# Patient Record
Sex: FEMALE | Race: WHITE | Employment: OTHER | URBAN - NONMETROPOLITAN AREA
[De-identification: names, ages, dates, MRNs, and addresses within clinical notes are randomized per-mention and may not be internally consistent; named-entity substitution may affect disease eponyms.]

---

## 2017-01-03 DIAGNOSIS — I10 ESSENTIAL HYPERTENSION: ICD-10-CM

## 2017-01-03 RX ORDER — LISINOPRIL AND HYDROCHLOROTHIAZIDE 25; 20 MG/1; MG/1
TABLET ORAL
Qty: 30 TABLET | Refills: 11 | Status: SHIPPED | OUTPATIENT
Start: 2017-01-03 | End: 2017-10-03 | Stop reason: DRUGHIGH

## 2017-01-09 ENCOUNTER — OFFICE VISIT (OUTPATIENT)
Dept: PRIMARY CARE CLINIC | Age: 72
End: 2017-01-09
Payer: MEDICARE

## 2017-01-09 VITALS
BODY MASS INDEX: 41.61 KG/M2 | DIASTOLIC BLOOD PRESSURE: 72 MMHG | TEMPERATURE: 96.7 F | OXYGEN SATURATION: 97 % | SYSTOLIC BLOOD PRESSURE: 132 MMHG | HEART RATE: 103 BPM | WEIGHT: 226.12 LBS | HEIGHT: 62 IN

## 2017-01-09 DIAGNOSIS — E78.2 MIXED HYPERLIPIDEMIA: ICD-10-CM

## 2017-01-09 DIAGNOSIS — I63.319 CEREBRAL INFARCTION DUE TO THROMBOSIS OF MIDDLE CEREBRAL ARTERY, UNSPECIFIED BLOOD VESSEL LATERALITY (HCC): ICD-10-CM

## 2017-01-09 DIAGNOSIS — Z23 NEED FOR 23-POLYVALENT PNEUMOCOCCAL POLYSACCHARIDE VACCINE: Primary | ICD-10-CM

## 2017-01-09 DIAGNOSIS — E53.8 B12 DEFICIENCY: ICD-10-CM

## 2017-01-09 DIAGNOSIS — E11.9 TYPE 2 DIABETES MELLITUS WITHOUT COMPLICATION, WITHOUT LONG-TERM CURRENT USE OF INSULIN (HCC): ICD-10-CM

## 2017-01-09 DIAGNOSIS — I10 ESSENTIAL HYPERTENSION: ICD-10-CM

## 2017-01-09 PROCEDURE — 90732 PPSV23 VACC 2 YRS+ SUBQ/IM: CPT | Performed by: PEDIATRICS

## 2017-01-09 PROCEDURE — 99214 OFFICE O/P EST MOD 30 MIN: CPT | Performed by: PEDIATRICS

## 2017-01-09 PROCEDURE — G0009 ADMIN PNEUMOCOCCAL VACCINE: HCPCS | Performed by: PEDIATRICS

## 2017-01-09 ASSESSMENT — ENCOUNTER SYMPTOMS
BACK PAIN: 0
EYE PAIN: 0
CONSTIPATION: 0
DIARRHEA: 0
SORE THROAT: 0
SINUS PRESSURE: 0
NAUSEA: 0
WHEEZING: 0
EYE DISCHARGE: 0
ABDOMINAL PAIN: 0
COUGH: 0
SHORTNESS OF BREATH: 0
VOMITING: 0
VOICE CHANGE: 0

## 2017-01-30 ENCOUNTER — PROCEDURE VISIT (OUTPATIENT)
Dept: PRIMARY CARE CLINIC | Age: 72
End: 2017-01-30
Payer: MEDICARE

## 2017-01-30 DIAGNOSIS — E53.8 B12 DEFICIENCY: Primary | ICD-10-CM

## 2017-01-30 PROCEDURE — 96372 THER/PROPH/DIAG INJ SC/IM: CPT | Performed by: NURSE PRACTITIONER

## 2017-01-30 RX ORDER — CYANOCOBALAMIN 1000 UG/ML
1000 INJECTION INTRAMUSCULAR; SUBCUTANEOUS ONCE
Status: COMPLETED | OUTPATIENT
Start: 2017-01-30 | End: 2017-01-30

## 2017-01-30 RX ADMIN — CYANOCOBALAMIN 1000 MCG: 1000 INJECTION INTRAMUSCULAR; SUBCUTANEOUS at 13:37

## 2017-02-06 RX ORDER — ATORVASTATIN CALCIUM 20 MG/1
20 TABLET, FILM COATED ORAL NIGHTLY
Qty: 30 TABLET | Refills: 11 | OUTPATIENT
Start: 2017-02-06

## 2017-02-13 ENCOUNTER — TELEPHONE (OUTPATIENT)
Dept: PRIMARY CARE CLINIC | Age: 72
End: 2017-02-13

## 2017-02-13 DIAGNOSIS — H65.21 RIGHT CHRONIC SEROUS OTITIS MEDIA: ICD-10-CM

## 2017-02-13 DIAGNOSIS — M10.9 ACUTE GOUTY ARTHRITIS: ICD-10-CM

## 2017-02-14 ENCOUNTER — TELEPHONE (OUTPATIENT)
Dept: PRIMARY CARE CLINIC | Age: 72
End: 2017-02-14

## 2017-02-14 RX ORDER — PREDNISONE 10 MG/1
TABLET ORAL
Qty: 43 TABLET | Refills: 1 | Status: SHIPPED | OUTPATIENT
Start: 2017-02-14 | End: 2017-02-28 | Stop reason: ALTCHOICE

## 2017-02-14 RX ORDER — COLCHICINE 0.6 MG/1
0.6 TABLET ORAL 2 TIMES DAILY
Qty: 60 TABLET | Refills: 2 | Status: SHIPPED | OUTPATIENT
Start: 2017-02-14 | End: 2017-02-28

## 2017-02-28 ENCOUNTER — OFFICE VISIT (OUTPATIENT)
Dept: PRIMARY CARE CLINIC | Age: 72
End: 2017-02-28
Payer: MEDICARE

## 2017-02-28 ENCOUNTER — TELEPHONE (OUTPATIENT)
Dept: PRIMARY CARE CLINIC | Age: 72
End: 2017-02-28

## 2017-02-28 VITALS
SYSTOLIC BLOOD PRESSURE: 120 MMHG | HEART RATE: 103 BPM | DIASTOLIC BLOOD PRESSURE: 76 MMHG | BODY MASS INDEX: 40.03 KG/M2 | TEMPERATURE: 97.9 F | HEIGHT: 62 IN | WEIGHT: 217.5 LBS | OXYGEN SATURATION: 98 %

## 2017-02-28 DIAGNOSIS — M10.072 ACUTE IDIOPATHIC GOUT OF LEFT FOOT: ICD-10-CM

## 2017-02-28 DIAGNOSIS — E11.9 TYPE 2 DIABETES MELLITUS WITHOUT COMPLICATION, WITHOUT LONG-TERM CURRENT USE OF INSULIN (HCC): ICD-10-CM

## 2017-02-28 DIAGNOSIS — R05.9 COUGH: ICD-10-CM

## 2017-02-28 DIAGNOSIS — E11.9 TYPE 2 DIABETES MELLITUS WITHOUT COMPLICATION, WITHOUT LONG-TERM CURRENT USE OF INSULIN (HCC): Primary | ICD-10-CM

## 2017-02-28 DIAGNOSIS — E78.2 MIXED HYPERLIPIDEMIA: ICD-10-CM

## 2017-02-28 DIAGNOSIS — E53.8 B12 DEFICIENCY: ICD-10-CM

## 2017-02-28 DIAGNOSIS — J00 ACUTE NASOPHARYNGITIS: Primary | ICD-10-CM

## 2017-02-28 DIAGNOSIS — R73.9 HYPERGLYCEMIA: ICD-10-CM

## 2017-02-28 DIAGNOSIS — I10 ESSENTIAL HYPERTENSION: ICD-10-CM

## 2017-02-28 LAB
ALBUMIN SERPL-MCNC: 4.2 G/DL (ref 3.5–5.2)
ALP BLD-CCNC: 110 U/L (ref 35–104)
ALT SERPL-CCNC: 15 U/L (ref 5–33)
ANION GAP SERPL CALCULATED.3IONS-SCNC: 24 MMOL/L (ref 7–19)
AST SERPL-CCNC: 10 U/L (ref 5–32)
ATYPICAL LYMPHOCYTE RELATIVE PERCENT: 2 % (ref 0–8)
BANDED NEUTROPHILS RELATIVE PERCENT: 3 % (ref 0–5)
BASOPHILS ABSOLUTE: 0 K/UL (ref 0–0.2)
BASOPHILS RELATIVE PERCENT: 0 % (ref 0–1)
BILIRUB SERPL-MCNC: 0.3 MG/DL (ref 0.2–1.2)
BUN BLDV-MCNC: 20 MG/DL (ref 8–23)
CALCIUM SERPL-MCNC: 9 MG/DL (ref 8.8–10.2)
CHLORIDE BLD-SCNC: 96 MMOL/L (ref 98–111)
CHOLESTEROL, TOTAL: 194 MG/DL (ref 160–199)
CO2: 21 MMOL/L (ref 22–29)
CREAT SERPL-MCNC: 1 MG/DL (ref 0.5–0.9)
CREATININE URINE: 225.8 MG/DL (ref 4.2–622)
EOSINOPHILS ABSOLUTE: 0 K/UL (ref 0–0.6)
EOSINOPHILS RELATIVE PERCENT: 0 % (ref 0–5)
GFR NON-AFRICAN AMERICAN: 55
GLOBULIN: 3 G/DL
GLUCOSE BLD-MCNC: 260 MG/DL (ref 74–109)
HBA1C MFR BLD: 8.7 %
HCT VFR BLD CALC: 40.7 % (ref 37–47)
HDLC SERPL-MCNC: 63 MG/DL (ref 65–121)
HEMOGLOBIN: 12.6 G/DL (ref 12–16)
LDL CHOLESTEROL CALCULATED: 87 MG/DL
LYMPHOCYTES ABSOLUTE: 2.6 K/UL (ref 1.1–4.5)
LYMPHOCYTES RELATIVE PERCENT: 9 % (ref 20–40)
MCH RBC QN AUTO: 29 PG (ref 27–31)
MCHC RBC AUTO-ENTMCNC: 31 G/DL (ref 33–37)
MCV RBC AUTO: 93.8 FL (ref 81–99)
MICROALBUMIN UR-MCNC: <1.2 MG/DL (ref 0–19)
MICROALBUMIN/CREAT UR-RTO: NORMAL MG/G
MONOCYTES ABSOLUTE: 5.5 K/UL (ref 0–0.9)
MONOCYTES RELATIVE PERCENT: 23 % (ref 0–10)
NEUTROPHILS ABSOLUTE: 15.7 K/UL (ref 1.5–7.5)
NEUTROPHILS RELATIVE PERCENT: 63 % (ref 50–65)
PDW BLD-RTO: 13.4 % (ref 11.5–14.5)
PLATELET # BLD: 264 K/UL (ref 130–400)
PLATELET SLIDE REVIEW: ADEQUATE
PMV BLD AUTO: 12.3 FL (ref 7.4–10.4)
POTASSIUM SERPL-SCNC: 4.2 MMOL/L (ref 3.5–5)
RBC # BLD: 4.34 M/UL (ref 4.2–5.4)
SODIUM BLD-SCNC: 141 MMOL/L (ref 136–145)
STOMATOCYTES: ABNORMAL
T4 FREE: 1.7 NG/ML (ref 0.9–1.7)
TOTAL PROTEIN: 7.2 G/DL (ref 6.6–8.7)
TRIGL SERPL-MCNC: 221 MG/DL (ref 150–199)
TSH SERPL DL<=0.05 MIU/L-ACNC: 1.37 UIU/ML (ref 0.27–4.2)
URIC ACID, SERUM: 9.3 MG/DL (ref 2.4–5.7)
VITAMIN B-12: 719 PG/ML (ref 211–946)
WBC # BLD: 23.8 K/UL (ref 4.8–10.8)

## 2017-02-28 PROCEDURE — G8598 ASA/ANTIPLAT THER USED: HCPCS | Performed by: NURSE PRACTITIONER

## 2017-02-28 PROCEDURE — 1123F ACP DISCUSS/DSCN MKR DOCD: CPT | Performed by: NURSE PRACTITIONER

## 2017-02-28 PROCEDURE — 3017F COLORECTAL CA SCREEN DOC REV: CPT | Performed by: NURSE PRACTITIONER

## 2017-02-28 PROCEDURE — 1036F TOBACCO NON-USER: CPT | Performed by: NURSE PRACTITIONER

## 2017-02-28 PROCEDURE — 1090F PRES/ABSN URINE INCON ASSESS: CPT | Performed by: NURSE PRACTITIONER

## 2017-02-28 PROCEDURE — G8484 FLU IMMUNIZE NO ADMIN: HCPCS | Performed by: NURSE PRACTITIONER

## 2017-02-28 PROCEDURE — G8399 PT W/DXA RESULTS DOCUMENT: HCPCS | Performed by: NURSE PRACTITIONER

## 2017-02-28 PROCEDURE — 99213 OFFICE O/P EST LOW 20 MIN: CPT | Performed by: NURSE PRACTITIONER

## 2017-02-28 PROCEDURE — 4040F PNEUMOC VAC/ADMIN/RCVD: CPT | Performed by: NURSE PRACTITIONER

## 2017-02-28 PROCEDURE — G8417 CALC BMI ABV UP PARAM F/U: HCPCS | Performed by: NURSE PRACTITIONER

## 2017-02-28 PROCEDURE — 3014F SCREEN MAMMO DOC REV: CPT | Performed by: NURSE PRACTITIONER

## 2017-02-28 PROCEDURE — G8427 DOCREV CUR MEDS BY ELIG CLIN: HCPCS | Performed by: NURSE PRACTITIONER

## 2017-02-28 RX ORDER — NABUMETONE 750 MG/1
TABLET, FILM COATED ORAL
COMMUNITY
Start: 2017-01-04 | End: 2017-04-03

## 2017-02-28 RX ORDER — GUAIFENESIN 600 MG/1
1200 TABLET, EXTENDED RELEASE ORAL 2 TIMES DAILY
Qty: 56 TABLET | Refills: 0 | Status: SHIPPED | OUTPATIENT
Start: 2017-02-28 | End: 2017-03-14

## 2017-02-28 RX ORDER — FLUTICASONE PROPIONATE 50 MCG
1 SPRAY, SUSPENSION (ML) NASAL DAILY
Qty: 1 BOTTLE | Refills: 3 | Status: SHIPPED | OUTPATIENT
Start: 2017-02-28 | End: 2017-07-03

## 2017-02-28 ASSESSMENT — ENCOUNTER SYMPTOMS
SHORTNESS OF BREATH: 0
CONSTIPATION: 0
DIARRHEA: 0
COUGH: 1
VOMITING: 0
RHINORRHEA: 1
SORE THROAT: 1
EYE REDNESS: 0

## 2017-03-01 ENCOUNTER — TELEPHONE (OUTPATIENT)
Dept: PRIMARY CARE CLINIC | Age: 72
End: 2017-03-01

## 2017-03-01 DIAGNOSIS — D72.829 LEUKOCYTOSIS, UNSPECIFIED TYPE: Primary | ICD-10-CM

## 2017-03-01 LAB — PATHOLOGIST REVIEW: NORMAL

## 2017-03-02 RX ORDER — NABUMETONE 750 MG/1
TABLET, FILM COATED ORAL
Qty: 60 TABLET | Refills: 2 | Status: SHIPPED | OUTPATIENT
Start: 2017-03-02 | End: 2017-07-03

## 2017-03-10 ENCOUNTER — TELEPHONE (OUTPATIENT)
Dept: PRIMARY CARE CLINIC | Age: 72
End: 2017-03-10

## 2017-03-10 RX ORDER — ALLOPURINOL 100 MG/1
100 TABLET ORAL DAILY
Qty: 30 TABLET | Refills: 2 | Status: SHIPPED | OUTPATIENT
Start: 2017-03-10 | End: 2017-05-02 | Stop reason: SDUPTHER

## 2017-03-16 ENCOUNTER — PROCEDURE VISIT (OUTPATIENT)
Dept: PRIMARY CARE CLINIC | Age: 72
End: 2017-03-16
Payer: MEDICARE

## 2017-03-16 DIAGNOSIS — E53.8 B12 DEFICIENCY: Primary | ICD-10-CM

## 2017-03-16 PROCEDURE — 96372 THER/PROPH/DIAG INJ SC/IM: CPT | Performed by: PEDIATRICS

## 2017-03-16 RX ORDER — CYANOCOBALAMIN 1000 UG/ML
1000 INJECTION INTRAMUSCULAR; SUBCUTANEOUS ONCE
Status: COMPLETED | OUTPATIENT
Start: 2017-03-16 | End: 2017-03-16

## 2017-03-16 RX ADMIN — CYANOCOBALAMIN 1000 MCG: 1000 INJECTION INTRAMUSCULAR; SUBCUTANEOUS at 10:34

## 2017-03-27 ENCOUNTER — TELEPHONE (OUTPATIENT)
Dept: PRIMARY CARE CLINIC | Age: 72
End: 2017-03-27

## 2017-03-27 DIAGNOSIS — Z80.0 FAMILY HISTORY OF COLON CANCER: Primary | ICD-10-CM

## 2017-03-27 DIAGNOSIS — E11.9 TYPE 2 DIABETES MELLITUS WITHOUT COMPLICATION, WITHOUT LONG-TERM CURRENT USE OF INSULIN (HCC): ICD-10-CM

## 2017-03-27 DIAGNOSIS — I10 ESSENTIAL HYPERTENSION: ICD-10-CM

## 2017-03-27 LAB
HCT VFR BLD CALC: 34.1 % (ref 37–47)
HEMOGLOBIN: 10.7 G/DL (ref 12–16)
MCH RBC QN AUTO: 28.8 PG (ref 27–31)
MCHC RBC AUTO-ENTMCNC: 31.4 G/DL (ref 33–37)
MCV RBC AUTO: 91.9 FL (ref 81–99)
PDW BLD-RTO: 13.7 % (ref 11.5–14.5)
PLATELET # BLD: 301 K/UL (ref 130–400)
PMV BLD AUTO: 11.2 FL (ref 7.4–10.4)
RBC # BLD: 3.71 M/UL (ref 4.2–5.4)
WBC # BLD: 7.1 K/UL (ref 4.8–10.8)

## 2017-03-30 ENCOUNTER — PROCEDURE VISIT (OUTPATIENT)
Dept: PRIMARY CARE CLINIC | Age: 72
End: 2017-03-30
Payer: MEDICARE

## 2017-03-30 ENCOUNTER — TELEPHONE (OUTPATIENT)
Dept: PRIMARY CARE CLINIC | Age: 72
End: 2017-03-30

## 2017-03-30 DIAGNOSIS — Z12.11 COLON CANCER SCREENING: Primary | ICD-10-CM

## 2017-03-30 LAB
CONTROL: NORMAL
HEMOCCULT STL QL: NORMAL

## 2017-03-30 PROCEDURE — 82274 ASSAY TEST FOR BLOOD FECAL: CPT | Performed by: PEDIATRICS

## 2017-04-03 ENCOUNTER — OFFICE VISIT (OUTPATIENT)
Dept: PRIMARY CARE CLINIC | Age: 72
End: 2017-04-03
Payer: MEDICARE

## 2017-04-03 VITALS
SYSTOLIC BLOOD PRESSURE: 118 MMHG | HEIGHT: 62 IN | BODY MASS INDEX: 39.95 KG/M2 | HEART RATE: 108 BPM | TEMPERATURE: 97.2 F | DIASTOLIC BLOOD PRESSURE: 62 MMHG | OXYGEN SATURATION: 98 % | WEIGHT: 217.12 LBS

## 2017-04-03 DIAGNOSIS — I10 ESSENTIAL HYPERTENSION: ICD-10-CM

## 2017-04-03 DIAGNOSIS — E11.9 TYPE 2 DIABETES MELLITUS WITHOUT COMPLICATION, WITHOUT LONG-TERM CURRENT USE OF INSULIN (HCC): Primary | ICD-10-CM

## 2017-04-03 DIAGNOSIS — E79.0 HYPERURICEMIA: ICD-10-CM

## 2017-04-03 DIAGNOSIS — D64.9 ANEMIA, UNSPECIFIED TYPE: ICD-10-CM

## 2017-04-03 DIAGNOSIS — E78.2 MIXED HYPERLIPIDEMIA: ICD-10-CM

## 2017-04-03 DIAGNOSIS — R53.83 FATIGUE, UNSPECIFIED TYPE: ICD-10-CM

## 2017-04-03 PROCEDURE — 99214 OFFICE O/P EST MOD 30 MIN: CPT | Performed by: PEDIATRICS

## 2017-04-03 PROCEDURE — 1036F TOBACCO NON-USER: CPT | Performed by: PEDIATRICS

## 2017-04-03 PROCEDURE — 3014F SCREEN MAMMO DOC REV: CPT | Performed by: PEDIATRICS

## 2017-04-03 PROCEDURE — 3017F COLORECTAL CA SCREEN DOC REV: CPT | Performed by: PEDIATRICS

## 2017-04-03 PROCEDURE — 1090F PRES/ABSN URINE INCON ASSESS: CPT | Performed by: PEDIATRICS

## 2017-04-03 PROCEDURE — G8427 DOCREV CUR MEDS BY ELIG CLIN: HCPCS | Performed by: PEDIATRICS

## 2017-04-03 PROCEDURE — 3045F PR MOST RECENT HEMOGLOBIN A1C LEVEL 7.0-9.0%: CPT | Performed by: PEDIATRICS

## 2017-04-03 PROCEDURE — G8399 PT W/DXA RESULTS DOCUMENT: HCPCS | Performed by: PEDIATRICS

## 2017-04-03 PROCEDURE — G8417 CALC BMI ABV UP PARAM F/U: HCPCS | Performed by: PEDIATRICS

## 2017-04-03 PROCEDURE — 1123F ACP DISCUSS/DSCN MKR DOCD: CPT | Performed by: PEDIATRICS

## 2017-04-03 PROCEDURE — G8598 ASA/ANTIPLAT THER USED: HCPCS | Performed by: PEDIATRICS

## 2017-04-03 PROCEDURE — 4040F PNEUMOC VAC/ADMIN/RCVD: CPT | Performed by: PEDIATRICS

## 2017-04-03 ASSESSMENT — ENCOUNTER SYMPTOMS
SORE THROAT: 0
VOICE CHANGE: 0
SINUS PRESSURE: 0
NAUSEA: 0
COUGH: 0
ABDOMINAL PAIN: 0
BACK PAIN: 0
DIARRHEA: 0
SHORTNESS OF BREATH: 0
WHEEZING: 0
EYE DISCHARGE: 0
BLOOD IN STOOL: 0
EYE PAIN: 0
CONSTIPATION: 0
VOMITING: 0

## 2017-04-17 ENCOUNTER — TELEPHONE (OUTPATIENT)
Dept: PRIMARY CARE CLINIC | Age: 72
End: 2017-04-17

## 2017-04-17 ENCOUNTER — PROCEDURE VISIT (OUTPATIENT)
Dept: PRIMARY CARE CLINIC | Age: 72
End: 2017-04-17
Payer: MEDICARE

## 2017-04-17 DIAGNOSIS — D72.829 LEUKOCYTOSIS, UNSPECIFIED TYPE: ICD-10-CM

## 2017-04-17 DIAGNOSIS — E53.8 B12 DEFICIENCY: Primary | ICD-10-CM

## 2017-04-17 LAB
BANDED NEUTROPHILS RELATIVE PERCENT: 2 % (ref 0–5)
BASOPHILS ABSOLUTE: 0 K/UL (ref 0–0.2)
BASOPHILS RELATIVE PERCENT: 0 % (ref 0–1)
EOSINOPHILS ABSOLUTE: 0.24 K/UL (ref 0–0.6)
EOSINOPHILS RELATIVE PERCENT: 2 % (ref 0–5)
HCT VFR BLD CALC: 34.7 % (ref 37–47)
HEMOGLOBIN: 11.2 G/DL (ref 12–16)
LYMPHOCYTES ABSOLUTE: 3.4 K/UL (ref 1.1–4.5)
LYMPHOCYTES RELATIVE PERCENT: 28 % (ref 20–40)
MCH RBC QN AUTO: 28.6 PG (ref 27–31)
MCHC RBC AUTO-ENTMCNC: 32.3 G/DL (ref 33–37)
MCV RBC AUTO: 88.7 FL (ref 81–99)
MONOCYTES ABSOLUTE: 1.8 K/UL (ref 0–0.9)
MONOCYTES RELATIVE PERCENT: 15 % (ref 0–10)
NEUTROPHILS ABSOLUTE: 6.7 K/UL (ref 1.5–7.5)
NEUTROPHILS RELATIVE PERCENT: 53 % (ref 50–65)
PDW BLD-RTO: 13.8 % (ref 11.5–14.5)
PLATELET # BLD: 293 K/UL (ref 130–400)
PMV BLD AUTO: 12.6 FL (ref 7.4–10.4)
RBC # BLD: 3.91 M/UL (ref 4.2–5.4)
RBC # BLD: NORMAL 10*6/UL
WBC # BLD: 12.2 K/UL (ref 4.8–10.8)

## 2017-04-17 PROCEDURE — 96372 THER/PROPH/DIAG INJ SC/IM: CPT | Performed by: PEDIATRICS

## 2017-04-17 RX ORDER — CYANOCOBALAMIN 1000 UG/ML
1000 INJECTION INTRAMUSCULAR; SUBCUTANEOUS ONCE
Status: COMPLETED | OUTPATIENT
Start: 2017-04-17 | End: 2017-04-17

## 2017-04-17 RX ORDER — COLCHICINE 0.6 MG/1
0.6 TABLET ORAL 2 TIMES DAILY
Qty: 60 TABLET | Refills: 2 | Status: SHIPPED | OUTPATIENT
Start: 2017-04-17 | End: 2017-07-03

## 2017-04-17 RX ADMIN — CYANOCOBALAMIN 1000 MCG: 1000 INJECTION INTRAMUSCULAR; SUBCUTANEOUS at 11:25

## 2017-04-18 ENCOUNTER — TELEPHONE (OUTPATIENT)
Dept: PRIMARY CARE CLINIC | Age: 72
End: 2017-04-18

## 2017-04-26 ENCOUNTER — OFFICE VISIT (OUTPATIENT)
Dept: PRIMARY CARE CLINIC | Age: 72
End: 2017-04-26
Payer: MEDICARE

## 2017-04-26 VITALS
BODY MASS INDEX: 38.14 KG/M2 | SYSTOLIC BLOOD PRESSURE: 102 MMHG | HEART RATE: 95 BPM | WEIGHT: 207.25 LBS | TEMPERATURE: 97.3 F | HEIGHT: 62 IN | OXYGEN SATURATION: 96 % | DIASTOLIC BLOOD PRESSURE: 60 MMHG

## 2017-04-26 DIAGNOSIS — E11.9 TYPE 2 DIABETES MELLITUS WITHOUT COMPLICATION, WITHOUT LONG-TERM CURRENT USE OF INSULIN (HCC): ICD-10-CM

## 2017-04-26 DIAGNOSIS — R30.9 PAINFUL URINATION: Primary | ICD-10-CM

## 2017-04-26 DIAGNOSIS — M10.071 ACUTE IDIOPATHIC GOUT OF RIGHT FOOT: ICD-10-CM

## 2017-04-26 DIAGNOSIS — N30.01 ACUTE CYSTITIS WITH HEMATURIA: ICD-10-CM

## 2017-04-26 LAB
BILIRUBIN, POC: NORMAL
BLOOD URINE, POC: NORMAL
CLARITY, POC: NORMAL
COLOR, POC: NORMAL
GLUCOSE URINE, POC: 100
KETONES, POC: 15
LEUKOCYTE EST, POC: NORMAL
NITRITE, POC: POSITIVE
PH, POC: 5
PROTEIN, POC: 100
SPECIFIC GRAVITY, POC: 1.02
UROBILINOGEN, POC: 4

## 2017-04-26 PROCEDURE — 1036F TOBACCO NON-USER: CPT | Performed by: PEDIATRICS

## 2017-04-26 PROCEDURE — G8417 CALC BMI ABV UP PARAM F/U: HCPCS | Performed by: PEDIATRICS

## 2017-04-26 PROCEDURE — 99213 OFFICE O/P EST LOW 20 MIN: CPT | Performed by: PEDIATRICS

## 2017-04-26 PROCEDURE — 3017F COLORECTAL CA SCREEN DOC REV: CPT | Performed by: PEDIATRICS

## 2017-04-26 PROCEDURE — G8399 PT W/DXA RESULTS DOCUMENT: HCPCS | Performed by: PEDIATRICS

## 2017-04-26 PROCEDURE — G8598 ASA/ANTIPLAT THER USED: HCPCS | Performed by: PEDIATRICS

## 2017-04-26 PROCEDURE — 1123F ACP DISCUSS/DSCN MKR DOCD: CPT | Performed by: PEDIATRICS

## 2017-04-26 PROCEDURE — 1090F PRES/ABSN URINE INCON ASSESS: CPT | Performed by: PEDIATRICS

## 2017-04-26 PROCEDURE — 4040F PNEUMOC VAC/ADMIN/RCVD: CPT | Performed by: PEDIATRICS

## 2017-04-26 PROCEDURE — 3014F SCREEN MAMMO DOC REV: CPT | Performed by: PEDIATRICS

## 2017-04-26 PROCEDURE — 3045F PR MOST RECENT HEMOGLOBIN A1C LEVEL 7.0-9.0%: CPT | Performed by: PEDIATRICS

## 2017-04-26 PROCEDURE — G8427 DOCREV CUR MEDS BY ELIG CLIN: HCPCS | Performed by: PEDIATRICS

## 2017-04-26 PROCEDURE — 81002 URINALYSIS NONAUTO W/O SCOPE: CPT | Performed by: PEDIATRICS

## 2017-04-26 RX ORDER — CIPROFLOXACIN 500 MG/1
500 TABLET, FILM COATED ORAL 2 TIMES DAILY
Qty: 20 TABLET | Refills: 0 | Status: SHIPPED | OUTPATIENT
Start: 2017-04-26 | End: 2017-05-06

## 2017-04-26 ASSESSMENT — ENCOUNTER SYMPTOMS
VOMITING: 0
DIARRHEA: 0
COUGH: 0
CONSTIPATION: 0
SINUS PRESSURE: 0
NAUSEA: 0
ABDOMINAL DISTENTION: 0
CHOKING: 0
ABDOMINAL PAIN: 0
VOICE CHANGE: 0
SORE THROAT: 0
WHEEZING: 0
SHORTNESS OF BREATH: 0
BACK PAIN: 0
CHEST TIGHTNESS: 0
TROUBLE SWALLOWING: 0

## 2017-05-01 ENCOUNTER — TELEPHONE (OUTPATIENT)
Dept: PRIMARY CARE CLINIC | Age: 72
End: 2017-05-01

## 2017-05-01 DIAGNOSIS — M1A.09X0 CHRONIC GOUT OF MULTIPLE SITES, UNSPECIFIED CAUSE: Primary | ICD-10-CM

## 2017-05-02 RX ORDER — ALLOPURINOL 300 MG/1
300 TABLET ORAL DAILY
Qty: 30 TABLET | Refills: 3 | Status: SHIPPED | OUTPATIENT
Start: 2017-05-02 | End: 2017-10-05 | Stop reason: SDUPTHER

## 2017-05-18 DIAGNOSIS — Z80.0 FAMILY HISTORY OF COLON CANCER: ICD-10-CM

## 2017-05-18 DIAGNOSIS — E11.9 TYPE 2 DIABETES MELLITUS WITHOUT COMPLICATION, WITHOUT LONG-TERM CURRENT USE OF INSULIN (HCC): ICD-10-CM

## 2017-05-18 DIAGNOSIS — M10.071 ACUTE IDIOPATHIC GOUT OF RIGHT FOOT: ICD-10-CM

## 2017-05-18 DIAGNOSIS — I10 ESSENTIAL HYPERTENSION: ICD-10-CM

## 2017-05-18 LAB
HCT VFR BLD CALC: 34.8 % (ref 37–47)
HEMOGLOBIN: 11.1 G/DL (ref 12–16)
MCH RBC QN AUTO: 29.4 PG (ref 27–31)
MCHC RBC AUTO-ENTMCNC: 31.9 G/DL (ref 33–37)
MCV RBC AUTO: 92.3 FL (ref 81–99)
PDW BLD-RTO: 13.9 % (ref 11.5–14.5)
PLATELET # BLD: 286 K/UL (ref 130–400)
PMV BLD AUTO: 12.4 FL (ref 7.4–10.4)
RBC # BLD: 3.77 M/UL (ref 4.2–5.4)
URIC ACID, SERUM: 6 MG/DL (ref 2.4–5.7)
WBC # BLD: 10.6 K/UL (ref 4.8–10.8)

## 2017-05-19 ENCOUNTER — TELEPHONE (OUTPATIENT)
Dept: PRIMARY CARE CLINIC | Age: 72
End: 2017-05-19

## 2017-06-08 ENCOUNTER — PROCEDURE VISIT (OUTPATIENT)
Dept: PRIMARY CARE CLINIC | Age: 72
End: 2017-06-08
Payer: MEDICARE

## 2017-06-08 DIAGNOSIS — E53.8 B12 DEFICIENCY: Primary | ICD-10-CM

## 2017-06-08 PROCEDURE — 96372 THER/PROPH/DIAG INJ SC/IM: CPT | Performed by: NURSE PRACTITIONER

## 2017-06-08 RX ORDER — CYANOCOBALAMIN 1000 UG/ML
1000 INJECTION INTRAMUSCULAR; SUBCUTANEOUS ONCE
Status: COMPLETED | OUTPATIENT
Start: 2017-06-08 | End: 2017-06-08

## 2017-06-08 RX ADMIN — CYANOCOBALAMIN 1000 MCG: 1000 INJECTION INTRAMUSCULAR; SUBCUTANEOUS at 10:30

## 2017-07-03 ENCOUNTER — OFFICE VISIT (OUTPATIENT)
Dept: PRIMARY CARE CLINIC | Age: 72
End: 2017-07-03
Payer: MEDICARE

## 2017-07-03 VITALS
BODY MASS INDEX: 38.46 KG/M2 | OXYGEN SATURATION: 97 % | SYSTOLIC BLOOD PRESSURE: 116 MMHG | HEART RATE: 107 BPM | DIASTOLIC BLOOD PRESSURE: 60 MMHG | HEIGHT: 62 IN | TEMPERATURE: 97.4 F | WEIGHT: 209 LBS

## 2017-07-03 DIAGNOSIS — Z86.010 HISTORY OF COLON POLYPS: ICD-10-CM

## 2017-07-03 DIAGNOSIS — E11.9 TYPE 2 DIABETES MELLITUS WITHOUT COMPLICATION, WITHOUT LONG-TERM CURRENT USE OF INSULIN (HCC): Primary | ICD-10-CM

## 2017-07-03 DIAGNOSIS — E78.2 MIXED HYPERLIPIDEMIA: ICD-10-CM

## 2017-07-03 DIAGNOSIS — D64.9 CHRONIC ANEMIA: ICD-10-CM

## 2017-07-03 DIAGNOSIS — I10 ESSENTIAL HYPERTENSION: ICD-10-CM

## 2017-07-03 DIAGNOSIS — I63.319 CEREBRAL INFARCTION DUE TO THROMBOSIS OF MIDDLE CEREBRAL ARTERY, UNSPECIFIED BLOOD VESSEL LATERALITY (HCC): ICD-10-CM

## 2017-07-03 PROCEDURE — G8427 DOCREV CUR MEDS BY ELIG CLIN: HCPCS | Performed by: PEDIATRICS

## 2017-07-03 PROCEDURE — 83036 HEMOGLOBIN GLYCOSYLATED A1C: CPT | Performed by: PEDIATRICS

## 2017-07-03 PROCEDURE — 1036F TOBACCO NON-USER: CPT | Performed by: PEDIATRICS

## 2017-07-03 PROCEDURE — G8399 PT W/DXA RESULTS DOCUMENT: HCPCS | Performed by: PEDIATRICS

## 2017-07-03 PROCEDURE — 99214 OFFICE O/P EST MOD 30 MIN: CPT | Performed by: PEDIATRICS

## 2017-07-03 PROCEDURE — 3017F COLORECTAL CA SCREEN DOC REV: CPT | Performed by: PEDIATRICS

## 2017-07-03 PROCEDURE — 3014F SCREEN MAMMO DOC REV: CPT | Performed by: PEDIATRICS

## 2017-07-03 PROCEDURE — G8417 CALC BMI ABV UP PARAM F/U: HCPCS | Performed by: PEDIATRICS

## 2017-07-03 PROCEDURE — 4040F PNEUMOC VAC/ADMIN/RCVD: CPT | Performed by: PEDIATRICS

## 2017-07-03 PROCEDURE — G8598 ASA/ANTIPLAT THER USED: HCPCS | Performed by: PEDIATRICS

## 2017-07-03 PROCEDURE — 3046F HEMOGLOBIN A1C LEVEL >9.0%: CPT | Performed by: PEDIATRICS

## 2017-07-03 PROCEDURE — 1090F PRES/ABSN URINE INCON ASSESS: CPT | Performed by: PEDIATRICS

## 2017-07-03 PROCEDURE — 1123F ACP DISCUSS/DSCN MKR DOCD: CPT | Performed by: PEDIATRICS

## 2017-07-03 ASSESSMENT — ENCOUNTER SYMPTOMS
VOMITING: 0
DIARRHEA: 0
SINUS PRESSURE: 0
TROUBLE SWALLOWING: 0
SHORTNESS OF BREATH: 0
BACK PAIN: 0
NAUSEA: 0
ABDOMINAL PAIN: 0
CHEST TIGHTNESS: 0

## 2017-07-10 ENCOUNTER — TELEPHONE (OUTPATIENT)
Dept: PRIMARY CARE CLINIC | Age: 72
End: 2017-07-10

## 2017-07-20 RX ORDER — ATORVASTATIN CALCIUM 20 MG/1
20 TABLET, FILM COATED ORAL DAILY
Qty: 30 TABLET | Refills: 11 | Status: SHIPPED | OUTPATIENT
Start: 2017-07-20 | End: 2018-12-03 | Stop reason: SDUPTHER

## 2017-08-10 ENCOUNTER — PROCEDURE VISIT (OUTPATIENT)
Dept: PRIMARY CARE CLINIC | Age: 72
End: 2017-08-10
Payer: MEDICARE

## 2017-08-10 DIAGNOSIS — E53.8 B12 DEFICIENCY: Primary | ICD-10-CM

## 2017-08-10 PROCEDURE — 96372 THER/PROPH/DIAG INJ SC/IM: CPT | Performed by: NURSE PRACTITIONER

## 2017-08-10 RX ORDER — CYANOCOBALAMIN 1000 UG/ML
1000 INJECTION INTRAMUSCULAR; SUBCUTANEOUS ONCE
Status: COMPLETED | OUTPATIENT
Start: 2017-08-10 | End: 2017-08-10

## 2017-08-10 RX ADMIN — CYANOCOBALAMIN 1000 MCG: 1000 INJECTION INTRAMUSCULAR; SUBCUTANEOUS at 11:50

## 2017-08-25 ENCOUNTER — OFFICE VISIT (OUTPATIENT)
Dept: PRIMARY CARE CLINIC | Age: 72
End: 2017-08-25
Payer: MEDICARE

## 2017-08-25 VITALS
DIASTOLIC BLOOD PRESSURE: 74 MMHG | HEIGHT: 62 IN | SYSTOLIC BLOOD PRESSURE: 132 MMHG | OXYGEN SATURATION: 98 % | HEART RATE: 72 BPM | WEIGHT: 206.5 LBS | BODY MASS INDEX: 38 KG/M2 | TEMPERATURE: 98.2 F
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
## 2017-08-25 DIAGNOSIS — M10.479 ACUTE GOUT DUE TO OTHER SECONDARY CAUSE INVOLVING TOE, UNSPECIFIED LATERALITY: Primary | ICD-10-CM

## 2017-08-25 PROCEDURE — 1036F TOBACCO NON-USER: CPT | Performed by: NURSE PRACTITIONER

## 2017-08-25 PROCEDURE — G8427 DOCREV CUR MEDS BY ELIG CLIN: HCPCS | Performed by: NURSE PRACTITIONER

## 2017-08-25 PROCEDURE — 4040F PNEUMOC VAC/ADMIN/RCVD: CPT | Performed by: NURSE PRACTITIONER

## 2017-08-25 PROCEDURE — G8598 ASA/ANTIPLAT THER USED: HCPCS | Performed by: NURSE PRACTITIONER

## 2017-08-25 PROCEDURE — G8399 PT W/DXA RESULTS DOCUMENT: HCPCS | Performed by: NURSE PRACTITIONER

## 2017-08-25 PROCEDURE — 99213 OFFICE O/P EST LOW 20 MIN: CPT | Performed by: NURSE PRACTITIONER

## 2017-08-25 PROCEDURE — G8417 CALC BMI ABV UP PARAM F/U: HCPCS | Performed by: NURSE PRACTITIONER

## 2017-08-25 PROCEDURE — 1123F ACP DISCUSS/DSCN MKR DOCD: CPT | Performed by: NURSE PRACTITIONER

## 2017-08-25 PROCEDURE — 1090F PRES/ABSN URINE INCON ASSESS: CPT | Performed by: NURSE PRACTITIONER

## 2017-08-25 PROCEDURE — 3017F COLORECTAL CA SCREEN DOC REV: CPT | Performed by: NURSE PRACTITIONER

## 2017-08-25 PROCEDURE — 3014F SCREEN MAMMO DOC REV: CPT | Performed by: NURSE PRACTITIONER

## 2017-08-25 RX ORDER — COLCHICINE 0.6 MG/1
0.6 TABLET ORAL 3 TIMES DAILY
Qty: 60 TABLET | Refills: 2 | Status: SHIPPED | OUTPATIENT
Start: 2017-08-25 | End: 2019-01-17

## 2017-08-25 RX ORDER — HYDROCODONE BITARTRATE AND ACETAMINOPHEN 7.5; 325 MG/1; MG/1
1 TABLET ORAL EVERY 6 HOURS PRN
Qty: 12 TABLET | Refills: 0 | Status: SHIPPED | OUTPATIENT
Start: 2017-08-25 | End: 2017-10-03

## 2017-08-25 ASSESSMENT — ENCOUNTER SYMPTOMS
COUGH: 0
SHORTNESS OF BREATH: 0
ABDOMINAL PAIN: 0

## 2017-09-25 ENCOUNTER — PROCEDURE VISIT (OUTPATIENT)
Dept: PRIMARY CARE CLINIC | Age: 72
End: 2017-09-25
Payer: MEDICARE

## 2017-09-25 ENCOUNTER — TELEPHONE (OUTPATIENT)
Dept: PRIMARY CARE CLINIC | Age: 72
End: 2017-09-25

## 2017-09-25 DIAGNOSIS — E79.0 ELEVATED URIC ACID IN BLOOD: ICD-10-CM

## 2017-09-25 DIAGNOSIS — E79.0 ELEVATED URIC ACID IN BLOOD: Primary | ICD-10-CM

## 2017-09-25 DIAGNOSIS — E53.8 B12 DEFICIENCY: Primary | ICD-10-CM

## 2017-09-25 LAB — URIC ACID, SERUM: 7 MG/DL (ref 2.4–5.7)

## 2017-09-25 PROCEDURE — 96372 THER/PROPH/DIAG INJ SC/IM: CPT | Performed by: NURSE PRACTITIONER

## 2017-09-25 RX ORDER — CYANOCOBALAMIN 1000 UG/ML
1000 INJECTION INTRAMUSCULAR; SUBCUTANEOUS ONCE
Status: COMPLETED | OUTPATIENT
Start: 2017-09-25 | End: 2017-09-25

## 2017-09-25 RX ADMIN — CYANOCOBALAMIN 1000 MCG: 1000 INJECTION INTRAMUSCULAR; SUBCUTANEOUS at 08:28

## 2017-10-03 ENCOUNTER — OFFICE VISIT (OUTPATIENT)
Dept: PRIMARY CARE CLINIC | Age: 72
End: 2017-10-03
Payer: MEDICARE

## 2017-10-03 ENCOUNTER — TELEPHONE (OUTPATIENT)
Dept: PRIMARY CARE CLINIC | Age: 72
End: 2017-10-03

## 2017-10-03 VITALS
TEMPERATURE: 96.1 F | SYSTOLIC BLOOD PRESSURE: 138 MMHG | HEART RATE: 94 BPM | DIASTOLIC BLOOD PRESSURE: 60 MMHG | HEIGHT: 62 IN | WEIGHT: 207.5 LBS | BODY MASS INDEX: 38.18 KG/M2 | OXYGEN SATURATION: 98 %

## 2017-10-03 DIAGNOSIS — E11.9 TYPE 2 DIABETES MELLITUS WITHOUT COMPLICATION, WITHOUT LONG-TERM CURRENT USE OF INSULIN (HCC): ICD-10-CM

## 2017-10-03 DIAGNOSIS — Z23 NEED FOR INFLUENZA VACCINATION: ICD-10-CM

## 2017-10-03 DIAGNOSIS — I10 ESSENTIAL HYPERTENSION: ICD-10-CM

## 2017-10-03 DIAGNOSIS — Z79.899 MEDICATION MANAGEMENT: Primary | ICD-10-CM

## 2017-10-03 DIAGNOSIS — R53.83 FATIGUE, UNSPECIFIED TYPE: ICD-10-CM

## 2017-10-03 DIAGNOSIS — E78.2 MIXED HYPERLIPIDEMIA: ICD-10-CM

## 2017-10-03 DIAGNOSIS — L84 PRE-ULCERATIVE CORN OR CALLOUS: ICD-10-CM

## 2017-10-03 DIAGNOSIS — M10.071 ACUTE IDIOPATHIC GOUT OF RIGHT FOOT: ICD-10-CM

## 2017-10-03 DIAGNOSIS — M15.9 PRIMARY OSTEOARTHRITIS INVOLVING MULTIPLE JOINTS: ICD-10-CM

## 2017-10-03 DIAGNOSIS — I63.319 CEREBRAL INFARCTION DUE TO THROMBOSIS OF MIDDLE CEREBRAL ARTERY, UNSPECIFIED BLOOD VESSEL LATERALITY (HCC): ICD-10-CM

## 2017-10-03 LAB
ALBUMIN SERPL-MCNC: 4.6 G/DL (ref 3.5–5.2)
ALP BLD-CCNC: 72 U/L (ref 35–104)
ALT SERPL-CCNC: 12 U/L (ref 5–33)
AMPHETAMINE SCREEN, URINE: NORMAL
ANION GAP SERPL CALCULATED.3IONS-SCNC: 17 MMOL/L (ref 7–19)
AST SERPL-CCNC: 15 U/L (ref 5–32)
BARBITURATE SCREEN, URINE: NORMAL
BASOPHILS ABSOLUTE: 0 K/UL (ref 0–0.2)
BASOPHILS RELATIVE PERCENT: 0.2 % (ref 0–1)
BENZODIAZEPINE SCREEN, URINE: NORMAL
BILIRUB SERPL-MCNC: <0.2 MG/DL (ref 0.2–1.2)
BUN BLDV-MCNC: 19 MG/DL (ref 8–23)
CALCIUM SERPL-MCNC: 9.9 MG/DL (ref 8.8–10.2)
CHLORIDE BLD-SCNC: 98 MMOL/L (ref 98–111)
CHOLESTEROL, TOTAL: 124 MG/DL (ref 160–199)
CO2: 23 MMOL/L (ref 22–29)
COCAINE METABOLITE SCREEN URINE: NORMAL
CREAT SERPL-MCNC: 0.8 MG/DL (ref 0.5–0.9)
CREATININE URINE: 179.3 MG/DL (ref 4.2–622)
EOSINOPHILS ABSOLUTE: 0.2 K/UL (ref 0–0.6)
EOSINOPHILS RELATIVE PERCENT: 2.4 % (ref 0–5)
GFR NON-AFRICAN AMERICAN: >60
GLUCOSE BLD-MCNC: 127 MG/DL (ref 74–109)
HBA1C MFR BLD: 6.6 %
HCT VFR BLD CALC: 34.1 % (ref 37–47)
HDLC SERPL-MCNC: 59 MG/DL (ref 65–121)
HEMOGLOBIN: 11 G/DL (ref 12–16)
LDL CHOLESTEROL CALCULATED: 49 MG/DL
LYMPHOCYTES ABSOLUTE: 3.4 K/UL (ref 1.1–4.5)
LYMPHOCYTES RELATIVE PERCENT: 40.8 % (ref 20–40)
MCH RBC QN AUTO: 29.7 PG (ref 27–31)
MCHC RBC AUTO-ENTMCNC: 32.3 G/DL (ref 33–37)
MCV RBC AUTO: 92.2 FL (ref 81–99)
MDMA URINE: NORMAL
METHADONE SCREEN, URINE: NORMAL
METHAMPHETAMINE, URINE: NORMAL
MICROALBUMIN UR-MCNC: <1.2 MG/DL (ref 0–19)
MICROALBUMIN/CREAT UR-RTO: NORMAL MG/G
MONOCYTES ABSOLUTE: 1.2 K/UL (ref 0–0.9)
MONOCYTES RELATIVE PERCENT: 14.9 % (ref 0–10)
NEUTROPHILS ABSOLUTE: 3.3 K/UL (ref 1.5–7.5)
NEUTROPHILS RELATIVE PERCENT: 40.6 % (ref 50–65)
OPIATE SCREEN URINE: NORMAL
OXYCODONE SCREEN URINE: NORMAL
PDW BLD-RTO: 13.3 % (ref 11.5–14.5)
PHENCYCLIDINE SCREEN URINE: NORMAL
PLATELET # BLD: 287 K/UL (ref 130–400)
PMV BLD AUTO: 12 FL (ref 9.4–12.3)
POTASSIUM SERPL-SCNC: 4.6 MMOL/L (ref 3.5–5)
PROPOXYPHENE SCREEN, URINE: NORMAL
RBC # BLD: 3.7 M/UL (ref 4.2–5.4)
SODIUM BLD-SCNC: 138 MMOL/L (ref 136–145)
T4 FREE: 1.4 NG/DL (ref 0.9–1.7)
THC: NORMAL
TOTAL PROTEIN: 7.2 G/DL (ref 6.6–8.7)
TRICYCLIC ANTIDEPRESSANTS, UR: NORMAL
TRIGL SERPL-MCNC: 78 MG/DL (ref 149–199)
TSH SERPL DL<=0.05 MIU/L-ACNC: 1.63 UIU/ML (ref 0.27–4.2)
URIC ACID, SERUM: 4.6 MG/DL (ref 2.4–5.7)
WBC # BLD: 8.2 K/UL (ref 4.8–10.8)

## 2017-10-03 PROCEDURE — 90662 IIV NO PRSV INCREASED AG IM: CPT | Performed by: PEDIATRICS

## 2017-10-03 PROCEDURE — 1090F PRES/ABSN URINE INCON ASSESS: CPT | Performed by: PEDIATRICS

## 2017-10-03 PROCEDURE — 4040F PNEUMOC VAC/ADMIN/RCVD: CPT | Performed by: PEDIATRICS

## 2017-10-03 PROCEDURE — G8417 CALC BMI ABV UP PARAM F/U: HCPCS | Performed by: PEDIATRICS

## 2017-10-03 PROCEDURE — 3046F HEMOGLOBIN A1C LEVEL >9.0%: CPT | Performed by: PEDIATRICS

## 2017-10-03 PROCEDURE — G8427 DOCREV CUR MEDS BY ELIG CLIN: HCPCS | Performed by: PEDIATRICS

## 2017-10-03 PROCEDURE — G0008 ADMIN INFLUENZA VIRUS VAC: HCPCS | Performed by: PEDIATRICS

## 2017-10-03 PROCEDURE — G8399 PT W/DXA RESULTS DOCUMENT: HCPCS | Performed by: PEDIATRICS

## 2017-10-03 PROCEDURE — 1036F TOBACCO NON-USER: CPT | Performed by: PEDIATRICS

## 2017-10-03 PROCEDURE — 1123F ACP DISCUSS/DSCN MKR DOCD: CPT | Performed by: PEDIATRICS

## 2017-10-03 PROCEDURE — 80305 DRUG TEST PRSMV DIR OPT OBS: CPT | Performed by: PEDIATRICS

## 2017-10-03 PROCEDURE — 3017F COLORECTAL CA SCREEN DOC REV: CPT | Performed by: PEDIATRICS

## 2017-10-03 PROCEDURE — 99214 OFFICE O/P EST MOD 30 MIN: CPT | Performed by: PEDIATRICS

## 2017-10-03 PROCEDURE — G8484 FLU IMMUNIZE NO ADMIN: HCPCS | Performed by: PEDIATRICS

## 2017-10-03 PROCEDURE — 3014F SCREEN MAMMO DOC REV: CPT | Performed by: PEDIATRICS

## 2017-10-03 PROCEDURE — G8598 ASA/ANTIPLAT THER USED: HCPCS | Performed by: PEDIATRICS

## 2017-10-03 RX ORDER — MELOXICAM 15 MG/1
15 TABLET ORAL DAILY
Qty: 30 TABLET | Refills: 3 | Status: SHIPPED | OUTPATIENT
Start: 2017-10-03 | End: 2018-01-11 | Stop reason: SDUPTHER

## 2017-10-03 RX ORDER — LISINOPRIL AND HYDROCHLOROTHIAZIDE 20; 12.5 MG/1; MG/1
1 TABLET ORAL DAILY
Qty: 30 TABLET | Refills: 3 | Status: SHIPPED | OUTPATIENT
Start: 2017-10-03 | End: 2018-01-11 | Stop reason: SDUPTHER

## 2017-10-03 RX ORDER — GLUCOSAMINE HCL/CHONDROITIN SU 500-400 MG
CAPSULE ORAL
Qty: 100 STRIP | Refills: 3 | Status: SHIPPED | OUTPATIENT
Start: 2017-10-03

## 2017-10-03 ASSESSMENT — ENCOUNTER SYMPTOMS
SHORTNESS OF BREATH: 0
BACK PAIN: 0
NAUSEA: 0
ABDOMINAL PAIN: 0
SORE THROAT: 0
WHEEZING: 0
DIARRHEA: 0
COUGH: 0
EYE PAIN: 0
SINUS PRESSURE: 0
VOMITING: 0

## 2017-10-03 NOTE — TELEPHONE ENCOUNTER
----- Message from [address] [facility],[address], DO sent at 10/3/2017  6:20 PM CDT -----  Cholesterol is excellently controlled. Uric acid is 4.6 which essentially eliminates the risk of gout completely. Glucose is mildly elevated at 127. Your metabolic profile is normal.  This includes kidney and liver functions as well as electrolytes. Hemoglobin A1c is 6.6 which is much much better than last time. Guidelines recommend that if we can keep this less than 6.5, we can almost eliminate end organ damage from diabetes. So that would be almost as if he did not even have diabetes. Good work, and keep up the good work. Thyroid is normal.  Mild anemia with hemoglobin of 11. This is similar to past blood levels. Indices are all unremarkable. The rest of the cells are also all unremarkable. There is no significant protein excretion in the urine.

## 2017-10-03 NOTE — TELEPHONE ENCOUNTER
Pt aware and voiced understanding. Informed patient of any recommendations from providers. Will call with any further questions.

## 2017-10-03 NOTE — PROGRESS NOTES
After obtaining consent, and per orders of Dr. Frandy Fields, injection of flu vaccine high dose was given in the Left deltoid . Patient tolerated it well. Patient instructed to report any adverse reaction to me immediately.

## 2017-10-03 NOTE — MR AVS SNAPSHOT
After Visit Summary             Sarah Wilcox   10/3/2017 7:30 AM   Office Visit    Description:  Female : 1945   Provider:  Shahid Lynn DO   Department:  [facility]              Your Follow-Up and Future Appointments         Below is a list of your follow-up and future appointments. This may not be a complete list as you may have made appointments directly with providers that we are not aware of or your providers may have made some for you. Please call your providers to confirm appointments. It is important to keep your appointments. Please bring your current insurance card, photo ID, co-pay, and all medication bottles to your appointment. If self-pay, payment is expected at the time of service. Your To-Do List     Future Appointments Provider Department Dept Phone    2018 7:30 AM NICOLE Sood DO [facility] [phone]    Please arrive 15 minutes prior to appointment, bring photo ID and insurance card. Future Orders Complete By Expires    CBC Auto Differential [RHA0840 Custom]  10/3/2017 10/4/2018    Comprehensive Metabolic Panel [ONW76 Custom]  10/3/2017 10/4/2018    Hemoglobin A1C [LAB90 Custom]  10/3/2017 10/4/2018    Lipid Panel [LAB18 Custom]  10/3/2017 10/4/2018    Microalbumin / Creatinine Urine Ratio [YIO576 Custom]  10/3/2017 10/4/2018    T4, Free [BIT887 Custom]  10/3/2017 10/4/2018    TSH without Reflex [INL248 Custom]  10/3/2017 10/4/2018    Uric Acid [FMV454 Custom]  10/3/2017 10/4/2018    Follow-Up    Return in about 3 months (around 1/3/2018).          Information from Your Visit        Department     Name Address Phone Fax    [address] [phone]      You Were Seen for:         Comments    Medication management   [[id number]]         Vital Signs     Blood Pressure Pulse Temperature Height Weight Oxygen Saturation    138/60 (Site: Right Arm, Position: Sitting, Cuff Size: Large Adult) 94 guidelines. However these guidelines can be individualized by your provider. 9/28/2017    Hemoglobin A1C (Test For Long-Term Glucose Control) 2/28/2018    Urine Check For Kidney Problems 2/28/2018    Cholesterol Screening 2/28/2018    Colonoscopy 5/18/2021            MyChart Signup           [facility] allows you to send messages to your doctor, view your test results, renew your prescriptions, schedule appointments, view visit notes, and more. How Do I Sign Up? 1. In your Internet browser, go to [email or web address]  2. Click on the Sign Up Now link in the Sign In box. You will see the New Member Sign Up page. 3. Enter your [facility] Access Code exactly as it appears below. You will not need to use this code after youve completed the sign-up process. If you do not sign up before the expiration date, you must request a new code. [facility] Access Code: [email or web address]  Expires: 12/2/2017  9:12 AM    4. Enter your Social Security Number ([id number]) and Date of Birth (mm/dd/yyyy) as indicated and click Submit. You will be taken to the next sign-up page. 5. Create a [facility] ID. This will be your [facility] login ID and cannot be changed, so think of one that is secure and easy to remember. 6. Create a [facility] password. You can change your password at any time. 7. Enter your Password Reset Question and Answer. This can be used at a later time if you forget your password. 8. Enter your e-mail address. You will receive e-mail notification when new information is available in [address] 9. Click Sign Up. You can now view your medical record. Additional Information  If you have questions, please contact the physician practice where you receive care. Remember, [facility] is NOT to be used for urgent needs. For medical emergencies, dial 911. For questions regarding your [facility] account call [phone]. If you have a clinical question, please call your doctor's office.

## 2017-10-03 NOTE — PROGRESS NOTES
[address] [facility], [address]  Phone [phone]   Fax [phone]      OFFICE VISIT: 10/3/2017    Jacob Muniz- : 1945      HPI  Reason For Visit:  Geraldo Bledsoe is a 81 y.o. Health Maintenance eye-scheduled  Flu- discuss  Date of Most Recent Physical:  7/3/2017  Medicare Health Risk Assessment Form completed and in chart today     The patient presents today for diabetes 6 month follow up  See office note from 7/3/2017        Diabetes Mellitus Type 2    Diet compliance:  compliant most of the time  Nutrition Consultation Needed:  no  Med Type: These are unchanged from last visit  Medication compliance:  compliant most of the time  Weight trend: fluctuating  Current exercise: yes - she is walking and taking care of her granddaughter  She is very busy and moving all the time. Checking: not checking at all due to broken glucometer. Home blood sugar records: none  Low BG:  no  Eye exam current (within one year): yes  Checking Feet regularly:  yes - no sores  ACE/ARB:  yes - lisinoprilhydrochlorothiazide  Aspirin: Yes  Tobacco history: She  reports that she has never smoked. She has never used smokeless tobacco.    Lab Results   Component Value Date    LABA1C 6.6 (H) 10/03/2017    LABA1C 8.7 (H) 2017    LABA1C 5.9 2016     Lab Results   Component Value Date    LABMICR <1.20 10/03/2017    CREATININE 0.8 10/03/2017     Point-of-care test hemoglobin A1c was ordered on 7/3/2017 but there is no value noted in the chart. Hypertension:    Home blood pressure monitoring: Yes - running in the 106L systolic and 41-93 diastolic  Medication:    Amlodipine 5 mg daily   Lisinopril hydrochlorthiazide  2025 milligrams daily. She is not adherent to a low sodium diet.        Hyperlipidemia:    Myalgias or GI upset: no   on atorvastatin (Lipitor)    Lab Results   Component Value Date    CHOL 124 (L) 10/03/2017    TRIG 78 (L) 10/03/2017    HDL 59 (L) 10/03/2017    1812 VSHORE 49 10/03/2017    LDLDIRECT 85 (L) 08/19/2015      Lab Results   Component Value Date    ALT 12 10/03/2017    AST 15 10/03/2017     She had an episode of gout and was treated with colchicine. She took this tid for several days. She had vomiting and diarrhea, but gout went away  She needs ua level. She is taking allopurinol 300mg daily. Her knees are bothering her with her arthritis  She is inquiring if there is anything that she can take that is not narcotic  She had norco with her gout attack, but it did not take away her knee pain  She does not want if ineffective. Last Cr = 1.0 on 2/28/17    Barriers To Success: none         height is 5' 2\" (1.575 m) and weight is 207 lb 8 oz (94.1 kg). Her temporal temperature is 96.1 °F (35.6 °C). Her blood pressure is 138/60 and her pulse is 94. Her oxygen saturation is 98%. Body mass index is 37.95 kg/(m^2). Results for orders placed or performed in visit on 10/03/17   POCT Rapid Drug Screen   Result Value Ref Range    Amphetamine Screen, Urine neg     Barbiturate Screen, Urine neg     Benzodiazepine Screen, Urine neg     COCAINE METABOLITE SCREEN URINE neg     THC neg     MDMA URINE neg     Methadone Screen, Urine neg     Opiate Scrn, Ur neg     Oxycodone Screen, Ur neg     PCP Scrn, Ur neg     Propoxyphene Screen, Urine neg     Tricyclic Antidepressants, Ur neg     Methamphetamine, Urine neg          Notes Recorded by Guillermo Cordoba DO on 5/19/2017 at 7:19 AM  Uric acid level is 6. This is right where we want to be. With a level of 6. His urgently impossible for you to have a gout attack. There continues to be a mild anemia similar to that of a month ago. No other abnormalities noted. I am taking some insurance in the stability.                    I have reviewed the following with the Ms. Flood   Lab Review   Orders Only on 09/25/2017   Component Date Value    Uric Acid, Serum 09/25/2017 7.0*   Orders Only on 05/18/2017   Component Date Value    WBC 05/18/2017 10.6     RBC 05/18/2017 3.77*    Hemoglobin 05/18/2017 11.1*    Hematocrit 05/18/2017 34.8*    MCV 05/18/2017 92.3     MCH 05/18/2017 29.4     MCHC 05/18/2017 31.9*    RDW 05/18/2017 13.9     Platelets 85/49/3091 286     MPV 05/18/2017 12.4*    Uric Acid, Serum 05/18/2017 6.0*   Office Visit on 04/26/2017   Component Date Value    Color, UA 04/26/2017 Red     Glucose, UA POC 04/26/2017 100     Bilirubin, UA 04/26/2017 Moderate     Ketones, UA 04/26/2017 15     Spec Grav, UA 04/26/2017 1.020     Blood, UA POC 04/26/2017 Trace-lysed     pH, UA 04/26/2017 5.0     Protein, UA POC 04/26/2017 100     Urobilinogen, UA 04/26/2017 4.0     Leukocytes, UA 04/26/2017 Large     Nitrite, UA 04/26/2017 Positive    Orders Only on 04/17/2017   Component Date Value    WBC 04/17/2017 12.2*    RBC 04/17/2017 3.91*    Hemoglobin 04/17/2017 11.2*    Hematocrit 04/17/2017 34.7*    MCV 04/17/2017 88.7     MCH 04/17/2017 28.6     MCHC 04/17/2017 32.3*    RDW 04/17/2017 13.8     Platelets 54/85/4532 293     MPV 04/17/2017 12.6*    Neutrophils % 04/17/2017 53.0     Lymphocytes % 04/17/2017 28.0     Monocytes % 04/17/2017 15.0*    Eosinophils % 04/17/2017 2.0     Basophils % 04/17/2017 0.0     Neutrophils # 04/17/2017 6.7     Lymphocytes # 04/17/2017 3.4     Monocytes # 04/17/2017 1.80*    Eosinophils # 04/17/2017 0.24     Basophils # 04/17/2017 0.00     Bands Relative 04/17/2017 2     RBC Morphology 04/17/2017 Normal      Copies of these are in the chart.     Current Outpatient Prescriptions   Medication Sig Dispense Refill    meloxicam (MOBIC) 15 MG tablet Take 1 tablet by mouth daily 30 tablet 3    lisinopril-hydrochlorothiazide (PRINZIDE;ZESTORETIC) 20-12.5 MG per tablet Take 1 tablet by mouth daily 30 tablet 3    Blood Glucose Monitoring Suppl JOSE Cap glucose daily and prn 1 Device 0    Glucose Blood (BLOOD GLUCOSE TEST STRIPS) STRP Cap glucose daily and prn 100 strip 3    colchicine (COLCRYS) 0.6 MG tablet Take 1 tablet by mouth 3 times daily (Patient taking differently: Take 0.6 mg by mouth as needed ) 60 tablet 2    atorvastatin (LIPITOR) 20 MG tablet Take 1 tablet by mouth daily 30 tablet 11    allopurinol (ZYLOPRIM) 300 MG tablet Take 1 tablet by mouth daily 30 tablet 3    Liraglutide (VICTOZA) 18 MG/3ML SOPN SC injection Inject 1.2 mg into the skin daily 2 Pen 3    Insulin Pen Needle (H-E-B INCONTROL PEN NEEDLES) 32G X 4 MM MISC 1 each by Does not apply route daily 100 each 3    metFORMIN (GLUCOPHAGE) 1000 MG tablet TAKE ONE TABLET BY MOUTH TWICE DAILY WITH MEALS 180 tablet 3    amLODIPine (NORVASC) 5 MG tablet Take 1 tablet by mouth daily 30 tablet 11    Calcium-Vitamin D (CALTRATE 600 PLUS-VIT D PO) Take 1 tablet by mouth 2 times daily      aspirin 81 MG tablet Take 81 mg by mouth daily.  Multiple Vitamins-Minerals (MULTI COMPLETE PO) Take 1 tablet by mouth daily. No current facility-administered medications for this visit. Allergies: Review of patient's allergies indicates no known allergies. Past Medical History:   Diagnosis Date    Hyperlipidemia     Hypertension     Stroke (cerebrum) (HCC)     Type II or unspecified type diabetes mellitus without mention of complication, not stated as uncontrolled        Past Surgical History:   Procedure Laterality Date    APPENDECTOMY     [address]    [facility]  [facility]Tubular AP (-) dysplasia x 1, 5 yr recall   [address] [facility] [address]  [facility]    She has had multiple hernia surgeries    UPPER GASTROINTESTINAL ENDOSCOPY  1985       Social History   Substance Use Topics    Smoking status: Never Smoker    Smokeless tobacco: Never Used    Alcohol use No       Review of Systems   Constitutional: Negative for fatigue and unexpected weight change. HENT: Negative for congestion, ear pain, sinus pressure and sore throat.     Eyes: Negative for pain and visual disturbance. Respiratory: Negative for cough, shortness of breath and wheezing. Cardiovascular: Negative for chest pain, palpitations and leg swelling. Gastrointestinal: Negative for abdominal pain, diarrhea, nausea and vomiting. Endocrine: Negative for polyuria. Genitourinary: Negative for dysuria, frequency, hematuria and urgency. Musculoskeletal: Negative for back pain and neck pain. Skin: Negative for rash. Neurological: Negative for dizziness and headaches. Psychiatric/Behavioral: Negative for self-injury. The patient is not nervous/anxious. Physical Exam   Constitutional: She is oriented to person, place, and time. She appears well-developed and well-nourished. She is cooperative. Non-toxic appearance. No distress. Body habitus is obese   HENT:   Head: Normocephalic and atraumatic. Right Ear: Hearing, tympanic membrane, external ear and ear canal normal.   Left Ear: Hearing, tympanic membrane, external ear and ear canal normal.   Nose: Nose normal.   Mouth/Throat: Oropharynx is clear and moist and mucous membranes are normal.   Eyes: Conjunctivae, EOM and lids are normal. Pupils are equal, round, and reactive to light. Neck: Phonation normal. Neck supple. No JVD present. Carotid bruit is not present. No thyromegaly present. Cardiovascular: Normal rate, regular rhythm and normal heart sounds. No extrasystoles are present. PMI is not displaced. Exam reveals no gallop and no friction rub. No murmur heard. Pulmonary/Chest: Effort normal and breath sounds normal. No respiratory distress. She has no wheezes. She has no rhonchi. She has no rales. Abdominal: Soft. Bowel sounds are normal. She exhibits no distension and no mass. There is no hepatosplenomegaly. There is no tenderness. There is no CVA tenderness. Genitourinary:   Genitourinary Comments: Examination deferred   Musculoskeletal: Normal range of motion. She exhibits no edema.    Joint examination reveals no acute arthritis or synovitis. Lymphadenopathy:     She has no cervical adenopathy. Neurological: She is alert and oriented to person, place, and time. She has normal strength. She displays no atrophy and no tremor. No cranial nerve deficit (by gross examination) or sensory deficit. Gait normal.   No focal deficits appreciated   Skin: Skin is warm and dry. No rash noted. Psychiatric: She has a normal mood and affect. Her speech is normal and behavior is normal.   Vitals reviewed. Monofilament Exam Reveals:  Pulses: intact  Edema: absent  Skin Lesions: some redness on plantar aspect of feet and callous at 5    Right Foot:    Left Foot:  Normal sensation at all   Normal sensation at all               ASSESSMENT      ICD-10-CM ICD-9-CM    1. Medication management Z79.899 V58.69 POCT Rapid Drug Screen   2. Essential hypertension I10 401.9 CBC Auto Differential      Comprehensive Metabolic Panel      Microalbumin / Creatinine Urine Ratio      lisinopril-hydrochlorothiazide (PRINZIDE;ZESTORETIC) 20-12.5 MG per tablet   3. Type 2 diabetes mellitus without complication, without long-term current use of insulin ([facility]) E11.9 250.00 Hemoglobin A1C      Microalbumin / Creatinine Urine Ratio      T4, Free      TSH without Reflex      Blood Glucose Monitoring Suppl [facility]      Glucose Blood (BLOOD GLUCOSE TEST STRIPS) [facility]      Diabetic Shoe   4. Mixed hyperlipidemia E78.2 272.2 Lipid Panel   5. Cerebral infarction due to thrombosis of middle cerebral artery, unspecified blood vessel laterality [facility] I63.319 434.01    6. Acute idiopathic gout of right foot M10.071 274.01 Uric Acid   7. Primary osteoarthritis involving multiple joints M15.0 715.09 meloxicam (MOBIC) 15 MG tablet   8. Fatigue, unspecified type R53.83 780.79 T4, Free      TSH without Reflex   9.  Need for influenza vaccination Z23 V04.81 INFLUENZA, HIGH DOSE, 65 YRS +, IM, PF, PREFILL SYR, 0.5ML (FLUZONE HD) 10. Pre-ulcerative corn or callous L84 700 Diabetic Shoe       PLAN      ICD-10-CM ICD-9-CM    1. Medication management Z79.899 V58.69 POCT Rapid Drug Screen   2. Essential hypertension I10 401.9 CBC Auto Differential      Comprehensive Metabolic Panel      Microalbumin / Creatinine Urine Ratio      lisinopril-hydrochlorothiazide (PRINZIDE;ZESTORETIC) 20-12.5 MG per tablet  This is a decrease from the 2025 gram dose she was on previously. She will watch her blood pressure and monitor routinely. If having any elevations of blood pressure we will consider adding 10 mg of lisinopril at bedtime to this regimen. 3. Type 2 diabetes mellitus without complication, without long-term current use of insulin ([facility]) E11.9 250.00 Hemoglobin A1C      Microalbumin / Creatinine Urine Ratio      T4, Free      TSH without Reflex      Blood Glucose Monitoring Suppl [facility]      Glucose Blood (BLOOD GLUCOSE TEST STRIPS) [facility] DIABETES FOOT EXAM      Diabetic Shoe   4. Mixed hyperlipidemia E78.2 272.2 Lipid Panel  This was well controlled on the regimen of atorvastatin 20 mg nightly. Last LDL determination was 87 on 2/28/17. 5. Cerebral infarction due to thrombosis of middle cerebral artery, unspecified blood vessel laterality ([facility] [address] I63.319 434.01 Will continue to modify risk factors as best as possible. This includes excellent blood pressure control, excellent diabetes control, aspirin 81 mg daily. 6. Acute idiopathic gout of right foot M10.071 274.01 Uric Acid  Hopefully decreasing HCTZ in her medication regimen were decrease further the risks of gout. Most recent uric acid was 7.0 with a goal of less than 6.4 or 6 as an average. 7. Primary osteoarthritis involving multiple joints M15.0 715.09 meloxicam (MOBIC) 15 MG tablet  We will need to monitor renal function following the administration of this medication. 8. Fatigue, unspecified type R53.83 780.79 T4, Free      TSH without Reflex   9.  Need for influenza

## 2017-10-05 RX ORDER — ALLOPURINOL 300 MG/1
300 TABLET ORAL DAILY
Qty: 30 TABLET | Refills: 5 | Status: SHIPPED | OUTPATIENT
Start: 2017-10-05 | End: 2018-04-12 | Stop reason: SDUPTHER

## 2017-10-24 ENCOUNTER — TELEPHONE (OUTPATIENT)
Dept: PRIMARY CARE CLINIC | Age: 72
End: 2017-10-24

## 2017-10-24 NOTE — TELEPHONE ENCOUNTER
Called patient back to let her know that I set out 1 victoza sample for her and she is going to come get.

## 2017-12-01 ENCOUNTER — TELEPHONE (OUTPATIENT)
Dept: PRIMARY CARE CLINIC | Age: 72
End: 2017-12-01

## 2017-12-01 ENCOUNTER — PROCEDURE VISIT (OUTPATIENT)
Dept: PRIMARY CARE CLINIC | Age: 72
End: 2017-12-01
Payer: MEDICARE

## 2017-12-01 DIAGNOSIS — E53.8 B12 DEFICIENCY: Primary | ICD-10-CM

## 2017-12-01 PROCEDURE — 96372 THER/PROPH/DIAG INJ SC/IM: CPT | Performed by: NURSE PRACTITIONER

## 2017-12-01 RX ORDER — CYANOCOBALAMIN 1000 UG/ML
1000 INJECTION INTRAMUSCULAR; SUBCUTANEOUS ONCE
Status: COMPLETED | OUTPATIENT
Start: 2017-12-01 | End: 2017-12-01

## 2017-12-01 RX ORDER — AMOXICILLIN AND CLAVULANATE POTASSIUM 875; 125 MG/1; MG/1
1 TABLET, FILM COATED ORAL 2 TIMES DAILY
Qty: 20 TABLET | Refills: 0 | Status: SHIPPED | OUTPATIENT
Start: 2017-12-01 | End: 2017-12-11

## 2017-12-01 RX ADMIN — CYANOCOBALAMIN 1000 MCG: 1000 INJECTION INTRAMUSCULAR; SUBCUTANEOUS at 10:27

## 2017-12-01 NOTE — PROGRESS NOTES
After obtaining consent, and per orders of LAYLA Mcpherson, injection of b12 given in Left deltoid by Brittanie Elena. Patient instructed to remain in clinic for 20 minutes afterwards, and to report any adverse reaction to me immediately.

## 2018-01-11 DIAGNOSIS — I10 ESSENTIAL HYPERTENSION: ICD-10-CM

## 2018-01-11 DIAGNOSIS — M15.9 PRIMARY OSTEOARTHRITIS INVOLVING MULTIPLE JOINTS: ICD-10-CM

## 2018-01-11 RX ORDER — LISINOPRIL AND HYDROCHLOROTHIAZIDE 20; 12.5 MG/1; MG/1
1 TABLET ORAL DAILY
Qty: 30 TABLET | Refills: 11 | Status: SHIPPED | OUTPATIENT
Start: 2018-01-11 | End: 2019-01-17

## 2018-01-11 RX ORDER — MELOXICAM 15 MG/1
15 TABLET ORAL DAILY
Qty: 30 TABLET | Refills: 11 | Status: SHIPPED | OUTPATIENT
Start: 2018-01-11 | End: 2019-01-17

## 2018-01-11 NOTE — TELEPHONE ENCOUNTER
Pt seen 10/3/17      Requested Prescriptions     Pending Prescriptions Disp Refills    lisinopril-hydrochlorothiazide (PRINZIDE;ZESTORETIC) 20-12.5 MG per tablet [Pharmacy Med Name: LISINOPRIL-HYDROCHLOROTHIAZIDE 20-12.5 MG TABLET] 30 tablet      Sig: TAKE ONE TABLET BY MOUTH DAILY    meloxicam (MOBIC) 15 MG tablet [Pharmacy Med Name: MELOXICAM 15 MG TABLET] 30 tablet      Sig: TAKE ONE TABLET BY MOUTH DAILY

## 2018-01-15 RX ORDER — AMLODIPINE BESYLATE 5 MG/1
5 TABLET ORAL DAILY
Qty: 30 TABLET | Refills: 11 | Status: SHIPPED | OUTPATIENT
Start: 2018-01-15 | End: 2019-01-17

## 2018-02-02 ENCOUNTER — PROCEDURE VISIT (OUTPATIENT)
Dept: PRIMARY CARE CLINIC | Age: 73
End: 2018-02-02
Payer: MEDICARE

## 2018-02-02 DIAGNOSIS — E53.8 B12 DEFICIENCY: Primary | ICD-10-CM

## 2018-02-02 PROCEDURE — 96372 THER/PROPH/DIAG INJ SC/IM: CPT | Performed by: PEDIATRICS

## 2018-02-02 RX ORDER — CYANOCOBALAMIN 1000 UG/ML
1000 INJECTION INTRAMUSCULAR; SUBCUTANEOUS ONCE
Status: COMPLETED | OUTPATIENT
Start: 2018-02-02 | End: 2018-02-02

## 2018-02-02 RX ADMIN — CYANOCOBALAMIN 1000 MCG: 1000 INJECTION INTRAMUSCULAR; SUBCUTANEOUS at 16:22

## 2018-03-14 DIAGNOSIS — M15.9 PRIMARY OSTEOARTHRITIS INVOLVING MULTIPLE JOINTS: ICD-10-CM

## 2018-03-14 NOTE — TELEPHONE ENCOUNTER
Received fax from pharmacy requesting refill on pts medication(s). Pt was last seen in office on 10/3/2017  and has a follow up scheduled for Visit date not found. Will send request to  Ruthie Hanna  for authorization.      Requested Prescriptions     Pending Prescriptions Disp Refills    metFORMIN (GLUCOPHAGE) 1000 MG tablet 180 tablet 3

## 2018-04-12 RX ORDER — ALLOPURINOL 300 MG/1
300 TABLET ORAL DAILY
Qty: 30 TABLET | Refills: 5 | Status: SHIPPED | OUTPATIENT
Start: 2018-04-12 | End: 2018-10-18 | Stop reason: SDUPTHER

## 2018-04-20 DIAGNOSIS — E11.9 TYPE 2 DIABETES MELLITUS WITHOUT COMPLICATION, WITHOUT LONG-TERM CURRENT USE OF INSULIN (HCC): ICD-10-CM

## 2018-05-18 ENCOUNTER — TELEPHONE (OUTPATIENT)
Dept: PRIMARY CARE CLINIC | Age: 73
End: 2018-05-18

## 2018-05-18 RX ORDER — AMOXICILLIN 500 MG/1
500 CAPSULE ORAL 3 TIMES DAILY
Qty: 30 CAPSULE | Refills: 0 | Status: SHIPPED | OUTPATIENT
Start: 2018-05-18 | End: 2018-05-28

## 2018-06-22 ENCOUNTER — PROCEDURE VISIT (OUTPATIENT)
Dept: PRIMARY CARE CLINIC | Age: 73
End: 2018-06-22
Payer: MEDICARE

## 2018-06-22 DIAGNOSIS — E53.8 B12 DEFICIENCY: Primary | ICD-10-CM

## 2018-06-22 PROCEDURE — 96372 THER/PROPH/DIAG INJ SC/IM: CPT | Performed by: NURSE PRACTITIONER

## 2018-06-22 RX ORDER — CYANOCOBALAMIN 1000 UG/ML
1000 INJECTION INTRAMUSCULAR; SUBCUTANEOUS ONCE
Status: COMPLETED | OUTPATIENT
Start: 2018-06-22 | End: 2018-06-22

## 2018-06-22 RX ADMIN — CYANOCOBALAMIN 1000 MCG: 1000 INJECTION INTRAMUSCULAR; SUBCUTANEOUS at 08:48

## 2018-08-03 ENCOUNTER — OFFICE VISIT (OUTPATIENT)
Dept: PRIMARY CARE CLINIC | Age: 73
End: 2018-08-03
Payer: MEDICARE

## 2018-08-03 VITALS
BODY MASS INDEX: 40.48 KG/M2 | OXYGEN SATURATION: 97 % | HEART RATE: 68 BPM | HEIGHT: 62 IN | WEIGHT: 220 LBS | SYSTOLIC BLOOD PRESSURE: 118 MMHG | DIASTOLIC BLOOD PRESSURE: 70 MMHG | TEMPERATURE: 97.9 F

## 2018-08-03 DIAGNOSIS — E66.01 MORBID OBESITY WITH BMI OF 40.0-44.9, ADULT (HCC): ICD-10-CM

## 2018-08-03 DIAGNOSIS — J30.89 ENVIRONMENTAL AND SEASONAL ALLERGIES: ICD-10-CM

## 2018-08-03 DIAGNOSIS — E53.8 B12 DEFICIENCY: ICD-10-CM

## 2018-08-03 DIAGNOSIS — Z11.59 NEED FOR HEPATITIS C SCREENING TEST: ICD-10-CM

## 2018-08-03 DIAGNOSIS — R60.9 PERIPHERAL EDEMA: ICD-10-CM

## 2018-08-03 DIAGNOSIS — Z00.00 VISIT FOR PREVENTIVE HEALTH EXAMINATION: Primary | ICD-10-CM

## 2018-08-03 DIAGNOSIS — E11.9 TYPE 2 DIABETES MELLITUS WITHOUT COMPLICATION, WITHOUT LONG-TERM CURRENT USE OF INSULIN (HCC): ICD-10-CM

## 2018-08-03 DIAGNOSIS — E78.2 MIXED HYPERLIPIDEMIA: ICD-10-CM

## 2018-08-03 DIAGNOSIS — E79.0 HYPERURICEMIA: ICD-10-CM

## 2018-08-03 DIAGNOSIS — Z12.39 BREAST CANCER SCREENING: ICD-10-CM

## 2018-08-03 DIAGNOSIS — I10 ESSENTIAL HYPERTENSION: ICD-10-CM

## 2018-08-03 PROCEDURE — 96372 THER/PROPH/DIAG INJ SC/IM: CPT | Performed by: PEDIATRICS

## 2018-08-03 PROCEDURE — 3046F HEMOGLOBIN A1C LEVEL >9.0%: CPT | Performed by: PEDIATRICS

## 2018-08-03 PROCEDURE — 3017F COLORECTAL CA SCREEN DOC REV: CPT | Performed by: PEDIATRICS

## 2018-08-03 PROCEDURE — G8598 ASA/ANTIPLAT THER USED: HCPCS | Performed by: PEDIATRICS

## 2018-08-03 PROCEDURE — G8427 DOCREV CUR MEDS BY ELIG CLIN: HCPCS | Performed by: PEDIATRICS

## 2018-08-03 PROCEDURE — 1123F ACP DISCUSS/DSCN MKR DOCD: CPT | Performed by: PEDIATRICS

## 2018-08-03 PROCEDURE — G0439 PPPS, SUBSEQ VISIT: HCPCS | Performed by: PEDIATRICS

## 2018-08-03 PROCEDURE — G8399 PT W/DXA RESULTS DOCUMENT: HCPCS | Performed by: PEDIATRICS

## 2018-08-03 PROCEDURE — 1036F TOBACCO NON-USER: CPT | Performed by: PEDIATRICS

## 2018-08-03 PROCEDURE — 1101F PT FALLS ASSESS-DOCD LE1/YR: CPT | Performed by: PEDIATRICS

## 2018-08-03 PROCEDURE — G8417 CALC BMI ABV UP PARAM F/U: HCPCS | Performed by: PEDIATRICS

## 2018-08-03 PROCEDURE — 4040F PNEUMOC VAC/ADMIN/RCVD: CPT | Performed by: PEDIATRICS

## 2018-08-03 PROCEDURE — 1090F PRES/ABSN URINE INCON ASSESS: CPT | Performed by: PEDIATRICS

## 2018-08-03 PROCEDURE — 99214 OFFICE O/P EST MOD 30 MIN: CPT | Performed by: PEDIATRICS

## 2018-08-03 PROCEDURE — 2022F DILAT RTA XM EVC RTNOPTHY: CPT | Performed by: PEDIATRICS

## 2018-08-03 RX ORDER — CYANOCOBALAMIN 1000 UG/ML
1000 INJECTION INTRAMUSCULAR; SUBCUTANEOUS ONCE
Status: COMPLETED | OUTPATIENT
Start: 2018-08-03 | End: 2018-08-03

## 2018-08-03 RX ORDER — FUROSEMIDE 20 MG/1
20 TABLET ORAL DAILY PRN
COMMUNITY

## 2018-08-03 RX ORDER — CHLORAL HYDRATE 500 MG
1000 CAPSULE ORAL DAILY
COMMUNITY

## 2018-08-03 RX ADMIN — CYANOCOBALAMIN 1000 MCG: 1000 INJECTION INTRAMUSCULAR; SUBCUTANEOUS at 10:54

## 2018-08-03 ASSESSMENT — PATIENT HEALTH QUESTIONNAIRE - PHQ9: SUM OF ALL RESPONSES TO PHQ QUESTIONS 1-9: 0

## 2018-08-03 ASSESSMENT — ENCOUNTER SYMPTOMS
EYE PAIN: 0
ABDOMINAL PAIN: 0
SORE THROAT: 0
SHORTNESS OF BREATH: 0
WHEEZING: 0
BACK PAIN: 0
COUGH: 0
SINUS PRESSURE: 0
VOMITING: 0
DIARRHEA: 0
NAUSEA: 0

## 2018-08-03 ASSESSMENT — LIFESTYLE VARIABLES: HOW OFTEN DO YOU HAVE A DRINK CONTAINING ALCOHOL: 0

## 2018-08-03 ASSESSMENT — ANXIETY QUESTIONNAIRES: GAD7 TOTAL SCORE: 0

## 2018-08-03 NOTE — PROGRESS NOTES
[address] [facility], [address]  Phone [phone]   Fax [phone]      OFFICE VISIT: 8/3/2018    Brandon Frazier- : 1945      HPI  Reason For Visit:  Carrol Hadley is a 72 y.o. Health Maintenance    Medicare AWV (Patient is here for her Medicare Annual Wellness visit); Diabetes (Patient checks her blood sugar daily/ it was 119 this am ); and Health Maintenance (Patient states her last mammogram was 2015 at [facility]/ Patient states she had shingles vaccine at [facility] but not sure when/Need for Hep C screening/ Just had eye exam/ Has not been to dentist in years/Last colonoscopy was 1-2 years ago in [place] by Dr. Zoila Zhang )      The patient presents for routine annual wellness evaluation. She also presents with multiple health issues        Diabetes Mellitus Type 2    Diet compliance:  compliant most of the time  Nutrition Consultation Needed:  no  Med Type:              Victoza 1.2 mg subcutaneous daily   Metformin 1000 mg twice daily with meals    Medication compliance:  compliant most of the time  Weight trend: fluctuating, up 13 pounds from last visit  Current exercise: yes - she is walking and taking care of her granddaughter  She is very busy and moving all the time. Checking: regularly in morning 119 this morning. Home blood sugar records: none  Low BG:  no  Eye exam current (within one year): yes  Checking Feet regularly:  yes - no sores  ACE/ARB:  yes - lisinoprilhydrochlorothiazide  Aspirin: Yes  Tobacco history: She  reports that she has never smoked.  She has never used smokeless tobacco.    Lab Results   Component Value Date    LABA1C 6.6 (H) 10/03/2017    LABA1C 8.7 (H) 2017    LABA1C 5.9 2016     Lab Results   Component Value Date    LABMICR <1.20 10/03/2017    CREATININE 0.8 10/03/2017       Hypertension:   Medication              Amlodipine 5 mg daily              Lisinopril hydrochlorthiazide   milligrams daily.   Medication compliance:  compliant all of the time  Home blood pressure monitoring: Yes - and has been well controlled. She is not adherent to a low sodium diet. Symptoms: none  Laboratory:  Lab Results   Component Value Date    BUN 19 10/03/2017    CREATININE 0.8 10/03/2017       Hyperlipidemia:    Myalgias or GI upset: no   on atorvastatin (Lipitor)    Lab Results   Component Value Date    CHOL 124 (L) 10/03/2017    TRIG 78 (L) 10/03/2017    HDL 59 (L) 10/03/2017    LDLCALC 49 10/03/2017    LDLDIRECT 85 (L) 08/19/2015      Lab Results   Component Value Date    ALT 12 10/03/2017    AST 15 10/03/2017       Hyperuricemia:  Medication:   Allopurinol 300 mg daily   Colcrys 0.6 mg up to 3 times daily when necessary  Symptoms: no gout at all. Osteoarthritis:  Medication:   Meloxicam 15 mg daily  Symptoms: \"bearable\"      Barriers To Success: financial     height is 5' 2\" (1.575 m) and weight is 220 lb (99.8 kg). Her temporal temperature is 97.9 °F (36.6 °C). Her blood pressure is 118/70 and her pulse is 68. Her oxygen saturation is 97%. Body mass index is 40.24 kg/m². I have reviewed the following with the Ms. Flood   Lab Review   No visits with results within 6 Month(s) from this visit.    Latest known visit with results is:   Orders Only on 10/03/2017   Component Date Value    WBC 10/03/2017 8.2     RBC 10/03/2017 3.70*    Hemoglobin 10/03/2017 11.0*    Hematocrit 10/03/2017 34.1*    MCV 10/03/2017 92.2     MCH 10/03/2017 29.7     MCHC 10/03/2017 32.3*    RDW 10/03/2017 13.3     Platelets 25/59/0454 287     MPV 10/03/2017 12.0     Neutrophils % 10/03/2017 40.6*    Lymphocytes % 10/03/2017 40.8*    Monocytes % 10/03/2017 14.9*    Eosinophils % 10/03/2017 2.4     Basophils % 10/03/2017 0.2     Neutrophils # 10/03/2017 3.3     Lymphocytes # 10/03/2017 3.4     Monocytes # 10/03/2017 1.20*    Eosinophils # 10/03/2017 0.20     Basophils # 10/03/2017 0.00     Sodium 10/03/2017 Calcium-Vitamin D (CALTRATE 600 PLUS-VIT D PO) Take 1 tablet by mouth 2 times daily      aspirin 81 MG tablet Take 81 mg by mouth daily.  Multiple Vitamins-Minerals (MULTI COMPLETE PO) Take 1 tablet by mouth daily.  Omega-3 Fatty Acids (FISH OIL) 1000 MG CAPS Take 1,000 mg by mouth daily      furosemide (LASIX) 20 MG tablet Take 20 mg by mouth      colchicine (COLCRYS) 0.6 MG tablet Take 1 tablet by mouth 3 times daily (Patient taking differently: Take 0.6 mg by mouth as needed ) 60 tablet 2     Current Facility-Administered Medications   Medication Dose Route Frequency Provider Last Rate Last Dose    cyanocobalamin injection 1,000 mcg  1,000 mcg Intramuscular Once B Jhonny Reyes DO           Allergies: Patient has no known allergies. Past Medical History:   Diagnosis Date    Hyperlipidemia     Hypertension     Stroke (cerebrum) (HCC)     Type II or unspecified type diabetes mellitus without mention of complication, not stated as uncontrolled        Past Surgical History:   Procedure Laterality Date    APPENDECTOMY     [address]    COLONOSCOPY  5/18/16    Dr Iram Rico [facility])-Tubular AP (-) dysplasia x 1, 5 yr recall   [address]  2012    She has had multiple hernia surgeries    UPPER GASTROINTESTINAL ENDOSCOPY  1985       Social History   Substance Use Topics    Smoking status: Never Smoker    Smokeless tobacco: Never Used    Alcohol use No        Review of Systems   Constitutional: Negative for fatigue and unexpected weight change. HENT: Negative for congestion, ear pain, sinus pressure and sore throat. Eyes: Negative for pain and visual disturbance. Respiratory: Negative for cough, shortness of breath and wheezing. Cardiovascular: Positive for leg swelling. Negative for chest pain and palpitations. Gastrointestinal: Negative for abdominal pain, diarrhea, nausea and vomiting.    Endocrine: Negative for polyuria. Genitourinary: Negative for dysuria, frequency, hematuria and urgency. Musculoskeletal: Positive for arthralgias (typical arthritis). Negative for back pain and neck pain. Skin: Negative for rash. Neurological: Positive for headaches (but these are usually short lived). Negative for dizziness and weakness. Psychiatric/Behavioral: Negative for self-injury. The patient is not nervous/anxious. Physical Exam   Constitutional: She is oriented to person, place, and time. She appears well-developed and well-nourished. She is cooperative. Non-toxic appearance. No distress. Body habitus is obese   HENT:   Head: Normocephalic and atraumatic. Right Ear: Hearing, tympanic membrane, external ear and ear canal normal.   Left Ear: Hearing, external ear and ear canal normal. A middle ear effusion is present. Nose: Mucosal edema (mild) present. Mouth/Throat: Mucous membranes are normal. Posterior oropharyngeal edema (mild) present. Eyes: Conjunctivae, EOM and lids are normal. Pupils are equal, round, and reactive to light. Neck: Phonation normal. Neck supple. No JVD present. Carotid bruit is not present. No thyromegaly present. Cardiovascular: Normal rate, regular rhythm and normal heart sounds. No extrasystoles are present. PMI is not displaced. Exam reveals no gallop and no friction rub. No murmur heard. Pulmonary/Chest: Effort normal and breath sounds normal. No respiratory distress. She has no wheezes. She has no rhonchi. She has no rales. Abdominal: Soft. Bowel sounds are normal. She exhibits no distension and no mass. There is no hepatosplenomegaly. There is no tenderness. There is no CVA tenderness. Genitourinary:   Genitourinary Comments: Examination deferred   Musculoskeletal: Normal range of motion. She exhibits no edema. Joint examination reveals no acute arthritis or synovitis. Lymphadenopathy:     She has no cervical adenopathy.    Neurological: She is alert and

## 2018-08-03 NOTE — PATIENT INSTRUCTIONS
Personalized Preventive Plan for Margart Sandhoff - 8/3/2018  Medicare offers a range of preventive health benefits. Some of the tests and screenings are paid in full while other may be subject to a deductible, co-insurance, and/or copay. Some of these benefits include a comprehensive review of your medical history including lifestyle, illnesses that may run in your family, and various assessments and screenings as appropriate. After reviewing your medical record and screening and assessments performed today your provider may have ordered immunizations, labs, imaging, and/or referrals for you. A list of these orders (if applicable) as well as your Preventive Care list are included within your After Visit Summary for your review. Other Preventive Recommendations:    · A preventive eye exam performed by an eye specialist is recommended every 1-2 years to screen for glaucoma; cataracts, macular degeneration, and other eye disorders. · A preventive dental visit is recommended every 6 months. · Try to get at least 150 minutes of exercise per week or 10,000 steps per day on a pedometer . · Order or download the FREE \"Exercise & Physical Activity: Your Everyday Guide\" from [employer]. Call 8-125.496.3481 or search [employer] online. · You need 7919-2604 mg of calcium and 7612-1024 IU of vitamin D per day. It is possible to meet your calcium requirement with diet alone, but a vitamin D supplement is usually necessary to meet this goal.  · When exposed to the sun, use a sunscreen that protects against both UVA and UVB radiation with an SPF of 30 or greater. Reapply every 2 to 3 hours or after sweating, drying off with a towel, or swimming. · Always wear a seat belt when traveling in a car. Always wear a helmet when riding a bicycle or motorcycle.   Patient Education        Well Visit, Over 72: Care Instructions  Your Care Instructions    Physical exams can help you stay doctor visit. Your doctor will tell you how often to check your blood pressure based on your age, your blood pressure results, and other factors. Diabetes. Ask your doctor whether you should have tests for diabetes. Vision. Experts recommend that you have yearly exams for glaucoma and other age-related eye problems. Hearing. Tell your doctor if you notice any change in your hearing. You can have tests to find out how well you hear. Colon cancer tests. Keep having colon cancer tests as your doctor recommends. You can have one of several types of tests. Heart attack and stroke risk. At least every 4 to 6 years, you should have your risk for heart attack and stroke assessed. Your doctor uses factors such as your age, blood pressure, cholesterol, and whether you smoke or have diabetes to show what your risk for a heart attack or stroke is over the next 10 years. Osteoporosis. Talk to your doctor about whether you should have a bone density test to find out whether you have thinning bones. Also ask your doctor about whether you should take calcium and vitamin D supplements. For women  Pap test and pelvic exam. You may no longer need a Pap test. Talk with your doctor about whether to stop or continue to have Pap tests. Breast exam and mammogram. Ask how often you should have a mammogram, which is an X-ray of your breasts. A mammogram can spot breast cancer before it can be felt and when it is easiest to treat. Thyroid disease. Talk to your doctor about whether to have your thyroid checked as part of a regular physical exam. Women have an increased chance of a thyroid problem. For men  Prostate exam. Talk to your doctor about whether you should have a blood test (called a PSA test) for prostate cancer. Experts disagree on whether men should have this test. Some experts recommend that you discuss the benefits and risks of the test with your doctor. Abdominal aortic aneurysm.  Ask your doctor whether you should have a test to check for an aneurysm. You may need a test if you ever smoked or if your parent, brother, sister, or child has had an aneurysm. When should you call for help? Watch closely for changes in your health, and be sure to contact your doctor if you have any problems or symptoms that concern you. Where can you learn more? Go to https://chpepiceweb.PT Global Tiket Network. org and sign in to your Ropatec account. Enter O599 in the Pet Chance Television box to learn more about \"Well Visit, Over 65: Care Instructions. \"     If you do not have an account, please click on the \"Sign Up Now\" link. Current as of: May 16, 2017  Content Version: 11.6  © 5051-9302 Emtrics, Incorporated. Care instructions adapted under license by [facility] ([facility]). If you have questions about a medical condition or this instruction, always ask your healthcare professional. [place] [address] any warranty or liability for your use of this information. Patient Education        Learning About Living Tonya Gutierrez  What is a living will? A living will is a legal form you use to write down the kind of care you want at the end of your life. It is used by the health professionals who will treat you if you aren't able to decide for yourself. If you put your wishes in writing, your loved ones and others will know what kind of care you want. They won't need to guess. This can ease your mind and be helpful to others. A living will is not the same as an estate or property will. An estate will explains what you want to happen with your money and property after you die. Is a living will a legal document? A living will is a legal document. Each state has its own laws about living romero. If you move to another state, make sure that your living will is legal in the state where you now live. Or you might use a universal form that has been approved by many states. This kind of form can sometimes be completed and stored online.  Your family? Do you want to donate organs when you die? Do you want certain Mu-ism practices performed before you die? If so, put your wishes in the advance directive. Read your advance directive every year, and make changes as needed. When should you call for help? Be sure to contact your doctor if you have any questions. Where can you learn more? Go to https://chpepiceweb.TempoIQ. org and sign in to your Dynadec account. Enter R264 in the [employer] Technologies box to learn more about \"Advance Directives: Care Instructions. \"     If you do not have an account, please click on the \"Sign Up Now\" link. Current as of: October 6, 2017  Content Version: 11.6  © [id number] [employer]KK Corporation, Prodagio Software. Care instructions adapted under license by [facility] ([facility]). If you have questions about a medical condition or this instruction, always ask your healthcare professional. [address] any warranty or liability for your use of this information. Patient Education        Eating Healthy Foods: Care Instructions  Your Care Instructions    Eating healthy foods can help lower your risk for disease. Healthy food gives you energy and keeps your heart strong, your brain active, your muscles working, and your bones strong. A healthy diet includes a variety of foods from the basic food groups: grains, vegetables, fruits, milk and milk products, and meat and beans. Some people may eat more of their favorite foods from only one food group and, as a result, miss getting the nutrients they need. So, it is important to pay attention not only to what you eat but also to what you are missing from your diet. You can eat a healthy, balanced diet by making a few small changes. Follow-up care is a key part of your treatment and safety. Be sure to make and go to all appointments, and call your doctor if you are having problems.  It's also a good idea to know your test results and keep a list of the medicines you take.  How can you care for yourself at home? Look at what you eat  Keep a food diary for a week or two and record everything you eat or drink. Track the number of servings you eat from each food group. For a balanced diet every day, eat a variety of:  6 or more ounce-equivalents of grains, such as cereals, breads, crackers, rice, or pasta, every day. An ounce-equivalent is 1 slice of bread, 1 cup of ready-to-eat cereal, or ½ cup of cooked rice, cooked pasta, or cooked cereal.  2½ cups of vegetables, especially:  Dark-green vegetables such as broccoli and spinach. Orange vegetables such as carrots and sweet potatoes. Dry beans (such as scott and kidney beans) and peas (such as lentils). 2 cups of fresh, frozen, or canned fruit. A small apple or 1 banana or orange equals 1 cup.  3 cups of nonfat or low-fat milk, yogurt, or other milk products. 5½ ounces of meat and beans, such as chicken, fish, lean meat, beans, nuts, and seeds. One egg, 1 tablespoon of peanut butter, ½ ounce nuts or seeds, or ¼ cup of cooked beans equals 1 ounce of meat. Learn how to read food labels for serving sizes and ingredients. Fast-food and convenience-food meals often contain few or no fruits or vegetables. Make sure you eat some fruits and vegetables to make the meal more nutritious. Look at your food diary. For each food group, add up what you have eaten and then divide the total by the number of days. This will give you an idea of how much you are eating from each food group. See if you can find some ways to change your diet to make it more healthy. Start small  Do not try to make dramatic changes to your diet all at once. You might feel that you are missing out on your favorite foods and then be more likely to fail. Start slowly, and gradually change your habits. Try some of the following:  Use whole wheat bread instead of white bread. Use nonfat or low-fat milk instead of whole milk.   Eat brown rice instead of white rice, it.  Eat a healthy diet to help keep your gums healthy and your teeth strong. Choose foods that are good for your teeth, such as whole grains, vegetables, fruits, and foods that are low in saturated fat and sodium. Mozzarella and other cheeses, peanuts, yogurt, and milk are good for your teeth. Sugar-free chewing gum (especially gum that contains xylitol) is also a good choice. Avoid foods that contain a lot of sugar, especially sticky, sweet foods like taffy. Don't snack before bedtime. Food left on the teeth is more likely to cause tooth decay overnight. Don't smoke or use smokeless tobacco. Tobacco can make tooth decay worse. If you need help quitting, talk to your doctor about stop-smoking programs and medicines. These can increase your chances of quitting for good. Where can you learn more? Go to https://Simple Tithepeluz marinaeb.D-Wave Systems. org and sign in to your LiveWire Tax account. Enter A230 in the Ecovision box to learn more about \"Learning About Dental Care. \"     If you do not have an account, please click on the \"Sign Up Now\" link. Current as of: May 12, 2017  Content Version: 11.6  © 4905-3141 Tranzlogic, Incorporated. Care instructions adapted under license by [facility] ([facility]). If you have questions about a medical condition or this instruction, always ask your healthcare professional. [place] [address] any warranty or liability for your use of this information. Patient Education        Learning About How to Make a Home Safe  Learning About How to Make a Home Safe  You can help protect the person in your care by making the home safe. Here are some general tips for how to lower the chance of getting injured in the home. Pad sharp corners on furniture and counter tops. Keep objects that are used often within easy reach. Use guardrails on the side of the bed. The rails can help a person get out of bed. They also can prevent falls from the bed.   Install handrails around the can you increase safety for people with dementia? If you are caring for someone who has dementia, you may need to make some extra changes to create a safe home. People with dementia have a loss of mental skills, such as memory, problem solving, and learning. So things that might not have been a danger to them before can cause safety problems now. Here are some things to consider:  Don't move furniture around. The person may become confused. Use locks on doors and cupboards. Lock up knives, scissors, medicines, cleaning supplies, and other dangerous items. Use hidden switches or controls for the stove, thermostat, water heater, and other appliances. If your loved one is still cooking, think about whether that is safe. It may be okay with some help, depending on your loved one's condition. But for people who have memory or thinking problems, it's best to avoid any activities that might not be safe. If the person tends to wander or to try to leave the home, install motion-sensor lights on all doors and windows. Have emergency numbers in a central area near a phone. Include 911 and numbers for the doctor and family members. Get medical alert jewelry for the person so you can be contacted if he or she wanders away. If possible, provide a safe place for wandering, such as an enclosed yard or garden. Where can you learn more? Go to https://GlycomindspeterenceewMedivie Therapeutics.Watcher Enterprises. org and sign in to your travelmob account. Enter Z367 in the [facility] box to learn more about \"Learning About How to Make a Home Safe. \"     If you do not have an account, please click on the \"Sign Up Now\" link. Current as of: October 6, 2017  Content Version: 11.6  © 5537-4756 Libboo, Incorporated. Care instructions adapted under license by [facility] ([facility]).  If you have questions about a medical condition or this instruction, always ask your healthcare professional. [address] any warranty or liability for your treatment and safety. Be sure to make and go to all appointments, and call your doctor if you are having problems. It's also a good idea to know your test results and keep a list of the medicines you take. How can you prevent falls outdoors? Wear shoes with firm soles and low heels. If you have to walk on an icy surface, use grippers that can be worn over your shoes in bad weather. Be extra careful if weather is bad. Walk on the grass when the sidewalks are slick. If you live in a place that gets snow and ice in the winter, sprinkle salt on slippery stairs and sidewalks. Be careful getting on or off buses and trains or getting in and out of cars. If handrails are available, use them. Be careful when you cross the street. Look for crosswalks or places where curb cuts or ramps are present. Try not to hurry, especially if you are carrying something. Be cautious in parking lots or garages. There may be curbs or changes in pavement, or the height of the pavement may vary. Make sure to wear the correct eyeglasses, if you need them. Reading glasses or bifocals can make it harder to see hazards that might be in your way. If you are walking outdoors for exercise, try to: Walk in well-lighted, well-maintained areas. These include high school or college tracks, shopping malls, and public spaces. Walk with a partner. Watch out for cracked sidewalks, curbs, changes in the height of the pavement, exposed tree roots, and debris such as fallen leaves or branches. Where can you learn more? Go to https://melitonewmarvin.Stimulus Technologies. org and sign in to your el? account. Enter W032 in the iSSimple box to learn more about \"Preventing Outdoor Falls: Care Instructions. \"     If you do not have an account, please click on the \"Sign Up Now\" link. Current as of: May 12, 2017  Content Version: 11.6  © 9761-8879 ET Solar Group, Senova Systems. Care instructions adapted under license by [facility] ([facility]).  If you have

## 2018-08-03 NOTE — PROGRESS NOTES
daily  Patient taking differently: Take 20 mg by mouth nightly  Yes LAYLA Royal   Insulin Pen Needle (H-E-B INCONTROL PEN NEEDLES) 32G X 4 MM MISC 1 each by Does not apply route daily Yes NICOLE Goodson DO   Calcium-Vitamin D (CALTRATE 600 PLUS-VIT D PO) Take 1 tablet by mouth 2 times daily Yes Historical Provider, MD   aspirin 81 MG tablet Take 81 mg by mouth daily. Yes Historical Provider, MD   Multiple Vitamins-Minerals (MULTI COMPLETE PO) Take 1 tablet by mouth daily. Yes Historical Provider, MD   Cyanocobalamin 1000 MCG/ML KIT Inject  as directed every 30 days.   Historical Provider, MD   Omega-3 Fatty Acids (FISH OIL) 1000 MG CAPS Take 1,000 mg by mouth daily  Historical Provider, MD   furosemide (LASIX) 20 MG tablet Take 20 mg by mouth  Historical Provider, MD   colchicine (COLCRYS) 0.6 MG tablet Take 1 tablet by mouth 3 times daily  Patient taking differently: Take 0.6 mg by mouth as needed   LAYLA Bain       Past Medical History:   Diagnosis Date    Hyperlipidemia     Hypertension     Stroke (cerebrum) ([facility] [address]     Type II or unspecified type diabetes mellitus without mention of complication, not stated as uncontrolled      Past Surgical History:   Procedure Laterality Date    APPENDECTOMY     [address]    COLONOSCOPY  5/18/16    Dr Keith Portillo [facility])-Tubular AP (-) dysplasia x 1, 5 yr recall    HERNIA REPAIR  2012    She has had multiple hernia surgeries    UPPER GASTROINTESTINAL ENDOSCOPY  1985       Family History   Problem Relation Age of Onset    Colon Cancer Mother     Colon Polyps Neg Hx     Esophageal Cancer Neg Hx     Liver Disease Neg Hx     Liver Cancer Neg Hx     Rectal Cancer Neg Hx     Stomach Cancer Neg Hx        CareTeam (Including outside providers/suppliers regularly involved in providing care):   Patient Care Team:  Shaniqua Becker DO as PCP - General  NICOLE Goodson DO as PCP - S Attributed Provider    Wt Readings from Last 3 Encounters:   08/03/18 220 lb (99.8 kg)   10/03/17 207 lb 8 oz (94.1 kg)   08/25/17 206 lb 8 oz (93.7 kg)     Vitals:    08/03/18 0951   BP: 118/70   Site: Left Arm   Position: Sitting   Cuff Size: Large Adult   Pulse: 68   Temp: 97.9 °F (36.6 °C)   TempSrc: Temporal   SpO2: 97%   Weight: 220 lb (99.8 kg)   Height: 5' 2\" (1.575 m)       Physical exam is documented elsewhere any separate note. Patient's complete Health Risk Assessment and screening values have been reviewed and are found in Flowsheets. The following problems were reviewed today and where indicated follow up appointments were made and/or referrals ordered. Positive Risk Factor Screenings with Interventions:     General Health:  General  In general, how would you say your health is?: Very Good  In the past 7 days, have you experienced any of the following?: None of These  Do you get the social and emotional support that you need?: Yes  Do you have a Living Will?: (!) No  General Health Risk Interventions:  · No Living Will: additional information provided    Health Habits/Nutrition:  Health Habits/Nutrition  Do you exercise for at least 20 minutes 2-3 times per week?: Yes  Have you lost any weight without trying in the past 3 months?: No  Do you eat fewer than 2 meals per day?: (!) Yes  Have you seen a dentist within the past year?: (!) No  Body mass index is 40.24 kg/m². Health Habits/Nutrition Interventions:  · Nutritional issues:   Additional information was provided   · Dental: Patient was encouraged to follow with her dental provider    Safety:  Safety  Do you have working smoke detectors?: Yes  Have all throw rugs been removed or fastened?: Yes  Do you have non-slip mats in all bathtubs?: (!) No  Do all of your stairways have a railing or banister?: Yes  Are your doorways, halls and stairs free of clutter?: Yes  Do you always fasten your seatbelt when you are in a car?: Yes  Safety

## 2018-08-17 ENCOUNTER — TELEPHONE (OUTPATIENT)
Dept: PRIMARY CARE CLINIC | Age: 73
End: 2018-08-17

## 2018-08-28 ENCOUNTER — HOSPITAL ENCOUNTER (OUTPATIENT)
Dept: WOMENS IMAGING | Age: 73
Discharge: HOME | End: 2018-08-28
Payer: MEDICARE

## 2018-08-28 ENCOUNTER — PROCEDURE VISIT (OUTPATIENT)
Dept: PRIMARY CARE CLINIC | Age: 73
End: 2018-08-28

## 2018-08-28 DIAGNOSIS — E53.8 B12 DEFICIENCY: Primary | ICD-10-CM

## 2018-08-28 DIAGNOSIS — E79.0 HYPERURICEMIA: ICD-10-CM

## 2018-08-28 DIAGNOSIS — Z00.00 VISIT FOR PREVENTIVE HEALTH EXAMINATION: ICD-10-CM

## 2018-08-28 DIAGNOSIS — Z11.59 NEED FOR HEPATITIS C SCREENING TEST: ICD-10-CM

## 2018-08-28 DIAGNOSIS — E53.8 B12 DEFICIENCY: ICD-10-CM

## 2018-08-28 DIAGNOSIS — E78.2 MIXED HYPERLIPIDEMIA: ICD-10-CM

## 2018-08-28 DIAGNOSIS — I10 ESSENTIAL HYPERTENSION: ICD-10-CM

## 2018-08-28 DIAGNOSIS — E11.9 TYPE 2 DIABETES MELLITUS WITHOUT COMPLICATION, WITHOUT LONG-TERM CURRENT USE OF INSULIN (HCC): ICD-10-CM

## 2018-08-28 DIAGNOSIS — Z12.39 BREAST CANCER SCREENING: ICD-10-CM

## 2018-08-28 LAB
ALBUMIN SERPL-MCNC: 4.4 G/DL (ref 3.5–5.2)
ALP BLD-CCNC: 69 U/L (ref 35–104)
ALT SERPL-CCNC: 10 U/L (ref 5–33)
ANION GAP SERPL CALCULATED.3IONS-SCNC: 25 MMOL/L (ref 7–19)
AST SERPL-CCNC: 14 U/L (ref 5–32)
BASOPHILS ABSOLUTE: 0 K/UL (ref 0–0.2)
BASOPHILS RELATIVE PERCENT: 0.3 % (ref 0–1)
BILIRUB SERPL-MCNC: <0.2 MG/DL (ref 0.2–1.2)
BUN BLDV-MCNC: 26 MG/DL (ref 8–23)
CALCIUM SERPL-MCNC: 9.5 MG/DL (ref 8.8–10.2)
CHLORIDE BLD-SCNC: 99 MMOL/L (ref 98–111)
CHOLESTEROL, TOTAL: 143 MG/DL (ref 160–199)
CO2: 14 MMOL/L (ref 22–29)
CREAT SERPL-MCNC: 1.1 MG/DL (ref 0.5–0.9)
EOSINOPHILS ABSOLUTE: 0.2 K/UL (ref 0–0.6)
EOSINOPHILS RELATIVE PERCENT: 2.4 % (ref 0–5)
GFR NON-AFRICAN AMERICAN: 49
GLUCOSE BLD-MCNC: 171 MG/DL (ref 74–109)
HBA1C MFR BLD: 6.3 % (ref 4–6)
HCT VFR BLD CALC: 34.4 % (ref 37–47)
HDLC SERPL-MCNC: 56 MG/DL (ref 65–121)
HEMOGLOBIN: 10.7 G/DL (ref 12–16)
LDL CHOLESTEROL CALCULATED: 57 MG/DL
LYMPHOCYTES ABSOLUTE: 3.6 K/UL (ref 1.1–4.5)
LYMPHOCYTES RELATIVE PERCENT: 39.1 % (ref 20–40)
MCH RBC QN AUTO: 29.6 PG (ref 27–31)
MCHC RBC AUTO-ENTMCNC: 31.1 G/DL (ref 33–37)
MCV RBC AUTO: 95 FL (ref 81–99)
MONOCYTES ABSOLUTE: 1.6 K/UL (ref 0–0.9)
MONOCYTES RELATIVE PERCENT: 16.9 % (ref 0–10)
NEUTROPHILS ABSOLUTE: 3.7 K/UL (ref 1.5–7.5)
NEUTROPHILS RELATIVE PERCENT: 40 % (ref 50–65)
PDW BLD-RTO: 13.6 % (ref 11.5–14.5)
PLATELET # BLD: 169 K/UL (ref 130–400)
PMV BLD AUTO: 12.6 FL (ref 9.4–12.3)
POTASSIUM SERPL-SCNC: 4.6 MMOL/L (ref 3.5–5)
RBC # BLD: 3.62 M/UL (ref 4.2–5.4)
SODIUM BLD-SCNC: 138 MMOL/L (ref 136–145)
T4 FREE: 1.3 NG/DL (ref 0.9–1.7)
TOTAL PROTEIN: 7 G/DL (ref 6.6–8.7)
TRIGL SERPL-MCNC: 152 MG/DL (ref 0–149)
TSH SERPL DL<=0.05 MIU/L-ACNC: 3.52 UIU/ML (ref 0.27–4.2)
URIC ACID, SERUM: 5 MG/DL (ref 2.4–5.7)
VITAMIN B-12: 583 PG/ML (ref 211–946)
WBC # BLD: 9.2 K/UL (ref 4.8–10.8)

## 2018-08-28 PROCEDURE — 77063 BREAST TOMOSYNTHESIS BI: CPT

## 2018-08-29 ENCOUNTER — TELEPHONE (OUTPATIENT)
Dept: PRIMARY CARE CLINIC | Age: 73
End: 2018-08-29

## 2018-08-29 LAB — HEPATITIS C ANTIBODY INTERPRETATION: NORMAL

## 2018-08-29 NOTE — TELEPHONE ENCOUNTER
----- Message from LAYLA Melgar sent at 8/28/2018  4:35 PM CDT -----  Please inform patient results show  No mammographic evidence of malignancy. Recommendation is for the  patient to return for routine mammography in one year or sooner, if  clinically indicated. BI-RADS Category 2, benign.

## 2018-08-30 NOTE — TELEPHONE ENCOUNTER
----- Message from LAYLA Smart sent at 8/29/2018  5:09 PM CDT -----  Please call patient and let them know results. Hepatitis C screening negative  Mild elevated blood sugar and mild decline in kidney function. Would recommend increasing fluids  Normal cholesterol  Normal uric acid  Normal thyroid  Normal B12  Blood counts are stable but show mild anemia  Hemoglobin A1c is 6.3.  This is 3 month blood sugar average of 134

## 2018-08-30 NOTE — TELEPHONE ENCOUNTER
Called patient, spoke with: Patient regarding the results of the patients most recent labs. I advised Patient of Dr. Javed Sensing recommendations.    Patient did voice understanding

## 2018-08-31 ENCOUNTER — PROCEDURE VISIT (OUTPATIENT)
Dept: PRIMARY CARE CLINIC | Age: 73
End: 2018-08-31
Payer: MEDICARE

## 2018-08-31 DIAGNOSIS — E53.8 VITAMIN B 12 DEFICIENCY: Primary | ICD-10-CM

## 2018-08-31 PROCEDURE — 96372 THER/PROPH/DIAG INJ SC/IM: CPT | Performed by: NURSE PRACTITIONER

## 2018-08-31 RX ORDER — CYANOCOBALAMIN 1000 UG/ML
1000 INJECTION INTRAMUSCULAR; SUBCUTANEOUS
Status: SHIPPED | OUTPATIENT
Start: 2018-08-31 | End: 2019-08-26

## 2018-08-31 RX ADMIN — CYANOCOBALAMIN 1000 MCG: 1000 INJECTION INTRAMUSCULAR; SUBCUTANEOUS at 16:20

## 2018-09-06 ENCOUNTER — TELEPHONE (OUTPATIENT)
Dept: PRIMARY CARE CLINIC | Age: 73
End: 2018-09-06

## 2018-09-25 ENCOUNTER — TELEPHONE (OUTPATIENT)
Dept: PRIMARY CARE CLINIC | Age: 73
End: 2018-09-25

## 2018-10-18 RX ORDER — ALLOPURINOL 300 MG/1
300 TABLET ORAL DAILY
Qty: 30 TABLET | Refills: 11 | Status: SHIPPED | OUTPATIENT
Start: 2018-10-18 | End: 2019-01-17

## 2018-10-29 ENCOUNTER — TELEPHONE (OUTPATIENT)
Dept: PRIMARY CARE CLINIC | Age: 73
End: 2018-10-29

## 2018-11-01 ENCOUNTER — TELEPHONE (OUTPATIENT)
Dept: PRIMARY CARE CLINIC | Age: 73
End: 2018-11-01

## 2018-11-16 DIAGNOSIS — E11.9 TYPE 2 DIABETES MELLITUS WITHOUT COMPLICATION, WITHOUT LONG-TERM CURRENT USE OF INSULIN (HCC): ICD-10-CM

## 2018-12-03 ENCOUNTER — OFFICE VISIT (OUTPATIENT)
Dept: PRIMARY CARE CLINIC | Age: 73
End: 2018-12-03
Payer: MEDICARE

## 2018-12-03 VITALS
TEMPERATURE: 97.9 F | WEIGHT: 223.8 LBS | HEART RATE: 68 BPM | BODY MASS INDEX: 41.18 KG/M2 | DIASTOLIC BLOOD PRESSURE: 74 MMHG | HEIGHT: 62 IN | SYSTOLIC BLOOD PRESSURE: 128 MMHG | OXYGEN SATURATION: 98 %

## 2018-12-03 DIAGNOSIS — E11.9 TYPE 2 DIABETES MELLITUS WITHOUT COMPLICATION, WITHOUT LONG-TERM CURRENT USE OF INSULIN (HCC): ICD-10-CM

## 2018-12-03 DIAGNOSIS — D64.9 ANEMIA, UNSPECIFIED TYPE: ICD-10-CM

## 2018-12-03 DIAGNOSIS — I10 ESSENTIAL HYPERTENSION: ICD-10-CM

## 2018-12-03 DIAGNOSIS — E87.20 METABOLIC ACIDOSIS: ICD-10-CM

## 2018-12-03 DIAGNOSIS — Z23 NEED FOR INFLUENZA VACCINATION: ICD-10-CM

## 2018-12-03 DIAGNOSIS — E78.2 MIXED HYPERLIPIDEMIA: ICD-10-CM

## 2018-12-03 DIAGNOSIS — E79.0 HYPERURICEMIA: ICD-10-CM

## 2018-12-03 DIAGNOSIS — I63.319 CEREBRAL INFARCTION DUE TO THROMBOSIS OF MIDDLE CEREBRAL ARTERY, UNSPECIFIED BLOOD VESSEL LATERALITY (HCC): ICD-10-CM

## 2018-12-03 DIAGNOSIS — E53.8 B12 DEFICIENCY: ICD-10-CM

## 2018-12-03 DIAGNOSIS — E53.8 VITAMIN B 12 DEFICIENCY: ICD-10-CM

## 2018-12-03 DIAGNOSIS — E11.9 TYPE 2 DIABETES MELLITUS WITHOUT COMPLICATION, WITHOUT LONG-TERM CURRENT USE OF INSULIN (HCC): Primary | ICD-10-CM

## 2018-12-03 LAB
ALBUMIN SERPL-MCNC: 4.7 G/DL (ref 3.5–5.2)
ALP BLD-CCNC: 93 U/L (ref 35–104)
ALT SERPL-CCNC: 15 U/L (ref 5–33)
ANION GAP SERPL CALCULATED.3IONS-SCNC: 20 MMOL/L (ref 7–19)
AST SERPL-CCNC: 14 U/L (ref 5–32)
BASOPHILS ABSOLUTE: 0 K/UL (ref 0–0.2)
BASOPHILS RELATIVE PERCENT: 0.4 % (ref 0–1)
BILIRUB SERPL-MCNC: 0.3 MG/DL (ref 0.2–1.2)
BUN BLDV-MCNC: 16 MG/DL (ref 8–23)
CALCIUM SERPL-MCNC: 10.6 MG/DL (ref 8.8–10.2)
CHLORIDE BLD-SCNC: 105 MMOL/L (ref 98–111)
CHOLESTEROL, TOTAL: 268 MG/DL (ref 160–199)
CO2: 19 MMOL/L (ref 22–29)
CREAT SERPL-MCNC: 0.9 MG/DL (ref 0.5–0.9)
CREATININE URINE: 259.8 MG/DL (ref 4.2–622)
EOSINOPHILS ABSOLUTE: 0.1 K/UL (ref 0–0.6)
EOSINOPHILS RELATIVE PERCENT: 1.2 % (ref 0–5)
GFR NON-AFRICAN AMERICAN: >60
GLUCOSE BLD-MCNC: 172 MG/DL (ref 74–109)
HBA1C MFR BLD: 6.6 % (ref 4–6)
HCT VFR BLD CALC: 39.7 % (ref 37–47)
HDLC SERPL-MCNC: 65 MG/DL (ref 65–121)
HEMOGLOBIN: 12.3 G/DL (ref 12–16)
LDL CHOLESTEROL CALCULATED: 157 MG/DL
LYMPHOCYTES ABSOLUTE: 3.1 K/UL (ref 1.1–4.5)
LYMPHOCYTES RELATIVE PERCENT: 38.3 % (ref 20–40)
MCH RBC QN AUTO: 29 PG (ref 27–31)
MCHC RBC AUTO-ENTMCNC: 31 G/DL (ref 33–37)
MCV RBC AUTO: 93.6 FL (ref 81–99)
MICROALBUMIN UR-MCNC: 2.9 MG/DL (ref 0–19)
MICROALBUMIN/CREAT UR-RTO: 11.2 MG/G
MONOCYTES ABSOLUTE: 1.6 K/UL (ref 0–0.9)
MONOCYTES RELATIVE PERCENT: 19.1 % (ref 0–10)
NEUTROPHILS ABSOLUTE: 3.3 K/UL (ref 1.5–7.5)
NEUTROPHILS RELATIVE PERCENT: 40.1 % (ref 50–65)
PDW BLD-RTO: 14.3 % (ref 11.5–14.5)
PLATELET # BLD: 192 K/UL (ref 130–400)
PMV BLD AUTO: 12.1 FL (ref 9.4–12.3)
POTASSIUM SERPL-SCNC: 3.5 MMOL/L (ref 3.5–5)
RBC # BLD: 4.24 M/UL (ref 4.2–5.4)
SODIUM BLD-SCNC: 144 MMOL/L (ref 136–145)
T4 FREE: 1.2 NG/DL (ref 0.9–1.7)
TOTAL PROTEIN: 7.8 G/DL (ref 6.6–8.7)
TRIGL SERPL-MCNC: 230 MG/DL (ref 0–149)
TSH SERPL DL<=0.05 MIU/L-ACNC: 2.44 UIU/ML (ref 0.27–4.2)
URIC ACID, SERUM: 5.2 MG/DL (ref 2.4–5.7)
VITAMIN B-12: >2000 PG/ML (ref 211–946)
WBC # BLD: 8.2 K/UL (ref 4.8–10.8)

## 2018-12-03 PROCEDURE — G8482 FLU IMMUNIZE ORDER/ADMIN: HCPCS | Performed by: PEDIATRICS

## 2018-12-03 PROCEDURE — 1101F PT FALLS ASSESS-DOCD LE1/YR: CPT | Performed by: PEDIATRICS

## 2018-12-03 PROCEDURE — 96372 THER/PROPH/DIAG INJ SC/IM: CPT | Performed by: PEDIATRICS

## 2018-12-03 PROCEDURE — G8598 ASA/ANTIPLAT THER USED: HCPCS | Performed by: PEDIATRICS

## 2018-12-03 PROCEDURE — 4040F PNEUMOC VAC/ADMIN/RCVD: CPT | Performed by: PEDIATRICS

## 2018-12-03 PROCEDURE — 99214 OFFICE O/P EST MOD 30 MIN: CPT | Performed by: PEDIATRICS

## 2018-12-03 PROCEDURE — G8399 PT W/DXA RESULTS DOCUMENT: HCPCS | Performed by: PEDIATRICS

## 2018-12-03 PROCEDURE — 2022F DILAT RTA XM EVC RTNOPTHY: CPT | Performed by: PEDIATRICS

## 2018-12-03 PROCEDURE — G0008 ADMIN INFLUENZA VIRUS VAC: HCPCS | Performed by: PEDIATRICS

## 2018-12-03 PROCEDURE — 3017F COLORECTAL CA SCREEN DOC REV: CPT | Performed by: PEDIATRICS

## 2018-12-03 PROCEDURE — 1123F ACP DISCUSS/DSCN MKR DOCD: CPT | Performed by: PEDIATRICS

## 2018-12-03 PROCEDURE — 90662 IIV NO PRSV INCREASED AG IM: CPT | Performed by: PEDIATRICS

## 2018-12-03 PROCEDURE — G8427 DOCREV CUR MEDS BY ELIG CLIN: HCPCS | Performed by: PEDIATRICS

## 2018-12-03 PROCEDURE — G8417 CALC BMI ABV UP PARAM F/U: HCPCS | Performed by: PEDIATRICS

## 2018-12-03 PROCEDURE — 3044F HG A1C LEVEL LT 7.0%: CPT | Performed by: PEDIATRICS

## 2018-12-03 PROCEDURE — 1036F TOBACCO NON-USER: CPT | Performed by: PEDIATRICS

## 2018-12-03 PROCEDURE — 1090F PRES/ABSN URINE INCON ASSESS: CPT | Performed by: PEDIATRICS

## 2018-12-03 RX ORDER — CYANOCOBALAMIN 1000 UG/ML
1000 INJECTION INTRAMUSCULAR; SUBCUTANEOUS ONCE
Status: COMPLETED | OUTPATIENT
Start: 2018-12-03 | End: 2018-12-03

## 2018-12-03 RX ORDER — ATORVASTATIN CALCIUM 20 MG/1
20 TABLET, FILM COATED ORAL NIGHTLY
Qty: 90 TABLET | Refills: 3 | Status: SHIPPED | OUTPATIENT
Start: 2018-12-03 | End: 2019-01-17

## 2018-12-03 RX ADMIN — CYANOCOBALAMIN 1000 MCG: 1000 INJECTION INTRAMUSCULAR; SUBCUTANEOUS at 09:58

## 2018-12-03 ASSESSMENT — ENCOUNTER SYMPTOMS
ABDOMINAL PAIN: 0
EYE PAIN: 0
SINUS PRESSURE: 0
NAUSEA: 0
BACK PAIN: 0
SORE THROAT: 0
WHEEZING: 0
VOMITING: 0
COUGH: 0
SHORTNESS OF BREATH: 0
DIARRHEA: 0

## 2018-12-03 NOTE — PROGRESS NOTES
[address] [facility], [address]  Phone [phone]   Fax [phone]      OFFICE VISIT: 12/3/2018    Antony Flood-: 1945      HPI  Reason For Visit:  Marylu Grissom is a 72 y.o. Health Maintenance    Follow-up (Patient is here for follow up on DM); Diabetes (Patient states her blood sugar has been good); Medication Refill (Lipitor and Metformin); and Health Maintenance (needs flu vaccine)    Patient presents on follow-up for diabetes. Diabetes Mellitus Type 2  Diet compliance:  compliant most of the time  Nutrition Consultation Needed:  no  Med Type:              Victoza 1.2 mg subcutaneous daily              Metformin 1000 mg twice daily with meals   Medication compliance:  compliant most of the time  Weight trend: fluctuating, up 3 pounds from last visit  Current exercise: yes - she is walking and taking care of her granddaughter  She is very busy and moving all the time. Checking: regularly in morning  this morning.   Home blood sugar records: none  Low BG:  no  Eye exam current (within one year): yes  Checking Feet regularly:  yes - no sores  ACE/ARB:  yes - lisinopril-hydrochlorothiazide  Aspirin: Yes  Tobacco history: She  reports that she has never smoked. She has never used smokeless tobacco.    Lab Results   Component Value Date    LABA1C 6.6 (H) 2018    LABA1C 6.3 (H) 2018    LABA1C 6.6 (H) 10/03/2017     Lab Results   Component Value Date    LABMICR 2.90 2018    CREATININE 0.9 2018       Hypertension:   Medication              Amlodipine 5 mg daily              Lisinopril hydrochlorthiazide  20-25 mg daily. Blood pressure today was 128/74 with a pulse of 68     Medication compliance:  compliant most of the time  Home blood pressure monitoring: Yes - Well controlled. She Is to some degree adherent to a low sodium diet.      Symptoms: none  Laboratory:  Lab Results   Component Value Date    BUN 16 2018    CREATININE 0.9

## 2018-12-03 NOTE — PROGRESS NOTES
After obtaining consent, and per orders of Dr. Armida Bunch, injection of b12 was given in the right arm IM. Patient tolerated it well. Patient instructed to report any adverse reaction to me immediately. After obtaining consent, and per orders of Dr. Armida Bunch, injection of Flu high dose was given in the Left arm IM. Patient tolerated it well. Patient instructed to report any adverse reaction to me immediately.

## 2018-12-04 ENCOUNTER — TELEPHONE (OUTPATIENT)
Dept: PRIMARY CARE CLINIC | Age: 73
End: 2018-12-04

## 2018-12-04 NOTE — TELEPHONE ENCOUNTER
----- Message from Deborah Alejo DO sent at 12/3/2018  7:49 PM CST -----  Thyroid values normal.  Vitamin B12 levels are actually high. You may be able back also in your supplementation. Your metabolic profile is normal.  This includes kidney and liver functions as well as electrolytes. Blood sugar was 172 at the time of the lab draw. Hemoglobin A1c is 6.6 which is up slightly from 3 months ago. This is close to where we needed to be. Try to watch her diet a little more closely and increase exercise initially at this down her needs to be  Cholesterol is triple what it was 3 months ago. Your risk of a heart attack and stroke as increased by 56.4% over the past 3 months  If you're not taking her Lipitor, you need to restart please  Uric acid is normal at 5.2. There is no significant protein excretion in the urine. Your WBC, (infection fighting ability) Hgb and Hct, (oxygen carrying cells) are normal; as is your percentage of each cell type.

## 2019-01-03 ENCOUNTER — OFFICE VISIT (OUTPATIENT)
Dept: PRIMARY CARE CLINIC | Age: 74
End: 2019-01-03
Payer: MEDICARE

## 2019-01-03 VITALS
WEIGHT: 227 LBS | HEIGHT: 62 IN | TEMPERATURE: 98.2 F | DIASTOLIC BLOOD PRESSURE: 72 MMHG | OXYGEN SATURATION: 98 % | SYSTOLIC BLOOD PRESSURE: 130 MMHG | HEART RATE: 89 BPM | BODY MASS INDEX: 41.77 KG/M2

## 2019-01-03 DIAGNOSIS — E53.8 VITAMIN B 12 DEFICIENCY: Primary | ICD-10-CM

## 2019-01-03 DIAGNOSIS — R19.06 EPIGASTRIC MASS: ICD-10-CM

## 2019-01-03 PROCEDURE — 99213 OFFICE O/P EST LOW 20 MIN: CPT | Performed by: PEDIATRICS

## 2019-01-03 PROCEDURE — 96372 THER/PROPH/DIAG INJ SC/IM: CPT | Performed by: PEDIATRICS

## 2019-01-03 RX ORDER — CYANOCOBALAMIN 1000 UG/ML
1000 INJECTION INTRAMUSCULAR; SUBCUTANEOUS ONCE
Status: COMPLETED | OUTPATIENT
Start: 2019-01-03 | End: 2019-01-03

## 2019-01-03 RX ADMIN — CYANOCOBALAMIN 1000 MCG: 1000 INJECTION INTRAMUSCULAR; SUBCUTANEOUS at 10:18

## 2019-01-03 ASSESSMENT — PATIENT HEALTH QUESTIONNAIRE - PHQ9
SUM OF ALL RESPONSES TO PHQ QUESTIONS 1-9: 0
2. FEELING DOWN, DEPRESSED OR HOPELESS: 0
SUM OF ALL RESPONSES TO PHQ QUESTIONS 1-9: 0
SUM OF ALL RESPONSES TO PHQ9 QUESTIONS 1 & 2: 0
1. LITTLE INTEREST OR PLEASURE IN DOING THINGS: 0

## 2019-01-03 ASSESSMENT — ENCOUNTER SYMPTOMS
ABDOMINAL PAIN: 1
NAUSEA: 0
SHORTNESS OF BREATH: 0
BACK PAIN: 0
VOMITING: 0
EYE PAIN: 0
WHEEZING: 0
COUGH: 0
SINUS PRESSURE: 0
DIARRHEA: 0
SORE THROAT: 0

## 2019-01-08 DIAGNOSIS — R19.06 EPIGASTRIC MASS: Primary | ICD-10-CM

## 2019-01-09 ENCOUNTER — TELEPHONE (OUTPATIENT)
Dept: PRIMARY CARE CLINIC | Age: 74
End: 2019-01-09

## 2019-01-09 ENCOUNTER — HOSPITAL ENCOUNTER (EMERGENCY)
Age: 74
Discharge: HOME | End: 2019-01-09
Attending: FAMILY MEDICINE
Payer: MEDICARE

## 2019-01-09 ENCOUNTER — HOSPITAL ENCOUNTER (OUTPATIENT)
Dept: GENERAL RADIOLOGY | Age: 74
Discharge: HOME | End: 2019-01-09
Payer: MEDICARE

## 2019-01-09 VITALS
WEIGHT: 227 LBS | HEART RATE: 88 BPM | DIASTOLIC BLOOD PRESSURE: 72 MMHG | OXYGEN SATURATION: 96 % | HEIGHT: 61 IN | SYSTOLIC BLOOD PRESSURE: 138 MMHG | RESPIRATION RATE: 18 BRPM | TEMPERATURE: 98.2 F | BODY MASS INDEX: 42.86 KG/M2

## 2019-01-09 DIAGNOSIS — R19.06 EPIGASTRIC MASS: ICD-10-CM

## 2019-01-09 DIAGNOSIS — K43.9 VENTRAL HERNIA WITHOUT OBSTRUCTION OR GANGRENE: Primary | ICD-10-CM

## 2019-01-09 LAB
ALBUMIN SERPL-MCNC: 4.7 G/DL (ref 3.5–5.2)
ALP BLD-CCNC: 85 U/L (ref 35–104)
ALT SERPL-CCNC: 11 U/L (ref 5–33)
ANION GAP SERPL CALCULATED.3IONS-SCNC: 15 MMOL/L (ref 7–19)
AST SERPL-CCNC: 17 U/L (ref 5–32)
BASOPHILS ABSOLUTE: 0 K/UL (ref 0–0.2)
BASOPHILS RELATIVE PERCENT: 0.3 % (ref 0–1)
BILIRUB SERPL-MCNC: <0.2 MG/DL (ref 0.2–1.2)
BUN BLDV-MCNC: 13 MG/DL (ref 8–23)
CALCIUM SERPL-MCNC: 9.4 MG/DL (ref 8.8–10.2)
CHLORIDE BLD-SCNC: 101 MMOL/L (ref 98–111)
CO2: 22 MMOL/L (ref 22–29)
CREAT SERPL-MCNC: 0.8 MG/DL (ref 0.5–0.9)
EOSINOPHILS ABSOLUTE: 0.1 K/UL (ref 0–0.6)
EOSINOPHILS RELATIVE PERCENT: 0.9 % (ref 0–5)
GFR NON-AFRICAN AMERICAN: >60
GLUCOSE BLD-MCNC: 125 MG/DL (ref 74–109)
HCT VFR BLD CALC: 35.1 % (ref 37–47)
HEMOGLOBIN: 11 G/DL (ref 12–16)
LIPASE: 37 U/L (ref 13–60)
LYMPHOCYTES ABSOLUTE: 3 K/UL (ref 1.1–4.5)
LYMPHOCYTES RELATIVE PERCENT: 25.4 % (ref 20–40)
MCH RBC QN AUTO: 28.8 PG (ref 27–31)
MCHC RBC AUTO-ENTMCNC: 31.3 G/DL (ref 33–37)
MCV RBC AUTO: 91.9 FL (ref 81–99)
MONOCYTES ABSOLUTE: 1.8 K/UL (ref 0–0.9)
MONOCYTES RELATIVE PERCENT: 15.3 % (ref 0–10)
NEUTROPHILS ABSOLUTE: 6.5 K/UL (ref 1.5–7.5)
NEUTROPHILS RELATIVE PERCENT: 56.2 % (ref 50–65)
PDW BLD-RTO: 14.2 % (ref 11.5–14.5)
PLATELET # BLD: 255 K/UL (ref 130–400)
PMV BLD AUTO: 11 FL (ref 9.4–12.3)
POTASSIUM REFLEX MAGNESIUM: 4.7 MMOL/L (ref 3.5–5)
RBC # BLD: 3.82 M/UL (ref 4.2–5.4)
SODIUM BLD-SCNC: 138 MMOL/L (ref 136–145)
TOTAL PROTEIN: 7.4 G/DL (ref 6.6–8.7)
WBC # BLD: 11.6 K/UL (ref 4.8–10.8)

## 2019-01-09 PROCEDURE — 6360000004 HC RX CONTRAST MEDICATION: Performed by: PEDIATRICS

## 2019-01-09 PROCEDURE — 96374 THER/PROPH/DIAG INJ IV PUSH: CPT

## 2019-01-09 PROCEDURE — 85025 COMPLETE CBC W/AUTO DIFF WBC: CPT

## 2019-01-09 PROCEDURE — 99284 EMERGENCY DEPT VISIT MOD MDM: CPT | Performed by: FAMILY MEDICINE

## 2019-01-09 PROCEDURE — 83690 ASSAY OF LIPASE: CPT

## 2019-01-09 PROCEDURE — 36415 COLL VENOUS BLD VENIPUNCTURE: CPT

## 2019-01-09 PROCEDURE — 6360000002 HC RX W HCPCS: Performed by: FAMILY MEDICINE

## 2019-01-09 PROCEDURE — 74177 CT ABD & PELVIS W/CONTRAST: CPT

## 2019-01-09 PROCEDURE — 99283 EMERGENCY DEPT VISIT LOW MDM: CPT

## 2019-01-09 PROCEDURE — 80053 COMPREHEN METABOLIC PANEL: CPT

## 2019-01-09 RX ORDER — MORPHINE SULFATE/0.9% NACL/PF 1 MG/ML
4 SYRINGE (ML) INJECTION ONCE
Status: COMPLETED | OUTPATIENT
Start: 2019-01-09 | End: 2019-01-09

## 2019-01-09 RX ORDER — ACETAMINOPHEN AND CODEINE PHOSPHATE 300; 30 MG/1; MG/1
1 TABLET ORAL EVERY 8 HOURS PRN
Qty: 18 TABLET | Refills: 0 | Status: SHIPPED | OUTPATIENT
Start: 2019-01-09 | End: 2019-01-11

## 2019-01-09 RX ORDER — ACETAMINOPHEN AND CODEINE PHOSPHATE 300; 30 MG/1; MG/1
1 TABLET ORAL EVERY 8 HOURS PRN
Status: DISCONTINUED | OUTPATIENT
Start: 2019-01-09 | End: 2019-01-09 | Stop reason: HOSPADM

## 2019-01-09 RX ADMIN — Medication 4 MG: at 14:46

## 2019-01-09 RX ADMIN — IOPAMIDOL 75 ML: 755 INJECTION, SOLUTION INTRAVENOUS at 08:49

## 2019-01-09 ASSESSMENT — ENCOUNTER SYMPTOMS
ABDOMINAL PAIN: 1
DIARRHEA: 0
CONSTIPATION: 0
SORE THROAT: 0
COUGH: 0
BACK PAIN: 0
SHORTNESS OF BREATH: 0
APNEA: 0
VOMITING: 0
WHEEZING: 0
CHEST TIGHTNESS: 0
ABDOMINAL DISTENTION: 0
TROUBLE SWALLOWING: 0

## 2019-01-09 ASSESSMENT — PAIN DESCRIPTION - LOCATION
LOCATION: ABDOMEN
LOCATION: ABDOMEN

## 2019-01-09 ASSESSMENT — PAIN SCALES - GENERAL
PAINLEVEL_OUTOF10: 5
PAINLEVEL_OUTOF10: 8

## 2019-01-14 ENCOUNTER — OFFICE VISIT (OUTPATIENT)
Dept: SURGERY | Age: 74
End: 2019-01-14
Payer: MEDICARE

## 2019-01-14 VITALS
HEIGHT: 62 IN | DIASTOLIC BLOOD PRESSURE: 70 MMHG | WEIGHT: 227 LBS | TEMPERATURE: 97.4 F | SYSTOLIC BLOOD PRESSURE: 130 MMHG | BODY MASS INDEX: 41.77 KG/M2

## 2019-01-14 DIAGNOSIS — K43.9 VENTRAL HERNIA WITHOUT OBSTRUCTION OR GANGRENE: Primary | ICD-10-CM

## 2019-01-14 PROCEDURE — 99214 OFFICE O/P EST MOD 30 MIN: CPT | Performed by: PHYSICIAN ASSISTANT

## 2019-01-16 PROBLEM — K43.9 VENTRAL HERNIA WITHOUT OBSTRUCTION OR GANGRENE: Status: ACTIVE | Noted: 2019-01-16

## 2019-01-17 ENCOUNTER — HOSPITAL ENCOUNTER (OUTPATIENT)
Dept: PREADMISSION TESTING | Age: 74
Discharge: HOME | End: 2019-01-21
Payer: MEDICARE

## 2019-01-17 VITALS — WEIGHT: 227 LBS | BODY MASS INDEX: 41.77 KG/M2 | HEIGHT: 62 IN

## 2019-01-17 PROCEDURE — 87081 CULTURE SCREEN ONLY: CPT

## 2019-01-17 PROCEDURE — 93005 ELECTROCARDIOGRAM TRACING: CPT

## 2019-01-17 RX ORDER — MELOXICAM 15 MG/1
15 TABLET ORAL DAILY
COMMUNITY
End: 2019-09-17 | Stop reason: SDUPTHER

## 2019-01-17 RX ORDER — ATORVASTATIN CALCIUM 20 MG/1
20 TABLET, FILM COATED ORAL NIGHTLY
COMMUNITY
End: 2019-12-05

## 2019-01-17 RX ORDER — ALLOPURINOL 300 MG/1
300 TABLET ORAL DAILY
COMMUNITY
End: 2019-12-05

## 2019-01-17 RX ORDER — LISINOPRIL AND HYDROCHLOROTHIAZIDE 20; 12.5 MG/1; MG/1
1 TABLET ORAL DAILY
COMMUNITY
End: 2020-01-06

## 2019-01-17 RX ORDER — AMLODIPINE BESYLATE 5 MG/1
5 TABLET ORAL DAILY
COMMUNITY
End: 2020-03-06 | Stop reason: SDUPTHER

## 2019-01-17 RX ORDER — COLCHICINE 0.6 MG/1
0.6 TABLET ORAL DAILY PRN
COMMUNITY
End: 2019-02-27

## 2019-01-18 DIAGNOSIS — M15.9 PRIMARY OSTEOARTHRITIS INVOLVING MULTIPLE JOINTS: ICD-10-CM

## 2019-01-18 DIAGNOSIS — I10 ESSENTIAL HYPERTENSION: ICD-10-CM

## 2019-01-18 LAB
EKG P AXIS: 62 DEGREES
EKG P-R INTERVAL: 122 MS
EKG Q-T INTERVAL: 354 MS
EKG QRS DURATION: 98 MS
EKG QTC CALCULATION (BAZETT): 396 MS
EKG T AXIS: 35 DEGREES
MRSA CULTURE ONLY: NORMAL

## 2019-01-18 RX ORDER — LISINOPRIL AND HYDROCHLOROTHIAZIDE 20; 12.5 MG/1; MG/1
1 TABLET ORAL DAILY
Qty: 30 TABLET | Refills: 11 | Status: ON HOLD | OUTPATIENT
Start: 2019-01-18 | End: 2019-01-22 | Stop reason: SDUPTHER

## 2019-01-18 RX ORDER — AMLODIPINE BESYLATE 5 MG/1
5 TABLET ORAL DAILY
Qty: 30 TABLET | Refills: 11 | Status: ON HOLD | OUTPATIENT
Start: 2019-01-18 | End: 2019-01-22 | Stop reason: SDUPTHER

## 2019-01-18 RX ORDER — MELOXICAM 15 MG/1
15 TABLET ORAL DAILY
Qty: 30 TABLET | Refills: 11 | Status: ON HOLD | OUTPATIENT
Start: 2019-01-18 | End: 2019-01-22 | Stop reason: SDUPTHER

## 2019-01-21 ENCOUNTER — TELEPHONE (OUTPATIENT)
Dept: SURGERY | Age: 74
End: 2019-01-21

## 2019-01-22 ENCOUNTER — HOSPITAL ENCOUNTER (OUTPATIENT)
Age: 74
Setting detail: OUTPATIENT SURGERY
Discharge: HOME | End: 2019-01-22
Attending: SURGERY | Admitting: SURGERY
Payer: MEDICARE

## 2019-01-22 ENCOUNTER — ANESTHESIA EVENT (OUTPATIENT)
Dept: OPERATING ROOM | Age: 74
End: 2019-01-22
Payer: MEDICARE

## 2019-01-22 ENCOUNTER — ANESTHESIA (OUTPATIENT)
Dept: OPERATING ROOM | Age: 74
End: 2019-01-22
Payer: MEDICARE

## 2019-01-22 VITALS
TEMPERATURE: 96.9 F | HEIGHT: 62 IN | BODY MASS INDEX: 41.77 KG/M2 | SYSTOLIC BLOOD PRESSURE: 153 MMHG | RESPIRATION RATE: 12 BRPM | WEIGHT: 227 LBS | HEART RATE: 91 BPM | OXYGEN SATURATION: 100 % | DIASTOLIC BLOOD PRESSURE: 79 MMHG

## 2019-01-22 VITALS
RESPIRATION RATE: 1 BRPM | SYSTOLIC BLOOD PRESSURE: 135 MMHG | OXYGEN SATURATION: 97 % | DIASTOLIC BLOOD PRESSURE: 71 MMHG | TEMPERATURE: 90 F

## 2019-01-22 DIAGNOSIS — K43.2 RECURRENT VENTRAL INCISIONAL HERNIA: Primary | ICD-10-CM

## 2019-01-22 LAB
GLUCOSE BLD-MCNC: 152 MG/DL (ref 70–99)
PERFORMED ON: ABNORMAL

## 2019-01-22 PROCEDURE — 6360000002 HC RX W HCPCS: Performed by: SURGERY

## 2019-01-22 PROCEDURE — 2780000010 HC IMPLANT OTHER: Performed by: SURGERY

## 2019-01-22 PROCEDURE — 2580000003 HC RX 258: Performed by: SURGERY

## 2019-01-22 PROCEDURE — 2500000003 HC RX 250 WO HCPCS: Performed by: SURGERY

## 2019-01-22 PROCEDURE — 7100000011 HC PHASE II RECOVERY - ADDTL 15 MIN: Performed by: SURGERY

## 2019-01-22 PROCEDURE — C9290 INJ, BUPIVACAINE LIPOSOME: HCPCS | Performed by: SURGERY

## 2019-01-22 PROCEDURE — 2709999900 HC NON-CHARGEABLE SUPPLY: Performed by: SURGERY

## 2019-01-22 PROCEDURE — 6370000000 HC RX 637 (ALT 250 FOR IP): Performed by: SURGERY

## 2019-01-22 PROCEDURE — 6360000002 HC RX W HCPCS: Performed by: ANESTHESIOLOGY

## 2019-01-22 PROCEDURE — 88302 TISSUE EXAM BY PATHOLOGIST: CPT

## 2019-01-22 PROCEDURE — 49565 PR REPAIR RECURR INCIS HERNIA,REDUC: CPT | Performed by: SURGERY

## 2019-01-22 PROCEDURE — 2580000003 HC RX 258: Performed by: ANESTHESIOLOGY

## 2019-01-22 PROCEDURE — 7100000000 HC PACU RECOVERY - FIRST 15 MIN: Performed by: SURGERY

## 2019-01-22 PROCEDURE — 3700000000 HC ANESTHESIA ATTENDED CARE: Performed by: SURGERY

## 2019-01-22 PROCEDURE — 49568 PR IMPLANT MESH HERNIA REPAIR/DEBRIDEMENT CLOSURE: CPT | Performed by: PHYSICIAN ASSISTANT

## 2019-01-22 PROCEDURE — 3600000004 HC SURGERY LEVEL 4 BASE: Performed by: SURGERY

## 2019-01-22 PROCEDURE — 7100000001 HC PACU RECOVERY - ADDTL 15 MIN: Performed by: SURGERY

## 2019-01-22 PROCEDURE — 7100000010 HC PHASE II RECOVERY - FIRST 15 MIN: Performed by: SURGERY

## 2019-01-22 PROCEDURE — 2500000003 HC RX 250 WO HCPCS: Performed by: NURSE ANESTHETIST, CERTIFIED REGISTERED

## 2019-01-22 PROCEDURE — 3600000014 HC SURGERY LEVEL 4 ADDTL 15MIN: Performed by: SURGERY

## 2019-01-22 PROCEDURE — 82948 REAGENT STRIP/BLOOD GLUCOSE: CPT

## 2019-01-22 PROCEDURE — 6360000002 HC RX W HCPCS: Performed by: NURSE ANESTHETIST, CERTIFIED REGISTERED

## 2019-01-22 PROCEDURE — 49568 PR IMPLANT MESH HERNIA REPAIR/DEBRIDEMENT CLOSURE: CPT | Performed by: SURGERY

## 2019-01-22 PROCEDURE — 3700000001 HC ADD 15 MINUTES (ANESTHESIA): Performed by: SURGERY

## 2019-01-22 PROCEDURE — 49565 PR REPAIR RECURR INCIS HERNIA,REDUC: CPT | Performed by: PHYSICIAN ASSISTANT

## 2019-01-22 PROCEDURE — 6370000000 HC RX 637 (ALT 250 FOR IP): Performed by: ANESTHESIOLOGY

## 2019-01-22 DEVICE — IMPLANTABLE DEVICE: Type: IMPLANTABLE DEVICE | Status: FUNCTIONAL

## 2019-01-22 RX ORDER — MIDAZOLAM HYDROCHLORIDE 1 MG/ML
2 INJECTION INTRAMUSCULAR; INTRAVENOUS
Status: DISCONTINUED | OUTPATIENT
Start: 2019-01-22 | End: 2019-01-22 | Stop reason: HOSPADM

## 2019-01-22 RX ORDER — MEPERIDINE HYDROCHLORIDE 50 MG/ML
12.5 INJECTION INTRAMUSCULAR; INTRAVENOUS; SUBCUTANEOUS EVERY 5 MIN PRN
Status: DISCONTINUED | OUTPATIENT
Start: 2019-01-22 | End: 2019-01-22 | Stop reason: HOSPADM

## 2019-01-22 RX ORDER — HYDROCODONE BITARTRATE AND ACETAMINOPHEN 5; 325 MG/1; MG/1
1 TABLET ORAL EVERY 6 HOURS PRN
Qty: 20 TABLET | Refills: 0 | Status: SHIPPED | OUTPATIENT
Start: 2019-01-22 | End: 2019-02-27

## 2019-01-22 RX ORDER — FENTANYL CITRATE 50 UG/ML
25 INJECTION, SOLUTION INTRAMUSCULAR; INTRAVENOUS
Status: DISCONTINUED | OUTPATIENT
Start: 2019-01-22 | End: 2019-01-22 | Stop reason: HOSPADM

## 2019-01-22 RX ORDER — LABETALOL HYDROCHLORIDE 5 MG/ML
5 INJECTION, SOLUTION INTRAVENOUS EVERY 10 MIN PRN
Status: DISCONTINUED | OUTPATIENT
Start: 2019-01-22 | End: 2019-01-22 | Stop reason: HOSPADM

## 2019-01-22 RX ORDER — ONDANSETRON 2 MG/ML
INJECTION INTRAMUSCULAR; INTRAVENOUS PRN
Status: DISCONTINUED | OUTPATIENT
Start: 2019-01-22 | End: 2019-01-22 | Stop reason: SDUPTHER

## 2019-01-22 RX ORDER — LIDOCAINE HYDROCHLORIDE 10 MG/ML
1 INJECTION, SOLUTION EPIDURAL; INFILTRATION; INTRACAUDAL; PERINEURAL
Status: DISCONTINUED | OUTPATIENT
Start: 2019-01-22 | End: 2019-01-22 | Stop reason: HOSPADM

## 2019-01-22 RX ORDER — APREPITANT 40 MG/1
40 CAPSULE ORAL ONCE
Status: COMPLETED | OUTPATIENT
Start: 2019-01-22 | End: 2019-01-22

## 2019-01-22 RX ORDER — ENALAPRILAT 2.5 MG/2ML
1.25 INJECTION INTRAVENOUS
Status: DISCONTINUED | OUTPATIENT
Start: 2019-01-22 | End: 2019-01-22 | Stop reason: HOSPADM

## 2019-01-22 RX ORDER — SODIUM CHLORIDE 0.9 % (FLUSH) 0.9 %
10 SYRINGE (ML) INJECTION PRN
Status: DISCONTINUED | OUTPATIENT
Start: 2019-01-22 | End: 2019-01-22 | Stop reason: HOSPADM

## 2019-01-22 RX ORDER — MORPHINE SULFATE/0.9% NACL/PF 1 MG/ML
2 SYRINGE (ML) INJECTION EVERY 5 MIN PRN
Status: DISCONTINUED | OUTPATIENT
Start: 2019-01-22 | End: 2019-01-22 | Stop reason: HOSPADM

## 2019-01-22 RX ORDER — LIDOCAINE HYDROCHLORIDE 10 MG/ML
INJECTION, SOLUTION INFILTRATION; PERINEURAL PRN
Status: DISCONTINUED | OUTPATIENT
Start: 2019-01-22 | End: 2019-01-22 | Stop reason: SDUPTHER

## 2019-01-22 RX ORDER — SODIUM CHLORIDE 0.9 % (FLUSH) 0.9 %
10 SYRINGE (ML) INJECTION EVERY 12 HOURS SCHEDULED
Status: DISCONTINUED | OUTPATIENT
Start: 2019-01-22 | End: 2019-01-22 | Stop reason: HOSPADM

## 2019-01-22 RX ORDER — DEXAMETHASONE SODIUM PHOSPHATE 4 MG/ML
INJECTION, SOLUTION INTRA-ARTICULAR; INTRALESIONAL; INTRAMUSCULAR; INTRAVENOUS; SOFT TISSUE PRN
Status: DISCONTINUED | OUTPATIENT
Start: 2019-01-22 | End: 2019-01-22 | Stop reason: SDUPTHER

## 2019-01-22 RX ORDER — SODIUM CHLORIDE, SODIUM LACTATE, POTASSIUM CHLORIDE, CALCIUM CHLORIDE 600; 310; 30; 20 MG/100ML; MG/100ML; MG/100ML; MG/100ML
INJECTION, SOLUTION INTRAVENOUS CONTINUOUS
Status: DISCONTINUED | OUTPATIENT
Start: 2019-01-22 | End: 2019-01-22 | Stop reason: HOSPADM

## 2019-01-22 RX ORDER — HYDRALAZINE HYDROCHLORIDE 20 MG/ML
5 INJECTION INTRAMUSCULAR; INTRAVENOUS EVERY 10 MIN PRN
Status: DISCONTINUED | OUTPATIENT
Start: 2019-01-22 | End: 2019-01-22 | Stop reason: HOSPADM

## 2019-01-22 RX ORDER — FENTANYL CITRATE 50 UG/ML
50 INJECTION, SOLUTION INTRAMUSCULAR; INTRAVENOUS
Status: DISCONTINUED | OUTPATIENT
Start: 2019-01-22 | End: 2019-01-22 | Stop reason: HOSPADM

## 2019-01-22 RX ORDER — PROPOFOL 10 MG/ML
INJECTION, EMULSION INTRAVENOUS PRN
Status: DISCONTINUED | OUTPATIENT
Start: 2019-01-22 | End: 2019-01-22 | Stop reason: SDUPTHER

## 2019-01-22 RX ORDER — ROCURONIUM BROMIDE 10 MG/ML
INJECTION, SOLUTION INTRAVENOUS PRN
Status: DISCONTINUED | OUTPATIENT
Start: 2019-01-22 | End: 2019-01-22 | Stop reason: SDUPTHER

## 2019-01-22 RX ORDER — DIPHENHYDRAMINE HYDROCHLORIDE 50 MG/ML
12.5 INJECTION INTRAMUSCULAR; INTRAVENOUS
Status: DISCONTINUED | OUTPATIENT
Start: 2019-01-22 | End: 2019-01-22 | Stop reason: HOSPADM

## 2019-01-22 RX ORDER — MIDAZOLAM HYDROCHLORIDE 1 MG/ML
INJECTION INTRAMUSCULAR; INTRAVENOUS PRN
Status: DISCONTINUED | OUTPATIENT
Start: 2019-01-22 | End: 2019-01-22 | Stop reason: SDUPTHER

## 2019-01-22 RX ORDER — FENTANYL CITRATE 50 UG/ML
INJECTION, SOLUTION INTRAMUSCULAR; INTRAVENOUS PRN
Status: DISCONTINUED | OUTPATIENT
Start: 2019-01-22 | End: 2019-01-22 | Stop reason: SDUPTHER

## 2019-01-22 RX ORDER — MORPHINE SULFATE/0.9% NACL/PF 1 MG/ML
4 SYRINGE (ML) INJECTION EVERY 5 MIN PRN
Status: DISCONTINUED | OUTPATIENT
Start: 2019-01-22 | End: 2019-01-22 | Stop reason: HOSPADM

## 2019-01-22 RX ORDER — METOCLOPRAMIDE HYDROCHLORIDE 5 MG/ML
10 INJECTION INTRAMUSCULAR; INTRAVENOUS
Status: DISCONTINUED | OUTPATIENT
Start: 2019-01-22 | End: 2019-01-22 | Stop reason: HOSPADM

## 2019-01-22 RX ORDER — SCOLOPAMINE TRANSDERMAL SYSTEM 1 MG/1
1 PATCH, EXTENDED RELEASE TRANSDERMAL ONCE
Status: DISCONTINUED | OUTPATIENT
Start: 2019-01-22 | End: 2019-01-22 | Stop reason: HOSPADM

## 2019-01-22 RX ORDER — SODIUM CHLORIDE 9 MG/ML
INJECTION, SOLUTION INTRAVENOUS CONTINUOUS
Status: DISCONTINUED | OUTPATIENT
Start: 2019-01-22 | End: 2019-01-22 | Stop reason: HOSPADM

## 2019-01-22 RX ORDER — MORPHINE SULFATE 10 MG/ML
INJECTION, SOLUTION INTRAMUSCULAR; INTRAVENOUS PRN
Status: DISCONTINUED | OUTPATIENT
Start: 2019-01-22 | End: 2019-01-22 | Stop reason: SDUPTHER

## 2019-01-22 RX ORDER — PROMETHAZINE HYDROCHLORIDE 25 MG/ML
6.25 INJECTION, SOLUTION INTRAMUSCULAR; INTRAVENOUS
Status: DISCONTINUED | OUTPATIENT
Start: 2019-01-22 | End: 2019-01-22 | Stop reason: HOSPADM

## 2019-01-22 RX ORDER — HYDROCODONE BITARTRATE AND ACETAMINOPHEN 5; 325 MG/1; MG/1
1 TABLET ORAL ONCE
Status: COMPLETED | OUTPATIENT
Start: 2019-01-22 | End: 2019-01-22

## 2019-01-22 RX ORDER — BUPIVACAINE HYDROCHLORIDE 2.5 MG/ML
INJECTION, SOLUTION INFILTRATION; PERINEURAL PRN
Status: DISCONTINUED | OUTPATIENT
Start: 2019-01-22 | End: 2019-01-22 | Stop reason: HOSPADM

## 2019-01-22 RX ADMIN — PROPOFOL 140 MG: 10 INJECTION, EMULSION INTRAVENOUS at 11:38

## 2019-01-22 RX ADMIN — LIDOCAINE HYDROCHLORIDE 5 ML: 10 INJECTION, SOLUTION INFILTRATION; PERINEURAL at 11:38

## 2019-01-22 RX ADMIN — FENTANYL CITRATE 25 MCG: 50 INJECTION INTRAMUSCULAR; INTRAVENOUS at 12:02

## 2019-01-22 RX ADMIN — MIDAZOLAM 1 MG: 1 INJECTION INTRAMUSCULAR; INTRAVENOUS at 11:30

## 2019-01-22 RX ADMIN — ROCURONIUM BROMIDE 50 MG: 10 INJECTION INTRAVENOUS at 11:38

## 2019-01-22 RX ADMIN — PROPOFOL 20 MG: 10 INJECTION, EMULSION INTRAVENOUS at 13:10

## 2019-01-22 RX ADMIN — HYDROCODONE BITARTRATE AND ACETAMINOPHEN 1 TABLET: 5; 325 TABLET ORAL at 14:35

## 2019-01-22 RX ADMIN — Medication 2 G: at 11:46

## 2019-01-22 RX ADMIN — SODIUM CHLORIDE, SODIUM LACTATE, POTASSIUM CHLORIDE, AND CALCIUM CHLORIDE: 600; 310; 30; 20 INJECTION, SOLUTION INTRAVENOUS at 10:32

## 2019-01-22 RX ADMIN — ONDANSETRON HYDROCHLORIDE 4 MG: 2 INJECTION, SOLUTION INTRAMUSCULAR; INTRAVENOUS at 13:15

## 2019-01-22 RX ADMIN — MIDAZOLAM 1 MG: 1 INJECTION INTRAMUSCULAR; INTRAVENOUS at 11:37

## 2019-01-22 RX ADMIN — FENTANYL CITRATE 25 MCG: 50 INJECTION INTRAMUSCULAR; INTRAVENOUS at 12:49

## 2019-01-22 RX ADMIN — SUGAMMADEX 220 MG: 100 INJECTION, SOLUTION INTRAVENOUS at 13:05

## 2019-01-22 RX ADMIN — MORPHINE SULFATE 5 MG: 10 INJECTION INTRAMUSCULAR; INTRAVENOUS; SUBCUTANEOUS at 13:11

## 2019-01-22 RX ADMIN — MORPHINE SULFATE 5 MG: 10 INJECTION INTRAMUSCULAR; INTRAVENOUS; SUBCUTANEOUS at 13:22

## 2019-01-22 RX ADMIN — SODIUM CHLORIDE, SODIUM LACTATE, POTASSIUM CHLORIDE, AND CALCIUM CHLORIDE: 600; 310; 30; 20 INJECTION, SOLUTION INTRAVENOUS at 11:55

## 2019-01-22 RX ADMIN — DEXAMETHASONE SODIUM PHOSPHATE 4 MG: 4 INJECTION, SOLUTION INTRAMUSCULAR; INTRAVENOUS at 11:47

## 2019-01-22 RX ADMIN — FENTANYL CITRATE 50 MCG: 50 INJECTION INTRAMUSCULAR; INTRAVENOUS at 11:37

## 2019-01-22 RX ADMIN — APREPITANT 40 MG: 40 CAPSULE ORAL at 10:32

## 2019-01-22 RX ADMIN — PROPOFOL 20 MG: 10 INJECTION, EMULSION INTRAVENOUS at 13:05

## 2019-01-22 ASSESSMENT — PAIN DESCRIPTION - LOCATION
LOCATION: ABDOMEN
LOCATION: ABDOMEN

## 2019-01-22 ASSESSMENT — PAIN DESCRIPTION - PAIN TYPE
TYPE: SURGICAL PAIN
TYPE: SURGICAL PAIN

## 2019-01-22 ASSESSMENT — PAIN SCALES - GENERAL
PAINLEVEL_OUTOF10: 4
PAINLEVEL_OUTOF10: 6
PAINLEVEL_OUTOF10: 6

## 2019-01-22 ASSESSMENT — PAIN DESCRIPTION - ORIENTATION
ORIENTATION: RIGHT
ORIENTATION: RIGHT

## 2019-01-22 ASSESSMENT — PAIN DESCRIPTION - DESCRIPTORS
DESCRIPTORS: ACHING
DESCRIPTORS: ACHING

## 2019-01-22 ASSESSMENT — PAIN DESCRIPTION - PROGRESSION: CLINICAL_PROGRESSION: GRADUALLY IMPROVING

## 2019-01-22 ASSESSMENT — PAIN DESCRIPTION - FREQUENCY
FREQUENCY: CONTINUOUS
FREQUENCY: CONTINUOUS

## 2019-01-22 ASSESSMENT — PAIN - FUNCTIONAL ASSESSMENT: PAIN_FUNCTIONAL_ASSESSMENT: 0-10

## 2019-01-29 ENCOUNTER — TELEPHONE (OUTPATIENT)
Dept: SURGERY | Age: 74
End: 2019-01-29

## 2019-02-11 ENCOUNTER — TELEPHONE (OUTPATIENT)
Dept: PRIMARY CARE CLINIC | Age: 74
End: 2019-02-11

## 2019-02-18 ENCOUNTER — OFFICE VISIT (OUTPATIENT)
Dept: SURGERY | Age: 74
End: 2019-02-18

## 2019-02-18 VITALS
SYSTOLIC BLOOD PRESSURE: 130 MMHG | WEIGHT: 225 LBS | TEMPERATURE: 97.8 F | DIASTOLIC BLOOD PRESSURE: 74 MMHG | HEIGHT: 62 IN | BODY MASS INDEX: 41.41 KG/M2

## 2019-02-18 DIAGNOSIS — Z98.890 STATUS POST REPAIR OF RECURRENT VENTRAL HERNIA: Primary | ICD-10-CM

## 2019-02-18 DIAGNOSIS — Z87.19 STATUS POST REPAIR OF RECURRENT VENTRAL HERNIA: Primary | ICD-10-CM

## 2019-02-18 PROCEDURE — 99024 POSTOP FOLLOW-UP VISIT: CPT | Performed by: PHYSICIAN ASSISTANT

## 2019-02-27 ENCOUNTER — OFFICE VISIT (OUTPATIENT)
Dept: PRIMARY CARE CLINIC | Age: 74
End: 2019-02-27
Payer: MEDICARE

## 2019-02-27 VITALS
WEIGHT: 229.2 LBS | BODY MASS INDEX: 42.18 KG/M2 | SYSTOLIC BLOOD PRESSURE: 128 MMHG | HEART RATE: 66 BPM | HEIGHT: 62 IN | DIASTOLIC BLOOD PRESSURE: 64 MMHG | OXYGEN SATURATION: 97 % | TEMPERATURE: 98.2 F

## 2019-02-27 DIAGNOSIS — I63.319 CEREBRAL INFARCTION DUE TO THROMBOSIS OF MIDDLE CEREBRAL ARTERY, UNSPECIFIED BLOOD VESSEL LATERALITY (HCC): ICD-10-CM

## 2019-02-27 DIAGNOSIS — E53.8 VITAMIN B 12 DEFICIENCY: ICD-10-CM

## 2019-02-27 DIAGNOSIS — E66.01 MORBID OBESITY WITH BMI OF 40.0-44.9, ADULT (HCC): ICD-10-CM

## 2019-02-27 DIAGNOSIS — I10 ESSENTIAL HYPERTENSION: ICD-10-CM

## 2019-02-27 DIAGNOSIS — E79.0 HYPERURICEMIA: ICD-10-CM

## 2019-02-27 DIAGNOSIS — E11.9 TYPE 2 DIABETES MELLITUS WITHOUT COMPLICATION, WITHOUT LONG-TERM CURRENT USE OF INSULIN (HCC): Primary | ICD-10-CM

## 2019-02-27 DIAGNOSIS — E53.8 B12 DEFICIENCY: ICD-10-CM

## 2019-02-27 DIAGNOSIS — E78.2 MIXED HYPERLIPIDEMIA: ICD-10-CM

## 2019-02-27 PROCEDURE — 96372 THER/PROPH/DIAG INJ SC/IM: CPT | Performed by: PEDIATRICS

## 2019-02-27 PROCEDURE — 99214 OFFICE O/P EST MOD 30 MIN: CPT | Performed by: PEDIATRICS

## 2019-02-27 RX ORDER — CYANOCOBALAMIN 1000 UG/ML
1000 INJECTION INTRAMUSCULAR; SUBCUTANEOUS ONCE
Status: COMPLETED | OUTPATIENT
Start: 2019-02-27 | End: 2019-02-27

## 2019-02-27 RX ADMIN — CYANOCOBALAMIN 1000 MCG: 1000 INJECTION INTRAMUSCULAR; SUBCUTANEOUS at 12:10

## 2019-02-27 ASSESSMENT — ENCOUNTER SYMPTOMS
BACK PAIN: 0
NAUSEA: 0
DIARRHEA: 0
WHEEZING: 0
SINUS PRESSURE: 0
ABDOMINAL PAIN: 1
EYE PAIN: 0
VOMITING: 0
SORE THROAT: 0
COUGH: 0
SHORTNESS OF BREATH: 0

## 2019-03-06 ENCOUNTER — OFFICE VISIT (OUTPATIENT)
Dept: SURGERY | Age: 74
End: 2019-03-06

## 2019-03-06 VITALS
BODY MASS INDEX: 43.43 KG/M2 | WEIGHT: 236 LBS | OXYGEN SATURATION: 98 % | HEIGHT: 62 IN | TEMPERATURE: 97.3 F | HEART RATE: 57 BPM

## 2019-03-06 DIAGNOSIS — Z87.19 STATUS POST REPAIR OF RECURRENT VENTRAL HERNIA: Primary | ICD-10-CM

## 2019-03-06 DIAGNOSIS — Z98.890 STATUS POST REPAIR OF RECURRENT VENTRAL HERNIA: Primary | ICD-10-CM

## 2019-03-06 PROCEDURE — 99024 POSTOP FOLLOW-UP VISIT: CPT | Performed by: PHYSICIAN ASSISTANT

## 2019-04-23 ENCOUNTER — PROCEDURE VISIT (OUTPATIENT)
Dept: PRIMARY CARE CLINIC | Age: 74
End: 2019-04-23
Payer: MEDICARE

## 2019-04-23 DIAGNOSIS — E53.8 B12 DEFICIENCY: Primary | ICD-10-CM

## 2019-04-23 PROCEDURE — 96372 THER/PROPH/DIAG INJ SC/IM: CPT | Performed by: PEDIATRICS

## 2019-04-23 RX ORDER — CYANOCOBALAMIN 1000 UG/ML
1000 INJECTION INTRAMUSCULAR; SUBCUTANEOUS ONCE
Status: COMPLETED | OUTPATIENT
Start: 2019-04-23 | End: 2019-04-23

## 2019-04-23 RX ADMIN — CYANOCOBALAMIN 1000 MCG: 1000 INJECTION INTRAMUSCULAR; SUBCUTANEOUS at 17:57

## 2019-05-28 ENCOUNTER — OFFICE VISIT (OUTPATIENT)
Dept: PRIMARY CARE CLINIC | Age: 74
End: 2019-05-28
Payer: MEDICARE

## 2019-05-28 VITALS
HEIGHT: 62 IN | BODY MASS INDEX: 41.41 KG/M2 | HEART RATE: 87 BPM | WEIGHT: 225 LBS | DIASTOLIC BLOOD PRESSURE: 68 MMHG | OXYGEN SATURATION: 98 % | SYSTOLIC BLOOD PRESSURE: 110 MMHG | TEMPERATURE: 98.3 F

## 2019-05-28 DIAGNOSIS — I10 ESSENTIAL HYPERTENSION: ICD-10-CM

## 2019-05-28 DIAGNOSIS — E78.2 MIXED HYPERLIPIDEMIA: ICD-10-CM

## 2019-05-28 DIAGNOSIS — E53.8 B12 DEFICIENCY: Primary | ICD-10-CM

## 2019-05-28 DIAGNOSIS — E11.9 TYPE 2 DIABETES MELLITUS WITHOUT COMPLICATION, WITHOUT LONG-TERM CURRENT USE OF INSULIN (HCC): ICD-10-CM

## 2019-05-28 PROCEDURE — 99214 OFFICE O/P EST MOD 30 MIN: CPT | Performed by: PEDIATRICS

## 2019-05-28 PROCEDURE — 96372 THER/PROPH/DIAG INJ SC/IM: CPT | Performed by: PEDIATRICS

## 2019-05-28 RX ORDER — CYANOCOBALAMIN 1000 UG/ML
1000 INJECTION INTRAMUSCULAR; SUBCUTANEOUS ONCE
Status: COMPLETED | OUTPATIENT
Start: 2019-05-28 | End: 2019-05-28

## 2019-05-28 RX ADMIN — CYANOCOBALAMIN 1000 MCG: 1000 INJECTION INTRAMUSCULAR; SUBCUTANEOUS at 12:07

## 2019-05-28 ASSESSMENT — ENCOUNTER SYMPTOMS
EYE PAIN: 0
SINUS PRESSURE: 0
DIARRHEA: 0
BACK PAIN: 0
ABDOMINAL PAIN: 0
SHORTNESS OF BREATH: 0
VOMITING: 0
COUGH: 0
WHEEZING: 0
SORE THROAT: 0
NAUSEA: 0

## 2019-05-28 NOTE — PROGRESS NOTES
[address] [facility], [address]  Phone [phone]   Fax [phone]      OFFICE VISIT: 2019    Evelia Flood-: 1945      HPI  Reason For Visit:  Torie Greco is a 72 y.o. Health Maintenance    Diabetes (FSBS are averaging 121 fasting in the AM) and Injections (b12)        Diabetes Mellitus Type 2  Diet compliance:  compliant most of the time  Nutrition Consultation Needed:  no  Medication:              Victoza 1.2 mg subcutaneous daily              Metformin 1000 mg twice daily with meals      Medication compliance:  compliant most of the time  Weight trend: increasing. Weight is down 11 pounds from 2 months ago  This is back to her baseline weight  Current exercise: yes - walking  Checkin times daily  Home blood sugar records: fasting range: Low 1 teens  Blood sugar was 121 this morning, fasting  Low BG:  no  Eye exam current (within one year): yes  Checking Feet regularly:  yes - no sores  ACE/ARB:  yes - lisinopril  Aspirin: Yes  Tobacco history: She  reports that she has never smoked. She has never used smokeless tobacco.    Lab Results   Component Value Date    LABA1C 6.6 (H) 2018    LABA1C 6.3 (H) 2018    LABA1C 6.6 (H) 10/03/2017     Lab Results   Component Value Date    LABMICR 2.90 2018    CREATININE 0.8 2019       Hypertension:   BP today was   BP Readings from Last 1 Encounters:   19 110/68      Recent BP readings:    BP Readings from Last 3 Encounters:   19 110/68   19 128/64   19 130/74     Medication              Amlodipine 5 mg daily              Lisinopril hydrochlorthiazide  20-25 mg daily. Medication compliance:  compliant most of the time  Home blood pressure monitoring: Yes - excellently controlled. She is adherent to a low sodium diet.      Symptoms: none  Laboratory:  Lab Results   Component Value Date    BUN 13 2019    CREATININE 0.8 2019       Hyperlipidemia: Medication:   atorvastatin (Lipitor)   Fish oil 1000 mg daily  Low Fat, Low Choleterol Diet:  no  Myalgias or GI upset: no  The patient exercises Regularly. Laboratory:    Lab Results   Component Value Date    CHOL 268 (H) 12/03/2018    TRIG 230 (H) 12/03/2018    HDL 65 12/03/2018    LDLCALC 157 12/03/2018    LDLDIRECT 85 (L) 08/19/2015      Lab Results   Component Value Date    ALT 11 01/09/2019    AST 17 01/09/2019       Osteoarthritis:  Medication:              Meloxicam 15 mg daily (this is helpful)              She also takes tylenol for this prn. Symptoms: she tolerates.  Her L knee slows her down the most.    Barriers To Success: financial         height is 5' 2\" (1.575 m) and weight is 225 lb (102.1 kg). Her temporal temperature is 98.3 °F (36.8 °C). Her blood pressure is 110/68 and her pulse is 87. Her oxygen saturation is 98%. Body mass index is 41.15 kg/m². I have reviewed the following with the [address]   Lab Review  Admission on 01/22/2019, Discharged on 01/22/2019   Component Date Value    POC Glucose 01/22/2019 152*    Performed on 01/22/2019 [facility] Outpatient Visit on 01/17/2019   Component Date Value    P-R Interval 01/17/2019 122     QRS Duration 01/17/2019 98     Q-T Interval 01/17/2019 354     QTc Calculation ([place]) 01/17/2019 396     P Axis 01/17/2019 62     T Axis 01/17/2019 35     MRSA Culture Only 01/17/2019 No MRSA detected on culture    Admission on 01/09/2019, Discharged on 01/09/2019   Component Date Value    WBC 01/09/2019 11.6*    RBC 01/09/2019 3.82*    Hemoglobin 01/09/2019 11.0*    Hematocrit 01/09/2019 35.1*    MCV 01/09/2019 91.9     MCH 01/09/2019 28.8     MCHC 01/09/2019 31.3*    RDW 01/09/2019 14.2     Platelets 56/93/6166 255     MPV 01/09/2019 11.0     Neutrophils % 01/09/2019 56.2     Lymphocytes % 01/09/2019 25.4     Monocytes % 01/09/2019 15.3*    Eosinophils % 01/09/2019 0.9     Basophils % 01/09/2019 0.3     Neutrophils # 01/09/2019 6.5     Lymphocytes # 01/09/2019 3.0     Monocytes # 01/09/2019 1.80*    Eosinophils # 01/09/2019 0.10     Basophils # 01/09/2019 0.00     Sodium 01/09/2019 138     Potassium reflex Magnesi* 01/09/2019 4.7     Chloride 01/09/2019 101     CO2 01/09/2019 22     Anion Gap 01/09/2019 15     Glucose 01/09/2019 125*    BUN 01/09/2019 13     CREATININE 01/09/2019 0.8     GFR Non- 01/09/2019 >60     Calcium 01/09/2019 9.4     Total Protein 01/09/2019 7.4     Alb 01/09/2019 4.7     Total Bilirubin 01/09/2019 <0.2     Alkaline Phosphatase 01/09/2019 85     ALT 01/09/2019 11     AST 01/09/2019 17     Lipase 01/09/2019 37    Appointment on 01/09/2019   Component Date Value    POC Sodium 01/09/2019 137     POC Potassium 01/09/2019 4.3     POC Chloride 01/09/2019 102     CO2 01/09/2019 23     POC Anion Gap 01/09/2019 12     POC Glucose 01/09/2019 155*    POC BUN 01/09/2019 14     POC Creatinine 01/09/2019 0.8     GFR Non- 01/09/2019 >60     Hemoglobin 01/09/2019 12.9     POC Hematocrit 01/09/2019 38     Performed on 01/09/2019 i-Stat    Orders Only on 12/03/2018   Component Date Value    Vitamin B-12 12/03/2018 >2000*    WBC 12/03/2018 8.2     RBC 12/03/2018 4.24     Hemoglobin 12/03/2018 12.3     Hematocrit 12/03/2018 39.7     MCV 12/03/2018 93.6     MCH 12/03/2018 29.0     MCHC 12/03/2018 31.0*    RDW 12/03/2018 14.3     Platelets 01/34/3473 192     MPV 12/03/2018 12.1     Neutrophils % 12/03/2018 40.1*    Lymphocytes % 12/03/2018 38.3     Monocytes % 12/03/2018 19.1*    Eosinophils % 12/03/2018 1.2     Basophils % 12/03/2018 0.4     Neutrophils # 12/03/2018 3.3     Lymphocytes # 12/03/2018 3.1     Monocytes # 12/03/2018 1.60*    Eosinophils # 12/03/2018 0.10     Basophils # 12/03/2018 0.00     Sodium 12/03/2018 144     Potassium 12/03/2018 3.5     Chloride 12/03/2018 105     CO2 12/03/2018 19*    Anion Gap 12/03/2018 20*    Glucose 12/03/2018 172*    BUN 12/03/2018 16     CREATININE 12/03/2018 0.9     GFR Non- 12/03/2018 >60     Calcium 12/03/2018 10.6*    Total Protein 12/03/2018 7.8     Alb 12/03/2018 4.7     Total Bilirubin 12/03/2018 0.3     Alkaline Phosphatase 12/03/2018 93     ALT 12/03/2018 15     AST 12/03/2018 14     Microalbumin, Random Uri* 12/03/2018 2.90     Creatinine, Ur 12/03/2018 259.8     Microalbumin Creatinine * 12/03/2018 11.2     T4 Free 12/03/2018 1.2     TSH 12/03/2018 2.440     Cholesterol, Total 12/03/2018 268*    Triglycerides 12/03/2018 230*    HDL 12/03/2018 65     LDL Calculated 12/03/2018 157     Hemoglobin A1C 12/03/2018 6.6*    Uric Acid, Serum 12/03/2018 5.2      Copies of these are in the chart.     Current Outpatient Medications   Medication Sig Dispense Refill    Liraglutide (VICTOZA) 18 MG/3ML SOPN SC injection Inject 1.8 mg into the skin daily Indications: Diabetes 3 pen 11    meloxicam (MOBIC) 15 MG tablet Take 15 mg by mouth daily      metFORMIN (GLUCOPHAGE) 1000 MG tablet Take 1,000 mg by mouth 2 times daily (with meals)      atorvastatin (LIPITOR) 20 MG tablet Take 20 mg by mouth nightly      lisinopril-hydrochlorothiazide (PRINZIDE;ZESTORETIC) 20-12.5 MG per tablet Take 1 tablet by mouth daily      amLODIPine (NORVASC) 5 MG tablet Take 5 mg by mouth daily      allopurinol (ZYLOPRIM) 300 MG tablet Take 300 mg by mouth daily      Omega-3 Fatty Acids (FISH OIL) 1000 MG CAPS Take 1,000 mg by mouth daily      furosemide (LASIX) 20 MG tablet Take 20 mg by mouth daily as needed Only taking PRN      Blood Glucose Monitoring Suppl JOSE Cap glucose daily and prn 1 Device 0    Glucose Blood (BLOOD GLUCOSE TEST STRIPS) STRP Cap glucose daily and prn 100 strip 3    Insulin Pen Needle (H-E-B INCONTROL PEN NEEDLES) 32G X 4 MM MISC 1 each by Does not apply route daily 100 each 3    Calcium-Vitamin D (CALTRATE 600 PLUS-VIT D PO) Take 1 for cough, shortness of breath and wheezing. Cardiovascular: Positive for leg swelling. Negative for chest pain and palpitations. Gastrointestinal: Negative for abdominal pain, diarrhea, nausea and vomiting. Endocrine: Negative for polyuria. Genitourinary: Negative for dysuria, frequency, hematuria and urgency. Musculoskeletal: Positive for arthralgias (typical arthritis). Negative for back pain and neck pain. Skin: Negative for rash. Neurological: Negative for dizziness, weakness and headaches. Psychiatric/Behavioral: Negative for self-injury. The patient is not nervous/anxious. Physical Exam   Constitutional: She is oriented to person, place, and time. She appears well-developed and well-nourished. She is cooperative. Non-toxic appearance. No distress. Body habitus is obese   HENT:   Head: Normocephalic and atraumatic. Right Ear: Hearing, tympanic membrane, external ear and ear canal normal.   Left Ear: Hearing, external ear and ear canal normal.   Mouth/Throat: Mucous membranes are normal.   Eyes: Pupils are equal, round, and reactive to light. Conjunctivae, EOM and lids are normal.   Neck: Phonation normal. Neck supple. No JVD present. Carotid bruit is not present. No thyromegaly present. Cardiovascular: Normal rate, regular rhythm and normal heart sounds. No extrasystoles are present. PMI is not displaced. Exam reveals no gallop and no friction rub. No murmur heard. Pulmonary/Chest: Effort normal and breath sounds normal. No respiratory distress. She has no wheezes. She has no rhonchi. She has no rales. Abdominal: Soft. Bowel sounds are normal. She exhibits no distension and no mass. There is no hepatosplenomegaly. There is no tenderness. There is no CVA tenderness. Genitourinary:   Genitourinary Comments: Examination deferred   Musculoskeletal: Normal range of motion. She exhibits no edema. Joint examination reveals no acute arthritis or synovitis.      Lymphadenopathy: She has no cervical adenopathy. Neurological: She is alert and oriented to person, place, and time. She has normal strength. She displays no atrophy and no tremor. No cranial nerve deficit (by gross examination) or sensory deficit. Gait normal.   No focal deficits appreciated   Skin: Skin is warm and dry. No rash noted. Psychiatric: She has a normal mood and affect. Her speech is normal and behavior is normal.   Vitals reviewed. ASSESSMENT      ICD-10-CM    1. B12 deficiency E53.8 cyanocobalamin injection 1,000 mcg   2. Type 2 diabetes mellitus without complication, without long-term current use of insulin ([facility]) E11.9 Comprehensive Metabolic Panel     Microalbumin / Creatinine Urine Ratio   3. Essential hypertension I10 CBC Auto Differential     Comprehensive Metabolic Panel     Microalbumin / Creatinine Urine Ratio   4. Mixed hyperlipidemia E78.2 Lipid Panel       PLAN      ICD-10-CM    1. B12 deficiency E53.8 cyanocobalamin injection 1,000 mcg   2. Type 2 diabetes mellitus without complication, without long-term current use of insulin ([facility]) E11.9 Liraglutide (VICTOZA) 18 MG/3ML SOPN SC injection  This is an increased dose from 1.2 previously. Comprehensive Metabolic Panel     Microalbumin / Creatinine Urine Ratio   3. Essential hypertension I10 CBC Auto Differential     Comprehensive Metabolic Panel     Microalbumin / Creatinine Urine Ratio   4. Mixed hyperlipidemia E78.2 Lipid Panel       Orders Placed This Encounter   Procedures    CBC Auto Differential    Comprehensive Metabolic Panel    Lipid Panel    Microalbumin / Creatinine Urine Ratio        Return in about 3 months (around 8/28/2019) for 30.

## 2019-05-28 NOTE — PROGRESS NOTES
After obtaining consent, and per orders of Dr. Pastor Clem DO, injection of  B12 1000 mcg given in Left deltoid by Marychuy Barrera. Patient instructed to remain in clinic for 20 minutes afterwards, and to report any adverse reaction to me immediately.

## 2019-05-28 NOTE — PATIENT INSTRUCTIONS
Patient Education        Learning About Diabetes Food Guidelines  Your Care Instructions    Meal planning is important to manage diabetes. It helps keep your blood sugar at a target level (which you set with your doctor). You don't have to eat special foods. You can eat what your family eats, including sweets once in a while. But you do have to pay attention to how often you eat and how much you eat of certain foods. You may want to work with a dietitian or a certified diabetes educator (CDE) to help you plan meals and snacks. A dietitian or CDE can also help you lose weight if that is one of your goals. What should you know about eating carbs? Managing the amount of carbohydrate (carbs) you eat is an important part of healthy meals when you have diabetes. Carbohydrate is found in many foods. · Learn which foods have carbs. And learn the amounts of carbs in different foods. ? Bread, cereal, pasta, and rice have about 15 grams of carbs in a serving. A serving is 1 slice of bread (1 ounce), ½ cup of cooked cereal, or 1/3 cup of cooked pasta or rice. ? Fruits have 15 grams of carbs in a serving. A serving is 1 small fresh fruit, such as an apple or orange; ½ of a banana; ½ cup of cooked or canned fruit; ½ cup of fruit juice; 1 cup of melon or raspberries; or 2 tablespoons of dried fruit. ? Milk and no-sugar-added yogurt have 15 grams of carbs in a serving. A serving is 1 cup of milk or 2/3 cup of no-sugar-added yogurt. ? Starchy vegetables have 15 grams of carbs in a serving. A serving is ½ cup of mashed potatoes or sweet potato; 1 cup winter squash; ½ of a small baked potato; ½ cup of cooked beans; or ½ cup cooked corn or green peas. · Learn how much carbs to eat each day and at each meal. A dietitian or CDE can teach you how to keep track of the amount of carbs you eat. This is called carbohydrate counting. · If you are not sure how to count carbohydrate grams, use the Plate Method to plan meals.  It is a good, quick way to make sure that you have a balanced meal. It also helps you spread carbs throughout the day. ? Divide your plate by types of foods. Put non-starchy vegetables on half the plate, meat or other protein food on one-quarter of the plate, and a grain or starchy vegetable in the final quarter of the plate. To this you can add a small piece of fruit and 1 cup of milk or yogurt, depending on how many carbs you are supposed to eat at a meal.  · Try to eat about the same amount of carbs at each meal. Do not \"save up\" your daily allowance of carbs to eat at one meal.  · Proteins have very little or no carbs per serving. Examples of proteins are beef, chicken, turkey, fish, eggs, tofu, cheese, cottage cheese, and peanut butter. A serving size of meat is 3 ounces, which is about the size of a deck of cards. Examples of meat substitute serving sizes (equal to 1 ounce of meat) are 1/4 cup of cottage cheese, 1 egg, 1 tablespoon of peanut butter, and ½ cup of tofu. How can you eat out and still eat healthy? · Learn to estimate the serving sizes of foods that have carbohydrate. If you measure food at home, it will be easier to estimate the amount in a serving of restaurant food. · If the meal you order has too much carbohydrate (such as potatoes, corn, or baked beans), ask to have a low-carbohydrate food instead. Ask for a salad or green vegetables. · If you use insulin, check your blood sugar before and after eating out to help you plan how much to eat in the future. · If you eat more carbohydrate at a meal than you had planned, take a walk or do other exercise. This will help lower your blood sugar. What else should you know? · Limit saturated fat, such as the fat from meat and dairy products. This is a healthy choice because people who have diabetes are at higher risk of heart disease. So choose lean cuts of meat and nonfat or low-fat dairy products.  Use olive or canola oil instead of butter or shortening when cooking. · Don't skip meals. Your blood sugar may drop too low if you skip meals and take insulin or certain medicines for diabetes. · Check with your doctor before you drink alcohol. Alcohol can cause your blood sugar to drop too low. Alcohol can also cause a bad reaction if you take certain diabetes medicines. Follow-up care is a key part of your treatment and safety. Be sure to make and go to all appointments, and call your doctor if you are having problems. It's also a good idea to know your test results and keep a list of the medicines you take. Where can you learn more? Go to [email or web address] and sign in to your HeyAnita account. Enter V532 in the [address] box to learn more about \"Learning About Diabetes Food Guidelines. \"     If you do not have an account, please click on the \"Sign Up Now\" link. Current as of: July 25, 2018  Content Version: 12.0  © 7794-1748 [employer]. Care instructions adapted under license by [facility] ([facility]). If you have questions about a medical condition or this instruction, always ask your healthcare professional. [address] any warranty or liability for your use of this information. Patient Education        Learning About Meal Planning for Diabetes  Why plan your meals? Meal planning can be a key part of managing diabetes. Planning meals and snacks with the right balance of carbohydrate, protein, and fat can help you keep your blood sugar at the target level you set with your doctor. You don't have to eat special foods. You can eat what your family eats, including sweets once in a while. But you do have to pay attention to how often you eat and how much you eat of certain foods. You may want to work with a dietitian or a certified diabetes educator. He or she can give you tips and meal ideas and can answer your questions about meal planning.  This health professional can also help you reach a healthy weight if that is one of your goals. What plan is right for you? Your dietitian or diabetes educator may suggest that you start with the plate format or carbohydrate counting. The plate format  The plate format is a simple way to help you manage how you eat. You plan meals by learning how much space each food should take on a plate. Using the plate format helps you spread carbohydrate throughout the day. It can make it easier to keep your blood sugar level within your target range. It also helps you see if you're eating healthy portion sizes. To use the plate format, you put non-starchy vegetables on half your plate. Add meat or meat substitutes on one-quarter of the plate. Put a grain or starchy vegetable (such as brown rice or a potato) on the final quarter of the plate. You can add a small piece of fruit and some low-fat or fat-free milk or yogurt, depending on your carbohydrate goal for each meal.  Here are some tips for using the plate format:  · Make sure that you are not using an oversized plate. A 9-inch plate is best. Many restaurants use larger plates. · Get used to using the plate format at home. Then you can use it when you eat out. · Write down your questions about using the plate format. Talk to your doctor, a dietitian, or a diabetes educator about your concerns. Carbohydrate counting  With carbohydrate counting, you plan meals based on the amount of carbohydrate in each food. Carbohydrate raises blood sugar higher and more quickly than any other nutrient. It is found in desserts, breads and cereals, and fruit. It's also found in starchy vegetables such as potatoes and corn, grains such as rice and pasta, and milk and yogurt. Spreading carbohydrate throughout the day helps keep your blood sugar levels within your target range.   Your daily amount depends on several things, including your weight, how active you are, which diabetes medicines you take, and what your goals are for your blood sugar levels. A registered dietitian or diabetes educator can help you plan how much carbohydrate to include in each meal and snack. A guideline for your daily amount of carbohydrate is:  · 45 to 60 grams at each meal. That's about the same as 3 to 4 carbohydrate servings. · 15 to 20 grams at each snack. That's about the same as 1 carbohydrate serving. The Nutrition Facts label on packaged foods tells you how much carbohydrate is in a serving of the food. First, look at the serving size on the food label. Is that the amount you eat in a serving? All of the nutrition information on a food label is based on that serving size. So if you eat more or less than that, you'll need to adjust the other numbers. Total carbohydrate is the next thing you need to look for on the label. If you count carbohydrate servings, one serving of carbohydrate is 15 grams. For foods that don't come with labels, such as fresh fruits and vegetables, you'll need a guide that lists carbohydrate in these foods. Ask your doctor, dietitian, or diabetes educator about books or other nutrition guides you can use. If you take insulin, you need to know how many grams of carbohydrate are in a meal. This lets you know how much rapid-acting insulin to take before you eat. If you use an insulin pump, you get a constant rate of insulin during the day. So the pump must be programmed at meals to give you extra insulin to cover the rise in blood sugar after meals. When you know how much carbohydrate you will eat, you can take the right amount of insulin. Or, if you always use the same amount of insulin, you need to make sure that you eat the same amount of carbohydrate at meals. If you need more help to understand carbohydrate counting and food labels, ask your doctor, dietitian, or diabetes educator. How do you get started with meal planning? Here are some tips to get started:  · Plan your meals a week at a time.  Don't forget to include snacks too.  · Use cookbooks or online recipes to plan several main meals. Plan some quick meals for busy nights. You also can double some recipes that freeze well. Then you can save half for other busy nights when you don't have time to cook. · Make sure you have the ingredients you need for your recipes. If you're running low on basic items, put these items on your shopping list too. · List foods that you use to make breakfasts, lunches, and snacks. List plenty of fruits and vegetables. · Post this list on the refrigerator. Add to it as you think of more things you need. · Take the list to the store to do your weekly shopping. Follow-up care is a key part of your treatment and safety. Be sure to make and go to all appointments, and call your doctor if you are having problems. It's also a good idea to know your test results and keep a list of the medicines you take. Where can you learn more? Go to https://Citycelebrity.[employer]. org and sign in to your Admitly account. Enter U514 in the [employer] box to learn more about \"Learning About Meal Planning for Diabetes. \"     If you do not have an account, please click on the \"Sign Up Now\" link. Current as of: July 25, 2018  Content Version: 12.0  © 4898-2073 [employer]. Care instructions adapted under license by [facility] ([facility]). If you have questions about a medical condition or this instruction, always ask your healthcare professional. [place] [address] any warranty or liability for your use of this information. Patient Education        Noninsulin Medicines for Type 2 Diabetes: Care Instructions  Your Care Instructions    There are different types of noninsulin medicines for diabetes. Each works in a different way. But they all help you control your blood sugar. Some types help your body make insulin to lower your blood sugar. Others lower how much insulin your body needs.  Some can slow how fast your body digests sugars. And some can remove extra glucose through your urine. · Alpha-glucosidase inhibitors. These keep starches from breaking down. This means that they lower the amount of glucose absorbed when you eat. They don't help your body make more insulin. So they will not cause low blood sugar unless you use them with other medicines for diabetes. They include acarbose and miglitol. · DPP-4 inhibitors. These help your body raise the level of insulin after you eat. They also help your body make less of a hormone that raises blood sugar. They include linagliptin, saxagliptin, and sitagliptin. · Incretin hormones (GLP-1 receptor agonists). Your body makes a protein that can raise your insulin level. It also can lower your blood sugar and make you less hungry. You can get shots of hormones that work the same way. They include exenatide and liraglutide. · Meglitinides. These help your body release insulin. They also help slow how your body digests sugars. So they can keep your blood sugar from rising too fast after you eat. They include nateglinide and repaglinide. · Metformin. This lowers how much glucose your liver makes. And it helps you respond better to insulin. It also lowers the amount of stored sugar that your liver releases when you are not eating. · SGLT2 inhibitors. These help to remove extra glucose through your urine. They may also help some people lose weight. They include canagliflozin, dapagliflozin, and empagliflozin. · Sulfonylureas. These help your body release more insulin. Some work for many hours. They can cause low blood sugar if you don't eat as you planned. They include glipizide and glyburide. · Thiazolidinediones. These reduce the amount of blood glucose. They also help you respond better to insulin. They include pioglitazone and rosiglitazone. You may need to take more than one medicine for diabetes.  Two or more medicines may work better to lower your blood sugar level than just one does.  Follow-up care is a key part of your treatment and safety. Be sure to make and go to all appointments, and call your doctor if you are having problems. It's also a good idea to know your test results and keep a list of the medicines you take. How can you care for yourself at home? · Eat a healthy diet. Get some exercise each day. This may help you to reduce how much medicine you need. · Do not take other prescription or over-the-counter medicines, vitamins, herbal products, or supplements without talking to your doctor first. Some medicines for type 2 diabetes can cause problems with other medicines or supplements. · Tell your doctor if you plan to get pregnant. Some of these drugs are not safe for pregnant women. · Be safe with medicines. Take your medicines exactly as prescribed. Meglitinides and sulfonylureas can cause your blood sugar to drop very low. Call your doctor if you think you are having a problem with your medicine. · Check your blood sugar often. You can use a glucose monitor. Keeping track can help you know how certain foods, activities, and medicines affect your blood sugar. And it can help you keep your blood sugar from getting so low that it's not safe. When should you call for help? Call 911 anytime you think you may need emergency care. For example, call if:    · You passed out (lost consciousness).     · You are confused or cannot think clearly.     · Your blood sugar is very high or very low.    Watch closely for changes in your health, and be sure to contact your doctor if:    · Your blood sugar stays outside the level your doctor set for you.     · You have any problems. Where can you learn more? Go to https://[employer].Sparkle.cs. org and sign in to your [employer]x account. Enter H153 in the [facility] box to learn more about \"Noninsulin Medicines for Type 2 Diabetes: Care Instructions. \"     If you do not have an account, please click on the \"Sign Up Now\"

## 2019-07-30 ENCOUNTER — TELEPHONE (OUTPATIENT)
Dept: PRIMARY CARE CLINIC | Age: 74
End: 2019-07-30

## 2019-08-07 ENCOUNTER — TELEPHONE (OUTPATIENT)
Dept: PRIMARY CARE CLINIC | Age: 74
End: 2019-08-07

## 2019-08-27 ENCOUNTER — OFFICE VISIT (OUTPATIENT)
Dept: PRIMARY CARE CLINIC | Age: 74
End: 2019-08-27
Payer: MEDICARE

## 2019-08-27 VITALS
HEIGHT: 61 IN | SYSTOLIC BLOOD PRESSURE: 136 MMHG | HEART RATE: 99 BPM | DIASTOLIC BLOOD PRESSURE: 76 MMHG | OXYGEN SATURATION: 97 % | TEMPERATURE: 98.1 F | WEIGHT: 221 LBS | BODY MASS INDEX: 41.72 KG/M2

## 2019-08-27 DIAGNOSIS — Z00.00 ROUTINE GENERAL MEDICAL EXAMINATION AT A HEALTH CARE FACILITY: ICD-10-CM

## 2019-08-27 DIAGNOSIS — E79.0 HYPERURICEMIA: ICD-10-CM

## 2019-08-27 DIAGNOSIS — I10 ESSENTIAL HYPERTENSION: ICD-10-CM

## 2019-08-27 DIAGNOSIS — Z00.00 VISIT FOR PREVENTIVE HEALTH EXAMINATION: Primary | ICD-10-CM

## 2019-08-27 DIAGNOSIS — E78.2 MIXED HYPERLIPIDEMIA: ICD-10-CM

## 2019-08-27 DIAGNOSIS — Z00.00 VISIT FOR PREVENTIVE HEALTH EXAMINATION: ICD-10-CM

## 2019-08-27 DIAGNOSIS — E53.8 B12 DEFICIENCY: ICD-10-CM

## 2019-08-27 DIAGNOSIS — I63.319 CEREBRAL INFARCTION DUE TO THROMBOSIS OF MIDDLE CEREBRAL ARTERY, UNSPECIFIED BLOOD VESSEL LATERALITY (HCC): ICD-10-CM

## 2019-08-27 DIAGNOSIS — Z12.39 SCREENING FOR BREAST CANCER: ICD-10-CM

## 2019-08-27 DIAGNOSIS — M54.31 SCIATICA OF RIGHT SIDE: ICD-10-CM

## 2019-08-27 DIAGNOSIS — E11.9 TYPE 2 DIABETES MELLITUS WITHOUT COMPLICATION, WITHOUT LONG-TERM CURRENT USE OF INSULIN (HCC): ICD-10-CM

## 2019-08-27 LAB
ALBUMIN SERPL-MCNC: 4.7 G/DL (ref 3.5–5.2)
ALP BLD-CCNC: 69 U/L (ref 35–104)
ALT SERPL-CCNC: 11 U/L (ref 5–33)
ANION GAP SERPL CALCULATED.3IONS-SCNC: 18 MMOL/L (ref 7–19)
AST SERPL-CCNC: 16 U/L (ref 5–32)
BASOPHILS ABSOLUTE: 0 K/UL (ref 0–0.2)
BASOPHILS RELATIVE PERCENT: 0.5 % (ref 0–1)
BILIRUB SERPL-MCNC: 0.3 MG/DL (ref 0.2–1.2)
BUN BLDV-MCNC: 18 MG/DL (ref 8–23)
CALCIUM SERPL-MCNC: 10.2 MG/DL (ref 8.8–10.2)
CHLORIDE BLD-SCNC: 96 MMOL/L (ref 98–111)
CHOLESTEROL, TOTAL: 134 MG/DL (ref 160–199)
CO2: 21 MMOL/L (ref 22–29)
CREAT SERPL-MCNC: 0.9 MG/DL (ref 0.5–0.9)
EOSINOPHILS ABSOLUTE: 0.2 K/UL (ref 0–0.6)
EOSINOPHILS RELATIVE PERCENT: 2.7 % (ref 0–5)
GFR NON-AFRICAN AMERICAN: >60
GLUCOSE BLD-MCNC: 120 MG/DL (ref 74–109)
HBA1C MFR BLD: 6.3 % (ref 4–6)
HCT VFR BLD CALC: 36.7 % (ref 37–47)
HDLC SERPL-MCNC: 57 MG/DL (ref 65–121)
HEMOGLOBIN: 11.7 G/DL (ref 12–16)
IMMATURE GRANULOCYTES #: 0.1 K/UL
LDL CHOLESTEROL CALCULATED: 43 MG/DL
LYMPHOCYTES ABSOLUTE: 2.7 K/UL (ref 1.1–4.5)
LYMPHOCYTES RELATIVE PERCENT: 31 % (ref 20–40)
MCH RBC QN AUTO: 30.4 PG (ref 27–31)
MCHC RBC AUTO-ENTMCNC: 31.9 G/DL (ref 33–37)
MCV RBC AUTO: 95.3 FL (ref 81–99)
MONOCYTES ABSOLUTE: 1.1 K/UL (ref 0–0.9)
MONOCYTES RELATIVE PERCENT: 12.5 % (ref 0–10)
NEUTROPHILS ABSOLUTE: 4.6 K/UL (ref 1.5–7.5)
NEUTROPHILS RELATIVE PERCENT: 52.4 % (ref 50–65)
PDW BLD-RTO: 14 % (ref 11.5–14.5)
PLATELET # BLD: 175 K/UL (ref 130–400)
PMV BLD AUTO: 13.5 FL (ref 9.4–12.3)
POTASSIUM SERPL-SCNC: 4.9 MMOL/L (ref 3.5–5)
RBC # BLD: 3.85 M/UL (ref 4.2–5.4)
SODIUM BLD-SCNC: 135 MMOL/L (ref 136–145)
T4 FREE: 1.4 NG/DL (ref 0.9–1.7)
TOTAL PROTEIN: 7.7 G/DL (ref 6.6–8.7)
TRIGL SERPL-MCNC: 170 MG/DL (ref 0–149)
TSH SERPL DL<=0.05 MIU/L-ACNC: 1.76 UIU/ML (ref 0.27–4.2)
URIC ACID, SERUM: 5.4 MG/DL (ref 2.4–5.7)
VITAMIN B-12: >2000 PG/ML (ref 211–946)
WBC # BLD: 8.8 K/UL (ref 4.8–10.8)

## 2019-08-27 PROCEDURE — 96372 THER/PROPH/DIAG INJ SC/IM: CPT | Performed by: PEDIATRICS

## 2019-08-27 PROCEDURE — 99214 OFFICE O/P EST MOD 30 MIN: CPT | Performed by: PEDIATRICS

## 2019-08-27 PROCEDURE — G0439 PPPS, SUBSEQ VISIT: HCPCS | Performed by: PEDIATRICS

## 2019-08-27 RX ORDER — CYANOCOBALAMIN 1000 UG/ML
1000 INJECTION INTRAMUSCULAR; SUBCUTANEOUS ONCE
Status: COMPLETED | OUTPATIENT
Start: 2019-08-27 | End: 2019-08-27

## 2019-08-27 RX ORDER — PREDNISONE 1 MG/1
TABLET ORAL
Qty: 43 TABLET | Refills: 0 | Status: SHIPPED | OUTPATIENT
Start: 2019-08-27 | End: 2019-09-10 | Stop reason: ALTCHOICE

## 2019-08-27 RX ADMIN — CYANOCOBALAMIN 1000 MCG: 1000 INJECTION INTRAMUSCULAR; SUBCUTANEOUS at 09:34

## 2019-08-27 ASSESSMENT — LIFESTYLE VARIABLES: HOW OFTEN DO YOU HAVE A DRINK CONTAINING ALCOHOL: 0

## 2019-08-27 ASSESSMENT — ENCOUNTER SYMPTOMS
EYE PAIN: 0
ABDOMINAL PAIN: 0
SORE THROAT: 0
DIARRHEA: 0
NAUSEA: 0
SINUS PRESSURE: 0
BACK PAIN: 0
SHORTNESS OF BREATH: 0
COUGH: 0
VOMITING: 0
WHEEZING: 0

## 2019-08-27 ASSESSMENT — PATIENT HEALTH QUESTIONNAIRE - PHQ9
SUM OF ALL RESPONSES TO PHQ QUESTIONS 1-9: 0
SUM OF ALL RESPONSES TO PHQ QUESTIONS 1-9: 0

## 2019-08-27 NOTE — PATIENT INSTRUCTIONS
[facility]. This kind of form can sometimes be completed and stored online. Your electronic copy will then be available wherever you have a connection to the Internet. In most cases, doctors will respect your wishes even if you have a form from a different state. You don't need an  to complete a living will. But legal advice can be helpful if your state's laws are unclear, your health history is complicated, or your family can't agree on what should be in your living will. You can change your living will at any time. Some people find that their wishes about end-of-life care change as their health changes. In addition to making a living will, think about completing a medical power of  form. This form lets you name the person you want to make end-of-life treatment decisions for you (your \"health care agent\") if you're not able to. Many hospitals and nursing homes will give you the forms you need to complete a living will and a medical power of . Your living will is used only if you can't make or communicate decisions for yourself anymore. If you become able to make decisions again, you can accept or refuse any treatment, no matter what you wrote in your living will. Your state may offer an online registry. This is a place where you can store your living will online so the doctors and nurses who need to treat you can find it right away. What should you think about when creating a living will? Talk about your end-of-life wishes with your family members and your doctor. Let them know what you want. That way the people making decisions for you won't be surprised by your choices. Think about these questions as you make your living will:  Do you know enough about life support methods that might be used? If not, talk to your doctor so you know what might be done if you can't breathe on your own, your heart stops, or you're unable to swallow.   What things would you still want to be able to do after you receive life-support methods? Would you want to be able to walk? To speak? To eat on your own? To live without the help of machines? If you have a choice, where do you want to be cared for? In your home? At a hospital or nursing home? Do you want certain Congregation practices performed if you become very ill? If you have a choice at the end of your life, where would you prefer to die? At home? In a hospital or nursing home? Somewhere else? Would you prefer to be buried or cremated? Do you want your organs to be donated after you die? What should you do with your living will? Make sure that your family members and your health care agent have copies of your living will. Give your doctor a copy of your living will to keep in your medical record. If you have more than one doctor, make sure that each one has a copy. You may want to put a copy of your living will where it can be easily found. Where can you learn more? Go to https://Brandma.copeChatwala.Oshiboree. org and sign in to your gopogo account. Enter Y991 in the Paga box to learn more about \"Learning About Living Perroeric. \"     If you do not have an account, please click on the \"Sign Up Now\" link. Current as of: April 1, 2019  Content Version: 12.1  © 4401-0726 Healthwise, Incorporated. Care instructions adapted under license by [facility] ([facility]). If you have questions about a medical condition or this instruction, always ask your healthcare professional. [place] [address] any warranty or liability for your use of this information. Patient Education        Advance Directives: Care Instructions  Your Care Instructions  An advance directive is a legal way to state your wishes at the end of your life. It tells your family and your doctor what to do if you can no longer say what you want. There are two main types of advance directives. You can change them any time that your wishes change.   A living will tells your of whole milk. Eat brown rice instead of white rice, and eat whole wheat pasta instead of white-flour pasta. Try low-fat cheeses and low-fat yogurt. Add more fruits and vegetables to meals and have them for snacks. Add lettuce, tomato, cucumber, and onion to sandwiches. Add fruit to yogurt and cereal.  Enjoy food  You can still eat your favorite foods. You just may need to eat less of them. If your favorite foods are high in fat, salt, and sugar, limit how often you eat them, but do not cut them out entirely. Eat a wide variety of foods. Make healthy choices when eating out  The type of restaurant you choose can help you make healthy choices. Even fast-food chains are now offering more low-fat or healthier choices on the menu. Choose smaller portions, or take half of your meal home. When eating out, try:  A veggie pizza with a whole wheat crust or grilled chicken (instead of sausage or pepperoni). Pasta with roasted vegetables, grilled chicken, or marinara sauce instead of cream sauce. A vegetable wrap or grilled chicken wrap. Broiled or poached food instead of fried or breaded items. Make healthy choices easy  Buy packaged, prewashed, ready-to-eat fresh vegetables and fruits, such as baby carrots, salad mixes, and chopped or shredded broccoli and cauliflower. Buy packaged, presliced fruits, such as melon or pineapple. Choose 100% fruit or vegetable juice instead of soda. Limit juice intake to 4 to 6 oz (½ to ¾ cup) a day. Blend low-fat yogurt, fruit juice, and canned or frozen fruit to make a smoothie for breakfast or a snack. Where can you learn more? Go to https://Choggerpepiceweb.PowerVision. org and sign in to your Ticketbis account. Enter S771 in the PriceShoppers.com box to learn more about \"Eating Healthy Foods: Care Instructions. \"     If you do not have an account, please click on the \"Sign Up Now\" link.   Current as of: November 7, 2018  Content Version: 12.1  © 3601-2110 Healthwise, [facility] have programs in which dentists help older adults by lowering fees. Contact your area's public health offices or  for information about dental care in your area. Using a toothbrush  Older adults with arthritis sometimes have trouble brushing their teeth because they can't easily hold the toothbrush. Their hands and fingers may be stiff, painful, or weak. If this is the case, you can: Offer an electric toothbrush. Enlarge the handle of a non-electric toothbrush by wrapping a sponge, an elastic bandage, or adhesive tape around it. Push the toothbrush handle through a ball made of rubber or soft foam.  Make the handle longer and thicker by taping Popsicle sticks or tongue depressors to it. You may also be able to buy special toothbrushes, toothpaste dispensers, and floss holders. Your doctor may recommend a soft-bristle toothbrush if the person you care for bleeds easily. Bleeding can happen because of a health problem or from certain medicines. A toothpaste for sensitive teeth may help if the person you care for has sensitive teeth. How do you brush and floss someone's teeth? If the person you are caring for has a hard time cleaning their teeth on their own, you may need to brush and floss their teeth for them. It may be easiest to have the person sit and face away from you, and to sit or stand behind them. That way you can steady their head against your arm as you reach around to floss and brush their teeth. Choose a place that has good lighting and is comfortable for both of you. Before you begin, gather your supplies. You will need gloves, floss, a toothbrush, and a container to hold water if you are not near a sink. Wash and dry your hands well and put on gloves. Start by flossing:  Gently work a piece of floss between each of the teeth toward the gums. A plastic flossing tool may make this easier, and they are available at most [facility].   Curve the floss around each tooth into a U-shape and gently slide it under the gum line. Move the floss firmly up and down several times to scrape off the plaque. After you've finished flossing, throw away the used floss and begin brushing:  Wet the brush and apply toothpaste. Place the brush at a 45-degree angle where the teeth meet the gums. Press firmly, and move the brush in small circles over the surface of the teeth. Be careful not to brush too hard. Vigorous brushing can make the gums pull away from the teeth and can scratch the tooth enamel. Brush all surfaces of the teeth, on the tongue side and on the cheek side. Pay special attention to the front teeth and all surfaces of the back teeth. Brush chewing surfaces with short back-and-forth strokes. After you've finished, help the person rinse the remaining toothpaste from their mouth. Where can you learn more? Go to https://tipple.mepeMOOIeweb.Pharminex. org and sign in to your Nimia account. Enter R197 in the Saehwa [employer] box to learn more about \"Learning About Dental Care for Older Adults. \"     If you do not have an account, please click on the \"Sign Up Now\" link. Current as of: April 1, 2019  Content Version: 12.1  © 8203-0319 Healthwise, Incorporated. Care instructions adapted under license by [facility] ([facility]). If you have questions about a medical condition or this instruction, always ask your healthcare professional. [place] [address] any warranty or liability for your use of this information. Patient Education        Learning About Dental Care  What is basic dental care? Basic dental care involves brushing and flossing your teeth regularly to remove plaque. Plaque is a thin film of bacteria that sticks to teeth above and below the gum line. It can build up and harden into tartar, which makes it harder to give the teeth a good cleaning. Tartar usually has to be removed by a dental hygienist.  The bacteria in plaque use sugars to make acids.  These room.  You read aloud from a small card that you hold in your hand. Refraction  You look into a special device. The device puts lenses of different strengths in front of each eye to see how strong your glasses or contact lenses need to be. Visual field tests  Your doctor may have you look through special machines. Or your doctor may simply have you stare straight ahead while he or she moves a finger into and out of your field of vision. Color vision test  You look at pieces of printed test patterns in various colors. You say what number or symbol you see. Your doctor may have you trace the number or symbol using a pointer. How do these tests feel? You shouldn't feel any discomfort during these tests. Follow-up care is a key part of your treatment and safety. Be sure to make and go to all appointments, and call your doctor if you are having problems. It's also a good idea to know your test results and keep a list of the medicines you take. Where can you learn more? Go to https://Big FishpeSkimaTalk.Well Done. org and sign in to your Bookmycab account. Enter G551 in the "NTS, Inc." box to learn more about \"Learning About Vision Tests. \"     If you do not have an account, please click on the \"Sign Up Now\" link. Current as of: July 17, 2018  Content Version: 12.1  © 7184-7306 Healthwise, Incorporated. Care instructions adapted under license by [facility] ([facility]). If you have questions about a medical condition or this instruction, always ask your healthcare professional. [place] [address] any warranty or liability for your use of this information. Patient Education        Preventing Falls: Care Instructions  Your Care Instructions    Getting around your home safely can be a challenge if you have injuries or health problems that make it easy for you to fall.  Loose rugs and furniture in walkways are among the dangers for many older people who have problems walking or who have poor eyesight. People who have conditions such as arthritis, osteoporosis, or dementia also have to be careful not to fall. You can make your home safer with a few simple measures. Follow-up care is a key part of your treatment and safety. Be sure to make and go to all appointments, and call your doctor if you are having problems. It's also a good idea to know your test results and keep a list of the medicines you take. How can you care for yourself at home? Taking care of yourself  You may get dizzy if you do not drink enough water. To prevent dehydration, drink plenty of fluids, enough so that your urine is light yellow or clear like water. Choose water and other caffeine-free clear liquids. If you have kidney, heart, or liver disease and have to limit fluids, talk with your doctor before you increase the amount of fluids you drink. Exercise regularly to improve your strength, muscle tone, and balance. Walk if you can. Swimming may be a good choice if you cannot walk easily. Have your vision and hearing checked each year or any time you notice a change. If you have trouble seeing and hearing, you might not be able to avoid objects and could lose your balance. Know the side effects of the medicines you take. Ask your doctor or pharmacist whether the medicines you take can affect your balance. Sleeping pills or sedatives can affect your balance. Limit the amount of alcohol you drink. Alcohol can impair your balance and other senses. Ask your doctor whether calluses or corns on your feet need to be removed. If you wear loose-fitting shoes because of calluses or corns, you can lose your balance and fall. Talk to your doctor if you have numbness in your feet. Preventing falls at home  Remove raised doorway thresholds, throw rugs, and clutter. Repair loose carpet or raised areas in the floor. Move furniture and electrical cords to keep them out of walking paths.   Use nonskid floor wax, and wipe up spills right careful when you cross the street. Look for crosswalks or places where curb cuts or ramps are present. Try not to hurry, especially if you are carrying something. Be cautious in parking lots or garages. There may be curbs or changes in pavement, or the height of the pavement may vary. Make sure to wear the correct eyeglasses, if you need them. Reading glasses or bifocals can make it harder to see hazards that might be in your way. If you are walking outdoors for exercise, try to: Walk in well-lighted, well-maintained areas. These include high school or college tracks, shopping malls, and public spaces. Walk with a partner. Watch out for cracked sidewalks, curbs, changes in the height of the pavement, exposed tree roots, and debris such as fallen leaves or branches. Where can you learn more? Go to https://Mytruspeluz marinaeb.ARTA Bioscience. org and sign in to your LGL/LatinMedios account. Enter G730 in the Enhanced Surface Dynamics box to learn more about \"Preventing Outdoor Falls: Care Instructions. \"     If you do not have an account, please click on the \"Sign Up Now\" link. Current as of: November 7, 2018  Content Version: 12.1  © 3874-6349 Healthwise, [employer]. Care instructions adapted under license by [facility] ([facility]). If you have questions about a medical condition or this instruction, always ask your healthcare professional. [place] [address] any warranty or liability for your use of this information. Patient Education        Learning About Getting In and Out of a Bathtub Safely  Introduction  Many falls happen during bathing. All that water makes the bathroom a slippery place. You may no longer be able to step over the tub wall comfortably and safely. You might lean on things that aren't meant to support your weight, like a towel bar or the shower curtain. There are several types of aids that will help keep you safe. You can buy them at [employer] or home improvement stores or online.   What

## 2019-08-27 NOTE — PROGRESS NOTES
[facility], [address]  Phone [phone]   Fax [phone]      OFFICE VISIT: 2019    Sergio Araya Aleena-: 1945      HPI  Reason For Visit:  [facility] is a 69 y.o. Health Maintenance    Medicare AWV (Patient is here for Medicare AWV); Health Maintenance (Labs/ Shingles vaccine/ Mammogram-ordered today); and Injections (Patient needs b 12 injection today. )    Patient presents for routine annual wellness evaluation. She also presents with multiple other health issues. She needs a B12 injection today. Other concerns:  Seasonal allergies:   She has allegra at home, she just needs to take it. This typically works well for her. Sciatica   This is back and has been present for about a month. This is radiating down her R leg.  mobic is not helping          Diabetes Mellitus Type 2  Diet compliance:  compliant most of the time  Nutrition Consultation Needed:  no  Medication:              Victoza 1.2 mg subcutaneous daily   (she is in the donut hole and cannot afford this medication)   (she is taking this \"hit or miss\")              Metformin 1000 mg twice daily with meals      Medication compliance:  compliant most of the time  Weight trend: increasing.  Weight is down 11 pounds from 2 months ago  This is back to her baseline weight  Current exercise: yes - walking  Checkin times daily  Home blood sugar records: fasting range: Low 1 teens  Blood sugar was 121 this morning, fasting  Low BG:  no  Eye exam current (within one year): yes  Checking Feet regularly:  yes - no sores  ACE/ARB:  yes - lisinopril  Aspirin: Yes  Tobacco history: She  reports that she has never smoked.  She has never used smokeless tobacco.    Lab Results   Component Value Date    LABA1C 6.6 (H) 2018    LABA1C 6.3 (H) 2018    LABA1C 6.6 (H) 10/03/2017     Lab Results   Component Value Date    LABMICR 2.90 2018    CREATININE 0.8 2019       Hypertension:   BP today was   BP Readings from Last 1 Encounters:   08/27/19 136/76      Recent BP readings:    BP Readings from Last 3 Encounters:   08/27/19 136/76   05/28/19 110/68   02/27/19 128/64     Medication              Amlodipine 5 mg daily              Lisinopril hydrochlorthiazide  20-25 mg daily.      Medication compliance:  compliant most of the time  Home blood pressure monitoring: Yes -controlled. She is adherent to a low sodium diet. Symptoms: none  Laboratory:  Lab Results   Component Value Date    BUN 13 01/09/2019    CREATININE 0.8 01/09/2019       Hyperlipidemia:   Medication:   atorvastatin (Lipitor) and OTC Fish Oil  Low Fat, Low Choleterol Diet:  yes -to some degree  Myalgias or GI upset: no  The patient exercises Regularly. Laboratory:    Lab Results   Component Value Date    CHOL 268 (H) 12/03/2018    TRIG 230 (H) 12/03/2018    HDL 65 12/03/2018    LDLCALC 157 12/03/2018    LDLDIRECT 85 (L) 08/19/2015      Lab Results   Component Value Date    ALT 11 01/09/2019    AST 17 01/09/2019       Osteoarthritis:  Medication:              Meloxicam 15 mg daily (this is helpful)              She also takes tylenol for this prn. Symptoms: she tolerates.  Her L knee slows her down the most.      Hyperuricemia  Medication:   Allopurinol 300 mg daily  Symptoms: none       Barriers To Success: financial  She cannot afford meds in donut hole. height is 5' 1\" (1.549 m) and weight is 221 lb (100.2 kg). Her temporal temperature is 98.1 °F (36.7 °C). Her blood pressure is 136/76 and her pulse is 99. Her oxygen saturation is 97%. Body mass index is 41.76 kg/m². I have reviewed the following with the Ms. Flood   Lab Review  No visits with results within 6 Month(s) from this visit. Latest known visit with results is:   Admission on 01/22/2019, Discharged on 01/22/2019   Component Date Value    POC Glucose 01/22/2019 152*    Performed on 01/22/2019 AccuChek      Copies of these are in the chart.     Current Outpatient Medications   Medication Sig Dispense Refill    predniSONE (DELTASONE) 5 MG tablet Days 1,2,3 = 30 mg (6 pills), Days 4,5 = 25 mg, Days 6,7 = 20 mg, Day 8 = 15 mg, Day 9 = 10 mg, Day 10 = 5 mg, Day 11,12 = 2.5 mg (1/2 tab) 43 tablet 0    Liraglutide (VICTOZA) 18 MG/3ML SOPN SC injection Inject 1.8 mg into the skin daily Indications: Diabetes 3 pen 11    meloxicam (MOBIC) 15 MG tablet Take 15 mg by mouth daily      metFORMIN (GLUCOPHAGE) 1000 MG tablet Take 1,000 mg by mouth 2 times daily (with meals)      atorvastatin (LIPITOR) 20 MG tablet Take 20 mg by mouth nightly      lisinopril-hydrochlorothiazide (PRINZIDE;ZESTORETIC) 20-12.5 MG per tablet Take 1 tablet by mouth daily      amLODIPine (NORVASC) 5 MG tablet Take 5 mg by mouth daily      allopurinol (ZYLOPRIM) 300 MG tablet Take 300 mg by mouth daily      Omega-3 Fatty Acids (FISH OIL) 1000 MG CAPS Take 1,000 mg by mouth daily      furosemide (LASIX) 20 MG tablet Take 20 mg by mouth daily as needed Only taking PRN      Blood Glucose Monitoring Suppl JOSE Cap glucose daily and prn 1 Device 0    Glucose Blood (BLOOD GLUCOSE TEST STRIPS) STRP Cap glucose daily and prn 100 strip 3    Insulin Pen Needle (H-E-B INCONTROL PEN NEEDLES) 32G X 4 MM MISC 1 each by Does not apply route daily 100 each 3    Calcium-Vitamin D (CALTRATE 600 PLUS-VIT D PO) Take 1 tablet by mouth 2 times daily      aspirin 81 MG tablet Take 81 mg by mouth daily       Multiple Vitamins-Minerals (MULTI COMPLETE PO) Take 1 tablet by mouth daily. No current facility-administered medications for this visit. Allergies: Patient has no known allergies.      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    Type II or unspecified type diabetes mellitus without mention of complication, not stated as uncontrolled        Family History   Problem Relation Age of Onset    Colon Cancer Mother     Colon Polyps Neg Hx     Esophageal Cancer Neg Hx     Liver Disease Neg Hx     Liver Cancer Neg Hx     Rectal Cancer Neg Hx     Stomach Cancer Neg Hx        Past Surgical History:   Procedure Laterality Date    APPENDECTOMY       SECTION      x2   [address]    COLONOSCOPY  16    Dr [facility]elius [facility] co)-Tubular AP (-) dysplasia x 1, 5 yr recall    HERNIA REPAIR      She has had multiple hernia surgeries; x4    HERNIA REPAIR      pt states she's had difficulty with mesh.  HYSTERECTOMY, TOTAL ABDOMINAL      UMBILICAL HERNIA REPAIR N/A 2019    INCISIONAL HERNIA REPAIR WITH BIOLOGIC MESH performed by Shelley Morales MD at [facility]       Social History     Tobacco Use    Smoking status: Never Smoker    Smokeless tobacco: Never Used   Substance Use Topics    Alcohol use: No        Review of Systems   Constitutional: Negative for fatigue and unexpected weight change. HENT: Negative for congestion, ear pain, sinus pressure and sore throat. Eyes: Negative for pain and visual disturbance. Respiratory: Negative for cough, shortness of breath and wheezing. Cardiovascular: Positive for leg swelling. Negative for chest pain and palpitations. Gastrointestinal: Negative for abdominal pain, diarrhea, nausea and vomiting. Endocrine: Negative for polyuria. Genitourinary: Negative for dysuria, frequency, hematuria and urgency. Musculoskeletal: Positive for arthralgias (typical arthritis). Negative for back pain and neck pain. Skin: Negative for rash. Neurological: Negative for dizziness, weakness and headaches. Psychiatric/Behavioral: Negative for self-injury. The patient is not nervous/anxious. Physical Exam   Constitutional: She is oriented to person, place, and time. She appears well-developed and well-nourished. She is cooperative.   Non-toxic 15 mg, Day 9 = 10 mg, Day 10 = 5 mg, Day 11,12 = 2.5 mg (1/2 tab)    Other orders  -     cyanocobalamin injection 1,000 mcg

## 2019-09-03 ENCOUNTER — HOSPITAL ENCOUNTER (OUTPATIENT)
Dept: WOMENS IMAGING | Age: 74
Discharge: HOME | End: 2019-09-03
Payer: MEDICARE

## 2019-09-03 ENCOUNTER — TELEPHONE (OUTPATIENT)
Dept: PRIMARY CARE CLINIC | Age: 74
End: 2019-09-03

## 2019-09-03 DIAGNOSIS — Z12.39 SCREENING FOR BREAST CANCER: ICD-10-CM

## 2019-09-03 PROCEDURE — 77063 BREAST TOMOSYNTHESIS BI: CPT

## 2019-09-10 ENCOUNTER — OFFICE VISIT (OUTPATIENT)
Dept: PRIMARY CARE CLINIC | Age: 74
End: 2019-09-10
Payer: MEDICARE

## 2019-09-10 VITALS
HEART RATE: 109 BPM | WEIGHT: 227.5 LBS | DIASTOLIC BLOOD PRESSURE: 70 MMHG | OXYGEN SATURATION: 98 % | TEMPERATURE: 98.2 F | BODY MASS INDEX: 41.86 KG/M2 | HEIGHT: 62 IN | SYSTOLIC BLOOD PRESSURE: 148 MMHG

## 2019-09-10 DIAGNOSIS — M54.31 SCIATICA OF RIGHT SIDE: ICD-10-CM

## 2019-09-10 DIAGNOSIS — G89.29 CHRONIC RIGHT-SIDED LOW BACK PAIN WITH RIGHT-SIDED SCIATICA: Primary | ICD-10-CM

## 2019-09-10 DIAGNOSIS — M54.41 CHRONIC RIGHT-SIDED LOW BACK PAIN WITH RIGHT-SIDED SCIATICA: Primary | ICD-10-CM

## 2019-09-10 PROCEDURE — 96372 THER/PROPH/DIAG INJ SC/IM: CPT | Performed by: PEDIATRICS

## 2019-09-10 PROCEDURE — 99213 OFFICE O/P EST LOW 20 MIN: CPT | Performed by: PEDIATRICS

## 2019-09-10 RX ORDER — HYDROCODONE BITARTRATE AND ACETAMINOPHEN 5; 325 MG/1; MG/1
1 TABLET ORAL EVERY 6 HOURS PRN
Qty: 12 TABLET | Refills: 0 | Status: SHIPPED | OUTPATIENT
Start: 2019-09-10 | End: 2019-09-17 | Stop reason: SDUPTHER

## 2019-09-10 RX ORDER — PREDNISONE 1 MG/1
TABLET ORAL
Qty: 43 TABLET | Refills: 0 | Status: SHIPPED | OUTPATIENT
Start: 2019-09-10 | End: 2019-09-17

## 2019-09-10 RX ORDER — KETOROLAC TROMETHAMINE 30 MG/ML
30 INJECTION, SOLUTION INTRAMUSCULAR; INTRAVENOUS ONCE
Status: COMPLETED | OUTPATIENT
Start: 2019-09-10 | End: 2019-09-10

## 2019-09-10 RX ADMIN — KETOROLAC TROMETHAMINE 30 MG: 30 INJECTION, SOLUTION INTRAMUSCULAR; INTRAVENOUS at 17:04

## 2019-09-10 ASSESSMENT — ENCOUNTER SYMPTOMS
EYE PAIN: 0
VOMITING: 0
WHEEZING: 0
SINUS PRESSURE: 0
ABDOMINAL PAIN: 0
COUGH: 0
DIARRHEA: 0
BACK PAIN: 1
SORE THROAT: 0
SHORTNESS OF BREATH: 0
NAUSEA: 0

## 2019-09-11 ENCOUNTER — HOSPITAL ENCOUNTER (OUTPATIENT)
Dept: GENERAL RADIOLOGY | Age: 74
Discharge: HOME | End: 2019-09-11
Payer: MEDICARE

## 2019-09-11 ENCOUNTER — TELEPHONE (OUTPATIENT)
Dept: PRIMARY CARE CLINIC | Age: 74
End: 2019-09-11

## 2019-09-11 DIAGNOSIS — M54.31 SCIATICA OF RIGHT SIDE: ICD-10-CM

## 2019-09-11 PROCEDURE — 72202 X-RAY EXAM SI JOINTS 3/> VWS: CPT

## 2019-09-11 PROCEDURE — 72100 X-RAY EXAM L-S SPINE 2/3 VWS: CPT

## 2019-09-17 ENCOUNTER — OFFICE VISIT (OUTPATIENT)
Dept: PRIMARY CARE CLINIC | Age: 74
End: 2019-09-17
Payer: MEDICARE

## 2019-09-17 VITALS
SYSTOLIC BLOOD PRESSURE: 138 MMHG | HEIGHT: 62 IN | DIASTOLIC BLOOD PRESSURE: 82 MMHG | BODY MASS INDEX: 40.8 KG/M2 | HEART RATE: 82 BPM | TEMPERATURE: 97.7 F | WEIGHT: 221.75 LBS | OXYGEN SATURATION: 97 %

## 2019-09-17 DIAGNOSIS — M54.31 SCIATICA OF RIGHT SIDE: ICD-10-CM

## 2019-09-17 DIAGNOSIS — M51.36 DDD (DEGENERATIVE DISC DISEASE), LUMBAR: Primary | ICD-10-CM

## 2019-09-17 DIAGNOSIS — M54.41 CHRONIC RIGHT-SIDED LOW BACK PAIN WITH RIGHT-SIDED SCIATICA: ICD-10-CM

## 2019-09-17 DIAGNOSIS — G89.29 CHRONIC RIGHT-SIDED LOW BACK PAIN WITH RIGHT-SIDED SCIATICA: ICD-10-CM

## 2019-09-17 PROCEDURE — 99213 OFFICE O/P EST LOW 20 MIN: CPT | Performed by: NURSE PRACTITIONER

## 2019-09-17 RX ORDER — GABAPENTIN 300 MG/1
300 CAPSULE ORAL 2 TIMES DAILY
Qty: 60 CAPSULE | Refills: 5 | Status: SHIPPED | OUTPATIENT
Start: 2019-09-17 | End: 2019-12-05

## 2019-09-17 RX ORDER — MELOXICAM 15 MG/1
15 TABLET ORAL DAILY
Qty: 30 TABLET | Refills: 5 | Status: SHIPPED | OUTPATIENT
Start: 2019-09-17 | End: 2020-04-03

## 2019-09-17 RX ORDER — HYDROCODONE BITARTRATE AND ACETAMINOPHEN 5; 325 MG/1; MG/1
1 TABLET ORAL EVERY 6 HOURS PRN
Qty: 12 TABLET | Refills: 0 | Status: SHIPPED | OUTPATIENT
Start: 2019-09-17 | End: 2019-09-20

## 2019-09-17 ASSESSMENT — ENCOUNTER SYMPTOMS
TROUBLE SWALLOWING: 0
SHORTNESS OF BREATH: 0
COUGH: 0
SORE THROAT: 0
RHINORRHEA: 0
BACK PAIN: 1
EYES NEGATIVE: 1
ABDOMINAL PAIN: 0
WHEEZING: 0

## 2019-09-17 NOTE — PROGRESS NOTES
TSH 1.760 0.270 - 4.200 uIU/mL   Comprehensive Metabolic Panel   Result Value Ref Range    Sodium 135 (L) 136 - 145 mmol/L    Potassium 4.9 3.5 - 5.0 mmol/L    Chloride 96 (L) 98 - 111 mmol/L    CO2 21 (L) 22 - 29 mmol/L    Anion Gap 18 7 - 19 mmol/L    Glucose 120 (H) 74 - 109 mg/dL    BUN 18 8 - 23 mg/dL    CREATININE 0.9 0.5 - 0.9 mg/dL    GFR Non-African American >60 >60    Calcium 10.2 8.8 - 10.2 mg/dL    Total Protein 7.7 6.6 - 8.7 g/dL    Alb 4.7 3.5 - 5.2 g/dL    Total Bilirubin 0.3 0.2 - 1.2 mg/dL    Alkaline Phosphatase 69 35 - 104 U/L    ALT 11 5 - 33 U/L    AST 16 5 - 32 U/L   CBC Auto Differential   Result Value Ref Range    WBC 8.8 4.8 - 10.8 K/uL    RBC 3.85 (L) 4.20 - 5.40 M/uL    Hemoglobin 11.7 (L) 12.0 - 16.0 g/dL    Hematocrit 36.7 (L) 37.0 - 47.0 %    MCV 95.3 81.0 - 99.0 fL    MCH 30.4 27.0 - 31.0 pg    MCHC 31.9 (L) 33.0 - 37.0 g/dL    RDW 14.0 11.5 - 14.5 %    Platelets 038 879 - 391 K/uL    MPV 13.5 (H) 9.4 - 12.3 fL    Neutrophils % 52.4 50.0 - 65.0 %    Lymphocytes % 31.0 20.0 - 40.0 %    Monocytes % 12.5 (H) 0.0 - 10.0 %    Eosinophils % 2.7 0.0 - 5.0 %    Basophils % 0.5 0.0 - 1.0 %    Neutrophils Absolute 4.6 1.5 - 7.5 K/uL    Immature Granulocytes # 0.1 K/uL    Lymphocytes Absolute 2.7 1.1 - 4.5 K/uL    Monocytes Absolute 1.10 (H) 0.00 - 0.90 K/uL    Eosinophils Absolute 0.20 0.00 - 0.60 K/uL    Basophils Absolute 0.00 0.00 - 0.20 K/uL       I have reviewed the following with the Ms. Flood   Lab Review   Orders Only on 08/27/2019   Component Date Value    Vitamin B-12 08/27/2019 >2000*    Uric Acid, Serum 08/27/2019 5.4     Hemoglobin A1C 08/27/2019 6.3*    Cholesterol, Total 08/27/2019 134*    Triglycerides 08/27/2019 170*    HDL 08/27/2019 57*    LDL Calculated 08/27/2019 43     T4 Free 08/27/2019 1.4     TSH 08/27/2019 1.760     Sodium 08/27/2019 135*    Potassium 08/27/2019 4.9     Chloride 08/27/2019 96*    CO2 08/27/2019 21*    Anion Gap 08/27/2019 18     medium-firm bed with a small pillow under their head and another under their knees. Some people prefer to lie on their side with a pillow between their knees. Don't stay in one position for too long. Take short walks (10 to 20 minutes) every 2 to 3 hours. Avoid slopes, hills, and stairs until you feel better. Walk only distances you can manage without pain, especially leg pain. How to do the exercises  Back stretches    1. Get down on your hands and knees on the floor. 2. Relax your head and allow it to droop. Round your back up toward the ceiling until you feel a nice stretch in your upper, middle, and lower back. Hold this stretch for as long as it feels comfortable, or about 15 to 30 seconds. 3. Return to the starting position with a flat back while you are on your hands and knees. 4. Let your back sway by pressing your stomach toward the floor. Lift your buttocks toward the ceiling. 5. Hold this position for 15 to 30 seconds. 6. Repeat 2 to 4 times. Follow-up care is a key part of your treatment and safety. Be sure to make and go to all appointments, and call your doctor if you are having problems. It's also a good idea to know your test results and keep a list of the medicines you take. Where can you learn more? Go to [email or web address] org and sign in to your [employer] account. Enter G280 in the [facility] box to learn more about \"Sciatica: Exercises. \"     If you do not have an account, please click on the \"Sign Up Now\" link. Current as of: September 20, 2018  Content Version: 12.1  © 2160-6715 Healthwise, Incorporated. Care instructions adapted under license by [facility] ([facility]). If you have questions about a medical condition or this instruction, always ask your healthcare professional. [place] [address] any warranty or liability for your use of this information.          Patient Education        Sciatica: Care Instructions  Your Care or tingling. ? Weakness. ? Pain.     · You lose bladder or bowel control.    Watch closely for changes in your health, and be sure to contact your doctor if:    · You are not getting better as expected. Where can you learn more? Go to [email or web address] and sign in to your [employer] account. Enter [phone] in the [facility] box to learn more about \"Sciatica: Care Instructions. \"     If you do not have an account, please click on the \"Sign Up Now\" link. Current as of: September 20, 2018  Content Version: 12.1  © 3216-5633 [employer]. Care instructions adapted under license by [facility] ([facility]). If you have questions about a medical condition or this instruction, always ask your healthcare professional. [address] any warranty or liability for your use of this information. Controlled Substances Monitoring: Additional Instructions: As always, patient is advisedto bring in medication bottles in order to correctly reconcile with our current list.      [facility] received counseling on the following healthy behaviors: none    Patient giveneducational materials on plan of care    I have instructed [facility] to complete a self tracking handout on none and instructed them to bring it with them to her next appointment. Discussed use, benefit, and side effects of prescribed medications. Barriers to medication compliance addressed. All patient questions answered. Pt voiced understanding.      LAYLA Hernandez

## 2019-09-27 ENCOUNTER — PROCEDURE VISIT (OUTPATIENT)
Dept: PRIMARY CARE CLINIC | Age: 74
End: 2019-09-27
Payer: MEDICARE

## 2019-09-27 DIAGNOSIS — E53.8 B12 DEFICIENCY: Primary | ICD-10-CM

## 2019-09-27 PROCEDURE — 96372 THER/PROPH/DIAG INJ SC/IM: CPT | Performed by: NURSE PRACTITIONER

## 2019-09-27 RX ORDER — CYANOCOBALAMIN 1000 UG/ML
1000 INJECTION INTRAMUSCULAR; SUBCUTANEOUS ONCE
Status: COMPLETED | OUTPATIENT
Start: 2019-09-27 | End: 2019-09-27

## 2019-09-27 RX ADMIN — CYANOCOBALAMIN 1000 MCG: 1000 INJECTION INTRAMUSCULAR; SUBCUTANEOUS at 14:24

## 2019-10-21 DIAGNOSIS — E78.2 MIXED HYPERLIPIDEMIA: ICD-10-CM

## 2019-10-21 DIAGNOSIS — E11.9 TYPE 2 DIABETES MELLITUS WITHOUT COMPLICATION, WITHOUT LONG-TERM CURRENT USE OF INSULIN (HCC): ICD-10-CM

## 2019-10-23 RX ORDER — ATORVASTATIN CALCIUM 20 MG/1
TABLET, FILM COATED ORAL
Qty: 90 TABLET | Refills: 3 | Status: SHIPPED | OUTPATIENT
Start: 2019-10-23 | End: 2020-11-16

## 2019-10-29 ENCOUNTER — PROCEDURE VISIT (OUTPATIENT)
Dept: PRIMARY CARE CLINIC | Age: 74
End: 2019-10-29
Payer: MEDICARE

## 2019-10-29 DIAGNOSIS — Z23 NEEDS FLU SHOT: Primary | ICD-10-CM

## 2019-10-29 DIAGNOSIS — E53.8 B12 DEFICIENCY: ICD-10-CM

## 2019-10-29 PROCEDURE — G0008 ADMIN INFLUENZA VIRUS VAC: HCPCS | Performed by: PEDIATRICS

## 2019-10-29 PROCEDURE — 96372 THER/PROPH/DIAG INJ SC/IM: CPT | Performed by: PEDIATRICS

## 2019-10-29 PROCEDURE — 90653 IIV ADJUVANT VACCINE IM: CPT | Performed by: PEDIATRICS

## 2019-10-29 RX ORDER — CYANOCOBALAMIN 1000 UG/ML
1000 INJECTION INTRAMUSCULAR; SUBCUTANEOUS ONCE
Status: COMPLETED | OUTPATIENT
Start: 2019-10-29 | End: 2019-10-29

## 2019-10-29 RX ADMIN — CYANOCOBALAMIN 1000 MCG: 1000 INJECTION INTRAMUSCULAR; SUBCUTANEOUS at 10:32

## 2019-11-13 RX ORDER — ALLOPURINOL 300 MG/1
300 TABLET ORAL DAILY
Qty: 30 TABLET | Refills: 11 | Status: SHIPPED | OUTPATIENT
Start: 2019-11-13 | End: 2020-10-15

## 2019-12-05 ENCOUNTER — OFFICE VISIT (OUTPATIENT)
Dept: PRIMARY CARE CLINIC | Age: 74
End: 2019-12-05
Payer: MEDICARE

## 2019-12-05 VITALS
TEMPERATURE: 98 F | OXYGEN SATURATION: 98 % | BODY MASS INDEX: 40.94 KG/M2 | WEIGHT: 222.5 LBS | HEART RATE: 78 BPM | SYSTOLIC BLOOD PRESSURE: 122 MMHG | DIASTOLIC BLOOD PRESSURE: 78 MMHG | HEIGHT: 62 IN

## 2019-12-05 DIAGNOSIS — M54.31 SCIATICA OF RIGHT SIDE: ICD-10-CM

## 2019-12-05 DIAGNOSIS — E78.2 MIXED HYPERLIPIDEMIA: ICD-10-CM

## 2019-12-05 DIAGNOSIS — I10 ESSENTIAL HYPERTENSION: ICD-10-CM

## 2019-12-05 DIAGNOSIS — E11.9 TYPE 2 DIABETES MELLITUS WITHOUT COMPLICATION, WITHOUT LONG-TERM CURRENT USE OF INSULIN (HCC): ICD-10-CM

## 2019-12-05 DIAGNOSIS — G89.29 CHRONIC RIGHT-SIDED LOW BACK PAIN WITH RIGHT-SIDED SCIATICA: ICD-10-CM

## 2019-12-05 DIAGNOSIS — E53.8 B12 DEFICIENCY: Primary | ICD-10-CM

## 2019-12-05 DIAGNOSIS — M54.41 CHRONIC RIGHT-SIDED LOW BACK PAIN WITH RIGHT-SIDED SCIATICA: ICD-10-CM

## 2019-12-05 DIAGNOSIS — M51.36 DDD (DEGENERATIVE DISC DISEASE), LUMBAR: ICD-10-CM

## 2019-12-05 PROCEDURE — 96372 THER/PROPH/DIAG INJ SC/IM: CPT | Performed by: NURSE PRACTITIONER

## 2019-12-05 PROCEDURE — 99214 OFFICE O/P EST MOD 30 MIN: CPT | Performed by: NURSE PRACTITIONER

## 2019-12-05 RX ORDER — CYANOCOBALAMIN 1000 UG/ML
1000 INJECTION INTRAMUSCULAR; SUBCUTANEOUS ONCE
Status: COMPLETED | OUTPATIENT
Start: 2019-12-05 | End: 2019-12-05

## 2019-12-05 RX ORDER — GABAPENTIN 600 MG/1
600 TABLET ORAL 2 TIMES DAILY
Qty: 60 TABLET | Refills: 3 | Status: SHIPPED | OUTPATIENT
Start: 2019-12-05 | End: 2020-03-12 | Stop reason: SDUPTHER

## 2019-12-05 RX ADMIN — CYANOCOBALAMIN 1000 MCG: 1000 INJECTION INTRAMUSCULAR; SUBCUTANEOUS at 10:01

## 2019-12-05 ASSESSMENT — ENCOUNTER SYMPTOMS
SHORTNESS OF BREATH: 0
COUGH: 0
WHEEZING: 0
EYES NEGATIVE: 1
TROUBLE SWALLOWING: 0
BACK PAIN: 1
RHINORRHEA: 0
SORE THROAT: 0
ABDOMINAL PAIN: 0

## 2020-01-06 NOTE — TELEPHONE ENCOUNTER
Received fax from pharmacy requesting refill on pts medication(s). Pt was last seen in office on 12/5/2019  and has a follow up scheduled for 3/5/2020. Will send request to  Dr. Lillie Rai  for patient.      Requested Prescriptions     Pending Prescriptions Disp Refills    lisinopril-hydrochlorothiazide (PRINZIDE;ZESTORETIC) 20-12.5 MG per tablet [Pharmacy Med Name: LISINOPRIL-HYDROCHLOROTHIAZIDE 20-12.5 MG TABLET] 30 tablet      Sig: TAKE ONE TABLET BY MOUTH DAILY

## 2020-01-07 RX ORDER — LISINOPRIL AND HYDROCHLOROTHIAZIDE 20; 12.5 MG/1; MG/1
1 TABLET ORAL DAILY
Qty: 90 TABLET | Refills: 3 | Status: SHIPPED | OUTPATIENT
Start: 2020-01-07 | End: 2021-01-27

## 2020-03-06 RX ORDER — AMLODIPINE BESYLATE 5 MG/1
5 TABLET ORAL DAILY
Qty: 90 TABLET | Refills: 3 | Status: SHIPPED | OUTPATIENT
Start: 2020-03-06 | End: 2021-03-25

## 2020-03-12 ENCOUNTER — TELEPHONE (OUTPATIENT)
Dept: PRIMARY CARE CLINIC | Age: 75
End: 2020-03-12

## 2020-03-12 ENCOUNTER — OFFICE VISIT (OUTPATIENT)
Dept: PRIMARY CARE CLINIC | Age: 75
End: 2020-03-12
Payer: MEDICARE

## 2020-03-12 VITALS
HEIGHT: 61 IN | BODY MASS INDEX: 41.02 KG/M2 | WEIGHT: 217.25 LBS | HEART RATE: 88 BPM | TEMPERATURE: 98.1 F | OXYGEN SATURATION: 96 % | DIASTOLIC BLOOD PRESSURE: 72 MMHG | SYSTOLIC BLOOD PRESSURE: 128 MMHG

## 2020-03-12 DIAGNOSIS — I10 ESSENTIAL HYPERTENSION: ICD-10-CM

## 2020-03-12 DIAGNOSIS — E79.0 HYPERURICEMIA: ICD-10-CM

## 2020-03-12 DIAGNOSIS — E11.9 TYPE 2 DIABETES MELLITUS WITHOUT COMPLICATION, WITHOUT LONG-TERM CURRENT USE OF INSULIN (HCC): ICD-10-CM

## 2020-03-12 LAB
ALBUMIN SERPL-MCNC: 4.8 G/DL (ref 3.5–5.2)
ALP BLD-CCNC: 66 U/L (ref 35–104)
ALT SERPL-CCNC: 13 U/L (ref 5–33)
ANION GAP SERPL CALCULATED.3IONS-SCNC: 15 MMOL/L (ref 7–19)
AST SERPL-CCNC: 17 U/L (ref 5–32)
BASOPHILS ABSOLUTE: 0 K/UL (ref 0–0.2)
BASOPHILS RELATIVE PERCENT: 0 % (ref 0–1)
BILIRUB SERPL-MCNC: 0.3 MG/DL (ref 0.2–1.2)
BUN BLDV-MCNC: 17 MG/DL (ref 8–23)
CALCIUM SERPL-MCNC: 10.5 MG/DL (ref 8.8–10.2)
CHLORIDE BLD-SCNC: 106 MMOL/L (ref 98–111)
CO2: 20 MMOL/L (ref 22–29)
CREAT SERPL-MCNC: 0.8 MG/DL (ref 0.5–0.9)
CREATININE URINE: 128.3 MG/DL (ref 4.2–622)
EOSINOPHILS ABSOLUTE: 0.34 K/UL (ref 0–0.6)
EOSINOPHILS RELATIVE PERCENT: 3 % (ref 0–5)
GFR NON-AFRICAN AMERICAN: >60
GLUCOSE BLD-MCNC: 123 MG/DL (ref 74–109)
HBA1C MFR BLD: 6.4 % (ref 4–6)
HCT VFR BLD CALC: 38.4 % (ref 37–47)
HEMOGLOBIN: 11.7 G/DL (ref 12–16)
HYPOCHROMIA: ABNORMAL
IMMATURE GRANULOCYTES #: 0.1 K/UL
LYMPHOCYTES ABSOLUTE: 3.1 K/UL (ref 1.1–4.5)
LYMPHOCYTES RELATIVE PERCENT: 27 % (ref 20–40)
MCH RBC QN AUTO: 29.2 PG (ref 27–31)
MCHC RBC AUTO-ENTMCNC: 30.5 G/DL (ref 33–37)
MCV RBC AUTO: 95.8 FL (ref 81–99)
MICROALBUMIN UR-MCNC: 5.4 MG/DL (ref 0–19)
MICROALBUMIN/CREAT UR-RTO: 42.1 MG/G
MONOCYTES ABSOLUTE: 1.5 K/UL (ref 0–0.9)
MONOCYTES RELATIVE PERCENT: 13 % (ref 0–10)
NEUTROPHILS ABSOLUTE: 6.4 K/UL (ref 1.5–7.5)
NEUTROPHILS RELATIVE PERCENT: 57 % (ref 50–65)
PDW BLD-RTO: 13.9 % (ref 11.5–14.5)
PLATELET # BLD: 180 K/UL (ref 130–400)
PLATELET SLIDE REVIEW: ADEQUATE
PMV BLD AUTO: 13.8 FL (ref 9.4–12.3)
POTASSIUM SERPL-SCNC: 4.8 MMOL/L (ref 3.5–5)
RBC # BLD: 4.01 M/UL (ref 4.2–5.4)
SODIUM BLD-SCNC: 141 MMOL/L (ref 136–145)
TOTAL PROTEIN: 7.1 G/DL (ref 6.6–8.7)
URIC ACID, SERUM: 4.7 MG/DL (ref 2.4–5.7)
WBC # BLD: 11.3 K/UL (ref 4.8–10.8)

## 2020-03-12 PROCEDURE — 96372 THER/PROPH/DIAG INJ SC/IM: CPT | Performed by: NURSE PRACTITIONER

## 2020-03-12 PROCEDURE — 99214 OFFICE O/P EST MOD 30 MIN: CPT | Performed by: NURSE PRACTITIONER

## 2020-03-12 RX ORDER — TRIAMCINOLONE ACETONIDE 40 MG/ML
40 INJECTION, SUSPENSION INTRA-ARTICULAR; INTRAMUSCULAR ONCE
Status: COMPLETED | OUTPATIENT
Start: 2020-03-12 | End: 2020-03-12

## 2020-03-12 RX ORDER — GABAPENTIN 600 MG/1
600 TABLET ORAL 2 TIMES DAILY
Qty: 60 TABLET | Refills: 3 | Status: SHIPPED | OUTPATIENT
Start: 2020-03-12 | End: 2020-09-04

## 2020-03-12 RX ADMIN — TRIAMCINOLONE ACETONIDE 40 MG: 40 INJECTION, SUSPENSION INTRA-ARTICULAR; INTRAMUSCULAR at 10:12

## 2020-03-12 ASSESSMENT — PATIENT HEALTH QUESTIONNAIRE - PHQ9
2. FEELING DOWN, DEPRESSED OR HOPELESS: 0
SUM OF ALL RESPONSES TO PHQ9 QUESTIONS 1 & 2: 0
SUM OF ALL RESPONSES TO PHQ QUESTIONS 1-9: 0
SUM OF ALL RESPONSES TO PHQ QUESTIONS 1-9: 0
1. LITTLE INTEREST OR PLEASURE IN DOING THINGS: 0

## 2020-03-12 ASSESSMENT — ENCOUNTER SYMPTOMS
EYES NEGATIVE: 1
WHEEZING: 0
BACK PAIN: 1
SORE THROAT: 0
RHINORRHEA: 0
TROUBLE SWALLOWING: 0
SHORTNESS OF BREATH: 0
COUGH: 0
ABDOMINAL PAIN: 0

## 2020-03-12 NOTE — TELEPHONE ENCOUNTER
----- Message from LAYLA Samuel sent at 3/12/2020  3:24 PM CDT -----  Cbc stable consistent with past

## 2020-03-12 NOTE — PROGRESS NOTES
Ref Range    T4 Free 1.4 0.9 - 1.7 ng/dL   TSH without Reflex   Result Value Ref Range    TSH 1.760 0.270 - 4.200 uIU/mL   Comprehensive Metabolic Panel   Result Value Ref Range    Sodium 135 (L) 136 - 145 mmol/L    Potassium 4.9 3.5 - 5.0 mmol/L    Chloride 96 (L) 98 - 111 mmol/L    CO2 21 (L) 22 - 29 mmol/L    Anion Gap 18 7 - 19 mmol/L    Glucose 120 (H) 74 - 109 mg/dL    BUN 18 8 - 23 mg/dL    CREATININE 0.9 0.5 - 0.9 mg/dL    GFR Non-African American >60 >60    Calcium 10.2 8.8 - 10.2 mg/dL    Total Protein 7.7 6.6 - 8.7 g/dL    Alb 4.7 3.5 - 5.2 g/dL    Total Bilirubin 0.3 0.2 - 1.2 mg/dL    Alkaline Phosphatase 69 35 - 104 U/L    ALT 11 5 - 33 U/L    AST 16 5 - 32 U/L   CBC Auto Differential   Result Value Ref Range    WBC 8.8 4.8 - 10.8 K/uL    RBC 3.85 (L) 4.20 - 5.40 M/uL    Hemoglobin 11.7 (L) 12.0 - 16.0 g/dL    Hematocrit 36.7 (L) 37.0 - 47.0 %    MCV 95.3 81.0 - 99.0 fL    MCH 30.4 27.0 - 31.0 pg    MCHC 31.9 (L) 33.0 - 37.0 g/dL    RDW 14.0 11.5 - 14.5 %    Platelets 686 843 - 875 K/uL    MPV 13.5 (H) 9.4 - 12.3 fL    Neutrophils % 52.4 50.0 - 65.0 %    Lymphocytes % 31.0 20.0 - 40.0 %    Monocytes % 12.5 (H) 0.0 - 10.0 %    Eosinophils % 2.7 0.0 - 5.0 %    Basophils % 0.5 0.0 - 1.0 %    Neutrophils Absolute 4.6 1.5 - 7.5 K/uL    Immature Granulocytes # 0.1 K/uL    Lymphocytes Absolute 2.7 1.1 - 4.5 K/uL    Monocytes Absolute 1.10 (H) 0.00 - 0.90 K/uL    Eosinophils Absolute 0.20 0.00 - 0.60 K/uL    Basophils Absolute 0.00 0.00 - 0.20 K/uL       I have reviewed the following with the Ms. Flood   Lab Review   No visits with results within 6 Month(s) from this visit.    Latest known visit with results is:   Orders Only on 08/27/2019   Component Date Value    Vitamin B-12 08/27/2019 >2000*    Uric Acid, Serum 08/27/2019 5.4     Hemoglobin A1C 08/27/2019 6.3*    Cholesterol, Total 08/27/2019 134*    Triglycerides 08/27/2019 170*    HDL 08/27/2019 57*    LDL Calculated 08/27/2019 43     T4 Free 08/27/2019 1.4     TSH 08/27/2019 1.760     Sodium 08/27/2019 135*    Potassium 08/27/2019 4.9     Chloride 08/27/2019 96*    CO2 08/27/2019 21*    Anion Gap 08/27/2019 18     Glucose 08/27/2019 120*    BUN 08/27/2019 18     CREATININE 08/27/2019 0.9     GFR Non- 08/27/2019 >60     Calcium 08/27/2019 10.2     Total Protein 08/27/2019 7.7     Alb 08/27/2019 4.7     Total Bilirubin 08/27/2019 0.3     Alkaline Phosphatase 08/27/2019 69     ALT 08/27/2019 11     AST 08/27/2019 16     WBC 08/27/2019 8.8     RBC 08/27/2019 3.85*    Hemoglobin 08/27/2019 11.7*    Hematocrit 08/27/2019 36.7*    MCV 08/27/2019 95.3     MCH 08/27/2019 30.4     MCHC 08/27/2019 31.9*    RDW 08/27/2019 14.0     Platelets 21/19/2130 175     MPV 08/27/2019 13.5*    Neutrophils % 08/27/2019 52.4     Lymphocytes % 08/27/2019 31.0     Monocytes % 08/27/2019 12.5*    Eosinophils % 08/27/2019 2.7     Basophils % 08/27/2019 0.5     Neutrophils Absolute 08/27/2019 4.6     Immature Granulocytes # 08/27/2019 0.1     Lymphocytes Absolute 08/27/2019 2.7     Monocytes Absolute 08/27/2019 1.10*    Eosinophils Absolute 08/27/2019 0.20     Basophils Absolute 08/27/2019 0.00      Copies of these are in the chart. Prior to Visit Medications    Medication Sig Taking? Authorizing Provider   gabapentin (NEURONTIN) 600 MG tablet Take 1 tablet by mouth 2 times daily for 30 days.  Yes LAYLA Rea   amLODIPine (NORVASC) 5 MG tablet Take 1 tablet by mouth daily Indications: High Blood Pressure Disorder Yes NICOLE Campos DO   lisinopril-hydrochlorothiazide (PRINZIDE;ZESTORETIC) 20-12.5 MG per tablet Take 1 tablet by mouth daily Yes LAYLA Casiano   Semaglutide,0.25 or 0.5MG/DOS, 2 MG/1.5ML SOPN Inject 0.5 mg into the skin once a week Yes LAYLA Rea   allopurinol (ZYLOPRIM) 300 MG tablet Take 1 tablet by mouth daily Yes LAYLA Casiano   atorvastatin (LIPITOR) 20 MG tablet TAKE ONE TABLET BY MOUTH NIGHTLY Yes NICOLE Pratt DO   metFORMIN (GLUCOPHAGE) 1000 MG tablet TAKE ONE TABLET BY MOUTH TWICE DAILY WITH MEALS Yes NICOLE Pratt DO   meloxicam (MOBIC) 15 MG tablet Take 1 tablet by mouth daily Indications: Arthritis Yes LAYLA Morris   Omega-3 Fatty Acids (FISH OIL) 1000 MG CAPS Take 1,000 mg by mouth daily Yes Historical Provider, MD   furosemide (LASIX) 20 MG tablet Take 20 mg by mouth daily as needed Only taking PRN Yes Historical Provider, MD   Blood Glucose Monitoring Suppl JOSE Cap glucose daily and prn Yes NICOLE Pratt DO   Glucose Blood (BLOOD GLUCOSE TEST STRIPS) STRP Cap glucose daily and prn Yes NICOLE Pratt DO   Insulin Pen Needle (H-E-B INCONTROL PEN NEEDLES) 32G X 4 MM MISC 1 each by Does not apply route daily Yes NICOEL Pratt DO   Calcium-Vitamin D (CALTRATE 600 PLUS-VIT D PO) Take 1 tablet by mouth 2 times daily Yes Historical Provider, MD   aspirin 81 MG tablet Take 81 mg by mouth daily  Yes Historical Provider, MD   Multiple Vitamins-Minerals (MULTI COMPLETE PO) Take 1 tablet by mouth daily. Yes Historical Provider, MD       Allergies: Patient has no known allergies. 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     HYSTERECTOMY, TOTAL ABDOMINAL      UMBILICAL HERNIA REPAIR N/A 2019 your body make insulin to lower your blood sugar. Others lower how much insulin your body needs. Some can slow how fast your body digests sugars. And some can remove extra glucose through your urine. · Alpha-glucosidase inhibitors. These keep starches from breaking down. This means that they lower the amount of glucose absorbed when you eat. They don't help your body make more insulin. So they will not cause low blood sugar unless you use them with other medicines for diabetes. They include acarbose and miglitol. · DPP-4 inhibitors. These help your body raise the level of insulin after you eat. They also help your body make less of a hormone that raises blood sugar. They include linagliptin, saxagliptin, and sitagliptin. · Incretin hormones (GLP-1 receptor agonists). Your body makes a protein that can raise your insulin level. It also can lower your blood sugar and make you less hungry. You can get shots of hormones that work the same way. They include exenatide and liraglutide. · Meglitinides. These help your body release insulin. They also help slow how your body digests sugars. So they can keep your blood sugar from rising too fast after you eat. They include nateglinide and repaglinide. · Metformin. This lowers how much glucose your liver makes. And it helps you respond better to insulin. It also lowers the amount of stored sugar that your liver releases when you are not eating. · SGLT2 inhibitors. These help to remove extra glucose through your urine. They may also help some people lose weight. They include canagliflozin, dapagliflozin, and empagliflozin. · Sulfonylureas. These help your body release more insulin. Some work for many hours. They can cause low blood sugar if you don't eat as you planned. They include glipizide and glyburide. · Thiazolidinediones. These reduce the amount of blood glucose. They also help you respond better to insulin. They include pioglitazone and rosiglitazone.   You may need to take more than one medicine for diabetes. Two or more medicines may work better to lower your blood sugar level than just one does. Follow-up care is a key part of your treatment and safety. Be sure to make and go to all appointments, and call your doctor if you are having problems. It's also a good idea to know your test results and keep a list of the medicines you take. How can you care for yourself at home? · Eat a healthy diet. Get some exercise each day. This may help you to reduce how much medicine you need. · Do not take other prescription or over-the-counter medicines, vitamins, herbal products, or supplements without talking to your doctor first. Some medicines for type 2 diabetes can cause problems with other medicines or supplements. · Tell your doctor if you plan to get pregnant. Some of these drugs are not safe for pregnant women. · Be safe with medicines. Take your medicines exactly as prescribed. Meglitinides and sulfonylureas can cause your blood sugar to drop very low. Call your doctor if you think you are having a problem with your medicine. · Check your blood sugar often. You can use a glucose monitor. Keeping track can help you know how certain foods, activities, and medicines affect your blood sugar. And it can help you keep your blood sugar from getting so low that it's not safe. When should you call for help? Call 911 anytime you think you may need emergency care. For example, call if:    · You passed out (lost consciousness).     · You are confused or cannot think clearly.     · Your blood sugar is very high or very low.    Watch closely for changes in your health, and be sure to contact your doctor if:    · Your blood sugar stays outside the level your doctor set for you.     · You have any problems. Where can you learn more? Go to [email or web address] and sign in to your [employer] account.  Enter H153 in the [employer] box to learn more

## 2020-03-12 NOTE — PATIENT INSTRUCTIONS
Patient Education        Noninsulin Medicines for Type 2 Diabetes: Care Instructions  Your Care Instructions    There are different types of noninsulin medicines for diabetes. Each works in a different way. But they all help you control your blood sugar. Some types help your body make insulin to lower your blood sugar. Others lower how much insulin your body needs. Some can slow how fast your body digests sugars. And some can remove extra glucose through your urine. · Alpha-glucosidase inhibitors. These keep starches from breaking down. This means that they lower the amount of glucose absorbed when you eat. They don't help your body make more insulin. So they will not cause low blood sugar unless you use them with other medicines for diabetes. They include acarbose and miglitol. · DPP-4 inhibitors. These help your body raise the level of insulin after you eat. They also help your body make less of a hormone that raises blood sugar. They include linagliptin, saxagliptin, and sitagliptin. · Incretin hormones (GLP-1 receptor agonists). Your body makes a protein that can raise your insulin level. It also can lower your blood sugar and make you less hungry. You can get shots of hormones that work the same way. They include exenatide and liraglutide. · Meglitinides. These help your body release insulin. They also help slow how your body digests sugars. So they can keep your blood sugar from rising too fast after you eat. They include nateglinide and repaglinide. · Metformin. This lowers how much glucose your liver makes. And it helps you respond better to insulin. It also lowers the amount of stored sugar that your liver releases when you are not eating. · SGLT2 inhibitors. These help to remove extra glucose through your urine. They may also help some people lose weight. They include canagliflozin, dapagliflozin, and empagliflozin. · Sulfonylureas. These help your body release more insulin.  Some work for [employer] · Your blood sugar stays outside the level your doctor set for you.     · You have any problems. Where can you learn more? Go to [email or web address] and sign in to your [employer] account. Enter H153 in the [employer] box to learn more about \"Noninsulin Medicines for Type 2 Diabetes: Care Instructions. \"     If you do not have an account, please click on the \"Sign Up Now\" link. Current as of: April 16, 2019  Content Version: 12.3  © 6409-4313 Healthwise, Incorporated. Care instructions adapted under license by [facility] ([facility]). If you have questions about a medical condition or this instruction, always ask your healthcare professional. [address] any warranty or liability for your use of this information.

## 2020-03-13 ENCOUNTER — TELEPHONE (OUTPATIENT)
Dept: PRIMARY CARE CLINIC | Age: 75
End: 2020-03-13

## 2020-04-03 RX ORDER — MELOXICAM 15 MG/1
TABLET ORAL
Qty: 30 TABLET | Refills: 5 | Status: SHIPPED | OUTPATIENT
Start: 2020-04-03 | End: 2020-10-06

## 2020-04-03 NOTE — TELEPHONE ENCOUNTER
Received fax from pharmacy requesting refill on pts medication(s). Pt was last seen in office on 3/12/2020  and has a follow up scheduled for 6/12/2020. Will send request to  Brenton Kennedy  for authorization.      Requested Prescriptions     Signed Prescriptions Disp Refills    meloxicam (MOBIC) 15 MG tablet 30 tablet 5     Sig: TAKE ONE TABLET BY MOUTH DAILY     Authorizing Provider: Bashir Garrison     Ordering User: Diann Bustamante

## 2020-06-12 ENCOUNTER — VIRTUAL VISIT (OUTPATIENT)
Dept: PRIMARY CARE CLINIC | Age: 75
End: 2020-06-12
Payer: MEDICARE

## 2020-06-12 PROCEDURE — 99443 PR PHYS/QHP TELEPHONE EVALUATION 21-30 MIN: CPT | Performed by: NURSE PRACTITIONER

## 2020-06-12 ASSESSMENT — ENCOUNTER SYMPTOMS
EYES NEGATIVE: 1
COUGH: 0
RHINORRHEA: 0
BACK PAIN: 1
SORE THROAT: 0
WHEEZING: 0
SHORTNESS OF BREATH: 0
ABDOMINAL PAIN: 0
TROUBLE SWALLOWING: 0

## 2020-06-12 NOTE — PATIENT INSTRUCTIONS

## 2020-06-12 NOTE — PROGRESS NOTES
[address]ith[address]  [facility], [address]  Phone [phone]   Fax [phone]            TELEMEDICINE visit by telephone    OFFICE VISIT: 2020    Jorge Lammy Ignacio- : 1945      Reason For Visit:  Nataly Sena is a 69 y.o. femalewho is here for 3 Month Follow-Up (diabetes ) and Gout (follow up)         Health Maintenance     HPI      HPI        Patient is here for diabetes HTN hyperlipidemia B12 deficiency sciatica  Diabetes type 2  With insurance relies on samples  Either victoza or ozempic  Reports doing well  At 110s in fasting  Is checking her feet  On asa and lisinopril  Weight stable  3/2020 : 6.4  Reports has been well when she checks  Other than glucometer been odd     HTN  Checking at home  128/72 or so  Denies any issues  On lisinopril hctz daily  norvasc along with lasix as needed  Reports no swelling  Not taken lasix in a while  Labs no significant protein     hyperlipid  History of stroke  At goal 2019 LDL 43 and Triglcerides at 170  lipitor 20mg qpm   fishi oil daily   And continues present     Gout  On allopurinol daily  Stable at 5.4   No flairs  Denies any flairs     Sciatica   On left side now  She reports she has been taking neurontin  With relief  But with 3 funerals this week been worse since on feet  She has been taking and bothersome           vitals were not taken for this visit. There is no height or weight on file to calculate BMI.     Results for orders placed or performed in visit on 20   Comprehensive Metabolic Panel   Result Value Ref Range    Sodium 141 136 - 145 mmol/L    Potassium 4.8 3.5 - 5.0 mmol/L    Chloride 106 98 - 111 mmol/L    CO2 20 (L) 22 - 29 mmol/L    Anion Gap 15 7 - 19 mmol/L    Glucose 123 (H) 74 - 109 mg/dL    BUN 17 8 - 23 mg/dL    CREATININE 0.8 0.5 - 0.9 mg/dL    GFR Non-African American >60 >60    Calcium 10.5 (H) 8.8 - 10.2 mg/dL    Total Protein 7.1 6.6 - 8.7 g/dL    Alb 4.8 3.5 - 5.2 g/dL    Total Bilirubin 0.3 0.2 - 1.2 mg/dL    Alkaline Phosphatase 66 35 - 104 U/L    ALT 13 5 - 33 U/L    AST 17 5 - 32 U/L   Hemoglobin A1C   Result Value Ref Range    Hemoglobin A1C 6.4 (H) 4.0 - 6.0 %   CBC Auto Differential   Result Value Ref Range    WBC 11.3 (H) 4.8 - 10.8 K/uL    RBC 4.01 (L) 4.20 - 5.40 M/uL    Hemoglobin 11.7 (L) 12.0 - 16.0 g/dL    Hematocrit 38.4 37.0 - 47.0 %    MCV 95.8 81.0 - 99.0 fL    MCH 29.2 27.0 - 31.0 pg    MCHC 30.5 (L) 33.0 - 37.0 g/dL    RDW 13.9 11.5 - 14.5 %    Platelets 398 148 - 202 K/uL    MPV 13.8 (H) 9.4 - 12.3 fL    PLATELET SLIDE REVIEW Adequate     Neutrophils % 57.0 50.0 - 65.0 %    Lymphocytes % 27.0 20.0 - 40.0 %    Monocytes % 13.0 (H) 0.0 - 10.0 %    Eosinophils % 3.0 0.0 - 5.0 %    Basophils % 0.0 0.0 - 1.0 %    Neutrophils Absolute 6.4 1.5 - 7.5 K/uL    Immature Granulocytes # 0.1 K/uL    Lymphocytes Absolute 3.1 1.1 - 4.5 K/uL    Monocytes Absolute 1.50 (H) 0.00 - 0.90 K/uL    Eosinophils Absolute 0.34 0.00 - 0.60 K/uL    Basophils Absolute 0.00 0.00 - 0.20 K/uL    Hypochromia 1+ (A)    Microalbumin / Creatinine Urine Ratio   Result Value Ref Range    Microalbumin, Random Urine 5.40 0.00 - 19.00 mg/dL    Creatinine, Ur 128.3 4.2 - 622.0 mg/dL    Microalbumin Creatinine Ratio 42.1 mg/g   Uric Acid   Result Value Ref Range    Uric Acid, Serum 4.7 2.4 - 5.7 mg/dL       I have reviewed the following with the Ms. Flood   Lab Review   Orders Only on 03/12/2020   Component Date Value    Sodium 03/12/2020 141     Potassium 03/12/2020 4.8     Chloride 03/12/2020 106     CO2 03/12/2020 20*    Anion Gap 03/12/2020 15     Glucose 03/12/2020 123*    BUN 03/12/2020 17     CREATININE 03/12/2020 0.8     GFR Non- 03/12/2020 >60     Calcium 03/12/2020 10.5*    Total Protein 03/12/2020 7.1     Alb 03/12/2020 4.8     Total Bilirubin 03/12/2020 0.3     Alkaline Phosphatase 03/12/2020 66     ALT 03/12/2020 13     AST 03/12/2020 17     Hemoglobin A1C 03/12/2020 6.4*    WBC 03/12/2020 11.3*    RBC 03/12/2020 change (improving), appetite change, fatigue and fever. HENT: Negative for congestion, postnasal drip, rhinorrhea, sore throat and trouble swallowing. Eyes: Negative. Respiratory: Negative for cough, shortness of breath and wheezing. Cardiovascular: Negative for chest pain and leg swelling. Gastrointestinal: Negative for abdominal pain. Endocrine: Negative for polydipsia, polyphagia and polyuria. Genitourinary: Negative for difficulty urinating. Musculoskeletal: Positive for arthralgias (in right hip) and back pain. Skin: Negative for rash. Neurological: Negative for seizures and headaches. Hematological: Negative for adenopathy. Psychiatric/Behavioral: Negative for behavioral problems, self-injury and sleep disturbance. The patient is not nervous/anxious. Physical Exam  Vitals signs reviewed. Constitutional:       General: She is not in acute distress. Appearance: She is well-developed. She is not diaphoretic. Comments: Obese     HENT:      Head: Normocephalic. Right Ear: External ear normal.      Left Ear: External ear normal.      Mouth/Throat:      Pharynx: No oropharyngeal exudate. Eyes:      General:         Right eye: No discharge. Left eye: No discharge. Neck:      Musculoskeletal: Normal range of motion. Cardiovascular:      Rate and Rhythm: Normal rate and regular rhythm. Heart sounds: Normal heart sounds. No murmur. Pulmonary:      Effort: Pulmonary effort is normal. No respiratory distress. Breath sounds: Normal breath sounds. No wheezing. Abdominal:      Palpations: Abdomen is soft. Musculoskeletal:         General: Tenderness (left sciatica) present. Lumbar back: She exhibits decreased range of motion, tenderness and pain (to right hip over sciatica). Lymphadenopathy:      Cervical: No cervical adenopathy. Skin:     General: Skin is warm. Capillary Refill: Capillary refill takes less than 2 seconds. Neurological:      Mental Status: She is alert and oriented to person, place, and time. Psychiatric:         Behavior: Behavior normal.         JOSEPH/ Sisi Mancia is  being evaluated by a telephone 21 minutes encounter to address concerns as mentioned above. A caregiver was present when appropriate. Due to this being a TeleHealth encounter (During DKL-74 public health emergency), evaluation of the following organ systems was limited: Vitals/Constitutional/EENT/Resp/CV/GI//MS/Neuro/Skin/Heme-Lymph-Imm. Pursuant to the emergency declaration under the [facility], [address] [place] [address] authority and the [employer] and [employer] Act, this Virtual Visit was conducted with patient's (and/or legal guardian's) consent, to reduce the patient's risk of exposure to COVID-19 and provide necessary medical care. The patient (and/or legal guardian) has also been advised to contact this office for worsening conditions or problems, and seek emergency medical treatment and/or call 911 if deemed necessary. Services were provided through a video synchronous discussion virtually to substitute for in-person clinic visit. Patient and provider were located at their individual homes. 1. Essential hypertension  Stable per patient report  Doing well    2. Type 2 diabetes mellitus without complication, without long-term current use of insulin ([address]  Labs at goal  Cont medications  She reports feeling \"well\"    3. Hyperuricemia  At goal  No recent attacks     4. 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 Return in about 13 weeks (around 9/11/2020) for medicare awv and labs .      Patient Instructions     Patient Education        Home Blood Pressure Test: About This Test  What is it? A home blood pressure test allows you to keep track of your blood pressure at home. Blood pressure is a measure of the force of blood against the walls of your arteries. Blood pressure readings include two numbers, such as 130/80 (say \"130 over 80\"). The first number is the systolic pressure. The second number is the diastolic pressure. Why is this test done? You may do this test at home to:  · Find out if you have high blood pressure. · Track your blood pressure if you have high blood pressure. · Track how well medicine is working to reduce high blood pressure. · Check how lifestyle changes, such as weight loss and exercise, are affecting blood pressure. How do you prepare for the test?  For at least 30 minutes before you take your blood pressure, don't exercise or use caffeine, tobacco, or medicines that raise blood pressure. Take your blood pressure while you feel comfortable and relaxed. Sit quietly with both feet on the floor for at least 5 minutes before the test.  How is the test done? · Sit with your arm slightly bent and resting on a table so that your upper arm is at the same level as your heart. · Roll up your sleeve or take off your shirt to expose your upper arm. · Wrap the blood pressure cuff around your upper arm so that the lower edge of the cuff is about 1 inch above the bend of your elbow. Proceed with the following steps depending on if you are using an automatic or manual pressure monitor. Automatic blood pressure monitors  · Press the on/off button on the automatic monitor and wait until the ready-to-measure \"heart\" symbol appears next to zero in the display window. · Press the start button. The cuff will inflate and deflate by itself. · Your blood pressure numbers will appear on the screen. · Write your numbers in your log book, along with the date and time.   Manual blood pressure monitors  · Place the earpieces of a stethoscope in your ears, and place the Instructions: As always, patient is advisedto bring in medication bottles in order to correctly reconcile with our current list.      Orly Pratt received counseling on the following healthy behaviors: none    Patient giveneducational materials on plan of care    I have instructed Orly Pratt to complete a self tracking handout on blood pressure and instructed them to bring it with them to her next appointment. Discussed use, benefit, and side effects of prescribed medications. Barriers to medication compliance addressed. All patient questions answered. Pt voiced understanding.      LAYLA Ventura

## 2020-07-06 ENCOUNTER — OFFICE VISIT (OUTPATIENT)
Dept: PRIMARY CARE CLINIC | Age: 75
End: 2020-07-06
Payer: MEDICARE

## 2020-07-06 VITALS
HEART RATE: 89 BPM | WEIGHT: 233.5 LBS | OXYGEN SATURATION: 96 % | SYSTOLIC BLOOD PRESSURE: 138 MMHG | TEMPERATURE: 98.2 F | DIASTOLIC BLOOD PRESSURE: 86 MMHG | BODY MASS INDEX: 44.12 KG/M2

## 2020-07-06 PROCEDURE — 99214 OFFICE O/P EST MOD 30 MIN: CPT | Performed by: PEDIATRICS

## 2020-07-06 RX ORDER — TRIAMCINOLONE ACETONIDE 1 MG/G
CREAM TOPICAL
Qty: 80 G | Refills: 5 | Status: SHIPPED | OUTPATIENT
Start: 2020-07-06

## 2020-07-06 NOTE — PATIENT INSTRUCTIONS
[email or web address] and sign in to your [employer] account. Enter F105 in the [facility] box to learn more about \"Subconjunctival Hemorrhage: Care Instructions. \"     If you do not have an account, please click on the \"Sign Up Now\" link. Current as of: December 18, 2019               Content Version: 12.5  © 8918-5241 [employer]. Care instructions adapted under license by [facility] ([facility]). If you have questions about a medical condition or this instruction, always ask your healthcare professional. [place] [address] any warranty or liability for your use of this information. Patient Education        Recurring Migraine Headache: Care Instructions  Your Care Instructions  Migraines are painful, throbbing headaches. They often start on one side of the head. They may cause nausea and vomiting and make you sensitive to light, sound, or smell. Some people may have only a few migraines throughout life. Others have them as often as several times a month. The goal of treatment is to reduce the number of migraines you have and relieve your symptoms. Even with treatment, you may continue to have migraines. You play an important role in dealing with your headaches. Work on avoiding things that seem to trigger your migraines. When you feel a headache coming on, act quickly to stop it before it gets worse. Follow-up care is a key part of your treatment and safety. Be sure to make and go to all appointments, and call your doctor if you are having problems. It's also a good idea to know your test results and keep a list of the medicines you take. How can you care for yourself at home? · Do not drive if you have taken a prescription pain medicine. · Rest in a quiet, dark room until your headache is gone. Close your eyes and try to relax or go to sleep. Do not watch TV or read.   · Put a cold, moist cloth or cold pack on the painful area for 10 to 20 minutes at a time. Put a thin cloth between the cold pack and your skin. · Have someone gently massage your neck and shoulders. · Take your medicines exactly as prescribed. Call your doctor if you think you are having a problem with your medicine. You will get more details on the specific medicines your doctor prescribes. · Don't take medicine for headache pain too often. Talk to your doctor if you are taking medicine more than 2 days a week to stop a headache. Taking too much pain medicine can lead to more headaches. These are called medicine-overuse headaches. To prevent migraines  · Keep a headache diary so you can figure out what triggers your headaches. Avoiding triggers may help you prevent headaches. Record when each headache began, how long it lasted, and what the pain was like. Write down any other symptoms you had with the headache. These may include nausea, flashing lights or dark spots, or sensitivity to bright light or loud noise. Note if the headache occurred near your period. List anything that might have triggered the headache. Triggers may include certain foods (chocolate, cheese, wine) or odors, smoke, bright light, stress, or lack of sleep. · If your doctor has prescribed medicine for your migraines, take it as directed. You may have medicine that you take only when you get a migraine and medicine that you take all the time to help prevent migraines. ? If your doctor has prescribed medicine for when you get a headache, take it at the first sign of a migraine, unless your doctor has given you other instructions. ? If your doctor has prescribed medicine to prevent migraines, take it exactly as prescribed. Call your doctor if you think you are having a problem with your medicine. · Find healthy ways to deal with stress. Migraines are most common during or right after stressful times. Try finding ways to reduce stress like practicing mindfulness or deep breathing exercises.   · Get regular sleep and exercise. But be careful to not push yourself too hard during exercise. It may trigger a headache. · Eat regular meals, and avoid foods and drinks that often trigger migraines. These include chocolate and alcohol, especially red wine and port. Chemicals used in food, such as aspartame and monosodium glutamate (MSG), also can trigger migraines. So can some food additives, such as those found in hot dogs, rose, cold cuts, aged cheeses, and pickled foods. · Limit caffeine by not drinking too much coffee, tea, or soda. Do not quit caffeine suddenly, because that can also give you migraines. · Do not smoke or allow others to smoke around you. If you need help quitting, talk to your doctor about stop-smoking programs and medicines. These can increase your chances of quitting for good. · If you are taking birth control pills or hormone therapy, talk to your doctor about whether they are triggering your migraines. When should you call for help? [id number] anytime you think you may need emergency care. For example, call if:  · You have symptoms of a stroke. These may include:  ? Sudden numbness, tingling, weakness, or loss of movement in your face, arm, or leg, especially on only one side of your body. ? Sudden vision changes. ? Sudden trouble speaking. ? Sudden confusion or trouble understanding simple statements. ? Sudden problems with walking or balance. ? A sudden, severe headache that is different from past headaches. Call your doctor now or seek immediate medical care if:  · You develop a fever and a stiff neck. · You have new nausea and vomiting, or you cannot keep down food or liquids. Watch closely for changes in your health, and be sure to contact your doctor if:  · You have a headache that does not get better within 1 or 2 days. · Your headaches get worse or happen more often. Where can you learn more? Go to [email or web address] and sign in to your [employer] account.  Enter  in the Search Health Information box to learn more about \"Recurring Migraine Headache: Care Instructions. \"     If you do not have an account, please click on the \"Sign Up Now\" link. Current as of: November 20, 2019               Content Version: 12.5  © 7845-3096 Healthwise[employer] Inc[employer]. Care instructions adapted under license by [facility] ([facility]). If you have questions about a medical condition or this instruction, always ask your healthcare professional. [place] [address] any warranty or liability for your use of this information. Patient Education        Migraine Headache: Care Instructions  Your Care Instructions  Migraines are painful, throbbing headaches that often start on one side of the head. They may cause nausea and vomiting and make you sensitive to light, sound, or smell. Without treatment, migraines can last from 4 hours to a few days. Medicines can help prevent migraines or stop them after they have started. Your doctor can help you find which ones work best for you. Follow-up care is a key part of your treatment and safety. Be sure to make and go to all appointments, and call your doctor if you are having problems. It's also a good idea to know your test results and keep a list of the medicines you take. How can you care for yourself at home? · Do not drive if you have taken a prescription pain medicine. · Rest in a quiet, dark room until your headache is gone. Close your eyes, and try to relax or go to sleep. Don't watch TV or read. · Put a cold, moist cloth or cold pack on the painful area for 10 to 20 minutes at a time. Put a thin cloth between the cold pack and your skin. · Use a warm, moist towel or a heating pad set on low to relax tight shoulder and neck muscles. · Have someone gently massage your neck and shoulders. · Take your medicines exactly as prescribed. Call your doctor if you think you are having a problem with your medicine.  You will get more details on the specific medicines your doctor prescribes. · Don't take medicine for headache pain too often. Talk to your doctor if you are taking medicine more than 2 days a week to stop a headache. Taking too much pain medicine can lead to more headaches. These are called medicine-overuse headaches. To prevent migraines  · Keep a headache diary so you can figure out what triggers your headaches. Avoiding triggers may help you prevent headaches. Record when each headache began, how long it lasted, and what the pain was like. Write down any other symptoms you had with the headache, such as nausea, flashing lights or dark spots, or sensitivity to bright light or loud noise. Note if the headache occurred near your period. List anything that might have triggered the headache. Triggers may include certain foods (chocolate, cheese, wine) or odors, smoke, bright light, stress, or lack of sleep. · If your doctor has prescribed medicine for your migraines, take it as directed. You may have medicine that you take only when you get a migraine and medicine that you take all the time to help prevent migraines. ? If your doctor has prescribed medicine for when you get a headache, take it at the first sign of a migraine, unless your doctor has given you other instructions. ? If your doctor has prescribed medicine to prevent migraines, take it exactly as prescribed. Call your doctor if you think you are having a problem with your medicine. · Find healthy ways to deal with stress. Migraines are most common during or right after stressful times. Try finding ways to reduce stress like practicing mindfulness or deep breathing exercises. · Get plenty of sleep and exercise. But be careful to not push yourself too hard during exercise. It may trigger a headache. · Eat meals on a regular schedule. Avoid foods and drinks that often trigger migraines.  These include chocolate, alcohol (especially red wine and port), aspartame, monosodium glutamate (MSG), and some additives found in foods (such as hot dogs, rose, cold cuts, aged cheeses, and pickled foods). · Limit caffeine. Don't drink too much coffee, tea, or soda. But don't quit caffeine suddenly. That can also give you migraines. · Do not smoke or allow others to smoke around you. If you need help quitting, talk to your doctor about stop-smoking programs and medicines. These can increase your chances of quitting for good. · If you are taking birth control pills or hormone therapy, talk to your doctor about whether they are triggering your migraines. When should you call for help? [id number] anytime you think you may need emergency care. For example, call if:  · You have signs of a stroke. These may include:  ? Sudden numbness, paralysis, or weakness in your face, arm, or leg, especially on only one side of your body. ? Sudden vision changes. ? Sudden trouble speaking. ? Sudden confusion or trouble understanding simple statements. ? Sudden problems with walking or balance. ? A sudden, severe headache that is different from past headaches. Call your doctor now or seek immediate medical care if:  · You have new or worse nausea and vomiting. · You have a new or higher fever. · Your headache gets much worse. Watch closely for changes in your health, and be sure to contact your doctor if:  · You are not getting better after 2 days (48 hours). Where can you learn more? Go to https://Education.com.[email or web address] org and sign in to your [employer] account. Enter O127 in the [employer] box to learn more about \"Migraine Headache: Care Instructions. \"     If you do not have an account, please click on the \"Sign Up Now\" link. Current as of: November 20, 2019               Content Version: 12.5  © 7620-0207 Healthwise, Incorporated. Care instructions adapted under license by [facility] ([facility]).  If you have questions about a medical condition or this instruction, always ask your healthcare

## 2020-07-06 NOTE — PROGRESS NOTES
[facility], [address]  Phone [phone]   Fax [phone]      OFFICE VISIT: 2020    Zak Flood-: 1945      HPI  Reason For Visit:  Jeff Corral is a 69 y.o. Migraine (has been having Migrains, had one on wednesday, thursday and  this past week. Patient states that she has not had Migrains in a very long time. Patients left eye is blood shot, she is unsure if this is Migraine related. )    Patient presents with complaints of migraine headaches. She gets migraines on a periodic basis. She is unsure whether this may be migraine related. Typically when she gets her migraines she typically takes Excedrine Migraine. She is not on any migraine prophylaxis. She notes that she does have rapid relief with this medication. She also notes that her left eye is red. She has not coughed or sneezed or vomited at all   She just woke up and her eye was red. She did not feel any different. She has a dry spot on her nose. This has been present for the past yr or so. Her skin seems dry          weight is 233 lb 8 oz (105.9 kg). Her temporal temperature is 98.2 °F (36.8 °C). Her blood pressure is 138/86 and her pulse is 89. Her oxygen saturation is 96%. Body mass index is 44.12 kg/m². I have reviewed the following with the Ms. Flood   Lab Review  Orders Only on 2020   Component Date Value    Sodium 2020 141     Potassium 2020 4.8     Chloride 2020 106     CO2 2020 20*    Anion Gap 2020 15     Glucose 2020 123*    BUN 2020 17     CREATININE 2020 0.8     GFR Non- 2020 >60     Calcium 2020 10.5*    Total Protein 2020 7.1     Alb 2020 4.8     Total Bilirubin 2020 0.3     Alkaline Phosphatase 2020 66     ALT 2020 13     AST 2020 17     Hemoglobin A1C 2020 6.4*    WBC 2020 11.3*    RBC 2020 4.01*  Hemoglobin 03/12/2020 11.7*    Hematocrit 03/12/2020 38.4     MCV 03/12/2020 95.8     MCH 03/12/2020 29.2     MCHC 03/12/2020 30.5*    RDW 03/12/2020 13.9     Platelets 90/56/8473 180     MPV 03/12/2020 13.8*    PLATELET SLIDE REVIEW 03/12/2020 Adequate     Neutrophils % 03/12/2020 57.0     Lymphocytes % 03/12/2020 27.0     Monocytes % 03/12/2020 13.0*    Eosinophils % 03/12/2020 3.0     Basophils % 03/12/2020 0.0     Neutrophils Absolute 03/12/2020 6.4     Immature Granulocytes # 03/12/2020 0.1     Lymphocytes Absolute 03/12/2020 3.1     Monocytes Absolute 03/12/2020 1.50*    Eosinophils Absolute 03/12/2020 0.34     Basophils Absolute 03/12/2020 0.00     Hypochromia 03/12/2020 1+*    Microalbumin, Random Uri* 03/12/2020 5.40     Creatinine, Ur 03/12/2020 128.3     Microalbumin Creatinine * 03/12/2020 42.1     Uric Acid, Serum 03/12/2020 4.7      Copies of these are in the chart. Current Outpatient Medications   Medication Sig Dispense Refill    triamcinolone (KENALOG) 0.1 % cream Apply topically 2 times daily. 80 g 5    meloxicam (MOBIC) 15 MG tablet TAKE ONE TABLET BY MOUTH DAILY 30 tablet 5    gabapentin (NEURONTIN) 600 MG tablet Take 1 tablet by mouth 2 times daily for 30 days.  60 tablet 3    amLODIPine (NORVASC) 5 MG tablet Take 1 tablet by mouth daily Indications: High Blood Pressure Disorder 90 tablet 3    lisinopril-hydrochlorothiazide (PRINZIDE;ZESTORETIC) 20-12.5 MG per tablet Take 1 tablet by mouth daily 90 tablet 3    Semaglutide,0.25 or 0.5MG/DOS, 2 MG/1.5ML SOPN Inject 0.5 mg into the skin once a week 1.5 mL 5    allopurinol (ZYLOPRIM) 300 MG tablet Take 1 tablet by mouth daily 30 tablet 11    atorvastatin (LIPITOR) 20 MG tablet TAKE ONE TABLET BY MOUTH NIGHTLY 90 tablet 3    metFORMIN (GLUCOPHAGE) 1000 MG tablet TAKE ONE TABLET BY MOUTH TWICE DAILY WITH MEALS 180 tablet 3    Omega-3 Fatty Acids (FISH OIL) 1000 MG CAPS Take 1,000 mg by mouth daily      furosemide (LASIX) 20 MG tablet Take 20 mg by mouth daily as needed Only taking PRN      Blood Glucose Monitoring Suppl JOSE Cap glucose daily and prn 1 Device 0    Glucose Blood (BLOOD GLUCOSE TEST STRIPS) STRP Cap glucose daily and prn 100 strip 3    Insulin Pen Needle (H-E-B INCONTROL PEN NEEDLES) 32G X 4 MM MISC 1 each by Does not apply route daily 100 each 3    Calcium-Vitamin D (CALTRATE 600 PLUS-VIT D PO) Take 1 tablet by mouth 2 times daily      aspirin 81 MG tablet Take 81 mg by mouth daily       Multiple Vitamins-Minerals (MULTI COMPLETE PO) Take 1 tablet by mouth daily. No current facility-administered medications for this visit. Allergies: Patient has no known allergies. 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    Type II or unspecified type diabetes mellitus without mention of complication, not stated as uncontrolled        Family History   Problem Relation Age of Onset    Colon Cancer Mother     Colon Polyps Neg Hx     Esophageal Cancer Neg Hx     Liver Disease Neg Hx     Liver Cancer Neg Hx     Rectal Cancer Neg Hx     Stomach Cancer Neg Hx        Past Surgical History:   Procedure Laterality Date    APPENDECTOMY       SECTION      x2   [address]    COLONOSCOPY  16    Dr John Chavira [facility])-Tubular AP (-) dysplasia x 1, 5 yr recall    HERNIA REPAIR      She has had multiple hernia surgeries; x4    HERNIA REPAIR      pt states she's had difficulty with mesh.     HYSTERECTOMY, TOTAL ABDOMINAL      UMBILICAL HERNIA REPAIR N/A 2019    INCISIONAL HERNIA REPAIR WITH BIOLOGIC MESH performed by Ayaka Sheldon MD at [address] [place] [address]       Social History     Tobacco Use    Smoking status: Never Smoker    Smokeless tobacco: Never Used   Substance Use Topics    Alcohol use: No        Review of Systems    Physical Exam  Vitals signs reviewed. Constitutional:       General: She is not in acute distress. Appearance: She is well-developed. She is not toxic-appearing. Comments: Body habitus is obese   HENT:      Head: Normocephalic and atraumatic. Right Ear: Hearing, ear canal and external ear normal. A middle ear effusion is present. Left Ear: Hearing, ear canal and external ear normal. A middle ear effusion is present. Nose: Nose normal.      Mouth/Throat:      Mouth: Mucous membranes are moist.   Eyes:      General: Lids are normal.      Extraocular Movements: Extraocular movements intact. Conjunctiva/sclera: Conjunctivae normal.      Pupils: Pupils are equal, round, and reactive to light. Neck:      Musculoskeletal: Neck supple. Thyroid: No thyromegaly. Vascular: No carotid bruit or JVD. Trachea: Phonation normal.   Cardiovascular:      Rate and Rhythm: Normal rate and regular rhythm. No extrasystoles are present. Chest Wall: PMI is not displaced. Heart sounds: Normal heart sounds. No murmur. No friction rub. No gallop. Pulmonary:      Effort: Pulmonary effort is normal. No respiratory distress. Breath sounds: Normal breath sounds. No wheezing, rhonchi or rales. Abdominal:      General: Bowel sounds are normal. There is no distension. Palpations: Abdomen is soft. There is no mass. Tenderness: There is no abdominal tenderness. Genitourinary:     Comments: Examination deferred  Musculoskeletal: Normal range of motion. General: Tenderness (over lumbar spine and at R SI joint.) present. Comments: Joint examination reveals no acute arthritis or synovitis. Lymphadenopathy:      Cervical: No cervical adenopathy. Skin:     General: Skin is warm and dry. Findings: No rash. Comments: There is a dry spot on her nose with scaling skin.    Neurological:      Mental Status: She is alert and oriented to person, place, and time. Cranial Nerves: No cranial nerve deficit (by gross examination). Sensory: No sensory deficit. Motor: No tremor or atrophy. Gait: Gait normal.      Comments: No focal deficits appreciated   Psychiatric:         Speech: Speech normal.         Behavior: Behavior normal. Behavior is cooperative. ASSESSMENT      ICD-10-CM    1. Dermatitis L30.9 triamcinolone (KENALOG) 0.1 % cream   2. Subconjunctival hemorrhage of left eye H11.32    3. Environmental allergies Z91.09    4. Migraine with aura and without status migrainosus, not intractable G43.109          PLAN    1. Dermatitis  We will try triamcinolone topically to see if this makes a difference  - triamcinolone (KENALOG) 0.1 % cream; Apply topically 2 times daily. Dispense: 80 g; Refill: 5    2. Subconjunctival hemorrhage of left eye  This is no harm to her eye or her vision in any way  reassurance as this will go away within about 2    3. Environmental allergies  Recommend that she restart her Allegra    4. Migraine with aura and without status migrainosus, not intractable  No known obvious triggers. Continue with treatment as it is effective      No orders of the defined types were placed in this encounter. Return in about 6 months (around 1/6/2021) for 30. This was an in house visit.

## 2020-09-04 NOTE — TELEPHONE ENCOUNTER
Received fax from pharmacy requesting refill on pts medication(s). Pt was last seen in office on 7/6/2020  and has a follow up scheduled for 9/11/2020. Will send request to  Dr. Kandis Rodriguez  for patient.      Requested Prescriptions     Pending Prescriptions Disp Refills    gabapentin (NEURONTIN) 600 MG tablet [Pharmacy Med Name: gabapentin 600 mg tablet] 60 tablet 3     Sig: TAKE ONE TABLET BY MOUTH TWICE DAILY

## 2020-09-05 RX ORDER — GABAPENTIN 600 MG/1
600 TABLET ORAL 2 TIMES DAILY
Qty: 180 TABLET | Refills: 1 | Status: SHIPPED | OUTPATIENT
Start: 2020-09-05 | End: 2021-03-25

## 2020-09-11 ENCOUNTER — OFFICE VISIT (OUTPATIENT)
Dept: PRIMARY CARE CLINIC | Age: 75
End: 2020-09-11
Payer: MEDICARE

## 2020-09-11 VITALS
TEMPERATURE: 97.5 F | HEART RATE: 97 BPM | OXYGEN SATURATION: 97 % | SYSTOLIC BLOOD PRESSURE: 124 MMHG | HEIGHT: 61 IN | BODY MASS INDEX: 42.1 KG/M2 | DIASTOLIC BLOOD PRESSURE: 72 MMHG | WEIGHT: 223 LBS

## 2020-09-11 PROCEDURE — G0439 PPPS, SUBSEQ VISIT: HCPCS | Performed by: PEDIATRICS

## 2020-09-11 PROCEDURE — 99214 OFFICE O/P EST MOD 30 MIN: CPT | Performed by: PEDIATRICS

## 2020-09-11 PROCEDURE — 96372 THER/PROPH/DIAG INJ SC/IM: CPT | Performed by: PEDIATRICS

## 2020-09-11 PROCEDURE — 20610 DRAIN/INJ JOINT/BURSA W/O US: CPT | Performed by: PEDIATRICS

## 2020-09-11 RX ORDER — TRIAMCINOLONE ACETONIDE 40 MG/ML
40 INJECTION, SUSPENSION INTRA-ARTICULAR; INTRAMUSCULAR ONCE
Status: COMPLETED | OUTPATIENT
Start: 2020-09-11 | End: 2020-09-11

## 2020-09-11 RX ORDER — METHYLPREDNISOLONE ACETATE 80 MG/ML
80 INJECTION, SUSPENSION INTRA-ARTICULAR; INTRALESIONAL; INTRAMUSCULAR; SOFT TISSUE ONCE
Status: COMPLETED | OUTPATIENT
Start: 2020-09-11 | End: 2020-09-11

## 2020-09-11 RX ORDER — BUTALBITAL, ACETAMINOPHEN AND CAFFEINE 50; 325; 40 MG/1; MG/1; MG/1
1 TABLET ORAL EVERY 4 HOURS PRN
Qty: 180 TABLET | Refills: 3 | Status: SHIPPED | OUTPATIENT
Start: 2020-09-11

## 2020-09-11 RX ORDER — CYANOCOBALAMIN 1000 UG/ML
1000 INJECTION INTRAMUSCULAR; SUBCUTANEOUS ONCE
Status: COMPLETED | OUTPATIENT
Start: 2020-09-11 | End: 2020-09-11

## 2020-09-11 RX ADMIN — CYANOCOBALAMIN 1000 MCG: 1000 INJECTION INTRAMUSCULAR; SUBCUTANEOUS at 11:03

## 2020-09-11 RX ADMIN — TRIAMCINOLONE ACETONIDE 40 MG: 40 INJECTION, SUSPENSION INTRA-ARTICULAR; INTRAMUSCULAR at 14:49

## 2020-09-11 RX ADMIN — METHYLPREDNISOLONE ACETATE 80 MG: 80 INJECTION, SUSPENSION INTRA-ARTICULAR; INTRALESIONAL; INTRAMUSCULAR; SOFT TISSUE at 14:49

## 2020-09-11 ASSESSMENT — PATIENT HEALTH QUESTIONNAIRE - PHQ9
2. FEELING DOWN, DEPRESSED OR HOPELESS: 0
SUM OF ALL RESPONSES TO PHQ QUESTIONS 1-9: 0
SUM OF ALL RESPONSES TO PHQ9 QUESTIONS 1 & 2: 0
SUM OF ALL RESPONSES TO PHQ QUESTIONS 1-9: 0
1. LITTLE INTEREST OR PLEASURE IN DOING THINGS: 0

## 2020-09-11 ASSESSMENT — ENCOUNTER SYMPTOMS
EYE PAIN: 0
ABDOMINAL PAIN: 0
WHEEZING: 0
BACK PAIN: 1
DIARRHEA: 0
VOMITING: 0
NAUSEA: 0
SORE THROAT: 0
SHORTNESS OF BREATH: 0
SINUS PRESSURE: 0
COUGH: 0

## 2020-09-11 ASSESSMENT — LIFESTYLE VARIABLES: HOW OFTEN DO YOU HAVE A DRINK CONTAINING ALCOHOL: 0

## 2020-09-11 NOTE — PROGRESS NOTES
[address] [facility], [address]  Phone [phone]   Fax [phone]      OFFICE VISIT: 2020    Ameya Flood-: 1945      HPI  Reason For Visit:  Miguelangel Hansen is a 70 y.o. Medicare AWV; Migraine (migraines getting worse over the past few weeks, last week she states she had 3. would like to discuss treatment); Other (needing b12 injection today and samples of ozempic if possible); and Health Maintenance (shingles- [facility])    Presents for routine annual wellness evaluation. She also presents with multiple other health issues. Present concerns:  She is wanting a B12 injection today. Migraine headaches: These have been worse over the past couple of weeks. She had 3 of them in one day a couple of weeks. She had not had a headache in about a week now. She has a history of migraines since she was in high school. She is worried about her having a headache prior to her stroke in the past.  Present medication regimen:   Excedrine Migraine prn. We will try some samples of ubrelvy and   I will also give her a rx for fioricet. Diabetes Mellitus Type 2  Diet compliance:  compliant most of the time  Nutrition Consultation Needed:  no  Medication:              Victoza 1.2 mg subcutaneous daily    She will also take ozempic if they are available.              Metformin 1000 mg twice daily with meals      Medication compliance:  compliant most of the time  Weight trend: increasing.  Weight is down 10 pounds from 2 months ago  This is back to her baseline weight  Current exercise: yes - walking  Checkin times daily  Home blood sugar records: fasting range: Low 1 teens  Blood sugar was 121 this morning, fasting  Low BG:  no  Eye exam current (within one year): yes  Checking Feet regularly:  yes - no sores  ACE/ARB:  yes - lisinopril  Aspirin: Yes  Tobacco history: She  reports that she has never smoked.  She has never used smokeless tobacco.    Lab Results   Component Value Date    LABA1C 6.4 (H) 03/12/2020    LABA1C 6.3 (H) 08/27/2019    LABA1C 6.6 (H) 12/03/2018     Lab Results   Component Value Date    LABMICR 5.40 03/12/2020    CREATININE 0.8 03/12/2020       Hypertension:   BP today was   BP Readings from Last 1 Encounters:   09/11/20 124/72      Recent BP readings:    BP Readings from Last 3 Encounters:   09/11/20 124/72   07/06/20 138/86   03/12/20 128/72     Medication              Amlodipine 5 mg daily              Lisinopril hydrochlorthiazide  20-25 mg daily.       Medication compliance:  compliant most of the time  Home blood pressure monitoring: Yes -well-controlled. She Is somewhat adherent to a low sodium diet. Symptoms: None  Laboratory:  Lab Results   Component Value Date    BUN 17 03/12/2020    CREATININE 0.8 03/12/2020       Hyperlipidemia:   Medication:   atorvastatin (Lipitor) and OTC Fish Oil  Low Fat, Low Choleterol Diet:  yes -to some degree  Myalgias or GI upset: no  The patient exercises intermittently. Laboratory:    Lab Results   Component Value Date    CHOL 134 (L) 08/27/2019    TRIG 170 (H) 08/27/2019    HDL 57 (L) 08/27/2019    LDLCALC 43 08/27/2019    LDLDIRECT 85 (L) 08/19/2015      Lab Results   Component Value Date    ALT 13 03/12/2020    AST 17 03/12/2020       Osteoarthritis:  Medication:              Meloxicam 15 mg daily (this is helpful)              She also takes tylenol for this prn. Symptoms: she tolerates.    She would like a shot in her L knee today       Hyperuricemia  Medication:              Allopurinol 300 mg daily  Symptoms: none           height is 5' 1\" (1.549 m) and weight is 223 lb (101.2 kg). Her temporal temperature is 97.5 °F (36.4 °C). Her blood pressure is 124/72 and her pulse is 97. Her oxygen saturation is 97%. Body mass index is 42.14 kg/m². I have reviewed the following with the Ms. Flood   Lab Review  Orders Only on 03/12/2020   Component Date Value    Sodium 03/12/2020 141     Potassium 03/12/2020 4.8     Chloride 03/12/2020 106     CO2 03/12/2020 20*    Anion Gap 03/12/2020 15     Glucose 03/12/2020 123*    BUN 03/12/2020 17     CREATININE 03/12/2020 0.8     GFR Non- 03/12/2020 >60     Calcium 03/12/2020 10.5*    Total Protein 03/12/2020 7.1     Alb 03/12/2020 4.8     Total Bilirubin 03/12/2020 0.3     Alkaline Phosphatase 03/12/2020 66     ALT 03/12/2020 13     AST 03/12/2020 17     Hemoglobin A1C 03/12/2020 6.4*    WBC 03/12/2020 11.3*    RBC 03/12/2020 4.01*    Hemoglobin 03/12/2020 11.7*    Hematocrit 03/12/2020 38.4     MCV 03/12/2020 95.8     MCH 03/12/2020 29.2     MCHC 03/12/2020 30.5*    RDW 03/12/2020 13.9     Platelets 87/48/9228 180     MPV 03/12/2020 13.8*    PLATELET SLIDE REVIEW 03/12/2020 Adequate     Neutrophils % 03/12/2020 57.0     Lymphocytes % 03/12/2020 27.0     Monocytes % 03/12/2020 13.0*    Eosinophils % 03/12/2020 3.0     Basophils % 03/12/2020 0.0     Neutrophils Absolute 03/12/2020 6.4     Immature Granulocytes # 03/12/2020 0.1     Lymphocytes Absolute 03/12/2020 3.1     Monocytes Absolute 03/12/2020 1.50*    Eosinophils Absolute 03/12/2020 0.34     Basophils Absolute 03/12/2020 0.00     Hypochromia 03/12/2020 1+*    Microalbumin, Random Uri* 03/12/2020 5.40     Creatinine, Ur 03/12/2020 128.3     Microalbumin Creatinine * 03/12/2020 42.1     Uric Acid, Serum 03/12/2020 4.7      Copies of these are in the chart. Current Outpatient Medications   Medication Sig Dispense Refill    butalbital-acetaminophen-caffeine (FIORICET, ESGIC) -40 MG per tablet Take 1 tablet by mouth every 4 hours as needed for Headaches 180 tablet 3    gabapentin (NEURONTIN) 600 MG tablet Take 1 tablet by mouth 2 times daily for 180 doses. (Patient taking differently: Take 600 mg by mouth 2 times daily. 1/2 tablet in am and 1/2 tablet in pm) 180 tablet 1    triamcinolone (KENALOG) 0.1 % cream Apply topically 2 times daily.  80 g 5  meloxicam (MOBIC) 15 MG tablet TAKE ONE TABLET BY MOUTH DAILY 30 tablet 5    amLODIPine (NORVASC) 5 MG tablet Take 1 tablet by mouth daily Indications: High Blood Pressure Disorder 90 tablet 3    lisinopril-hydrochlorothiazide (PRINZIDE;ZESTORETIC) 20-12.5 MG per tablet Take 1 tablet by mouth daily 90 tablet 3    Semaglutide,0.25 or 0.5MG/DOS, 2 MG/1.5ML SOPN Inject 0.5 mg into the skin once a week 1.5 mL 5    allopurinol (ZYLOPRIM) 300 MG tablet Take 1 tablet by mouth daily 30 tablet 11    atorvastatin (LIPITOR) 20 MG tablet TAKE ONE TABLET BY MOUTH NIGHTLY 90 tablet 3    metFORMIN (GLUCOPHAGE) 1000 MG tablet TAKE ONE TABLET BY MOUTH TWICE DAILY WITH MEALS 180 tablet 3    Omega-3 Fatty Acids (FISH OIL) 1000 MG CAPS Take 1,000 mg by mouth daily      furosemide (LASIX) 20 MG tablet Take 20 mg by mouth daily as needed Only taking PRN      Blood Glucose Monitoring Suppl JOSE Cap glucose daily and prn 1 Device 0    Glucose Blood (BLOOD GLUCOSE TEST STRIPS) STRP Cap glucose daily and prn 100 strip 3    Insulin Pen Needle (H-E-B INCONTROL PEN NEEDLES) 32G X 4 MM MISC 1 each by Does not apply route daily 100 each 3    Calcium-Vitamin D (CALTRATE 600 PLUS-VIT D PO) Take 1 tablet by mouth 2 times daily      aspirin 81 MG tablet Take 81 mg by mouth daily       Multiple Vitamins-Minerals (MULTI COMPLETE PO) Take 1 tablet by mouth daily. No current facility-administered medications for this visit. Allergies: Patient has no known allergies.      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    Type II or unspecified type diabetes mellitus without mention of complication, not stated as uncontrolled        Family History   Problem Relation Age of Onset    Colon Cancer Mother     Colon Polyps Neg Hx     Esophageal Cancer Neg Hx     Liver Disease Neg Hx     Liver Cancer Neg Hx Right Ear: Hearing, tympanic membrane, ear canal and external ear normal.      Left Ear: Hearing, ear canal and external ear normal.      Mouth/Throat:      Mouth: Mucous membranes are moist.      Pharynx: Oropharynx is clear. Eyes:      General: Lids are normal.      Conjunctiva/sclera: Conjunctivae normal.      Pupils: Pupils are equal, round, and reactive to light. Neck:      Musculoskeletal: Normal range of motion and neck supple. Thyroid: No thyromegaly. Vascular: No carotid bruit or JVD. Trachea: Phonation normal.   Cardiovascular:      Rate and Rhythm: Normal rate and regular rhythm. No extrasystoles are present. Chest Wall: PMI is not displaced. Pulses: Normal pulses. Heart sounds: Normal heart sounds. No murmur. No friction rub. No gallop. Pulmonary:      Effort: Pulmonary effort is normal. No respiratory distress. Breath sounds: Normal breath sounds. No wheezing, rhonchi or rales. Abdominal:      General: Bowel sounds are normal. There is no distension. Palpations: Abdomen is soft. There is no mass. Tenderness: There is no abdominal tenderness. Genitourinary:     Comments: Examination deferred  Musculoskeletal: Normal range of motion. General: Tenderness (over lumbar spine and at R SI joint.) present. Comments: Joint examination reveals no acute arthritis or synovitis. Lymphadenopathy:      Cervical: No cervical adenopathy. Skin:     General: Skin is warm and dry. Capillary Refill: Capillary refill takes less than 2 seconds. Findings: No rash. Neurological:      General: No focal deficit present. Mental Status: She is alert and oriented to person, place, and time. Cranial Nerves: No cranial nerve deficit (by gross examination). Sensory: No sensory deficit. Motor: No tremor or atrophy.       Gait: Gait normal.      Comments: No focal deficits appreciated   Psychiatric:         Mood and Affect: Mood normal.         Speech: Speech normal.         Behavior: Behavior normal. Behavior is cooperative. ASSESSMENT      ICD-10-CM    1. Type 2 diabetes mellitus without complication, without long-term current use of insulin ([facility])  E11.9 Comprehensive Metabolic Panel     FREE T4     Microalbumin / Creatinine Urine Ratio     TSH without Reflex     Hemoglobin A1C   2. Essential hypertension  I10 CBC Auto Differential     Comprehensive Metabolic Panel   3. Mixed hyperlipidemia  E78.2 Lipid Panel   4. B12 deficiency  E53.8 cyanocobalamin injection 1,000 mcg     Vitamin B12   5. Hyperuricemia  E79.0 Uric Acid   6. Cerebral infarction due to thrombosis of middle cerebral artery, unspecified blood vessel laterality ([facility] [address]  I63.319    7. Routine general medical examination at a health care facility  Z00.00    8. Fatigue, unspecified type  R53.83 FREE T4     TSH without Reflex   9. Migraine with aura and without status migrainosus, not intractable  G43.109 butalbital-acetaminophen-caffeine (FIORICET, ESGIC) -40 MG per tablet   10. Primary osteoarthritis of left knee  M17.12 triamcinolone acetonide (KENALOG-40) injection 40 mg     methylPREDNISolone acetate (DEPO-MEDROL) injection 80 mg     MN DRAIN/INJECT LARGE JOINT/BURSA         PLAN    1. Type 2 diabetes mellitus without complication, without long-term current use of insulin ([facility]  Doing very well on present medication regimen. We did give her a sample of Ozempic today. Continue the same. Recheck hemoglobin A1c at next blood draw  - Comprehensive Metabolic Panel; Future  - FREE T4; Future  - Microalbumin / Creatinine Urine Ratio; Future  - TSH without Reflex; Future  - Hemoglobin A1C; Future    2. Essential hypertension  Blood pressure is excellently controlled. Continue the same medication regimen.    - CBC Auto Differential; Future  - Comprehensive Metabolic Panel; Future    3.  Mixed hyperlipidemia  Recheck lipids to ensure stability on present medication regimen  - Lipid Panel; Future    4. B12 deficiency  B12 shot was delivered today. We will also check B12 levels in the near future  - cyanocobalamin injection 1,000 mcg  - Vitamin B12; Future    5. Hyperuricemia  Recheck uric acid level to ensure that she is not at risk for gout  - Uric Acid; Future    6. Cerebral infarction due to thrombosis of middle cerebral artery, unspecified blood vessel laterality (HCC)  On appropriate prophylactic therapy and controlling risk factors very well. 7. Routine general medical examination at a health care facility  Routine age-appropriate anticipatory guidance was provided. Annual wellness visit was performed in a separate note and issues were addressed as identified. 8. Fatigue, unspecified type  Recheck thyroid levels  - FREE T4; Future  - TSH without Reflex; Future    9. Migraine with aura and without status migrainosus, not intractable  Samples of migraine medication including Ubrelvy and Nurtec      Orders Placed This Encounter   Procedures    CBC Auto Differential    Comprehensive Metabolic Panel    FREE T4    Lipid Panel    Microalbumin / Creatinine Urine Ratio    TSH without Reflex    Hemoglobin A1C    Uric Acid    Vitamin B12    WA DRAIN/INJECT LARGE JOINT/BURSA        Return in 3 months (on 12/11/2020) for Medicare Annual Wellness Visit in 1 year, 30. This Was an in-house visit        Knee Arthrocentesis with Injection Procedure Note    Pre-operative Diagnosis: left knee djd with pain    Post-operative Diagnosis: same    Indications: Symptom relief from osteoarthritis    Anesthesia: not required     Procedure Details     Verbal consent was obtained for the procedure. The joint was prepped with chlorhexadine. The area was sprayed with Gebauer's Ethyl Chloride as a topical anesthetic and then a 22 gauge needle was inserted into the superior aspect of the joint from a lateral approach.   2 ml 1% lidocaine and 2 ml of DepoMedrol (40mg/ml) and 1ml 1/22/2019    INCISIONAL HERNIA REPAIR WITH BIOLOGIC MESH performed by Reji De La Garza MD at [address] [place] [address]         Family History   Problem Relation Age of Onset    Colon Cancer Mother     Colon Polyps Neg Hx     Esophageal Cancer Neg Hx     Liver Disease Neg Hx     Liver Cancer Neg Hx     Rectal Cancer Neg Hx     Stomach Cancer Neg Hx        CareTeam (Including outside providers/suppliers regularly involved in providing care):   Patient Care Team:  Lary Isbell DO as PCP - General   Lb Jacobs DO as PCP - [facility] Empaneled Provider    Wt Readings from Last 3 Encounters:   09/11/20 223 lb (101.2 kg)   07/06/20 233 lb 8 oz (105.9 kg)   03/12/20 217 lb 4 oz (98.5 kg)     Vitals:    09/11/20 1010   BP: 124/72   Site: Left Upper Arm   Position: Sitting   Cuff Size: Large Adult   Pulse: 97   Temp: 97.5 °F (36.4 °C)   TempSrc: Temporal   SpO2: 97%   Weight: 223 lb (101.2 kg)   Height: 5' 1\" (1.549 m)     Body mass index is 42.14 kg/m². Based upon direct observation of the patient, evaluation of cognition reveals recent and remote memory intact. Physical exam is documented elsewhere in a separate note    Patient's complete Health Risk Assessment and screening values have been reviewed and are found in Flowsheets. The following problems were reviewed today and where indicated follow up appointments were made and/or referrals ordered. Positive Risk Factor Screenings with Interventions:     General Health:  General  In general, how would you say your health is?: Very Good  In the past 7 days, have you experienced any of the following?  New or Increased Pain, New or Increased Fatigue, Loneliness, Social Isolation, Stress or Anger?: (!) New or Increased Pain(knee pain a little worse the past weeks)  Do you get the social and emotional support that you need?: Yes  Do you have a Living Will?: (!) No  General Health Risk Interventions:  · No Living Will: additional vaccine  Aged Out    Hib vaccine  Aged Out    Meningococcal (ACWY) vaccine  Aged Out     Recommendations for Flashnotes Due: see orders and patient instructions/AVS.  . Recommended screening schedule for the next 5-10 years is provided to the patient in written form: see Patient Eugenio Castillo was seen today for medicare awv, migraine, other and health maintenance. Diagnoses and all orders for this visit:    Type 2 diabetes mellitus without complication, without long-term current use of insulin (HCC)  -     Comprehensive Metabolic Panel; Future  -     FREE T4; Future  -     Microalbumin / Creatinine Urine Ratio; Future  -     TSH without Reflex; Future  -     Hemoglobin A1C; Future    Essential hypertension  -     CBC Auto Differential; Future  -     Comprehensive Metabolic Panel; Future    Mixed hyperlipidemia  -     Lipid Panel; Future    B12 deficiency  -     cyanocobalamin injection 1,000 mcg  -     Vitamin B12; Future    Hyperuricemia  -     Uric Acid; Future    Cerebral infarction due to thrombosis of middle cerebral artery, unspecified blood vessel laterality (HCC)    Routine general medical examination at a health care facility    Fatigue, unspecified type  -     FREE T4; Future  -     TSH without Reflex; Future    Migraine with aura and without status migrainosus, not intractable  -     butalbital-acetaminophen-caffeine (FIORICET, ESGIC) -40 MG per tablet;  Take 1 tablet by mouth every 4 hours as needed for Headaches    Primary osteoarthritis of left knee  -     triamcinolone acetonide (KENALOG-40) injection 40 mg  -     methylPREDNISolone acetate (DEPO-MEDROL) injection 80 mg  -     MI DRAIN/INJECT LARGE JOINT/BURSA          This was an in-house visit

## 2020-09-11 NOTE — PATIENT INSTRUCTIONS
Personalized Preventive Plan for Kathy Carmona - 9/11/2020  Medicare offers a range of preventive health benefits. Some of the tests and screenings are paid in full while other may be subject to a deductible, co-insurance, and/or copay. Some of these benefits include a comprehensive review of your medical history including lifestyle, illnesses that may run in your family, and various assessments and screenings as appropriate. After reviewing your medical record and screening and assessments performed today your provider may have ordered immunizations, labs, imaging, and/or referrals for you. A list of these orders (if applicable) as well as your Preventive Care list are included within your After Visit Summary for your review. Other Preventive Recommendations:    · A preventive eye exam performed by an eye specialist is recommended every 1-2 years to screen for glaucoma; cataracts, macular degeneration, and other eye disorders. · A preventive dental visit is recommended every 6 months. · Try to get at least 150 minutes of exercise per week or 10,000 steps per day on a pedometer . · Order or download the FREE \"Exercise & Physical Activity: Your Everyday Guide\" from [employer]. Call 4-912.260.4420 or search [employer] online. · You need 3587-1397 mg of calcium and 1873-6428 IU of vitamin D per day. It is possible to meet your calcium requirement with diet alone, but a vitamin D supplement is usually necessary to meet this goal.  · When exposed to the sun, use a sunscreen that protects against both UVA and UVB radiation with an SPF of 30 or greater. Reapply every 2 to 3 hours or after sweating, drying off with a towel, or swimming. · Always wear a seat belt when traveling in a car. Always wear a helmet when riding a bicycle or motorcycle.   Patient Education        Well Visit, Over 72: Care Instructions  Your Care Instructions     Physical exams can help you stay healthy. Your doctor has checked your overall health and may have suggested ways to take good care of yourself. He or she also may have recommended tests. At home, you can help prevent illness with healthy eating, regular exercise, and other steps. Follow-up care is a key part of your treatment and safety. Be sure to make and go to all appointments, and call your doctor if you are having problems. It's also a good idea to know your test results and keep a list of the medicines you take. How can you care for yourself at home? Reach and stay at a healthy weight. This will lower your risk for many problems, such as obesity, diabetes, heart disease, and high blood pressure. Get at least 30 minutes of exercise on most days of the week. Walking is a good choice. You also may want to do other activities, such as running, swimming, cycling, or playing tennis or team sports. Do not smoke. Smoking can make health problems worse. If you need help quitting, talk to your doctor about stop-smoking programs and medicines. These can increase your chances of quitting for good. Protect your skin from too much sun. When you're outdoors from 10 a.m. to 4 p.m., stay in the shade or cover up with clothing and a hat with a wide brim. Wear sunglasses that block UV rays. Even when it's cloudy, put broad-spectrum sunscreen (SPF 30 or higher) on any exposed skin. See a dentist one or two times a year for checkups and to have your teeth cleaned. Wear a seat belt in the car. Follow your doctor's advice about when to have certain tests. These tests can spot problems early. For men and women  Cholesterol. Your doctor will tell you how often to have this done based on your overall health and other things that can increase your risk for heart attack and stroke. Blood pressure. Have your blood pressure checked during a routine doctor visit.  Your doctor will tell you how often to check your blood pressure based on your age, your blood pressure results, and other factors. Diabetes. Ask your doctor whether you should have tests for diabetes. Vision. Experts recommend that you have yearly exams for glaucoma and other age-related eye problems. Hearing. Tell your doctor if you notice any change in your hearing. You can have tests to find out how well you hear. Colon cancer tests. Keep having colon cancer tests as your doctor recommends. You can have one of several types of tests. Heart attack and stroke risk. At least every 4 to 6 years, you should have your risk for heart attack and stroke assessed. Your doctor uses factors such as your age, blood pressure, cholesterol, and whether you smoke or have diabetes to show what your risk for a heart attack or stroke is over the next 10 years. Osteoporosis. Talk to your doctor about whether you should have a bone density test to find out whether you have thinning bones. Ask your doctor if you need to take a calcium plus vitamin D supplement. You may be able to get enough calcium and vitamin D through your diet. For women  Pap test and pelvic exam. You may no longer need a Pap test. Talk with your doctor about whether to stop or continue to have Pap tests. Breast exam and mammogram. Ask how often you should have a mammogram, which is an X-ray of your breasts. A mammogram can spot breast cancer before it can be felt and when it is easiest to treat. Thyroid disease. Talk to your doctor about whether to have your thyroid checked as part of a regular physical exam. Women have an increased chance of a thyroid problem. For men  Prostate exam. Talk to your doctor about whether you should have a blood test (called a PSA test) for prostate cancer. Experts recommend that you discuss the benefits and risks of the test with your doctor before you decide whether to have this test. Some experts say that men ages 79 and older no longer need testing. Abdominal aortic aneurysm.  Ask your doctor whether you should have a test to check for an aneurysm. You may need a test if you ever smoked or if your parent, brother, sister, or child has had an aneurysm. When should you call for help? Watch closely for changes in your health, and be sure to contact your doctor if you have any problems or symptoms that concern you. Where can you learn more? Go to https://chpepiceweb.ConnXus. org and sign in to your [employer] account. Enter V731 in the [employer] box to learn more about \"Well Visit, Over 65: Care Instructions. \"     If you do not have an account, please click on the \"Sign Up Now\" link. Current as of: August 22, 2019               Content Version: 12.5  © 8996-4824 Healthwise, Incorporated. Care instructions adapted under license by [facility] [place] ([facility]). If you have questions about a medical condition or this instruction, always ask your healthcare professional. [place] [address] any warranty or liability for your use of this information. Patient Education        Learning About Living [place] Slav[place]  What is a living will? A living will, also called a declaration, is a legal form. It tells your family and your doctor your wishes when you can't speak for yourself. It's used by the health professionals who will treat you as you near the end of your life or if you get seriously hurt or ill. If you put your wishes in writing, your loved ones and others will know what kind of care you want. They won't need to guess. This can ease your mind and be helpful to others. And you can change or cancel your living will at any time. A living will is not the same as an estate or property will. An estate will explains what you want to happen with your money and property after you die. How do you use it? A living will is used to describe the kinds of treatment or life support you want as you near the end of your life or if you get seriously hurt or ill. Keep these facts in mind about living romero.   Your living will is used only if you can't speak or make decisions for yourself. Most often, one or more doctors must certify that you can't speak or decide for yourself before your living will takes effect. If you get better and can speak for yourself again, you can accept or refuse any treatment. It doesn't matter what you said in your living will. Some states may limit your right to refuse treatment in certain cases. For example, you may need to clearly state in your living will that you don't want artificial hydration and nutrition, such as being fed through a tube. Is a living will a legal document? A living will is a legal document. Each state has its own laws about living romero. And a living will may be called something else in your state. Here are some things to know about living romero. You don't need an  to complete a living will. But legal advice can be helpful if your state's laws are unclear. It can also help if your health history is complicated or your family can't agree on what should be in your living will. You can change your living will at any time. Some people find that their wishes about end-of-life care change as their health changes. If you make big changes to your living will, complete a new form. If you move to another state, make sure that your living will is legal in the state where you now live. In most cases, doctors will respect your wishes even if you have a form from a different state. You might use a universal form that has been approved by many states. This kind of form can sometimes be filled out and stored online. Your digital copy will then be available wherever you have a connection to the internet. The doctors and nurses who need to treat you can find it right away. Your state may offer an online registry. This is another place where you can store your living will online. It's a good idea to get your living will notarized.  This means using a person called a notary public to watch two people sign, or witness, your living will. What should you know when you create a living will? Here are some questions to ask yourself as you make your living will:  Do you know enough about life support methods that might be used? If not, talk to your doctor so you know what might be done if you can't breathe on your own, your heart stops, or you can't swallow. What things would you still want to be able to do after you receive life-support methods? Would you want to be able to walk? To speak? To eat on your own? To live without the help of machines? Do you want certain Church practices performed if you become very ill? If you have a choice, where do you want to be cared for? In your home? At a hospital or nursing home? If you have a choice at the end of your life, where would you prefer to die? At home? In a hospital or nursing home? Somewhere else? Would you prefer to be buried or cremated? Do you want your organs to be donated after you die? What should you do with your living will? Make sure that your family members and your health care agent have copies of your living will (also called a declaration). Give your doctor a copy of your living will. Ask him or her to keep it as part of your medical record. If you have more than one doctor, make sure that each one has a copy. Put a copy of your living will where it can be easily found. For example, some people may put a copy on their refrigerator door. If you are using a digital copy, be sure your doctor, family members, and health care agent know how to find and access it. Where can you learn more? Go to https://chlance.ClearSlide. org and sign in to your [employer] account. Enter O498 in the [employer] box to learn more about \"Learning About Living [place]. \"     If you do not have an account, please click on the \"Sign Up Now\" link.   Current as of: December 9, 2019               Content Version: 12.5  © 8588-2492 Healthwise, Incorporated. Care instructions adapted under license by [facility] ([facility]). If you have questions about a medical condition or this instruction, always ask your healthcare professional. [address] any warranty or liability for your use of this information. Patient Education        Advance Directives: Care Instructions  Overview  An advance directive is a legal way to state your wishes at the end of your life. It tells your family and your doctor what to do if you can't say what you want. There are two main types of advance directives. You can change them any time your wishes change. Living will. This form tells your family and your doctor your wishes about life support and other treatment. The form is also called a declaration. Medical power of . This form lets you name a person to make treatment decisions for you when you can't speak for yourself. This person is called a health care agent (health care proxy, health care surrogate). The form is also called a durable power of  for health care. If you do not have an advance directive, decisions about your medical care may be made by a family member, or by a doctor or a  who doesn't know you. It may help to think of an advance directive as a gift to the people who care for you. If you have one, they won't have to make tough decisions by themselves. Follow-up care is a key part of your treatment and safety. Be sure to make and go to all appointments, and call your doctor if you are having problems. It's also a good idea to know your test results and keep a list of the medicines you take. What should you include in an advance directive? Many states have a unique advance directive form. (It may ask you to address specific issues.) Or you might use a universal form that's approved by many states.   If your form doesn't tell you what to address, it may be hard to know what to include in your advance directive. Use the questions below to help you get started. Who do you want to make decisions about your medical care if you are not able to? What life-support measures do you want if you have a serious illness that gets worse over time or can't be cured? What are you most afraid of that might happen? (Maybe you're afraid of having pain, losing your independence, or being kept alive by machines.)  Where would you prefer to die? (Your home? A hospital? A nursing home?)  Do you want to donate your organs when you die? Do you want certain Sikh practices performed before you die? When should you call for help? Be sure to contact your doctor if you have any questions. Where can you learn more? Go to https://Berkley Networks.[employer]land [email or web address] org and sign in to your [employer] account. Enter R264 in the [employer] box to learn more about \"Advance Directives: Care Instructions. \"     If you do not have an account, please click on the \"Sign Up Now\" link. Current as of: December 9, 2019               Content Version: 12.5  © 2819-0057 [employer]. Care instructions adapted under license by [facility] [address] If you have questions about a medical condition or this instruction, always ask your healthcare professional. [address] any warranty or liability for your use of this information. Patient Education        Eating Healthy Foods: Care Instructions  Your Care Instructions     Eating healthy foods can help lower your risk for disease. Healthy food gives you energy and keeps your heart strong, your brain active, your muscles working, and your bones strong. A healthy diet includes a variety of foods from the basic food groups: grains, vegetables, fruits, milk and milk products, and meat and beans. Some people may eat more of their favorite foods from only one food group and, as a result, miss getting the nutrients they need.  So, it is important to pay attention not only to what you eat but also to what you are missing from your diet. You can eat a healthy, balanced diet by making a few small changes. Follow-up care is a key part of your treatment and safety. Be sure to make and go to all appointments, and call your doctor if you are having problems. It's also a good idea to know your test results and keep a list of the medicines you take. How can you care for yourself at home? Look at what you eat  Keep a food diary for a week or two and record everything you eat or drink. Track the number of servings you eat from each food group. For a balanced diet every day, eat a variety of:  6 or more ounce-equivalents of grains, such as cereals, breads, crackers, rice, or pasta, every day. An ounce-equivalent is 1 slice of bread, 1 cup of ready-to-eat cereal, or ½ cup of cooked rice, cooked pasta, or cooked cereal.  2½ cups of vegetables, especially:  Dark-green vegetables such as broccoli and spinach. Orange vegetables such as carrots and sweet potatoes. Dry beans (such as scott and kidney beans) and peas (such as lentils). 2 cups of fresh, frozen, or canned fruit. A small apple or 1 banana or orange equals 1 cup.  3 cups of nonfat or low-fat milk, yogurt, or other milk products. 5½ ounces of meat and beans, such as chicken, fish, lean meat, beans, nuts, and seeds. One egg, 1 tablespoon of peanut butter, ½ ounce nuts or seeds, or ¼ cup of cooked beans equals 1 ounce of meat. Learn how to read food labels for serving sizes and ingredients. Fast-food and convenience-food meals often contain few or no fruits or vegetables. Make sure you eat some fruits and vegetables to make the meal more nutritious. Look at your food diary. For each food group, add up what you have eaten and then divide the total by the number of days. This will give you an idea of how much you are eating from each food group.  See if you can find some ways to change your diet to make it more https://chpepiceweb.Idea.[email or web address] org and sign in to your [employer] account. Enter E262 in the [facility] box to learn more about \"Eating Healthy Foods: Care Instructions. \"     If you do not have an account, please click on the \"Sign Up Now\" link. Current as of: August 22, 2019               Content Version: 12.5  © 2246-9468 [employer]. Care instructions adapted under license by [facility] ([facility]). If you have questions about a medical condition or this instruction, always ask your healthcare professional. [place] [address] any warranty or liability for your use of this information. Patient Education        Learning About Dental Care for Older Adults  Dental care for older adults: Overview  Dental care for older people is much the same as for younger adults. But older adults do have concerns that younger adults do not. Older adults may have problems with gum disease and decay on the roots of their teeth. They may need missing teeth replaced or broken fillings fixed. Or they may have dentures that need to be cared for. Some older adults may have trouble holding a toothbrush. You can help remind the person you are caring for to brush and floss their teeth or to clean their dentures. In some cases, you may need to do the brushing and other dental care tasks. People who have trouble using their hands or who have dementia may need this extra help. How can you help with dental care? Normal dental care  To keep the teeth and gums healthy:  Brush the teeth with fluoride toothpaste twice a day--in the morning and at night--and floss at least once a day. Plaque can quickly build up on the teeth of older adults. Watch for the signs of gum disease. These signs include gums that bleed after brushing or after eating hard foods, such as apples. See a dentist regularly. Many experts recommend checkups every 6 months.   Keep the dentist up to date on any new medications the person is taking. Encourage a balanced diet that includes whole grains, vegetables, and fruits, and that is low in saturated fat and sodium. Encourage the person you're caring for not to use tobacco products. They can affect dental and general health. Many older adults have a fixed income and feel that they can't afford dental care. But most towns and cities have programs in which dentists help older adults by lowering fees. Contact your area's public health offices or  for information about dental care in your area. Using a toothbrush  Older adults with arthritis sometimes have trouble brushing their teeth because they can't easily hold the toothbrush. Their hands and fingers may be stiff, painful, or weak. If this is the case, you can: Offer an electric toothbrush. Enlarge the handle of a non-electric toothbrush by wrapping a sponge, an elastic bandage, or adhesive tape around it. Push the toothbrush handle through a ball made of rubber or soft foam.  Make the handle longer and thicker by taping Popsicle sticks or tongue depressors to it. You may also be able to buy special toothbrushes, toothpaste dispensers, and floss holders. Your doctor may recommend a soft-bristle toothbrush if the person you care for bleeds easily. Bleeding can happen because of a health problem or from certain medicines. A toothpaste for sensitive teeth may help if the person you care for has sensitive teeth. How do you brush and floss someone's teeth? If the person you are caring for has a hard time cleaning their teeth on their own, you may need to brush and floss their teeth for them. It may be easiest to have the person sit and face away from you, and to sit or stand behind them. That way you can steady their head against your arm as you reach around to floss and brush their teeth. Choose a place that has good lighting and is comfortable for both of you. Before you begin, gather your supplies.  You will need gloves, floss, a toothbrush, and a container to hold water if you are not near a sink. Wash and dry your hands well and put on gloves. Start by flossing:  Gently work a piece of floss between each of the teeth toward the gums. A plastic flossing tool may make this easier, and they are available at most [facility]. Curve the floss around each tooth into a U-shape and gently slide it under the gum line. Move the floss firmly up and down several times to scrape off the plaque. After you've finished flossing, throw away the used floss and begin brushing:  Wet the brush and apply toothpaste. Place the brush at a 45-degree angle where the teeth meet the gums. Press firmly, and move the brush in small circles over the surface of the teeth. Be careful not to brush too hard. Vigorous brushing can make the gums pull away from the teeth and can scratch the tooth enamel. Brush all surfaces of the teeth, on the tongue side and on the cheek side. Pay special attention to the front teeth and all surfaces of the back teeth. Brush chewing surfaces with short back-and-forth strokes. After you've finished, help the person rinse the remaining toothpaste from their mouth. Where can you learn more? Go to https://Beebrite.Joyride. org and sign in to your iAgree account. Enter C276 in the [facility] box to learn more about \"Learning About Dental Care for Older Adults. \"     If you do not have an account, please click on the \"Sign Up Now\" link. Current as of: December 9, 2019               Content Version: 12.5  © 8005-3477 Healthwise, Incorporated. Care instructions adapted under license by [facility] ([facility]). If you have questions about a medical condition or this instruction, always ask your healthcare professional. [place] [address] any warranty or liability for your use of this information. Patient Education        Learning About Dental Care  What is basic dental care?     Basic increased risk for health problems such as fatigue, lower protection (immunity) against illness, muscle loss, bone loss, hair loss, and hormone problems. BMI is just one measure of your risk for weight-related health problems. You may be at higher risk for health problems if you are not active, you eat an unhealthy diet, or you drink too much alcohol or use tobacco products. Follow-up care is a key part of your treatment and safety. Be sure to make and go to all appointments, and call your doctor if you are having problems. It's also a good idea to know your test results and keep a list of the medicines you take. How can you care for yourself at home? Practice healthy eating habits. This includes eating plenty of fruits, vegetables, whole grains, lean protein, and low-fat dairy. If your doctor recommends it, get more exercise. Walking is a good choice. Bit by bit, increase the amount you walk every day. Try for at least 30 minutes on most days of the week. Do not smoke. Smoking can increase your risk for health problems. If you need help quitting, talk to your doctor about stop-smoking programs and medicines. These can increase your chances of quitting for good. Limit alcohol to 2 drinks a day for men and 1 drink a day for women. Too much alcohol can cause health problems. If you have a BMI higher than 25  Your doctor may do other tests to check your risk for weight-related health problems. This may include measuring the distance around your waist. A waist measurement of more than 40 inches in men or 35 inches in women can increase the risk of weight-related health problems. Talk with your doctor about steps you can take to stay healthy or improve your health. You may need to make lifestyle changes to lose weight and stay healthy, such as changing your diet and getting regular exercise. If you have a BMI lower than 18.5  Your doctor may do other tests to check your risk for health problems.   Talk with your doctor about steps you can take to stay healthy or improve your health. You may need to make lifestyle changes to gain or maintain weight and stay healthy, such as getting more healthy foods in your diet and doing exercises to build muscle. Where can you learn more? Go to https://jeff.Helishopter. org and sign in to your Pawzii account. Enter S176 in the GameDuell box to learn more about \"Body Mass Index: Care Instructions. \"     If you do not have an account, please click on the \"Sign Up Now\" link. Current as of: December 11, 2019               Content Version: 12.5  © 0382-9062 Tastemaker. Care instructions adapted under license by [facility] [place] ([facility]). If you have questions about a medical condition or this instruction, always ask your healthcare professional. [address] any warranty or liability for your use of this information. Patient Education        Starting a Weight Loss Plan: Care Instructions  Your Care Instructions     If you are thinking about losing weight, it can be hard to know where to start. Your doctor can help you set up a weight loss plan that best meets your needs. You may want to take a class on nutrition or exercise, or join a weight loss support group. If you have questions about how to make changes to your eating or exercise habits, ask your doctor about seeing a registered dietitian or an exercise specialist.  It can be a big challenge to lose weight. But you do not have to make huge changes at once. Make small changes, and stick with them. When those changes become habit, add a few more changes. If you do not think you are ready to make changes right now, try to pick a date in the future. Make an appointment to see your doctor to discuss whether the time is right for you to start a plan. Follow-up care is a key part of your treatment and safety.  Be sure to make and go to all appointments, and call your doctor if you are having problems. It's also a good idea to know your test results and keep a list of the medicines you take. How can you care for yourself at home? Set realistic goals. Many people expect to lose much more weight than is likely. A weight loss of 5% to 10% of your body weight may be enough to improve your health. Get family and friends involved to provide support. Talk to them about why you are trying to lose weight, and ask them to help. They can help by participating in exercise and having meals with you, even if they may be eating something different. Find what works best for you. If you do not have time or do not like to cook, a program that offers meal replacement bars or shakes may be better for you. Or if you like to prepare meals, finding a plan that includes daily menus and recipes may be best.  Ask your doctor about other health professionals who can help you achieve your weight loss goals. A dietitian can help you make healthy changes in your diet. An exercise specialist or  can help you develop a safe and effective exercise program.  A counselor or psychiatrist can help you cope with issues such as depression, anxiety, or family problems that can make it hard to focus on weight loss. Consider joining a support group for people who are trying to lose weight. Your doctor can suggest groups in your area. Where can you learn more? Go to https://Staff Rankerlance.Relaborate. org and sign in to your LiquiGlide account. Enter O378 in the [employer] box to learn more about \"Starting a Weight Loss Plan: Care Instructions. \"     If you do not have an account, please click on the \"Sign Up Now\" link. Current as of: December 11, 2019               Content Version: 12.5  © 9265-5475 Healthwise, Incorporated. Care instructions adapted under license by [facility] ([facility]).  If you have questions about a medical condition or this instruction, always ask your healthcare professional. [employer] disclaims any warranty or liability for your use of this information.

## 2020-10-06 RX ORDER — MELOXICAM 15 MG/1
15 TABLET ORAL DAILY
Qty: 30 TABLET | Refills: 5 | Status: SHIPPED | OUTPATIENT
Start: 2020-10-06 | End: 2021-03-25

## 2020-10-08 ENCOUNTER — TELEPHONE (OUTPATIENT)
Dept: PRIMARY CARE CLINIC | Age: 75
End: 2020-10-08

## 2020-10-15 RX ORDER — ALLOPURINOL 300 MG/1
300 TABLET ORAL DAILY
Qty: 90 TABLET | Refills: 3 | Status: SHIPPED | OUTPATIENT
Start: 2020-10-15 | End: 2021-11-04

## 2020-10-15 NOTE — TELEPHONE ENCOUNTER
Received fax from pharmacy requesting refill on pts medication(s). Pt was last seen in office on 9/11/2020  and has a follow up scheduled for 12/11/2020. Will send request to  Dr. Jacqueline Peters  for patient.      Requested Prescriptions     Pending Prescriptions Disp Refills    allopurinol (ZYLOPRIM) 300 MG tablet [Pharmacy Med Name: allopurinol 300 mg tablet] 30 tablet 11     Sig: TAKE ONE TABLET BY MOUTH DAILY

## 2020-11-16 RX ORDER — ATORVASTATIN CALCIUM 20 MG/1
TABLET, FILM COATED ORAL
Qty: 90 TABLET | Refills: 3 | Status: SHIPPED | OUTPATIENT
Start: 2020-11-16 | End: 2021-10-07

## 2020-11-16 NOTE — TELEPHONE ENCOUNTER
Received fax from pharmacy requesting refill on pts medication(s). Pt was last seen in office on 9/11/2020  and has a follow up scheduled for 12/11/2020. Will send request to  Dr. Jimenez Herrera  for authorization.      Requested Prescriptions     Pending Prescriptions Disp Refills    atorvastatin (LIPITOR) 20 MG tablet [Pharmacy Med Name: atorvastatin 20 mg tablet] 90 tablet 3     Sig: TAKE ONE TABLET BY MOUTH NIGHTLY

## 2020-11-19 ENCOUNTER — TELEPHONE (OUTPATIENT)
Dept: PRIMARY CARE CLINIC | Age: 75
End: 2020-11-19

## 2020-12-11 ENCOUNTER — VIRTUAL VISIT (OUTPATIENT)
Dept: PRIMARY CARE CLINIC | Age: 75
End: 2020-12-11
Payer: MEDICARE

## 2020-12-11 PROCEDURE — 99443 PR PHYS/QHP TELEPHONE EVALUATION 21-30 MIN: CPT | Performed by: PEDIATRICS

## 2020-12-11 ASSESSMENT — ENCOUNTER SYMPTOMS
NAUSEA: 0
VOMITING: 0
EYE PAIN: 0
WHEEZING: 0
DIARRHEA: 0
ABDOMINAL PAIN: 0
SHORTNESS OF BREATH: 0
COUGH: 0
BACK PAIN: 1
SORE THROAT: 0
SINUS PRESSURE: 0

## 2020-12-11 NOTE — PATIENT INSTRUCTIONS
Patient Education        Learning About Diabetes Food Guidelines  Your Care Instructions     Meal planning is important to manage diabetes. It helps keep your blood sugar at a target level (which you set with your doctor). You don't have to eat special foods. You can eat what your family eats, including sweets once in a while. But you do have to pay attention to how often you eat and how much you eat of certain foods. You may want to work with a dietitian or a certified diabetes educator (CDE) to help you plan meals and snacks. A dietitian or CDE can also help you lose weight if that is one of your goals. What should you know about eating carbs? Managing the amount of carbohydrate (carbs) you eat is an important part of healthy meals when you have diabetes. Carbohydrate is found in many foods. · Learn which foods have carbs. And learn the amounts of carbs in different foods. ? Bread, cereal, pasta, and rice have about 15 grams of carbs in a serving. A serving is 1 slice of bread (1 ounce), ½ cup of cooked cereal, or 1/3 cup of cooked pasta or rice. ? Fruits have 15 grams of carbs in a serving. A serving is 1 small fresh fruit, such as an apple or orange; ½ of a banana; ½ cup of cooked or canned fruit; ½ cup of fruit juice; 1 cup of melon or raspberries; or 2 tablespoons of dried fruit. ? Milk and no-sugar-added yogurt have 15 grams of carbs in a serving. A serving is 1 cup of milk or 2/3 cup of no-sugar-added yogurt. ? Starchy vegetables have 15 grams of carbs in a serving. A serving is ½ cup of mashed potatoes or sweet potato; 1 cup winter squash; ½ of a small baked potato; ½ cup of cooked beans; or ½ cup cooked corn or green peas. · Learn how much carbs to eat each day and at each meal. A dietitian or CDE can teach you how to keep track of the amount of carbs you eat. This is called carbohydrate counting. · If you are not sure how to count carbohydrate grams, use the Plate Method to plan meals.  It is a when cooking. · Don't skip meals. Your blood sugar may drop too low if you skip meals and take insulin or certain medicines for diabetes. · Check with your doctor before you drink alcohol. Alcohol can cause your blood sugar to drop too low. Alcohol can also cause a bad reaction if you take certain diabetes medicines. Follow-up care is a key part of your treatment and safety. Be sure to make and go to all appointments, and call your doctor if you are having problems. It's also a good idea to know your test results and keep a list of the medicines you take. Where can you learn more? Go to https://chpepiceweb.[email or web address] org and sign in to your Audyssey account. Enter X215 in the Medialive box to learn more about \"Learning About Diabetes Food Guidelines. \"     If you do not have an account, please click on the \"Sign Up Now\" link. Current as of: December 20, 2019               Content Version: 12.6  © 9264-9960 DriverTech. Care instructions adapted under license by [facility] ([facility]). If you have questions about a medical condition or this instruction, always ask your healthcare professional. [place] [address] any warranty or liability for your use of this information. Patient Education        Learning About Meal Planning for Diabetes  Why plan your meals? Meal planning can be a key part of managing diabetes. Planning meals and snacks with the right balance of carbohydrate, protein, and fat can help you keep your blood sugar at the target level you set with your doctor. You don't have to eat special foods. You can eat what your family eats, including sweets once in a while. But you do have to pay attention to how often you eat and how much you eat of certain foods. You may want to work with a dietitian or a certified diabetes educator. He or she can give you tips and meal ideas and can answer your questions about meal planning.  This health professional can also help you reach a healthy weight if that is one of your goals. What plan is right for you? Your dietitian or diabetes educator may suggest that you start with the plate format or carbohydrate counting. The plate format  The plate format is a simple way to help you manage how you eat. You plan meals by learning how much space each food should take on a plate. Using the plate format helps you spread carbohydrate throughout the day. It can make it easier to keep your blood sugar level within your target range. It also helps you see if you're eating healthy portion sizes. To use the plate format, you put non-starchy vegetables on half your plate. Add meat or meat substitutes on one-quarter of the plate. Put a grain or starchy vegetable (such as brown rice or a potato) on the final quarter of the plate. You can add a small piece of fruit and some low-fat or fat-free milk or yogurt, depending on your carbohydrate goal for each meal.  Here are some tips for using the plate format:  · Make sure that you are not using an oversized plate. A 9-inch plate is best. Many restaurants use larger plates. · Get used to using the plate format at home. Then you can use it when you eat out. · Write down your questions about using the plate format. Talk to your doctor, a dietitian, or a diabetes educator about your concerns. Carbohydrate counting  With carbohydrate counting, you plan meals based on the amount of carbohydrate in each food. Carbohydrate raises blood sugar higher and more quickly than any other nutrient. It is found in desserts, breads and cereals, and fruit. It's also found in starchy vegetables such as potatoes and corn, grains such as rice and pasta, and milk and yogurt. Spreading carbohydrate throughout the day helps keep your blood sugar levels within your target range.   Your daily amount depends on several things, including your weight, how active you are, which diabetes medicines you take, and what your goals are for your blood sugar levels. A registered dietitian or diabetes educator can help you plan how much carbohydrate to include in each meal and snack. A guideline for your daily amount of carbohydrate is:  · 45 to 60 grams at each meal. That's about the same as 3 to 4 carbohydrate servings. · 15 to 20 grams at each snack. That's about the same as 1 carbohydrate serving. The Nutrition Facts label on packaged foods tells you how much carbohydrate is in a serving of the food. First, look at the serving size on the food label. Is that the amount you eat in a serving? All of the nutrition information on a food label is based on that serving size. So if you eat more or less than that, you'll need to adjust the other numbers. Total carbohydrate is the next thing you need to look for on the label. If you count carbohydrate servings, one serving of carbohydrate is 15 grams. For foods that don't come with labels, such as fresh fruits and vegetables, you'll need a guide that lists carbohydrate in these foods. Ask your doctor, dietitian, or diabetes educator about books or other nutrition guides you can use. If you take insulin, you need to know how many grams of carbohydrate are in a meal. This lets you know how much rapid-acting insulin to take before you eat. If you use an insulin pump, you get a constant rate of insulin during the day. So the pump must be programmed at meals to give you extra insulin to cover the rise in blood sugar after meals. When you know how much carbohydrate you will eat, you can take the right amount of insulin. Or, if you always use the same amount of insulin, you need to make sure that you eat the same amount of carbohydrate at meals. If you need more help to understand carbohydrate counting and food labels, ask your doctor, dietitian, or diabetes educator. How do you get started with meal planning? Here are some tips to get started:  · Plan your meals a week at a time.  Don't forget to include snacks too. · Use cookbooks or online recipes to plan several main meals. Plan some quick meals for busy nights. You also can double some recipes that freeze well. Then you can save half for other busy nights when you don't have time to cook. · Make sure you have the ingredients you need for your recipes. If you're running low on basic items, put these items on your shopping list too. · List foods that you use to make breakfasts, lunches, and snacks. List plenty of fruits and vegetables. · Post this list on the refrigerator. Add to it as you think of more things you need. · Take the list to the store to do your weekly shopping. Follow-up care is a key part of your treatment and safety. Be sure to make and go to all appointments, and call your doctor if you are having problems. It's also a good idea to know your test results and keep a list of the medicines you take. Where can you learn more? Go to [email or web address]://LangoLab.froodies [email or web address] org and sign in to your Recorrido account. Enter S417 in the Trinity-Noble box to learn more about \"Learning About Meal Planning for Diabetes. \"     If you do not have an account, please click on the \"Sign Up Now\" link. Current as of: December 20, 2019               Content Version: 12.6  © 9037-5360 DearLocal, Incorporated. Care instructions adapted under license by [facility] ([facility]). If you have questions about a medical condition or this instruction, always ask your healthcare professional. [place] [address] any warranty or liability for your use of this information. Patient Education        Learning About Coronavirus [phone])  Coronavirus [phone]): Overview  What is coronavirus (PDNDL-30)? The coronavirus disease (COVID-19) is caused by a virus. It is an illness that was first found in December 2019. It has since spread worldwide. The virus can cause fever, cough, and trouble breathing.  In severe cases, it can cause pneumonia and make it hard to breathe without help. It can cause death. This virus spreads person-to-person through droplets from coughing and sneezing. It can also spread when you are close to someone who is infected. And it can spread when you touch something that has the virus on it, such as a doorknob or a tabletop. Coronaviruses are a large group of viruses. They cause the common cold. They also cause more serious illnesses like [place] respiratory syndrome (MERS) and severe acute respiratory syndrome (SARS). COVID-19 is caused by a novel coronavirus. That means it's a new type that has not been seen in people before. How is COVID-19 treated? Mild illness can be treated at home, but more serious illness needs to be treated in the hospital. Treatment may include medicines to reduce symptoms, plus breathing support such as oxygen therapy or a ventilator. Other treatments, such as antiviral medicines, may help people who have COVID-19. What can you do to protect yourself from COVID-19? The best way to protect yourself from getting sick is to:  · Avoid areas where there is an outbreak. · Avoid contact with people who may be infected. · Avoid crowds and try to stay at least 6 feet away from other people. · Wash your hands often, especially after you cough or sneeze. Use soap and water, and scrub for at least 20 seconds. If soap and water aren't available, use an alcohol-based hand . · Avoid touching your mouth, nose, and eyes. What can you do to avoid spreading the virus to others? To help avoid spreading the virus to others:  · Wash your hands often with soap or alcohol-based hand sanitizers. · Cover your mouth with a tissue when you cough or sneeze. Then throw the tissue in the trash. · Use a disinfectant to clean things that you touch often. These include doorknobs, remote controls, phones, and handles on your refrigerator and microwave.  And don't forget countertops, tabletops, bathrooms, and where people may gather, such as rojas or other public gathering places. So if possible:  · Work from home, and keep your kids at home. · Don't travel if you don't have to. And avoid public transportation, ride-shares, and taxis unless you have no choice. · Limit shopping to essentials, like food and medicines. · Wear a cloth face cover if you have to go to a public place like the grocery store or pharmacy. · Don't eat in restaurants. (You can still get takeout or food deliveries.)  · Avoid crowds and busy places. Follow stay-at-home orders or other directions for your area. Current as of: July 10, 2020               Content Version: 12.6  © 2006-2020 [employer]. Care instructions adapted under license by [facility] ([facility]). If you have questions about a medical condition or this instruction, always ask your healthcare professional. [address] any warranty or liability for your use of this information.

## 2020-12-11 NOTE — PROGRESS NOTES
[address] [facility], [address]  Phone [phone]   Fax [phone]      OFFICE VISIT: 2020    Pepe Flood-: 1945      HPI  Reason For Visit:  Kailey Meehan is a 70 y.o. Diabetes    Patient presents via telephone conference on routine follow-up for multiple health issues. Present concerns:  none      Diabetes Mellitus Type 2  Diet compliance:  compliant most of the time  Nutrition Consultation Needed:  no  Medication:              Victoza 1.2 mg subcutaneous daily                          She will also take ozempic if they are available.              Metformin 1000 mg twice daily with meals      Medication compliance:  compliant most of the time  Weight trend: increasing.  Weight is down 10 pounds from 2 months ago  This is back to her baseline weight  Current exercise: yes - walking  Checkin times daily  Home blood sugar records: fasting range: Low 1 teens  Blood sugar was 121 this morning, fasting  Low BG:  no  Eye exam current (within one year): yes  Checking Feet regularly:  yes - no sores  ACE/ARB:  yes - lisinopril  Aspirin: Yes  Tobacco history: She  reports that she has never smoked. She has never used smokeless tobacco.    Lab Results   Component Value Date    LABA1C 6.4 (H) 2020    LABA1C 6.3 (H) 2019    LABA1C 6.6 (H) 2018     Lab Results   Component Value Date    LABMICR 5.40 2020    CREATININE 0.8 2020       Hypertension:   BP today was   BP Readings from Last 1 Encounters:   20 124/72      Recent BP readings:    BP Readings from Last 3 Encounters:   20 124/72   20 138/86   20 128/72     Medication   Amlodipine 5 mg daily   Lisinopril-hydrochlorothiazide 20-25 mg daily  Medication compliance:  compliant most of the time  Home blood pressure monitoring: Yes - controlled. She Is somewhat adherent to a low sodium diet.      Symptoms: None  Laboratory:  Lab Results   Component Value Date    BUN 17 03/12/2020    CREATININE 0.8 03/12/2020       Hyperlipidemia:   Medication:   atorvastatin (Lipitor) and OTC Fish Oil  Low Fat, Low Choleterol Diet:  yes -she tries  Myalgias or GI upset: no  The patient exercises intermittently. Laboratory:    Lab Results   Component Value Date    CHOL 134 (L) 08/27/2019    TRIG 170 (H) 08/27/2019    HDL 57 (L) 08/27/2019    LDLCALC 43 08/27/2019    LDLDIRECT 85 (L) 08/19/2015      Lab Results   Component Value Date    ALT 13 03/12/2020    AST 17 03/12/2020       Migraine headaches: We did try some Ubrelvy samples  She has not had a migraine since our last visit. She has not tried the samples yet. She is controlling her allergies better and this seems to be a trigger. Osteoarthritis:  Medication:              Meloxicam 15 mg daily (this is helpful)              She also takes tylenol for this prn. Symptoms: she tolerates.    She would like a shot in her L knee today        Hyperuricemia  Medication:              Allopurinol 300 mg daily  Symptoms: none         She will come in to get a flu shot when she comes in for her labs. vitals were not taken for this visit. There is no height or weight on file to calculate BMI. I have reviewed the following with the Ms. Flood   Lab Review  No visits with results within 6 Month(s) from this visit.    Latest known visit with results is:   Orders Only on 03/12/2020   Component Date Value    Sodium 03/12/2020 141     Potassium 03/12/2020 4.8     Chloride 03/12/2020 106     CO2 03/12/2020 20*    Anion Gap 03/12/2020 15     Glucose 03/12/2020 123*    BUN 03/12/2020 17     CREATININE 03/12/2020 0.8     GFR Non- 03/12/2020 >60     Calcium 03/12/2020 10.5*    Total Protein 03/12/2020 7.1     Alb 03/12/2020 4.8     Total Bilirubin 03/12/2020 0.3     Alkaline Phosphatase 03/12/2020 66     ALT 03/12/2020 13     AST 03/12/2020 17     Hemoglobin A1C 03/12/2020 6.4*    WBC 03/12/2020 11.3*    RBC 03/12/2020 4.01*    Hemoglobin 03/12/2020 11.7*    Hematocrit 03/12/2020 38.4     MCV 03/12/2020 95.8     MCH 03/12/2020 29.2     MCHC 03/12/2020 30.5*    RDW 03/12/2020 13.9     Platelets 40/33/6231 180     MPV 03/12/2020 13.8*    PLATELET SLIDE REVIEW 03/12/2020 Adequate     Neutrophils % 03/12/2020 57.0     Lymphocytes % 03/12/2020 27.0     Monocytes % 03/12/2020 13.0*    Eosinophils % 03/12/2020 3.0     Basophils % 03/12/2020 0.0     Neutrophils Absolute 03/12/2020 6.4     Immature Granulocytes # 03/12/2020 0.1     Lymphocytes Absolute 03/12/2020 3.1     Monocytes Absolute 03/12/2020 1.50*    Eosinophils Absolute 03/12/2020 0.34     Basophils Absolute 03/12/2020 0.00     Hypochromia 03/12/2020 1+*    Microalbumin, Random Uri* 03/12/2020 5.40     Creatinine, Ur 03/12/2020 128.3     Microalbumin Creatinine * 03/12/2020 42.1     Uric Acid, Serum 03/12/2020 4.7      Copies of these are in the chart. Current Outpatient Medications   Medication Sig Dispense Refill    atorvastatin (LIPITOR) 20 MG tablet TAKE ONE TABLET BY MOUTH NIGHTLY 90 tablet 3    allopurinol (ZYLOPRIM) 300 MG tablet Take 1 tablet by mouth daily 90 tablet 3    meloxicam (MOBIC) 15 MG tablet Take 1 tablet by mouth daily 30 tablet 5    butalbital-acetaminophen-caffeine (FIORICET, ESGIC) -40 MG per tablet Take 1 tablet by mouth every 4 hours as needed for Headaches 180 tablet 3    gabapentin (NEURONTIN) 600 MG tablet Take 1 tablet by mouth 2 times daily for 180 doses. (Patient taking differently: Take 600 mg by mouth 2 times daily. 1/2 tablet in am and 1/2 tablet in pm) 180 tablet 1    triamcinolone (KENALOG) 0.1 % cream Apply topically 2 times daily.  80 g 5    amLODIPine (NORVASC) 5 MG tablet Take 1 tablet by mouth daily Indications: High Blood Pressure Disorder 90 tablet 3    lisinopril-hydrochlorothiazide (PRINZIDE;ZESTORETIC) 20-12.5 MG per tablet Take 1 tablet by mouth daily 90 tablet 3    Z23          PLAN    1. Type 2 diabetes mellitus without complication, without long-term current use of insulin ([address]  She feels that she is doing well from a Glucose standpoint. We will check a hemoglobin A1c in the future. Most recent hemoglobin A1c was excellently controlled at 6.4.    - Comprehensive Metabolic Panel; Future  - Hemoglobin A1C; Future  - Microalbumin / Creatinine Urine Ratio; Future    2. Essential hypertension  Blood pressure is controlled on the serial Monitoring at home. She monitors periodically  - CBC Auto Differential; Future  - Comprehensive Metabolic Panel; Future  - Microalbumin / Creatinine Urine Ratio; Future    3. Mixed hyperlipidemia  Lipids have been excellently controlled historically. We will continue present medication management and recheck lipids in the future  - Lipid Panel; Future    4. B12 deficiency  She does need a B12 shot. We can do this at her convenience    5. 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 She is doing a great job at this    6. Fatigue, unspecified type  Recheck thyroid  - T4, Free; Future  - TSH without Reflex; Future    7. Need for influenza vaccination  She will get her flu shot when she comes in for her blood work      Orders Placed This Encounter   Procedures    CBC Auto Differential    Comprehensive Metabolic Panel    Hemoglobin A1C    Lipid Panel    Microalbumin / Creatinine Urine Ratio    T4, Free    TSH without Reflex        Return in about 6 months (around 6/11/2021) for 30. Neil Babinski is a 70 y.o. female being evaluated by a Virtual Visit (Telephone visit) encounter to address concerns as mentioned above. A caregiver was present when appropriate.  Due to this being a TeleHealth encounter (During QZQCK-40 public health emergency), evaluation of the following organ systems was limited: Vitals/Constitutional/EENT/Resp/CV/GI//MS/Neuro/Skin/Heme-Lymph-Imm. Pursuant to the emergency declaration under the [address] [place] [address], [address] [place] [address] and the [employer] Act, this Virtual Visit was conducted with patient's (and/or legal guardian's) consent, to reduce the patient's risk of exposure to COVID-19 and provide necessary medical care. The patient (and/or legal guardian) has also been advised to contact this office for worsening conditions or problems, and seek emergency medical treatment and/or call 911 if deemed necessary. Patient identification was verified at the start of the visit: Yes    Total time spent for this encounter: 25m    Services were provided through a video synchronous discussion virtually to substitute for in-person clinic visit. Patient and provider were located at their individual homes. --SAVITA Joyce DO on 12/11/2020 at 10:34 AM    An electronic signature was used to authenticate this note.

## 2021-01-14 DIAGNOSIS — E11.9 TYPE 2 DIABETES MELLITUS WITHOUT COMPLICATION, WITHOUT LONG-TERM CURRENT USE OF INSULIN (HCC): ICD-10-CM

## 2021-01-14 NOTE — TELEPHONE ENCOUNTER
Received fax from pharmacy requesting refill on pts medication(s). Pt was last seen in office on 12/11/2020  and has a follow up scheduled for 6/11/2021. Will send request to  Dr. Lili Floyd  for patient.      Requested Prescriptions     Pending Prescriptions Disp Refills    metFORMIN (GLUCOPHAGE) 1000 MG tablet [Pharmacy Med Name: metformin 1,000 mg tablet] 180 tablet 3     Sig: TAKE ONE TABLET BY MOUTH TWICE DAILY WITH MEALS

## 2021-01-27 RX ORDER — LISINOPRIL AND HYDROCHLOROTHIAZIDE 20; 12.5 MG/1; MG/1
1 TABLET ORAL DAILY
Qty: 90 TABLET | Refills: 3 | Status: SHIPPED | OUTPATIENT
Start: 2021-01-27 | End: 2021-02-03 | Stop reason: ALTCHOICE

## 2021-01-27 NOTE — TELEPHONE ENCOUNTER
Received fax from pharmacy requesting refill on pts medication(s). Pt was last seen in office on 12/11/2020  and has a follow up scheduled for 6/11/2021. Will send request to  Dr. Gunderson Members  for patient.      Requested Prescriptions     Pending Prescriptions Disp Refills    lisinopril-hydroCHLOROthiazide (PRINZIDE;ZESTORETIC) 20-12.5 MG per tablet [Pharmacy Med Name: lisinopril 20 mg-hydrochlorothiazide 12.5 mg tablet] 90 tablet 3     Sig: TAKE ONE TABLET BY MOUTH DAILY

## 2021-01-28 ENCOUNTER — TELEPHONE (OUTPATIENT)
Dept: PRIMARY CARE CLINIC | Age: 76
End: 2021-01-28

## 2021-02-01 ENCOUNTER — PROCEDURE VISIT (OUTPATIENT)
Dept: PRIMARY CARE CLINIC | Age: 76
End: 2021-02-01
Payer: MEDICARE

## 2021-02-01 DIAGNOSIS — E53.8 B12 DEFICIENCY: ICD-10-CM

## 2021-02-01 DIAGNOSIS — Z23 NEED FOR INFLUENZA VACCINATION: Primary | ICD-10-CM

## 2021-02-01 DIAGNOSIS — E11.9 TYPE 2 DIABETES MELLITUS WITHOUT COMPLICATION, WITHOUT LONG-TERM CURRENT USE OF INSULIN (HCC): ICD-10-CM

## 2021-02-01 DIAGNOSIS — E79.0 HYPERURICEMIA: ICD-10-CM

## 2021-02-01 DIAGNOSIS — E78.2 MIXED HYPERLIPIDEMIA: ICD-10-CM

## 2021-02-01 DIAGNOSIS — I10 ESSENTIAL HYPERTENSION: ICD-10-CM

## 2021-02-01 DIAGNOSIS — R53.83 FATIGUE, UNSPECIFIED TYPE: ICD-10-CM

## 2021-02-01 LAB
ALBUMIN SERPL-MCNC: 4.5 G/DL (ref 3.5–5.2)
ALP BLD-CCNC: 69 U/L (ref 35–104)
ALT SERPL-CCNC: 9 U/L (ref 5–33)
ANION GAP SERPL CALCULATED.3IONS-SCNC: 18 MMOL/L (ref 7–19)
AST SERPL-CCNC: 14 U/L (ref 5–32)
ATYPICAL LYMPHOCYTE RELATIVE PERCENT: 4 % (ref 0–8)
BANDED NEUTROPHILS RELATIVE PERCENT: 2 % (ref 0–5)
BASOPHILS ABSOLUTE: 0 K/UL (ref 0–0.2)
BASOPHILS RELATIVE PERCENT: 0 % (ref 0–1)
BILIRUB SERPL-MCNC: 0.3 MG/DL (ref 0.2–1.2)
BUN BLDV-MCNC: 13 MG/DL (ref 8–23)
CALCIUM SERPL-MCNC: 9.4 MG/DL (ref 8.8–10.2)
CHLORIDE BLD-SCNC: 91 MMOL/L (ref 98–111)
CHOLESTEROL, TOTAL: 135 MG/DL (ref 160–199)
CO2: 19 MMOL/L (ref 22–29)
CREAT SERPL-MCNC: 0.9 MG/DL (ref 0.5–0.9)
CREATININE URINE: 80.1 MG/DL (ref 4.2–622)
EOSINOPHILS ABSOLUTE: 0 K/UL (ref 0–0.6)
EOSINOPHILS RELATIVE PERCENT: 0 % (ref 0–5)
GFR AFRICAN AMERICAN: >59
GFR NON-AFRICAN AMERICAN: >60
GLUCOSE BLD-MCNC: 127 MG/DL (ref 74–109)
HBA1C MFR BLD: 6 % (ref 4–6)
HCT VFR BLD CALC: 34.9 % (ref 37–47)
HDLC SERPL-MCNC: 65 MG/DL (ref 65–121)
HEMOGLOBIN: 11.6 G/DL (ref 12–16)
IMMATURE GRANULOCYTES #: 0.4 K/UL
LDL CHOLESTEROL CALCULATED: 42 MG/DL
LYMPHOCYTES ABSOLUTE: 3.3 K/UL (ref 1.1–4.5)
LYMPHOCYTES RELATIVE PERCENT: 17 % (ref 20–40)
MCH RBC QN AUTO: 30.4 PG (ref 27–31)
MCHC RBC AUTO-ENTMCNC: 33.2 G/DL (ref 33–37)
MCV RBC AUTO: 91.4 FL (ref 81–99)
METAMYELOCYTES RELATIVE PERCENT: 3 %
MICROALBUMIN UR-MCNC: 1.5 MG/DL (ref 0–19)
MICROALBUMIN/CREAT UR-RTO: 18.7 MG/G
MONOCYTES ABSOLUTE: 2 K/UL (ref 0–0.9)
MONOCYTES RELATIVE PERCENT: 13 % (ref 0–10)
MYELOCYTE PERCENT: 2 %
NEUTROPHILS ABSOLUTE: 10.4 K/UL (ref 1.5–7.5)
NEUTROPHILS RELATIVE PERCENT: 59 % (ref 50–65)
PDW BLD-RTO: 13.9 % (ref 11.5–14.5)
PLATELET # BLD: 187 K/UL (ref 130–400)
PLATELET SLIDE REVIEW: ADEQUATE
PMV BLD AUTO: 13.5 FL (ref 9.4–12.3)
POTASSIUM SERPL-SCNC: 4.6 MMOL/L (ref 3.5–5)
RBC # BLD: 3.82 M/UL (ref 4.2–5.4)
RBC # BLD: NORMAL 10*6/UL
SODIUM BLD-SCNC: 128 MMOL/L (ref 136–145)
T4 FREE: 1.89 NG/DL (ref 0.93–1.7)
TOTAL PROTEIN: 7 G/DL (ref 6.6–8.7)
TRIGL SERPL-MCNC: 140 MG/DL (ref 0–149)
TSH SERPL DL<=0.05 MIU/L-ACNC: 1.71 UIU/ML (ref 0.27–4.2)
URIC ACID, SERUM: 4.7 MG/DL (ref 2.4–5.7)
VITAMIN B-12: >2000 PG/ML (ref 211–946)
WBC # BLD: 15.7 K/UL (ref 4.8–10.8)

## 2021-02-01 PROCEDURE — 90694 VACC AIIV4 NO PRSRV 0.5ML IM: CPT | Performed by: PEDIATRICS

## 2021-02-01 PROCEDURE — 96372 THER/PROPH/DIAG INJ SC/IM: CPT | Performed by: PEDIATRICS

## 2021-02-01 PROCEDURE — G0008 ADMIN INFLUENZA VIRUS VAC: HCPCS | Performed by: PEDIATRICS

## 2021-02-01 RX ORDER — CYANOCOBALAMIN 1000 UG/ML
1000 INJECTION INTRAMUSCULAR; SUBCUTANEOUS ONCE
Status: COMPLETED | OUTPATIENT
Start: 2021-02-01 | End: 2021-02-01

## 2021-02-01 RX ADMIN — CYANOCOBALAMIN 1000 MCG: 1000 INJECTION INTRAMUSCULAR; SUBCUTANEOUS at 14:17

## 2021-02-01 NOTE — PROGRESS NOTES
Vaccine Information Sheet, \"Influenza - Inactivated\"  given to Rommel Pan, or parent/legal guardian of  Rommel Pan and verbalized understanding. Patient responses:    Have you ever had a reaction to a flu vaccine? No  Are you able to eat eggs without adverse effects? Yes  Do you have any current illness? No  Have you ever had [place] Syndrome? No    Flu vaccine given per order. Please see immunization tab. After obtaining consent from NICOLE Solis DO, gave patient vitamin b12 injection in Right deltoid, patient tolerated well. Medication was not supplied by the patient.

## 2021-02-02 DIAGNOSIS — I10 ESSENTIAL HYPERTENSION: ICD-10-CM

## 2021-02-02 DIAGNOSIS — E11.9 TYPE 2 DIABETES MELLITUS WITHOUT COMPLICATION, WITHOUT LONG-TERM CURRENT USE OF INSULIN (HCC): Primary | ICD-10-CM

## 2021-02-02 DIAGNOSIS — R79.89 ABNORMAL TSH: ICD-10-CM

## 2021-02-02 DIAGNOSIS — R35.89 DIURESIS EXCESSIVE: ICD-10-CM

## 2021-02-02 NOTE — TELEPHONE ENCOUNTER
Change to change plain lisinopril  And change lasix to as needed   And recheck cmp and tsh in 1 month

## 2021-02-02 NOTE — TELEPHONE ENCOUNTER
Patient states that she has lost about 20 pounds since Thanksgiving. Patient is not taking Lasix, would you like to send in Lisinopril?

## 2021-02-03 RX ORDER — LISINOPRIL 20 MG/1
20 TABLET ORAL DAILY
Qty: 90 TABLET | Refills: 3 | Status: SHIPPED | OUTPATIENT
Start: 2021-02-03 | End: 2021-12-30 | Stop reason: SDUPTHER

## 2021-02-04 ENCOUNTER — TELEPHONE (OUTPATIENT)
Dept: PRIMARY CARE CLINIC | Age: 76
End: 2021-02-04

## 2021-02-04 DIAGNOSIS — D64.9 ANEMIA, UNSPECIFIED TYPE: Primary | ICD-10-CM

## 2021-02-04 NOTE — TELEPHONE ENCOUNTER
Called patient, spoke with: Patient regarding the results of the patients most recent labs. I advised Patient of LAYLA Mukherjee, recommendations. Patient did  voice understanding      Labs ordered.

## 2021-03-25 DIAGNOSIS — M54.31 SCIATICA OF RIGHT SIDE: ICD-10-CM

## 2021-03-25 DIAGNOSIS — M51.36 DDD (DEGENERATIVE DISC DISEASE), LUMBAR: ICD-10-CM

## 2021-03-25 DIAGNOSIS — G89.29 CHRONIC RIGHT-SIDED LOW BACK PAIN WITH RIGHT-SIDED SCIATICA: ICD-10-CM

## 2021-03-25 DIAGNOSIS — M54.41 CHRONIC RIGHT-SIDED LOW BACK PAIN WITH RIGHT-SIDED SCIATICA: ICD-10-CM

## 2021-03-25 RX ORDER — AMLODIPINE BESYLATE 5 MG/1
5 TABLET ORAL DAILY
Qty: 90 TABLET | Refills: 3 | Status: SHIPPED | OUTPATIENT
Start: 2021-03-25 | End: 2022-02-24

## 2021-03-25 RX ORDER — MELOXICAM 15 MG/1
15 TABLET ORAL DAILY
Qty: 90 TABLET | Refills: 3 | Status: SHIPPED | OUTPATIENT
Start: 2021-03-25 | End: 2022-03-24

## 2021-03-25 RX ORDER — GABAPENTIN 600 MG/1
600 TABLET ORAL 2 TIMES DAILY
Qty: 180 TABLET | Refills: 1 | Status: SHIPPED | OUTPATIENT
Start: 2021-03-25 | End: 2021-10-12

## 2021-03-25 NOTE — TELEPHONE ENCOUNTER
Received fax from pharmacy requesting refill on pts medication(s). Pt was last seen in office on 2/1/2021  and has a follow up scheduled for 6/11/2021. Will send request to  Dr. Muriel Cavanaugh  for patient.      Requested Prescriptions     Pending Prescriptions Disp Refills    gabapentin (NEURONTIN) 600 MG tablet [Pharmacy Med Name: gabapentin 600 mg tablet] 180 tablet 1     Sig: TAKE ONE TABLET BY MOUTH TWICE DAILY    amLODIPine (NORVASC) 5 MG tablet [Pharmacy Med Name: amlodipine 5 mg tablet] 90 tablet 3     Sig: TAKE ONE TABLET BY MOUTH DAILY HIGH BLOOD PRESSURE    meloxicam (MOBIC) 15 MG tablet [Pharmacy Med Name: meloxicam 15 mg tablet] 30 tablet 5     Sig: TAKE ONE TABLET BY MOUTH DAILY

## 2021-05-13 ENCOUNTER — TELEPHONE (OUTPATIENT)
Dept: PRIMARY CARE CLINIC | Age: 76
End: 2021-05-13

## 2021-06-11 ENCOUNTER — OFFICE VISIT (OUTPATIENT)
Dept: PRIMARY CARE CLINIC | Age: 76
End: 2021-06-11
Payer: MEDICARE

## 2021-06-11 VITALS
OXYGEN SATURATION: 98 % | DIASTOLIC BLOOD PRESSURE: 84 MMHG | WEIGHT: 197 LBS | TEMPERATURE: 97.3 F | BODY MASS INDEX: 37.19 KG/M2 | SYSTOLIC BLOOD PRESSURE: 126 MMHG | HEART RATE: 101 BPM | HEIGHT: 61 IN

## 2021-06-11 DIAGNOSIS — I10 ESSENTIAL HYPERTENSION: ICD-10-CM

## 2021-06-11 DIAGNOSIS — M15.9 PRIMARY OSTEOARTHRITIS INVOLVING MULTIPLE JOINTS: ICD-10-CM

## 2021-06-11 DIAGNOSIS — Z12.11 SCREEN FOR COLON CANCER: ICD-10-CM

## 2021-06-11 DIAGNOSIS — E53.8 B12 DEFICIENCY: ICD-10-CM

## 2021-06-11 DIAGNOSIS — I63.319 CEREBRAL INFARCTION DUE TO THROMBOSIS OF MIDDLE CEREBRAL ARTERY, UNSPECIFIED BLOOD VESSEL LATERALITY (HCC): ICD-10-CM

## 2021-06-11 DIAGNOSIS — E11.9 TYPE 2 DIABETES MELLITUS WITHOUT COMPLICATION, WITHOUT LONG-TERM CURRENT USE OF INSULIN (HCC): Primary | ICD-10-CM

## 2021-06-11 DIAGNOSIS — S20.212A CONTUSION OF RIB ON LEFT SIDE, INITIAL ENCOUNTER: ICD-10-CM

## 2021-06-11 DIAGNOSIS — R53.83 FATIGUE, UNSPECIFIED TYPE: ICD-10-CM

## 2021-06-11 DIAGNOSIS — D64.9 ANEMIA, UNSPECIFIED TYPE: ICD-10-CM

## 2021-06-11 DIAGNOSIS — E79.0 HYPERURICEMIA: ICD-10-CM

## 2021-06-11 DIAGNOSIS — E78.2 MIXED HYPERLIPIDEMIA: ICD-10-CM

## 2021-06-11 DIAGNOSIS — G43.109 MIGRAINE WITH AURA AND WITHOUT STATUS MIGRAINOSUS, NOT INTRACTABLE: ICD-10-CM

## 2021-06-11 PROCEDURE — 99214 OFFICE O/P EST MOD 30 MIN: CPT | Performed by: PEDIATRICS

## 2021-06-11 SDOH — ECONOMIC STABILITY: FOOD INSECURITY: WITHIN THE PAST 12 MONTHS, YOU WORRIED THAT YOUR FOOD WOULD RUN OUT BEFORE YOU GOT MONEY TO BUY MORE.: NEVER TRUE

## 2021-06-11 SDOH — ECONOMIC STABILITY: FOOD INSECURITY: WITHIN THE PAST 12 MONTHS, THE FOOD YOU BOUGHT JUST DIDN'T LAST AND YOU DIDN'T HAVE MONEY TO GET MORE.: NEVER TRUE

## 2021-06-11 ASSESSMENT — PATIENT HEALTH QUESTIONNAIRE - PHQ9
1. LITTLE INTEREST OR PLEASURE IN DOING THINGS: 0
2. FEELING DOWN, DEPRESSED OR HOPELESS: 0
SUM OF ALL RESPONSES TO PHQ QUESTIONS 1-9: 0
SUM OF ALL RESPONSES TO PHQ QUESTIONS 1-9: 0
SUM OF ALL RESPONSES TO PHQ9 QUESTIONS 1 & 2: 0
SUM OF ALL RESPONSES TO PHQ QUESTIONS 1-9: 0

## 2021-06-11 ASSESSMENT — ENCOUNTER SYMPTOMS
ABDOMINAL PAIN: 0
WHEEZING: 0
DIARRHEA: 0
SORE THROAT: 0
VOMITING: 0
EYE PAIN: 0
BACK PAIN: 1
SHORTNESS OF BREATH: 0
COUGH: 0
NAUSEA: 0
SINUS PRESSURE: 0

## 2021-06-11 ASSESSMENT — SOCIAL DETERMINANTS OF HEALTH (SDOH): HOW HARD IS IT FOR YOU TO PAY FOR THE VERY BASICS LIKE FOOD, HOUSING, MEDICAL CARE, AND HEATING?: NOT HARD AT ALL

## 2021-06-11 NOTE — PATIENT INSTRUCTIONS
Patient Education        Learning About Meal Planning for Diabetes  Why plan your meals? Meal planning can be a key part of managing diabetes. Planning meals and snacks with the right balance of carbohydrate, protein, and fat can help you keep your blood sugar at the target level you set with your doctor. You don't have to eat special foods. You can eat what your family eats, including sweets once in a while. But you do have to pay attention to how often you eat and how much you eat of certain foods. You may want to work with a dietitian or a certified diabetes educator. He or she can give you tips and meal ideas and can answer your questions about meal planning. This health professional can also help you reach a healthy weight if that is one of your goals. What plan is right for you? Your dietitian or diabetes educator may suggest that you start with the plate format or carbohydrate counting. The plate format  The plate format is a simple way to help you manage how you eat. You plan meals by learning how much space each food should take on a plate. Using the plate format helps you spread carbohydrate throughout the day. It can make it easier to keep your blood sugar level within your target range. It also helps you see if you're eating healthy portion sizes. To use the plate format, you put non-starchy vegetables on half your plate. Add meat or meat substitutes on one-quarter of the plate. Put a grain or starchy vegetable (such as brown rice or a potato) on the final quarter of the plate. You can add a small piece of fruit and some low-fat or fat-free milk or yogurt, depending on your carbohydrate goal for each meal.  Here are some tips for using the plate format:  · Make sure that you are not using an oversized plate. A 9-inch plate is best. Many restaurants use larger plates. · Get used to using the plate format at home. Then you can use it when you eat out.   · Write down your questions about using rapid-acting insulin to take before you eat. If you use an insulin pump, you get a constant rate of insulin during the day. So the pump must be programmed at meals to give you extra insulin to cover the rise in blood sugar after meals. When you know how much carbohydrate you will eat, you can take the right amount of insulin. Or, if you always use the same amount of insulin, you need to make sure that you eat the same amount of carbohydrate at meals. If you need more help to understand carbohydrate counting and food labels, ask your doctor, dietitian, or diabetes educator. How can you plan healthy meals? Here are some tips to get started:  · Plan your meals a week at a time. Don't forget to include snacks too. · Use cookbooks or online recipes to plan several main meals. Plan some quick meals for busy nights. You also can double some recipes that freeze well. Then you can save half for other busy nights when you don't have time to cook. · Make sure you have the ingredients you need for your recipes. If you're running low on basic items, put these items on your shopping list too. · List foods that you use to make breakfasts, lunches, and snacks. List plenty of fruits and vegetables. · Post this list on the refrigerator. Add to it as you think of more things you need. · Take the list to the store to do your weekly shopping. Follow-up care is a key part of your treatment and safety. Be sure to make and go to all appointments, and call your doctor if you are having problems. It's also a good idea to know your test results and keep a list of the medicines you take. Where can you learn more? Go to https://jeff.[email or web address]. org and sign in to your [employer] account. Enter I544 in the [facility] box to learn more about \"Learning About Meal Planning for Diabetes. \"     If you do not have an account, please click on the \"Sign Up Now\" link.   Current as of: August 31, 2020               Content Version: 12.8  © 9840-8265 [employer]. Care instructions adapted under license by [facility] ([facility]). If you have questions about a medical condition or this instruction, always ask your healthcare professional. [address] any warranty or liability for your use of this information.

## 2021-06-11 NOTE — PROGRESS NOTES
LABA1C 6.4 (H) 03/12/2020    LABA1C 6.3 (H) 08/27/2019     Lab Results   Component Value Date    LABMICR 1.50 02/01/2021    CREATININE 0.9 02/01/2021       Hypertension:   BP today was   BP Readings from Last 1 Encounters:   06/11/21 126/84      Recent BP readings:    BP Readings from Last 3 Encounters:   06/11/21 126/84   09/11/20 124/72   07/06/20 138/86     Medication   Amlodipine 5 mg daily   Lisinopril 20 mg daily   Lasix 20 mg daily as needed  Medication compliance:  compliant most of the time  Home blood pressure monitoring: Yes - and controlled. She is somewhat adherent to a low sodium diet. Symptoms: none  Laboratory:  Lab Results   Component Value Date    BUN 13 02/01/2021    CREATININE 0.9 02/01/2021       Hyperlipidemia:   Medication:   atorvastatin (Lipitor) and fish oil  Low Fat, Low Choleterol Diet:  yes - she tries  Myalgias or GI upset: no  The patient exercises fairly regularly. Laboratory:    Lab Results   Component Value Date    CHOL 135 (L) 02/01/2021    TRIG 140 02/01/2021    HDL 65 02/01/2021    LDLCALC 42 02/01/2021    LDLDIRECT 85 (L) 08/19/2015      Lab Results   Component Value Date    ALT 9 02/01/2021    AST 14 02/01/2021       History of cerebrovascular accident:  Risk factors are appropriately controlled. Migraine headaches:  Medication:   Excedrin Migraine as needed   Fioricet as needed   She had tried some samples of Ubrelvy in the past and this was helpful as well  Symptoms:this is a rare event. B12 deficiency. Medication:   She does get B12 injections. Symptoms:she does feel better when she takes the b12 shots. Osteoarthritis:  Medication:              Meloxicam 15 mg daily (this is helpful)              She also takes tylenol for this prn.   Symptoms: she tolerates.    She would like a shot in her L knee today        Hyperuricemia  Medication:              Allopurinol 300 mg daily  Symptoms: none          height is 5' 1\" (1.549 m) and weight is 197 lb (89.4 kg). Her temporal temperature is 97.3 °F (36.3 °C). Her blood pressure is 126/84 and her pulse is 101. Her oxygen saturation is 98%. Body mass index is 37.22 kg/m². I have reviewed the following with the Ms. Flood   Lab Review  Orders Only on 02/01/2021   Component Date Value    TSH 02/01/2021 1.710     T4 Free 02/01/2021 1.89*    Microalbumin, Random Uri* 02/01/2021 1.50     Creatinine, Ur 02/01/2021 80.1     Microalbumin Creatinine * 02/01/2021 18.7     Cholesterol, Total 02/01/2021 135*    Triglycerides 02/01/2021 140     HDL 02/01/2021 65     LDL Calculated 02/01/2021 42     Hemoglobin A1C 02/01/2021 6.0     Sodium 02/01/2021 128*    Potassium 02/01/2021 4.6     Chloride 02/01/2021 91*    CO2 02/01/2021 19*    Anion Gap 02/01/2021 18     Glucose 02/01/2021 127*    BUN 02/01/2021 13     CREATININE 02/01/2021 0.9     GFR Non- 02/01/2021 >60     GFR  02/01/2021 >59     Calcium 02/01/2021 9.4     Total Protein 02/01/2021 7.0     Albumin 02/01/2021 4.5     Total Bilirubin 02/01/2021 0.3     Alkaline Phosphatase 02/01/2021 69     ALT 02/01/2021 9     AST 02/01/2021 14     WBC 02/01/2021 15.7*    RBC 02/01/2021 3.82*    Hemoglobin 02/01/2021 11.6*    Hematocrit 02/01/2021 34.9*    MCV 02/01/2021 91.4     MCH 02/01/2021 30.4     MCHC 02/01/2021 33.2     RDW 02/01/2021 13.9     Platelets 04/91/4477 187     MPV 02/01/2021 13.5*    PLATELET SLIDE REVIEW 02/01/2021 Adequate     Neutrophils % 02/01/2021 59.0     Lymphocytes % 02/01/2021 17.0*    Monocytes % 02/01/2021 13.0*    Eosinophils % 02/01/2021 0.0     Basophils % 02/01/2021 0.0     Neutrophils Absolute 02/01/2021 10.4*    Immature Granulocytes # 02/01/2021 0.4     Lymphocytes Absolute 02/01/2021 3.3     Monocytes Absolute 02/01/2021 2.00*    Eosinophils Absolute 02/01/2021 0.00     Basophils Absolute 02/01/2021 0.00     Bands Relative 02/01/2021 2     Atypical Lymphocytes Rel* 02/01/2021 4     Metamyelocytes Relative 02/01/2021 3*    Myelocyte Percent 02/01/2021 2*    RBC Morphology 02/01/2021 Normal     Vitamin B-12 02/01/2021 >2000*    Uric Acid, Serum 02/01/2021 4.7      Copies of these are in the chart. Current Outpatient Medications   Medication Sig Dispense Refill    gabapentin (NEURONTIN) 600 MG tablet Take 1 tablet by mouth 2 times daily for 180 days. 180 tablet 1    amLODIPine (NORVASC) 5 MG tablet Take 1 tablet by mouth daily 90 tablet 3    meloxicam (MOBIC) 15 MG tablet Take 1 tablet by mouth daily 90 tablet 3    lisinopril (PRINIVIL;ZESTRIL) 20 MG tablet Take 1 tablet by mouth daily 90 tablet 3    metFORMIN (GLUCOPHAGE) 1000 MG tablet Take 1 tablet by mouth 2 times daily (with meals) 180 tablet 3    atorvastatin (LIPITOR) 20 MG tablet TAKE ONE TABLET BY MOUTH NIGHTLY 90 tablet 3    allopurinol (ZYLOPRIM) 300 MG tablet Take 1 tablet by mouth daily 90 tablet 3    butalbital-acetaminophen-caffeine (FIORICET, ESGIC) -40 MG per tablet Take 1 tablet by mouth every 4 hours as needed for Headaches 180 tablet 3    triamcinolone (KENALOG) 0.1 % cream Apply topically 2 times daily.  80 g 5    Semaglutide,0.25 or 0.5MG/DOS, 2 MG/1.5ML SOPN Inject 0.5 mg into the skin once a week 1.5 mL 5    Omega-3 Fatty Acids (FISH OIL) 1000 MG CAPS Take 1,000 mg by mouth daily      furosemide (LASIX) 20 MG tablet Take 20 mg by mouth daily as needed Only taking PRN      Blood Glucose Monitoring Suppl JOSE Cap glucose daily and prn 1 Device 0    Glucose Blood (BLOOD GLUCOSE TEST STRIPS) STRP Cap glucose daily and prn 100 strip 3    Insulin Pen Needle (H-E-B INCONTROL PEN NEEDLES) 32G X 4 MM MISC 1 each by Does not apply route daily 100 each 3    Calcium-Vitamin D (CALTRATE 600 PLUS-VIT D PO) Take 1 tablet by mouth 2 times daily      aspirin 81 MG tablet Take 81 mg by mouth daily       Multiple Vitamins-Minerals (MULTI COMPLETE PO) Take 1 tablet by mouth daily. No current facility-administered medications for this visit. Allergies: Patient has no known allergies. 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    Type II or unspecified type diabetes mellitus without mention of complication, not stated as uncontrolled        Family History   Problem Relation Age of Onset    Colon Cancer Mother     Colon Polyps Neg Hx     Esophageal Cancer Neg Hx     Liver Disease Neg Hx     Liver Cancer Neg Hx     Rectal Cancer Neg Hx     Stomach Cancer Neg Hx        Past Surgical History:   Procedure Laterality Date    APPENDECTOMY       SECTION      x2   [address]    COLONOSCOPY  16    [facility]Tubular AP (-) dysplasia x 1, 5 yr recall    HERNIA REPAIR      She has had multiple hernia surgeries; x4    HERNIA REPAIR      pt states she's had difficulty with mesh.  HYSTERECTOMY, TOTAL ABDOMINAL      UMBILICAL HERNIA REPAIR N/A 2019    INCISIONAL HERNIA REPAIR WITH BIOLOGIC MESH performed by Holly Duran MD at [address] [place] [address]       Social History     Tobacco Use    Smoking status: Never Smoker    Smokeless tobacco: Never Used   Substance Use Topics    Alcohol use: No        Review of Systems   Constitutional: Negative for fatigue and unexpected weight change. HENT: Negative for congestion, ear pain, sinus pressure and sore throat. Eyes: Negative for pain and visual disturbance. Respiratory: Negative for cough, shortness of breath and wheezing. Cardiovascular: Positive for leg swelling. Negative for chest pain and palpitations. Gastrointestinal: Negative for abdominal pain, diarrhea, nausea and vomiting. Endocrine: Negative for polyuria. Genitourinary: Negative for dysuria, frequency, hematuria and urgency. Musculoskeletal: Positive for arthralgias (typical arthritis) and back pain. Negative for neck pain. Skin: Negative for rash. Neurological: Positive for headaches (increased recently). Negative for dizziness and weakness. Psychiatric/Behavioral: Negative for self-injury. The patient is not nervous/anxious. Physical Exam  Vitals reviewed. Constitutional:       General: She is not in acute distress. Appearance: She is well-developed. She is obese. She is not toxic-appearing. Comments: Body habitus is obese   HENT:      Head: Normocephalic and atraumatic. Right Ear: Hearing, tympanic membrane, ear canal and external ear normal.      Left Ear: Hearing, ear canal and external ear normal.      Nose: Nose normal.      Mouth/Throat:      Mouth: Mucous membranes are moist.      Pharynx: Oropharynx is clear. Eyes:      General: Lids are normal.      Extraocular Movements: Extraocular movements intact. Conjunctiva/sclera: Conjunctivae normal.      Pupils: Pupils are equal, round, and reactive to light. Neck:      Thyroid: No thyromegaly. Vascular: No carotid bruit or JVD. Trachea: Phonation normal.   Cardiovascular:      Rate and Rhythm: Normal rate and regular rhythm. No extrasystoles are present. Chest Wall: PMI is not displaced. Pulses: Normal pulses. Heart sounds: Normal heart sounds. No murmur heard. No friction rub. No gallop. Pulmonary:      Effort: Pulmonary effort is normal. No respiratory distress. Breath sounds: Normal breath sounds. No wheezing, rhonchi or rales. Abdominal:      General: Bowel sounds are normal. There is no distension. Palpations: Abdomen is soft. There is no mass. Tenderness: There is no abdominal tenderness. Genitourinary:     Comments: Examination deferred  Musculoskeletal:         General: Tenderness (over left ribs and left knee ) and signs of injury (contusion ) present. No swelling.  Normal range of motion. Cervical back: Normal range of motion and neck supple. Right lower leg: No edema. Left lower leg: No edema. Comments: Joint examination reveals no acute arthritis or synovitis. Lymphadenopathy:      Cervical: No cervical adenopathy. Skin:     General: Skin is warm and dry. Capillary Refill: Capillary refill takes less than 2 seconds. Findings: No rash. Neurological:      General: No focal deficit present. Mental Status: She is alert and oriented to person, place, and time. Cranial Nerves: No cranial nerve deficit (by gross examination). Sensory: No sensory deficit. Motor: No tremor or atrophy. Gait: Gait normal.      Comments: No focal deficits appreciated   Psychiatric:         Mood and Affect: Mood normal.         Speech: Speech normal.         Behavior: Behavior normal. Behavior is cooperative. ASSESSMENT      ICD-10-CM    1. Type 2 diabetes mellitus without complication, without long-term current use of insulin (HCC)  E11.9 Comprehensive Metabolic Panel     Microalbumin / Creatinine Urine Ratio     Hemoglobin A1C     T4, Free     TSH without Reflex   2. Essential hypertension  I10 CBC Auto Differential     Comprehensive Metabolic Panel     Microalbumin / Creatinine Urine Ratio   3. Mixed hyperlipidemia  E78.2 Lipid Panel   4. Migraine with aura and without status migrainosus, not intractable  G43. 109    5. B12 deficiency  E53.8 Vitamin B12   6. Hyperuricemia  E79.0 Uric Acid   7. Cerebral infarction due to thrombosis of middle cerebral artery, unspecified blood vessel laterality ([facility]  I63.319    8. Primary osteoarthritis involving multiple joints  M89.49    9. Contusion of rib on left side, initial encounter  S20.212A    10. Screen for colon cancer  Z12.11 Cologuard (For External Results Only)   11. Fatigue, unspecified type  R53.83    12.  Anemia, unspecified type  D64.9 CBC Auto Differential     Vitamin B12     Ferritin     Iron and TIBC     Folate         PLAN    1. Type 2 diabetes mellitus without complication, without long-term current use of insulin (HCC)  Blood sugar is now excellently controlled on this present regimen. We did give her some samples of Ozempic as we have been today. She has lost 26 pounds and her blood sugars are now excellently controlled. We will continue with present diet and exercise and medication regimen   - Comprehensive Metabolic Panel; Future  - Microalbumin / Creatinine Urine Ratio; Future  - Hemoglobin A1C; Future  - T4, Free; Future  - TSH without Reflex; Future    2. Essential hypertension  Blood pressure is also significantly improved with her weight loss  Continue present medication regimen  - CBC Auto Differential; Future  - Comprehensive Metabolic Panel; Future  - Microalbumin / Creatinine Urine Ratio; Future    3. Mixed hyperlipidemia  Lipids are excellently controlled on present medication regimen. Continue the same  - Lipid Panel; Future    4. Migraine with aura and without status migrainosus, not intractable  She has very infrequent migraines. 5. B12 deficiency  B12 was normal on last check. She does feel better when she has the injections. Did not have any problems with this. - Vitamin B12; Future    6. Hyperuricemia  This was normalized on medication.  - Uric Acid; Future    7. Cerebral infarction due to thrombosis of middle cerebral artery, unspecified blood vessel laterality (HCC)  Risk factors are very well controlled. 8. Primary osteoarthritis involving multiple joints  Continue with meloxicam as present    9. Contusion of rib on left side, initial encounter  This will heal in time. We discussed the minimal intervention for rib fractures. We will continue with pain management utilizing meloxicam    10. Screen for colon cancer  We will set up for Cologuard today   - Cologuard (For External Results Only); Future    11. Fatigue, unspecified type  Check labs    12.  Anemia, unspecified type  Evaluating anemia with laboratory profile  - CBC Auto Differential; Future  - Vitamin B12; Future  - Ferritin; Future  - Iron and TIBC; Future  - Folate; Future      Orders Placed This Encounter   Procedures    Cologuard (For External Results Only)    CBC Auto Differential    Comprehensive Metabolic Panel    Lipid Panel    Microalbumin / Creatinine Urine Ratio    Hemoglobin A1C    T4, Free    TSH without Reflex    Vitamin B12    Uric Acid    Ferritin    Iron and TIBC    Folate        Return in about 6 months (around 12/11/2021) for 30. This was an in-house visit.

## 2021-07-07 ENCOUNTER — TELEPHONE (OUTPATIENT)
Dept: PRIMARY CARE CLINIC | Age: 76
End: 2021-07-07

## 2021-07-07 DIAGNOSIS — Z12.11 SCREEN FOR COLON CANCER: ICD-10-CM

## 2021-07-07 NOTE — TELEPHONE ENCOUNTER
----- Message from [id number] [facility],[address], DO sent at 7/7/2021 12:24 PM CDT -----  Cologuard test is normal.Recommend repeat in 3 years

## 2021-08-04 ENCOUNTER — TELEPHONE (OUTPATIENT)
Dept: PRIMARY CARE CLINIC | Age: 76
End: 2021-08-04

## 2021-09-16 ENCOUNTER — TELEPHONE (OUTPATIENT)
Dept: PRIMARY CARE CLINIC | Age: 76
End: 2021-09-16

## 2021-10-05 ENCOUNTER — TELEPHONE (OUTPATIENT)
Dept: PRIMARY CARE CLINIC | Age: 76
End: 2021-10-05

## 2021-10-07 DIAGNOSIS — E78.2 MIXED HYPERLIPIDEMIA: ICD-10-CM

## 2021-10-07 RX ORDER — ATORVASTATIN CALCIUM 20 MG/1
20 TABLET, FILM COATED ORAL NIGHTLY
Qty: 90 TABLET | Refills: 3 | Status: SHIPPED | OUTPATIENT
Start: 2021-10-07 | End: 2022-10-10

## 2021-10-07 NOTE — TELEPHONE ENCOUNTER
Received fax from pharmacy requesting refill on pts medication(s). Pt was last seen in office on 6/11/2021  and has a follow up scheduled for 12/10/2021. Will send request to  Dr. Lola Padgett  for authorization.      Requested Prescriptions     Pending Prescriptions Disp Refills    atorvastatin (LIPITOR) 20 MG tablet [Pharmacy Med Name: atorvastatin 20 mg tablet] 90 tablet 3     Sig: Take 1 tablet by mouth nightly

## 2021-10-12 DIAGNOSIS — M51.36 DDD (DEGENERATIVE DISC DISEASE), LUMBAR: ICD-10-CM

## 2021-10-12 DIAGNOSIS — M54.31 SCIATICA OF RIGHT SIDE: ICD-10-CM

## 2021-10-12 DIAGNOSIS — M54.41 CHRONIC RIGHT-SIDED LOW BACK PAIN WITH RIGHT-SIDED SCIATICA: ICD-10-CM

## 2021-10-12 DIAGNOSIS — G89.29 CHRONIC RIGHT-SIDED LOW BACK PAIN WITH RIGHT-SIDED SCIATICA: ICD-10-CM

## 2021-10-12 RX ORDER — GABAPENTIN 600 MG/1
600 TABLET ORAL 2 TIMES DAILY
Qty: 180 TABLET | Refills: 1 | Status: SHIPPED | OUTPATIENT
Start: 2021-10-12 | End: 2022-04-26

## 2021-10-12 NOTE — TELEPHONE ENCOUNTER
Received fax from pharmacy requesting refill on pts medication(s). Pt was last seen in office on 6/11/2021  and has a follow up scheduled for 12/10/2021. Will send request to  Dr. Sandrita Estrada  for patient.      Requested Prescriptions     Pending Prescriptions Disp Refills    gabapentin (NEURONTIN) 600 MG tablet [Pharmacy Med Name: gabapentin 600 mg tablet] 180 tablet 1     Sig: TAKE ONE TABLET BY MOUTH TWICE DAILY

## 2021-10-13 ENCOUNTER — TELEPHONE (OUTPATIENT)
Dept: PRIMARY CARE CLINIC | Age: 76
End: 2021-10-13

## 2021-10-14 ENCOUNTER — NURSE ONLY (OUTPATIENT)
Dept: PRIMARY CARE CLINIC | Age: 76
End: 2021-10-14
Payer: MEDICARE

## 2021-10-14 DIAGNOSIS — E53.8 B12 DEFICIENCY: ICD-10-CM

## 2021-10-14 DIAGNOSIS — Z23 NEED FOR INFLUENZA VACCINATION: Primary | ICD-10-CM

## 2021-10-14 PROCEDURE — 96372 THER/PROPH/DIAG INJ SC/IM: CPT | Performed by: PEDIATRICS

## 2021-10-14 PROCEDURE — 90694 VACC AIIV4 NO PRSRV 0.5ML IM: CPT | Performed by: PEDIATRICS

## 2021-10-14 PROCEDURE — G0008 ADMIN INFLUENZA VIRUS VAC: HCPCS | Performed by: PEDIATRICS

## 2021-10-14 RX ORDER — CYANOCOBALAMIN 1000 UG/ML
1000 INJECTION INTRAMUSCULAR; SUBCUTANEOUS ONCE
Status: COMPLETED | OUTPATIENT
Start: 2021-10-14 | End: 2021-10-14

## 2021-10-14 RX ADMIN — CYANOCOBALAMIN 1000 MCG: 1000 INJECTION INTRAMUSCULAR; SUBCUTANEOUS at 15:14

## 2021-10-14 NOTE — PROGRESS NOTES
After obtaining consent, and per orders of JIM RICH, injection of B12 given in Left deltoid  by Tracy Rush. Patient tolerated well. Medication was not supplied by patient. After obtaining consent, and per orders of JIM RICH, injection of FLU given in Right deltoid  by Tracy Rush. Patient tolerated well. Medication was not supplied by patient. Vaccine Information Sheet, \"Influenza - Inactivated\"  given to Lizbeth Aquino, or parent/legal guardian of  Lizbeth Aquino and verbalized understanding. Patient responses:    Have you ever had a reaction to a flu vaccine? No  Are you able to eat eggs without adverse effects? Yes  Do you have any current illness? No  Have you ever had Guillian Lawton Syndrome? No    Flu vaccine given per order. Please see immunization tab.

## 2021-11-04 RX ORDER — ALLOPURINOL 300 MG/1
300 TABLET ORAL DAILY
Qty: 90 TABLET | Refills: 3 | Status: SHIPPED | OUTPATIENT
Start: 2021-11-04 | End: 2022-10-05

## 2021-11-04 NOTE — TELEPHONE ENCOUNTER
Received fax from pharmacy requesting refill on pts medication(s). Pt was last seen in office on 6/11/2021  and has a follow up scheduled for 12/10/2021. Will send request to  Dr. Peng Garner  for authorization.      Requested Prescriptions     Pending Prescriptions Disp Refills    allopurinol (ZYLOPRIM) 300 MG tablet [Pharmacy Med Name: allopurinol 300 mg tablet] 90 tablet 3     Sig: Take 1 tablet by mouth daily

## 2021-12-01 ENCOUNTER — TELEPHONE (OUTPATIENT)
Dept: PRIMARY CARE CLINIC | Age: 76
End: 2021-12-01

## 2021-12-10 ENCOUNTER — OFFICE VISIT (OUTPATIENT)
Dept: PRIMARY CARE CLINIC | Age: 76
End: 2021-12-10
Payer: MEDICARE

## 2021-12-10 ENCOUNTER — TELEPHONE (OUTPATIENT)
Dept: PRIMARY CARE CLINIC | Age: 76
End: 2021-12-10

## 2021-12-10 ENCOUNTER — PATIENT MESSAGE (OUTPATIENT)
Dept: PRIMARY CARE CLINIC | Age: 76
End: 2021-12-10

## 2021-12-10 VITALS
HEART RATE: 101 BPM | BODY MASS INDEX: 33.99 KG/M2 | WEIGHT: 180 LBS | TEMPERATURE: 97.2 F | DIASTOLIC BLOOD PRESSURE: 82 MMHG | OXYGEN SATURATION: 97 % | HEIGHT: 61 IN | SYSTOLIC BLOOD PRESSURE: 122 MMHG

## 2021-12-10 DIAGNOSIS — E11.42 TYPE 2 DIABETES MELLITUS WITH DIABETIC POLYNEUROPATHY, WITHOUT LONG-TERM CURRENT USE OF INSULIN (HCC): ICD-10-CM

## 2021-12-10 DIAGNOSIS — E53.8 B12 DEFICIENCY: ICD-10-CM

## 2021-12-10 DIAGNOSIS — Z00.00 ROUTINE GENERAL MEDICAL EXAMINATION AT A HEALTH CARE FACILITY: ICD-10-CM

## 2021-12-10 DIAGNOSIS — R53.83 FATIGUE, UNSPECIFIED TYPE: ICD-10-CM

## 2021-12-10 DIAGNOSIS — D64.9 ANEMIA, UNSPECIFIED TYPE: ICD-10-CM

## 2021-12-10 DIAGNOSIS — E79.0 HYPERURICEMIA: ICD-10-CM

## 2021-12-10 DIAGNOSIS — I10 PRIMARY HYPERTENSION: ICD-10-CM

## 2021-12-10 DIAGNOSIS — I63.319 CEREBRAL INFARCTION DUE TO THROMBOSIS OF MIDDLE CEREBRAL ARTERY, UNSPECIFIED BLOOD VESSEL LATERALITY (HCC): ICD-10-CM

## 2021-12-10 DIAGNOSIS — R35.89 DIURESIS EXCESSIVE: ICD-10-CM

## 2021-12-10 DIAGNOSIS — Z12.31 ENCOUNTER FOR SCREENING MAMMOGRAM FOR MALIGNANT NEOPLASM OF BREAST: Primary | ICD-10-CM

## 2021-12-10 DIAGNOSIS — E11.9 TYPE 2 DIABETES MELLITUS WITHOUT COMPLICATION, WITHOUT LONG-TERM CURRENT USE OF INSULIN (HCC): ICD-10-CM

## 2021-12-10 DIAGNOSIS — M17.11 PRIMARY OSTEOARTHRITIS OF RIGHT KNEE: ICD-10-CM

## 2021-12-10 DIAGNOSIS — R79.89 ABNORMAL TSH: ICD-10-CM

## 2021-12-10 DIAGNOSIS — G43.109 MIGRAINE WITH AURA AND WITHOUT STATUS MIGRAINOSUS, NOT INTRACTABLE: ICD-10-CM

## 2021-12-10 DIAGNOSIS — E78.2 MIXED HYPERLIPIDEMIA: ICD-10-CM

## 2021-12-10 DIAGNOSIS — I10 ESSENTIAL HYPERTENSION: ICD-10-CM

## 2021-12-10 DIAGNOSIS — M17.12 PRIMARY OSTEOARTHRITIS OF LEFT KNEE: ICD-10-CM

## 2021-12-10 LAB
ALBUMIN SERPL-MCNC: 4.5 G/DL (ref 3.5–5.2)
ALP BLD-CCNC: 74 U/L (ref 35–104)
ALT SERPL-CCNC: 10 U/L (ref 5–33)
ANION GAP SERPL CALCULATED.3IONS-SCNC: 18 MMOL/L (ref 7–19)
AST SERPL-CCNC: 13 U/L (ref 5–32)
BASOPHILS ABSOLUTE: 0 K/UL (ref 0–0.2)
BASOPHILS RELATIVE PERCENT: 0.2 % (ref 0–1)
BILIRUB SERPL-MCNC: <0.2 MG/DL (ref 0.2–1.2)
BUN BLDV-MCNC: 8 MG/DL (ref 8–23)
CALCIUM SERPL-MCNC: 10 MG/DL (ref 8.8–10.2)
CHLORIDE BLD-SCNC: 106 MMOL/L (ref 98–111)
CHOLESTEROL, TOTAL: 126 MG/DL (ref 160–199)
CO2: 19 MMOL/L (ref 22–29)
CREAT SERPL-MCNC: 0.6 MG/DL (ref 0.5–0.9)
CREATININE URINE: 124.9 MG/DL (ref 4.2–622)
EOSINOPHILS ABSOLUTE: 0.1 K/UL (ref 0–0.6)
EOSINOPHILS RELATIVE PERCENT: 0.6 % (ref 0–5)
FERRITIN: 66.1 NG/ML (ref 13–150)
FOLATE: >20 NG/ML (ref 4.8–37.3)
GFR AFRICAN AMERICAN: >59
GFR NON-AFRICAN AMERICAN: >60
GLUCOSE BLD-MCNC: 152 MG/DL (ref 74–109)
HBA1C MFR BLD: 6.1 % (ref 4–6)
HCT VFR BLD CALC: 38.1 % (ref 37–47)
HDLC SERPL-MCNC: 59 MG/DL (ref 65–121)
HEMOGLOBIN: 11.6 G/DL (ref 12–16)
IMMATURE GRANULOCYTES #: 0.1 K/UL
IRON SATURATION: 21 % (ref 14–50)
IRON: 61 UG/DL (ref 37–145)
LDL CHOLESTEROL CALCULATED: 44 MG/DL
LYMPHOCYTES ABSOLUTE: 2.4 K/UL (ref 1.1–4.5)
LYMPHOCYTES RELATIVE PERCENT: 24.5 % (ref 20–40)
MCH RBC QN AUTO: 28.9 PG (ref 27–31)
MCHC RBC AUTO-ENTMCNC: 30.4 G/DL (ref 33–37)
MCV RBC AUTO: 95 FL (ref 81–99)
MICROALBUMIN UR-MCNC: 17.7 MG/DL (ref 0–19)
MICROALBUMIN/CREAT UR-RTO: 141.7 MG/G
MONOCYTES ABSOLUTE: 1.7 K/UL (ref 0–0.9)
MONOCYTES RELATIVE PERCENT: 17.7 % (ref 0–10)
NEUTROPHILS ABSOLUTE: 5.5 K/UL (ref 1.5–7.5)
NEUTROPHILS RELATIVE PERCENT: 55.8 % (ref 50–65)
PDW BLD-RTO: 14.1 % (ref 11.5–14.5)
PLATELET # BLD: 199 K/UL (ref 130–400)
PMV BLD AUTO: 12.7 FL (ref 9.4–12.3)
POTASSIUM SERPL-SCNC: 4.4 MMOL/L (ref 3.5–5)
RBC # BLD: 4.01 M/UL (ref 4.2–5.4)
SODIUM BLD-SCNC: 143 MMOL/L (ref 136–145)
T4 FREE: 1.32 NG/DL (ref 0.93–1.7)
TOTAL IRON BINDING CAPACITY: 293 UG/DL (ref 250–400)
TOTAL PROTEIN: 6.8 G/DL (ref 6.6–8.7)
TRIGL SERPL-MCNC: 115 MG/DL (ref 0–149)
TSH SERPL DL<=0.05 MIU/L-ACNC: 1.98 UIU/ML (ref 0.27–4.2)
URIC ACID, SERUM: 3.7 MG/DL (ref 2.4–5.7)
VITAMIN B-12: 457 PG/ML (ref 211–946)
WBC # BLD: 9.8 K/UL (ref 4.8–10.8)

## 2021-12-10 PROCEDURE — G0439 PPPS, SUBSEQ VISIT: HCPCS | Performed by: PEDIATRICS

## 2021-12-10 PROCEDURE — 99214 OFFICE O/P EST MOD 30 MIN: CPT | Performed by: PEDIATRICS

## 2021-12-10 PROCEDURE — 20610 DRAIN/INJ JOINT/BURSA W/O US: CPT | Performed by: PEDIATRICS

## 2021-12-10 ASSESSMENT — PATIENT HEALTH QUESTIONNAIRE - PHQ9
SUM OF ALL RESPONSES TO PHQ QUESTIONS 1-9: 0
SUM OF ALL RESPONSES TO PHQ QUESTIONS 1-9: 0
SUM OF ALL RESPONSES TO PHQ9 QUESTIONS 1 & 2: 0
2. FEELING DOWN, DEPRESSED OR HOPELESS: 0
1. LITTLE INTEREST OR PLEASURE IN DOING THINGS: 0
SUM OF ALL RESPONSES TO PHQ QUESTIONS 1-9: 0

## 2021-12-10 ASSESSMENT — ENCOUNTER SYMPTOMS
BACK PAIN: 1
NAUSEA: 0
SORE THROAT: 0
DIARRHEA: 0
WHEEZING: 0
VOMITING: 0
ABDOMINAL PAIN: 0
COUGH: 0
SHORTNESS OF BREATH: 0
SINUS PRESSURE: 0
EYE PAIN: 0

## 2021-12-10 ASSESSMENT — LIFESTYLE VARIABLES: HOW OFTEN DO YOU HAVE A DRINK CONTAINING ALCOHOL: 0

## 2021-12-10 NOTE — TELEPHONE ENCOUNTER
----- Message from LAYLA Willis sent at 12/10/2021  4:18 PM CST -----  Please inform patient results show  The microalbumin creatinine ratio on her urine is normal

## 2021-12-10 NOTE — TELEPHONE ENCOUNTER
----- Message from LAYLA Richmond sent at 12/10/2021  2:05 PM CST -----  Please call patient and let them know results. Normal B12  Normal folate  Normal iron and ferritin  Normal uric acid level  Normal cholesterol  Your metabolic profile is normal.  This includes kidney and liver functions as well as electrolytes.   Blood sugar is elevated at 152

## 2021-12-10 NOTE — PROGRESS NOTES
[facility], [address]  Phone [phone]   Fax [phone]      OFFICE VISIT: 12/10/2021    Anton Flood-: 1945      HPI  Reason For Visit:  Efren Vang is a 78 y.o. Medicare AWV (BG have been staying good, no migraines. She does need steroid injections in both knees. )    Patient presents on routine annual wellness evaluation. Present concerns:  She is having problems with her knees. She would like injections bilateral knees today. Diabetes Mellitus Type 2  Diet compliance:  compliant most of the time  Nutrition Consultation Needed:  no  Medication:   Metformin 1000 mg twice daily              Ozempic 0.5mg  subcu weekly              Metformin 1000 mg twice daily with meals      Medication compliance:  compliant most of the time  Weight trend: increasing.  Weight is down 17lb in the past 6 months. Current exercise: yes - walking  Checkin times daily  Home blood sugar records: fasting range: Low 1 teens  Blood sugar was 121 this morning, fasting  Low BG:  no  Eye exam current (within one year): yes  Checking Feet regularly:  yes - no sores  ACE/ARB:  yes - lisinopril  Aspirin: Yes  She also takes gabapentin for her neuropathy  Tobacco history: She  reports that she has never smoked.  She has never used smokeless tobacco.    Lab Results   Component Value Date    LABA1C 6.0 2021    LABA1C 6.4 (H) 2020    LABA1C 6.3 (H) 2019     Lab Results   Component Value Date    LABMICR 1.50 2021    CREATININE 0.9 2021     Monofilament Exam Reveals:  Pulses: normal  Edema:1+ bilaterally  Skin Lesions:normal  Right Foot:    Left Foot:  Normal sensation at all   Normal sensation at all             Hypertension:   BP today was   BP Readings from Last 1 Encounters:   12/10/21 122/82      Recent BP readings:    BP Readings from Last 3 Encounters:   12/10/21 122/82   21 126/84   20 124/72     Medication              Amlodipine 5 mg daily              Lisinopril 20 mg daily              Lasix 20 mg daily as needed   Medication compliance:  compliant most of the time  Home blood pressure monitoring: Yes -controlled. She Is somewhat adherent to a low sodium diet. Symptoms: none  Laboratory:  Lab Results   Component Value Date    BUN 13 02/01/2021    CREATININE 0.9 02/01/2021       Hyperlipidemia:   Medication:   atorvastatin (Lipitor) and OTC Fish Oil  Low Fat, Low Choleterol Diet:  yes -she tries  Myalgias or GI upset: no  The patient exercises daily. Laboratory:    Lab Results   Component Value Date    CHOL 135 (L) 02/01/2021    TRIG 140 02/01/2021    HDL 65 02/01/2021    LDLCALC 42 02/01/2021    LDLDIRECT 85 (L) 08/19/2015      Lab Results   Component Value Date    ALT 9 02/01/2021    AST 14 02/01/2021       History of cerebrovascular accident  Medications as above and trying to our best to control risk factors as well as possible. Symptoms are mostly resolved      Hyperuricemia:  Medication:   Allopurinol 300 mg daily  Symptoms: no recent gout symptoms      Migraine headaches:  Medication:              Excedrin Migraine as needed              Fioricet as needed              She had tried some samples of Ubrelvy in the past and this was helpful as well  Symptoms:this is a rare event.        B12 deficiency. Medication:              She does get B12 injections. Symptoms:she does feel better when she takes the b12 shots.        Osteoarthritis:  Medication:              Meloxicam 15 mg daily (this is helpful)              She also takes tylenol for this prn. Symptoms: she tolerates.    She would like a shot in her Bilateral knees today      height is 5' 1\" (1.549 m) and weight is 180 lb (81.6 kg). Her temporal temperature is 97.2 °F (36.2 °C). Her blood pressure is 122/82 and her pulse is 101. Her oxygen saturation is 97%. Body mass index is 34.01 kg/m². I have reviewed the following with the MsRaymond KimAleena   Lab Review  No visits with results within 6 Month(s) from this visit.    Latest known visit with results is:   Orders Only on 02/01/2021   Component Date Value    TSH 02/01/2021 1.710     T4 Free 02/01/2021 1.89*    Microalbumin, Random Uri* 02/01/2021 1.50     Creatinine, Ur 02/01/2021 80.1     Microalbumin Creatinine * 02/01/2021 18.7     Cholesterol, Total 02/01/2021 135*    Triglycerides 02/01/2021 140     HDL 02/01/2021 65     LDL Calculated 02/01/2021 42     Hemoglobin A1C 02/01/2021 6.0     Sodium 02/01/2021 128*    Potassium 02/01/2021 4.6     Chloride 02/01/2021 91*    CO2 02/01/2021 19*    Anion Gap 02/01/2021 18     Glucose 02/01/2021 127*    BUN 02/01/2021 13     CREATININE 02/01/2021 0.9     GFR Non- 02/01/2021 >60     GFR  02/01/2021 >59     Calcium 02/01/2021 9.4     Total Protein 02/01/2021 7.0     Albumin 02/01/2021 4.5     Total Bilirubin 02/01/2021 0.3     Alkaline Phosphatase 02/01/2021 69     ALT 02/01/2021 9     AST 02/01/2021 14     WBC 02/01/2021 15.7*    RBC 02/01/2021 3.82*    Hemoglobin 02/01/2021 11.6*    Hematocrit 02/01/2021 34.9*    MCV 02/01/2021 91.4     MCH 02/01/2021 30.4     MCHC 02/01/2021 33.2     RDW 02/01/2021 13.9     Platelets 89/24/1095 187     MPV 02/01/2021 13.5*    PLATELET SLIDE REVIEW 02/01/2021 Adequate     Neutrophils % 02/01/2021 59.0     Lymphocytes % 02/01/2021 17.0*    Monocytes % 02/01/2021 13.0*    Eosinophils % 02/01/2021 0.0     Basophils % 02/01/2021 0.0     Neutrophils Absolute 02/01/2021 10.4*    Immature Granulocytes # 02/01/2021 0.4     Lymphocytes Absolute 02/01/2021 3.3     Monocytes Absolute 02/01/2021 2.00*    Eosinophils Absolute 02/01/2021 0.00     Basophils Absolute 02/01/2021 0.00     Bands Relative 02/01/2021 2     Atypical Lymphocytes Rel* 02/01/2021 4     Metamyelocytes Relative 02/01/2021 3*    Myelocyte Percent 02/01/2021 2*    RBC Morphology 02/01/2021 Normal     Vitamin B-12 02/01/2021 >2000*    Uric Acid, Serum 02/01/2021 4.7      Copies of these are in the chart. Current Outpatient Medications   Medication Sig Dispense Refill    allopurinol (ZYLOPRIM) 300 MG tablet Take 1 tablet by mouth daily 90 tablet 3    gabapentin (NEURONTIN) 600 MG tablet Take 1 tablet by mouth 2 times daily for 180 days. 180 tablet 1    atorvastatin (LIPITOR) 20 MG tablet Take 1 tablet by mouth nightly 90 tablet 3    amLODIPine (NORVASC) 5 MG tablet Take 1 tablet by mouth daily 90 tablet 3    meloxicam (MOBIC) 15 MG tablet Take 1 tablet by mouth daily 90 tablet 3    lisinopril (PRINIVIL;ZESTRIL) 20 MG tablet Take 1 tablet by mouth daily 90 tablet 3    metFORMIN (GLUCOPHAGE) 1000 MG tablet Take 1 tablet by mouth 2 times daily (with meals) 180 tablet 3    butalbital-acetaminophen-caffeine (FIORICET, ESGIC) -40 MG per tablet Take 1 tablet by mouth every 4 hours as needed for Headaches 180 tablet 3    triamcinolone (KENALOG) 0.1 % cream Apply topically 2 times daily. 80 g 5    Semaglutide,0.25 or 0.5MG/DOS, 2 MG/1.5ML SOPN Inject 0.5 mg into the skin once a week 1.5 mL 5    Omega-3 Fatty Acids (FISH OIL) 1000 MG CAPS Take 1,000 mg by mouth daily      furosemide (LASIX) 20 MG tablet Take 20 mg by mouth daily as needed Only taking PRN      Blood Glucose Monitoring Suppl JOSE Cap glucose daily and prn 1 Device 0    Glucose Blood (BLOOD GLUCOSE TEST STRIPS) STRP Cap glucose daily and prn 100 strip 3    Insulin Pen Needle (H-E-B INCONTROL PEN NEEDLES) 32G X 4 MM MISC 1 each by Does not apply route daily 100 each 3    Calcium-Vitamin D (CALTRATE 600 PLUS-VIT D PO) Take 1 tablet by mouth 2 times daily      aspirin 81 MG tablet Take 81 mg by mouth daily       Multiple Vitamins-Minerals (MULTI COMPLETE PO) Take 1 tablet by mouth daily. No current facility-administered medications for this visit. Allergies: Patient has no known allergies.      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    Type II or unspecified type diabetes mellitus without mention of complication, not stated as uncontrolled        Family History   Problem Relation Age of Onset    Colon Cancer Mother     Colon Polyps Neg Hx     Esophageal Cancer Neg Hx     Liver Disease Neg Hx     Liver Cancer Neg Hx     Rectal Cancer Neg Hx     Stomach Cancer Neg Hx        Past Surgical History:   Procedure Laterality Date    APPENDECTOMY       SECTION      x2   [address]    COLONOSCOPY  16    Dr Sherle Schaumann [facility])-Tubular AP (-) dysplasia x 1, 5 yr recall    HERNIA REPAIR      She has had multiple hernia surgeries; x4    HERNIA REPAIR      pt states she's had difficulty with mesh.  HYSTERECTOMY, TOTAL ABDOMINAL      UMBILICAL HERNIA REPAIR N/A 2019    INCISIONAL HERNIA REPAIR WITH BIOLOGIC MESH performed by Gerson Razo MD at [address] [place] [address]       Social History     Tobacco Use    Smoking status: Never Smoker    Smokeless tobacco: Never Used   Substance Use Topics    Alcohol use: No        Review of Systems   Constitutional: Negative for fatigue and unexpected weight change. HENT: Negative for congestion, ear pain, sinus pressure and sore throat. Eyes: Negative for pain and visual disturbance. Respiratory: Negative for cough, shortness of breath and wheezing. Cardiovascular: Positive for leg swelling. Negative for chest pain and palpitations. Gastrointestinal: Negative for abdominal pain, diarrhea, nausea and vomiting. Endocrine: Negative for polyuria. Genitourinary: Negative for dysuria, frequency, hematuria and urgency. Musculoskeletal: Positive for arthralgias (typical arthritis) and back pain. Negative for neck pain. Skin: Negative for rash.    Neurological: Positive for headaches (increased recently). Negative for dizziness and weakness. Psychiatric/Behavioral: Negative for self-injury. The patient is not nervous/anxious. Physical Exam  Vitals reviewed. Constitutional:       General: She is not in acute distress. Appearance: She is well-developed. She is obese. She is not toxic-appearing. Comments: Body habitus is obese   HENT:      Head: Normocephalic and atraumatic. Right Ear: Hearing, ear canal and external ear normal. A middle ear effusion is present. Left Ear: Hearing, ear canal and external ear normal. A middle ear effusion is present. Nose: Nose normal.      Mouth/Throat:      Mouth: Mucous membranes are moist.      Pharynx: Oropharynx is clear. Eyes:      General: Lids are normal.      Extraocular Movements: Extraocular movements intact. Conjunctiva/sclera: Conjunctivae normal.      Pupils: Pupils are equal, round, and reactive to light. Neck:      Thyroid: No thyromegaly. Vascular: No carotid bruit or JVD. Trachea: Phonation normal.   Cardiovascular:      Rate and Rhythm: Normal rate and regular rhythm. No extrasystoles are present. Chest Wall: PMI is not displaced. Pulses: Normal pulses. Heart sounds: Normal heart sounds. No murmur heard. No friction rub. No gallop. Pulmonary:      Effort: Pulmonary effort is normal. No respiratory distress. Breath sounds: Normal breath sounds. No wheezing, rhonchi or rales. Abdominal:      General: Bowel sounds are normal. There is no distension. Palpations: Abdomen is soft. There is no mass. Tenderness: There is no abdominal tenderness. Genitourinary:     Comments: Examination deferred  Musculoskeletal:         General: Tenderness (over bilateral knees  ) and signs of injury (contusion ) present. No swelling. Normal range of motion. Cervical back: Normal range of motion and neck supple. Right lower leg: Edema (trace to 1+) present.       Left lower leg: Edema (trace to 1+) present. Comments: Joint examination reveals no acute arthritis or synovitis. Lymphadenopathy:      Cervical: No cervical adenopathy. Skin:     General: Skin is warm and dry. Capillary Refill: Capillary refill takes less than 2 seconds. Findings: No rash. Neurological:      General: No focal deficit present. Mental Status: She is alert and oriented to person, place, and time. Cranial Nerves: No cranial nerve deficit (by gross examination). Sensory: No sensory deficit. Motor: No tremor or atrophy. Gait: Gait normal.      Comments: No focal deficits appreciated   Psychiatric:         Mood and Affect: Mood normal.         Speech: Speech normal.         Behavior: Behavior normal. Behavior is cooperative. ASSESSMENT      ICD-10-CM    1. Encounter for screening mammogram for malignant neoplasm of breast  Z12.31 VERONICA DIGITAL SCREEN W OR WO CAD BILATERAL   2. Type 2 diabetes mellitus with diabetic polyneuropathy, without long-term current use of insulin (HCC)  E11.42 Comprehensive Metabolic Panel     Hemoglobin A1C     Microalbumin / Creatinine Urine Ratio      DIABETES FOOT EXAM   3. Primary hypertension  I10 CBC Auto Differential     Comprehensive Metabolic Panel     Microalbumin / Creatinine Urine Ratio   4. Mixed hyperlipidemia  E78.2 Lipid Panel   5. Cerebral infarction due to thrombosis of middle cerebral artery, unspecified blood vessel laterality ([facility] [address]  I63.319    6. Hyperuricemia  E79.0 Uric Acid   7. B12 deficiency  E53.8 Vitamin B12   8. Primary osteoarthritis of left knee  M17.12    9. Primary osteoarthritis of right knee  M17.11    10. Migraine with aura and without status migrainosus, not intractable  G43.109    11.  Routine general medical examination at a health care facility  Z00.00 VERONICA DIGITAL SCREEN W OR WO CAD BILATERAL     CBC Auto Differential     Comprehensive Metabolic Panel     Hemoglobin A1C     Lipid Panel Microalbumin / Creatinine Urine Ratio     T4, Free     TSH without Reflex      DIABETES FOOT EXAM   12. Fatigue, unspecified type  R53.83 T4, Free     TSH without Reflex         PLAN    1. Encounter for screening mammogram for malignant neoplasm of breast  Will set up mammogram  - VERONICA DIGITAL SCREEN W OR WO CAD BILATERAL; Future    2. Type 2 diabetes mellitus with diabetic polyneuropathy, without long-term current use of insulin ([facility])  Check labs on therapy. She is doing great   She is losing weight. - Comprehensive Metabolic Panel; Future  - Hemoglobin A1C; Future  - Microalbumin / Creatinine Urine Ratio; Future  -  DIABETES FOOT EXAM    3. Primary hypertension  The current medical regimen is effective;  continue present plan and medications. - CBC Auto Differential; Future  - Comprehensive Metabolic Panel; Future  - Microalbumin / Creatinine Urine Ratio; Future    4. Mixed hyperlipidemia  Need to recheck lipids on therapy  - Lipid Panel; Future    5. Cerebral infarction due to thrombosis of middle cerebral artery, unspecified blood vessel laterality ([facility] [address]  Now almost completely recovered    6. Hyperuricemia  Will recheck uric acid  This was controlled on last check  - Uric Acid; Future    7. B12 deficiency  This was very well controlled last check. - Vitamin B12; Future    8. Primary osteoarthritis of left knee  Injection of the left knee    9. Primary osteoarthritis of right knee  Injection of the right knee    10. Migraine with aura and without status migrainosus, not intractable  This is fairly well controlled with this regimen    11. Routine general medical examination at a health care facility  Routine age-appropriate anticipatory guidance was provided. Annual wellness visit was performed in a separate note and issues were addressed as identified.   - VERONICA DIGITAL SCREEN W OR WO CAD BILATERAL; Future  - CBC Auto Differential; Future  - Comprehensive Metabolic Panel;  Future  - Hemoglobin A1C; Future  - Lipid Panel; Future  - Microalbumin / Creatinine Urine Ratio; Future  - T4, Free; Future  - TSH without Reflex; Future  - HM DIABETES FOOT EXAM    12. Fatigue, unspecified type  Check thyroid. - T4, Free; Future  - TSH without Reflex; Future      Orders Placed This Encounter   Procedures    VERONICA DIGITAL SCREEN W OR WO CAD BILATERAL    CBC Auto Differential    Comprehensive Metabolic Panel    Hemoglobin A1C    Lipid Panel    Microalbumin / Creatinine Urine Ratio    T4, Free    TSH without Reflex    Vitamin B12    Uric Acid    HM DIABETES FOOT EXAM        Return in 3 months (on 3/10/2022) for Medicare Annual Wellness Visit in 1 year, 30. Knee Arthrocentesis with Injection Procedure Note    Pre-operative Diagnosis: left knee djd with pain    Post-operative Diagnosis: same    Indications: Symptom relief from osteoarthritis    Anesthesia: not required     Procedure Details     Verbal consent was obtained for the procedure. The joint was prepped with chlorhexadine. The area was sprayed with Gebauer's Ethyl Chloride as a topical anesthetic and then a 22 gauge needle was inserted into the superior aspect of the joint from a lateral approach. 2 ml 1% lidocaine and 2 ml of DepoMedrol (40mg/ml) and 1ml Kenalog (40mg/ml)  was then injected into the joint. The needle was removed and the area cleansed and dressed. Complications:  None; patient tolerated the procedure well. Knee Arthrocentesis with Injection Procedure Note    Pre-operative Diagnosis: right knee djd with pain    Post-operative Diagnosis: same    Indications: Symptom relief from osteoarthritis    Anesthesia: not required     Procedure Details     Verbal consent was obtained for the procedure. The joint was prepped with chlorhexadine. The area was sprayed with Gebauer's Ethyl Chloride as a topical anesthetic and then a 22 gauge needle was inserted into the superior aspect of the joint from a lateral approach.   2 ml 1% lidocaine and 2 ml of DepoMedrol (40mg/ml) and 1ml Kenalog (40mg/ml)  was then injected into the joint. The needle was removed and the area cleansed and dressed. Complications:  None; patient tolerated the procedure well. This was an in-house visit            Medicare Annual Wellness Visit  Name: Martin Lopez Date: 12/10/2021   MRN: [id number] Sex: Female   Age: 78 y.o. Ethnicity: Non- / Non    : 1945 Race: White (non-)      Alecia Cook is here for Medicare AWV (BG have been staying good, no migraines. She does need steroid injections in both knees. )    Screenings for behavioral, psychosocial and functional/safety risks, and cognitive dysfunction are all negative except as indicated below. These results, as well as other patient data from the [address] form, are documented in Flowsheets linked to this Encounter. No Known Allergies      Prior to Visit Medications    Medication Sig Taking? Authorizing Provider   allopurinol (ZYLOPRIM) 300 MG tablet Take 1 tablet by mouth daily Yes NICOLE Muhammad, DO   gabapentin (NEURONTIN) 600 MG tablet Take 1 tablet by mouth 2 times daily for 180 days. Yes NICOLE Muhammad, DO   atorvastatin (LIPITOR) 20 MG tablet Take 1 tablet by mouth nightly Yes NICOLE Muhammad DO   amLODIPine (NORVASC) 5 MG tablet Take 1 tablet by mouth daily Yes NICOLE Muhammad DO   meloxicam (MOBIC) 15 MG tablet Take 1 tablet by mouth daily Yes NICOLE Muhammad, DO   lisinopril (PRINIVIL;ZESTRIL) 20 MG tablet Take 1 tablet by mouth daily Yes NICOLE Muhammad DO   metFORMIN (GLUCOPHAGE) 1000 MG tablet Take 1 tablet by mouth 2 times daily (with meals) Yes LAYLA Tovar   butalbital-acetaminophen-caffeine (FIORICET, ESGIC) -00 MG per tablet Take 1 tablet by mouth every 4 hours as needed for Headaches Yes NICOLE Muhammad, DO   triamcinolone (KENALOG) 0.1 % cream Apply topically 2 times daily.  Yes Livan Cee, DO Semaglutide,0.25 or 0.5MG/DOS, 2 MG/1.5ML SOPN Inject 0.5 mg into the skin once a week Yes Herb Sprain, APRN   Omega-3 Fatty Acids (FISH OIL) 1000 MG CAPS Take 1,000 mg by mouth daily Yes Historical Provider, MD   furosemide (LASIX) 20 MG tablet Take 20 mg by mouth daily as needed Only taking PRN Yes Historical Provider, MD   Blood Glucose Monitoring Suppl JOSE Cap glucose daily and prn Yes B Leonor Felty, DO   Glucose Blood (BLOOD GLUCOSE TEST STRIPS) STRP Cap glucose daily and prn Yes B Leonor Felty, DO   Insulin Pen Needle (H-E-B INCONTROL PEN NEEDLES) 32G X 4 MM MISC 1 each by Does not apply route daily Yes B Leonor Felty, DO   Calcium-Vitamin D (CALTRATE 600 PLUS-VIT D PO) Take 1 tablet by mouth 2 times daily Yes Historical Provider, MD   aspirin 81 MG tablet Take 81 mg by mouth daily  Yes Historical Provider, MD   Multiple Vitamins-Minerals (MULTI COMPLETE PO) Take 1 tablet by mouth daily. Yes Historical Provider, MD         Past Medical History:   Diagnosis Date    Arthritis     Hyperlipidemia     Hypertension     Incisional hernia     Stroke (cerebrum) ([facility] [address]     4yr ago; no residual    Type II or unspecified type diabetes mellitus without mention of complication, not stated as uncontrolled        Past Surgical History:   Procedure Laterality Date    APPENDECTOMY       SECTION      x2   [address]    COLONOSCOPY  16    Dr Luis Enrique Morrison [facility])-Tubular AP (-) dysplasia x 1, 5 yr recall   [address] [facility]  2012    She has had multiple hernia surgeries; x4    HERNIA REPAIR      pt states she's had difficulty with mesh.     HYSTERECTOMY, TOTAL ABDOMINAL      UMBILICAL HERNIA REPAIR N/A 2019    INCISIONAL HERNIA REPAIR WITH BIOLOGIC MESH performed by Thang Gómez MD at [address] [place] [address]         Family History   Problem Relation Age of Onset    Colon Cancer Mother     Colon Polyps Neg Hx     Esophageal Cancer Neg Hx     Liver Disease Neg Hx     Liver Cancer Neg Hx     Rectal Cancer Neg Hx     Stomach Cancer Neg Hx        CareTeam (Including outside providers/suppliers regularly involved in providing care):   Patient Care Team:  Amaury Uribe DO as PCP - General  B Leonor Felty, DO as PCP - [facility] Empaneled Provider    Wt Readings from Last 3 Encounters:   12/10/21 180 lb (81.6 kg)   06/11/21 197 lb (89.4 kg)   09/11/20 223 lb (101.2 kg)     Vitals:    12/10/21 0810   BP: 122/82   Site: Left Upper Arm   Position: Sitting   Cuff Size: Large Adult   Pulse: 101   Temp: 97.2 °F (36.2 °C)   TempSrc: Temporal   SpO2: 97%   Weight: 180 lb (81.6 kg)   Height: 5' 1\" (1.549 m)     Body mass index is 34.01 kg/m². Based upon direct observation of the patient, evaluation of cognition reveals recent and remote memory intact. Physical exam as documented elsewhere in a separate note    Patient's complete Health Risk Assessment and screening values have been reviewed and are found in Flowsheets. The following problems were reviewed today and where indicated follow up appointments were made and/or referrals ordered. Positive Risk Factor Screenings with Interventions:            General Health and ACP:  General  In general, how would you say your health is?: Very Good  In the past 7 days, have you experienced any of the following?  New or Increased Pain, New or Increased Fatigue, Loneliness, Social Isolation, Stress or Anger?: None of These  Do you get the social and emotional support that you need?: Yes  Do you have a Living Will?: (!) No  Advance Directives     Power of [address] [place] [address] Will ACP-Advance Directive ACP-Power of     Not on File Not on File Not on File Not on File      General Health Risk Interventions:  · No Living Will: Additional information was provided    Health Habits/Nutrition:  Health Habits/Nutrition  Do you exercise for at least 20 minutes 2-3 times per week?: (!) No  Have you lost any weight without trying in the past 3 months?: No  Do you eat only one meal per day?: No  Have you seen the dentist within the past year?: Yes  Body mass index: (!) 34.01  Health Habits/Nutrition Interventions:  · Inadequate physical activity:  educational materials provided to promote increased physical activity  · Nutritional issues:  educational materials for healthy, well-balanced diet provided       Personalized Preventive Plan   Current Health Maintenance Status  Immunization History   Administered Date(s) Administered    COVID-19, Madison Jen, Primary or Immunocompromised, PF, 100mcg/0.5mL 07/27/2021, 08/24/2021    Influenza Virus Vaccine 10/25/2013, 03/25/2015, 10/07/2015    Influenza, High Dose (Fluzone 65 yrs and older) 10/03/2017, 12/03/2018    Influenza, Quadv, IM, PF (6 mo and older Fluzone, Flulaval, Fluarix, and 3 yrs and older Afluria) 10/04/2016    Influenza, Quadv, adjuvanted, 65 yrs +, IM, PF (Fluad) 02/01/2021, 10/14/2021    Influenza, Triv, inactivated, subunit, adjuvanted, IM (Fluad 65 yrs and older) 10/29/2019    Pneumococcal Conjugate 13-valent ([id number]) 12/21/2015    Pneumococcal Conjugate 7-valent (Prevnar7) 10/25/2011    Pneumococcal Polysaccharide ([id number]) 01/09/2017    Varicella (Varivax) 09/25/2013        Health Maintenance   Topic Date Due    DTaP/Tdap/Td vaccine (1 - Tdap) Never done    Shingles Vaccine (1 of 2) 11/20/2013    Diabetic retinal exam  05/06/2021    Breast cancer screen  09/03/2021    Annual Wellness Visit (AWV)  09/12/2021    A1C test (Diabetic or Prediabetic)  02/01/2022    Diabetic microalbuminuria test  02/01/2022    Lipid screen  02/01/2022    Potassium monitoring  02/01/2022    Creatinine monitoring  02/01/2022    COVID-19 Vaccine (3 - Booster for Madison Jen series) 02/24/2022    Diabetic foot exam  12/10/2022    Colon cancer screen colonoscopy  07/07/2026    DEXA (modify frequency per FRAX score)  Completed    Flu vaccine  Completed    Pneumococcal 65+ years Vaccine  Completed    Hepatitis C screen  Completed    Hepatitis A vaccine  Aged Out    Hib vaccine  Aged Out    Meningococcal (ACWY) vaccine  Aged Out     Recommendations for [employer] Due: see orders and patient instructions/AVS.  . Recommended screening schedule for the next 5-10 years is provided to the patient in written form: see Patient Roseann Benjamin was seen today for medicare awv. Diagnoses and all orders for this visit:    Encounter for screening mammogram for malignant neoplasm of breast  -     [facility] DIGITAL SCREEN W OR WO CAD BILATERAL; Future    Type 2 diabetes mellitus with diabetic polyneuropathy, without long-term current use of insulin (HCC)  -     Comprehensive Metabolic Panel; Future  -     Hemoglobin A1C; Future  -     Microalbumin / Creatinine Urine Ratio; Future  -      DIABETES FOOT EXAM    Primary hypertension  -     CBC Auto Differential; Future  -     Comprehensive Metabolic Panel; Future  -     Microalbumin / Creatinine Urine Ratio; Future    Mixed hyperlipidemia  -     Lipid Panel; Future    Cerebral infarction due to thrombosis of middle cerebral artery, unspecified blood vessel laterality (HCC)    Hyperuricemia  -     Uric Acid; Future    B12 deficiency  -     Vitamin B12; Future    Primary osteoarthritis of left knee    Primary osteoarthritis of right knee    Migraine with aura and without status migrainosus, not intractable    Routine general medical examination at a health care facility  -     [facility] DIGITAL SCREEN W OR WO CAD BILATERAL; Future  -     CBC Auto Differential; Future  -     Comprehensive Metabolic Panel; Future  -     Hemoglobin A1C; Future  -     Lipid Panel; Future  -     Microalbumin / Creatinine Urine Ratio; Future  -     T4, Free; Future  -     TSH without Reflex; Future  -      DIABETES FOOT EXAM    Fatigue, unspecified type  -     T4, Free; Future  -     TSH without Reflex;  Future This was an in-house visit.

## 2021-12-10 NOTE — PATIENT INSTRUCTIONS
Personalized Preventive Plan for Kathryn Link - 12/10/2021  Medicare offers a range of preventive health benefits. Some of the tests and screenings are paid in full while other may be subject to a deductible, co-insurance, and/or copay. Some of these benefits include a comprehensive review of your medical history including lifestyle, illnesses that may run in your family, and various assessments and screenings as appropriate. After reviewing your medical record and screening and assessments performed today your provider may have ordered immunizations, labs, imaging, and/or referrals for you. A list of these orders (if applicable) as well as your Preventive Care list are included within your After Visit Summary for your review. Other Preventive Recommendations:    · A preventive eye exam performed by an eye specialist is recommended every 1-2 years to screen for glaucoma; cataracts, macular degeneration, and other eye disorders. · A preventive dental visit is recommended every 6 months. · Try to get at least 150 minutes of exercise per week or 10,000 steps per day on a pedometer . · Order or download the FREE \"Exercise & Physical Activity: Your Everyday Guide\" from [employer]. Call 7-345.850.6689 or search [employer] online. · You need 3471-7269 mg of calcium and 7714-0846 IU of vitamin D per day. It is possible to meet your calcium requirement with diet alone, but a vitamin D supplement is usually necessary to meet this goal.  · When exposed to the sun, use a sunscreen that protects against both UVA and UVB radiation with an SPF of 30 or greater. Reapply every 2 to 3 hours or after sweating, drying off with a towel, or swimming. · Always wear a seat belt when traveling in a car. Always wear a helmet when riding a bicycle or motorcycle. Patient Education        Well Visit, Over 72: Care Instructions  Overview     Well visits can help you stay healthy.  Your doctor has checked your overall health and may have suggested ways to take good care of yourself. Your doctor also may have recommended tests. At home, you can help prevent illness with healthy eating, regular exercise, and other steps. Follow-up care is a key part of your treatment and safety. Be sure to make and go to all appointments, and call your doctor if you are having problems. It's also a good idea to know your test results and keep a list of the medicines you take. How can you care for yourself at home? Get screening tests that you and your doctor decide on. Screening helps find diseases before any symptoms appear. Eat healthy foods. Choose fruits, vegetables, whole grains, protein, and low-fat dairy foods. Limit fat, especially saturated fat. Reduce salt in your diet. Limit alcohol. If you are a man, have no more than 2 drinks a day or 14 drinks a week. If you are a woman, have no more than 1 drink a day or 7 drinks a week. Since alcohol affects older adults differently, you may want to limit alcohol even more. Or you may not want to drink at all. Get at least 30 minutes of exercise on most days of the week. Walking is a good choice. You also may want to do other activities, such as running, swimming, cycling, or playing tennis or team sports. Reach and stay at a healthy weight. This will lower your risk for many problems, such as obesity, diabetes, heart disease, and high blood pressure. Do not smoke. Smoking can make health problems worse. If you need help quitting, talk to your doctor about stop-smoking programs and medicines. These can increase your chances of quitting for good. Care for your mental health. It is easy to get weighed down by worry and stress. Learn strategies to manage stress, like deep breathing and mindfulness, and stay connected with your family and community. If you find you often feel sad or hopeless, talk with your doctor. Treatment can help.   Talk to your doctor about whether you have any risk factors for sexually transmitted infections (STIs). You can help prevent STIs if you wait to have sex with a new partner (or partners) until you've each been tested for STIs. It also helps if you use condoms (male or female condoms) and if you limit your sex partners to one person who only has sex with you. Vaccines are available for some STIs. If you think you may have a problem with alcohol or drug use, talk to your doctor. This includes prescription medicines (such as amphetamines and opioids) and illegal drugs (such as cocaine and methamphetamine). Your doctor can help you figure out what type of treatment is best for you. Protect your skin from too much sun. When you're outdoors from 10 a.m. to 4 p.m., stay in the shade or cover up with clothing and a hat with a wide brim. Wear sunglasses that block UV rays. Even when it's cloudy, put broad-spectrum sunscreen (SPF 30 or higher) on any exposed skin. See a dentist one or two times a year for checkups and to have your teeth cleaned. Wear a seat belt in the car. When should you call for help? Watch closely for changes in your health, and be sure to contact your doctor if you have any problems or symptoms that concern you. Where can you learn more? Go to https://Circle 1 Networklance.health-partners. org and sign in to your [employer] account. Enter U517 in the [facility] box to learn more about \"Well Visit, Over 65: Care Instructions. \"     If you do not have an account, please click on the \"Sign Up Now\" link. Current as of: February 11, 2021               Content Version: 13.0  © 7408-2910 Healthwise, Incorporated. Care instructions adapted under license by [facility] ([facility]). If you have questions about a medical condition or this instruction, always ask your healthcare professional. [place] [address] any warranty or liability for your use of this information.          Patient Education        Learning About Living Kathy  What is a living will? A living will, also called a declaration, is a legal form. It tells your family and your doctor your wishes when you can't speak for yourself. It's used by the health professionals who will treat you as you near the end of your life or if you get seriously hurt or ill. If you put your wishes in writing, your loved ones and others will know what kind of care you want. They won't need to guess. This can ease your mind and be helpful to others. And you can change or cancel your living will at any time. A living will is not the same as an estate or property will. An estate will explains what you want to happen with your money and property after you die. How do you use it? A living will is used to describe the kinds of treatment or life support you want as you near the end of your life or if you get seriously hurt or ill. Keep these facts in mind about living romero. Your living will is used only if you can't speak or make decisions for yourself. Most often, one or more doctors must certify that you can't speak or decide for yourself before your living will takes effect. If you get better and can speak for yourself again, you can accept or refuse any treatment. It doesn't matter what you said in your living will. Some states may limit your right to refuse treatment in certain cases. For example, you may need to clearly state in your living will that you don't want artificial hydration and nutrition, such as being fed through a tube. Is a living will a legal document? A living will is a legal document. Each state has its own laws about living romero. And a living will may be called something else in your state. Here are some things to know about living romero. You don't need an  to complete a living will. But legal advice can be helpful if your state's laws are unclear.  It can also help if your health history is complicated or your family can't agree on what should be in your living will. You can change your living will at any time. Some people find that their wishes about end-of-life care change as their health changes. If you make big changes to your living will, complete a new form. If you move to another state, make sure that your living will is legal in the state where you now live. In most cases, doctors will respect your wishes even if you have a form from a different state. You might use a universal form that has been approved by many states. This kind of form can sometimes be filled out and stored online. Your digital copy will then be available wherever you have a connection to the internet. The doctors and nurses who need to treat you can find it right away. Your state may offer an online registry. This is another place where you can store your living will online. It's a good idea to get your living will notarized. This means using a person called a Patientco to watch two people sign, or witness, your living will. What should you know when you create a living will? Here are some questions to ask yourself as you make your living will:  Do you know enough about life support methods that might be used? If not, talk to your doctor so you know what might be done if you can't breathe on your own, your heart stops, or you can't swallow. What things would you still want to be able to do after you receive life-support methods? Would you want to be able to walk? To speak? To eat on your own? To live without the help of machines? Do you want certain Protestant practices performed if you become very ill? If you have a choice, where do you want to be cared for? In your home? At a hospital or nursing home? If you have a choice at the end of your life, where would you prefer to die? At home? In a hospital or nursing home? Somewhere else? Would you prefer to be buried or cremated? Do you want your organs to be donated after you die?   What should you do with your living will?  Make sure that your family members and your health care agent have copies of your living will (also called a declaration). Give your doctor a copy of your living will. Ask him or her to keep it as part of your medical record. If you have more than one doctor, make sure that each one has a copy. Put a copy of your living will where it can be easily found. For example, some people may put a copy on their refrigerator door. If you are using a digital copy, be sure your doctor, family members, and health care agent know how to find and access it. Where can you learn more? Go to https://chpepiceweb.[employer]S. org and sign in to your [employer] account. Enter F128 in the [employer] box to learn more about \"Learning About Living Perroy. \"     If you do not have an account, please click on the \"Sign Up Now\" link. Current as of: March 17, 2021               Content Version: 13.0  © 2006-2021 [employer]. Care instructions adapted under license by [facility] ([facility]). If you have questions about a medical condition or this instruction, always ask your healthcare professional. [place] [address] any warranty or liability for your use of this information. Patient Education        Advance Directives: Care Instructions  Overview  An advance directive is a legal way to state your wishes at the end of your life. It tells your family and your doctor what to do if you can't say what you want. There are two main types of advance directives. You can change them any time your wishes change. Living will. This form tells your family and your doctor your wishes about life support and other treatment. The form is also called a declaration. Medical power of . This form lets you name a person to make treatment decisions for you when you can't speak for yourself. This person is called a health care agent (health care proxy, health care surrogate).  The form is also 2021               Content Version: 13.0  © 2006-2021 Healthwise, [employer]. Care instructions adapted under license by [facility] ([facility]). If you have questions about a medical condition or this instruction, always ask your healthcare professional. [address] any warranty or liability for your use of this information. Patient Education        Learning About Being Physically Active  What is physical activity? Being physically active means doing any kind of activity that gets your body moving. The types of physical activity that can help you get fit and stay healthy include:  Aerobic or \"cardio\" activities. These make your heart beat faster and make you breathe harder, such as brisk walking, riding a bike, or running. They strengthen your heart and lungs and build up your endurance. Strength training activities. These make your muscles work against, or \"resist,\" something. Examples include lifting weights or doing push-ups. These activities help tone and strengthen your muscles and bones. Stretches. These let you move your joints and muscles through their full range of motion. Stretching helps you be more flexible. What are the benefits of being active? Being active is one of the best things you can do for your health. It helps you to:  Feel stronger and have more energy to do all the things you like to do. Focus better at school or work. Feel, think, and sleep better. Reach and stay at a healthy weight. Lose fat and build lean muscle. Lower your risk for serious health problems, including diabetes, heart attack, high blood pressure, and some cancers. Keep your heart, lungs, bones, muscles, and joints strong and healthy. How can you make being active part of your life? Start slowly. Make it your long-term goal to get at least 30 minutes of exercise on most days of the week. Walking is a good choice.  You also may want to do other activities, such as running, swimming, seconds, if you can. You should feel a stretch in the muscle, but not pain. Breathe out as you do the stretch. Then breathe in as you hold the stretch. Don't hold your breath. If you're worried about how more activity might affect your health, have a checkup before you start. Follow any special advice your doctor gives you for getting a smart start. Where can you learn more? Go to https://DosYogurespepiceweb.[employer]. org and sign in to your [facility] account. Enter P739 in the Z[employer].com box to learn more about \"Learning About Being Physically Active. \"     If you do not have an account, please click on the \"Sign Up Now\" link. Current as of: May 12, 2021               Content Version: 13.0  © 2006-2021 Healthwise, [employer]. Care instructions adapted under license by [facility] ([facility]). If you have questions about a medical condition or this instruction, always ask your healthcare professional. [place] [address] any warranty or liability for your use of this information. Patient Education        Learning About Meal Planning for Diabetes  Why plan your meals? Meal planning can be a key part of managing diabetes. Planning meals and snacks with the right balance of carbohydrate, protein, and fat can help you keep your blood sugar at the target level you set with your doctor. You don't have to eat special foods. You can eat what your family eats, including sweets once in a while. But you do have to pay attention to how often you eat and how much you eat of certain foods. You may want to work with a dietitian or a certified diabetes educator. He or she can give you tips and meal ideas and can answer your questions about meal planning. This health professional can also help you reach a healthy weight if that is one of your goals. What plan is right for you? Your dietitian or diabetes educator may suggest that you start with the plate format or carbohydrate counting.   The plate format  The plate format is a simple way to help you manage how you eat. You plan meals by learning how much space each food should take on a plate. Using the plate format helps you spread carbohydrate throughout the day. It can make it easier to keep your blood sugar level within your target range. It also helps you see if you're eating healthy portion sizes. To use the plate format, you put non-starchy vegetables on half your plate. Add meat or meat substitutes on one-quarter of the plate. Put a grain or starchy vegetable (such as brown rice or a potato) on the final quarter of the plate. You can add a small piece of fruit and some low-fat or fat-free milk or yogurt, depending on your carbohydrate goal for each meal.  Here are some tips for using the plate format:  Make sure that you are not using an oversized plate. A 9-inch plate is best. Many restaurants use larger plates. Get used to using the plate format at home. Then you can use it when you eat out. Write down your questions about using the plate format. Talk to your doctor, a dietitian, or a diabetes educator about your concerns. Carbohydrate counting  With carbohydrate counting, you plan meals based on the amount of carbohydrate in each food. Carbohydrate raises blood sugar higher and more quickly than any other nutrient. It is found in desserts, breads and cereals, and fruit. It's also found in starchy vegetables such as potatoes and corn, grains such as rice and pasta, and milk and yogurt. Spreading carbohydrate throughout the day helps keep your blood sugar levels within your target range. Your daily amount depends on several things, including your weight, how active you are, which diabetes medicines you take, and what your goals are for your blood sugar levels. A registered dietitian or diabetes educator can help you plan how much carbohydrate to include in each meal and snack.   A guideline for your daily amount of carbohydrate is:  45 to 60 grams at each meal. That's about the same as 3 to 4 carbohydrate servings. 15 to 20 grams at each snack. That's about the same as 1 carbohydrate serving. The Nutrition Facts label on packaged foods tells you how much carbohydrate is in a serving of the food. First, look at the serving size on the food label. Is that the amount you eat in a serving? All of the nutrition information on a food label is based on that serving size. So if you eat more or less than that, you'll need to adjust the other numbers. Total carbohydrate is the next thing you need to look for on the label. If you count carbohydrate servings, one serving of carbohydrate is 15 grams. For foods that don't come with labels, such as fresh fruits and vegetables, you'll need a guide that lists carbohydrate in these foods. Ask your doctor, dietitian, or diabetes educator about books or other nutrition guides you can use. If you take insulin, you need to know how many grams of carbohydrate are in a meal. This lets you know how much rapid-acting insulin to take before you eat. If you use an insulin pump, you get a constant rate of insulin during the day. So the pump must be programmed at meals to give you extra insulin to cover the rise in blood sugar after meals. When you know how much carbohydrate you will eat, you can take the right amount of insulin. Or, if you always use the same amount of insulin, you need to make sure that you eat the same amount of carbohydrate at meals. If you need more help to understand carbohydrate counting and food labels, ask your doctor, dietitian, or diabetes educator. How can you plan healthy meals? Here are some tips to get started:  Plan your meals a week at a time. Don't forget to include snacks too. Use cookbooks or online recipes to plan several main meals. Plan some quick meals for busy nights. You also can double some recipes that freeze well.  Then you can save half for other busy nights when you don't have time to cook. Make sure you have the ingredients you need for your recipes. If you're running low on basic items, put these items on your shopping list too. List foods that you use to make breakfasts, lunches, and snacks. List plenty of fruits and vegetables. Post this list on the refrigerator. Add to it as you think of more things you need. Take the list to the store to do your weekly shopping. Follow-up care is a key part of your treatment and safety. Be sure to make and go to all appointments, and call your doctor if you are having problems. It's also a good idea to know your test results and keep a list of the medicines you take. Where can you learn more? Go to https://Trivnet.[employer]ender Software. org and sign in to your Gladitood account. Enter R358 in the IceWEB box to learn more about \"Learning About Meal Planning for Diabetes. \"     If you do not have an account, please click on the \"Sign Up Now\" link. Current as of: December 17, 2020               Content Version: 13.0  © 5115-8194 Healthwise, [employer]. Care instructions adapted under license by [facility] ([facility]). If you have questions about a medical condition or this instruction, always ask your healthcare professional. [address] any warranty or liability for your use of this information. Patient Education        Eating Healthy Foods: Care Instructions  Your Care Instructions     Eating healthy foods can help lower your risk for disease. Healthy food gives you energy and keeps your heart strong, your brain active, your muscles working, and your bones strong. A healthy diet includes a variety of foods from the basic food groups: grains, vegetables, fruits, milk and milk products, and meat and beans. Some people may eat more of their favorite foods from only one food group and, as a result, miss getting the nutrients they need.  So, it is important to pay attention not only to what you eat but also to what you are missing from your diet. You can eat a healthy, balanced diet by making a few small changes. Follow-up care is a key part of your treatment and safety. Be sure to make and go to all appointments, and call your doctor if you are having problems. It's also a good idea to know your test results and keep a list of the medicines you take. How can you care for yourself at home? Look at what you eat  Keep a food diary for a week or two and record everything you eat or drink. Track the number of servings you eat from each food group. For a balanced diet every day, eat a variety of:  6 or more ounce-equivalents of grains, such as cereals, breads, crackers, rice, or pasta, every day. An ounce-equivalent is 1 slice of bread, 1 cup of ready-to-eat cereal, or ½ cup of cooked rice, cooked pasta, or cooked cereal.  2½ cups of vegetables, especially:  Dark-green vegetables such as broccoli and spinach. Orange vegetables such as carrots and sweet potatoes. Dry beans (such as scott and kidney beans) and peas (such as lentils). 2 cups of fresh, frozen, or canned fruit. A small apple or 1 banana or orange equals 1 cup.  3 cups of nonfat or low-fat milk, yogurt, or other milk products. 5½ ounces of meat and beans, such as chicken, fish, lean meat, beans, nuts, and seeds. One egg, 1 tablespoon of peanut butter, ½ ounce nuts or seeds, or ¼ cup of cooked beans equals 1 ounce of meat. Learn how to read food labels for serving sizes and ingredients. Fast-food and convenience-food meals often contain few or no fruits or vegetables. Make sure you eat some fruits and vegetables to make the meal more nutritious. Look at your food diary. For each food group, add up what you have eaten and then divide the total by the number of days. This will give you an idea of how much you are eating from each food group. See if you can find some ways to change your diet to make it more healthy.   Start small  Do not try to make dramatic changes to your diet all at once. You might feel that you are missing out on your favorite foods and then be more likely to fail. Start slowly, and gradually change your habits. Try some of the following:  Use whole wheat bread instead of white bread. Use nonfat or low-fat milk instead of whole milk. Eat brown rice instead of white rice, and eat whole wheat pasta instead of white-flour pasta. Try low-fat cheeses and low-fat yogurt. Add more fruits and vegetables to meals and have them for snacks. Add lettuce, tomato, cucumber, and onion to sandwiches. Add fruit to yogurt and cereal.  Enjoy food  You can still eat your favorite foods. You just may need to eat less of them. If your favorite foods are high in fat, salt, and sugar, limit how often you eat them, but do not cut them out entirely. Eat a wide variety of foods. Make healthy choices when eating out  The type of restaurant you choose can help you make healthy choices. Even fast-food chains are now offering more low-fat or healthier choices on the menu. Choose smaller portions, or take half of your meal home. When eating out, try:  A veggie pizza with a whole wheat crust or grilled chicken (instead of sausage or pepperoni). Pasta with roasted vegetables, grilled chicken, or marinara sauce instead of cream sauce. A vegetable wrap or grilled chicken wrap. Broiled or poached food instead of fried or breaded items. Make healthy choices easy  Buy packaged, prewashed, ready-to-eat fresh vegetables and fruits, such as baby carrots, salad mixes, and chopped or shredded broccoli and cauliflower. Buy packaged, presliced fruits, such as melon or pineapple. Choose 100% fruit or vegetable juice instead of soda. Limit juice intake to 4 to 6 oz (½ to ¾ cup) a day. Blend low-fat yogurt, fruit juice, and canned or frozen fruit to make a smoothie for breakfast or a snack. Where can you learn more? Go to https://jeff.health-partners. org and sign in to your Xyo account. Enter V915 in the [facility] box to learn more about \"Eating Healthy Foods: Care Instructions. \"     If you do not have an account, please click on the \"Sign Up Now\" link. Current as of: December 17, 2020               Content Version: 13.0  © 9190-9276 Healthwise, [employer]. Care instructions adapted under license by [facility] ([facility]). If you have questions about a medical condition or this instruction, always ask your healthcare professional. [place] [address] any warranty or liability for your use of this information. Patient Education        7 Tips for Eating Healthy When [facility] All the Time    Make quick meals on busy nights. Try recipes that are simple to make and easy to clean up, like pasta, soups, or casseroles. These dishes can often be made ahead of time and are easy to reheat when you're short on time. Buy foods that last.  Choose fresh foods, such as onions, garlic, and potatoes. You can also reach for frozen, canned, and dried foods. Foods like these last and can help you pull a healthy meal together quickly. Samanta Hides something new. Try checking out cookbooks from [employer]. Or search for new recipes online. You can also change the ingredients in an old favorite recipe. Or switch the seasonings to create something new. Try theme nights. Try \"Taco Tuesday. \" Do \"Meatless Monday\" for a vegetarian meal each week. And save \"Leftovers Night\" for days when you don't want to cook. Let your favorite dishes guide you. Then make them again every few weeks. Samanta Hides with a friend. Maybe you know someone who's feeling the same way about healthy eating. Pick a recipe and schedule a night to make it \"together. \" You can also video call while you cook or eat. Share food with other people. If you like cooking and baking, you can share what you've made. Split up what you've made and keep only what you want to eat.  You can wrap up the rest to share with a friend, neighbor, or family member. Aim for balance, variety, and moderation. On most days, eat from each food group--grains, protein foods, vegetables, fruits, and dairy. And choose different foods from each group. For example, if you often eat apples, try reaching for a banana instead. All foods, if you eat them in moderation, can be part of healthy eating. Current as of: December 17, 2020               Content Version: 13.0  © 2006-2021 Healthwise, [employer]. Care instructions adapted under license by [facility] ([facility]). If you have questions about a medical condition or this instruction, always ask your healthcare professional. [address] any warranty or liability for your use of this information. Patient Education        Learning About Dietary Guidelines  What are the Dietary Guidelines for Americans? Dietary Guidelines for Americans provide tips for eating well and staying healthy. This helps reduce the risk for long-term (chronic) diseases. These guidelines recommend that you:  Eat and drink the right amount for you. The U.[place]. government's food guide is called MyPlate. It can help you make your own well-balanced eating plan. Try to balance your eating with your activity. This helps you stay at a healthy weight. Drink alcohol in moderation, if at all. Limit foods high in salt, saturated fat, trans fat, and added sugar. These guidelines are from the U.S. Department of Agriculture and the formerly [facility] and [employer]. They are updated every 5 years. What is MyPlate? MyPlate is the U.[place]. government's food guide. It can help you make your own well-balanced eating plan. A balanced eating plan means that you eat enough, but not too much, and that your food gives you the nutrients you need to stay healthy. MyPlate focuses on eating plenty of whole grains, fruits, and vegetables, and on limiting fat and sugar.  It is available online at www. ChooseMyPlate.gov. How can you get started? If you're trying to eat healthier, you can slowly change your eating habits over time. You don't have to make big changes all at once. Start by adding one or two healthy foods to your meals each day. Grains  Choose whole-grain breads and cereals and whole-wheat pasta and whole-grain crackers. Vegetables  Eat a variety of vegetables every day. They have lots of nutrients and are part of a heart-healthy diet. Fruits  Eat a variety of fruits every day. Fruits contain lots of nutrients. Choose fresh fruit instead of fruit juice. Protein foods  Choose fish and lean poultry more often. Eat red meat and fried meats less often. Dried beans, tofu, and nuts are also good sources of protein. Dairy  Choose low-fat or fat-free products from this food group. If you have problems digesting milk, try eating cheese or yogurt instead. Fats and oils  Limit fats and oils if you're trying to cut calories. Choose healthy fats when you cook. These include canola oil and olive oil. Where can you learn more? Go to https://Vizify.Nexway. org and sign in to your alike account. Enter P641 in the [facility] box to learn more about \"Learning About Dietary Guidelines. \"     If you do not have an account, please click on the \"Sign Up Now\" link. Current as of: December 17, 2020               Content Version: 13.0  © 3028-8652 Healthwise, [employer]. Care instructions adapted under license by [facility] ([facility]). If you have questions about a medical condition or this instruction, always ask your healthcare professional. [place] [address] any warranty or liability for your use of this information. Patient Education        Learning About Healthy Weight  What is a healthy weight? A healthy weight is the weight at which you feel good about yourself and have energy for work and play.  It's also one that lowers your risk for health problems. What can you do to stay at a healthy weight? It can be hard to stay at a healthy weight, especially when fast food, vending-machine snacks, and processed foods are so easy to find. And with your busy lifestyle, activity may be low on your list of things to do. But staying at a healthy weight may be easier than you think. Here are some dos and don'ts for staying at a healthy weight. Do eat healthy foods  The kinds of foods you eat have a big impact on both your weight and your health. Reaching and staying at a healthy weight is not about going on a diet. It's about making healthier food choices every day and changing your diet for good. Healthy eating means eating a variety of foods so that you get all the nutrients you need. Your body needs protein, carbohydrate, and fats for energy. They keep your heart beating, your brain active, and your muscles working. On most days, try to eat from each food group. This means eating a variety of: Whole grains, such as whole wheat breads and pastas. Fruits and vegetables. Dairy products, such as low-fat milk, yogurt, and cheese. Lean proteins, such as all types of fish, chicken without the skin, and beans. Don't have too much or too little of one thing. All foods, if eaten in moderation, can be part of healthy eating. Even sweets can be okay. If your favorite foods are high in fat, salt, sugar, or calories, limit how often you eat them. Eat smaller servings, or look for healthy substitutes. Do watch what you eat  Many people eat more than their bodies need. Part of staying at a healthy weight means learning how much food you really need from day to day and not eating more than that. Even with healthy foods, eating too much can make you gain weight. Having a well-balanced diet means that you eat enough, but not too much, and that your food gives you the nutrients you need to stay healthy. So listen to your body. Eat when you're hungry.  Stop when you feel satisfied. It's a good idea to have healthy snacks ready for when you get hungry. Keep healthy snacks with you at work, in your car, and at home. If you have a healthy snack easily available, you'll be less likely to pick a candy bar or bag of chips from a vending machine instead. Some healthy snacks you might want to keep on hand are fruit, low-fat yogurt, string cheese, low-fat microwave popcorn, raisins and other dried fruit, nuts, whole wheat crackers, pretzels, carrots, celery sticks, and broccoli. Do some physical activity  A big part of reaching and staying at a healthy weight is being active. When you're active, you burn calories. This makes it easier to reach and stay at a healthy weight. When you're active on a regular basis, your body burns more calories, even when you're at rest. Being active helps you lose fat and build lean muscle. Try to be active for at least 1 hour every day. This may sound like a lot, but it's okay to be active in smaller blocks of time that add up to 1 hour a day. Any activity that makes your heart beat faster and keeps it there for a while counts. A brisk walk, run, or swim will get your heart beating faster. So will climbing stairs, shooting baskets, or cycling. Even some household chores like vacuuming and mowing the lawn will get your heart rate up. Pick activities that you enjoy--ones that make your heart beat faster, your muscles stronger, and your muscles and joints more flexible. If you find more than one thing you like doing, do them all. You don't have to do the same thing every day. Don't diet  Diets don't work. Diets are temporary. Because you give up so much when you diet, you may be hungry and think about food all the time. And after you stop dieting, you also may overeat to make up for what you missed. Most people who diet end up gaining back the pounds they lost--and more. Remember that healthy bodies come in lots of shapes and sizes.  Everyone can get healthier by eating better and being more active. Where can you learn more? Go to https://chpepiceweb.[employer]. org and sign in to your [employer] account. Enter [id number] in the [employer] box to learn more about \"Learning About Healthy Weight. \"     If you do not have an account, please click on the \"Sign Up Now\" link. Current as of: March 17, 2021               Content Version: 13.0  © 2405-6312 Healthwise, Incorporated. Care instructions adapted under license by [facility] ([facility]). If you have questions about a medical condition or this instruction, always ask your healthcare professional. [address] any warranty or liability for your use of this information.

## 2021-12-27 ENCOUNTER — HOSPITAL ENCOUNTER (OUTPATIENT)
Dept: WOMENS IMAGING | Age: 76
Discharge: HOME | End: 2021-12-27
Payer: MEDICARE

## 2021-12-27 DIAGNOSIS — Z00.00 ROUTINE GENERAL MEDICAL EXAMINATION AT A HEALTH CARE FACILITY: ICD-10-CM

## 2021-12-27 DIAGNOSIS — Z12.31 ENCOUNTER FOR SCREENING MAMMOGRAM FOR MALIGNANT NEOPLASM OF BREAST: ICD-10-CM

## 2021-12-27 PROCEDURE — 77063 BREAST TOMOSYNTHESIS BI: CPT

## 2021-12-30 DIAGNOSIS — I10 ESSENTIAL HYPERTENSION: ICD-10-CM

## 2021-12-30 DIAGNOSIS — E11.9 TYPE 2 DIABETES MELLITUS WITHOUT COMPLICATION, WITHOUT LONG-TERM CURRENT USE OF INSULIN (HCC): ICD-10-CM

## 2021-12-30 RX ORDER — LISINOPRIL 20 MG/1
20 TABLET ORAL DAILY
Qty: 90 TABLET | Refills: 3 | Status: SHIPPED | OUTPATIENT
Start: 2021-12-30

## 2021-12-30 NOTE — TELEPHONE ENCOUNTER
Received fax from pharmacy requesting refill on pts medication(s). Pt was last seen in office on 12/10/2021  and has a follow up scheduled for 3/11/2022. Will send request to  Dr. Cailin Nick  for authorization.      Requested Prescriptions     Pending Prescriptions Disp Refills    lisinopril (PRINIVIL;ZESTRIL) 20 MG tablet 90 tablet 3     Sig: Take 1 tablet by mouth daily

## 2022-01-31 ENCOUNTER — TELEPHONE (OUTPATIENT)
Dept: PRIMARY CARE CLINIC | Age: 77
End: 2022-01-31

## 2022-02-24 RX ORDER — AMLODIPINE BESYLATE 5 MG/1
TABLET ORAL
Qty: 28 TABLET | Refills: 3 | Status: SHIPPED | OUTPATIENT
Start: 2022-02-24 | End: 2022-06-16

## 2022-03-11 ENCOUNTER — OFFICE VISIT (OUTPATIENT)
Dept: PRIMARY CARE CLINIC | Age: 77
End: 2022-03-11
Payer: MEDICARE

## 2022-03-11 VITALS
BODY MASS INDEX: 32.47 KG/M2 | HEIGHT: 61 IN | SYSTOLIC BLOOD PRESSURE: 128 MMHG | WEIGHT: 172 LBS | DIASTOLIC BLOOD PRESSURE: 72 MMHG | OXYGEN SATURATION: 98 % | TEMPERATURE: 97.4 F | HEART RATE: 102 BPM

## 2022-03-11 DIAGNOSIS — D64.9 MILD ANEMIA: ICD-10-CM

## 2022-03-11 DIAGNOSIS — E79.0 HYPERURICEMIA: ICD-10-CM

## 2022-03-11 DIAGNOSIS — I10 PRIMARY HYPERTENSION: ICD-10-CM

## 2022-03-11 DIAGNOSIS — M15.9 PRIMARY OSTEOARTHRITIS INVOLVING MULTIPLE JOINTS: ICD-10-CM

## 2022-03-11 DIAGNOSIS — E11.42 TYPE 2 DIABETES MELLITUS WITH DIABETIC POLYNEUROPATHY, WITHOUT LONG-TERM CURRENT USE OF INSULIN (HCC): Primary | ICD-10-CM

## 2022-03-11 DIAGNOSIS — E55.9 VITAMIN D DEFICIENCY: ICD-10-CM

## 2022-03-11 DIAGNOSIS — E78.2 MIXED HYPERLIPIDEMIA: ICD-10-CM

## 2022-03-11 DIAGNOSIS — G43.109 MIGRAINE WITH AURA AND WITHOUT STATUS MIGRAINOSUS, NOT INTRACTABLE: ICD-10-CM

## 2022-03-11 DIAGNOSIS — I63.319 CEREBRAL INFARCTION DUE TO THROMBOSIS OF MIDDLE CEREBRAL ARTERY, UNSPECIFIED BLOOD VESSEL LATERALITY (HCC): ICD-10-CM

## 2022-03-11 DIAGNOSIS — E53.8 B12 DEFICIENCY: ICD-10-CM

## 2022-03-11 PROCEDURE — 99214 OFFICE O/P EST MOD 30 MIN: CPT | Performed by: PEDIATRICS

## 2022-03-11 PROCEDURE — 96372 THER/PROPH/DIAG INJ SC/IM: CPT | Performed by: PEDIATRICS

## 2022-03-11 RX ORDER — TRIAMCINOLONE ACETONIDE 40 MG/ML
40 INJECTION, SUSPENSION INTRA-ARTICULAR; INTRAMUSCULAR ONCE
Status: COMPLETED | OUTPATIENT
Start: 2022-03-11 | End: 2022-03-11

## 2022-03-11 RX ADMIN — TRIAMCINOLONE ACETONIDE 40 MG: 40 INJECTION, SUSPENSION INTRA-ARTICULAR; INTRAMUSCULAR at 09:05

## 2022-03-11 ASSESSMENT — ENCOUNTER SYMPTOMS
BACK PAIN: 1
SORE THROAT: 0
COUGH: 0
NAUSEA: 0
VOMITING: 0
SINUS PRESSURE: 0
DIARRHEA: 0
ABDOMINAL PAIN: 0
WHEEZING: 0
EYE PAIN: 0
SHORTNESS OF BREATH: 0

## 2022-03-24 DIAGNOSIS — M54.31 SCIATICA OF RIGHT SIDE: ICD-10-CM

## 2022-03-24 DIAGNOSIS — M51.36 DDD (DEGENERATIVE DISC DISEASE), LUMBAR: ICD-10-CM

## 2022-03-24 RX ORDER — MELOXICAM 15 MG/1
15 TABLET ORAL DAILY
Qty: 90 TABLET | Refills: 3 | Status: SHIPPED | OUTPATIENT
Start: 2022-03-24

## 2022-03-24 NOTE — TELEPHONE ENCOUNTER
Received fax from pharmacy requesting refill on pts medication(s). Pt was last seen in office on 3/11/2022  and has a follow up scheduled for 7/11/2022. Will send request to  Dr. Roxi Griffin  for authorization.      Requested Prescriptions     Pending Prescriptions Disp Refills    meloxicam (MOBIC) 15 MG tablet [Pharmacy Med Name: meloxicam 15 mg tablet] 90 tablet 3     Sig: Take 1 tablet by mouth daily

## 2022-04-26 DIAGNOSIS — M54.41 CHRONIC RIGHT-SIDED LOW BACK PAIN WITH RIGHT-SIDED SCIATICA: ICD-10-CM

## 2022-04-26 DIAGNOSIS — G89.29 CHRONIC RIGHT-SIDED LOW BACK PAIN WITH RIGHT-SIDED SCIATICA: ICD-10-CM

## 2022-04-26 DIAGNOSIS — M51.36 DDD (DEGENERATIVE DISC DISEASE), LUMBAR: ICD-10-CM

## 2022-04-26 DIAGNOSIS — M54.31 SCIATICA OF RIGHT SIDE: ICD-10-CM

## 2022-04-26 RX ORDER — GABAPENTIN 600 MG/1
600 TABLET ORAL 2 TIMES DAILY
Qty: 180 TABLET | Refills: 1 | Status: SHIPPED | OUTPATIENT
Start: 2022-04-26 | End: 2022-10-19 | Stop reason: SINTOL

## 2022-04-26 NOTE — TELEPHONE ENCOUNTER
Received fax from pharmacy requesting refill on pts medication(s). Pt was last seen in office on 3/11/2022  and has a follow up scheduled for 7/11/2022. Will send request to  Dr. Mayur Eason  for authorization. Margarito Frankel scanned to pts chart for review. Requested Prescriptions     Pending Prescriptions Disp Refills    gabapentin (NEURONTIN) 600 MG tablet [Pharmacy Med Name: gabapentin 600 mg tablet] 180 tablet 1     Sig: Take 1 tablet by mouth 2 times daily for 90 days.

## 2022-06-16 RX ORDER — AMLODIPINE BESYLATE 5 MG/1
TABLET ORAL
Qty: 28 TABLET | Refills: 3 | Status: SHIPPED | OUTPATIENT
Start: 2022-06-16 | End: 2022-10-05

## 2022-06-27 ENCOUNTER — TELEPHONE (OUTPATIENT)
Dept: PRIMARY CARE CLINIC | Age: 77
End: 2022-06-27

## 2022-07-19 DIAGNOSIS — E11.42 TYPE 2 DIABETES MELLITUS WITH DIABETIC POLYNEUROPATHY, WITHOUT LONG-TERM CURRENT USE OF INSULIN (HCC): ICD-10-CM

## 2022-07-19 DIAGNOSIS — I10 PRIMARY HYPERTENSION: ICD-10-CM

## 2022-07-19 DIAGNOSIS — E55.9 VITAMIN D DEFICIENCY: ICD-10-CM

## 2022-07-19 DIAGNOSIS — E53.8 B12 DEFICIENCY: ICD-10-CM

## 2022-07-19 LAB
ALBUMIN SERPL-MCNC: 4.2 G/DL (ref 3.5–5.2)
ALP BLD-CCNC: 70 U/L (ref 35–104)
ALT SERPL-CCNC: 9 U/L (ref 5–33)
ANION GAP SERPL CALCULATED.3IONS-SCNC: 12 MMOL/L (ref 7–19)
AST SERPL-CCNC: 12 U/L (ref 5–32)
BASOPHILS ABSOLUTE: 0 K/UL (ref 0–0.2)
BASOPHILS RELATIVE PERCENT: 0.3 % (ref 0–1)
BILIRUB SERPL-MCNC: <0.2 MG/DL (ref 0.2–1.2)
BUN BLDV-MCNC: 12 MG/DL (ref 8–23)
CALCIUM SERPL-MCNC: 9.3 MG/DL (ref 8.8–10.2)
CHLORIDE BLD-SCNC: 104 MMOL/L (ref 98–111)
CO2: 20 MMOL/L (ref 22–29)
CREAT SERPL-MCNC: 0.7 MG/DL (ref 0.5–0.9)
EOSINOPHILS ABSOLUTE: 0.1 K/UL (ref 0–0.6)
EOSINOPHILS RELATIVE PERCENT: 1.2 % (ref 0–5)
GFR AFRICAN AMERICAN: >59
GFR NON-AFRICAN AMERICAN: >60
GLUCOSE BLD-MCNC: 126 MG/DL (ref 74–109)
HBA1C MFR BLD: 5.6 % (ref 4–6)
HCT VFR BLD CALC: 34.3 % (ref 37–47)
HEMOGLOBIN: 10.7 G/DL (ref 12–16)
IMMATURE GRANULOCYTES #: 0.1 K/UL
LYMPHOCYTES ABSOLUTE: 3.5 K/UL (ref 1.1–4.5)
LYMPHOCYTES RELATIVE PERCENT: 36.8 % (ref 20–40)
MCH RBC QN AUTO: 29.6 PG (ref 27–31)
MCHC RBC AUTO-ENTMCNC: 31.2 G/DL (ref 33–37)
MCV RBC AUTO: 95 FL (ref 81–99)
MONOCYTES ABSOLUTE: 1.6 K/UL (ref 0–0.9)
MONOCYTES RELATIVE PERCENT: 16.6 % (ref 0–10)
NEUTROPHILS ABSOLUTE: 4.2 K/UL (ref 1.5–7.5)
NEUTROPHILS RELATIVE PERCENT: 44 % (ref 50–65)
PDW BLD-RTO: 13.9 % (ref 11.5–14.5)
PLATELET # BLD: 205 K/UL (ref 130–400)
PMV BLD AUTO: 13.5 FL (ref 9.4–12.3)
POTASSIUM SERPL-SCNC: 4.1 MMOL/L (ref 3.5–5)
RBC # BLD: 3.61 M/UL (ref 4.2–5.4)
SODIUM BLD-SCNC: 136 MMOL/L (ref 136–145)
TOTAL PROTEIN: 6.4 G/DL (ref 6.6–8.7)
VITAMIN B-12: 353 PG/ML (ref 211–946)
VITAMIN D 25-HYDROXY: 29.8 NG/ML
WBC # BLD: 9.5 K/UL (ref 4.8–10.8)

## 2022-07-20 ENCOUNTER — TELEPHONE (OUTPATIENT)
Dept: PRIMARY CARE CLINIC | Age: 77
End: 2022-07-20

## 2022-07-20 NOTE — TELEPHONE ENCOUNTER
----- Message from [address] [facility],[address], DO sent at 7/19/2022  7:30 PM CDT -----  Vitamin B12 level is low normal.  Recommend oral supplementation with 1000 daily oral pills. Vitamin D is just below the normal level. Recommend supplementing with 4000 international units of vitamin D3 on a daily basis. Hemoglobin A1c is 5.6 which indicates excellent blood sugar control over the past 3 months. Your metabolic profile is normal.  This includes kidney and liver functions as well as electrolytes. Blood sugar is 126 at the time of the lab draw. CBC shows a mild anemia slightly more so than past values. Otherwise stable CBC.   Recommend follow-up CBC in 1 month to ensure stability

## 2022-07-21 ENCOUNTER — OFFICE VISIT (OUTPATIENT)
Dept: PRIMARY CARE CLINIC | Age: 77
End: 2022-07-21
Payer: MEDICARE

## 2022-07-21 VITALS
SYSTOLIC BLOOD PRESSURE: 121 MMHG | BODY MASS INDEX: 30.04 KG/M2 | OXYGEN SATURATION: 98 % | DIASTOLIC BLOOD PRESSURE: 70 MMHG | WEIGHT: 159 LBS | TEMPERATURE: 97.6 F | HEART RATE: 97 BPM

## 2022-07-21 DIAGNOSIS — E55.9 VITAMIN D DEFICIENCY: ICD-10-CM

## 2022-07-21 DIAGNOSIS — E11.42 TYPE 2 DIABETES MELLITUS WITH DIABETIC POLYNEUROPATHY, WITHOUT LONG-TERM CURRENT USE OF INSULIN (HCC): Primary | ICD-10-CM

## 2022-07-21 DIAGNOSIS — E53.8 B12 DEFICIENCY: ICD-10-CM

## 2022-07-21 DIAGNOSIS — E79.0 HYPERURICEMIA: ICD-10-CM

## 2022-07-21 DIAGNOSIS — M17.4 OTHER SECONDARY OSTEOARTHRITIS OF BOTH KNEES: ICD-10-CM

## 2022-07-21 PROCEDURE — 96372 THER/PROPH/DIAG INJ SC/IM: CPT | Performed by: NURSE PRACTITIONER

## 2022-07-21 PROCEDURE — 3044F HG A1C LEVEL LT 7.0%: CPT | Performed by: NURSE PRACTITIONER

## 2022-07-21 PROCEDURE — 99214 OFFICE O/P EST MOD 30 MIN: CPT | Performed by: NURSE PRACTITIONER

## 2022-07-21 PROCEDURE — 20610 DRAIN/INJ JOINT/BURSA W/O US: CPT | Performed by: NURSE PRACTITIONER

## 2022-07-21 PROCEDURE — 1123F ACP DISCUSS/DSCN MKR DOCD: CPT | Performed by: NURSE PRACTITIONER

## 2022-07-21 RX ORDER — CYANOCOBALAMIN 1000 UG/ML
1000 INJECTION INTRAMUSCULAR; SUBCUTANEOUS ONCE
Status: COMPLETED | OUTPATIENT
Start: 2022-07-21 | End: 2022-07-21

## 2022-07-21 RX ORDER — TRIAMCINOLONE ACETONIDE 40 MG/ML
40 INJECTION, SUSPENSION INTRA-ARTICULAR; INTRAMUSCULAR ONCE
Status: COMPLETED | OUTPATIENT
Start: 2022-07-21 | End: 2022-07-21

## 2022-07-21 RX ORDER — DEXAMETHASONE SODIUM PHOSPHATE 4 MG/ML
4 INJECTION, SOLUTION INTRA-ARTICULAR; INTRALESIONAL; INTRAMUSCULAR; INTRAVENOUS; SOFT TISSUE ONCE
Status: COMPLETED | OUTPATIENT
Start: 2022-07-21 | End: 2022-07-21

## 2022-07-21 RX ORDER — ERGOCALCIFEROL 1.25 MG/1
50000 CAPSULE ORAL WEEKLY
Qty: 12 CAPSULE | Refills: 1 | Status: SHIPPED | OUTPATIENT
Start: 2022-07-21

## 2022-07-21 RX ADMIN — DEXAMETHASONE SODIUM PHOSPHATE 4 MG: 4 INJECTION, SOLUTION INTRA-ARTICULAR; INTRALESIONAL; INTRAMUSCULAR; INTRAVENOUS; SOFT TISSUE at 15:14

## 2022-07-21 RX ADMIN — CYANOCOBALAMIN 1000 MCG: 1000 INJECTION INTRAMUSCULAR; SUBCUTANEOUS at 15:12

## 2022-07-21 RX ADMIN — TRIAMCINOLONE ACETONIDE 40 MG: 40 INJECTION, SUSPENSION INTRA-ARTICULAR; INTRAMUSCULAR at 15:15

## 2022-07-21 SDOH — ECONOMIC STABILITY: FOOD INSECURITY: WITHIN THE PAST 12 MONTHS, THE FOOD YOU BOUGHT JUST DIDN'T LAST AND YOU DIDN'T HAVE MONEY TO GET MORE.: NEVER TRUE

## 2022-07-21 SDOH — ECONOMIC STABILITY: FOOD INSECURITY: WITHIN THE PAST 12 MONTHS, YOU WORRIED THAT YOUR FOOD WOULD RUN OUT BEFORE YOU GOT MONEY TO BUY MORE.: NEVER TRUE

## 2022-07-21 ASSESSMENT — ENCOUNTER SYMPTOMS
SHORTNESS OF BREATH: 0
DIARRHEA: 0
VOMITING: 0
EYE PAIN: 0
SINUS PRESSURE: 0
ABDOMINAL PAIN: 0
WHEEZING: 0
NAUSEA: 0
COUGH: 0
BACK PAIN: 0
SORE THROAT: 0

## 2022-07-21 ASSESSMENT — PATIENT HEALTH QUESTIONNAIRE - PHQ9
3. TROUBLE FALLING OR STAYING ASLEEP: 0
SUM OF ALL RESPONSES TO PHQ QUESTIONS 1-9: 0
8. MOVING OR SPEAKING SO SLOWLY THAT OTHER PEOPLE COULD HAVE NOTICED. OR THE OPPOSITE, BEING SO FIGETY OR RESTLESS THAT YOU HAVE BEEN MOVING AROUND A LOT MORE THAN USUAL: 0
SUM OF ALL RESPONSES TO PHQ9 QUESTIONS 1 & 2: 0
6. FEELING BAD ABOUT YOURSELF - OR THAT YOU ARE A FAILURE OR HAVE LET YOURSELF OR YOUR FAMILY DOWN: 0
SUM OF ALL RESPONSES TO PHQ QUESTIONS 1-9: 0
2. FEELING DOWN, DEPRESSED OR HOPELESS: 0
5. POOR APPETITE OR OVEREATING: 0
SUM OF ALL RESPONSES TO PHQ QUESTIONS 1-9: 0
4. FEELING TIRED OR HAVING LITTLE ENERGY: 0
SUM OF ALL RESPONSES TO PHQ QUESTIONS 1-9: 0
9. THOUGHTS THAT YOU WOULD BE BETTER OFF DEAD, OR OF HURTING YOURSELF: 0
10. IF YOU CHECKED OFF ANY PROBLEMS, HOW DIFFICULT HAVE THESE PROBLEMS MADE IT FOR YOU TO DO YOUR WORK, TAKE CARE OF THINGS AT HOME, OR GET ALONG WITH OTHER PEOPLE: 0
1. LITTLE INTEREST OR PLEASURE IN DOING THINGS: 0
7. TROUBLE CONCENTRATING ON THINGS, SUCH AS READING THE NEWSPAPER OR WATCHING TELEVISION: 0

## 2022-07-21 ASSESSMENT — SOCIAL DETERMINANTS OF HEALTH (SDOH): HOW HARD IS IT FOR YOU TO PAY FOR THE VERY BASICS LIKE FOOD, HOUSING, MEDICAL CARE, AND HEATING?: NOT HARD AT ALL

## 2022-07-21 NOTE — PROGRESS NOTES
Rate and Rhythm: Normal rate and regular rhythm. Pulmonary:      Effort: Pulmonary effort is normal.      Breath sounds: Normal breath sounds. No wheezing or rhonchi. Musculoskeletal:      Right knee: Deformity and bony tenderness present. Decreased range of motion. Tenderness present. Left knee: Deformity and bony tenderness present. Decreased range of motion. Tenderness present. Comments: Consistent with advanced arthritis     Neurological:      Mental Status: She is alert and oriented to person, place, and time. Psychiatric:         Mood and Affect: Mood normal.         Behavior: Behavior normal.                 An electronic signature was used to authenticate this note. --LAYLA Staples     Knee Arthrocentesis without Injection Procedure Note    Pre-operative Diagnosis: bilateral knee pain    Post-operative Diagnosis: same    Indications: Symptom relief from osteoarthritis    Anesthesia: not required without added sodium bicarbonate    Procedure Details     Verbal consent was obtained for the procedure. The joint was prepped with chlorhexidine  A 22 gauge needle was inserted into the superior aspect of the joint from a lateral approach. 5 ml 1% lidocaine and 1 ml of triamcinolone (KENALOG) 40mg/ml  and decadron 4mg  was then injected into the joint through the same needle. The needle was removed and the area cleansed and dressed. Complications:  None; patient tolerated the procedure well.   Both knees in similar fashion with same medications

## 2022-08-16 ENCOUNTER — TELEPHONE (OUTPATIENT)
Dept: PRIMARY CARE CLINIC | Age: 77
End: 2022-08-16

## 2022-08-26 ENCOUNTER — OFFICE VISIT (OUTPATIENT)
Dept: PRIMARY CARE CLINIC | Age: 77
End: 2022-08-26
Payer: MEDICARE

## 2022-08-26 VITALS
WEIGHT: 151 LBS | SYSTOLIC BLOOD PRESSURE: 138 MMHG | TEMPERATURE: 98.9 F | HEART RATE: 122 BPM | DIASTOLIC BLOOD PRESSURE: 76 MMHG | BODY MASS INDEX: 28.53 KG/M2 | OXYGEN SATURATION: 97 %

## 2022-08-26 DIAGNOSIS — E83.42 HYPOMAGNESEMIA: ICD-10-CM

## 2022-08-26 DIAGNOSIS — Z86.73 HISTORY OF TIA (TRANSIENT ISCHEMIC ATTACK): ICD-10-CM

## 2022-08-26 DIAGNOSIS — D72.829 LEUKOCYTOSIS, UNSPECIFIED TYPE: Primary | ICD-10-CM

## 2022-08-26 DIAGNOSIS — Z09 HOSPITAL DISCHARGE FOLLOW-UP: ICD-10-CM

## 2022-08-26 DIAGNOSIS — U07.1 SARS-COV-2 POSITIVE: ICD-10-CM

## 2022-08-26 DIAGNOSIS — R53.1 WEAKNESS: ICD-10-CM

## 2022-08-26 DIAGNOSIS — E53.8 B12 DEFICIENCY: ICD-10-CM

## 2022-08-26 DIAGNOSIS — D72.829 LEUKOCYTOSIS, UNSPECIFIED TYPE: ICD-10-CM

## 2022-08-26 DIAGNOSIS — Z09 HOSPITAL DISCHARGE FOLLOW-UP: Primary | ICD-10-CM

## 2022-08-26 LAB
ALBUMIN SERPL-MCNC: 4.6 G/DL (ref 3.5–5.2)
ALP BLD-CCNC: 61 U/L (ref 35–104)
ALT SERPL-CCNC: 9 U/L (ref 5–33)
ANION GAP SERPL CALCULATED.3IONS-SCNC: 16 MMOL/L (ref 7–19)
AST SERPL-CCNC: 11 U/L (ref 5–32)
BASOPHILS ABSOLUTE: 0 K/UL (ref 0–0.2)
BASOPHILS RELATIVE PERCENT: 0.2 % (ref 0–1)
BILIRUB SERPL-MCNC: <0.2 MG/DL (ref 0.2–1.2)
BUN BLDV-MCNC: 15 MG/DL (ref 8–23)
CALCIUM SERPL-MCNC: 9.3 MG/DL (ref 8.8–10.2)
CHLORIDE BLD-SCNC: 101 MMOL/L (ref 98–111)
CO2: 20 MMOL/L (ref 22–29)
CREAT SERPL-MCNC: 0.9 MG/DL (ref 0.5–0.9)
EOSINOPHILS ABSOLUTE: 0 K/UL (ref 0–0.6)
EOSINOPHILS RELATIVE PERCENT: 0.1 % (ref 0–5)
GFR AFRICAN AMERICAN: >59
GFR NON-AFRICAN AMERICAN: >60
GLUCOSE BLD-MCNC: 179 MG/DL (ref 74–109)
HCT VFR BLD CALC: 37.7 % (ref 37–47)
HEMOGLOBIN: 11.4 G/DL (ref 12–16)
IMMATURE GRANULOCYTES #: 0.3 K/UL
LYMPHOCYTES ABSOLUTE: 2.6 K/UL (ref 1.1–4.5)
LYMPHOCYTES RELATIVE PERCENT: 15.9 % (ref 20–40)
MAGNESIUM: 1.1 MG/DL (ref 1.6–2.4)
MCH RBC QN AUTO: 29.5 PG (ref 27–31)
MCHC RBC AUTO-ENTMCNC: 30.2 G/DL (ref 33–37)
MCV RBC AUTO: 97.7 FL (ref 81–99)
MONOCYTES ABSOLUTE: 1.9 K/UL (ref 0–0.9)
MONOCYTES RELATIVE PERCENT: 11.7 % (ref 0–10)
NEUTROPHILS ABSOLUTE: 11.5 K/UL (ref 1.5–7.5)
NEUTROPHILS RELATIVE PERCENT: 70.1 % (ref 50–65)
PDW BLD-RTO: 14.6 % (ref 11.5–14.5)
PLATELET # BLD: 198 K/UL (ref 130–400)
PMV BLD AUTO: 13.9 FL (ref 9.4–12.3)
POTASSIUM SERPL-SCNC: 3.9 MMOL/L (ref 3.5–5)
RBC # BLD: 3.86 M/UL (ref 4.2–5.4)
SODIUM BLD-SCNC: 137 MMOL/L (ref 136–145)
TOTAL PROTEIN: 6.5 G/DL (ref 6.6–8.7)
WBC # BLD: 16.4 K/UL (ref 4.8–10.8)

## 2022-08-26 PROCEDURE — 99214 OFFICE O/P EST MOD 30 MIN: CPT | Performed by: NURSE PRACTITIONER

## 2022-08-26 PROCEDURE — 1123F ACP DISCUSS/DSCN MKR DOCD: CPT | Performed by: NURSE PRACTITIONER

## 2022-08-26 PROCEDURE — 96372 THER/PROPH/DIAG INJ SC/IM: CPT | Performed by: NURSE PRACTITIONER

## 2022-08-26 PROCEDURE — 1111F DSCHRG MED/CURRENT MED MERGE: CPT | Performed by: NURSE PRACTITIONER

## 2022-08-26 RX ORDER — CYANOCOBALAMIN 1000 UG/ML
1000 INJECTION INTRAMUSCULAR; SUBCUTANEOUS ONCE
Status: COMPLETED | OUTPATIENT
Start: 2022-08-26 | End: 2022-08-26

## 2022-08-26 RX ADMIN — CYANOCOBALAMIN 1000 MCG: 1000 INJECTION INTRAMUSCULAR; SUBCUTANEOUS at 13:28

## 2022-08-26 NOTE — PROGRESS NOTES
Post-Discharge Transitional Care  Follow Up      Ama Rios   YOB: 1945    Date of Office Visit:  8/26/2022  Date of Hospital Admission: 1/22/19  Date of Hospital Discharge: 1/22/19  Risk of hospital readmission (high >=14%. Medium >=10%) :No data recorded    Care management risk score Rising risk (score 2-5) and Complex Care (Scores >=6): No Risk Score On File     Non face to face  following discharge, date last encounter closed (first attempt may have been earlier): *No documented post hospital discharge outreach found in the last 14 days    Call initiated 2 business days of discharge: *No response recorded in the last 14 days    ASSESSMENT/PLAN:   Hospital discharge follow-up  Still waiting on records to review from [address] [place] [address] LIST  -     Magnesium; Future  -     CBC with Auto Differential; Future  -     Comprehensive Metabolic Panel; Future  Hypomagnesemia  Critical was treated   Will need to recheck and monitor close    -     Magnesium; Future  -     CBC with Auto Differential; Future  -     Comprehensive Metabolic Panel; Future  Weakness  Multiple issues  Post covid versus electrolyte dehydration    -     Magnesium; Future  -     CBC with Auto Differential; Future  -     Comprehensive Metabolic Panel; Future  -     Culture, Urine; Future  SARS-CoV-2 positive  Cont to monitor  symptoms  -     CBC with Auto Differential; Future  -     Comprehensive Metabolic Panel; Future  Leukocytosis, unspecified type  No shift at ER  Will check again consider treating   Versus culture    -     CBC with Auto Differential; Future  -     Culture, Urine;  Future  History of TIA (transient ischemic attack)  Cont asa daily    B12 deficiency  Cont monthly  -     cyanocobalamin injection 1,000 mcg; 1,000 mcg, IntraMUSCular, ONCE, 1 dose, On Fri 8/26/22 at [facility] Making: moderate complexity  Return in about 1 month (around 9/26/2022) for follow up of weakness. Subjective:   HPI:  Follow up of Hospital problems/diagnosis(es)  Patient is here for follow up  Had been feeling ill days before went to ER  [facility] : [facility] [place]  The night of 8/12 workup at [facility] ER   Noted elevated WBC : urine had trace of blood and luek no culture at [facility]  Blood culture times 2 negative  Positive for covid : xray negative  note covid like symptoms the week before on looking back    Patient had confusion : and issues talking and weakness  Had thrown up Thursday night into Friday  And passed out in floor  Found to have critical low magnesium  She cant recall this ever being an issue    Was in [place] :8/12-8/14  For possible stroke  When was there it started resolve and saturday afternoon   With fluids and supportive care   Multiple attempts to get records not available  She was not on any medications  Was told to restart baby asa daily    Reports extreme fatigue  But feeling better than before  Noted saturday migraine thrown up twice  Since that has resolved other than weakness    History of b12 issues   Will check again     Patient reports that no signs of stroke   From 2016   Our MRI reports 8/2016 was negative     Inpatient course: Discharge summary reviewed- see chart. Interval history/Current status: improving     Patient Active Problem List   Diagnosis    Family history of colon cancer    DM2 (diabetes mellitus, type 2) ([facility]    Cerebral infarction ([facility] [address]    Hyperlipidemia    Hypertension    B12 deficiency    Hyperuricemia    Ventral hernia without obstruction or gangrene    Recurrent ventral incisional hernia with necrosis       Medications listed as ordered at the time of discharge from hospital     Medication List            Accurate as of August 26, 2022  1:29 PM. If you have any questions, ask your nurse or doctor.                 CONTINUE taking these medications      allopurinol 300 MG tablet  Commonly known as: ZYLOPRIM  Take 1 tablet by mouth daily     amLODIPine 5 MG tablet  Commonly known as: NORVASC  TAKE ONE TABLET BY MOUTH DAILY     aspirin 81 MG tablet     atorvastatin 20 MG tablet  Commonly known as: LIPITOR  Take 1 tablet by mouth nightly     blood glucose monitor kit and supplies  Cap glucose daily and prn     blood glucose test strips  Cap glucose daily and prn     butalbital-acetaminophen-caffeine -40 MG per tablet  Commonly known as: FIORICET, ESGIC  Take 1 tablet by mouth every 4 hours as needed for Headaches     CALTRATE 600 PLUS-VIT D PO     fish oil 1000 MG Caps     furosemide 20 MG tablet  Commonly known as: LASIX     gabapentin 600 MG tablet  Commonly known as: NEURONTIN  Take 1 tablet by mouth 2 times daily for 90 days. Insulin Pen Needle 32G X 4 MM Misc  Commonly known as: H-E-B inControl Pen Needles  1 each by Does not apply route daily     lisinopril 20 MG tablet  Commonly known as: PRINIVIL;ZESTRIL  Take 1 tablet by mouth daily     meloxicam 15 MG tablet  Commonly known as: MOBIC  Take 1 tablet by mouth daily     metFORMIN 1000 MG tablet  Commonly known as: GLUCOPHAGE  TAKE ONE TABLET BY MOUTH TWICE DAILY WITH MEALS     MULTI COMPLETE PO     Semaglutide(0.25 or 0.5MG/DOS) 2 MG/1.5ML Sopn  Inject 0.5 mg into the skin once a week     triamcinolone 0.1 % cream  Commonly known as: KENALOG  Apply topically 2 times daily.      vitamin D 1.25 MG (13871 UT) Caps capsule  Commonly known as: ERGOCALCIFEROL  Take 1 capsule by mouth once a week                Medications marked \"taking\" at this time  Outpatient Medications Marked as Taking for the 8/26/22 encounter (Office Visit) with LAYLA Nath   Medication Sig Dispense Refill    vitamin D (ERGOCALCIFEROL) 1.25 MG (47898 UT) CAPS capsule Take 1 capsule by mouth once a week 12 capsule 1    amLODIPine (NORVASC) 5 MG tablet TAKE ONE TABLET BY MOUTH DAILY 28 tablet 3    meloxicam (MOBIC) 15 MG tablet Take 1 tablet by mouth daily 90 tablet 3    metFORMIN (GLUCOPHAGE) 1000 MG tablet TAKE ONE TABLET BY MOUTH TWICE DAILY WITH MEALS 180 tablet 3    lisinopril (PRINIVIL;ZESTRIL) 20 MG tablet Take 1 tablet by mouth daily 90 tablet 3    allopurinol (ZYLOPRIM) 300 MG tablet Take 1 tablet by mouth daily 90 tablet 3    atorvastatin (LIPITOR) 20 MG tablet Take 1 tablet by mouth nightly 90 tablet 3    butalbital-acetaminophen-caffeine (FIORICET, ESGIC) -40 MG per tablet Take 1 tablet by mouth every 4 hours as needed for Headaches 180 tablet 3    triamcinolone (KENALOG) 0.1 % cream Apply topically 2 times daily. 80 g 5    Semaglutide,0.25 or 0.5MG/DOS, 2 MG/1.5ML SOPN Inject 0.5 mg into the skin once a week 1.5 mL 5    Omega-3 Fatty Acids (FISH OIL) 1000 MG CAPS Take 1,000 mg by mouth daily      furosemide (LASIX) 20 MG tablet Take 20 mg by mouth daily as needed Only taking PRN      Blood Glucose Monitoring Suppl JOSE Cap glucose daily and prn 1 Device 0    Glucose Blood (BLOOD GLUCOSE TEST STRIPS) STRP Cap glucose daily and prn 100 strip 3    Insulin Pen Needle (H-E-B INCONTROL PEN NEEDLES) 32G X 4 MM MISC 1 each by Does not apply route daily 100 each 3    Calcium-Vitamin D (CALTRATE 600 PLUS-VIT D PO) Take 1 tablet by mouth 2 times daily      aspirin 81 MG tablet Take 81 mg by mouth daily       Multiple Vitamins-Minerals (MULTI COMPLETE PO) Take 1 tablet by mouth daily. Medications patient taking as of now reconciled against medications ordered at time of hospital discharge: Yes    A comprehensive review of systems was negative except for what was noted in the HPI.     Objective:    /76 (Site: Right Upper Arm, Position: Sitting, Cuff Size: Large Adult)   Pulse (!) 122   Temp 98.9 °F (37.2 °C) (Temporal)   Wt 151 lb (68.5 kg)   SpO2 97%   BMI 28.53 kg/m²   General Appearance: alert and oriented to person, place and time, well developed and well- nourished, in no acute distress  Skin: warm and dry, no rash or erythema  Head: normocephalic and atraumatic  Eyes: pupils equal, round, and reactive to light, extraocular eye movements intact, conjunctivae normal  ENT: tympanic membrane, external ear and ear canal normal bilaterally, nose without deformity, nasal mucosa and turbinates normal without polyps  Neck: supple and non-tender without mass, no thyromegaly or thyroid nodules, no cervical lymphadenopathy  Pulmonary/Chest: clear to auscultation bilaterally- no wheezes, rales or rhonchi, normal air movement, no respiratory distress  Cardiovascular: normal rate, regular rhythm, normal S1 and S2, no murmurs, rubs, clicks, or gallops, distal pulses intact, no carotid bruits  Abdomen: soft, non-tender, non-distended, normal bowel sounds, no masses or organomegaly  Extremities: no cyanosis, clubbing or edema  Musculoskeletal: normal range of motion, no joint swelling, deformity or tenderness  Neurologic: reflexes normal and symmetric, no cranial nerve deficit, gait, coordination and speech normal      An electronic signature was used to authenticate this note.   --LAYLA Calabrese

## 2022-08-27 RX ORDER — AZITHROMYCIN 250 MG/1
250 TABLET, FILM COATED ORAL SEE ADMIN INSTRUCTIONS
Qty: 6 TABLET | Refills: 0 | Status: SHIPPED | OUTPATIENT
Start: 2022-08-27 | End: 2022-09-01

## 2022-08-27 RX ORDER — MAGNESIUM OXIDE 400 MG/1
400 TABLET ORAL 2 TIMES DAILY
Qty: 60 TABLET | Refills: 0 | Status: SHIPPED | OUTPATIENT
Start: 2022-08-27 | End: 2022-09-26 | Stop reason: SDUPTHER

## 2022-08-28 LAB — URINE CULTURE, ROUTINE: NORMAL

## 2022-08-29 ENCOUNTER — TELEPHONE (OUTPATIENT)
Dept: PRIMARY CARE CLINIC | Age: 77
End: 2022-08-29

## 2022-08-29 DIAGNOSIS — R82.998 HIGH URINE WHITE BLOOD CELL COUNT: ICD-10-CM

## 2022-08-29 DIAGNOSIS — E61.2 MAGNESIUM DEFICIENCY: Primary | ICD-10-CM

## 2022-08-29 NOTE — TELEPHONE ENCOUNTER
----- Message from LAYLA Doll sent at 8/29/2022  8:16 AM CDT -----  Urine culture negative  CBC elevated make sure taking antibiotic called in Saturday   Recheck cbc and mag Friday  Cmp wnl glucose 179 cont tight control  Magnesium low on mag ox twice a day recheck Friday

## 2022-09-02 ENCOUNTER — TELEPHONE (OUTPATIENT)
Dept: PRIMARY CARE CLINIC | Age: 77
End: 2022-09-02

## 2022-09-02 DIAGNOSIS — E83.42 HYPOMAGNESEMIA: ICD-10-CM

## 2022-09-02 DIAGNOSIS — D72.829 LEUKOCYTOSIS, UNSPECIFIED TYPE: ICD-10-CM

## 2022-09-02 DIAGNOSIS — E83.42 HYPOMAGNESEMIA: Primary | ICD-10-CM

## 2022-09-02 LAB
BASOPHILS ABSOLUTE: 0 K/UL (ref 0–0.2)
BASOPHILS RELATIVE PERCENT: 0.2 % (ref 0–1)
EOSINOPHILS ABSOLUTE: 0 K/UL (ref 0–0.6)
EOSINOPHILS RELATIVE PERCENT: 0.2 % (ref 0–5)
HCT VFR BLD CALC: 39.5 % (ref 37–47)
HEMOGLOBIN: 12 G/DL (ref 12–16)
IMMATURE GRANULOCYTES #: 0.1 K/UL
LYMPHOCYTES ABSOLUTE: 2.6 K/UL (ref 1.1–4.5)
LYMPHOCYTES RELATIVE PERCENT: 26.7 % (ref 20–40)
MAGNESIUM: 1.6 MG/DL (ref 1.6–2.4)
MCH RBC QN AUTO: 29.1 PG (ref 27–31)
MCHC RBC AUTO-ENTMCNC: 30.4 G/DL (ref 33–37)
MCV RBC AUTO: 95.6 FL (ref 81–99)
MONOCYTES ABSOLUTE: 1.7 K/UL (ref 0–0.9)
MONOCYTES RELATIVE PERCENT: 16.8 % (ref 0–10)
NEUTROPHILS ABSOLUTE: 5.4 K/UL (ref 1.5–7.5)
NEUTROPHILS RELATIVE PERCENT: 54.7 % (ref 50–65)
PDW BLD-RTO: 14.6 % (ref 11.5–14.5)
PLATELET # BLD: 189 K/UL (ref 130–400)
PMV BLD AUTO: 13 FL (ref 9.4–12.3)
RBC # BLD: 4.13 M/UL (ref 4.2–5.4)
WBC # BLD: 9.9 K/UL (ref 4.8–10.8)

## 2022-09-02 NOTE — TELEPHONE ENCOUNTER
----- Message from LAYLA Calabrese sent at 9/2/2022  1:08 PM CDT -----  Magnesium improving not at goal cont twice a day dosing  Recheck magnesium in 1 month  Cbc signs of infection gone great news   No anemia or concerns

## 2022-09-23 DIAGNOSIS — R82.998 HIGH URINE WHITE BLOOD CELL COUNT: ICD-10-CM

## 2022-09-23 DIAGNOSIS — E61.2 MAGNESIUM DEFICIENCY: ICD-10-CM

## 2022-09-23 LAB
BASOPHILS ABSOLUTE: 0 K/UL (ref 0–0.2)
BASOPHILS RELATIVE PERCENT: 0.2 % (ref 0–1)
EOSINOPHILS ABSOLUTE: 0 K/UL (ref 0–0.6)
EOSINOPHILS RELATIVE PERCENT: 0.1 % (ref 0–5)
HCT VFR BLD CALC: 37.6 % (ref 37–47)
HEMOGLOBIN: 11.5 G/DL (ref 12–16)
IMMATURE GRANULOCYTES #: 0.1 K/UL
LYMPHOCYTES ABSOLUTE: 2.8 K/UL (ref 1.1–4.5)
LYMPHOCYTES RELATIVE PERCENT: 25.3 % (ref 20–40)
MAGNESIUM: 1.6 MG/DL (ref 1.6–2.4)
MCH RBC QN AUTO: 29.6 PG (ref 27–31)
MCHC RBC AUTO-ENTMCNC: 30.6 G/DL (ref 33–37)
MCV RBC AUTO: 96.9 FL (ref 81–99)
MONOCYTES ABSOLUTE: 1.8 K/UL (ref 0–0.9)
MONOCYTES RELATIVE PERCENT: 16.7 % (ref 0–10)
NEUTROPHILS ABSOLUTE: 6.2 K/UL (ref 1.5–7.5)
NEUTROPHILS RELATIVE PERCENT: 56.6 % (ref 50–65)
PDW BLD-RTO: 14.2 % (ref 11.5–14.5)
PLATELET # BLD: 206 K/UL (ref 130–400)
PMV BLD AUTO: 13.6 FL (ref 9.4–12.3)
RBC # BLD: 3.88 M/UL (ref 4.2–5.4)
WBC # BLD: 10.9 K/UL (ref 4.8–10.8)

## 2022-09-26 ENCOUNTER — OFFICE VISIT (OUTPATIENT)
Dept: PRIMARY CARE CLINIC | Age: 77
End: 2022-09-26
Payer: MEDICARE

## 2022-09-26 VITALS
BODY MASS INDEX: 26.97 KG/M2 | WEIGHT: 142.75 LBS | TEMPERATURE: 96.8 F | DIASTOLIC BLOOD PRESSURE: 80 MMHG | OXYGEN SATURATION: 97 % | SYSTOLIC BLOOD PRESSURE: 132 MMHG | HEART RATE: 99 BPM

## 2022-09-26 DIAGNOSIS — R53.83 FATIGUE, UNSPECIFIED TYPE: ICD-10-CM

## 2022-09-26 DIAGNOSIS — E55.9 VITAMIN D DEFICIENCY: ICD-10-CM

## 2022-09-26 DIAGNOSIS — R23.3 BRUISES EASILY: ICD-10-CM

## 2022-09-26 DIAGNOSIS — E61.2 MAGNESIUM DEFICIENCY: ICD-10-CM

## 2022-09-26 DIAGNOSIS — E53.8 B12 DEFICIENCY: Primary | ICD-10-CM

## 2022-09-26 PROCEDURE — 99214 OFFICE O/P EST MOD 30 MIN: CPT | Performed by: NURSE PRACTITIONER

## 2022-09-26 PROCEDURE — 1123F ACP DISCUSS/DSCN MKR DOCD: CPT | Performed by: NURSE PRACTITIONER

## 2022-09-26 RX ORDER — MAGNESIUM OXIDE 400 MG/1
400 TABLET ORAL 2 TIMES DAILY
Qty: 60 TABLET | Refills: 5 | Status: SHIPPED | OUTPATIENT
Start: 2022-09-26 | End: 2022-10-26

## 2022-09-26 ASSESSMENT — ENCOUNTER SYMPTOMS
SINUS PRESSURE: 0
DIARRHEA: 0
COUGH: 0
NAUSEA: 0
VOMITING: 0
EYE PAIN: 0
WHEEZING: 0
ABDOMINAL PAIN: 0
SHORTNESS OF BREATH: 0
SORE THROAT: 0
BACK PAIN: 0

## 2022-09-26 NOTE — PROGRESS NOTES
Darrelyn Leventhal (:  1945) is a 78 y.o. female,Established patient, here for evaluation of the following chief complaint(s):  Follow-up (For weakness)      ASSESSMENT/PLAN:    ICD-10-CM    1. B12 deficiency  E53.8 Cont present  Doing well      2. Vitamin D deficiency  E55.9 Taking weekly        3. Magnesium deficiency  E61.2 magnesium oxide (MAG-OX) 400 MG tablet  Cont twice a day        4. Fatigue, unspecified type  R53.83 magnesium oxide (MAG-OX) 400 MG tablet      5. Bruises easily  R23.8 Back down to every other day            Return if symptoms worsen or fail to improve. SUBJECTIVE/OBJECTIVE:  HPI    Patient is here for weakness  She has been taking the magnesium every day  She has finished the supplements on twice a day  Recheck  magnesium low of normal    Vitamin D deficiency on weekly    Bacterial elevated WBC  Lower now   And no shifts    Bruising daily  On 81mg daily EC  But notes bruising bad      Fatigue  Slowly getting better  She is resting when can  Then returns to working  Slowly getting back to normal      /80 (Site: Left Upper Arm, Position: Sitting, Cuff Size: Large Adult)   Pulse 99   Temp 96.8 °F (36 °C) (Temporal)   Wt 142 lb 12 oz (64.8 kg)   SpO2 97%   BMI 26.97 kg/m²   Review of Systems   Constitutional:  Positive for fatigue (improving). Negative for unexpected weight change. HENT:  Negative for congestion, ear pain, sinus pressure and sore throat. Eyes:  Negative for pain and visual disturbance. Respiratory:  Negative for cough, shortness of breath and wheezing. Cardiovascular:  Negative for chest pain, palpitations and leg swelling. Gastrointestinal:  Negative for abdominal pain, diarrhea, nausea and vomiting. Endocrine: Negative for polyuria. Genitourinary:  Negative for dysuria, frequency, hematuria and urgency. Musculoskeletal:  Positive for arthralgias (typical arthritis bilat knees). Negative for back pain and neck pain.    Skin:  Negative

## 2022-09-26 NOTE — PROGRESS NOTES
R[address] Lu [address] [place], [address]  Phone [phone]   Fax [phone]      OFFICE VISIT: 3/11/2022    Natalia Flood-: 1945      HPI  Reason For Visit:  Gaurang Nagel is a 78 y.o.     3 Month Follow-Up, Diabetes (BG good, no concerns. ), and Back Pain (back pain acting up, will come and go. )    Patient presents for routine follow-up for multiple health issues. Present concerns:  She notes she is having increase in her back pain. She states that this waxes and wanes in severity. This has been an ongoing issue for her. Diabetes Mellitus Type 2  Diet compliance:  compliant most of the time  Nutrition Consultation Needed:  no  Medication:              Metformin 1000 mg twice daily              Ozempic 0.5mg  subcu weekly              Metformin 1000 mg twice daily with meals      Medication compliance:  compliant most of the time  Weight trend: increasing.  Weight is down 8lb in the past 3 months. Current exercise: yes - walking  Checkin times daily  Home blood sugar records: fasting range: Low 1 teens  Blood sugar was 121 this morning, fasting  Low BG:  no  Eye exam current (within one year): yes  Checking Feet regularly:  yes - no sores  ACE/ARB:  yes - lisinopril  Aspirin: Yes  She also takes gabapentin for her neuropathy  Tobacco history: She  reports that she has never smoked.  She has never used smokeless tobacco.    Lab Results   Component Value Date    LABA1C 6.1 (H) 12/10/2021    LABA1C 6.0 2021    LABA1C 6.4 (H) 2020     Lab Results   Component Value Date    LABMICR 17.70 12/10/2021    CREATININE 0.6 12/10/2021       Hypertension:   BP today was   BP Readings from Last 1 Encounters:   22 128/72      Recent BP readings:    BP Readings from Last 3 Encounters:   22 128/72   12/10/21 122/82   21 126/84     Medication              Amlodipine 5 mg daily              Lisinopril 20 mg daily              Lasix 20 mg daily as needed Medication compliance:  compliant most of the time  Home blood pressure monitoring: Yes - controlled. She is trying to be adherent to a low sodium diet. Symptoms: none  Laboratory:  Lab Results   Component Value Date    BUN 8 12/10/2021    CREATININE 0.6 12/10/2021       Hyperlipidemia:   Medication:   atorvastatin (Lipitor) and OTC Fish Oil  Low Fat, Low Choleterol Diet:  yes - she is diligently trying  Myalgias or GI upset: no  The patient exercises daily. Laboratory:    Lab Results   Component Value Date    CHOL 126 (L) 12/10/2021    TRIG 115 12/10/2021    HDL 59 (L) 12/10/2021    LDLCALC 44 12/10/2021    LDLDIRECT 85 (L) 08/19/2015      Lab Results   Component Value Date    ALT 10 12/10/2021    AST 13 12/10/2021       History of cerebrovascular accident  Medications as above and trying to our best to control risk factors as well as possible. Symptoms are mostly resolved       Mild anemia: This is nonspecific and not associated with any other blood dyscrasias. We are following this. Most recent H&H were 11.6 and 38.1 respectively      Hyperuricemia:  Medication:              Allopurinol 300 mg daily  Symptoms: no recent gout symptoms  Most recent uric acid level was 3.7 on 12/10/2021       Migraine headaches:  Medication:              Excedrin Migraine as needed              Fioricet as needed              She had tried some samples of Ubrelvy in the past and this was helpful as well  Symptoms:this is a rare event.        B12 deficiency. Medication:              VNU does get B12 injections. Symptoms:she does feel better when she takes the b12 shots.        Osteoarthritis:  Medication:              Meloxicam 15 mg daily (this is helpful)              She also takes tylenol for this prn. Symptoms: she tolerates.       height is 5' 1\" (1.549 m) and weight is 172 lb (78 kg). Her temporal temperature is 97.4 °F (36.3 °C). Her blood pressure is 128/72 and her pulse is 102.  Her oxygen saturation is 98%.      Body mass index is 32.5 kg/m². I have reviewed the following with the Ms. Flood   Lab Review  Orders Only on 12/10/2021   Component Date Value    Uric Acid, Serum 12/10/2021 3.7     Vitamin B-12 12/10/2021 457     TSH 12/10/2021 1.980     T4 Free 12/10/2021 1.32     Microalbumin, Random Uri* 12/10/2021 17.70     Creatinine, Ur 12/10/2021 124.9     Microalbumin Creatinine * 12/10/2021 141.7     Cholesterol, Total 12/10/2021 126*    Triglycerides 12/10/2021 115     HDL 12/10/2021 59*    LDL Calculated 12/10/2021 44     Hemoglobin A1C 12/10/2021 6.1*    Sodium 12/10/2021 143     Potassium 12/10/2021 4.4     Chloride 12/10/2021 106     CO2 12/10/2021 19*    Anion Gap 12/10/2021 18     Glucose 12/10/2021 152*    BUN 12/10/2021 8     CREATININE 12/10/2021 0.6     GFR Non- 12/10/2021 >60     GFR  12/10/2021 >59     Calcium 12/10/2021 10.0     Total Protein 12/10/2021 6.8     Albumin 12/10/2021 4.5     Total Bilirubin 12/10/2021 <0.2     Alkaline Phosphatase 12/10/2021 74     ALT 12/10/2021 10     AST 12/10/2021 13     WBC 12/10/2021 9.8     RBC 12/10/2021 4.01*    Hemoglobin 12/10/2021 11.6*    Hematocrit 12/10/2021 38.1     MCV 12/10/2021 95.0     MCH 12/10/2021 28.9     MCHC 12/10/2021 30.4*    RDW 12/10/2021 14.1     Platelets 20/15/7642 199     MPV 12/10/2021 12.7*    Neutrophils % 12/10/2021 55.8     Lymphocytes % 12/10/2021 24.5     Monocytes % 12/10/2021 17.7*    Eosinophils % 12/10/2021 0.6     Basophils % 12/10/2021 0.2     Neutrophils Absolute 12/10/2021 5.5     Immature Granulocytes # 12/10/2021 0.1     Lymphocytes Absolute 12/10/2021 2.4     Monocytes Absolute 12/10/2021 1.70*    Eosinophils Absolute 12/10/2021 0.10     Basophils Absolute 12/10/2021 0.00     Folate 12/10/2021 >20.0     Iron 12/10/2021 61     TIBC 12/10/2021 293     Iron Saturation 12/10/2021 21     Ferritin 12/10/2021 66.1      Copies of these are in the chart. Current Outpatient Medications   Medication Sig Dispense Refill    amLODIPine (NORVASC) 5 MG tablet TAKE ONE TABLET BY MOUTH DAILY 28 tablet 3    metFORMIN (GLUCOPHAGE) 1000 MG tablet TAKE ONE TABLET BY MOUTH TWICE DAILY WITH MEALS 180 tablet 3    lisinopril (PRINIVIL;ZESTRIL) 20 MG tablet Take 1 tablet by mouth daily 90 tablet 3    allopurinol (ZYLOPRIM) 300 MG tablet Take 1 tablet by mouth daily 90 tablet 3    gabapentin (NEURONTIN) 600 MG tablet Take 1 tablet by mouth 2 times daily for 180 days. 180 tablet 1    atorvastatin (LIPITOR) 20 MG tablet Take 1 tablet by mouth nightly 90 tablet 3    meloxicam (MOBIC) 15 MG tablet Take 1 tablet by mouth daily 90 tablet 3    butalbital-acetaminophen-caffeine (FIORICET, ESGIC) -40 MG per tablet Take 1 tablet by mouth every 4 hours as needed for Headaches 180 tablet 3    triamcinolone (KENALOG) 0.1 % cream Apply topically 2 times daily. 80 g 5    Semaglutide,0.25 or 0.5MG/DOS, 2 MG/1.5ML SOPN Inject 0.5 mg into the skin once a week 1.5 mL 5    Omega-3 Fatty Acids (FISH OIL) 1000 MG CAPS Take 1,000 mg by mouth daily      furosemide (LASIX) 20 MG tablet Take 20 mg by mouth daily as needed Only taking PRN      Blood Glucose Monitoring Suppl JOSE Cap glucose daily and prn 1 Device 0    Glucose Blood (BLOOD GLUCOSE TEST STRIPS) STRP Cap glucose daily and prn 100 strip 3    Insulin Pen Needle (H-E-B INCONTROL PEN NEEDLES) 32G X 4 MM MISC 1 each by Does not apply route daily 100 each 3    Calcium-Vitamin D (CALTRATE 600 PLUS-VIT D PO) Take 1 tablet by mouth 2 times daily      aspirin 81 MG tablet Take 81 mg by mouth daily       Multiple Vitamins-Minerals (MULTI COMPLETE PO) Take 1 tablet by mouth daily.        Current Facility-Administered Medications   Medication Dose Route Frequency Provider Last Rate Last Admin    triamcinolone acetonide (KENALOG-40) injection 40 mg  40 mg IntraMUSCular Once B Danielle Seip, DO Allergies: Patient has no known allergies. 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    Type II or unspecified type diabetes mellitus without mention of complication, not stated as uncontrolled        Family History   Problem Relation Age of Onset    Colon Cancer Mother     Colon Polyps Neg Hx     Esophageal Cancer Neg Hx     Liver Disease Neg Hx     Liver Cancer Neg Hx     Rectal Cancer Neg Hx     Stomach Cancer Neg Hx        Past Surgical History:   Procedure Laterality Date    APPENDECTOMY       SECTION      x2   [address]    COLONOSCOPY  16    Dr Chanda Shields [facility])-Tubular AP (-) dysplasia x 1, 5 yr recall    HERNIA REPAIR      She has had multiple hernia surgeries; x4    HERNIA REPAIR      pt states she's had difficulty with mesh.  HYSTERECTOMY, TOTAL ABDOMINAL      OVARY REMOVAL Bilateral     age 46    UMBILICAL HERNIA REPAIR N/A 2019    INCISIONAL HERNIA REPAIR WITH BIOLOGIC MESH performed by Katarina Adame MD at [address] [place] [address]       Social History     Tobacco Use    Smoking status: Never Smoker    Smokeless tobacco: Never Used   Substance Use Topics    Alcohol use: No        Review of Systems   Constitutional: Negative for fatigue and unexpected weight change. HENT: Negative for congestion, ear pain, sinus pressure and sore throat. Eyes: Negative for pain and visual disturbance. Respiratory: Negative for cough, shortness of breath and wheezing. Cardiovascular: Positive for leg swelling. Negative for chest pain and palpitations. Gastrointestinal: Negative for abdominal pain, diarrhea, nausea and vomiting. Endocrine: Negative for polyuria. Genitourinary: Negative for dysuria, frequency, hematuria and urgency.    Musculoskeletal: Positive for arthralgias (typical arthritis) and back pain. Negative for neck pain. Skin: Negative for rash. Neurological: Positive for headaches (increased recently). Negative for dizziness and weakness. Psychiatric/Behavioral: Negative for self-injury. The patient is not nervous/anxious. Physical Exam  Vitals reviewed. Constitutional:       General: She is not in acute distress. Appearance: She is well-developed. She is obese. She is not toxic-appearing. Comments: Body habitus is ob, but rapidly losing weight. HENT:      Head: Normocephalic and atraumatic. Right Ear: Hearing, ear canal and external ear normal. A middle ear effusion is present. Left Ear: Hearing, ear canal and external ear normal. A middle ear effusion is present. Nose: Nose normal.      Mouth/Throat:      Mouth: Mucous membranes are moist.      Pharynx: Oropharynx is clear. Eyes:      General: Lids are normal.      Extraocular Movements: Extraocular movements intact. Conjunctiva/sclera: Conjunctivae normal.      Pupils: Pupils are equal, round, and reactive to light. Neck:      Thyroid: No thyromegaly. Vascular: No carotid bruit or JVD. Trachea: Phonation normal.   Cardiovascular:      Rate and Rhythm: Normal rate and regular rhythm. No extrasystoles are present. Chest Wall: PMI is not displaced. Pulses: Normal pulses. Heart sounds: Normal heart sounds. No murmur heard. No friction rub. No gallop. Pulmonary:      Effort: Pulmonary effort is normal. No respiratory distress. Breath sounds: Normal breath sounds. No wheezing, rhonchi or rales. Abdominal:      General: Bowel sounds are normal. There is no distension. Palpations: Abdomen is soft. There is no mass. Tenderness: There is no abdominal tenderness. Genitourinary:     Comments: Examination deferred  Musculoskeletal:         General: Tenderness (over bilateral knees  ) and signs of injury (contusion ) present. No swelling. Normal range of motion. Cervical back: Normal range of motion and neck supple. Right lower leg: Edema (trace to 1+) present. Left lower leg: Edema (trace to 1+) present. Comments: Joint examination reveals no acute arthritis or synovitis. Lymphadenopathy:      Cervical: No cervical adenopathy. Skin:     General: Skin is warm and dry. Capillary Refill: Capillary refill takes less than 2 seconds. Findings: No rash. Neurological:      General: No focal deficit present. Mental Status: She is alert and oriented to person, place, and time. Cranial Nerves: No cranial nerve deficit (by gross examination). Sensory: No sensory deficit. Motor: No tremor or atrophy. Gait: Gait normal.      Comments: No focal deficits appreciated   Psychiatric:         Mood and Affect: Mood normal.         Speech: Speech normal.         Behavior: Behavior normal. Behavior is cooperative. ASSESSMENT      ICD-10-CM    1. Type 2 diabetes mellitus with diabetic polyneuropathy, without long-term current use of insulin ([facility])  E11.42 Comprehensive Metabolic Panel     Hemoglobin A1C   2. Primary hypertension  I10 CBC with Auto Differential     Comprehensive Metabolic Panel   3. Mixed hyperlipidemia  E78.2    4. Cerebral infarction due to thrombosis of middle cerebral artery, unspecified blood vessel laterality ([facility] [address]  I63.319    5. Mild anemia  D64.9    6. Migraine with aura and without status migrainosus, not intractable  G43.109    7. Hyperuricemia  E79.0    8. B12 deficiency  E53.8 Vitamin B12   9. Primary osteoarthritis involving multiple joints  M89.49    10. Vitamin D deficiency  E55.9 Vitamin D 25 Hydroxy         PLAN    1. Type 2 diabetes mellitus with diabetic polyneuropathy, without long-term current use of insulin ([facility] [address]  She is doing well in this regard. I am very pleased with her present status and control  - Comprehensive Metabolic Panel;  Future  - Hemoglobin A1C; Future    2. Primary hypertension  Will continue with present regimen  - CBC with Auto Differential; Future  - Comprehensive Metabolic Panel; Future    3. Mixed hyperlipidemia  This has been excellently controlled    4. Cerebral infarction due to thrombosis of middle cerebral artery, unspecified blood vessel laterality ([address]  She is now nearly completely recovered    5. Mild anemia  We will follow this     6. Migraine with aura and without status migrainosus, not intractable  Rare occurrence    7. Hyperuricemia  Will continue medication, but now excellently controlled    8. B12 deficiency  Will continue present regimen  - Vitamin B12; Future    9. Primary osteoarthritis involving multiple joints  Doing fairly well with mobic    10. Vitamin D deficiency  Check vitamin D level. - Vitamin D 25 Hydroxy; Future      Orders Placed This Encounter   Procedures    Vitamin D 25 Hydroxy    Vitamin B12    CBC with Auto Differential    Comprehensive Metabolic Panel    Hemoglobin A1C        Return in about 4 months (around 7/11/2022) for 30. This was an in-house visit. negative - no polyuria, no polydipsia

## 2022-09-27 ENCOUNTER — TELEPHONE (OUTPATIENT)
Dept: PRIMARY CARE CLINIC | Age: 77
End: 2022-09-27

## 2022-09-27 NOTE — TELEPHONE ENCOUNTER
----- Message from [address] [facility],[address], DO sent at 9/26/2022  2:33 PM CDT -----  Magnesium is a lower level of normal.  Your WBC, (infection fighting ability) Hgb and Hct, (oxygen carrying cells) are normal; as is your percentage of each cell type. Stable mild nonspecific anemia.

## 2022-09-28 NOTE — TELEPHONE ENCOUNTER
Called patient, spoke with: Patient regarding the results of the patients most recent labs. I advised Patient of Dr. Eugenie Wall recommendations.    Patient did voice understanding

## 2022-10-05 RX ORDER — ALLOPURINOL 300 MG/1
300 TABLET ORAL DAILY
Qty: 90 TABLET | Refills: 3 | Status: SHIPPED | OUTPATIENT
Start: 2022-10-05

## 2022-10-05 RX ORDER — AMLODIPINE BESYLATE 5 MG/1
5 TABLET ORAL DAILY
Qty: 28 TABLET | Refills: 3 | Status: SHIPPED | OUTPATIENT
Start: 2022-10-05

## 2022-10-05 NOTE — TELEPHONE ENCOUNTER
Received fax from pharmacy requesting refill on pts medication(s). Pt was last seen in office on 9/26/2022  and has a follow up scheduled for 11/8/2022. Will send request to  Dr. Yina Alejo  for authorization.      Requested Prescriptions     Pending Prescriptions Disp Refills    allopurinol (ZYLOPRIM) 300 MG tablet [Pharmacy Med Name: allopurinol 300 mg tablet] 90 tablet 3     Sig: Take 1 tablet by mouth daily    amLODIPine (NORVASC) 5 MG tablet [Pharmacy Med Name: amlodipine 5 mg tablet] 28 tablet 3     Sig: Take 1 tablet by mouth daily

## 2022-10-10 DIAGNOSIS — E78.2 MIXED HYPERLIPIDEMIA: ICD-10-CM

## 2022-10-10 RX ORDER — ATORVASTATIN CALCIUM 20 MG/1
TABLET, FILM COATED ORAL
Qty: 90 TABLET | Refills: 3 | Status: SHIPPED | OUTPATIENT
Start: 2022-10-10

## 2022-10-10 NOTE — TELEPHONE ENCOUNTER
Received fax from pharmacy requesting refill on pts medication(s). Pt was last seen in office on 9/26/2022  and has a follow up scheduled for 11/8/2022. Will send request to  Dr. Jimbo Akins  for authorization.      Requested Prescriptions     Pending Prescriptions Disp Refills    atorvastatin (LIPITOR) 20 MG tablet [Pharmacy Med Name: atorvastatin 20 mg tablet] 90 tablet 3     Sig: TAKE ONE TABLET BY MOUTH NIGHTLY

## 2022-10-19 ENCOUNTER — OFFICE VISIT (OUTPATIENT)
Dept: PRIMARY CARE CLINIC | Age: 77
End: 2022-10-19
Payer: MEDICARE

## 2022-10-19 VITALS
DIASTOLIC BLOOD PRESSURE: 90 MMHG | HEART RATE: 113 BPM | SYSTOLIC BLOOD PRESSURE: 150 MMHG | WEIGHT: 149.5 LBS | BODY MASS INDEX: 28.22 KG/M2 | OXYGEN SATURATION: 98 % | HEIGHT: 61 IN | TEMPERATURE: 97.1 F

## 2022-10-19 DIAGNOSIS — S46.811A STRAIN OF RIGHT TRAPEZIUS MUSCLE, INITIAL ENCOUNTER: Primary | ICD-10-CM

## 2022-10-19 PROCEDURE — 99213 OFFICE O/P EST LOW 20 MIN: CPT | Performed by: NURSE PRACTITIONER

## 2022-10-19 PROCEDURE — 1123F ACP DISCUSS/DSCN MKR DOCD: CPT | Performed by: NURSE PRACTITIONER

## 2022-10-19 PROCEDURE — 96372 THER/PROPH/DIAG INJ SC/IM: CPT | Performed by: NURSE PRACTITIONER

## 2022-10-19 RX ORDER — DEXAMETHASONE SODIUM PHOSPHATE 100 MG/10ML
8 INJECTION INTRAMUSCULAR; INTRAVENOUS ONCE
Status: COMPLETED | OUTPATIENT
Start: 2022-10-19 | End: 2022-10-19

## 2022-10-19 RX ORDER — TIZANIDINE 2 MG/1
2 TABLET ORAL NIGHTLY PRN
Qty: 14 TABLET | Refills: 0 | Status: SHIPPED | OUTPATIENT
Start: 2022-10-19

## 2022-10-19 RX ORDER — DEXAMETHASONE SODIUM PHOSPHATE 4 MG/ML
8 INJECTION, SOLUTION INTRA-ARTICULAR; INTRALESIONAL; INTRAMUSCULAR; INTRAVENOUS; SOFT TISSUE ONCE
Status: SHIPPED | OUTPATIENT
Start: 2022-10-19

## 2022-10-19 RX ADMIN — DEXAMETHASONE SODIUM PHOSPHATE 8 MG: 100 INJECTION INTRAMUSCULAR; INTRAVENOUS at 15:44

## 2022-10-19 NOTE — PROGRESS NOTES
Kari Gross (:  1945) is a 78 y.o. female,Established patient, here for evaluation of the following chief complaint(s):  Shoulder Pain      ASSESSMENT/PLAN:    ICD-10-CM    1. Strain of right trapezius muscle, initial encounter  S46.811A dexamethasone (DECADRON) injection 8 mg     tiZANidine (ZANAFLEX) 2 MG tablet (discussed side effects)  Closely monitor blood sugars          Return if symptoms worsen or fail to improve. SUBJECTIVE/OBJECTIVE:  HPI    SHOULDER PAIN:  Reports posterior right shoulder pain. This started about a week ago. \"I haven't fallen or done anything to injury it. \"  She is taking Tylenol prn. She is taking Mobic 15 mg daily. She has stopped the Neurontin. \"I don't like the way it makes me feel. \"  Reports pain in the right trapezius area. \"I did not sleep last night. \"    A1c: 5.6 (2022)    BP (!) 150/90   Pulse (!) 113   Temp 97.1 °F (36.2 °C) (Temporal)   Ht 5' 1\" (1.549 m)   Wt 149 lb 8 oz (67.8 kg)   SpO2 98%   BMI 28.25 kg/m²     Review of Systems   Musculoskeletal:  Positive for arthralgias (right posterior shoulder pain) and neck pain. Physical Exam  Vitals reviewed. Constitutional:       Appearance: She is well-developed. HENT:      Head: Normocephalic. Right Ear: External ear normal.      Left Ear: External ear normal.      Nose: Nose normal.   Eyes:      General:         Right eye: No discharge. Left eye: No discharge. Cardiovascular:      Rate and Rhythm: Normal rate and regular rhythm. Pulmonary:      Effort: Pulmonary effort is normal.      Breath sounds: Normal breath sounds. No wheezing, rhonchi or rales. Abdominal:      General: Bowel sounds are normal.      Palpations: Abdomen is soft. Musculoskeletal:      Right shoulder: No tenderness. Normal range of motion. Cervical back: Normal range of motion. Spasms (right trapezius muscle) and tenderness present. Skin:     General: Skin is dry.    Neurological: General: No focal deficit present. Mental Status: She is alert and oriented to person, place, and time. Mental status is at baseline. Psychiatric:         Mood and Affect: Mood normal.         Behavior: Behavior normal.         Thought Content: Thought content normal.         Judgment: Judgment normal.           An electronic signature was used to authenticate this note.     --LAYLA Torrez

## 2022-10-19 NOTE — PROGRESS NOTES
After obtaining consent, and per orders of JIM RICH, injection of 8mg decadron given in Right upper quad. gluteus  by Robert Stiles. Patient tolerated well. Medication was not supplied by patient.

## 2022-11-04 ENCOUNTER — TELEPHONE (OUTPATIENT)
Dept: PRIMARY CARE CLINIC | Age: 77
End: 2022-11-04

## 2022-11-04 NOTE — TELEPHONE ENCOUNTER
Pts daughter called stating that her mom had another migraine this morning and when she has these it is like she has had a stroke. It takes her a while to become herself again. She is wondering if she needs to just bring her in to have her looked at. I advised against this considering the flu is going around so bad right now. Advised her to watch her and if she gets worse to give us a call  back.  Daughter understood

## 2022-11-07 ENCOUNTER — TELEPHONE (OUTPATIENT)
Dept: PRIMARY CARE CLINIC | Age: 77
End: 2022-11-07

## 2022-11-07 RX ORDER — CALCIUM CARBONATE 300MG(750)
TABLET,CHEWABLE ORAL
Qty: 120 TABLET | Refills: 0 | Status: SHIPPED | OUTPATIENT
Start: 2022-11-07 | End: 2022-11-10 | Stop reason: SDUPTHER

## 2022-11-07 NOTE — TELEPHONE ENCOUNTER
Pts daughter called, when she was discharged yesterday afternoon from [facility], she was told to increase her potassium and her magnesium. She said that she was not given a rx for her magnesium though. She said it is supposed to be 400mg 2 BID.       I have called and [facility] with [facility]  is going to fax over her d/c med list.

## 2022-11-07 NOTE — TELEPHONE ENCOUNTER
[facility] Transitions Initial Follow Up Call    Outreach made within 2 business days of discharge: Yes    Patient: Luma Yeh Patient : 1945   MRN: [id number]  Reason for Admission: There are no discharge diagnoses documented for the most recent discharge. Discharge Date: 22       Spoke with: pt    Discharge department/facility: [facility]    TCM Interactive Patient Contact:  Was patient able to fill all prescriptions: Yes  Was patient instructed to bring all medications to the follow-up visit: Yes  Is patient taking all medications as directed in the discharge summary?  Yes  Does patient understand their discharge instructions: Yes  Does patient have questions or concerns that need addressed prior to 7-14 day follow up office visit: no- we filled her magnesium earlier this morning    Scheduled appointment with PCP within 7-14 days    Follow Up  Future Appointments   Date Time Provider Jada Menjivar   11/10/2022  9:45 AM LAYLA Orantes [facility]   2022  8:00 AM [address] [facility],[address], DO Myra Fernández [facility]   2023  3:00 PM NICOLE [place] DO [address], [address] [facility]

## 2022-11-10 ENCOUNTER — TELEPHONE (OUTPATIENT)
Dept: PRIMARY CARE CLINIC | Age: 77
End: 2022-11-10

## 2022-11-10 ENCOUNTER — OFFICE VISIT (OUTPATIENT)
Dept: PRIMARY CARE CLINIC | Age: 77
End: 2022-11-10

## 2022-11-10 VITALS
HEART RATE: 101 BPM | SYSTOLIC BLOOD PRESSURE: 136 MMHG | WEIGHT: 144.5 LBS | TEMPERATURE: 98 F | BODY MASS INDEX: 27.3 KG/M2 | OXYGEN SATURATION: 98 % | DIASTOLIC BLOOD PRESSURE: 74 MMHG

## 2022-11-10 DIAGNOSIS — S46.811S TRAPEZIUS MUSCLE STRAIN, RIGHT, SEQUELA: ICD-10-CM

## 2022-11-10 DIAGNOSIS — Z09 HOSPITAL DISCHARGE FOLLOW-UP: ICD-10-CM

## 2022-11-10 DIAGNOSIS — E83.42 HYPOMAGNESEMIA: ICD-10-CM

## 2022-11-10 DIAGNOSIS — E87.6 HYPOKALEMIA: ICD-10-CM
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
## 2022-11-10 DIAGNOSIS — S30.1XXD ABDOMINAL WALL SEROMA, SUBSEQUENT ENCOUNTER: ICD-10-CM

## 2022-11-10 DIAGNOSIS — T14.8XXA MULTIPLE SKIN TEARS: ICD-10-CM

## 2022-11-10 DIAGNOSIS — Z09 HOSPITAL DISCHARGE FOLLOW-UP: Primary | ICD-10-CM

## 2022-11-10 LAB
ALBUMIN SERPL-MCNC: 4.3 G/DL (ref 3.5–5.2)
ALP BLD-CCNC: 74 U/L (ref 35–104)
ALT SERPL-CCNC: 11 U/L (ref 5–33)
ANION GAP SERPL CALCULATED.3IONS-SCNC: 13 MMOL/L (ref 7–19)
AST SERPL-CCNC: 11 U/L (ref 5–32)
BANDED NEUTROPHILS RELATIVE PERCENT: 4 % (ref 0–5)
BASOPHILS ABSOLUTE: 0.2 K/UL (ref 0–0.2)
BASOPHILS RELATIVE PERCENT: 2 % (ref 0–1)
BILIRUB SERPL-MCNC: 0.4 MG/DL (ref 0.2–1.2)
BUN BLDV-MCNC: 7 MG/DL (ref 8–23)
CALCIUM SERPL-MCNC: 10.4 MG/DL (ref 8.8–10.2)
CHLORIDE BLD-SCNC: 100 MMOL/L (ref 98–111)
CO2: 27 MMOL/L (ref 22–29)
CREAT SERPL-MCNC: 0.6 MG/DL (ref 0.5–0.9)
EOSINOPHILS ABSOLUTE: 0.22 K/UL (ref 0–0.6)
EOSINOPHILS RELATIVE PERCENT: 2 % (ref 0–5)
GFR SERPL CREATININE-BSD FRML MDRD: >60 ML/MIN/{1.73_M2}
GLUCOSE BLD-MCNC: 141 MG/DL (ref 74–109)
HCT VFR BLD CALC: 35.9 % (ref 37–47)
HEMOGLOBIN: 11.1 G/DL (ref 12–16)
IMMATURE GRANULOCYTES #: 0.1 K/UL
LYMPHOCYTES ABSOLUTE: 2 K/UL (ref 1.1–4.5)
LYMPHOCYTES RELATIVE PERCENT: 18 % (ref 20–40)
MAGNESIUM: 1.5 MG/DL (ref 1.6–2.4)
MCH RBC QN AUTO: 29.4 PG (ref 27–31)
MCHC RBC AUTO-ENTMCNC: 30.9 G/DL (ref 33–37)
MCV RBC AUTO: 95.2 FL (ref 81–99)
MONOCYTES ABSOLUTE: 2.9 K/UL (ref 0–0.9)
MONOCYTES RELATIVE PERCENT: 26 % (ref 0–10)
NEUTROPHILS ABSOLUTE: 5.8 K/UL (ref 1.5–7.5)
NEUTROPHILS RELATIVE PERCENT: 48 % (ref 50–65)
PDW BLD-RTO: 13.5 % (ref 11.5–14.5)
PLATELET # BLD: 242 K/UL (ref 130–400)
PMV BLD AUTO: 12.8 FL (ref 9.4–12.3)
POTASSIUM SERPL-SCNC: 4.4 MMOL/L (ref 3.5–5)
RBC # BLD: 3.77 M/UL (ref 4.2–5.4)
RBC # BLD: NORMAL 10*6/UL
SODIUM BLD-SCNC: 140 MMOL/L (ref 136–145)
TOTAL PROTEIN: 6.8 G/DL (ref 6.6–8.7)
WBC # BLD: 11.2 K/UL (ref 4.8–10.8)

## 2022-11-10 RX ORDER — POTASSIUM CHLORIDE 20 MEQ/1
TABLET, EXTENDED RELEASE ORAL
COMMUNITY
Start: 2022-11-07 | End: 2022-11-10 | Stop reason: SDUPTHER

## 2022-11-10 RX ORDER — HYDROCODONE BITARTRATE AND ACETAMINOPHEN 7.5; 325 MG/1; MG/1
1 TABLET ORAL EVERY 6 HOURS PRN
Qty: 12 TABLET | Refills: 0 | Status: SHIPPED | OUTPATIENT
Start: 2022-11-10 | End: 2022-11-14 | Stop reason: SDUPTHER

## 2022-11-10 RX ORDER — LIDOCAINE 50 MG/G
1 PATCH TOPICAL DAILY
Qty: 10 PATCH | Refills: 0 | Status: SHIPPED | OUTPATIENT
Start: 2022-11-10 | End: 2022-11-20

## 2022-11-10 RX ORDER — CALCIUM CARBONATE 300MG(750)
2 TABLET,CHEWABLE ORAL 2 TIMES DAILY
Qty: 120 TABLET | Refills: 5 | Status: SHIPPED | OUTPATIENT
Start: 2022-11-10

## 2022-11-10 RX ORDER — ONDANSETRON 4 MG/1
4 TABLET, ORALLY DISINTEGRATING ORAL 3 TIMES DAILY PRN
Qty: 21 TABLET | Refills: 0 | Status: SHIPPED | OUTPATIENT
Start: 2022-11-10

## 2022-11-10 RX ORDER — MUPIROCIN CALCIUM 20 MG/G
1 CREAM TOPICAL 2 TIMES DAILY
Qty: 30 G | Refills: 0 | Status: SHIPPED | OUTPATIENT
Start: 2022-11-10

## 2022-11-10 RX ORDER — POTASSIUM CHLORIDE 20 MEQ/1
20 TABLET, EXTENDED RELEASE ORAL DAILY
Qty: 30 TABLET | Refills: 11 | Status: SHIPPED | OUTPATIENT
Start: 2022-11-10

## 2022-11-10 ASSESSMENT — ENCOUNTER SYMPTOMS
WHEEZING: 0
SORE THROAT: 0
COUGH: 0
ABDOMINAL PAIN: 0
DIARRHEA: 0
NAUSEA: 0
SHORTNESS OF BREATH: 0
VOMITING: 0

## 2022-11-10 NOTE — TELEPHONE ENCOUNTER
Pts daughter called stating that the lidocaine patch is not covered and is asking for something to be sent in that is covered by insurance.

## 2022-11-10 NOTE — TELEPHONE ENCOUNTER
----- Message from Alba Moritz, APRN sent at 11/10/2022  4:05 PM CST -----  Magnesium little low  Take an extra for 2 days then cont at 4 a day  Cmp electrolytes liver and kidneys wnl

## 2022-11-10 NOTE — PROGRESS NOTES
Post-Discharge Transitional Care Follow Up    Derek Arce   YOB: 1945    Date of Office Visit:  11/10/2022  Date of Hospital Admission: 11/4/222  Date of Hospital Discharge: 11/6/2022    Patient is here for hospital follow up  Reports right shoulder trapezous muscle was tender and painful  Was seen before hospitalization for this   Given steroid shot with no relief  In turn she reports was sleeping well and restless    She fell 11/4 on the porch   She doesn't recall   Reports questionable confusion   Went to [facility] ER  Noted CT chest right 5-7 the rib fracture   CT abdomen noted ventral hernia repair  With a hard post operative seroma  She reports has some on and off belly pain    She was noted to have low magnesium despite taking twice a day  Was now changed to 2 twice a day  Along with K daily  She is due to check this      Today reports she is still having issues with her right shoulder pain  On posterior neck to trapezius  She has tried creams biofreeze etc with no relief    Controlled Substance Monitoring:    Acute and Chronic Pain Monitoring:   RX Monitoring 11/10/2022   Attestation -   Periodic Controlled Substance Monitoring Possible medication side effects, risk of tolerance/dependence & alternative treatments discussed. ;No signs of potential drug abuse or diversion identified. ;Assessed functional status. Chronic Pain > 50 MEDD Considered consultation with a specialist.           Skin tear on left wrist  Noted yellow discharge  Care management risk score Rising risk (score 2-5) and Complex Care (Scores >=6): No Risk Score On File     Non face to face  following discharge, date last encounter closed (first attempt may have been earlier): 11/07/2022     Call initiated 2 business days of discharge:  Yes    ASSESSMENT/PLAN:   Hospital discharge follow-up  Reviewed with patient  Will check magnesium and cmp weekly   With close follow due to falls with low levels    -     CT DISCHARGE MEDS RECONCILED W/ CURRENT OUTPATIENT MED LIST  -     potassium chloride (KLOR-CON M) 20 MEQ extended release tablet; Take 1 tablet by mouth daily, Disp-30 tablet, R-11Normal  -     Magnesium 400 MG TABS; Take 2 tablets by mouth in the morning and at bedtime 4 po bid, Disp-120 tablet, R-5Normal  -     CBC with Auto Differential; Future  -     Comprehensive Metabolic Panel; Future  -     Magnesium; Future  Abdominal wall seroma, subsequent encounter  Will do referral due hard area in stomach    -     Myra TangmoBeto barrett DO, General Surgery, [place]  -     CBC with Auto Differential; Future  -     Comprehensive Metabolic Panel; Future  -     Magnesium; Future  Hypomagnesemia  -     Magnesium 400 MG TABS; Take 2 tablets by mouth in the morning and at bedtime 4 po bid, Disp-120 tablet, R-5Normal  -     Magnesium; Future  Hypokalemia  -     potassium chloride (KLOR-CON M) 20 MEQ extended release tablet; Take 1 tablet by mouth daily, Disp-30 tablet, R-11Normal  -     Comprehensive Metabolic Panel; Future  Multiple skin tears  Wash soap and water   Apply cream twice a day    -     mupirocin (BACTROBAN) 2 % cream; Apply 1 applicator topically 2 times daily Apply 3 times daily. , Topical, 2 TIMES DAILY Starting Thu 11/10/2022, Disp-30 g, R-0, Normal  Trapezius muscle strain, right, sequela  Will do heat  Least effective dose    -     lidocaine (LIDODERM) 5 %; Place 1 patch onto the skin daily for 10 days 12 hours on, 12 hours off., Disp-10 patch, R-0Normal  -     HYDROcodone-acetaminophen (NORCO) 7.5-325 MG per tablet; Take 1 tablet by mouth every 6 hours as needed for Pain for up to 3 days. Intended supply: 3 days. Take lowest dose possible to manage pain, Disp-12 tablet, R-0Normal  -     ondansetron (ZOFRAN-ODT) 4 MG disintegrating tablet;  Take 1 tablet by mouth 3 times daily as needed for Nausea or Vomiting, Disp-21 tablet, R-0Normal  Fall, initial encounter  Genreally noted to be low magnesium   Follow closely    - lidocaine (LIDODERM) 5 %; Place 1 patch onto the skin daily for 10 days 12 hours on, 12 hours off., Disp-10 patch, R-0Normal  -     HYDROcodone-acetaminophen (NORCO) 7.5-325 MG per tablet; Take 1 tablet by mouth every 6 hours as needed for Pain for up to 3 days. Intended supply: 3 days. Take lowest dose possible to manage pain, Disp-12 tablet, R-0Normal  -     ondansetron (ZOFRAN-ODT) 4 MG disintegrating tablet; Take 1 tablet by mouth 3 times daily as needed for Nausea or Vomiting, Disp-21 tablet, R-0Normal    Medical Decision Making: high complexity  No follow-ups on file. Subjective:   HPI    Inpatient course: Discharge summary reviewed- see chart. Interval history/Current status: improving  Still having significant back /neck pain      Patient Active Problem List   Diagnosis    Family history of colon cancer    DM2 (diabetes mellitus, type 2) ([facility] [address]    Cerebral infarction ([facility] [address]    Hyperlipidemia    Hypertension    B12 deficiency    Hyperuricemia    Ventral hernia without obstruction or gangrene    Recurrent ventral incisional hernia with necrosis    Vitamin D deficiency    Magnesium deficiency    Bruises easily    Fatigue       Medication list at time of discharge reviewed: Yes    Medications marked \"taking\" at this time  Outpatient Medications Marked as Taking for the 11/10/22 encounter (Office Visit) with LAYLA Vasquez   Medication Sig Dispense Refill    potassium chloride (KLOR-CON M) 20 MEQ extended release tablet Take 1 tablet by mouth daily 30 tablet 11    Magnesium 400 MG TABS Take 2 tablets by mouth in the morning and at bedtime 4 po bid 120 tablet 5    mupirocin (BACTROBAN) 2 % cream Apply 1 applicator topically 2 times daily Apply 3 times daily. 30 g 0    lidocaine (LIDODERM) 5 % Place 1 patch onto the skin daily for 10 days 12 hours on, 12 hours off.  10 patch 0    HYDROcodone-acetaminophen (NORCO) 7.5-325 MG per tablet Take 1 tablet by mouth every 6 hours as needed for Pain for up to 3 days. Intended supply: 3 days. Take lowest dose possible to manage pain 12 tablet 0    ondansetron (ZOFRAN-ODT) 4 MG disintegrating tablet Take 1 tablet by mouth 3 times daily as needed for Nausea or Vomiting 21 tablet 0    tiZANidine (ZANAFLEX) 2 MG tablet Take 1 tablet by mouth nightly as needed (neck pain) 14 tablet 0    atorvastatin (LIPITOR) 20 MG tablet TAKE ONE TABLET BY MOUTH NIGHTLY 90 tablet 3    allopurinol (ZYLOPRIM) 300 MG tablet Take 1 tablet by mouth daily 90 tablet 3    amLODIPine (NORVASC) 5 MG tablet Take 1 tablet by mouth daily 28 tablet 3    vitamin D (ERGOCALCIFEROL) 1.25 MG (84016 UT) CAPS capsule Take 1 capsule by mouth once a week 12 capsule 1    meloxicam (MOBIC) 15 MG tablet Take 1 tablet by mouth daily 90 tablet 3    metFORMIN (GLUCOPHAGE) 1000 MG tablet TAKE ONE TABLET BY MOUTH TWICE DAILY WITH MEALS 180 tablet 3    lisinopril (PRINIVIL;ZESTRIL) 20 MG tablet Take 1 tablet by mouth daily 90 tablet 3    butalbital-acetaminophen-caffeine (FIORICET, ESGIC) -40 MG per tablet Take 1 tablet by mouth every 4 hours as needed for Headaches 180 tablet 3    triamcinolone (KENALOG) 0.1 % cream Apply topically 2 times daily. 80 g 5    Semaglutide,0.25 or 0.5MG/DOS, 2 MG/1.5ML SOPN Inject 0.5 mg into the skin once a week 1.5 mL 5    Omega-3 Fatty Acids (FISH OIL) 1000 MG CAPS Take 1,000 mg by mouth daily      furosemide (LASIX) 20 MG tablet Take 20 mg by mouth daily as needed Only taking PRN      Blood Glucose Monitoring Suppl JOSE Cap glucose daily and prn 1 Device 0    Glucose Blood (BLOOD GLUCOSE TEST STRIPS) STRP Cap glucose daily and prn 100 strip 3    Insulin Pen Needle (H-E-B INCONTROL PEN NEEDLES) 32G X 4 MM MISC 1 each by Does not apply route daily 100 each 3    Calcium-Vitamin D (CALTRATE 600 PLUS-VIT D PO) Take 1 tablet by mouth 2 times daily      aspirin 81 MG tablet Take 81 mg by mouth daily       Multiple Vitamins-Minerals (MULTI COMPLETE PO) Take 1 tablet by mouth daily. Medications patient taking as of now reconciled against medications ordered at time of hospital discharge: Yes    Review of Systems   Constitutional:  Positive for activity change. Negative for appetite change, fatigue, fever and unexpected weight change. HENT:  Negative for congestion, postnasal drip and sore throat. Respiratory:  Negative for cough, shortness of breath and wheezing. Cardiovascular:  Positive for leg swelling. Negative for chest pain and palpitations. Gastrointestinal:  Negative for abdominal pain (hard area in ventral repair area), diarrhea, nausea and vomiting. Genitourinary:  Negative for difficulty urinating. Musculoskeletal:  Positive for arthralgias, neck pain and neck stiffness (right trapezius muscle). Skin:  Negative for rash. Hematological:  Does not bruise/bleed easily. Psychiatric/Behavioral:  Positive for sleep disturbance (due to pain). Negative for behavioral problems and self-injury. The patient is not nervous/anxious and is not hyperactive. Objective:    /74 (Site: Right Upper Arm, Position: Sitting, Cuff Size: Large Adult)   Pulse (!) 101   Temp 98 °F (36.7 °C) (Temporal)   Wt 144 lb 8 oz (65.5 kg)   SpO2 98%   BMI 27.30 kg/m²   Physical Exam  Vitals reviewed. Constitutional:       General: She is not in acute distress. Appearance: Normal appearance. She is not ill-appearing. Comments: Appears in pain     Cardiovascular:      Rate and Rhythm: Normal rate and regular rhythm. Heart sounds: No murmur heard. Pulmonary:      Effort: Pulmonary effort is normal.      Breath sounds: Normal breath sounds. No wheezing or rhonchi. Comments: Tender right rib area    Skin:     Capillary Refill: Capillary refill takes less than 2 seconds. Findings: Bruising (noted left skin tear yellow drainage) present. No rash. Neurological:      General: No focal deficit present. Mental Status: She is alert.    Psychiatric:         Mood and Affect: Mood normal.         Behavior: Behavior normal.         An electronic signature was used to authenticate this note.   --LAYLA Green

## 2022-11-11 ENCOUNTER — TELEPHONE (OUTPATIENT)
Dept: PRIMARY CARE CLINIC | Age: 77
End: 2022-11-11

## 2022-11-11 NOTE — TELEPHONE ENCOUNTER
----- Message from LAYLA Madrid sent at 11/10/2022  5:18 PM CST -----  CBC normal no anemia or concerns

## 2022-11-11 NOTE — TELEPHONE ENCOUNTER
Called patient, spoke with: Patient regarding the results of the patients most recent labs. I advised Patient of Dr. Edwar Vincent recommendations.    Patient did voice understanding

## 2022-11-14 ENCOUNTER — APPOINTMENT (OUTPATIENT)
Dept: MRI IMAGING | Facility: HOSPITAL | Age: 77
End: 2022-11-14

## 2022-11-14 ENCOUNTER — HOSPITAL ENCOUNTER (INPATIENT)
Facility: HOSPITAL | Age: 77
LOS: 1 days | Discharge: HOME HEALTH | End: 2022-11-15
Attending: STUDENT IN AN ORGANIZED HEALTH CARE EDUCATION/TRAINING PROGRAM | Admitting: FAMILY MEDICINE

## 2022-11-14 ENCOUNTER — APPOINTMENT (OUTPATIENT)
Dept: GENERAL RADIOLOGY | Facility: HOSPITAL | Age: 77
End: 2022-11-14

## 2022-11-14 ENCOUNTER — APPOINTMENT (OUTPATIENT)
Dept: CT IMAGING | Facility: HOSPITAL | Age: 77
End: 2022-11-14

## 2022-11-14 DIAGNOSIS — Z74.09 IMPAIRED MOBILITY: ICD-10-CM

## 2022-11-14 DIAGNOSIS — S46.811S TRAPEZIUS MUSCLE STRAIN, RIGHT, SEQUELA: ICD-10-CM

## 2022-11-14 DIAGNOSIS — R00.0 TACHYCARDIA: Primary | ICD-10-CM

## 2022-11-14 DIAGNOSIS — I63.9 CEREBELLAR INFARCT: ICD-10-CM
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
## 2022-11-14 DIAGNOSIS — R13.10 DYSPHAGIA, UNSPECIFIED TYPE: ICD-10-CM

## 2022-11-14 PROBLEM — G92.8 TOXIC METABOLIC ENCEPHALOPATHY: Status: ACTIVE | Noted: 2022-11-14

## 2022-11-14 PROBLEM — E11.65 TYPE 2 DIABETES MELLITUS WITH HYPERGLYCEMIA, WITHOUT LONG-TERM CURRENT USE OF INSULIN: Status: ACTIVE | Noted: 2022-11-14

## 2022-11-14 PROBLEM — I10 UNCONTROLLED HYPERTENSION: Status: ACTIVE | Noted: 2022-11-14

## 2022-11-14 LAB
ALBUMIN SERPL-MCNC: 4.2 G/DL (ref 3.5–5.2)
ALBUMIN/GLOB SERPL: 1.7 G/DL
ALP SERPL-CCNC: 92 U/L (ref 39–117)
ALT SERPL W P-5'-P-CCNC: 11 U/L (ref 1–33)
AMPHET+METHAMPHET UR QL: NEGATIVE
AMPHETAMINES UR QL: NEGATIVE
ANION GAP SERPL CALCULATED.3IONS-SCNC: 13 MMOL/L (ref 5–15)
AST SERPL-CCNC: 11 U/L (ref 1–32)
BARBITURATES UR QL SCN: NEGATIVE
BASOPHILS # BLD AUTO: 0.02 10*3/MM3 (ref 0–0.2)
BASOPHILS NFR BLD AUTO: 0.2 % (ref 0–1.5)
BENZODIAZ UR QL SCN: NEGATIVE
BILIRUB SERPL-MCNC: 0.3 MG/DL (ref 0–1.2)
BILIRUB UR QL STRIP: NEGATIVE
BUN SERPL-MCNC: 11 MG/DL (ref 8–23)
BUN/CREAT SERPL: 17.2 (ref 7–25)
BUPRENORPHINE SERPL-MCNC: NEGATIVE NG/ML
CALCIUM SPEC-SCNC: 10.4 MG/DL (ref 8.6–10.5)
CANNABINOIDS SERPL QL: NEGATIVE
CHLORIDE SERPL-SCNC: 101 MMOL/L (ref 98–107)
CLARITY UR: CLEAR
CO2 SERPL-SCNC: 23 MMOL/L (ref 22–29)
COCAINE UR QL: NEGATIVE
COLOR UR: YELLOW
CREAT SERPL-MCNC: 0.64 MG/DL (ref 0.57–1)
D DIMER PPP FEU-MCNC: 1.13 MCGFEU/ML (ref 0–0.5)
D-LACTATE SERPL-SCNC: 1.6 MMOL/L (ref 0.5–2)
DEPRECATED RDW RBC AUTO: 46 FL (ref 37–54)
EGFRCR SERPLBLD CKD-EPI 2021: 92.3 ML/MIN/1.73
EOSINOPHIL # BLD AUTO: 0.04 10*3/MM3 (ref 0–0.4)
EOSINOPHIL NFR BLD AUTO: 0.4 % (ref 0.3–6.2)
ERYTHROCYTE [DISTWIDTH] IN BLOOD BY AUTOMATED COUNT: 13.6 % (ref 12.3–15.4)
GLOBULIN UR ELPH-MCNC: 2.5 GM/DL
GLUCOSE BLDC GLUCOMTR-MCNC: 147 MG/DL (ref 70–130)
GLUCOSE BLDC GLUCOMTR-MCNC: 175 MG/DL (ref 70–130)
GLUCOSE BLDC GLUCOMTR-MCNC: 206 MG/DL (ref 70–130)
GLUCOSE SERPL-MCNC: 169 MG/DL (ref 65–99)
GLUCOSE UR STRIP-MCNC: NEGATIVE MG/DL
HBA1C MFR BLD: 6.1 % (ref 4.8–5.6)
HCT VFR BLD AUTO: 35.4 % (ref 34–46.6)
HGB BLD-MCNC: 10.9 G/DL (ref 12–15.9)
HGB UR QL STRIP.AUTO: NEGATIVE
HOLD SPECIMEN: NORMAL
IMM GRANULOCYTES # BLD AUTO: 0.11 10*3/MM3 (ref 0–0.05)
IMM GRANULOCYTES NFR BLD AUTO: 1.2 % (ref 0–0.5)
KETONES UR QL STRIP: ABNORMAL
LEUKOCYTE ESTERASE UR QL STRIP.AUTO: NEGATIVE
LYMPHOCYTES # BLD AUTO: 1.72 10*3/MM3 (ref 0.7–3.1)
LYMPHOCYTES NFR BLD AUTO: 19.2 % (ref 19.6–45.3)
MAGNESIUM SERPL-MCNC: 1.6 MG/DL (ref 1.6–2.4)
MCH RBC QN AUTO: 28.5 PG (ref 26.6–33)
MCHC RBC AUTO-ENTMCNC: 30.8 G/DL (ref 31.5–35.7)
MCV RBC AUTO: 92.7 FL (ref 79–97)
METHADONE UR QL SCN: NEGATIVE
MONOCYTES # BLD AUTO: 1.15 10*3/MM3 (ref 0.1–0.9)
MONOCYTES NFR BLD AUTO: 12.8 % (ref 5–12)
NEUTROPHILS NFR BLD AUTO: 5.92 10*3/MM3 (ref 1.7–7)
NEUTROPHILS NFR BLD AUTO: 66.2 % (ref 42.7–76)
NITRITE UR QL STRIP: NEGATIVE
NRBC BLD AUTO-RTO: 0 /100 WBC (ref 0–0.2)
OPIATES UR QL: POSITIVE
OXYCODONE UR QL SCN: NEGATIVE
PCP UR QL SCN: NEGATIVE
PH UR STRIP.AUTO: 7 [PH] (ref 5–8)
PLATELET # BLD AUTO: 331 10*3/MM3 (ref 140–450)
PMV BLD AUTO: 11.2 FL (ref 6–12)
POTASSIUM SERPL-SCNC: 4.6 MMOL/L (ref 3.5–5.2)
PROPOXYPH UR QL: NEGATIVE
PROT SERPL-MCNC: 6.7 G/DL (ref 6–8.5)
PROT UR QL STRIP: ABNORMAL
RBC # BLD AUTO: 3.82 10*6/MM3 (ref 3.77–5.28)
SODIUM SERPL-SCNC: 137 MMOL/L (ref 136–145)
SP GR UR STRIP: 1.01 (ref 1–1.03)
T4 FREE SERPL-MCNC: 1.38 NG/DL (ref 0.93–1.7)
TRICYCLICS UR QL SCN: NEGATIVE
TROPONIN T SERPL-MCNC: 0.01 NG/ML (ref 0–0.03)
TSH SERPL DL<=0.05 MIU/L-ACNC: 0.89 UIU/ML (ref 0.27–4.2)
UROBILINOGEN UR QL STRIP: ABNORMAL
WBC NRBC COR # BLD: 8.96 10*3/MM3 (ref 3.4–10.8)
WHOLE BLOOD HOLD COAG: NORMAL
WHOLE BLOOD HOLD SPECIMEN: NORMAL

## 2022-11-14 PROCEDURE — 80306 DRUG TEST PRSMV INSTRMNT: CPT | Performed by: STUDENT IN AN ORGANIZED HEALTH CARE EDUCATION/TRAINING PROGRAM

## 2022-11-14 PROCEDURE — 82746 ASSAY OF FOLIC ACID SERUM: CPT | Performed by: FAMILY MEDICINE

## 2022-11-14 PROCEDURE — 84439 ASSAY OF FREE THYROXINE: CPT | Performed by: STUDENT IN AN ORGANIZED HEALTH CARE EDUCATION/TRAINING PROGRAM

## 2022-11-14 PROCEDURE — 25010000002 LORAZEPAM PER 2 MG: Performed by: STUDENT IN AN ORGANIZED HEALTH CARE EDUCATION/TRAINING PROGRAM

## 2022-11-14 PROCEDURE — 85379 FIBRIN DEGRADATION QUANT: CPT | Performed by: FAMILY MEDICINE

## 2022-11-14 PROCEDURE — 87150 DNA/RNA AMPLIFIED PROBE: CPT | Performed by: FAMILY MEDICINE

## 2022-11-14 PROCEDURE — 82962 GLUCOSE BLOOD TEST: CPT

## 2022-11-14 PROCEDURE — 87147 CULTURE TYPE IMMUNOLOGIC: CPT | Performed by: FAMILY MEDICINE

## 2022-11-14 PROCEDURE — 25010000002 KETOROLAC TROMETHAMINE PER 15 MG: Performed by: NURSE PRACTITIONER

## 2022-11-14 PROCEDURE — 86592 SYPHILIS TEST NON-TREP QUAL: CPT | Performed by: FAMILY MEDICINE

## 2022-11-14 PROCEDURE — 99285 EMERGENCY DEPT VISIT HI MDM: CPT

## 2022-11-14 PROCEDURE — 84484 ASSAY OF TROPONIN QUANT: CPT | Performed by: STUDENT IN AN ORGANIZED HEALTH CARE EDUCATION/TRAINING PROGRAM

## 2022-11-14 PROCEDURE — 85025 COMPLETE CBC W/AUTO DIFF WBC: CPT | Performed by: STUDENT IN AN ORGANIZED HEALTH CARE EDUCATION/TRAINING PROGRAM

## 2022-11-14 PROCEDURE — 83735 ASSAY OF MAGNESIUM: CPT | Performed by: STUDENT IN AN ORGANIZED HEALTH CARE EDUCATION/TRAINING PROGRAM

## 2022-11-14 PROCEDURE — 83605 ASSAY OF LACTIC ACID: CPT | Performed by: STUDENT IN AN ORGANIZED HEALTH CARE EDUCATION/TRAINING PROGRAM

## 2022-11-14 PROCEDURE — 70450 CT HEAD/BRAIN W/O DYE: CPT

## 2022-11-14 PROCEDURE — 70551 MRI BRAIN STEM W/O DYE: CPT

## 2022-11-14 PROCEDURE — 93010 ELECTROCARDIOGRAM REPORT: CPT | Performed by: EMERGENCY MEDICINE

## 2022-11-14 PROCEDURE — 81003 URINALYSIS AUTO W/O SCOPE: CPT | Performed by: STUDENT IN AN ORGANIZED HEALTH CARE EDUCATION/TRAINING PROGRAM

## 2022-11-14 PROCEDURE — 25010000002 MAGNESIUM SULFATE IN D5W 1G/100ML (PREMIX) 1-5 GM/100ML-% SOLUTION: Performed by: NURSE PRACTITIONER

## 2022-11-14 PROCEDURE — 82607 VITAMIN B-12: CPT | Performed by: FAMILY MEDICINE

## 2022-11-14 PROCEDURE — 84443 ASSAY THYROID STIM HORMONE: CPT | Performed by: STUDENT IN AN ORGANIZED HEALTH CARE EDUCATION/TRAINING PROGRAM

## 2022-11-14 PROCEDURE — 36415 COLL VENOUS BLD VENIPUNCTURE: CPT | Performed by: FAMILY MEDICINE

## 2022-11-14 PROCEDURE — 87040 BLOOD CULTURE FOR BACTERIA: CPT | Performed by: FAMILY MEDICINE

## 2022-11-14 PROCEDURE — 71045 X-RAY EXAM CHEST 1 VIEW: CPT

## 2022-11-14 PROCEDURE — 93005 ELECTROCARDIOGRAM TRACING: CPT | Performed by: STUDENT IN AN ORGANIZED HEALTH CARE EDUCATION/TRAINING PROGRAM

## 2022-11-14 PROCEDURE — 80053 COMPREHEN METABOLIC PANEL: CPT | Performed by: STUDENT IN AN ORGANIZED HEALTH CARE EDUCATION/TRAINING PROGRAM

## 2022-11-14 PROCEDURE — 83036 HEMOGLOBIN GLYCOSYLATED A1C: CPT | Performed by: FAMILY MEDICINE

## 2022-11-14 RX ORDER — SODIUM CHLORIDE 9 MG/ML
75 INJECTION, SOLUTION INTRAVENOUS CONTINUOUS
Status: DISCONTINUED | OUTPATIENT
Start: 2022-11-14 | End: 2022-11-15 | Stop reason: HOSPADM

## 2022-11-14 RX ORDER — ALLOPURINOL 300 MG/1
300 TABLET ORAL DAILY
COMMUNITY

## 2022-11-14 RX ORDER — LABETALOL HYDROCHLORIDE 5 MG/ML
10 INJECTION, SOLUTION INTRAVENOUS EVERY 6 HOURS PRN
Status: DISCONTINUED | OUTPATIENT
Start: 2022-11-14 | End: 2022-11-15 | Stop reason: HOSPADM

## 2022-11-14 RX ORDER — CHLORHEXIDINE GLUCONATE 500 MG/1
1 CLOTH TOPICAL EVERY 24 HOURS
Status: DISCONTINUED | OUTPATIENT
Start: 2022-11-15 | End: 2022-11-15 | Stop reason: HOSPADM

## 2022-11-14 RX ORDER — ATORVASTATIN CALCIUM 40 MG/1
40 TABLET, FILM COATED ORAL NIGHTLY
Status: DISCONTINUED | OUTPATIENT
Start: 2022-11-14 | End: 2022-11-15

## 2022-11-14 RX ORDER — ASPIRIN 300 MG/1
300 SUPPOSITORY RECTAL DAILY
Status: DISCONTINUED | OUTPATIENT
Start: 2022-11-15 | End: 2022-11-15

## 2022-11-14 RX ORDER — ATORVASTATIN CALCIUM 40 MG/1
40 TABLET, FILM COATED ORAL NIGHTLY
COMMUNITY
End: 2022-11-15 | Stop reason: HOSPADM

## 2022-11-14 RX ORDER — SODIUM CHLORIDE 0.9 % (FLUSH) 0.9 %
10 SYRINGE (ML) INJECTION EVERY 12 HOURS SCHEDULED
Status: DISCONTINUED | OUTPATIENT
Start: 2022-11-14 | End: 2022-11-15 | Stop reason: HOSPADM

## 2022-11-14 RX ORDER — INSULIN LISPRO 100 [IU]/ML
2-7 INJECTION, SOLUTION INTRAVENOUS; SUBCUTANEOUS EVERY 6 HOURS
Status: DISCONTINUED | OUTPATIENT
Start: 2022-11-15 | End: 2022-11-15

## 2022-11-14 RX ORDER — HYDROCODONE BITARTRATE AND ACETAMINOPHEN 7.5; 325 MG/1; MG/1
1 TABLET ORAL EVERY 6 HOURS PRN
Qty: 12 TABLET | Refills: 0 | Status: SHIPPED | OUTPATIENT
Start: 2022-11-14 | End: 2022-11-17

## 2022-11-14 RX ORDER — ONDANSETRON 4 MG/1
4 TABLET, FILM COATED ORAL EVERY 6 HOURS PRN
Status: DISCONTINUED | OUTPATIENT
Start: 2022-11-14 | End: 2022-11-15 | Stop reason: HOSPADM

## 2022-11-14 RX ORDER — ATORVASTATIN CALCIUM 20 MG/1
20 TABLET, FILM COATED ORAL NIGHTLY
COMMUNITY

## 2022-11-14 RX ORDER — AMLODIPINE BESYLATE 5 MG/1
5 TABLET ORAL DAILY
COMMUNITY
End: 2022-11-15 | Stop reason: HOSPADM

## 2022-11-14 RX ORDER — MAGNESIUM SULFATE 1 G/100ML
1 INJECTION INTRAVENOUS ONCE
Status: COMPLETED | OUTPATIENT
Start: 2022-11-14 | End: 2022-11-14

## 2022-11-14 RX ORDER — MELOXICAM 15 MG/1
15 TABLET ORAL DAILY
COMMUNITY

## 2022-11-14 RX ORDER — CHLORHEXIDINE GLUCONATE 500 MG/1
1 CLOTH TOPICAL ONCE
Status: COMPLETED | OUTPATIENT
Start: 2022-11-14 | End: 2022-11-15

## 2022-11-14 RX ORDER — LISINOPRIL 20 MG/1
20 TABLET ORAL DAILY
COMMUNITY

## 2022-11-14 RX ORDER — ACETAMINOPHEN 325 MG/1
650 TABLET ORAL EVERY 4 HOURS PRN
Status: DISCONTINUED | OUTPATIENT
Start: 2022-11-14 | End: 2022-11-15 | Stop reason: HOSPADM

## 2022-11-14 RX ORDER — METOPROLOL TARTRATE 5 MG/5ML
5 INJECTION INTRAVENOUS EVERY 8 HOURS SCHEDULED
Status: DISCONTINUED | OUTPATIENT
Start: 2022-11-14 | End: 2022-11-15 | Stop reason: HOSPADM

## 2022-11-14 RX ORDER — SODIUM CHLORIDE 0.9 % (FLUSH) 0.9 %
10 SYRINGE (ML) INJECTION AS NEEDED
Status: DISCONTINUED | OUTPATIENT
Start: 2022-11-14 | End: 2022-11-15 | Stop reason: HOSPADM

## 2022-11-14 RX ORDER — ASPIRIN 81 MG/1
81 TABLET, CHEWABLE ORAL DAILY
COMMUNITY
End: 2022-11-21 | Stop reason: HOSPADM

## 2022-11-14 RX ORDER — ACETAMINOPHEN 650 MG/1
650 SUPPOSITORY RECTAL EVERY 4 HOURS PRN
Status: DISCONTINUED | OUTPATIENT
Start: 2022-11-14 | End: 2022-11-15 | Stop reason: HOSPADM

## 2022-11-14 RX ORDER — DEXTROSE MONOHYDRATE 25 G/50ML
25 INJECTION, SOLUTION INTRAVENOUS
Status: DISCONTINUED | OUTPATIENT
Start: 2022-11-14 | End: 2022-11-15

## 2022-11-14 RX ORDER — POTASSIUM CHLORIDE 20 MEQ/1
20 TABLET, EXTENDED RELEASE ORAL DAILY
COMMUNITY

## 2022-11-14 RX ORDER — NICOTINE POLACRILEX 4 MG
15 LOZENGE BUCCAL
Status: DISCONTINUED | OUTPATIENT
Start: 2022-11-14 | End: 2022-11-15

## 2022-11-14 RX ORDER — MAGNESIUM OXIDE 400 MG/1
800 TABLET ORAL 2 TIMES DAILY
COMMUNITY

## 2022-11-14 RX ORDER — INSULIN LISPRO 100 [IU]/ML
2-7 INJECTION, SOLUTION INTRAVENOUS; SUBCUTANEOUS
Status: DISCONTINUED | OUTPATIENT
Start: 2022-11-14 | End: 2022-11-14

## 2022-11-14 RX ORDER — LORAZEPAM 2 MG/ML
1 INJECTION INTRAMUSCULAR ONCE
Status: COMPLETED | OUTPATIENT
Start: 2022-11-14 | End: 2022-11-14

## 2022-11-14 RX ORDER — KETOROLAC TROMETHAMINE 15 MG/ML
15 INJECTION, SOLUTION INTRAMUSCULAR; INTRAVENOUS EVERY 6 HOURS PRN
Status: DISCONTINUED | OUTPATIENT
Start: 2022-11-14 | End: 2022-11-15 | Stop reason: HOSPADM

## 2022-11-14 RX ORDER — ONDANSETRON 2 MG/ML
4 INJECTION INTRAMUSCULAR; INTRAVENOUS EVERY 6 HOURS PRN
Status: DISCONTINUED | OUTPATIENT
Start: 2022-11-14 | End: 2022-11-15 | Stop reason: HOSPADM

## 2022-11-14 RX ORDER — ASPIRIN 81 MG/1
81 TABLET, CHEWABLE ORAL DAILY
Status: DISCONTINUED | OUTPATIENT
Start: 2022-11-15 | End: 2022-11-15

## 2022-11-14 RX ORDER — AMOXICILLIN 250 MG
1 CAPSULE ORAL NIGHTLY PRN
Status: DISCONTINUED | OUTPATIENT
Start: 2022-11-14 | End: 2022-11-15 | Stop reason: HOSPADM

## 2022-11-14 RX ADMIN — Medication 1 APPLICATION: at 20:18

## 2022-11-14 RX ADMIN — Medication 10 ML: at 20:18

## 2022-11-14 RX ADMIN — KETOROLAC TROMETHAMINE 15 MG: 15 INJECTION, SOLUTION INTRAMUSCULAR; INTRAVENOUS at 18:46

## 2022-11-14 RX ADMIN — METOPROLOL TARTRATE 5 MG: 5 INJECTION INTRAVENOUS at 23:14

## 2022-11-14 RX ADMIN — MAGNESIUM SULFATE 1 G: 1 INJECTION INTRAVENOUS at 18:03

## 2022-11-14 RX ADMIN — LORAZEPAM 1 MG: 2 INJECTION INTRAMUSCULAR; INTRAVENOUS at 12:38

## 2022-11-14 RX ADMIN — SODIUM CHLORIDE 75 ML/HR: 9 INJECTION, SOLUTION INTRAVENOUS at 20:41

## 2022-11-14 RX ADMIN — INSULIN LISPRO 3 UNITS: 100 INJECTION, SOLUTION INTRAVENOUS; SUBCUTANEOUS at 23:29

## 2022-11-14 RX ADMIN — SODIUM CHLORIDE, POTASSIUM CHLORIDE, SODIUM LACTATE AND CALCIUM CHLORIDE 1000 ML: 600; 310; 30; 20 INJECTION, SOLUTION INTRAVENOUS at 11:53

## 2022-11-14 RX ADMIN — LABETALOL HYDROCHLORIDE 10 MG: 5 INJECTION, SOLUTION INTRAVENOUS at 20:35

## 2022-11-14 NOTE — H&P
[facility] Medicine Services  HISTORY AND PHYSICAL    Date of Admission: 11/14/2022  Primary Care Physician: Provider, No Known    Subjective     Chief Complaint: Right cerebellar infarct    History of Present Illness  Concepcion harris is a 77-year-old female with a past medical history of stroke 2012 for which she received tPA at  prior to transfer to [place], type 2 diabetes, hypertension, high Po kalemia and hypomagnesia.  Patient started having neurological changes again in August of this year.  She went to [facility] 8/2022 with negative work-up.  She has continued to have multiple episodes of altered mental status with 3 visits to  emergency department over the past 2 months.  Most recently admitted 11/4 after a fall causing 3 right rib fractures and shoulder injury.  Patient has been on 7.5 Norco most recent ingestion last evening.  Multiple family members at bedside providing history.  Patient does not follow commands or offer insight into her visit.  She currently is incontinent of stool.  Reportedly took a laxative yesterday as pain medication has made her constipated.  Patient did receive Ativan 1 mg IV prior to MRI.  She is tachycardic, blood pressure is elevated I feel this is most likely secondary to pain and discomfort.  Per record review when seen by PCP who does discuss abdominal wall seroma.  Family was advised to have surgeon look at this.  They are requesting a consult during this admission.  We will follow for further signs of infection as of right now tachycardia may be associated with pain only.  She is admitted for further evaluation and treatment    Review of Systems   Unable to determine due to altered mental status    Past Medical History:   Past Medical History:   Diagnosis Date   • Abdominal wall seroma    • B12 deficiency    • Cerebral infarct (HCC)    • Diabetes mellitus (HCC)    • HLD  "(hyperlipidemia)    • Hypertension    • Hypokalemia    • Hypomagnesemia        Past Surgical History:   Past Surgical History:   Procedure Laterality Date   • CHOLECYSTECTOMY     • HERNIA REPAIR     • HYSTERECTOMY         Family History: family history includes Cancer in her mother; Hypertension in her mother; Transient ischemic attack in her father.    Social History:  reports that she has never smoked. She does not have any smokeless tobacco history on file. She reports that she does not drink alcohol and does not use drugs.    Code Status: Full, if unable speak for Dr. Riley will speak for her      Allergies:  No Known Allergies    Medications:  No current facility-administered medications on file prior to encounter.     Current Outpatient Medications on File Prior to Encounter   Medication Sig Dispense Refill   • allopurinol (ZYLOPRIM) 300 MG tablet Take 1 tablet by mouth Daily.     • amLODIPine (NORVASC) 5 MG tablet Take 1 tablet by mouth Daily.     • aspirin 81 MG chewable tablet Chew 1 tablet Daily.     • atorvastatin (LIPITOR) 20 MG tablet Take 1 tablet by mouth Daily.     • atorvastatin (LIPITOR) 40 MG tablet Take 1 tablet by mouth Every Night.     • lisinopril (PRINIVIL,ZESTRIL) 20 MG tablet Take 1 tablet by mouth Daily.     • magnesium oxide (MAG-OX) 400 MG tablet Take 1 tablet by mouth Daily.     • meloxicam (MOBIC) 15 MG tablet Take 1 tablet by mouth Daily.     • metFORMIN (GLUCOPHAGE) 1000 MG tablet Take 1 tablet by mouth 2 (Two) Times a Day With Meals.     • potassium chloride (K-DUR,KLOR-CON) 20 MEQ CR tablet Take 1 tablet by mouth Daily.       I have utilized all available immediate resources to obtain, update, and review the patient's current medications.    Objective     BP (!) 181/99   Pulse (!) 140   Temp 99.3 °F (37.4 °C) (Axillary)   Resp 22   Ht 162.6 cm (64\")   Wt 65.7 kg (144 lb 14.4 oz)   LMP  (LMP Unknown)   SpO2 97%   BMI 24.87 kg/m²   Physical Exam  Vitals reviewed. "   Constitutional:       Appearance: She is ill-appearing.   HENT:      Head: Normocephalic and atraumatic.      Mouth/Throat:      Mouth: Mucous membranes are dry.   Eyes:      Conjunctiva/sclera: Conjunctivae normal.      Comments: Keeps eyes closed   Cardiovascular:      Rate and Rhythm: Regular rhythm. Tachycardia present.   Pulmonary:      Effort: Pulmonary effort is normal.      Comments: Diminished without adventitious breath  Abdominal:      Palpations: Abdomen is soft.      Comments: Abdominal wall pelvic pouching at mesh site, known seroma   Musculoskeletal:      Cervical back: Neck supple.      Right lower leg: No edema.      Left lower leg: No edema.      Comments: Generalized weakness and debility, keeps head down on chest with decreased oxygenation   Skin:     General: Skin is warm and dry.   Neurological:      Mental Status: She is disoriented.   Psychiatric:      Comments: Flat affect, no behavioral       Pertinent Data:   Lab Results (last 72 hours)     Procedure Component Value Units Date/Time    Blood Culture - Blood, Arm, Left [[id number]] Collected: 11/14/22 1000    Specimen: Blood from Arm, Left Updated: 11/14/22 1828    Vitamin B12 [[id number]] Collected: 11/14/22 1000    Specimen: Blood Updated: 11/14/22 1807    Folate [[id number]] Collected: 11/14/22 1000    Specimen: Blood Updated: 11/14/22 1807    T4, Free [[id number]]  (Normal) Collected: 11/14/22 1550    Specimen: Blood Updated: 11/14/22 1630     Free T4 1.38 ng/dL     TSH [[id number]]  (Normal) Collected: 11/14/22 1550    Specimen: Blood Updated: 11/14/22 1629     TSH 0.894 uIU/mL     Troponin [[id number]]  (Normal) Collected: 11/14/22 1550    Specimen: Blood Updated: 11/14/22 1622     Troponin T 0.011 ng/mL     Urine Drug Screen - Urine, Clean Catch [[id number]]  (Abnormal) Collected: 11/14/22 1123    Specimen: Urine, Clean Catch Updated: 11/14/22 1149     THC, Screen, Urine Negative     Phencyclidine (PCP), Urine Negative     Cocaine Screen,  Urine Negative     Methamphetamine, Ur Negative     Opiate Screen Positive     Amphetamine Screen, Urine Negative     Benzodiazepine Screen, Urine Negative     Tricyclic Antidepressants Screen Negative     Methadone Screen, Urine Negative     Barbiturates Screen, Urine Negative     Oxycodone Screen, Urine Negative     Propoxyphene Screen Negative     Buprenorphine, Screen, Urine Negative    Urinalysis With Culture If Indicated - Urine, Catheter [[id number]]  (Abnormal) Collected: 11/14/22 1122    Specimen: Urine, Catheter Updated: 11/14/22 1143     Color, UA Yellow     Appearance, UA Clear     pH, UA 7.0     Specific Gravity, UA 1.011     Glucose, UA Negative     Ketones, UA Trace     Bilirubin, UA Negative     Blood, UA Negative     Protein, UA Trace     Leuk Esterase, UA Negative     Nitrite, UA Negative     Urobilinogen, UA 0.2 E.U./dL    Lactic Acid, Plasma [[id number]]  (Normal) Collected: 11/14/22 1000    Specimen: Blood Updated: 11/14/22 1132     Lactate 1.6 mmol/L     Comprehensive Metabolic Panel [[id number]]  (Abnormal) Collected: 11/14/22 1000    Specimen: Blood Updated: 11/14/22 1028     Glucose 169 mg/dL      BUN 11 mg/dL      Creatinine 0.64 mg/dL      Sodium 137 mmol/L      Potassium 4.6 mmol/L      Chloride 101 mmol/L      CO2 23.0 mmol/L      Calcium 10.4 mg/dL      Total Protein 6.7 g/dL      Albumin 4.20 g/dL      ALT (SGPT) 11 U/L      AST (SGOT) 11 U/L      Alkaline Phosphatase 92 U/L      Total Bilirubin 0.3 mg/dL      Globulin 2.5 gm/dL      A/G Ratio 1.7 g/dL      BUN/Creatinine Ratio 17.2     Anion Gap 13.0 mmol/L      eGFR 92.3 mL/min/1.73     Magnesium [[id number]]  (Normal) Collected: 11/14/22 1000    Specimen: Blood Updated: 11/14/22 1022     Magnesium 1.6 mg/dL     CBC Auto Differential [[id number]]  (Abnormal) Collected: 11/14/22 1000    Specimen: Blood Updated: 11/14/22 1014     WBC 8.96 10*3/mm3      RBC 3.82 10*6/mm3      Hemoglobin 10.9 g/dL      Hematocrit 35.4 %      MCV 92.7 fL       MCH 28.5 pg      MCHC 30.8 g/dL      RDW 13.6 %      RDW-SD 46.0 fl      MPV 11.2 fL      Platelets 331 10*3/mm3      Neutrophil % 66.2 %      Lymphocyte % 19.2 %      Monocyte % 12.8 %      Eosinophil % 0.4 %      Basophil % 0.2 %      Immature Grans % 1.2 %      Neutrophils, Absolute 5.92 10*3/mm3      Lymphocytes, Absolute 1.72 10*3/mm3      Monocytes, Absolute 1.15 10*3/mm3      Eosinophils, Absolute 0.04 10*3/mm3      Basophils, Absolute 0.02 10*3/mm3      Immature Grans, Absolute 0.11 10*3/mm3      nRBC 0.0 /100 WBC     POC Glucose Once [[id number]]  (Abnormal) Collected: 11/14/22 0954    Specimen: Blood Updated: 11/14/22 1005     Glucose 147 mg/dL      Comment: : [id number] Deven Messina ID: [id number]           Imaging Results (Last 24 Hours)     Procedure Component Value Units Date/Time    MRI Brain Without Contrast [[id number]] Collected: 11/14/22 1507     Updated: 11/14/22 1514    Narrative:      EXAMINATION:  MRI BRAIN WO CONTRAST-  11/14/2022 2:43 PM CST     HISTORY: Altered mental status. Evaluate for stroke.     TECHNIQUE: Multiplanar imaging was performed in a high field magnet.     COMPARISON: No comparison study.     FINDINGS: There is a tiny acute infarct in the right cerebellar  hemisphere. This is in on the diffusion-weighted images. There is  restricted diffusion on the ADC map images. There is T2 high signal  within the hemispheric white matter. 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 There is minimal atrophy. There is minimal mucosal thickening in  the ethmoid sinus region.       Impression:      1. Tiny acute infarct in the right cerebellar hemisphere.  2. T2 high signal in the hemispheric white matter is nonspecific and  likely due to chronic small vessel disease.  3. Minimal atrophy.  4. 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     The full report of this exam was immediately signed and available to the  emergency room. The patient is currently in the emergency room.        This report was  finalized on 11/14/2022 15:10 by Dr. Sebastian Velazquez MD.    CT Head Without Contrast [[id number]] Collected: 11/14/22 1114     Updated: 11/14/22 1119    Narrative:      CT HEAD WO CONTRAST- 11/14/2022 10:48 AM CST     HISTORY: ams     COMPARISON: None      DLP: 654 mGy cm. All CT scans are performed using dose optimization  techniques as appropriate to the performed exam and including at least  one of the following: Automated exposure control, adjustment of the mA  and/or kV according to size, and the use of the iterative reconstruction  technique.     TECHNIQUE: Serial axial tomographic images of the brain were obtained  without the use of intravenous contrast.      FINDINGS:   The midline structures are nondisplaced. There is mild cerebral and  cerebellar atrophy, with an associated increase in the prominence of the  ventricles and sulci. The basilar cisterns are normal in size and  configuration. There is no evidence of intracranial hemorrhage or  mass-effect. There is low attenuation in the periventricular white  matter, consistent with chronic ischemic change. There are no abnormal  extra-axial fluid collections. There is no evidence of tonsillar  herniation.      The included orbits and their contents are unremarkable. The visualized  paranasal sinuses, mastoid air cells and middle ear cavities are clear.  The visualized osseous structures and overlying soft tissues of the  skull and face are intact.        Impression:         1. Mild cerebral and cerebellar atrophy with chronic microvascular  disease but no evidence of acute intracranial process.        This report was finalized on 11/14/2022 11:16 by Dr. Dominic Valdes MD.    XR Chest 1 View [[id number]] Collected: 11/14/22 1058     Updated: 11/14/22 1102    Narrative:      XR CHEST 1 VW- 11/14/2022 10:42 AM CST     HISTORY: AMS     COMPARISON: None.     FINDINGS:      No lung consolidation. No pleural effusion or pneumothorax. The  cardiomediastinal  silhouette and pulmonary vascularity are within normal  limits. The osseous structures and surrounding soft tissues demonstrate  no acute abnormality.       Impression:      1. No radiographic evidence of acute cardiopulmonary process.  This report was finalized on 11/14/2022 10:58 by Dr Dylan Copeland, .          Assessment / Plan     Assessment:   Active Hospital Problems    Diagnosis    • **Tachycardia    • Cerebellar infarct (HCC)    • Uncontrolled hypertension    • Toxic metabolic encephalopathy    • Type 2 diabetes mellitus with hyperglycemia, without long-term current use of insulin (HCC)        Plan:   1.  Admit as inpatient critical care  2.  Home medications reviewed, will hold due to aspiration concerns  3.  Follow stroke protocol  4.  Monitor glucose every 6 hours with regular insulin sliding scale coverage  5.  Magnesium sulfate 1 g IV x1  6.  Normal saline 75 mL/hour  7.  Toradol 15 mg every 6 hours as needed for right shoulder pain  8.  Supplemental oxygen as needed, incentive spirometry, continuous pulse oximeter  9.  N.p.o. for now  10.  Echocardiogram, bilateral ultrasound carotids and MRI of the right shoulder in a.m.  11.  Additional labs, labs in a.m.  12.  Apply external catheter    I discussed the patient's findings and my recommendations with: Abhishek Kohli DO    Time spent: 45 minutes    Patient seen and examined by me on 11/14/2022 at 5:08 PM.    Electronically signed by ROSA Mason, 11/14/22, 18:52 CST.

## 2022-11-14 NOTE — ED PROVIDER NOTES
Subjective   History of Present Illness   Patient presents due to altered mental status.  History obtained from the daughter due to patient's minimal ability to cooperate and interact.  Apparently yesterday the patient was having some difficulty with word finding and having a little bit of difficulty walking.  The daughter notes that in the past she has had hypomagnesemia and hypokalemia that have led to the symptoms that she has been hospitalized in [place].  She also has a history of stroke 10 years ago for which she got tPA.  This morning she called the patient who seemed to be doing well and she said there was possibly some slight difficulty word finding but nothing notable around 7 AM.  She got in touch with the patient again a little bit later and patient was not responding over the phone.  She went to check on her and found her on the toilet.  When she stood up she had to  her to walk and she did not walk well and had to take a step backwards rather than forwards and had some difficulty.  She has not been able to answer basic questions and has difficulty with speech.  The daughter was concerned about low potassium and low magnesium and brought her to the ER for further evaluation.  No recent illness symptoms identified; no recent history of cough or sore throat, congestion, trouble breathing, issues eating or drinking, issues with vomiting or diarrhea.  No fevers reported.  Taking her magnesium and potassium supplements.    Review of Systems   Unable to perform ROS: Mental status change       Past Medical History:   Diagnosis Date   • Abdominal wall seroma    • B12 deficiency    • Cerebral infarct (HCC)    • Diabetes mellitus (HCC)    • HLD (hyperlipidemia)    • Hypertension    • Hypokalemia    • Hypomagnesemia        No Known Allergies    Past Surgical History:   Procedure Laterality Date   • CHOLECYSTECTOMY     • HERNIA REPAIR     • HYSTERECTOMY         Family History   Problem Relation Age of Onset   •  "Cancer Mother    • Hypertension Mother    • Transient ischemic attack Father        Social History     Socioeconomic History   • Marital status:    Tobacco Use   • Smoking status: Never   Substance and Sexual Activity   • Alcohol use: Never   • Drug use: Never           Objective   Physical Exam  Vitals reviewed.   Constitutional:       Comments: listless   HENT:      Head: Normocephalic and atraumatic.   Eyes:      Extraocular Movements: Extraocular movements intact.      Conjunctiva/sclera: Conjunctivae normal.   Cardiovascular:      Rate and Rhythm: Regular rhythm. Tachycardia present.      Pulses: Normal pulses.      Heart sounds: Normal heart sounds.   Pulmonary:      Effort: Pulmonary effort is normal. No respiratory distress.      Breath sounds: No wheezing.   Abdominal:      General: Abdomen is flat. There is no distension.      Tenderness: There is no abdominal tenderness.   Musculoskeletal:         General: No swelling or tenderness.      Cervical back: Normal range of motion and neck supple.      Right lower leg: No edema.      Left lower leg: No edema.   Skin:     General: Skin is warm and dry.      Capillary Refill: Capillary refill takes less than 2 seconds.   Neurological:      Mental Status: She is alert.      Comments: Unable to state her name or location.  Does not appear to have any hemineglect and persistently tries to get her self out of bed demonstrating good strength in all 4 extremities.  She also appears to have good coordination grabbing the handrails.  She cannot cooperate with formal cerebellar testing and does not follow commands.  When asked if she knows where she is she says \"yes.\"  Will not answer where.  No nystagmus noted.  No facial droop.  Her speech is clear when spoken but limited to occasional one-word statements.   Psychiatric:         Behavior: Behavior normal.         Thought Content: Thought content normal.         Procedures           ED Course  ED Course as of " 11/15/22 0744   Mon Nov 14, 2022   1134 -chest x-ray reviewed by me. no focal consolidations, no pulmonary edema, mediastinum not widened.  CT head with NAICA [AS]   1337 Hemoglobin(!): 10.9 [AS]      ED Course User Index  [AS] Michael Tsang MD MDM   Concepcion Velez is a 77 y.o. female with PMH above who presents to the Emergency Department with altered mental status.  Serum electrolytes are within normal limits which makes this etiology unlikely.  Stroke is considered; given that she has a history of stroke as well as diabetes she is not a candidate for tPA.  Difficult to get a NIH stroke scale on her; best estimate that I have at this time is 5.  I discussed her case with Dr. Lim who recommends that she get an MRI to further evaluate. Recommended against vessel imaging or basic CT, however she is in scanner at time of our conversation so will go ahead and obtain. She is also notably tachycardic to 119, not febrile.  Metabolic process seems very likely and work-up to elucidate this is ongoing with chest x-ray, urinalysis.  Will give IV fluids a liter to see if her tachycardia responds. Given the listlessness, pt will need meds for assistsnace. D/w jameson who agrees; has a history of paradoxical reaction to Haldol so we will give 1 mg ativan IV      ED Course:   -CT head is overall unremarkable without acute process.  Laboratory studies are unrevealing; additional work-up included lactic acid, UDS, urinalysis, troponin, TSH, T4 which were overall unremarkable.  UDS has opiates but she takes home dose pain medicine.  The patient was more comfortable and compliant after the Ativan but persistently tachycardic with a sinus tachycardia in the 130s and 140s.  I ordered a liter of fluids to see if it is fluid responsive and unfortunately this took several hours to get infused but after it was infused her tachycardia did not improve. chest x-ray reviewed by me. no  focal consolidations, no pulmonary edema, mediastinum not widened.  She continues to be calm and cooperative on reassessment.  MRI was ordered per Dr. Lim's recommendations and shows a small acute cerebellar infarct.  He feels this is incidental and not pertinent and would not require admission.  She does not have adria ataxia on exam and her mental status changes very likely to be due to the cerebellar infarct.  Per Dr. Lim no further work-up of this is required at this time.  Ultimately, I was unable to elucidate the underlying etiology of the patient's tachycardia but do find it to be significant in the context of her altered mental status.  I discussed admission with Dr. Vasquez who agreed to admit the patient under Dr. Kohli. He requested a d-dimer; patient has been on room air with good sats and no signs of shortness of breath or chest pain and does not have a history of blood clots or pertinent risk factors and her overall story does not seem consistent with pulmonary embolus to me, however I agreed to add this test on.  Dr. Vasquez did not feel the patient needed to stay in the emergency department pending the results and agrees they can be followed-up inpatient.  Patient and family updated.  Patient dispositioned without acute event.    Final diagnosis: tachycardia, altered mental status    All questions answered. Patient/family was understanding and in agreement with today's assessment and plan. The patient was monitored during their stay in the ED and dispositioned without acute event.    Electronically signed by:  Michael Tsang MD 11/15/2022 07:44 CST      Note: Dragon medical dictation software was used in the creation of this note.        Final diagnoses:   Tachycardia       ED Disposition  ED Disposition     ED Disposition   Decision to Admit    Condition   --    Comment   Level of Care: Critical Care [6]   Diagnosis: Cerebellar infarct (HCC) [235559]   Admitting Physician: DEVIKA  SHAHEEN WOODARD [[id number]]   Attending Physician: SHAHEEN FORTUNE [[id number]]   Bed Request Comments: ICU/CCU   Certification: I Certify That Inpatient Hospital Services Are Medically Necessary For Greater Than 2 Midnights               No follow-up provider specified.       Medication List      No changes were made to your prescriptions during this visit.          Michael Tsang MD  11/15/22 0726

## 2022-11-15 ENCOUNTER — APPOINTMENT (OUTPATIENT)
Dept: CT IMAGING | Facility: HOSPITAL | Age: 77
End: 2022-11-15

## 2022-11-15 ENCOUNTER — APPOINTMENT (OUTPATIENT)
Dept: NEUROLOGY | Facility: HOSPITAL | Age: 77
End: 2022-11-15

## 2022-11-15 ENCOUNTER — APPOINTMENT (OUTPATIENT)
Dept: CARDIOLOGY | Facility: HOSPITAL | Age: 77
End: 2022-11-15

## 2022-11-15 VITALS
DIASTOLIC BLOOD PRESSURE: 87 MMHG | HEART RATE: 85 BPM | SYSTOLIC BLOOD PRESSURE: 147 MMHG | TEMPERATURE: 98.9 F | BODY MASS INDEX: 23.39 KG/M2 | HEIGHT: 64 IN | OXYGEN SATURATION: 94 % | WEIGHT: 137 LBS | RESPIRATION RATE: 16 BRPM

## 2022-11-15 LAB
ANION GAP SERPL CALCULATED.3IONS-SCNC: 15 MMOL/L (ref 5–15)
BH CV ECHO MEAS - AO MAX PG: 11.6 MMHG
BH CV ECHO MEAS - AO MEAN PG: 6 MMHG
BH CV ECHO MEAS - AO ROOT DIAM: 2.5 CM
BH CV ECHO MEAS - AO V2 MAX: 170 CM/SEC
BH CV ECHO MEAS - AO V2 VTI: 30.1 CM
BH CV ECHO MEAS - AVA(I,D): 1.97 CM2
BH CV ECHO MEAS - EDV(CUBED): 58.4 ML
BH CV ECHO MEAS - EDV(MOD-SP4): 37.1 ML
BH CV ECHO MEAS - EF(MOD-SP4): 55 %
BH CV ECHO MEAS - ESV(CUBED): 17 ML
BH CV ECHO MEAS - ESV(MOD-SP4): 16.7 ML
BH CV ECHO MEAS - FS: 33.8 %
BH CV ECHO MEAS - IVS/LVPW: 0.82 CM
BH CV ECHO MEAS - IVSD: 0.75 CM
BH CV ECHO MEAS - LA DIMENSION: 2.7 CM
BH CV ECHO MEAS - LAT PEAK E' VEL: 7.4 CM/SEC
BH CV ECHO MEAS - LV DIASTOLIC VOL/BSA (35-75): 22.3 CM2
BH CV ECHO MEAS - LV MASS(C)D: 93.2 GRAMS
BH CV ECHO MEAS - LV MAX PG: 5.1 MMHG
BH CV ECHO MEAS - LV MEAN PG: 3 MMHG
BH CV ECHO MEAS - LV SYSTOLIC VOL/BSA (12-30): 10 CM2
BH CV ECHO MEAS - LV V1 MAX: 113 CM/SEC
BH CV ECHO MEAS - LV V1 VTI: 20.9 CM
BH CV ECHO MEAS - LVIDD: 3.9 CM
BH CV ECHO MEAS - LVIDS: 2.6 CM
BH CV ECHO MEAS - LVOT AREA: 2.8 CM2
BH CV ECHO MEAS - LVOT DIAM: 1.9 CM
BH CV ECHO MEAS - LVPWD: 0.91 CM
BH CV ECHO MEAS - MED PEAK E' VEL: 6 CM/SEC
BH CV ECHO MEAS - MV A MAX VEL: 149 CM/SEC
BH CV ECHO MEAS - MV DEC TIME: 0.21 MSEC
BH CV ECHO MEAS - MV E MAX VEL: 92.4 CM/SEC
BH CV ECHO MEAS - MV E/A: 0.62
BH CV ECHO MEAS - RAP SYSTOLE: 5 MMHG
BH CV ECHO MEAS - RVSP: 35.9 MMHG
BH CV ECHO MEAS - SI(MOD-SP4): 12.2 ML/M2
BH CV ECHO MEAS - SV(LVOT): 59.3 ML
BH CV ECHO MEAS - SV(MOD-SP4): 20.4 ML
BH CV ECHO MEAS - TR MAX PG: 30.9 MMHG
BH CV ECHO MEAS - TR MAX VEL: 278 CM/SEC
BH CV ECHO MEASUREMENTS AVERAGE E/E' RATIO: 13.79
BUN SERPL-MCNC: 14 MG/DL (ref 8–23)
BUN/CREAT SERPL: 20.6 (ref 7–25)
CALCIUM SPEC-SCNC: 9.5 MG/DL (ref 8.6–10.5)
CHLORIDE SERPL-SCNC: 98 MMOL/L (ref 98–107)
CHOLEST SERPL-MCNC: 125 MG/DL (ref 0–200)
CO2 SERPL-SCNC: 22 MMOL/L (ref 22–29)
CREAT SERPL-MCNC: 0.68 MG/DL (ref 0.57–1)
DEPRECATED RDW RBC AUTO: 45.2 FL (ref 37–54)
EGFRCR SERPLBLD CKD-EPI 2021: 91 ML/MIN/1.73
ERYTHROCYTE [DISTWIDTH] IN BLOOD BY AUTOMATED COUNT: 13.6 % (ref 12.3–15.4)
FLUAV RNA RESP QL NAA+PROBE: NOT DETECTED
FLUBV RNA RESP QL NAA+PROBE: NOT DETECTED
FOLATE SERPL-MCNC: 6.11 NG/ML (ref 4.78–24.2)
GLUCOSE BLDC GLUCOMTR-MCNC: 123 MG/DL (ref 70–130)
GLUCOSE SERPL-MCNC: 138 MG/DL (ref 65–99)
HCT VFR BLD AUTO: 32.7 % (ref 34–46.6)
HDLC SERPL-MCNC: 57 MG/DL (ref 40–60)
HGB BLD-MCNC: 10.2 G/DL (ref 12–15.9)
LDLC SERPL CALC-MCNC: 48 MG/DL (ref 0–100)
LDLC/HDLC SERPL: 0.8 {RATIO}
LEFT ATRIUM VOLUME INDEX: 24.8 ML/M2
LEFT ATRIUM VOLUME: 41.4 ML
MAGNESIUM SERPL-MCNC: 1.6 MG/DL (ref 1.6–2.4)
MAXIMAL PREDICTED HEART RATE: 145 BPM
MCH RBC QN AUTO: 28.4 PG (ref 26.6–33)
MCHC RBC AUTO-ENTMCNC: 31.2 G/DL (ref 31.5–35.7)
MCV RBC AUTO: 91.1 FL (ref 79–97)
PLATELET # BLD AUTO: 312 10*3/MM3 (ref 140–450)
PMV BLD AUTO: 11.7 FL (ref 6–12)
POTASSIUM SERPL-SCNC: 3.4 MMOL/L (ref 3.5–5.2)
QT INTERVAL: 324 MS
QTC INTERVAL: 451 MS
RBC # BLD AUTO: 3.59 10*6/MM3 (ref 3.77–5.28)
RPR SER QL: NORMAL
RSV RNA NPH QL NAA+NON-PROBE: NOT DETECTED
SARS-COV-2 RNA RESP QL NAA+PROBE: NOT DETECTED
SODIUM SERPL-SCNC: 135 MMOL/L (ref 136–145)
STRESS TARGET HR: 123 BPM
TRIGL SERPL-MCNC: 113 MG/DL (ref 0–150)
VIT B12 BLD-MCNC: 324 PG/ML (ref 211–946)
VLDLC SERPL-MCNC: 20 MG/DL (ref 5–40)
WBC NRBC COR # BLD: 14.61 10*3/MM3 (ref 3.4–10.8)

## 2022-11-15 PROCEDURE — 97161 PT EVAL LOW COMPLEX 20 MIN: CPT

## 2022-11-15 PROCEDURE — 93306 TTE W/DOPPLER COMPLETE: CPT | Performed by: INTERNAL MEDICINE

## 2022-11-15 PROCEDURE — 95819 EEG AWAKE AND ASLEEP: CPT | Performed by: PSYCHIATRY & NEUROLOGY

## 2022-11-15 PROCEDURE — 70498 CT ANGIOGRAPHY NECK: CPT

## 2022-11-15 PROCEDURE — 85027 COMPLETE CBC AUTOMATED: CPT | Performed by: NURSE PRACTITIONER

## 2022-11-15 PROCEDURE — 93306 TTE W/DOPPLER COMPLETE: CPT

## 2022-11-15 PROCEDURE — 87637 SARSCOV2&INF A&B&RSV AMP PRB: CPT | Performed by: FAMILY MEDICINE

## 2022-11-15 PROCEDURE — 92610 EVALUATE SWALLOWING FUNCTION: CPT

## 2022-11-15 PROCEDURE — 63710000001 INSULIN LISPRO (HUMAN) PER 5 UNITS: Performed by: NURSE PRACTITIONER

## 2022-11-15 PROCEDURE — 95819 EEG AWAKE AND ASLEEP: CPT

## 2022-11-15 PROCEDURE — 70496 CT ANGIOGRAPHY HEAD: CPT

## 2022-11-15 PROCEDURE — 82962 GLUCOSE BLOOD TEST: CPT

## 2022-11-15 PROCEDURE — 99223 1ST HOSP IP/OBS HIGH 75: CPT | Performed by: PSYCHIATRY & NEUROLOGY

## 2022-11-15 PROCEDURE — 80048 BASIC METABOLIC PNL TOTAL CA: CPT | Performed by: NURSE PRACTITIONER

## 2022-11-15 PROCEDURE — 94799 UNLISTED PULMONARY SVC/PX: CPT

## 2022-11-15 PROCEDURE — 0 LEVETIRACETAM IN NACL 0.75% 1000 MG/100ML SOLUTION: Performed by: PSYCHIATRY & NEUROLOGY

## 2022-11-15 PROCEDURE — 0 IOPAMIDOL PER 1 ML: Performed by: FAMILY MEDICINE

## 2022-11-15 PROCEDURE — 80061 LIPID PANEL: CPT | Performed by: NURSE PRACTITIONER

## 2022-11-15 PROCEDURE — 97165 OT EVAL LOW COMPLEX 30 MIN: CPT | Performed by: OCCUPATIONAL THERAPIST

## 2022-11-15 PROCEDURE — 83735 ASSAY OF MAGNESIUM: CPT | Performed by: FAMILY MEDICINE

## 2022-11-15 RX ORDER — LEVETIRACETAM 500 MG/1
500 TABLET ORAL 2 TIMES DAILY
Qty: 60 TABLET | Refills: 1 | Status: SHIPPED | OUTPATIENT
Start: 2022-11-15

## 2022-11-15 RX ORDER — ATORVASTATIN CALCIUM 40 MG/1
80 TABLET, FILM COATED ORAL NIGHTLY
Status: DISCONTINUED | OUTPATIENT
Start: 2022-11-15 | End: 2022-11-15 | Stop reason: HOSPADM

## 2022-11-15 RX ORDER — POTASSIUM CHLORIDE 1.5 G/1.77G
40 POWDER, FOR SOLUTION ORAL AS NEEDED
Status: DISCONTINUED | OUTPATIENT
Start: 2022-11-15 | End: 2022-11-15 | Stop reason: HOSPADM

## 2022-11-15 RX ORDER — CEFDINIR 300 MG/1
300 CAPSULE ORAL EVERY 12 HOURS SCHEDULED
Status: DISCONTINUED | OUTPATIENT
Start: 2022-11-15 | End: 2022-11-15 | Stop reason: HOSPADM

## 2022-11-15 RX ORDER — METOPROLOL SUCCINATE 50 MG/1
50 TABLET, EXTENDED RELEASE ORAL DAILY
Qty: 30 TABLET | Refills: 2 | Status: SHIPPED | OUTPATIENT
Start: 2022-11-15

## 2022-11-15 RX ORDER — POTASSIUM CHLORIDE 750 MG/1
40 CAPSULE, EXTENDED RELEASE ORAL AS NEEDED
Status: DISCONTINUED | OUTPATIENT
Start: 2022-11-15 | End: 2022-11-15 | Stop reason: HOSPADM

## 2022-11-15 RX ORDER — LEVETIRACETAM 10 MG/ML
1000 INJECTION INTRAVASCULAR ONCE
Status: COMPLETED | OUTPATIENT
Start: 2022-11-15 | End: 2022-11-15

## 2022-11-15 RX ORDER — ASPIRIN 325 MG
325 TABLET ORAL DAILY
Status: DISCONTINUED | OUTPATIENT
Start: 2022-11-15 | End: 2022-11-15 | Stop reason: HOSPADM

## 2022-11-15 RX ORDER — CEFDINIR 300 MG/1
300 CAPSULE ORAL EVERY 12 HOURS SCHEDULED
Qty: 13 CAPSULE | Refills: 0 | Status: SHIPPED | OUTPATIENT
Start: 2022-11-15 | End: 2022-11-21 | Stop reason: HOSPADM

## 2022-11-15 RX ORDER — POTASSIUM CHLORIDE 7.45 MG/ML
10 INJECTION INTRAVENOUS
Status: DISCONTINUED | OUTPATIENT
Start: 2022-11-15 | End: 2022-11-15 | Stop reason: HOSPADM

## 2022-11-15 RX ADMIN — CEFDINIR 300 MG: 300 CAPSULE ORAL at 12:55

## 2022-11-15 RX ADMIN — POTASSIUM CHLORIDE 40 MEQ: 10 CAPSULE, COATED, EXTENDED RELEASE ORAL at 12:56

## 2022-11-15 RX ADMIN — IOPAMIDOL 100 ML: 755 INJECTION, SOLUTION INTRAVENOUS at 13:35

## 2022-11-15 RX ADMIN — CHLORHEXIDINE GLUCONATE 1 APPLICATION: 500 CLOTH TOPICAL at 04:45

## 2022-11-15 RX ADMIN — METOPROLOL TARTRATE 5 MG: 5 INJECTION INTRAVENOUS at 05:45

## 2022-11-15 RX ADMIN — CHLORHEXIDINE GLUCONATE 1 APPLICATION: 500 CLOTH TOPICAL at 04:44

## 2022-11-15 RX ADMIN — ASPIRIN 325 MG: 325 TABLET ORAL at 12:56

## 2022-11-15 RX ADMIN — LEVETIRACETAM 1000 MG: 10 INJECTION INTRAVASCULAR at 15:40

## 2022-11-15 NOTE — DISCHARGE SUMMARY
[facility]  DISCHARGE SUMMARY       Date of Admission: 11/14/2022  Date of Discharge:  11/15/2022  Primary Care Physician: Provider, No Known    Discharge Diagnoses:  Active Hospital Problems    Diagnosis    • **Tachycardia    • Cerebellar infarct (HCC)    • Uncontrolled hypertension    • Toxic metabolic encephalopathy    • Type 2 diabetes mellitus with hyperglycemia, without long-term current use of insulin (HCC)          Presenting Problem/History of Present Illness:  Tachycardia [R00.0]  Cerebellar infarct (HCC) [I63.9]     Chief Complaint on Day of Discharge:   No complaint    History of Present Illness on Day of Discharge:   The patient is doing well today.  She has been seen and evaluated by neurology.  EEG report is noted below noting an extremely atypical result.  The patient will empirically be placed on Keppra and if her spells are improved then this may give more evidence that they were epileptic in etiology.  The patient is stable for discharge home today with her family.    Hospital Course  Concepcion harris is a 77-year-old female with a past medical history of stroke 2012 for which she received tPA at [facility] prior to transfer to [place], type 2 diabetes, hypertension, high Po kalemia and hypomagnesia.  Patient started having neurological changes again in August of this year.  She went to [facility] 8/2022 with negative work-up.  She has continued to have multiple episodes of altered mental status with 3 visits to [facility] emergency department over the past 2 months.  Most recently admitted 11/4 after a fall causing 3 right rib fractures and shoulder injury.  Patient has been on 7.5 Norco most recent ingestion last evening.  Multiple family members at bedside providing history.  Patient does not follow commands or offer insight into her visit.  She currently is incontinent of stool.  Reportedly took a  laxative yesterday as pain medication has made her constipated.  Patient did receive Ativan 1 mg IV prior to MRI.  She is tachycardic, blood pressure is elevated I feel this is most likely secondary to pain and discomfort.  Per record review when seen by PCP who does discuss abdominal wall seroma.  Family was advised to have surgeon look at this.  They are requesting a consult during this admission.  We will follow for further signs of infection as of right now tachycardia may be associated with pain only.  She is admitted for further evaluation and treatment  Plan:   1.  Admit as inpatient critical care  2.  Home medications reviewed, will hold due to aspiration concerns  3.  Follow stroke protocol  4.  Monitor glucose every 6 hours with regular insulin sliding scale coverage  5.  Magnesium sulfate 1 g IV x1  6.  Normal saline 75 mL/hour  7.  Toradol 15 mg every 6 hours as needed for right shoulder pain  8.  Supplemental oxygen as needed, incentive spirometry, continuous pulse oximeter  9.  N.p.o. for now  10.  Echocardiogram, bilateral ultrasound carotids and MRI of the right shoulder in a.m.  11.  Additional labs, labs in a.m.  12.  Apply external catheter      Consults:   Neurology:  Impression     • Spells  ? The differential diagnosis for the spells are as follows: I believe the most likely etiology would be complicated migraines given the fact the patient has associated headaches with the spells.  As the headache resolves the patient often has symptoms that last less than 24 hours.  Another consideration would be an epileptic etiology.  I do not believe this represents a TIA or stroke.  A final consideration would be a progressive neurologic disorder such as Lewy body dementia which can initially be spells of altered mentation and cognitive decline.  Currently I find no evidence of cogwheeling to suggest an underlying neuro progressive disorder.  • Sinus tachycardia  • Increased stress from 's  illness  • Incidental finding in the cerebellum that is not a clinically relevant stroke.  That in no way could explain symptomatology given by the patient.  I think is reasonable to perform cardiac telemetry, perform a cardiac echo, and vessel imaging.  We can also increase the aspirin and maximize her statin as well.  She has no physical exam deficits consistent with a cerebellar stroke.  It is a tiny foci of diffusion restriction and therefore is likely not causing any clinical symptoms.     Plan     • Cardiac telemetry  • Cardiac echo  • CT angiography of head and neck  • EEG  • Minimize mind-altering medications such as opiates  • Cleared from neurology standpoint to be discharged home immediately following testing today.  I do not believe that she has any emergent neurologic features.  If the patient does have an abnormal EEG we may consider antiepileptics.  If she does not then I am recommending either verapamil sustained-release 120 mg p.o. nightly.  If internal medicine needs to use a beta-blocker we can use that instead as this would have some efficacy against complicated migraines.  Will need follow-up in the neurology clinic to watch for any underlying progressive neurologic disorder such as Lewy body dementia  ? In an effort to minimize hospitalization, I have discontinued unnecessary tests such as the carotid ultrasound and MRI of her shoulder.     I discussed the patient's findings and my recommendations with patient and family.  Discussed with patient's daughter in the room.     Elie Lim MD  Follow-up neurology note:     The patient's EEG is extremely atypical.  It has no definitive signs of epileptic foci, however, I do find some concerning features of this.  I still believe the complicated migraines would remain at the top of my differential diagnosis list.  To that end, I think it be reasonable to try Keppra 500 mg twice daily as an agent that could potentially prevent migraines but may  "also serve as an antiepileptic if there is any epileptic component to the spells.  She can be discharged today from the inpatient setting and have close follow-up with the neurology clinic in approximately 4 weeks time.  If she has no further spells on Keppra, this may give more credence to the idea that these were epileptic in etiology.     Keppra has been E prescribed to her local pharmacy.     Electronically signed by Elie Lim MD    Result Review    Result Review:  I have personally reviewed the results from the time of this admission to 11/15/2022 13:35 CST and agree with these findings:  []  Laboratory  []  Microbiology  []  Radiology  []  EKG/Telemetry   []  Cardiology/Vascular   []  Pathology  []  Old records  []  Other:    EEG:  Findings: This is an abnormal EEG.  There is generalized slowing seen throughout that could be consistent with a postictal state versus a metabolic/medication induced encephalopathy.  That being said, there are some atypical semirhythmic waveforms occasionally with after coming slow waves which may represent an irritable cortex.  It is difficult to say whether this can be seen more over the left compared to the right or vice versa.     Elie Lim MD    Condition on Discharge:    Stable and improved    Physical Exam on Discharge:  /87   Pulse 85   Temp 98.9 °F (37.2 °C) (Axillary)   Resp 16   Ht 162.6 cm (64\")   Wt 62.4 kg (137 lb 9.1 oz)   LMP  (LMP Unknown)   SpO2 94%   BMI 23.61 kg/m²   Physical Exam     Constitutional:       Appearance: She is normal-appearing.  Drowsy but easily awakened.  HENT:      Head: Normocephalic and atraumatic.      Mouth: Mucous membranes are dry.   Eyes:      Conjunctiva/sclera: Conjunctivae normal.   Cardiovascular:      Rate and Rhythm: Regular rhythm.   Pulmonary:      Effort: Pulmonary effort is normal.      Comments: Diminished without adventitious breath  Abdominal:      Palpations: Abdomen is soft.      Comments: " Abdominal wall pelvic pouching at mesh site, known seroma   Musculoskeletal:      Cervical back: Neck supple.      Right lower leg: No edema.      Left lower leg: No edema.      Comments: Generalized weakness and debility.   Skin:     General: Skin is warm and dry.   Neurological:      Mental Status: She is oriented x2.   Psychiatric:      Comments: Flat affect, no behavioral disturbance.     Discharge Disposition:  Home or Self Care    Discharge Medications:     Discharge Medications      New Medications      Instructions Start Date   cefdinir 300 MG capsule  Commonly known as: OMNICEF   300 mg, Oral, Every 12 Hours Scheduled      levETIRAcetam 500 MG tablet  Commonly known as: KEPPRA   500 mg, Oral, 2 Times Daily      metoprolol succinate XL 50 MG 24 hr tablet  Commonly known as: Toprol XL   50 mg, Oral, Daily         Changes to Medications      Instructions Start Date   atorvastatin 20 MG tablet  Commonly known as: LIPITOR  What changed: Another medication with the same name was removed. Continue taking this medication, and follow the directions you see here.   20 mg, Oral, Daily         Continue These Medications      Instructions Start Date   allopurinol 300 MG tablet  Commonly known as: ZYLOPRIM   300 mg, Oral, Daily      aspirin 81 MG chewable tablet   81 mg, Oral, Daily      lisinopril 20 MG tablet  Commonly known as: PRINIVIL,ZESTRIL   20 mg, Oral, Daily      magnesium oxide 400 MG tablet  Commonly known as: MAG-OX   400 mg, Oral, Daily      meloxicam 15 MG tablet  Commonly known as: MOBIC   15 mg, Oral, Daily      metFORMIN 1000 MG tablet  Commonly known as: GLUCOPHAGE   1,000 mg, Oral, 2 Times Daily With Meals      potassium chloride 20 MEQ CR tablet  Commonly known as: K-DUR,KLOR-CON   20 mEq, Oral, Daily         Stop These Medications    amLODIPine 5 MG tablet  Commonly known as: NORVASC            Discharge Diet:   Diet Instructions     Diet: Regular; Thin      Discharge Diet: Regular    Fluid  Consistency: Thin          Discharge Care Plan / Instructions:   Discharge home    Activity at Discharge:   Activity Instructions     Activity as Tolerated            Follow-up Appointments:  Follow-up with PCP next week       Electronically signed by Abhishek Kohli DO, 11/15/22, 13:35 CST.    Time: Discharge Less than 30 min    Part of this note may be an electronic transcription/translation of spoken language to printed text using the Dragon Dictation system.

## 2022-11-15 NOTE — THERAPY EVALUATION
"Patient Name: Concepcion Velez  : 1945    MRN: [id number]                              Today's Date: 11/15/2022       Admit Date: 2022    Visit Dx:     ICD-10-CM ICD-9-CM   1. Tachycardia  R00.0 785.0   2. Dysphagia, unspecified type  R13.10 787.20   3. Cerebellar infarct (HCC)  I63.9 434.91   4. Impaired mobility  Z74.09 799.89     Patient Active Problem List   Diagnosis   • Tachycardia   • Cerebellar infarct (HCC)   • Uncontrolled hypertension   • Toxic metabolic encephalopathy   • Type 2 diabetes mellitus with hyperglycemia, without long-term current use of insulin (HCC)     Past Medical History:   Diagnosis Date   • Abdominal wall seroma    • B12 deficiency    • Cerebral infarct (HCC)    • Diabetes mellitus (HCC)    • HLD (hyperlipidemia)    • Hypertension    • Hypokalemia    • Hypomagnesemia      Past Surgical History:   Procedure Laterality Date   • CHOLECYSTECTOMY     • HERNIA REPAIR     • HYSTERECTOMY        General Information     Row Name 11/15/22 1022          Physical Therapy Time and Intention    Document Type evaluation  - (r)  (t)  (c)     Mode of Treatment physical therapy  - (r)  (t)  (c)     Row Name 11/15/22 1022          General Information    Patient Profile Reviewed yes  Pt presented to [facility] following multiple events of AMS/neuro changes since August. Dx: tachycardia, cerebellar infarct, metabolic encephalopathy. PMH: HTN, CVA in , recently admitted  s/p fall, causing 3 R rib Fxs and \"shoulder injury\"  - (r)  (t) LH (c)     Prior Level of Function independent:;all household mobility;gait;transfer;bed mobility;ADL's  - (r)  (t)  (c)     Existing Precautions/Restrictions fall  - (r)  (t) LH (c)     Barriers to Rehab medically complex;physical barrier  - (r)  (t)  (c)     Row Name 11/15/22 1022          Living Environment    People in Home spouse  Daughter is moving in  - (r)  (t)  (c)     Row Name 11/15/22 1022          Home Main Entrance    " Number of Stairs, Main Entrance six  -LH (r) MF (t) LH (c)     Stair Railings, Main Entrance railings on both sides of stairs  -LH (r) MF (t) LH (c)     Row [facility] 11/15/22 1022          Stairs Within Home, Primary    Number of Stairs, Within Home, Primary one  -LH (r) MF (t) LH (c)     Stair Railings, Within Home, Primary none  -LH (r) MF (t) LH (c)     Row [facility] 11/15/22 1022          Cognition    Orientation Status (Cognition) oriented to;person;place;situation;verbal cues/prompts needed for orientation;time  -LH (r) MF (t) LH (c)     Row Name 11/15/22 1022          Safety Issues, Functional Mobility    Safety Issues Affecting Function (Mobility) at risk behavior observed;awareness of need for assistance;impulsivity;insight into deficits/self-awareness;judgment;problem-solving  -LH (r) MF (t) LH (c)     Impairments Affecting Function (Mobility) balance;cognition;strength  -LH (r) MF (t) LH (c)           User Key  (r) = Recorded By, (t) = Taken By, (c) = Cosigned By    Initials Name Provider Type     Roberto Wren, PT Physical Therapist    MF Usha Lopez, PT Student PT Student               Mobility     Row Name 11/15/22 1022          Bed Mobility    Bed Mobility supine-sit-supine  -LH (r) MF (t) LH (c)     Supine-Sit-Supine [place] (Bed Mobility) modified independence  -LH (r) MF (t) LH (c)     Assistive Device (Bed Mobility) head of bed elevated  -LH (r) MF (t) LH (c)     Row [facility] 11/15/22 1022          Sit-Stand Transfer    Sit-Stand [place] (Transfers) contact guard;1 person assist  -LH (r) MF (t) LH (c)     Comment, (Sit-Stand Transfer) x3  -LH (r) MF (t) LH (c)     Row Name 11/15/22 1022          Gait/Stairs (Locomotion)    [place] Level (Gait) contact guard  -LH (r) MF (t) LH (c)     Distance in Feet (Gait) 500  -LH (r) MF (t) LH (c)     Deviations/Abnormal Patterns (Gait) base of support, narrow  -LH (r) MF (t) LH (c)     Bilateral Gait Deviations --  Increased lateral sway B  -LH  (r) MF (t) LH (c)           User Key  (r) = Recorded By, (t) = Taken By, (c) = Cosigned By    Initials Name Provider Type     Roberto Wren, PT Physical Therapist    Usha Aguilar, PT Student PT Student               Obj/Interventions     Row Name 11/15/22 1022          Range of Motion Comprehensive    General Range of Motion bilateral lower extremity ROM WFL  -LH (r) MF (t) LH (c)     Row Name 11/15/22 1022          Strength Comprehensive (MMT)    Comment, General Manual Muscle Testing (MMT) Assessment Gross LE strength: 4/5  -LH (r) MF (t) LH (c)     Row Name 11/15/22 1022          Balance    Balance Assessment sitting dynamic balance;standing dynamic balance  -LH (r) MF (t) LH (c)     Dynamic Sitting Balance standby assist  -LH (r) MF (t) LH (c)     Position, Sitting Balance unsupported;sitting edge of bed;sitting in chair;other (see comments)  toilety  -LH (r) MF (t) LH (c)     Dynamic Standing Balance contact guard  -LH (r) MF (t) LH (c)     Position/Device Used, Standing Balance supported  -LH (r) MF (t) LH (c)     Row Name 11/15/22 1022          Sensory Assessment (Somatosensory)    Sensory Assessment (Somatosensory) LE sensation intact  -LH (r) MF (t) LH (c)           User Key  (r) = Recorded By, (t) = Taken By, (c) = Cosigned By    Initials Name Provider Type     Roberto Wren, PT Physical Therapist    Usha Aguilar, PT Student PT Student               Goals/Plan     Row Name 11/15/22 1022          Bed Mobility Goal 1 (PT)    Activity/Assistive Device (Bed Mobility Goal 1, PT) sit to supine/supine to sit  -LH (r) MF (t) LH (c)     [place] Level/Cues Needed (Bed Mobility Goal 1, PT) independent  -LH (r) MF (t) LH (c)     Time Frame (Bed Mobility Goal 1, PT) long term goal (LTG);10 days  -LH (r) MF (t) LH (c)     Progress/Outcomes (Bed Mobility Goal 1, PT) new goal  -LH (r) MF (t) LH (c)     Row Name 11/15/22 1022          Transfer Goal 1 (PT)    Activity/Assistive Device  (Transfer Goal 1, PT) sit-to-stand/stand-to-sit;bed-to-chair/chair-to-bed  -LH (r) MF (t) LH (c)     [place] Level/Cues Needed (Transfer Goal 1, PT) standby assist  -LH (r) MF (t) LH (c)     Time Frame (Transfer Goal 1, PT) long term goal (LTG);10 days  -LH (r) MF (t) LH (c)     Progress/Outcome (Transfer Goal 1, PT) new goal  -LH (r) MF (t) LH (c)     Row Name 11/15/22 1022          Gait Training Goal 1 (PT)    Activity/Assistive Device (Gait Training Goal 1, PT) gait (walking locomotion);decrease fall risk;diminish gait deviation  -LH (r) MF (t) LH (c)     [place] Level (Gait Training Goal 1, PT) independent  -LH (r) MF (t) LH (c)     Distance (Gait Training Goal 1, PT) 300  -LH (r) MF (t) LH (c)     Time Frame (Gait Training Goal 1, PT) long term goal (LTG);10 days  -LH (r) MF (t) LH (c)     Strategies/Barriers (Gait Training Goal 1, PT) Pt will demo decreased lateral sway and no LOB w/ amb  -LH (r) MF (t) LH (c)     Progress/Outcome (Gait Training Goal 1, PT) new goal  -LH (r) MF (t) LH (c)     Row Name 11/15/22 1022          Stairs Goal 1 (PT)    Activity/Assistive Device (Stairs Goal 1, PT) ascending stairs;descending stairs;using handrail, left;using handrail, right;step-over step;step-to-step;decrease fall risk;improve balance and speed  -LH (r) MF (t) LH (c)     [place] Level/Cues Needed (Stairs Goal 1, PT) standby assist  -LH (r) MF (t) LH (c)     Number of Stairs (Stairs Goal 1, PT) 6  -LH (r) MF (t) LH (c)     Time Frame (Stairs Goal 1, PT) long term goal (LTG);10 days  -LH (r) MF (t) LH (c)     Progress/Outcome (Stairs Goal 1, PT) new goal  -LH (r) MF (t) LH (c)     Row Name 11/15/22 5292          Therapy Assessment/Plan (PT)    Planned Therapy Interventions (PT) balance training;bed mobility training;gait training;home exercise program;patient/family education;stair training;strengthening;transfer training  -LH (r) MF (t) LH (c)           User Key  (r) = Recorded By, (t) = Taken By, (c)  = Cosigned By    Initials Name Provider Type     Roberto Wren, PT Physical Therapist    Usha Aguilar, PT Student PT Student               Clinical Impression     Row Name 11/15/22 1022          Pain    Pretreatment Pain Rating 0/10 - no pain  - (r) BEBA (t) ABIGAIL (c)     Posttreatment Pain Rating 0/10 - no pain  -ABIGAIL (r) BEBA (t) ABIGAIL (c)     Row Name 11/15/22 1022          Plan of Care Review    Plan of Care Reviewed With patient  - (r) BEBA (t) ABIGAIL (c)     Outcome Evaluation PT eval complete. Pt is oriented to name, place, and situation. She required vcs to orient to time. Pt resides at home w/ her spouse, however her daughter states she is moving in. [facility] Throughout sesison, pt demos impulsive behavior and confusion. She required vcs for direction while amb and to orient herself in the room; she attempted to sit on rails instead of commode. She did follow 100% of commands. Pt was Tanner for supien<>sit t/f w/ HOB elevated. She required CGA for 3 sit<>stand t/fs and to amb 500 ft in unit. Pt demos mod balance deficits, increased lateral sway, and narrow DOMINIK w/ amb. Gross LE strength: 4/5. B LE ROM and sensation: WFL. She demos BLE non-pitting edema. Overall, pt demos balance deficits, LE weakness, and cognitive deficits. Pt is a falls risk. Anticipated d/c home w/ 24/7 care and HH. Recommend SLP consult for cognition.  - (r) BEBA (t) ABIGAIL (c)     Row Name 11/15/22 1022          Therapy Assessment/Plan (PT)    Patient/Family Therapy Goals Statement (PT) to go home  -ABIGAIL (r) BEBA (t) ABIGAIL (c)     Rehab Potential (PT) good, to achieve stated therapy goals  -ABIGAIL (r) BEBA (t) ABIGAIL (c)     Criteria for Skilled Interventions Met (PT) yes;meets criteria;skilled treatment is necessary  -ABIGAIL (r) BEBA (t) ABIGAIL (c)     Therapy Frequency (PT) 2 times/day  -ABIGAIL (r) BEBA (t) ABIGAIL (c)     Predicted Duration of Therapy Intervention (PT) until d/c  -LH (r) MF (t) LH (c)     Row Name 11/15/22 1029           Positioning and Restraints    Pre-Treatment Position in bed  - (r) MF (t) LH (c)     Post Treatment Position bed  - (r) MF (t) LH (c)     In Bed notified nsg;side lying right;call light within reach;encouraged to call for assist;exit alarm on;patient within staff view;with family/caregiver;pillow between legs  -LH (r) MF (t) LH (c)           User Key  (r) = Recorded By, (t) = Taken By, (c) = Cosigned By    Initials Name Provider Type     Roberto Wren, PT Physical Therapist    MF Usha Lopez, PT Student PT Student               Outcome Measures     Row Name 11/15/22 1022 11/15/22 7930       How much help from another person do you currently need...    Turning from your back to your side while in flat bed without using bedrails? 4  - (r) MF (t) LH (c) 1  -AW    Moving from lying on back to sitting on the side of a flat bed without bedrails? 3  - (r) MF (t) LH (c) 1  -AW    Moving to and from a bed to a chair (including a wheelchair)? 3  - (r) MF (t) LH (c) 1  -AW    Standing up from a chair using your arms (e.g., wheelchair, bedside chair)? 3  -LH (r) MF (t) LH (c) 1  -AW    Climbing 3-5 steps with a railing? 3  - (r) MF (t) LH (c) 1  -AW    To walk in hospital room? 3  - (r) MF (t) LH (c) 1  -AW    AM-PAC 6 Clicks Score (PT) 19  -LH (r) MF (t) 6  -AW    Highest level of mobility 6 --> Walked 10 steps or more  - (r) MF (t) 2 --> Bed activities/dependent transfer  -AW    Row Name 11/15/22 1025 11/15/22 1022       Modified [place] Scale    Pre-Stroke Modified [place] Scale 0 - No Symptoms at all.  -JJ 0 - No Symptoms at all.  - (r) MF (t) LH (c)    Modified [place] Scale 1 - No significant disability despite symptoms.  Able to carry out all usual duties and activities.  -JJ 2 - Slight disability.  Unable to carry out all previous activities but able to look after own affairs without assistance.  -LH (r) MF (t) LH (c)    Row Name 11/15/22 1025 11/15/22 1022       Functional Assessment     Outcome Measure Options AM-PAC 6 Clicks Daily Activity (OT);Modified [place]  -ISMAEL AM-PAC 6 Clicks Basic Mobility (PT);Modified [place]  - (r)  (t)  (c)          User Key  (r) = Recorded By, (t) = Taken By, (c) = Cosigned By    Initials Name Provider Type     Roberto Wren, PT Physical Therapist    Sydney Gillis, RN Registered Nurse    Sharonda Garg, OTR/L, CSRS Occupational Therapist    Usha Aguilar, PT Student PT Student                             Physical Therapy Education     Title: PT OT SLP Therapies (In Progress)     Topic: Physical Therapy (In Progress)     Point: Mobility training (Done)     Learning Progress Summary           Patient Acceptance, TB,D, VU by  at 11/15/2022 1022    Comment: PT POC beenfits of activity                   Point: Home exercise program (Not Started)     Learner Progress:  Not documented in this visit.          Point: Body mechanics (Not Started)     Learner Progress:  Not documented in this visit.          Point: Precautions (Not Started)     Learner Progress:  Not documented in this visit.                      User Key     Initials Effective Dates Name Provider Type Discipline     08/29/22 -  Usha Lopez, PT Student PT Student PT              PT Recommendation and Plan  Planned Therapy Interventions (PT): balance training, bed mobility training, gait training, home exercise program, patient/family education, stair training, strengthening, transfer training  Plan of Care Reviewed With: patient  Outcome Evaluation: PT eval complete. Pt is oriented to name, place, and situation. She required vcs to orient to time. Pt resides at home w/ her spouse, however her daughter states she is moving in. Pt reports she is ind w/ all ADLs and mobility at [facility]. Throughout [facility], pt demos impulsive behavior and confusion. She required vcs for direction while amb and to orient herself in the room; she attempted to sit on rails instead of commode. She  did follow 100% of commands. Pt was [place] for supien<>sit t/f w/ HOB elevated. She required CGA for 3 sit<>stand t/fs and to amb 500 ft in unit. Pt demos mod balance deficits, increased lateral sway, and narrow DOMINIK w/ amb. Gross LE strength: 4/5. B LE ROM and sensation: WFL. She demos BLE non-pitting edema. Overall, pt demos balance deficits, LE weakness, and cognitive deficits. Pt is a falls risk. Anticipated d/c home w/ 24/7 care and HH. Recommend SLP consult for cognition.     Time Calculation:    PT Charges     Row Name 11/15/22 1022             Time Calculation    Start Time 1022  -LH (r) MF (t) LH (c)      Stop Time 1106  +10 min chart review  -LH (r) MF (t) LH (c)      Time Calculation (min) 44 min  -LH (r) MF (t)      PT Received On 11/15/22  -LH (r) MF (t) LH (c)      PT Goal Re-Cert Due Date 11/25/22  -LH (r) MF (t) LH (c)         Untimed Charges    PT Eval/Re-eval Minutes 54  -LH (r) MF (t) LH (c)         Total Minutes    Untimed Charges Total Minutes 54  -LH (r) MF (t)       Total Minutes 54  -LH (r) MF (t)            User Key  (r) = Recorded By, (t) = Taken By, (c) = Cosigned By    Initials Name Provider Type     Roberto Wren, PT Physical Therapist    Usha Aguilar, PT Student PT Student                  PT G-Codes  Outcome Measure Options: AM-PAC 6 Clicks Daily Activity (OT), Modified [place]  AM-PAC 6 Clicks Score (PT): 19  AM-PAC 6 Clicks Score (OT): 24  Modified [place] Scale: 1 - No significant disability despite symptoms.  Able to carry out all usual duties and activities.  PT Discharge Summary  Anticipated Discharge Disposition (PT): home with home health, home with 24/7 care    Usha Eason, PT Student  11/15/2022

## 2022-11-15 NOTE — THERAPY EVALUATION
Acute Care - Speech Language Pathology   Swallow Initial Evaluation [facility]     Patient Name: Concepcion Velez  : 1945  MRN: [id number]  Today's Date: 11/15/2022               Admit Date: 2022     SPEECH-LANGUAGE PATHOLOGY EVALUATION - SWALLOW  Subjective: The patient was seen on this date for a Clinical Swallow evaluation.  Patient was alert and cooperative.  Significant history: Patient admitted with concerns for CVA/TIA. 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 History includes tachycardia, cerebellar infarct, toxic metabolic encephalopathy, DM, HTN.     Objective: Textures given included thin liquid, nectar thick liquid, honey thick liquid, puree consistency, and regular consistency.  Assessment: Difficulties were noted with none of the above consistencies.  Observations: Patient displayed perseveration at times during evaluation.  SLP Findings:  Patient presents with functional swallow, without esophageal component.   Recommendations: Diet Textures: thin liquid, regular consistency food.  Medications should be taken whole with thin liquids. May have water and ice between meals after oral care, under staff or family supervision and with the recommended strategies for safe swallowing.   Recommended Strategies: Upright for PO, small bites and sips, may use straw, and alternate liquids and solids. Oral care before breakfast, after all meals and PRN.  Other Recommended Evaluations: Cognitive-Linguistic Evaluation    Dysphagia therapy is not recommended. Rationale: patient is safely consuming the LRD.    Visit Dx:     ICD-10-CM ICD-9-CM   1. Tachycardia  R00.0 785.0   2. Dysphagia, unspecified type  R13.10 787.20     Patient Active Problem List   Diagnosis   • Tachycardia   • Cerebellar infarct (HCC)   • Uncontrolled hypertension   • Toxic metabolic encephalopathy   • Type 2 diabetes mellitus with hyperglycemia, without long-term current use of insulin (HCC)     Past Medical History:   Diagnosis  Date   • Abdominal wall seroma    • B12 deficiency    • Cerebral infarct (HCC)    • Diabetes mellitus (HCC)    • HLD (hyperlipidemia)    • Hypertension    • Hypokalemia    • Hypomagnesemia      Past Surgical History:   Procedure Laterality Date   • CHOLECYSTECTOMY     • HERNIA REPAIR     • HYSTERECTOMY         SLP Recommendation and Plan  SLP Swallowing Diagnosis: swallow WFL/no suspected pharyngeal impairment (11/15/22 0816)  SLP Diet Recommendation: regular textures, thin liquids (11/15/22 0816)  Recommended Precautions and Strategies: upright posture during/after eating, alternate between small bites of food and sips of liquid, general aspiration precautions (11/15/22 0816)  SLP Rec. for Method of Medication Administration: meds whole, as tolerated (11/15/22 0816)     Monitor for Signs of Aspiration: yes, notify SLP if any concerns (11/15/22 0816)  Recommended Diagnostics: SLE/Cog/Motor Speech Evaluation (11/15/22 0816)  Swallow Criteria for Skilled Therapeutic Interventions Met: no problems identified which require skilled intervention (11/15/22 0816)  Anticipated Discharge Disposition (SLP): unknown (11/15/22 0816)  Rehab Potential/Prognosis, Swallowing: good, to achieve stated therapy goals (11/15/22 0816)  Therapy Frequency (Swallow): evaluation only (11/15/22 0816)                                           Plan of Care Reviewed With: patient, family  Progress: improving      SWALLOW EVALUATION (last 72 hours)     SLP Adult Swallow Evaluation     Row Name 11/15/22 0816                   Rehab Evaluation    Document Type evaluation  -MD        Subjective Information no complaints  -MD        Patient Observations alert;cooperative;agree to therapy  -MD        Patient/Family/Caregiver Comments/Observations daughter present  -MD        Patient Effort excellent  -MD        Symptoms Noted During/After Treatment none  -MD           General Information    Patient Profile Reviewed yes  -MD        Pertinent History Of  Current Problem Patient admitted with concern for CVA/TIA. Patient has complicated migraines. Neurology has identified this as a likely continuance of complicated migraines. Patient history includestachycardia, cerebellar infarct, toxic metabolic encephalopathy, DM, and HTN.  -MD        Current Method of Nutrition NPO  -MD        Precautions/Limitations, Vision WFL;for purposes of eval  -MD        Precautions/Limitations, Hearing WFL;for purposes of eval  -MD        Prior Level of Function-Communication WFL;other (see comments)  per family  -MD        Prior Level of Function-Swallowing no diet consistency restrictions  -MD        Plans/Goals Discussed with patient;family  -MD        Barriers to Rehab medically complex  -MD        Patient's Goals for Discharge patient did not state  -MD           Pain    Additional Documentation Pain Scale: Numbers Pre/Post-Treatment (Group)  -MD           Pain Scale: Numbers Pre/Post-Treatment    Pretreatment Pain Rating 0/10 - no pain  -MD        Posttreatment Pain Rating 0/10 - no pain  -MD           Oral Motor Structure and Function    Dentition Assessment natural, present and adequate  -MD        Secretion Management WNL/WFL  -MD        Mucosal Quality moist, healthy  -MD        Gag Response WFL  -MD        Volitional Swallow WFL  -MD        Volitional Cough WFL  -MD           Oral Musculature and Cranial Nerve Assessment    Oral Motor General Assessment WFL  -MD           General Eating/Swallowing Observations    Eating/Swallowing Skills self-fed  -MD        Positioning During Eating upright in bed  -MD        Utensils Used spoon;cup;straw  -MD        Consistencies Trialed regular textures;whole;pureed;thin liquids;nectar/syrup-thick liquids;honey-thick liquids  -MD           Respiratory    Respiratory Status WFL  -MD           Clinical Swallow Eval    Oral Prep Phase WFL  -MD        Oral Transit WFL  -MD        Oral Residue WFL  -MD        Pharyngeal Phase WFL  -MD         Esophageal Phase unremarkable  -MD           SLP Evaluation Clinical Impression    SLP Swallowing Diagnosis swallow WFL/no suspected pharyngeal impairment  -MD        Functional Impact no impact on function  -MD        Rehab Potential/Prognosis, Swallowing good, to achieve stated therapy goals  -MD        Swallow Criteria for Skilled Therapeutic Interventions Met no problems identified which require skilled intervention  -MD           Recommendations    Therapy Frequency (Swallow) evaluation only  -MD        SLP Diet Recommendation regular textures;thin liquids  -MD        Recommended Diagnostics SLE/Cog/Motor Speech Evaluation  -MD        Recommended Precautions and Strategies upright posture during/after eating;alternate between small bites of food and sips of liquid;general aspiration precautions  -MD        Oral Care Recommendations Oral Care before breakfast, after meals and PRN  -MD        SLP Rec. for Method of Medication Administration meds whole;as tolerated  -MD        Monitor for Signs of Aspiration yes;notify SLP if any concerns  -MD        Anticipated Discharge Disposition (SLP) unknown  -MD              User Key  (r) = Recorded By, (t) = Taken By, (c) = Cosigned By    Initials Name Effective Dates    Concepcion Barton, SLP 06/21/22 -                 EDUCATION  The patient has been educated in the following areas:   Dysphagia (Swallowing Impairment).              Time Calculation:    Time Calculation- SLP     Row Name 11/15/22 1030             Time Calculation- SLP    SLP Start Time 0816  -MD      SLP Stop Time 0918  -MD      SLP Time Calculation (min) 62 min  -MD      SLP Received On 11/15/22  -MD      SLP Goal Re-Cert Due Date 11/25/22  -MD         Untimed Charges    97345-DI Eval Oral Pharyng Swallow Minutes 62  -MD         Total Minutes    Untimed Charges Total Minutes 62  -MD       Total Minutes 62  -MD            User Key  (r) = Recorded By, (t) = Taken By, (c) = Cosigned By    Initials Name  Provider Type    Concepcion Barton, SLP Speech and Language Pathologist                Therapy Charges for Today     Code Description Service Date Service Provider Modifiers Qty    [id number] HC ST EVAL ORAL PHARYNG SWALLOW 4 11/15/2022 Concepcion Lange, SLP GN 1               Concepcion Lange, FREDDIE  11/15/2022

## 2022-11-15 NOTE — PLAN OF CARE
Goal Outcome Evaluation:  Plan of Care Reviewed With: patient           Outcome Evaluation: PT eval complete. Pt is oriented to name, place, and situation. She required vcs to orient to time. Pt resides at home w/ her spouse, however her daughter states she is moving in. Pt reports she is ind w/ all ADLs and mobility at PLOF. Throughout sesison, pt demos impulsive behavior and confusion. She required vcs for direction while amb and to orient herself in the room; she attempted to sit on rails instead of commode. She did follow 100% of commands. Pt was Tanner for supien<>sit t/f w/ HOB elevated. She required CGA for 3 sit<>stand t/fs and to amb 500 ft in unit. Pt demos mod balance deficits, increased lateral sway, and narrow DOMINIK w/ amb. Gross LE strength: 4/5. B LE ROM and sensation: WFL. She demos BLE non-pitting edema. Overall, pt demos balance deficits, LE weakness, and cognitive deficits. Pt is a falls risk. Anticipated d/c home w/ 24/7 care and HH. Recommend SLP consult for cognition.

## 2022-11-15 NOTE — CASE MANAGEMENT/SOCIAL WORK
Discharge Planning Assessment  [facility]     Patient Name: Concepcion Velez  MRN: [id number]  Today's Date: 11/15/2022    Admit Date: 11/14/2022        Discharge Needs Assessment     Row Name 11/15/22 1254       Living Environment    People in Home spouse    Current Living Arrangements home    Primary Care Provided by self    Provides Primary Care For no one    Family Caregiver if Needed child(carrie), adult    Quality of Family Relationships helpful;involved;supportive    Able to Return to Prior Arrangements yes       Resource/Environmental Concerns    Resource/Environmental Concerns none    Transportation Concerns none       Transition Planning    Patient/Family Anticipates Transition to home    Patient/Family Anticipated Services at Transition none    Transportation Anticipated family or friend will provide       Discharge Needs Assessment    Readmission Within the Last 30 Days no previous admission in last 30 days    Discharge Coordination/Progress PT resides at home with spouse who is also currently admitted to the CCU at [facility]. PT has a daughter who is in the process of moving in with PT and spouse. PT anticipates returning home upon dc and would benefit from HH. Will follow and arrange HH or any required DME prior to dc with orders. PT has no documented PCP and does have Rx coverage.               Discharge Plan    No documentation.               Continued Care and Services - Admitted Since 11/14/2022    Coordination has not been started for this encounter.          Demographic Summary    No documentation.                Functional Status    No documentation.                Psychosocial    No documentation.                Abuse/Neglect    No documentation.                Legal    No documentation.                Substance Abuse    No documentation.                Patient Forms    No documentation.                   JAVIER Fernandes

## 2022-11-15 NOTE — PLAN OF CARE
Goal Outcome Evaluation:  Plan of Care Reviewed With: patient, family        Progress: improving       SPEECH-LANGUAGE PATHOLOGY EVALUATION - SWALLOW  Subjective: The patient was seen on this date for a Clinical Swallow evaluation.  Patient was alert and cooperative.  Significant history: Patient admitted with concerns for CVA/TIA. 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 History includes tachycardia, cerebellar infarct, toxic metabolic encephalopathy, DM, HTN.     Objective: Textures given included thin liquid, nectar thick liquid, honey thick liquid, puree consistency, and regular consistency.  Assessment: Difficulties were noted with none of the above consistencies.  Observations:  Patient displayed perseveration at times during evaluation.  SLP Findings:  Patient presents with functional swallow, without esophageal component.   Recommendations: Diet Textures: thin liquid, regular consistency food.  Medications should be taken whole with thin liquids. May have water and ice between meals after oral care, under staff or family supervision and with the recommended strategies for safe swallowing.   Recommended Strategies: Upright for PO, small bites and sips, may use straw, and alternate liquids and solids. Oral care before breakfast, after all meals and PRN.  Other Recommended Evaluations: Cognitive-Linguistic Evaluation    Dysphagia therapy is not recommended. Rationale: patient is safely consuming the LRD.    Concepcion Lange, SLP 11/15/2022 10:30 CST

## 2022-11-15 NOTE — DISCHARGE PLACEMENT REQUEST
"Arlin Velez (77 y.o. Female)     Date of Birth   1945    Social Security Number       Address   [address] [place] [address]    Home Phone   [phone]    MRN   [id number]       [facility]   [facility]    Marital Status                               Admission Date   11/14/22    Admission Type   Emergency    Admitting Provider   Abhishek Kohli DO    Attending Provider   Abhishek Kohli DO    Department, Room/Bed   [facility] INTENSIVE CARE, I003/1       Discharge Date       Discharge Disposition   Home or Self Care    Discharge Destination                               Attending Provider: Abhishek Kohli DO    Allergies: No Known Allergies    Isolation: None   Infection: None   Code Status: CPR    Ht: 162.6 cm (64\")   Wt: 62.1 kg (137 lb)    Admission Cmt: None   Principal Problem: Tachycardia [R00.0]                 Active Insurance as of 11/14/2022     Primary Coverage     Payor Plan Insurance Group Employer/Plan Group    ANTHEM MEDICARE REPLACEMENT ANTHEM MEDICARE ADVANTAGE [id number]     Payor Plan Address Payor Plan Phone Number Payor Plan Fax Number Effective Dates    [address] [phone]  1/1/2022 - None Entered    [facility] [id number]       Subscriber Name Subscriber Birth Date Member ID       ARLIN VELEZ 1945 [id number]                 Emergency Contacts      (Rel.) Home Phone Work Phone Mobile Phone    Kaylin Cardona (Daughter) -- -- [phone]    Lavelle Velez (Son) -- -- [phone]    Lillie Henson (Daughter) -- -- [phone]    Lucy Yan (Daughter) -- -- [phone]               History & Physical      Ember Aguilar APRN at 11/14/22 1708     Attestation signed by Abhishek Kohli DO at 11/15/22 0842    This visit was performed by both a physician and an APC. I personally evaluated and examined the patient. I performed all aspects of the MDM as documented.    In and evaluated.  Admission treatment plan " developed in conjunction with APRN.    Cardiovascular:      Rate and Rhythm: Regular rhythm. Tachycardia present.   Pulmonary:      Effort: Pulmonary effort is normal.      Comments: Diminished without adventitious breath  Abdominal:      Palpations: Abdomen is soft.      Comments: Abdominal wall pelvic pouching at mesh site, known seroma     Electronically signed by Abhishek Kohli DO, 11/15/2022, 08:42 CST.                        [facility] Medicine Services  HISTORY AND PHYSICAL    Date of Admission: 11/14/2022  Primary Care Physician: Provider, No Known    Subjective     Chief Complaint: Right cerebellar infarct    History of Present Illness  Concepcion harris is a 77-year-old female with a past medical history of stroke 2012 for which she received tPA at [facility] prior to transfer to [place], type 2 diabetes, hypertension, high Po kalemia and hypomagnesia.  Patient started having neurological changes again in August of this year.  She went to [facility] 8/2022 with negative work-up.  She has continued to have multiple episodes of altered mental status with 3 visits to [facility] emergency department over the past 2 months.  Most recently admitted 11/4 after a fall causing 3 right rib fractures and shoulder injury.  Patient has been on 7.5 Norco most recent ingestion last evening.  Multiple family members at bedside providing history.  Patient does not follow commands or offer insight into her visit.  She currently is incontinent of stool.  Reportedly took a laxative yesterday as pain medication has made her constipated.  Patient did receive Ativan 1 mg IV prior to MRI.  She is tachycardic, blood pressure is elevated I feel this is most likely secondary to pain and discomfort.  Per record review when seen by PCP who does discuss abdominal wall seroma.  Family was advised to have surgeon look at this.  They are requesting a consult during  this admission.  We will follow for further signs of infection as of right now tachycardia may be associated with pain only.  She is admitted for further evaluation and treatment    Review of Systems   Unable to determine due to altered mental status    Past Medical History:   Past Medical History:   Diagnosis Date   • Abdominal wall seroma    • B12 deficiency    • Cerebral infarct (HCC)    • Diabetes mellitus (HCC)    • HLD (hyperlipidemia)    • Hypertension    • Hypokalemia    • Hypomagnesemia        Past Surgical History:   Past Surgical History:   Procedure Laterality Date   • CHOLECYSTECTOMY     • HERNIA REPAIR     • HYSTERECTOMY         Family History: family history includes Cancer in her mother; Hypertension in her mother; Transient ischemic attack in her father.    Social History:  reports that she has never smoked. She does not have any smokeless tobacco history on file. She reports that she does not drink alcohol and does not use drugs.    Code Status: Full, if unable speak for Dr. Riley will speak for her      Allergies:  No Known Allergies    Medications:  No current facility-administered medications on file prior to encounter.     Current Outpatient Medications on File Prior to Encounter   Medication Sig Dispense Refill   • allopurinol (ZYLOPRIM) 300 MG tablet Take 1 tablet by mouth Daily.     • amLODIPine (NORVASC) 5 MG tablet Take 1 tablet by mouth Daily.     • aspirin 81 MG chewable tablet Chew 1 tablet Daily.     • atorvastatin (LIPITOR) 20 MG tablet Take 1 tablet by mouth Daily.     • atorvastatin (LIPITOR) 40 MG tablet Take 1 tablet by mouth Every Night.     • lisinopril (PRINIVIL,ZESTRIL) 20 MG tablet Take 1 tablet by mouth Daily.     • magnesium oxide (MAG-OX) 400 MG tablet Take 1 tablet by mouth Daily.     • meloxicam (MOBIC) 15 MG tablet Take 1 tablet by mouth Daily.     • metFORMIN (GLUCOPHAGE) 1000 MG tablet Take 1 tablet by mouth 2 (Two) Times a Day With Meals.     • potassium chloride  "(K-DUR,KLOR-CON) 20 MEQ CR tablet Take 1 tablet by mouth Daily.       I have utilized all available immediate resources to obtain, update, and review the patient's current medications.    Objective     BP (!) 181/99   Pulse (!) 140   Temp 99.3 °F (37.4 °C) (Axillary)   Resp 22   Ht 162.6 cm (64\")   Wt 65.7 kg (144 lb 14.4 oz)   LMP  (LMP Unknown)   SpO2 97%   BMI 24.87 kg/m²   Physical Exam  Vitals reviewed.   Constitutional:       Appearance: She is ill-appearing.   HENT:      Head: Normocephalic and atraumatic.      Mouth/Throat:      Mouth: Mucous membranes are dry.   Eyes:      Conjunctiva/sclera: Conjunctivae normal.      Comments: Keeps eyes closed   Cardiovascular:      Rate and Rhythm: Regular rhythm. Tachycardia present.   Pulmonary:      Effort: Pulmonary effort is normal.      Comments: Diminished without adventitious breath  Abdominal:      Palpations: Abdomen is soft.      Comments: Abdominal wall pelvic pouching at mesh site, known seroma   Musculoskeletal:      Cervical back: Neck supple.      Right lower leg: No edema.      Left lower leg: No edema.      Comments: Generalized weakness and debility, keeps head down on chest with decreased oxygenation   Skin:     General: Skin is warm and dry.   Neurological:      Mental Status: She is disoriented.   Psychiatric:      Comments: Flat affect, no behavioral       Pertinent Data:   Lab Results (last 72 hours)     Procedure Component Value Units Date/Time    Blood Culture - Blood, Arm, Left [[id number]] Collected: 11/14/22 1000    Specimen: Blood from Arm, Left Updated: 11/14/22 1828    Vitamin B12 [[id number]] Collected: 11/14/22 1000    Specimen: Blood Updated: 11/14/22 1807    Folate [[id number]] Collected: 11/14/22 1000    Specimen: Blood Updated: 11/14/22 1807    T4, Free [[id number]]  (Normal) Collected: 11/14/22 1550    Specimen: Blood Updated: 11/14/22 1630     Free T4 1.38 ng/dL     TSH [[id number]]  (Normal) Collected: 11/14/22 1550    " Specimen: Blood Updated: 11/14/22 1629     TSH 0.894 uIU/mL     Troponin [[id number]]  (Normal) Collected: 11/14/22 1550    Specimen: Blood Updated: 11/14/22 1622     Troponin T 0.011 ng/mL     Urine Drug Screen - Urine, Clean Catch [[id number]]  (Abnormal) Collected: 11/14/22 1123    Specimen: Urine, Clean Catch Updated: 11/14/22 1149     THC, Screen, Urine Negative     Phencyclidine (PCP), Urine Negative     Cocaine Screen, Urine Negative     Methamphetamine, Ur Negative     Opiate Screen Positive     Amphetamine Screen, Urine Negative     Benzodiazepine Screen, Urine Negative     Tricyclic Antidepressants Screen Negative     Methadone Screen, Urine Negative     Barbiturates Screen, Urine Negative     Oxycodone Screen, Urine Negative     Propoxyphene Screen Negative     Buprenorphine, Screen, Urine Negative    Urinalysis With Culture If Indicated - Urine, Catheter [[id number]]  (Abnormal) Collected: 11/14/22 1122    Specimen: Urine, Catheter Updated: 11/14/22 1143     Color, UA Yellow     Appearance, UA Clear     pH, UA 7.0     Specific Gravity, UA 1.011     Glucose, UA Negative     Ketones, UA Trace     Bilirubin, UA Negative     Blood, UA Negative     Protein, UA Trace     Leuk Esterase, UA Negative     Nitrite, UA Negative     Urobilinogen, UA 0.2 E.U./dL    Lactic Acid, Plasma [[id number]]  (Normal) Collected: 11/14/22 1000    Specimen: Blood Updated: 11/14/22 1132     Lactate 1.6 mmol/L     Comprehensive Metabolic Panel [[id number]]  (Abnormal) Collected: 11/14/22 1000    Specimen: Blood Updated: 11/14/22 1028     Glucose 169 mg/dL      BUN 11 mg/dL      Creatinine 0.64 mg/dL      Sodium 137 mmol/L      Potassium 4.6 mmol/L      Chloride 101 mmol/L      CO2 23.0 mmol/L      Calcium 10.4 mg/dL      Total Protein 6.7 g/dL      Albumin 4.20 g/dL      ALT (SGPT) 11 U/L      AST (SGOT) 11 U/L      Alkaline Phosphatase 92 U/L      Total Bilirubin 0.3 mg/dL      Globulin 2.5 gm/dL      A/G Ratio 1.7 g/dL       BUN/Creatinine Ratio 17.2     Anion Gap 13.0 mmol/L      eGFR 92.3 mL/min/1.73     Magnesium [[id number]]  (Normal) Collected: 11/14/22 1000    Specimen: Blood Updated: 11/14/22 1022     Magnesium 1.6 mg/dL     CBC Auto Differential [[id number]]  (Abnormal) Collected: 11/14/22 1000    Specimen: Blood Updated: 11/14/22 1014     WBC 8.96 10*3/mm3      RBC 3.82 10*6/mm3      Hemoglobin 10.9 g/dL      Hematocrit 35.4 %      MCV 92.7 fL      MCH 28.5 pg      MCHC 30.8 g/dL      RDW 13.6 %      RDW-SD 46.0 fl      MPV 11.2 fL      Platelets 331 10*3/mm3      Neutrophil % 66.2 %      Lymphocyte % 19.2 %      Monocyte % 12.8 %      Eosinophil % 0.4 %      Basophil % 0.2 %      Immature Grans % 1.2 %      Neutrophils, Absolute 5.92 10*3/mm3      Lymphocytes, Absolute 1.72 10*3/mm3      Monocytes, Absolute 1.15 10*3/mm3      Eosinophils, Absolute 0.04 10*3/mm3      Basophils, Absolute 0.02 10*3/mm3      Immature Grans, Absolute 0.11 10*3/mm3      nRBC 0.0 /100 WBC     POC Glucose Once [[id number]]  (Abnormal) Collected: 11/14/22 0954    Specimen: Blood Updated: 11/14/22 1005     Glucose 147 mg/dL      Comment: : [id number] Messina ID: [id number]           Imaging Results (Last 24 Hours)     Procedure Component Value Units Date/Time    MRI Brain Without Contrast [[id number]] Collected: 11/14/22 1507     Updated: 11/14/22 1514    Narrative:      EXAMINATION:  MRI BRAIN WO CONTRAST-  11/14/2022 2:43 PM CST     HISTORY: Altered mental status. Evaluate for stroke.     TECHNIQUE: Multiplanar imaging was performed in a high field magnet.     COMPARISON: No comparison study.     FINDINGS: There is a tiny acute infarct in the right cerebellar  hemisphere. This is in on the diffusion-weighted images. There is  restricted diffusion on the ADC map images. There is T2 high signal  within the hemispheric white matter. 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 There is minimal atrophy. There is minimal mucosal thickening in  the  ethmoid sinus region.       Impression:      1. Tiny acute infarct in the right cerebellar hemisphere.  2. T2 high signal in the hemispheric white matter is nonspecific and  likely due to chronic small vessel disease.  3. Minimal atrophy.  4. 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     The full report of this exam was immediately signed and available to the  emergency room. The patient is currently in the emergency room.        This report was finalized on 11/14/2022 15:10 by Dr. Sebastian Velazquez MD.    CT Head Without Contrast [[id number]] Collected: 11/14/22 1114     Updated: 11/14/22 1119    Narrative:      CT HEAD WO CONTRAST- 11/14/2022 10:48 AM CST     HISTORY: ams     COMPARISON: None      DLP: 654 mGy cm. All CT scans are performed using dose optimization  techniques as appropriate to the performed exam and including at least  one of the following: Automated exposure control, adjustment of the mA  and/or kV according to size, and the use of the iterative reconstruction  technique.     TECHNIQUE: Serial axial tomographic images of the brain were obtained  without the use of intravenous contrast.      FINDINGS:   The midline structures are nondisplaced. There is mild cerebral and  cerebellar atrophy, with an associated increase in the prominence of the  ventricles and sulci. The basilar cisterns are normal in size and  configuration. There is no evidence of intracranial hemorrhage or  mass-effect. There is low attenuation in the periventricular white  matter, consistent with chronic ischemic change. There are no abnormal  extra-axial fluid collections. There is no evidence of tonsillar  herniation.      The included orbits and their contents are unremarkable. The visualized  paranasal sinuses, mastoid air cells and middle ear cavities are clear.  The visualized osseous structures and overlying soft tissues of the  skull and face are intact.        Impression:         1. Mild cerebral and cerebellar atrophy with chronic  microvascular  disease but no evidence of acute intracranial process.        This report was finalized on 11/14/2022 11:16 by Dr. Dominic Valdes MD.    XR Chest 1 View [[id number]] Collected: 11/14/22 1058     Updated: 11/14/22 1102    Narrative:      XR CHEST 1 VW- 11/14/2022 10:42 AM CST     HISTORY: AMS     COMPARISON: None.     FINDINGS:      No lung consolidation. No pleural effusion or pneumothorax. The  cardiomediastinal silhouette and pulmonary vascularity are within normal  limits. The osseous structures and surrounding soft tissues demonstrate  no acute abnormality.       Impression:      1. No radiographic evidence of acute cardiopulmonary process.  This report was finalized on 11/14/2022 10:58 by Dr Dylan Copeland, .          Assessment / Plan     Assessment:   Active Hospital Problems    Diagnosis    • **Tachycardia    • Cerebellar infarct (HCC)    • Uncontrolled hypertension    • Toxic metabolic encephalopathy    • Type 2 diabetes mellitus with hyperglycemia, without long-term current use of insulin (HCC)        Plan:   1.  Admit as inpatient critical care  2.  Home medications reviewed, will hold due to aspiration concerns  3.  Follow stroke protocol  4.  Monitor glucose every 6 hours with regular insulin sliding scale coverage  5.  Magnesium sulfate 1 g IV x1  6.  Normal saline 75 mL/hour  7.  Toradol 15 mg every 6 hours as needed for right shoulder pain  8.  Supplemental oxygen as needed, incentive spirometry, continuous pulse oximeter  9.  N.p.o. for now  10.  Echocardiogram, bilateral ultrasound carotids and MRI of the right shoulder in a.m.  11.  Additional labs, labs in a.m.  12.  Apply external catheter    I discussed the patient's findings and my recommendations with: Abhishek Kohli DO    Time spent: 45 minutes    Patient seen and examined by me on 11/14/2022 at 5:08 PM.    Electronically signed by ROSA Mason, 11/14/22, 18:52 CST.    Electronically signed by Abhishek Kohli  DO YAMILET at 11/15/22 0842          Physician Progress Notes (all)      Elie Lim MD at 11/15/22 1303        Follow-up neurology note:    The patient's EEG is extremely atypical.  It has no definitive signs of epileptic foci, however, I do find some concerning features of this.  I still believe the complicated migraines would remain at the top of my differential diagnosis list.  To that end, I think it be reasonable to try Keppra 500 mg twice daily as an agent that could potentially prevent migraines but may also serve as an antiepileptic if there is any epileptic component to the spells.  She can be discharged today from the inpatient setting and have close follow-up with the neurology clinic in approximately 4 weeks time.  If she has no further spells on Keppra, this may give more credence to the idea that these were epileptic in etiology.    Keppra has been E prescribed to her local pharmacy.    Electronically signed by Elie Lim MD, 11/15/22, 1:04 PM CST.        Electronically signed by Elie Lim MD at 11/15/22 1306          Consult Notes (all)      Elie Lim MD at 11/15/22 0806      Consult Orders    1. Inpatient Neurology Consult Stroke [[id number]] ordered by Ember Aguilar APRN at 11/14/22 1750               Neurology Consult Note    Referring Provider: Dr. Abhishek Kohli  Reason for Consultation: AMS      History of present illness:      This a very joss 77-year-old female who presents with her daughter to our ER yesterday.  The indication for presentation is altered mentation.  Her daughter is an excellent historian although the patient is an excellent historian this morning as well.  Reportedly over the past 6 weeks the patient has episodes of a severe headache it is holocephalic in nature.  She has light and sound sensitivity with this.  Directly after experiencing this headache she often has an aphasia and occasional right-sided weakness.  This has resulted in  falls as well.  She has been admitted to [facility] several times with no significant findings.  She is also been admitted for this exact thing at [place] twice.  She was seen by both cardiology and neurology.  They diagnosed her with complicated migraines.  Reportedly her magnesium was low several times although it is unclear that this is causing any of the problems.  Yesterday the patient was found aphasic again.  She was rocking in bed and had had urinary incontinence.  It is unclear that she has any passing out spells.  No generalized tonic-clonic seizure has been seen.  There is been no tongue biting.  The patient is concerned that occasionally she has altered mentation and potentially some cognitive deficits as well.  It is also worth mentioning that the patient's , who she is the caretaker for, is currently in the critical care unit as well for cardiac reasons.  The patient has been under increased stress because of this.    Past Medical History:   Diagnosis Date   • Abdominal wall seroma    • B12 deficiency    • Cerebral infarct (HCC)    • Diabetes mellitus (HCC)    • HLD (hyperlipidemia)    • Hypertension    • Hypokalemia    • Hypomagnesemia        No Known Allergies  No current facility-administered medications on file prior to encounter.     Current Outpatient Medications on File Prior to Encounter   Medication Sig   • allopurinol (ZYLOPRIM) 300 MG tablet Take 1 tablet by mouth Daily.   • amLODIPine (NORVASC) 5 MG tablet Take 1 tablet by mouth Daily.   • aspirin 81 MG chewable tablet Chew 1 tablet Daily.   • atorvastatin (LIPITOR) 20 MG tablet Take 1 tablet by mouth Daily.   • atorvastatin (LIPITOR) 40 MG tablet Take 1 tablet by mouth Every Night.   • lisinopril (PRINIVIL,ZESTRIL) 20 MG tablet Take 1 tablet by mouth Daily.   • magnesium oxide (MAG-OX) 400 MG tablet Take 1 tablet by mouth Daily.   • meloxicam (MOBIC) 15 MG tablet Take 1 tablet by mouth Daily.   • metFORMIN  (GLUCOPHAGE) 1000 MG tablet Take 1 tablet by mouth 2 (Two) Times a Day With Meals.   • potassium chloride (K-DUR,KLOR-CON) 20 MEQ CR tablet Take 1 tablet by mouth Daily.       Social History     Socioeconomic History   • Marital status:    Tobacco Use   • Smoking status: Never   Substance and Sexual Activity   • Alcohol use: Never   • Drug use: Never     Family History   Problem Relation Age of Onset   • Cancer Mother    • Hypertension Mother    • Transient ischemic attack Father        Review of Systems  A 14-point review of systems was reviewed and was negative except for spells    Vital Signs   Temp:  [98.5 °F (36.9 °C)-99.6 °F (37.6 °C)] 99.3 °F (37.4 °C)  Heart Rate:  [] 84  Resp:  [15-22] 16  BP: (108-181)/(60-99) 120/74    General Exam:  Head:  Normocephalic, atraumatic  HEENT:  Neck supple  Fundoscopic Exam:  No signs of disc edema  CVS:  Regular rate and rhythm.  No murmurs  Carotid Examination:  No bruits  Lungs:  Clear to auscultation  Abdomen:  Nontender, Nondistended  Extremities:  No signs of peripheral edema  Skin:  Bruises on multiple locations on the body.    Neurologic Exam:    Mental Status:    -Awake, Alert, Oriented X 3  -No word finding difficulties  -No aphasia  -No dysarthria  -Follows simple and complex commands    CN II:  Visual fields full.  Pupils equally reactive to light  CN III, IV, VI:  Extraocular Muscles full with no signs of nystagmus  CN V:  Facial sensory is symmetric with no asymmetries.  CN VII:  Facial motor symmetric  CN VIII:  Gross hearing intact bilaterally  CN IX:  Palate elevates symmetrically  CN X:  Palate elevates symmetrically  CN XI:  Shoulder shrug symmetric  CN XII:  Tongue is midline on protrusion    Motor: (strength out of 5:  1= minimal movement, 2 = movement in plane of gravity, 3 = movement against gravity, 4 = movement against some resistance, 5 = full strength)    -Right Upper Ext: Proximal: 5 Distal: 5  -Left Upper Ext: Proximal: 5 Distal:  5    -Right Lower Ext: Proximal: 5 Distal: 5  -Left Lower Ext: Proximal: 5 Distal: 5    No signs of increased tone.  No signs of cogwheeling.    DTR:  -Right   Biceps: 2+ Triceps: 2+ Brachioradialis: 2+   Patella: 2+ Ankle: 2+ Neg Babinski  -Left   Biceps: 2+ Triceps: 2+ Brachioradialis: 2+   Patella: 2+ Ankle: 2+ Neg Babinski    Sensory:  -Intact to light touch, pinprick, temperature, pain, and proprioception    Coordination:  -Finger to nose intact  -Heel to shin intact  -No ataxia    Gait  -No signs of ataxia  -ambulates unassisted      Results Review:  Lab Results (last 24 hours)     Procedure Component Value Units Date/Time    COVID-19, FLU A/B, RSV PCR - Swab, Nasopharynx [[id number]] Collected: 11/15/22 0643    Specimen: Swab from Nasopharynx Updated: 11/15/22 0730    Basic Metabolic Panel [[id number]]  (Abnormal) Collected: 11/15/22 0450    Specimen: Blood Updated: 11/15/22 0627     Glucose 138 mg/dL      BUN 14 mg/dL      Creatinine 0.68 mg/dL      Sodium 135 mmol/L      Potassium 3.4 mmol/L      Chloride 98 mmol/L      CO2 22.0 mmol/L      Calcium 9.5 mg/dL      BUN/Creatinine Ratio 20.6     Anion Gap 15.0 mmol/L      eGFR 91.0 mL/min/1.73      Comment: National Kidney Foundation and American Society of Nephrology (ASN) Task Force recommended calculation based on the Chronic Kidney Disease Epidemiology Collaboration (CKD-EPI) equation refit without adjustment for race.       Narrative:      GFR Normal >60  Chronic Kidney Disease <60  Kidney Failure <15    The GFR formula is only valid for adults with stable renal function between ages 18 and 70.    CBC (No Diff) [[id number]]  (Abnormal) Collected: 11/15/22 0522    Specimen: Blood Updated: 11/15/22 0610     WBC 14.61 10*3/mm3      RBC 3.59 10*6/mm3      Hemoglobin 10.2 g/dL      Hematocrit 32.7 %      MCV 91.1 fL      MCH 28.4 pg      MCHC 31.2 g/dL      RDW 13.6 %      RDW-SD 45.2 fl      MPV 11.7 fL      Platelets 312 10*3/mm3     Lipid Panel [[id number]]  Collected: 11/15/22 0450    Specimen: Blood Updated: 11/15/22 0610     Total Cholesterol 125 mg/dL      Triglycerides 113 mg/dL      HDL Cholesterol 57 mg/dL      LDL Cholesterol  48 mg/dL      VLDL Cholesterol 20 mg/dL      LDL/HDL Ratio 0.80    Narrative:      Cholesterol Reference Ranges  (U.S. Department of Health and Human Services ATP III Classifications)    Desirable          <200 mg/dL  Borderline High    200-239 mg/dL  High Risk          >240 mg/dL      Triglyceride Reference Ranges  (U.S. Department of Health and Human Services ATP III Classifications)    Normal           <150 mg/dL  Borderline High  150-199 mg/dL  High             200-499 mg/dL  Very High        >500 mg/dL    HDL Reference Ranges  (U.S. Department of Health and Human Services ATP III Classifications)    Low     <40 mg/dl (major risk factor for CHD)  High    >60 mg/dl ('negative' risk factor for CHD)        LDL Reference Ranges  (U.S. Department of Health and Human Services ATP III Classifications)    Optimal          <100 mg/dL  Near Optimal     100-129 mg/dL  Borderline High  130-159 mg/dL  High             160-189 mg/dL  Very High        >189 mg/dL    POC Glucose Once [[id number]]  (Normal) Collected: 11/15/22 0544    Specimen: Blood Updated: 11/15/22 0555     Glucose 123 mg/dL      Comment: : [id number] Antione WeHaus ID: [id number]       POC Glucose Once [[id number]]  (Abnormal) Collected: 11/14/22 2326    Specimen: Blood Updated: 11/14/22 2338     Glucose 206 mg/dL      Comment: : [id number] [facility]: [id number]       Hemoglobin A1c [[id number]]  (Abnormal) Collected: 11/14/22 1000    Specimen: Blood Updated: 11/14/22 2309     Hemoglobin A1C 6.10 %     Narrative:      Hemoglobin A1C Ranges:    Increased Risk for Diabetes  5.7% to 6.4%  Diabetes                     >= 6.5%  Diabetic Goal                < 7.0%    POC Glucose Once [[id number]]  (Abnormal) Collected: 11/14/22 2020    Specimen: Blood Updated: 11/14/22  2032     Glucose 175 mg/dL      Comment: : [id number] Antione Dang ID: [id number]       D-dimer, Quantitative [[id number]]  (Abnormal) Collected: 11/14/22 1917    Specimen: Blood Updated: 11/14/22 2000     D-Dimer, Quantitative 1.13 MCGFEU/mL     Narrative:      Reference Range is 0-0.50 MCGFEU/mL. However, results <0.50 MCGFEU/mL tends to rule out DVT or PE. Results >0.50 MCGFEU/mL are not useful in predicting absence or presence of DVT or PE.      Blood Culture - Blood, Arm, Right [[id number]] Collected: 11/14/22 1917    Specimen: Blood from Arm, Right Updated: 11/14/22 1954    RPR [[id number]] Collected: 11/14/22 1917    Specimen: Blood Updated: 11/14/22 1940    Blood Culture - Blood, Arm, Left [[id number]] Collected: 11/14/22 1000    Specimen: Blood from Arm, Left Updated: 11/14/22 1828    Vitamin B12 [[id number]] Collected: 11/14/22 1000    Specimen: Blood Updated: 11/14/22 1807    Folate [[id number]] Collected: 11/14/22 1000    Specimen: Blood Updated: 11/14/22 1807    T4, Free [[id number]]  (Normal) Collected: 11/14/22 1550    Specimen: Blood Updated: 11/14/22 1630     Free T4 1.38 ng/dL     Narrative:      Results may be falsely increased if patient taking Biotin.      TSH [[id number]]  (Normal) Collected: 11/14/22 1550    Specimen: Blood Updated: 11/14/22 1629     TSH 0.894 uIU/mL     Troponin [[id number]]  (Normal) Collected: 11/14/22 1550    Specimen: Blood Updated: 11/14/22 1622     Troponin T 0.011 ng/mL     Narrative:      Troponin T Reference Range:  <= 0.03 ng/mL-   Negative for AMI  >0.03 ng/mL-     Abnormal for myocardial necrosis.  Clinicians would have to utilize clinical acumen, EKG, Troponin and serial changes to determine if it is an Acute Myocardial Infarction or myocardial injury due to an underlying chronic condition.       Results may be falsely decreased if patient taking Biotin.      [place] Draw [[id number]] Collected: 11/14/22 1000    Specimen: Blood Updated: 11/14/22 2048     Narrative:      The following orders were created for panel order [place] Draw.  Procedure                               Abnormality         Status                     ---------                               -----------         ------                     Green Top (Gel)[[id number]]                                  Final result               Lavender Top[[id number]]                                     Final result               Red Top[[id number]]                                          Final result               [place] Blood Culture Eleazar...[[id number]]                      Final result               Zhang Top[[id number]]                                         Final result               Light Blue Top[[id number]]                                   Final result                 Please view results for these tests on the individual orders.    Gray Top [[id number]] Collected: 11/14/22 1000    Specimen: Blood Updated: 11/14/22 1415     Extra Tube Hold for add-ons.     Comment: Auto resulted.       Urine Drug Screen - Urine, Clean Catch [[id number]]  (Abnormal) Collected: 11/14/22 1123    Specimen: Urine, Clean Catch Updated: 11/14/22 1149     THC, Screen, Urine Negative     Phencyclidine (PCP), Urine Negative     Cocaine Screen, Urine Negative     Methamphetamine, Ur Negative     Opiate Screen Positive     Amphetamine Screen, Urine Negative     Benzodiazepine Screen, Urine Negative     Tricyclic Antidepressants Screen Negative     Methadone Screen, Urine Negative     Barbiturates Screen, Urine Negative     Oxycodone Screen, Urine Negative     Propoxyphene Screen Negative     Buprenorphine, Screen, Urine Negative    Narrative:      Cutoff For Drugs Screened:    Amphetamines               500 ng/ml  Barbiturates               200 ng/ml  Benzodiazepines            150 ng/ml  Cocaine                    150 ng/ml  Methadone                  200 ng/ml  Opiates                    100 ng/ml  Phencyclidine               25 ng/ml  THC                             50 ng/ml  Methamphetamine            500 ng/ml  Tricyclic Antidepressants  300 ng/ml  Oxycodone                  100 ng/ml  Propoxyphene               300 ng/ml  Buprenorphine               10 ng/ml    The normal value for all drugs tested is negative. This report includes unconfirmed screening results, with the cutoff values listed, to be used for medical treatment purposes only.  Unconfirmed results must not be used for non-medical purposes such as employment or legal testing.  Clinical consideration should be applied to any drug of abuse test, particularly when unconfirmed results are used.      Urinalysis With Culture If Indicated - Urine, Catheter [[id number]]  (Abnormal) Collected: 11/14/22 1122    Specimen: Urine, Catheter Updated: 11/14/22 1143     Color, UA Yellow     Appearance, UA Clear     pH, UA 7.0     Specific Gravity, UA 1.011     Glucose, UA Negative     Ketones, UA Trace     Bilirubin, UA Negative     Blood, UA Negative     Protein, UA Trace     Leuk Esterase, UA Negative     Nitrite, UA Negative     Urobilinogen, UA 0.2 E.U./dL    Narrative:      In absence of clinical symptoms, the presence of pyuria, bacteria, and/or nitrites on the urinalysis result does not correlate with infection.  Urine microscopic not indicated.    Lactic Acid, Plasma [[id number]]  (Normal) Collected: 11/14/22 1000    Specimen: Blood Updated: 11/14/22 1132     Lactate 1.6 mmol/L     [place] Blood Culture Bottle Set [[id number]] Collected: 11/14/22 1000    Specimen: Blood from Arm, Left Updated: 11/14/22 1101     Extra Tube Hold for add-ons.     Comment: Auto resulted.       Red Top [[id number]] Collected: 11/14/22 1000    Specimen: Blood Updated: 11/14/22 1101     Extra Tube Hold for add-ons.     Comment: Auto resulted.       Green Top (Gel) [[id number]] Collected: 11/14/22 1000    Specimen: Blood Updated: 11/14/22 1101     Extra Tube Hold for add-ons.     Comment: Auto resulted.       Lavender Top  [[id number]] Collected: 11/14/22 1000    Specimen: Blood Updated: 11/14/22 1101     Extra Tube hold for add-on     Comment: Auto resulted       Light Blue Top [[id number]] Collected: 11/14/22 1000    Specimen: Blood Updated: 11/14/22 1101     Extra Tube Hold for add-ons.     Comment: Auto resulted       Comprehensive Metabolic Panel [[id number]]  (Abnormal) Collected: 11/14/22 1000    Specimen: Blood Updated: 11/14/22 1028     Glucose 169 mg/dL      BUN 11 mg/dL      Creatinine 0.64 mg/dL      Sodium 137 mmol/L      Potassium 4.6 mmol/L      Chloride 101 mmol/L      CO2 23.0 mmol/L      Calcium 10.4 mg/dL      Total Protein 6.7 g/dL      Albumin 4.20 g/dL      ALT (SGPT) 11 U/L      AST (SGOT) 11 U/L      Alkaline Phosphatase 92 U/L      Total Bilirubin 0.3 mg/dL      Globulin 2.5 gm/dL      A/G Ratio 1.7 g/dL      BUN/Creatinine Ratio 17.2     Anion Gap 13.0 mmol/L      eGFR 92.3 mL/min/1.73      Comment: National Kidney Foundation and American Society of Nephrology (ASN) Task Force recommended calculation based on the Chronic Kidney Disease Epidemiology Collaboration (CKD-EPI) equation refit without adjustment for race.       Narrative:      GFR Normal >60  Chronic Kidney Disease <60  Kidney Failure <15    The GFR formula is only valid for adults with stable renal function between ages 18 and 70.    Magnesium [[id number]]  (Normal) Collected: 11/14/22 1000    Specimen: Blood Updated: 11/14/22 1022     Magnesium 1.6 mg/dL     CBC & Differential [[id number]]  (Abnormal) Collected: 11/14/22 1000    Specimen: Blood Updated: 11/14/22 1014    Narrative:      The following orders were created for panel order CBC & Differential.  Procedure                               Abnormality         Status                     ---------                               -----------         ------                     CBC Auto Differential[[id number]]        Abnormal            Final result                 Please view results for these tests on  the individual orders.    CBC Auto Differential [[id number]]  (Abnormal) Collected: 11/14/22 1000    Specimen: Blood Updated: 11/14/22 1014     WBC 8.96 10*3/mm3      RBC 3.82 10*6/mm3      Hemoglobin 10.9 g/dL      Hematocrit 35.4 %      MCV 92.7 fL      MCH 28.5 pg      MCHC 30.8 g/dL      RDW 13.6 %      RDW-SD 46.0 fl      MPV 11.2 fL      Platelets 331 10*3/mm3      Neutrophil % 66.2 %      Lymphocyte % 19.2 %      Monocyte % 12.8 %      Eosinophil % 0.4 %      Basophil % 0.2 %      Immature Grans % 1.2 %      Neutrophils, Absolute 5.92 10*3/mm3      Lymphocytes, Absolute 1.72 10*3/mm3      Monocytes, Absolute 1.15 10*3/mm3      Eosinophils, Absolute 0.04 10*3/mm3      Basophils, Absolute 0.02 10*3/mm3      Immature Grans, Absolute 0.11 10*3/mm3      nRBC 0.0 /100 WBC     POC Glucose Once [[id number]]  (Abnormal) Collected: 11/14/22 0954    Specimen: Blood Updated: 11/14/22 1005     Glucose 147 mg/dL      Comment: [facility] [id number]             .  Imaging Results (Last 24 Hours)     Procedure Component Value Units Date/Time    MRI Brain Without Contrast [[id number]] Collected: 11/14/22 1507     Updated: 11/14/22 1514    Narrative:      EXAMINATION:  MRI BRAIN WO CONTRAST-  11/14/2022 2:43 PM CST     HISTORY: Altered mental status. Evaluate for stroke.     TECHNIQUE: Multiplanar imaging was performed in a high field magnet.     COMPARISON: No comparison study.     FINDINGS: There is a tiny acute infarct in the right cerebellar  hemisphere. This is in on the diffusion-weighted images. There is  restricted diffusion on the ADC map images. There is T2 high signal  within the hemispheric white matter. 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 There is minimal atrophy. There is minimal mucosal thickening in  the ethmoid sinus region.       Impression:      1. Tiny acute infarct in the right cerebellar hemisphere.  2. T2 high signal in the hemispheric white matter is nonspecific and  likely  due to chronic small vessel disease.  3. Minimal atrophy.  4. 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     The full report of this exam was immediately signed and available to the  emergency room. The patient is currently in the emergency room.        This report was finalized on 11/14/2022 15:10 by Dr. Sebastian Velazquez MD.    CT Head Without Contrast [[id number]] Collected: 11/14/22 1114     Updated: 11/14/22 1119    Narrative:      CT HEAD WO CONTRAST- 11/14/2022 10:48 AM CST     HISTORY: ams     COMPARISON: None      DLP: 654 mGy cm. All CT scans are performed using dose optimization  techniques as appropriate to the performed exam and including at least  one of the following: Automated exposure control, adjustment of the mA  and/or kV according to size, and the use of the iterative reconstruction  technique.     TECHNIQUE: Serial axial tomographic images of the brain were obtained  without the use of intravenous contrast.      FINDINGS:   The midline structures are nondisplaced. There is mild cerebral and  cerebellar atrophy, with an associated increase in the prominence of the  ventricles and sulci. The basilar cisterns are normal in size and  configuration. There is no evidence of intracranial hemorrhage or  mass-effect. There is low attenuation in the periventricular white  matter, consistent with chronic ischemic change. There are no abnormal  extra-axial fluid collections. There is no evidence of tonsillar  herniation.      The included orbits and their contents are unremarkable. The visualized  paranasal sinuses, mastoid air cells and middle ear cavities are clear.  The visualized osseous structures and overlying soft tissues of the  skull and face are intact.        Impression:         1. Mild cerebral and cerebellar atrophy with chronic microvascular  disease but no evidence of acute intracranial process.        This report was finalized on 11/14/2022 11:16 by Dr. Dominic Valdes MD.    XR Chest 1 View [[id number]]  Collected: 11/14/22 1058     Updated: 11/14/22 1102    Narrative:      XR CHEST 1 VW- 11/14/2022 10:42 AM CST     HISTORY: AMS     COMPARISON: None.     FINDINGS:      No lung consolidation. No pleural effusion or pneumothorax. The  cardiomediastinal silhouette and pulmonary vascularity are within normal  limits. The osseous structures and surrounding soft tissues demonstrate  no acute abnormality.       Impression:      1. No radiographic evidence of acute cardiopulmonary process.  This report was finalized on 11/14/2022 10:58 by Dr Dylan Copeland, .          MRI brain reviewed by me.  There is an incidental finding in the right cerebellum.  There is a punctate focal lesion of diffusion restriction.    White count elevated overnight being 8.9 yesterday up to 14.6 today.  Metabolic panel shows some mild electrolyte abnormalities.    COVID and flu swabs are pending    RPR is pending  TSH is normal at 0.8  Hemoglobin A1c is mildly elevated at 6.1  B12 is pending  Folate pending      Impression    • Spells  o The differential diagnosis for the spells are as follows: I believe the most likely etiology would be complicated migraines given the fact the patient has associated headaches with the spells.  As the headache resolves the patient often has symptoms that last less than 24 hours.  Another consideration would be an epileptic etiology.  I do not believe this represents a TIA or stroke.  A final consideration would be a progressive neurologic disorder such as Lewy body dementia which can initially be spells of altered mentation and cognitive decline.  Currently I find no evidence of cogwheeling to suggest an underlying neuro progressive disorder.  • Sinus tachycardia  • Increased stress from 's illness  • Incidental finding in the cerebellum that is not a clinically relevant stroke.  That in no way could explain symptomatology given by the patient.  I think is reasonable to perform cardiac telemetry, perform a  cardiac echo, and vessel imaging.  We can also increase the aspirin and maximize her statin as well.  She has no physical exam deficits consistent with a cerebellar stroke.  It is a tiny foci of diffusion restriction and therefore is likely not causing any clinical symptoms.    Plan    • Cardiac telemetry  • Cardiac echo  • CT angiography of head and neck  • EEG  • Minimize mind-altering medications such as opiates  • Cleared from neurology standpoint to be discharged home immediately following testing today.  I do not believe that she has any emergent neurologic features.  If the patient does have an abnormal EEG we may consider antiepileptics.  If she does not then I am recommending either verapamil sustained-release 120 mg p.o. nightly.  If internal medicine needs to use a beta-blocker we can use that instead as this would have some efficacy against complicated migraines.  Will need follow-up in the neurology clinic to watch for any underlying progressive neurologic disorder such as Lewy body dementia  o In an effort to minimize hospitalization, I have discontinued unnecessary tests such as the carotid ultrasound and MRI of her shoulder.    I discussed the patient's findings and my recommendations with patient and family.  Discussed with patient's daughter in the room.    Elie Lim MD  11/15/22  08:06 CST        Electronically signed by Elie Lim MD at 11/15/22 0906          Discharge Summary      Abhishek Kohli DO at 11/15/22 1335              [facility] Medicine Services  DISCHARGE SUMMARY       Date of Admission: 11/14/2022  Date of Discharge:  11/15/2022  Primary Care Physician: Provider, No Known    Discharge Diagnoses:  Active Hospital Problems    Diagnosis    • **Tachycardia    • Cerebellar infarct (HCC)    • Uncontrolled hypertension    • Toxic metabolic encephalopathy    • Type 2 diabetes mellitus with hyperglycemia, without long-term current use  of insulin ([facility])          Presenting Problem/History of Present Illness:  Tachycardia [R00.0]  Cerebellar infarct ([facility]) [I63.9]     Chief Complaint on Day of Discharge:   No complaint    History of Present Illness on Day of Discharge:   The patient is doing well today.  She has been seen and evaluated by neurology.  EEG report is noted below noting an extremely atypical result.  The patient will empirically be placed on Keppra and if her spells are improved then this may give more evidence that they were epileptic in etiology.  The patient is stable for discharge home today with her family.    Hospital Course  Concepcion harris is a 77-year-old female with a past medical history of stroke 2012 for which she received tPA at [facility] prior to transfer to [place], type 2 diabetes, hypertension, high Po kalemia and hypomagnesia.  Patient started having neurological changes again in August of this year.  She went to [facility] 8/2022 with negative work-up.  She has continued to have multiple episodes of altered mental status with 3 visits to [facility] emergency department over the past 2 months.  Most recently admitted 11/4 after a fall causing 3 right rib fractures and shoulder injury.  Patient has been on 7.5 Norco most recent ingestion last evening.  Multiple family members at bedside providing history.  Patient does not follow commands or offer insight into her visit.  She currently is incontinent of stool.  Reportedly took a laxative yesterday as pain medication has made her constipated.  Patient did receive Ativan 1 mg IV prior to MRI.  She is tachycardic, blood pressure is elevated I feel this is most likely secondary to pain and discomfort.  Per record review when seen by PCP who does discuss abdominal wall seroma.  Family was advised to have surgeon look at this.  They are requesting a consult during this admission.  We will follow for further signs of infection as of right  now tachycardia may be associated with pain only.  She is admitted for further evaluation and treatment  Plan:   1.  Admit as inpatient critical care  2.  Home medications reviewed, will hold due to aspiration concerns  3.  Follow stroke protocol  4.  Monitor glucose every 6 hours with regular insulin sliding scale coverage  5.  Magnesium sulfate 1 g IV x1  6.  Normal saline 75 mL/hour  7.  Toradol 15 mg every 6 hours as needed for right shoulder pain  8.  Supplemental oxygen as needed, incentive spirometry, continuous pulse oximeter  9.  N.p.o. for now  10.  Echocardiogram, bilateral ultrasound carotids and MRI of the right shoulder in a.m.  11.  Additional labs, labs in a.m.  12.  Apply external catheter      Consults:   Neurology:  Impression     • Spells  ? The differential diagnosis for the spells are as follows: I believe the most likely etiology would be complicated migraines given the fact the patient has associated headaches with the spells.  As the headache resolves the patient often has symptoms that last less than 24 hours.  Another consideration would be an epileptic etiology.  I do not believe this represents a TIA or stroke.  A final consideration would be a progressive neurologic disorder such as Lewy body dementia which can initially be spells of altered mentation and cognitive decline.  Currently I find no evidence of cogwheeling to suggest an underlying neuro progressive disorder.  • Sinus tachycardia  • Increased stress from 's illness  • Incidental finding in the cerebellum that is not a clinically relevant stroke.  That in no way could explain symptomatology given by the patient.  I think is reasonable to perform cardiac telemetry, perform a cardiac echo, and vessel imaging.  We can also increase the aspirin and maximize her statin as well.  She has no physical exam deficits consistent with a cerebellar stroke.  It is a tiny foci of diffusion restriction and therefore is likely not  causing any clinical symptoms.     Plan     • Cardiac telemetry  • Cardiac echo  • CT angiography of head and neck  • EEG  • Minimize mind-altering medications such as opiates  • Cleared from neurology standpoint to be discharged home immediately following testing today.  I do not believe that she has any emergent neurologic features.  If the patient does have an abnormal EEG we may consider antiepileptics.  If she does not then I am recommending either verapamil sustained-release 120 mg p.o. nightly.  If internal medicine needs to use a beta-blocker we can use that instead as this would have some efficacy against complicated migraines.  Will need follow-up in the neurology clinic to watch for any underlying progressive neurologic disorder such as Lewy body dementia  ? In an effort to minimize hospitalization, I have discontinued unnecessary tests such as the carotid ultrasound and MRI of her shoulder.     I discussed the patient's findings and my recommendations with patient and family.  Discussed with patient's daughter in the room.     Elie Lim MD  Follow-up neurology note:     The patient's EEG is extremely atypical.  It has no definitive signs of epileptic foci, however, I do find some concerning features of this.  I still believe the complicated migraines would remain at the top of my differential diagnosis list.  To that end, I think it be reasonable to try Keppra 500 mg twice daily as an agent that could potentially prevent migraines but may also serve as an antiepileptic if there is any epileptic component to the spells.  She can be discharged today from the inpatient setting and have close follow-up with the neurology clinic in approximately 4 weeks time.  If she has no further spells on Keppra, this may give more credence to the idea that these were epileptic in etiology.     Keppra has been E prescribed to her local pharmacy.     Electronically signed by Elie Lim MD    Result Review   "  Result Review:  I have personally reviewed the results from the time of this admission to 11/15/2022 13:35 CST and agree with these findings:  []  Laboratory  []  Microbiology  []  Radiology  []  EKG/Telemetry   []  Cardiology/Vascular   []  Pathology  []  Old records  []  Other:    EEG:  Findings: This is an abnormal EEG.  There is generalized slowing seen throughout that could be consistent with a postictal state versus a metabolic/medication induced encephalopathy.  That being said, there are some atypical semirhythmic waveforms occasionally with after coming slow waves which may represent an irritable cortex.  It is difficult to say whether this can be seen more over the left compared to the right or vice versa.     Elie Lim MD    Condition on Discharge:    Stable and improved    Physical Exam on Discharge:  /87   Pulse 85   Temp 98.9 °F (37.2 °C) (Axillary)   Resp 16   Ht 162.6 cm (64\")   Wt 62.4 kg (137 lb 9.1 oz)   LMP  (LMP Unknown)   SpO2 94%   BMI 23.61 kg/m²   Physical Exam     Constitutional:       Appearance: She is normal-appearing.  Drowsy but easily awakened.  HENT:      Head: Normocephalic and atraumatic.      Mouth: Mucous membranes are dry.   Eyes:      Conjunctiva/sclera: Conjunctivae normal.   Cardiovascular:      Rate and Rhythm: Regular rhythm.   Pulmonary:      Effort: Pulmonary effort is normal.      Comments: Diminished without adventitious breath  Abdominal:      Palpations: Abdomen is soft.      Comments: Abdominal wall pelvic pouching at mesh site, known seroma   Musculoskeletal:      Cervical back: Neck supple.      Right lower leg: No edema.      Left lower leg: No edema.      Comments: Generalized weakness and debility.   Skin:     General: Skin is warm and dry.   Neurological:      Mental Status: She is oriented x2.   Psychiatric:      Comments: Flat affect, no behavioral disturbance.     Discharge Disposition:  Home or Self Care    Discharge Medications:   "   Discharge Medications      New Medications      Instructions Start Date   cefdinir 300 MG capsule  Commonly known as: OMNICEF   300 mg, Oral, Every 12 Hours Scheduled      levETIRAcetam 500 MG tablet  Commonly known as: KEPPRA   500 mg, Oral, 2 Times Daily      metoprolol succinate XL 50 MG 24 hr tablet  Commonly known as: Toprol XL   50 mg, Oral, Daily         Changes to Medications      Instructions Start Date   atorvastatin 20 MG tablet  Commonly known as: LIPITOR  What changed: Another medication with the same name was removed. Continue taking this medication, and follow the directions you see here.   20 mg, Oral, Daily         Continue These Medications      Instructions Start Date   allopurinol 300 MG tablet  Commonly known as: ZYLOPRIM   300 mg, Oral, Daily      aspirin 81 MG chewable tablet   81 mg, Oral, Daily      lisinopril 20 MG tablet  Commonly known as: PRINIVIL,ZESTRIL   20 mg, Oral, Daily      magnesium oxide 400 MG tablet  Commonly known as: MAG-OX   400 mg, Oral, Daily      meloxicam 15 MG tablet  Commonly known as: MOBIC   15 mg, Oral, Daily      metFORMIN 1000 MG tablet  Commonly known as: GLUCOPHAGE   1,000 mg, Oral, 2 Times Daily With Meals      potassium chloride 20 MEQ CR tablet  Commonly known as: K-DUR,KLOR-CON   20 mEq, Oral, Daily         Stop These Medications    amLODIPine 5 MG tablet  Commonly known as: NORVASC            Discharge Diet:   Diet Instructions     Diet: Regular; Thin      Discharge Diet: Regular    Fluid Consistency: Thin          Discharge Care Plan / Instructions:   Discharge home    Activity at Discharge:   Activity Instructions     Activity as Tolerated            Follow-up Appointments:  Follow-up with PCP next week       Electronically signed by Abhishek Kohli DO, 11/15/22, 13:35 CST.    Time: Discharge Less than 30 min    Part of this note may be an electronic transcription/translation of spoken language to printed text using the Dragon Dictation  system.        Electronically signed by Abhishek Kohli DO at 11/15/22 1339            Case Management  Consult [[id number]] (Order [id number])  Order  Date: 11/15/2022 Department: [facility] INTENSIVE CARE Ordering/Authorizing: Abhishek Kohli DO     Standing Order Information    Remaining Occurrences Interval Last Released     0/1 Once 11/15/2022              Order History  Inpatient  Date/Time Action Taken User Additional Information   11/15/22 1509 Sign Sydney Oh RN Ordering Mode: Verbal with readback   11/15/22 1509 Release Instance Sydney Oh RN (auto-released) Released Order:[id number]     Order Details    Frequency Duration Priority Order Class   Once 1  occurrence Routine Hospital Performed     Order Questions    Question Answer   Is discharge planning needed? If yes, who is requesting discharge planning? Patient   Reason for Consult? Home Health            Source Order Set / Preference List    Preference List   [facility] GENERAL CONSULTS [[id number]]             Verbal Order Info    Action Created on Order Mode Entered by Responsible Provider Signed by Signed on   Ordering 11/15/22 1509 Verbal with readback Sydney Oh RN Robinson, Maurice S, DO           Consult Order Info    ID Description Priority Start Date Start Time   [id number] Case Management  Consult Routine 11/15/2022  3:10 PM   Provider Specialty Referred to   ______________________________________ _____________________________________                                   Reprint Order Requisition    Case Management  Consult (Order #[id number]) on 11/15/22       Encounter    View Encounter                  Order Provider Info        Office phone Pager E-mail   Ordering User Sydney Oh RN -- -- --   Authorizing Provider Abhishek Kohli [phone] -- --   Attending Provider Abhishek Kohli [phone] -- --   Entered By Sydney Oh RN -- -- --    Ordering Provider Abhishek Kohli, [phone] -- --     Tracking Reports    Cosign Tracking Order Transmittal Tracking

## 2022-11-15 NOTE — PAYOR COMM NOTE
"REF:   [id number]    [facility]  FREIDA,   [phone]  OR  FAX   [phone]    Arlin Velez (77 y.o. Female)     Date of Birth   1945    Social Security Number       Address   [address] [place] [address]    Home Phone   [phone]    MRN   [id number]       Religious   Mosque    Marital Status                               Admission Date   11/14/22    Admission Type   Emergency    Admitting Provider   Abhishek Kohli DO    Attending Provider   Abhishek Kohli DO    Department, Room/Bed   [facility] INTENSIVE CARE, I003/1       Discharge Date       Discharge Disposition       Discharge Destination                               Attending Provider: Abhishek Kohli DO    Allergies: No Known Allergies    Isolation: None   Infection: None   Code Status: CPR    Ht: 162.6 cm (64\")   Wt: 62.4 kg (137 lb 9.1 oz)    Admission Cmt: None   Principal Problem: Tachycardia [R00.0]                 Active Insurance as of 11/14/2022     Primary Coverage     Payor Plan Insurance Group Employer/Plan Group    ANTHEM MEDICARE REPLACEMENT ANTHEM MEDICARE ADVANTAGE [id number]     Payor Plan Address Payor Plan Phone Number Payor Plan Fax Number Effective Dates    [address] [phone]  1/1/2022 - None Entered    [facility] [id number]       Subscriber Name Subscriber Birth Date Member ID       ARLIN VELEZ 1945 [id number]                 Emergency Contacts      (Rel.) Home Phone Work Phone Mobile Phone    Kaylin Cardona (Daughter) -- -- [phone]    RosieLavelle (Son) -- -- [phone]    Lillie Henson (Daughter) -- -- [phone]    Lucy Yan (Daughter) -- -- [phone]      Patient Care Timeline (11/14/2022 09:38 to 11/14/2022 18:33)    11/14/2022 11/14/2022 Event Details User   09:38 Patient arrival  Estuardo Carvalho RN   09:38 Peripheral IV 11/14/22 0938 Left Antecubital Placed Placement Date/Time: 11/14/22 0938   Placed by " "External Staff?: EMS  Size (Gauge): 18 G  Orientation: Left  Location: Antecubital Estuardo Carvalho RN     09:39:09 Chief Complaints Updated Altered Mental Status   Estuardo Carvalho RN   09:40 HPI HPI (Adult)  Stated Reason for Visit: Patient is a 77 year old femae that presents to ER from home. Patient awoke this morning with altered mental status. Patient per EMS is normal alert and oriented x 4. Patient last known well time last night at 2100. Family reports patient awoke altered. Patient has hx of hypokalemia and hypomagnesium. Family reports to EMS this the behavior during these episodes. Estuardo Carvalho RN   09:40:02 Patient roomed in ED To room 02 Dora Balderrama RN   09:42 Vital Signs  Vital Signs  Heart Rate: 117  Resp: 22  BP: 163/97  BMI (Calculated): 24.9  Oxygen Therapy  SpO2: 100 %  Device (Oxygen Therapy): room air  Vitals Timer  Restart Vitals Timer: Yes  Height and Weight  Height: 162.6 cm (64\")  Height Method: Estimated  Weight: 65.7 kg (144 lb 14.4 oz)  Weight Method: Bed scale  Other flowsheet entries  Ideal Body Weight k.7 Estuardo Carvalho RN     09:44 Vital Signs Vital Signs  Temp: 98.5 °F (36.9 °C)  Temp src: Oral Estuardo Carvalho RN     11:26:32 Neuro Cognitive Neuro Cognitive (Adult)  Cognitive/Neuro/Behavioral WDL: .WDL except; orientation  Orientation: disoriented to; place; time; situation  Additional Documentation: NIH Stroke Scale (group)  NIH Stroke Scale  Interval: baseline  1a. Level of Consciousness: 2-->Not alert, requires repeated stimulation to attend, or is obtunded and requires strong or painful stimulation to make movements (not stereotyped)  1b. LOC Questions: 2-->Answers neither question correctly  1c. LOC Commands: 2-->Performs neither task correctly  2. Best Gaze: 0-->Normal  3. Visual: 0-->No visual loss (Unable to assess. Pt unable to respond.)  4. Facial Palsy: 0-->Normal symmetrical movements  5a. Motor Arm, Left: 2-->Some effort against gravity, limb " cannot get to or maintain (if cued) 90 (or 45) degrees, drifts down to bed, but has some effort against gravity  5b. Motor Arm, Right: 2-->Some effort against gravity, limb cannot get to or maintain (if cued) 90 (or 45) degrees, drifts down to bed, but has some effort against gravity  6a. Motor Leg, Left: 3-->No effort against gravity, leg falls to bed immediately  6b. Motor Leg, Right: 3-->No effort against gravity, leg falls to bed immediately  7. Limb Ataxia: 0-->Absent  8. Sensory: 0-->Normal, no sensory loss (Unable to assess. Pt unable to speak.)  9. Best Language: 3-->Mute, global aphasia, no usable speech or auditory comprehension  10. Dysarthria: (UN) Intubated or other physical barrier (Pt not speaking.)  11. Extinction and Inattention (formerly Neglect): 0-->No abnormality (Unable to assess. Pt unable to answer.)  Total (NIH Stroke Scale): 19 Francisca Mccollum RN     11:53 Medication New Bag lactated ringers bolus 1,000 mL - Dose: 1,000 mL ; Rate: 2,000 mL/hr ; Route: Intravenous ; Line: Peripheral IV 11/14/22 0938 Left Antecubital ; Scheduled Time: 1028 Francisca Mccollum RN     12:32:21 ED Quick Updates Quick Updates  Quick Updates - Free Text: Pt will be unable to complete MRI today as she is very restless and confused. Ativan is ordered but, according to this RN, this will not be enough to keep pt still. Pt was hardly able to sit still long enough for CT.  Francisca Mccollum RN   12:38 Medication Given LORazepam (ATIVAN) injection 1 mg - Dose: 1 mg ; Route: Intravenous ; Line: Peripheral IV 11/14/22 0938 Left Antecubital ; Scheduled Time: 1056 Francisca Mccollum RN     17:42 Vital Signs  Vital Signs  Heart Rate: 140 Abnormal  (Device Time: 17:42:00)  BP: 181/99 Abnormal  (Device Time: 17:42:00)  Noninvasive MAP (mmHg): 119 (Device Time: 17:42:00)  Oxygen Therapy  SpO2: 97 % (Device Time: 17:42:00) Francisca Mccollum RN       18:03 Medication New Bag magnesium sulfate in D5W 1g/100mL (PREMIX) - Dose: 1 g ;  Route: Intravenous ; Line: Peripheral IV 11/14/22 0938 Left Antecubital ; Scheduled Time: 1714 Francisca Mccollum RN                      History & Physical      Ember Aguilar APRN at 11/14/22 1708     Attestation signed by Abhishek Kohli DO at 11/15/22 0842    This visit was performed by both a physician and an APC. I personally evaluated and examined the patient. I performed all aspects of the MDM as documented.    In and evaluated.  Admission treatment plan developed in conjunction with APRN.    Cardiovascular:      Rate and Rhythm: Regular rhythm. Tachycardia present.   Pulmonary:      Effort: Pulmonary effort is normal.      Comments: Diminished without adventitious breath  Abdominal:      Palpations: Abdomen is soft.      Comments: Abdominal wall pelvic pouching at mesh site, known seroma     Electronically signed by Abhishek Kohli DO, 11/15/2022, 08:42 CST.                        [facility]  HISTORY AND PHYSICAL    Date of Admission: 11/14/2022  Primary Care Physician: Provider, No Known    Subjective     Chief Complaint: Right cerebellar infarct    History of Present Illness  Concepcion harris is a 77-year-old female with a past medical history of stroke 2012 for which she received tPA at [facility] prior to transfer to [place], type 2 diabetes, hypertension, high Po kalemia and hypomagnesia.  Patient started having neurological changes again in August of this year.  She went to [facility] 8/2022 with negative work-up.  She has continued to have multiple episodes of altered mental status with 3 visits to [facility] emergency department over the past 2 months.  Most recently admitted 11/4 after a fall causing 3 right rib fractures and shoulder injury.  Patient has been on 7.5 Norco most recent ingestion last evening.  Multiple family members at bedside providing history.  Patient does not follow commands or  offer insight into her visit.  She currently is incontinent of stool.  Reportedly took a laxative yesterday as pain medication has made her constipated.  Patient did receive Ativan 1 mg IV prior to MRI.  She is tachycardic, blood pressure is elevated I feel this is most likely secondary to pain and discomfort.  Per record review when seen by PCP who does discuss abdominal wall seroma.  Family was advised to have surgeon look at this.  They are requesting a consult during this admission.  We will follow for further signs of infection as of right now tachycardia may be associated with pain only.  She is admitted for further evaluation and treatment    Review of Systems   Unable to determine due to altered mental status    Past Medical History:   Past Medical History:   Diagnosis Date   • Abdominal wall seroma    • B12 deficiency    • Cerebral infarct (HCC)    • Diabetes mellitus (HCC)    • HLD (hyperlipidemia)    • Hypertension    • Hypokalemia    • Hypomagnesemia        Past Surgical History:   Past Surgical History:   Procedure Laterality Date   • CHOLECYSTECTOMY     • HERNIA REPAIR     • HYSTERECTOMY         Family History: family history includes Cancer in her mother; Hypertension in her mother; Transient ischemic attack in her father.    Social History:  reports that she has never smoked. She does not have any smokeless tobacco history on file. She reports that she does not drink alcohol and does not use drugs.    Code Status: Full, if unable speak for Dr. Riley will speak for her      Allergies:  No Known Allergies    Medications:  No current facility-administered medications on file prior to encounter.     Current Outpatient Medications on File Prior to Encounter   Medication Sig Dispense Refill   • allopurinol (ZYLOPRIM) 300 MG tablet Take 1 tablet by mouth Daily.     • amLODIPine (NORVASC) 5 MG tablet Take 1 tablet by mouth Daily.     • aspirin 81 MG chewable tablet Chew 1 tablet Daily.     • atorvastatin  "(LIPITOR) 20 MG tablet Take 1 tablet by mouth Daily.     • atorvastatin (LIPITOR) 40 MG tablet Take 1 tablet by mouth Every Night.     • lisinopril (PRINIVIL,ZESTRIL) 20 MG tablet Take 1 tablet by mouth Daily.     • magnesium oxide (MAG-OX) 400 MG tablet Take 1 tablet by mouth Daily.     • meloxicam (MOBIC) 15 MG tablet Take 1 tablet by mouth Daily.     • metFORMIN (GLUCOPHAGE) 1000 MG tablet Take 1 tablet by mouth 2 (Two) Times a Day With Meals.     • potassium chloride (K-DUR,KLOR-CON) 20 MEQ CR tablet Take 1 tablet by mouth Daily.       I have utilized all available immediate resources to obtain, update, and review the patient's current medications.    Objective     BP (!) 181/99   Pulse (!) 140   Temp 99.3 °F (37.4 °C) (Axillary)   Resp 22   Ht 162.6 cm (64\")   Wt 65.7 kg (144 lb 14.4 oz)   LMP  (LMP Unknown)   SpO2 97%   BMI 24.87 kg/m²   Physical Exam  Vitals reviewed.   Constitutional:       Appearance: She is ill-appearing.   HENT:      Head: Normocephalic and atraumatic.      Mouth/Throat:      Mouth: Mucous membranes are dry.   Eyes:      Conjunctiva/sclera: Conjunctivae normal.      Comments: Keeps eyes closed   Cardiovascular:      Rate and Rhythm: Regular rhythm. Tachycardia present.   Pulmonary:      Effort: Pulmonary effort is normal.      Comments: Diminished without adventitious breath  Abdominal:      Palpations: Abdomen is soft.      Comments: Abdominal wall pelvic pouching at mesh site, known seroma   Musculoskeletal:      Cervical back: Neck supple.      Right lower leg: No edema.      Left lower leg: No edema.      Comments: Generalized weakness and debility, keeps head down on chest with decreased oxygenation   Skin:     General: Skin is warm and dry.   Neurological:      Mental Status: She is disoriented.   Psychiatric:      Comments: Flat affect, no behavioral       Pertinent Data:   Lab Results (last 72 hours)     Procedure Component Value Units Date/Time    Blood Culture - Blood, " Arm, Left [[id number]] Collected: 11/14/22 1000    Specimen: Blood from Arm, Left Updated: 11/14/22 1828    Vitamin B12 [[id number]] Collected: 11/14/22 1000    Specimen: Blood Updated: 11/14/22 1807    Folate [[id number]] Collected: 11/14/22 1000    Specimen: Blood Updated: 11/14/22 1807    T4, Free [[id number]]  (Normal) Collected: 11/14/22 1550    Specimen: Blood Updated: 11/14/22 1630     Free T4 1.38 ng/dL     TSH [[id number]]  (Normal) Collected: 11/14/22 1550    Specimen: Blood Updated: 11/14/22 1629     TSH 0.894 uIU/mL     Troponin [[id number]]  (Normal) Collected: 11/14/22 1550    Specimen: Blood Updated: 11/14/22 1622     Troponin T 0.011 ng/mL     Urine Drug Screen - Urine, Clean Catch [[id number]]  (Abnormal) Collected: 11/14/22 1123    Specimen: Urine, Clean Catch Updated: 11/14/22 1149     THC, Screen, Urine Negative     Phencyclidine (PCP), Urine Negative     Cocaine Screen, Urine Negative     Methamphetamine, Ur Negative     Opiate Screen Positive     Amphetamine Screen, Urine Negative     Benzodiazepine Screen, Urine Negative     Tricyclic Antidepressants Screen Negative     Methadone Screen, Urine Negative     Barbiturates Screen, Urine Negative     Oxycodone Screen, Urine Negative     Propoxyphene Screen Negative     Buprenorphine, Screen, Urine Negative    Urinalysis With Culture If Indicated - Urine, Catheter [[id number]]  (Abnormal) Collected: 11/14/22 1122    Specimen: Urine, Catheter Updated: 11/14/22 1143     Color, UA Yellow     Appearance, UA Clear     pH, UA 7.0     Specific Gravity, UA 1.011     Glucose, UA Negative     Ketones, UA Trace     Bilirubin, UA Negative     Blood, UA Negative     Protein, UA Trace     Leuk Esterase, UA Negative     Nitrite, UA Negative     Urobilinogen, UA 0.2 E.U./dL    Lactic Acid, Plasma [[id number]]  (Normal) Collected: 11/14/22 1000    Specimen: Blood Updated: 11/14/22 1132     Lactate 1.6 mmol/L     Comprehensive Metabolic Panel [[id number]]  (Abnormal)  Collected: 11/14/22 1000    Specimen: Blood Updated: 11/14/22 1028     Glucose 169 mg/dL      BUN 11 mg/dL      Creatinine 0.64 mg/dL      Sodium 137 mmol/L      Potassium 4.6 mmol/L      Chloride 101 mmol/L      CO2 23.0 mmol/L      Calcium 10.4 mg/dL      Total Protein 6.7 g/dL      Albumin 4.20 g/dL      ALT (SGPT) 11 U/L      AST (SGOT) 11 U/L      Alkaline Phosphatase 92 U/L      Total Bilirubin 0.3 mg/dL      Globulin 2.5 gm/dL      A/G Ratio 1.7 g/dL      BUN/Creatinine Ratio 17.2     Anion Gap 13.0 mmol/L      eGFR 92.3 mL/min/1.73     Magnesium [[id number]]  (Normal) Collected: 11/14/22 1000    Specimen: Blood Updated: 11/14/22 1022     Magnesium 1.6 mg/dL     CBC Auto Differential [[id number]]  (Abnormal) Collected: 11/14/22 1000    Specimen: Blood Updated: 11/14/22 1014     WBC 8.96 10*3/mm3      RBC 3.82 10*6/mm3      Hemoglobin 10.9 g/dL      Hematocrit 35.4 %      MCV 92.7 fL      MCH 28.5 pg      MCHC 30.8 g/dL      RDW 13.6 %      RDW-SD 46.0 fl      MPV 11.2 fL      Platelets 331 10*3/mm3      Neutrophil % 66.2 %      Lymphocyte % 19.2 %      Monocyte % 12.8 %      Eosinophil % 0.4 %      Basophil % 0.2 %      Immature Grans % 1.2 %      Neutrophils, Absolute 5.92 10*3/mm3      Lymphocytes, Absolute 1.72 10*3/mm3      Monocytes, Absolute 1.15 10*3/mm3      Eosinophils, Absolute 0.04 10*3/mm3      Basophils, Absolute 0.02 10*3/mm3      Immature Grans, Absolute 0.11 10*3/mm3      nRBC 0.0 /100 WBC     POC Glucose Once [[id number]]  (Abnormal) Collected: 11/14/22 0954    Specimen: Blood Updated: 11/14/22 1005     Glucose 147 mg/dL      Comment: : [id number] Messina ID: [id number]           Imaging Results (Last 24 Hours)     Procedure Component Value Units Date/Time    MRI Brain Without Contrast [[id number]] Collected: 11/14/22 1507     Updated: 11/14/22 1514    Narrative:      EXAMINATION:  MRI BRAIN WO CONTRAST-  11/14/2022 2:43 PM CST     HISTORY: Altered mental status. Evaluate for  stroke.     TECHNIQUE: Multiplanar imaging was performed in a high field magnet.     COMPARISON: No comparison study.     FINDINGS: There is a tiny acute infarct in the right cerebellar  hemisphere. This is in on the diffusion-weighted images. There is  restricted diffusion on the ADC map images. There is T2 high signal  within the hemispheric white matter. 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 There is minimal atrophy. There is minimal mucosal thickening in  the ethmoid sinus region.       Impression:      1. Tiny acute infarct in the right cerebellar hemisphere.  2. T2 high signal in the hemispheric white matter is nonspecific and  likely due to chronic small vessel disease.  3. Minimal atrophy.  4. 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     The full report of this exam was immediately signed and available to the  emergency room. The patient is currently in the emergency room.        This report was finalized on 11/14/2022 15:10 by Dr. Sebastian Velazquez MD.    CT Head Without Contrast [[id number]] Collected: 11/14/22 1114     Updated: 11/14/22 1119    Narrative:      CT HEAD WO CONTRAST- 11/14/2022 10:48 AM CST     HISTORY: ams     COMPARISON: None      DLP: 654 mGy cm. All CT scans are performed using dose optimization  techniques as appropriate to the performed exam and including at least  one of the following: Automated exposure control, adjustment of the mA  and/or kV according to size, and the use of the iterative reconstruction  technique.     TECHNIQUE: Serial axial tomographic images of the brain were obtained  without the use of intravenous contrast.      FINDINGS:   The midline structures are nondisplaced. There is mild cerebral and  cerebellar atrophy, with an associated increase in the prominence of the  ventricles and sulci. The basilar cisterns are normal in size and  configuration. There is no evidence of intracranial hemorrhage or  mass-effect. There is low attenuation in the periventricular white  matter,  consistent with chronic ischemic change. There are no abnormal  extra-axial fluid collections. There is no evidence of tonsillar  herniation.      The included orbits and their contents are unremarkable. The visualized  paranasal sinuses, mastoid air cells and middle ear cavities are clear.  The visualized osseous structures and overlying soft tissues of the  skull and face are intact.        Impression:         1. Mild cerebral and cerebellar atrophy with chronic microvascular  disease but no evidence of acute intracranial process.        This report was finalized on 11/14/2022 11:16 by Dr. Dominic Valdes MD.    XR Chest 1 View [[id number]] Collected: 11/14/22 1058     Updated: 11/14/22 1102    Narrative:      XR CHEST 1 VW- 11/14/2022 10:42 AM CST     HISTORY: AMS     COMPARISON: None.     FINDINGS:      No lung consolidation. No pleural effusion or pneumothorax. The  cardiomediastinal silhouette and pulmonary vascularity are within normal  limits. The osseous structures and surrounding soft tissues demonstrate  no acute abnormality.       Impression:      1. No radiographic evidence of acute cardiopulmonary process.  This report was finalized on 11/14/2022 10:58 by Dr Dylan Copeland, .          Assessment / Plan     Assessment:   Active Hospital Problems    Diagnosis    • **Tachycardia    • Cerebellar infarct (HCC)    • Uncontrolled hypertension    • Toxic metabolic encephalopathy    • Type 2 diabetes mellitus with hyperglycemia, without long-term current use of insulin (HCC)        Plan:   1.  Admit as inpatient critical care  2.  Home medications reviewed, will hold due to aspiration concerns  3.  Follow stroke protocol  4.  Monitor glucose every 6 hours with regular insulin sliding scale coverage  5.  Magnesium sulfate 1 g IV x1  6.  Normal saline 75 mL/hour  7.  Toradol 15 mg every 6 hours as needed for right shoulder pain  8.  Supplemental oxygen as needed, incentive spirometry, continuous pulse  oximeter  9.  N.p.o. for now  10.  Echocardiogram, bilateral ultrasound carotids and MRI of the right shoulder in a.m.  11.  Additional labs, labs in a.m.  12.  Apply external catheter    I discussed the patient's findings and my recommendations with: Abhishek Kohli DO    Time spent: 45 minutes    Patient seen and examined by me on 11/14/2022 at 5:08 PM.    Electronically signed by ROSA Mason, 11/14/22, 18:52 CST.    Electronically signed by Abhishek Kohli DO at 11/15/22 0842          Emergency Department Notes      Michael Tsang MD at 11/14/22 0942          Subjective   History of Present Illness   Patient presents due to altered mental status.  History obtained from the daughter due to patient's minimal ability to cooperate and interact.  Apparently yesterday the patient was having some difficulty with word finding and having a little bit of difficulty walking.  The daughter notes that in the past she has had hypomagnesemia and hypokalemia that have led to the symptoms that she has been hospitalized in [place].  She also has a history of stroke 10 years ago for which she got tPA.  This morning she called the patient who seemed to be doing well and she said there was possibly some slight difficulty word finding but nothing notable around 7 AM.  She got in touch with the patient again a little bit later and patient was not responding over the phone.  She went to check on her and found her on the toilet.  When she stood up she had to  her to walk and she did not walk well and had to take a step backwards rather than forwards and had some difficulty.  She has not been able to answer basic questions and has difficulty with speech.  The daughter was concerned about low potassium and low magnesium and brought her to the ER for further evaluation.  No recent illness symptoms identified; no recent history of cough or sore throat, congestion, trouble breathing, issues eating or  drinking, issues with vomiting or diarrhea.  No fevers reported.  Taking her magnesium and potassium supplements.    Review of Systems   Unable to perform ROS: Mental status change       Past Medical History:   Diagnosis Date   • Abdominal wall seroma    • B12 deficiency    • Cerebral infarct (HCC)    • Diabetes mellitus (HCC)    • HLD (hyperlipidemia)    • Hypertension    • Hypokalemia    • Hypomagnesemia        No Known Allergies    Past Surgical History:   Procedure Laterality Date   • CHOLECYSTECTOMY     • HERNIA REPAIR     • HYSTERECTOMY         Family History   Problem Relation Age of Onset   • Cancer Mother    • Hypertension Mother    • Transient ischemic attack Father        Social History     Socioeconomic History   • Marital status:    Tobacco Use   • Smoking status: Never   Substance and Sexual Activity   • Alcohol use: Never   • Drug use: Never           Objective   Physical Exam  Vitals reviewed.   Constitutional:       Comments: listless   HENT:      Head: Normocephalic and atraumatic.   Eyes:      Extraocular Movements: Extraocular movements intact.      Conjunctiva/sclera: Conjunctivae normal.   Cardiovascular:      Rate and Rhythm: Regular rhythm. Tachycardia present.      Pulses: Normal pulses.      Heart sounds: Normal heart sounds.   Pulmonary:      Effort: Pulmonary effort is normal. No respiratory distress.      Breath sounds: No wheezing.   Abdominal:      General: Abdomen is flat. There is no distension.      Tenderness: There is no abdominal tenderness.   Musculoskeletal:         General: No swelling or tenderness.      Cervical back: Normal range of motion and neck supple.      Right lower leg: No edema.      Left lower leg: No edema.   Skin:     General: Skin is warm and dry.      Capillary Refill: Capillary refill takes less than 2 seconds.   Neurological:      Mental Status: She is alert.      Comments: Unable to state her name or location.  Does not appear to have any hemineglect  "and persistently tries to get her self out of bed demonstrating good strength in all 4 extremities.  She also appears to have good coordination grabbing the handrails.  She cannot cooperate with formal cerebellar testing and does not follow commands.  When asked if she knows where she is she says \"yes.\"  Will not answer where.  No nystagmus noted.  No facial droop.  Her speech is clear when spoken but limited to occasional one-word statements.   Psychiatric:         Behavior: Behavior normal.         Thought Content: Thought content normal.         Procedures          ED Course  ED Course as of 11/15/22 0744   Mon Nov 14, 2022   1134 -chest x-ray reviewed by me. no focal consolidations, no pulmonary edema, mediastinum not widened.  CT head with NAICA [AS]   1337 Hemoglobin(!): 10.9 [AS]      ED Course User Index  [AS] Michael Tsang MD                                           [facility]   Concepcion Velez is a 77 y.o. female with PMH above who presents to the Emergency Department with altered mental status.  Serum electrolytes are within normal limits which makes this etiology unlikely.  Stroke is considered; given that she has a history of stroke as well as diabetes she is not a candidate for tPA.  Difficult to get a NIH stroke scale on her; best estimate that I have at this time is 5.  I discussed her case with Dr. Lim who recommends that she get an MRI to further evaluate. Recommended against vessel imaging or basic CT, however she is in scanner at time of our conversation so will go ahead and obtain. She is also notably tachycardic to 119, not febrile.  Metabolic process seems very likely and work-up to elucidate this is ongoing with chest x-ray, urinalysis.  Will give IV fluids a liter to see if her tachycardia responds. Given the listlessness, pt will need meds for assistsnace. 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  Laboratory studies are unrevealing; additional work-up included lactic acid, UDS, urinalysis, troponin, TSH, T4 which were overall unremarkable.  UDS has opiates but she takes home dose pain medicine.  The patient was more comfortable and compliant after the Ativan but persistently tachycardic with a sinus tachycardia in the 130s and 140s.  I ordered a liter of fluids to see if it is fluid responsive and unfortunately this took several hours to get infused but after it was infused her tachycardia did not improve. chest x-ray reviewed by me. no focal consolidations, no pulmonary edema, mediastinum not widened.  She continues to be calm and cooperative on reassessment.  MRI was ordered per Dr. Lim's recommendations and shows a small acute cerebellar infarct.  He feels this is incidental and not pertinent and would not require admission.  She does not have adria ataxia on exam and her mental status changes very likely to be due to the cerebellar infarct.  Per Dr. Lim no further work-up of this is required at this time.  Ultimately, I was unable to elucidate the underlying etiology of the patient's tachycardia but do find it to be significant in the context of her altered mental status.  I discussed admission with Dr. Vasquez who agreed to admit the patient under Dr. Kohli. He requested a d-dimer; patient has been on room air with good sats and no signs of shortness of breath or chest pain and does not have a history of blood clots or pertinent risk factors and her overall story does not seem consistent with pulmonary embolus to me, however I agreed to add this test on.  Dr. Vasquez did not feel the patient needed to stay in the emergency department pending the results and agrees they can be followed-up inpatient.  Patient and family updated.  Patient dispositioned without acute event.    Final diagnosis: tachycardia, altered mental status    All questions  answered. Patient/family was understanding and in agreement with today's assessment and plan. The patient was monitored during their stay in the ED and dispositioned without acute event.    Electronically signed by:  Michael Tsang MD 11/15/2022 07:44 CST      Note: Dragon medical dictation software was used in the creation of this note.        Final diagnoses:   Tachycardia       ED Disposition  ED Disposition     ED Disposition   Decision to Admit    Condition   --    Comment   Level of Care: Critical Care [6]   Diagnosis: Cerebellar infarct (HCC) [250812]   Admitting Physician: SHAHEEN FORTUNE [[id number]]   Attending Physician: SHAHEEN FORTUNE [[id number]]   Bed Request Comments: ICU/CCU   Certification: I Certify That Inpatient Hospital Services Are Medically Necessary For Greater Than 2 Midnights               No follow-up provider specified.       Medication List      No changes were made to your prescriptions during this visit.          Michael Tsang MD  11/15/22 0744      Electronically signed by Michael Tsang MD at 11/15/22 0744       Vital Signs (last 2 days)     Date/Time Temp Temp src Pulse Resp BP Patient Position SpO2    11/15/22 0630 -- -- 84 -- 120/74 -- 95    11/15/22 0600 -- -- 95 -- 147/60 -- 97    11/15/22 0545 -- -- -- -- 144/73 -- --    11/15/22 0500 -- -- 89 -- 125/69 -- 95    11/15/22 0430 -- -- 93 -- 145/81 -- 95    11/15/22 0400 99.3 (37.4) Axillary 92 16 116/96 Lying 95    11/15/22 0330 -- -- 94 -- 144/77 -- 95    11/15/22 0300 -- -- 119 -- 160/76 -- 97    11/15/22 0230 -- -- 105 -- 146/76 -- 96    11/15/22 0200 -- -- 104 -- 160/82 -- 94    11/15/22 0130 -- -- 90 -- 138/74 -- 94    11/15/22 0100 -- -- 94 -- 144/82 -- 94    11/15/22 0030 -- -- 129 -- 151/99 -- 96    11/15/22 0000 -- -- 117 15 169/86 Lying 91    11/14/22 2340 99.6 (37.6) Axillary -- 20 -- -- --    11/14/22 2330 -- -- 116 -- 175/98 -- 94    11/14/22 2300 -- -- 135 -- 176/90 -- 91    11/14/22 2230  -- -- 135 -- 179/90 -- --    11/14/22 2130 -- -- 128 -- 169/88 -- 94    11/14/22 2100 -- -- 122 -- 152/84 -- 98    11/14/22 2000 -- -- 143 -- 161/86 -- 98    11/14/22 1900 -- -- 141 -- 177/95 -- 99    11/14/22 1742 -- -- 140 -- 181/99 -- 97    11/14/22 1701 -- -- 138 -- 108/62 -- 97    11/14/22 15:57:33 99.3 (37.4) Axillary 134 22 156/85 Sitting 99    11/14/22 1352 -- -- 118 -- 164/83 -- 100    11/14/22 12:29:52 -- -- 131 21 163/99 Sitting 95    11/14/22 0944 98.5 (36.9) Oral -- -- -- -- --    11/14/22 0942 -- -- 117 22 163/97 -- 100        Oxygen Therapy (last 2 days)     Date/Time SpO2 Device (Oxygen Therapy) Flow (L/min) Oxygen Concentration (%) ETCO2 (mmHg)    11/15/22 0630 95 -- -- -- --    11/15/22 0600 97 -- -- -- --    11/15/22 0500 95 -- -- -- --    11/15/22 0430 95 -- -- -- --    11/15/22 0400 95 room air -- -- --    11/15/22 0330 95 -- -- -- --    11/15/22 0300 97 -- -- -- --    11/15/22 0230 96 -- -- -- --    11/15/22 0200 94 -- -- -- --    11/15/22 0130 94 -- -- -- --    11/15/22 0100 94 -- -- -- --    11/15/22 0030 96 -- -- -- --    11/15/22 0000 91 room air -- -- --    11/14/22 2330 94 -- -- -- --    11/14/22 2300 91 -- -- -- --    11/14/22 2130 94 -- -- -- --    11/14/22 2100 98 -- -- -- --    11/14/22 2015 -- room air -- -- --    11/14/22 2000 98 -- -- -- --    11/14/22 1900 99 -- -- -- --    11/14/22 1742 97 -- -- -- --    11/14/22 1701 97 -- -- -- --    11/14/22 15:57:33 99 -- -- -- --    11/14/22 1352 100 -- -- -- --    11/14/22 12:29:52 95 -- -- -- --    11/14/22 0942 100 room air -- -- --        Intake & Output (last 2 days)       11/13 0701 11/14 0700 11/14 0701  11/15 0700 11/15 0701  11/16 0700    I.V. (mL/kg)  541 (8.7)     Total Intake(mL/kg)  541 (8.7)     Net  +541            Urine Unmeasured Occurrence  1 x     Stool Unmeasured Occurrence  1 x           Facility-Administered Medications as of 11/15/2022   Medication Dose Route Frequency Provider Last Rate Last Admin   • acetaminophen  (TYLENOL) tablet 650 mg  650 mg Oral Q4H PRN Ember Aguilar APRN        Or   • acetaminophen (TYLENOL) suppository 650 mg  650 mg Rectal Q4H PRN Ember Aguilar APRJOSE       • aspirin tablet 325 mg  325 mg Oral Daily Elie Lim MD       • atorvastatin (LIPITOR) tablet 80 mg  80 mg Oral Nightly Elie Lim MD       • cefdinir (OMNICEF) capsule 300 mg  300 mg Oral Q12H Abhishek Kohli DO       • [COMPLETED] Chlorhexidine Gluconate Cloth 2 % pads 1 application  1 application Topical Once Ember Aguilar APRN   1 application at 11/15/22 0444   • Chlorhexidine Gluconate Cloth 2 % pads 1 application  1 application Topical Q24H Ember Aguilar APRN   1 application at 11/15/22 0445   • ketorolac (TORADOL) injection 15 mg  15 mg Intravenous Q6H PRN Ember Aguilar APRN   15 mg at 11/14/22 1846   • labetalol (NORMODYNE,TRANDATE) injection 10 mg  10 mg Intravenous Q6H PRN Jagdeep Amor DO   10 mg at 11/14/22 2035   • [COMPLETED] lactated ringers bolus 1,000 mL  1,000 mL Intravenous Once Michael Tsang MD 2,000 mL/hr at 11/14/22 1153 1,000 mL at 11/14/22 1153   • [COMPLETED] LORazepam (ATIVAN) injection 1 mg  1 mg Intravenous Once Michael Tsang MD   1 mg at 11/14/22 1238   • [COMPLETED] magnesium sulfate in D5W 1g/100mL (PREMIX)  1 g Intravenous Once Ember Aguilar APRN   1 g at 11/14/22 1803   • metoprolol tartrate (LOPRESSOR) injection 5 mg  5 mg Intravenous Q8H Jagdepe Amor DO   5 mg at 11/15/22 0545   • mupirocin (BACTROBAN) 2 % nasal ointment 1 application  1 application Each Nare BID Ember Aguilar APRN   1 application at 11/14/22 2018   • ondansetron (ZOFRAN) tablet 4 mg  4 mg Oral Q6H PRN Aguilar, Ember D, APRN        Or   • ondansetron (ZOFRAN) injection 4 mg  4 mg Intravenous Q6H PRN Ember Aguilar APRN       • sennosides-docusate (PERICOLACE) 8.6-50 MG per tablet 1 tablet  1 tablet Oral Nightly PRN Ember Aguilar APRN       • sodium  "chloride 0.9 % flush 10 mL  10 mL Intravenous Q12H Ember Aguilar APRN   10 mL at 22 2018   • sodium chloride 0.9 % flush 10 mL  10 mL Intravenous PRN Ember Aguilar APRN       • sodium chloride 0.9 % infusion  75 mL/hr Intravenous Continuous Ember Aguilar APRN 75 mL/hr at 22 75 mL/hr at 22       Orders (last 48 hrs)      Start     Ordered    11/15/22 2100  atorvastatin (LIPITOR) tablet 80 mg  Nightly         11/15/22 0900    11/15/22 1015  cefdinir (OMNICEF) capsule 300 mg  Every 12 Hours Scheduled         11/15/22 0926    11/15/22 1000  aspirin tablet 325 mg  Daily         11/15/22 0900    11/15/22 0926  Diet Regular Texture (IDDSI 7); Regular Consistency; Regular/House Diet  Diet Effective Now        Comments: Comments entered here are not received by the  Department and are not considered part of the interpreted diet order. Please use the \"DIET MESSAGE\" order to communicate additional instructions to the diet office. If necessary, consult a registered dietitian.    11/15/22 0926    11/15/22 0919  SLP Plan of Care Cert / Re-Cert  Once        Comments: Speech Language Pathology Plan of Care  Initial Certification  Certification Period: 11/15/2022 - 2023    Patient Name: Concepcion Velez  : 1945    (R00.0) Tachycardia  (primary encounter diagnosis)                Assessment                       Plan             Concepcion Lange, SLP  11/15/2022      By cosigning this order, either electronically or physically, I certify that the therapy services are furnished while this patient is under my care, the services outline above are required by this patient, and will be reviewed every 90 days.        M.D.:__________________________________________ Date: ______________    11/15/22 0918    11/15/22 0917  Diet  Diet Effective Now,   Status:  Canceled        Comments: Comments entered here are not received by the  Department and are not considered part " "of the interpreted diet order. Please use the \"DIET MESSAGE\" order to communicate additional instructions to the diet office. If necessary, consult a registered dietitian.    11/15/22 0918    11/15/22 0917  Diet Instructions To Nursing  Until Discontinued        Comments: Patient safe to start a regular consistency diet with thin liquids. Medications should be given whole with either thin liquids or puree. Patient should use upright positioning, slow rate of intake, small bites/sips, and assure oral cavity is clear after oral intake. Please let ST know if concerns with swallowing continue. Thanks.    11/15/22 0918    11/15/22 0901  CT Angiogram Head  1 Time Imaging         11/15/22 0900    11/15/22 0901  CT Angiogram Neck  1 Time Imaging         11/15/22 0900    11/15/22 0900  aspirin chewable tablet 81 mg  Daily,   Status:  Discontinued        See Hyperspace for full Linked Orders Report.    11/14/22 1750    11/15/22 0900  aspirin suppository 300 mg  Daily,   Status:  Discontinued        See Hyperspace for full Linked Orders Report.    11/14/22 1750    11/15/22 0900  EEG  STAT         11/15/22 0900    11/15/22 0728  COVID-19, FLU A/B, RSV PCR - Swab, Nasopharynx  Once         11/15/22 0727    11/15/22 0600  Hemoglobin A1c  Morning Draw,   Status:  Canceled         11/14/22 1750    11/15/22 0600  Lipid Panel  Morning Draw         11/14/22 1750    11/15/22 0600  CBC (No Diff)  Morning Draw         11/14/22 1750    11/15/22 0600  Basic Metabolic Panel  Morning Draw         11/14/22 1750    11/15/22 0556  POC Glucose Once  PROCEDURE ONCE         11/15/22 0544    11/15/22 0400  Chlorhexidine Gluconate Cloth 2 % pads 1 application  Every 24 Hours         11/14/22 1750    11/15/22 0000  MRI Brain Without Contrast  1 Time Imaging,   Status:  Canceled         11/14/22 1750    11/15/22 0000  MRI Shoulder Right Without Contrast  1 Time Imaging,   Status:  Canceled         11/14/22 1750    11/15/22 0000  US Carotid Bilateral  " 1 Time Imaging,   Status:  Canceled         11/14/22 1750    11/15/22 0000  Insulin Lispro (humaLOG) injection 2-7 Units  Every 6 Hours,   Status:  Discontinued         11/14/22 1850    11/14/22 2339  POC Glucose Once  PROCEDURE ONCE         11/14/22 2326    11/14/22 2300  metoprolol tartrate (LOPRESSOR) injection 5 mg  Every 8 Hours Scheduled         11/14/22 2248 11/14/22 2143  NPO Diet NPO Type: Strict NPO  Diet Effective Now,   Status:  Canceled         11/14/22 2142 11/14/22 2100  atorvastatin (LIPITOR) tablet 40 mg  Nightly,   Status:  Discontinued         11/14/22 1941 11/14/22 2100  sodium chloride 0.9 % flush 10 mL  Every 12 Hours Scheduled         11/14/22 1750 11/14/22 2100  mupirocin (BACTROBAN) 2 % nasal ointment 1 application  2 Times Daily         11/14/22 1750 11/14/22 2033  POC Glucose Once  PROCEDURE ONCE         11/14/22 2020 11/14/22 2014  labetalol (NORMODYNE,TRANDATE) injection 10 mg  Every 6 Hours PRN         11/14/22 2014 11/14/22 2000  Intake and Output  Every 4 Hours       11/14/22 1750 11/14/22 1941  Hemoglobin A1c  Once         11/14/22 1941 11/14/22 1800  POC Glucose Q6H  Every 6 Hours,   Status:  Canceled      Comments: May Discontinue After 2 Consecutive Readings Less Than 140Notify Provider if 2 Readings Greater Than 140      11/14/22 1750 11/14/22 1800  POC Glucose Q6H  Every 6 Hours,   Status:  Canceled       11/14/22 1750 11/14/22 1800  Vital Signs Every Hour and Per Hospital Policy Based on Patient Condition  Every Hour       11/14/22 1750 11/14/22 1800  Intake and Output  Every Hour       11/14/22 1750 11/14/22 1800  Insulin Lispro (humaLOG) injection 2-7 Units  3 Times Daily Before Meals,   Status:  Discontinued         11/14/22 1750 11/14/22 1800  Incentive Spirometry  Every 4 Hours While Awake       11/14/22 1750 11/14/22 1752  Daily Weights  Daily       11/14/22 1750 11/14/22 1752  Chlorhexidine Gluconate Cloth 2 % pads 1  application  Once         11/14/22 1750 11/14/22 1752  sodium chloride 0.9 % infusion  Continuous         11/14/22 1750 11/14/22 1750  sennosides-docusate (PERICOLACE) 8.6-50 MG per tablet 1 tablet  Nightly PRN         11/14/22 1750 11/14/22 1750  acetaminophen (TYLENOL) tablet 650 mg  Every 4 Hours PRN        See Hyperspace for full Linked Orders Report.    11/14/22 1750 11/14/22 1750  acetaminophen (TYLENOL) suppository 650 mg  Every 4 Hours PRN        See Hyperspace for full Linked Orders Report.    11/14/22 1750 11/14/22 1750  ondansetron (ZOFRAN) tablet 4 mg  Every 6 Hours PRN        See Hyperspace for full Linked Orders Report.    11/14/22 1750 11/14/22 1750  ondansetron (ZOFRAN) injection 4 mg  Every 6 Hours PRN        See Hyperspace for full Linked Orders Report.    11/14/22 1750 11/14/22 1750  Oxygen Therapy- Nasal Cannula; 2 LPM; Titrate for SPO2: Greater Than or Equal To, 94%  Continuous         11/14/22 1750 11/14/22 1749  Adult Transthoracic Echo Complete W/ Cont if Necessary Per Protocol (With Agitated Saline)  Once         11/14/22 1750 11/14/22 1749  Statin Ordered From Home Medications  Once         11/14/22 1750 11/14/22 1747  Influenza A & B, KURT - Swab, Nasopharynx  Once,   Status:  Canceled         11/14/22 1746 11/14/22 1746  Respiratory Panel PCR w/COVID-19(SARS-CoV-2) BROOKE/ABRIL/ANGELA/PAD/COR/MAD/BELEN In-House, NP Swab in UTM/VTM, 3-4 HR TAT - Swab, Nasopharynx  Once,   Status:  Canceled         11/14/22 1746 11/14/22 1746  Apply External Urinary Catheter  Once         11/14/22 1750 11/14/22 1745  Blood Culture - Blood, Arm, Left  Once        See Hyperspace for full Linked Orders Report.    11/14/22 1745 11/14/22 1745  Blood Culture - Blood, Arm, Right  Once        Comments: From a different site than #1.     See Hyperspace for full Linked Orders Report.    11/14/22 1745    11/14/22 1745  Vital Signs  Per Order Details        Comments: For ICU Admission:  Vital Signs Every 2 Hours  For Telemetry Unit Admission: Vital Signs Every 4 Hours    11/14/22 1750 11/14/22 1745  Pulse Oximetry, Continuous  Continuous,   Status:  Canceled         11/14/22 1750 11/14/22 1745  Cardiac Monitoring  Continuous,   Status:  Canceled        Comments: Follow Standing Orders As Outlined in Process Instructions (Open Order Report to View Full Instructions)    11/14/22 1750 11/14/22 1745  Notify Provider  Until Discontinued         11/14/22 1750 11/14/22 1745  Nursing Dysphagia Screening (Complete Prior to Giving Anything By Mouth)  Once         11/14/22 1750 11/14/22 1745  RN to Place Order SLP Consult - Eval & Treat Choosing Reason of RN Dysphagia Screen Failed  Per Order Details        Comments: RN to Place Order SLP Consult - Eval & Treat Choosing Reason of RN Dysphagia Screen Failed    11/14/22 1750 11/14/22 1745  Nurse to Call MD or Nutrition Services for Diet if Patient Passes Dysphagia Screen  Once         11/14/22 1750 11/14/22 1745  NPO Diet NPO Type: Strict NPO  Diet Effective Now,   Status:  Canceled        Comments: Strict NPO Until Nursing Dysphagia Screen Passed    11/14/22 1750 11/14/22 1745  Neuro Checks  Per Order Details        Comments: For ICU Admission: Neuro Checks to Include All Stroke Deficits Every Hour x10 Hours, Then Every 2 Hours,  For Telemetry Unit Admission: Neuro Checks to Include All Stroke Deficits Every 4 Hours    11/14/22 1750 11/14/22 1745  NIHSS Assessment  Every Shift      Comments: Turn off all sedation medications prior to performing assessment. Assessment to be performed upon admission, transfer to another unit, discharge, and with neurological decline. If NIHSS change is greater than or equal to 4 and/or neurological decline is noted notify physician.    11/14/22 1750 11/14/22 1745  Provide Stroke Education Material  Prior to Discharge        Comments: Educate patient PRN and daily during hospitalization.    11/14/22  1750    11/14/22 1745  OT Consult: Eval & Treat  Once         11/14/22 1750    11/14/22 1745  PT Consult: Eval & Treat As Tolerated  Once         11/14/22 1750    11/14/22 1745  SLP Consult: Eval & Treat Communication Disorder  Once        Comments: Per stroke protocol.    11/14/22 1750    11/14/22 1745  Inpatient Case Management  Consult  Once        Provider:  (Not yet assigned)    11/14/22 1750    11/14/22 1745  Inpatient Diabetes Educator Consult  Once        Provider:  (Not yet assigned)    11/14/22 1750    11/14/22 1745  Inpatient Neurology Consult Stroke  Once        Specialty:  Neurology  Provider:  Elie Lim MD    11/14/22 1750    11/14/22 1745  Assessed for Rehabilitation Services  Once         11/14/22 1750    11/14/22 1745  Reason for Not Administering IV Thrombolytic  Once         11/14/22 1750    11/14/22 1745  Insert Peripheral IV  Once         11/14/22 1750    11/14/22 1745  Saline Lock & Maintain IV Access  Continuous         11/14/22 1750    11/14/22 1745  Place Sequential Compression Device  Once         11/14/22 1750    11/14/22 1745  Maintain Sequential Compression Device  Continuous         11/14/22 1750    11/14/22 1745  Do NOT Hold Basal or Correction Scale Insulin When Patient is NPO, Hold Scheduled Mealtime (Bolus) Insulin if NPO  Continuous,   Status:  Canceled         11/14/22 1750    11/14/22 1745  Cardiac Monitoring  Continuous        Comments: Follow Standing Orders As Outlined in Process Instructions (Open Order Report to View Full Instructions)    11/14/22 1750    11/14/22 1745  Continuous Pulse Oximetry  Continuous         11/14/22 1750    11/14/22 1745  Height & Weight  Once         11/14/22 1750    11/14/22 1745  Oral care for patients not on NPPV or intubated  Every Shift      Comments: Oral care for patients not on NPPV or intubated    11/14/22 1750    11/14/22 1745  Use Mobility Guidelines for Advancement of Activity  Continuous         11/14/22 1750     11/14/22 1745  Code Status and Medical Interventions:  Continuous         11/14/22 1750    11/14/22 1745  Activity - Strict Bed Rest with HOB Flat  Until Discontinued         11/14/22 1750    11/14/22 1744  ketorolac (TORADOL) injection 15 mg  Every 6 Hours PRN         11/14/22 1750    11/14/22 1744  Follow Hypoglycemia Standing Orders For Blood Glucose <70 & Notify Provider of Treatment  As Needed,   Status:  Canceled      Comments: Follow Hypoglycemia Orders As Outlined in Process Instructions (Open Order Report to View Full Instructions)  Notify Provider Any Time Hypoglycemia Treatment is Administered    11/14/22 1750 11/14/22 1744  dextrose (GLUTOSE) oral gel 15 g  Every 15 Minutes PRN,   Status:  Discontinued         11/14/22 1750 11/14/22 1744  dextrose (D50W) (25 g/50 mL) IV injection 25 g  Every 15 Minutes PRN,   Status:  Discontinued         11/14/22 1750 11/14/22 1744  glucagon (human recombinant) (GLUCAGEN DIAGNOSTIC) injection 1 mg  Every 15 Minutes PRN,   Status:  Discontinued         11/14/22 1750 11/14/22 1744  sodium chloride 0.9 % flush 10 mL  As Needed         11/14/22 1750 11/14/22 1735  [facility]btain lab, imaging and d[facility]c [facility]  [facility] [place] (Specify)  Once        Comments: Obtain lab, imaging and d/c summary from [facility] admission 11/4 11/14/22 1735    11/14/22 1721  RPR  Once         11/14/22 1720    11/14/22 1721  Folate  Once         11/14/22 1720    11/14/22 1720  Vitamin B12  Once         11/14/22 1720    11/14/22 1716  D-dimer, Quantitative  STAT         11/14/22 1715    11/14/22 1714  magnesium sulfate in D5W 1g/100mL (PREMIX)  Once         11/14/22 1712    11/14/22 1708  Inpatient Admission  Once         11/14/22 1709    11/14/22 1705  NPO Diet NPO Type: Strict NPO  Diet Effective Now,   Status:  Canceled        Comments: Should remain in effect until a complete SLP evaluation occurs and a superceding MD  order is received.    11/14/22 1705    11/14/22 1705  SLP Consult: Eval & Treat RN Dysphagia Screen Failed  Once         11/14/22 1705    11/14/22 1654  Inpatient Admission  Once,   Status:  Canceled         11/14/22 1654    11/14/22 1654  Cardiac Monitoring  Continuous,   Status:  Canceled        Comments: Follow Standing Orders As Outlined in Process Instructions (Open Order Report to View Full Instructions)    11/14/22 1654    11/14/22 1653  D-dimer, Quantitative  STAT,   Status:  Canceled         11/14/22 1653    11/14/22 1536  Check temperature  Once         11/14/22 1535    11/14/22 1534  T4, Free  STAT         11/14/22 1533    11/14/22 1533  Troponin  STAT         11/14/22 1533    11/14/22 1533  TSH  STAT         11/14/22 1533    11/14/22 1121  Lactic Acid, Plasma  Once         11/14/22 1120    11/14/22 1116  MRI Brain Without Contrast  1 Time Imaging         11/14/22 1115    11/14/22 1056  LORazepam (ATIVAN) injection 1 mg  Once         11/14/22 1055    11/14/22 1034  Urinalysis With Culture If Indicated - Urine, Catheter  STAT         11/14/22 1033    11/14/22 1034  Urine Drug Screen - Urine, Clean Catch  STAT         11/14/22 1033    11/14/22 1029  XR Chest 1 View  1 Time Imaging         11/14/22 1028    11/14/22 1028  lactated ringers bolus 1,000 mL  Once         11/14/22 1026    11/14/22 1028  Urinalysis With Culture If Indicated -  STAT,   Status:  Canceled         11/14/22 1028    11/14/22 1027  CT Head Without Contrast  1 Time Imaging         11/14/22 1027    11/14/22 1006  POC Glucose Once  PROCEDURE ONCE         11/14/22 0954    11/14/22 0950  CBC & Differential  STAT         11/14/22 0949    11/14/22 0950  Comprehensive Metabolic Panel  STAT         11/14/22 0949    11/14/22 0950  Magnesium  STAT         11/14/22 0949    11/14/22 0950  CBC Auto Differential  PROCEDURE ONCE         11/14/22 0949    11/14/22 0947  [place] Draw  Once         11/14/22 0946    11/14/22 0947  POC Glucose Once  Once          11/14/22 0946    11/14/22 0947  Green Top (Gel)  PROCEDURE ONCE         11/14/22 0946    11/14/22 0947  Lavender Top  PROCEDURE ONCE         11/14/22 0946    11/14/22 0947  Red Top  PROCEDURE ONCE         11/14/22 0946    11/14/22 0947  [place] Blood Culture Bottle Set  PROCEDURE ONCE         11/14/22 0946    11/14/22 0947  Gray Top  PROCEDURE ONCE         11/14/22 0946    11/14/22 0947  Light Blue Top  PROCEDURE ONCE         11/14/22 0946    11/14/22 0946  ECG 12 Lead Altered Mental Status  Once         11/14/22 0946    Unscheduled  Order CT Head Without Contrast for Neurological Decline  As Needed       11/14/22 1750    --  SCANNED - TELEMETRY           11/14/22 0000    --  metFORMIN (GLUCOPHAGE) 1000 MG tablet  2 Times Daily With Meals         11/14/22 1741    --  allopurinol (ZYLOPRIM) 300 MG tablet  Daily         11/14/22 1741    --  magnesium oxide (MAG-OX) 400 MG tablet  Daily         11/14/22 1741    --  meloxicam (MOBIC) 15 MG tablet  Daily         11/14/22 1741    --  atorvastatin (LIPITOR) 20 MG tablet  Daily         11/14/22 1741    --  aspirin 81 MG chewable tablet  Daily         11/14/22 1741    --  amLODIPine (NORVASC) 5 MG tablet  Daily         11/14/22 1741    --  potassium chloride (K-DUR,KLOR-CON) 20 MEQ CR tablet  Daily         11/14/22 1741    --  atorvastatin (LIPITOR) 40 MG tablet  Nightly         11/14/22 1741    --  lisinopril (PRINIVIL,ZESTRIL) 20 MG tablet  Daily         11/14/22 1743                 Consult Notes (last 48 hours)      Elie Lim MD at 11/15/22 0806      Consult Orders    1. Inpatient Neurology Consult Stroke [[id number]] ordered by Ember Aguilar APRN at 11/14/22 1750               Neurology Consult Note    Referring Provider: Dr. Abhishek Kohli  Reason for Consultation: AMS      History of present illness:      This a very joss 77-year-old female who presents with her daughter to our ER yesterday.  The indication for presentation is altered mentation.   Her daughter is an excellent historian although the patient is an excellent historian this morning as well.  Reportedly over the past 6 weeks the patient has episodes of a severe headache it is holocephalic in nature.  She has light and sound sensitivity with this.  Directly after experiencing this headache she often has an aphasia and occasional right-sided weakness.  This has resulted in falls as well.  She has been admitted to [facility] several times with no significant findings.  She is also been admitted for this exact thing at [place] twice.  She was seen by both cardiology and neurology.  They diagnosed her with complicated migraines.  Reportedly her magnesium was low several times although it is unclear that this is causing any of the problems.  Yesterday the patient was found aphasic again.  She was rocking in bed and had had urinary incontinence.  It is unclear that she has any passing out spells.  No generalized tonic-clonic seizure has been seen.  There is been no tongue biting.  The patient is concerned that occasionally she has altered mentation and potentially some cognitive deficits as well.  It is also worth mentioning that the patient's , who she is the caretaker for, is currently in the critical care unit as well for cardiac reasons.  The patient has been under increased stress because of this.    Past Medical History:   Diagnosis Date   • Abdominal wall seroma    • B12 deficiency    • Cerebral infarct (HCC)    • Diabetes mellitus (HCC)    • HLD (hyperlipidemia)    • Hypertension    • Hypokalemia    • Hypomagnesemia        No Known Allergies  No current facility-administered medications on file prior to encounter.     Current Outpatient Medications on File Prior to Encounter   Medication Sig   • allopurinol (ZYLOPRIM) 300 MG tablet Take 1 tablet by mouth Daily.   • amLODIPine (NORVASC) 5 MG tablet Take 1 tablet by mouth Daily.   • aspirin 81 MG chewable tablet Chew 1  tablet Daily.   • atorvastatin (LIPITOR) 20 MG tablet Take 1 tablet by mouth Daily.   • atorvastatin (LIPITOR) 40 MG tablet Take 1 tablet by mouth Every Night.   • lisinopril (PRINIVIL,ZESTRIL) 20 MG tablet Take 1 tablet by mouth Daily.   • magnesium oxide (MAG-OX) 400 MG tablet Take 1 tablet by mouth Daily.   • meloxicam (MOBIC) 15 MG tablet Take 1 tablet by mouth Daily.   • metFORMIN (GLUCOPHAGE) 1000 MG tablet Take 1 tablet by mouth 2 (Two) Times a Day With Meals.   • potassium chloride (K-DUR,KLOR-CON) 20 MEQ CR tablet Take 1 tablet by mouth Daily.       Social History     Socioeconomic History   • Marital status:    Tobacco Use   • Smoking status: Never   Substance and Sexual Activity   • Alcohol use: Never   • Drug use: Never     Family History   Problem Relation Age of Onset   • Cancer Mother    • Hypertension Mother    • Transient ischemic attack Father        Review of Systems  A 14-point review of systems was reviewed and was negative except for spells    Vital Signs   Temp:  [98.5 °F (36.9 °C)-99.6 °F (37.6 °C)] 99.3 °F (37.4 °C)  Heart Rate:  [] 84  Resp:  [15-22] 16  BP: (108-181)/(60-99) 120/74    General Exam:  Head:  Normocephalic, atraumatic  HEENT:  Neck supple  Fundoscopic Exam:  No signs of disc edema  CVS:  Regular rate and rhythm.  No murmurs  Carotid Examination:  No bruits  Lungs:  Clear to auscultation  Abdomen:  Nontender, Nondistended  Extremities:  No signs of peripheral edema  Skin:  Bruises on multiple locations on the body.    Neurologic Exam:    Mental Status:    -Awake, Alert, Oriented X 3  -No word finding difficulties  -No aphasia  -No dysarthria  -Follows simple and complex commands    CN II:  Visual fields full.  Pupils equally reactive to light  CN III, IV, VI:  Extraocular Muscles full with no signs of nystagmus  CN V:  Facial sensory is symmetric with no asymmetries.  CN VII:  Facial motor symmetric  CN VIII:  Gross hearing intact bilaterally  CN IX:  Palate  elevates symmetrically  CN X:  Palate elevates symmetrically  CN XI:  Shoulder shrug symmetric  CN XII:  Tongue is midline on protrusion    Motor: (strength out of 5:  1= minimal movement, 2 = movement in plane of gravity, 3 = movement against gravity, 4 = movement against some resistance, 5 = full strength)    -Right Upper Ext: Proximal: 5 Distal: 5  -Left Upper Ext: Proximal: 5 Distal: 5    -Right Lower Ext: Proximal: 5 Distal: 5  -Left Lower Ext: Proximal: 5 Distal: 5    No signs of increased tone.  No signs of cogwheeling.    DTR:  -Right   Biceps: 2+ Triceps: 2+ Brachioradialis: 2+   Patella: 2+ Ankle: 2+ Neg Babinski  -Left   Biceps: 2+ Triceps: 2+ Brachioradialis: 2+   Patella: 2+ Ankle: 2+ Neg Babinski    Sensory:  -Intact to light touch, pinprick, temperature, pain, and proprioception    Coordination:  -Finger to nose intact  -Heel to shin intact  -No ataxia    Gait  -No signs of ataxia  -ambulates unassisted      Results Review:  Lab Results (last 24 hours)     Procedure Component Value Units Date/Time    COVID-19, FLU A/B, RSV PCR - Swab, Nasopharynx [[id number]] Collected: 11/15/22 0643    Specimen: Swab from Nasopharynx Updated: 11/15/22 0730    Basic Metabolic Panel [[id number]]  (Abnormal) Collected: 11/15/22 0450    Specimen: Blood Updated: 11/15/22 0627     Glucose 138 mg/dL      BUN 14 mg/dL      Creatinine 0.68 mg/dL      Sodium 135 mmol/L      Potassium 3.4 mmol/L      Chloride 98 mmol/L      CO2 22.0 mmol/L      Calcium 9.5 mg/dL      BUN/Creatinine Ratio 20.6     Anion Gap 15.0 mmol/L      eGFR 91.0 mL/min/1.73      Comment: National Kidney Foundation and American Society of Nephrology (ASN) Task Force recommended calculation based on the Chronic Kidney Disease Epidemiology Collaboration (CKD-EPI) equation refit without adjustment for race.       Narrative:      GFR Normal >60  Chronic Kidney Disease <60  Kidney Failure <15    The GFR formula is only valid for adults with stable renal function  between ages 18 and 70.    CBC (No Diff) [[id number]]  (Abnormal) Collected: 11/15/22 0522    Specimen: Blood Updated: 11/15/22 0610     WBC 14.61 10*3/mm3      RBC 3.59 10*6/mm3      Hemoglobin 10.2 g/dL      Hematocrit 32.7 %      MCV 91.1 fL      MCH 28.4 pg      MCHC 31.2 g/dL      RDW 13.6 %      RDW-SD 45.2 fl      MPV 11.7 fL      Platelets 312 10*3/mm3     Lipid Panel [[id number]] Collected: 11/15/22 0450    Specimen: Blood Updated: 11/15/22 0610     Total Cholesterol 125 mg/dL      Triglycerides 113 mg/dL      HDL Cholesterol 57 mg/dL      LDL Cholesterol  48 mg/dL      VLDL Cholesterol 20 mg/dL      LDL/HDL Ratio 0.80    Narrative:      Cholesterol Reference Ranges  (U.S. Department of Health and Human Services ATP III Classifications)    Desirable          <200 mg/dL  Borderline High    200-239 mg/dL  High Risk          >240 mg/dL      Triglyceride Reference Ranges  (U.S. Department of Health and Human Services ATP III Classifications)    Normal           <150 mg/dL  Borderline High  150-199 mg/dL  High             200-499 mg/dL  Very High        >500 mg/dL    HDL Reference Ranges  (U.S. Department of Health and Human Services ATP III Classifications)    Low     <40 mg/dl (major risk factor for CHD)  High    >60 mg/dl ('negative' risk factor for CHD)        LDL Reference Ranges  (U.S. Department of Health and Human Services ATP III Classifications)    Optimal          <100 mg/dL  Near Optimal     100-129 mg/dL  Borderline High  130-159 mg/dL  High             160-189 mg/dL  Very High        >189 mg/dL    POC Glucose Once [[id number]]  (Normal) Collected: 11/15/22 0544    Specimen: Blood Updated: 11/15/22 0555     Glucose 123 mg/dL      Comment: : [id number] Antione GreenWizardeter ID: [id number]       POC Glucose Once [[id number]]  (Abnormal) Collected: 11/14/22 2326    Specimen: Blood Updated: 11/14/22 2338     Glucose 206 mg/dL      Comment: : [id number] BreatheAmericaeter ID: [id number]       Hemoglobin  A1c [[id number]]  (Abnormal) Collected: 11/14/22 1000    Specimen: Blood Updated: 11/14/22 2309     Hemoglobin A1C 6.10 %     Narrative:      Hemoglobin A1C Ranges:    Increased Risk for Diabetes  5.7% to 6.4%  Diabetes                     >= 6.5%  Diabetic Goal                < 7.0%    POC Glucose Once [[id number]]  (Abnormal) Collected: 11/14/22 2020    Specimen: Blood Updated: 11/14/22 2032     Glucose 175 mg/dL      Comment: : [id number] Antione EdyerMeter ID: [id number]       D-dimer, Quantitative [[id number]]  (Abnormal) Collected: 11/14/22 1917    Specimen: Blood Updated: 11/14/22 2000     D-Dimer, Quantitative 1.13 MCGFEU/mL     Narrative:      Reference Range is 0-0.50 MCGFEU/mL. However, results <0.50 MCGFEU/mL tends to rule out DVT or PE. Results >0.50 MCGFEU/mL are not useful in predicting absence or presence of DVT or PE.      Blood Culture - Blood, Arm, Right [[id number]] Collected: 11/14/22 1917    Specimen: Blood from Arm, Right Updated: 11/14/22 1954    RPR [[id number]] Collected: 11/14/22 1917    Specimen: Blood Updated: 11/14/22 1940    Blood Culture - Blood, Arm, Left [[id number]] Collected: 11/14/22 1000    Specimen: Blood from Arm, Left Updated: 11/14/22 1828    Vitamin B12 [[id number]] Collected: 11/14/22 1000    Specimen: Blood Updated: 11/14/22 1807    Folate [[id number]] Collected: 11/14/22 1000    Specimen: Blood Updated: 11/14/22 1807    T4, Free [[id number]]  (Normal) Collected: 11/14/22 1550    Specimen: Blood Updated: 11/14/22 1630     Free T4 1.38 ng/dL     Narrative:      Results may be falsely increased if patient taking Biotin.      TSH [[id number]]  (Normal) Collected: 11/14/22 1550    Specimen: Blood Updated: 11/14/22 1629     TSH 0.894 uIU/mL     Troponin [[id number]]  (Normal) Collected: 11/14/22 1550    Specimen: Blood Updated: 11/14/22 1622     Troponin T 0.011 ng/mL     Narrative:      Troponin T Reference Range:  <= 0.03 ng/mL-   Negative for AMI  >0.03 ng/mL-     Abnormal for  myocardial necrosis.  Clinicians would have to utilize clinical acumen, EKG, Troponin and serial changes to determine if it is an Acute Myocardial Infarction or myocardial injury due to an underlying chronic condition.       Results may be falsely decreased if patient taking Biotin.      [place] Draw [[id number]] Collected: 11/14/22 1000    Specimen: Blood Updated: 11/14/22 1415    Narrative:      The following orders were created for panel order [place] Draw.  Procedure                               Abnormality         Status                     ---------                               -----------         ------                     Green Top (Gel)[[id number]]                                  Final result               Lavender Top[[id number]]                                     Final result               Red Top[[id number]]                                          Final result               [place] Blood Culture Eleazar...[[id number]]                      Final result               [place] Top[[id number]]                                         Final result               Light Blue Top[[id number]]                                   Final result                 Please view results for these tests on the individual orders.    Gray Top [[id number]] Collected: 11/14/22 1000    Specimen: Blood Updated: 11/14/22 1415     Extra Tube Hold for add-ons.     Comment: Auto resulted.       Urine Drug Screen - Urine, Clean Catch [[id number]]  (Abnormal) Collected: 11/14/22 1123    Specimen: Urine, Clean Catch Updated: 11/14/22 1149     THC, Screen, Urine Negative     Phencyclidine (PCP), Urine Negative     Cocaine Screen, Urine Negative     Methamphetamine, Ur Negative     Opiate Screen Positive     Amphetamine Screen, Urine Negative     Benzodiazepine Screen, Urine Negative     Tricyclic Antidepressants Screen Negative     Methadone Screen, Urine Negative     Barbiturates Screen, Urine Negative     Oxycodone Screen, Urine Negative      Propoxyphene Screen Negative     Buprenorphine, Screen, Urine Negative    Narrative:      Cutoff For Drugs Screened:    Amphetamines               500 ng/ml  Barbiturates               200 ng/ml  Benzodiazepines            150 ng/ml  Cocaine                    150 ng/ml  Methadone                  200 ng/ml  Opiates                    100 ng/ml  Phencyclidine               25 ng/ml  THC                            50 ng/ml  Methamphetamine            500 ng/ml  Tricyclic Antidepressants  300 ng/ml  Oxycodone                  100 ng/ml  Propoxyphene               300 ng/ml  Buprenorphine               10 ng/ml    The normal value for all drugs tested is negative. This report includes unconfirmed screening results, with the cutoff values listed, to be used for medical treatment purposes only.  Unconfirmed results must not be used for non-medical purposes such as employment or legal testing.  Clinical consideration should be applied to any drug of abuse test, particularly when unconfirmed results are used.      Urinalysis With Culture If Indicated - Urine, Catheter [[id number]]  (Abnormal) Collected: 11/14/22 1122    Specimen: Urine, Catheter Updated: 11/14/22 1143     Color, UA Yellow     Appearance, UA Clear     pH, UA 7.0     Specific Gravity, UA 1.011     Glucose, UA Negative     Ketones, UA Trace     Bilirubin, UA Negative     Blood, UA Negative     Protein, UA Trace     Leuk Esterase, UA Negative     Nitrite, UA Negative     Urobilinogen, UA 0.2 E.U./dL    Narrative:      In absence of clinical symptoms, the presence of pyuria, bacteria, and/or nitrites on the urinalysis result does not correlate with infection.  Urine microscopic not indicated.    Lactic Acid, Plasma [[id number]]  (Normal) Collected: 11/14/22 1000    Specimen: Blood Updated: 11/14/22 1132     Lactate 1.6 mmol/L     [place] Blood Culture Bottle Set [[id number]] Collected: 11/14/22 1000    Specimen: Blood from Arm, Left Updated: 11/14/22 1101      Extra Tube Hold for add-ons.     Comment: Auto resulted.       Red Top [[id number]] Collected: 11/14/22 1000    Specimen: Blood Updated: 11/14/22 1101     Extra Tube Hold for add-ons.     Comment: Auto resulted.       Green Top (Gel) [[id number]] Collected: 11/14/22 1000    Specimen: Blood Updated: 11/14/22 1101     Extra Tube Hold for add-ons.     Comment: Auto resulted.       Lavender Top [[id number]] Collected: 11/14/22 1000    Specimen: Blood Updated: 11/14/22 1101     Extra Tube hold for add-on     Comment: Auto resulted       Light Blue Top [[id number]] Collected: 11/14/22 1000    Specimen: Blood Updated: 11/14/22 1101     Extra Tube Hold for add-ons.     Comment: Auto resulted       Comprehensive Metabolic Panel [[id number]]  (Abnormal) Collected: 11/14/22 1000    Specimen: Blood Updated: 11/14/22 1028     Glucose 169 mg/dL      BUN 11 mg/dL      Creatinine 0.64 mg/dL      Sodium 137 mmol/L      Potassium 4.6 mmol/L      Chloride 101 mmol/L      CO2 23.0 mmol/L      Calcium 10.4 mg/dL      Total Protein 6.7 g/dL      Albumin 4.20 g/dL      ALT (SGPT) 11 U/L      AST (SGOT) 11 U/L      Alkaline Phosphatase 92 U/L      Total Bilirubin 0.3 mg/dL      Globulin 2.5 gm/dL      A/G Ratio 1.7 g/dL      BUN/Creatinine Ratio 17.2     Anion Gap 13.0 mmol/L      eGFR 92.3 mL/min/1.73      Comment: National Kidney Foundation and American Society of Nephrology (ASN) Task Force recommended calculation based on the Chronic Kidney Disease Epidemiology Collaboration (CKD-EPI) equation refit without adjustment for race.       Narrative:      GFR Normal >60  Chronic Kidney Disease <60  Kidney Failure <15    The GFR formula is only valid for adults with stable renal function between ages 18 and 70.    Magnesium [[id number]]  (Normal) Collected: 11/14/22 1000    Specimen: Blood Updated: 11/14/22 1022     Magnesium 1.6 mg/dL     CBC & Differential [[id number]]  (Abnormal) Collected: 11/14/22 1000    Specimen: Blood Updated: 11/14/22 1014     Narrative:      The following orders were created for panel order CBC & Differential.  Procedure                               Abnormality         Status                     ---------                               -----------         ------                     CBC Auto Differential[[id number]]        Abnormal            Final result                 Please view results for these tests on the individual orders.    CBC Auto Differential [[id number]]  (Abnormal) Collected: 11/14/22 1000    Specimen: Blood Updated: 11/14/22 1014     WBC 8.96 10*3/mm3      RBC 3.82 10*6/mm3      Hemoglobin 10.9 g/dL      Hematocrit 35.4 %      MCV 92.7 fL      MCH 28.5 pg      MCHC 30.8 g/dL      RDW 13.6 %      RDW-SD 46.0 fl      MPV 11.2 fL      Platelets 331 10*3/mm3      Neutrophil % 66.2 %      Lymphocyte % 19.2 %      Monocyte % 12.8 %      Eosinophil % 0.4 %      Basophil % 0.2 %      Immature Grans % 1.2 %      Neutrophils, Absolute 5.92 10*3/mm3      Lymphocytes, Absolute 1.72 10*3/mm3      Monocytes, Absolute 1.15 10*3/mm3      Eosinophils, Absolute 0.04 10*3/mm3      Basophils, Absolute 0.02 10*3/mm3      Immature Grans, Absolute 0.11 10*3/mm3      nRBC 0.0 /100 WBC     POC Glucose Once [[id number]]  (Abnormal) Collected: 11/14/22 0954    Specimen: Blood Updated: 11/14/22 1005     Glucose 147 mg/dL      Comment: : [id number] Messina ID: [id number]             .  Imaging Results (Last 24 Hours)     Procedure Component Value Units Date/Time    MRI Brain Without Contrast [[id number]] Collected: 11/14/22 1507     Updated: 11/14/22 1514    Narrative:      EXAMINATION:  MRI BRAIN WO CONTRAST-  11/14/2022 2:43 PM CST     HISTORY: Altered mental status. Evaluate for stroke.     TECHNIQUE: Multiplanar imaging was performed in a high field magnet.     COMPARISON: No comparison study.     FINDINGS: There is a tiny acute infarct in the right cerebellar  hemisphere. This is in on the diffusion-weighted images. There  is  restricted diffusion on the ADC map images. There is T2 high signal  within the hemispheric white matter. 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 There is minimal atrophy. There is minimal mucosal thickening in  the ethmoid sinus region.       Impression:      1. Tiny acute infarct in the right cerebellar hemisphere.  2. T2 high signal in the hemispheric white matter is nonspecific and  likely due to chronic small vessel disease.  3. Minimal atrophy.  4. 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     The full report of this exam was immediately signed and available to the  emergency room. The patient is currently in the emergency room.        This report was finalized on 11/14/2022 15:10 by Dr. Sebastian Velazquez MD.    CT Head Without Contrast [[id number]] Collected: 11/14/22 1114     Updated: 11/14/22 1119    Narrative:      CT HEAD WO CONTRAST- 11/14/2022 10:48 AM CST     HISTORY: ams     COMPARISON: None      DLP: 654 mGy cm. All CT scans are performed using dose optimization  techniques as appropriate to the performed exam and including at least  one of the following: Automated exposure control, adjustment of the mA  and/or kV according to size, and the use of the iterative reconstruction  technique.     TECHNIQUE: Serial axial tomographic images of the brain were obtained  without the use of intravenous contrast.      FINDINGS:   The midline structures are nondisplaced. There is mild cerebral and  cerebellar atrophy, with an associated increase in the prominence of the  ventricles and sulci. The basilar cisterns are normal in size and  configuration. There is no evidence of intracranial hemorrhage or  mass-effect. There is low attenuation in the periventricular white  matter, consistent with chronic ischemic change. There are no abnormal  extra-axial fluid collections. There is no evidence of tonsillar  herniation.      The included orbits and their contents are unremarkable. The visualized  paranasal sinuses, mastoid air cells  and middle ear cavities are clear.  The visualized osseous structures and overlying soft tissues of the  skull and face are intact.        Impression:         1. Mild cerebral and cerebellar atrophy with chronic microvascular  disease but no evidence of acute intracranial process.        This report was finalized on 11/14/2022 11:16 by Dr. Dominic Valdes MD.    XR Chest 1 View [[id number]] Collected: 11/14/22 1058     Updated: 11/14/22 1102    Narrative:      XR CHEST 1 VW- 11/14/2022 10:42 AM CST     HISTORY: AMS     COMPARISON: None.     FINDINGS:      No lung consolidation. No pleural effusion or pneumothorax. The  cardiomediastinal silhouette and pulmonary vascularity are within normal  limits. The osseous structures and surrounding soft tissues demonstrate  no acute abnormality.       Impression:      1. No radiographic evidence of acute cardiopulmonary process.  This report was finalized on 11/14/2022 10:58 by Dr Dylan Copeland, .          MRI brain reviewed by me.  There is an incidental finding in the right cerebellum.  There is a punctate focal lesion of diffusion restriction.    White count elevated overnight being 8.9 yesterday up to 14.6 today.  Metabolic panel shows some mild electrolyte abnormalities.    COVID and flu swabs are pending    RPR is pending  TSH is normal at 0.8  Hemoglobin A1c is mildly elevated at 6.1  B12 is pending  Folate pending      Impression    • Spells  o The differential diagnosis for the spells are as follows: I believe the most likely etiology would be complicated migraines given the fact the patient has associated headaches with the spells.  As the headache resolves the patient often has symptoms that last less than 24 hours.  Another consideration would be an epileptic etiology.  I do not believe this represents a TIA or stroke.  A final consideration would be a progressive neurologic disorder such as Lewy body dementia which can initially be spells of altered mentation and  cognitive decline.  Currently I find no evidence of cogwheeling to suggest an underlying neuro progressive disorder.  • Sinus tachycardia  • Increased stress from 's illness  • Incidental finding in the cerebellum that is not a clinically relevant stroke.  That in no way could explain symptomatology given by the patient.  I think is reasonable to perform cardiac telemetry, perform a cardiac echo, and vessel imaging.  We can also increase the aspirin and maximize her statin as well.  She has no physical exam deficits consistent with a cerebellar stroke.  It is a tiny foci of diffusion restriction and therefore is likely not causing any clinical symptoms.    Plan    • Cardiac telemetry  • Cardiac echo  • CT angiography of head and neck  • EEG  • Minimize mind-altering medications such as opiates  • Cleared from neurology standpoint to be discharged home immediately following testing today.  I do not believe that she has any emergent neurologic features.  If the patient does have an abnormal EEG we may consider antiepileptics.  If she does not then I am recommending either verapamil sustained-release 120 mg p.o. nightly.  If internal medicine needs to use a beta-blocker we can use that instead as this would have some efficacy against complicated migraines.  Will need follow-up in the neurology clinic to watch for any underlying progressive neurologic disorder such as Lewy body dementia  o In an effort to minimize hospitalization, I have discontinued unnecessary tests such as the carotid ultrasound and MRI of her shoulder.    I discussed the patient's findings and my recommendations with patient and family.  Discussed with patient's daughter in the room.    Elie Lim MD  11/15/22  08:06 CST        Electronically signed by Elie Lim MD at 11/15/22 3599

## 2022-11-15 NOTE — THERAPY DISCHARGE NOTE
Acute Care - Occupational Therapy Discharge  Psychiatric    Patient Name: Concepcion Velez  : 1945    MRN: [id number]                              Today's Date: 11/15/2022       Admit Date: 2022    Visit Dx:     ICD-10-CM ICD-9-CM   1. Tachycardia  R00.0 785.0   2. Dysphagia, unspecified type  R13.10 787.20   3. Cerebellar infarct (HCC)  I63.9 434.91     Patient Active Problem List   Diagnosis   • Tachycardia   • Cerebellar infarct (HCC)   • Uncontrolled hypertension   • Toxic metabolic encephalopathy   • Type 2 diabetes mellitus with hyperglycemia, without long-term current use of insulin (HCC)     Past Medical History:   Diagnosis Date   • Abdominal wall seroma    • B12 deficiency    • Cerebral infarct (HCC)    • Diabetes mellitus (HCC)    • HLD (hyperlipidemia)    • Hypertension    • Hypokalemia    • Hypomagnesemia      Past Surgical History:   Procedure Laterality Date   • CHOLECYSTECTOMY     • HERNIA REPAIR     • HYSTERECTOMY        General Information     Row Name 11/15/22 1025          OT Time and Intention    Document Type evaluation  -JJ     Mode of Treatment occupational therapy  -JJ     Row Name 11/15/22 1025          General Information    Patient Profile Reviewed yes  -JJ     Prior Level of Function independent:;all household mobility;transfer;ADL's;bed mobility  -JJ     Existing Precautions/Restrictions fall  -JJ     Barriers to Rehab medically complex  -JJ     Row Name 11/15/22 1025          Living Environment    People in Home spouse  -JJ     Row Name 11/15/22 1025          Home Main Entrance    Number of Stairs, Main Entrance five  -JJ     Row Name 11/15/22 1025          Stairs Within Home, Primary    Number of Stairs, Within Home, Primary none  -JJ     Row Name 11/15/22 1025          Cognition    Orientation Status (Cognition) oriented x 4  -JJ     Row Name 11/15/22 1025          Safety Issues, Functional Mobility    Safety Issues Affecting Function (Mobility) awareness of need for  assistance;insight into deficits/self-awareness;impulsivity;safety precautions follow-through/compliance;safety precaution awareness;problem-solving  -     Impairments Affecting Function (Mobility) cognition  -     Cognitive Impairments, Mobility Safety/Performance awareness, need for assistance;impulsivity;insight into deficits/self-awareness;judgment;problem-solving/reasoning;safety precaution awareness;safety precaution follow-through  -           User Key  (r) = Recorded By, (t) = Taken By, (c) = Cosigned By    Initials Name Provider Type    Sharonda Garg, EVA/L, CSRS Occupational Therapist               Mobility/ADL's     Row Name 11/15/22 1025          Bed Mobility    Bed Mobility supine-sit  -     Supine-Sit [place] (Bed Mobility) modified independence  -     Row Name 11/15/22 1025          Transfers    Transfers sit-stand transfer;stand-sit transfer;toilet transfer  -     Row Name 11/15/22 1025          Sit-Stand Transfer    Sit-Stand [place] (Transfers) supervision  -     Row Name 11/15/22 1025          Stand-Sit Transfer    Stand-Sit [place] (Transfers) supervision  -     Row Name 11/15/22 1025          Toilet Transfer    Type (Toilet Transfer) sit-stand;stand-sit  -     [place] Level (Toilet Transfer) supervision  -     Assistive Device (Toilet Transfer) commode  -     Comment, (Toilet Transfer) When attempt to sit on commode pt bypassed commode and was attempting to it on pull out rail in ICU room. Required redirection on where to sit for toileting.  -     Row Name 11/15/22 1025          Functional Mobility    Functional Mobility- Ind. Level supervision required  -     Row Name 11/15/22 1025          Activities of Daily Living    BADL Assessment/Intervention lower body dressing;toileting  -     Row Name 11/15/22 1025          Lower Body Dressing Assessment/Training    [place] Level (Lower Body Dressing) don;socks;independent  -     Row Name  11/15/22 1025          Toileting Assessment/Training    [place] Level (Toileting) adjust/manage clothing;perform perineal hygiene  -JJ     Assistive Devices (Toileting) commode;grab bar/safety frame  -JJ     Position (Toileting) unsupported standing;unsupported sitting  -JJ     Comment, (Toileting) Pt required vcs for termination of task with perineal hygiene.  -J           User Key  (r) = Recorded By, (t) = Taken By, (c) = Cosigned By    Initials Name Provider Type    Sharonda Garg, OTR/L, CSRS Occupational Therapist               Obj/Interventions     Row Name 11/15/22 1025          Sensory Assessment (Somatosensory)    Sensory Assessment (Somatosensory) UE sensation intact  -     Row Name 11/15/22 1025          Vision Assessment/Intervention    Visual Impairment/Limitations WFL  -     Row Name 11/15/22 1025          Range of Motion Comprehensive    General Range of Motion bilateral upper extremity ROM WFL  -     Row Name 11/15/22 1025          Strength Comprehensive (MMT)    Comment, General Manual Muscle Testing (MMT) Assessment B UE strength 5/5  -JJ     Row Name 11/15/22 1025          Motor Skills    Motor Skills coordination  -JJ     Coordination WFL;bilateral;upper extremity  -JJ     Row Name 11/15/22 1025          Balance    Balance Assessment sitting static balance;sitting dynamic balance;standing dynamic balance;standing static balance  -JJ     Static Sitting Balance independent  -JJ     Dynamic Sitting Balance independent  -JJ     Position, Sitting Balance unsupported  -JJ     Static Standing Balance standby assist  -JJ     Dynamic Standing Balance standby assist  -JJ     Position/Device Used, Standing Balance unsupported  -JJ           User Key  (r) = Recorded By, (t) = Taken By, (c) = Cosigned By    Initials Name Provider Type    Sharonda Garg, DIOR/L, CSRS Occupational Therapist               Goals/Plan    No documentation.                Clinical Impression     Row Name  11/15/22 1026          Pain Assessment    Pretreatment Pain Rating 0/10 - no pain  -J     Posttreatment Pain Rating 0/10 - no pain  -     Pain Intervention(s) Medication (See MAR);Repositioned;Ambulation/increased activity  -ROWAN     Row Name 11/15/22 1026          Plan of Care Review    Plan of Care Reviewed With patient;family  -     Outcome Evaluation OT eval completed. Pt presents alert and oriented x4, resting comfortably in bed. Pt is able to accurately answer all orientation questions, but does demonstrate conversation confusion throughout session. She often requires vcs for safety awareness, insight into deficits and problem solving. During formal testing, B UE strength is 5/5, FMC/GMC B intact, sensation intact. She completed bed mobility with Mod I, Sup for all sit <> stand t/f and functional mobility. No overt LOB or sway noted during mobility. During toileting when attempt to sit on commode pt bypassed commode and was attempting to sit on pull out rail in ICU room. Required redirection on where to sit for toileting. She also required vcs for termination of perineal hygiene. Therapist expressed concerns about cognitive deficits to MD and family, and would benefit from SLP services upon d/c from facility. She would benefit from skilled OT services but per MD pt is to d/c same day as eval completed. Anticipate d/c home with assist and HH services.  -     Row Name 11/15/22 1026          Therapy Assessment/Plan (OT)    Criteria for Skilled Therapeutic Interventions Met (OT) no;no problems identified which require skilled intervention  Per MD pt to d/c same day as eval  -     Therapy Frequency (OT) evaluation only  -     Row Name 11/15/22 1026          Therapy Plan Review/Discharge Plan (OT)    Anticipated Discharge Disposition (OT) home with assist;home with home health;home with outpatient therapy services  SLP services for cognition  -     Row Name 11/15/22 1026          Positioning and  Restraints    Pre-Treatment Position in bed  -JJ     Post Treatment Position bed  -JJ     In Bed notified nsg;fowlers;call light within reach;encouraged to call for assist;side rails up x3;with family/caregiver;patient within staff view  -JJ           User Key  (r) = Recorded By, (t) = Taken By, (c) = Cosigned By    Initials Name Provider Type    J Sharonda Anderson, OTR/L, CSRS Occupational Therapist               Outcome Measures     Row Name 11/15/22 1025          How much help from another is currently needed...    Putting on and taking off regular lower body clothing? 4  -JJ     Bathing (including washing, rinsing, and drying) 4  -JJ     Toileting (which includes using toilet bed pan or urinal) 4  -JJ     Putting on and taking off regular upper body clothing 4  -JJ     Taking care of personal grooming (such as brushing teeth) 4  -JJ     Eating meals 4  -JJ     AM-PAC 6 Clicks Score (OT) 24  -JJ     Row Name 11/15/22 1022 11/15/22 0757       How much help from another person do you currently need...    Turning from your back to your side while in flat bed without using bedrails? 4 (P)   -MF 1  -AW    Moving from lying on back to sitting on the side of a flat bed without bedrails? 3 (P)   -MF 1  -AW    Moving to and from a bed to a chair (including a wheelchair)? 3 (P)   -MF 1  -AW    Standing up from a chair using your arms (e.g., wheelchair, bedside chair)? 3 (P)   -MF 1  -AW    Climbing 3-5 steps with a railing? 3 (P)   -MF 1  -AW    To walk in hospital room? 3 (P)   -MF 1  -AW    AM-PAC 6 Clicks Score (PT) 19 (P)   -MF 6  -AW    Highest level of mobility 6 --> Walked 10 steps or more (P)   -MF 2 --> Bed activities/dependent transfer  -AW    Row Name 11/15/22 1025 11/15/22 1022       Modified [place] Scale    Pre-Stroke Modified [place] Scale 0 - No Symptoms at all.  -JJ 0 - No Symptoms at all. (P)   -MF    Modified [place] Scale 1 - No significant disability despite symptoms.  Able to carry out all usual  duties and activities.  - 2 - Slight disability.  Unable to carry out all previous activities but able to look after own affairs without assistance. (P)   -    Row Name 11/15/22 1025 11/15/22 1022       Functional Assessment    Outcome Measure Options AM-PAC 6 Clicks Daily Activity (OT);Modified [place]  -JJ AM-PAC 6 Clicks Basic Mobility (PT);Modified [place] (P)   -MF          User Key  (r) = Recorded By, (t) = Taken By, (c) = Cosigned By    Initials Name Provider Type    Sydney Gillis, RN Registered Nurse    Sharonda Garg, OTR/L, CSRS Occupational Therapist    MF Usha Lopez, PT Student PT Student              Occupational Therapy Education     Title: PT OT SLP Therapies (In Progress)     Topic: Occupational Therapy (In Progress)     Point: ADL training (Done)     Description:   Instruct learner(s) on proper safety adaptation and remediation techniques during self care or transfers.   Instruct in proper use of assistive devices.              Learning Progress Summary           Patient Acceptance, E, VU by  at 11/15/2022 1124                   Point: Home exercise program (Not Started)     Description:   Instruct learner(s) on appropriate technique for monitoring, assisting and/or progressing therapeutic exercises/activities.              Learner Progress:  Not documented in this visit.          Point: Precautions (Done)     Description:   Instruct learner(s) on prescribed precautions during self-care and functional transfers.              Learning Progress Summary           Patient Acceptance, E, VU by ROWAN at 11/15/2022 1124                   Point: Body mechanics (Not Started)     Description:   Instruct learner(s) on proper positioning and spine alignment during self-care, functional mobility activities and/or exercises.              Learner Progress:  Not documented in this visit.                      User Key     Initials Effective Dates Name Provider Type Discipline     11/10/21  -  Sharonda Anderson, OTR/L, CSRS Occupational Therapist OT              OT Recommendation and Plan  Therapy Frequency (OT): evaluation only  Plan of Care Review  Plan of Care Reviewed With: patient, family  Outcome Evaluation: OT eval completed. Pt presents alert and oriented x4, resting comfortably in bed. Pt is able to accurately answer all orientation questions, but does demonstrate conversation confusion throughout session. She often requires vcs for safety awareness, insight into deficits and problem solving. During formal testing, B UE strength is 5/5, FMC/GMC B intact, sensation intact. She completed bed mobility with Mod I, Sup for all sit <> stand t/f and functional mobility. No overt LOB or sway noted during mobility. During toileting when attempt to sit on commode pt bypassed commode and was attempting to sit on pull out rail in ICU room. Required redirection on where to sit for toileting. She also required vcs for termination of perineal hygiene. Therapist expressed concerns about cognitive deficits to MD and family, and would benefit from SLP services upon d/c from facility. She would benefit from skilled OT services but per MD pt is to d/c same day as eval completed. Anticipate d/c home with assist and HH services.  Plan of Care Reviewed With: patient, family  Outcome Evaluation: OT eval completed. Pt presents alert and oriented x4, resting comfortably in bed. Pt is able to accurately answer all orientation questions, but does demonstrate conversation confusion throughout session. She often requires vcs for safety awareness, insight into deficits and problem solving. During formal testing, B UE strength is 5/5, FMC/GMC B intact, sensation intact. She completed bed mobility with Mod I, Sup for all sit <> stand t/f and functional mobility. No overt LOB or sway noted during mobility. During toileting when attempt to sit on commode pt bypassed commode and was attempting to sit on pull out rail in ICU  room. Required redirection on where to sit for toileting. She also required vcs for termination of perineal hygiene. Therapist expressed concerns about cognitive deficits to MD and family, and would benefit from SLP services upon d/c from facility. She would benefit from skilled OT services but per MD pt is to d/c same day as eval completed. Anticipate d/c home with assist and HH services.     Time Calculation:    Time Calculation- OT     Row Name 11/15/22 1025             Time Calculation- OT    OT Start Time 1025  add attempted eval this am 10 minutes  -JJ      OT Stop Time 1109  -J      OT Time Calculation (min) 44 min  -      OT Received On 11/15/22  -            User Key  (r) = Recorded By, (t) = Taken By, (c) = Cosigned By    Initials Name Provider Type    Sharonda Garg, EVA/L, JOE Occupational Therapist              Therapy Charges for Today     Code Description Service Date Service Provider Modifiers Qty    [id number] HC OT EVAL LOW COMPLEXITY 4 11/15/2022 Sharonda Anderson OTR/L, JOE GO 1             OT Discharge Summary  Anticipated Discharge Disposition (OT): home with assist, home with home health, home with outpatient therapy services (SLP services for cognition)  Reason for Discharge: Discharge from facility  Outcomes Achieved: Discharge from facility occurred on same date as evluation  Discharge Destination: Home with assist, Home with home health, Home with outpatient services    CASI Ovalle, JOE  11/15/2022

## 2022-11-15 NOTE — PLAN OF CARE
Goal Outcome Evaluation:  Plan of Care Reviewed With: patient, family           Outcome Evaluation: OT eval completed. Pt presents alert and oriented x4, resting comfortably in bed. Pt is able to accurately answer all orientation questions, but does demonstrate conversation confusion throughout session. She often requires vcs for safety awareness, insight into deficits and problem solving. During formal testing, B UE strength is 5/5, FMC/GMC B intact, sensation intact. She completed bed mobility with Mod I, Sup for all sit <> stand t/f and functional mobility. No overt LOB or sway noted during mobility. During toileting when attempt to sit on commode pt bypassed commode and was attempting to sit on pull out rail in ICU room. Required redirection on where to sit for toileting. She also required vcs for termination of perineal hygiene. Therapist expressed concerns about cognitive deficits to MD and family, and would benefit from SLP services upon d/c from facility. She would benefit from skilled OT services but per MD pt is to d/c same day as eval completed. Anticipate d/c home with assist and HH services.

## 2022-11-15 NOTE — CONSULTS
Neurology Consult Note    Referring Provider: Dr. Abhishek Kohli  Reason for Consultation: AMS      History of present illness:      This a very joss 77-year-old female who presents with her daughter to our ER yesterday.  The indication for presentation is altered mentation.  Her daughter is an excellent historian although the patient is an excellent historian this morning as well.  Reportedly over the past 6 weeks the patient has episodes of a severe headache it is holocephalic in nature.  She has light and sound sensitivity with this.  Directly after experiencing this headache she often has an aphasia and occasional right-sided weakness.  This has resulted in falls as well.  She has been admitted to [facility] several times with no significant findings.  She is also been admitted for this exact thing at [place] twice.  She was seen by both cardiology and neurology.  They diagnosed her with complicated migraines.  Reportedly her magnesium was low several times although it is unclear that this is causing any of the problems.  Yesterday the patient was found aphasic again.  She was rocking in bed and had had urinary incontinence.  It is unclear that she has any passing out spells.  No generalized tonic-clonic seizure has been seen.  There is been no tongue biting.  The patient is concerned that occasionally she has altered mentation and potentially some cognitive deficits as well.  It is also worth mentioning that the patient's , who she is the caretaker for, is currently in the critical care unit as well for cardiac reasons.  The patient has been under increased stress because of this.    Past Medical History:   Diagnosis Date   • Abdominal wall seroma    • B12 deficiency    • Cerebral infarct (HCC)    • Diabetes mellitus (HCC)    • HLD (hyperlipidemia)    • Hypertension    • Hypokalemia    • Hypomagnesemia        No Known Allergies  No current facility-administered medications on file  prior to encounter.     Current Outpatient Medications on File Prior to Encounter   Medication Sig   • allopurinol (ZYLOPRIM) 300 MG tablet Take 1 tablet by mouth Daily.   • amLODIPine (NORVASC) 5 MG tablet Take 1 tablet by mouth Daily.   • aspirin 81 MG chewable tablet Chew 1 tablet Daily.   • atorvastatin (LIPITOR) 20 MG tablet Take 1 tablet by mouth Daily.   • atorvastatin (LIPITOR) 40 MG tablet Take 1 tablet by mouth Every Night.   • lisinopril (PRINIVIL,ZESTRIL) 20 MG tablet Take 1 tablet by mouth Daily.   • magnesium oxide (MAG-OX) 400 MG tablet Take 1 tablet by mouth Daily.   • meloxicam (MOBIC) 15 MG tablet Take 1 tablet by mouth Daily.   • metFORMIN (GLUCOPHAGE) 1000 MG tablet Take 1 tablet by mouth 2 (Two) Times a Day With Meals.   • potassium chloride (K-DUR,KLOR-CON) 20 MEQ CR tablet Take 1 tablet by mouth Daily.       Social History     Socioeconomic History   • Marital status:    Tobacco Use   • Smoking status: Never   Substance and Sexual Activity   • Alcohol use: Never   • Drug use: Never     Family History   Problem Relation Age of Onset   • Cancer Mother    • Hypertension Mother    • Transient ischemic attack Father        Review of Systems  A 14-point review of systems was reviewed and was negative except for spells    Vital Signs   Temp:  [98.5 °F (36.9 °C)-99.6 °F (37.6 °C)] 99.3 °F (37.4 °C)  Heart Rate:  [] 84  Resp:  [15-22] 16  BP: (108-181)/(60-99) 120/74    General Exam:  Head:  Normocephalic, atraumatic  HEENT:  Neck supple  Fundoscopic Exam:  No signs of disc edema  CVS:  Regular rate and rhythm.  No murmurs  Carotid Examination:  No bruits  Lungs:  Clear to auscultation  Abdomen:  Nontender, Nondistended  Extremities:  No signs of peripheral edema  Skin:  Bruises on multiple locations on the body.    Neurologic Exam:    Mental Status:    -Awake, Alert, Oriented X 3  -No word finding difficulties  -No aphasia  -No dysarthria  -Follows simple and complex commands    CN II:   Visual fields full.  Pupils equally reactive to light  CN III, IV, VI:  Extraocular Muscles full with no signs of nystagmus  CN V:  Facial sensory is symmetric with no asymmetries.  CN VII:  Facial motor symmetric  CN VIII:  Gross hearing intact bilaterally  CN IX:  Palate elevates symmetrically  CN X:  Palate elevates symmetrically  CN XI:  Shoulder shrug symmetric  CN XII:  Tongue is midline on protrusion    Motor: (strength out of 5:  1= minimal movement, 2 = movement in plane of gravity, 3 = movement against gravity, 4 = movement against some resistance, 5 = full strength)    -Right Upper Ext: Proximal: 5 Distal: 5  -Left Upper Ext: Proximal: 5 Distal: 5    -Right Lower Ext: Proximal: 5 Distal: 5  -Left Lower Ext: Proximal: 5 Distal: 5    No signs of increased tone.  No signs of cogwheeling.    DTR:  -Right   Biceps: 2+ Triceps: 2+ Brachioradialis: 2+   Patella: 2+ Ankle: 2+ Neg Babinski  -Left   Biceps: 2+ Triceps: 2+ Brachioradialis: 2+   Patella: 2+ Ankle: 2+ Neg Babinski    Sensory:  -Intact to light touch, pinprick, temperature, pain, and proprioception    Coordination:  -Finger to nose intact  -Heel to shin intact  -No ataxia    Gait  -No signs of ataxia  -ambulates unassisted      Results Review:  Lab Results (last 24 hours)     Procedure Component Value Units Date/Time    COVID-19, FLU A/B, RSV PCR - Swab, Nasopharynx [[id number]] Collected: 11/15/22 0643    Specimen: Swab from Nasopharynx Updated: 11/15/22 0730    Basic Metabolic Panel [[id number]]  (Abnormal) Collected: 11/15/22 0450    Specimen: Blood Updated: 11/15/22 0627     Glucose 138 mg/dL      BUN 14 mg/dL      Creatinine 0.68 mg/dL      Sodium 135 mmol/L      Potassium 3.4 mmol/L      Chloride 98 mmol/L      CO2 22.0 mmol/L      Calcium 9.5 mg/dL      BUN/Creatinine Ratio 20.6     Anion Gap 15.0 mmol/L      eGFR 91.0 mL/min/1.73      Comment: National Kidney Foundation and American Society of Nephrology (ASN) Task Force recommended calculation  based on the Chronic Kidney Disease Epidemiology Collaboration (CKD-EPI) equation refit without adjustment for race.       Narrative:      GFR Normal >60  Chronic Kidney Disease <60  Kidney Failure <15    The GFR formula is only valid for adults with stable renal function between ages 18 and 70.    CBC (No Diff) [[id number]]  (Abnormal) Collected: 11/15/22 0522    Specimen: Blood Updated: 11/15/22 0610     WBC 14.61 10*3/mm3      RBC 3.59 10*6/mm3      Hemoglobin 10.2 g/dL      Hematocrit 32.7 %      MCV 91.1 fL      MCH 28.4 pg      MCHC 31.2 g/dL      RDW 13.6 %      RDW-SD 45.2 fl      MPV 11.7 fL      Platelets 312 10*3/mm3     Lipid Panel [[id number]] Collected: 11/15/22 0450    Specimen: Blood Updated: 11/15/22 0610     Total Cholesterol 125 mg/dL      Triglycerides 113 mg/dL      HDL Cholesterol 57 mg/dL      LDL Cholesterol  48 mg/dL      VLDL Cholesterol 20 mg/dL      LDL/HDL Ratio 0.80    Narrative:      Cholesterol Reference Ranges  (U.S. Department of Health and Human Services ATP III Classifications)    Desirable          <200 mg/dL  Borderline High    200-239 mg/dL  High Risk          >240 mg/dL      Triglyceride Reference Ranges  (U.S. Department of Health and Human Services ATP III Classifications)    Normal           <150 mg/dL  Borderline High  150-199 mg/dL  High             200-499 mg/dL  Very High        >500 mg/dL    HDL Reference Ranges  (U.S. Department of Health and Human Services ATP III Classifications)    Low     <40 mg/dl (major risk factor for CHD)  High    >60 mg/dl ('negative' risk factor for CHD)        LDL Reference Ranges  (U.S. Department of Health and Human Services ATP III Classifications)    Optimal          <100 mg/dL  Near Optimal     100-129 mg/dL  Borderline High  130-159 mg/dL  High             160-189 mg/dL  Very High        >189 mg/dL    POC Glucose Once [[id number]]  (Normal) Collected: 11/15/22 0544    Specimen: Blood Updated: 11/15/22 0555     Glucose 123 mg/dL       Comment: : [id number] Antione Tagoodieseter ID: [id number]       POC Glucose Once [[id number]]  (Abnormal) Collected: 11/14/22 2326    Specimen: Blood Updated: 11/14/22 2338     Glucose 206 mg/dL      Comment: : [id number] Antione [place] Emergency RegistryerMeter ID: [id number]       Hemoglobin A1c [[id number]]  (Abnormal) Collected: 11/14/22 1000    Specimen: Blood Updated: 11/14/22 2309     Hemoglobin A1C 6.10 %     Narrative:      Hemoglobin A1C Ranges:    Increased Risk for Diabetes  5.7% to 6.4%  Diabetes                     >= 6.5%  Diabetic Goal                < 7.0%    POC Glucose Once [[id number]]  (Abnormal) Collected: 11/14/22 2020    Specimen: Blood Updated: 11/14/22 2032     Glucose 175 mg/dL      Comment: : [id number] Antione Tagoodieseter ID: [id number]       D-dimer, Quantitative [[id number]]  (Abnormal) Collected: 11/14/22 1917    Specimen: Blood Updated: 11/14/22 2000     D-Dimer, Quantitative 1.13 MCGFEU/mL     Narrative:      Reference Range is 0-0.50 MCGFEU/mL. However, results <0.50 MCGFEU/mL tends to rule out DVT or PE. Results >0.50 MCGFEU/mL are not useful in predicting absence or presence of DVT or PE.      Blood Culture - Blood, Arm, Right [[id number]] Collected: 11/14/22 1917    Specimen: Blood from Arm, Right Updated: 11/14/22 1954    RPR [[id number]] Collected: 11/14/22 1917    Specimen: Blood Updated: 11/14/22 1940    Blood Culture - Blood, Arm, Left [[id number]] Collected: 11/14/22 1000    Specimen: Blood from Arm, Left Updated: 11/14/22 1828    Vitamin B12 [[id number]] Collected: 11/14/22 1000    Specimen: Blood Updated: 11/14/22 1807    Folate [[id number]] Collected: 11/14/22 1000    Specimen: Blood Updated: 11/14/22 1807    T4, Free [[id number]]  (Normal) Collected: 11/14/22 1550    Specimen: Blood Updated: 11/14/22 1630     Free T4 1.38 ng/dL     Narrative:      Results may be falsely increased if patient taking Biotin.      TSH [[id number]]  (Normal) Collected: 11/14/22 1550    Specimen: Blood Updated:  11/14/22 1629     TSH 0.894 uIU/mL     Troponin [[id number]]  (Normal) Collected: 11/14/22 1550    Specimen: Blood Updated: 11/14/22 1622     Troponin T 0.011 ng/mL     Narrative:      Troponin T Reference Range:  <= 0.03 ng/mL-   Negative for AMI  >0.03 ng/mL-     Abnormal for myocardial necrosis.  Clinicians would have to utilize clinical acumen, EKG, Troponin and serial changes to determine if it is an Acute Myocardial Infarction or myocardial injury due to an underlying chronic condition.       Results may be falsely decreased if patient taking Biotin.      [place] Draw [[id number]] Collected: 11/14/22 1000    Specimen: Blood Updated: 11/14/22 1415    Narrative:      The following orders were created for panel order [place] Draw.  Procedure                               Abnormality         Status                     ---------                               -----------         ------                     Green Top (Gel)[[id number]]                                  Final result               Lavender Top[[id number]]                                     Final result               Red Top[[id number]]                                          Final result               [place] Blood Culture Eleazar...[[id number]]                      Final result               Zhang Top[[id number]]                                         Final result               Light Blue Top[[id number]]                                   Final result                 Please view results for these tests on the individual orders.    Gray Top [[id number]] Collected: 11/14/22 1000    Specimen: Blood Updated: 11/14/22 1415     Extra Tube Hold for add-ons.     Comment: Auto resulted.       Urine Drug Screen - Urine, Clean Catch [[id number]]  (Abnormal) Collected: 11/14/22 1123    Specimen: Urine, Clean Catch Updated: 11/14/22 1149     THC, Screen, Urine Negative     Phencyclidine (PCP), Urine Negative     Cocaine Screen, Urine Negative     Methamphetamine, Ur Negative      Opiate Screen Positive     Amphetamine Screen, Urine Negative     Benzodiazepine Screen, Urine Negative     Tricyclic Antidepressants Screen Negative     Methadone Screen, Urine Negative     Barbiturates Screen, Urine Negative     Oxycodone Screen, Urine Negative     Propoxyphene Screen Negative     Buprenorphine, Screen, Urine Negative    Narrative:      Cutoff For Drugs Screened:    Amphetamines               500 ng/ml  Barbiturates               200 ng/ml  Benzodiazepines            150 ng/ml  Cocaine                    150 ng/ml  Methadone                  200 ng/ml  Opiates                    100 ng/ml  Phencyclidine               25 ng/ml  THC                            50 ng/ml  Methamphetamine            500 ng/ml  Tricyclic Antidepressants  300 ng/ml  Oxycodone                  100 ng/ml  Propoxyphene               300 ng/ml  Buprenorphine               10 ng/ml    The normal value for all drugs tested is negative. This report includes unconfirmed screening results, with the cutoff values listed, to be used for medical treatment purposes only.  Unconfirmed results must not be used for non-medical purposes such as employment or legal testing.  Clinical consideration should be applied to any drug of abuse test, particularly when unconfirmed results are used.      Urinalysis With Culture If Indicated - Urine, Catheter [[id number]]  (Abnormal) Collected: 11/14/22 1122    Specimen: Urine, Catheter Updated: 11/14/22 1143     Color, UA Yellow     Appearance, UA Clear     pH, UA 7.0     Specific Gravity, UA 1.011     Glucose, UA Negative     Ketones, UA Trace     Bilirubin, UA Negative     Blood, UA Negative     Protein, UA Trace     Leuk Esterase, UA Negative     Nitrite, UA Negative     Urobilinogen, UA 0.2 E.U./dL    Narrative:      In absence of clinical symptoms, the presence of pyuria, bacteria, and/or nitrites on the urinalysis result does not correlate with infection.  Urine microscopic not indicated.     Lactic Acid, Plasma [[id number]]  (Normal) Collected: 11/14/22 1000    Specimen: Blood Updated: 11/14/22 1132     Lactate 1.6 mmol/L     Alex Blood Culture Bottle Set [[id number]] Collected: 11/14/22 1000    Specimen: Blood from Arm, Left Updated: 11/14/22 1101     Extra Tube Hold for add-ons.     Comment: Auto resulted.       Red Top [[id number]] Collected: 11/14/22 1000    Specimen: Blood Updated: 11/14/22 1101     Extra Tube Hold for add-ons.     Comment: Auto resulted.       Green Top (Gel) [[id number]] Collected: 11/14/22 1000    Specimen: Blood Updated: 11/14/22 1101     Extra Tube Hold for add-ons.     Comment: Auto resulted.       Lavender Top [[id number]] Collected: 11/14/22 1000    Specimen: Blood Updated: 11/14/22 1101     Extra Tube hold for add-on     Comment: Auto resulted       Light Blue Top [[id number]] Collected: 11/14/22 1000    Specimen: Blood Updated: 11/14/22 1101     Extra Tube Hold for add-ons.     Comment: Auto resulted       Comprehensive Metabolic Panel [[id number]]  (Abnormal) Collected: 11/14/22 1000    Specimen: Blood Updated: 11/14/22 1028     Glucose 169 mg/dL      BUN 11 mg/dL      Creatinine 0.64 mg/dL      Sodium 137 mmol/L      Potassium 4.6 mmol/L      Chloride 101 mmol/L      CO2 23.0 mmol/L      Calcium 10.4 mg/dL      Total Protein 6.7 g/dL      Albumin 4.20 g/dL      ALT (SGPT) 11 U/L      AST (SGOT) 11 U/L      Alkaline Phosphatase 92 U/L      Total Bilirubin 0.3 mg/dL      Globulin 2.5 gm/dL      A/G Ratio 1.7 g/dL      BUN/Creatinine Ratio 17.2     Anion Gap 13.0 mmol/L      eGFR 92.3 mL/min/1.73      Comment: National Kidney Foundation and American Society of Nephrology (ASN) Task Force recommended calculation based on the Chronic Kidney Disease Epidemiology Collaboration (CKD-EPI) equation refit without adjustment for race.       Narrative:      GFR Normal >60  Chronic Kidney Disease <60  Kidney Failure <15    The GFR formula is only valid for adults with stable renal  function between ages 18 and 70.    Magnesium [[id number]]  (Normal) Collected: 11/14/22 1000    Specimen: Blood Updated: 11/14/22 1022     Magnesium 1.6 mg/dL     CBC & Differential [[id number]]  (Abnormal) Collected: 11/14/22 1000    Specimen: Blood Updated: 11/14/22 1014    Narrative:      The following orders were created for panel order CBC & Differential.  Procedure                               Abnormality         Status                     ---------                               -----------         ------                     CBC Auto Differential[[id number]]        Abnormal            Final result                 Please view results for these tests on the individual orders.    CBC Auto Differential [[id number]]  (Abnormal) Collected: 11/14/22 1000    Specimen: Blood Updated: 11/14/22 1014     WBC 8.96 10*3/mm3      RBC 3.82 10*6/mm3      Hemoglobin 10.9 g/dL      Hematocrit 35.4 %      MCV 92.7 fL      MCH 28.5 pg      MCHC 30.8 g/dL      RDW 13.6 %      RDW-SD 46.0 fl      MPV 11.2 fL      Platelets 331 10*3/mm3      Neutrophil % 66.2 %      Lymphocyte % 19.2 %      Monocyte % 12.8 %      Eosinophil % 0.4 %      Basophil % 0.2 %      Immature Grans % 1.2 %      Neutrophils, Absolute 5.92 10*3/mm3      Lymphocytes, Absolute 1.72 10*3/mm3      Monocytes, Absolute 1.15 10*3/mm3      Eosinophils, Absolute 0.04 10*3/mm3      Basophils, Absolute 0.02 10*3/mm3      Immature Grans, Absolute 0.11 10*3/mm3      nRBC 0.0 /100 WBC     POC Glucose Once [[id number]]  (Abnormal) Collected: 11/14/22 0954    Specimen: Blood Updated: 11/14/22 1005     Glucose 147 mg/dL      Comment: : [facility] [place] [id number]             .  Imaging Results (Last 24 Hours)     Procedure Component Value Units Date/Time    MRI Brain Without Contrast [[id number]] Collected: 11/14/22 1507     Updated: 11/14/22 1514    Narrative:      EXAMINATION:  MRI BRAIN WO CONTRAST-  11/14/2022 2:43 PM CST     HISTORY: Altered mental status.  Evaluate for stroke.     TECHNIQUE: Multiplanar imaging was performed in a high field magnet.     COMPARISON: No comparison study.     FINDINGS: There is a tiny acute infarct in the right cerebellar  hemisphere. This is in on the diffusion-weighted images. There is  restricted diffusion on the ADC map images. There is T2 high signal  within the hemispheric white matter. 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 There is minimal atrophy. There is minimal mucosal thickening in  the ethmoid sinus region.       Impression:      1. Tiny acute infarct in the right cerebellar hemisphere.  2. T2 high signal in the hemispheric white matter is nonspecific and  likely due to chronic small vessel disease.  3. Minimal atrophy.  4. 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     The full report of this exam was immediately signed and available to the  emergency room. The patient is currently in the emergency room.        This report was finalized on 11/14/2022 15:10 by Dr. Sebastian Velazquez MD.    CT Head Without Contrast [[id number]] Collected: 11/14/22 1114     Updated: 11/14/22 1119    Narrative:      CT HEAD WO CONTRAST- 11/14/2022 10:48 AM CST     HISTORY: ams     COMPARISON: None      DLP: 654 mGy cm. All CT scans are performed using dose optimization  techniques as appropriate to the performed exam and including at least  one of the following: Automated exposure control, adjustment of the mA  and/or kV according to size, and the use of the iterative reconstruction  technique.     TECHNIQUE: Serial axial tomographic images of the brain were obtained  without the use of intravenous contrast.      FINDINGS:   The midline structures are nondisplaced. There is mild cerebral and  cerebellar atrophy, with an associated increase in the prominence of the  ventricles and sulci. The basilar cisterns are normal in size and  configuration. There is no evidence of intracranial hemorrhage or  mass-effect. There is low attenuation in the periventricular  white  matter, consistent with chronic ischemic change. There are no abnormal  extra-axial fluid collections. There is no evidence of tonsillar  herniation.      The included orbits and their contents are unremarkable. The visualized  paranasal sinuses, mastoid air cells and middle ear cavities are clear.  The visualized osseous structures and overlying soft tissues of the  skull and face are intact.        Impression:         1. Mild cerebral and cerebellar atrophy with chronic microvascular  disease but no evidence of acute intracranial process.        This report was finalized on 11/14/2022 11:16 by Dr. Dominic Valdes MD.    XR Chest 1 View [[id number]] Collected: 11/14/22 1058     Updated: 11/14/22 1102    Narrative:      XR CHEST 1 VW- 11/14/2022 10:42 AM CST     HISTORY: AMS     COMPARISON: None.     FINDINGS:      No lung consolidation. No pleural effusion or pneumothorax. The  cardiomediastinal silhouette and pulmonary vascularity are within normal  limits. The osseous structures and surrounding soft tissues demonstrate  no acute abnormality.       Impression:      1. No radiographic evidence of acute cardiopulmonary process.  This report was finalized on 11/14/2022 10:58 by Dr Dylan Copeland, .          MRI brain reviewed by me.  There is an incidental finding in the right cerebellum.  There is a punctate focal lesion of diffusion restriction.    White count elevated overnight being 8.9 yesterday up to 14.6 today.  Metabolic panel shows some mild electrolyte abnormalities.    COVID and flu swabs are pending    RPR is pending  TSH is normal at 0.8  Hemoglobin A1c is mildly elevated at 6.1  B12 is pending  Folate pending      Impression    • Spells  o The differential diagnosis for the spells are as follows: I believe the most likely etiology would be complicated migraines given the fact the patient has associated headaches with the spells.  As the headache resolves the patient often has symptoms that last  less than 24 hours.  Another consideration would be an epileptic etiology.  I do not believe this represents a TIA or stroke.  A final consideration would be a progressive neurologic disorder such as Lewy body dementia which can initially be spells of altered mentation and cognitive decline.  Currently I find no evidence of cogwheeling to suggest an underlying neuro progressive disorder.  • Sinus tachycardia  • Increased stress from 's illness  • Incidental finding in the cerebellum that is not a clinically relevant stroke.  That in no way could explain symptomatology given by the patient.  I think is reasonable to perform cardiac telemetry, perform a cardiac echo, and vessel imaging.  We can also increase the aspirin and maximize her statin as well.  She has no physical exam deficits consistent with a cerebellar stroke.  It is a tiny foci of diffusion restriction and therefore is likely not causing any clinical symptoms.    Plan    • Cardiac telemetry  • Cardiac echo  • CT angiography of head and neck  • EEG  • Minimize mind-altering medications such as opiates  • Cleared from neurology standpoint to be discharged home immediately following testing today.  I do not believe that she has any emergent neurologic features.  If the patient does have an abnormal EEG we may consider antiepileptics.  If she does not then I am recommending either verapamil sustained-release 120 mg p.o. nightly.  If internal medicine needs to use a beta-blocker we can use that instead as this would have some efficacy against complicated migraines.  Will need follow-up in the neurology clinic to watch for any underlying progressive neurologic disorder such as Lewy body dementia  o In an effort to minimize hospitalization, I have discontinued unnecessary tests such as the carotid ultrasound and MRI of her shoulder.    I discussed the patient's findings and my recommendations with patient and family.  Discussed with patient's daughter in  the room.    Elie Lim MD  11/15/22  08:06 CST

## 2022-11-15 NOTE — PROGRESS NOTES
Follow-up neurology note:    The patient's EEG is extremely atypical.  It has no definitive signs of epileptic foci, however, I do find some concerning features of this.  I still believe the complicated migraines would remain at the top of my differential diagnosis list.  To that end, I think it be reasonable to try Keppra 500 mg twice daily as an agent that could potentially prevent migraines but may also serve as an antiepileptic if there is any epileptic component to the spells.  She can be discharged today from the inpatient setting and have close follow-up with the neurology clinic in approximately 4 weeks time.  If she has no further spells on Keppra, this may give more credence to the idea that these were epileptic in etiology.    Keppra has been E prescribed to her local pharmacy.    Electronically signed by Elie Lim MD, 11/15/22, 1:04 PM CST.

## 2022-11-15 NOTE — CASE MANAGEMENT/SOCIAL WORK
Continued Stay Note  SOLANGE Stiles     Patient Name: Concepcion Velez  MRN: [id number]  Today's Date: 11/15/2022    Admit Date: 11/14/2022        Discharge Plan     Row Name 11/15/22 1523       Plan    Final Discharge Disposition Code 06 - home with home health care    Final Note PT to dc home with HH. [facility] have selected [facility]. Orders have been faxed. PT may dc when medically ready.               Discharge Codes    No documentation.               Expected Discharge Date and Time     Expected Discharge Date Expected Discharge Time    Nov 15, 2022             JAVIER Fernandes

## 2022-11-16 ENCOUNTER — READMISSION MANAGEMENT (OUTPATIENT)
Dept: CALL CENTER | Facility: HOSPITAL | Age: 77
End: 2022-11-16

## 2022-11-16 ENCOUNTER — TELEPHONE (OUTPATIENT)
Dept: PRIMARY CARE CLINIC | Age: 77
End: 2022-11-16

## 2022-11-16 LAB
BACTERIA BLD CULT: ABNORMAL
BOTTLE TYPE: ABNORMAL
HOLD SPECIMEN: NORMAL

## 2022-11-16 NOTE — PAYOR COMM NOTE
"REF:  [id number]    [facility]  FREIDA  [phone]  OR  FAX   [phone]       Arlin Velez (77 y.o. Female)     Date of Birth   1945    Social Security Number       Address   [address] [facility] [place] [address]    Home Phone   [phone]    MRN   [id number]       Episcopal   Jew    Marital Status                               Admission Date   11/14/22    Admission Type   Emergency    Admitting Provider   Abhishek Kohli DO    Attending Provider       Department, Room/Bed   [facility] INTENSIVE CARE, I003/1       Discharge Date   11/15/2022    Discharge Disposition   Home or Self Care    Discharge Destination                               Attending Provider: (none)   Allergies: No Known Allergies    Isolation: None   Infection: None   Code Status: Prior    Ht: 162.6 cm (64\")   Wt: 62.1 kg (137 lb)    Admission Cmt: None   Principal Problem: Tachycardia [R00.0]                 Active Insurance as of 11/14/2022     Primary Coverage     Payor Plan Insurance Group Employer/Plan Group    ANTH MEDICARE REPLACEMENT ANTHEM MEDICARE ADVANTAGE [id number]     Payor Plan Address Payor Plan Phone Number Payor Plan Fax Number Effective Dates    [address] [phone]  1/1/2022 - None Entered    [facility] [id number]       Subscriber Name Subscriber Birth Date Member ID       ARLIN VELEZ 1945 [id number]                 Emergency Contacts      (Rel.) Home Phone Work Phone Mobile Phone    IsaiasonKaylin (Daughter) -- -- [phone]    Lavelle Velez (Son) -- -- [phone]    Lillie Henson (Daughter) -- -- [phone]    Lucy Yan (Daughter) -- -- [phone]               Discharge Summary      Abhishek Kohli DO at 11/15/22 [address] [place] [address] Medicine Services  DISCHARGE SUMMARY       Date of Admission: 11/14/2022  Date of Discharge:  11/15/2022  Primary Care Physician: Provider, No " Known    Discharge Diagnoses:  Active Hospital Problems    Diagnosis    • **Tachycardia    • Cerebellar infarct (HCC)    • Uncontrolled hypertension    • Toxic metabolic encephalopathy    • Type 2 diabetes mellitus with hyperglycemia, without long-term current use of insulin (HCC)          Presenting Problem/History of Present Illness:  Tachycardia [R00.0]  Cerebellar infarct (HCC) [I63.9]     Chief Complaint on Day of Discharge:   No complaint    History of Present Illness on Day of Discharge:   The patient is doing well today.  She has been seen and evaluated by neurology.  EEG report is noted below noting an extremely atypical result.  The patient will empirically be placed on Keppra and if her spells are improved then this may give more evidence that they were epileptic in etiology.  The patient is stable for discharge home today with her family.    Hospital Course  Concepcion harris is a 77-year-old female with a past medical history of stroke 2012 for which she received tPA at [facility] prior to transfer to [place], type 2 diabetes, hypertension, high Po kalemia and hypomagnesia.  Patient started having neurological changes again in August of this year.  She went to [facility] 8/2022 with negative work-up.  She has continued to have multiple episodes of altered mental status with 3 visits to [facility] emergency department over the past 2 months.  Most recently admitted 11/4 after a fall causing 3 right rib fractures and shoulder injury.  Patient has been on 7.5 Norco most recent ingestion last evening.  Multiple family members at bedside providing history.  Patient does not follow commands or offer insight into her visit.  She currently is incontinent of stool.  Reportedly took a laxative yesterday as pain medication has made her constipated.  Patient did receive Ativan 1 mg IV prior to MRI.  She is tachycardic, blood pressure is elevated I feel this is most likely secondary  to pain and discomfort.  Per record review when seen by PCP who does discuss abdominal wall seroma.  Family was advised to have surgeon look at this.  They are requesting a consult during this admission.  We will follow for further signs of infection as of right now tachycardia may be associated with pain only.  She is admitted for further evaluation and treatment  Plan:   1.  Admit as inpatient critical care  2.  Home medications reviewed, will hold due to aspiration concerns  3.  Follow stroke protocol  4.  Monitor glucose every 6 hours with regular insulin sliding scale coverage  5.  Magnesium sulfate 1 g IV x1  6.  Normal saline 75 mL/hour  7.  Toradol 15 mg every 6 hours as needed for right shoulder pain  8.  Supplemental oxygen as needed, incentive spirometry, continuous pulse oximeter  9.  N.p.o. for now  10.  Echocardiogram, bilateral ultrasound carotids and MRI of the right shoulder in a.m.  11.  Additional labs, labs in a.m.  12.  Apply external catheter      Consults:   Neurology:  Impression     • Spells  ? The differential diagnosis for the spells are as follows: I believe the most likely etiology would be complicated migraines given the fact the patient has associated headaches with the spells.  As the headache resolves the patient often has symptoms that last less than 24 hours.  Another consideration would be an epileptic etiology.  I do not believe this represents a TIA or stroke.  A final consideration would be a progressive neurologic disorder such as Lewy body dementia which can initially be spells of altered mentation and cognitive decline.  Currently I find no evidence of cogwheeling to suggest an underlying neuro progressive disorder.  • Sinus tachycardia  • Increased stress from 's illness  • Incidental finding in the cerebellum that is not a clinically relevant stroke.  That in no way could explain symptomatology given by the patient.  I think is reasonable to perform cardiac  telemetry, perform a cardiac echo, and vessel imaging.  We can also increase the aspirin and maximize her statin as well.  She has no physical exam deficits consistent with a cerebellar stroke.  It is a tiny foci of diffusion restriction and therefore is likely not causing any clinical symptoms.     Plan     • Cardiac telemetry  • Cardiac echo  • CT angiography of head and neck  • EEG  • Minimize mind-altering medications such as opiates  • Cleared from neurology standpoint to be discharged home immediately following testing today.  I do not believe that she has any emergent neurologic features.  If the patient does have an abnormal EEG we may consider antiepileptics.  If she does not then I am recommending either verapamil sustained-release 120 mg p.o. nightly.  If internal medicine needs to use a beta-blocker we can use that instead as this would have some efficacy against complicated migraines.  Will need follow-up in the neurology clinic to watch for any underlying progressive neurologic disorder such as Lewy body dementia  ? In an effort to minimize hospitalization, I have discontinued unnecessary tests such as the carotid ultrasound and MRI of her shoulder.     I discussed the patient's findings and my recommendations with patient and family.  Discussed with patient's daughter in the room.     Elie Lim MD  Follow-up neurology note:     The patient's EEG is extremely atypical.  It has no definitive signs of epileptic foci, however, I do find some concerning features of this.  I still believe the complicated migraines would remain at the top of my differential diagnosis list.  To that end, I think it be reasonable to try Keppra 500 mg twice daily as an agent that could potentially prevent migraines but may also serve as an antiepileptic if there is any epileptic component to the spells.  She can be discharged today from the inpatient setting and have close follow-up with the neurology clinic in  "approximately 4 weeks time.  If she has no further spells on Keppra, this may give more credence to the idea that these were epileptic in etiology.     Keppra has been E prescribed to her local pharmacy.     Electronically signed by Elie Lim MD    Result Review    Result Review:  I have personally reviewed the results from the time of this admission to 11/15/2022 13:35 CST and agree with these findings:  []  Laboratory  []  Microbiology  []  Radiology  []  EKG/Telemetry   []  Cardiology/Vascular   []  Pathology  []  Old records  []  Other:    EEG:  Findings: This is an abnormal EEG.  There is generalized slowing seen throughout that could be consistent with a postictal state versus a metabolic/medication induced encephalopathy.  That being said, there are some atypical semirhythmic waveforms occasionally with after coming slow waves which may represent an irritable cortex.  It is difficult to say whether this can be seen more over the left compared to the right or vice versa.     Elie Lim MD    Condition on Discharge:    Stable and improved    Physical Exam on Discharge:  /87   Pulse 85   Temp 98.9 °F (37.2 °C) (Axillary)   Resp 16   Ht 162.6 cm (64\")   Wt 62.4 kg (137 lb 9.1 oz)   LMP  (LMP Unknown)   SpO2 94%   BMI 23.61 kg/m²   Physical Exam     Constitutional:       Appearance: She is normal-appearing.  Drowsy but easily awakened.  HENT:      Head: Normocephalic and atraumatic.      Mouth: Mucous membranes are dry.   Eyes:      Conjunctiva/sclera: Conjunctivae normal.   Cardiovascular:      Rate and Rhythm: Regular rhythm.   Pulmonary:      Effort: Pulmonary effort is normal.      Comments: Diminished without adventitious breath  Abdominal:      Palpations: Abdomen is soft.      Comments: Abdominal wall pelvic pouching at mesh site, known seroma   Musculoskeletal:      Cervical back: Neck supple.      Right lower leg: No edema.      Left lower leg: No edema.      Comments: " Generalized weakness and debility.   Skin:     General: Skin is warm and dry.   Neurological:      Mental Status: She is oriented x2.   Psychiatric:      Comments: Flat affect, no behavioral disturbance.     Discharge Disposition:  Home or Self Care    Discharge Medications:     Discharge Medications      New Medications      Instructions Start Date   cefdinir 300 MG capsule  Commonly known as: OMNICEF   300 mg, Oral, Every 12 Hours Scheduled      levETIRAcetam 500 MG tablet  Commonly known as: KEPPRA   500 mg, Oral, 2 Times Daily      metoprolol succinate XL 50 MG 24 hr tablet  Commonly known as: Toprol XL   50 mg, Oral, Daily         Changes to Medications      Instructions Start Date   atorvastatin 20 MG tablet  Commonly known as: LIPITOR  What changed: Another medication with the same name was removed. Continue taking this medication, and follow the directions you see here.   20 mg, Oral, Daily         Continue These Medications      Instructions Start Date   allopurinol 300 MG tablet  Commonly known as: ZYLOPRIM   300 mg, Oral, Daily      aspirin 81 MG chewable tablet   81 mg, Oral, Daily      lisinopril 20 MG tablet  Commonly known as: PRINIVIL,ZESTRIL   20 mg, Oral, Daily      magnesium oxide 400 MG tablet  Commonly known as: MAG-OX   400 mg, Oral, Daily      meloxicam 15 MG tablet  Commonly known as: MOBIC   15 mg, Oral, Daily      metFORMIN 1000 MG tablet  Commonly known as: GLUCOPHAGE   1,000 mg, Oral, 2 Times Daily With Meals      potassium chloride 20 MEQ CR tablet  Commonly known as: K-DUR,KLOR-CON   20 mEq, Oral, Daily         Stop These Medications    amLODIPine 5 MG tablet  Commonly known as: NORVASC            Discharge Diet:   Diet Instructions     Diet: Regular; Thin      Discharge Diet: Regular    Fluid Consistency: Thin          Discharge Care Plan / Instructions:   Discharge home    Activity at Discharge:   Activity Instructions     Activity as Tolerated            Follow-up  Appointments:  Follow-up with PCP next week       Electronically signed by Abhishek Fortune DO, 11/15/22, 13:35 CST.    Time: Discharge Less than 30 min    Part of this note may be an electronic transcription/translation of spoken language to printed text using the Dragon Dictation system.        Electronically signed by Abhishek Fortune DO at 11/15/22 1337       Discharge Order (From admission, onward)     Start     Ordered    11/15/22 1307  Discharge patient  Once        Expected Discharge Date: 11/15/22    Discharge Disposition: Home or Self Care    Physician of Record for Attribution - Please select from Treatment Team: ABHISHEK FORTUNE [[id number]]    Review needed by CMO to determine Physician of Record: No       Question Answer Comment   Physician of Record for Attribution - Please select from Treatment Team ABHISHEK FORTUNE    Review needed by CMO to determine Physician of Record No        11/15/22 5405

## 2022-11-16 NOTE — TELEPHONE ENCOUNTER
Care Transitions Initial Follow Up Call    Outreach made within 2 business days of discharge: Yes    Patient: Nelson Rome Patient : 1945   MRN: [id number]  Reason for Admission: There are no discharge diagnoses documented for the most recent discharge. Discharge Date: 11/15/22      Spoke with: no one.  LMTCB    Discharge department/facility: [facility]        Scheduled appointment with PCP within 7-14 days    Follow Up  Future Appointments   Date Time Provider Jada Menjivar   2022  2:15 PM LALYA Randall [facility]   2022  8:00 AM DO Myra Rizvi [facility]   2023  3:00 PM B Author Goyo DO [address], [address] [place] [address]

## 2022-11-16 NOTE — OUTREACH NOTE
Prep Survey    Flowsheet Row Responses   [facility] discharged [facility]? [facility]   Is LACE score < 7 ? Yes   Emergency Room discharge w/ pulse ox? No   Eligibility Readm Mgmt   Discharge diagnosis  Right cerebellar infarct   Does the patient have one of the following disease processes/diagnoses(primary or secondary)? Stroke   Does the patient have Home health ordered? Yes   What is the Home health agency?  [facility]   Is there a DME ordered? No   Prep survey completed? Yes          LEDY DEL VALLE - Registered Nurse

## 2022-11-16 NOTE — THERAPY DISCHARGE NOTE
Acute Care - Physical Therapy Discharge Summary  [facility]       Patient Name: Concepcion Velez  : 1945  MRN: [id number]    Today's Date: 2022                 Admit Date: 2022      PT Recommendation and Plan    Visit Dx:    ICD-10-CM ICD-9-CM   1. Tachycardia  R00.0 785.0   2. Dysphagia, unspecified type  R13.10 787.20   3. Cerebellar infarct (HCC)  I63.9 434.91   4. Impaired mobility  Z74.09 799.89                PT Rehab Goals     Row Name 22 1203             Bed Mobility Goal 1 (PT)    Activity/Assistive Device (Bed Mobility Goal 1, PT) sit to supine/supine to sit  -JOSE      [place] Level/Cues Needed (Bed Mobility Goal 1, PT) independent  -JOSE      Time Frame (Bed Mobility Goal 1, PT) long term goal (LTG);10 days  -JOSE      Progress/Outcomes (Bed Mobility Goal 1, PT) goal not met  -JOSE         Transfer Goal 1 (PT)    Activity/Assistive Device (Transfer Goal 1, PT) sit-to-stand/stand-to-sit;bed-to-chair/chair-to-bed  -JOSE      [place] Level/Cues Needed (Transfer Goal 1, PT) standby assist  -JOSE      Time Frame (Transfer Goal 1, PT) long term goal (LTG);10 days  -JOSE      Progress/Outcome (Transfer Goal 1, PT) goal not met  -JOSE         Gait Training Goal 1 (PT)    Activity/Assistive Device (Gait Training Goal 1, PT) gait (walking locomotion);decrease fall risk;diminish gait deviation  -JOSE      [place] Level (Gait Training Goal 1, PT) independent  -JOSE      Distance (Gait Training Goal 1, PT) 300  -JOSE      Time Frame (Gait Training Goal 1, PT) long term goal (LTG);10 days  -JOSE      Strategies/Barriers (Gait Training Goal 1, PT) Pt will demo decreased lateral sway and no LOB w/ amb  -JOSE      Progress/Outcome (Gait Training Goal 1, PT) goal not met  -JOSE         Stairs Goal 1 (PT)    Activity/Assistive Device (Stairs Goal 1, PT) ascending stairs;descending stairs;using handrail, left;using handrail, right;step-over step;step-to-step;decrease fall risk;improve balance and speed  -JOSE       [place] Level/Cues Needed (Stairs Goal 1, PT) standby assist  -JOSE      Number of Stairs (Stairs Goal 1, PT) 6  -JOSE      Time Frame (Stairs Goal 1, PT) long term goal (LTG);10 days  -JOSE      Progress/Outcome (Stairs Goal 1, PT) goal not met  -JOSE            User Key  (r) = Recorded By, (t) = Taken By, (c) = Cosigned By    Initials Name Provider Type Discipline    Angelo Agustin, PTA Physical Therapist Assistant PT                    PT Discharge Summary  Anticipated Discharge Disposition (PT): home  Reason for Discharge: Discharge from facility  Outcomes Achieved: Refer to plan of care for updates on goals achieved  Discharge Destination: Home      Angelo Cuevas PTA   11/16/2022

## 2022-11-17 ENCOUNTER — APPOINTMENT (OUTPATIENT)
Dept: MRI IMAGING | Facility: HOSPITAL | Age: 77
End: 2022-11-17

## 2022-11-17 ENCOUNTER — HOSPITAL ENCOUNTER (INPATIENT)
Facility: HOSPITAL | Age: 77
LOS: 4 days | Discharge: HOME HEALTH | End: 2022-11-21
Attending: EMERGENCY MEDICINE | Admitting: FAMILY MEDICINE

## 2022-11-17 ENCOUNTER — READMISSION MANAGEMENT (OUTPATIENT)
Dept: CALL CENTER | Facility: HOSPITAL | Age: 77
End: 2022-11-17

## 2022-11-17 ENCOUNTER — APPOINTMENT (OUTPATIENT)
Dept: CT IMAGING | Facility: HOSPITAL | Age: 77
End: 2022-11-17

## 2022-11-17 DIAGNOSIS — Z74.09 IMPAIRED MOBILITY: ICD-10-CM

## 2022-11-17 DIAGNOSIS — R13.10 DYSPHAGIA, UNSPECIFIED TYPE: ICD-10-CM

## 2022-11-17 DIAGNOSIS — Z74.09 IMPAIRED MOBILITY AND ADLS: ICD-10-CM

## 2022-11-17 DIAGNOSIS — Z78.9 IMPAIRED MOBILITY AND ADLS: ICD-10-CM

## 2022-11-17 DIAGNOSIS — I63.9 CEREBROVASCULAR ACCIDENT (CVA), UNSPECIFIED MECHANISM: Primary | ICD-10-CM

## 2022-11-17 DIAGNOSIS — R41.89 COGNITIVE CHANGES: ICD-10-CM

## 2022-11-17 DIAGNOSIS — R41.82 ALTERED MENTAL STATUS, UNSPECIFIED ALTERED MENTAL STATUS TYPE: ICD-10-CM

## 2022-11-17 PROBLEM — E87.1 HYPONATREMIA: Status: ACTIVE | Noted: 2022-11-17

## 2022-11-17 PROBLEM — D72.829 LEUKOCYTOSIS: Status: ACTIVE | Noted: 2022-11-17

## 2022-11-17 PROBLEM — R19.7 DIARRHEA: Status: ACTIVE | Noted: 2022-11-17

## 2022-11-17 PROBLEM — E83.42 HYPOMAGNESEMIA: Status: ACTIVE | Noted: 2022-11-17

## 2022-11-17 LAB
AMPHET+METHAMPHET UR QL: NEGATIVE
AMPHETAMINES UR QL: NEGATIVE
ANION GAP SERPL CALCULATED.3IONS-SCNC: 11 MMOL/L (ref 5–15)
BACTERIA SPEC AEROBE CULT: ABNORMAL
BACTERIA SPEC AEROBE CULT: ABNORMAL
BACTERIA UR QL AUTO: ABNORMAL /HPF
BARBITURATES UR QL SCN: NEGATIVE
BENZODIAZ UR QL SCN: POSITIVE
BILIRUB UR QL STRIP: NEGATIVE
BUN SERPL-MCNC: 13 MG/DL (ref 8–23)
BUN/CREAT SERPL: 22 (ref 7–25)
BUPRENORPHINE SERPL-MCNC: NEGATIVE NG/ML
CALCIUM SPEC-SCNC: 9.6 MG/DL (ref 8.6–10.5)
CANNABINOIDS SERPL QL: NEGATIVE
CHLORIDE SERPL-SCNC: 94 MMOL/L (ref 98–107)
CK SERPL-CCNC: 31 U/L (ref 20–180)
CLARITY UR: CLEAR
CO2 SERPL-SCNC: 27 MMOL/L (ref 22–29)
COCAINE UR QL: NEGATIVE
COLOR UR: YELLOW
CREAT SERPL-MCNC: 0.59 MG/DL (ref 0.57–1)
D-LACTATE SERPL-SCNC: 1.6 MMOL/L (ref 0.5–2)
DEPRECATED RDW RBC AUTO: 46.5 FL (ref 37–54)
EGFRCR SERPLBLD CKD-EPI 2021: 94.1 ML/MIN/1.73
ERYTHROCYTE [DISTWIDTH] IN BLOOD BY AUTOMATED COUNT: 13.8 % (ref 12.3–15.4)
FERRITIN SERPL-MCNC: 74.23 NG/ML (ref 13–150)
GLUCOSE BLDC GLUCOMTR-MCNC: 104 MG/DL (ref 70–130)
GLUCOSE BLDC GLUCOMTR-MCNC: 108 MG/DL (ref 70–130)
GLUCOSE SERPL-MCNC: 136 MG/DL (ref 65–99)
GLUCOSE UR STRIP-MCNC: NEGATIVE MG/DL
GRAM STN SPEC: ABNORMAL
GRAM STN SPEC: ABNORMAL
HCT VFR BLD AUTO: 34.3 % (ref 34–46.6)
HGB BLD-MCNC: 10.6 G/DL (ref 12–15.9)
HGB UR QL STRIP.AUTO: NEGATIVE
HYALINE CASTS UR QL AUTO: ABNORMAL /LPF
IRON 24H UR-MRATE: 54 MCG/DL (ref 37–145)
IRON SATN MFR SERPL: 17 % (ref 20–50)
ISOLATED FROM: ABNORMAL
ISOLATED FROM: ABNORMAL
KETONES UR QL STRIP: NEGATIVE
LEUKOCYTE ESTERASE UR QL STRIP.AUTO: ABNORMAL
LYMPHOCYTES # BLD MANUAL: 1.94 10*3/MM3 (ref 0.7–3.1)
LYMPHOCYTES NFR BLD MANUAL: 8.2 % (ref 5–12)
MAGNESIUM SERPL-MCNC: 1.4 MG/DL (ref 1.6–2.4)
MCH RBC QN AUTO: 28.4 PG (ref 26.6–33)
MCHC RBC AUTO-ENTMCNC: 30.9 G/DL (ref 31.5–35.7)
MCV RBC AUTO: 92 FL (ref 79–97)
METHADONE UR QL SCN: NEGATIVE
MONOCYTES # BLD: 1.3 10*3/MM3 (ref 0.1–0.9)
NEUTROPHILS # BLD AUTO: 12.55 10*3/MM3 (ref 1.7–7)
NEUTROPHILS NFR BLD MANUAL: 74.2 % (ref 42.7–76)
NEUTS BAND NFR BLD MANUAL: 5.2 % (ref 0–5)
NEUTS VAC BLD QL SMEAR: ABNORMAL
NITRITE UR QL STRIP: NEGATIVE
OPIATES UR QL: POSITIVE
OSMOLALITY UR: 326 MOSM/KG (ref 50–1400)
OXYCODONE UR QL SCN: NEGATIVE
PCP UR QL SCN: NEGATIVE
PH UR STRIP.AUTO: 7 [PH] (ref 5–8)
PLAT MORPH BLD: NORMAL
PLATELET # BLD AUTO: 275 10*3/MM3 (ref 140–450)
PMV BLD AUTO: 10.7 FL (ref 6–12)
POTASSIUM SERPL-SCNC: 3.6 MMOL/L (ref 3.5–5.2)
PROPOXYPH UR QL: NEGATIVE
PROT UR QL STRIP: NEGATIVE
RBC # BLD AUTO: 3.73 10*6/MM3 (ref 3.77–5.28)
RBC # UR STRIP: ABNORMAL /HPF
RBC MORPH BLD: NORMAL
REF LAB TEST METHOD: ABNORMAL
SODIUM SERPL-SCNC: 132 MMOL/L (ref 136–145)
SODIUM SERPL-SCNC: 133 MMOL/L (ref 136–145)
SODIUM UR-SCNC: 61 MMOL/L
SP GR UR STRIP: 1.01 (ref 1–1.03)
SQUAMOUS #/AREA URNS HPF: ABNORMAL /HPF
TIBC SERPL-MCNC: 316 MCG/DL (ref 298–536)
TRANSFERRIN SERPL-MCNC: 212 MG/DL (ref 200–360)
TRICYCLICS UR QL SCN: NEGATIVE
UROBILINOGEN UR QL STRIP: ABNORMAL
VARIANT LYMPHS NFR BLD MANUAL: 4.1 % (ref 0–5)
VARIANT LYMPHS NFR BLD MANUAL: 8.2 % (ref 19.6–45.3)
WBC # UR STRIP: ABNORMAL /HPF
WBC NRBC COR # BLD: 15.81 10*3/MM3 (ref 3.4–10.8)

## 2022-11-17 PROCEDURE — 85025 COMPLETE CBC W/AUTO DIFF WBC: CPT | Performed by: EMERGENCY MEDICINE

## 2022-11-17 PROCEDURE — 25010000002 MAGNESIUM SULFATE PER 500 MG OF MAGNESIUM: Performed by: PSYCHIATRY & NEUROLOGY

## 2022-11-17 PROCEDURE — 25010000002 MAGNESIUM SULFATE IN D5W 1G/100ML (PREMIX) 1-5 GM/100ML-% SOLUTION: Performed by: EMERGENCY MEDICINE

## 2022-11-17 PROCEDURE — 83540 ASSAY OF IRON: CPT | Performed by: PSYCHIATRY & NEUROLOGY

## 2022-11-17 PROCEDURE — 82728 ASSAY OF FERRITIN: CPT | Performed by: PSYCHIATRY & NEUROLOGY

## 2022-11-17 PROCEDURE — 97166 OT EVAL MOD COMPLEX 45 MIN: CPT | Performed by: OCCUPATIONAL THERAPIST

## 2022-11-17 PROCEDURE — 87040 BLOOD CULTURE FOR BACTERIA: CPT | Performed by: INTERNAL MEDICINE

## 2022-11-17 PROCEDURE — 84466 ASSAY OF TRANSFERRIN: CPT | Performed by: PSYCHIATRY & NEUROLOGY

## 2022-11-17 PROCEDURE — 70450 CT HEAD/BRAIN W/O DYE: CPT

## 2022-11-17 PROCEDURE — P9612 CATHETERIZE FOR URINE SPEC: HCPCS

## 2022-11-17 PROCEDURE — 25010000002 LEVETIRACETAM IN NACL 0.82% 500 MG/100ML SOLUTION: Performed by: PSYCHIATRY & NEUROLOGY

## 2022-11-17 PROCEDURE — 83935 ASSAY OF URINE OSMOLALITY: CPT | Performed by: INTERNAL MEDICINE

## 2022-11-17 PROCEDURE — 93005 ELECTROCARDIOGRAM TRACING: CPT | Performed by: EMERGENCY MEDICINE

## 2022-11-17 PROCEDURE — 81001 URINALYSIS AUTO W/SCOPE: CPT | Performed by: EMERGENCY MEDICINE

## 2022-11-17 PROCEDURE — 92610 EVALUATE SWALLOWING FUNCTION: CPT | Performed by: SPEECH-LANGUAGE PATHOLOGIST

## 2022-11-17 PROCEDURE — 83605 ASSAY OF LACTIC ACID: CPT | Performed by: EMERGENCY MEDICINE

## 2022-11-17 PROCEDURE — 36415 COLL VENOUS BLD VENIPUNCTURE: CPT | Performed by: INTERNAL MEDICINE

## 2022-11-17 PROCEDURE — 70551 MRI BRAIN STEM W/O DYE: CPT

## 2022-11-17 PROCEDURE — 84295 ASSAY OF SERUM SODIUM: CPT | Performed by: INTERNAL MEDICINE

## 2022-11-17 PROCEDURE — 99223 1ST HOSP IP/OBS HIGH 75: CPT | Performed by: PSYCHIATRY & NEUROLOGY

## 2022-11-17 PROCEDURE — 80048 BASIC METABOLIC PNL TOTAL CA: CPT | Performed by: EMERGENCY MEDICINE

## 2022-11-17 PROCEDURE — 82962 GLUCOSE BLOOD TEST: CPT

## 2022-11-17 PROCEDURE — 99285 EMERGENCY DEPT VISIT HI MDM: CPT

## 2022-11-17 PROCEDURE — 80306 DRUG TEST PRSMV INSTRMNT: CPT | Performed by: PSYCHIATRY & NEUROLOGY

## 2022-11-17 PROCEDURE — 83735 ASSAY OF MAGNESIUM: CPT | Performed by: EMERGENCY MEDICINE

## 2022-11-17 PROCEDURE — 93010 ELECTROCARDIOGRAM REPORT: CPT | Performed by: INTERNAL MEDICINE

## 2022-11-17 PROCEDURE — 25010000002 CYANOCOBALAMIN PER 1000 MCG: Performed by: PSYCHIATRY & NEUROLOGY

## 2022-11-17 PROCEDURE — 85007 BL SMEAR W/DIFF WBC COUNT: CPT | Performed by: EMERGENCY MEDICINE

## 2022-11-17 PROCEDURE — 82550 ASSAY OF CK (CPK): CPT | Performed by: EMERGENCY MEDICINE

## 2022-11-17 PROCEDURE — 84300 ASSAY OF URINE SODIUM: CPT | Performed by: INTERNAL MEDICINE

## 2022-11-17 RX ORDER — LEVETIRACETAM 500 MG/1
500 TABLET ORAL EVERY 12 HOURS SCHEDULED
Status: DISCONTINUED | OUTPATIENT
Start: 2022-11-17 | End: 2022-11-17 | Stop reason: ALTCHOICE

## 2022-11-17 RX ORDER — LEVETIRACETAM 5 MG/ML
500 INJECTION INTRAVASCULAR EVERY 12 HOURS SCHEDULED
Status: DISCONTINUED | OUTPATIENT
Start: 2022-11-17 | End: 2022-11-17

## 2022-11-17 RX ORDER — ASPIRIN 300 MG/1
300 SUPPOSITORY RECTAL DAILY
Status: DISCONTINUED | OUTPATIENT
Start: 2022-11-17 | End: 2022-11-19

## 2022-11-17 RX ORDER — MAGNESIUM SULFATE 1 G/100ML
1 INJECTION INTRAVENOUS ONCE
Status: COMPLETED | OUTPATIENT
Start: 2022-11-17 | End: 2022-11-17

## 2022-11-17 RX ORDER — LIDOCAINE 50 MG/G
1 PATCH TOPICAL EVERY 24 HOURS
Status: ON HOLD | COMMUNITY
End: 2022-11-17

## 2022-11-17 RX ORDER — ATORVASTATIN CALCIUM 10 MG/1
20 TABLET, FILM COATED ORAL NIGHTLY
Status: DISCONTINUED | OUTPATIENT
Start: 2022-11-17 | End: 2022-11-21 | Stop reason: HOSPADM

## 2022-11-17 RX ORDER — CYANOCOBALAMIN 1000 UG/ML
1000 INJECTION, SOLUTION INTRAMUSCULAR; SUBCUTANEOUS ONCE
Status: COMPLETED | OUTPATIENT
Start: 2022-11-17 | End: 2022-11-17

## 2022-11-17 RX ORDER — ONDANSETRON 4 MG/1
4 TABLET, ORALLY DISINTEGRATING ORAL 3 TIMES DAILY PRN
Status: ON HOLD | COMMUNITY
End: 2022-11-17

## 2022-11-17 RX ORDER — ACETAMINOPHEN 325 MG/1
650 TABLET ORAL EVERY 4 HOURS PRN
Status: DISCONTINUED | OUTPATIENT
Start: 2022-11-17 | End: 2022-11-21 | Stop reason: HOSPADM

## 2022-11-17 RX ORDER — HYDROCODONE BITARTRATE AND ACETAMINOPHEN 7.5; 325 MG/1; MG/1
1 TABLET ORAL EVERY 6 HOURS PRN
COMMUNITY

## 2022-11-17 RX ORDER — SODIUM CHLORIDE 0.9 % (FLUSH) 0.9 %
10 SYRINGE (ML) INJECTION EVERY 12 HOURS SCHEDULED
Status: DISCONTINUED | OUTPATIENT
Start: 2022-11-17 | End: 2022-11-21 | Stop reason: HOSPADM

## 2022-11-17 RX ORDER — DEXTROSE MONOHYDRATE 25 G/50ML
25 INJECTION, SOLUTION INTRAVENOUS
Status: DISCONTINUED | OUTPATIENT
Start: 2022-11-17 | End: 2022-11-21 | Stop reason: HOSPADM

## 2022-11-17 RX ORDER — ACETAMINOPHEN 650 MG/1
650 SUPPOSITORY RECTAL EVERY 4 HOURS PRN
Status: DISCONTINUED | OUTPATIENT
Start: 2022-11-17 | End: 2022-11-21 | Stop reason: HOSPADM

## 2022-11-17 RX ORDER — ASPIRIN 81 MG/1
81 TABLET, CHEWABLE ORAL DAILY
Status: DISCONTINUED | OUTPATIENT
Start: 2022-11-17 | End: 2022-11-19

## 2022-11-17 RX ORDER — INSULIN LISPRO 100 [IU]/ML
2-7 INJECTION, SOLUTION INTRAVENOUS; SUBCUTANEOUS
Status: DISCONTINUED | OUTPATIENT
Start: 2022-11-17 | End: 2022-11-21 | Stop reason: HOSPADM

## 2022-11-17 RX ORDER — LEVETIRACETAM 5 MG/ML
500 INJECTION INTRAVASCULAR EVERY 12 HOURS
Status: DISCONTINUED | OUTPATIENT
Start: 2022-11-17 | End: 2022-11-19

## 2022-11-17 RX ORDER — NICOTINE POLACRILEX 4 MG
15 LOZENGE BUCCAL
Status: DISCONTINUED | OUTPATIENT
Start: 2022-11-17 | End: 2022-11-21 | Stop reason: HOSPADM

## 2022-11-17 RX ORDER — ONDANSETRON 2 MG/ML
4 INJECTION INTRAMUSCULAR; INTRAVENOUS EVERY 6 HOURS PRN
Status: DISCONTINUED | OUTPATIENT
Start: 2022-11-17 | End: 2022-11-21 | Stop reason: HOSPADM

## 2022-11-17 RX ORDER — SODIUM CHLORIDE 0.9 % (FLUSH) 0.9 %
10 SYRINGE (ML) INJECTION AS NEEDED
Status: DISCONTINUED | OUTPATIENT
Start: 2022-11-17 | End: 2022-11-21 | Stop reason: HOSPADM

## 2022-11-17 RX ORDER — ERGOCALCIFEROL 1.25 MG/1
50000 CAPSULE ORAL WEEKLY
COMMUNITY

## 2022-11-17 RX ORDER — ASPIRIN 81 MG/1
81 TABLET ORAL DAILY
Status: DISCONTINUED | OUTPATIENT
Start: 2022-11-18 | End: 2022-11-17

## 2022-11-17 RX ORDER — FOLIC ACID 1 MG/1
1 TABLET ORAL DAILY
Status: DISCONTINUED | OUTPATIENT
Start: 2022-11-17 | End: 2022-11-17

## 2022-11-17 RX ADMIN — LEVETIRACETAM 500 MG: 5 INJECTION INTRAVASCULAR at 16:28

## 2022-11-17 RX ADMIN — ATORVASTATIN CALCIUM 20 MG: 10 TABLET, FILM COATED ORAL at 20:14

## 2022-11-17 RX ADMIN — ACETAMINOPHEN 650 MG: 325 TABLET, FILM COATED ORAL at 21:03

## 2022-11-17 RX ADMIN — CYANOCOBALAMIN 1000 MCG: 1000 INJECTION, SOLUTION INTRAMUSCULAR at 19:11

## 2022-11-17 RX ADMIN — ACETAMINOPHEN 650 MG: 325 TABLET, FILM COATED ORAL at 17:26

## 2022-11-17 RX ADMIN — MAGNESIUM SULFATE 1 G: 1 INJECTION INTRAVENOUS at 11:01

## 2022-11-17 RX ADMIN — MAGNESIUM SULFATE HEPTAHYDRATE 100 ML/HR: 500 INJECTION, SOLUTION INTRAMUSCULAR; INTRAVENOUS at 17:32

## 2022-11-17 RX ADMIN — ASPIRIN 300 MG: 300 SUPPOSITORY RECTAL at 15:54

## 2022-11-17 RX ADMIN — Medication 10 ML: at 20:14

## 2022-11-17 NOTE — TELEPHONE ENCOUNTER
Care Transitions Initial Follow Up Call    Outreach made within 2 business days of discharge: Yes    Patient: Aleksandar Smart Patient : 1945   MRN: [id number]  Reason for Admission: There are no discharge diagnoses documented for the most recent discharge. Discharge Date: 11/15/22      Spoke with: to spouse. Pt is back in [facility] he said to cancel their appts next week.     Discharge department/facility: [facility]        Scheduled appointment with PCP within 7-14 days    Follow Up  Future Appointments   Date Time Provider Jada Menjivar   2022  2:15 PM Julane Fothergill, APRN [facility]   2022  8:00 AM DO Myra Heck [facility]   2023  3:00 PM NICOLE Pathak DO [address], [place]

## 2022-11-17 NOTE — THERAPY EVALUATION
"Acute Care - Speech Language Pathology   Swallow Initial Evaluation [facility]     Patient Name: Concepcion Velez  : 1945  MRN: [id number]  Today's Date: 2022               Admit Date: 2022  SPEECH-LANGUAGE PATHOLOGY EVALUATION - SWALLOW  Subjective: The patient was seen on this date for a Clinical Swallow evaluation.  Patient was alert and cooperative. Patient presents confused. Conversational upon initially coming into room but given directed tasks or questions patient presented with delayed responses and perseverations. Rated head pain a 10/10; RN Halie made aware. Patient had an instance of \"zoning out\" for approximately 15 seconds. During that time she did not blink to threat, follow commands, or verbalize.   Primary problem: Confusion, slurred speech, L arm/leg weakness, imaging with multiple infarcts.  Objective: Textures given included thin liquid, nectar thick liquid, honey thick liquid, puree consistency, and regular consistency.  Assessment: Difficulties were noted with none of the above consistencies.  Observations: Patient had difficulty holding head up to midline. She completed all trials with no overt s/s of aspiration. Functional chew given regular solids.   SLP Findings:  Patient presents with functional swallow, without esophageal component.   Recommendations: Diet Textures: thin liquid, regular consistency food.  Medications should be taken whole with thin liquids. May have water and ice between meals after oral care, under staff or family supervision and with the recommended strategies for safe swallowing.   Recommended Strategies: Upright for PO and small bites and sips. Oral care before breakfast, after all meals and PRN.  Other Recommended Evaluations: Speech-Language Evaluation and Cognitive-Linguistic Evaluation  as patient is not at her typical baseline. Per family report she typically cares for her young great grandchild and is fully independent at home. Patient not safe " from a cognitive standpoint for return home at this point in time without supervision. SLP will follow up to complete s/l/cognitive evaluation.   Dysphagia therapy is not recommended.  Alona PAUL Bermudez, MS CCC-SLP 11/17/2022 15:01 CST    Visit Dx:     ICD-10-CM ICD-9-CM   1. Cerebrovascular accident (CVA), unspecified mechanism (HCC)  I63.9 434.91   2. Altered mental status, unspecified altered mental status type  R41.82 780.97   3. Dysphagia, unspecified type  R13.10 787.20     Patient Active Problem List   Diagnosis   • Tachycardia   • Cerebellar infarct (HCC)   • Uncontrolled hypertension   • Toxic metabolic encephalopathy   • Type 2 diabetes mellitus with hyperglycemia, without long-term current use of insulin (HCC)   • Cerebrovascular accident (CVA), unspecified mechanism (HCC)   • CVA (cerebral vascular accident) (HCC)   • Diarrhea   • Leukocytosis   • Hypomagnesemia   • Hyponatremia     Past Medical History:   Diagnosis Date   • Abdominal wall seroma    • B12 deficiency    • Cerebral infarct (HCC)    • Diabetes mellitus (HCC)    • HLD (hyperlipidemia)    • Hypertension    • Hypokalemia    • Hypomagnesemia      Past Surgical History:   Procedure Laterality Date   • CHOLECYSTECTOMY     • HERNIA REPAIR     • HYSTERECTOMY         SLP Recommendation and Plan  SLP Swallowing Diagnosis: swallow WFL/no suspected pharyngeal impairment (11/17/22 1338)  SLP Diet Recommendation: regular textures, thin liquids (11/17/22 1338)  Recommended Precautions and Strategies: upright posture during/after eating, small bites of food and sips of liquid, general aspiration precautions, fatigue precautions (11/17/22 1338)  SLP Rec. for Method of Medication Administration: meds whole, as tolerated (11/17/22 1338)     Monitor for Signs of Aspiration: yes, notify SLP if any concerns (11/17/22 1338)  Recommended Diagnostics: SLE/Cog/Motor Speech Evaluation (11/17/22 1338)  Swallow Criteria for Skilled Therapeutic Interventions Met: no  problems identified which require skilled intervention (11/17/22 1338)  Anticipated Discharge Disposition (SLP): unknown (11/17/22 1338)     Therapy Frequency (Swallow): evaluation only (11/17/22 1338)  Predicted Duration Therapy Intervention (Days): until discharge (11/17/22 1338)                                        Plan of Care Reviewed With: patient, daughter, caregiver (UMAIR ROSAS)  Progress: no change (Initial Evaluation)      SWALLOW EVALUATION (last 72 hours)     SLP Adult Swallow Evaluation     Row Name 11/17/22 1338                   Rehab Evaluation    Document Type evaluation  -MG        Subjective Information no complaints  -MG        Patient Observations alert;cooperative;agree to therapy  -MG        Patient/Family/Caregiver Comments/Observations 3 daughters present.  -MG        Patient Effort good  -MG        Symptoms Noted During/After Treatment none  -MG           General Information    Patient Profile Reviewed yes  -MG        Pertinent History Of Current Problem Primary problem: Confusion, slurred speech, L arm/leg weakness, imaging with multiple infarcts.  -MG        Current Method of Nutrition NPO  -MG        Precautions/Limitations, Vision WFL;for purposes of eval  -MG        Precautions/Limitations, Hearing WFL;for purposes of eval  -MG        Prior Level of Function-Communication WFL  -MG        Prior Level of Function-Swallowing no diet consistency restrictions  -MG        Plans/Goals Discussed with patient and family;other (see comments);agreed upon  UMAIR ROSAS  -MG        Barriers to Rehab none identified  -MG        Patient's Goals for Discharge return to all previous roles/activities  -MG        Family Goals for Discharge patient able to provide self-care independently;patient able to return to all previous activities/roles  Rehab for home  -MG           Pain    Additional Documentation Pain Scale: FACES Pre/Post-Treatment (Group)  -MG           Pain Scale: FACES Pre/Post-Treatment     Pain: FACES Scale, Pretreatment 0-->no hurt  -MG        Posttreatment Pain Rating 0-->no hurt  -MG           Oral Motor Structure and Function    Dentition Assessment natural, present and adequate  -MG        Secretion Management WNL/WFL  -MG        Mucosal Quality moist, healthy  -MG        Volitional Swallow WFL  -MG        Volitional Cough WFL  -MG           Oral Musculature and Cranial Nerve Assessment    Oral Motor General Assessment WFL  -MG           General Eating/Swallowing Observations    Respiratory Support Currently in Use room air  -MG        Eating/Swallowing Skills fed by SLP  -MG        Positioning During Eating upright in bed  -MG        Utensils Used spoon;straw  -MG        Consistencies Trialed pureed;honey-thick liquids;nectar/syrup-thick liquids;thin liquids;regular textures  -MG           Clinical Swallow Eval    Oral Prep Phase WFL  -MG        Oral Transit WFL  -MG        Oral Residue WFL  -MG        Pharyngeal Phase no overt signs/symptoms of pharyngeal impairment  -MG        Esophageal Phase unremarkable  -MG        Clinical Swallow Evaluation Summary see note  -MG           SLP Evaluation Clinical Impression    SLP Swallowing Diagnosis swallow WFL/no suspected pharyngeal impairment  -MG        Functional Impact no impact on function  -MG        Swallow Criteria for Skilled Therapeutic Interventions Met no problems identified which require skilled intervention  -MG           Recommendations    Therapy Frequency (Swallow) evaluation only  -MG        Predicted Duration Therapy Intervention (Days) until discharge  -MG        SLP Diet Recommendation regular textures;thin liquids  -MG        Recommended Diagnostics SLE/Cog/Motor Speech Evaluation  -MG        Recommended Precautions and Strategies upright posture during/after eating;small bites of food and sips of liquid;general aspiration precautions;fatigue precautions  -MG        Oral Care Recommendations Oral Care BID/PRN;Toothbrush  -MG         SLP Rec. for Method of Medication Administration meds whole;as tolerated  -MG        Monitor for Signs of Aspiration yes;notify SLP if any concerns  -MG        Anticipated Discharge Disposition (SLP) unknown  -MG              User Key  (r) = Recorded By, (t) = Taken By, (c) = Cosigned By    Initials Name Effective Dates    Alona Guevara MS CCC-SLP 08/12/22 -                 EDUCATION  The patient has been educated in the following areas:   Dysphagia (Swallowing Impairment) Oral Care/Hydration.              Time Calculation:    Time Calculation- SLP     Row Name 11/17/22 1500             Time Calculation- SLP    SLP Start Time 1338  -MG      SLP Stop Time 1501  -MG      SLP Time Calculation (min) 83 min  -MG      SLP Received On 11/17/22  -MG      SLP Goal Re-Cert Due Date 11/27/22  -MG         Untimed Charges    SLP Eval/Re-eval  ST Eval Oral Pharyng Swallow - 26736  -MG      [id number]-DF Eval Oral Pharyng Swallow Minutes 83  -MG         Total Minutes    Untimed Charges Total Minutes 83  -MG       Total Minutes 83  -MG            User Key  (r) = Recorded By, (t) = Taken By, (c) = Cosigned By    Initials Name Provider Type    Alona Guevara MS CCC-SLP Speech and Language Pathologist                Therapy Charges for Today     Code Description Service Date Service Provider Modifiers Qty    [id number] HC ST EVAL ORAL PHARYNG SWALLOW 6 11/17/2022 Alona Bermudez MS CCC-SLP GN 1               Alona Bermudez MS CCC-SLP  11/17/2022

## 2022-11-17 NOTE — ED PROVIDER NOTES
Subjective   History of Present Illness  77-year-old lady was brought to the ED with left arm and left leg weakness.  Last time known well is not clearly defined at 10 PM yesterday one of her daughter noted that she was weak in her left leg and left arm.  In the morning the patient had got up and vomited and defecated everywhere and was appears somewhat confused subsequently brought to the ER or our the ER the patient awake and alert knows where she is.  And answering questions appropriately no speech impediment is noted the patient is weak in the left arm and left leg.  The patient's family at the bedside states that she has left arm weakness and numbness on her prior admission in the hospital and she was discharged home she was able to ambulate which she not been able to do today.  The family is upset at the neurological service for letting her go home early.  They want to be placed in a nursing home today.  She is not a tPA candidate I have discussed this case with on-call neurology recommendation was to get a CT and MR.    Extremity Weakness  Location:  Left arm and left leg weakness  Severity:  Moderate  Onset quality:  Gradual  Timing:  Constant  Chronicity:  Recurrent  Associated symptoms: no abdominal pain, no chest pain, no congestion, no cough, no diarrhea, no ear pain, no fever, no headaches, no loss of consciousness, no myalgias, no rhinorrhea, no shortness of breath, no vomiting and no wheezing        Review of Systems   Constitutional: Negative.  Negative for fever.   HENT: Negative.  Negative for congestion, ear pain and rhinorrhea.    Eyes: Negative.    Respiratory: Negative.  Negative for cough, shortness of breath and wheezing.    Cardiovascular: Negative.  Negative for chest pain.   Gastrointestinal: Negative.  Negative for abdominal pain, diarrhea and vomiting.   Musculoskeletal: Positive for extremity weakness. Negative for back pain, myalgias and neck pain.   Skin: Negative.    Neurological:  Negative for loss of consciousness and headaches.   All other systems reviewed and are negative.      Past Medical History:   Diagnosis Date   • Abdominal wall seroma    • B12 deficiency    • Cerebral infarct (HCC)    • Diabetes mellitus (HCC)    • HLD (hyperlipidemia)    • Hypertension    • Hypokalemia    • Hypomagnesemia        No Known Allergies    Past Surgical History:   Procedure Laterality Date   • CHOLECYSTECTOMY     • HERNIA REPAIR     • HYSTERECTOMY         Family History   Problem Relation Age of Onset   • Cancer Mother    • Hypertension Mother    • Transient ischemic attack Father        Social History     Socioeconomic History   • Marital status:    Tobacco Use   • Smoking status: Never   Substance and Sexual Activity   • Alcohol use: Never   • Drug use: Never           Objective   Physical Exam  Vitals and nursing note reviewed. Exam conducted with a chaperone present.   Constitutional:       General: She is awake. She is not in acute distress.     Appearance: Normal appearance. She is well-developed. She is not toxic-appearing or diaphoretic.   HENT:      Head: Normocephalic and atraumatic.      Mouth/Throat:      Mouth: Mucous membranes are moist.      Pharynx: Oropharynx is clear.   Eyes:      General: Lids are normal. Lids are everted, no foreign bodies appreciated. No visual field deficit.     Pupils: Pupils are equal, round, and reactive to light.   Neck:      Thyroid: No thyromegaly.      Vascular: Normal carotid pulses. No carotid bruit or JVD.      Trachea: Trachea and phonation normal. No tracheal tenderness or tracheal deviation.      Meningeal: Brudzinski's sign and Kernig's sign absent.   Cardiovascular:      Rate and Rhythm: Normal rate and regular rhythm.      Pulses: Normal pulses.      Heart sounds: Normal heart sounds.   Pulmonary:      Effort: Pulmonary effort is normal. No tachypnea or respiratory distress.      Breath sounds: Normal breath sounds. No stridor.   Abdominal:       General: Abdomen is flat. Bowel sounds are normal. There is no distension.      Palpations: Abdomen is soft. There is no mass.      Tenderness: There is no abdominal tenderness. There is no guarding.   Musculoskeletal:         General: Normal range of motion.      Cervical back: Full passive range of motion without pain, normal range of motion and neck supple. No rigidity.   Skin:     General: Skin is warm and dry.      Capillary Refill: Capillary refill takes less than 2 seconds.      Coloration: Skin is not pale.      Nails: There is no clubbing.   Neurological:      General: No focal deficit present.      Mental Status: She is alert and oriented to person, place, and time. Mental status is at baseline. She is confused. She is not disoriented.      GCS: GCS eye subscore is 4. GCS verbal subscore is 5. GCS motor subscore is 6.      Cranial Nerves: Cranial nerves are intact and 2-12 are intact. No cranial nerve deficit, dysarthria or facial asymmetry.      Sensory: Sensation is intact. No sensory deficit.      Motor: Weakness and abnormal muscle tone present.      Coordination: Coordination is intact. Coordination normal.      Deep Tendon Reflexes: Reflexes are normal and symmetric. Reflexes normal. Babinski sign absent on the right side. Babinski sign absent on the left side.      Reflex Scores:       Bicep reflexes are 2+ on the right side and 2+ on the left side.       Patellar reflexes are 2+ on the right side and 2+ on the left side.     Comments: Patient not able to walk because of weakness in the left leg.  Patient has flaccid paralysis of left arm and left leg.   Psychiatric:         Behavior: Behavior is cooperative.         Procedures           ED Course  ED Course as of 11/17/22 1147   Thu Nov 17, 2022   0718 Case discussed with Dr. Garcia [TS]   1057 Patient came in with left-sided weakness her MRI shows worsening CVAs.  This has been discussed with neurology the patient will be admitted to the  medicine service for further evaluation. [TS]   1144 Patient has got weakness and some confusion.  Lab work-up shows a white count of 15,000 and rest the chemistry within normal limits urinalysis is essentially unremarkable with a slightly low magnesium which was replaced.  CT of the head is negative MRI is consistent with strokes [TS]   1145 .. Previously noted foci of diffusion restriction within the right  cerebellar hemisphere are more prominent on the current exam. There are  also now noted to be sites of diffusion restriction within the left  cerebellar hemisphere with associated ADC mapping abnormality consistent  with acute bilateral cerebellar hemisphere infarcts. These are  nonhemorrhagic. There is also a focus of diffusion abnormality within  the right posterior parietal/occipital lobe involving the cortex with  associated ADC mapping abnormality suggesting an additional site of  cortical infarct. A focus of more indeterminate diffusion restriction  within the right frontal vertex involving a gyrus is also present not  appreciated on the previous exam. [TS]   1145 Discussed with the family patient had multiple strokes.  Discussed this with neurology and hospitalist will admit [TS]      ED Course User Index  [TS] Ovidio Coley MD                                           MDM  Number of Diagnoses or Management Options  Diagnosis management comments: Left-sided weakness with no speech impediment no cranial nerve involvement.  This could be postictal paralysis versus variant migraine.  On her last admission she was diagnosed as migraine and was placed on Keppra because of atypical  EEG       Amount and/or Complexity of Data Reviewed  Clinical lab tests: ordered and reviewed  Tests in the radiology section of CPT®: ordered and reviewed  Tests in the medicine section of CPT®: reviewed and ordered    Risk of Complications, Morbidity, and/or Mortality  Presenting problems: moderate  Diagnostic procedures:  moderate  Management options: moderate        Final diagnoses:   Cerebrovascular accident (CVA), unspecified mechanism (HCC)   Altered mental status, unspecified altered mental status type       ED Disposition  ED Disposition     ED Disposition   Decision to Admit    Condition   --    Comment   Level of Care: Telemetry [5]   Diagnosis: Cerebrovascular accident (CVA), unspecified mechanism (HCC) [[id number]]   Admitting Physician: NINI WASHINGOTN [[id number]]   Attending Physician: NINI WASHINGTON [[id number]]   Certification: I Certify That Inpatient Hospital Services Are Medically Necessary For Greater Than 2 Midnights               No follow-up provider specified.       Medication List      No changes were made to your prescriptions during this visit.          Ovidio Coley MD  11/17/22 0954       Ovidio Coley MD  11/17/22 1149

## 2022-11-17 NOTE — CASE MANAGEMENT/SOCIAL WORK
Continued Stay Note   [facility]     Patient Name: Concepcion Velez  MRN: [id number]  Today's Date: 11/17/2022    Admit Date: 11/17/2022    Plan: Referral to [facility]   Discharge Plan     Row Name 11/17/22 1509       Plan    Plan Referral to [facility]    Plan Comments Patient/daughter would like referral made to [facility]. ST notes in but PT/OT evals are pending. CARLO faxed referral to Kathia Patel with [facility] and will add PT/OT notes when available.               ASHISH Edwards

## 2022-11-17 NOTE — H&P
[facility] Medicine Services  HISTORY AND PHYSICAL    Date of Admission: 11/17/2022  Primary Care Physician: Jaxson Vines DO    Subjective     Chief Complaint: Left-sided weakness    History of Present Illness  Patient is a 77-year-old female with a history of hypertension, hyperlipidemia, diabetes who was just recently hospitalized from 11/14 through 11/15 with a headache and concerns for possible complicated migraine versus seizure.  There was reported imaging finding of stroke at that time but felt to be incidental finding per reports.  She was discharged home on p.o. Omnicef for unknown reason.  Her chest x-ray was clear.  Her urine was clear.  She did have mild white count elevation of 14,000.  No clear signs of infection.  Since discharge home she been having some GI symptoms.  Nausea, vomiting, diarrhea.  Daughter state profuse diarrhea for several days.  At least 4+ episodes daily without noted bleeding.  She apparently went to bed fine last night around 10 PM and this morning family found her in urine and fecal material with left-sided weakness.  Not a tPA candidate given timing.  She does also seem to be improving already in the ER.  She is able to talk and is alert and oriented.  She says she feels okay at this time.  Only complaint is of ongoing headache but denies any changes in vision or photophobia.  No double vision.  No blurred vision.  Denies any chest pain or shortness of breath.  No current nausea or abdominal pain.  MRI in the ER done prior to admission already showing previous noted area of question with several new bilateral areas of stroke evident.  No hemorrhagic conversion.  No midline shift.  We have been asked to admit for further care.        Review of Systems   Otherwise complete ROS reviewed and negative except as mentioned in the HPI.    Past Medical History:   Past Medical History:   Diagnosis Date   • Abdominal wall seroma    • B12  deficiency    • Cerebral infarct (HCC)    • Diabetes mellitus (HCC)    • HLD (hyperlipidemia)    • Hypertension    • Hypokalemia    • Hypomagnesemia      Past Surgical History:  Past Surgical History:   Procedure Laterality Date   • CHOLECYSTECTOMY     • HERNIA REPAIR     • HYSTERECTOMY       Social History:  reports that she has never smoked. She does not have any smokeless tobacco history on file. She reports that she does not drink alcohol and does not use drugs.    Family History: family history includes Cancer in her mother; Hypertension in her mother; Transient ischemic attack in her father.       Allergies:  No Known Allergies    Medications:  Prior to Admission medications    Medication Sig Start Date End Date Taking? Authorizing Provider   allopurinol (ZYLOPRIM) 300 MG tablet Take 1 tablet by mouth Daily.    ProviderSilver MD   aspirin 81 MG chewable tablet Chew 1 tablet Daily.    ProviderSilver MD   atorvastatin (LIPITOR) 20 MG tablet Take 1 tablet by mouth Daily.    Silver Babin MD   cefdinir (OMNICEF) 300 MG capsule Take 1 capsule by mouth Every 12 (Twelve) Hours for 13 doses. Indications: Upper Respiratory Tract Infection 11/15/22 11/22/22  Abhishek Kohli DO   levETIRAcetam (KEPPRA) 500 MG tablet Take 1 tablet by mouth 2 (Two) Times a Day. 11/15/22   Elie Lim MD   lisinopril (PRINIVIL,ZESTRIL) 20 MG tablet Take 1 tablet by mouth Daily.    ProviderSilver MD   magnesium oxide (MAG-OX) 400 MG tablet Take 1 tablet by mouth Daily.    Silver Babin MD   meloxicam (MOBIC) 15 MG tablet Take 1 tablet by mouth Daily.    ProviderSilver MD   metFORMIN (GLUCOPHAGE) 1000 MG tablet Take 1 tablet by mouth 2 (Two) Times a Day With Meals.    Silver Babin MD   metoprolol succinate XL (Toprol XL) 50 MG 24 hr tablet Take 1 tablet by mouth Daily. 11/15/22   Abhishek Kohli DO   potassium chloride (K-DUR,KLOR-CON) 20 MEQ CR tablet Take 1 tablet by  "mouth Daily.    Provider, MD Silver     I have utilized all available immediate resources to obtain, update, and review the patient's current medications.    Objective     Vital Signs: /66   Pulse 93   Temp 97.9 °F (36.6 °C)   Resp 16   Ht 152.4 cm (60\")   Wt 59.9 kg (132 lb)   LMP  (LMP Unknown)   SpO2 98%   BMI 25.78 kg/m²   Physical Exam   GEN: Awake, alert, interactive, in NAD  HEENT: PERRLA, EOMI, Anicteric, Trachea midline  Lungs:  no wheezing/rales/rhonchi  Heart: RRR, +S1/s2, no rub  ABD: soft, nt/nd, +BS, no guarding/rebound  Extremities: atraumatic, no cyanosis, no pitting edema  Skin: no rashes or petechiae   Neuro: AAOx3, slight left sided weakness vs R, greater noticed in distal LE, gait not tested  Psych: somewhat of a flat affect        Results Reviewed:  Lab Results (last 24 hours)     Procedure Component Value Units Date/Time    Manual Differential [[id number]]  (Abnormal) Collected: 11/17/22 0847    Specimen: Blood Updated: 11/17/22 0934     Neutrophil % 74.2 %      Lymphocyte % 8.2 %      Monocyte % 8.2 %      Bands %  5.2 %      Atypical Lymphocyte % 4.1 %      Neutrophils Absolute 12.55 10*3/mm3      Lymphocytes Absolute 1.94 10*3/mm3      Monocytes Absolute 1.30 10*3/mm3      RBC Morphology Normal     Vacuolated Neutrophils Slight/1+     Platelet Morphology Normal    CBC & Differential [[id number]]  (Abnormal) Collected: 11/17/22 0847    Specimen: Blood Updated: 11/17/22 0934    Narrative:      The following orders were created for panel order CBC & Differential.  Procedure                               Abnormality         Status                     ---------                               -----------         ------                     CBC Auto Differential[[id number]]        Abnormal            Final result                 Please view results for these tests on the individual orders.    CBC Auto Differential [[id number]]  (Abnormal) Collected: 11/17/22 0847    Specimen: Blood " Updated: 11/17/22 0934     WBC 15.81 10*3/mm3      RBC 3.73 10*6/mm3      Hemoglobin 10.6 g/dL      Hematocrit 34.3 %      MCV 92.0 fL      MCH 28.4 pg      MCHC 30.9 g/dL      RDW 13.8 %      RDW-SD 46.5 fl      MPV 10.7 fL      Platelets 275 10*3/mm3     Narrative:      The previously reported component NRBC is no longer being reported. Previous result was 0.0 /100 WBC (Reference Range: 0.0-0.2 /100 WBC) on 11/17/2022 at 0905 CST.    Basic Metabolic Panel [[id number]]  (Abnormal) Collected: 11/17/22 0847    Specimen: Blood Updated: 11/17/22 0922     Glucose 136 mg/dL      BUN 13 mg/dL      Creatinine 0.59 mg/dL      Sodium 132 mmol/L      Potassium 3.6 mmol/L      Chloride 94 mmol/L      CO2 27.0 mmol/L      Calcium 9.6 mg/dL      BUN/Creatinine Ratio 22.0     Anion Gap 11.0 mmol/L      eGFR 94.1 mL/min/1.73      Comment: National Kidney Foundation and American Society of Nephrology (ASN) Task Force recommended calculation based on the Chronic Kidney Disease Epidemiology Collaboration (CKD-EPI) equation refit without adjustment for race.       Narrative:      GFR Normal >60  Chronic Kidney Disease <60  Kidney Failure <15    The GFR formula is only valid for adults with stable renal function between ages 18 and 70.    CK [[id number]]  (Normal) Collected: 11/17/22 0847    Specimen: Blood Updated: 11/17/22 0921     Creatine Kinase 31 U/L     Lactic Acid, Plasma [[id number]]  (Normal) Collected: 11/17/22 0847    Specimen: Blood Updated: 11/17/22 0918     Lactate 1.6 mmol/L     Magnesium [[id number]]  (Abnormal) Collected: 11/17/22 0847    Specimen: Blood Updated: 11/17/22 0916     Magnesium 1.4 mg/dL     Urinalysis With Culture If Indicated - Urine, Catheter [[id number]]  (Abnormal) Collected: 11/17/22 0847    Specimen: Urine, Catheter Updated: 11/17/22 0905     Color, UA Yellow     Appearance, UA Clear     pH, UA 7.0     Specific Gravity, UA 1.012     Glucose, UA Negative     Ketones, UA Negative     Bilirubin, UA  Negative     Blood, UA Negative     Protein, UA Negative     Leuk Esterase, UA Trace     Nitrite, UA Negative     Urobilinogen, UA 0.2 E.U./dL    Narrative:      In absence of clinical symptoms, the presence of pyuria, bacteria, and/or nitrites on the urinalysis result does not correlate with infection.    Urinalysis, Microscopic Only - Urine, Catheter [[id number]]  (Abnormal) Collected: 11/17/22 0847    Specimen: Urine, Catheter Updated: 11/17/22 0905     RBC, UA None Seen /HPF      WBC, UA 3-5 /HPF      Comment: Urine culture not indicated.        Bacteria, UA None Seen /HPF      Squamous Epithelial Cells, UA 3-6 /HPF      Hyaline Casts, UA 0-2 /LPF      Methodology Automated Microscopy        Imaging Results (Last 24 Hours)     Procedure Component Value Units Date/Time    MRI Brain Without Contrast [[id number]] Collected: 11/17/22 0939     Updated: 11/17/22 0952    Narrative:      EXAMINATION: MRI brain without contrast 11/17/2022     HISTORY: Transient ischemic attack. Stroke follow-up     FINDINGS: Today's exam is compared to a previous study of 3 days  earlier. Multiplanar fast spin echo imaging sequences were obtained of  the brain on a high-field magnet without gadolinium enhancement.     There are peripheral sites of diffusion restriction within both  cerebellar hemispheres showing progression from the previous  examination. The left cerebellar hemisphere lesions are new from the  prior study. There are associated ADC mapping abnormalities consistent  with acute ischemic infarction. No evidence of hemorrhagic conversion.  There is no mass effect. There is also a peripheral diffusion  restriction within the right posterior parietal/occipital lobe with  associated ADC mapping abnormality also new from the previous exam  suggesting a small focus of cortical infarction. A more equivocal focus  of diffusion restriction within the right frontal lobe gyrus on image  192 of series 204 is present.     There is mild  atrophy the brain. Small vessel ischemic changes are noted  involving the periventricular and higher white matter tracts. There is  normal flow-voids within the [place].     The orbits are unremarkable.       Impression:      1.. Previously noted foci of diffusion restriction within the right  cerebellar hemisphere are more prominent on the current exam. There are  also now noted to be sites of diffusion restriction within the left  cerebellar hemisphere with associated ADC mapping abnormality consistent  with acute bilateral cerebellar hemisphere infarcts. These are  nonhemorrhagic. There is also a focus of diffusion abnormality within  the right posterior parietal/occipital lobe involving the cortex with  associated ADC mapping abnormality suggesting an additional site of  cortical infarct. A focus of more indeterminate diffusion restriction  within the right frontal vertex involving a gyrus is also present not  appreciated on the previous exam.  2. No mass effect or shift of the midline. No evidence of hemorrhagic  conversion. There are normal flow-voids within the [place].  3. Atrophy with small vessel disease involving the periventricular and  higher white matter tracts.  This report was finalized on 11/17/2022 09:49 by Dr. Dominic Valdes MD.    CT Head Without Contrast [[id number]] Collected: 11/17/22 0818     Updated: 11/17/22 0825    Narrative:      EXAMINATION: CT head without contrast 11/17/2022     HISTORY: Neurologic deficit. Stroke suspected.     DOSE: 758 mGycm. All CT scans are performed using dose optimization  techniques as appropriate to the performed exam and including at least  one of the following: Automated exposure control, adjustment of the mA  and/or kV according to size, and the use of the iterative reconstruction  technique..     FINDINGS: Multiple contiguous axial images are obtained from the skull  base to the vertex per protocol without intravenous  contrast-enhancement  with reformatted images obtained in the sagittal and coronal projections  from the original data set.     There is evidence of a remote infarct involving the left cerebellar  hemisphere. There is mild atrophy of the brain. 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     There is no mass, mass effect or shift of the midline. No evidence of  acute infarct or hemorrhage.     The visualized paranasal sinuses and mastoid air cells are normally  aerated.       Impression:      1.. No evidence of acute infarct or hemorrhage. Remote left cerebellar  hemisphere infarct.  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  This report was finalized on 11/17/2022 08:22 by Dr. Dominic Valdes MD.        I have personally reviewed and interpreted the radiology studies and ECG obtained at time of admission.     Assessment / Plan     Assessment:   Active Hospital Problems    Diagnosis    • **Cerebrovascular accident (CVA), unspecified mechanism (HCC)    • Diarrhea    • Leukocytosis    • Hypomagnesemia    • Hyponatremia    • Type 2 diabetes mellitus with hyperglycemia, without long-term current use of insulin (HCC)    • Uncontrolled hypertension      Plan:   #1 CVA -multiple new bilateral strokes.  Fairly high burden.  Unclear source.  No A. fib recent hospital stay.  EKG on arrival is normal sinus.  Last echo with normal EF and no PFO.  Suspect patient will require COLIN to better evaluate for embolic source.  Plan for speech, PT, OT.  Continue with neuro evaluation.  Patient is already on aspirin and statin.    #2 headache -last hospital stay there was concern for possible complicated migraine.  She is still having headache.  Unclear if it is associated with her stroke.  Monitor closely.  Continue neurochecks.  We will discuss further with neurosurgery after their evaluation.  She was started on Keppra last hospital stay due to abnormal EEG to help potentially prevent seizure activity and migraines.  No clear seizure  activity noted to this point.  We will continue Keppra.    #3 hypertension -allow permissive hypertension today, monitor closely    #4 hypomagnesemia -1.4.  Likely in the setting of recent diarrheal illness.  Was given 1 g in the ER.  Monitor and recheck.    #5 diarrhea -patient having diarrhea since discharge.  Was on Omnicef after discharge for unclear reason.  Will DC antibiotic.  White count slightly higher.  We will check a GI PCR and C. difficile.  She has no abdominal tenderness on exam.  She denies any abdominal pain.    #6 leukocytosis -was 14 on last hospital stay with no clear signs of infection.  Urine is also clear on arrival now.  Lungs are clear.  On room air.  Afebrile.  Of note her differentials could higher lymphocyte count.  Neutrophil percentage is normal.  Rule out C. difficile.  Recheck CBC in the morning.  Add a peripheral smear in the morning.    #7 DM2 -sliding scale insulin hypoglycemia protocol.    #8 hyponatremia -mild at 132.  Initially thought patient might be a little dehydrated in the setting of elevated WBC and her GI illness.  However family states she drank 8 bottles of water last night and she twice requested something to drink while I was seeing her in the ER.  We will add urine sodium and osm to her urine already down in lab.  Unclear if she needs fluids or restriction at this point.  We will follow-up.    Code Status/Advanced Care Plan: Full code    The patient's surrogate decision maker is her daughter.     I discussed my findings and recommendations with the patient and several daughters at bedside.  Case also discussed with Dr. Tim Lai of neurology.    Estimated length of stay is 2+ days.     The patient was seen and examined by me on 11/17/2022 at 12:45 PM.    Electronically signed by Yony Dsyon DO, 11/17/22, 13:32 CST.      ADDENDUM:  After discussion with Dr. Lai she feels given the degree of patient's stroke burden and risk for swelling it would be better  to monitor in the ICU tonight.  Transfer order put in for the ICU.  Bedboard notified.    Electronically signed by Yony Dyson DO, 11/17/22, 4:27 PM CST.

## 2022-11-17 NOTE — DISCHARGE PLACEMENT REQUEST
"Call back: Gina Rendon, [phone]  Will fax PT/OT/MD Progress Notes when available    Arlin Velez (77 y.o. Female)     Date of Birth   1945    Social Security Number       Address   [address] [place] [address]    Home Phone   [phone]    MRN   [id number]       Yazdanism   Mandaen    Marital Status                               Admission Date   11/17/22    Admission Type   Emergency    Admitting Provider   Yony Dyson DO    Attending Provider   Yony Dyson DO    Department, Room/Bed   [facility] 3A, 345/1       Discharge Date       Discharge Disposition       Discharge Destination                               Attending Provider: Yony Dyson DO    Allergies: No Known Allergies    Isolation: None   Infection: C.difficile (rule out) (11/17/22)   Code Status: CPR    Ht: 152.4 cm (60\")   Wt: 59.9 kg (132 lb)    Admission Cmt: None   Principal Problem: Cerebrovascular accident (CVA), unspecified mechanism (HCC) [I63.9]                 Active Insurance as of 11/17/2022     Primary Coverage     Payor Plan Insurance Group Employer/Plan Group    ANTHEM MEDICARE REPLACEMENT [employer] MEDICARE ADVANTAGE [id number]     Payor Plan Address Payor Plan Phone Number Payor Plan Fax Number Effective Dates    [address] [phone]  1/1/2022 - None Entered    [facility] [id number]       Subscriber Name Subscriber Birth Date Member ID       ARLIN VELEZ 1945 [id number]                 Emergency Contacts      (Rel.) Home Phone Work Phone Mobile Phone    DulceKaylin (Daughter) -- -- [phone]    Lavelle Velez (Son) -- -- [phone]    Lillie Henson (Daughter) -- -- [phone]    Lucy Yan (Daughter) -- -- [phone]            Insurance Information                ANTHEM MEDICARE REPLACEMENT/ANTHEM MEDICARE ADVANTAGE Phone: [phone]    Subscriber: Arlin Velez Subscriber#: [id number]    Group#: [id number] Precert#: --          History " "& Physical    No notes of this type exist for this encounter.         Physician Progress Notes (most recent note)    No notes of this type exist for this encounter.         Consult Notes (most recent note)    No notes of this type exist for this encounter.         Physical Therapy Notes (most recent note)    No notes exist for this encounter.         Occupational Therapy Notes (most recent note)    No notes exist for this encounter.            Speech Language Pathology Notes (most recent note)      Alona Bermudez MS CCC-SLP at 22 1501          Acute Care - Speech Language Pathology   Swallow Initial Evaluation [facility]     Patient Name: Concepcion Velez  : 1945  MRN: [id number]  Today's Date: 2022               Admit Date: 2022  SPEECH-LANGUAGE PATHOLOGY EVALUATION - SWALLOW  Subjective: The patient was seen on this date for a Clinical Swallow evaluation.  Patient was alert and cooperative. Patient presents confused. Conversational upon initially coming into room but given directed tasks or questions patient presented with delayed responses and perseverations. Rated head pain a 10/10; UMAIR Ambrose made aware. Patient had an instance of \"zoning out\" for approximately 15 seconds. During that time she did not blink to threat, follow commands, or verbalize.   Primary problem: Confusion, slurred speech, L arm/leg weakness, imaging with multiple infarcts.  Objective: Textures given included thin liquid, nectar thick liquid, honey thick liquid, puree consistency, and regular consistency.  Assessment: Difficulties were noted with none of the above consistencies.  Observations: Patient had difficulty holding head up to midline. She completed all trials with no overt s/s of aspiration. Functional chew given regular solids.   SLP Findings:  Patient presents with functional swallow, without esophageal component.   Recommendations: Diet Textures: thin liquid, regular consistency food.  Medications " should be taken whole with thin liquids. May have water and ice between meals after oral care, under staff or family supervision and with the recommended strategies for safe swallowing.   Recommended Strategies: Upright for PO and small bites and sips. Oral care before breakfast, after all meals and PRN.  Other Recommended Evaluations: Speech-Language Evaluation and Cognitive-Linguistic Evaluation  as patient is not at her typical baseline. Per family report she typically cares for her young great grandchild and is fully independent at home. Patient not safe from a cognitive standpoint for return home at this point in time without supervision. SLP will follow up to complete s/l/cognitive evaluation.   Dysphagia therapy is not recommended.  Alona Bermudez, MS CCC-SLP 11/17/2022 15:01 CST    Visit Dx:     ICD-10-CM ICD-9-CM   1. Cerebrovascular accident (CVA), unspecified mechanism (HCC)  I63.9 434.91   2. Altered mental status, unspecified altered mental status type  R41.82 780.97   3. Dysphagia, unspecified type  R13.10 787.20     Patient Active Problem List   Diagnosis   • Tachycardia   • Cerebellar infarct (HCC)   • Uncontrolled hypertension   • Toxic metabolic encephalopathy   • Type 2 diabetes mellitus with hyperglycemia, without long-term current use of insulin (HCC)   • Cerebrovascular accident (CVA), unspecified mechanism (HCC)   • CVA (cerebral vascular accident) (HCC)   • Diarrhea   • Leukocytosis   • Hypomagnesemia   • Hyponatremia     Past Medical History:   Diagnosis Date   • Abdominal wall seroma    • B12 deficiency    • Cerebral infarct (HCC)    • Diabetes mellitus (HCC)    • HLD (hyperlipidemia)    • Hypertension    • Hypokalemia    • Hypomagnesemia      Past Surgical History:   Procedure Laterality Date   • CHOLECYSTECTOMY     • HERNIA REPAIR     • HYSTERECTOMY         SLP Recommendation and Plan  SLP Swallowing Diagnosis: swallow WFL/no suspected pharyngeal impairment (11/17/22 1848)  SLP Diet  Recommendation: regular textures, thin liquids (11/17/22 1338)  Recommended Precautions and Strategies: upright posture during/after eating, small bites of food and sips of liquid, general aspiration precautions, fatigue precautions (11/17/22 1338)  SLP Rec. for Method of Medication Administration: meds whole, as tolerated (11/17/22 1338)     Monitor for Signs of Aspiration: yes, notify SLP if any concerns (11/17/22 1338)  Recommended Diagnostics: SLE/Cog/Motor Speech Evaluation (11/17/22 1338)  Swallow Criteria for Skilled Therapeutic Interventions Met: no problems identified which require skilled intervention (11/17/22 1338)  Anticipated Discharge Disposition (SLP): unknown (11/17/22 1338)     Therapy Frequency (Swallow): evaluation only (11/17/22 1338)  Predicted Duration Therapy Intervention (Days): until discharge (11/17/22 1338)                                        Plan of Care Reviewed With: patient, daughter, caregiver (UMAIR ROSAS)  Progress: no change (Initial Evaluation)      SWALLOW EVALUATION (last 72 hours)     SLP Adult Swallow Evaluation     Row Name 11/17/22 1338                   Rehab Evaluation    Document Type evaluation  -MG        Subjective Information no complaints  -MG        Patient Observations alert;cooperative;agree to therapy  -MG        Patient/Family/Caregiver Comments/Observations 3 daughters present.  -MG        Patient Effort good  -MG        Symptoms Noted During/After Treatment none  -MG           General Information    Patient Profile Reviewed yes  -MG        Pertinent History Of Current Problem Primary problem: Confusion, slurred speech, L arm/leg weakness, imaging with multiple infarcts.  -MG        Current Method of Nutrition NPO  -MG        Precautions/Limitations, Vision WFL;for purposes of eval  -MG        Precautions/Limitations, Hearing WFL;for purposes of eval  -MG        Prior Level of Function-Communication WFL  -MG        Prior Level of Function-Swallowing no diet  consistency restrictions  -MG        Plans/Goals Discussed with patient and family;other (see comments);agreed upon  RN Halie ROSAS  -MG        Barriers to Rehab none identified  -MG        Patient's Goals for Discharge return to all previous roles/activities  -MG        Family Goals for Discharge patient able to provide self-care independently;patient able to return to all previous activities/roles  Rehab for home  -MG           Pain    Additional Documentation Pain Scale: FACES Pre/Post-Treatment (Group)  -MG           Pain Scale: FACES Pre/Post-Treatment    Pain: FACES Scale, Pretreatment 0-->no hurt  -MG        Posttreatment Pain Rating 0-->no hurt  -MG           Oral Motor Structure and Function    Dentition Assessment natural, present and adequate  -MG        Secretion Management WNL/WFL  -MG        Mucosal Quality moist, healthy  -MG        Volitional Swallow WFL  -MG        Volitional Cough WFL  -MG           Oral Musculature and Cranial Nerve Assessment    Oral Motor General Assessment WFL  -MG           General Eating/Swallowing Observations    Respiratory Support Currently in Use room air  -MG        Eating/Swallowing Skills fed by SLP  -MG        Positioning During Eating upright in bed  -MG        Utensils Used spoon;straw  -MG        Consistencies Trialed pureed;honey-thick liquids;nectar/syrup-thick liquids;thin liquids;regular textures  -MG           Clinical Swallow Eval    Oral Prep Phase WFL  -MG        Oral Transit WFL  -MG        Oral Residue WFL  -MG        Pharyngeal Phase no overt signs/symptoms of pharyngeal impairment  -MG        Esophageal Phase unremarkable  -MG        Clinical Swallow Evaluation Summary see note  -MG           SLP Evaluation Clinical Impression    SLP Swallowing Diagnosis swallow WFL/no suspected pharyngeal impairment  -MG        Functional Impact no impact on function  -MG        Swallow Criteria for Skilled Therapeutic Interventions Met no problems identified which require  skilled intervention  -MG           Recommendations    Therapy Frequency (Swallow) evaluation only  -MG        Predicted Duration Therapy Intervention (Days) until discharge  -MG        SLP Diet Recommendation regular textures;thin liquids  -MG        Recommended Diagnostics SLE/Cog/Motor Speech Evaluation  -MG        Recommended Precautions and Strategies upright posture during/after eating;small bites of food and sips of liquid;general aspiration precautions;fatigue precautions  -MG        Oral Care Recommendations Oral Care BID/PRN;Toothbrush  -MG        SLP Rec. for Method of Medication Administration meds whole;as tolerated  -MG        Monitor for Signs of Aspiration yes;notify SLP if any concerns  -MG        Anticipated Discharge Disposition (SLP) unknown  -MG              User Key  (r) = Recorded By, (t) = Taken By, (c) = Cosigned By    Initials Name Effective Dates    Alona Guevara MS CCC-SLP 08/12/22 -                 EDUCATION  The patient has been educated in the following areas:   Dysphagia (Swallowing Impairment) Oral Care/Hydration.              Time Calculation:    Time Calculation- SLP     Row Name 11/17/22 1500             Time Calculation- SLP    SLP Start Time 1338  -MG      SLP Stop Time 1501  -MG      SLP Time Calculation (min) 83 min  -MG      SLP Received On 11/17/22  -MG      SLP Goal Re-Cert Due Date 11/27/22  -MG         Untimed Charges    SLP Eval/Re-eval  ST Eval Oral Pharyng Swallow - 13308  -MG      75043-[id number] Eval Oral Pharyng Swallow Minutes 83  -MG         Total Minutes    Untimed Charges Total Minutes 83  -MG       Total Minutes 83  -MG            User Key  (r) = Recorded By, (t) = Taken By, (c) = Cosigned By    Initials Name Provider Type    Alona Guevara MS CCC-SLP Speech and Language Pathologist                Therapy Charges for Today     Code Description Service Date Service Provider Modifiers Qty    [id number]  ST EVAL ORAL PHARYNG SWALLOW 6 11/17/2022  Alona Bermudez, MS CCC-SLP GN 1               Alona Bermudez MS CCC-SLP  11/17/2022    Electronically signed by Alona Bermudez, MS CCC-SLP at 11/17/22 2923

## 2022-11-17 NOTE — PLAN OF CARE
"Goal Outcome Evaluation:  Plan of Care Reviewed With: patient, daughter, caregiver (UMAIR ROSAS)        Progress: no change (Initial Evaluation)         SPEECH-LANGUAGE PATHOLOGY EVALUATION - SWALLOW  Subjective: The patient was seen on this date for a Clinical Swallow evaluation.  Patient was alert and cooperative. Patient presents confused. Conversational upon initially coming into room but given directed tasks or questions patient presented with delayed responses and perseverations. Rated head pain a 10/10; UMAIR Ambrose made aware. Patient had an instance of \"zoning out\" for approximately 15 seconds. During that time she did not blink to threat, follow commands, or verbalize.   Primary problem: Confusion, slurred speech, L arm/leg weakness, imaging with multiple infarcts.  Objective: Textures given included thin liquid, nectar thick liquid, honey thick liquid, puree consistency, and regular consistency.  Assessment: Difficulties were noted with none of the above consistencies.  Observations: Patient had difficulty holding head up to midline. She completed all trials with no overt s/s of aspiration. Functional chew given regular solids.   SLP Findings:  Patient presents with functional swallow, without esophageal component.   Recommendations: Diet Textures: thin liquid, regular consistency food.  Medications should be taken whole with thin liquids. May have water and ice between meals after oral care, under staff or family supervision and with the recommended strategies for safe swallowing.   Recommended Strategies: Upright for PO and small bites and sips. Oral care before breakfast, after all meals and PRN.  Other Recommended Evaluations: Speech-Language Evaluation and Cognitive-Linguistic Evaluation  as patient is not at her typical baseline. Per family report she typically cares for her young great grandchild and is fully independent at home. Patient not safe from a cognitive standpoint for return home at this point " in time without supervision. SLP will follow up to complete s/l/cognitive evaluation.   Dysphagia therapy is not recommended.

## 2022-11-17 NOTE — THERAPY EVALUATION
Patient Name: Concepcion Velez  : 1945    MRN: [id number]                              Today's Date: 2022       Admit Date: 2022    Visit Dx:     ICD-10-CM ICD-9-CM   1. Cerebrovascular accident (CVA), unspecified mechanism (HCC)  I63.9 434.91   2. Altered mental status, unspecified altered mental status type  R41.82 780.97   3. Dysphagia, unspecified type  R13.10 787.20   4. Impaired mobility and ADLs  Z74.09 V49.89    Z78.9      Patient Active Problem List   Diagnosis   • Tachycardia   • Cerebellar infarct (HCC)   • Uncontrolled hypertension   • Toxic metabolic encephalopathy   • Type 2 diabetes mellitus with hyperglycemia, without long-term current use of insulin (HCC)   • Cerebrovascular accident (CVA), unspecified mechanism (HCC)   • CVA (cerebral vascular accident) (HCC)   • Diarrhea   • Leukocytosis   • Hypomagnesemia   • Hyponatremia     Past Medical History:   Diagnosis Date   • Abdominal wall seroma    • B12 deficiency    • Cerebral infarct (HCC)    • Diabetes mellitus (HCC)    • HLD (hyperlipidemia)    • Hypertension    • Hypokalemia    • Hypomagnesemia      Past Surgical History:   Procedure Laterality Date   • CHOLECYSTECTOMY     • HERNIA REPAIR     • HYSTERECTOMY        General Information     Row Name 22 1404          OT Time and Intention    Document Type evaluation  -JJ     Mode of Treatment occupational therapy  -JJ     Row Name 22 1404          General Information    Patient Profile Reviewed yes  -JJ     Prior Level of Function independent:;all household mobility;transfer;bed mobility;ADL's  prior to recent admit on   -JJ     Existing Precautions/Restrictions fall  -JJ     Barriers to Rehab cognitive status  -JJ     Row Name 22 1404          Living Environment    People in Home spouse  -JJ     Row Name 22 1404          Home Main Entrance    Number of Stairs, Main Entrance five  -JJ     Row Name 22 1404          Stairs Within Home, Primary     Number of Stairs, Within Home, Primary none  -     Stair Railings, Within Home, Primary none  -     Row Name 11/17/22 1404          Cognition    Orientation Status (Cognition) oriented x 4  -     Row Name 11/17/22 1404          Safety Issues, Functional Mobility    Safety Issues Affecting Function (Mobility) ability to follow commands;at risk behavior observed;awareness of need for assistance;impulsivity;friction/shear risk;insight into deficits/self-awareness;judgment;safety precaution awareness;safety precautions follow-through/compliance;problem-solving  -     Impairments Affecting Function (Mobility) balance;endurance/activity tolerance;strength;cognition;visual/perceptual  -     Cognitive Impairments, Mobility Safety/Performance attention;awareness, need for assistance;insight into deficits/self-awareness;impulsivity;problem-solving/reasoning;safety precaution awareness;safety precaution follow-through  -           User Key  (r) = Recorded By, (t) = Taken By, (c) = Cosigned By    Initials Name Provider Type     Sharonda Anderson, EVA/L, CSRS Occupational Therapist                 Mobility/ADL's     Row Name 11/17/22 1404          Bed Mobility    Bed Mobility supine-sit;sit-supine  -     Supine-Sit [place] (Bed Mobility) minimum assist (75% patient effort)  -     Sit-Supine [place] (Bed Mobility) minimum assist (75% patient effort)  -     Bed Mobility, Safety Issues decreased use of arms for pushing/pulling;decreased use of legs for bridging/pushing;cognitive deficits limit understanding  -     Assistive Device (Bed Mobility) head of bed elevated;bed rails  -     Row Name 11/17/22 1404          Transfers    Transfers sit-stand transfer;stand-sit transfer;toilet transfer  -     Row Name 11/17/22 1404          Sit-Stand Transfer    Sit-Stand [place] (Transfers) contact guard  -     Row Name 11/17/22 1404          Stand-Sit Transfer    Stand-Sit [place] (Transfers)  contact guard  -     Row Name 11/17/22 1404          Toilet Transfer    Type (Toilet Transfer) sit-stand;stand-sit  -     [place] Level (Toilet Transfer) contact guard  -     Row Name 11/17/22 1404          Functional Mobility    Functional Mobility- Ind. Level minimum assist (75% patient effort)  -     Functional Mobility- Device --  HHAx1  -     Functional Mobility- Safety Issues step length decreased  -     Functional Mobility- Comment amb in hallway. Once instance of L knee buckling with mobility.  -     Row Name 11/17/22 1404          Activities of Daily Living    BADL Assessment/Intervention toileting;lower body dressing;upper body dressing  -     Row Name 11/17/22 1404          Toileting Assessment/Training    [place] Level (Toileting) adjust/manage clothing;perform perineal hygiene;change pad/brief;minimum assist (75% patient effort)  -     Position (Toileting) supported standing;unsupported sitting  -     Row Name 11/17/22 1404          Lower Body Dressing Assessment/Training    [place] Level (Lower Body Dressing) don;doff;socks;moderate assist (50% patient effort);maximum assist (25% patient effort)  -JJ     Position (Lower Body Dressing) edge of bed sitting  -     Row Name 11/17/22 1404          Upper Body Dressing Assessment/Training    [place] Level (Upper Body Dressing) don;doff;minimum assist (75% patient effort)  -JJ     Position (Upper Body Dressing) edge of bed sitting  -     Comment, (Upper Body Dressing) hospital gown  -           User Key  (r) = Recorded By, (t) = Taken By, (c) = Cosigned By    Initials Name Provider Type    Sharonda Garg, DIOR/L, CSRS Occupational Therapist               Obj/Interventions     Row Name 11/17/22 1404          Sensory Assessment (Somatosensory)    Sensory Assessment (Somatosensory) UE sensation intact  -     Sensory Assessment per pt  -     Row Name 11/17/22 1404          Vision Assessment/Intervention     Visual Processing Deficit sujey-inattention/neglect, left  -     Vision Assessment Comment required vcs to attend and often when asked to move L UE/LE she only raises R side.  -     Row Name 11/17/22 1404          Range of Motion Comprehensive    Comment, General Range of Motion B UE AROM WFL, except for R shoulder impaired approx 50% from previous rotator cuff tear. L shoulder flexion impaired approx 50% d/t weakness.  -     Row Name 11/17/22 1404          Strength Comprehensive (MMT)    Comment, General Manual Muscle Testing (MMT) Assessment R UE strength 4+/5, L UE strength 4-/5  -     Row Name 11/17/22 1404          Balance    Balance Assessment sitting static balance;sitting dynamic balance;standing dynamic balance;standing static balance  -     Static Sitting Balance supervision  -     Dynamic Sitting Balance contact guard  -JJ     Position, Sitting Balance unsupported;sitting edge of bed  -     Static Standing Balance contact guard;minimal assist  -J     Dynamic Standing Balance minimal assist  -J     Position/Device Used, Standing Balance supported  -           User Key  (r) = Recorded By, (t) = Taken By, (c) = Cosigned By    Initials Name Provider Type    Sharonda Garg, OTR/L, CSRS Occupational Therapist               Goals/Plan     Row Name 11/17/22 1404          Dressing Goal 1 (OT)    Activity/Device (Dressing Goal 1, OT) dressing skills, all  -JJ     [place]/Cues Needed (Dressing Goal 1, OT) standby assist  -JJ     Time Frame (Dressing Goal 1, OT) long term goal (LTG);by discharge  -     Progress/Outcome (Dressing Goal 1, OT) new goal  -     Row Name 11/17/22 1404          Toileting Goal 1 (OT)    Activity/Device (Toileting Goal 1, OT) toileting skills, all  -J     [place] Level/Cues Needed (Toileting Goal 1, OT) standby assist  -JJ     Time Frame (Toileting Goal 1, OT) long term goal (LTG);by discharge  -     Progress/Outcome (Toileting Goal 1, OT) new goal   -     Row Name 11/17/22 1404          Strength Goal 1 (OT)    Strength Goal 1 (OT) Pt will increase L UE strength to 4+/5 for improved independence with adls.  -     Time Frame (Strength Goal 1, OT) long term goal (LTG);by discharge  -     Progress/Outcome (Strength Goal 1, OT) new goal  -     Row Name 11/17/22 1404          Therapy Assessment/Plan (OT)    Planned Therapy Interventions (OT) activity tolerance training;adaptive equipment training;BADL retraining;cognitive/visual perception retraining;functional balance retraining;transfer/mobility retraining;strengthening exercise;occupation/activity based interventions;patient/caregiver education/training  -           User Key  (r) = Recorded By, (t) = Taken By, (c) = Cosigned By    Initials Name Provider Type    Sharonda Garg, OTR/L, CSRS Occupational Therapist               Clinical Impression     Row Name 11/17/22 1406          Pain Assessment    Pretreatment Pain Rating 0/10 - no pain  -     Posttreatment Pain Rating 0/10 - no pain  -     Row Name 11/17/22 1402          Plan of Care Review    Plan of Care Reviewed With patient;daughter  -     Outcome Evaluation OT eval completed. Pt presents alert and oriented x4, but demonstrates conversation confusion throughout session. She is often found to be staring off, not attending to conversation or directions. Follows approx 75% of simple commands but is significantly impulsive and requires verbal cues for safety awareness. She demonstrates L sided inattention, often requires tactile cues to utilize L UE during functional tasks. Min A for all bed mobility. CGA/Min A for sit <> stand t/f, but upon standing pt was incontinent of large amount of urine. Amb to bathroom with Min A. Required Max A to change socks, Min A to don/[facility] gown and Mod/Max A for perineal hygiene and clothing mgt during toileting. She did have one instance of L knee buckling during mobility that required Min A to  correct. She would benefit from skilled OT services to address these deficits. Recommend d/c to acute IP rehab.  -     Row Name 11/17/22 1404          Therapy Assessment/Plan (OT)    Rehab Potential (OT) good, to achieve stated therapy goals  -     Criteria for Skilled Therapeutic Interventions Met (OT) yes;skilled treatment is necessary  -     Therapy Frequency (OT) 5 times/wk  -     Predicted Duration of Therapy Intervention (OT) 10 days  -     Row Name 11/17/22 1404          Therapy Plan Review/Discharge Plan (OT)    Anticipated Discharge Disposition (OT) inpatient rehabilitation facility  -     Row Name 11/17/22 1404          Positioning and Restraints    Pre-Treatment Position in bed  -     Post Treatment Position bed  -     In Bed notified nsg;fowlers;call light within reach;encouraged to call for assist;side rails up x3;exit alarm on;with family/caregiver  -           User Key  (r) = Recorded By, (t) = Taken By, (c) = Cosigned By    Initials Name Provider Type    Sharonda Garg OTR/L, CSRS Occupational Therapist               Outcome Measures     Row Name 11/17/22 1404          How much help from another is currently needed...    Putting on and taking off regular lower body clothing? 2  -JJ     Bathing (including washing, rinsing, and drying) 3  -JJ     Toileting (which includes using toilet bed pan or urinal) 3  -JJ     Putting on and taking off regular upper body clothing 3  -JJ     Taking care of personal grooming (such as brushing teeth) 3  -JJ     Eating meals 3  -JJ     AM-PAC 6 Clicks Score (OT) 17  -     Row Name 11/17/22 1404          Functional Assessment    Outcome Measure Options AM-PAC 6 Clicks Daily Activity (OT)  -           User Key  (r) = Recorded By, (t) = Taken By, (c) = Cosigned By    Initials Name Provider Type    Sharonda Garg OTR/L, CSRS Occupational Therapist                Occupational Therapy Education     Title: PT OT SLP Therapies (In  Progress)     Topic: Occupational Therapy (In Progress)     Point: ADL training (Done)     Description:   Instruct learner(s) on proper safety adaptation and remediation techniques during self care or transfers.   Instruct in proper use of assistive devices.              Learning Progress Summary           Patient Acceptance, E, VU by  at 11/17/2022 1518                   Point: Home exercise program (Not Started)     Description:   Instruct learner(s) on appropriate technique for monitoring, assisting and/or progressing therapeutic exercises/activities.              Learner Progress:  Not documented in this visit.          Point: Precautions (Done)     Description:   Instruct learner(s) on prescribed precautions during self-care and functional transfers.              Learning Progress Summary           Patient Acceptance, E, VU by ROWAN at 11/17/2022 1518                   Point: Body mechanics (Not Started)     Description:   Instruct learner(s) on proper positioning and spine alignment during self-care, functional mobility activities and/or exercises.              Learner Progress:  Not documented in this visit.                      User Key     Initials Effective Dates Name Provider Type Discipline     11/10/21 -  Sharonda Anderson, OTR/L, CSRS Occupational Therapist OT              OT Recommendation and Plan  Planned Therapy Interventions (OT): activity tolerance training, adaptive equipment training, BADL retraining, cognitive/visual perception retraining, functional balance retraining, transfer/mobility retraining, strengthening exercise, occupation/activity based interventions, patient/caregiver education/training  Therapy Frequency (OT): 5 times/wk  Plan of Care Review  Plan of Care Reviewed With: patient, daughter  Outcome Evaluation: OT eval completed. Pt presents alert and oriented x4, but demonstrates conversation confusion throughout session. She is often found to be staring off, not attending to  conversation or directions. Follows approx 75% of simple commands but is significantly impulsive and requires verbal cues for safety awareness. She demonstrates L sided inattention, often requires tactile cues to utilize L UE during functional tasks. Min A for all bed mobility. CGA/Min A for sit <> stand t/f, but upon standing pt was incontinent of large amount of urine. Amb to bathroom with Min A. Required Max A to change socks, Min A to don/doff hospital gown and Mod/Max A for perineal hygiene and clothing mgt during toileting. She did have one instance of L knee buckling during mobility that required Min A to correct. She would benefit from skilled OT services to address these deficits. Recommend d/c to acute IP rehab.     Time Calculation:    Time Calculation- OT     Row Name 11/17/22 1404             Time Calculation- OT    OT Start Time 1404  -      OT Stop Time 1457  -      OT Time Calculation (min) 53 min  -      OT Received On 11/17/22  -      OT Goal Re-Cert Due Date 11/27/22  -            User Key  (r) = Recorded By, (t) = Taken By, (c) = Cosigned By    Initials Name Provider Type    Sharonda Garg OTR/L, CSRS Occupational Therapist              Therapy Charges for Today     Code Description Service Date Service Provider Modifiers Qty    [id number] HC OT EVAL MOD COMPLEXITY 4 11/17/2022 Sharonda Anderson OTR/L, CSRS GO 1               EVA [place]/L, CSRS  11/17/2022

## 2022-11-17 NOTE — CONSULTS
Neurology Consult Note    Patient:  Concepcion Velez   YOB: 1945  MRN:  [id number]  Date of Admission:  11/17/2022  7:48 AM    Date: 11/17/2022    Referring Provider: Yony Dyson DO  Reason for Consultation: Strokes      History of present illness:     This is a 77 y.o.  female.with co morbid conditions of hypertension, hyperlipidemia, DM and recent discharge on 11/15 for right cerebellar stroke and ? seizure evaluated for strokes    Patient was last known well at 10 PM yesterday and was found to have left arm and leg weakness today.  She got up this morning and vomited and defecated at 4 AM She has not thrown up since.  She did not take her Keppra today. Per family she was not somnolent from it..     Patient was recently hospitalized 11/14 and discharged 11/15   She had been seen by Dr Lim on 11/15  During that consultation both daughter and patient reported  Episodes of severe holocephalic  headache associated with photophobia and phonophobia and on occasion aphasia dn and right sided weakness and had been admitted to [place] twice for the same thing.  Reportedly had low magnesium associated with these and in previously admission she reported urinary incontinent with one of these episodes but no seizure like activity.or tongue biting. She had episodes of altered mentation at times.     Of note UDS was + for opiates in her recent hospitalization. TSH was normal, RPR was normal B12 was only 324 but in normal range  Lactic acid and magnesium was normal but magnesium was only 1.6 and it is 1.4  today.  1 gram was given to her today    Per family for past 2 days she has had diarrhea and she threw up today.   Further work up then   Head CT 11/14:  No acute changes and mild cerebral microvascular disease and atrophy  MRI brain 11/14/22: Tiny acute stroke in the right cerebellum and White mater changes c/w cerebral microvascular disease  CTA of head and neck 11/14/22: No LVO. Atheromatous  changes of the intracranial internal carotid arteries and no hemodynamically significant stenosis (mild to moderate calcified plaque within the proximal bilateral internal carotid arteries resulting in 50% stenosis of the proximal left ICA and 20% stenosis of proximal right ICA).  Hemoglobin A1C 6.1  LDL 11/15/22 44  TTE 11/15/22:   •  Left ventricular systolic function is normal. Left ventricular ejection fraction appears to be 56 - 60%.  •  Estimated right ventricular systolic pressure from tricuspid regurgitation is mildly elevated (35-45 mmHg).  •  Normal size and function of the right ventricle.  •  No significant valvular pathology.  •  Saline test results are negative.   EEG 11/15/22:  Findings: This is an abnormal EEG.  There is generalized slowing seen throughout that could be consistent with a postictal state versus a metabolic/medication induced encephalopathy.  That being said, there are some atypical semi rhythmic waveforms occasionally with after coming slow waves which may represent an irritable cortex.  It is difficult to say whether this can be seen more over the left compared to the right or vice versa.  Patient was started on Keppra.during that hospitalization    There has been no witnessed seizure like activity.  Past Medical History:   Diagnosis Date   • Abdominal wall seroma    • B12 deficiency    • Cerebral infarct (HCC)    • Diabetes mellitus (HCC)    • HLD (hyperlipidemia)    • Hypertension    • Hypokalemia    • Hypomagnesemia        Past Surgical History:   Procedure Laterality Date   • CHOLECYSTECTOMY     • HERNIA REPAIR     • HYSTERECTOMY         Prior to Admission medications    Medication Sig Start Date End Date Taking? Authorizing Provider   allopurinol (ZYLOPRIM) 300 MG tablet Take 1 tablet by mouth Daily.    Provider, MD Silver   aspirin 81 MG chewable tablet Chew 1 tablet Daily.    Provider, MD Silver   atorvastatin (LIPITOR) 20 MG tablet Take 1 tablet by mouth Daily.     Silver Babin MD   cefdinir (OMNICEF) 300 MG capsule Take 1 capsule by mouth Every 12 (Twelve) Hours for 13 doses. Indications: Upper Respiratory Tract Infection 11/15/22 11/22/22  Abhishek Kohli DO   levETIRAcetam (KEPPRA) 500 MG tablet Take 1 tablet by mouth 2 (Two) Times a Day. 11/15/22   Elie Lim MD   lisinopril (PRINIVIL,ZESTRIL) 20 MG tablet Take 1 tablet by mouth Daily.    Silver Babin MD   magnesium oxide (MAG-OX) 400 MG tablet Take 1 tablet by mouth Daily.    Silver Babin MD   meloxicam (MOBIC) 15 MG tablet Take 1 tablet by mouth Daily.    Silver Babin MD   metFORMIN (GLUCOPHAGE) 1000 MG tablet Take 1 tablet by mouth 2 (Two) Times a Day With Meals.    Silver Babin MD   metoprolol succinate XL (Toprol XL) 50 MG 24 hr tablet Take 1 tablet by mouth Daily. 11/15/22   Abhishek Kohli DO   potassium chloride (K-DUR,KLOR-CON) 20 MEQ CR tablet Take 1 tablet by mouth Daily.    Silver Babin MD       Hospital scheduled medications:   [START ON 11/18/2022] aspirin, 81 mg, Oral, Daily  atorvastatin, 20 mg, Oral, Nightly  insulin regular, 2-7 Units, Subcutaneous, Q6H  levETIRAcetam, 500 mg, Intravenous, Q12H  sodium chloride, 10 mL, Intravenous, Q12H      Hospital PRN medications:  •  acetaminophen **OR** acetaminophen  •  dextrose  •  dextrose  •  glucagon (human recombinant)  •  ondansetron  •  sodium chloride    No Known Allergies    Social History     Socioeconomic History   • Marital status:    Tobacco Use   • Smoking status: Never   Substance and Sexual Activity   • Alcohol use: Never   • Drug use: Never     Family History   Problem Relation Age of Onset   • Cancer Mother    • Hypertension Mother    • Transient ischemic attack Father        Review of Systems  A 14-point review of systems was unobtainable as patient is not talking    Vital Signs   Temp:  [97.6 °F (36.4 °C)-97.9 °F (36.6 °C)] 97.6 °F (36.4 °C)  Heart Rate:  []  85  Resp:  [12-16] 16  BP: (149-166)/(65-95) 151/65    General Exam:  Head:  Normocephalic, atraumatic  HEENT:  Neck supple  Fundoscopic Exam:  No signs of disc edema  CVS:  Regular rate and rhythm.  No murmurs  Carotid Examination:  No bruits  Lungs:  Clear to auscultation  Abdomen:  Nontender, nondistended  Extremities:  No signs of peripheral edema  Skin:  No rashes    Neurologic Exam:    Mental Status:    -Somnolent  -Follows simple and complex commands    CN II:  Visual fields full.  Pupils equally reactive to light  CN III, IV, VI:  Extraocular Muscles full with no signs of nystagmus  CN V:  Facial sensory is symmetric   CN VII:  Facial motor symmetric  CN VIII:  Gross hearing intact bilaterally  CN IX/X:  Palate elevates symmetrically  CN XI:  Shoulder shrug symmetric  CN XII:  Tongue is midline on protrusion    Motor: (strength out of 5:  1= minimal movement, 2 = movement in plane of gravity, 3 = movement against gravity, 4 = movement against some resistance, 5 = full strength)    -Right Upper Ext: Proximal: 5 Distal: 5  -Left Upper Ext: Proximal: 5 Distal: 5    -Right Lower Ext: Proximal: 5 Distal: 5  -Left Lower Ext: Proximal: 5 Distal: 5    DTR:  -Right   Biceps: 2+ Triceps: 2+ Brachioradialis: 2+   Patella: 2+ Ankle: 2+ Neg Babinski  -Left   Biceps: 2+ Triceps: 2+ Brachioradialis: 2+   Patella: 2+ Ankle: 2+ Neg Babinski    Sensory:  -Intact to light touch, pinprick, temperature, pain, and proprioception    Coordination:  -Finger to nose intact  -Heel to shin intact  -No ataxia    Gait  -No signs of ataxia  -ambulates unassisted      Results Review:  Lab Results (last 7 days)     Procedure Component Value Units Date/Time    Sodium, Urine, Random - Urine, Clean Catch [[id number]] Collected: 11/17/22 0847    Specimen: Urine, Clean Catch Updated: 11/17/22 1341     Sodium, Urine 61 mmol/L     Narrative:      Reference intervals for random urine have not been established.  Clinical usage is dependent upon  physician's interpretation in combination with other laboratory tests.       Osmolality, Urine - Urine, Clean Catch [[id number]] Collected: 11/17/22 0847    Specimen: Urine, Clean Catch Updated: 11/17/22 1332    Manual Differential [[id number]]  (Abnormal) Collected: 11/17/22 0847    Specimen: Blood Updated: 11/17/22 0934     Neutrophil % 74.2 %      Lymphocyte % 8.2 %      Monocyte % 8.2 %      Bands %  5.2 %      Atypical Lymphocyte % 4.1 %      Neutrophils Absolute 12.55 10*3/mm3      Lymphocytes Absolute 1.94 10*3/mm3      Monocytes Absolute 1.30 10*3/mm3      RBC Morphology Normal     Vacuolated Neutrophils Slight/1+     Platelet Morphology Normal    CBC & Differential [[id number]]  (Abnormal) Collected: 11/17/22 0847    Specimen: Blood Updated: 11/17/22 0934    Narrative:      The following orders were created for panel order CBC & Differential.  Procedure                               Abnormality         Status                     ---------                               -----------         ------                     CBC Auto Differential[[id number]]        Abnormal            Final result                 Please view results for these tests on the individual orders.    CBC Auto Differential [[id number]]  (Abnormal) Collected: 11/17/22 0847    Specimen: Blood Updated: 11/17/22 0934     WBC 15.81 10*3/mm3      RBC 3.73 10*6/mm3      Hemoglobin 10.6 g/dL      Hematocrit 34.3 %      MCV 92.0 fL      MCH 28.4 pg      MCHC 30.9 g/dL      RDW 13.8 %      RDW-SD 46.5 fl      MPV 10.7 fL      Platelets 275 10*3/mm3     Narrative:      The previously reported component NRBC is no longer being reported. Previous result was 0.0 /100 WBC (Reference Range: 0.0-0.2 /100 WBC) on 11/17/2022 at 0905 CST.    Basic Metabolic Panel [[id number]]  (Abnormal) Collected: 11/17/22 0847    Specimen: Blood Updated: 11/17/22 0922     Glucose 136 mg/dL      BUN 13 mg/dL      Creatinine 0.59 mg/dL      Sodium 132 mmol/L      Potassium 3.6 mmol/L       Chloride 94 mmol/L      CO2 27.0 mmol/L      Calcium 9.6 mg/dL      BUN/Creatinine Ratio 22.0     Anion Gap 11.0 mmol/L      eGFR 94.1 mL/min/1.73      Comment: National Kidney Foundation and American Society of Nephrology (ASN) Task Force recommended calculation based on the Chronic Kidney Disease Epidemiology Collaboration (CKD-EPI) equation refit without adjustment for race.       Narrative:      GFR Normal >60  Chronic Kidney Disease <60  Kidney Failure <15    The GFR formula is only valid for adults with stable renal function between ages 18 and 70.    CK [[id number]]  (Normal) Collected: 11/17/22 0847    Specimen: Blood Updated: 11/17/22 0921     Creatine Kinase 31 U/L     Lactic Acid, Plasma [[id number]]  (Normal) Collected: 11/17/22 0847    Specimen: Blood Updated: 11/17/22 0918     Lactate 1.6 mmol/L     Magnesium [[id number]]  (Abnormal) Collected: 11/17/22 0847    Specimen: Blood Updated: 11/17/22 0916     Magnesium 1.4 mg/dL     Urinalysis With Culture If Indicated - Urine, Catheter [[id number]]  (Abnormal) Collected: 11/17/22 0847    Specimen: Urine, Catheter Updated: 11/17/22 0905     Color, UA Yellow     Appearance, UA Clear     pH, UA 7.0     Specific Gravity, UA 1.012     Glucose, UA Negative     Ketones, UA Negative     Bilirubin, UA Negative     Blood, UA Negative     Protein, UA Negative     Leuk Esterase, UA Trace     Nitrite, UA Negative     Urobilinogen, UA 0.2 E.U./dL    Narrative:      In absence of clinical symptoms, the presence of pyuria, bacteria, and/or nitrites on the urinalysis result does not correlate with infection.    Urinalysis, Microscopic Only - Urine, Catheter [[id number]]  (Abnormal) Collected: 11/17/22 0847    Specimen: Urine, Catheter Updated: 11/17/22 0905     RBC, UA None Seen /HPF      WBC, UA 3-5 /HPF      Comment: Urine culture not indicated.        Bacteria, UA None Seen /HPF      Squamous Epithelial Cells, UA 3-6 /HPF      Hyaline Casts, UA 0-2 /LPF      Methodology  Automated Microscopy          .  Imaging Results (Last 24 Hours)     Procedure Component Value Units Date/Time    MRI Brain Without Contrast [[id number]] Collected: 11/17/22 0939     Updated: 11/17/22 0952    Narrative:      EXAMINATION: MRI brain without contrast 11/17/2022     HISTORY: Transient ischemic attack. Stroke follow-up     FINDINGS: Today's exam is compared to a previous study of 3 days  earlier. Multiplanar fast spin echo imaging sequences were obtained of  the brain on a high-field magnet without gadolinium enhancement.     There are peripheral sites of diffusion restriction within both  cerebellar hemispheres showing progression from the previous  examination. The left cerebellar hemisphere lesions are new from the  prior study. There are associated ADC mapping abnormalities consistent  with acute ischemic infarction. No evidence of hemorrhagic conversion.  There is no mass effect. There is also a peripheral diffusion  restriction within the right posterior parietal/occipital lobe with  associated ADC mapping abnormality also new from the previous exam  suggesting a small focus of cortical infarction. A more equivocal focus  of diffusion restriction within the right frontal lobe gyrus on image  192 of series 204 is present.     There is mild atrophy the brain. Small vessel ischemic changes are noted  involving the periventricular and higher white matter tracts. There is  normal flow-voids within the [place].     The orbits are unremarkable.       Impression:      1.. Previously noted foci of diffusion restriction within the right  cerebellar hemisphere are more prominent on the current exam. There are  also now noted to be sites of diffusion restriction within the left  cerebellar hemisphere with associated ADC mapping abnormality consistent  with acute bilateral cerebellar hemisphere infarcts. These are  nonhemorrhagic. There is also a focus of diffusion abnormality within  the right  posterior parietal/occipital lobe involving the cortex with  associated ADC mapping abnormality suggesting an additional site of  cortical infarct. A focus of more indeterminate diffusion restriction  within the right frontal vertex involving a gyrus is also present not  appreciated on the previous exam.  2. No mass effect or shift of the midline. No evidence of hemorrhagic  conversion. There are normal flow-voids within the [place].  3. Atrophy with small vessel disease involving the periventricular and  higher white matter tracts.  This report was finalized on 11/17/2022 09:49 by Dr. Dominic Valdes MD.    CT Head Without Contrast [[id number]] Collected: 11/17/22 0818     Updated: 11/17/22 0825    Narrative:      EXAMINATION: CT head without contrast 11/17/2022     HISTORY: Neurologic deficit. Stroke suspected.     DOSE: 758 mGycm. All CT scans are performed using dose optimization  techniques as appropriate to the performed exam and including at least  one of the following: Automated exposure control, adjustment of the mA  and/or kV according to size, and the use of the iterative reconstruction  technique..     FINDINGS: Multiple contiguous axial images are obtained from the skull  base to the vertex per protocol without intravenous contrast-enhancement  with reformatted images obtained in the sagittal and coronal projections  from the original data set.     There is evidence of a remote infarct involving the left cerebellar  hemisphere. There is mild atrophy of the brain. 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     There is no mass, mass effect or shift of the midline. No evidence of  acute infarct or hemorrhage.     The visualized paranasal sinuses and mastoid air cells are normally  aerated.       Impression:      1.. No evidence of acute infarct or hemorrhage. Remote left cerebellar  hemisphere infarct.  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  This report was finalized on 11/17/2022 08:22 by   Dominic Valdes MD.          DWI      Impression  1. 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 There is a ? Area in the right frontal lobe  Family did give her an aspirin yesterday.  2. Anemia with elevated indices  3. Hypomagnesemia of 1.4  4. DM well controlled with hemoglobin A1C 3 days ago at 6.1  5. Hyperlipemia history with LDL indicating good control   6. Hyponatremia at 132 (glucose of 136 so that is not the cause and previously normal TSH and Free T4  3 days ago   7. Leukocytosis of 15,810  She is afebrile and neck is supple  CXR negative  U/A mild changes.Was on Omnicef at discharge 11/15    Addendum: patient more somnolent over past hour per family.    Plan  · Telemetry  · COLIN tomorrow and NPO tonight. Discussed with Dr Duran who thought Dr Juarez was on and then Dr Juarez informed me Dr Brian is on and we did text.  Dr Brian will do COLIN tomorrow.   · Needs bedside swallow study before any medications or anything oral can be given  Nursing notified.  · If fails bedside swallow then will need to be NPO until  Speech therapy clears  · Magnesium level tomorrow but will give 2 grams IV now and had received 1 gram earlier today  · Iron profile, ferritin  · Defer to hospitalist treatment of hyponatremia  · STAT Keppra dose IV now.  · STAT Head CT  · Transfer to unit      UDS + for hydrocodone and ativan.  Family states no Ativan since Monday (4 days ago) and no hydrocodone since 11 AM yesterday. Ativan given for MRI which was on Tuesday so it was 3 days ago.  She has rib fractures and shoulder pain and so is on hydrocodone for that.     I discussed the patient's findings and my recommendations with patient, family, nursing staff and Radiology and Dr Braulio Lai MD  11/17/22  14:05 CST

## 2022-11-17 NOTE — THERAPY DISCHARGE NOTE
Acute Care - Speech Language Pathology Discharge Summary  [facility]       Patient Name: Concepcion Velez  : 1945  MRN: [id number]    Today's Date: 2022                   Admit Date: 2022      SLP Recommendation and Plan  Regular solid diet consistency with regular/thin liquid consistency. Pt may benefit from outpatient SLP services for a speech-language/cognitive assessment if concerns persist.  Thanks! Adina Starr CCC-SLP 2022 12:31 CST    Visit Dx:    ICD-10-CM ICD-9-CM   1. Tachycardia  R00.0 785.0   2. Dysphagia, unspecified type  R13.10 787.20   3. Cerebellar infarct (HCC)  I63.9 434.91   4. Impaired mobility  Z74.09 799.89                           SLP Discharge Summary  Anticipated Discharge Disposition (SLP): home  Reason for Discharge: discharge from this facility  Progress Toward Achieving Short/long Term Goals: all goals met within established timelines  Discharge Destination: home      NOMI SilvaSLP  2022

## 2022-11-17 NOTE — CASE MANAGEMENT/SOCIAL WORK
Discharge Planning Assessment  [facility]     Patient Name: Concepcion Velez  MRN: [id number]  Today's Date: 11/17/2022    Admit Date: 11/17/2022        Discharge Needs Assessment     Row Name 11/17/22 1459       Living Environment    People in Home spouse    Name(s) of People in Home spouse, Mr Velez, - patient is caregiver of spouse    Current Living Arrangements home    Primary Care Provided by self    Provides Primary Care For no one, unable/limited ability to care for self    Caregiving Concerns CVA    Family Caregiver if Needed child(carrie), adult    Family Caregiver Names Lillie Henson, daughter, Kaylin daughter, 2 other sons/daughters    Quality of Family Relationships helpful;involved;supportive    Able to Return to Prior Arrangements no       Resource/Environmental Concerns    Resource/Environmental Concerns none       Transition Planning    Patient/Family Anticipates Transition to inpatient rehabilitation facility    Patient/Family Anticipated Services at Transition rehabilitation services    Transportation Anticipated other (see comments)  may need EMS       Discharge Needs Assessment    Readmission Within the Last 30 Days previous discharge plan unsuccessful    Current Outpatient/Agency/Support Group inpatient rehabilitation facility    Equipment Currently Used at Home none    Concerns to be Addressed no discharge needs identified    Outpatient/Agency/Support Group Needs inpatient rehabilitation facility    Discharge Facility/Level of Care Needs rehabilitation facility    Provided Post Acute Provider List? N/A    N/A Provider List Comment familiar with [facility] and wants to go there if possible    Discharge Coordination/Progress Plan for [facility] . Referral requested per daughter. Precert will be needed. Pending bed offer. SW to follow with referral. Patient has PCP and rx coverage.  supportive family.               Discharge Plan    No documentation.               Continued Care and  Services - Admitted Since 11/17/2022    Coordination has not been started for this encounter.          Demographic Summary    No documentation.                Functional Status    No documentation.                Psychosocial    No documentation.                Abuse/Neglect    No documentation.                Legal    No documentation.                Substance Abuse    No documentation.                Patient Forms    No documentation.                   Silvana Bridges RN

## 2022-11-17 NOTE — PLAN OF CARE
Goal Outcome Evaluation:  Plan of Care Reviewed With: patient, daughter           Outcome Evaluation: OT eval completed. Pt presents alert and oriented x4, but demonstrates conversation confusion throughout session. She is often found to be staring off, not attending to conversation or directions. Follows approx 75% of simple commands but is significantly impulsive and requires verbal cues for safety awareness. She demonstrates L sided inattention, often requires tactile cues to utilize L UE during functional tasks. Min A for all bed mobility. CGA/Min A for sit <> stand t/f, but upon standing pt was incontinent of large amount of urine. Amb to bathroom with Min A. Required Max A to change socks, Min A to don/[facility] gown and Mod/Max A for perineal hygiene and clothing mgt during toileting. She did have one instance of L knee buckling during mobility that required Min A to correct. She would benefit from skilled OT services to address these deficits. Recommend d/c to acute IP rehab.

## 2022-11-18 ENCOUNTER — APPOINTMENT (OUTPATIENT)
Dept: NEUROLOGY | Facility: HOSPITAL | Age: 77
End: 2022-11-18

## 2022-11-18 ENCOUNTER — APPOINTMENT (OUTPATIENT)
Dept: CARDIOLOGY | Facility: HOSPITAL | Age: 77
End: 2022-11-18

## 2022-11-18 LAB
ANION GAP SERPL CALCULATED.3IONS-SCNC: 13 MMOL/L (ref 5–15)
ANISOCYTOSIS BLD QL: ABNORMAL
BUN SERPL-MCNC: 8 MG/DL (ref 8–23)
BUN/CREAT SERPL: 15.7 (ref 7–25)
CALCIUM SPEC-SCNC: 9.2 MG/DL (ref 8.6–10.5)
CHLORIDE SERPL-SCNC: 97 MMOL/L (ref 98–107)
CHOLEST SERPL-MCNC: 121 MG/DL (ref 0–200)
CO2 SERPL-SCNC: 25 MMOL/L (ref 22–29)
CREAT SERPL-MCNC: 0.51 MG/DL (ref 0.57–1)
CYTOLOGIST CVX/VAG CYTO: NORMAL
DEPRECATED RDW RBC AUTO: 45.6 FL (ref 37–54)
EGFRCR SERPLBLD CKD-EPI 2021: 97.5 ML/MIN/1.73
ERYTHROCYTE [DISTWIDTH] IN BLOOD BY AUTOMATED COUNT: 13.7 % (ref 12.3–15.4)
GLUCOSE BLDC GLUCOMTR-MCNC: 93 MG/DL (ref 70–130)
GLUCOSE SERPL-MCNC: 117 MG/DL (ref 65–99)
HCT VFR BLD AUTO: 31.9 % (ref 34–46.6)
HDLC SERPL-MCNC: 39 MG/DL (ref 40–60)
HGB BLD-MCNC: 10 G/DL (ref 12–15.9)
LDLC SERPL CALC-MCNC: 52 MG/DL (ref 0–100)
LDLC/HDLC SERPL: 1.18 {RATIO}
LYMPHOCYTES # BLD MANUAL: 2.84 10*3/MM3 (ref 0.7–3.1)
LYMPHOCYTES NFR BLD MANUAL: 11.1 % (ref 5–12)
MAGNESIUM SERPL-MCNC: 1.5 MG/DL (ref 1.6–2.4)
MAXIMAL PREDICTED HEART RATE: 145 BPM
MCH RBC QN AUTO: 28.6 PG (ref 26.6–33)
MCHC RBC AUTO-ENTMCNC: 31.3 G/DL (ref 31.5–35.7)
MCV RBC AUTO: 91.1 FL (ref 79–97)
METAMYELOCYTES NFR BLD MANUAL: 1 % (ref 0–0)
MICROCYTES BLD QL: ABNORMAL
MONOCYTES # BLD: 1.56 10*3/MM3 (ref 0.1–0.9)
NEUTROPHILS # BLD AUTO: 9.51 10*3/MM3 (ref 1.7–7)
NEUTROPHILS NFR BLD MANUAL: 66.7 % (ref 42.7–76)
NEUTS BAND NFR BLD MANUAL: 1 % (ref 0–5)
PATH INTERP BLD-IMP: NORMAL
PLAT MORPH BLD: NORMAL
PLATELET # BLD AUTO: 241 10*3/MM3 (ref 140–450)
PMV BLD AUTO: 10.8 FL (ref 6–12)
POIKILOCYTOSIS BLD QL SMEAR: ABNORMAL
POTASSIUM SERPL-SCNC: 3.2 MMOL/L (ref 3.5–5.2)
QT INTERVAL: 368 MS
QTC INTERVAL: 427 MS
RBC # BLD AUTO: 3.5 10*6/MM3 (ref 3.77–5.28)
SODIUM SERPL-SCNC: 135 MMOL/L (ref 136–145)
STRESS TARGET HR: 123 BPM
TRIGL SERPL-MCNC: 179 MG/DL (ref 0–150)
VARIANT LYMPHS NFR BLD MANUAL: 14.1 % (ref 19.6–45.3)
VARIANT LYMPHS NFR BLD MANUAL: 6.1 % (ref 0–5)
VLDLC SERPL-MCNC: 30 MG/DL (ref 5–40)
WBC MORPH BLD: NORMAL
WBC NRBC COR # BLD: 14.05 10*3/MM3 (ref 3.4–10.8)

## 2022-11-18 PROCEDURE — 93312 ECHO TRANSESOPHAGEAL: CPT | Performed by: INTERNAL MEDICINE

## 2022-11-18 PROCEDURE — 85060 BLOOD SMEAR INTERPRETATION: CPT | Performed by: INTERNAL MEDICINE

## 2022-11-18 PROCEDURE — 0 POTASSIUM CHLORIDE 10 MEQ/100ML SOLUTION: Performed by: INTERNAL MEDICINE

## 2022-11-18 PROCEDURE — 83735 ASSAY OF MAGNESIUM: CPT | Performed by: INTERNAL MEDICINE

## 2022-11-18 PROCEDURE — 97535 SELF CARE MNGMENT TRAINING: CPT | Performed by: OCCUPATIONAL THERAPIST

## 2022-11-18 PROCEDURE — 80048 BASIC METABOLIC PNL TOTAL CA: CPT | Performed by: INTERNAL MEDICINE

## 2022-11-18 PROCEDURE — 99233 SBSQ HOSP IP/OBS HIGH 50: CPT | Performed by: PSYCHIATRY & NEUROLOGY

## 2022-11-18 PROCEDURE — 85007 BL SMEAR W/DIFF WBC COUNT: CPT | Performed by: INTERNAL MEDICINE

## 2022-11-18 PROCEDURE — 25010000002 NA FERRIC GLUC CPLX PER 12.5 MG: Performed by: PSYCHIATRY & NEUROLOGY

## 2022-11-18 PROCEDURE — 93325 DOPPLER ECHO COLOR FLOW MAPG: CPT

## 2022-11-18 PROCEDURE — 25010000002 LEVETIRACETAM IN NACL 0.82% 500 MG/100ML SOLUTION: Performed by: PSYCHIATRY & NEUROLOGY

## 2022-11-18 PROCEDURE — 85025 COMPLETE CBC W/AUTO DIFF WBC: CPT | Performed by: INTERNAL MEDICINE

## 2022-11-18 PROCEDURE — 80061 LIPID PANEL: CPT | Performed by: INTERNAL MEDICINE

## 2022-11-18 PROCEDURE — 25010000002 FENTANYL CITRATE (PF) 50 MCG/ML SOLUTION: Performed by: INTERNAL MEDICINE

## 2022-11-18 PROCEDURE — 95816 EEG AWAKE AND DROWSY: CPT

## 2022-11-18 PROCEDURE — 95816 EEG AWAKE AND DROWSY: CPT | Performed by: PSYCHIATRY & NEUROLOGY

## 2022-11-18 PROCEDURE — 93325 DOPPLER ECHO COLOR FLOW MAPG: CPT | Performed by: INTERNAL MEDICINE

## 2022-11-18 PROCEDURE — 93320 DOPPLER ECHO COMPLETE: CPT

## 2022-11-18 PROCEDURE — 97161 PT EVAL LOW COMPLEX 20 MIN: CPT | Performed by: PHYSICAL THERAPIST

## 2022-11-18 PROCEDURE — 93312 ECHO TRANSESOPHAGEAL: CPT

## 2022-11-18 PROCEDURE — S0260 H&P FOR SURGERY: HCPCS | Performed by: INTERNAL MEDICINE

## 2022-11-18 PROCEDURE — 82962 GLUCOSE BLOOD TEST: CPT

## 2022-11-18 PROCEDURE — 25010000002 MIDAZOLAM HCL (PF) 5 MG/5ML SOLUTION: Performed by: INTERNAL MEDICINE

## 2022-11-18 PROCEDURE — 93320 DOPPLER ECHO COMPLETE: CPT | Performed by: INTERNAL MEDICINE

## 2022-11-18 PROCEDURE — 25010000002 MAGNESIUM SULFATE 2 GM/50ML SOLUTION: Performed by: CLINICAL NURSE SPECIALIST

## 2022-11-18 RX ORDER — POTASSIUM CHLORIDE 7.45 MG/ML
10 INJECTION INTRAVENOUS
Status: COMPLETED | OUTPATIENT
Start: 2022-11-18 | End: 2022-11-18

## 2022-11-18 RX ORDER — MAGNESIUM SULFATE HEPTAHYDRATE 40 MG/ML
2 INJECTION, SOLUTION INTRAVENOUS ONCE
Status: COMPLETED | OUTPATIENT
Start: 2022-11-18 | End: 2022-11-18

## 2022-11-18 RX ORDER — MIDAZOLAM HYDROCHLORIDE 1 MG/ML
5 INJECTION, SOLUTION INTRAMUSCULAR; INTRAVENOUS ONCE
Status: COMPLETED | OUTPATIENT
Start: 2022-11-18 | End: 2022-11-18

## 2022-11-18 RX ORDER — CHLORHEXIDINE GLUCONATE 500 MG/1
1 CLOTH TOPICAL EVERY 24 HOURS
Status: DISCONTINUED | OUTPATIENT
Start: 2022-11-19 | End: 2022-11-19

## 2022-11-18 RX ORDER — FENTANYL CITRATE 50 UG/ML
100 INJECTION, SOLUTION INTRAMUSCULAR; INTRAVENOUS ONCE
Status: COMPLETED | OUTPATIENT
Start: 2022-11-18 | End: 2022-11-18

## 2022-11-18 RX ORDER — HYDROCODONE BITARTRATE AND ACETAMINOPHEN 5; 325 MG/1; MG/1
1 TABLET ORAL EVERY 6 HOURS PRN
Status: DISCONTINUED | OUTPATIENT
Start: 2022-11-18 | End: 2022-11-21 | Stop reason: HOSPADM

## 2022-11-18 RX ORDER — CHLORHEXIDINE GLUCONATE 500 MG/1
1 CLOTH TOPICAL ONCE
Status: DISCONTINUED | OUTPATIENT
Start: 2022-11-18 | End: 2022-11-19

## 2022-11-18 RX ORDER — DIPHENHYDRAMINE HYDROCHLORIDE 50 MG/ML
12.5 INJECTION INTRAMUSCULAR; INTRAVENOUS ONCE
Status: COMPLETED | OUTPATIENT
Start: 2022-11-19 | End: 2022-11-18

## 2022-11-18 RX ADMIN — ACETAMINOPHEN 650 MG: 325 TABLET, FILM COATED ORAL at 21:15

## 2022-11-18 RX ADMIN — LEVETIRACETAM 500 MG: 5 INJECTION INTRAVASCULAR at 16:59

## 2022-11-18 RX ADMIN — TOPICAL ANESTHETIC: 200 SPRAY DENTAL; PERIODONTAL at 14:46

## 2022-11-18 RX ADMIN — ACETAMINOPHEN 650 MG: 325 TABLET, FILM COATED ORAL at 16:45

## 2022-11-18 RX ADMIN — ACETAMINOPHEN 650 MG: 325 TABLET, FILM COATED ORAL at 04:33

## 2022-11-18 RX ADMIN — DIPHENHYDRAMINE HYDROCHLORIDE 12.5 MG: 50 INJECTION, SOLUTION INTRAMUSCULAR; INTRAVENOUS at 23:56

## 2022-11-18 RX ADMIN — Medication 10 ML: at 20:16

## 2022-11-18 RX ADMIN — ATORVASTATIN CALCIUM 20 MG: 10 TABLET, FILM COATED ORAL at 20:16

## 2022-11-18 RX ADMIN — HYDROCODONE BITARTRATE AND ACETAMINOPHEN 1 TABLET: 5; 325 TABLET ORAL at 21:51

## 2022-11-18 RX ADMIN — Medication 1 APPLICATION: at 22:30

## 2022-11-18 RX ADMIN — SODIUM CHLORIDE 250 MG: 9 INJECTION, SOLUTION INTRAVENOUS at 14:48

## 2022-11-18 RX ADMIN — LEVETIRACETAM 500 MG: 5 INJECTION INTRAVASCULAR at 04:33

## 2022-11-18 RX ADMIN — FENTANYL CITRATE 50 MCG: 50 INJECTION INTRAMUSCULAR; INTRAVENOUS at 13:50

## 2022-11-18 RX ADMIN — POTASSIUM CHLORIDE 10 MEQ: 7.46 INJECTION, SOLUTION INTRAVENOUS at 11:00

## 2022-11-18 RX ADMIN — MAGNESIUM SULFATE HEPTAHYDRATE 2 G: 2 INJECTION, SOLUTION INTRAVENOUS at 09:04

## 2022-11-18 RX ADMIN — POTASSIUM CHLORIDE 10 MEQ: 7.46 INJECTION, SOLUTION INTRAVENOUS at 12:22

## 2022-11-18 RX ADMIN — ASPIRIN 300 MG: 300 SUPPOSITORY RECTAL at 09:01

## 2022-11-18 RX ADMIN — Medication 10 ML: at 09:10

## 2022-11-18 RX ADMIN — MIDAZOLAM HYDROCHLORIDE 2 MG: 1 INJECTION, SOLUTION INTRAMUSCULAR; INTRAVENOUS at 13:54

## 2022-11-18 NOTE — PLAN OF CARE
Goal Outcome Evaluation:  Plan of Care Reviewed With: other (see comments)        Progress: no change  Outcome Evaluation: Ntn assessment completed. Pt NPO at this time. If pt to remain npo 5 days or > may benefit from alternate means of nutrition. Cont to follow for plan of care.

## 2022-11-18 NOTE — PLAN OF CARE
Problem: Fall Injury Risk  Goal: Absence of Fall and Fall-Related Injury  Outcome: Ongoing, Progressing  Intervention: Promote Injury-Free Environment  Recent Flowsheet Documentation  Taken 11/18/2022 1700 by Dali Carbajal RN  Safety Promotion/Fall Prevention: safety round/check completed  Taken 11/18/2022 1600 by Dali Carbajal RN  Safety Promotion/Fall Prevention: safety round/check completed  Taken 11/18/2022 1500 by Dali Carbajal RN  Safety Promotion/Fall Prevention: safety round/check completed  Taken 11/18/2022 1400 by Dali Carbajal RN  Safety Promotion/Fall Prevention: safety round/check completed  Taken 11/18/2022 1300 by Dali Carbajal RN  Safety Promotion/Fall Prevention: safety round/check completed  Taken 11/18/2022 1200 by Dali Carbajal RN  Safety Promotion/Fall Prevention: safety round/check completed  Taken 11/18/2022 1100 by Dali Carbajal RN  Safety Promotion/Fall Prevention: safety round/check completed  Taken 11/18/2022 1000 by Dali Carbajal RN  Safety Promotion/Fall Prevention: safety round/check completed  Taken 11/18/2022 0900 by Dali Carbajal RN  Safety Promotion/Fall Prevention: safety round/check completed  Taken 11/18/2022 0800 by Dali Carbajal RN  Safety Promotion/Fall Prevention:   fall prevention program maintained   safety round/check completed  Taken 11/18/2022 0700 by Dali Carbajal RN  Safety Promotion/Fall Prevention: safety round/check completed     Problem: Adjustment to Illness (Stroke, Ischemic/Transient Ischemic Attack)  Goal: Optimal Coping  Outcome: Ongoing, Progressing     Problem: Bowel Elimination Impaired (Stroke, Ischemic/Transient Ischemic Attack)  Goal: Effective Bowel Elimination  Outcome: Ongoing, Progressing     Problem: Cerebral Tissue Perfusion (Stroke, Ischemic/Transient Ischemic Attack)  Goal: Optimal Cerebral Tissue Perfusion  Outcome: Ongoing, Progressing     Problem: Cognitive Impairment (Stroke, Ischemic/Transient Ischemic Attack)  Goal: Optimal Cognitive  Function  Outcome: Ongoing, Progressing     Problem: Communication Impairment (Stroke, Ischemic/Transient Ischemic Attack)  Goal: Improved Communication Skills  Outcome: Ongoing, Progressing     Problem: Functional Ability Impaired (Stroke, Ischemic/Transient Ischemic Attack)  Goal: Optimal Functional Ability  Outcome: Ongoing, Progressing     Problem: Respiratory Compromise (Stroke, Ischemic/Transient Ischemic Attack)  Goal: Effective Oxygenation and Ventilation  Outcome: Ongoing, Progressing     Problem: Sensorimotor Impairment (Stroke, Ischemic/Transient Ischemic Attack)  Goal: Improved Sensorimotor Function  Outcome: Ongoing, Progressing     Problem: Swallowing Impairment (Stroke, Ischemic/Transient Ischemic Attack)  Goal: Optimal Eating and Swallowing without Aspiration  Outcome: Ongoing, Progressing     Problem: Urinary Elimination Impaired (Stroke, Ischemic/Transient Ischemic Attack)  Goal: Effective Urinary Elimination  Outcome: Ongoing, Progressing     Problem: Skin Injury Risk Increased  Goal: Skin Health and Integrity  Outcome: Ongoing, Progressing     Problem: Adult Inpatient Plan of Care  Goal: Plan of Care Review  Outcome: Ongoing, Progressing  Goal: Patient-Specific Goal (Individualized)  Outcome: Ongoing, Progressing  Goal: Absence of Hospital-Acquired Illness or Injury  Outcome: Ongoing, Progressing  Intervention: Identify and Manage Fall Risk  Recent Flowsheet Documentation  Taken 11/18/2022 1700 by Dali Carbajal RN  Safety Promotion/Fall Prevention: safety round/check completed  Taken 11/18/2022 1600 by Dali Carbajal RN  Safety Promotion/Fall Prevention: safety round/check completed  Taken 11/18/2022 1500 by Dali Carbajal RN  Safety Promotion/Fall Prevention: safety round/check completed  Taken 11/18/2022 1400 by Dali Carbajal RN  Safety Promotion/Fall Prevention: safety round/check completed  Taken 11/18/2022 1300 by Dali Carbajal RN  Safety Promotion/Fall Prevention: safety round/check  completed  Taken 11/18/2022 1200 by Dali Carbajal RN  Safety Promotion/Fall Prevention: safety round/check completed  Taken 11/18/2022 1100 by Dali Carbajal RN  Safety Promotion/Fall Prevention: safety round/check completed  Taken 11/18/2022 1000 by Dali Carbajal RN  Safety Promotion/Fall Prevention: safety round/check completed  Taken 11/18/2022 0900 by Dali Carbajal RN  Safety Promotion/Fall Prevention: safety round/check completed  Taken 11/18/2022 0800 by Dali Carbajal RN  Safety Promotion/Fall Prevention:   fall prevention program maintained   safety round/check completed  Taken 11/18/2022 0700 by Dali Carbajal RN  Safety Promotion/Fall Prevention: safety round/check completed  Intervention: Prevent Skin Injury  Recent Flowsheet Documentation  Taken 11/18/2022 1600 by Dali Carbajal RN  Body Position: position changed independently  Taken 11/18/2022 1400 by Dali Carbajal RN  Body Position: position changed independently  Taken 11/18/2022 1200 by Dali Carbajal RN  Body Position: position changed independently  Taken 11/18/2022 1000 by Dali Carbajal RN  Body Position: position changed independently  Taken 11/18/2022 0800 by Dali Carbajal RN  Body Position: sitting up in bed  Goal: Optimal Comfort and Wellbeing  Outcome: Ongoing, Progressing  Goal: Readiness for Transition of Care  Outcome: Ongoing, Progressing   Goal Outcome Evaluation:

## 2022-11-18 NOTE — THERAPY TREATMENT NOTE
Patient Name: Concepcion Velez  : 1945    MRN: [id number]                              Today's Date: 2022       Admit Date: 2022    Visit Dx:     ICD-10-CM ICD-9-CM   1. Cerebrovascular accident (CVA), unspecified mechanism (HCC)  I63.9 434.91   2. Altered mental status, unspecified altered mental status type  R41.82 780.97   3. Dysphagia, unspecified type  R13.10 787.20   4. Impaired mobility and ADLs  Z74.09 V49.89    Z78.9    5. Impaired mobility  Z74.09 799.89     Patient Active Problem List   Diagnosis   • Tachycardia   • Cerebellar infarct (HCC)   • Uncontrolled hypertension   • Toxic metabolic encephalopathy   • Type 2 diabetes mellitus with hyperglycemia, without long-term current use of insulin (HCC)   • Cerebrovascular accident (CVA), unspecified mechanism (HCC)   • CVA (cerebral vascular accident) (HCC)   • Diarrhea   • Leukocytosis   • Hypomagnesemia   • Hyponatremia     Past Medical History:   Diagnosis Date   • Abdominal wall seroma    • B12 deficiency    • Cerebral infarct (HCC)    • Diabetes mellitus (HCC)    • HLD (hyperlipidemia)    • Hypertension    • Hypokalemia    • Hypomagnesemia      Past Surgical History:   Procedure Laterality Date   • CHOLECYSTECTOMY     • HERNIA REPAIR     • HYSTERECTOMY        General Information     Row Name 22 0955          OT Time and Intention    Document Type therapy note (daily note)  -J     Mode of Treatment occupational therapy  -     Row Name 22 0955          General Information    Patient Profile Reviewed yes  -JROWAN     Existing Precautions/Restrictions fall  -JJ     Row Name 22 0955          Cognition    Orientation Status (Cognition) oriented x 4  -           User Key  (r) = Recorded By, (t) = Taken By, (c) = Cosigned By    Initials Name Provider Type    Sharonda Garg, DIOR/L, CSRS Occupational Therapist                 Mobility/ADL's     Row Name 22 0955          Bed Mobility    Bed Mobility  supine-sit;sit-supine  -JJ     Supine-Sit [place] (Bed Mobility) contact guard  -J     Sit-Supine [place] (Bed Mobility) contact guard  -J     Bed Mobility, Safety Issues decreased use of arms for pushing/pulling;decreased use of legs for bridging/pushing;cognitive deficits limit understanding  -     Assistive Device (Bed Mobility) head of bed elevated;bed rails  -     Row Name 11/18/22 0955          Transfers    Transfers sit-stand transfer;stand-sit transfer;toilet transfer  -     Row Name 11/18/22 0955          Sit-Stand Transfer    Sit-Stand [place] (Transfers) contact guard  -     Row Name 11/18/22 0955          Stand-Sit Transfer    Stand-Sit [place] (Transfers) contact guard  -     Row Name 11/18/22 0955          Toilet Transfer    Type (Toilet Transfer) sit-stand;stand-sit  -     [place] Level (Toilet Transfer) contact guard  -     Row Name 11/18/22 0955          Functional Mobility    Functional Mobility- Ind. Level minimum assist (75% patient effort);contact guard assist  -     Functional Mobility- Comment amb in room and hallway. Pt demonstrates decreased awareness/attention to L side of environment and decreased balance.  -     Row Name 11/18/22 0955          Activities of Daily Living    BADL Assessment/Intervention toileting;lower body dressing  -     Row Name 11/18/22 0955          Toileting Assessment/Training    [place] Level (Toileting) adjust/manage clothing;perform perineal hygiene;change pad/brief;contact guard assist;set up  -     Position (Toileting) supported standing;unsupported sitting  -     Row Name 11/18/22 0955          Lower Body Dressing Assessment/Training    [place] Level (Lower Body Dressing) don;doff;socks;moderate assist (50% patient effort)  -     Position (Lower Body Dressing) edge of bed sitting  -           User Key  (r) = Recorded By, (t) = Taken By, (c) = Cosigned By    Initials Name Provider Type      Sharonda Anderson OTR/L, JOE Occupational Therapist               Obj/Interventions     Row Name 11/18/22 0955          Vision Assessment/Intervention    Vision Assessment Comment Address L sided inattention, work on attending to L side during functional tasks and mobility, Required vcs to locate objects in L visual field and often veers to the L during mobility. Poor safety awareness demonstrated throughout session.  -ISMAEL           User Key  (r) = Recorded By, (t) = Taken By, (c) = Cosigned By    Initials Name Provider Type    Sharonda Garg OTR/L, JOE Occupational Therapist               Goals/Plan    No documentation.                Clinical Impression     Row Name 11/18/22 0955          Pain Assessment    Pretreatment Pain Rating 0/10 - no pain  -ISMAEL     Posttreatment Pain Rating 0/10 - no pain  -     Row Name 11/18/22 0955          Plan of Care Review    Plan of Care Reviewed With patient  -     Outcome Evaluation OT tx completed. Pt presents alert and oriented x4, but has slight conversational confusion throughout. She continues to demonstrate poor safety awareness and is often impulsive with movements during session. She continues to have L visual and spacial neglect. She was able to come to sitting at EOB with CGA and vcs. Amb to commode with CGA and vcs and was able to complete all aspects of toileting with set-up and CGA only. Address L sided visual/spacial inattention, working on attending to L side during functional tasks and mobility, Required vcs to locate objects in L visual field and often veers to the L during mobility. She is easily distractible throughout session and often required redirection back to task to complete. Left fowlers in bed at end of session. Continue OT POC.  -ISMAEL     Row Name 11/18/22 0955          Therapy Plan Review/Discharge Plan (OT)    Anticipated Discharge Disposition (OT) inpatient rehabilitation facility  -     Row Name 11/18/22 0955          Positioning and  Restraints    Pre-Treatment Position in bed  -JJ     Post Treatment Position bed  -JJ     In Bed notified nsg;fowlers;call light within reach;encouraged to call for assist;side rails up x3;patient within staff view  -JJ           User Key  (r) = Recorded By, (t) = Taken By, (c) = Cosigned By    Initials Name Provider Type    Sharonda Garg, OTR/L, CSRS Occupational Therapist               Outcome Measures     Row Name 11/18/22 0955          How much help from another is currently needed...    Putting on and taking off regular lower body clothing? 3  -JJ     Bathing (including washing, rinsing, and drying) 3  -JJ     Toileting (which includes using toilet bed pan or urinal) 3  -JJ     Putting on and taking off regular upper body clothing 3  -JJ     Taking care of personal grooming (such as brushing teeth) 3  -JJ     Eating meals 4  -JJ     AM-PAC 6 Clicks Score (OT) 19  -JJ     Row Name 11/18/22 1000 11/18/22 0800       How much help from another person do you currently need...    Turning from your back to your side while in flat bed without using bedrails? 4  -MS 3  -SP    Moving from lying on back to sitting on the side of a flat bed without bedrails? 3  -MS 3  -SP    Moving to and from a bed to a chair (including a wheelchair)? 3  -MS 3  -SP    Standing up from a chair using your arms (e.g., wheelchair, bedside chair)? 3  -MS 3  -SP    Climbing 3-5 steps with a railing? 2  -MS 3  -SP    To walk in hospital room? 3  -MS 3  -SP    AM-PAC 6 Clicks Score (PT) 18  -MS 18  -SP    Highest level of mobility 6 --> Walked 10 steps or more  -MS 6 --> Walked 10 steps or more  -SP    Row Name 11/18/22 1000 11/18/22 0955       Modified [place] Scale    Pre-Stroke Modified [place]res Scale -- 0 - No Symptoms at all.  -JJ    Modified [place] Scale 4 - Moderately severe disability.  Unable to walk without assistance, and unable to attend to own bodily needs without assistance.  -MS 4 - Moderately severe disability.  Unable to  walk without assistance, and unable to attend to own bodily needs without assistance.  -ISMAEL    Row Name 11/18/22 1000 11/18/22 0955       Functional Assessment    Outcome Measure Options AM-PAC 6 Clicks Basic Mobility (PT);Modified [place]  -MS AM-PAC 6 Clicks Daily Activity (OT)  -ISMAEL          User Key  (r) = Recorded By, (t) = Taken By, (c) = Cosigned By    Initials Name Provider Type    MS Alona Spain R, PT, DPT, NCS Physical Therapist    Dali Brady, RN Registered Nurse    Sharonda Garg, OTR/L, CSRS Occupational Therapist                Occupational Therapy Education     Title: PT OT SLP Therapies (In Progress)     Topic: Occupational Therapy (In Progress)     Point: ADL training (Done)     Description:   Instruct learner(s) on proper safety adaptation and remediation techniques during self care or transfers.   Instruct in proper use of assistive devices.              Learning Progress Summary           Patient Acceptance, E, VU by ISMAEL at 11/18/2022 1522    Acceptance, E, VU by ISMAEL at 11/17/2022 1518                   Point: Home exercise program (Not Started)     Description:   Instruct learner(s) on appropriate technique for monitoring, assisting and/or progressing therapeutic exercises/activities.              Learner Progress:  Not documented in this visit.          Point: Precautions (Done)     Description:   Instruct learner(s) on prescribed precautions during self-care and functional transfers.              Learning Progress Summary           Patient Acceptance, E, VU by ISMAEL at 11/18/2022 1522    Acceptance, E, VU by ISMAEL at 11/17/2022 1518                   Point: Body mechanics (Not Started)     Description:   Instruct learner(s) on proper positioning and spine alignment during self-care, functional mobility activities and/or exercises.              Learner Progress:  Not documented in this visit.                      User Key     Initials Effective Dates Name Provider Type Discipline    ISMAEL  11/10/21 -  Sharonda Anderson OTR/L, JOE Occupational Therapist OT              OT Recommendation and Plan  Planned Therapy Interventions (OT): activity tolerance training, adaptive equipment training, BADL retraining, cognitive/visual perception retraining, functional balance retraining, transfer/mobility retraining, strengthening exercise, occupation/activity based interventions, patient/caregiver education/training  Therapy Frequency (OT): 5 times/wk  Plan of Care Review  Plan of Care Reviewed With: patient  Outcome Evaluation: OT tx completed. Pt presents alert and oriented x4, but has slight conversational confusion throughout. She continues to demonstrate poor safety awareness and is often impulsive with movements during session. She continues to have L visual and spacial neglect. She was able to come to sitting at EOB with CGA and vcs. Amb to commode with CGA and vcs and was able to complete all aspects of toileting with set-up and CGA only. Address L sided visual/spacial inattention, working on attending to L side during functional tasks and mobility, Required vcs to locate objects in L visual field and often veers to the L during mobility. She is easily distractible throughout session and often required redirection back to task to complete. Left fowlers in bed at end of session. Continue OT POC.     Time Calculation:    Time Calculation- OT     Row Name 11/18/22 0955             Time Calculation- OT    OT Start Time 0955  -      OT Stop Time 1049  -      OT Time Calculation (min) 54 min  -      Total Timed Code Minutes- OT 54 minute(s)  -      OT Received On 11/18/22  -            User Key  (r) = Recorded By, (t) = Taken By, (c) = Cosigned By    Initials Name Provider Type    Sharonda Garg OTR/L, JOE Occupational Therapist              Therapy Charges for Today     Code Description Service Date Service Provider Modifiers Qty    [id number]  OT EVAL MOD COMPLEXITY 4 11/17/2022  Sharonda Anderson OTR/L, CSRS GO 1    [id number] HC OT SELF CARE/MGMT/TRAIN EA 15 MIN 11/18/2022 Sharonda Anderson OTR/L, CSRS GO 4               EVA [place]/L, CSRS  11/18/2022

## 2022-11-18 NOTE — THERAPY EVALUATION
Patient Name: Concepcion Velez  : 1945    MRN: [id number]                              Today's Date: 2022       Admit Date: 2022    Visit Dx:     ICD-10-CM ICD-9-CM   1. Cerebrovascular accident (CVA), unspecified mechanism (HCC)  I63.9 434.91   2. Altered mental status, unspecified altered mental status type  R41.82 780.97   3. Dysphagia, unspecified type  R13.10 787.20   4. Impaired mobility and ADLs  Z74.09 V49.89    Z78.9    5. Impaired mobility  Z74.09 799.89     Patient Active Problem List   Diagnosis   • Tachycardia   • Cerebellar infarct (HCC)   • Uncontrolled hypertension   • Toxic metabolic encephalopathy   • Type 2 diabetes mellitus with hyperglycemia, without long-term current use of insulin (HCC)   • Cerebrovascular accident (CVA), unspecified mechanism (HCC)   • CVA (cerebral vascular accident) (HCC)   • Diarrhea   • Leukocytosis   • Hypomagnesemia   • Hyponatremia     Past Medical History:   Diagnosis Date   • Abdominal wall seroma    • B12 deficiency    • Cerebral infarct (HCC)    • Diabetes mellitus (HCC)    • HLD (hyperlipidemia)    • Hypertension    • Hypokalemia    • Hypomagnesemia      Past Surgical History:   Procedure Laterality Date   • CHOLECYSTECTOMY     • HERNIA REPAIR     • HYSTERECTOMY        General Information     Row Name 22 1000          Physical Therapy Time and Intention    Document Type evaluation  acute B cerebellar strokes, R occipital and R frontal embolic strokes  -MS     Mode of Treatment physical therapy;co-treatment  -MS     Row Name 22 1000          General Information    Patient Profile Reviewed yes  -MS     Prior Level of Function independent:;all household mobility;bed mobility;ADL's  prior to  pt was caring for her 15m old grandson  -MS     Existing Precautions/Restrictions fall  -MS     Barriers to Rehab cognitive status  -MS     Row Name 22 1000          Living Environment    People in Home spouse  -MS     Row Name 22  1000          Home Main Entrance    Number of Stairs, Main Entrance five  -MS     Row Name 11/18/22 1000          Stairs Within Home, Primary    Number of Stairs, Within Home, Primary none  -MS     Row Name 11/18/22 1000          Cognition    Orientation Status (Cognition) oriented x 4  -MS     Row Name 11/18/22 1000          Safety Issues, Functional Mobility    Safety Issues Affecting Function (Mobility) ability to follow commands;at risk behavior observed;awareness of need for assistance;impulsivity;insight into deficits/self-awareness;judgment;problem-solving;sequencing abilities  -MS     Impairments Affecting Function (Mobility) balance;endurance/activity tolerance;strength;cognition;visual/perceptual  -MS           User Key  (r) = Recorded By, (t) = Taken By, (c) = Cosigned By    Initials Name Provider Type    Alona Cates, PT, DPT, NCS Physical Therapist               Mobility     Row Name 11/18/22 1000          Bed Mobility    Bed Mobility supine-sit;sit-supine  -MS     Supine-Sit [place] (Bed Mobility) contact guard  -MS     Sit-Supine [place] (Bed Mobility) contact guard  -MS     Assistive Device (Bed Mobility) head of bed elevated  -MS     Row Name 11/18/22 1000          Sit-Stand Transfer    Sit-Stand [place] (Transfers) contact guard  -MS     Row Name 11/18/22 1000          Gait/Stairs (Locomotion)    [place] Level (Gait) minimum assist (75% patient effort)  -MS     Assistive Device (Gait) --  HHA x2  -MS     Distance in Feet (Gait) 25ft x 8 with pt drifting to the R and scissoring in both directions and multiple LOB  -MS           User Key  (r) = Recorded By, (t) = Taken By, (c) = Cosigned By    Initials Name Provider Type    Alona Cates PT, DPT, NCS Physical Therapist               Obj/Interventions     Row Name 11/18/22 1000          Range of Motion Comprehensive    General Range of Motion bilateral upper extremity ROM WFL;bilateral lower extremity ROM WFL  -MS      Comment, General Range of Motion demonstrated during functional movement, pt had difficulty following directions for testing at times but followed functional directions  -MS     Row Name 11/18/22 1000          Strength Comprehensive (MMT)    Comment, General Manual Muscle Testing (MMT) Assessment pt neglects L side at times, but demonstrated good functional strength of the L side. R LE 5/5, L 4/5  -MS     Row Name 11/18/22 1000          Motor Skills    Motor Skills coordination  -MS     Coordination --  pt neglects the L side at times, she requires cues to use the L UE at times or to look to the L at times  -MS     Row Name 11/18/22 1000          Balance    Balance Assessment sitting static balance;sitting dynamic balance;standing static balance;standing dynamic balance  -MS     Static Sitting Balance standby assist  -MS     Dynamic Sitting Balance contact guard  -MS     Position, Sitting Balance unsupported;sitting edge of bed  -MS     Static Standing Balance contact guard;minimal assist  -MS     Dynamic Standing Balance minimal assist  -MS     Position/Device Used, Standing Balance supported  HHA x2  -MS     Row Name 11/18/22 1000          Sensory Assessment (Somatosensory)    Sensory Assessment (Somatosensory) sensation intact  -MS           User Key  (r) = Recorded By, (t) = Taken By, (c) = Cosigned By    Initials Name Provider Type    MS Alona Spain R, PT, DPT, NCS Physical Therapist               Goals/Plan     Row Name 11/18/22 1000          Bed Mobility Goal 1 (PT)    Activity/Assistive Device (Bed Mobility Goal 1, PT) bed mobility activities, all  -MS     [place] Level/Cues Needed (Bed Mobility Goal 1, PT) independent  -MS     Time Frame (Bed Mobility Goal 1, PT) long term goal (LTG);by discharge  -MS     Progress/Outcomes (Bed Mobility Goal 1, PT) new goal  -MS     Row Name 11/18/22 1000          Transfer Goal 1 (PT)    Activity/Assistive Device (Transfer Goal 1, PT)  sit-to-stand/stand-to-sit;bed-to-chair/chair-to-bed;walker, rolling  -MS     [place] Level/Cues Needed (Transfer Goal 1, PT) modified independence  -MS     Time Frame (Transfer Goal 1, PT) long term goal (LTG);by discharge  -MS     Progress/Outcome (Transfer Goal 1, PT) new goal  -MS     Row Name 11/18/22 1000          Gait Training Goal 1 (PT)    Activity/Assistive Device (Gait Training Goal 1, PT) gait (walking locomotion);assistive device use;decrease fall risk;diminish gait deviation;improve balance and speed;increase endurance/gait distance;walker, rolling  -MS     [place] Level (Gait Training Goal 1, PT) standby assist  -MS     Distance (Gait Training Goal 1, PT) 75ft with no scissoring and maintain a straight path  -MS     Time Frame (Gait Training Goal 1, PT) long term goal (LTG);by discharge  -MS     Progress/Outcome (Gait Training Goal 1, PT) new goal  -MS     Row Name 11/18/22 1000          Problem Specific Goal 1 (PT)    Problem Specific Goal 1 (PT) Pt will find 3 objects on L side of visual field during gait  -MS     Time Frame (Problem Specific Goal 1, PT) long-term goal (LTG);by discharge  -MS     Progress/Outcome (Problem Specific Goal 1, PT) [facility]  -MS     Row Name 11/18/22 1000          Therapy Assessment/Plan (PT)    Planned Therapy Interventions (PT) balance training;bed mobility training;gait training;patient/family education;strengthening;neuromuscular re-education;motor coordination training;transfer training  -MS           User Key  (r) = Recorded By, (t) = Taken By, (c) = Cosigned By    Initials Name Provider Type    Alona Cates, PT, DPT, NCS Physical Therapist               Clinical Impression     Row Name 11/18/22 1000          Pain    Pretreatment Pain Rating 0/10 - no pain  -MS     Posttreatment Pain Rating 0/10 - no pain  -MS     Row Name 11/18/22 1000          Plan of Care Review    Plan of Care Reviewed With patient  -MS     Progress no change  -MS     Outcome  Evaluation The patient presents alert and oriented x4 however she does not remember being in the hospital earlier in the week. She is impulsive and poor safety awareness. She has L visual and spacial neglect, but she will attend to the L with cues. She has difficulty with following directions for testing especially for the L side, but she does not seem to have any significant weakness or cooridnation deficits on either side. She is unsteady during gait and she would benefit from use of a walker. PT will continue to work with her for gait stability, spacial and safety awareness during gait, and balance. Recommend inpt acute rehab upon discharge.  -MS     Row Name 11/18/22 1000          Therapy Assessment/Plan (PT)    Patient/Family Therapy Goals Statement (PT) care for self and grandson again  -MS     Rehab Potential (PT) good, to achieve stated therapy goals  -MS     Criteria for Skilled Interventions Met (PT) yes;meets criteria;skilled treatment is necessary  -MS     Therapy Frequency (PT) 2 times/day  -MS     Predicted Duration of Therapy Intervention (PT) until discharge  -MS     Row Name 11/18/22 1000          Positioning and Restraints    Post Treatment Position bed  -MS     In Bed fowlers;call light within reach;encouraged to call for assist;side rails up x2;patient within staff view  -MS           User Key  (r) = Recorded By, (t) = Taken By, (c) = Cosigned By    Initials Name Provider Type    MS Grabiel Alona R, PT, DPT, NCS Physical Therapist               Outcome Measures     Row Name 11/18/22 1000 11/18/22 0800       How much help from another person do you currently need...    Turning from your back to your side while in flat bed without using bedrails? 4  -MS 3  -SP    Moving from lying on back to sitting on the side of a flat bed without bedrails? 3  -MS 3  -SP    Moving to and from a bed to a chair (including a wheelchair)? 3  -MS 3  -SP    Standing up from a chair using your arms (e.g., wheelchair,  bedside chair)? 3  -MS 3  -SP    Climbing 3-5 steps with a railing? 2  -MS 3  -SP    To walk in hospital room? 3  -MS 3  -SP    AM-PAC 6 Clicks Score (PT) 18  -MS 18  -SP    Highest level of mobility 6 --> Walked 10 steps or more  -MS 6 --> Walked 10 steps or more  -SP    Row Name 11/18/22 1000          Modified [place] Scale    Modified [place] Scale 4 - Moderately severe disability.  Unable to walk without assistance, and unable to attend to own bodily needs without assistance.  -MS     Row Name 11/18/22 1000          Functional Assessment    Outcome Measure Options AM-PAC 6 Clicks Basic Mobility (PT);Modified [place]  -MS           User Key  (r) = Recorded By, (t) = Taken By, (c) = Cosigned By    Initials Name Provider Type    MS Alona Spain, PT, DPT, NCS Physical Therapist    SP Dali Carbajal, RN Registered Nurse                             Physical Therapy Education     Title: PT OT SLP Therapies (In Progress)     Topic: Physical Therapy (In Progress)     Point: Mobility training (In Progress)     Learning Progress Summary           Patient Acceptance, E, NR by MS at 11/18/2022 1344    Comment: role of PT in her care                   Point: Home exercise program (Not Started)     Learner Progress:  Not documented in this visit.          Point: Body mechanics (Not Started)     Learner Progress:  Not documented in this visit.          Point: Precautions (Not Started)     Learner Progress:  Not documented in this visit.                      User Key     Initials Effective Dates Name Provider Type Discipline    MS 06/19/18 -  Alona Spain, PT, DPT, NCS Physical Therapist PT              PT Recommendation and Plan  Planned Therapy Interventions (PT): balance training, bed mobility training, gait training, patient/family education, strengthening, neuromuscular re-education, motor coordination training, transfer training  Plan of Care Reviewed With: patient  Progress: no change  Outcome Evaluation: The patient  presents alert and oriented x4 however she does not remember being in the hospital earlier in the week. She is impulsive and poor safety awareness. She has L visual and spacial neglect, but she will attend to the L with cues. She has difficulty with following directions for testing especially for the L side, but she does not seem to have any significant weakness or cooridnation deficits on either side. She is unsteady during gait and she would benefit from use of a walker. PT will continue to work with her for gait stability, spacial and safety awareness during gait, and balance. Recommend inpt acute rehab upon discharge.     Time Calculation:    PT Charges     Row Name 11/18/22 1000             Time Calculation    Start Time 1000  -MS      Stop Time 1040  -MS      Time Calculation (min) 40 min  -MS      PT Received On 11/18/22  -MS      PT Goal Re-Cert Due Date 11/28/22  -MS         Untimed Charges    PT Eval/Re-eval Minutes 40  -MS         Total Minutes    Untimed Charges Total Minutes 40  -MS       Total Minutes 40  -MS            User Key  (r) = Recorded By, (t) = Taken By, (c) = Cosigned By    Initials Name Provider Type    Alona Cates, PT, DPT, NCS Physical Therapist              Therapy Charges for Today     Code Description Service Date Service Provider Modifiers Qty    84170811270 HC PT EVAL LOW COMPLEXITY 3 11/18/2022 Alona Spain PT, DPT, NCS GP 1          PT G-Codes  Outcome Measure Options: AM-PAC 6 Clicks Basic Mobility (PT), Modified [place]  AM-PAC 6 Clicks Score (PT): 18  AM-PAC 6 Clicks Score (OT): 17  Modified [place] Scale: 4 - Moderately severe disability.  Unable to walk without assistance, and unable to attend to own bodily needs without assistance.  PT Discharge Summary  Anticipated Discharge Disposition (PT): inpatient rehabilitation facility    Alona Spain PT, DPT, NCS  11/18/2022

## 2022-11-18 NOTE — CASE MANAGEMENT/SOCIAL WORK
Continued Stay Note   [place]     Patient Name: Concepcion Velez  MRN: [id number]  Today's Date: 11/18/2022    Admit Date: 11/17/2022    Plan: Referral to [facility] pending   Discharge Plan     Row Name 11/18/22 1155       Plan    [facility]    Patient/Family in Agreement with Plan yes    Plan Comments Patient now in ICU.  [facility].  Patient's insurance will also require precert for rehab placement.               Discharge Codes    No documentation.                     TONG Koroma

## 2022-11-18 NOTE — PAYOR COMM NOTE
"11/18/22 [facility]    [phone]    PHONE [phone]    PATIENT ER ADMIT TO INPATIENT TO ICU ON 11/17/22.    FAXING FOR INPATIENT REVIEW AND APPROVAL.        Arlin Velez (77 y.o. Female)     Date of Birth   1945    Social Security Number       Address   [address] [place] [address]    Home Phone   [phone]    MRN   [id number]       Episcopalian   Sikh    Marital Status                               Admission Date   11/17/22    Admission Type   Emergency    Admitting Provider   Yony Dyson DO    Attending Provider   Yony Dyson DO    Department, Room/Bed   [facility] INTENSIVE CARE, I003/1       Discharge Date       Discharge Disposition       Discharge Destination                               Attending Provider: Yony Dyson DO    Allergies: No Known Allergies    Isolation: Spore   Infection: C.difficile (rule out) (11/17/22)   Code Status: CPR    Ht: 152.4 cm (60\")   Wt: 61.2 kg (134 lb 14.7 oz)    Admission Cmt: None   Principal Problem: Cerebrovascular accident (CVA), unspecified mechanism (HCC) [I63.9]                 Active Insurance as of 11/17/2022     Primary Coverage     Payor Plan Insurance Group Employer/Plan Group    [facility] MEDICARE REPLACEMENT ANTH MEDICARE ADVANTAGE [id number]     Payor Plan Address Payor Plan Phone Number Payor Plan Fax Number Effective Dates    [address] [phone]  1/1/2022 - None Entered    [facility] [id number]       Subscriber Name Subscriber Birth Date Member ID       ARLIN VELEZ 1945 [id number]                 Emergency Contacts      (Rel.) Home Phone Work Phone Mobile Phone    Kaylin Cardona (Daughter) -- -- [phone]    Lavelle Velez (Son) -- -- [phone]    Lillie Henson (Daughter) -- -- [phone]    Lucy Yan (Daughter) -- -- [phone]           [facility] Encounter Date/Time: 11/17/2022 48   Hospital Account: [id number]  "   MRN: [id number]   Patient:  Arlin Velez   Contact Serial #: [id number]   SSN:          ENCOUNTER             Patient Class: Inpatient   Unit: [facility] ICU   Hospital Service: Medicine     Bed: I003/1   Admitting Provider: Yony Dyson DO   Referring Physician: Provider, No Known   Attending Provider: Yony Dyson DO   Adm Diagnosis: Cerebrovascular accident*               PATIENT          Name: Arlin Velez : 1945 (77 yrs)   Address: [address] [place] [address] Sex: Female   City: [place] [address]   County: [place]   Marital Status:  Ethnicity: NOT                                                                              Race: WHITE   Primary Care Provider: Jaxson Vines DO Patients Phone: Home Phone: [phone]     Mobile Phone: [phone]   EMERGENCY CONTACT   Contact Name Legal Guardian? Relationship to Patient Home Phone Work Phone   1. Kaylin Cardona  2. Lavelle Velez      Daughter  Son                GUARANTOR            Guarantor: Arlin Velez     : 1945   Address: [address] [place] [address] Sex: [place]     [place] [address]     Relation to Patient: Self       Home Phone: [phone]   Guarantor ID: [id number]       Work Phone:     GUARANTOR EMPLOYER   Employer:           Status: RETIRED   COVERAGE          PRIMARY INSURANCE   Payor: [employer] MEDICARE REPLACEMENT Plan: [employer] MEDICARE ADVANTAGE   Group Number: [id number] Insurance Type: INDEMNITY   Subscriber Name: ARLIN VELEZ Subscriber : 1945   Subscriber ID: [id number] Coverage Address: [address] [address]   Pat. Rel. to Subscriber: Self Coverage Phone: [phone]   SECONDARY INSURANCE   Payor: N/A Plan: N/A   Group Number:   Insurance Type:     Subscriber Name:   Subscriber :     Subscriber ID:   Coverage Address:     Pat. Rel. to Subscriber:   Coverage Phone:        Contact Serial # ([id number])       2022    Chart ID [id number])            Yony Dyson     Physician  Hospitalist  H&P      Addendum  Date of Service:  11/17/22 1332  Creation Time:  11/17/22 1332     [facility]  HISTORY AND PHYSICAL     Date of Admission: 11/17/2022  Primary Care Physician: Jaxson Vines DO     Subjective      Chief Complaint: Left-sided weakness     History of Present Illness  Patient is a 77-year-old female with a history of hypertension, hyperlipidemia, diabetes who was just recently hospitalized from 11/14 through 11/15 with a headache and concerns for possible complicated migraine versus seizure.  There was reported imaging finding of stroke at that time but felt to be incidental finding per reports.  She was discharged home on p.o. Omnicef for unknown reason.  Her chest x-ray was clear.  Her urine was clear.  She did have mild white count elevation of 14,000.  No clear signs of infection.  Since discharge home she been having some GI symptoms.  Nausea, vomiting, diarrhea.  Daughter state profuse diarrhea for several days.  At least 4+ episodes daily without noted bleeding.  She apparently went to bed fine last night around 10 PM and this morning family found her in urine and fecal material with left-sided weakness.  Not a tPA candidate given timing.  She does also seem to be improving already in the ER.  She is able to talk and is alert and oriented.  She says she feels okay at this time.  Only complaint is of ongoing headache but denies any changes in vision or photophobia.  No double vision.  No blurred vision.  Denies any chest pain or shortness of breath.  No current nausea or abdominal pain.  MRI in the ER done prior to admission already showing previous noted area of question with several new bilateral areas of stroke evident.  No hemorrhagic conversion.  No midline shift.  We have been asked to admit for further care.           Review of Systems   Otherwise complete ROS reviewed and  "negative except as mentioned in the HPI.     Past Medical History:   Medical History        Past Medical History:   Diagnosis Date   • Abdominal wall seroma     • B12 deficiency     • Cerebral infarct (HCC)     • Diabetes mellitus (HCC)     • HLD (hyperlipidemia)     • Hypertension     • Hypokalemia     • Hypomagnesemia           Past Surgical History:  Surgical History         Past Surgical History:   Procedure Laterality Date   • CHOLECYSTECTOMY       • HERNIA REPAIR       • HYSTERECTOMY             Social History:  reports that she has never smoked. She does not have any smokeless tobacco history on file. She reports that she does not drink alcohol and does not use drugs.     Family History: family history includes Cancer in her mother; Hypertension in her mother; Transient ischemic attack in her father.        Allergies:  No Known Allergies         Vital Signs: /66   Pulse 93   Temp 97.9 °F (36.6 °C)   Resp 16   Ht 152.4 cm (60\")   Wt 59.9 kg (132 lb)   LMP  (LMP Unknown)   SpO2 98%   BMI 25.78 kg/m²   Physical Exam   GEN: Awake, alert, interactive, in NAD  HEENT: PERRLA, EOMI, Anicteric, Trachea midline  Lungs:  no wheezing/rales/rhonchi  Heart: RRR, +S1/s2, no rub  ABD: soft, nt/nd, +BS, no guarding/rebound  Extremities: atraumatic, no cyanosis, no pitting edema  Skin: no rashes or petechiae   Neuro: AAOx3, slight left sided weakness vs R, greater noticed in distal LE, gait not tested  Psych: somewhat of a flat affect           Results Reviewed:           Lab Results (last 24 hours)      Procedure Component Value Units Date/Time     Manual Differential [[id number]]  (Abnormal) Collected: 11/17/22 0838     Specimen: Blood Updated: 11/17/22 0934       Neutrophil % 74.2 %         Lymphocyte % 8.2 %         Monocyte % 8.2 %         Bands %  5.2 %         Atypical Lymphocyte % 4.1 %         Neutrophils Absolute 12.55 10*3/mm3         Lymphocytes Absolute 1.94 10*3/mm3         Monocytes Absolute 1.30 " 10*3/mm3         RBC Morphology Normal       Vacuolated Neutrophils Slight/1+       Platelet Morphology Normal     CBC & Differential [[id number]]  (Abnormal) Collected: 11/17/22 0847     Specimen: Blood Updated: 11/17/22 0934     Narrative:       The following orders were created for panel order CBC & Differential.  Procedure                               Abnormality         Status                     ---------                               -----------         ------                     CBC Auto Differential[[id number]]        Abnormal            Final result                  Please view results for these tests on the individual orders.     CBC Auto Differential [[id number]]  (Abnormal) Collected: 11/17/22 0847     Specimen: Blood Updated: 11/17/22 0934       WBC 15.81 10*3/mm3         RBC 3.73 10*6/mm3         Hemoglobin 10.6 g/dL         Hematocrit 34.3 %         MCV 92.0 fL         MCH 28.4 pg         MCHC 30.9 g/dL         RDW 13.8 %         RDW-SD 46.5 fl         MPV 10.7 fL         Platelets 275 10*3/mm3       Narrative:       The previously reported component NRBC is no longer being reported. Previous result was 0.0 /100 WBC (Reference Range: 0.0-0.2 /100 WBC) on 11/17/2022 at 0905 CST.     Basic Metabolic Panel [[id number]]  (Abnormal) Collected: 11/17/22 0847     Specimen: Blood Updated: 11/17/22 0922       Glucose 136 mg/dL         BUN 13 mg/dL         Creatinine 0.59 mg/dL         Sodium 132 mmol/L         Potassium 3.6 mmol/L         Chloride 94 mmol/L         CO2 27.0 mmol/L         Calcium 9.6 mg/dL         BUN/Creatinine Ratio 22.0       Anion Gap 11.0 mmol/L         eGFR 94.1 mL/min/1.73         Comment: National Kidney Foundation and American Society of Nephrology (ASN) Task Force recommended calculation based on the Chronic Kidney Disease Epidemiology Collaboration (CKD-EPI) equation refit without adjustment for race.        Narrative:       GFR Normal >60  Chronic Kidney Disease <60  Kidney Failure  <15     The GFR formula is only valid for adults with stable renal function between ages 18 and 70.     CK [[id number]]  (Normal) Collected: 11/17/22 0847     Specimen: Blood Updated: 11/17/22 0921       Creatine Kinase 31 U/L       Lactic Acid, Plasma [[id number]]  (Normal) Collected: 11/17/22 0847     Specimen: Blood Updated: 11/17/22 0918       Lactate 1.6 mmol/L       Magnesium [[id number]]  (Abnormal) Collected: 11/17/22 0847     Specimen: Blood Updated: 11/17/22 0916       Magnesium 1.4 mg/dL       Urinalysis With Culture If Indicated - Urine, Catheter [[id number]]  (Abnormal) Collected: 11/17/22 0847     Specimen: Urine, Catheter Updated: 11/17/22 0905       Color, UA Yellow       Appearance, UA Clear       pH, UA 7.0       Specific Gravity, UA 1.012       Glucose, UA Negative       Ketones, UA Negative       Bilirubin, UA Negative       Blood, UA Negative       Protein, UA Negative       Leuk Esterase, UA Trace       Nitrite, UA Negative       Urobilinogen, UA 0.2 E.U./dL     Narrative:       In absence of clinical symptoms, the presence of pyuria, bacteria, and/or nitrites on the urinalysis result does not correlate with infection.     Urinalysis, Microscopic Only - Urine, Catheter [[id number]]  (Abnormal) Collected: 11/17/22 0847     Specimen: Urine, Catheter Updated: 11/17/22 0905       RBC, UA None Seen /HPF         WBC, UA 3-5 /HPF         Comment: Urine culture not indicated.          Bacteria, UA None Seen /HPF         Squamous Epithelial Cells, UA 3-6 /HPF         Hyaline Casts, UA 0-2 /LPF         Methodology Automated Microscopy                   Imaging Results (Last 24 Hours)      Procedure Component Value Units Date/Time     MRI Brain Without Contrast [[id number]] Collected: 11/17/22 0939       Updated: 11/17/22 0952     Narrative:       EXAMINATION: MRI brain without contrast 11/17/2022     HISTORY: Transient ischemic attack. Stroke follow-up     FINDINGS: Today's exam is compared to a previous study  of 3 days  earlier. Multiplanar fast spin echo imaging sequences were obtained of  the brain on a high-field magnet without gadolinium enhancement.     There are peripheral sites of diffusion restriction within both  cerebellar hemispheres showing progression from the previous  examination. The left cerebellar hemisphere lesions are new from the  prior study. There are associated ADC mapping abnormalities consistent  with acute ischemic infarction. No evidence of hemorrhagic conversion.  There is no mass effect. There is also a peripheral diffusion  restriction within the right posterior parietal/occipital lobe with  associated ADC mapping abnormality also new from the previous exam  suggesting a small focus of cortical infarction. A more equivocal focus  of diffusion restriction within the right frontal lobe gyrus on image  192 of series 204 is present.     There is mild atrophy the brain. Small vessel ischemic changes are noted  involving the periventricular and higher white matter tracts. There is  normal flow-voids within the [place].     The orbits are unremarkable.        Impression:       1.. Previously noted foci of diffusion restriction within the right  cerebellar hemisphere are more prominent on the current exam. There are  also now noted to be sites of diffusion restriction within the left  cerebellar hemisphere with associated ADC mapping abnormality consistent  with acute bilateral cerebellar hemisphere infarcts. These are  nonhemorrhagic. There is also a focus of diffusion abnormality within  the right posterior parietal/occipital lobe involving the cortex with  associated ADC mapping abnormality suggesting an additional site of  cortical infarct. A focus of more indeterminate diffusion restriction  within the right frontal vertex involving a gyrus is also present not  appreciated on the previous exam.  2. No mass effect or shift of the midline. No evidence of hemorrhagic  conversion. There  are normal flow-voids within the [place].  3. Atrophy with small vessel disease involving the periventricular and  higher white matter tracts.  This report was finalized on 11/17/2022 09:49 by Dr. Dominic Valdes MD.     .      CT Head Without Contrast [[id number]] Collected: 11/17/22 0818       Updated: 11/17/22 0825     Narrative:       EXAMINATION: CT head without contrast 11/17/2022     HISTORY: Neurologic deficit. Stroke suspected.     DOSE: 758 mGycm. All CT scans are performed using dose optimization  techniques as appropriate to the performed exam and including at least  one of the following: Automated exposure control, adjustment of the mA  and/or kV according to size, and the use of the iterative reconstruction  technique..     FINDINGS: Multiple contiguous axial images are obtained from the skull  base to the vertex per protocol without intravenous contrast-enhancement  with reformatted images obtained in the sagittal and coronal projections  from the original data set.     There is evidence of a remote infarct involving the left cerebellar  hemisphere. There is mild atrophy of the brain. 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     There is no mass, mass effect or shift of the midline. No evidence of  acute infarct or hemorrhage.     The visualized paranasal sinuses and mastoid air cells are normally  aerated.        Impression:       1.. No evidence of acute infarct or hemorrhage. Remote left cerebellar  hemisphere infarct.  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  This report was finalized on 11/17/2022 08:22 by Dr. Dominic Valdes MD.          I have personally reviewed and interpreted the radiology studies and ECG obtained at time of admission.      Assessment:        Active Hospital Problems     Diagnosis     • **Cerebrovascular accident (CVA), unspecified mechanism (HCC)     • Diarrhea     • Leukocytosis     • Hypomagnesemia     • Hyponatremia     • Type 2 diabetes mellitus with  hyperglycemia, without long-term current use of insulin (HCC)     • Uncontrolled hypertension        Plan:   #1 CVA -multiple new bilateral strokes.  Fairly high burden.  Unclear source.  No A. fib recent hospital stay.  EKG on arrival is normal sinus.  Last echo with normal EF and no PFO.  Suspect patient will require COLIN to better evaluate for embolic source.  Plan for speech, PT, OT.  Continue with neuro evaluation.  Patient is already on aspirin and statin.     #2 headache -last hospital stay there was concern for possible complicated migraine.  She is still having headache.  Unclear if it is associated with her stroke.  Monitor closely.  Continue neurochecks.  We will discuss further with neurosurgery after their evaluation.  She was started on Keppra last hospital stay due to abnormal EEG to help potentially prevent seizure activity and migraines.  No clear seizure activity noted to this point.  We will continue Keppra.     #3 hypertension -allow permissive hypertension today, monitor closely     #4 hypomagnesemia -1.4.  Likely in the setting of recent diarrheal illness.  Was given 1 g in the ER.  Monitor and recheck.     #5 diarrhea -patient having diarrhea since discharge.  Was on Omnicef after discharge for unclear reason.  Will DC antibiotic.  White count slightly higher.  We will check a GI PCR and C. difficile.  She has no abdominal tenderness on exam.  She denies any abdominal pain.     #6 leukocytosis -was 14 on last hospital stay with no clear signs of infection.  Urine is also clear on arrival now.  Lungs are clear.  On room air.  Afebrile.  Of note her differentials could higher lymphocyte count.  Neutrophil percentage is normal.  Rule out C. difficile.  Recheck CBC in the morning.  Add a peripheral smear in the morning.     #7 DM2 -sliding scale insulin hypoglycemia protocol.     #8 hyponatremia -mild at 132.  Initially thought patient might be a little dehydrated in the setting of elevated WBC and  her GI illness.  However family states she drank 8 bottles of water last night and she twice requested something to drink while I was seeing her in the ER.  We will add urine sodium and osm to her urine already down in lab.  Unclear if she needs fluids or restriction at this point.  We will follow-up.     Code Status/Advanced Care Plan: Full code     The patient's surrogate decision maker is her daughter.      I discussed my findings and recommendations with the patient and several daughters at bedside.  Case also discussed with Dr. Tim Lai of neurology.     Estimated length of stay is 2+ days.      ADDENDUM:  After discussion with Dr. Lai she feels given the degree of patient's stroke burden and risk for swelling it would be better to monitor in the ICU tonight.  Transfer order put in for the ICU.  Bedboard notified.     Electronically signed by Yony Dyson DO, 11/17/22, 4:27 PM CST.     Shira Cruz MD   Physician  Neurology  Consults      Signed  Date of Service:  11/17/22 1405  Creation Time:  11/17/22 1405  Consult Orders   Inpatient Neurology Consult Stroke [[id number]] ordered by Yony Dyson DO at 11/17/22 1336            Patient:  Concepcion Velez   YOB: 1945  MRN:  [id number]  Date of Admission:  11/17/2022  7:48 AM     Date: 11/17/2022     Referring Provider: Yony Dyson DO  Reason for Consultation: Strokes        History of present illness:      This is a 77 y.o.  female.with co morbid conditions of hypertension, hyperlipidemia, DM and recent discharge on 11/15 for right cerebellar stroke and ? seizure evaluated for strokes     Patient was last known well at 10 PM yesterday and was found to have left arm and leg weakness today.  She got up this morning and vomited and defecated at 4 AM She has not thrown up since.  She did not take her Keppra today. Per family she was not somnolent from it..      Patient was recently hospitalized 11/14 and discharged 11/15    She had been seen by Dr Lim on 11/15  During that consultation both daughter and patient reported  Episodes of severe holocephalic  headache associated with photophobia and phonophobia and on occasion aphasia dn and right sided weakness and had been admitted to [facility] twice for the same thing.  Reportedly had low magnesium associated with these and in previously admission she reported urinary incontinent with one of these episodes but no seizure like activity.or tongue biting. She had episodes of altered mentation at times.      Of note UDS was + for opiates in her recent hospitalization. TSH was normal, RPR was normal B12 was only 324 but in normal range  Lactic acid and magnesium was normal but magnesium was only 1.6 and it is 1.4  today.  1 gram was given to her today     Per family for past 2 days she has had diarrhea and she threw up today.   Further work up then   Head CT 11/14:  No acute changes and mild cerebral microvascular disease and atrophy  MRI brain 11/14/22: Tiny acute stroke in the right cerebellum and White mater changes c/w cerebral microvascular disease  CTA of head and neck 11/14/22: No LVO. Atheromatous changes of the intracranial internal carotid arteries and no hemodynamically significant stenosis (mild to moderate calcified plaque within the proximal bilateral internal carotid arteries resulting in 50% stenosis of the proximal left ICA and 20% stenosis of proximal right ICA).  Hemoglobin A1C 6.1  LDL 11/15/22 44  TTE 11/15/22:   •  Left ventricular systolic function is normal. Left ventricular ejection fraction appears to be 56 - 60%.  •  Estimated right ventricular systolic pressure from tricuspid regurgitation is mildly elevated (35-45 mmHg).  •  Normal size and function of the right ventricle.  •  No significant valvular pathology.  •  Saline test results are negative.   EEG 11/15/22:  Findings: This is an abnormal EEG.  There is generalized slowing seen throughout that could be  consistent with a postictal state versus a metabolic/medication induced encephalopathy.  That being said, there are some atypical semi rhythmic waveforms occasionally with after coming slow waves which may represent an irritable cortex.  It is difficult to say whether this can be seen more over the left compared to the right or vice versa.  Patient was started on Keppra.during that hospitalization     There has been no witnessed seizure like activity.  Medical History        Past Medical History:   Diagnosis Date   • Abdominal wall seroma     • B12 deficiency     • Cerebral infarct (HCC)     • Diabetes mellitus (HCC)     • HLD (hyperlipidemia)     • Hypertension     • Hypokalemia     • Hypomagnesemia              Surgical History         Past Surgical History:   Procedure Laterality Date   • CHOLECYSTECTOMY       • HERNIA REPAIR       • HYSTERECTOMY                             Social History           Socioeconomic History   • Marital status:    Tobacco Use   • Smoking status: Never   Substance and Sexual Activity   • Alcohol use: Never   • Drug use: Never               Family History   Problem Relation Age of Onset   • Cancer Mother     • Hypertension Mother     • Transient ischemic attack Father           Review of Systems  A 14-point review of systems was unobtainable as patient is not talking     Vital Signs   Temp:  [97.6 °F (36.4 °C)-97.9 °F (36.6 °C)] 97.6 °F (36.4 °C)  Heart Rate:  [] 85  Resp:  [12-16] 16  BP: (149-166)/(65-95) 151/65     General Exam:  Head:  Normocephalic, atraumatic  HEENT:  Neck supple  Fundoscopic Exam:  No signs of disc edema  CVS:  Regular rate and rhythm.  No murmurs  Carotid Examination:  No bruits  Lungs:  Clear to auscultation  Abdomen:  Nontender, nondistended  Extremities:  No signs of peripheral edema  Skin:  No rashes     Neurologic Exam:     Mental Status:    -Somnolent  -Follows simple and complex commands     CN II:  Visual fields full.  Pupils equally  reactive to light  CN III, IV, VI:  Extraocular Muscles full with no signs of nystagmus  CN V:  Facial sensory is symmetric   CN VII:  Facial motor symmetric  CN VIII:  Gross hearing intact bilaterally  CN IX/X:  Palate elevates symmetrically  CN XI:  Shoulder shrug symmetric  CN XII:  Tongue is midline on protrusion     Motor: (strength out of 5:  1= minimal movement, 2 = movement in plane of gravity, 3 = movement against gravity, 4 = movement against some resistance, 5 = full strength)     -Right Upper Ext: Proximal: 5 Distal: 5  -Left Upper Ext: Proximal: 5   Distal: 5     -Right Lower Ext: Proximal: 5 Distal: 5  -Left Lower Ext: Proximal: 5   Distal: 5     DTR:  -Right              Biceps: 2+       Triceps: 2+      Brachioradialis: 2+              Patella: 2+       Ankle: 2+         Neg Babinski  -Left              Biceps: 2+       Triceps: 2+      Brachioradialis: 2+              Patella: 2+       Ankle: 2+         Neg Babinski     Sensory:  -Intact to light touch, pinprick, temperature, pain, and proprioception     Coordination:  -Finger to nose intact  -Heel to shin intact  -No ataxia     Gait  -No signs of ataxia  -ambulates unassisted        Results Review:           Lab Results (last 7 days)      Procedure Component Value Units Date/Time     Sodium, Urine, Random - Urine, Clean Catch [[id number]] Collected: 11/17/22 0847     Specimen: Urine, Clean Catch Updated: 11/17/22 1341       Sodium, Urine 61 mmol/L       Narrative:       Reference intervals for random urine have not been established.  Clinical usage is dependent upon physician's interpretation in combination with other laboratory tests.         Osmolality, Urine - Urine, Clean Catch [[id number]] Collected: 11/17/22 0847     Specimen: Urine, Clean Catch Updated: 11/17/22 1332     Manual Differential [[id number]]  (Abnormal) Collected: 11/17/22 0847     Specimen: Blood Updated: 11/17/22 0934       Neutrophil % 74.2 %         Lymphocyte % 8.2 %          Monocyte % 8.2 %         Bands %  5.2 %         Atypical Lymphocyte % 4.1 %         Neutrophils Absolute 12.55 10*3/mm3         Lymphocytes Absolute 1.94 10*3/mm3         Monocytes Absolute 1.30 10*3/mm3         RBC Morphology Normal       Vacuolated Neutrophils Slight/1+       Platelet Morphology Normal     CBC & Differential [[id number]]  (Abnormal) Collected: 11/17/22 0847     Specimen: Blood Updated: 11/17/22 0934     Narrative:       The following orders were created for panel order CBC & Differential.  Procedure                               Abnormality         Status                     ---------                               -----------         ------                     CBC Auto Differential[[id number]]        Abnormal            Final result                  Please view results for these tests on the individual orders.     CBC Auto Differential [[id number]]  (Abnormal) Collected: 11/17/22 0847     Specimen: Blood Updated: 11/17/22 0934       WBC 15.81 10*3/mm3         RBC 3.73 10*6/mm3         Hemoglobin 10.6 g/dL         Hematocrit 34.3 %         MCV 92.0 fL         MCH 28.4 pg         MCHC 30.9 g/dL         RDW 13.8 %         RDW-SD 46.5 fl         MPV 10.7 fL         Platelets 275 10*3/mm3       Narrative:       The previously reported component NRBC is no longer being reported. Previous result was 0.0 /100 WBC (Reference Range: 0.0-0.2 /100 WBC) on 11/17/2022 at 0905 CST.     Basic Metabolic Panel [[id number]]  (Abnormal) Collected: 11/17/22 0847     Specimen: Blood Updated: 11/17/22 0922       Glucose 136 mg/dL         BUN 13 mg/dL         Creatinine 0.59 mg/dL         Sodium 132 mmol/L         Potassium 3.6 mmol/L         Chloride 94 mmol/L         CO2 27.0 mmol/L         Calcium 9.6 mg/dL         BUN/Creatinine Ratio 22.0       Anion Gap 11.0 mmol/L         eGFR 94.1 mL/min/1.73         Comment: National Kidney Foundation and American Society of Nephrology (ASN) Task Force recommended calculation based  on the Chronic Kidney Disease Epidemiology Collaboration (CKD-EPI) equation refit without adjustment for race.        Narrative:       GFR Normal >60  Chronic Kidney Disease <60  Kidney Failure <15     The GFR formula is only valid for adults with stable renal function between ages 18 and 70.     CK [[id number]]  (Normal) Collected: 11/17/22 0847     Specimen: Blood Updated: 11/17/22 0921       Creatine Kinase 31 U/L       Lactic Acid, Plasma [[id number]]  (Normal) Collected: 11/17/22 0847     Specimen: Blood Updated: 11/17/22 0918       Lactate 1.6 mmol/L       Magnesium [[id number]]  (Abnormal) Collected: 11/17/22 0847     Specimen: Blood Updated: 11/17/22 0916       Magnesium 1.4 mg/dL       Urinalysis With Culture If Indicated - Urine, Catheter [[id number]]  (Abnormal) Collected: 11/17/22 0847     Specimen: Urine, Catheter Updated: 11/17/22 0905       Color, UA Yellow       Appearance, UA Clear       pH, UA 7.0       Specific Gravity, UA 1.012       Glucose, UA Negative       Ketones, UA Negative       Bilirubin, UA Negative       Blood, UA Negative       Protein, UA Negative       Leuk Esterase, UA Trace       Nitrite, UA Negative       Urobilinogen, UA 0.2 E.U./dL     Narrative:       In absence of clinical symptoms, the presence of pyuria, bacteria, and/or nitrites on the urinalysis result does not correlate with infection.     Urinalysis, Microscopic Only - Urine, Catheter [[id number]]  (Abnormal) Collected: 11/17/22 0847     Specimen: Urine, Catheter Updated: 11/17/22 0905       RBC, UA None Seen /HPF         WBC, UA 3-5 /HPF         Comment: Urine culture not indicated.          Bacteria, UA None Seen /HPF         Squamous Epithelial Cells, UA 3-6 /HPF         Hyaline Casts, UA 0-2 /LPF         Methodology Automated Microscopy             .           Imaging Results (Last 24 Hours)      Procedure Component Value Units Date/Time     MRI Brain Without Contrast [[id number]] Collected: 11/17/22 0939       Updated:  11/17/22 0952     Narrative:       EXAMINATION: MRI brain without contrast 11/17/2022     HISTORY: Transient ischemic attack. Stroke follow-up     FINDINGS: Today's exam is compared to a previous study of 3 days  earlier. Multiplanar fast spin echo imaging sequences were obtained of  the brain on a high-field magnet without gadolinium enhancement.     There are peripheral sites of diffusion restriction within both  cerebellar hemispheres showing progression from the previous  examination. The left cerebellar hemisphere lesions are new from the  prior study. There are associated ADC mapping abnormalities consistent  with acute ischemic infarction. No evidence of hemorrhagic conversion.  There is no mass effect. There is also a peripheral diffusion  restriction within the right posterior parietal/occipital lobe with  associated ADC mapping abnormality also new from the previous exam  suggesting a small focus of cortical infarction. A more equivocal focus  of diffusion restriction within the right frontal lobe gyrus on image  192 of series 204 is present.     There is mild atrophy the brain. Small vessel ischemic changes are noted  involving the periventricular and higher white matter tracts. There is  normal flow-voids within the [place].     The orbits are unremarkable.        Impression:       1.. Previously noted foci of diffusion restriction within the right  cerebellar hemisphere are more prominent on the current exam. There are  also now noted to be sites of diffusion restriction within the left  cerebellar hemisphere with associated ADC mapping abnormality consistent  with acute bilateral cerebellar hemisphere infarcts. These are  nonhemorrhagic. There is also a focus of diffusion abnormality within  the right posterior parietal/occipital lobe involving the cortex with  associated ADC mapping abnormality suggesting an additional site of  cortical infarct. A focus of more indeterminate diffusion  restriction  within the right frontal vertex involving a gyrus is also present not  appreciated on the previous exam.  2. No mass effect or shift of the midline. No evidence of hemorrhagic  conversion. There are normal flow-voids within the [place] [facility] [place].  3. Atrophy with small vessel disease involving the periventricular and  higher white matter tracts.  This report was finalized on 11/17/2022 09:49 by Dr. Dominic Valdes MD.     CT Head Without Contrast [[id number]] Collected: 11/17/22 0818       Updated: 11/17/22 0825     Narrative:       EXAMINATION: CT head without contrast 11/17/2022     HISTORY: Neurologic deficit. Stroke suspected.     DOSE: 758 mGycm. All CT scans are performed using dose optimization  techniques as appropriate to the performed exam and including at least  one of the following: Automated exposure control, adjustment of the mA  and/or kV according to size, and the use of the iterative reconstruction  technique..     FINDINGS: Multiple contiguous axial images are obtained from the skull  base to the vertex per protocol without intravenous      There is no mass, mass effect or shift of the midline. No evidence of  acute infarct or hemorrhage.     The visualized paranasal sinuses and mastoid air cells are normally  aerated.         Impression:       1.. No evidence of acute infarct or hemorrhage. Remote left cerebellar  hemisphere infarct.  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  This report was finalized on 11/17/2022 08:22 by Dr. Dominic Valdes MD.               Impression  1. 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 There is a ? Area in the right frontal lobe  Family did give her an aspirin yesterday.  2. Anemia with elevated indices  3. Hypomagnesemia of 1.4  4. DM well controlled with hemoglobin A1C 3 days ago at 6.1  5. Hyperlipemia history with LDL indicating good control   6. Hyponatremia at 132 (glucose of 136 so that is not the  cause and previously normal TSH and Free T4  3 days ago   7. Leukocytosis of 15,810  She is afebrile and neck is supple  CXR negative  U/A mild changes.Was on Omnicef at discharge 11/15     Addendum: patient more somnolent over past hour per family.     Plan  • Telemetry  • COLIN tomorrow and NPO tonight. Discussed with Dr Duran who thought Dr Juarez was on and then Dr Juarez informed me Dr Brian is on and we did text.  Dr Brian will do COLIN tomorrow.   • Needs bedside swallow study before any medications or anything oral can be given  Nursing notified.  • If fails bedside swallow then will need to be NPO until  Speech therapy clears  • Magnesium level tomorrow but will give 2 grams IV now and had received 1 gram earlier today  • Iron profile, ferritin  • Defer to hospitalist treatment of hyponatremia  • STAT Keppra dose IV now.  • STAT Head CT  • Transfer to unit        UDS + for hydrocodone and ativan.  Family states no Ativan since Monday (4 days ago) and no hydrocodone since 11 AM yesterday. Ativan given for MRI which was on Tuesday so it was 3 days ago.  She has rib fractures and shoulder pain and so is on hydrocodone for that.      I discussed the patient's findings and my recommendations with patient, family, nursing staff and Radiology and Dr Braulio Lai MD  11/17/22  14:05 CST    Yony Dyson DO   Physician  Hospitalist  Progress Notes      Signed  Date of Service:  11/18/22 0930  Creation Time:  11/18/22 0930     Signed              [facility] Medicine Services  INPATIENT PROGRESS NOTE     Patient Name: Concepcion Velez  Date of Admission: 11/17/2022  Today's Date: 11/18/22  Length of Stay: 1  Primary Care Physician: Jaxson Vines DO     Subjective   Chief Complaint: f/u cva     HPI   Patient seen and examined with family at bedside.  Awake alert interactive.  She is oriented.  She says she feels well.  Has no acute complaints.  Headaches doing a  little better today.  No nausea or vomiting.  Tolerating p.o.        Review of Systems   All pertinent negatives and positives are as above. All other systems have been reviewed and are negative unless otherwise stated.      Objective    Temp:  [97.6 °F (36.4 °C)-99.4 °F (37.4 °C)] 98.2 °F (36.8 °C)  Heart Rate:  [] 82  Resp:  [13-17] 13  BP: (132-167)/() 138/102  Physical Exam  GEN: Awake, alert, interactive, in NAD  HEENT: PERRLA, EOMI, Anicteric, Trachea midline  Lungs:  no wheezing/rales/rhonchi  Heart: RRR, +S1/s2, no rub  ABD: soft, nt/nd, +BS, no guarding/rebound  Extremities: atraumatic, no cyanosis, no pitting edema  Skin: no rashes or petechiae   Neuro: AAOx3, slight left sided weakness vs R, greater noticed in distal LE, gait not tested  Psych: somewhat of a flat affect              I have reviewed the labs, radiology results, and diagnostic studies.     Laboratory Data:          Results from last 7 days   Lab Units 11/18/22  0555 11/17/22  0847 11/15/22  0522   WBC 10*3/mm3 14.05* 15.81* 14.61*   HEMOGLOBIN g/dL 10.0* 10.6* 10.2*   HEMATOCRIT % 31.9* 34.3 32.7*   PLATELETS 10*3/mm3 241 275 312                  Results from last 7 days   Lab Units 11/18/22  0555 11/17/22  1923 11/17/22  0847 11/15/22  0450 11/14/22  1000   SODIUM mmol/L 135* 133* 132* 135* 137   POTASSIUM mmol/L 3.2*  --  3.6 3.4* 4.6   CHLORIDE mmol/L 97*  --  94* 98 101   CO2 mmol/L 25.0  --  27.0 22.0 23.0   BUN mg/dL 8  --  13 14 11   CREATININE mg/dL 0.51*  --  0.59 0.68 0.64   CALCIUM mg/dL 9.2  --  9.6 9.5 10.4   BILIRUBIN mg/dL  --   --   --   --  0.3   ALK PHOS U/L  --   --   --   --  92   ALT (SGPT) U/L  --   --   --   --  11   AST (SGOT) U/L  --   --   --   --  11   GLUCOSE mg/dL 117*  --  136* 138* 169*         Culture Data:         Blood Culture   Date Value Ref Range Status   11/17/2022 No growth at less than 24 hours   Preliminary   11/14/2022 No growth at 3 days   Preliminary   11/14/2022 Staphylococcus,  coagulase negative (C)   Final   11/14/2022 Micrococcus species (C)   Final         Active Hospital Problems     Diagnosis     • **Cerebrovascular accident (CVA), unspecified mechanism (HCC)     • Diarrhea     • Leukocytosis     • Hypomagnesemia     • Hyponatremia     • Type 2 diabetes mellitus with hyperglycemia, without long-term current use of insulin (HCC)     • Uncontrolled hypertension           Plan:  #1 acute CVA -new bilateral strokes on imaging.  Unclear source.  Recently had a work-up with echo and no PFO.  Plan for a COLIN today by cardiology.  Continue with PT and OT.  Patient is on aspirin and statin.  May end up on ESus protocol per neuro if echo normal.  Appreciate their input.     #2 headache -improving, monitor.  EEG planned for today.  On Keppra for possible migraine/seizure treatment.  No obvious seizure seen.     #3 hypomagnesemia -1.5, replace     #4 hypokalemia -3.2, replace     #5 leukocytosis -persists around 14.  No fevers.  No obvious signs of infection.  It is noted that it is predominantly lymphocytes on differential.  Awaiting peripheral smear results.     #6 diarrhea -none since arrival to the hospital     #7 hyponatremia -improved from 132 up to 135. Unclear significance at this time.     #8 DM2 -sliding scale insulin and hypoglycemia protocol        Discharge Planning: Ongoing.  May need rehab at discharge.  Awaiting full work-up and therapy evaluations.  Continue to monitor in the unit this morning through COLIN and EEG.  May consider transition to the floor later on today versus tomorrow pending further neuro recommendations.     Electronically signed by Yony Dyson DO, 11/18/22, 09:30 CST.  Shira Cruz MD   Physician  Neurology  Progress Notes      Signed  Date of Service:  11/18/22 1035  Creation Time:  11/18/22 1035     Signed        Expand AllCollapse All    Neurology Progress Note        Date of admission: 11/17/2022  7:48 AM  Date of visit: 11/18/2022     Chief  Complaint:  F/u strokes     Subjective      Subjective:     Patient awake and cooperative. Still moves right side better than left but demonstrates normal strength  Is imbalanced when walks     Hemoglobin A1C 6.1  LDL 11/15/22 44              Physical Exam:     HEENT:  Neck supple  CVS:  Regular rate and rhythm.  No murmurs  Carotid Examination:  No bruits  Lungs:  Clear to auscultation  Abdomen:  Nontender, Nondistended  Extremities:  No signs of peripheral edema     Neurologic Exam:     -Awake, Alert, 3  -No word finding difficulties  -No aphasia  -No dysarthria  -Follows simple  commands     Cranial nerves II through XII intact.  CN II:  Visual fields full.  Pupils equally reactive to light  CN III, IV, VI:  Extraocular Muscles full with no signs of nystagmus  CN V:  Facial sensory is symmetric   CN VII:  Facial motor symmetric  CN VIII:  Gross hearing intact bilaterally  CN IX/X:  Palate elevates symmetrically  CN XI:  Shoulder shrug symmetric  CN XII:  Tongue is midline on protrusion     Motor: (strength out of 5:  1= minimal movement, 2 = movement in plane of gravity, 3 = movement against gravity, 4 = movement against some resistance, 5 = full strength)     She moves all extremities well.  Hand  is quite strong on  Both sides She was able to stand and walk and bear weight  DTR:  2+ throughout in all four extremities     Sensory:  -Intact to light touch,      Coordination/Gait:  -Needs walker and has imbalance                Results Review:  Hospital Problem List      Cerebrovascular accident (CVA), unspecified mechanism (HCC)    Uncontrolled hypertension    Type 2 diabetes mellitus with hyperglycemia, without long-term current use of insulin (HCC)    Diarrhea    Leukocytosis    Hypomagnesemia    Hyponatremia     Impression:  1. Acute bilateral cerebellar strokes and right occipital and ? Right frontal all appearing embolic  2. Hypertension  3. Mixed hyperlipidemia  4. DM controlled  5. UDS + for opiates  and benzo's  6. Anemia  7. Hypomagnesemia  8. Hyponatremia  9. leukocytosis  10. Migraine headaches  11. Previous abnormal EEG   Plan:  COLIN scheduled for today and will be performed by Dr Brian  Continue Telemetry  If COLIN is unremarkable she will need a Zio patch for at least 14 days and may need LINQ  If COLIN unremarkable will use ESUS protocol  Replace magnesium  Patient remains NPO until COLIN.  EEG  Keppra can be changed to IV or oral--give oral when she is able           Shira Lai MD  11/18/22  10:35 CST        Kwasi Brian MD   Physician  Cardiology  H&P      Addendum  Date of Service:  11/18/22 1334  Creation Time:  11/18/22 1334     Expand AllCollapse All       Subjective      Concepcion Velez is a 77 y.o. female is being seen for consultation today at the request of Dr Tim Lai.     Chief Complaint: CVA     HPI: Ms. Velez is a 77-year-old female with significant past medical history of CVA, diabetes mellitus type 2, hyperlipidemia, and hypertension.  She recently admitted earlier this month with right cerebral stroke.  She is readmitted on 11/17/2022 with recurrent strokelike symptoms.  MRI confirmed acute stroke concerning for embolic phenomenon.  TTE from 11/15/2022 showed normal biventricular systolic function with no significant valvular dysfunction with mildly elevated RVSP, negative saline study and no evidence of intracardiac mass or thrombus.  COLIN has been requested for further evaluation.            Patient Active Problem List   Diagnosis   • Tachycardia   • Cerebellar infarct (HCC)   • Uncontrolled hypertension   • Toxic metabolic encephalopathy   • Type 2 diabetes mellitus with hyperglycemia, without long-term current use of insulin (HCC)   • Cerebrovascular accident (CVA), unspecified mechanism (HCC)   • CVA (cerebral vascular accident) ([facility])   • Diarrhea   • Leukocytosis   • Hypomagnesemia   • Hyponatremia             Objective      /59   Pulse 81   Temp 98.5 °F (36.9  "°C) (Axillary)   Resp 13   Ht 152.4 cm (60\")   Wt 61.2 kg (134 lb 14.7 oz)   LMP  (LMP Unknown)   SpO2 100%   BMI 26.35 kg/m²      Physical Exam:  Constitutional:       Appearance: Well-developed and overweight.   HENT:      Head: Normocephalic and atraumatic.   Pulmonary:      Effort: Pulmonary effort is normal.      Breath sounds: Normal breath sounds.   Cardiovascular:      Normal rate. Regular rhythm.      No gallop. No click.   Skin:     General: Skin is warm and dry.   Neurological:      Mental Status: Alert and oriented to person, place, and time.                Lab Results   Component Value Date     CKTOTAL 31 11/17/2022     TROPONINT 0.011 11/14/2022            Lab Results   Component Value Date     GLUCOSE 117 (H) 11/18/2022     CALCIUM 9.2 11/18/2022      (L) 11/18/2022     K 3.2 (L) 11/18/2022     CO2 25.0 11/18/2022     CL 97 (L) 11/18/2022     BUN 8 11/18/2022     CREATININE 0.51 (L) 11/18/2022     EGFRIFAFRI >59 08/26/2022     EGFRIFNONA >60 08/26/2022     BCR 15.7 11/18/2022     ANIONGAP 13.0 11/18/2022            Lab Results   Component Value Date     WBC 14.05 (H) 11/18/2022     HGB 10.0 (L) 11/18/2022     HCT 31.9 (L) 11/18/2022     MCV 91.1 11/18/2022      11/18/2022      Assessment:  1.  Acute CVA: Concern for embolic  2.  Diabetes mellitus type 2  3.  Hyperlipidemia  4.  Hypertension  5.  Anemia  6.  Leukocytosis  7.  Hyponatremia     Plan:  -COLIN today            Date/Time Temp Pulse Resp BP Patient Position SpO2   11/18/22 1100 -- 81 -- 140/59 -- 100   11/18/22 1000 -- 81 -- 134/63 -- 100   11/18/22 0900 -- 88 -- 137/113 Abnormal  -- 100   11/18/22 0800 98.5 (36.9) 75 -- 123/52 -- 98   11/18/22 0730 -- 82 -- -- -- 100   11/18/22 0715 -- 89 -- -- -- 100   11/18/22 0700 -- 84 -- 138/102 Abnormal  -- 100   11/                           Current Facility-Administered Medications   Medication Dose Route Frequency Provider Last Rate Last Admin   • acetaminophen (TYLENOL) tablet 650 " mg  650 mg Oral Q4H PRN Yony Dyson DO   650 mg at 11/18/22 0433    Or   • acetaminophen (TYLENOL) suppository 650 mg  650 mg Rectal Q4H PRN Yony Dyson DO       • aspirin chewable tablet 81 mg  81 mg Oral Daily Shira Cruz MD        Or   • aspirin suppository 300 mg  300 mg Rectal Daily Shira Cruz MD   300 mg at 11/18/22 0901   • atorvastatin (LIPITOR) tablet 20 mg  20 mg Oral Nightly Yony Dyson DO   20 mg at 11/17/22 2014   • benzocaine (HURRICAINE) 20 % liquid solution  - ADS Override Pull            • dextrose (D50W) (25 g/50 mL) IV injection 25 g  25 g Intravenous Q15 Min PRN Yony Dyson DO       • dextrose (GLUTOSE) oral gel 15 g  15 g Oral Q15 Min PRN Yony Dyson DO       • fentaNYL citrate (PF) (SUBLIMAZE) injection 100 mcg  100 mcg Intravenous Once Kwasi Brian MD       • ferric gluconate (FERRLECIT) 250 mg in sodium chloride 0.9 % 120 mL IVPB  250 mg Intravenous Once Shira Cruz MD       • glucagon (human recombinant) (GLUCAGEN DIAGNOSTIC) injection 1 mg  1 mg Intramuscular Q15 Min PRN Yony Dyson DO       • Insulin Lispro (humaLOG) injection 2-7 Units  2-7 Units Subcutaneous TID AC Yony Dyson DO       • levETIRAcetam in NaCl 0.82% (KEPPRA) IVPB 500 mg  500 mg Intravenous Q12H Shira Cruz MD   500 mg at 11/18/22 0433   • Midazolam HCl (PF) (VERSED) injection 5 mg  5 mg Intravenous Once Kwasi Brian MD       • ondansetron (ZOFRAN) injection 4 mg  4 mg Intravenous Q6H PRN Yony Dyson DO       • sodium chloride 0.9 % flush 10 mL  10 mL Intravenous Q12H Yony Dyson DO   10 mL at 11/18/22 0910   • sodium chloride 0.9 % flush 10 mL  10 mL Intravenous PRN Yony Dyson DO

## 2022-11-18 NOTE — PLAN OF CARE
Goal Outcome Evaluation:  Plan of Care Reviewed With: patient        Progress: no change  Outcome Evaluation: The patient presents alert and oriented x4 however she does not remember being in the hospital earlier in the week. She is impulsive and poor safety awareness. She has L visual and spacial neglect, but she will attend to the L with cues. She has difficulty with following directions for testing especially for the L side, but she does not seem to have any significant weakness or cooridnation deficits on either side. She is unsteady during gait and she would benefit from use of a walker. PT will continue to work with her for gait stability, spacial and safety awareness during gait, and balance. Recommend inpt acute rehab upon discharge.

## 2022-11-18 NOTE — H&P
Subjective   Concepcion Velez is a 77 y.o. female is being seen for consultation today at the request of Dr Tim Lai.    Chief Complaint: CVA    HPI: Ms. Velez is a 77-year-old female with significant past medical history of CVA, diabetes mellitus type 2, hyperlipidemia, and hypertension.  She recently admitted earlier this month with right cerebral stroke.  She is readmitted on 11/17/2022 with recurrent strokelike symptoms.  MRI confirmed acute stroke concerning for embolic phenomenon.  TTE from 11/15/2022 showed normal biventricular systolic function with no significant valvular dysfunction with mildly elevated RVSP, negative saline study and no evidence of intracardiac mass or thrombus.  COLIN has been requested for further evaluation.      Patient Active Problem List   Diagnosis   • Tachycardia   • Cerebellar infarct (HCC)   • Uncontrolled hypertension   • Toxic metabolic encephalopathy   • Type 2 diabetes mellitus with hyperglycemia, without long-term current use of insulin (HCC)   • Cerebrovascular accident (CVA), unspecified mechanism (HCC)   • CVA (cerebral vascular accident) (HCC)   • Diarrhea   • Leukocytosis   • Hypomagnesemia   • Hyponatremia       Past Medical History:   Diagnosis Date   • Abdominal wall seroma    • B12 deficiency    • Cerebral infarct (HCC)    • Diabetes mellitus (HCC)    • HLD (hyperlipidemia)    • Hypertension    • Hypokalemia    • Hypomagnesemia      Past Surgical History:   Procedure Laterality Date   • CHOLECYSTECTOMY     • HERNIA REPAIR     • HYSTERECTOMY         The following portions of the patient's history were reviewed and updated as appropriate: allergies, current medications, past family history, past medical history, past social history, past surgical history and problem list.    Social History     Socioeconomic History   • Marital status:    Tobacco Use   • Smoking status: Never   Substance and Sexual Activity   • Alcohol use: Never   • Drug use: Never  "      Family History   Problem Relation Age of Onset   • Cancer Mother    • Hypertension Mother    • Transient ischemic attack Father        Review of Systems: A 12 system review of systems was completed and is negative except stated in HPI.     Objective   /59   Pulse 81   Temp 98.5 °F (36.9 °C) (Axillary)   Resp 13   Ht 152.4 cm (60\")   Wt 61.2 kg (134 lb 14.7 oz)   LMP  (LMP Unknown)   SpO2 100%   BMI 26.35 kg/m²     Physical Exam:  Constitutional:       Appearance: Well-developed and overweight.   HENT:      Head: Normocephalic and atraumatic.   Pulmonary:      Effort: Pulmonary effort is normal.      Breath sounds: Normal breath sounds.   Cardiovascular:      Normal rate. Regular rhythm.      No gallop. No click.   Skin:     General: Skin is warm and dry.   Neurological:      Mental Status: Alert and oriented to person, place, and time.         Lab Results   Component Value Date    CKTOTAL 31 11/17/2022    TROPONINT 0.011 11/14/2022     Lab Results   Component Value Date    GLUCOSE 117 (H) 11/18/2022    CALCIUM 9.2 11/18/2022     (L) 11/18/2022    K 3.2 (L) 11/18/2022    CO2 25.0 11/18/2022    CL 97 (L) 11/18/2022    BUN 8 11/18/2022    CREATININE 0.51 (L) 11/18/2022    EGFRIFAFRI >59 08/26/2022    EGFRIFNONA >60 08/26/2022    BCR 15.7 11/18/2022    ANIONGAP 13.0 11/18/2022     Lab Results   Component Value Date    WBC 14.05 (H) 11/18/2022    HGB 10.0 (L) 11/18/2022    HCT 31.9 (L) 11/18/2022    MCV 91.1 11/18/2022     11/18/2022     Lab Results   Component Value Date    CHOL 121 11/18/2022    CHOL 125 11/15/2022     Lab Results   Component Value Date    TRIG 179 (H) 11/18/2022    TRIG 113 11/15/2022    TRIG 115 12/10/2021     Lab Results   Component Value Date    HDL 39 (L) 11/18/2022    HDL 57 11/15/2022    HDL 59 (L) 12/10/2021     No components found for: LDLCALC  Lab Results   Component Value Date    LDL 52 11/18/2022    LDL 48 11/15/2022    LDL 44 12/10/2021 "         Assessment:  1.  Acute CVA: Concern for embolic  2.  Diabetes mellitus type 2  3.  Hyperlipidemia  4.  Hypertension  5.  Anemia  6.  Leukocytosis  7.  Hyponatremia    Plan:  -COLIN today

## 2022-11-18 NOTE — PROGRESS NOTES
Neurology Progress Note      Date of admission: 11/17/2022  7:48 AM  Date of visit: 11/18/2022    Chief Complaint:  F/u strokes    Subjective     Subjective:    Patient awake and cooperative. Still moves right side better than left but demonstrates normal strength  Is imbalanced when walks    Hemoglobin A1C 6.1  LDL 11/15/22 44  Medications:  Current Facility-Administered Medications   Medication Dose Route Frequency Provider Last Rate Last Admin   • acetaminophen (TYLENOL) tablet 650 mg  650 mg Oral Q4H PRN Yony Dyson DO   650 mg at 11/18/22 0433    Or   • acetaminophen (TYLENOL) suppository 650 mg  650 mg Rectal Q4H PRN Yony Dyson DO       • aspirin chewable tablet 81 mg  81 mg Oral Daily Shira Cruz MD        Or   • aspirin suppository 300 mg  300 mg Rectal Daily Shira Cruz MD   300 mg at 11/18/22 0901   • atorvastatin (LIPITOR) tablet 20 mg  20 mg Oral Nightly Yony Dyson DO   20 mg at 11/17/22 2014   • dextrose (D50W) (25 g/50 mL) IV injection 25 g  25 g Intravenous Q15 Min PRN Yony Dyson DO       • dextrose (GLUTOSE) oral gel 15 g  15 g Oral Q15 Min PRN Yony Dyson DO       • glucagon (human recombinant) (GLUCAGEN DIAGNOSTIC) injection 1 mg  1 mg Intramuscular Q15 Min PRN Yony Dyson,        • Insulin Lispro (humaLOG) injection 2-7 Units  2-7 Units Subcutaneous TID AC Yony Dyson DO       • levETIRAcetam in NaCl 0.82% (KEPPRA) IVPB 500 mg  500 mg Intravenous Q12H Shira Cruz MD   500 mg at 11/18/22 0433   • magnesium sulfate 2g/50 mL (PREMIX) infusion  2 g Intravenous Once Tammy Fairbanks APRN 25 mL/hr at 11/18/22 0904 2 g at 11/18/22 0904   • ondansetron (ZOFRAN) injection 4 mg  4 mg Intravenous Q6H PRN Yony Dyson DO       • potassium chloride 10 mEq in 100 mL IVPB  10 mEq Intravenous Q1H Kiel, Yony F, DO       • sodium chloride 0.9 % flush 10 mL  10 mL Intravenous Q12H Yony Dyson, DO   10 mL at 11/18/22  0910   • sodium chloride 0.9 % flush 10 mL  10 mL Intravenous PRN Yony Dyson,            Review of Systems:   -A 14-point review of systems is completed and is negative except for pain from fractured ribs    Objective     Objective      Vital Signs  Temp:  [97.6 °F (36.4 °C)-99.4 °F (37.4 °C)] 98.5 °F (36.9 °C)  Heart Rate:  [] 81  Resp:  [13-17] 13  BP: (123-167)/() 134/63    Physical Exam:    HEENT:  Neck supple  CVS:  Regular rate and rhythm.  No murmurs  Carotid Examination:  No bruits  Lungs:  Clear to auscultation  Abdomen:  Nontender, Nondistended  Extremities:  No signs of peripheral edema    Neurologic Exam:    -Awake, Alert, 3  -No word finding difficulties  -No aphasia  -No dysarthria  -Follows simple  commands    Cranial nerves II through XII intact.  CN II:  Visual fields full.  Pupils equally reactive to light  CN III, IV, VI:  Extraocular Muscles full with no signs of nystagmus  CN V:  Facial sensory is symmetric   CN VII:  Facial motor symmetric  CN VIII:  Gross hearing intact bilaterally  CN IX/X:  Palate elevates symmetrically  CN XI:  Shoulder shrug symmetric  CN XII:  Tongue is midline on protrusion    Motor: (strength out of 5:  1= minimal movement, 2 = movement in plane of gravity, 3 = movement against gravity, 4 = movement against some resistance, 5 = full strength)    She moves all extremities well.  Hand  is quite strong on  Both sides She was able to stand and walk and bear weight  DTR:  2+ throughout in all four extremities    Sensory:  -Intact to light touch,     Coordination/Gait:  -Needs walker and has imbalance     Results Review:    I reviewed the patient's new clinical results.    Lab Results (last 24 hours)     Procedure Component Value Units Date/Time    Manual Differential [[id number]]  (Abnormal) Collected: 11/18/22 0555    Specimen: Blood Updated: 11/18/22 0641     Neutrophil % 66.7 %      Lymphocyte % 14.1 %      Monocyte % 11.1 %      Bands %  1.0 %       Metamyelocyte % 1.0 %      Atypical Lymphocyte % 6.1 %      Neutrophils Absolute 9.51 10*3/mm3      Lymphocytes Absolute 2.84 10*3/mm3      Monocytes Absolute 1.56 10*3/mm3      Anisocytosis Slight/1+     Microcytes Slight/1+     Poikilocytes Slight/1+     WBC Morphology Normal     Platelet Morphology Normal    CBC & Differential [[id number]]  (Abnormal) Collected: 11/18/22 0555    Specimen: Blood Updated: 11/18/22 0641    Narrative:      The following orders were created for panel order CBC & Differential.  Procedure                               Abnormality         Status                     ---------                               -----------         ------                     CBC Auto Differential[[id number]]        Abnormal            Final result                 Please view results for these tests on the individual orders.    CBC Auto Differential [[id number]]  (Abnormal) Collected: 11/18/22 0555    Specimen: Blood Updated: 11/18/22 0641     WBC 14.05 10*3/mm3      RBC 3.50 10*6/mm3      Hemoglobin 10.0 g/dL      Hematocrit 31.9 %      MCV 91.1 fL      MCH 28.6 pg      MCHC 31.3 g/dL      RDW 13.7 %      RDW-SD 45.6 fl      MPV 10.8 fL      Platelets 241 10*3/mm3     Narrative:      The previously reported component NRBC is no longer being reported. Previous result was 0.0 /100 WBC (Reference Range: 0.0-0.2 /100 WBC) on 11/18/2022 at 0618 CST.    Basic Metabolic Panel [[id number]]  (Abnormal) Collected: 11/18/22 0555    Specimen: Blood Updated: 11/18/22 0639     Glucose 117 mg/dL      BUN 8 mg/dL      Creatinine 0.51 mg/dL      Sodium 135 mmol/L      Potassium 3.2 mmol/L      Chloride 97 mmol/L      CO2 25.0 mmol/L      Calcium 9.2 mg/dL      BUN/Creatinine Ratio 15.7     Anion Gap 13.0 mmol/L      eGFR 97.5 mL/min/1.73      Comment: National Kidney Foundation and American Society of Nephrology (ASN) Task Force recommended calculation based on the Chronic Kidney Disease Epidemiology Collaboration (CKD-EPI) equation  refit without adjustment for race.       Narrative:      GFR Normal >60  Chronic Kidney Disease <60  Kidney Failure <15    The GFR formula is only valid for adults with stable renal function between ages 18 and 70.    Lipid Panel [[id number]]  (Abnormal) Collected: 11/18/22 0555    Specimen: Blood Updated: 11/18/22 0639     Total Cholesterol 121 mg/dL      Triglycerides 179 mg/dL      HDL Cholesterol 39 mg/dL      LDL Cholesterol  52 mg/dL      VLDL Cholesterol 30 mg/dL      LDL/HDL Ratio 1.18    Narrative:      Cholesterol Reference Ranges  (U.S. Department of Health and Human Services ATP III Classifications)    Desirable          <200 mg/dL  Borderline High    200-239 mg/dL  High Risk          >240 mg/dL      Triglyceride Reference Ranges  (U.S. Department of Health and Human Services ATP III Classifications)    Normal           <150 mg/dL  Borderline High  150-199 mg/dL  High             200-499 mg/dL  Very High        >500 mg/dL    HDL Reference Ranges  (U.S. Department of Health and Human Services ATP III Classifications)    Low     <40 mg/dl (major risk factor for CHD)  High    >60 mg/dl ('negative' risk factor for CHD)        LDL Reference Ranges  (U.S. Department of Health and Human Services ATP III Classifications)    Optimal          <100 mg/dL  Near Optimal     100-129 mg/dL  Borderline High  130-159 mg/dL  High             160-189 mg/dL  Very High        >189 mg/dL    Magnesium [[id number]]  (Abnormal) Collected: 11/18/22 0555    Specimen: Blood Updated: 11/18/22 0639     Magnesium 1.5 mg/dL     Peripheral Blood Smear [[id number]] Collected: 11/18/22 0555    Specimen: Blood Updated: 11/18/22 0600    Blood Culture With XIANG - Blood, Arm, Right [[id number]]  (Normal) Collected: 11/17/22 1519    Specimen: Blood from Arm, Right Updated: 11/18/22 0515     Blood Culture No growth at less than 24 hours    Sodium [[id number]]  (Abnormal) Collected: 11/17/22 1923    Specimen: Blood Updated: 11/17/22 2015     Sodium  133 mmol/L     POC Glucose Once [[id number]]  (Normal) Collected: 11/17/22 1738    Specimen: Blood Updated: 11/17/22 1749     Glucose 108 mg/dL      Comment: : [id number] Maria Guadalupe TalbotMeter ID: [id number]       POC Glucose Once [[id number]]  (Normal) Collected: 11/17/22 1552    Specimen: Blood Updated: 11/17/22 1603     Glucose 104 mg/dL      Comment: : [id number] Wallace StilesghMeter ID: [id number]       Iron Profile [[id number]]  (Abnormal) Collected: 11/17/22 0847    Specimen: Blood Updated: 11/17/22 1505     Iron 54 mcg/dL      Iron Saturation 17 %      Transferrin 212 mg/dL      TIBC 316 mcg/dL     Ferritin [[id number]]  (Normal) Collected: 11/17/22 0847    Specimen: Blood Updated: 11/17/22 1505     Ferritin 74.23 ng/mL     Narrative:      Results may be falsely decreased if patient taking Biotin.      Urine Drug Screen - Urine, Catheter [[id number]]  (Abnormal) Collected: 11/17/22 0847    Specimen: Urine, Catheter Updated: 11/17/22 1433     THC, Screen, Urine Negative     Phencyclidine (PCP), Urine Negative     Cocaine Screen, Urine Negative     Methamphetamine, Ur Negative     Opiate Screen Positive     Amphetamine Screen, Urine Negative     Benzodiazepine Screen, Urine Positive     Tricyclic Antidepressants Screen Negative     Methadone Screen, Urine Negative     Barbiturates Screen, Urine Negative     Oxycodone Screen, Urine Negative     Propoxyphene Screen Negative     Buprenorphine, Screen, Urine Negative    Narrative:      Cutoff For Drugs Screened:    Amphetamines               500 ng/ml  Barbiturates               200 ng/ml  Benzodiazepines            150 ng/ml  Cocaine                    150 ng/ml  Methadone                  200 ng/ml  Opiates                    100 ng/ml  Phencyclidine               25 ng/ml  THC                            50 ng/ml  Methamphetamine            500 ng/ml  Tricyclic Antidepressants  300 ng/ml  Oxycodone                  100 ng/ml  Propoxyphene               300  ng/ml  Buprenorphine               10 ng/ml    The normal value for all drugs tested is negative. This report includes unconfirmed screening results, with the cutoff values listed, to be used for medical treatment purposes only.  Unconfirmed results must not be used for non-medical purposes such as employment or legal testing.  Clinical consideration should be applied to any drug of abuse test, particularly when unconfirmed results are used.      Osmolality, Urine - Urine, Catheter [[id number]]  (Normal) Collected: 11/17/22 0847    Specimen: Urine, Catheter Updated: 11/17/22 1428     Osmolality, Urine 326 mOsm/kg     Sodium, Urine, Random - Urine, Clean Catch [[id number]] Collected: 11/17/22 0847    Specimen: Urine, Clean Catch Updated: 11/17/22 1341     Sodium, Urine 61 mmol/L     Narrative:      Reference intervals for random urine have not been established.  Clinical usage is dependent upon physician's interpretation in combination with other laboratory tests.           Imaging Results (Last 24 Hours)     Procedure Component Value Units Date/Time    CT Head Without Contrast [[id number]] Collected: 11/17/22 1700     Updated: 11/17/22 1708    Narrative:      EXAMINATION: CT HEAD WO CONTRAST-      11/17/2022 4:38 PM CST     HISTORY: Stroke, follow up     In order to have a CT radiation dose as low as reasonably achievable  Automated Exposure Control was utilized for adjustment of the mA and/or  KV according to patient size.     DLP in mGycm= 1013     The CT scan of the head is performed without intravenous contrast  enhancement.     Images are acquired in axial plane and subsequent reconstruction in  coronal and sagittal planes.     Comparison is made with the previous study obtained earlier today.     There is no evidence of a mass. No midline shift.     There is no evidence of intracranial hemorrhage or hematoma.     The ventricles, basal cisterns and cortical sulci are age appropriate.     Chronic white matter  ischemic changes bilaterally noted. The gray-white  matter differentiation is maintained.     An empty sella turcica is seen.     Images are reviewed in bone window show no acute bony abnormality or a  bone lesion.       Impression:      1. No acute intracranial abnormality.                                         This report was finalized on 11/17/2022 17:05 by Dr. Mary Ann Feliz MD.          Assessment/Plan     Hospital Problem List      Cerebrovascular accident (CVA), unspecified mechanism (HCC)    Uncontrolled hypertension    Type 2 diabetes mellitus with hyperglycemia, without long-term current use of insulin (HCC)    Diarrhea    Leukocytosis    Hypomagnesemia    Hyponatremia    Impression:  1. Acute bilateral cerebellar strokes and right occipital and ? Right frontal all appearing embolic  2. Hypertension  3. Mixed hyperlipidemia  4. DM controlled  5. UDS + for opiates and benzo's  6. Anemia  7. Hypomagnesemia  8. Hyponatremia  9. leukocytosis  10. Migraine headaches  11. Previous abnormal EEG   Plan:  COLIN scheduled for today and will be performed by Dr Brian  Continue Telemetry  If COLIN is unremarkable she will need a Zio patch for at least 14 days and may need LINQ  If COLIN unremarkable will use ESUS protocol  Replace magnesium  Patient remains NPO until COLIN.  EEG  Keppra can be changed to IV or oral--give oral when she is able        Shira Lai MD  11/18/22  10:35 CST

## 2022-11-18 NOTE — PROGRESS NOTES
[facility] Medicine Services  INPATIENT PROGRESS NOTE    Patient Name: Concepcion Velez  Date of Admission: 11/17/2022  Today's Date: 11/18/22  Length of Stay: 1  Primary Care Physician: Jaxson Vines DO    Subjective   Chief Complaint: f/u cva    HPI   Patient seen and examined with family at bedside.  Awake alert interactive.  She is oriented.  She says she feels well.  Has no acute complaints.  Headaches doing a little better today.  No nausea or vomiting.  Tolerating p.o.      Review of Systems   All pertinent negatives and positives are as above. All other systems have been reviewed and are negative unless otherwise stated.     Objective    Temp:  [97.6 °F (36.4 °C)-99.4 °F (37.4 °C)] 98.2 °F (36.8 °C)  Heart Rate:  [] 82  Resp:  [13-17] 13  BP: (132-167)/() 138/102  Physical Exam  GEN: Awake, alert, interactive, in NAD  HEENT: PERRLA, EOMI, Anicteric, Trachea midline  Lungs:  no wheezing/rales/rhonchi  Heart: RRR, +S1/s2, no rub  ABD: soft, nt/nd, +BS, no guarding/rebound  Extremities: atraumatic, no cyanosis, no pitting edema  Skin: no rashes or petechiae   Neuro: AAOx3, slight left sided weakness vs R, greater noticed in distal LE, gait not tested  Psych: somewhat of a flat affect      Results Review:  I have reviewed the labs, radiology results, and diagnostic studies.    Laboratory Data:   Results from last 7 days   Lab Units 11/18/22  0555 11/17/22  0847 11/15/22  0522   WBC 10*3/mm3 14.05* 15.81* 14.61*   HEMOGLOBIN g/dL 10.0* 10.6* 10.2*   HEMATOCRIT % 31.9* 34.3 32.7*   PLATELETS 10*3/mm3 241 275 312        Results from last 7 days   Lab Units 11/18/22  0555 11/17/22  1923 11/17/22  0847 11/15/22  0450 11/14/22  1000   SODIUM mmol/L 135* 133* 132* 135* 137   POTASSIUM mmol/L 3.2*  --  3.6 3.4* 4.6   CHLORIDE mmol/L 97*  --  94* 98 101   CO2 mmol/L 25.0  --  27.0 22.0 23.0   BUN mg/dL 8  --  13 14 11   CREATININE mg/dL 0.51*  --  0.59 0.68 0.64    CALCIUM mg/dL 9.2  --  9.6 9.5 10.4   BILIRUBIN mg/dL  --   --   --   --  0.3   ALK PHOS U/L  --   --   --   --  92   ALT (SGPT) U/L  --   --   --   --  11   AST (SGOT) U/L  --   --   --   --  11   GLUCOSE mg/dL 117*  --  136* 138* 169*       Culture Data:   Blood Culture   Date Value Ref Range Status   11/17/2022 No growth at less than 24 hours  Preliminary   11/14/2022 No growth at 3 days  Preliminary   11/14/2022 Staphylococcus, coagulase negative (C)  Final   11/14/2022 Micrococcus species (C)  Final       Radiology Data:   Imaging Results (Last 24 Hours)     Procedure Component Value Units Date/Time    CT Head Without Contrast [[id number]] Collected: 11/17/22 1700     Updated: 11/17/22 1708    Narrative:      EXAMINATION: CT HEAD WO CONTRAST-      11/17/2022 4:38 PM CST     HISTORY: Stroke, follow up     In order to have a CT radiation dose as low as reasonably achievable  Automated Exposure Control was utilized for adjustment of the mA and/or  KV according to patient size.     DLP in mGycm= 1013     The CT scan of the head is performed without intravenous contrast  enhancement.     Images are acquired in axial plane and subsequent reconstruction in  coronal and sagittal planes.     Comparison is made with the previous study obtained earlier today.     There is no evidence of a mass. No midline shift.     There is no evidence of intracranial hemorrhage or hematoma.     The ventricles, basal cisterns and cortical sulci are age appropriate.     Chronic white matter ischemic changes bilaterally noted. The gray-white  matter differentiation is maintained.     An empty sella turcica is seen.     Images are reviewed in bone window show no acute bony abnormality or a  bone lesion.       Impression:      1. No acute intracranial abnormality.                                         This report was finalized on 11/17/2022 17:05 by Dr. Mary Ann Feliz MD.    MRI Brain Without Contrast [[id number]] Collected: 11/17/22 0940      Updated: 11/17/22 0952    Narrative:      EXAMINATION: MRI brain without contrast 11/17/2022     HISTORY: Transient ischemic attack. Stroke follow-up     FINDINGS: Today's exam is compared to a previous study of 3 days  earlier. Multiplanar fast spin echo imaging sequences were obtained of  the brain on a high-field magnet without gadolinium enhancement.     There are peripheral sites of diffusion restriction within both  cerebellar hemispheres showing progression from the previous  examination. The left cerebellar hemisphere lesions are new from the  prior study. There are associated ADC mapping abnormalities consistent  with acute ischemic infarction. No evidence of hemorrhagic conversion.  There is no mass effect. There is also a peripheral diffusion  restriction within the right posterior parietal/occipital lobe with  associated ADC mapping abnormality also new from the previous exam  suggesting a small focus of cortical infarction. A more equivocal focus  of diffusion restriction within the right frontal lobe gyrus on image  192 of series 204 is present.     There is mild atrophy the brain. Small vessel ischemic changes are noted  involving the periventricular and higher white matter tracts. There is  normal flow-voids within the [place].     The orbits are unremarkable.       Impression:      1.. Previously noted foci of diffusion restriction within the right  cerebellar hemisphere are more prominent on the current exam. There are  also now noted to be sites of diffusion restriction within the left  cerebellar hemisphere with associated ADC mapping abnormality consistent  with acute bilateral cerebellar hemisphere infarcts. These are  nonhemorrhagic. There is also a focus of diffusion abnormality within  the right posterior parietal/occipital lobe involving the cortex with  associated ADC mapping abnormality suggesting an additional site of  cortical infarct. A focus of more indeterminate  diffusion restriction  within the right frontal vertex involving a gyrus is also present not  appreciated on the previous exam.  2. No mass effect or shift of the midline. No evidence of hemorrhagic  conversion. There are normal flow-voids within the [place].  3. Atrophy with small vessel disease involving the periventricular and  higher white matter tracts.  This report was finalized on 11/17/2022 09:49 by Dr. Dominic Valdes MD.          I have reviewed the patient's current medications.     Assessment/Plan     Active Hospital Problems    Diagnosis    • **Cerebrovascular accident (CVA), unspecified mechanism (HCC)    • Diarrhea    • Leukocytosis    • Hypomagnesemia    • Hyponatremia    • Type 2 diabetes mellitus with hyperglycemia, without long-term current use of insulin (HCC)    • Uncontrolled hypertension        Plan:  #1 acute CVA -new bilateral strokes on imaging.  Unclear source.  Recently had a work-up with echo and no PFO.  Plan for a COLIN today by cardiology.  Continue with PT and OT.  Patient is on aspirin and statin.  May end up on ESus protocol per neuro if echo normal.  Appreciate their input.    #2 headache -improving, monitor.  EEG planned for today.  On Keppra for possible migraine/seizure treatment.  No obvious seizure seen.    #3 hypomagnesemia -1.5, replace    #4 hypokalemia -3.2, replace    #5 leukocytosis -persists around 14.  No fevers.  No obvious signs of infection.  It is noted that it is predominantly lymphocytes on differential.  Awaiting peripheral smear results.    #6 diarrhea -none since arrival to the hospital    #7 hyponatremia -improved from 132 up to 135. Unclear significance at this time.    #8 DM2 -sliding scale insulin and hypoglycemia protocol      Discharge Planning: Ongoing.  May need rehab at discharge.  Awaiting full work-up and therapy evaluations.  Continue to monitor in the unit this morning through COLIN and EEG.  May consider transition to the floor later on  today versus tomorrow pending further neuro recommendations.    Electronically signed by Yony Dyson DO, 11/18/22, 09:30 CST.

## 2022-11-18 NOTE — OUTREACH NOTE
Stroke Week 1 Survey    Flowsheet Row Responses   [facility] facility patient discharged from? [place]   Does the patient have one of the following disease processes/diagnoses(primary or secondary)? Stroke   Week 1 attempt successful? No   Unsuccessful attempts Attempt 1   Revoke Readmitted          CALVIN DEL VALLE - Registered Nurse

## 2022-11-18 NOTE — PLAN OF CARE
Goal Outcome Evaluation:  Plan of Care Reviewed With: patient           Outcome Evaluation: OT tx completed. Pt presents alert and oriented x4, but has slight conversational confusion throughout. She continues to demonstrate poor safety awareness and is often impulsive with movements during session. She continues to have L visual and spacial neglect. She was able to come to sitting at EOB with CGA and vcs. Amb to commode with CGA and vcs and was able to complete all aspects of toileting with set-up and CGA only. Address L sided visual/spacial inattention, working on attending to L side during functional tasks and mobility, Required vcs to locate objects in L visual field and often veers to the L during mobility. She is easily distractible throughout session and often required redirection back to task to complete. Left fowlers in bed at end of session. Continue OT POC.

## 2022-11-18 NOTE — PLAN OF CARE
Problem: Fall Injury Risk  Goal: Absence of Fall and Fall-Related Injury  Outcome: Ongoing, Progressing  Intervention: Identify and Manage Contributors  Intervention: Promote Injury-Free Environment     Problem: Adjustment to Illness (Stroke, Ischemic/Transient Ischemic Attack)  Goal: Optimal Coping  Outcome: Ongoing, Progressing  Intervention: Support Psychosocial Response to Stroke     Problem: Bowel Elimination Impaired (Stroke, Ischemic/Transient Ischemic Attack)  Goal: Effective Bowel Elimination  Outcome: Ongoing, Progressing     Problem: Cerebral Tissue Perfusion (Stroke, Ischemic/Transient Ischemic Attack)  Goal: Optimal Cerebral Tissue Perfusion  Outcome: Ongoing, Progressing     Problem: Cognitive Impairment (Stroke, Ischemic/Transient Ischemic Attack)  Goal: Optimal Cognitive Function  Outcome: Ongoing, Progressing     Problem: Communication Impairment (Stroke, Ischemic/Transient Ischemic Attack)  Goal: Improved Communication Skills  Outcome: Ongoing, Progressing     Problem: Functional Ability Impaired (Stroke, Ischemic/Transient Ischemic Attack)  Goal: Optimal Functional Ability  Outcome: Ongoing, Progressing  Intervention: Optimize Functional Ability   Goal Outcome Evaluation:

## 2022-11-19 LAB
ADV 40+41 DNA STL QL NAA+NON-PROBE: NOT DETECTED
ALBUMIN SERPL-MCNC: 3.8 G/DL (ref 3.5–5.2)
ALBUMIN/GLOB SERPL: 1.5 G/DL
ALP SERPL-CCNC: 83 U/L (ref 39–117)
ALT SERPL W P-5'-P-CCNC: 8 U/L (ref 1–33)
ANION GAP SERPL CALCULATED.3IONS-SCNC: 11 MMOL/L (ref 5–15)
AST SERPL-CCNC: 13 U/L (ref 1–32)
ASTRO TYP 1-8 RNA STL QL NAA+NON-PROBE: NOT DETECTED
BACTERIA SPEC AEROBE CULT: NORMAL
BASOPHILS # BLD AUTO: 0.04 10*3/MM3 (ref 0–0.2)
BASOPHILS NFR BLD AUTO: 0.3 % (ref 0–1.5)
BILIRUB SERPL-MCNC: 0.2 MG/DL (ref 0–1.2)
BUN SERPL-MCNC: 9 MG/DL (ref 8–23)
BUN/CREAT SERPL: 15.3 (ref 7–25)
C CAYETANENSIS DNA STL QL NAA+NON-PROBE: NOT DETECTED
C COLI+JEJ+UPSA DNA STL QL NAA+NON-PROBE: NOT DETECTED
C DIFF TOX GENS STL QL NAA+PROBE: NEGATIVE
CALCIUM SPEC-SCNC: 9.6 MG/DL (ref 8.6–10.5)
CHLORIDE SERPL-SCNC: 102 MMOL/L (ref 98–107)
CO2 SERPL-SCNC: 26 MMOL/L (ref 22–29)
CREAT SERPL-MCNC: 0.59 MG/DL (ref 0.57–1)
CRYPTOSP DNA STL QL NAA+NON-PROBE: NOT DETECTED
DEPRECATED RDW RBC AUTO: 45.6 FL (ref 37–54)
E HISTOLYT DNA STL QL NAA+NON-PROBE: NOT DETECTED
EAEC PAA PLAS AGGR+AATA ST NAA+NON-PRB: NOT DETECTED
EC STX1+STX2 GENES STL QL NAA+NON-PROBE: NOT DETECTED
EGFRCR SERPLBLD CKD-EPI 2021: 94.1 ML/MIN/1.73
EOSINOPHIL # BLD AUTO: 0.06 10*3/MM3 (ref 0–0.4)
EOSINOPHIL NFR BLD AUTO: 0.5 % (ref 0.3–6.2)
EPEC EAE GENE STL QL NAA+NON-PROBE: NOT DETECTED
ERYTHROCYTE [DISTWIDTH] IN BLOOD BY AUTOMATED COUNT: 13.6 % (ref 12.3–15.4)
ETEC LTA+ST1A+ST1B TOX ST NAA+NON-PROBE: NOT DETECTED
G LAMBLIA DNA STL QL NAA+NON-PROBE: NOT DETECTED
GLOBULIN UR ELPH-MCNC: 2.6 GM/DL
GLUCOSE BLDC GLUCOMTR-MCNC: 107 MG/DL (ref 70–130)
GLUCOSE BLDC GLUCOMTR-MCNC: 111 MG/DL (ref 70–130)
GLUCOSE BLDC GLUCOMTR-MCNC: 151 MG/DL (ref 70–130)
GLUCOSE SERPL-MCNC: 146 MG/DL (ref 65–99)
HCT VFR BLD AUTO: 32.7 % (ref 34–46.6)
HEMOCCULT STL QL: NEGATIVE
HGB BLD-MCNC: 10.2 G/DL (ref 12–15.9)
LYMPHOCYTES # BLD AUTO: 2.72 10*3/MM3 (ref 0.7–3.1)
LYMPHOCYTES NFR BLD AUTO: 21.8 % (ref 19.6–45.3)
MAGNESIUM SERPL-MCNC: 1.6 MG/DL (ref 1.6–2.4)
MCH RBC QN AUTO: 28.3 PG (ref 26.6–33)
MCHC RBC AUTO-ENTMCNC: 31.2 G/DL (ref 31.5–35.7)
MCV RBC AUTO: 90.8 FL (ref 79–97)
MONOCYTES # BLD AUTO: 2.98 10*3/MM3 (ref 0.1–0.9)
MONOCYTES NFR BLD AUTO: 23.9 % (ref 5–12)
NEUTROPHILS NFR BLD AUTO: 52.1 % (ref 42.7–76)
NEUTROPHILS NFR BLD AUTO: 6.47 10*3/MM3 (ref 1.7–7)
NOROVIRUS GI+II RNA STL QL NAA+NON-PROBE: NOT DETECTED
P SHIGELLOIDES DNA STL QL NAA+NON-PROBE: NOT DETECTED
PHOSPHATE SERPL-MCNC: 3.6 MG/DL (ref 2.5–4.5)
PLATELET # BLD AUTO: 235 10*3/MM3 (ref 140–450)
PMV BLD AUTO: 10.9 FL (ref 6–12)
POTASSIUM SERPL-SCNC: 3 MMOL/L (ref 3.5–5.2)
PROT SERPL-MCNC: 6.4 G/DL (ref 6–8.5)
RBC # BLD AUTO: 3.6 10*6/MM3 (ref 3.77–5.28)
RVA RNA STL QL NAA+NON-PROBE: NOT DETECTED
S ENT+BONG DNA STL QL NAA+NON-PROBE: NOT DETECTED
SAPO I+II+IV+V RNA STL QL NAA+NON-PROBE: NOT DETECTED
SHIGELLA SP+EIEC IPAH ST NAA+NON-PROBE: NOT DETECTED
SODIUM SERPL-SCNC: 139 MMOL/L (ref 136–145)
V CHOL+PARA+VUL DNA STL QL NAA+NON-PROBE: NOT DETECTED
V CHOLERAE DNA STL QL NAA+NON-PROBE: NOT DETECTED
WBC NRBC COR # BLD: 12.45 10*3/MM3 (ref 3.4–10.8)
Y ENTEROCOL DNA STL QL NAA+NON-PROBE: NOT DETECTED

## 2022-11-19 PROCEDURE — 97530 THERAPEUTIC ACTIVITIES: CPT

## 2022-11-19 PROCEDURE — 87507 IADNA-DNA/RNA PROBE TQ 12-25: CPT | Performed by: INTERNAL MEDICINE

## 2022-11-19 PROCEDURE — 82272 OCCULT BLD FECES 1-3 TESTS: CPT | Performed by: PSYCHIATRY & NEUROLOGY

## 2022-11-19 PROCEDURE — 63710000001 INSULIN LISPRO (HUMAN) PER 5 UNITS: Performed by: INTERNAL MEDICINE

## 2022-11-19 PROCEDURE — 85025 COMPLETE CBC W/AUTO DIFF WBC: CPT | Performed by: INTERNAL MEDICINE

## 2022-11-19 PROCEDURE — 25010000002 DIPHENHYDRAMINE PER 50 MG: Performed by: INTERNAL MEDICINE

## 2022-11-19 PROCEDURE — 25010000002 LEVETIRACETAM IN NACL 0.82% 500 MG/100ML SOLUTION: Performed by: PSYCHIATRY & NEUROLOGY

## 2022-11-19 PROCEDURE — 87493 C DIFF AMPLIFIED PROBE: CPT | Performed by: INTERNAL MEDICINE

## 2022-11-19 PROCEDURE — 83735 ASSAY OF MAGNESIUM: CPT | Performed by: INTERNAL MEDICINE

## 2022-11-19 PROCEDURE — 80053 COMPREHEN METABOLIC PANEL: CPT | Performed by: INTERNAL MEDICINE

## 2022-11-19 PROCEDURE — 99233 SBSQ HOSP IP/OBS HIGH 50: CPT | Performed by: PSYCHIATRY & NEUROLOGY

## 2022-11-19 PROCEDURE — 82962 GLUCOSE BLOOD TEST: CPT

## 2022-11-19 PROCEDURE — 25010000002 MAGNESIUM SULFATE 2 GM/50ML SOLUTION: Performed by: PSYCHIATRY & NEUROLOGY

## 2022-11-19 PROCEDURE — 97116 GAIT TRAINING THERAPY: CPT

## 2022-11-19 PROCEDURE — 84100 ASSAY OF PHOSPHORUS: CPT | Performed by: INTERNAL MEDICINE

## 2022-11-19 RX ORDER — POTASSIUM CHLORIDE 750 MG/1
40 CAPSULE, EXTENDED RELEASE ORAL
Status: COMPLETED | OUTPATIENT
Start: 2022-11-19 | End: 2022-11-19

## 2022-11-19 RX ORDER — CLOPIDOGREL BISULFATE 75 MG/1
75 TABLET ORAL DAILY
Status: DISCONTINUED | OUTPATIENT
Start: 2022-11-19 | End: 2022-11-21 | Stop reason: HOSPADM

## 2022-11-19 RX ORDER — LEVETIRACETAM 500 MG/1
500 TABLET ORAL EVERY 12 HOURS SCHEDULED
Status: DISCONTINUED | OUTPATIENT
Start: 2022-11-19 | End: 2022-11-21 | Stop reason: HOSPADM

## 2022-11-19 RX ORDER — LEVETIRACETAM 5 MG/ML
500 INJECTION INTRAVASCULAR EVERY 12 HOURS SCHEDULED
Status: DISCONTINUED | OUTPATIENT
Start: 2022-11-19 | End: 2022-11-21 | Stop reason: HOSPADM

## 2022-11-19 RX ORDER — MAGNESIUM SULFATE HEPTAHYDRATE 40 MG/ML
2 INJECTION, SOLUTION INTRAVENOUS ONCE
Status: COMPLETED | OUTPATIENT
Start: 2022-11-19 | End: 2022-11-19

## 2022-11-19 RX ORDER — METOPROLOL SUCCINATE 50 MG/1
50 TABLET, EXTENDED RELEASE ORAL
Status: DISCONTINUED | OUTPATIENT
Start: 2022-11-19 | End: 2022-11-21 | Stop reason: HOSPADM

## 2022-11-19 RX ADMIN — Medication 10 ML: at 08:31

## 2022-11-19 RX ADMIN — METOPROLOL SUCCINATE 50 MG: 50 TABLET, FILM COATED, EXTENDED RELEASE ORAL at 15:27

## 2022-11-19 RX ADMIN — ASPIRIN 81 MG: 81 TABLET, CHEWABLE ORAL at 08:30

## 2022-11-19 RX ADMIN — LEVETIRACETAM 500 MG: 5 INJECTION INTRAVASCULAR at 03:43

## 2022-11-19 RX ADMIN — MAGNESIUM SULFATE HEPTAHYDRATE 2 G: 2 INJECTION, SOLUTION INTRAVENOUS at 08:30

## 2022-11-19 RX ADMIN — CLOPIDOGREL 75 MG: 75 TABLET, FILM COATED ORAL at 12:11

## 2022-11-19 RX ADMIN — POTASSIUM CHLORIDE 40 MEQ: 10 CAPSULE, COATED, EXTENDED RELEASE ORAL at 08:30

## 2022-11-19 RX ADMIN — POTASSIUM CHLORIDE 40 MEQ: 10 CAPSULE, COATED, EXTENDED RELEASE ORAL at 12:13

## 2022-11-19 RX ADMIN — ATORVASTATIN CALCIUM 20 MG: 10 TABLET, FILM COATED ORAL at 19:54

## 2022-11-19 RX ADMIN — INSULIN LISPRO 2 UNITS: 100 INJECTION, SOLUTION INTRAVENOUS; SUBCUTANEOUS at 12:10

## 2022-11-19 RX ADMIN — Medication 1 APPLICATION: at 08:30

## 2022-11-19 RX ADMIN — LEVETIRACETAM 500 MG: 500 TABLET, FILM COATED ORAL at 19:54

## 2022-11-19 RX ADMIN — ACETAMINOPHEN 650 MG: 325 TABLET, FILM COATED ORAL at 01:34

## 2022-11-19 RX ADMIN — CHLORHEXIDINE GLUCONATE 1 APPLICATION: 500 CLOTH TOPICAL at 03:43

## 2022-11-19 NOTE — PROGRESS NOTES
Neurology Progress Note      Date of admission: 11/17/2022  7:48 AM  Date of visit: 11/19/2022    Chief Complaint:  F/u strokes    Subjective     Subjective:    Per nursing she is alert but is impulsive and tries to climb out of bed, pulls out IV's etc.  She is wobbly but needs one person assist.    She is bruising and about a month ago her aspirin was reduced to every other day because of the bruising    Repeat EEG  showed improvement compared to previous EEG done in last admission. She remains on Keppra  Interpretation:  This is a  normal awake EEG. This is improved compared to the previous EEG of 11/15/22. . The background of 8 Hz is borderline acceptable and may be seen in normal individuals but may be seen in early dementia. There is excess beta activity which may be seen in an individual who had recently received/ingested benzodiazepines. Clinical correlation is recommended    COLIN was unremarkable  •  Left ventricular systolic function is normal.  •  There is no significant (greater than mild) valvular dysfunction.  •  Estimated right ventricular systolic pressure from tricuspid regurgitation is normal (<35 mmHg).  •  There is no evidence of right to left shunt on bubble study.  •  There is no evidence of intracardiac mass or thrombus.    Medications:  Current Facility-Administered Medications   Medication Dose Route Frequency Provider Last Rate Last Admin   • acetaminophen (TYLENOL) tablet 650 mg  650 mg Oral Q4H PRN Yony Dyson DO   650 mg at 11/19/22 0134    Or   • acetaminophen (TYLENOL) suppository 650 mg  650 mg Rectal Q4H PRN Yony Dyson DO       • aspirin chewable tablet 81 mg  81 mg Oral Daily Shira Cruz MD        Or   • aspirin suppository 300 mg  300 mg Rectal Daily Shira Cruz MD   300 mg at 11/18/22 0901   • atorvastatin (LIPITOR) tablet 20 mg  20 mg Oral Nightly Yony Dyson DO   20 mg at 11/18/22 2016   • Chlorhexidine Gluconate Cloth 2 % pads 1  application  1 application Topical Once Yony Dyson DO       • Chlorhexidine Gluconate Cloth 2 % pads 1 application  1 application Topical Q24H Yony Dyson DO   1 application at 11/19/22 0343   • dextrose (D50W) (25 g/50 mL) IV injection 25 g  25 g Intravenous Q15 Min PRN Yony Dyson DO       • dextrose (GLUTOSE) oral gel 15 g  15 g Oral Q15 Min PRN Yony Dyson DO       • glucagon (human recombinant) (GLUCAGEN DIAGNOSTIC) injection 1 mg  1 mg Intramuscular Q15 Min PRN Yony Dyson DO       • HYDROcodone-acetaminophen (NORCO) 5-325 MG per tablet 1 tablet  1 tablet Oral Q6H PRN Jaxson Vines DO   1 tablet at 11/18/22 2151   • Insulin Lispro (humaLOG) injection 2-7 Units  2-7 Units Subcutaneous TID AC Yony Dyson DO       • levETIRAcetam in NaCl 0.82% (KEPPRA) IVPB 500 mg  500 mg Intravenous Q12H Shira Cruz MD   500 mg at 11/19/22 0343   • mupirocin (BACTROBAN) 2 % nasal ointment 1 application  1 application Each Nare BID Yony Dyson DO   1 application at 11/18/22 2230   • ondansetron (ZOFRAN) injection 4 mg  4 mg Intravenous Q6H PRN Yony Dyson DO       • sodium chloride 0.9 % flush 10 mL  10 mL Intravenous Q12H Yony Dyson DO   10 mL at 11/18/22 2016   • sodium chloride 0.9 % flush 10 mL  10 mL Intravenous PRN Yony Dyson DO           Review of Systems:   -A 14-point review of systems is completed and is negative except for c/o dry eyes Denies headache    Objective     Objective      Vital Signs  Temp:  [98.4 °F (36.9 °C)-98.7 °F (37.1 °C)] 98.6 °F (37 °C)  Heart Rate:  [] 73  Resp:  [14-17] 15  BP: (127-160)/() 156/65    Physical Exam:    HEENT:  Neck supple  CVS:  Regular rate and rhythm.  No murmurs  Carotid Examination:  No bruits  Lungs:  Clear to auscultation  Abdomen:  Nontender, Nondistended  Extremities:  No signs of peripheral edema    Neurologic Exam:    -Awake, Alert, Oriented to person, place, city, state, month,  year but erroneously thinks it is Thursday and Thanksgiving and that is the 24th   -No word finding difficulties  -No aphasia  -No dysarthria  -Follows simple and complex commands    Cranial nerves II through XII intact.  CN II:  Visual fields full.  Pupils equally reactive to light  CN III, IV, VI:  Extraocular Muscles full with no signs of nystagmus  CN V:  Facial sensory is symmetric   CN VII:  Facial motor symmetric  CN VIII:  Gross hearing intact bilaterally  CN IX/X:  Palate elevates symmetrically  CN XI:  Shoulder shrug symmetric  CN XII:  Tongue is midline on protrusion  Motor: (strength out of 5:  1= minimal movement, 2 = movement in plane of gravity, 3 = movement against gravity, 4 = movement against some resistance, 5 = full strength)    -Right Upper Ext: Proximal: 5 Distal: 5  -Left Upper Ext: Proximal: 5 Distal: 5    -Right Lower Ext: Proximal: 5 Distal: 5  -Left Lower Ext: Proximal: 5 Distal: 5    DTR:  2+ throughout in all four extremities    Sensory:  -Intact to light touch, pinprick,    Coordination/Gait:  -Normal finger to nose and heel to shin but fine finger movements are incoordinated on right more than left     Results Review:    I reviewed the patient's new clinical results.    Lab Results (last 24 hours)     Procedure Component Value Units Date/Time    POC Glucose Once [[id number]]  (Normal) Collected: 11/19/22 0746    Specimen: Blood Updated: 11/19/22 0756     Glucose 107 mg/dL      Comment: : mbeda Best ID: [id number]       Comprehensive Metabolic Panel [[id number]]  (Abnormal) Collected: 11/19/22 0226    Specimen: Blood Updated: 11/19/22 0326     Glucose 146 mg/dL      BUN 9 mg/dL      Creatinine 0.59 mg/dL      Sodium 139 mmol/L      Potassium 3.0 mmol/L      Comment: Slight hemolysis detected by analyzer. Results may be affected.        Chloride 102 mmol/L      CO2 26.0 mmol/L      Calcium 9.6 mg/dL      Total Protein 6.4 g/dL      Albumin 3.80 g/dL      ALT (SGPT)  8 U/L      AST (SGOT) 13 U/L      Comment: Slight hemolysis detected by analyzer. Results may be affected.        Alkaline Phosphatase 83 U/L      Total Bilirubin 0.2 mg/dL      Globulin 2.6 gm/dL      A/G Ratio 1.5 g/dL      BUN/Creatinine Ratio 15.3     Anion Gap 11.0 mmol/L      eGFR 94.1 mL/min/1.73      Comment: National Kidney Foundation and American Society of Nephrology (ASN) Task Force recommended calculation based on the Chronic Kidney Disease Epidemiology Collaboration (CKD-EPI) equation refit without adjustment for race.       Narrative:      GFR Normal >60  Chronic Kidney Disease <60  Kidney Failure <15    The GFR formula is only valid for adults with stable renal function between ages 18 and 70.    Phosphorus [[id number]]  (Normal) Collected: 11/19/22 0226    Specimen: Blood Updated: 11/19/22 0314     Phosphorus 3.6 mg/dL     Magnesium [[id number]]  (Normal) Collected: 11/19/22 0226    Specimen: Blood Updated: 11/19/22 0314     Magnesium 1.6 mg/dL     CBC & Differential [[id number]]  (Abnormal) Collected: 11/19/22 0226    Specimen: Blood Updated: 11/19/22 0307    Narrative:      The following orders were created for panel order CBC & Differential.  Procedure                               Abnormality         Status                     ---------                               -----------         ------                     CBC Auto Differential[[id number]]        Abnormal            Final result                 Please view results for these tests on the individual orders.    CBC Auto Differential [[id number]]  (Abnormal) Collected: 11/19/22 0226    Specimen: Blood Updated: 11/19/22 0307     WBC 12.45 10*3/mm3      RBC 3.60 10*6/mm3      Hemoglobin 10.2 g/dL      Hematocrit 32.7 %      MCV 90.8 fL      MCH 28.3 pg      MCHC 31.2 g/dL      RDW 13.6 %      RDW-SD 45.6 fl      MPV 10.9 fL      Platelets 235 10*3/mm3      Neutrophil % 52.1 %      Lymphocyte % 21.8 %      Monocyte % 23.9 %      Eosinophil % 0.5 %       Basophil % 0.3 %      Neutrophils, Absolute 6.47 10*3/mm3      Lymphocytes, Absolute 2.72 10*3/mm3      Monocytes, Absolute 2.98 10*3/mm3      Eosinophils, Absolute 0.06 10*3/mm3      Basophils, Absolute 0.04 10*3/mm3     Blood Culture With XIANG - Blood, Arm, Right [[id number]]  (Normal) Collected: 11/17/22 1519    Specimen: Blood from Arm, Right Updated: 11/18/22 1715     Blood Culture No growth at 24 hours    Peripheral Blood Smear [[id number]] Collected: 11/18/22 0555    Specimen: Blood Updated: 11/18/22 1643     Performed by: Ian Reid MD     Pathologist Interpretation --     Peripheral blood smear, pathologist review:  Mild leukocytosis with absolute neutrophilia and monocytosis.  No circulating blasts.  Moderate normocytic normochromic anemia.  Mild anisopoikilocytosis.  No significant circulating schistocytes.  Adequate platelets with normal morphology.    POC Glucose Once [[id number]]  (Normal) Collected: 11/18/22 1108    Specimen: Blood Updated: 11/18/22 1120     Glucose 93 mg/dL      Comment: : [id number] Viry Esposito ID: [id number]           Imaging Results (Last 24 Hours)     ** No results found for the last 24 hours. **          Assessment/Plan     Hospital Problem List      Cerebrovascular accident (CVA), unspecified mechanism (HCC)    Uncontrolled hypertension    Type 2 diabetes mellitus with hyperglycemia, without long-term current use of insulin (HCC)    Diarrhea    Leukocytosis    Hypomagnesemia    Hyponatremia    Impression:  1. Acute bilateral cerebellar strokes and right occipital and ? Right frontal all appearing embolic  2. Hypertension  3. Mixed hyperlipidemia  4. DM controlled  5. UDS + for opiates and benzo's  6. Anemia  7. Hypomagnesemia  8. Hyponatremia, resolved  9. leukocytosis  10. Migraine headaches  11. Previous abnormal EEG concerning for seizure risk and now normal while on Keppra  12. Hypokalemia, new  Plan:  · Ok to transfer to floor  · Zio patch for 14 days at  discharge  · Change aspirin to Plavix but consider ESUS --Family concerned that with all the current bruising and skin tear she will not be able to tolerate  · Occult blood for stools  · Replace magnesium  · Replace K+  · Check level in AM  · Can change Keppra to oral   · Artificial tears      Shira Lai MD  11/19/22  08:01 CST

## 2022-11-19 NOTE — PLAN OF CARE
Goal Outcome Evaluation:               Patient does answer A&O questions correctly but seems confused in other areas. She is impulsive and constantly need redirecting and re-teaching. She seems to be progressing and yet forgets often. Lack of sleep may also play a cause to confusion

## 2022-11-19 NOTE — THERAPY TREATMENT NOTE
Acute Care - Physical Therapy Treatment Note  [facility]     Patient Name: Concepcion Velez  : 1945  MRN: [id number]  Today's Date: 2022      Visit Dx:     ICD-10-CM ICD-9-CM   1. Cerebrovascular accident (CVA), unspecified mechanism (HCC)  I63.9 434.91   2. Altered mental status, unspecified altered mental status type  R41.82 780.97   3. Dysphagia, unspecified type  R13.10 787.20   4. Impaired mobility and ADLs  Z74.09 V49.89    Z78.9    5. Impaired mobility  Z74.09 799.89     Patient Active Problem List   Diagnosis   • Tachycardia   • Cerebellar infarct (HCC)   • Uncontrolled hypertension   • Toxic metabolic encephalopathy   • Type 2 diabetes mellitus with hyperglycemia, without long-term current use of insulin (HCC)   • Cerebrovascular accident (CVA), unspecified mechanism (HCC)   • CVA (cerebral vascular accident) (HCC)   • Diarrhea   • Leukocytosis   • Hypomagnesemia   • Hyponatremia     Past Medical History:   Diagnosis Date   • Abdominal wall seroma    • B12 deficiency    • Cerebral infarct (HCC)    • Diabetes mellitus (HCC)    • HLD (hyperlipidemia)    • Hypertension    • Hypokalemia    • Hypomagnesemia      Past Surgical History:   Procedure Laterality Date   • CHOLECYSTECTOMY     • HERNIA REPAIR     • HYSTERECTOMY       PT Assessment (last 12 hours)     PT Evaluation and Treatment     Row Name 22          Physical Therapy Time and Intention    Subjective Information complains of;weakness;pain  -AE     Document Type therapy note (daily note)  -AE     Mode of Treatment physical therapy  -AE     Row Name 22          General Information    Existing Precautions/Restrictions fall  -AE     Barriers to Rehab cognitive status  -AE     Comment, General Information impulsive   -AE     Row Name 2214          Pain    Pretreatment Pain Rating 3/10  -AE     Posttreatment Pain Rating 3/10  -AE     Pain Location upper  -AE     Pain Location - back  -AE     Row Name 22           Bed Mobility    Supine-Sit [place] (Bed Mobility) standby assist  -AE     Row Name 11/19/22 0914          Sit-Stand Transfer    Sit-Stand [place] (Transfers) contact guard  -AE     Row Name 11/19/22 0914          Stand-Sit Transfer    Stand-Sit [place] (Transfers) contact guard  -AE     Row Name 11/19/22 0914          Toilet Transfer    [place] Level (Toilet Transfer) contact guard  -AE     Row Name 11/19/22 0914          Gait/Stairs (Locomotion)    [place] Level (Gait) minimum assist (75% patient effort)  -AE     Assistive Device (Gait) walker, front-wheeled  -AE     Distance in Feet (Gait) 30x 5  -AE     Deviations/Abnormal Patterns (Gait) stride length decreased;base of support, narrow  -AE     Comment, (Gait/Stairs) Pt drifts with RW to R side   -AE     Row Name 11/19/22 0914          Positioning and Restraints    Pre-Treatment Position in bed  -AE     Post Treatment Position bed  -AE     In Bed fowlers;call light within reach;exit alarm on  -AE           User Key  (r) = Recorded By, (t) = Taken By, (c) = Cosigned By    Initials Name Provider Type    AE Nayla Fraser, PTA Physical Therapist Assistant                Physical Therapy Education     Title: PT OT SLP Therapies (In Progress)     Topic: Physical Therapy (In Progress)     Point: Mobility training (Done)     Learning Progress Summary           Patient LEVON Jimenez, VU by AE at 11/19/2022 1006    Comment: t/f's, gait training    Acceptance, E, NR by MS at 11/18/2022 1344    Comment: role of PT in her care                   Point: Home exercise program (Done)     Learning Progress Summary           Patient LEVON Jimenez, VU by AE at 11/19/2022 1006    Comment: t/f's, gait training                   Point: Body mechanics (Not Started)     Learner Progress:  Not documented in this visit.          Point: Precautions (Not Started)     Learner Progress:  Not documented in this visit.                      User Key     Initials Effective  Dates Name Provider Type Discipline    AE 06/22/15 -  Nayla Fraser, PTA Physical Therapist Assistant PT    MS 06/19/18 -  Alona Spain, PT, DPT, NCS Physical Therapist PT              PT Recommendation and Plan     Plan of Care Reviewed With: patient  Progress: improving  Outcome Evaluation: Pt was sba for bed mobility and cga to stand. Pt continues to be impulsive and has decreased safety awareness.Pt took steps to BSC and back to bed and had diarhea. Pt was cga with RW to walk 30ft x 4. Pt veers to the R side and has impaired balance. Pt improving but would benefit from continued PT at inpatient rehab.   Outcome Measures     Row Name 11/19/22 0914             How much help from another person do you currently need...    Turning from your back to your side while in flat bed without using bedrails? 4  -AE      Moving from lying on back to sitting on the side of a flat bed without bedrails? 3  -AE      Moving to and from a bed to a chair (including a wheelchair)? 3  -AE      Standing up from a chair using your arms (e.g., wheelchair, bedside chair)? 3  -AE      Climbing 3-5 steps with a railing? 2  -AE      To walk in hospital room? 3  -AE      AM-PAC 6 Clicks Score (PT) 18  -AE         Functional Assessment    Outcome Measure Options AM-PAC 6 Clicks Basic Mobility (PT)  -AE            User Key  (r) = Recorded By, (t) = Taken By, (c) = Cosigned By    Initials Name Provider Type    AE Nayla Fraser PTA Physical Therapist Assistant                 Time Calculation:    PT Charges     Row Name 11/19/22 1007             Time Calculation    Start Time 0914  -AE      Stop Time 0958  -AE      Time Calculation (min) 44 min  -AE      PT Received On 11/19/22  -AE      PT Goal Re-Cert Due Date 11/28/22  -AE         Time Calculation- PT    Total Timed Code Minutes- PT 44 minute(s)  -AE         Timed Charges    57952 - Gait Training Minutes  30  -AE      26812 - PT Therapeutic Activity Minutes 14  -AE         Total  Minutes    Timed Charges Total Minutes 44  -AE       Total Minutes 44  -AE            User Key  (r) = Recorded By, (t) = Taken By, (c) = Cosigned By    Initials Name Provider Type    AE Nayla Fraser PTA Physical Therapist Assistant              Therapy Charges for Today     Code Description Service Date Service Provider Modifiers Qty    33877375170 HC PT THERAPEUTIC ACT EA 15 MIN 11/19/2022 Nayla Fraser PTA GP 1    49842901087 HC GAIT TRAINING EA 15 MIN 11/19/2022 Nayla Fraser PTA GP 2          PT G-Codes  Outcome Measure Options: AM-PAC 6 Clicks Basic Mobility (PT)  AM-PAC 6 Clicks Score (PT): 18  AM-PAC 6 Clicks Score (OT): 19  Modified [place] Scale: 4 - Moderately severe disability.  Unable to walk without assistance, and unable to attend to own bodily needs without assistance.    Nayla Fraser PTA  11/19/2022

## 2022-11-19 NOTE — PROGRESS NOTES
[facility] Medicine Services  INPATIENT PROGRESS NOTE    Patient Name: Concepcion Velez  Date of Admission: 11/17/2022  Today's Date: 11/19/22  Length of Stay: 2  Primary Care Physician: Jaxson Vines DO    Subjective   Chief Complaint: f/u cva    HPI   Patient resting in bed.  Awake alert interactive.  No family at bedside this morning.  She is able to answer orientation questions but seems a little bit confused at times.  Reportedly she got up and had a bowel movement on the floor in the corner of the room this morning.  No other adverse events noted overnight.    ROS:  All pertinent negatives and positives are as above. All other systems have been reviewed and are negative unless otherwise stated.     Objective    Temp:  [98.4 °F (36.9 °C)-98.7 °F (37.1 °C)] 98.6 °F (37 °C)  Heart Rate:  [] 73  Resp:  [14-17] 15  BP: (127-160)/() 156/65  Physical Exam  GEN: Awake, alert, interactive, in NAD  HEENT: PERRLA, EOMI, Anicteric, Trachea midline  Lungs:  no wheezing/rales/rhonchi  Heart: RRR, +S1/s2, no rub  ABD: soft, nt/nd, +BS, no guarding/rebound  Extremities: atraumatic, no cyanosis, no pitting edema  Skin: no rashes or petechiae   Neuro: AAOx3, ZAZUETA, gait not tested  Psych: somewhat of a flat affect      Results Review:  I have reviewed the labs, radiology results, and diagnostic studies.    Laboratory Data:   Results from last 7 days   Lab Units 11/19/22 0226 11/18/22 0555 11/17/22  0847   WBC 10*3/mm3 12.45* 14.05* 15.81*   HEMOGLOBIN g/dL 10.2* 10.0* 10.6*   HEMATOCRIT % 32.7* 31.9* 34.3   PLATELETS 10*3/mm3 235 241 275        Results from last 7 days   Lab Units 11/19/22 0226 11/18/22  0555 11/17/22  1923 11/17/22  0847 11/15/22  0450 11/14/22  1000   SODIUM mmol/L 139 135* 133* 132*   < > 137   POTASSIUM mmol/L 3.0* 3.2*  --  3.6   < > 4.6   CHLORIDE mmol/L 102 97*  --  94*   < > 101   CO2 mmol/L 26.0 25.0  --  27.0   < > 23.0   BUN mg/dL 9 8  --  13   <  > 11   CREATININE mg/dL 0.59 0.51*  --  0.59   < > 0.64   CALCIUM mg/dL 9.6 9.2  --  9.6   < > 10.4   BILIRUBIN mg/dL 0.2  --   --   --   --  0.3   ALK PHOS U/L 83  --   --   --   --  92   ALT (SGPT) U/L 8  --   --   --   --  11   AST (SGOT) U/L 13  --   --   --   --  11   GLUCOSE mg/dL 146* 117*  --  136*   < > 169*    < > = values in this interval not displayed.       Culture Data:   Blood Culture   Date Value Ref Range Status   11/17/2022 No growth at less than 24 hours  Preliminary   11/14/2022 No growth at 3 days  Preliminary   11/14/2022 Staphylococcus, coagulase negative (C)  Final   11/14/2022 Micrococcus species (C)  Final       Radiology Data:   Imaging Results (Last 24 Hours)     ** No results found for the last 24 hours. **          I have reviewed the patient's current medications.     Assessment/Plan     Active Hospital Problems    Diagnosis    • **Cerebrovascular accident (CVA), unspecified mechanism (HCC)    • Diarrhea    • Leukocytosis    • Hypomagnesemia    • Hyponatremia    • Type 2 diabetes mellitus with hyperglycemia, without long-term current use of insulin (HCC)    • Uncontrolled hypertension        Plan:  #1 acute CVA -new bilateral strokes on imaging.  Unclear source.  Recently had a work-up with echo and no PFO.  Status post COLIN on 11/18 which again showed no PFO or clot.  Neuro was considering ESUS but family concerned about anticoagulation given her bruising and age.  Aspirin was transitioned to Plavix today.  On statin.  Will need a Zio patch at discharge.  Continue PT and OT.  Patient likely needs acute rehab placement.    #2 headache -improved    #3 hypomagnesemia - improved to 1.6 today but will give 2 more grams.    #4 hypokalemia - lower at 3.0 despite replacement.  We will give 40 M EQ's p.o. x2.    #5 leukocytosis -slowly improving.  Down to 12.4 today.  No fevers.  No signs of active infection.  Peripheral smear without any overt abnormal cells or blasts.    #6 diarrhea -1  episode this a.m., sent for testing but given overall trend doubt infectious process    #7 hyponatremia -resolved, never received fluids.  Suspect some element of SIADH from stroke.    #8 DM2 -sliding scale insulin and hypoglycemia protocol    #9 hypertension -blood pressure starting to elevate.  48 hours after hospitalization.  We will go ahead and start back home Toprol-XL today.  Monitor.      Discharge Planning: Ongoing.  Okay to transfer to 3-day.  Continue care and therapies.  Plan for acute rehab or SNF placement.    Electronically signed by Yony Dyson DO, 11/19/22, 08:13 CST.

## 2022-11-19 NOTE — PLAN OF CARE
Goal Outcome Evaluation:  Plan of Care Reviewed With: patient        Progress: improving  Outcome Evaluation: Pt was sba for bed mobility and cga to stand. Pt continues to be impulsive and has decreased safety awareness.Pt took steps to BSC and back to bed and had diarhea. Pt was cga with RW to walk 30ft x 4. Pt veers to the R side and has impaired balance. Pt improving but would benefit from continued PT at inpatient rehab.

## 2022-11-20 LAB
ANION GAP SERPL CALCULATED.3IONS-SCNC: 10 MMOL/L (ref 5–15)
ANISOCYTOSIS BLD QL: ABNORMAL
BASOPHILS # BLD MANUAL: 0.13 10*3/MM3 (ref 0–0.2)
BASOPHILS NFR BLD MANUAL: 1 % (ref 0–1.5)
BUN SERPL-MCNC: 8 MG/DL (ref 8–23)
BUN/CREAT SERPL: 14.3 (ref 7–25)
CALCIUM SPEC-SCNC: 9.5 MG/DL (ref 8.6–10.5)
CHLORIDE SERPL-SCNC: 104 MMOL/L (ref 98–107)
CO2 SERPL-SCNC: 26 MMOL/L (ref 22–29)
CREAT SERPL-MCNC: 0.56 MG/DL (ref 0.57–1)
DEPRECATED RDW RBC AUTO: 47.1 FL (ref 37–54)
EGFRCR SERPLBLD CKD-EPI 2021: 95.3 ML/MIN/1.73
ELLIPTOCYTES BLD QL SMEAR: ABNORMAL
ERYTHROCYTE [DISTWIDTH] IN BLOOD BY AUTOMATED COUNT: 14.1 % (ref 12.3–15.4)
GLUCOSE BLDC GLUCOMTR-MCNC: 150 MG/DL (ref 70–130)
GLUCOSE BLDC GLUCOMTR-MCNC: 156 MG/DL (ref 70–130)
GLUCOSE BLDC GLUCOMTR-MCNC: 171 MG/DL (ref 70–130)
GLUCOSE BLDC GLUCOMTR-MCNC: 194 MG/DL (ref 70–130)
GLUCOSE SERPL-MCNC: 147 MG/DL (ref 65–99)
HCT VFR BLD AUTO: 32.2 % (ref 34–46.6)
HGB BLD-MCNC: 10 G/DL (ref 12–15.9)
HYPOCHROMIA BLD QL: ABNORMAL
LYMPHOCYTES # BLD MANUAL: 2.71 10*3/MM3 (ref 0.7–3.1)
LYMPHOCYTES NFR BLD MANUAL: 17.5 % (ref 5–12)
MACROCYTES BLD QL SMEAR: ABNORMAL
MAGNESIUM SERPL-MCNC: 1.4 MG/DL (ref 1.6–2.4)
MCH RBC QN AUTO: 28.5 PG (ref 26.6–33)
MCHC RBC AUTO-ENTMCNC: 31.1 G/DL (ref 31.5–35.7)
MCV RBC AUTO: 91.7 FL (ref 79–97)
MICROCYTES BLD QL: ABNORMAL
MONOCYTES # BLD: 2.22 10*3/MM3 (ref 0.1–0.9)
NEUTROPHILS # BLD AUTO: 7.64 10*3/MM3 (ref 1.7–7)
NEUTROPHILS NFR BLD MANUAL: 59.2 % (ref 42.7–76)
NEUTS BAND NFR BLD MANUAL: 1 % (ref 0–5)
NRBC BLD AUTO-RTO: 0 /100 WBC (ref 0–0.2)
PLAT MORPH BLD: NORMAL
PLATELET # BLD AUTO: 192 10*3/MM3 (ref 140–450)
PMV BLD AUTO: 11.9 FL (ref 6–12)
POLYCHROMASIA BLD QL SMEAR: ABNORMAL
POTASSIUM SERPL-SCNC: 3.5 MMOL/L (ref 3.5–5.2)
RBC # BLD AUTO: 3.51 10*6/MM3 (ref 3.77–5.28)
SODIUM SERPL-SCNC: 140 MMOL/L (ref 136–145)
VARIANT LYMPHS NFR BLD MANUAL: 15.5 % (ref 19.6–45.3)
VARIANT LYMPHS NFR BLD MANUAL: 5.8 % (ref 0–5)
WBC MORPH BLD: NORMAL
WBC NRBC COR # BLD: 12.7 10*3/MM3 (ref 3.4–10.8)

## 2022-11-20 PROCEDURE — 80048 BASIC METABOLIC PNL TOTAL CA: CPT | Performed by: INTERNAL MEDICINE

## 2022-11-20 PROCEDURE — 82962 GLUCOSE BLOOD TEST: CPT

## 2022-11-20 PROCEDURE — 25010000002 CYANOCOBALAMIN PER 1000 MCG: Performed by: PSYCHIATRY & NEUROLOGY

## 2022-11-20 PROCEDURE — 63710000001 INSULIN LISPRO (HUMAN) PER 5 UNITS: Performed by: INTERNAL MEDICINE

## 2022-11-20 PROCEDURE — 97116 GAIT TRAINING THERAPY: CPT

## 2022-11-20 PROCEDURE — 85007 BL SMEAR W/DIFF WBC COUNT: CPT | Performed by: INTERNAL MEDICINE

## 2022-11-20 PROCEDURE — 25010000002 MAGNESIUM SULFATE 2 GM/50ML SOLUTION: Performed by: CLINICAL NURSE SPECIALIST

## 2022-11-20 PROCEDURE — 85025 COMPLETE CBC W/AUTO DIFF WBC: CPT | Performed by: INTERNAL MEDICINE

## 2022-11-20 PROCEDURE — 99233 SBSQ HOSP IP/OBS HIGH 50: CPT | Performed by: PSYCHIATRY & NEUROLOGY

## 2022-11-20 PROCEDURE — 97110 THERAPEUTIC EXERCISES: CPT

## 2022-11-20 PROCEDURE — 83735 ASSAY OF MAGNESIUM: CPT | Performed by: INTERNAL MEDICINE

## 2022-11-20 RX ORDER — CYANOCOBALAMIN 1000 UG/ML
1000 INJECTION, SOLUTION INTRAMUSCULAR; SUBCUTANEOUS ONCE
Status: COMPLETED | OUTPATIENT
Start: 2022-11-20 | End: 2022-11-20

## 2022-11-20 RX ORDER — MAGNESIUM SULFATE HEPTAHYDRATE 40 MG/ML
2 INJECTION, SOLUTION INTRAVENOUS ONCE
Status: DISCONTINUED | OUTPATIENT
Start: 2022-11-20 | End: 2022-11-20

## 2022-11-20 RX ORDER — MAGNESIUM SULFATE HEPTAHYDRATE 40 MG/ML
2 INJECTION, SOLUTION INTRAVENOUS ONCE
Status: COMPLETED | OUTPATIENT
Start: 2022-11-20 | End: 2022-11-20

## 2022-11-20 RX ORDER — LISINOPRIL 10 MG/1
10 TABLET ORAL
Status: DISCONTINUED | OUTPATIENT
Start: 2022-11-20 | End: 2022-11-21 | Stop reason: HOSPADM

## 2022-11-20 RX ORDER — FOLIC ACID 1 MG/1
1 TABLET ORAL DAILY
Status: DISCONTINUED | OUTPATIENT
Start: 2022-11-20 | End: 2022-11-21 | Stop reason: HOSPADM

## 2022-11-20 RX ORDER — POTASSIUM CHLORIDE 750 MG/1
40 CAPSULE, EXTENDED RELEASE ORAL ONCE
Status: COMPLETED | OUTPATIENT
Start: 2022-11-20 | End: 2022-11-20

## 2022-11-20 RX ADMIN — Medication 10 ML: at 08:27

## 2022-11-20 RX ADMIN — LEVETIRACETAM 500 MG: 500 TABLET, FILM COATED ORAL at 20:49

## 2022-11-20 RX ADMIN — MAGNESIUM GLUCONATE 500 MG ORAL TABLET 400 MG: 500 TABLET ORAL at 10:18

## 2022-11-20 RX ADMIN — POTASSIUM CHLORIDE 40 MEQ: 10 CAPSULE, COATED, EXTENDED RELEASE ORAL at 09:33

## 2022-11-20 RX ADMIN — MAGNESIUM GLUCONATE 500 MG ORAL TABLET 800 MG: 500 TABLET ORAL at 20:49

## 2022-11-20 RX ADMIN — HYDROCODONE BITARTRATE AND ACETAMINOPHEN 1 TABLET: 5; 325 TABLET ORAL at 23:36

## 2022-11-20 RX ADMIN — LISINOPRIL 10 MG: 10 TABLET ORAL at 18:05

## 2022-11-20 RX ADMIN — METOPROLOL SUCCINATE 50 MG: 50 TABLET, FILM COATED, EXTENDED RELEASE ORAL at 08:27

## 2022-11-20 RX ADMIN — LEVETIRACETAM 500 MG: 500 TABLET, FILM COATED ORAL at 08:27

## 2022-11-20 RX ADMIN — CLOPIDOGREL 75 MG: 75 TABLET, FILM COATED ORAL at 08:27

## 2022-11-20 RX ADMIN — INSULIN LISPRO 2 UNITS: 100 INJECTION, SOLUTION INTRAVENOUS; SUBCUTANEOUS at 18:05

## 2022-11-20 RX ADMIN — CYANOCOBALAMIN 1000 MCG: 1000 INJECTION, SOLUTION INTRAMUSCULAR at 14:56

## 2022-11-20 RX ADMIN — GLYCERIN 1 DROP: .002; .002; .01 SOLUTION/ DROPS OPHTHALMIC at 08:31

## 2022-11-20 RX ADMIN — FOLIC ACID 1 MG: 1 TABLET ORAL at 14:56

## 2022-11-20 RX ADMIN — HYDROCODONE BITARTRATE AND ACETAMINOPHEN 1 TABLET: 5; 325 TABLET ORAL at 08:27

## 2022-11-20 RX ADMIN — ATORVASTATIN CALCIUM 20 MG: 10 TABLET, FILM COATED ORAL at 20:49

## 2022-11-20 RX ADMIN — MAGNESIUM SULFATE HEPTAHYDRATE 2 G: 2 INJECTION, SOLUTION INTRAVENOUS at 09:34

## 2022-11-20 NOTE — PAYOR COMM NOTE
"11/19 CLINICAL  [id number]   [id number]    Arlin Velez (77 y.o. Female)     Date of Birth   1945    Social Security Number       Address   [address] [place] [address]    Home Phone   [phone]    MRN   [id number]       [facility]    Marital Status                               Admission Date   11/17/22    Admission Type   Emergency    Admitting Provider   Yony Dyson DO    Attending Provider   Yony Dyson DO    Department, Room/Bed   [facility] 3A, 343/1       Discharge Date       Discharge Disposition       Discharge Destination                               Attending Provider: Yony Dyson DO    Allergies: No Known Allergies    Isolation: Spore   Infection: C.difficile (rule out) (11/17/22)   Code Status: CPR    Ht: 152.4 cm (60\")   Wt: 61.3 kg (135 lb 1.6 oz)    Admission Cmt: None   Principal Problem: Cerebrovascular accident (CVA), unspecified mechanism (HCC) [I63.9]                 Active Insurance as of 11/17/2022     Primary Coverage     Payor Plan Insurance Group Employer/Plan Group    [employer] MEDICARE REPLACEMENT ANTHEM MEDICARE ADVANTAGE [id number]     Payor Plan Address Payor Plan Phone Number Payor Plan Fax Number Effective Dates    [phone]  1/1/2022 - None Entered    [facility] [phone]       Subscriber Name Subscriber Birth Date Member ID       ARLIN VELEZ 1945 [id number]                 Emergency Contacts      (Rel.) Home Phone Work Phone Mobile Phone    Isaiason,Kaylin (Daughter) -- -- [phone]    RosieLavelle garcia (Son) -- -- [phone]    Lillie Henson (Friend) -- -- [phone]    Lucy Yan (Daughter) -- -- [phone]              Current Facility-Administered Medications   Medication Dose Route Frequency Provider Last Rate Last Admin   • acetaminophen (TYLENOL) tablet 650 mg  650 mg Oral Q4H PRN Yony Dyson DO   650 mg at 11/19/22 0134    Or   • acetaminophen (TYLENOL) " suppository 650 mg  650 mg Rectal Q4H PRN Yony Dyson DO       • atorvastatin (LIPITOR) tablet 20 mg  20 mg Oral Nightly Yony Dyson DO   20 mg at 11/19/22 1954   • clopidogrel (PLAVIX) tablet 75 mg  75 mg Oral Daily Yony Dyson DO   75 mg at 11/20/22 0827   • cyanocobalamin injection 1,000 mcg  1,000 mcg Intramuscular Once Shira Cruz MD       • dextrose (D50W) (25 g/50 mL) IV injection 25 g  25 g Intravenous Q15 Min PRN Yony Dyson DO       • dextrose (GLUTOSE) oral gel 15 g  15 g Oral Q15 Min PRN Yony Dyson DO       • folic acid (FOLVITE) tablet 1 mg  1 mg Oral Daily Shira Cruz MD       • glucagon (human recombinant) (GLUCAGEN DIAGNOSTIC) injection 1 mg  1 mg Intramuscular Q15 Min PRN Yony Dyson DO       • Glycerin-Hypromellose- (ARTIFICIAL TEARS) 0.2-0.2-1 % ophthalmic solution solution 1 drop  1 drop Both Eyes Q3H PRN Yony Dyson DO   1 drop at 11/20/22 0831   • HYDROcodone-acetaminophen (NORCO) 5-325 MG per tablet 1 tablet  1 tablet Oral Q6H PRN Yony Dyson DO   1 tablet at 11/20/22 0827   • Insulin Lispro (humaLOG) injection 2-7 Units  2-7 Units Subcutaneous TID AC Yony Dyson DO   2 Units at 11/19/22 1210   • levETIRAcetam (KEPPRA) tablet 500 mg  500 mg Oral Q12H Yony Dyson DO   500 mg at 11/20/22 0827    Or   • levETIRAcetam in NaCl 0.82% (KEPPRA) IVPB 500 mg  500 mg Intravenous Q12H Yony Dyson DO       • magnesium oxide (MAG-OX) tablet 400 mg  400 mg Oral BID Tammy Fairbanks APRN   400 mg at 11/20/22 1018   • magnesium sulfate 2g/50 mL (PREMIX) infusion  2 g Intravenous Once Shira Cruz MD       • metoprolol succinate XL (TOPROL-XL) 24 hr tablet 50 mg  50 mg Oral Q24H Yony Dyson DO   50 mg at 11/20/22 0827   • ondansetron (ZOFRAN) injection 4 mg  4 mg Intravenous Q6H PRN Yony Dyson DO       • sodium chloride 0.9 % flush 10 mL  10 mL Intravenous Q12H Yony Dyson DO   10 mL  at 11/20/22 0827   • sodium chloride 0.9 % flush 10 mL  10 mL Intravenous PRN Yony Dyson DO         Orders (last 24 hrs)      Start     Ordered    11/20/22 1115  folic acid (FOLVITE) tablet 1 mg  Daily         11/20/22 1016    11/20/22 1115  cyanocobalamin injection 1,000 mcg  Once         11/20/22 1017    11/20/22 1100  magnesium sulfate 2g/50 mL (PREMIX) infusion  Once         11/20/22 1013    11/20/22 1000  magnesium oxide (MAG-OX) tablet 400 mg  2 Times Daily         11/20/22 0914    11/20/22 0948  Case Management  Consult  Once        Provider:  (Not yet assigned)    11/20/22 0948    11/20/22 0945  potassium chloride (MICRO-K) CR capsule 40 mEq  Once         11/20/22 0852    11/20/22 0845  magnesium sulfate 2g/50 mL (PREMIX) infusion  Once         11/20/22 0754    11/20/22 0833  POC Glucose Once  PROCEDURE ONCE         11/20/22 0821    11/20/22 0602  Manual Differential  Once         11/20/22 0601    11/20/22 0600  Basic Metabolic Panel  Morning Draw         11/19/22 1218    11/20/22 0600  Magnesium  Morning Draw         11/19/22 1218    11/20/22 0600  CBC & Differential  Morning Draw         11/19/22 1218    11/20/22 0600  CBC Auto Differential  PROCEDURE ONCE         11/19/22 2202    11/19/22 2100  levETIRAcetam (KEPPRA) tablet 500 mg  Every 12 Hours Scheduled        See Hyperspace for full Linked Orders Report.    11/19/22 1036    11/19/22 2100  levETIRAcetam in NaCl 0.82% (KEPPRA) IVPB 500 mg  Every 12 Hours Scheduled        See Hyperspace for full Linked Orders Report.    11/19/22 1036    11/19/22 1800  Intake and Output  Every 6 Hours         11/19/22 1759    11/19/22 1734  POC Glucose Once  PROCEDURE ONCE         11/19/22 1721    11/19/22 1323  Auto Discontinue GI Panel in 48 Hours if not Collected  ONCE GI PANEL         11/17/22 1323    11/19/22 1306  Auto Discontinue in 48 Hours if not Collected  ONCE CDIFF         11/17/22 1305    11/19/22 1300  metoprolol succinate XL  (TOPROL-XL) 24 hr tablet 50 mg  Every 24 Hours Scheduled         11/19/22 1212    11/19/22 1149  POC Glucose Once  PROCEDURE ONCE         11/19/22 1135    11/19/22 1115  clopidogrel (PLAVIX) tablet 75 mg  Daily         11/19/22 1028    11/19/22 1048  Vascular Access Consult  Once        Provider:  (Not yet assigned)    11/19/22 1048    11/19/22 1035  Occult Blood X 1, Stool - Stool, Per Rectum  Once         11/19/22 1034    11/19/22 1031  Glycerin-Hypromellose- (ARTIFICIAL TEARS) 0.2-0.2-1 % ophthalmic solution solution 1 drop  Every 3 Hours PRN         11/19/22 1031    11/19/22 0900  potassium chloride (MICRO-K) CR capsule 40 mEq  Every 3 Hours         11/19/22 0812    11/19/22 0400  Chlorhexidine Gluconate Cloth 2 % pads 1 application  Every 24 Hours,   Status:  Discontinued         11/18/22 2144 11/18/22 2230  mupirocin (BACTROBAN) 2 % nasal ointment 1 application  2 Times Daily,   Status:  Discontinued         11/18/22 2144    11/18/22 2230  Chlorhexidine Gluconate Cloth 2 % pads 1 application  Once,   Status:  Discontinued         11/18/22 2144 11/18/22 2142  HYDROcodone-acetaminophen (NORCO) 5-325 MG per tablet 1 tablet  Every 6 Hours PRN         11/18/22 2142    11/17/22 2100  atorvastatin (LIPITOR) tablet 20 mg  Nightly         11/17/22 1338    11/17/22 1730  Insulin Lispro (humaLOG) injection 2-7 Units  3 Times Daily Before Meals         11/17/22 1424    11/17/22 1700  POC Glucose TID AC  3 Times Daily Before Meals       11/17/22 1424    11/17/22 1630  levETIRAcetam in NaCl 0.82% (KEPPRA) IVPB 500 mg  Every 12 Hours,   Status:  Discontinued         11/17/22 1558    11/17/22 1600  Intake and Output  Every 4 Hours,   Status:  Canceled       11/17/22 1336    11/17/22 1430  sodium chloride 0.9 % flush 10 mL  Every 12 Hours Scheduled         11/17/22 1336    11/17/22 1344  dextrose (GLUTOSE) oral gel 15 g  Every 15 Minutes PRN         11/17/22 1345    11/17/22 1344  dextrose (D50W) (25 g/50 mL) IV  injection 25 g  Every 15 Minutes PRN         11/17/22 1345    11/17/22 1344  glucagon (human recombinant) (GLUCAGEN DIAGNOSTIC) injection 1 mg  Every 15 Minutes PRN         11/17/22 1345    11/17/22 1336  ondansetron (ZOFRAN) injection 4 mg  Every 6 Hours PRN         11/17/22 1336    11/17/22 1336  acetaminophen (TYLENOL) tablet 650 mg  Every 4 Hours PRN        See Hyperspace for full Linked Orders Report.    11/17/22 1336    11/17/22 1336  acetaminophen (TYLENOL) suppository 650 mg  Every 4 Hours PRN        See Hyperspace for full Linked Orders Report.    11/17/22 1336    11/17/22 1335  NIHSS Assessment  Every Shift      Comments: Turn off all sedation medications prior to performing assessment. Assessment to be performed upon admission, transfer to another unit, discharge, and with neurological decline. If NIHSS change is greater than or equal to 4 and/or neurological decline is noted notify physician.    11/17/22 1336    11/17/22 1334  sodium chloride 0.9 % flush 10 mL  As Needed         11/17/22 1336    Unscheduled  Order CT Head Without Contrast for Neurological Decline  As Needed       11/17/22 1336    Unscheduled  Follow Hypoglycemia Standing Orders For Blood Glucose <70 & Notify Provider of Treatment  As Needed      Comments: Follow Hypoglycemia Orders As Outlined in Process Instructions (Open Order Report to View Full Instructions)  Notify Provider Any Time Hypoglycemia Treatment is Administered    11/17/22 1345    --  HYDROcodone-acetaminophen (NORCO) 7.5-325 MG per tablet  Every 6 Hours PRN         11/17/22 1444    --  vitamin D (ERGOCALCIFEROL) 1.25 MG (88674 UT) capsule capsule  Weekly         11/17/22 1456    --  SCANNED EKG         11/17/22 0000    --  SCANNED - TELEMETRY           11/17/22 0000    --  SCANNED - TELEMETRY           11/17/22 0000                   Physician Progress Notes (last 48 hours)      Tammy Fairbanks, APRN at 11/20/22 0907            Neurology Progress Note      Date of  admission: 11/17/2022  7:48 AM  Date of visit: 11/20/2022    Chief Complaint:  F/u strokes    Subjective     Subjective:  Patient sitting up in bed feeding self breakfast. Daughter Kaylin at bedside. Patient had some confusion last PM. She is alert and oriented this AM. She is having difficulty opening her OJ and difficulty managing her tray.         Interpretation:  This is a  normal awake EEG. This is improved compared to the previous EEG of 11/15/22. . The background of 8 Hz is borderline acceptable and may be seen in normal individuals but may be seen in early dementia. There is excess beta activity which may be seen in an individual who had recently received/ingested benzodiazepines. Clinical correlation is recommended    Mg+ 1.4 this AM down from 1.6. she did receive IV replacement yesterday and does have oral magnesium listed as home med. Discussed with Dr. Dyson. Will give IV replacement and start oral replacement.     COLIN was unremarkable  •  Left ventricular systolic function is normal.  •  There is no significant (greater than mild) valvular dysfunction.  •  Estimated right ventricular systolic pressure from tricuspid regurgitation is normal (<35 mmHg).  •  There is no evidence of right to left shunt on bubble study.  •  There is no evidence of intracardiac mass or thrombus.    Medications:  Current Facility-Administered Medications   Medication Dose Route Frequency Provider Last Rate Last Admin   • acetaminophen (TYLENOL) tablet 650 mg  650 mg Oral Q4H PRN Yony Dyson DO   650 mg at 11/19/22 0134    Or   • acetaminophen (TYLENOL) suppository 650 mg  650 mg Rectal Q4H PRN Yony Dyson DO       • atorvastatin (LIPITOR) tablet 20 mg  20 mg Oral Nightly Yony Dyson DO   20 mg at 11/19/22 1954   • clopidogrel (PLAVIX) tablet 75 mg  75 mg Oral Daily Yony Dyson DO   75 mg at 11/20/22 0827   • dextrose (D50W) (25 g/50 mL) IV injection 25 g  25 g Intravenous Q15 Min PRN Yony Dyson DO        • dextrose (GLUTOSE) oral gel 15 g  15 g Oral Q15 Min PRN Yony Dyson DO       • glucagon (human recombinant) (GLUCAGEN DIAGNOSTIC) injection 1 mg  1 mg Intramuscular Q15 Min PRN Yony Dyson DO       • Glycerin-Hypromellose- (ARTIFICIAL TEARS) 0.2-0.2-1 % ophthalmic solution solution 1 drop  1 drop Both Eyes Q3H PRN Yony Dyson DO   1 drop at 11/20/22 0831   • HYDROcodone-acetaminophen (NORCO) 5-325 MG per tablet 1 tablet  1 tablet Oral Q6H PRN Yony Dyson DO   1 tablet at 11/20/22 0827   • Insulin Lispro (humaLOG) injection 2-7 Units  2-7 Units Subcutaneous TID AC Yony Dyson DO   2 Units at 11/19/22 1210   • levETIRAcetam (KEPPRA) tablet 500 mg  500 mg Oral Q12H Yony Dyson DO   500 mg at 11/20/22 0827    Or   • levETIRAcetam in NaCl 0.82% (KEPPRA) IVPB 500 mg  500 mg Intravenous Q12H Yony Dyson DO       • magnesium oxide (MAG-OX) tablet 400 mg  400 mg Oral BID Tammy Fairbanks APRN       • metoprolol succinate XL (TOPROL-XL) 24 hr tablet 50 mg  50 mg Oral Q24H Yony Dyson DO   50 mg at 11/20/22 0827   • ondansetron (ZOFRAN) injection 4 mg  4 mg Intravenous Q6H PRN Yony Dyson DO       • sodium chloride 0.9 % flush 10 mL  10 mL Intravenous Q12H Yony Dyson DO   10 mL at 11/20/22 0827   • sodium chloride 0.9 % flush 10 mL  10 mL Intravenous PRN Yony Dyson DO           Review of Systems:   -A 14-point review of systems is completed and is negative except for confusion last PM.   Objective     Objective      Vital Signs  Temp:  [97.5 °F (36.4 °C)-98.3 °F (36.8 °C)] 97.5 °F (36.4 °C)  Heart Rate:  [77-95] 77  Resp:  [16] 16  BP: (139-171)/(66-87) 161/72    Physical Exam:    HEENT:  Neck supple  CVS:  Regular rate and rhythm.  No murmurs  Carotid Examination:  No bruits  Lungs:  Clear to auscultation  Abdomen:  Nontender, Nondistended  Extremities:  No signs of peripheral edema    Neurologic Exam:    -Awake, Alert, Oriented to person,  place, city, state, month, year but erroneously thinks it is Wednesday.  Could not name the  but did name the president.  -No word finding difficulties  -No aphasia  -No dysarthria  -Follows simple and complex commands    Cranial nerves II through XII intact.  CN II:  Visual fields full.  Pupils equally reactive to light  CN III, IV, VI:  Extraocular Muscles full with no signs of nystagmus  CN V:  Facial sensory is symmetric   CN VII:  Facial motor symmetric  CN VIII:  Gross hearing intact bilaterally  CN IX/X:  Palate elevates symmetrically  CN XI:  Shoulder shrug symmetric  CN XII:  Tongue is midline on protrusion  Motor: (strength out of 5:  1= minimal movement, 2 = movement in plane of gravity, 3 = movement against gravity, 4 = movement against some resistance, 5 = full strength)    -Right Upper Ext: Proximal: 5 Distal: 5  -Left Upper Ext: Proximal: 5 Distal: 5    -Right Lower Ext: Proximal: 5 Distal: 5  -Left Lower Ext: Proximal: 5 Distal: 5    DTR:  2+ throughout in all four extremities    Sensory:  -Intact to light touch, pinprick,    Coordination/Gait:  -Normal finger to nose and heel to shin but fine finger movements are incoordinated on right more than left     Results Review:    I reviewed the patient's new clinical results.    Lab Results (last 24 hours)     Procedure Component Value Units Date/Time    POC Glucose Once [[id number]]  (Abnormal) Collected: 11/20/22 0821    Specimen: Blood Updated: 11/20/22 0832     Glucose 171 mg/dL      Comment: : [id number] ID: [id number]       Manual Differential [[id number]]  (Abnormal) Collected: 11/20/22 0503    Specimen: Blood Updated: 11/20/22 0705     Neutrophil % 59.2 %      Lymphocyte % 15.5 %      Monocyte % 17.5 %      Basophil % 1.0 %      Bands %  1.0 %      Atypical Lymphocyte % 5.8 %      Neutrophils Absolute 7.64 10*3/mm3      Lymphocytes Absolute 2.71 10*3/mm3      Monocytes Absolute 2.22 10*3/mm3      Basophils Absolute 0.13  10*3/mm3      Anisocytosis Slight/1+     Elliptocytes Slight/1+     Hypochromia Slight/1+     Macrocytes Slight/1+     Microcytes Slight/1+     Polychromasia Slight/1+     WBC Morphology Normal     Platelet Morphology Normal    Basic Metabolic Panel [[id number]]  (Abnormal) Collected: 11/20/22 0503    Specimen: Blood Updated: 11/20/22 0618     Glucose 147 mg/dL      BUN 8 mg/dL      Creatinine 0.56 mg/dL      Sodium 140 mmol/L      Potassium 3.5 mmol/L      Chloride 104 mmol/L      CO2 26.0 mmol/L      Calcium 9.5 mg/dL      BUN/Creatinine Ratio 14.3     Anion Gap 10.0 mmol/L      eGFR 95.3 mL/min/1.73      Comment: National Kidney Foundation and American Society of Nephrology (ASN) Task Force recommended calculation based on the Chronic Kidney Disease Epidemiology Collaboration (CKD-EPI) equation refit without adjustment for race.       Narrative:      GFR Normal >60  Chronic Kidney Disease <60  Kidney Failure <15    The GFR formula is only valid for adults with stable renal function between ages 18 and 70.    Magnesium [[id number]]  (Abnormal) Collected: 11/20/22 0503    Specimen: Blood Updated: 11/20/22 0618     Magnesium 1.4 mg/dL     CBC & Differential [[id number]]  (Abnormal) Collected: 11/20/22 0503    Specimen: Blood Updated: 11/20/22 0603    Narrative:      The following orders were created for panel order CBC & Differential.  Procedure                               Abnormality         Status                     ---------                               -----------         ------                     CBC Auto Differential[[id number]]        Abnormal            Final result                 Please view results for these tests on the individual orders.    CBC Auto Differential [[id number]]  (Abnormal) Collected: 11/20/22 0503    Specimen: Blood Updated: 11/20/22 0603     WBC 12.70 10*3/mm3      RBC 3.51 10*6/mm3      Hemoglobin 10.0 g/dL      Hematocrit 32.2 %      MCV 91.7 fL      MCH 28.5 pg      MCHC 31.1 g/dL       RDW 14.1 %      RDW-SD 47.1 fl      MPV 11.9 fL      Platelets 192 10*3/mm3      nRBC 0.0 /100 WBC     POC Glucose Once [[id number]]  (Normal) Collected: 11/19/22 1721    Specimen: Blood Updated: 11/19/22 1733     Glucose 111 mg/dL      Comment: : [id number] Paolo Buckley ID: [id number]       Blood Culture With XIANG - Blood, Arm, Right [[id number]]  (Normal) Collected: 11/17/22 1519    Specimen: Blood from Arm, Right Updated: 11/19/22 1715     Blood Culture No growth at 2 days    POC Glucose Once [[id number]]  (Abnormal) Collected: 11/19/22 1135    Specimen: Blood Updated: 11/19/22 1148     Glucose 151 mg/dL      Comment: : [id number] Brian Emery ID: [id number]       Occult Blood X 1, Stool - Stool, Per Rectum [[id number]]  (Normal) Collected: 11/19/22 0800    Specimen: Stool from Per Rectum Updated: 11/19/22 1144     Fecal Occult Blood Negative        Imaging Results (Last 24 Hours)     ** No results found for the last 24 hours. **          Assessment/Plan     Hospital Problem List      Cerebrovascular accident (CVA), unspecified mechanism (HCC)    Uncontrolled hypertension    Type 2 diabetes mellitus with hyperglycemia, without long-term current use of insulin (HCC)    Diarrhea    Leukocytosis    Hypomagnesemia    Hyponatremia    Impression:  1. Acute bilateral cerebellar strokes and right occipital and ? Right frontal all appearing embolic  2. Hypertension  3. Mixed hyperlipidemia. LDL 52  4. DM controlled  5. UDS + for opiates and benzo's  6. Anemia  7. Hypomagnesemia  8. Hyponatremia, resolved  9. leukocytosis  10. Migraine headaches  11. Previous abnormal EEG concerning for seizure risk and now normal while on Keppra  12. Hypokalemia, new    Plan:  · Zio patch for 14 days at discharge  · Change aspirin to Plavix but consider ESUS --Family concerned that with all the current bruising and skin tear she will not be able to tolerate. Long discussion with risk/benefit of ESUS and family would like  to discuss.   · Occult blood for stools negative.  · Replace magnesium with IV and resume home oral dose at 400 mg BID (takes 800 mg BID usually)  ·  Keppra 1000 mg BID  · Artificial tears  · Referral to acute rehab.       ROSA Londono  11/20/22  09:48 CST        Electronically signed by Tammy Fairbanks APRN at 11/20/22 0956     Yony Dyson DO at 11/19/22 0813              [facility]  INPATIENT PROGRESS NOTE    Patient Name: Concepcion Velez  Date of Admission: 11/17/2022  Today's Date: 11/19/22  Length of Stay: 2  Primary Care Physician: Jaxson Vines DO    Subjective   Chief Complaint: f/u cva    HPI   Patient resting in bed.  Awake alert interactive.  No family at bedside this morning.  She is able to answer orientation questions but seems a little bit confused at times.  Reportedly she got up and had a bowel movement on the floor in the corner of the room this morning.  No other adverse events noted overnight.    ROS:  All pertinent negatives and positives are as above. All other systems have been reviewed and are negative unless otherwise stated.     Objective    Temp:  [98.4 °F (36.9 °C)-98.7 °F (37.1 °C)] 98.6 °F (37 °C)  Heart Rate:  [] 73  Resp:  [14-17] 15  BP: (127-160)/() 156/65  Physical Exam  GEN: Awake, alert, interactive, in NAD  HEENT: PERRLA, EOMI, Anicteric, Trachea midline  Lungs:  no wheezing/rales/rhonchi  Heart: RRR, +S1/s2, no rub  ABD: soft, nt/nd, +BS, no guarding/rebound  Extremities: atraumatic, no cyanosis, no pitting edema  Skin: no rashes or petechiae   Neuro: AAOx3, ZAZUETA, gait not tested  Psych: somewhat of a flat affect      Results Review:  I have reviewed the labs, radiology results, and diagnostic studies.    Laboratory Data:   Results from last 7 days   Lab Units 11/19/22  0226 11/18/22  0555 11/17/22  0847   WBC 10*3/mm3 12.45* 14.05* 15.81*   HEMOGLOBIN g/dL 10.2* 10.0* 10.6*   HEMATOCRIT %  32.7* 31.9* 34.3   PLATELETS 10*3/mm3 235 241 275        Results from last 7 days   Lab Units 11/19/22  0226 11/18/22  0555 11/17/22  1923 11/17/22  0847 11/15/22  0450 11/14/22  1000   SODIUM mmol/L 139 135* 133* 132*   < > 137   POTASSIUM mmol/L 3.0* 3.2*  --  3.6   < > 4.6   CHLORIDE mmol/L 102 97*  --  94*   < > 101   CO2 mmol/L 26.0 25.0  --  27.0   < > 23.0   BUN mg/dL 9 8  --  13   < > 11   CREATININE mg/dL 0.59 0.51*  --  0.59   < > 0.64   CALCIUM mg/dL 9.6 9.2  --  9.6   < > 10.4   BILIRUBIN mg/dL 0.2  --   --   --   --  0.3   ALK PHOS U/L 83  --   --   --   --  92   ALT (SGPT) U/L 8  --   --   --   --  11   AST (SGOT) U/L 13  --   --   --   --  11   GLUCOSE mg/dL 146* 117*  --  136*   < > 169*    < > = values in this interval not displayed.       Culture Data:   Blood Culture   Date Value Ref Range Status   11/17/2022 No growth at less than 24 hours  Preliminary   11/14/2022 No growth at 3 days  Preliminary   11/14/2022 Staphylococcus, coagulase negative (C)  Final   11/14/2022 Micrococcus species (C)  Final       Radiology Data:   Imaging Results (Last 24 Hours)     ** No results found for the last 24 hours. **          I have reviewed the patient's current medications.     Assessment/Plan     Active Hospital Problems    Diagnosis    • **Cerebrovascular accident (CVA), unspecified mechanism (HCC)    • Diarrhea    • Leukocytosis    • Hypomagnesemia    • Hyponatremia    • Type 2 diabetes mellitus with hyperglycemia, without long-term current use of insulin (HCC)    • Uncontrolled hypertension        Plan:  #1 acute CVA -new bilateral strokes on imaging.  Unclear source.  Recently had a work-up with echo and no PFO.  Status post COLIN on 11/18 which again showed no PFO or clot.  Neuro was considering ESUS but family concerned about anticoagulation given her bruising and age.  Aspirin was transitioned to Plavix today.  On statin.  Will need a Zio patch at discharge.  Continue PT and OT.  Patient likely needs  acute rehab placement.    #2 headache -improved    #3 hypomagnesemia - improved to 1.6 today but will give 2 more grams.    #4 hypokalemia - lower at 3.0 despite replacement.  We will give 40 M EQ's p.o. x2.    #5 leukocytosis -slowly improving.  Down to 12.4 today.  No fevers.  No signs of active infection.  Peripheral smear without any overt abnormal cells or blasts.    #6 diarrhea -1 episode this a.m., sent for testing but given overall trend doubt infectious process    #7 hyponatremia -resolved, never received fluids.  Suspect some element of SIADH from stroke.    #8 DM2 -sliding scale insulin and hypoglycemia protocol    #9 hypertension -blood pressure starting to elevate.  48 hours after hospitalization.  We will go ahead and start back home Toprol-XL today.  Monitor.      Discharge Planning: Ongoing.  Okay to transfer to 3-day.  Continue care and therapies.  Plan for acute rehab or SNF placement.    Electronically signed by Yony Dyson DO, 11/19/22, 08:13 CST.      Electronically signed by Yony Dyson DO at 11/19/22 1218     Shira Cruz MD at 11/19/22 0801            Neurology Progress Note      Date of admission: 11/17/2022  7:48 AM  Date of visit: 11/19/2022    Chief Complaint:  F/u strokes    Subjective     Subjective:    Per nursing she is alert but is impulsive and tries to climb out of bed, pulls out IV's etc.  She is wobbly but needs one person assist.    She is bruising and about a month ago her aspirin was reduced to every other day because of the bruising    Repeat EEG  showed improvement compared to previous EEG done in last admission. She remains on Keppra  Interpretation:  This is a  normal awake EEG. This is improved compared to the previous EEG of 11/15/22. . The background of 8 Hz is borderline acceptable and may be seen in normal individuals but may be seen in early dementia. There is excess beta activity which may be seen in an individual who had recently  received/ingested benzodiazepines. Clinical correlation is recommended    COILN was unremarkable  •  Left ventricular systolic function is normal.  •  There is no significant (greater than mild) valvular dysfunction.  •  Estimated right ventricular systolic pressure from tricuspid regurgitation is normal (<35 mmHg).  •  There is no evidence of right to left shunt on bubble study.  •  There is no evidence of intracardiac mass or thrombus.    Medications:  Current Facility-Administered Medications   Medication Dose Route Frequency Provider Last Rate Last Admin   • acetaminophen (TYLENOL) tablet 650 mg  650 mg Oral Q4H PRN Yony Dyson DO   650 mg at 11/19/22 0134    Or   • acetaminophen (TYLENOL) suppository 650 mg  650 mg Rectal Q4H PRN Yony Dyson DO       • aspirin chewable tablet 81 mg  81 mg Oral Daily Shira Cruz MD        Or   • aspirin suppository 300 mg  300 mg Rectal Daily Shira Cruz MD   300 mg at 11/18/22 0901   • atorvastatin (LIPITOR) tablet 20 mg  20 mg Oral Nightly Yony Dyson DO   20 mg at 11/18/22 2016   • Chlorhexidine Gluconate Cloth 2 % pads 1 application  1 application Topical Once Yony Dyson DO       • Chlorhexidine Gluconate Cloth 2 % pads 1 application  1 application Topical Q24H Yony Dyson DO   1 application at 11/19/22 0343   • dextrose (D50W) (25 g/50 mL) IV injection 25 g  25 g Intravenous Q15 Min PRN Yony Dyson DO       • dextrose (GLUTOSE) oral gel 15 g  15 g Oral Q15 Min PRN Yony Dyson DO       • glucagon (human recombinant) (GLUCAGEN DIAGNOSTIC) injection 1 mg  1 mg Intramuscular Q15 Min PRN Yony Dyson DO       • HYDROcodone-acetaminophen (NORCO) 5-325 MG per tablet 1 tablet  1 tablet Oral Q6H PRN Jaxson Vines DO   1 tablet at 11/18/22 2151   • Insulin Lispro (humaLOG) injection 2-7 Units  2-7 Units Subcutaneous TID AC Yony Dyson DO       • levETIRAcetam in NaCl 0.82% (KEPPRA) IVPB 500 mg  500 mg  Intravenous Q12H Shira Cruz MD   500 mg at 11/19/22 0343   • mupirocin (BACTROBAN) 2 % nasal ointment 1 application  1 application Each Nare BID Yony Dyson DO   1 application at 11/18/22 2230   • ondansetron (ZOFRAN) injection 4 mg  4 mg Intravenous Q6H PRN Yony Dyson DO       • sodium chloride 0.9 % flush 10 mL  10 mL Intravenous Q12H Yony Dyson DO   10 mL at 11/18/22 2016   • sodium chloride 0.9 % flush 10 mL  10 mL Intravenous PRN Yony Dyson DO           Review of Systems:   -A 14-point review of systems is completed and is negative except for c/o dry eyes Denies headache    Objective     Objective      Vital Signs  Temp:  [98.4 °F (36.9 °C)-98.7 °F (37.1 °C)] 98.6 °F (37 °C)  Heart Rate:  [] 73  Resp:  [14-17] 15  BP: (127-160)/() 156/65    Physical Exam:    HEENT:  Neck supple  CVS:  Regular rate and rhythm.  No murmurs  Carotid Examination:  No bruits  Lungs:  Clear to auscultation  Abdomen:  Nontender, Nondistended  Extremities:  No signs of peripheral edema    Neurologic Exam:    -Awake, Alert, Oriented to person, place, city, state, month, year but erroneously thinks it is Thursday and Thanksgiving and that is the 24th   -No word finding difficulties  -No aphasia  -No dysarthria  -Follows simple and complex commands    Cranial nerves II through XII intact.  CN II:  Visual fields full.  Pupils equally reactive to light  CN III, IV, VI:  Extraocular Muscles full with no signs of nystagmus  CN V:  Facial sensory is symmetric   CN VII:  Facial motor symmetric  CN VIII:  Gross hearing intact bilaterally  CN IX/X:  Palate elevates symmetrically  CN XI:  Shoulder shrug symmetric  CN XII:  Tongue is midline on protrusion  Motor: (strength out of 5:  1= minimal movement, 2 = movement in plane of gravity, 3 = movement against gravity, 4 = movement against some resistance, 5 = full strength)    -Right Upper Ext: Proximal: 5 Distal: 5  -Left Upper Ext: Proximal:  5 Distal: 5    -Right Lower Ext: Proximal: 5 Distal: 5  -Left Lower Ext: Proximal: 5 Distal: 5    DTR:  2+ throughout in all four extremities    Sensory:  -Intact to light touch, pinprick,    Coordination/Gait:  -Normal finger to nose and heel to shin but fine finger movements are incoordinated on right more than left     Results Review:    I reviewed the patient's new clinical results.    Lab Results (last 24 hours)     Procedure Component Value Units Date/Time    POC Glucose Once [[id number]]  (Normal) Collected: 11/19/22 0746    Specimen: Blood Updated: 11/19/22 0756     Glucose 107 mg/dL      Comment: : jamal JavierMeter ID: [id number]       Comprehensive Metabolic Panel [[id number]]  (Abnormal) Collected: 11/19/22 0226    Specimen: Blood Updated: 11/19/22 0326     Glucose 146 mg/dL      BUN 9 mg/dL      Creatinine 0.59 mg/dL      Sodium 139 mmol/L      Potassium 3.0 mmol/L      Comment: Slight hemolysis detected by analyzer. Results may be affected.        Chloride 102 mmol/L      CO2 26.0 mmol/L      Calcium 9.6 mg/dL      Total Protein 6.4 g/dL      Albumin 3.80 g/dL      ALT (SGPT) 8 U/L      AST (SGOT) 13 U/L      Comment: Slight hemolysis detected by analyzer. Results may be affected.        Alkaline Phosphatase 83 U/L      Total Bilirubin 0.2 mg/dL      Globulin 2.6 gm/dL      A/G Ratio 1.5 g/dL      BUN/Creatinine Ratio 15.3     Anion Gap 11.0 mmol/L      eGFR 94.1 mL/min/1.73      Comment: National Kidney Foundation and American Society of Nephrology (ASN) Task Force recommended calculation based on the Chronic Kidney Disease Epidemiology Collaboration (CKD-EPI) equation refit without adjustment for race.       Narrative:      GFR Normal >60  Chronic Kidney Disease <60  Kidney Failure <15    The GFR formula is only valid for adults with stable renal function between ages 18 and 70.    Phosphorus [[id number]]  (Normal) Collected: 11/19/22 0226    Specimen: Blood Updated: 11/19/22 0314      Phosphorus 3.6 mg/dL     Magnesium [[id number]]  (Normal) Collected: 11/19/22 0226    Specimen: Blood Updated: 11/19/22 0314     Magnesium 1.6 mg/dL     CBC & Differential [[id number]]  (Abnormal) Collected: 11/19/22 0226    Specimen: Blood Updated: 11/19/22 0307    Narrative:      The following orders were created for panel order CBC & Differential.  Procedure                               Abnormality         Status                     ---------                               -----------         ------                     CBC Auto Differential[[id number]]        Abnormal            Final result                 Please view results for these tests on the individual orders.    CBC Auto Differential [[id number]]  (Abnormal) Collected: 11/19/22 0226    Specimen: Blood Updated: 11/19/22 0307     WBC 12.45 10*3/mm3      RBC 3.60 10*6/mm3      Hemoglobin 10.2 g/dL      Hematocrit 32.7 %      MCV 90.8 fL      MCH 28.3 pg      MCHC 31.2 g/dL      RDW 13.6 %      RDW-SD 45.6 fl      MPV 10.9 fL      Platelets 235 10*3/mm3      Neutrophil % 52.1 %      Lymphocyte % 21.8 %      Monocyte % 23.9 %      Eosinophil % 0.5 %      Basophil % 0.3 %      Neutrophils, Absolute 6.47 10*3/mm3      Lymphocytes, Absolute 2.72 10*3/mm3      Monocytes, Absolute 2.98 10*3/mm3      Eosinophils, Absolute 0.06 10*3/mm3      Basophils, Absolute 0.04 10*3/mm3     Blood Culture With XIANG - Blood, Arm, Right [[id number]]  (Normal) Collected: 11/17/22 1519    Specimen: Blood from Arm, Right Updated: 11/18/22 1715     Blood Culture No growth at 24 hours    Peripheral Blood Smear [[id number]] Collected: 11/18/22 0555    Specimen: Blood Updated: 11/18/22 1643     Performed by: Ian Reid MD     Pathologist Interpretation --     Peripheral blood smear, pathologist review:  Mild leukocytosis with absolute neutrophilia and monocytosis.  No circulating blasts.  Moderate normocytic normochromic anemia.  Mild anisopoikilocytosis.  No significant circulating  schistocytes.  Adequate platelets with normal morphology.    POC Glucose Once [[id number]]  (Normal) Collected: 11/18/22 1108    Specimen: Blood Updated: 11/18/22 1120     Glucose 93 mg/dL      Comment: : [id number] Viry Esposito ID: [id number]           Imaging Results (Last 24 Hours)     ** No results found for the last 24 hours. **          Assessment/Plan     Hospital Problem List      Cerebrovascular accident (CVA), unspecified mechanism (HCC)    Uncontrolled hypertension    Type 2 diabetes mellitus with hyperglycemia, without long-term current use of insulin (HCC)    Diarrhea    Leukocytosis    Hypomagnesemia    Hyponatremia    Impression:  1. Acute bilateral cerebellar strokes and right occipital and ? Right frontal all appearing embolic  2. Hypertension  3. Mixed hyperlipidemia  4. DM controlled  5. UDS + for opiates and benzo's  6. Anemia  7. Hypomagnesemia  8. Hyponatremia, resolved  9. leukocytosis  10. Migraine headaches  11. Previous abnormal EEG concerning for seizure risk and now normal while on Keppra  12. Hypokalemia, new  Plan:  · Ok to transfer to floor  · Zio patch for 14 days at discharge  · Change aspirin to Plavix but consider ESUS --Family concerned that with all the current bruising and skin tear she will not be able to tolerate  · Occult blood for stools  · Replace magnesium  · Replace K+  · Check level in AM  · Can change Keppra to oral   · Artificial tears      Shira Lai MD  11/19/22  08:01 CST        Electronically signed by Shira Cruz MD at 11/19/22 1038     Shira Cruz MD at 11/18/22 1035            Neurology Progress Note      Date of admission: 11/17/2022  7:48 AM  Date of visit: 11/18/2022    Chief Complaint:  F/u strokes    Subjective     Subjective:    Patient awake and cooperative. Still moves right side better than left but demonstrates normal strength  Is imbalanced when walks    Hemoglobin A1C 6.1  LDL 11/15/22  44  Medications:  Current Facility-Administered Medications   Medication Dose Route Frequency Provider Last Rate Last Admin   • acetaminophen (TYLENOL) tablet 650 mg  650 mg Oral Q4H PRN Yony Dyson DO   650 mg at 11/18/22 0433    Or   • acetaminophen (TYLENOL) suppository 650 mg  650 mg Rectal Q4H PRN Yony Dyson DO       • aspirin chewable tablet 81 mg  81 mg Oral Daily Shira Cruz MD        Or   • aspirin suppository 300 mg  300 mg Rectal Daily Shira Cruz MD   300 mg at 11/18/22 0901   • atorvastatin (LIPITOR) tablet 20 mg  20 mg Oral Nightly Yony Dyson DO   20 mg at 11/17/22 2014   • dextrose (D50W) (25 g/50 mL) IV injection 25 g  25 g Intravenous Q15 Min PRN Yony Dyson DO       • dextrose (GLUTOSE) oral gel 15 g  15 g Oral Q15 Min PRN Yony Dyson DO       • glucagon (human recombinant) (GLUCAGEN DIAGNOSTIC) injection 1 mg  1 mg Intramuscular Q15 Min PRN Yony Dyson DO       • Insulin Lispro (humaLOG) injection 2-7 Units  2-7 Units Subcutaneous TID AC Yony Dyson DO       • levETIRAcetam in NaCl 0.82% (KEPPRA) IVPB 500 mg  500 mg Intravenous Q12H Shira Cruz MD   500 mg at 11/18/22 0433   • magnesium sulfate 2g/50 mL (PREMIX) infusion  2 g Intravenous Once Tammy Fairbanks APRN 25 mL/hr at 11/18/22 0904 2 g at 11/18/22 0904   • ondansetron (ZOFRAN) injection 4 mg  4 mg Intravenous Q6H PRN Yony Dyson DO       • potassium chloride 10 mEq in 100 mL IVPB  10 mEq Intravenous Q1H Yony Dyson DO       • sodium chloride 0.9 % flush 10 mL  10 mL Intravenous Q12H Yony Dyson DO   10 mL at 11/18/22 0910   • sodium chloride 0.9 % flush 10 mL  10 mL Intravenous PRN Yony Dyson DO           Review of Systems:   -A 14-point review of systems is completed and is negative except for pain from fractured ribs    Objective     Objective      Vital Signs  Temp:  [97.6 °F (36.4 °C)-99.4 °F (37.4 °C)] 98.5 °F (36.9 °C)  Heart  Rate:  [] 81  Resp:  [13-17] 13  BP: (123-167)/() 134/63    Physical Exam:    HEENT:  Neck supple  CVS:  Regular rate and rhythm.  No murmurs  Carotid Examination:  No bruits  Lungs:  Clear to auscultation  Abdomen:  Nontender, Nondistended  Extremities:  No signs of peripheral edema    Neurologic Exam:    -Awake, Alert, 3  -No word finding difficulties  -No aphasia  -No dysarthria  -Follows simple  commands    Cranial nerves II through XII intact.  CN II:  Visual fields full.  Pupils equally reactive to light  CN III, IV, VI:  Extraocular Muscles full with no signs of nystagmus  CN V:  Facial sensory is symmetric   CN VII:  Facial motor symmetric  CN VIII:  Gross hearing intact bilaterally  CN IX/X:  Palate elevates symmetrically  CN XI:  Shoulder shrug symmetric  CN XII:  Tongue is midline on protrusion    Motor: (strength out of 5:  1= minimal movement, 2 = movement in plane of gravity, 3 = movement against gravity, 4 = movement against some resistance, 5 = full strength)    She moves all extremities well.  Hand  is quite strong on  Both sides She was able to stand and walk and bear weight  DTR:  2+ throughout in all four extremities    Sensory:  -Intact to light touch,     Coordination/Gait:  -Needs walker and has imbalance     Results Review:    I reviewed the patient's new clinical results.    Lab Results (last 24 hours)     Procedure Component Value Units Date/Time    Manual Differential [[id number]]  (Abnormal) Collected: 11/18/22 0555    Specimen: Blood Updated: 11/18/22 0641     Neutrophil % 66.7 %      Lymphocyte % 14.1 %      Monocyte % 11.1 %      Bands %  1.0 %      Metamyelocyte % 1.0 %      Atypical Lymphocyte % 6.1 %      Neutrophils Absolute 9.51 10*3/mm3      Lymphocytes Absolute 2.84 10*3/mm3      Monocytes Absolute 1.56 10*3/mm3      Anisocytosis Slight/1+     Microcytes Slight/1+     Poikilocytes Slight/1+     WBC Morphology Normal     Platelet Morphology Normal    CBC &  Differential [[id number]]  (Abnormal) Collected: 11/18/22 0555    Specimen: Blood Updated: 11/18/22 0641    Narrative:      The following orders were created for panel order CBC & Differential.  Procedure                               Abnormality         Status                     ---------                               -----------         ------                     CBC Auto Differential[[id number]]        Abnormal            Final result                 Please view results for these tests on the individual orders.    CBC Auto Differential [[id number]]  (Abnormal) Collected: 11/18/22 0555    Specimen: Blood Updated: 11/18/22 0641     WBC 14.05 10*3/mm3      RBC 3.50 10*6/mm3      Hemoglobin 10.0 g/dL      Hematocrit 31.9 %      MCV 91.1 fL      MCH 28.6 pg      MCHC 31.3 g/dL      RDW 13.7 %      RDW-SD 45.6 fl      MPV 10.8 fL      Platelets 241 10*3/mm3     Narrative:      The previously reported component NRBC is no longer being reported. Previous result was 0.0 /100 WBC (Reference Range: 0.0-0.2 /100 WBC) on 11/18/2022 at 0618 CST.    Basic Metabolic Panel [[id number]]  (Abnormal) Collected: 11/18/22 0555    Specimen: Blood Updated: 11/18/22 0639     Glucose 117 mg/dL      BUN 8 mg/dL      Creatinine 0.51 mg/dL      Sodium 135 mmol/L      Potassium 3.2 mmol/L      Chloride 97 mmol/L      CO2 25.0 mmol/L      Calcium 9.2 mg/dL      BUN/Creatinine Ratio 15.7     Anion Gap 13.0 mmol/L      eGFR 97.5 mL/min/1.73      Comment: National Kidney Foundation and American Society of Nephrology (ASN) Task Force recommended calculation based on the Chronic Kidney Disease Epidemiology Collaboration (CKD-EPI) equation refit without adjustment for race.       Narrative:      GFR Normal >60  Chronic Kidney Disease <60  Kidney Failure <15    The GFR formula is only valid for adults with stable renal function between ages 18 and 70.    Lipid Panel [[id number]]  (Abnormal) Collected: 11/18/22 0555    Specimen: Blood Updated: 11/18/22  0639     Total Cholesterol 121 mg/dL      Triglycerides 179 mg/dL      HDL Cholesterol 39 mg/dL      LDL Cholesterol  52 mg/dL      VLDL Cholesterol 30 mg/dL      LDL/HDL Ratio 1.18    Narrative:      Cholesterol Reference Ranges  (U.S. Department of Health and Human Services ATP III Classifications)    Desirable          <200 mg/dL  Borderline High    200-239 mg/dL  High Risk          >240 mg/dL      Triglyceride Reference Ranges  (U.S. Department of Health and Human Services ATP III Classifications)    Normal           <150 mg/dL  Borderline High  150-199 mg/dL  High             200-499 mg/dL  Very High        >500 mg/dL    HDL Reference Ranges  (U.S. Department of Health and Human Services ATP III Classifications)    Low     <40 mg/dl (major risk factor for CHD)  High    >60 mg/dl ('negative' risk factor for CHD)        LDL Reference Ranges  (U.S. Department of Health and Human Services ATP III Classifications)    Optimal          <100 mg/dL  Near Optimal     100-129 mg/dL  Borderline High  130-159 mg/dL  High             160-189 mg/dL  Very High        >189 mg/dL    Magnesium [[id number]]  (Abnormal) Collected: 11/18/22 0555    Specimen: Blood Updated: 11/18/22 0639     Magnesium 1.5 mg/dL     Peripheral Blood Smear [[id number]] Collected: 11/18/22 0555    Specimen: Blood Updated: 11/18/22 0600    Blood Culture With XIANG - Blood, Arm, Right [[id number]]  (Normal) Collected: 11/17/22 1519    Specimen: Blood from Arm, Right Updated: 11/18/22 0515     Blood Culture No growth at less than 24 hours    Sodium [[id number]]  (Abnormal) Collected: 11/17/22 1923    Specimen: Blood Updated: 11/17/22 2015     Sodium 133 mmol/L     POC Glucose Once [[id number]]  (Normal) Collected: 11/17/22 1738    Specimen: Blood Updated: 11/17/22 1749     Glucose 108 mg/dL      Comment: : [id number] [facility] Morgan[facility] ID: [id number]       POC Glucose Once [[id number]]  (Normal) Collected: 11/17/22 1552    Specimen: Blood Updated: 11/17/22  1603     Glucose 104 mg/dL      Comment: : [id number] Wallace Kerr ID: [id number]       Iron Profile [[id number]]  (Abnormal) Collected: 11/17/22 0847    Specimen: Blood Updated: 11/17/22 1505     Iron 54 mcg/dL      Iron Saturation 17 %      Transferrin 212 mg/dL      TIBC 316 mcg/dL     Ferritin [[id number]]  (Normal) Collected: 11/17/22 0847    Specimen: Blood Updated: 11/17/22 1505     Ferritin 74.23 ng/mL     Narrative:      Results may be falsely decreased if patient taking Biotin.      Urine Drug Screen - Urine, Catheter [[id number]]  (Abnormal) Collected: 11/17/22 0847    Specimen: Urine, Catheter Updated: 11/17/22 1433     THC, Screen, Urine Negative     Phencyclidine (PCP), Urine Negative     Cocaine Screen, Urine Negative     Methamphetamine, Ur Negative     Opiate Screen Positive     Amphetamine Screen, Urine Negative     Benzodiazepine Screen, Urine Positive     Tricyclic Antidepressants Screen Negative     Methadone Screen, Urine Negative     Barbiturates Screen, Urine Negative     Oxycodone Screen, Urine Negative     Propoxyphene Screen Negative     Buprenorphine, Screen, Urine Negative    Narrative:      Cutoff For Drugs Screened:    Amphetamines               500 ng/ml  Barbiturates               200 ng/ml  Benzodiazepines            150 ng/ml  Cocaine                    150 ng/ml  Methadone                  200 ng/ml  Opiates                    100 ng/ml  Phencyclidine               25 ng/ml  THC                            50 ng/ml  Methamphetamine            500 ng/ml  Tricyclic Antidepressants  300 ng/ml  Oxycodone                  100 ng/ml  Propoxyphene               300 ng/ml  Buprenorphine               10 ng/ml    The normal value for all drugs tested is negative. This report includes unconfirmed screening results, with the cutoff values listed, to be used for medical treatment purposes only.  Unconfirmed results must not be used for non-medical purposes such as employment or legal  testing.  Clinical consideration should be applied to any drug of abuse test, particularly when unconfirmed results are used.      Osmolality, Urine - Urine, Catheter [[id number]]  (Normal) Collected: 11/17/22 0847    Specimen: Urine, Catheter Updated: 11/17/22 1428     Osmolality, Urine 326 mOsm/kg     Sodium, Urine, Random - Urine, Clean Catch [[id number]] Collected: 11/17/22 0847    Specimen: Urine, Clean Catch Updated: 11/17/22 1341     Sodium, Urine 61 mmol/L     Narrative:      Reference intervals for random urine have not been established.  Clinical usage is dependent upon physician's interpretation in combination with other laboratory tests.           Imaging Results (Last 24 Hours)     Procedure Component Value Units Date/Time    CT Head Without Contrast [[id number]] Collected: 11/17/22 1700     Updated: 11/17/22 1708    Narrative:      EXAMINATION: CT HEAD WO CONTRAST-      11/17/2022 4:38 PM CST     HISTORY: Stroke, follow up     In order to have a CT radiation dose as low as reasonably achievable  Automated Exposure Control was utilized for adjustment of the mA and/or  KV according to patient size.     DLP in mGycm= 1013     The CT scan of the head is performed without intravenous contrast  enhancement.     Images are acquired in axial plane and subsequent reconstruction in  coronal and sagittal planes.     Comparison is made with the previous study obtained earlier today.     There is no evidence of a mass. No midline shift.     There is no evidence of intracranial hemorrhage or hematoma.     The ventricles, basal cisterns and cortical sulci are age appropriate.     Chronic white matter ischemic changes bilaterally noted. The gray-white  matter differentiation is maintained.     An empty sella turcica is seen.     Images are reviewed in bone window show no acute bony abnormality or a  bone lesion.       Impression:      1. No acute intracranial abnormality.                                         This  report was finalized on 11/17/2022 17:05 by Dr. Mary Ann Feliz MD.          Assessment/Plan     Hospital Problem List      Cerebrovascular accident (CVA), unspecified mechanism (HCC)    Uncontrolled hypertension    Type 2 diabetes mellitus with hyperglycemia, without long-term current use of insulin (HCC)    Diarrhea    Leukocytosis    Hypomagnesemia    Hyponatremia    Impression:  1. Acute bilateral cerebellar strokes and right occipital and ? Right frontal all appearing embolic  2. Hypertension  3. Mixed hyperlipidemia  4. DM controlled  5. UDS + for opiates and benzo's  6. Anemia  7. Hypomagnesemia  8. Hyponatremia  9. leukocytosis  10. Migraine headaches  11. Previous abnormal EEG   Plan:  COLIN scheduled for today and will be performed by Dr Brian  Continue Telemetry  If COLIN is unremarkable she will need a Zio patch for at least 14 days and may need LINQ  If COLIN unremarkable will use ESUS protocol  Replace magnesium  Patient remains NPO until COLIN.  EEG  Keppra can be changed to IV or oral--give oral when she is able        Shira Lai MD  11/18/22  10:35 CST        Electronically signed by Shira Cruz MD at 11/18/22 1051       Consult Notes (last 48 hours)  Notes from 11/18/22 1033 through 11/20/22 1033   No notes of this type exist for this encounter.

## 2022-11-20 NOTE — PROGRESS NOTES
Neurology Progress Note      Date of admission: 11/17/2022  7:48 AM  Date of visit: 11/20/2022    Chief Complaint:  F/u strokes    Subjective     Subjective:  Patient sitting up in bed feeding self breakfast. Daughter Kaylin at bedside. Patient had some confusion last PM. She is alert and oriented this AM. She is having difficulty opening her OJ and difficulty managing her tray.         Interpretation:  This is a  normal awake EEG. This is improved compared to the previous EEG of 11/15/22. . The background of 8 Hz is borderline acceptable and may be seen in normal individuals but may be seen in early dementia. There is excess beta activity which may be seen in an individual who had recently received/ingested benzodiazepines. Clinical correlation is recommended    Mg+ 1.4 this AM down from 1.6. she did receive IV replacement yesterday and does have oral magnesium listed as home med. Discussed with Dr. Dyson. Will give IV replacement and start oral replacement.     COLIN was unremarkable  •  Left ventricular systolic function is normal.  •  There is no significant (greater than mild) valvular dysfunction.  •  Estimated right ventricular systolic pressure from tricuspid regurgitation is normal (<35 mmHg).  •  There is no evidence of right to left shunt on bubble study.  •  There is no evidence of intracardiac mass or thrombus.    Medications:  Current Facility-Administered Medications   Medication Dose Route Frequency Provider Last Rate Last Admin   • acetaminophen (TYLENOL) tablet 650 mg  650 mg Oral Q4H PRN Yony Dyson DO   650 mg at 11/19/22 0134    Or   • acetaminophen (TYLENOL) suppository 650 mg  650 mg Rectal Q4H PRN Yony Dyson DO       • atorvastatin (LIPITOR) tablet 20 mg  20 mg Oral Nightly Yony Dyson DO   20 mg at 11/19/22 1954   • clopidogrel (PLAVIX) tablet 75 mg  75 mg Oral Daily Yony Dyson DO   75 mg at 11/20/22 0827   • dextrose (D50W) (25 g/50 mL) IV injection 25 g  25 g  Intravenous Q15 Min PRN Yony Dyson DO       • dextrose (GLUTOSE) oral gel 15 g  15 g Oral Q15 Min PRN Yony Dyson DO       • glucagon (human recombinant) (GLUCAGEN DIAGNOSTIC) injection 1 mg  1 mg Intramuscular Q15 Min PRN Yony Dyson, DO       • Glycerin-Hypromellose- (ARTIFICIAL TEARS) 0.2-0.2-1 % ophthalmic solution solution 1 drop  1 drop Both Eyes Q3H PRN Yony Dyson DO   1 drop at 11/20/22 0831   • HYDROcodone-acetaminophen (NORCO) 5-325 MG per tablet 1 tablet  1 tablet Oral Q6H PRN Yony Dyson DO   1 tablet at 11/20/22 0827   • Insulin Lispro (humaLOG) injection 2-7 Units  2-7 Units Subcutaneous TID AC Yony Dyson DO   2 Units at 11/19/22 1210   • levETIRAcetam (KEPPRA) tablet 500 mg  500 mg Oral Q12H Yony Dyson DO   500 mg at 11/20/22 0827    Or   • levETIRAcetam in NaCl 0.82% (KEPPRA) IVPB 500 mg  500 mg Intravenous Q12H Yony Dyson DO       • magnesium oxide (MAG-OX) tablet 400 mg  400 mg Oral BID Tammy Fairbanks APRN       • metoprolol succinate XL (TOPROL-XL) 24 hr tablet 50 mg  50 mg Oral Q24H Yony Dyson DO   50 mg at 11/20/22 0827   • ondansetron (ZOFRAN) injection 4 mg  4 mg Intravenous Q6H PRN Yony Dyson DO       • sodium chloride 0.9 % flush 10 mL  10 mL Intravenous Q12H Yony Dyson DO   10 mL at 11/20/22 0827   • sodium chloride 0.9 % flush 10 mL  10 mL Intravenous PRN Yony Dyson DO           Review of Systems:   -A 14-point review of systems is completed and is negative except for confusion last PM.   Objective     Objective      Vital Signs  Temp:  [97.5 °F (36.4 °C)-98.3 °F (36.8 °C)] 97.5 °F (36.4 °C)  Heart Rate:  [77-95] 77  Resp:  [16] 16  BP: (139-171)/(66-87) 161/72    Physical Exam:    HEENT:  Neck supple  CVS:  Regular rate and rhythm.  No murmurs  Carotid Examination:  No bruits  Lungs:  Clear to auscultation  Abdomen:  Nontender, Nondistended  Extremities:  No signs of peripheral edema    Neurologic  Exam:    -Awake, Alert, Oriented to person, place, city, state, month, year but erroneously thinks it is Wednesday.  Could not name the  but did name the president.  -No word finding difficulties  -No aphasia  -No dysarthria  -Follows simple and complex commands    Cranial nerves II through XII intact.  CN II:  Visual fields full.  Pupils equally reactive to light  CN III, IV, VI:  Extraocular Muscles full with no signs of nystagmus  CN V:  Facial sensory is symmetric   CN VII:  Facial motor symmetric  CN VIII:  Gross hearing intact bilaterally  CN IX/X:  Palate elevates symmetrically  CN XI:  Shoulder shrug symmetric  CN XII:  Tongue is midline on protrusion  Motor: (strength out of 5:  1= minimal movement, 2 = movement in plane of gravity, 3 = movement against gravity, 4 = movement against some resistance, 5 = full strength)    -Right Upper Ext: Proximal: 5 Distal: 5  -Left Upper Ext: Proximal: 5 Distal: 5    -Right Lower Ext: Proximal: 5 Distal: 5  -Left Lower Ext: Proximal: 5 Distal: 5    DTR:  2+ throughout in all four extremities    Sensory:  -Intact to light touch, pinprick,    Coordination/Gait:  -Normal finger to nose and heel to shin but fine finger movements are incoordinated on right more than left     Results Review:    I reviewed the patient's new clinical results.    Lab Results (last 24 hours)     Procedure Component Value Units Date/Time    POC Glucose Once [[id number]]  (Abnormal) Collected: 11/20/22 0821    Specimen: Blood Updated: 11/20/22 0832     Glucose 171 mg/dL      Comment: : [id number] ID: [id number]       Manual Differential [[id number]]  (Abnormal) Collected: 11/20/22 0503    Specimen: Blood Updated: 11/20/22 0705     Neutrophil % 59.2 %      Lymphocyte % 15.5 %      Monocyte % 17.5 %      Basophil % 1.0 %      Bands %  1.0 %      Atypical Lymphocyte % 5.8 %      Neutrophils Absolute 7.64 10*3/mm3      Lymphocytes Absolute 2.71 10*3/mm3      Monocytes Absolute  2.22 10*3/mm3      Basophils Absolute 0.13 10*3/mm3      Anisocytosis Slight/1+     Elliptocytes Slight/1+     Hypochromia Slight/1+     Macrocytes Slight/1+     Microcytes Slight/1+     Polychromasia Slight/1+     WBC Morphology Normal     Platelet Morphology Normal    Basic Metabolic Panel [[id number]]  (Abnormal) Collected: 11/20/22 0503    Specimen: Blood Updated: 11/20/22 0618     Glucose 147 mg/dL      BUN 8 mg/dL      Creatinine 0.56 mg/dL      Sodium 140 mmol/L      Potassium 3.5 mmol/L      Chloride 104 mmol/L      CO2 26.0 mmol/L      Calcium 9.5 mg/dL      BUN/Creatinine Ratio 14.3     Anion Gap 10.0 mmol/L      eGFR 95.3 mL/min/1.73      Comment: National Kidney Foundation and American Society of Nephrology (ASN) Task Force recommended calculation based on the Chronic Kidney Disease Epidemiology Collaboration (CKD-EPI) equation refit without adjustment for race.       Narrative:      GFR Normal >60  Chronic Kidney Disease <60  Kidney Failure <15    The GFR formula is only valid for adults with stable renal function between ages 18 and 70.    Magnesium [[id number]]  (Abnormal) Collected: 11/20/22 0503    Specimen: Blood Updated: 11/20/22 0618     Magnesium 1.4 mg/dL     CBC & Differential [[id number]]  (Abnormal) Collected: 11/20/22 0503    Specimen: Blood Updated: 11/20/22 0603    Narrative:      The following orders were created for panel order CBC & Differential.  Procedure                               Abnormality         Status                     ---------                               -----------         ------                     CBC Auto Differential[[id number]]        Abnormal            Final result                 Please view results for these tests on the individual orders.    CBC Auto Differential [[id number]]  (Abnormal) Collected: 11/20/22 0503    Specimen: Blood Updated: 11/20/22 0603     WBC 12.70 10*3/mm3      RBC 3.51 10*6/mm3      Hemoglobin 10.0 g/dL      Hematocrit 32.2 %      MCV 91.7  fL      MCH 28.5 pg      MCHC 31.1 g/dL      RDW 14.1 %      RDW-SD 47.1 fl      MPV 11.9 fL      Platelets 192 10*3/mm3      nRBC 0.0 /100 WBC     POC Glucose Once [[id number]]  (Normal) Collected: 11/19/22 1721    Specimen: Blood Updated: 11/19/22 1733     Glucose 111 mg/dL      Comment: : [id number] ID: [id number]       Blood Culture With XIANG - Blood, Arm, Right [[id number]]  (Normal) Collected: 11/17/22 1519    Specimen: Blood from Arm, Right Updated: 11/19/22 1715     Blood Culture No growth at 2 days    POC Glucose Once [[id number]]  (Abnormal) Collected: 11/19/22 1135    Specimen: Blood Updated: 11/19/22 1148     Glucose 151 mg/dL      Comment: : [id number] Brian Emery ID: [id number]       Occult Blood X 1, Stool - Stool, Per Rectum [[id number]]  (Normal) Collected: 11/19/22 0800    Specimen: Stool from Per Rectum Updated: 11/19/22 1144     Fecal Occult Blood Negative        Imaging Results (Last 24 Hours)     ** No results found for the last 24 hours. **          Assessment/Plan     Hospital Problem List      Cerebrovascular accident (CVA), unspecified mechanism (HCC)    Uncontrolled hypertension    Type 2 diabetes mellitus with hyperglycemia, without long-term current use of insulin (HCC)    Diarrhea    Leukocytosis    Hypomagnesemia    Hyponatremia    Impression:  1. Acute bilateral cerebellar strokes and right occipital and ? Right frontal all appearing embolic  2. Hypertension  3. Mixed hyperlipidemia. LDL 52  4. DM controlled  5. UDS + for opiates and benzo's  6. Anemia  7. Hypomagnesemia  8. Hyponatremia, resolved  9. leukocytosis  10. Migraine headaches  11. Previous abnormal EEG concerning for seizure risk and now normal while on Keppra  12. Hypokalemia, new    Plan:  · Zio patch for 14 days at discharge  · Change aspirin to Plavix but consider ESUS --Family concerned that with all the current bruising and skin tear she will not be able to tolerate. Long discussion with  risk/benefit of ESUS and family would like to discuss.   · Occult blood for stools negative.  · Replace magnesium with IV and resume home oral dose at 400 mg BID (takes 800 mg BID usually)  ·  Keppra 1000 mg BID  · Artificial tears  · Referral to acute rehab.       Tammy Fairbanks, APRN  11/20/22  09:48 CST

## 2022-11-20 NOTE — PLAN OF CARE
Goal Outcome Evaluation:              Outcome Evaluation: Alert and oriented, forgetful and delusional at times. VSS. Denies pain. Good diet. OOB with CGA to restroom. Continent of bowel and bladder. Family at bedside throughout the day. Glucose monitored. Slept well on and off today. Potential for d/c tomorrow. All needs met at this time. Safety maintained. Plan of care continued.

## 2022-11-20 NOTE — PLAN OF CARE
Problem: Fall Injury Risk  Goal: Absence of Fall and Fall-Related Injury  Outcome: Ongoing, Progressing  Intervention: Identify and Manage Contributors  Recent Flowsheet Documentation  Taken 11/19/2022 2000 by Sai Trotter RN  Medication Review/Management: medications reviewed  Intervention: Promote Injury-Free Environment  Recent Flowsheet Documentation  Taken 11/20/2022 0400 by Sai Trotter RN  Safety Promotion/Fall Prevention: safety round/check completed  Taken 11/20/2022 0200 by Sai Trotter RN  Safety Promotion/Fall Prevention: safety round/check completed  Taken 11/20/2022 0000 by Sai Trotter RN  Safety Promotion/Fall Prevention: safety round/check completed  Taken 11/19/2022 2200 by Sai Trotter RN  Safety Promotion/Fall Prevention: safety round/check completed  Taken 11/19/2022 2000 by Sai Trotter RN  Safety Promotion/Fall Prevention: safety round/check completed     Problem: Adjustment to Illness (Stroke, Ischemic/Transient Ischemic Attack)  Goal: Optimal Coping  Outcome: Ongoing, Progressing  Intervention: Support Psychosocial Response to Stroke  Recent Flowsheet Documentation  Taken 11/19/2022 2000 by Sai Trotter RN  Family/Support System Care: support provided     Problem: Adjustment to Illness (Stroke, Ischemic/Transient Ischemic Attack)  Goal: Optimal Coping  Outcome: Ongoing, Progressing  Intervention: Support Psychosocial Response to Stroke  Recent Flowsheet Documentation  Taken 11/19/2022 2000 by Sai Trotter RN  Family/Support System Care: support provided     Problem: Bowel Elimination Impaired (Stroke, Ischemic/Transient Ischemic Attack)  Goal: Effective Bowel Elimination  Outcome: Ongoing, Progressing   Goal Outcome Evaluation:  Plan of Care Reviewed With: patient        Progress: improving

## 2022-11-20 NOTE — THERAPY TREATMENT NOTE
Acute Care - Physical Therapy Treatment Note  [facility]     Patient Name: Concepcion Velez  : 1945  MRN: [id number]  Today's Date: 2022      Visit Dx:     ICD-10-CM ICD-9-CM   1. Cerebrovascular accident (CVA), unspecified mechanism (HCC)  I63.9 434.91   2. Altered mental status, unspecified altered mental status type  R41.82 780.97   3. Dysphagia, unspecified type  R13.10 787.20   4. Impaired mobility and ADLs  Z74.09 V49.89    Z78.9    5. Impaired mobility  Z74.09 799.89     Patient Active Problem List   Diagnosis   • Tachycardia   • Cerebellar infarct (HCC)   • Uncontrolled hypertension   • Toxic metabolic encephalopathy   • Type 2 diabetes mellitus with hyperglycemia, without long-term current use of insulin (HCC)   • Cerebrovascular accident (CVA), unspecified mechanism (HCC)   • CVA (cerebral vascular accident) (HCC)   • Diarrhea   • Leukocytosis   • Hypomagnesemia   • Hyponatremia     Past Medical History:   Diagnosis Date   • Abdominal wall seroma    • B12 deficiency    • Cerebral infarct (HCC)    • Diabetes mellitus (HCC)    • HLD (hyperlipidemia)    • Hypertension    • Hypokalemia    • Hypomagnesemia      Past Surgical History:   Procedure Laterality Date   • CHOLECYSTECTOMY     • HERNIA REPAIR     • HYSTERECTOMY       PT Assessment (last 12 hours)     PT Evaluation and Treatment     Row Name 22 0742          Physical Therapy Time and Intention    Subjective Information complains of;pain  -AB     Document Type therapy note (daily note)  -AB     Mode of Treatment physical therapy  -AB     Row Name 22 0742          General Information    Existing Precautions/Restrictions fall  -AB     Barriers to Rehab cognitive status  -AB     Row Name 22 0742          Bed Mobility    Comment, (Bed Mobility) up in chair  -AB     Row Name 22 0742          Sit-Stand Transfer    Sit-Stand [place] (Transfers) contact guard;standby assist  -AB     Row Name 22 0742          Stand-Sit  Transfer    Stand-Sit [place] (Transfers) contact guard;standby assist  -AB     Row Name 11/20/22 0742          Gait/Stairs (Locomotion)    [place] Level (Gait) verbal cues;contact guard  -AB     Assistive Device (Gait) walker, front-wheeled  -AB     Distance in Feet (Gait) 50' x 4  -AB     Comment, (Gait/Stairs) drifts to the Right and runs into objects on R  -AB     Row Name 11/20/22 0742          Motor Skills    Comments, Therapeutic Exercise 20 reps sitting with vc's to stay on task  -AB     Additional Documentation Comments, Therapeutic Exercise (Row)  -AB     Row Name 11/20/22 0742          Plan of Care Review    Plan of Care Reviewed With patient  -AB     Progress improving  -AB     Outcome Evaluation She was up in the chair and agreed to therapy. She was CGA for sit/stand and to ambulate 50' x 4 with Rwx. She performed sitting exercises 20 reps with vc's to stay on task. During ambulation she would drift to the right and run into objects. She was left up in chair with call light.PT will continue to follow.  -AB     Row Name 11/20/22 0742          Positioning and Restraints    Pre-Treatment Position sitting in chair/recliner  -AB     Post Treatment Position chair  -AB     In Chair sitting;call light within reach  -AB           User Key  (r) = Recorded By, (t) = Taken By, (c) = Cosigned By    Initials Name Provider Type    Sydney Cueva, PTA Physical Therapist Assistant                Physical Therapy Education     Title: PT OT SLP Therapies (In Progress)     Topic: Physical Therapy (In Progress)     Point: Mobility training (Done)     Learning Progress Summary           Patient LEVON Jimenez, VU by AE at 11/19/2022 1006    Comment: t/f's, gait training    Acceptance, E, NR by MS at 11/18/2022 1344    Comment: role of PT in her care                   Point: Home exercise program (Done)     Learning Progress Summary           Patient LEVON Jimenez, VU by AE at 11/19/2022 1006    Comment: t/f's, gait  training                   Point: Body mechanics (Not Started)     Learner Progress:  Not documented in this visit.          Point: Precautions (Not Started)     Learner Progress:  Not documented in this visit.                      User Key     Initials Effective Dates Name Provider Type Discipline    AE 06/22/15 -  Nayla Fraser, TY Physical Therapist Assistant PT    MS 06/19/18 -  Alona Spain, PT, DPT, NCS Physical Therapist PT              PT Recommendation and Plan     Plan of Care Reviewed With: patient  Progress: improving  Outcome Evaluation: She was up in the chair and agreed to therapy. She was CGA for sit/stand and to ambulate 50' x 4 with Rwx. She performed sitting exercises 20 reps with vc's to stay on task. During ambulation she would drift to the right and run into objects. She was left up in chair with call light.PT will continue to follow.   Outcome Measures     Row Name 11/20/22 0742 11/19/22 0914          How much help from another person do you currently need...    Turning from your back to your side while in flat bed without using bedrails? 4  -AB 4  -AE     Moving from lying on back to sitting on the side of a flat bed without bedrails? 3  -AB 3  -AE     Moving to and from a bed to a chair (including a wheelchair)? 3  -AB 3  -AE     Standing up from a chair using your arms (e.g., wheelchair, bedside chair)? 3  -AB 3  -AE     Climbing 3-5 steps with a railing? 2  -AB 2  -AE     To walk in hospital room? 3  -AB 3  -AE     AM-PAC 6 Clicks Score (PT) 18  -AB 18  -AE        Functional Assessment    Outcome Measure Options AM-PAC 6 Clicks Basic Mobility (PT)  -AB AM-PAC 6 Clicks Basic Mobility (PT)  -AE           User Key  (r) = Recorded By, (t) = Taken By, (c) = Cosigned By    Initials Name Provider Type    AB Sydney Vega, TY Physical Therapist Assistant    AE Nayla Fraser, TY Physical Therapist Assistant                 Time Calculation:    PT Charges     Row Name 11/20/22 0742              Time Calculation    Start Time 0742  -AB      Stop Time 0811  -AB      Time Calculation (min) 29 min  -AB      PT Received On 11/20/22  -AB         Time Calculation- PT    Total Timed Code Minutes- PT 29 minute(s)  -AB            User Key  (r) = Recorded By, (t) = Taken By, (c) = Cosigned By    Initials Name Provider Type    AB Sydney Vega PTA Physical Therapist Assistant              Therapy Charges for Today     Code Description Service Date Service Provider Modifiers Qty    35070492412 HC GAIT TRAINING EA 15 MIN 11/20/2022 Sydney Vega PTA GP 1    41107921420 HC PT THER PROC EA 15 MIN 11/20/2022 Sydney Vega PTA GP 1          PT G-Codes  Outcome Measure Options: AM-PAC 6 Clicks Basic Mobility (PT)  AM-PAC 6 Clicks Score (PT): 18  AM-PAC 6 Clicks Score (OT): 19  Modified Gissell Scale: 4 - Moderately severe disability.  Unable to walk without assistance, and unable to attend to own bodily needs without assistance.    Sydney Vega PTA  11/20/2022

## 2022-11-20 NOTE — CASE MANAGEMENT/SOCIAL WORK
[facility] following, CARLO to inform [facility] tomorrow- Monday 11/21 that pt is out of unit. This SW did fax updated records to [facility] so they will have early tomorrow.  Pt will need ins. precert before going. Will follow.

## 2022-11-20 NOTE — DISCHARGE PLACEMENT REQUEST
"  TERES[facility] [place] [facility]  [phone]  Arlin Velez (77 y.o. Female)     Date of Birth   1945    Social Security Number       Address   [address] [place] [address]    Home Phone   [phone]    MRN   [id number]       [facility]    Marital Status                               Admission Date   11/17/22    Admission Type   Emergency    Admitting Provider   Yony Dyson DO    Attending Provider   Yony Dyson DO    Department, Room/Bed   [facility] 3A, 343/1       Discharge Date       Discharge Disposition       Discharge Destination                               Attending Provider: Yony Dyson DO    Allergies: No Known Allergies    Isolation: Spore   Infection: C.difficile (rule out) (11/17/22)   Code Status: CPR    Ht: 152.4 cm (60\")   Wt: 61.3 kg (135 lb 1.6 oz)    Admission Cmt: None   Principal Problem: Cerebrovascular accident (CVA), unspecified mechanism (HCC) [I63.9]                 Active Insurance as of 11/17/2022     Primary Coverage     Payor Plan Insurance Group Employer/Plan Group    ANTHEM MEDICARE REPLACEMENT ANTHEM MEDICARE ADVANTAGE [id number]     Payor Plan Address Payor Plan Phone Number Payor Plan Fax Number Effective Dates    [address] [phone]  1/1/2022 - None Entered    [facility] [address]       Subscriber Name Subscriber Birth Date Member ID       ARLIN VELEZ 1945 [id number]                 Emergency Contacts      (Rel.) Home Phone Work Phone Mobile Phone    Kaylin Cardona (Daughter) -- -- [phone]    RosieLavelle garcia (Son) -- -- [phone]    Lillie Henson (Friend) -- -- [phone]    Lucy Yan (Daughter) -- -- [phone]               Physician Progress Notes (last 24 hours)      Tammy Fairbanks APRN at 11/20/22 0948            Neurology Progress Note      Date of admission: 11/17/2022  7:48 AM  Date of visit: 11/20/2022    Chief Complaint:  F/u strokes    Subjective     Subjective:  Patient " sitting up in bed feeding self breakfast. Daughter Kaylin at bedside. Patient had some confusion last PM. She is alert and oriented this AM. She is having difficulty opening her OJ and difficulty managing her tray.         Interpretation:  This is a  normal awake EEG. This is improved compared to the previous EEG of 11/15/22. . The background of 8 Hz is borderline acceptable and may be seen in normal individuals but may be seen in early dementia. There is excess beta activity which may be seen in an individual who had recently received/ingested benzodiazepines. Clinical correlation is recommended    Mg+ 1.4 this AM down from 1.6. she did receive IV replacement yesterday and does have oral magnesium listed as home med. Discussed with Dr. Dyson. Will give IV replacement and start oral replacement.     COLIN was unremarkable  •  Left ventricular systolic function is normal.  •  There is no significant (greater than mild) valvular dysfunction.  •  Estimated right ventricular systolic pressure from tricuspid regurgitation is normal (<35 mmHg).  •  There is no evidence of right to left shunt on bubble study.  •  There is no evidence of intracardiac mass or thrombus.    Medications:  Current Facility-Administered Medications   Medication Dose Route Frequency Provider Last Rate Last Admin   • acetaminophen (TYLENOL) tablet 650 mg  650 mg Oral Q4H PRN Yony Dyson DO   650 mg at 11/19/22 0134    Or   • acetaminophen (TYLENOL) suppository 650 mg  650 mg Rectal Q4H PRN Yony Dyson, DO       • atorvastatin (LIPITOR) tablet 20 mg  20 mg Oral Nightly Yony Dyson DO   20 mg at 11/19/22 1954   • clopidogrel (PLAVIX) tablet 75 mg  75 mg Oral Daily Yony Dyson DO   75 mg at 11/20/22 0827   • dextrose (D50W) (25 g/50 mL) IV injection 25 g  25 g Intravenous Q15 Min PRN Yony Dyson, DO       • dextrose (GLUTOSE) oral gel 15 g  15 g Oral Q15 Min PRN Yony Dyson DO       • glucagon (human recombinant)  (GLUCAGEN DIAGNOSTIC) injection 1 mg  1 mg Intramuscular Q15 Min PRN Yony Dyson, DO       • Glycerin-Hypromellose- (ARTIFICIAL TEARS) 0.2-0.2-1 % ophthalmic solution solution 1 drop  1 drop Both Eyes Q3H PRN Yony Dyson DO   1 drop at 11/20/22 0831   • HYDROcodone-acetaminophen (NORCO) 5-325 MG per tablet 1 tablet  1 tablet Oral Q6H PRN Yony Dyson DO   1 tablet at 11/20/22 0827   • Insulin Lispro (humaLOG) injection 2-7 Units  2-7 Units Subcutaneous TID AC Yony Dyson DO   2 Units at 11/19/22 1210   • levETIRAcetam (KEPPRA) tablet 500 mg  500 mg Oral Q12H Yony Dyson DO   500 mg at 11/20/22 0827    Or   • levETIRAcetam in NaCl 0.82% (KEPPRA) IVPB 500 mg  500 mg Intravenous Q12H Yony Dyson DO       • magnesium oxide (MAG-OX) tablet 400 mg  400 mg Oral BID Tammy Fairbanks APRN       • metoprolol succinate XL (TOPROL-XL) 24 hr tablet 50 mg  50 mg Oral Q24H Yony Dyson DO   50 mg at 11/20/22 0827   • ondansetron (ZOFRAN) injection 4 mg  4 mg Intravenous Q6H PRN Yony Dyson DO       • sodium chloride 0.9 % flush 10 mL  10 mL Intravenous Q12H Yony Dyson DO   10 mL at 11/20/22 0827   • sodium chloride 0.9 % flush 10 mL  10 mL Intravenous PRN Yony Dyson DO           Review of Systems:   -A 14-point review of systems is completed and is negative except for confusion last PM.   Objective     Objective      Vital Signs  Temp:  [97.5 °F (36.4 °C)-98.3 °F (36.8 °C)] 97.5 °F (36.4 °C)  Heart Rate:  [77-95] 77  Resp:  [16] 16  BP: (139-171)/(66-87) 161/72    Physical Exam:    HEENT:  Neck supple  CVS:  Regular rate and rhythm.  No murmurs  Carotid Examination:  No bruits  Lungs:  Clear to auscultation  Abdomen:  Nontender, Nondistended  Extremities:  No signs of peripheral edema    Neurologic Exam:    -Awake, Alert, Oriented to person, place, city, state, month, year but erroneously thinks it is Wednesday.  Could not name the  but did name the  president.  -No word finding difficulties  -No aphasia  -No dysarthria  -Follows simple and complex commands    Cranial nerves II through XII intact.  CN II:  Visual fields full.  Pupils equally reactive to light  CN III, IV, VI:  Extraocular Muscles full with no signs of nystagmus  CN V:  Facial sensory is symmetric   CN VII:  Facial motor symmetric  CN VIII:  Gross hearing intact bilaterally  CN IX/X:  Palate elevates symmetrically  CN XI:  Shoulder shrug symmetric  CN XII:  Tongue is midline on protrusion  Motor: (strength out of 5:  1= minimal movement, 2 = movement in plane of gravity, 3 = movement against gravity, 4 = movement against some resistance, 5 = full strength)    -Right Upper Ext: Proximal: 5 Distal: 5  -Left Upper Ext: Proximal: 5 Distal: 5    -Right Lower Ext: Proximal: 5 Distal: 5  -Left Lower Ext: Proximal: 5 Distal: 5    DTR:  2+ throughout in all four extremities    Sensory:  -Intact to light touch, pinprick,    Coordination/Gait:  -Normal finger to nose and heel to shin but fine finger movements are incoordinated on right more than left     Results Review:    I reviewed the patient's new clinical results.    Lab Results (last 24 hours)     Procedure Component Value Units Date/Time    POC Glucose Once [[id number]]  (Abnormal) Collected: 11/20/22 0821    Specimen: Blood Updated: 11/20/22 0832     Glucose 171 mg/dL      Comment: : [id number] Carey AyersMeter ID: [id number]       Manual Differential [[id number]]  (Abnormal) Collected: 11/20/22 0503    Specimen: Blood Updated: 11/20/22 0705     Neutrophil % 59.2 %      Lymphocyte % 15.5 %      Monocyte % 17.5 %      Basophil % 1.0 %      Bands %  1.0 %      Atypical Lymphocyte % 5.8 %      Neutrophils Absolute 7.64 10*3/mm3      Lymphocytes Absolute 2.71 10*3/mm3      Monocytes Absolute 2.22 10*3/mm3      Basophils Absolute 0.13 10*3/mm3      Anisocytosis Slight/1+     Elliptocytes Slight/1+     Hypochromia Slight/1+     Macrocytes  Slight/1+     Microcytes Slight/1+     Polychromasia Slight/1+     WBC Morphology Normal     Platelet Morphology Normal    Basic Metabolic Panel [[id number]]  (Abnormal) Collected: 11/20/22 0503    Specimen: Blood Updated: 11/20/22 0618     Glucose 147 mg/dL      BUN 8 mg/dL      Creatinine 0.56 mg/dL      Sodium 140 mmol/L      Potassium 3.5 mmol/L      Chloride 104 mmol/L      CO2 26.0 mmol/L      Calcium 9.5 mg/dL      BUN/Creatinine Ratio 14.3     Anion Gap 10.0 mmol/L      eGFR 95.3 mL/min/1.73      Comment: National Kidney Foundation and American Society of Nephrology (ASN) Task Force recommended calculation based on the Chronic Kidney Disease Epidemiology Collaboration (CKD-EPI) equation refit without adjustment for race.       Narrative:      GFR Normal >60  Chronic Kidney Disease <60  Kidney Failure <15    The GFR formula is only valid for adults with stable renal function between ages 18 and 70.    Magnesium [[id number]]  (Abnormal) Collected: 11/20/22 0503    Specimen: Blood Updated: 11/20/22 0618     Magnesium 1.4 mg/dL     CBC & Differential [[id number]]  (Abnormal) Collected: 11/20/22 0503    Specimen: Blood Updated: 11/20/22 0603    Narrative:      The following orders were created for panel order CBC & Differential.  Procedure                               Abnormality         Status                     ---------                               -----------         ------                     CBC Auto Differential[[id number]]        Abnormal            Final result                 Please view results for these tests on the individual orders.    CBC Auto Differential [[id number]]  (Abnormal) Collected: 11/20/22 0503    Specimen: Blood Updated: 11/20/22 0603     WBC 12.70 10*3/mm3      RBC 3.51 10*6/mm3      Hemoglobin 10.0 g/dL      Hematocrit 32.2 %      MCV 91.7 fL      MCH 28.5 pg      MCHC 31.1 g/dL      RDW 14.1 %      RDW-SD 47.1 fl      MPV 11.9 fL      Platelets 192 10*3/mm3      nRBC 0.0 /100 WBC      POC Glucose Once [[id number]]  (Normal) Collected: 11/19/22 1721    Specimen: Blood Updated: 11/19/22 1733     Glucose 111 mg/dL      Comment: : [id number] Paolo Buckley ID: [id number]       Blood Culture With XIANG - Blood, Arm, Right [[id number]]  (Normal) Collected: 11/17/22 1519    Specimen: Blood from Arm, Right Updated: 11/19/22 1715     Blood Culture No growth at 2 days    POC Glucose Once [[id number]]  (Abnormal) Collected: 11/19/22 1135    Specimen: Blood Updated: 11/19/22 1148     Glucose 151 mg/dL      Comment: : [id number] Brian Emery ID: [id number]       Occult Blood X 1, Stool - Stool, Per Rectum [[id number]]  (Normal) Collected: 11/19/22 0800    Specimen: Stool from Per Rectum Updated: 11/19/22 1144     Fecal Occult Blood Negative        Imaging Results (Last 24 Hours)     ** No results found for the last 24 hours. **          Assessment/Plan     Hospital Problem List      Cerebrovascular accident (CVA), unspecified mechanism (HCC)    Uncontrolled hypertension    Type 2 diabetes mellitus with hyperglycemia, without long-term current use of insulin (HCC)    Diarrhea    Leukocytosis    Hypomagnesemia    Hyponatremia    Impression:  1. Acute bilateral cerebellar strokes and right occipital and ? Right frontal all appearing embolic  2. Hypertension  3. Mixed hyperlipidemia. LDL 52  4. DM controlled  5. UDS + for opiates and benzo's  6. Anemia  7. Hypomagnesemia  8. Hyponatremia, resolved  9. leukocytosis  10. Migraine headaches  11. Previous abnormal EEG concerning for seizure risk and now normal while on Keppra  12. Hypokalemia, new    Plan:  · Zio patch for 14 days at discharge  · Change aspirin to Plavix but consider ESUS --Family concerned that with all the current bruising and skin tear she will not be able to tolerate. Long discussion with risk/benefit of ESUS and family would like to discuss.   · Occult blood for stools negative.  · Replace magnesium with IV and resume home oral dose  at 400 mg BID (takes 800 mg BID usually)  ·  Keppra 1000 mg BID  · Artificial tears  · Referral to acute rehab.       ROSA Londono  22  09:48 CST        Electronically signed by Tammy Fairbanks APRN at 22 0956          Physical Therapy Notes (last 24 hours)      Sydney Vega, PTA at 22 0850  Version 1 of 1       Goal Outcome Evaluation:  Plan of Care Reviewed With: patient        Progress: improving  Outcome Evaluation: She was up in the chair and agreed to therapy. She was CGA for sit/stand and to ambulate 50' x 4 with Rwx. She performed sitting exercises 20 reps with vc's to stay on task. During ambulation she would drift to the right and run into objects. She was left up in chair with call light.PT will continue to follow.    Electronically signed by Sydney Vega PTA at 22 0851     Sydney Vega PTA at 22 0852  Version 1 of 1         Acute Care - Physical Therapy Treatment Note   [place]     Patient Name: Concepcion Velez  : 1945  MRN: [id number]  Today's Date: 2022      Visit Dx:     ICD-10-CM ICD-9-CM   1. Cerebrovascular accident (CVA), unspecified mechanism (HCC)  I63.9 434.91   2. Altered mental status, unspecified altered mental status type  R41.82 780.97   3. Dysphagia, unspecified type  R13.10 787.20   4. Impaired mobility and ADLs  Z74.09 V49.89    Z78.9    5. Impaired mobility  Z74.09 799.89     Patient Active Problem List   Diagnosis   • Tachycardia   • Cerebellar infarct (HCC)   • Uncontrolled hypertension   • Toxic metabolic encephalopathy   • Type 2 diabetes mellitus with hyperglycemia, without long-term current use of insulin (HCC)   • Cerebrovascular accident (CVA), unspecified mechanism (HCC)   • CVA (cerebral vascular accident) (HCC)   • Diarrhea   • Leukocytosis   • Hypomagnesemia   • Hyponatremia     Past Medical History:   Diagnosis Date   • Abdominal wall seroma    • B12 deficiency    • Cerebral infarct (HCC)    • Diabetes  mellitus (HCC)    • HLD (hyperlipidemia)    • Hypertension    • Hypokalemia    • Hypomagnesemia      Past Surgical History:   Procedure Laterality Date   • CHOLECYSTECTOMY     • HERNIA REPAIR     • HYSTERECTOMY       PT Assessment (last 12 hours)     PT Evaluation and Treatment     Row Name 11/20/22 [facility]          Physical Therapy Time and Intention    Subjective Information complains of;pain  -AB     Document Type therapy note (daily note)  -AB     Mode of Treatment physical therapy  -AB     Row Name 11/20/22 42          General Information    Existing Precautions/Restrictions fall  -AB     Barriers to Rehab cognitive status  -AB     Row Name 11/20/22 42          Bed Mobility    Comment, (Bed Mobility) up in chair  -AB     Row Name 11/20/22 0742          Sit-Stand Transfer    Sit-Stand Patrick (Transfers) contact guard;standby assist  -AB     Row Name 11/20/22 0742          Stand-Sit Transfer    Stand-Sit Patrick (Transfers) contact guard;standby assist  -AB     Row Name 11/20/22 [facility]          Gait/Stairs (Locomotion)    Patrick Level (Gait) verbal cues;contact guard  -AB     Assistive Device (Gait) walker, front-wheeled  -AB     Distance in Feet (Gait) 50' x 4  -AB     Comment, (Gait/Stairs) drifts to the Right and runs into objects on R  -AB     Row Name 11/20/22 42          Motor Skills    Comments, Therapeutic Exercise 20 reps sitting with vc's to stay on task  -AB     Additional Documentation Comments, Therapeutic Exercise (Row)  -AB     Row Name 11/20/22 0742          Plan of Care Review    Plan of Care Reviewed With patient  -AB     Progress improving  -AB     Outcome Evaluation She was up in the chair and agreed to therapy. She was CGA for sit/stand and to ambulate 50' x 4 with Rwx. She performed sitting exercises 20 reps with vc's to stay on task. During ambulation she would drift to the right and run into objects. She was left up in chair with call light.PT will continue to follow.   -AB     Row Name 11/20/22 0742          Positioning and Restraints    Pre-Treatment Position sitting in chair/recliner  -AB     Post Treatment Position chair  -AB     In Chair sitting;call light within reach  -AB           User Key  (r) = Recorded By, (t) = Taken By, (c) = Cosigned By    Initials Name Provider Type    AB Sydney Vega PTA Physical Therapist Assistant                Physical Therapy Education     Title: PT OT SLP Therapies (In Progress)     Topic: Physical Therapy (In Progress)     Point: Mobility training (Done)     Learning Progress Summary           Patient Eager, E, VU by AE at 11/19/2022 1006    Comment: t/f's, gait training    Acceptance, E, NR by MS at 11/18/2022 1344    Comment: role of PT in her care                   Point: Home exercise program (Done)     Learning Progress Summary           Patient Wesleyer, E, VU by AE at 11/19/2022 1006    Comment: t/f's, gait training                   Point: Body mechanics (Not Started)     Learner Progress:  Not documented in this visit.          Point: Precautions (Not Started)     Learner Progress:  Not documented in this visit.                      User Key     Initials Effective Dates Name Provider Type Discipline    AE 06/22/15 -  Nayla Fraser PTA Physical Therapist Assistant PT    MS 06/19/18 -  Alona Spain, PT, DPT, NCS Physical Therapist PT              PT Recommendation and Plan     Plan of Care Reviewed With: patient  Progress: improving  Outcome Evaluation: She was up in the chair and agreed to therapy. She was CGA for sit/stand and to ambulate 50' x 4 with Rwx. She performed sitting exercises 20 reps with vc's to stay on task. During ambulation she would drift to the right and run into objects. She was left up in chair with call light.PT will continue to follow.   Outcome Measures     Row Name 11/20/22 0742 11/19/22 0914          How much help from another person do you currently need...    Turning from your back to your side  while in flat bed without using bedrails? 4  -AB 4  -AE     Moving from lying on back to sitting on the side of a flat bed without bedrails? 3  -AB 3  -AE     Moving to and from a bed to a chair (including a wheelchair)? 3  -AB 3  -AE     Standing up from a chair using your arms (e.g., wheelchair, bedside chair)? 3  -AB 3  -AE     Climbing 3-5 steps with a railing? 2  -AB 2  -AE     To walk in hospital room? 3  -AB 3  -AE     AM-PAC 6 Clicks Score (PT) 18  -AB 18  -AE        Functional Assessment    Outcome Measure Options AM-PAC 6 Clicks Basic Mobility (PT)  -AB AM-PAC 6 Clicks Basic Mobility (PT)  -AE           User Key  (r) = Recorded By, (t) = Taken By, (c) = Cosigned By    Initials Name Provider Type    AB Sydney Vega PTA Physical Therapist Assistant    AE Nayla Fraser PTA Physical Therapist Assistant                 Time Calculation:    PT Charges     Row Name 11/20/22 0742             Time Calculation    Start Time 0742  -AB      Stop Time 0811  -AB      Time Calculation (min) 29 min  -AB      PT Received On 11/20/22  -AB         Time Calculation- PT    Total Timed Code Minutes- PT 29 minute(s)  -AB            User Key  (r) = Recorded By, (t) = Taken By, (c) = Cosigned By    Initials Name Provider Type    AB Sydney Vega PTA Physical Therapist Assistant              Therapy Charges for Today     Code Description Service Date Service Provider Modifiers Qty    12125281150 HC GAIT TRAINING EA 15 MIN 11/20/2022 Sydney Vega PTA GP 1    06392035723 HC PT THER PROC EA 15 MIN 11/20/2022 Sydney Vega, TY GP 1          PT G-Codes  Outcome Measure Options: AM-PAC 6 Clicks Basic Mobility (PT)  AM-PAC 6 Clicks Score (PT): 18  AM-PAC 6 Clicks Score (OT): 19  Modified Gissell Scale: 4 - Moderately severe disability.  Unable to walk without assistance, and unable to attend to own bodily needs without assistance.    Sydney Vega PTA  11/20/2022      Electronically signed by Sydney Vega PTA  at 11/20/22 0852       Occupational Therapy Notes (last 24 hours)  Notes from 11/19/22 1101 through 11/20/22 1101   No notes exist for this encounter.

## 2022-11-20 NOTE — PLAN OF CARE
Goal Outcome Evaluation:  Plan of Care Reviewed With: patient        Progress: improving  Outcome Evaluation: She was up in the chair and agreed to therapy. She was CGA for sit/stand and to ambulate 50' x 4 with Rwx. She performed sitting exercises 20 reps with vc's to stay on task. During ambulation she would drift to the right and run into objects. She was left up in chair with call light.PT will continue to follow.

## 2022-11-20 NOTE — PLAN OF CARE
Goal Outcome Evaluation:              Outcome Evaluation: Alert and oriented x4, forgetful and impulsive. Family at bedside. VSS. Denies pain. NIH 0, family states improvement in speech and eyes. Family remaining at bedside. All needs met at this time. Safety maintained. Plan of care continued.

## 2022-11-20 NOTE — PROGRESS NOTES
[facility]  INPATIENT PROGRESS NOTE    Patient Name: Concepcion Velez  Date of Admission: 11/17/2022  Today's Date: 11/20/22  Length of Stay: 3  Primary Care Physician: Jaxson Vines DO    Subjective   Chief Complaint: f/u cva    HPI:  Patient seen with daughter at bedside.  Awake alert interactive.  Looks better today than I have seen her all week from a cognition standpoint.  No complaints.  No chest pain or shortness of breath.  No dizziness.  Tolerating p.o.  No diarrhea reported.    ROS:  All pertinent negatives and positives are as above. All other systems have been reviewed and are negative unless otherwise stated.     Objective    Temp:  [97.5 °F (36.4 °C)-98.3 °F (36.8 °C)] 97.5 °F (36.4 °C)  Heart Rate:  [77-95] 77  Resp:  [16] 16  BP: (139-171)/(65-87) 161/72  Physical Exam  GEN: Awake, alert, interactive, in NAD  HEENT: PERRLA, EOMI, Anicteric, Trachea midline  Lungs:  no wheezing/rales/rhonchi  Heart: RRR, +S1/s2, no rub  ABD: soft, nt/nd, +BS, no guarding/rebound  Extremities: atraumatic, no cyanosis, no pitting edema  Skin: no rashes or petechiae   Neuro: AAOx3, ZAZUETA, gait not tested  Psych: somewhat of a flat affect      Results Review:  I have reviewed the labs, radiology results, and diagnostic studies.    Laboratory Data:   Results from last 7 days   Lab Units 11/20/22  0503 11/19/22 0226 11/18/22  0555   WBC 10*3/mm3 12.70* 12.45* 14.05*   HEMOGLOBIN g/dL 10.0* 10.2* 10.0*   HEMATOCRIT % 32.2* 32.7* 31.9*   PLATELETS 10*3/mm3 192 235 241        Results from last 7 days   Lab Units 11/20/22  0503 11/19/22  0226 11/18/22  0555 11/15/22  0450 11/14/22  1000   SODIUM mmol/L 140 139 135*   < > 137   POTASSIUM mmol/L 3.5 3.0* 3.2*   < > 4.6   CHLORIDE mmol/L 104 102 97*   < > 101   CO2 mmol/L 26.0 26.0 25.0   < > 23.0   BUN mg/dL 8 9 8   < > 11   CREATININE mg/dL 0.56* 0.59 0.51*   < > 0.64   CALCIUM mg/dL 9.5 9.6 9.2   < > 10.4   BILIRUBIN  mg/dL  --  0.2  --   --  0.3   ALK PHOS U/L  --  83  --   --  92   ALT (SGPT) U/L  --  8  --   --  11   AST (SGOT) U/L  --  13  --   --  11   GLUCOSE mg/dL 147* 146* 117*   < > 169*    < > = values in this interval not displayed.       Culture Data:   Blood Culture   Date Value Ref Range Status   11/17/2022 No growth at less than 24 hours  Preliminary   11/14/2022 No growth at 3 days  Preliminary   11/14/2022 Staphylococcus, coagulase negative (C)  Final   11/14/2022 Micrococcus species (C)  Final       Radiology Data:   Imaging Results (Last 24 Hours)     ** No results found for the last 24 hours. **          I have reviewed the patient's current medications.     Assessment/Plan     Active Hospital Problems    Diagnosis    • **Cerebrovascular accident (CVA), unspecified mechanism (HCC)    • Diarrhea    • Leukocytosis    • Hypomagnesemia    • Hyponatremia    • Type 2 diabetes mellitus with hyperglycemia, without long-term current use of insulin (HCC)    • Uncontrolled hypertension        Plan:  #1 acute CVA -new bilateral strokes on imaging.  Unclear source.  Recently had a work-up with echo and no PFO.  Status post COLIN on 11/18 which again showed no PFO or clot.  Neuro was considering ESUS but family concerned about anticoagulation given her bruising and age.  Aspirin was transitioned to Plavix.  On statin.  Will need a Zio patch at discharge.  Continue PT and OT.  Patient likely needs acute rehab placement.    #2 headache -improved    #3 hypomagnesemia -continue to replace.  Getting 2 g.  We will also start p.o. twice daily.  Recheck in the morning.    #4 hypokalemia -improved but will give further replacement.    #5 leukocytosis -slowly improving.  Down to 12 today.  No fevers.  No signs of active infection.  Peripheral smear without any overt abnormal cells or blasts.    #6 diarrhea -1 episode during hospital stay.  Was sent for PCR negative.    #7 hyponatremia -resolved, never received fluids.  Suspect some  element of SIADH    #8 DM2 -sliding scale insulin and hypoglycemia protocol    #9 hypertension -back on home Toprol-XL.  Tolerating.  Will resume lisinopril today.  Continue to monitor.      Discharge Planning: Ongoing. Continue care and therapies.  Plan for acute rehab at d/c, referral sent to [facility]    Electronically signed by Yony Dyson DO, 11/20/22, 08:52 CST.

## 2022-11-21 ENCOUNTER — APPOINTMENT (OUTPATIENT)
Dept: CARDIOLOGY | Facility: HOSPITAL | Age: 77
End: 2022-11-21

## 2022-11-21 ENCOUNTER — READMISSION MANAGEMENT (OUTPATIENT)
Dept: CALL CENTER | Facility: HOSPITAL | Age: 77
End: 2022-11-21

## 2022-11-21 VITALS
OXYGEN SATURATION: 99 % | HEART RATE: 77 BPM | BODY MASS INDEX: 26.52 KG/M2 | WEIGHT: 135.1 LBS | DIASTOLIC BLOOD PRESSURE: 62 MMHG | SYSTOLIC BLOOD PRESSURE: 135 MMHG | HEIGHT: 60 IN | TEMPERATURE: 98.4 F | RESPIRATION RATE: 16 BRPM

## 2022-11-21 LAB
ANION GAP SERPL CALCULATED.3IONS-SCNC: 9 MMOL/L (ref 5–15)
BUN SERPL-MCNC: 8 MG/DL (ref 8–23)
BUN/CREAT SERPL: 15.4 (ref 7–25)
CALCIUM SPEC-SCNC: 10.1 MG/DL (ref 8.6–10.5)
CHLORIDE SERPL-SCNC: 102 MMOL/L (ref 98–107)
CO2 SERPL-SCNC: 28 MMOL/L (ref 22–29)
CREAT SERPL-MCNC: 0.52 MG/DL (ref 0.57–1)
EGFRCR SERPLBLD CKD-EPI 2021: 97 ML/MIN/1.73
GLUCOSE BLDC GLUCOMTR-MCNC: 164 MG/DL (ref 70–130)
GLUCOSE BLDC GLUCOMTR-MCNC: 165 MG/DL (ref 70–130)
GLUCOSE SERPL-MCNC: 146 MG/DL (ref 65–99)
MAGNESIUM SERPL-MCNC: 1.9 MG/DL (ref 1.6–2.4)
MAXIMAL PREDICTED HEART RATE: 145 BPM
POTASSIUM SERPL-SCNC: 4.1 MMOL/L (ref 3.5–5.2)
SODIUM SERPL-SCNC: 139 MMOL/L (ref 136–145)
STRESS TARGET HR: 123 BPM

## 2022-11-21 PROCEDURE — 82962 GLUCOSE BLOOD TEST: CPT

## 2022-11-21 PROCEDURE — 97530 THERAPEUTIC ACTIVITIES: CPT

## 2022-11-21 PROCEDURE — 80048 BASIC METABOLIC PNL TOTAL CA: CPT | Performed by: INTERNAL MEDICINE

## 2022-11-21 PROCEDURE — 63710000001 INSULIN LISPRO (HUMAN) PER 5 UNITS: Performed by: INTERNAL MEDICINE

## 2022-11-21 PROCEDURE — 83735 ASSAY OF MAGNESIUM: CPT | Performed by: INTERNAL MEDICINE

## 2022-11-21 PROCEDURE — 97110 THERAPEUTIC EXERCISES: CPT

## 2022-11-21 PROCEDURE — 97116 GAIT TRAINING THERAPY: CPT

## 2022-11-21 PROCEDURE — 92523 SPEECH SOUND LANG COMPREHEN: CPT | Performed by: SPEECH-LANGUAGE PATHOLOGIST

## 2022-11-21 PROCEDURE — 93246 EXT ECG>7D<15D RECORDING: CPT

## 2022-11-21 RX ORDER — CLOPIDOGREL BISULFATE 75 MG/1
75 TABLET ORAL DAILY
Qty: 30 TABLET | Refills: 0 | Status: SHIPPED | OUTPATIENT
Start: 2022-11-22 | End: 2022-12-22

## 2022-11-21 RX ORDER — FOLIC ACID 1 MG/1
1 TABLET ORAL DAILY
Qty: 30 TABLET | Refills: 0 | Status: SHIPPED | OUTPATIENT
Start: 2022-11-22 | End: 2022-12-22

## 2022-11-21 RX ADMIN — METOPROLOL SUCCINATE 50 MG: 50 TABLET, FILM COATED, EXTENDED RELEASE ORAL at 09:04

## 2022-11-21 RX ADMIN — LISINOPRIL 10 MG: 10 TABLET ORAL at 09:05

## 2022-11-21 RX ADMIN — CLOPIDOGREL 75 MG: 75 TABLET, FILM COATED ORAL at 09:04

## 2022-11-21 RX ADMIN — INSULIN LISPRO 2 UNITS: 100 INJECTION, SOLUTION INTRAVENOUS; SUBCUTANEOUS at 09:03

## 2022-11-21 RX ADMIN — MAGNESIUM GLUCONATE 500 MG ORAL TABLET 800 MG: 500 TABLET ORAL at 09:04

## 2022-11-21 RX ADMIN — INSULIN LISPRO 2 UNITS: 100 INJECTION, SOLUTION INTRAVENOUS; SUBCUTANEOUS at 12:40

## 2022-11-21 RX ADMIN — FOLIC ACID 1 MG: 1 TABLET ORAL at 09:04

## 2022-11-21 RX ADMIN — HYDROCODONE BITARTRATE AND ACETAMINOPHEN 1 TABLET: 5; 325 TABLET ORAL at 03:53

## 2022-11-21 RX ADMIN — LEVETIRACETAM 500 MG: 500 TABLET, FILM COATED ORAL at 09:04

## 2022-11-21 NOTE — THERAPY EVALUATION
Acute Care - Speech Language Pathology Initial Evaluation  [facility]     Patient Name: Concepcion Velez  : 1945  MRN: [id number]  Today's Date: 2022               Admit Date: 2022     The patient was seen today for an evaluation of her cognitive-communication skills. The patient was alert and cooperative. Son-in-law present at the bedside.     Primary problem: Confusion, slurred speech, L arm/leg weakness, imaging with multiple infarcts, possible seizures.    MMSE:  The Mini Mental State Examination (MMSE) is a tool that can be used to systematically and thoroughly assess mental status with older,  community dwelling, hospitalized and institutionalized adults. It is an 11-question measure that tests five areas of cognitive function:  orientation, registration, attention and calculation, recall, and language. The maximum score is 30. A score of 23 or lower is indicative of cognitive impairment.     Severity Rating Score Assessment and Function   Questionably Significant (25-30)  If clinical signs of cognitive impairment are present, in depth cognitive assessment may be valuable.  May have clinically significant but mild deficits in day to day functioning.  Likely to affect only most demanding activities of daily living.   Mild (20-25) 23 In depth assessment may be helpful to better determine pattern and extent of deficits.  Significant effect in day to day functioning.  May require supervision, support and assistance.   Moderate (10-20)  In depth assessment may be helpful if there are specific clinical indications.  Clear impairment in day to day functioning.  May require 24 hour supervision.   Severe (0-10)  Pt not likely to be able to participate in in-depth testing.  Marked impairment in day to day functioning.  Likely to require 24 hour supervision and assistance with ADLs.   Orientation: Patient oriented to year, season, month, and date. Stated it was Tuesday instead of Monday. Oriented to  country, state, city, building, and floor of building.     Memory: Recalled 3/3 unrelated words immediately. Recalled 0/3 unrelated words with a delay.     Comments: Able to complete mental manipulation to spell WORLD backwards with missing only 1 letter in the sequence. Able to name 2 common objects, repeat a simple phrase, read and follow a command, and write a simple sentence. Difficulty with copying design accurately as she faustina 2 triangles interlocked but independently stated it was not correct but did not attempt self corrections. Followed 2/3 verbally given commands.     The patient scored a 23/30 which is indicative of a mild cognitive impairment. The patient would benefit from skilled ST services for memory and higher level organization skills in order to return to discharge destination safely.    Alona Bermudez, MS CCC-SLP 11/21/2022 08:45 CST    Visit Dx:    ICD-10-CM ICD-9-CM   1. Cerebrovascular accident (CVA), unspecified mechanism (HCC)  I63.9 434.91   2. Altered mental status, unspecified altered mental status type  R41.82 780.97   3. Dysphagia, unspecified type  R13.10 787.20   4. Impaired mobility and ADLs  Z74.09 V49.89    Z78.9    5. Impaired mobility  Z74.09 799.89   6. Cognitive changes  R41.89 799.59     Patient Active Problem List   Diagnosis   • Tachycardia   • Cerebellar infarct (HCC)   • Uncontrolled hypertension   • Toxic metabolic encephalopathy   • Type 2 diabetes mellitus with hyperglycemia, without long-term current use of insulin (HCC)   • Cerebrovascular accident (CVA), unspecified mechanism (HCC)   • CVA (cerebral vascular accident) (HCC)   • Diarrhea   • Leukocytosis   • Hypomagnesemia   • Hyponatremia     Past Medical History:   Diagnosis Date   • Abdominal wall seroma    • B12 deficiency    • Cerebral infarct (HCC)    • Diabetes mellitus (HCC)    • HLD (hyperlipidemia)    • Hypertension    • Hypokalemia    • Hypomagnesemia      Past Surgical History:   Procedure Laterality  Date   • CHOLECYSTECTOMY     • HERNIA REPAIR     • HYSTERECTOMY         SLP Recommendation and Plan  SLP Diagnosis: mild, cognitive-linguistic disorder (11/21/22 0749)           [facility] [place] Interventions Met: yes (11/21/22 0749)  Anticipated Discharge Disposition (SLP): unknown (11/21/22 0749)        Predicted Duration Therapy Intervention (Days): until discharge (11/21/22 0749)                          Plan of Care Reviewed With: patient, family (11/21/22 0836)  Progress: no change (Initial Evaluation) (11/21/22 0836)      SLP EVALUATION (last 72 hours)     SLP [facility] [place]     Row Name 11/21/22 0749 11/18/22 1505 11/18/22 1309             Communication Assessment/Intervention    Document Type evaluation  -MG evaluation  -MG evaluation  -MG      Subjective Information no complaints  -MG -- --      Patient Observations alert;cooperative;agree to therapy  -MG -- --      Patient/Family/Caregiver Comments/Observations Son in law present  -MG -- --      Session Not Performed -- patient unavailable for evaluation  -MG patient unavailable for evaluation  -MG      Patient Effort good  -MG -- --      Comment -- Too lethargic post COLIN to assess cognition and communciation  -MG COLIN and imaging per RN  -MG      Symptoms Noted During/After Treatment none  -MG -- --         General Information    Patient Profile Reviewed yes  -MG -- --      Pertinent History Of Current Problem Primary problem: Confusion, slurred speech, L arm/leg weakness, imaging with multiple infarcts, possible seizures.  -MG -- --      Precautions/Limitations, Vision WFL;for purposes of eval  -MG -- --      Precautions/Limitations, Hearing WFL;for purposes of eval  -MG -- --      Prior Level of Function-Communication WFL  -MG -- --      Plans/Goals Discussed with patient and family  -MG -- --      Barriers to Rehab none identified  -MG -- --      Patient's Goals for Discharge return to home;return to all previous roles/activities  -MG  -- --      Family Goals for Discharge patient able to return to previous activities/roles  -MG -- --      Standardized Assessment Used MMSE  -MG -- --         Pain    Additional Documentation Pain Scale: FACES Pre/Post-Treatment (Group)  -MG -- --         Pain Scale: FACES Pre/Post-Treatment    Pain: FACES Scale, Pretreatment 0-->no hurt  -MG -- --      Posttreatment Pain Rating 0-->no hurt  -MG -- --         Cognitive Assessment Intervention- SLP    Cognitive Function (Cognition) mild impairment  -MG -- --         SLP Evaluation Clinical Impressions    SLP Diagnosis mild;cognitive-linguistic disorder  -MG -- --      Rehab Potential/Prognosis good  -MG -- --      [facility] Criteria for Skilled Therapy Interventions Met yes  -MG -- --      Functional Impact functional impact in ADLs;difficulty completing home management task  -MG -- --         Recommendations    Therapy Frequency (SLP SLC) PRN  -MG -- --      Predicted Duration Therapy Intervention (Days) until discharge  -MG -- --      Anticipated Discharge Disposition (SLP) unknown  -MG -- --         Communication Treatment Objective and Progress Goals (SLP)    SLC LTGs Patient will demonstrate functional cognitive-linguistic skills for return to discharge environment  -MG -- --      Cognitive Linguistic Treatment Objectives Cognitive Linguistic Treatment Objectives (Group)  -MG -- --         Patient will demonstrate functional cognitive-linguistic skills for return to discharge environment    Douglas with use of compensatory strategies  -MG -- --      Time frame by discharge  -MG -- --      Progress/Outcomes new goal  -MG -- --         Cognitive Linguistic Treatment Objectives    Memory Skills Selection memory skills, SLP goal 1  -MG -- --      Executive Function Skills Selection executive function skills, SLP goal 1  -MG -- --         Memory Skills Goal 1 (SLP)    Improve Memory Skills Through Goal 1 (SLP) recall details of the day;use memory strategies;use  written schedule;use external memory aid;independently (over 90% accuracy)  -MG -- --      Time Frame (Memory Skills Goal 1, SLP) by discharge  -MG -- --      Barriers (Memory Skills Goal 1, SLP) none  -MG -- --      Progress/Outcomes (Memory Skills Goal 1, SLP) new goal  -MG -- --         Executive Functional Skills Goal 1 (SLP)    Improve Executive Function Skills Goal 1 (SLP) demonstrate awareness of deficit;identify strategies, strengths, limitations;identify anticipated needs;organization/planning activity;time management activity;home management activity;independently (over 90% accuracy)  -MG -- --      Time Frame (Executive Function Skills Goal 1, SLP) by discharge  -MG -- --      Barriers (Executive Function Skills Goal 1, SLP) none  -MG -- --      Progress/Outcomes (Executive Function Skills Goal 1, SLP) new goal  -MG -- --            User Key  (r) = Recorded By, (t) = Taken By, (c) = Cosigned By    Initials Name Effective Dates    MG Alona Bermudez, MS CCC-SLP 08/12/22 -                    EDUCATION  The patient has been educated in the following areas:     Cognitive Impairment.           SLP GOALS     Row Name 11/21/22 0749             Memory Skills Goal 1 (SLP)    Improve Memory Skills Through Goal 1 (SLP) recall details of the day;use memory strategies;use written schedule;use external memory aid;independently (over 90% accuracy)  -MG      Time Frame (Memory Skills Goal 1, SLP) by discharge  -MG      Barriers (Memory Skills Goal 1, SLP) none  -MG      Progress/Outcomes (Memory Skills Goal 1, SLP) new goal  -MG         Executive Functional Skills Goal 1 (SLP)    Improve Executive Function Skills Goal 1 (SLP) demonstrate awareness of deficit;identify strategies, strengths, limitations;identify anticipated needs;organization/planning activity;time management activity;home management activity;independently (over 90% accuracy)  -MG      Time Frame (Executive Function Skills Goal 1, SLP) by discharge  -MG       Barriers (Executive Function Skills Goal 1, SLP) none  -MG      Progress/Outcomes (Executive Function Skills Goal 1, SLP) new goal  -MG         Patient will demonstrate functional cognitive-linguistic skills for return to discharge environment    Isabella with use of compensatory strategies  -MG      Time frame by discharge  -MG      Progress/Outcomes new goal  -MG            User Key  (r) = Recorded By, (t) = Taken By, (c) = Cosigned By    Initials Name Provider Type    Alona Guevara MS CCC-SLP Speech and Language Pathologist                        Time Calculation:      Time Calculation- SLP     Row Name 11/21/22 0844             Time Calculation- SLP    SLP Start Time 0749  -MG      SLP Stop Time 0844  -MG      SLP Time Calculation (min) 55 min  -MG      SLP Received On 11/21/22  -MG      SLP Goal Re-Cert Due Date 12/01/22  -MG         Untimed Charges    SLP Eval/Re-eval  ST Eval Speech and Production w/ Language - 67190  -MG      [id number] Eval Speech and Production w/ Language Minutes 55  -MG         Total Minutes    Untimed Charges Total Minutes 55  -MG       Total Minutes 55  -MG            User Key  (r) = Recorded By, (t) = Taken By, (c) = Cosigned By    Initials Name Provider Type    Alona Guevara, MS CCC-SLP Speech and Language Pathologist                Therapy Charges for Today     Code Description Service Date Service Provider Modifiers Qty    [id number] HC ST EVAL SPEECH AND PROD W LANG  4 11/21/2022 Alona Bermudez, MS CCC-SLP  1                     Alona Bermudez MS CCC-SLP  11/21/2022

## 2022-11-21 NOTE — DISCHARGE SUMMARY
[facility]  DISCHARGE SUMMARY       Date of Admission: 11/17/2022  Date of Discharge:  11/21/2022  Primary Care Physician: Jaxson Vines DO    Presenting Problem/Chief Complaint:  Left arm and left leg weakness    Final Discharge Diagnoses:  Active Hospital Problems    Diagnosis     **Cerebrovascular accident (CVA), unspecified mechanism (HCC)     Diarrhea     Leukocytosis     Hypomagnesemia     Hyponatremia     Type 2 diabetes mellitus with hyperglycemia, without long-term current use of insulin (HCC)     Uncontrolled hypertension        Consults: Dr. Tim Lai with neurology.    Procedures Performed: none.    Pertinent Test Results:   Results for orders placed during the hospital encounter of 11/17/22    Adult Transesophageal Echo (COLIN) W/ Cont if Necessary Per Protocol    Interpretation Summary    Left ventricular systolic function is normal.    There is no significant (greater than mild) valvular dysfunction.    Estimated right ventricular systolic pressure from tricuspid regurgitation is normal (<35 mmHg).    There is no evidence of right to left shunt on bubble study.    There is no evidence of intracardiac mass or thrombus.      Imaging Results (All)       Procedure Component Value Units Date/Time    CT Head Without Contrast [[id number]] Collected: 11/17/22 1700     Updated: 11/17/22 1708    Narrative:      EXAMINATION: CT HEAD WO CONTRAST-      11/17/2022 4:38 PM CST     HISTORY: Stroke, follow up     In order to have a CT radiation dose as low as reasonably achievable  Automated Exposure Control was utilized for adjustment of the mA and/or  KV according to patient size.     DLP in mGycm= 1013     The CT scan of the head is performed without intravenous contrast  enhancement.     Images are acquired in axial plane and subsequent reconstruction in  coronal and sagittal planes.     Comparison is made with the previous study obtained earlier  today.     There is no evidence of a mass. No midline shift.     There is no evidence of intracranial hemorrhage or hematoma.     The ventricles, basal cisterns and cortical sulci are age appropriate.     Chronic white matter ischemic changes bilaterally noted. The gray-white  matter differentiation is maintained.     An empty sella turcica is seen.     Images are reviewed in bone window show no acute bony abnormality or a  bone lesion.       Impression:      1. No acute intracranial abnormality.                                         This report was finalized on 11/17/2022 17:05 by Dr. Mary nAn Feliz MD.    MRI Brain Without Contrast [[id number]] Collected: 11/17/22 0939     Updated: 11/17/22 0952    Narrative:      EXAMINATION: MRI brain without contrast 11/17/2022     HISTORY: Transient ischemic attack. Stroke follow-up     FINDINGS: Today's exam is compared to a previous study of 3 days  earlier. Multiplanar fast spin echo imaging sequences were obtained of  the brain on a high-field magnet without gadolinium enhancement.     There are peripheral sites of diffusion restriction within both  cerebellar hemispheres showing progression from the previous  examination. The left cerebellar hemisphere lesions are new from the  prior study. There are associated ADC mapping abnormalities consistent  with acute ischemic infarction. No evidence of hemorrhagic conversion.  There is no mass effect. There is also a peripheral diffusion  restriction within the right posterior parietal/occipital lobe with  associated ADC mapping abnormality also new from the previous exam  suggesting a small focus of cortical infarction. A more equivocal focus  of diffusion restriction within the right frontal lobe gyrus on image  192 of series 204 is present.     There is mild atrophy the brain. Small vessel ischemic changes are noted  involving the periventricular and higher white matter tracts. There is  normal flow-voids within the [place].     The orbits are unremarkable.       Impression:      1.. Previously noted foci of diffusion restriction within the right  cerebellar hemisphere are more prominent on the current exam. There are  also now noted to be sites of diffusion restriction within the left  cerebellar hemisphere with associated ADC mapping abnormality consistent  with acute bilateral cerebellar hemisphere infarcts. These are  nonhemorrhagic. There is also a focus of diffusion abnormality within  the right posterior parietal/occipital lobe involving the cortex with  associated ADC mapping abnormality suggesting an additional site of  cortical infarct. A focus of more indeterminate diffusion restriction  within the right frontal vertex involving a gyrus is also present not  appreciated on the previous exam.  2. No mass effect or shift of the midline. No evidence of hemorrhagic  conversion. There are normal flow-voids within the [place].  3. Atrophy with small vessel disease involving the periventricular and  higher white matter tracts.  This report was finalized on 11/17/2022 09:49 by Dr. Dominic Valdes MD.    CT Head Without Contrast [[id number]] Collected: 11/17/22 0818     Updated: 11/17/22 0825    Narrative:      EXAMINATION: CT head without contrast 11/17/2022     HISTORY: Neurologic deficit. Stroke suspected.     DOSE: 758 mGycm. All CT scans are performed using dose optimization  techniques as appropriate to the performed exam and including at least  one of the following: Automated exposure control, adjustment of the mA  and/or kV according to size, and the use of the iterative reconstruction  technique..     FINDINGS: Multiple contiguous axial images are obtained from the skull  base to the vertex per protocol without intravenous contrast-enhancement  with reformatted images obtained in the sagittal and coronal projections  from the original data set.     There is evidence of a remote infarct involving the left  cerebellar  hemisphere. There is mild atrophy of the brain. 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     There is no mass, mass effect or shift of the midline. No evidence of  acute infarct or hemorrhage.     The visualized paranasal sinuses and mastoid air cells are normally  aerated.       Impression:      1.. No evidence of acute infarct or hemorrhage. Remote left cerebellar  hemisphere infarct.  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  This report was finalized on 11/17/2022 08:22 by Dr. Dominic Valdes MD.            LAB RESULTS:      Lab 11/20/22  0503 11/19/22  0226 11/18/22  0555 11/17/22  0847 11/15/22  0522 11/14/22  1917   WBC 12.70* 12.45* 14.05* 15.81* 14.61*  --    HEMOGLOBIN 10.0* 10.2* 10.0* 10.6* 10.2*  --    HEMATOCRIT 32.2* 32.7* 31.9* 34.3 32.7*  --    PLATELETS 192 235 241 275 312  --    NEUTROS ABS 7.64* 6.47 9.51* 12.55*  --   --    LYMPHS ABS  --  2.72  --   --   --   --    MONOS ABS  --  2.98*  --   --   --   --    EOS ABS  --  0.06  --   --   --   --    MCV 91.7 90.8 91.1 92.0 91.1  --    LACTATE  --   --   --  1.6  --   --    D DIMER QUANT  --   --   --   --   --  1.13*         Lab 11/21/22  0336 11/20/22  0503 11/19/22  0226 11/18/22  0555 11/17/22  1923 11/17/22  0847 11/15/22  0450 11/14/22  1550   SODIUM 139 140 139 135* 133* 132*   < >  --    POTASSIUM 4.1 3.5 3.0* 3.2*  --  3.6   < >  --    CHLORIDE 102 104 102 97*  --  94*   < >  --    CO2 28.0 26.0 26.0 25.0  --  27.0   < >  --    ANION GAP 9.0 10.0 11.0 13.0  --  11.0   < >  --    BUN 8 8 9 8  --  13   < >  --    CREATININE 0.52* 0.56* 0.59 0.51*  --  0.59   < >  --    EGFR 97.0 95.3 94.1 97.5  --  94.1   < >  --    GLUCOSE 146* 147* 146* 117*  --  136*   < >  --    CALCIUM 10.1 9.5 9.6 9.2  --  9.6   < >  --    MAGNESIUM 1.9 1.4* 1.6 1.5*  --  1.4*   < >  --    PHOSPHORUS  --   --  3.6  --   --   --   --   --    TSH  --   --   --   --   --   --   --  0.894    < > = values in this interval not displayed.         Lab  11/19/22  0226   TOTAL PROTEIN 6.4   ALBUMIN 3.80   GLOBULIN 2.6   ALT (SGPT) 8   AST (SGOT) 13   BILIRUBIN 0.2   ALK PHOS 83         Lab 11/14/22  1550   TROPONIN T 0.011         Lab 11/18/22  0555 11/15/22  0450   CHOLESTEROL 121 125   LDL CHOL 52 48   HDL CHOL 39* 57   TRIGLYCERIDES 179* 113         Lab 11/17/22  0847   IRON 54   IRON SATURATION 17*   TIBC 316   TRANSFERRIN 212   FERRITIN 74.23         Brief Urine Lab Results  (Last result in the past 365 days)        Color   Clarity   Blood   Leuk Est   Nitrite   Protein   CREAT   Urine HCG        11/17/22 0847 Yellow   Clear   Negative   Trace   Negative   Negative                 Microbiology Results (last 10 days)       Procedure Component Value - Date/Time    Clostridioides difficile Toxin - Stool, Per Rectum [[id number]]  (Normal) Collected: 11/19/22 0800    Lab Status: Final result Specimen: Stool from Per Rectum Updated: 11/19/22 0854    Narrative:      The following orders were created for panel order Clostridioides difficile Toxin - Stool, Per Rectum.  Procedure                               Abnormality         Status                     ---------                               -----------         ------                     Clostridioides difficile...[[id number]]  Normal              Final result                 Please view results for these tests on the individual orders.    Clostridioides difficile Toxin, PCR - Stool, Per Rectum [[id number]]  (Normal) Collected: 11/19/22 0800    Lab Status: Final result Specimen: Stool from Per Rectum Updated: 11/19/22 0854     C. Difficile Toxins by PCR Negative    Narrative:      The result indicates the absence of toxigenic C. difficile from stool specimen.     Gastrointestinal Panel, PCR - Stool, Per Rectum [[id number]]  (Normal) Collected: 11/19/22 0800    Lab Status: Final result Specimen: Stool from Per Rectum Updated: 11/19/22 0927     Campylobacter Not Detected     Plesiomonas shigelloides Not Detected      Salmonella Not Detected     Vibrio Not Detected     Vibrio cholerae Not Detected     Yersinia enterocolitica Not Detected     Enteroaggregative E. coli (EAEC) Not Detected     Enteropathogenic E. coli (EPEC) Not Detected     Enterotoxigenic E. coli (ETEC) lt/st Not Detected     Shiga-like toxin-producing E. coli (STEC) stx1/stx2 Not Detected     Shigella/Enteroinvasive E. coli (EIEC) Not Detected     Cryptosporidium Not Detected     Cyclospora cayetanensis Not Detected     Entamoeba histolytica Not Detected     Giardia lamblia Not Detected     Adenovirus F40/41 Not Detected     Astrovirus Not Detected     Norovirus GI/GII Not Detected     Rotavirus A Not Detected     Sapovirus (I, II, IV or V) Not Detected    Narrative:      If Aeromonas, Staphylococcus aureus or Bacillus cereus are suspected, please order [id number]: Stool Culture, Aeromonas, S aureus, B Cereus.    Blood Culture With XIANG - Blood, Arm, Right [[id number]]  (Normal) Collected: 11/17/22 1519    Lab Status: Preliminary result Specimen: Blood from Arm, Right Updated: 11/20/22 1715     Blood Culture No growth at 3 days    COVID-19, FLU A/B, RSV PCR - Swab, Nasopharynx [[id number]]  (Normal) Collected: 11/15/22 0643    Lab Status: Final result Specimen: Swab from Nasopharynx Updated: 11/15/22 0819     COVID19 Not Detected     Influenza A PCR Not Detected     Influenza B PCR Not Detected     RSV, PCR Not Detected    Narrative:      Fact sheet for providers: https://www.fda.gov/media/794953/download    Fact sheet for patients: https://www.fda.gov/media/084518/download    Test performed by PCR.    Blood Culture - Blood, Arm, Right [[id number]]  (Normal) Collected: 11/14/22 1917    Lab Status: Final result Specimen: Blood from Arm, Right Updated: 11/19/22 2001     Blood Culture No growth at 5 days    Blood Culture - Blood, Arm, Left [[id number]]  (Abnormal) Collected: 11/14/22 1000    Lab Status: Final result Specimen: Blood from Arm, Left Updated: 11/17/22 0831      Blood Culture Staphylococcus, coagulase negative     Isolated from Aerobic Bottle     Blood Culture Micrococcus species     Isolated from Anaerobic Bottle     Gram Stain Aerobic Bottle Gram positive cocci      Anaerobic Bottle Gram positive cocci in clusters    Narrative:      Probable contaminant requires clinical correlation, susceptibility not performed unless requested by physician.        Blood Culture ID, PCR - Blood, Arm, Left [[id number]]  (Abnormal) Collected: 11/14/22 1000    Lab Status: Edited Result - FINAL Specimen: Blood from Arm, Left Updated: 11/16/22 0103     BCID, PCR Staph spp, not aureus or lugdunensis. Identification by BCID2 PCR.     BOTTLE TYPE Aerobic Bottle            Chief Complaint on Day of Discharge: Overall, patient reports feeling well.  No further GI complaints.  Tolerating a diet.  She feels ready for discharge home.     Hospital Course:  The patient is a 77 y.o. female who presented to [facility] on 11/17 for left arm and left leg weakness.  Of note, she was hospitalized at our facility on 11/14 to 11/15/2022 with a headache and concerns for possible complicated migraine versus seizure.  Imaging finding of stroke at that time felt to be incidental finding per Dr. Lim with neurology.  She had an EEG which was read as extremely atypical, however no definitive signs of epileptic foci.  She was started on Keppra 500 mg twice daily as an agent that could potentially prevent migraines but also serve as an antiepileptic if there is any epileptic component to the spells.  She was instructed to follow-up with neurology in 4 weeks.  No clear sign of infection.  She was discharged home on p.o. Omnicef for unknown reason.  Since discharge home she was having some GI symptoms with nausea, vomiting and diarrhea.  Last known well was on 11/16/2022 at approximately 10 PM.  Family found her on the morning of 11/17 in urine and fecal material with left-sided weakness.  Work-up in the  ER revealed multiple new bilateral strokes.  Not a tPA candidate given timing of last known well.     Acute bilateral cerebellar strokes and right occipital and right frontal all appearing embolic.  Will need 14-day Zio patch at discharge.  2D echo showed no PFO.  COLIN was unremarkable.  Aspirin discontinued in favor of Plavix on 11/19.  Neurology considering ESUS protocol, however family is concerned about anticoagulation given her bruising and age/fall risk.  LDL 52, continue statin.  EEG on 11/18 is improved compared to the previous EEG on 11/15/2022.  Continue Keppra 500 mg BID per neurology.  Discussed with ROSA Castellanos, she is okay for discharge from neurology standpoint with close follow-up in 1 month.     Hypomagnesemic, 1.4 and admission.  Follow-up 1.9 today.  Continue home magnesium oxide twice daily for goal magnesium 2.0.  Potassium 4.1.     Hypertension.  Blood pressure 136/73 today.  Continue Toprol-XL and lisinopril.     Leukocytosis -slowly improving.  Down to 12.  No fevers.  No signs of active infection. Peripheral smear without any overt abnormal cells or blasts.  Gastrointestinal panel, PCR negative.  Blood cultures with no growth to date. Monitor off of antibiotics.     Hyponatremic, 132 on admission.  Resolved, sodium 139 today.     SCDs for DVT prophylaxis.     PT/OT/SLP.  Plans were initially in place for patient to go to [facility].  However today she was able to walk 50 feet x 4 with rolling walker with therapy.  Therapy feel she is safe to return home with home health versus outpatient therapy.  She has all needed DME.    Overall, patient reports feeling well.  No further GI complaints.  Tolerating a diet.  She feels ready for discharge home.  She has reached maximum benefit of hospitalization.  She is medically stable and appropriate for discharge home today.  She is to follow-up with Dr. Hanna in 1 week.    Condition on Discharge:  Medically stable.    Physical Exam on  "Discharge:  /62 (BP Location: Right arm, Patient Position: Lying)   Pulse 77   Temp 98.4 °F (36.9 °C) (Oral)   Resp 16   Ht 152.4 cm (60\")   Wt 61.3 kg (135 lb 1.6 oz)   LMP  (LMP Unknown)   SpO2 99%   BMI 26.38 kg/m²   Physical Exam  Vitals reviewed.   Constitutional:       General: She is not in acute distress.     Appearance: She is not toxic-appearing.      Comments: Sitting up in bed.  No acute distress.  On room air.  Discussed with her nurse skyler.   HENT:      Head: Normocephalic and atraumatic.      Mouth/Throat:      Mouth: Mucous membranes are moist.      Pharynx: Oropharynx is clear.   Eyes:      Extraocular Movements: Extraocular movements intact.      Conjunctiva/sclera: Conjunctivae normal.      Pupils: Pupils are equal, round, and reactive to light.   Cardiovascular:      Rate and Rhythm: Normal rate and regular rhythm.      Pulses: Normal pulses.   Pulmonary:      Effort: Pulmonary effort is normal. No respiratory distress.      Breath sounds: Normal breath sounds. No wheezing or rhonchi.   Abdominal:      General: Bowel sounds are normal. There is no distension.      Palpations: Abdomen is soft.      Tenderness: There is no abdominal tenderness.   Musculoskeletal:         General: No swelling or tenderness. Normal range of motion.      Cervical back: Normal range of motion and neck supple. No muscular tenderness.   Skin:     General: Skin is warm and dry.      Findings: No erythema or rash.   Neurological:      General: No focal deficit present.      Mental Status: She is alert and oriented to person, place, and time.      Cranial Nerves: No cranial nerve deficit.      Motor: No weakness.   Psychiatric:         Mood and Affect: Mood normal.         Behavior: Behavior normal.       Discharge Disposition:  Home or Self Care    Discharge Medications:     Discharge Medications        New Medications        Instructions Start Date   clopidogrel 75 MG tablet  Commonly known as: PLAVIX   " 75 mg, Oral, Daily   Start Date: November 22, 2022     folic acid 1 MG tablet  Commonly known as: FOLVITE   1 mg, Oral, Daily   Start Date: November 22, 2022            Continue These Medications        Instructions Start Date   allopurinol 300 MG tablet  Commonly known as: ZYLOPRIM   300 mg, Oral, Daily      atorvastatin 20 MG tablet  Commonly known as: LIPITOR   20 mg, Oral, Nightly      HYDROcodone-acetaminophen 7.5-325 MG per tablet  Commonly known as: NORCO   1 tablet, Oral, Every 6 Hours PRN      levETIRAcetam 500 MG tablet  Commonly known as: KEPPRA   500 mg, Oral, 2 Times Daily      lisinopril 20 MG tablet  Commonly known as: PRINIVIL,ZESTRIL   20 mg, Oral, Daily      magnesium oxide 400 MG tablet  Commonly known as: MAG-OX   800 mg, Oral, 2 Times Daily      meloxicam 15 MG tablet  Commonly known as: MOBIC   15 mg, Oral, Daily      metFORMIN 1000 MG tablet  Commonly known as: GLUCOPHAGE   1,000 mg, Oral, 2 Times Daily With Meals      metoprolol succinate XL 50 MG 24 hr tablet  Commonly known as: Toprol XL   50 mg, Oral, Daily      potassium chloride 20 MEQ CR tablet  Commonly known as: K-DUR,KLOR-CON   20 mEq, Oral, Daily      vitamin D 1.25 MG (71851 UT) capsule capsule  Commonly known as: ERGOCALCIFEROL   50,000 Units, Oral, Weekly             Stop These Medications      aspirin 81 MG chewable tablet     cefdinir 300 MG capsule  Commonly known as: OMNICEF              Discharge Diet:   Diet Instructions       Diet: Regular, Consistent Carbohydrate      Discharge Diet:  Regular  Consistent Carbohydrate               Activity at Discharge:   Activity Instructions       Activity as Tolerated              Discharge Care Plan/Instructions:   1.  Seek evaluation for worsening symptoms.  2.  Discontinue aspirin.  Continue Plavix.  3.  14-day Zio patch.    Follow-up Appointments:   1.  Follow-up with Dr. Hanna in 1 week.  2.  Follow-up with neurology in 1 month.    Test Results Pending at Discharge:  none.    Electronically signed by ROSA Renee, 11/21/22, 13:46 CST.    Time: 35 minutes.    Chart reviewed.  Patient examined in conjunction with ROSA Knight.  Agree with assessment and plan.  Lungs are clear to auscultation are bilaterally  Cardiovascular regular rate and rhythm no murmur gallop  Abdomen soft bowel sounds are present nontender      Jody Tyler DO  11/21/22  17:34 CST

## 2022-11-21 NOTE — PLAN OF CARE
Problem: Fall Injury Risk  Goal: Absence of Fall and Fall-Related Injury  Outcome: Ongoing, Progressing  Intervention: Promote Injury-Free Environment  Recent Flowsheet Documentation  Taken 11/21/2022 1200 by Rai Arias RN  Safety Promotion/Fall Prevention:   assistive device/personal items within reach   clutter free environment maintained   safety round/check completed   room organization consistent  Taken 11/21/2022 1030 by Rai Arias RN  Safety Promotion/Fall Prevention:   safety round/check completed   room organization consistent   assistive device/personal items within reach   clutter free environment maintained  Taken 11/21/2022 0821 by Rai Arias RN  Safety Promotion/Fall Prevention:   room organization consistent   safety round/check completed   assistive device/personal items within reach   clutter free environment maintained  Taken 11/21/2022 0730 by Rai Arias RN  Safety Promotion/Fall Prevention:   safety round/check completed   room organization consistent   clutter free environment maintained   assistive device/personal items within reach     Problem: Adjustment to Illness (Stroke, Ischemic/Transient Ischemic Attack)  Goal: Optimal Coping  Outcome: Ongoing, Progressing     Problem: Bowel Elimination Impaired (Stroke, Ischemic/Transient Ischemic Attack)  Goal: Effective Bowel Elimination  Outcome: Ongoing, Progressing     Problem: Cerebral Tissue Perfusion (Stroke, Ischemic/Transient Ischemic Attack)  Goal: Optimal Cerebral Tissue Perfusion  Outcome: Ongoing, Progressing     Problem: Cognitive Impairment (Stroke, Ischemic/Transient Ischemic Attack)  Goal: Optimal Cognitive Function  Outcome: Ongoing, Progressing     Problem: Communication Impairment (Stroke, Ischemic/Transient Ischemic Attack)  Goal: Improved Communication Skills  Outcome: Ongoing, Progressing     Problem: Functional Ability Impaired (Stroke, Ischemic/Transient Ischemic Attack)  Goal: Optimal Functional  Ability  Outcome: Ongoing, Progressing     Problem: Respiratory Compromise (Stroke, Ischemic/Transient Ischemic Attack)  Goal: Effective Oxygenation and Ventilation  Outcome: Ongoing, Progressing  Intervention: Optimize Oxygenation and Ventilation  Recent Flowsheet Documentation  Taken 11/21/2022 1200 by Rai Arias RN  Head of Bed ([facility]) Positioning: HOB elevated  Taken 11/21/2022 1030 by Rai Arias RN  Head of Bed ([facility]) Positioning: HOB elevated  Taken 11/21/2022 0821 by Rai Arias RN  Head of Bed ([facility]) Positioning: HOB elevated  Taken 11/21/2022 0730 by Rai Arias RN  Head of Bed ([facility]) Positioning: HOB elevated     Problem: Sensorimotor Impairment (Stroke, Ischemic/Transient Ischemic Attack)  Goal: Improved Sensorimotor Function  Outcome: Ongoing, Progressing  Intervention: Optimize Range of Motion, Motor Control and Function  Recent Flowsheet Documentation  Taken 11/21/2022 1200 by Rai Arias RN  Positioning/Transfer Devices:   pillows   in use  Taken 11/21/2022 1030 by Rai Arias RN  Positioning/Transfer Devices:   pillows   in use  Taken 11/21/2022 0821 by Rai Arias RN  Positioning/Transfer Devices:   pillows   in use  Range of Motion: active ROM (range of motion) encouraged  Taken 11/21/2022 0730 by Rai Arias RN  Positioning/Transfer Devices:   pillows   in use     Problem: Swallowing Impairment (Stroke, Ischemic/Transient Ischemic Attack)  Goal: Optimal Eating and Swallowing without Aspiration  Outcome: Ongoing, Progressing     Problem: Urinary Elimination Impaired (Stroke, Ischemic/Transient Ischemic Attack)  Goal: Effective Urinary Elimination  Outcome: Ongoing, Progressing     Problem: Skin Injury Risk Increased  Goal: Skin Health and Integrity  Outcome: Ongoing, Progressing  Intervention: Optimize Skin Protection  Recent Flowsheet Documentation  Taken 11/21/2022 1200 by Rai Arais RN  Head of Bed ([facility]) Positioning: HOB elevated  Taken 11/21/2022 1030  by Rai Arias RN  Head of Bed ([facility]) Positioning: HOB elevated  Taken 11/21/2022 0821 by Rai Arias RN  Head of Bed ([facility]) Positioning: HOB elevated  Taken 11/21/2022 0730 by Rai Arias RN  Head of Bed ([facility]) Positioning: HOB elevated     Problem: Adult Inpatient Plan of Care  Goal: Plan of Care Review  Outcome: Ongoing, Progressing  Flowsheets (Taken 11/21/2022 1641)  Progress: improving  Plan of Care Reviewed With: patient  Goal: Patient-Specific Goal (Individualized)  Outcome: Ongoing, Progressing  Goal: Absence of Hospital-Acquired Illness or Injury  Outcome: Ongoing, Progressing  Intervention: Identify and Manage Fall Risk  Recent Flowsheet Documentation  Taken 11/21/2022 1200 by Rai Arias RN  Safety Promotion/Fall Prevention:   assistive device/personal items within reach   clutter free environment maintained   safety round/check completed   room organization consistent  Taken 11/21/2022 1030 by Rai Arias RN  Safety Promotion/Fall Prevention:   safety round/check completed   room organization consistent   assistive device/personal items within reach   clutter free environment maintained  Taken 11/21/2022 0821 by Rai Arias RN  Safety Promotion/Fall Prevention:   room organization consistent   safety round/check completed   assistive device/personal items within reach   clutter free environment maintained  Taken 11/21/2022 0730 by Rai Arias RN  Safety Promotion/Fall Prevention:   safety round/check completed   room organization consistent   clutter free environment maintained   assistive device/personal items within reach  Intervention: Prevent Skin Injury  Recent Flowsheet Documentation  Taken 11/21/2022 1200 by Rai Arias RN  Body Position: position changed independently  Taken 11/21/2022 1030 by Rai Arias RN  Body Position: position changed independently  Taken 11/21/2022 0821 by Rai Arias RN  Body Position: position changed independently  Taken  11/21/2022 0730 by Rai Arias RN  Body Position: position changed independently  Intervention: Prevent and Manage VTE (Venous Thromboembolism) Risk  Recent Flowsheet Documentation  Taken 11/21/2022 0821 by Rai Arias RN  VTE Prevention/Management: (see mar) other (see comments)  Range of Motion: active ROM (range of motion) encouraged  Goal: Optimal Comfort and Wellbeing  Outcome: Ongoing, Progressing  Intervention: Provide Person-Centered Care  Recent Flowsheet Documentation  Taken 11/21/2022 0821 by Rai Arias RN  Trust Relationship/Rapport:   choices provided   thoughts/feelings acknowledged  Goal: Readiness for Transition of Care  Outcome: Ongoing, Progressing   Goal Outcome Evaluation:  Plan of Care Reviewed With: patient        Progress: improving

## 2022-11-21 NOTE — THERAPY TREATMENT NOTE
Acute Care - Physical Therapy Treatment Note  [facility]     Patient Name: Concepcion Velez  : 1945  MRN: [id number]  Today's Date: 2022      Visit Dx:     ICD-10-CM ICD-9-CM   1. Cerebrovascular accident (CVA), unspecified mechanism (HCC)  I63.9 434.91   2. Altered mental status, unspecified altered mental status type  R41.82 780.97   3. Dysphagia, unspecified type  R13.10 787.20   4. Impaired mobility and ADLs  Z74.09 V49.89    Z78.9    5. Impaired mobility  Z74.09 799.89   6. Cognitive changes  R41.89 799.59     Patient Active Problem List   Diagnosis   • Tachycardia   • Cerebellar infarct (HCC)   • Uncontrolled hypertension   • Toxic metabolic encephalopathy   • Type 2 diabetes mellitus with hyperglycemia, without long-term current use of insulin (HCC)   • Cerebrovascular accident (CVA), unspecified mechanism (HCC)   • CVA (cerebral vascular accident) (HCC)   • Diarrhea   • Leukocytosis   • Hypomagnesemia   • Hyponatremia     Past Medical History:   Diagnosis Date   • Abdominal wall seroma    • B12 deficiency    • Cerebral infarct (HCC)    • Diabetes mellitus (HCC)    • HLD (hyperlipidemia)    • Hypertension    • Hypokalemia    • Hypomagnesemia      Past Surgical History:   Procedure Laterality Date   • CHOLECYSTECTOMY     • HERNIA REPAIR     • HYSTERECTOMY       PT Assessment (last 12 hours)     PT Evaluation and Treatment     Row Name 22 1135 22 1018       Physical Therapy Time and Intention    Subjective Information no complaints  -MF --  -    Document Type therapy note (daily note)  - --    Mode of Treatment physical therapy  - --    Session Not Performed -- patient/family declined treatment  -    Comment, Session Not Performed -- pt sleeping, family states she had a bad night, requested to check back later  -    Row Name 22 1138          General Information    Existing Precautions/Restrictions fall  -     Limitations/Impairments safety/cognitive  -     Row Name  11/21/22 1135          Pain    Pretreatment Pain Rating 10/10  -     Posttreatment Pain Rating 10/10  -     Pain Location - Side/Orientation Right  -     Pain Location - shoulder  -     Pain Intervention(s) Repositioned;Ambulation/increased activity  -     Row Name 11/21/22 1135          Bed Mobility    Supine-Sit [place] (Bed Mobility) supervision  -     Sit-Supine [place] (Bed Mobility) supervision  -     Assistive Device (Bed Mobility) head of bed elevated  -     Row Name 11/21/22 1135          Sit-Stand Transfer    Sit-Stand [place] (Transfers) contact guard;standby assist  -     Row Name 11/21/22 1135          Stand-Sit Transfer    Stand-Sit [place] (Transfers) contact guard;standby assist  -     Row Name 11/21/22 1135          Gait/Stairs (Locomotion)    [place] Level (Gait) verbal cues;contact guard  -     Assistive Device (Gait) walker, front-wheeled  -     Distance in Feet (Gait) 50  x 4,  -     Deviations/Abnormal Patterns (Gait) tony decreased;stride length decreased  -     Bilateral Gait Deviations heel strike decreased  -     Comment, (Gait/Stairs) drifts to the right-unaware of issue  -     Row Name 11/21/22 1135          Balance    Comment, Balance Side steps-both directions-CGA without UE support. 10' straight ahead-without UE support.  -     Row Name 11/21/22 1135          Hip (Therapeutic Exercise)    Hip (Therapeutic Exercise) AROM (active range of motion)  -     Hip AROM (Therapeutic Exercise) bilateral;flexion;aBduction;aDduction;standing;10 repetitions  -     Row Name 11/21/22 1135          Ankle (Therapeutic Exercise)    Ankle (Therapeutic Exercise) AROM (active range of motion)  -     Ankle AROM (Therapeutic Exercise) bilateral;plantarflexion;standing;10 repetitions  -     Row Name 11/21/22 1135          Plan of Care Review    Plan of Care Reviewed With patient  -     Progress no change  -     Outcome Evaluation Pt.  agreeable to therapy. She was able to perform bed mobility and transfers with SBA. Pt. was able to ambulate 50' x 4 with RWX. Pt. continues to drift to R side while walking. She participated with standing LE ex's, but has continued weakness. Will continue to work on balance and strength while she is here.  -     Row Name 11/21/22 1135          Positioning and Restraints    Pre-Treatment Position in bed  -     Post Treatment Position bed  -     In Bed fowlers;call light within reach;with family/caregiver  -           User Key  (r) = Recorded By, (t) = Taken By, (c) = Cosigned By    Initials Name Provider Type     Kayleigh Lee, PTA Physical Therapist Assistant     Alba Jay, TY Physical Therapist Assistant                Physical Therapy Education     Title: PT OT SLP Therapies (In Progress)     Topic: Physical Therapy (Done)     Point: Mobility training (Done)     Learning Progress Summary           Patient Acceptance, E, VU,NR by  at 11/21/2022 1202    Comment: gait, ex's    Eager, E, VU by  at 11/19/2022 1006    Comment: t/f's, gait training    Acceptance, E, NR by MS at 11/18/2022 1344    Comment: role of PT in her care                   Point: Home exercise program (Done)     Learning Progress Summary           Patient Acceptance, E, VU,NR by  at 11/21/2022 1202    Comment: gait, ex's    Eager, E, VU by  at 11/19/2022 1006    Comment: t/f's, gait training                   Point: Body mechanics (Done)     Learning Progress Summary           Patient Acceptance, E, VU,NR by  at 11/21/2022 1202    Comment: gait, ex's                   Point: Precautions (Done)     Learning Progress Summary           Patient Acceptance, E, VU,NR by  at 11/21/2022 1202    Comment: gait, ex's                               User Key     Initials Effective Dates Name Provider Type Discipline    AE 06/22/15 -  Nayla Fraser, PTA Physical Therapist Assistant PT    MS 06/19/18 -  Alona Spain, PT,  DPT, NCS Physical Therapist PT     06/16/21 -  Alba Jay PTA Physical Therapist Assistant PT              PT Recommendation and Plan     Plan of Care Reviewed With: patient  Progress: no change  Outcome Evaluation: Pt. agreeable to therapy. She was able to perform bed mobility and transfers with SBA. Pt. was able to ambulate 50' x 4 with RWX. Pt. continues to drift to R side while walking. She participated with standing LE ex's, but has continued weakness. Will continue to work on balance and strength while she is here.   Outcome Measures     Row Name 11/21/22 1135 11/20/22 0742 11/19/22 0914       How much help from another person do you currently need...    Turning from your back to your side while in flat bed without using bedrails? 4  -MF 4  -AB 4  -AE    Moving from lying on back to sitting on the side of a flat bed without bedrails? 3  -MF 3  -AB 3  -AE    Moving to and from a bed to a chair (including a wheelchair)? 3  -MF 3  -AB 3  -AE    Standing up from a chair using your arms (e.g., wheelchair, bedside chair)? 3  -MF 3  -AB 3  -AE    Climbing 3-5 steps with a railing? 3  -MF 2  -AB 2  -AE    To walk in hospital room? 3  - 3  -AB 3  -AE    AM-PAC 6 Clicks Score (PT) 19  -MF 18  -AB 18  -AE       Functional Assessment    Outcome Measure Options AM-PAC 6 Clicks Basic Mobility (PT)  -MF AM-PAC 6 Clicks Basic Mobility (PT)  -AB AM-PAC 6 Clicks Basic Mobility (PT)  -AE          User Key  (r) = Recorded By, (t) = Taken By, (c) = Cosigned By    Initials Name Provider Type    AB Sydney Vega, PTA Physical Therapist Assistant    AE Nayla Fraser, TY Physical Therapist Assistant     Alba Jay PTA Physical Therapist Assistant                 Time Calculation:    PT Charges     Row Name 11/21/22 1203             Time Calculation    Start Time 1135  -      Stop Time 1200  -      Time Calculation (min) 25 min  -      PT Received On 11/21/22  -         Time Calculation- PT     Total Timed Code Minutes- PT 25 minute(s)  -MF         Timed Charges    08597 - PT Therapeutic Exercise Minutes 10  -MF      71750 - Gait Training Minutes  15  -MF         Total Minutes    Timed Charges Total Minutes 25  -MF       Total Minutes 25  -MF            User Key  (r) = Recorded By, (t) = Taken By, (c) = Cosigned By    Initials Name Provider Type     Alba Jay PTA Physical Therapist Assistant              Therapy Charges for Today     Code Description Service Date Service Provider Modifiers Qty    60980372483 HC PT THER PROC EA 15 MIN 11/21/2022 Alba Jay PTA GP 1    27180756379 HC GAIT TRAINING EA 15 MIN 11/21/2022 Alba Jay, TY GP 1          PT G-Codes  Outcome Measure Options: AM-PAC 6 Clicks Basic Mobility (PT)  AM-PAC 6 Clicks Score (PT): 19  AM-PAC 6 Clicks Score (OT): 19  Modified [place] Scale: 4 - Moderately severe disability.  Unable to walk without assistance, and unable to attend to own bodily needs without assistance.    Alba Jay PTA  11/21/2022

## 2022-11-21 NOTE — PLAN OF CARE
Goal Outcome Evaluation:  Plan of Care Reviewed With: patient        Progress: no change  Outcome Evaluation: Pt. agreeable to therapy. She was able to perform bed mobility and transfers with SBA. Pt. was able to ambulate 50' x 4 with RWX. Pt. continues to drift to R side while walking. She participated with standing LE ex's, but has continued weakness. Will continue to work on balance and strength while she is here.

## 2022-11-21 NOTE — PLAN OF CARE
Problem: Fall Injury Risk  Goal: Absence of Fall and Fall-Related Injury  Outcome: Ongoing, Progressing  Intervention: Identify and Manage Contributors  Recent Flowsheet Documentation  Taken 11/20/2022 2000 by Sia Trotter RN  Medication Review/Management: medications reviewed  Intervention: Promote Injury-Free Environment  Recent Flowsheet Documentation  Taken 11/21/2022 0400 by Sai Trotter RN  Safety Promotion/Fall Prevention: safety round/check completed  Taken 11/21/2022 0200 by Sai Trotter RN  Safety Promotion/Fall Prevention: safety round/check completed  Taken 11/21/2022 0000 by Sai Trotter RN  Safety Promotion/Fall Prevention: safety round/check completed  Taken 11/20/2022 2200 by Sai Trotter RN  Safety Promotion/Fall Prevention: safety round/check completed  Taken 11/20/2022 2000 by Sai Trotter RN  Safety Promotion/Fall Prevention: safety round/check completed     Problem: Adjustment to Illness (Stroke, Ischemic/Transient Ischemic Attack)  Goal: Optimal Coping  Outcome: Ongoing, Progressing  Intervention: Support Psychosocial Response to Stroke  Recent Flowsheet Documentation  Taken 11/20/2022 2000 by Sai Trotter RN  Family/Support System Care: support provided     Problem: Urinary Elimination Impaired (Stroke, Ischemic/Transient Ischemic Attack)  Goal: Effective Urinary Elimination  Outcome: Ongoing, Progressing     Problem: Swallowing Impairment (Stroke, Ischemic/Transient Ischemic Attack)  Goal: Optimal Eating and Swallowing without Aspiration  Outcome: Ongoing, Progressing     Problem: Skin Injury Risk Increased  Goal: Skin Health and Integrity  Outcome: Ongoing, Progressing  Intervention: Optimize Skin Protection  Recent Flowsheet Documentation  Taken 11/21/2022 0400 by Sai Trotter RN  Head of Bed (HOB) Positioning: HOB elevated  Taken 11/21/2022 0200 by Sai Trotter RN  Head of Bed ([facility]) Positioning: HOB elevated  Taken 11/21/2022 0000 by Sai Trotter  RN  Head of Bed (HOB) Positioning: HOB elevated  Taken 11/20/2022 2200 by Sai Trotter RN  Head of Bed ([facility]) Positioning: HOB elevated  Taken 11/20/2022 2000 by Sai Trotter RN  Head of Bed ([facility]) Positioning: [facility] elevated  Pressure Reduction Devices: pressure-redistributing mattress utilized   Goal Outcome Evaluation:  Plan of Care Reviewed With: patient        Progress: improving

## 2022-11-21 NOTE — DISCHARGE PLACEMENT REQUEST
"Arlin Velez (77 y.o. Female)     Date of Birth   1945    Social Security Number       Address   [address] [place] [address]    Home Phone   [phone]    MRN   [id number]       [facility]    Marital Status                               Admission Date   11/17/22    Admission Type   Emergency    Admitting Provider   Jody Tyler DO    Attending Provider   Jody Tyler DO    Department, Room/Bed   [facility] 3A, 343/1       Discharge Date       Discharge Disposition   Home or Self Care    Discharge Destination                               Attending Provider: Jody Tyler DO    Allergies: No Known Allergies    Isolation: None   Infection: None   Code Status: CPR    Ht: 152.4 cm (60\")   Wt: 61.3 kg (135 lb 1.6 oz)    Admission Cmt: None   Principal Problem: Cerebrovascular accident (CVA), unspecified mechanism (HCC) [I63.9]                 Active Insurance as of 11/17/2022     Primary Coverage     Payor Plan Insurance Group Employer/Plan Group    ANTH MEDICARE REPLACEMENT ANTH MEDICARE ADVANTAGE [id number]     Payor Plan Address Payor Plan Phone Number Payor Plan Fax Number Effective Dates    [address] [phone]  1/1/2022 - None Entered    [facility] [address]       Subscriber Name Subscriber Birth Date Member ID       ARLIN VELEZ 1945 [id number]                 Emergency Contacts      (Rel.) Home Phone Work Phone Mobile Phone    Kaylin Cardona (Daughter) -- -- [phone]    Lavelle Velez (Son) -- -- [phone]    Lillie Henson (Friend) -- -- [phone]    Lucy Yan (Daughter) -- -- [phone]               History & Physical      Snehal, Kwasi Roldan MD at 11/18/22 1334          Subjective    Arlin Velez is a 77 y.o. female is being seen for consultation today at the request of Dr Tim Lai.    Chief Complaint: CVA    HPI: Ms. Velez is a 77-year-old female with significant past medical history of CVA, " diabetes mellitus type 2, hyperlipidemia, and hypertension.  She recently admitted earlier this month with right cerebral stroke.  She is readmitted on 11/17/2022 with recurrent strokelike symptoms.  MRI confirmed acute stroke concerning for embolic phenomenon.  TTE from 11/15/2022 showed normal biventricular systolic function with no significant valvular dysfunction with mildly elevated RVSP, negative saline study and no evidence of intracardiac mass or thrombus.  COLIN has been requested for further evaluation.      Patient Active Problem List   Diagnosis   • Tachycardia   • Cerebellar infarct (HCC)   • Uncontrolled hypertension   • Toxic metabolic encephalopathy   • Type 2 diabetes mellitus with hyperglycemia, without long-term current use of insulin (HCC)   • Cerebrovascular accident (CVA), unspecified mechanism (HCC)   • CVA (cerebral vascular accident) (HCC)   • Diarrhea   • Leukocytosis   • Hypomagnesemia   • Hyponatremia       Past Medical History:   Diagnosis Date   • Abdominal wall seroma    • B12 deficiency    • Cerebral infarct (HCC)    • Diabetes mellitus (HCC)    • HLD (hyperlipidemia)    • Hypertension    • Hypokalemia    • Hypomagnesemia      Past Surgical History:   Procedure Laterality Date   • CHOLECYSTECTOMY     • HERNIA REPAIR     • HYSTERECTOMY         The following portions of the patient's history were reviewed and updated as appropriate: allergies, current medications, past family history, past medical history, past social history, past surgical history and problem list.    Social History     Socioeconomic History   • Marital status:    Tobacco Use   • Smoking status: Never   Substance and Sexual Activity   • Alcohol use: Never   • Drug use: Never       Family History   Problem Relation Age of Onset   • Cancer Mother    • Hypertension Mother    • Transient ischemic attack Father        Review of Systems: A 12 system review of systems was completed and is negative except stated in HPI.  "    Objective    /59   Pulse 81   Temp 98.5 °F (36.9 °C) (Axillary)   Resp 13   Ht 152.4 cm (60\")   Wt 61.2 kg (134 lb 14.7 oz)   LMP  (LMP Unknown)   SpO2 100%   BMI 26.35 kg/m²     Physical Exam:  Constitutional:       Appearance: Well-developed and overweight.   HENT:      Head: Normocephalic and atraumatic.   Pulmonary:      Effort: Pulmonary effort is normal.      Breath sounds: Normal breath sounds.   Cardiovascular:      Normal rate. Regular rhythm.      No gallop. No click.   Skin:     General: Skin is warm and dry.   Neurological:      Mental Status: Alert and oriented to person, place, and time.         Lab Results   Component Value Date    CKTOTAL 31 11/17/2022    TROPONINT 0.011 11/14/2022     Lab Results   Component Value Date    GLUCOSE 117 (H) 11/18/2022    CALCIUM 9.2 11/18/2022     (L) 11/18/2022    K 3.2 (L) 11/18/2022    CO2 25.0 11/18/2022    CL 97 (L) 11/18/2022    BUN 8 11/18/2022    CREATININE 0.51 (L) 11/18/2022    EGFRIFAFRI >59 08/26/2022    EGFRIFNONA >60 08/26/2022    BCR 15.7 11/18/2022    ANIONGAP 13.0 11/18/2022     Lab Results   Component Value Date    WBC 14.05 (H) 11/18/2022    HGB 10.0 (L) 11/18/2022    HCT 31.9 (L) 11/18/2022    MCV 91.1 11/18/2022     11/18/2022     Lab Results   Component Value Date    CHOL 121 11/18/2022    CHOL 125 11/15/2022     Lab Results   Component Value Date    TRIG 179 (H) 11/18/2022    TRIG 113 11/15/2022    TRIG 115 12/10/2021     Lab Results   Component Value Date    HDL 39 (L) 11/18/2022    HDL 57 11/15/2022    HDL 59 (L) 12/10/2021     No components found for: LDLCALC  Lab Results   Component Value Date    LDL 52 11/18/2022    LDL 48 11/15/2022    LDL 44 12/10/2021         Assessment:  1.  Acute CVA: Concern for embolic  2.  Diabetes mellitus type 2  3.  Hyperlipidemia  4.  Hypertension  5.  Anemia  6.  Leukocytosis  7.  Hyponatremia    Plan:  -COLIN today      Electronically signed by Kwasi Brian MD at 11/18/22 2591   "   Yony Dyson DO at 11/17/22 1332              [facility] Medicine Services  HISTORY AND PHYSICAL    Date of Admission: 11/17/2022  Primary Care Physician: Jaxson Vines DO    Subjective     Chief Complaint: Left-sided weakness    History of Present Illness  Patient is a 77-year-old female with a history of hypertension, hyperlipidemia, diabetes who was just recently hospitalized from 11/14 through 11/15 with a headache and concerns for possible complicated migraine versus seizure.  There was reported imaging finding of stroke at that time but felt to be incidental finding per reports.  She was discharged home on p.o. Omnicef for unknown reason.  Her chest x-ray was clear.  Her urine was clear.  She did have mild white count elevation of 14,000.  No clear signs of infection.  Since discharge home she been having some GI symptoms.  Nausea, vomiting, diarrhea.  Daughter state profuse diarrhea for several days.  At least 4+ episodes daily without noted bleeding.  She apparently went to bed fine last night around 10 PM and this morning family found her in urine and fecal material with left-sided weakness.  Not a tPA candidate given timing.  She does also seem to be improving already in the ER.  She is able to talk and is alert and oriented.  She says she feels okay at this time.  Only complaint is of ongoing headache but denies any changes in vision or photophobia.  No double vision.  No blurred vision.  Denies any chest pain or shortness of breath.  No current nausea or abdominal pain.  MRI in the ER done prior to admission already showing previous noted area of question with several new bilateral areas of stroke evident.  No hemorrhagic conversion.  No midline shift.  We have been asked to admit for further care.        Review of Systems   Otherwise complete ROS reviewed and negative except as mentioned in the HPI.    Past Medical History:   Past Medical History:    Diagnosis Date   • Abdominal wall seroma    • B12 deficiency    • Cerebral infarct (HCC)    • Diabetes mellitus (HCC)    • HLD (hyperlipidemia)    • Hypertension    • Hypokalemia    • Hypomagnesemia      Past Surgical History:  Past Surgical History:   Procedure Laterality Date   • CHOLECYSTECTOMY     • HERNIA REPAIR     • HYSTERECTOMY       Social History:  reports that she has never smoked. She does not have any smokeless tobacco history on file. She reports that she does not drink alcohol and does not use drugs.    Family History: family history includes Cancer in her mother; Hypertension in her mother; Transient ischemic attack in her father.       Allergies:  No Known Allergies    Medications:  Prior to Admission medications    Medication Sig Start Date End Date Taking? Authorizing Provider   allopurinol (ZYLOPRIM) 300 MG tablet Take 1 tablet by mouth Daily.    ProviderSilver MD   aspirin 81 MG chewable tablet Chew 1 tablet Daily.    Silver Babin MD   atorvastatin (LIPITOR) 20 MG tablet Take 1 tablet by mouth Daily.    Silver Babin MD   cefdinir (OMNICEF) 300 MG capsule Take 1 capsule by mouth Every 12 (Twelve) Hours for 13 doses. Indications: Upper Respiratory Tract Infection 11/15/22 11/22/22  Abhishek Kohli DO   levETIRAcetam (KEPPRA) 500 MG tablet Take 1 tablet by mouth 2 (Two) Times a Day. 11/15/22   Elie Lim MD   lisinopril (PRINIVIL,ZESTRIL) 20 MG tablet Take 1 tablet by mouth Daily.    Silver Babin MD   magnesium oxide (MAG-OX) 400 MG tablet Take 1 tablet by mouth Daily.    Silver Babin MD   meloxicam (MOBIC) 15 MG tablet Take 1 tablet by mouth Daily.    Silver Babin MD   metFORMIN (GLUCOPHAGE) 1000 MG tablet Take 1 tablet by mouth 2 (Two) Times a Day With Meals.    Silver Babin MD   metoprolol succinate XL (Toprol XL) 50 MG 24 hr tablet Take 1 tablet by mouth Daily. 11/15/22   Abhishek Kohli DO   potassium chloride  "(K-DUR,KLOR-CON) 20 MEQ CR tablet Take 1 tablet by mouth Daily.    Provider, Silver, MD BOYD have utilized all available immediate resources to obtain, update, and review the patient's current medications.    Objective     Vital Signs: /66   Pulse 93   Temp 97.9 °F (36.6 °C)   Resp 16   Ht 152.4 cm (60\")   Wt 59.9 kg (132 lb)   LMP  (LMP Unknown)   SpO2 98%   BMI 25.78 kg/m²   Physical Exam   GEN: Awake, alert, interactive, in NAD  HEENT: PERRLA, EOMI, Anicteric, Trachea midline  Lungs:  no wheezing/rales/rhonchi  Heart: RRR, +S1/s2, no rub  ABD: soft, nt/nd, +BS, no guarding/rebound  Extremities: atraumatic, no cyanosis, no pitting edema  Skin: no rashes or petechiae   Neuro: AAOx3, slight left sided weakness vs R, greater noticed in distal LE, gait not tested  Psych: somewhat of a flat affect        Results Reviewed:  Lab Results (last 24 hours)     Procedure Component Value Units Date/Time    Manual Differential [[id number]]  (Abnormal) Collected: 11/17/22 0847    Specimen: Blood Updated: 11/17/22 0934     Neutrophil % 74.2 %      Lymphocyte % 8.2 %      Monocyte % 8.2 %      Bands %  5.2 %      Atypical Lymphocyte % 4.1 %      Neutrophils Absolute 12.55 10*3/mm3      Lymphocytes Absolute 1.94 10*3/mm3      Monocytes Absolute 1.30 10*3/mm3      RBC Morphology Normal     Vacuolated Neutrophils Slight/1+     Platelet Morphology Normal    CBC & Differential [[id number]]  (Abnormal) Collected: 11/17/22 0847    Specimen: Blood Updated: 11/17/22 0934    Narrative:      The following orders were created for panel order CBC & Differential.  Procedure                               Abnormality         Status                     ---------                               -----------         ------                     CBC Auto Differential[[id number]]        Abnormal            Final result                 Please view results for these tests on the individual orders.    CBC Auto Differential [[id number]]  " (Abnormal) Collected: 11/17/22 0847    Specimen: Blood Updated: 11/17/22 0934     WBC 15.81 10*3/mm3      RBC 3.73 10*6/mm3      Hemoglobin 10.6 g/dL      Hematocrit 34.3 %      MCV 92.0 fL      MCH 28.4 pg      MCHC 30.9 g/dL      RDW 13.8 %      RDW-SD 46.5 fl      MPV 10.7 fL      Platelets 275 10*3/mm3     Narrative:      The previously reported component NRBC is no longer being reported. Previous result was 0.0 /100 WBC (Reference Range: 0.0-0.2 /100 WBC) on 11/17/2022 at 0905 CST.    Basic Metabolic Panel [[id number]]  (Abnormal) Collected: 11/17/22 0847    Specimen: Blood Updated: 11/17/22 0922     Glucose 136 mg/dL      BUN 13 mg/dL      Creatinine 0.59 mg/dL      Sodium 132 mmol/L      Potassium 3.6 mmol/L      Chloride 94 mmol/L      CO2 27.0 mmol/L      Calcium 9.6 mg/dL      BUN/Creatinine Ratio 22.0     Anion Gap 11.0 mmol/L      eGFR 94.1 mL/min/1.73      Comment: National Kidney Foundation and American Society of Nephrology (ASN) Task Force recommended calculation based on the Chronic Kidney Disease Epidemiology Collaboration (CKD-EPI) equation refit without adjustment for race.       Narrative:      GFR Normal >60  Chronic Kidney Disease <60  Kidney Failure <15    The GFR formula is only valid for adults with stable renal function between ages 18 and 70.    CK [[id number]]  (Normal) Collected: 11/17/22 0847    Specimen: Blood Updated: 11/17/22 0921     Creatine Kinase 31 U/L     Lactic Acid, Plasma [[id number]]  (Normal) Collected: 11/17/22 0847    Specimen: Blood Updated: 11/17/22 0918     Lactate 1.6 mmol/L     Magnesium [[id number]]  (Abnormal) Collected: 11/17/22 0847    Specimen: Blood Updated: 11/17/22 0916     Magnesium 1.4 mg/dL     Urinalysis With Culture If Indicated - Urine, Catheter [[id number]]  (Abnormal) Collected: 11/17/22 0847    Specimen: Urine, Catheter Updated: 11/17/22 0905     Color, UA Yellow     Appearance, UA Clear     pH, UA 7.0     Specific Gravity, UA 1.012     Glucose, UA  Negative     Ketones, UA Negative     Bilirubin, UA Negative     Blood, UA Negative     Protein, UA Negative     Leuk Esterase, UA Trace     Nitrite, UA Negative     Urobilinogen, UA 0.2 E.U./dL    Narrative:      In absence of clinical symptoms, the presence of pyuria, bacteria, and/or nitrites on the urinalysis result does not correlate with infection.    Urinalysis, Microscopic Only - Urine, Catheter [[id number]]  (Abnormal) Collected: 11/17/22 0847    Specimen: Urine, Catheter Updated: 11/17/22 0905     RBC, UA None Seen /HPF      WBC, UA 3-5 /HPF      Comment: Urine culture not indicated.        Bacteria, UA None Seen /HPF      Squamous Epithelial Cells, UA 3-6 /HPF      Hyaline Casts, UA 0-2 /LPF      Methodology Automated Microscopy        Imaging Results (Last 24 Hours)     Procedure Component Value Units Date/Time    MRI Brain Without Contrast [[id number]] Collected: 11/17/22 0939     Updated: 11/17/22 0952    Narrative:      EXAMINATION: MRI brain without contrast 11/17/2022     HISTORY: Transient ischemic attack. Stroke follow-up     FINDINGS: Today's exam is compared to a previous study of 3 days  earlier. Multiplanar fast spin echo imaging sequences were obtained of  the brain on a high-field magnet without gadolinium enhancement.     There are peripheral sites of diffusion restriction within both  cerebellar hemispheres showing progression from the previous  examination. The left cerebellar hemisphere lesions are new from the  prior study. There are associated ADC mapping abnormalities consistent  with acute ischemic infarction. No evidence of hemorrhagic conversion.  There is no mass effect. There is also a peripheral diffusion  restriction within the right posterior parietal/occipital lobe with  associated ADC mapping abnormality also new from the previous exam  suggesting a small focus of cortical infarction. A more equivocal focus  of diffusion restriction within the right frontal lobe gyrus on  image  192 of series 204 is present.     There is mild atrophy the brain. Small vessel ischemic changes are noted  involving the periventricular and higher white matter tracts. There is  normal flow-voids within the [place].     The orbits are unremarkable.       Impression:      1.. Previously noted foci of diffusion restriction within the right  cerebellar hemisphere are more prominent on the current exam. There are  also now noted to be sites of diffusion restriction within the left  cerebellar hemisphere with associated ADC mapping abnormality consistent  with acute bilateral cerebellar hemisphere infarcts. These are  nonhemorrhagic. There is also a focus of diffusion abnormality within  the right posterior parietal/occipital lobe involving the cortex with  associated ADC mapping abnormality suggesting an additional site of  cortical infarct. A focus of more indeterminate diffusion restriction  within the right frontal vertex involving a gyrus is also present not  appreciated on the previous exam.  2. No mass effect or shift of the midline. No evidence of hemorrhagic  conversion. There are normal flow-voids within the [place].  3. Atrophy with small vessel disease involving the periventricular and  higher white matter tracts.  This report was finalized on 11/17/2022 09:49 by Dr. Dominic Valdes MD.    CT Head Without Contrast [[id number]] Collected: 11/17/22 0818     Updated: 11/17/22 0825    Narrative:      EXAMINATION: CT head without contrast 11/17/2022     HISTORY: Neurologic deficit. Stroke suspected.     DOSE: 758 mGycm. All CT scans are performed using dose optimization  techniques as appropriate to the performed exam and including at least  one of the following: Automated exposure control, adjustment of the mA  and/or kV according to size, and the use of the iterative reconstruction  technique..     FINDINGS: Multiple contiguous axial images are obtained from the skull  base to the  vertex per protocol without intravenous contrast-enhancement  with reformatted images obtained in the sagittal and coronal projections  from the original data set.     There is evidence of a remote infarct involving the left cerebellar  hemisphere. There is mild atrophy of the brain. 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     There is no mass, mass effect or shift of the midline. No evidence of  acute infarct or hemorrhage.     The visualized paranasal sinuses and mastoid air cells are normally  aerated.       Impression:      1.. No evidence of acute infarct or hemorrhage. Remote left cerebellar  hemisphere infarct.  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  This report was finalized on 11/17/2022 08:22 by Dr. Dominic Valdes MD.        I have personally reviewed and interpreted the radiology studies and ECG obtained at time of admission.     Assessment / Plan     Assessment:   Active Hospital Problems    Diagnosis    • **Cerebrovascular accident (CVA), unspecified mechanism (HCC)    • Diarrhea    • Leukocytosis    • Hypomagnesemia    • Hyponatremia    • Type 2 diabetes mellitus with hyperglycemia, without long-term current use of insulin (HCC)    • Uncontrolled hypertension      Plan:   #1 CVA -multiple new bilateral strokes.  Fairly high burden.  Unclear source.  No A. fib recent hospital stay.  EKG on arrival is normal sinus.  Last echo with normal EF and no PFO.  Suspect patient will require COLIN to better evaluate for embolic source.  Plan for speech, PT, OT.  Continue with neuro evaluation.  Patient is already on aspirin and statin.    #2 headache -last hospital stay there was concern for possible complicated migraine.  She is still having headache.  Unclear if it is associated with her stroke.  Monitor closely.  Continue neurochecks.  We will discuss further with neurosurgery after their evaluation.  She was started on Keppra last hospital stay due to abnormal EEG to help potentially prevent seizure  activity and migraines.  No clear seizure activity noted to this point.  We will continue Keppra.    #3 hypertension -allow permissive hypertension today, monitor closely    #4 hypomagnesemia -1.4.  Likely in the setting of recent diarrheal illness.  Was given 1 g in the ER.  Monitor and recheck.    #5 diarrhea -patient having diarrhea since discharge.  Was on Omnicef after discharge for unclear reason.  Will DC antibiotic.  White count slightly higher.  We will check a GI PCR and C. difficile.  She has no abdominal tenderness on exam.  She denies any abdominal pain.    #6 leukocytosis -was 14 on last hospital stay with no clear signs of infection.  Urine is also clear on arrival now.  Lungs are clear.  On room air.  Afebrile.  Of note her differentials could higher lymphocyte count.  Neutrophil percentage is normal.  Rule out C. difficile.  Recheck CBC in the morning.  Add a peripheral smear in the morning.    #7 DM2 -sliding scale insulin hypoglycemia protocol.    #8 hyponatremia -mild at 132.  Initially thought patient might be a little dehydrated in the setting of elevated WBC and her GI illness.  However family states she drank 8 bottles of water last night and she twice requested something to drink while I was seeing her in the ER.  We will add urine sodium and osm to her urine already down in lab.  Unclear if she needs fluids or restriction at this point.  We will follow-up.    Code Status/Advanced Care Plan: Full code    The patient's surrogate decision maker is her daughter.     I discussed my findings and recommendations with the patient and several daughters at bedside.  Case also discussed with Dr. Tim Lai of neurology.    Estimated length of stay is 2+ days.     The patient was seen and examined by me on 11/17/2022 at 12:45 PM.    Electronically signed by Yony Dyson DO, 11/17/22, 13:32 CST.      ADDENDUM:  After discussion with Dr. Lai she feels given the degree of patient's stroke  burden and risk for swelling it would be better to monitor in the ICU tonight.  Transfer order put in for the ICU.  Bedboard notified.    Electronically signed by Yony Dyson DO, 11/17/22, 4:27 PM CST.              Electronically signed by Yony Dyson DO at 11/17/22 1628          Discharge Summary      Karis Woodard APRN at 11/21/22 1343              [facility] Medicine Services  DISCHARGE SUMMARY       Date of Admission: 11/17/2022  Date of Discharge:  11/21/2022  Primary Care Physician: Jaxson Vines DO    Presenting Problem/Chief Complaint:  Left arm and left leg weakness    Final Discharge Diagnoses:  Active Hospital Problems    Diagnosis    • **Cerebrovascular accident (CVA), unspecified mechanism (HCC)    • Diarrhea    • Leukocytosis    • Hypomagnesemia    • Hyponatremia    • Type 2 diabetes mellitus with hyperglycemia, without long-term current use of insulin (HCC)    • Uncontrolled hypertension        Consults: Dr. Tim Lai with neurology.    Procedures Performed: none.    Pertinent Test Results:   Results for orders placed during the hospital encounter of 11/17/22    Adult Transesophageal Echo (COLIN) W/ Cont if Necessary Per Protocol    Interpretation Summary  •  Left ventricular systolic function is normal.  •  There is no significant (greater than mild) valvular dysfunction.  •  Estimated right ventricular systolic pressure from tricuspid regurgitation is normal (<35 mmHg).  •  There is no evidence of right to left shunt on bubble study.  •  There is no evidence of intracardiac mass or thrombus.      Imaging Results (All)     Procedure Component Value Units Date/Time    CT Head Without Contrast [[id number]] Collected: 11/17/22 1700     Updated: 11/17/22 1708    Narrative:      EXAMINATION: CT HEAD WO CONTRAST-      11/17/2022 4:38 PM CST     HISTORY: Stroke, follow up     In order to have a CT radiation dose as low as reasonably achievable  Automated  Exposure Control was utilized for adjustment of the mA and/or  KV according to patient size.     DLP in mGycm= 1013     The CT scan of the head is performed without intravenous contrast  enhancement.     Images are acquired in axial plane and subsequent reconstruction in  coronal and sagittal planes.     Comparison is made with the previous study obtained earlier today.     There is no evidence of a mass. No midline shift.     There is no evidence of intracranial hemorrhage or hematoma.     The ventricles, basal cisterns and cortical sulci are age appropriate.     Chronic white matter ischemic changes bilaterally noted. The gray-white  matter differentiation is maintained.     An empty sella turcica is seen.     Images are reviewed in bone window show no acute bony abnormality or a  bone lesion.       Impression:      1. No acute intracranial abnormality.                                         This report was finalized on 11/17/2022 17:05 by Dr. Mary Ann Feliz MD.    MRI Brain Without Contrast [[id number]] Collected: 11/17/22 0939     Updated: 11/17/22 0952    Narrative:      EXAMINATION: MRI brain without contrast 11/17/2022     HISTORY: Transient ischemic attack. Stroke follow-up     FINDINGS: Today's exam is compared to a previous study of 3 days  earlier. Multiplanar fast spin echo imaging sequences were obtained of  the brain on a high-field magnet without gadolinium enhancement.     There are peripheral sites of diffusion restriction within both  cerebellar hemispheres showing progression from the previous  examination. The left cerebellar hemisphere lesions are new from the  prior study. There are associated ADC mapping abnormalities consistent  with acute ischemic infarction. No evidence of hemorrhagic conversion.  There is no mass effect. There is also a peripheral diffusion  restriction within the right posterior parietal/occipital lobe with  associated ADC mapping abnormality also new from the previous  exam  suggesting a small focus of cortical infarction. A more equivocal focus  of diffusion restriction within the right frontal lobe gyrus on image  192 of series 204 is present.     There is mild atrophy the brain. Small vessel ischemic changes are noted  involving the periventricular and higher white matter tracts. There is  normal flow-voids within the [place].     The orbits are unremarkable.       Impression:      1.. Previously noted foci of diffusion restriction within the right  cerebellar hemisphere are more prominent on the current exam. There are  also now noted to be sites of diffusion restriction within the left  cerebellar hemisphere with associated ADC mapping abnormality consistent  with acute bilateral cerebellar hemisphere infarcts. These are  nonhemorrhagic. There is also a focus of diffusion abnormality within  the right posterior parietal/occipital lobe involving the cortex with  associated ADC mapping abnormality suggesting an additional site of  cortical infarct. A focus of more indeterminate diffusion restriction  within the right frontal vertex involving a gyrus is also present not  appreciated on the previous exam.  2. No mass effect or shift of the midline. No evidence of hemorrhagic  conversion. There are normal flow-voids within the [place].  3. Atrophy with small vessel disease involving the periventricular and  higher white matter tracts.  This report was finalized on 11/17/2022 09:49 by Dr. Dominic Valdes MD.    CT Head Without Contrast [[id number]] Collected: 11/17/22 0818     Updated: 11/17/22 0825    Narrative:      EXAMINATION: CT head without contrast 11/17/2022     HISTORY: Neurologic deficit. Stroke suspected.     DOSE: 758 mGycm. All CT scans are performed using dose optimization  techniques as appropriate to the performed exam and including at least  one of the following: Automated exposure control, adjustment of the mA  and/or kV according to size, and  the use of the iterative reconstruction  technique..     FINDINGS: Multiple contiguous axial images are obtained from the skull  base to the vertex per protocol without intravenous contrast-enhancement  with reformatted images obtained in the sagittal and coronal projections  from the original data set.     There is evidence of a remote infarct involving the left cerebellar  hemisphere. There is mild atrophy of the brain. 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     There is no mass, mass effect or shift of the midline. No evidence of  acute infarct or hemorrhage.     The visualized paranasal sinuses and mastoid air cells are normally  aerated.       Impression:      1.. No evidence of acute infarct or hemorrhage. Remote left cerebellar  hemisphere infarct.  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  This report was finalized on 11/17/2022 08:22 by Dr. Dominic Valdes MD.          LAB RESULTS:      Lab 11/20/22  0503 11/19/22 0226 11/18/22  0555 11/17/22  0847 11/15/22  0522 11/14/22  1917   WBC 12.70* 12.45* 14.05* 15.81* 14.61*  --    HEMOGLOBIN 10.0* 10.2* 10.0* 10.6* 10.2*  --    HEMATOCRIT 32.2* 32.7* 31.9* 34.3 32.7*  --    PLATELETS 192 235 241 275 312  --    NEUTROS ABS 7.64* 6.47 9.51* 12.55*  --   --    LYMPHS ABS  --  2.72  --   --   --   --    MONOS ABS  --  2.98*  --   --   --   --    EOS ABS  --  0.06  --   --   --   --    MCV 91.7 90.8 91.1 92.0 91.1  --    LACTATE  --   --   --  1.6  --   --    D DIMER QUANT  --   --   --   --   --  1.13*         Lab 11/21/22  0336 11/20/22  0503 11/19/22 0226 11/18/22  0555 11/17/22  1923 11/17/22  0847 11/15/22  0450 11/14/22  1550   SODIUM 139 140 139 135* 133* 132*   < >  --    POTASSIUM 4.1 3.5 3.0* 3.2*  --  3.6   < >  --    CHLORIDE 102 104 102 97*  --  94*   < >  --    CO2 28.0 26.0 26.0 25.0  --  27.0   < >  --    ANION GAP 9.0 10.0 11.0 13.0  --  11.0   < >  --    BUN 8 8 9 8  --  13   < >  --    CREATININE 0.52* 0.56* 0.59 0.51*  --  0.59   < >  --     EGFR 97.0 95.3 94.1 97.5  --  94.1   < >  --    GLUCOSE 146* 147* 146* 117*  --  136*   < >  --    CALCIUM 10.1 9.5 9.6 9.2  --  9.6   < >  --    MAGNESIUM 1.9 1.4* 1.6 1.5*  --  1.4*   < >  --    PHOSPHORUS  --   --  3.6  --   --   --   --   --    TSH  --   --   --   --   --   --   --  0.894    < > = values in this interval not displayed.         Lab 11/19/22  0226   TOTAL PROTEIN 6.4   ALBUMIN 3.80   GLOBULIN 2.6   ALT (SGPT) 8   AST (SGOT) 13   BILIRUBIN 0.2   ALK PHOS 83         Lab 11/14/22  1550   TROPONIN T 0.011         Lab 11/18/22  0555 11/15/22  0450   CHOLESTEROL 121 125   LDL CHOL 52 48   HDL CHOL 39* 57   TRIGLYCERIDES 179* 113         Lab 11/17/22  0847   IRON 54   IRON SATURATION 17*   TIBC 316   TRANSFERRIN 212   FERRITIN 74.23         Brief Urine Lab Results  (Last result in the past 365 days)      Color   Clarity   Blood   Leuk Est   Nitrite   Protein   CREAT   Urine HCG        11/17/22 0847 Yellow   Clear   Negative   Trace   Negative   Negative               Microbiology Results (last 10 days)     Procedure Component Value - Date/Time    Clostridioides difficile Toxin - Stool, Per Rectum [[id number]]  (Normal) Collected: 11/19/22 0800    Lab Status: Final result Specimen: Stool from Per Rectum Updated: 11/19/22 0854    Narrative:      The following orders were created for panel order Clostridioides difficile Toxin - Stool, Per Rectum.  Procedure                               Abnormality         Status                     ---------                               -----------         ------                     Clostridioides difficile...[[id number]]  Normal              Final result                 Please view results for these tests on the individual orders.    Clostridioides difficile Toxin, PCR - Stool, Per Rectum [[id number]]  (Normal) Collected: 11/19/22 0800    Lab Status: Final result Specimen: Stool from Per Rectum Updated: 11/19/22 0854     C. Difficile Toxins by PCR Negative    Narrative:       The result indicates the absence of toxigenic C. difficile from stool specimen.     Gastrointestinal Panel, PCR - Stool, Per Rectum [[id number]]  (Normal) Collected: 11/19/22 0800    Lab Status: Final result Specimen: Stool from Per Rectum Updated: 11/19/22 0927     Campylobacter Not Detected     Plesiomonas shigelloides Not Detected     Salmonella Not Detected     Vibrio Not Detected     Vibrio cholerae Not Detected     Yersinia enterocolitica Not Detected     Enteroaggregative E. coli (EAEC) Not Detected     Enteropathogenic E. coli (EPEC) Not Detected     Enterotoxigenic E. coli (ETEC) lt/st Not Detected     Shiga-like toxin-producing E. coli (STEC) stx1/stx2 Not Detected     Shigella/Enteroinvasive E. coli (EIEC) Not Detected     Cryptosporidium Not Detected     Cyclospora cayetanensis Not Detected     Entamoeba histolytica Not Detected     Giardia lamblia Not Detected     Adenovirus F40/41 Not Detected     Astrovirus Not Detected     Norovirus GI/GII Not Detected     Rotavirus A Not Detected     Sapovirus (I, II, IV or V) Not Detected    Narrative:      If Aeromonas, Staphylococcus aureus or Bacillus cereus are suspected, please order [id number]: Stool Culture, Aeromonas, S aureus, B Cereus.    Blood Culture With XIANG - Blood, Arm, Right [[id number]]  (Normal) Collected: 11/17/22 1519    Lab Status: Preliminary result Specimen: Blood from Arm, Right Updated: 11/20/22 1715     Blood Culture No growth at 3 days    COVID-19, FLU A/B, RSV PCR - Swab, Nasopharynx [[id number]]  (Normal) Collected: 11/15/22 0643    Lab Status: Final result Specimen: Swab from Nasopharynx Updated: 11/15/22 0819     COVID19 Not Detected     Influenza A PCR Not Detected     Influenza B PCR Not Detected     RSV, PCR Not Detected    Narrative:      Fact sheet for providers: https://www.fda.gov/media/376146/download    Fact sheet for patients: https://www.fda.gov/media/268153/download    Test performed by PCR.    Blood Culture - Blood, Arm,  Right [[id number]]  (Normal) Collected: 11/14/22 1917    Lab Status: Final result Specimen: Blood from Arm, Right Updated: 11/19/22 2001     Blood Culture No growth at 5 days    Blood Culture - Blood, Arm, Left [[id number]]  (Abnormal) Collected: 11/14/22 1000    Lab Status: Final result Specimen: Blood from Arm, Left Updated: 11/17/22 0831     Blood Culture Staphylococcus, coagulase negative     Isolated from Aerobic Bottle     Blood Culture Micrococcus species     Isolated from Anaerobic Bottle     Gram Stain Aerobic Bottle Gram positive cocci      Anaerobic Bottle Gram positive cocci in clusters    Narrative:      Probable contaminant requires clinical correlation, susceptibility not performed unless requested by physician.        Blood Culture ID, PCR - Blood, Arm, Left [[id number]]  (Abnormal) Collected: 11/14/22 1000    Lab Status: Edited Result - FINAL Specimen: Blood from Arm, Left Updated: 11/16/22 0103     BCID, PCR Staph spp, not aureus or lugdunensis. Identification by BCID2 PCR.     BOTTLE TYPE Aerobic Bottle          Chief Complaint on Day of Discharge: Overall, patient reports feeling well.  No further GI complaints.  Tolerating a diet.  She feels ready for discharge home.     Hospital Course:  The patient is a 77 y.o. female who presented to [facility] on 11/17 for left arm and left leg weakness.  Of note, she was hospitalized at our facility on 11/14 to 11/15/2022 with a headache and concerns for possible complicated migraine versus seizure.  Imaging finding of stroke at that time felt to be incidental finding per Dr. Lim with neurology.  She had an EEG which was read as extremely atypical, however no definitive signs of epileptic foci.  She was started on Keppra 500 mg twice daily as an agent that could potentially prevent migraines but also serve as an antiepileptic if there is any epileptic component to the spells.  She was instructed to follow-up with neurology in 4 weeks.  No clear  sign of infection.  She was discharged home on p.o. Omnicef for unknown reason.  Since discharge home she was having some GI symptoms with nausea, vomiting and diarrhea.  Last known well was on 11/16/2022 at approximately 10 PM.  Family found her on the morning of 11/17 in urine and fecal material with left-sided weakness.  Work-up in the ER revealed multiple new bilateral strokes.  Not a tPA candidate given timing of last known well.     Acute bilateral cerebellar strokes and right occipital and right frontal all appearing embolic.  Will need 14-day Zio patch at discharge.  2D echo showed no PFO.  COLIN was unremarkable.  Aspirin discontinued in favor of Plavix on 11/19.  Neurology considering ESUS protocol, however family is concerned about anticoagulation given her bruising and age/fall risk.  LDL 52, continue statin.  EEG on 11/18 is improved compared to the previous EEG on 11/15/2022.  Continue Keppra 500 mg BID per neurology.  Discussed with ROSA Castellanos, she is okay for discharge from neurology standpoint with close follow-up in 1 month.     Hypomagnesemic, 1.4 and admission.  Follow-up 1.9 today.  Continue home magnesium oxide twice daily for goal magnesium 2.0.  Potassium 4.1.     Hypertension.  Blood pressure 136/73 today.  Continue Toprol-XL and lisinopril.     Leukocytosis -slowly improving.  Down to 12.  No fevers.  No signs of active infection. Peripheral smear without any overt abnormal cells or blasts.  Gastrointestinal panel, PCR negative.  Blood cultures with no growth to date. Monitor off of antibiotics.     Hyponatremic, 132 on admission.  Resolved, sodium 139 today.     SCDs for DVT prophylaxis.     PT/OT/SLP.  Plans were initially in place for patient to go to [facility].  However today she was able to walk 50 feet x 4 with rolling walker with therapy.  Therapy feel she is safe to return home with home health versus outpatient therapy.  She has all needed DME.    Overall, patient reports  "feeling well.  No further GI complaints.  Tolerating a diet.  She feels ready for discharge home.  She has reached maximum benefit of hospitalization.  She is medically stable and appropriate for discharge home today.  She is to follow-up with Dr. Hanna in 1 week.    Condition on Discharge:  Medically stable.    Physical Exam on Discharge:  /62 (BP Location: Right arm, Patient Position: Lying)   Pulse 77   Temp 98.4 °F (36.9 °C) (Oral)   Resp 16   Ht 152.4 cm (60\")   Wt 61.3 kg (135 lb 1.6 oz)   LMP  (LMP Unknown)   SpO2 99%   BMI 26.38 kg/m²   Physical Exam  Vitals reviewed.   Constitutional:       General: She is not in acute distress.     Appearance: She is not toxic-appearing.      Comments: Sitting up in bed.  No acute distress.  On room air.  Discussed with her nurse skyler.   HENT:      Head: Normocephalic and atraumatic.      Mouth/Throat:      Mouth: Mucous membranes are moist.      Pharynx: Oropharynx is clear.   Eyes:      Extraocular Movements: Extraocular movements intact.      Conjunctiva/sclera: Conjunctivae normal.      Pupils: Pupils are equal, round, and reactive to light.   Cardiovascular:      Rate and Rhythm: Normal rate and regular rhythm.      Pulses: Normal pulses.   Pulmonary:      Effort: Pulmonary effort is normal. No respiratory distress.      Breath sounds: Normal breath sounds. No wheezing or rhonchi.   Abdominal:      General: Bowel sounds are normal. There is no distension.      Palpations: Abdomen is soft.      Tenderness: There is no abdominal tenderness.   Musculoskeletal:         General: No swelling or tenderness. Normal range of motion.      Cervical back: Normal range of motion and neck supple. No muscular tenderness.   Skin:     General: Skin is warm and dry.      Findings: No erythema or rash.   Neurological:      General: No focal deficit present.      Mental Status: She is alert and oriented to person, place, and time.      Cranial Nerves: No cranial nerve " deficit.      Motor: No weakness.   Psychiatric:         Mood and Affect: Mood normal.         Behavior: Behavior normal.       Discharge Disposition:  Home or Self Care    Discharge Medications:     Discharge Medications      New Medications      Instructions Start Date   clopidogrel 75 MG tablet  Commonly known as: PLAVIX   75 mg, Oral, Daily   Start Date: November 22, 2022     folic acid 1 MG tablet  Commonly known as: FOLVITE   1 mg, Oral, Daily   Start Date: November 22, 2022        Continue These Medications      Instructions Start Date   allopurinol 300 MG tablet  Commonly known as: ZYLOPRIM   300 mg, Oral, Daily      atorvastatin 20 MG tablet  Commonly known as: LIPITOR   20 mg, Oral, Nightly      HYDROcodone-acetaminophen 7.5-325 MG per tablet  Commonly known as: NORCO   1 tablet, Oral, Every 6 Hours PRN      levETIRAcetam 500 MG tablet  Commonly known as: KEPPRA   500 mg, Oral, 2 Times Daily      lisinopril 20 MG tablet  Commonly known as: PRINIVIL,ZESTRIL   20 mg, Oral, Daily      magnesium oxide 400 MG tablet  Commonly known as: MAG-OX   800 mg, Oral, 2 Times Daily      meloxicam 15 MG tablet  Commonly known as: MOBIC   15 mg, Oral, Daily      metFORMIN 1000 MG tablet  Commonly known as: GLUCOPHAGE   1,000 mg, Oral, 2 Times Daily With Meals      metoprolol succinate XL 50 MG 24 hr tablet  Commonly known as: Toprol XL   50 mg, Oral, Daily      potassium chloride 20 MEQ CR tablet  Commonly known as: K-DUR,KLOR-CON   20 mEq, Oral, Daily      vitamin D 1.25 MG (27898 UT) capsule capsule  Commonly known as: ERGOCALCIFEROL   50,000 Units, Oral, Weekly         Stop These Medications    aspirin 81 MG chewable tablet     cefdinir 300 MG capsule  Commonly known as: OMNICEF            Discharge Diet:   Diet Instructions     Diet: Regular, Consistent Carbohydrate      Discharge Diet:  Regular  Consistent Carbohydrate             Activity at Discharge:   Activity Instructions     Activity as Tolerated             Discharge Care Plan/Instructions:   1.  Seek evaluation for worsening symptoms.  2.  Discontinue aspirin.  Continue Plavix.  3.  14-day Zio patch.    Follow-up Appointments:   1.  Follow-up with Dr. Hanna in 1 week.  2.  Follow-up with neurology in 1 month.    Test Results Pending at Discharge: none.    Electronically signed by ROSA Renee, 11/21/22, 13:46 CST.    Time: 35 minutes.          Electronically signed by Karis Woodard APRN at 11/21/22 1348            Ambulatory Referral to Home Health [[id number]] (Order [id number])  Order  Date: 11/21/2022 Department: [address] Ordering/Authorizing: Karis Woodard APRN     Order History  Outpatient  Date/Time Action Taken User Additional Information   11/21/22 1343 Sign Karis Woodard APRN      Order Details    Frequency Duration Priority Order Class   None None Routine Internal Referral     Start Date/Time    Start Date   11/21/22     Order Information    Order Date Service Start Date Start Time   11/21/22 Medicine 11/21/22      Reference Links    Associated Reports External References   View Encounter Current Health Referral Information     Order Questions    Question Answer   Face to Face Visit Date: 11/21/2022   Follow-up provider for Plan of Care? I treated the patient in an acute care facility and will not continue treatment after discharge.   Follow-up provider: MAURICE HERNANDEZ   Reason/Clinical Findings stroke, debility   Describe mobility limitations that make leaving home difficult: debility   Nursing/Therapeutic Services Requested Skilled Nursing    Physical Therapy    Occupational Therapy   Skilled nursing orders: Cardiopulmonary assessments    Neurovascular assessments    Medication education   PT orders: Strengthening    Home safety assessment   Occupational orders: Activities of daily living    Home safety assessment    Energy conservation    Strengthening   Frequency: 1 Week 1            Source Order Set / Preference List    Order  Set   [facility] GEN EXPRESS DISCHARGE [[id number]]           Clinical Indications     ICD-10-CM ICD-9-CM   Cerebrovascular accident (CVA), unspecified mechanism (HCC)  - Primary     I63.9 434.91                             Reprint Order Requisition    Ambulatory Referral to Home Health (Order #[id number]) on 11/21/22         Encounter    View Encounter                Order Provider Info        Office phone Pager E-mail   Ordering User Karis Woodard APRN [phone] -- --   Authorizing Provider Karis Woodard APRN [phone] -- --   Attending Provider Jody Tyler [phone] -- --       Tracking Reports    Cosign Tracking Order Transmittal Tracking     Authorized by:  ROSA Renee  (NPI: [id number])                Lab Component SmartPhrase Guide    Ambulatory Referral to Home Health (Order #[id number]) on 11/21/22

## 2022-11-21 NOTE — CASE MANAGEMENT/SOCIAL WORK
Continued Stay Note  SOLANGE Stiles     Patient Name: Concepcion Velez  MRN: [id number]  Today's Date: 11/21/2022    Admit Date: 11/17/2022    Plan: Referral to [facility] pending   Discharge Plan     Row Name 11/21/22 1429       Plan    Plan Comments PT wishes to dc home with HH. CARLO has sent a referral to [facility]  per request.    Final Discharge Disposition Code 06 - home with home health care               Discharge Codes    No documentation.               Expected Discharge Date and Time     Expected Discharge Date Expected Discharge Time    Nov 21, 2022             JAVIER Fernandes

## 2022-11-21 NOTE — PLAN OF CARE
Goal Outcome Evaluation:  Plan of Care Reviewed With: patient, family        Progress: no change (Initial Evaluation)       The patient was seen today for an evaluation of her cognitive-communication skills. The patient was alert and cooperative. Son-in-law present at the bedside.     Primary problem: Confusion, slurred speech, L arm/leg weakness, imaging with multiple infarcts, possible seizures.    MMSE:  The Mini Mental State Examination (MMSE) is a tool that can be used to systematically and thoroughly assess mental status with older,  community dwelling, hospitalized and institutionalized adults. It is an 11-question measure that tests five areas of cognitive function:  orientation, registration, attention and calculation, recall, and language. The maximum score is 30. A score of 23 or lower is indicative of cognitive impairment.     Severity Rating Score Assessment and Function   Questionably Significant (25-30)  If clinical signs of cognitive impairment are present, in depth cognitive assessment may be valuable.  May have clinically significant but mild deficits in day to day functioning.  Likely to affect only most demanding activities of daily living.   Mild (20-25) 23 In depth assessment may be helpful to better determine pattern and extent of deficits.  Significant effect in day to day functioning.  May require supervision, support and assistance.   Moderate (10-20)  In depth assessment may be helpful if there are specific clinical indications.  Clear impairment in day to day functioning.  May require 24 hour supervision.   Severe (0-10)  Pt not likely to be able to participate in in-depth testing.  Marked impairment in day to day functioning.  Likely to require 24 hour supervision and assistance with ADLs.   Orientation: Patient oriented to year, season, month, and date. Stated it was Tuesday instead of Monday. Oriented to country, state, city, building, and floor of building.     Memory: Recalled 3/3  unrelated words immediately. Recalled 0/3 unrelated words with a delay.     Comments: Able to complete mental manipulation to spell WORLD backwards with missing only 1 letter in the sequence. Able to name 2 common objects, repeat a simple phrase, read and follow a command, and write a simple sentence. Difficulty with copying design accurately as she faustina 2 triangles interlocked but independently stated it was not correct but did not attempt self corrections. Followed 2/3 verbally given commands.     The patient scored a 23/30 which is indicative of a mild cognitive impairment. The patient would benefit from skilled ST services for memory and higher level organization skills in order to return to discharge destination safely.    Alona Bermudez, MS CCC-SLP 11/21/2022 08:43 CST

## 2022-11-21 NOTE — NURSING NOTE
Patient discharged home today with home health. Educated patient on D/c paperwork, stroke education provided. Patient v/u. IV D/c. Patient left via personal vehicle.

## 2022-11-21 NOTE — THERAPY TREATMENT NOTE
Patient Name: Concepcion Velez  : 1945    MRN: [id number]                              Today's Date: 2022       Admit Date: 2022    Visit Dx: Therapist utilized gait belt, applied non-slipped socks, provided fall risk education/prevention, & facilitated muscle strengthening PRN to reduce patient falls risk during this session.      ICD-10-CM ICD-9-CM   1. Cerebrovascular accident (CVA), unspecified mechanism (HCC)  I63.9 434.91   2. Altered mental status, unspecified altered mental status type  R41.82 780.97   3. Dysphagia, unspecified type  R13.10 787.20   4. Impaired mobility and ADLs  Z74.09 V49.89    Z78.9    5. Impaired mobility  Z74.09 799.89   6. Cognitive changes  R41.89 799.59     Patient Active Problem List   Diagnosis   • Tachycardia   • Cerebellar infarct (HCC)   • Uncontrolled hypertension   • Toxic metabolic encephalopathy   • Type 2 diabetes mellitus with hyperglycemia, without long-term current use of insulin (HCC)   • Cerebrovascular accident (CVA), unspecified mechanism (HCC)   • CVA (cerebral vascular accident) (HCC)   • Diarrhea   • Leukocytosis   • Hypomagnesemia   • Hyponatremia     Past Medical History:   Diagnosis Date   • Abdominal wall seroma    • B12 deficiency    • Cerebral infarct (HCC)    • Diabetes mellitus (HCC)    • HLD (hyperlipidemia)    • Hypertension    • Hypokalemia    • Hypomagnesemia      Past Surgical History:   Procedure Laterality Date   • CHOLECYSTECTOMY     • HERNIA REPAIR     • HYSTERECTOMY        General Information     Row Name 22 1447          OT Time and Intention    Document Type therapy note (daily note)  -MT     Mode of Treatment occupational therapy  -MT     Row Name 22 1446          General Information    Patient Profile Reviewed yes  -MT     Existing Precautions/Restrictions fall  -MT     Row Name 22 1446          Cognition    Orientation Status (Cognition) oriented x 4  -MT     Row Name 22 1440          Safety Issues,  Functional Mobility    Impairments Affecting Function (Mobility) balance;endurance/activity tolerance;strength;cognition;visual/perceptual  -MT           User Key  (r) = Recorded By, (t) = Taken By, (c) = Cosigned By    Initials Name Provider Type    Lorelei Kilgore COTA Occupational Therapist Assistant                 Mobility/ADL's     Row Name 11/21/22 1447          Bed Mobility    Comment, (Bed Mobility) chair  -MT     Row Name 11/21/22 1447          Transfers    Transfers sit-stand transfer;stand-sit transfer  -MT     Row Name 11/21/22 1447          Sit-Stand Transfer    Sit-Stand Hooker (Transfers) contact guard;standby assist  -MT     Row Name 11/21/22 1447          Stand-Sit Transfer    Stand-Sit Hooker (Transfers) contact guard;standby assist  -MT     Row Name 11/21/22 1447          Functional Mobility    Functional Mobility- Ind. Level contact guard assist  -MT     Functional Mobility- Comment decreased L side awareness and does veer at times needing VCs to correct. Trialed with and with out RW. Pt actually demo's decreased balance with RW anticipate d/t focus on RW use and not balance. Pt CGA-SBA all mobility in room with no LOB  -MT           User Key  (r) = Recorded By, (t) = Taken By, (c) = Cosigned By    Initials Name Provider Type    Lorelei Kilgore COTA Occupational Therapist Assistant               Obj/Interventions    No documentation.                Goals/Plan    No documentation.                Clinical Impression     Row Name 11/21/22 1447          Pain Assessment    Pretreatment Pain Rating 10/10  -MT     Posttreatment Pain Rating 10/10  -MT     Pain Location - Side/Orientation Right  -MT     Pain Location - shoulder  -MT     Pre/Posttreatment Pain Comment educated on gentle ROM/ther ex for shoulder to provide pain relief  -MT     Row Name 11/21/22 1447          Plan of Care Review    Plan of Care Reviewed With patient;daughter  -MT     Progress improving  -MT     Row Name  11/21/22 1447          Therapy Plan Review/Discharge Plan (OT)    Anticipated Discharge Disposition (OT) home with 24/7 care;home with home health  -MT     Row Name 11/21/22 1447          Vital Signs    O2 Delivery Pre Treatment room air  -MT     Row Name 11/21/22 1447          Positioning and Restraints    Pre-Treatment Position sitting in chair/recliner  -MT     Post Treatment Position chair  -MT     In Chair notified nsg;call light within reach;encouraged to call for assist;with family/caregiver  -MT           User Key  (r) = Recorded By, (t) = Taken By, (c) = Cosigned By    Initials Name Provider Type    MT Lorelei Diego COTA Occupational Therapist Assistant               Outcome Measures     Row Name 11/21/22 1447          How much help from another is currently needed...    Putting on and taking off regular lower body clothing? 3  -MT     Bathing (including washing, rinsing, and drying) 3  -MT     Toileting (which includes using toilet bed pan or urinal) 3  -MT     Putting on and taking off regular upper body clothing 3  -MT     Taking care of personal grooming (such as brushing teeth) 3  -MT     Eating meals 4  -MT     AM-PAC 6 Clicks Score (OT) 19  -MT     Row Name 11/21/22 5631          How much help from another person do you currently need...    Turning from your back to your side while in flat bed without using bedrails? 4  -MF     Moving from lying on back to sitting on the side of a flat bed without bedrails? 3  -MF     Moving to and from a bed to a chair (including a wheelchair)? 3  -MF     Standing up from a chair using your arms (e.g., wheelchair, bedside chair)? 3  -MF     Climbing 3-5 steps with a railing? 3  -MF     To walk in hospital room? 3  -MF     AM-PAC 6 Clicks Score (PT) 19  -MF     Highest level of mobility 6 --> Walked 10 steps or more  -     Row Name 11/21/22 0776          Functional Assessment    Outcome Measure Options AM-PAC 6 Clicks Basic Mobility (PT)  -           User Key   (r) = Recorded By, (t) = Taken By, (c) = Cosigned By    Initials Name Provider Type    Alba Haynes, TY Physical Therapist Assistant    Lorelei Kilgore COTA Occupational Therapist Assistant                Occupational Therapy Education     Title: PT OT SLP Therapies (In Progress)     Topic: Occupational Therapy (In Progress)     Point: ADL training (Done)     Description:   Instruct learner(s) on proper safety adaptation and remediation techniques during self care or transfers.   Instruct in proper use of assistive devices.              Learning Progress Summary           Patient Acceptance, E, VU by ISMAEL at 11/18/2022 1522    Acceptance, E, VU by ISMAEL at 11/17/2022 1518                   Point: Home exercise program (Not Started)     Description:   Instruct learner(s) on appropriate technique for monitoring, assisting and/or progressing therapeutic exercises/activities.              Learner Progress:  Not documented in this visit.          Point: Precautions (Done)     Description:   Instruct learner(s) on prescribed precautions during self-care and functional transfers.              Learning Progress Summary           Patient Acceptance, E, VU by ISMAEL at 11/18/2022 1522    Acceptance, E, VU by ISMAEL at 11/17/2022 1518                   Point: Body mechanics (Not Started)     Description:   Instruct learner(s) on proper positioning and spine alignment during self-care, functional mobility activities and/or exercises.              Learner Progress:  Not documented in this visit.                      User Key     Initials Effective Dates Name Provider Type Discipline     11/10/21 -  Sharonda Anderson OTR/L, CSRS Occupational Therapist OT              OT Recommendation and Plan     Plan of Care Review  Plan of Care Reviewed With: patient, daughter  Progress: improving     Time Calculation:    Time Calculation- OT     Row Name 11/21/22 1447 11/21/22 1203          Time Calculation- OT    OT Start Time 1447   -MT --     OT Stop Time 1515  -MT --     OT Time Calculation (min) 28 min  -MT --     Total Timed Code Minutes- OT 28 minute(s)  -MT --     OT Received On 11/21/22  -MT --        Timed Charges    81000 - Gait Training Minutes  -- 15  -MF     80625 - OT Therapeutic Activity Minutes 28  -MT --        Total Minutes    Timed Charges Total Minutes 28  -MT 15  -MF      Total Minutes 28  -MT 15  -MF           User Key  (r) = Recorded By, (t) = Taken By, (c) = Cosigned By    Initials Name Provider Type    Alba Haynes PTA Physical Therapist Assistant    Lorelei Kilgore COTA Occupational Therapist Assistant              Therapy Charges for Today     Code Description Service Date Service Provider Modifiers Qty    [id number]  OT THERAPEUTIC ACT EA 15 MIN 11/21/2022 Lorelei Diego COTA GO 2               DAREK Piper  11/21/2022

## 2022-11-22 ENCOUNTER — TRANSITIONAL CARE MANAGEMENT TELEPHONE ENCOUNTER (OUTPATIENT)
Dept: CALL CENTER | Facility: HOSPITAL | Age: 77
End: 2022-11-22

## 2022-11-22 LAB — BACTERIA SPEC AEROBE CULT: NORMAL

## 2022-11-22 NOTE — OUTREACH NOTE
Call Center TCM Note    Flowsheet Row Responses   [facility] patient discharged from? [place]   Does the patient have one of the following disease processes/diagnoses(primary or secondary)? Stroke   TCM attempt successful? No   Unsuccessful attempts Attempt 2          Traci Mays RN    11/22/2022, 14:17 CST

## 2022-11-22 NOTE — PAYOR COMM NOTE
"REF: [id number]    [facility]  FREIDA  [phone]  OR  FAX   [phone]      Arlin Velez (77 y.o. Female)     Date of Birth   1945    Social Security Number       Address   [address] [place] [address]    Home Phone   [phone]    MRN   [id number]       Sikhism   [facility]    Marital Status                               Admission Date   11/17/22    Admission Type   Emergency    Admitting Provider   Jody Tyler DO    Attending Provider       Department, Room/Bed   [facility] 3A, 343/1       Discharge Date   11/21/2022    Discharge Disposition   Home or Self Care    Discharge Destination                               Attending Provider: (none)   Allergies: No Known Allergies    Isolation: None   Infection: None   Code Status: Prior    Ht: 152.4 cm (60\")   Wt: 61.3 kg (135 lb 1.6 oz)    Admission Cmt: None   Principal Problem: Cerebrovascular accident (CVA), unspecified mechanism (HCC) [I63.9]                 Active Insurance as of 11/17/2022     Primary Coverage     Payor Plan Insurance Group Employer/Plan Group    ROSTR MEDICARE REPLACEMENT ANTHEM MEDICARE ADVANTAGE [id number]     Payor Plan Address Payor Plan Phone Number Payor Plan Fax Number Effective Dates    [id number] [phone]  1/1/2022 - None Entered    [facility] [address]       Subscriber Name Subscriber Birth Date Member ID       ARLIN VELEZ 1945 [id number]                 Emergency Contacts      (Rel.) Home Phone Work Phone Mobile Phone    Isaiason,Kaylin (Daughter) -- -- [phone]    RosieLavelle garcia (Son) -- -- [phone]    Lillie Henson (Friend) -- -- [phone]    Lucy Yan (Daughter) -- -- [phone]               Discharge Summary      Jody Tyler DO at 11/21/22 1343              [facility] Medicine Services  DISCHARGE SUMMARY       Date of Admission: 11/17/2022  Date of Discharge:  11/21/2022  Primary " Care Physician: Jaxson Vines DO    Presenting Problem/Chief Complaint:  Left arm and left leg weakness    Final Discharge Diagnoses:  Active Hospital Problems    Diagnosis    • **Cerebrovascular accident (CVA), unspecified mechanism (HCC)    • Diarrhea    • Leukocytosis    • Hypomagnesemia    • Hyponatremia    • Type 2 diabetes mellitus with hyperglycemia, without long-term current use of insulin (HCC)    • Uncontrolled hypertension        Consults: Dr. Tim Lai with neurology.    Procedures Performed: none.    Pertinent Test Results:   Results for orders placed during the hospital encounter of 11/17/22    Adult Transesophageal Echo (COLIN) W/ Cont if Necessary Per Protocol    Interpretation Summary  •  Left ventricular systolic function is normal.  •  There is no significant (greater than mild) valvular dysfunction.  •  Estimated right ventricular systolic pressure from tricuspid regurgitation is normal (<35 mmHg).  •  There is no evidence of right to left shunt on bubble study.  •  There is no evidence of intracardiac mass or thrombus.      Imaging Results (All)       Procedure Component Value Units Date/Time    CT Head Without Contrast [[id number]] Collected: 11/17/22 1700     Updated: 11/17/22 1708    Narrative:      EXAMINATION: CT HEAD WO CONTRAST-      11/17/2022 4:38 PM CST     HISTORY: Stroke, follow up     In order to have a CT radiation dose as low as reasonably achievable  Automated Exposure Control was utilized for adjustment of the mA and/or  KV according to patient size.     DLP in mGycm= 1013     The CT scan of the head is performed without intravenous contrast  enhancement.     Images are acquired in axial plane and subsequent reconstruction in  coronal and sagittal planes.     Comparison is made with the previous study obtained earlier today.     There is no evidence of a mass. No midline shift.     There is no evidence of intracranial hemorrhage or hematoma.     The ventricles, basal  cisterns and cortical sulci are age appropriate.     Chronic white matter ischemic changes bilaterally noted. The gray-white  matter differentiation is maintained.     An empty sella turcica is seen.     Images are reviewed in bone window show no acute bony abnormality or a  bone lesion.       Impression:      1. No acute intracranial abnormality.                                         This report was finalized on 11/17/2022 17:05 by Dr. Mary Ann Feliz MD.    MRI Brain Without Contrast [[id number]] Collected: 11/17/22 0939     Updated: 11/17/22 0952    Narrative:      EXAMINATION: MRI brain without contrast 11/17/2022     HISTORY: Transient ischemic attack. Stroke follow-up     FINDINGS: Today's exam is compared to a previous study of 3 days  earlier. Multiplanar fast spin echo imaging sequences were obtained of  the brain on a high-field magnet without gadolinium enhancement.     There are peripheral sites of diffusion restriction within both  cerebellar hemispheres showing progression from the previous  examination. The left cerebellar hemisphere lesions are new from the  prior study. There are associated ADC mapping abnormalities consistent  with acute ischemic infarction. No evidence of hemorrhagic conversion.  There is no mass effect. There is also a peripheral diffusion  restriction within the right posterior parietal/occipital lobe with  associated ADC mapping abnormality also new from the previous exam  suggesting a small focus of cortical infarction. A more equivocal focus  of diffusion restriction within the right frontal lobe gyrus on image  192 of series 204 is present.     There is mild atrophy the brain. Small vessel ischemic changes are noted  involving the periventricular and higher white matter tracts. There is  normal flow-voids within the [place].     The orbits are unremarkable.       Impression:      1.. Previously noted foci of diffusion restriction within the right  cerebellar  hemisphere are more prominent on the current exam. There are  also now noted to be sites of diffusion restriction within the left  cerebellar hemisphere with associated ADC mapping abnormality consistent  with acute bilateral cerebellar hemisphere infarcts. These are  nonhemorrhagic. There is also a focus of diffusion abnormality within  the right posterior parietal/occipital lobe involving the cortex with  associated ADC mapping abnormality suggesting an additional site of  cortical infarct. A focus of more indeterminate diffusion restriction  within the right frontal vertex involving a gyrus is also present not  appreciated on the previous exam.  2. No mass effect or shift of the midline. No evidence of hemorrhagic  conversion. There are normal flow-voids within the [place].  3. Atrophy with small vessel disease involving the periventricular and  higher white matter tracts.  This report was finalized on 11/17/2022 09:49 by Dr. Dominic Valdes MD.    CT Head Without Contrast [[id number]] Collected: 11/17/22 0818     Updated: 11/17/22 0825    Narrative:      EXAMINATION: CT head without contrast 11/17/2022     HISTORY: Neurologic deficit. Stroke suspected.     DOSE: 758 mGycm. All CT scans are performed using dose optimization  techniques as appropriate to the performed exam and including at least  one of the following: Automated exposure control, adjustment of the mA  and/or kV according to size, and the use of the iterative reconstruction  technique..     FINDINGS: Multiple contiguous axial images are obtained from the skull  base to the vertex per protocol without intravenous contrast-enhancement  with reformatted images obtained in the sagittal and coronal projections  from the original data set.     There is evidence of a remote infarct involving the left cerebellar  hemisphere. There is mild atrophy of the brain. 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     There is no mass, mass  effect or shift of the midline. No evidence of  acute infarct or hemorrhage.     The visualized paranasal sinuses and mastoid air cells are normally  aerated.       Impression:      1.. No evidence of acute infarct or hemorrhage. Remote left cerebellar  hemisphere infarct.  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  This report was finalized on 11/17/2022 08:22 by Dr. Dominic Valdes MD.            LAB RESULTS:      Lab 11/20/22  0503 11/19/22  0226 11/18/22  0555 11/17/22  0847 11/15/22  0522 11/14/22  1917   WBC 12.70* 12.45* 14.05* 15.81* 14.61*  --    HEMOGLOBIN 10.0* 10.2* 10.0* 10.6* 10.2*  --    HEMATOCRIT 32.2* 32.7* 31.9* 34.3 32.7*  --    PLATELETS 192 235 241 275 312  --    NEUTROS ABS 7.64* 6.47 9.51* 12.55*  --   --    LYMPHS ABS  --  2.72  --   --   --   --    MONOS ABS  --  2.98*  --   --   --   --    EOS ABS  --  0.06  --   --   --   --    MCV 91.7 90.8 91.1 92.0 91.1  --    LACTATE  --   --   --  1.6  --   --    D DIMER QUANT  --   --   --   --   --  1.13*         Lab 11/21/22  0336 11/20/22  0503 11/19/22  0226 11/18/22  0555 11/17/22  1923 11/17/22  0847 11/15/22  0450 11/14/22  1550   SODIUM 139 140 139 135* 133* 132*   < >  --    POTASSIUM 4.1 3.5 3.0* 3.2*  --  3.6   < >  --    CHLORIDE 102 104 102 97*  --  94*   < >  --    CO2 28.0 26.0 26.0 25.0  --  27.0   < >  --    ANION GAP 9.0 10.0 11.0 13.0  --  11.0   < >  --    BUN 8 8 9 8  --  13   < >  --    CREATININE 0.52* 0.56* 0.59 0.51*  --  0.59   < >  --    EGFR 97.0 95.3 94.1 97.5  --  94.1   < >  --    GLUCOSE 146* 147* 146* 117*  --  136*   < >  --    CALCIUM 10.1 9.5 9.6 9.2  --  9.6   < >  --    MAGNESIUM 1.9 1.4* 1.6 1.5*  --  1.4*   < >  --    PHOSPHORUS  --   --  3.6  --   --   --   --   --    TSH  --   --   --   --   --   --   --  0.894    < > = values in this interval not displayed.         Lab 11/19/22  0226   TOTAL PROTEIN 6.4   ALBUMIN 3.80   GLOBULIN 2.6   ALT (SGPT) 8   AST (SGOT) 13   BILIRUBIN 0.2   ALK PHOS 83         Lab 11/14/22  4291    TROPONIN T 0.011         Lab 11/18/22  0555 11/15/22  0450   CHOLESTEROL 121 125   LDL CHOL 52 48   HDL CHOL 39* 57   TRIGLYCERIDES 179* 113         Lab 11/17/22 0847   IRON 54   IRON SATURATION 17*   TIBC 316   TRANSFERRIN 212   FERRITIN 74.23         Brief Urine Lab Results  (Last result in the past 365 days)        Color   Clarity   Blood   Leuk Est   Nitrite   Protein   CREAT   Urine HCG        11/17/22 0847 Yellow   Clear   Negative   Trace   Negative   Negative                 Microbiology Results (last 10 days)       Procedure Component Value - Date/Time    Clostridioides difficile Toxin - Stool, Per Rectum [[id number]]  (Normal) Collected: 11/19/22 0800    Lab Status: Final result Specimen: Stool from Per Rectum Updated: 11/19/22 0854    Narrative:      The following orders were created for panel order Clostridioides difficile Toxin - Stool, Per Rectum.  Procedure                               Abnormality         Status                     ---------                               -----------         ------                     Clostridioides difficile...[[id number]]  Normal              Final result                 Please view results for these tests on the individual orders.    Clostridioides difficile Toxin, PCR - Stool, Per Rectum [[id number]]  (Normal) Collected: 11/19/22 0800    Lab Status: Final result Specimen: Stool from Per Rectum Updated: 11/19/22 0854     C. Difficile Toxins by PCR Negative    Narrative:      The result indicates the absence of toxigenic C. difficile from stool specimen.     Gastrointestinal Panel, PCR - Stool, Per Rectum [[id number]]  (Normal) Collected: 11/19/22 0800    Lab Status: Final result Specimen: Stool from Per Rectum Updated: 11/19/22 0927     Campylobacter Not Detected     Plesiomonas shigelloides Not Detected     Salmonella Not Detected     Vibrio Not Detected     Vibrio cholerae Not Detected     Yersinia enterocolitica Not Detected     Enteroaggregative E. coli (EAEC)  Not Detected     Enteropathogenic E. coli (EPEC) Not Detected     Enterotoxigenic E. coli (ETEC) lt/st Not Detected     Shiga-like toxin-producing E. coli (STEC) stx1/stx2 Not Detected     Shigella/Enteroinvasive E. coli (EIEC) Not Detected     Cryptosporidium Not Detected     Cyclospora cayetanensis Not Detected     Entamoeba histolytica Not Detected     Giardia lamblia Not Detected     Adenovirus F40/41 Not Detected     Astrovirus Not Detected     Norovirus GI/GII Not Detected     Rotavirus A Not Detected     Sapovirus (I, II, IV or V) Not Detected    Narrative:      If Aeromonas, Staphylococcus aureus or Bacillus cereus are suspected, please order [id number]: Stool Culture, Aeromonas, S aureus, B Cereus.    Blood Culture With XIANG - Blood, Arm, Right [[id number]]  (Normal) Collected: 11/17/22 1519    Lab Status: Preliminary result Specimen: Blood from Arm, Right Updated: 11/20/22 1715     Blood Culture No growth at 3 days    COVID-19, FLU A/B, RSV PCR - Swab, Nasopharynx [[id number]]  (Normal) Collected: 11/15/22 0643    Lab Status: Final result Specimen: Swab from Nasopharynx Updated: 11/15/22 0819     COVID19 Not Detected     Influenza A PCR Not Detected     Influenza B PCR Not Detected     RSV, PCR Not Detected    Narrative:      Fact sheet for providers: https://www.fda.gov/media/275194/download    Fact sheet for patients: https://www.fda.gov/media/087602/download    Test performed by PCR.    Blood Culture - Blood, Arm, Right [[id number]]  (Normal) Collected: 11/14/22 1917    Lab Status: Final result Specimen: Blood from Arm, Right Updated: 11/19/22 2001     Blood Culture No growth at 5 days    Blood Culture - Blood, Arm, Left [[id number]]  (Abnormal) Collected: 11/14/22 1000    Lab Status: Final result Specimen: Blood from Arm, Left Updated: 11/17/22 0831     Blood Culture Staphylococcus, coagulase negative     Isolated from Aerobic Bottle     Blood Culture Micrococcus species     Isolated from Anaerobic Bottle      Gram Stain Aerobic Bottle Gram positive cocci      Anaerobic Bottle Gram positive cocci in clusters    Narrative:      Probable contaminant requires clinical correlation, susceptibility not performed unless requested by physician.        Blood Culture ID, PCR - Blood, Arm, Left [[id number]]  (Abnormal) Collected: 11/14/22 1000    Lab Status: Edited Result - FINAL Specimen: Blood from Arm, Left Updated: 11/16/22 0103     BCID, PCR Staph spp, not aureus or lugdunensis. Identification by BCID2 PCR.     BOTTLE TYPE Aerobic Bottle            Chief Complaint on Day of Discharge: Overall, patient reports feeling well.  No further GI complaints.  Tolerating a diet.  She feels ready for discharge home.     Hospital Course:  The patient is a 77 y.o. female who presented to [facility] on 11/17 for left arm and left leg weakness.  Of note, she was hospitalized at our facility on 11/14 to 11/15/2022 with a headache and concerns for possible complicated migraine versus seizure.  Imaging finding of stroke at that time felt to be incidental finding per Dr. Lim with neurology.  She had an EEG which was read as extremely atypical, however no definitive signs of epileptic foci.  She was started on Keppra 500 mg twice daily as an agent that could potentially prevent migraines but also serve as an antiepileptic if there is any epileptic component to the spells.  She was instructed to follow-up with neurology in 4 weeks.  No clear sign of infection.  She was discharged home on p.o. Omnicef for unknown reason.  Since discharge home she was having some GI symptoms with nausea, vomiting and diarrhea.  Last known well was on 11/16/2022 at approximately 10 PM.  Family found her on the morning of 11/17 in urine and fecal material with left-sided weakness.  Work-up in the ER revealed multiple new bilateral strokes.  Not a tPA candidate given timing of last known well.     Acute bilateral cerebellar strokes and right occipital and  "right frontal all appearing embolic.  Will need 14-day Zio patch at discharge.  2D echo showed no PFO.  COLIN was unremarkable.  Aspirin discontinued in favor of Plavix on 11/19.  Neurology considering ESUS protocol, however family is concerned about anticoagulation given her bruising and age/fall risk.  LDL 52, continue statin.  EEG on 11/18 is improved compared to the previous EEG on 11/15/2022.  Continue Keppra 500 mg BID per neurology.  Discussed with ROSA Castellanos, she is okay for discharge from neurology standpoint with close follow-up in 1 month.     Hypomagnesemic, 1.4 and admission.  Follow-up 1.9 today.  Continue home magnesium oxide twice daily for goal magnesium 2.0.  Potassium 4.1.     Hypertension.  Blood pressure 136/73 today.  Continue Toprol-XL and lisinopril.     Leukocytosis -slowly improving.  Down to 12.  No fevers.  No signs of active infection. Peripheral smear without any overt abnormal cells or blasts.  Gastrointestinal panel, PCR negative.  Blood cultures with no growth to date. Monitor off of antibiotics.     Hyponatremic, 132 on admission.  Resolved, sodium 139 today.     SCDs for DVT prophylaxis.     PT/OT/SLP.  Plans were initially in place for patient to go to [facility] re[facility].  However today she was able to walk 50 feet x 4 with rolling walker with therapy.  Therapy feel she is safe to return home with home health versus outpatient therapy.  She has all needed DME.    Overall, patient reports feeling well.  No further GI complaints.  Tolerating a diet.  She feels ready for discharge home.  She has reached maximum benefit of hospitalization.  She is medically stable and appropriate for discharge home today.  She is to follow-up with Dr. Hanna in 1 week.    Condition on Discharge:  Medically stable.    Physical Exam on Discharge:  /62 (BP Location: Right arm, Patient Position: Lying)   Pulse 77   Temp 98.4 °F (36.9 °C) (Oral)   Resp 16   Ht 152.4 cm (60\")   Wt 61.3 kg (135 lb 1.6 " oz)   LMP  (LMP Unknown)   SpO2 99%   BMI 26.38 kg/m²   Physical Exam  Vitals reviewed.   Constitutional:       General: She is not in acute distress.     Appearance: She is not toxic-appearing.      Comments: Sitting up in bed.  No acute distress.  On room air.  Discussed with her nurse skyler.   HENT:      Head: Normocephalic and atraumatic.      Mouth/Throat:      Mouth: Mucous membranes are moist.      Pharynx: Oropharynx is clear.   Eyes:      Extraocular Movements: Extraocular movements intact.      Conjunctiva/sclera: Conjunctivae normal.      Pupils: Pupils are equal, round, and reactive to light.   Cardiovascular:      Rate and Rhythm: Normal rate and regular rhythm.      Pulses: Normal pulses.   Pulmonary:      Effort: Pulmonary effort is normal. No respiratory distress.      Breath sounds: Normal breath sounds. No wheezing or rhonchi.   Abdominal:      General: Bowel sounds are normal. There is no distension.      Palpations: Abdomen is soft.      Tenderness: There is no abdominal tenderness.   Musculoskeletal:         General: No swelling or tenderness. Normal range of motion.      Cervical back: Normal range of motion and neck supple. No muscular tenderness.   Skin:     General: Skin is warm and dry.      Findings: No erythema or rash.   Neurological:      General: No focal deficit present.      Mental Status: She is alert and oriented to person, place, and time.      Cranial Nerves: No cranial nerve deficit.      Motor: No weakness.   Psychiatric:         Mood and Affect: Mood normal.         Behavior: Behavior normal.       Discharge Disposition:  Home or Self Care    Discharge Medications:     Discharge Medications        New Medications        Instructions Start Date   clopidogrel 75 MG tablet  Commonly known as: PLAVIX   75 mg, Oral, Daily   Start Date: November 22, 2022     folic acid 1 MG tablet  Commonly known as: FOLVITE   1 mg, Oral, Daily   Start Date: November 22, 2022             Continue These Medications        Instructions Start Date   allopurinol 300 MG tablet  Commonly known as: ZYLOPRIM   300 mg, Oral, Daily      atorvastatin 20 MG tablet  Commonly known as: LIPITOR   20 mg, Oral, Nightly      HYDROcodone-acetaminophen 7.5-325 MG per tablet  Commonly known as: NORCO   1 tablet, Oral, Every 6 Hours PRN      levETIRAcetam 500 MG tablet  Commonly known as: KEPPRA   500 mg, Oral, 2 Times Daily      lisinopril 20 MG tablet  Commonly known as: PRINIVIL,ZESTRIL   20 mg, Oral, Daily      magnesium oxide 400 MG tablet  Commonly known as: MAG-OX   800 mg, Oral, 2 Times Daily      meloxicam 15 MG tablet  Commonly known as: MOBIC   15 mg, Oral, Daily      metFORMIN 1000 MG tablet  Commonly known as: GLUCOPHAGE   1,000 mg, Oral, 2 Times Daily With Meals      metoprolol succinate XL 50 MG 24 hr tablet  Commonly known as: Toprol XL   50 mg, Oral, Daily      potassium chloride 20 MEQ CR tablet  Commonly known as: K-DUR,KLOR-CON   20 mEq, Oral, Daily      vitamin D 1.25 MG (02010 UT) capsule capsule  Commonly known as: ERGOCALCIFEROL   50,000 Units, Oral, Weekly             Stop These Medications      aspirin 81 MG chewable tablet     cefdinir 300 MG capsule  Commonly known as: OMNICEF              Discharge Diet:   Diet Instructions       Diet: Regular, Consistent Carbohydrate      Discharge Diet:  Regular  Consistent Carbohydrate               Activity at Discharge:   Activity Instructions       Activity as Tolerated              Discharge Care Plan/Instructions:   1.  Seek evaluation for worsening symptoms.  2.  Discontinue aspirin.  Continue Plavix.  3.  14-day Zio patch.    Follow-up Appointments:   1.  Follow-up with Dr. Hanna in 1 week.  2.  Follow-up with neurology in 1 month.    Test Results Pending at Discharge: none.    Electronically signed by ROSA Renee, 11/21/22, 13:46 CST.    Time: 35 minutes.    Chart reviewed.  Patient examined in conjunction with ROSA Knight.  Agree  with assessment and plan.  Lungs are clear to auscultation are bilaterally  Cardiovascular regular rate and rhythm no murmur gallop  Abdomen soft bowel sounds are present nontender      Jody Tyler DO  11/21/22  17:34 CST      Electronically signed by Jody Tyler DO at 11/21/22 1734       Discharge Order (From admission, onward)     Start     Ordered    11/21/22 1337  Discharge patient  Once        Expected Discharge Date: 11/21/22    Discharge Disposition: Home or Self Care    Physician of Record for Attribution - Please select from Treatment Team: JULIO CASTELLON [[id number]]    Review needed by CMO to determine Physician of Record: No       Question Answer Comment   Physician of Record for Attribution - Please select from Treatment Team JULIO CASTELLON    Review needed by CMO to determine Physician of Record No        11/21/22 7567

## 2022-11-22 NOTE — OUTREACH NOTE
Call Center TCM Note    Flowsheet Row Responses   [facility] patient discharged from? [place]   Does the patient have one of the following disease processes/diagnoses(primary or secondary)? Stroke   TCM attempt successful? No  [No verbal consent]   Unsuccessful attempts Attempt 1   Comments Neurology fu appt on 1/18/23   Does the patient have an appointment with their PCP within 7 days of discharge? Yes  [11/28/22 at 1:15 PM]          Traci Mays RN    11/22/2022, 12:18 CST

## 2022-11-22 NOTE — OUTREACH NOTE
Prep Survey    Flowsheet Row Responses   [facility] facility patient discharged from? [place]   Is LACE score < 7 ? No   Emergency Room discharge w/ pulse ox? No   Eligibility [facility]   Date of Admission 11/17/22   Date of Discharge 11/21/22   Discharge Disposition Home-Health Care Svc   Discharge diagnosis CVA, left arm & leg weakness, T2DM, uncontrolled HTN   Does the patient have one of the following disease processes/diagnoses(primary or secondary)? Stroke   Does the patient have Home health ordered? Yes   What is the Home health agency?  [facility]   Is there a DME ordered? No   Prep survey completed? Yes          LILIA WOODARD - Registered Nurse

## 2022-11-22 NOTE — THERAPY DISCHARGE NOTE
Acute Care - Occupational Therapy Discharge Summary  [facility]     Patient Name: Concepcion Velez  : 1945  MRN: [id number]    Today's Date: 2022                 Admit Date: 2022        OT Recommendation and Plan    Visit Dx:    ICD-10-CM ICD-9-CM   1. Cerebrovascular accident (CVA), unspecified mechanism (HCC)  I63.9 434.91   2. Altered mental status, unspecified altered mental status type  R41.82 780.97   3. Dysphagia, unspecified type  R13.10 787.20   4. Impaired mobility and ADLs  Z74.09 V49.89    Z78.9    5. Impaired mobility  Z74.09 799.89   6. Cognitive changes  R41.89 799.59                OT Rehab Goals     Row Name 22 0700             Dressing Goal 1 (OT)    Activity/Device (Dressing Goal 1, OT) dressing skills, all  -TS      [place]/Cues Needed (Dressing Goal 1, OT) standby assist  -TS      Time Frame (Dressing Goal 1, OT) long term goal (LTG);by discharge  -TS      Progress/Outcome (Dressing Goal 1, OT) goal not met  -TS         Toileting Goal 1 (OT)    Activity/Device (Toileting Goal 1, OT) toileting skills, all  -TS      [place] Level/Cues Needed (Toileting Goal 1, OT) standby assist  -TS      Time Frame (Toileting Goal 1, OT) long term goal (LTG);by discharge  -TS      Progress/Outcome (Toileting Goal 1, OT) goal not met  -TS         Strength Goal 1 (OT)    Strength Goal 1 (OT) Pt will increase L UE strength to 4+/5 for improved independence with adls.  -TS      Time Frame (Strength Goal 1, OT) long term goal (LTG);by discharge  -TS      Progress/Outcome (Strength Goal 1, OT) goal not met  -TS            User Key  (r) = Recorded By, (t) = Taken By, (c) = Cosigned By    Initials Name Provider Type Lucy Mullen COTA Occupational Therapist Assistant OT                 Outcome Measures     Row Name 22 1135 22 0742 22 0914       How much help from another person do you currently need...    Turning from your back to your side while  in flat bed without using bedrails? 4  -MF 4  -AB 4  -AE    Moving from lying on back to sitting on the side of a flat bed without bedrails? 3  -MF 3  -AB 3  -AE    Moving to and from a bed to a chair (including a wheelchair)? 3  -MF 3  -AB 3  -AE    Standing up from a chair using your arms (e.g., wheelchair, bedside chair)? 3  -MF 3  -AB 3  -AE    Climbing 3-5 steps with a railing? 3  -MF 2  -AB 2  -AE    To walk in hospital room? 3  -MF 3  -AB 3  -AE    AM-PAC 6 Clicks Score (PT) 19  -MF 18  -AB 18  -AE       Functional Assessment    Outcome Measure Options AM-PAC 6 Clicks Basic Mobility (PT)  -MF AM-PAC 6 Clicks Basic Mobility (PT)  -AB AM-PAC 6 Clicks Basic Mobility (PT)  -AE          User Key  (r) = Recorded By, (t) = Taken By, (c) = Cosigned By    Initials Name Provider Type    AB Sydney Vega, PTA Physical Therapist Assistant    AE Nayla Fraser, PTA Physical Therapist Assistant     Alba Jay, PTA Physical Therapist Assistant                Timed Therapy Charges  Total Units: 2    Suggested Charges  Total Units: 2    Procedure Name Documented Minutes Units Code    HC OT THERAPEUTIC ACT EA 15 MIN 28  2    63437 (CPT®)               Documented Minutes  Total Minutes: 28    Therapy Provided Minutes    24202 - OT Therapeutic Activity Minutes 28                    OT Discharge Summary  Anticipated Discharge Disposition (OT): home with assist  Reason for Discharge: Discharge from facility  Outcomes Achieved: Refer to plan of care for updates on goals achieved  Discharge Destination: Home with assist, Home with home health      DAREK Parsons  11/22/2022

## 2022-11-22 NOTE — THERAPY DISCHARGE NOTE
Acute Care - Speech Language Pathology Discharge Summary  [facility]       Patient Name: Concepcion Velez  : 1945  MRN: [id number]    Today's Date: 2022                   Admit Date: 2022      SLP Recommendation and Plan  Cognitive tx  Rupinder Haywood, MS-CCC/SLP, CNT 2022 16:12 CST    Visit Dx:    ICD-10-CM ICD-9-CM   1. Cerebrovascular accident (CVA), unspecified mechanism (HCC)  I63.9 434.91   2. Altered mental status, unspecified altered mental status type  R41.82 780.97   3. Dysphagia, unspecified type  R13.10 787.20   4. Impaired mobility and ADLs  Z74.09 V49.89    Z78.9    5. Impaired mobility  Z74.09 799.89   6. Cognitive changes  R41.89 799.59                SLP GOALS     Row Name 22 1000 22 0749          Memory Skills Goal 1 (SLP)    Improve Memory Skills Through Goal 1 (SLP) recall details of the day;use memory strategies;use written schedule;use external memory aid;independently (over 90% accuracy)  -KW recall details of the day;use memory strategies;use written schedule;use external memory aid;independently (over 90% accuracy)  -MG     Time Frame (Memory Skills Goal 1, SLP) by discharge  -KW by discharge  -MG     Barriers (Memory Skills Goal 1, SLP) none  -KW none  -MG     Progress/Outcomes (Memory Skills Goal 1, SLP) discharged from facility;goal not met  -KW new goal  -MG        Executive Functional Skills Goal 1 (SLP)    Improve Executive Function Skills Goal 1 (SLP) demonstrate awareness of deficit;identify strategies, strengths, limitations;identify anticipated needs;organization/planning activity;time management activity;home management activity;independently (over 90% accuracy)  -KW demonstrate awareness of deficit;identify strategies, strengths, limitations;identify anticipated needs;organization/planning activity;time management activity;home management activity;independently (over 90% accuracy)  -MG     Time Frame (Executive Function Skills Goal 1, SLP) by  discharge  -KW by discharge  -MG     Barriers (Executive Function Skills Goal 1, SLP) none  -KW none  -MG     Progress/Outcomes (Executive Function Skills Goal 1, SLP) discharged from facility;goal not met  -KW new goal  -MG        Patient will demonstrate functional cognitive-linguistic skills for return to discharge environment    [place] with use of compensatory strategies  -KW with use of compensatory strategies  -MG     Time frame by discharge  -KW by discharge  -MG     Progress/Outcomes discharged from facility;goal not met  -KW new goal  -MG           User Key  (r) = Recorded By, (t) = Taken By, (c) = Cosigned By    Initials Name Provider Type    Rupinder Conrad MS-CCC/SLP, KORI Speech and Language Pathologist    Alona Guevara MS CCC-SLP Speech and Language Pathologist                        SLP Discharge Summary  Anticipated Discharge Disposition (SLP): unknown  Reason for Discharge: discharge from this facility  Progress Toward Achieving Short/long Term Goals: goals not met within established timelines  Discharge Destination: home w/ assist      YOUSUF Lopez/SLPKORI  11/22/2022

## 2022-11-22 NOTE — THERAPY DISCHARGE NOTE
Acute Care - Physical Therapy Discharge Summary  [facility]       Patient Name: Concepcion Velez  : 1945  MRN: [id number]    Today's Date: 2022                 Admit Date: 2022      PT Recommendation and Plan    Visit Dx:    ICD-10-CM ICD-9-CM   1. Cerebrovascular accident (CVA), unspecified mechanism (HCC)  I63.9 434.91   2. Altered mental status, unspecified altered mental status type  R41.82 780.97   3. Dysphagia, unspecified type  R13.10 787.20   4. Impaired mobility and ADLs  Z74.09 V49.89    Z78.9    5. Impaired mobility  Z74.09 799.89   6. Cognitive changes  R41.89 799.59        Outcome Measures     Row Name 22 1135 22 0742          How much help from another person do you currently need...    Turning from your back to your side while in flat bed without using bedrails? 4  -MF 4  -AB     Moving from lying on back to sitting on the side of a flat bed without bedrails? 3  -MF 3  -AB     Moving to and from a bed to a chair (including a wheelchair)? 3  -MF 3  -AB     Standing up from a chair using your arms (e.g., wheelchair, bedside chair)? 3  -MF 3  -AB     Climbing 3-5 steps with a railing? 3  -MF 2  -AB     To walk in hospital room? 3  -MF 3  -AB     AM-PAC 6 Clicks Score (PT) 19  -MF 18  -AB        Functional Assessment    Outcome Measure Options AM-PAC 6 Clicks Basic Mobility (PT)  - AM-PAC 6 Clicks Basic Mobility (PT)  -AB           User Key  (r) = Recorded By, (t) = Taken By, (c) = Cosigned By    Initials Name Provider Type    AB Sydney Vega, PTA Physical Therapist Assistant    Alba Haynes PTA Physical Therapist Assistant                     PT Rehab Goals     Row Name 22 1000             Bed Mobility Goal 1 (PT)    Activity/Assistive Device (Bed Mobility Goal 1, PT) bed mobility activities, all  -AB      [place] Level/Cues Needed (Bed Mobility Goal 1, PT) independent  -AB      Time Frame (Bed Mobility Goal 1, PT) long term goal (LTG);by discharge   -AB      Progress/Outcomes (Bed Mobility Goal 1, PT) goal not met  -AB         Transfer Goal 1 (PT)    Activity/Assistive Device (Transfer Goal 1, PT) sit-to-stand/stand-to-sit;bed-to-chair/chair-to-bed;walker, rolling  -AB      [place] Level/Cues Needed (Transfer Goal 1, PT) modified independence  -AB      Time Frame (Transfer Goal 1, PT) long term goal (LTG);by discharge  -AB      Progress/Outcome (Transfer Goal 1, PT) goal not met  -AB         Gait Training Goal 1 (PT)    Activity/Assistive Device (Gait Training Goal 1, PT) gait (walking locomotion);assistive device use;decrease fall risk;diminish gait deviation;improve balance and speed;increase endurance/gait distance;walker, rolling  -AB      [place] Level (Gait Training Goal 1, PT) standby assist  -AB      Distance (Gait Training Goal 1, PT) 75ft with no scissoring and maintain a straight path  -AB      Time Frame (Gait Training Goal 1, PT) long term goal (LTG);by discharge  -AB      Progress/Outcome (Gait Training Goal 1, PT) goal not met  -AB         Problem Specific Goal 1 (PT)    Problem Specific Goal 1 (PT) Pt will find 3 objects on L side of visual field during gait  -AB      Time Frame (Problem Specific Goal 1, PT) long-term goal (LTG);by discharge  -AB      Progress/Outcome (Problem Specific Goal 1, PT) goal not met  -AB            User Key  (r) = Recorded By, (t) = Taken By, (c) = Cosigned By    Initials Name Provider Type Discipline    Sydney Cueva PTA Physical Therapist Assistant PT                    PT Discharge Summary  Anticipated Discharge Disposition (PT): inpatient rehabilitation facility  Reason for Discharge: Discharge from facility  Outcomes Achieved: Refer to plan of care for updates on goals achieved  Discharge Destination: Home with home health      Sydney Vega PTA   11/22/2022

## 2022-11-23 ENCOUNTER — TRANSITIONAL CARE MANAGEMENT TELEPHONE ENCOUNTER (OUTPATIENT)
Dept: CALL CENTER | Facility: HOSPITAL | Age: 77
End: 2022-11-23

## 2022-11-28 DIAGNOSIS — E11.9 TYPE 2 DIABETES MELLITUS WITHOUT COMPLICATION, WITHOUT LONG-TERM CURRENT USE OF INSULIN (HCC): ICD-10-CM

## 2022-11-28 NOTE — TELEPHONE ENCOUNTER
Pts daughter called stating her mom is being brought home with home health and she is wanting to know if she can get her labs drawn weekly to check her magnesium level, needing a new order for a glucometer, pulse ox and is also needed her pain medication refilled. She is wanting her referred for a MRI cause all the pain medication is doing is masking the pain and not fixing the problem. Will send to [facility] for authorization.

## 2022-11-29 RX ORDER — GLUCOSAMINE HCL/CHONDROITIN SU 500-400 MG
CAPSULE ORAL
Qty: 100 STRIP | Refills: 3 | Status: SHIPPED | OUTPATIENT
Start: 2022-11-29

## 2022-11-30 ENCOUNTER — TELEPHONE (OUTPATIENT)
Dept: PRIMARY CARE CLINIC | Age: 77
End: 2022-11-30

## 2022-12-05 ENCOUNTER — OFFICE VISIT (OUTPATIENT)
Dept: PRIMARY CARE CLINIC | Age: 77
End: 2022-12-05

## 2022-12-05 VITALS
WEIGHT: 138.25 LBS | TEMPERATURE: 98 F | BODY MASS INDEX: 26.12 KG/M2 | DIASTOLIC BLOOD PRESSURE: 84 MMHG | HEART RATE: 84 BPM | OXYGEN SATURATION: 98 % | SYSTOLIC BLOOD PRESSURE: 130 MMHG

## 2022-12-05 DIAGNOSIS — M25.511 ACUTE PAIN OF RIGHT SHOULDER: ICD-10-CM

## 2022-12-05 DIAGNOSIS — E87.6 HYPOKALEMIA: ICD-10-CM

## 2022-12-05 DIAGNOSIS — Z23 NEED FOR INFLUENZA VACCINATION: ICD-10-CM

## 2022-12-05 DIAGNOSIS — Z09 HOSPITAL DISCHARGE FOLLOW-UP: Primary | ICD-10-CM

## 2022-12-05 DIAGNOSIS — E83.42 HYPOMAGNESEMIA: ICD-10-CM

## 2022-12-05 DIAGNOSIS — S46.811S TRAPEZIUS MUSCLE STRAIN, RIGHT, SEQUELA: ICD-10-CM

## 2022-12-05 DIAGNOSIS — E87.1 HYPONATREMIA: ICD-10-CM

## 2022-12-05 DIAGNOSIS — I63.50 CEREBROVASCULAR ACCIDENT (CVA) DUE TO OCCLUSION OF CEREBRAL ARTERY (HCC): ICD-10-CM

## 2022-12-05 DIAGNOSIS — R56.9 SEIZURES (HCC): ICD-10-CM

## 2022-12-05 DIAGNOSIS — Z09 HOSPITAL DISCHARGE FOLLOW-UP: ICD-10-CM
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
## 2022-12-05 DIAGNOSIS — I10 ESSENTIAL HYPERTENSION: ICD-10-CM

## 2022-12-05 DIAGNOSIS — E11.9 TYPE 2 DIABETES MELLITUS WITHOUT COMPLICATION, WITHOUT LONG-TERM CURRENT USE OF INSULIN (HCC): ICD-10-CM

## 2022-12-05 DIAGNOSIS — R19.7 DIARRHEA, UNSPECIFIED TYPE: ICD-10-CM

## 2022-12-05 LAB — MAGNESIUM: 1.6 MG/DL (ref 1.6–2.4)

## 2022-12-05 RX ORDER — CALCIUM CARBONATE 300MG(750)
TABLET,CHEWABLE ORAL
Qty: 120 TABLET | Refills: 5 | Status: SHIPPED | OUTPATIENT
Start: 2022-12-05

## 2022-12-05 RX ORDER — CLOPIDOGREL BISULFATE 75 MG/1
75 TABLET ORAL DAILY
Qty: 30 TABLET | Refills: 11 | Status: SHIPPED | OUTPATIENT
Start: 2022-12-05

## 2022-12-05 RX ORDER — HYDROCODONE BITARTRATE AND ACETAMINOPHEN 7.5; 325 MG/1; MG/1
1 TABLET ORAL EVERY 6 HOURS PRN
Qty: 30 TABLET | Refills: 0 | Status: SHIPPED | OUTPATIENT
Start: 2022-12-05 | End: 2022-12-15

## 2022-12-05 RX ORDER — LANOLIN ALCOHOL/MO/W.PET/CERES
CREAM (GRAM) TOPICAL
Qty: 120 TABLET | Refills: 0 | OUTPATIENT
Start: 2022-12-05

## 2022-12-05 RX ORDER — LEVETIRACETAM 500 MG/1
1 TABLET ORAL 2 TIMES DAILY
COMMUNITY
Start: 2022-11-15 | End: 2022-12-05 | Stop reason: SDUPTHER

## 2022-12-05 RX ORDER — POTASSIUM CHLORIDE 20 MEQ/1
TABLET, EXTENDED RELEASE ORAL
Qty: 30 TABLET | Refills: 0 | Status: SHIPPED | OUTPATIENT
Start: 2022-12-05

## 2022-12-05 RX ORDER — CLOPIDOGREL BISULFATE 75 MG/1
TABLET ORAL
COMMUNITY
Start: 2022-11-22 | End: 2022-12-05 | Stop reason: SDUPTHER

## 2022-12-05 RX ORDER — FOLIC ACID 1 MG/1
1 TABLET ORAL DAILY
COMMUNITY
Start: 2022-11-22 | End: 2022-12-23

## 2022-12-05 RX ORDER — LEVETIRACETAM 500 MG/1
500 TABLET ORAL 2 TIMES DAILY
Qty: 60 TABLET | Refills: 5 | Status: SHIPPED | OUTPATIENT
Start: 2022-12-05

## 2022-12-05 RX ORDER — METOPROLOL SUCCINATE 50 MG/1
50 TABLET, EXTENDED RELEASE ORAL DAILY
COMMUNITY
Start: 2022-11-15

## 2022-12-05 RX ORDER — LISINOPRIL 20 MG/1
20 TABLET ORAL DAILY
Qty: 90 TABLET | Refills: 3 | Status: SHIPPED | OUTPATIENT
Start: 2022-12-05

## 2022-12-05 NOTE — PROGRESS NOTES
Post-Discharge Transitional Care  Follow Up      Shan Becker   YOB: 1945    Date of Office Visit:  12/5/2022  Date of [address] Discharge:11/21/2022  Risk of hospital readmission (high >=14%. Medium >=10%) :No data recorded    Patient is here for hospital follow up   Reports that had been hospital   11/16-11/17 at [facility] TIA like symptoms  Neurology CT scan and MRI was negative :  Felt to be stress or atypical migraine   Was noted falling magnesium was in toilet   But was corrected and reports was \"screwed up \" and sent home    Incidental finding in the cerebellum that is not a clinically relevant stroke. That in no way could explain symptomatology given by the patient. I think is reasonable to perform cardiac telemetry, perform a cardiac echo, and vessel imaging. We can also increase the aspirin and maximize her statin as well. She has no physical exam deficits consistent with a cerebellar stroke. It is a tiny foci of diffusion restriction and therefore is likely not causing any clinical symptoms. In time she wasn't normal Wednesday and Thursday  With confusion weakness left side     Family returned to [facility] with EMS  With worsening  Was admitted again 11/17 -11/21     MRI noted / different neurologist  Previously noted foci of diffusion restriction within the right  cerebellar hemisphere are more prominent on the current exam. There are  also now noted to be sites of diffusion restriction within the left  cerebellar hemisphere with associated ADC mapping abnormality consistent  with acute bilateral cerebellar hemisphere infarcts. These are  nonhemorrhagic. There is also a focus of diffusion abnormality within  the right posterior parietal/occipital lobe involving the cortex with  associated ADC mapping abnormality suggesting an additional site of  cortical infarct.  A focus of more indeterminate diffusion restriction  within the right frontal vertex involving a gyrus is also present not  appreciated on the previous exam.  2. No mass effect or shift of the midline. No evidence of hemorrhagic  conversion. There are normal flow-voids within the [place]. 3. Atrophy with small vessel disease involving the periventricular and  higher white matter tracts. This report was finalized on 11/17/2022 09:49 by Dr. Catrachita Peterson MD.   Care management risk score Rising risk (score 2-5) and Complex Care (Scores >=6): No Risk Score On File       Noted hypomagnesium corrected  Currently on 4 tablets twice a day    Along with hyponatremia K daily at present  Noted she had significant diarrhea before second admission      HTN :  Toprol xl 5mg daily  Lisinopril  daily    Diabetes   At present using just metformin twice a day   She reports glucose running 104-114 scant diarrhea this am    New onset seizures  Abnormal EEG  Was started on keppra twice a day   Doing well  No more confusion      Since fall  Right shoulder pain  Is getting worse  Report severe since fall      Non face to face  following discharge, date last encounter closed (first attempt may have been earlier): 11/30/2022    Call initiated 2 business days of discharge: No    ASSESSMENT/PLAN:   Hospital discharge follow-up  -     RI DISCHARGE MEDS RECONCILED W/ CURRENT OUTPATIENT MED LIST  Type 2 diabetes mellitus without complication, without long-term current use of insulin (HCC)  -     metFORMIN (GLUCOPHAGE) 1000 MG tablet; Take 0.5 tablets by mouth in the morning, at noon, and at bedtime, Disp-180 tablet, R-3This prescription was filled on 12/7/2021. Any refills authorized will be placed on file. Normal  -     lisinopril (PRINIVIL;ZESTRIL) 20 MG tablet; Take 1 tablet by mouth daily, Disp-90 tablet, R-3This in place of lisinopril/hctzNormal  Essential hypertension  -     lisinopril (PRINIVIL;ZESTRIL) 20 MG tablet;  Take 1 tablet by mouth daily, Disp-90 tablet, R-3This in place of lisinopril/hctzNormal  Diarrhea, unspecified type  -     metFORMIN (GLUCOPHAGE) 1000 MG tablet; Take 0.5 tablets by mouth in the morning, at noon, and at bedtime, Disp-180 tablet, R-3This prescription was filled on 12/7/2021. Any refills authorized will be placed on file. Normal  Hypomagnesemia  Hyponatremia  Seizures (HCC)  -     levETIRAcetam (KEPPRA) 500 MG tablet; Take 1 tablet by mouth in the morning and at bedtime, Disp-60 tablet, R-5Normal  Cerebrovascular accident (CVA) due to occlusion of cerebral artery (HCC)  -     levETIRAcetam (KEPPRA) 500 MG tablet; Take 1 tablet by mouth in the morning and at bedtime, Disp-60 tablet, R-5Normal  Fall, initial encounter  -     External Referral To Orthopedic Surgery  -     MRI SHOULDER RIGHT WO CONTRAST; Future  -     HYDROcodone-acetaminophen (NORCO) 7.5-325 MG per tablet; Take 1 tablet by mouth every 6 hours as needed for Pain for up to 10 days. Intended supply: 3 days. Take lowest dose possible to manage pain, Disp-30 tablet, R-0Normal  Acute pain of right shoulder  -     MRI SHOULDER RIGHT WO CONTRAST; Future  -     HYDROcodone-acetaminophen (NORCO) 7.5-325 MG per tablet; Take 1 tablet by mouth every 6 hours as needed for Pain for up to 10 days. Intended supply: 3 days. Take lowest dose possible to manage pain, Disp-30 tablet, R-0Normal  Trapezius muscle strain, right, sequela  -     HYDROcodone-acetaminophen (NORCO) 7.5-325 MG per tablet; Take 1 tablet by mouth every 6 hours as needed for Pain for up to 10 days. Intended supply: 3 days. Take lowest dose possible to manage pain, Disp-30 tablet, R-0Normal      Medical Decision Making: high complexity  No follow-ups on file. On this date 12/5/2022 I have spent 25 minutes reviewing previous notes, test results and face to face with the patient discussing the diagnosis and importance of compliance with the treatment plan as well as documenting on the day of the visit.        Subjective:   HPI:  Follow up of ZO MOORE problems/diagnosis(es):     Lena Mackay was seen today for follow-up from hospital and knee pain. Diagnoses and all orders for this visit:    Hospital discharge follow-up  2 hospital admissions follow up    -     ME DISCHARGE MEDS RECONCILED W/ CURRENT OUTPATIENT MED LIST    Type 2 diabetes mellitus without complication, without long-term current use of insulin (HCC)  Due to diarrhea going to do half tid     -     metFORMIN (GLUCOPHAGE) 1000 MG tablet; Take 0.5 tablets by mouth in the morning, at noon, and at bedtime  -     lisinopril (PRINIVIL;ZESTRIL) 20 MG tablet; Take 1 tablet by mouth daily    Essential hypertension  Cont to monitor    -     lisinopril (PRINIVIL;ZESTRIL) 20 MG tablet; Take 1 tablet by mouth daily    Diarrhea, unspecified type  -     metFORMIN (GLUCOPHAGE) 1000 MG tablet; Take 0.5 tablets by mouth in the morning, at noon, and at bedtime    Hypomagnesemia  On 4 a day  Check today and routine weekly for a month      Hyponatremia  Weekly for a month    Seizures ([address]  Cont twice a day  Has follow up neurology    -     levETIRAcetam (KEPPRA) 500 MG tablet; Take 1 tablet by mouth in the morning and at bedtime    Cerebrovascular accident (CVA) due to occlusion of cerebral artery ([address]  Cont follow    Home health    -     levETIRAcetam (KEPPRA) 500 MG tablet; Take 1 tablet by mouth in the morning and at bedtime    Fall, initial encounter  -     External Referral To Orthopedic Surgery  -     MRI SHOULDER RIGHT WO CONTRAST; Future  -     HYDROcodone-acetaminophen (NORCO) 7.5-325 MG per tablet; Take 1 tablet by mouth every 6 hours as needed for Pain for up to 10 days. Intended supply: 3 days. Take lowest dose possible to manage pain    Acute pain of right shoulder  -     MRI SHOULDER RIGHT WO CONTRAST; Future  -     HYDROcodone-acetaminophen (NORCO) 7.5-325 MG per tablet; Take 1 tablet by mouth every 6 hours as needed for Pain for up to 10 days. Intended supply: 3 days.  Take lowest dose possible to manage pain    Trapezius muscle strain, right, sequela  -     HYDROcodone-acetaminophen (NORCO) 7.5-325 MG per tablet; Take 1 tablet by mouth every 6 hours as needed for Pain for up to 10 days. Intended supply: 3 days. Take lowest dose possible to manage pain    Other orders  -     clopidogrel (PLAVIX) 75 MG tablet; Take 1 tablet by mouth daily        Inpatient course: Discharge summary reviewed- see chart. Interval history/Current status: stable  Memory improved    Right shoulder over a month worse  Will use norco as needed  Severe  Will need ortho      Patient Active Problem List   Diagnosis    Family history of colon cancer    DM2 (diabetes mellitus, type 2) ([facility]    Cerebral infarction ([facility]    Hyperlipidemia    Hypertension    B12 deficiency    Hyperuricemia    Ventral hernia without obstruction or gangrene    Recurrent ventral incisional hernia with necrosis    Vitamin D deficiency    Magnesium deficiency    Bruises easily    Fatigue       Medications listed as ordered at the time of discharge from hospital     Medication List            Accurate as of December 5, 2022  1:49 PM. If you have any questions, ask your nurse or doctor. START taking these medications      Magnesium 400 MG Tabs  4 po bid  Started by: LAYLA Corona            CHANGE how you take these medications      clopidogrel 75 MG tablet  Commonly known as: PLAVIX  Take 1 tablet by mouth daily  What changed: See the new instructions. Changed by: LAYLA Corona     metFORMIN 1000 MG tablet  Commonly known as: GLUCOPHAGE  Take 0.5 tablets by mouth in the morning, at noon, and at bedtime  What changed: See the new instructions. Changed by: LAYLA Corona     potassium chloride 20 MEQ extended release tablet  Commonly known as: KLOR-CON M  TAKE ONE TABLET BY MOUTH DAILY  What changed: See the new instructions.   Changed by: Jake Arreaga MD            CONTINUE taking these medications      allopurinol 300 MG tablet  Commonly known as: ZYLOPRIM  Take 1 tablet by mouth daily     atorvastatin 20 MG tablet  Commonly known as: LIPITOR  TAKE ONE TABLET BY MOUTH NIGHTLY     blood glucose monitor kit and supplies  Cap glucose daily and prn     blood glucose test strips  Cap glucose daily and prn     CALTRATE 600 PLUS-VIT D PO     folic acid 1 MG tablet  Commonly known as: FOLVITE     furosemide 20 MG tablet  Commonly known as: LASIX     HYDROcodone-acetaminophen 7.5-325 MG per tablet  Commonly known as: Norco  Take 1 tablet by mouth every 6 hours as needed for Pain for up to 10 days. Intended supply: 3 days.  Take lowest dose possible to manage pain     Insulin Pen Needle 32G X 4 MM Misc  Commonly known as: H-E-B inControl Pen Needles  1 each by Does not apply route daily     levETIRAcetam 500 MG tablet  Commonly known as: KEPPRA  Take 1 tablet by mouth in the morning and at bedtime     lisinopril 20 MG tablet  Commonly known as: PRINIVIL;ZESTRIL  Take 1 tablet by mouth daily     meloxicam 15 MG tablet  Commonly known as: MOBIC  Take 1 tablet by mouth daily     metoprolol succinate 50 MG extended release tablet  Commonly known as: TOPROL XL     MULTI COMPLETE PO     ondansetron 4 MG disintegrating tablet  Commonly known as: ZOFRAN-ODT  Take 1 tablet by mouth 3 times daily as needed for Nausea or Vomiting     vitamin D 1.25 MG (83224 UT) Caps capsule  Commonly known as: ERGOCALCIFEROL  Take 1 capsule by mouth once a week               Where to Get Your Medications        These medications were sent to [facility], [address]  [address], [address] [place] [address] Way      Phone: [phone]   clopidogrel 75 MG tablet  HYDROcodone-acetaminophen 7.5-325 MG per tablet  levETIRAcetam 500 MG tablet  lisinopril 20 MG tablet  Magnesium 400 MG Tabs  metFORMIN 1000 MG tablet  potassium chloride 20 MEQ extended release tablet           Medications marked \"taking\" at this time  Outpatient Medications Marked as Taking for the 12/5/22 encounter (Office Visit) with LAYLA Burks   Medication Sig Dispense Refill    potassium chloride (KLOR-CON M) 20 MEQ extended release tablet TAKE ONE TABLET BY MOUTH DAILY 30 tablet 0    Magnesium 400 MG TABS 4 po bid 739 tablet 5    folic acid (FOLVITE) 1 MG tablet Take 1 mg by mouth daily      metoprolol succinate (TOPROL XL) 50 MG extended release tablet Take 50 mg by mouth daily      clopidogrel (PLAVIX) 75 MG tablet Take 1 tablet by mouth daily 30 tablet 11    metFORMIN (GLUCOPHAGE) 1000 MG tablet Take 0.5 tablets by mouth in the morning, at noon, and at bedtime 180 tablet 3    lisinopril (PRINIVIL;ZESTRIL) 20 MG tablet Take 1 tablet by mouth daily 90 tablet 3    levETIRAcetam (KEPPRA) 500 MG tablet Take 1 tablet by mouth in the morning and at bedtime 60 tablet 5    HYDROcodone-acetaminophen (NORCO) 7.5-325 MG per tablet Take 1 tablet by mouth every 6 hours as needed for Pain for up to 10 days. Intended supply: 3 days.  Take lowest dose possible to manage pain 30 tablet 0    blood glucose monitor kit and supplies Cap glucose daily and prn 1 kit 0    blood glucose monitor strips Cap glucose daily and prn 100 strip 3    ondansetron (ZOFRAN-ODT) 4 MG disintegrating tablet Take 1 tablet by mouth 3 times daily as needed for Nausea or Vomiting 21 tablet 0    atorvastatin (LIPITOR) 20 MG tablet TAKE ONE TABLET BY MOUTH NIGHTLY 90 tablet 3    allopurinol (ZYLOPRIM) 300 MG tablet Take 1 tablet by mouth daily 90 tablet 3    vitamin D (ERGOCALCIFEROL) 1.25 MG (47362 UT) CAPS capsule Take 1 capsule by mouth once a week 12 capsule 1    meloxicam (MOBIC) 15 MG tablet Take 1 tablet by mouth daily 90 tablet 3    furosemide (LASIX) 20 MG tablet Take 20 mg by mouth daily as needed Only taking PRN      Insulin Pen Needle (H-E-B INCONTROL PEN NEEDLES) 32G X 4 MM MISC 1 each by Does not apply route daily 100 each 3    Calcium-Vitamin D (CALTRATE 600 PLUS-VIT D PO) Take 1

## 2022-12-05 NOTE — TELEPHONE ENCOUNTER
Received fax from pharmacy requesting refill on pts medication(s). Pt was last seen in office on 11/10/2022  and has a follow up scheduled for 12/5/2022. Will send request to  Juliet Burnett  for authorization.      Requested Prescriptions     Pending Prescriptions Disp Refills    magnesium oxide (MAG-OX) 400 (240 Mg) MG tablet [Pharmacy Med Name: magnesium oxide 400 mg (241.3 mg magnesium) tablet] 120 tablet 0     Sig: TAKE TWO TABLETS BY MOUTH TWICE DAILY

## 2022-12-07 ENCOUNTER — TELEPHONE (OUTPATIENT)
Dept: PRIMARY CARE CLINIC | Age: 77
End: 2022-12-07

## 2022-12-07 DIAGNOSIS — E87.6 HYPOKALEMIA: Primary | ICD-10-CM

## 2022-12-07 DIAGNOSIS — E11.9 TYPE 2 DIABETES MELLITUS WITHOUT COMPLICATION, WITHOUT LONG-TERM CURRENT USE OF INSULIN (HCC): ICD-10-CM

## 2022-12-07 DIAGNOSIS — R60.9 PERIPHERAL EDEMA: ICD-10-CM

## 2022-12-07 DIAGNOSIS — R79.0 ABNORMAL BLOOD LEVEL OF MAGNESIUM: Primary | ICD-10-CM

## 2022-12-07 RX ORDER — FUROSEMIDE 20 MG/1
20 TABLET ORAL DAILY PRN
Qty: 60 TABLET | Refills: 11 | Status: SHIPPED | OUTPATIENT
Start: 2022-12-07

## 2022-12-07 RX ORDER — PEN NEEDLE, DIABETIC 32GX 5/32"
1 NEEDLE, DISPOSABLE MISCELLANEOUS DAILY
Qty: 100 EACH | Refills: 3 | Status: SHIPPED | OUTPATIENT
Start: 2022-12-07

## 2022-12-07 NOTE — TELEPHONE ENCOUNTER
Received fax from pharmacy requesting refill on pts medication(s). Pt was last seen in office on 2022  and has a follow up scheduled for 2022. Will send request to  Erlinda Mccullough  for authorization.      Requested Prescriptions     Pending Prescriptions Disp Refills    calcium carbonate (CALTRATE 600) 1500 (600 Ca) MG TABS tablet 60 tablet 3     Sig: Take 1 tablet by mouth daily    furosemide (LASIX) 20 MG tablet 60 tablet 11     Sig: Take 1 tablet by mouth daily as needed (swelling) Indications: only takes rarely Only taking PRN    Multiple Vitamins-Minerals (MULTI COMPLETE) CAPS 30 capsule 11     Sig: Take 1 tablet by mouth daily    Insulin Pen Needle (H-E-B INCONTROL PEN NEEDLES) 32G X 4 MM MISC 100 each 3     Si each by Does not apply route daily

## 2022-12-07 NOTE — TELEPHONE ENCOUNTER
----- Message from LAYLA Iraheta sent at 12/6/2022  5:25 PM CST -----  Magnesium low of normal  Increase to 6 for 2 days then go back to 4 a day  Recheck mag in 1 week

## 2022-12-07 NOTE — TELEPHONE ENCOUNTER
Called patient, spoke with: Patient regarding the results of the patients most recent labs. I advised Patient of Dr. Paige Begun recommendations.    Patient did voice understanding

## 2022-12-09 ENCOUNTER — TELEPHONE (OUTPATIENT)
Dept: PRIMARY CARE CLINIC | Age: 77
End: 2022-12-09

## 2022-12-09 DIAGNOSIS — E83.42 HYPOMAGNESEMIA: ICD-10-CM

## 2022-12-09 DIAGNOSIS — E83.42 HYPOMAGNESEMIA: Primary | ICD-10-CM

## 2022-12-09 DIAGNOSIS — E87.1 HYPONATREMIA: ICD-10-CM

## 2022-12-09 LAB
ALBUMIN SERPL-MCNC: 4.1 G/DL (ref 3.5–5.2)
ALP BLD-CCNC: 64 U/L (ref 35–104)
ALT SERPL-CCNC: 10 U/L (ref 5–33)
ANION GAP SERPL CALCULATED.3IONS-SCNC: 8 MMOL/L (ref 7–19)
AST SERPL-CCNC: 13 U/L (ref 5–32)
BILIRUB SERPL-MCNC: <0.2 MG/DL (ref 0.2–1.2)
BUN BLDV-MCNC: 16 MG/DL (ref 8–23)
CALCIUM SERPL-MCNC: 10 MG/DL (ref 8.8–10.2)
CHLORIDE BLD-SCNC: 98 MMOL/L (ref 98–111)
CO2: 25 MMOL/L (ref 22–29)
CREAT SERPL-MCNC: 0.8 MG/DL (ref 0.5–0.9)
GFR SERPL CREATININE-BSD FRML MDRD: >60 ML/MIN/{1.73_M2}
GLUCOSE BLD-MCNC: 162 MG/DL (ref 74–109)
MAGNESIUM: 3.3 MG/DL (ref 1.6–2.4)
POTASSIUM SERPL-SCNC: 6.6 MMOL/L (ref 3.5–5)
SODIUM BLD-SCNC: 131 MMOL/L (ref 136–145)
TOTAL PROTEIN: 6 G/DL (ref 6.6–8.7)

## 2022-12-09 NOTE — TELEPHONE ENCOUNTER
[facility] called and was asking if patient would need to continue with the magnesium checks every week? If patient does need to do this an order can be wrote and faxed over and they can do this for the patient in house.    [address] [facility] NUMBER [phone]

## 2022-12-12 ENCOUNTER — TELEPHONE (OUTPATIENT)
Dept: PRIMARY CARE CLINIC | Age: 77
End: 2022-12-12

## 2022-12-12 ENCOUNTER — OFFICE VISIT (OUTPATIENT)
Dept: PRIMARY CARE CLINIC | Age: 77
End: 2022-12-12
Payer: MEDICARE

## 2022-12-12 VITALS
OXYGEN SATURATION: 98 % | DIASTOLIC BLOOD PRESSURE: 68 MMHG | BODY MASS INDEX: 27.38 KG/M2 | HEART RATE: 80 BPM | TEMPERATURE: 98.1 F | HEIGHT: 61 IN | WEIGHT: 145 LBS | SYSTOLIC BLOOD PRESSURE: 116 MMHG

## 2022-12-12 DIAGNOSIS — R53.83 FATIGUE, UNSPECIFIED TYPE: ICD-10-CM

## 2022-12-12 DIAGNOSIS — G45.9 TIA (TRANSIENT ISCHEMIC ATTACK): ICD-10-CM

## 2022-12-12 DIAGNOSIS — E78.2 MIXED HYPERLIPIDEMIA: ICD-10-CM

## 2022-12-12 DIAGNOSIS — E53.8 B12 DEFICIENCY: ICD-10-CM

## 2022-12-12 DIAGNOSIS — E83.42 HYPOMAGNESEMIA: ICD-10-CM

## 2022-12-12 DIAGNOSIS — G40.909 SEIZURE DISORDER (HCC): ICD-10-CM

## 2022-12-12 DIAGNOSIS — E11.42 TYPE 2 DIABETES MELLITUS WITH DIABETIC POLYNEUROPATHY, WITHOUT LONG-TERM CURRENT USE OF INSULIN (HCC): ICD-10-CM

## 2022-12-12 DIAGNOSIS — E11.42 TYPE 2 DIABETES MELLITUS WITH DIABETIC POLYNEUROPATHY, WITHOUT LONG-TERM CURRENT USE OF INSULIN (HCC): Primary | ICD-10-CM

## 2022-12-12 DIAGNOSIS — I10 PRIMARY HYPERTENSION: ICD-10-CM

## 2022-12-12 DIAGNOSIS — E79.0 HYPERURICEMIA: ICD-10-CM

## 2022-12-12 DIAGNOSIS — Z00.00 MEDICARE ANNUAL WELLNESS VISIT, SUBSEQUENT: ICD-10-CM

## 2022-12-12 DIAGNOSIS — M25.511 ACUTE PAIN OF RIGHT SHOULDER: ICD-10-CM

## 2022-12-12 DIAGNOSIS — E61.2 MAGNESIUM DEFICIENCY: ICD-10-CM

## 2022-12-12 DIAGNOSIS — M17.12 PRIMARY OSTEOARTHRITIS OF LEFT KNEE: ICD-10-CM

## 2022-12-12 DIAGNOSIS — S46.811S TRAPEZIUS MUSCLE STRAIN, RIGHT, SEQUELA: ICD-10-CM
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
## 2022-12-12 DIAGNOSIS — E87.5 HYPERKALEMIA: ICD-10-CM

## 2022-12-12 DIAGNOSIS — I63.319 CEREBRAL INFARCTION DUE TO THROMBOSIS OF MIDDLE CEREBRAL ARTERY, UNSPECIFIED BLOOD VESSEL LATERALITY (HCC): ICD-10-CM

## 2022-12-12 DIAGNOSIS — M17.11 PRIMARY OSTEOARTHRITIS OF RIGHT KNEE: ICD-10-CM

## 2022-12-12 LAB
ALBUMIN SERPL-MCNC: 4.1 G/DL (ref 3.5–5.2)
ALP BLD-CCNC: 71 U/L (ref 35–104)
ALT SERPL-CCNC: 8 U/L (ref 5–33)
ANION GAP SERPL CALCULATED.3IONS-SCNC: 11 MMOL/L (ref 7–19)
AST SERPL-CCNC: 11 U/L (ref 5–32)
BASOPHILS ABSOLUTE: 0 K/UL (ref 0–0.2)
BASOPHILS RELATIVE PERCENT: 0.4 % (ref 0–1)
BILIRUB SERPL-MCNC: <0.2 MG/DL (ref 0.2–1.2)
BUN BLDV-MCNC: 13 MG/DL (ref 8–23)
CALCIUM SERPL-MCNC: 10.5 MG/DL (ref 8.8–10.2)
CHLORIDE BLD-SCNC: 99 MMOL/L (ref 98–111)
CHOLESTEROL, TOTAL: 131 MG/DL (ref 160–199)
CO2: 24 MMOL/L (ref 22–29)
CREAT SERPL-MCNC: 0.7 MG/DL (ref 0.5–0.9)
CREATININE URINE: 52.8 MG/DL (ref 4.2–622)
EOSINOPHILS ABSOLUTE: 0 K/UL (ref 0–0.6)
EOSINOPHILS RELATIVE PERCENT: 0.3 % (ref 0–5)
GFR SERPL CREATININE-BSD FRML MDRD: >60 ML/MIN/{1.73_M2}
GLUCOSE BLD-MCNC: 140 MG/DL (ref 74–109)
HBA1C MFR BLD: 5.8 % (ref 4–6)
HCT VFR BLD CALC: 36.4 % (ref 37–47)
HDLC SERPL-MCNC: 67 MG/DL (ref 65–121)
HEMOGLOBIN: 10.9 G/DL (ref 12–16)
IMMATURE GRANULOCYTES #: 0.1 K/UL
LDL CHOLESTEROL CALCULATED: 43 MG/DL
LYMPHOCYTES ABSOLUTE: 3.1 K/UL (ref 1.1–4.5)
LYMPHOCYTES RELATIVE PERCENT: 27.8 % (ref 20–40)
MAGNESIUM: 1.5 MG/DL (ref 1.6–2.4)
MCH RBC QN AUTO: 29.8 PG (ref 27–31)
MCHC RBC AUTO-ENTMCNC: 29.9 G/DL (ref 33–37)
MCV RBC AUTO: 99.5 FL (ref 81–99)
MICROALBUMIN UR-MCNC: 1.7 MG/DL (ref 0–19)
MICROALBUMIN/CREAT UR-RTO: 32.2 MG/G
MONOCYTES ABSOLUTE: 1.7 K/UL (ref 0–0.9)
MONOCYTES RELATIVE PERCENT: 15.4 % (ref 0–10)
NEUTROPHILS ABSOLUTE: 6.1 K/UL (ref 1.5–7.5)
NEUTROPHILS RELATIVE PERCENT: 55.1 % (ref 50–65)
PDW BLD-RTO: 14.9 % (ref 11.5–14.5)
PLATELET # BLD: 158 K/UL (ref 130–400)
PMV BLD AUTO: 12.9 FL (ref 9.4–12.3)
POTASSIUM SERPL-SCNC: 5.8 MMOL/L (ref 3.5–5)
RBC # BLD: 3.66 M/UL (ref 4.2–5.4)
SODIUM BLD-SCNC: 134 MMOL/L (ref 136–145)
T4 FREE: 1.35 NG/DL (ref 0.93–1.7)
TOTAL PROTEIN: 6.3 G/DL (ref 6.6–8.7)
TRIGL SERPL-MCNC: 106 MG/DL (ref 0–149)
TSH SERPL DL<=0.05 MIU/L-ACNC: 2.85 UIU/ML (ref 0.27–4.2)
WBC # BLD: 11 K/UL (ref 4.8–10.8)

## 2022-12-12 PROCEDURE — 3044F HG A1C LEVEL LT 7.0%: CPT | Performed by: PEDIATRICS

## 2022-12-12 PROCEDURE — 3078F DIAST BP <80 MM HG: CPT | Performed by: PEDIATRICS

## 2022-12-12 PROCEDURE — 20610 DRAIN/INJ JOINT/BURSA W/O US: CPT | Performed by: PEDIATRICS

## 2022-12-12 PROCEDURE — 3074F SYST BP LT 130 MM HG: CPT | Performed by: PEDIATRICS

## 2022-12-12 PROCEDURE — 1123F ACP DISCUSS/DSCN MKR DOCD: CPT | Performed by: PEDIATRICS

## 2022-12-12 PROCEDURE — 99214 OFFICE O/P EST MOD 30 MIN: CPT | Performed by: PEDIATRICS

## 2022-12-12 PROCEDURE — G0439 PPPS, SUBSEQ VISIT: HCPCS | Performed by: PEDIATRICS

## 2022-12-12 RX ORDER — HYDROCODONE BITARTRATE AND ACETAMINOPHEN 7.5; 325 MG/1; MG/1
1 TABLET ORAL EVERY 6 HOURS PRN
Qty: 30 TABLET | Refills: 0 | Status: SHIPPED | OUTPATIENT
Start: 2022-12-12 | End: 2022-12-22

## 2022-12-12 ASSESSMENT — PATIENT HEALTH QUESTIONNAIRE - PHQ9
SUM OF ALL RESPONSES TO PHQ9 QUESTIONS 1 & 2: 0
SUM OF ALL RESPONSES TO PHQ QUESTIONS 1-9: 0
1. LITTLE INTEREST OR PLEASURE IN DOING THINGS: 0
SUM OF ALL RESPONSES TO PHQ QUESTIONS 1-9: 0
2. FEELING DOWN, DEPRESSED OR HOPELESS: 0

## 2022-12-12 ASSESSMENT — ENCOUNTER SYMPTOMS
COUGH: 0
DIARRHEA: 0
ABDOMINAL PAIN: 0
WHEEZING: 0
VOMITING: 0
NAUSEA: 0
SORE THROAT: 0
SHORTNESS OF BREATH: 0

## 2022-12-12 ASSESSMENT — LIFESTYLE VARIABLES
HOW OFTEN DO YOU HAVE A DRINK CONTAINING ALCOHOL: NEVER
HOW MANY STANDARD DRINKS CONTAINING ALCOHOL DO YOU HAVE ON A TYPICAL DAY: PATIENT DOES NOT DRINK

## 2022-12-12 NOTE — PATIENT INSTRUCTIONS
Preventing Falls: Care Instructions  Overview     Getting around your home safely can be a challenge if you have injuries or health problems that make it easy for you to fall. Loose rugs and furniture in walkways are among the dangers for many older people who have problems walking or who have poor eyesight. People who have conditions such as arthritis, osteoporosis, or dementia also have to be careful not to fall. You can make your home safer with a few simple measures. Follow-up care is a key part of your treatment and safety. Be sure to make and go to all appointments, and call your doctor if you are having problems. It's also a good idea to know your test results and keep a list of the medicines you take. How can you care for yourself at home? Taking care of yourself  Exercise regularly to improve your strength, muscle tone, and balance. Walk if you can. Swimming may be a good choice if you cannot walk easily. Have your vision and hearing checked each year or any time you notice a change. If you have trouble seeing and hearing, you might not be able to avoid objects and could lose your balance. Know the side effects of the medicines you take. Ask your doctor or pharmacist whether the medicines you take can affect your balance. Sleeping pills or sedatives can affect your balance. Limit the amount of alcohol you drink. Alcohol can impair your balance and other senses. Ask your doctor whether calluses or corns on your feet need to be removed. If you wear loose-fitting shoes because of calluses or corns, you can lose your balance and fall. Talk to your doctor if you have numbness in your feet. You may get dizzy if you do not drink enough water. To prevent dehydration, drink plenty of fluids. Choose water and other clear liquids. If you have kidney, heart, or liver disease and have to limit fluids, talk with your doctor before you increase the amount of fluids you drink.   Preventing falls at home  Remove raised doorway thresholds, throw rugs, and clutter. Repair loose carpet or raised areas in the floor. Move furniture and electrical cords to keep them out of walking paths. Use nonskid floor wax, and wipe up spills right away, especially on ceramic tile floors. If you use a walker or cane, put rubber tips on it. If you use crutches, clean the bottoms of them regularly with an abrasive pad, such as steel wool. Keep your house well lit, especially stairways, porches, and outside walkways. Use night-lights in areas such as hallways and bathrooms. Add extra light switches or use remote switches (such as switches that go on or off when you clap your hands) to make it easier to turn lights on if you have to get up during the night. Install sturdy handrails on stairways. Move items in your cabinets so that the things you use a lot are on the lower shelves (about waist level). Keep a cordless phone and a flashlight with new batteries by your bed. If possible, put a phone in each of the main rooms of your house, or carry a cell phone in case you fall and cannot reach a phone. Or, you can wear a device around your neck or wrist. You push a button that sends a signal for help. Wear low-heeled shoes that fit well and give your feet good support. Use footwear with nonskid soles. Check the heels and soles of your shoes for wear. Repair or replace worn heels or soles. Do not wear socks without shoes on smooth floors, such as wood. Walk on the grass when the sidewalks are slippery. If you live in an area that gets snow and ice in the winter, sprinkle salt on slippery steps and sidewalks. Or ask a family member or friend to do this for you. Preventing falls in the bath  Install grab bars and nonskid mats inside and outside your shower or tub and near the toilet and sinks. Use shower chairs and bath benches. Use a hand-held shower head that will allow you to sit while showering.   Get into a tub or shower by putting the weaker leg in first. Get out of a tub or shower with your strong side first.  Repair loose toilet seats and consider installing a raised toilet seat to make getting on and off the toilet easier. Keep your bathroom door unlocked while you are in the shower. Where can you learn more? Go to http://www.jackson.com/ and enter G117 to learn more about \"Preventing Falls: Care Instructions. \"  Current as of: May 4, 2022               Content Version: 13.5  © 7938-4190 Healthwise, [employer]. Care instructions adapted under license by [facility] ([facility]). If you have questions about a medical condition or this instruction, always ask your healthcare professional. [address] any warranty or liability for your use of this information. Learning About Being Active as an Older Adult  Why is being active important as you get older? Being active is one of the best things you can do for your health. And it's never too late to start. Being active--or getting active, if you aren't already--has definite benefits. It can:  Give you more energy,  Keep your mind sharp. Improve balance to reduce your risk of falls. Help you manage chronic illness with fewer medicines. No matter how old you are, how fit you are, or what health problems you have, there is a form of activity that will work for you. And the more physical activity you can do, the better your overall health will be. What kinds of activity can help you stay healthy? Being more active will make your daily activities easier. Physical activity includes planned exercise and things you do in daily life. There are four types of activity:  Aerobic. Doing aerobic activity makes your heart and lungs strong. Includes walking, dancing, and gardening. Aim for at least 2½ hours spread throughout the week. It improves your energy and can help you sleep better. Muscle-strengthening.   This type of activity can help maintain muscle and strengthen bones. Includes climbing stairs, using resistance bands, and lifting or carrying heavy loads. Aim for at least twice a week. It can help protect the knees and other joints. Stretching. Stretching gives you better range of motion in joints and muscles. Includes upper arm stretches, calf stretches, and gentle yoga. Aim for at least twice a week, preferably after your muscles are warmed up from other activities. It can help you function better in daily life. Balancing. This helps you stay coordinated and have good posture. Includes heel-to-toe walking, kaitlin chi, and certain types of yoga. Aim for at least 3 days a week. It can reduce your risk of falling. Even if you have a hard time meeting the recommendations, it's better to be more active than less active. All activity done in each category counts toward your weekly total. You'd be surprised how daily things like carrying groceries, keeping up with grandchildren, and taking the stairs can add up. What keeps you from being active? If you've had a hard time being more active, you're not alone. Maybe you remember being able to do more. Or maybe you've never thought of yourself as being active. It's frustrating when you can't do the things you want. Being more active can help. What's holding you back? Getting started. Have a goal, but break it into easy tasks. Small steps build into big accomplishments. Staying motivated. If you feel like skipping your activity, remember your goal. Maybe you want to move better and stay independent. Every activity gets you one step closer. Not feeling your best.  Start with 5 minutes of an activity you enjoy. Prove to yourself you can do it. As you get comfortable, increase your time. You may not be where you want to be. But you're in the process of getting there. Everyone starts somewhere. How can you find safe ways to stay active?   Talk with your doctor about any physical challenges you're facing. Make a plan with your doctor if you have a health problem or aren't sure how to get started with activity. If you're already active, ask your doctor if there is anything you should change to stay safe as your body and health change. If you tend to feel dizzy after you take medicine, avoid activity at that time. Try being active before you take your medicine. This will reduce your risk of falls. If you plan to be active at home, make sure to clear your space before you get started. Remove things like TV cords, coffee tables, and throw rugs. It's safest to have plenty of space to move freely. The key to getting more active is to take it slow and steady. Try to improve only a little bit at a time. Pick just one area to improve on at first. And if an activity hurts, stop and talk to your doctor. Where can you learn more? Go to http://www.jackson.com/ and enter P600 to learn more about \"Learning About Being Active as an Older Adult. \"  Current as of: October 10, 2022               Content Version: 13.5  © 3900-2035 Healthwise, [employer]. Care instructions adapted under license by [facility]. If you have questions about a medical condition or this instruction, always ask your healthcare professional. [address] any warranty or liability for your use of this information. Learning About Dental Care for Older Adults  Dental care for older adults: Overview  Dental care for older people is much the same as for younger adults. But older adults do have concerns that younger adults do not. Older adults may have problems with gum disease and decay on the roots of their teeth. They may need missing teeth replaced or broken fillings fixed. Or they may have dentures that need to be cared for. Some older adults may have trouble holding a toothbrush. You can help remind the person you are caring for to brush and floss their teeth or to clean their dentures.  In some cases, you may need to do the brushing and other dental care tasks. People who have trouble using their hands or who have dementia may need this extra help. How can you help with dental care? Normal dental care  To keep the teeth and gums healthy:  Brush the teeth with fluoride toothpaste twice a day--in the morning and at night--and floss at least once a day. Plaque can quickly build up on the teeth of older adults. Watch for the signs of gum disease. These signs include gums that bleed after brushing or after eating hard foods, such as apples. See a dentist regularly. Many experts recommend checkups every 6 months. Keep the dentist up to date on any new medications the person is taking. Encourage a balanced diet that includes whole grains, vegetables, and fruits, and that is low in saturated fat and sodium. Encourage the person you're caring for not to use tobacco products. They can affect dental and general health. Many older adults have a fixed income and feel that they can't afford dental care. But most [facility] and [facility] have programs in which dentists help older adults by lowering fees. Contact your area's public health offices or  for information about dental care in your area. Using a toothbrush  Older adults with arthritis sometimes have trouble brushing their teeth because they can't easily hold the toothbrush. Their hands and fingers may be stiff, painful, or weak. If this is the case, you can: Offer an electric toothbrush. Enlarge the handle of a non-electric toothbrush by wrapping a sponge, an elastic bandage, or adhesive tape around it. Push the toothbrush handle through a ball made of rubber or soft foam.  Make the handle longer and thicker by taping Popsicle sticks or tongue depressors to it. You may also be able to buy special toothbrushes, toothpaste dispensers, and floss holders.   Your doctor may recommend a soft-bristle toothbrush if the person you care for bleeds easily. Bleeding can happen because of a health problem or from certain medicines. A toothpaste for sensitive teeth may help if the person you care for has sensitive teeth. How do you brush and floss someone's teeth? If the person you are caring for has a hard time cleaning their teeth on their own, you may need to brush and floss their teeth for them. It may be easiest to have the person sit and face away from you, and to sit or stand behind them. That way you can steady their head against your arm as you reach around to floss and brush their teeth. Choose a place that has good lighting and is comfortable for both of you. Before you begin, gather your supplies. You will need gloves, floss, a toothbrush, and a container to hold water if you are not near a sink. Wash and dry your hands well and put on gloves. Start by flossing:  Gently work a piece of floss between each of the teeth toward the gums. A plastic flossing tool may make this easier, and they are available at most [facility]. Curve the floss around each tooth into a U-shape and gently slide it under the gum line. Move the floss firmly up and down several times to scrape off the plaque. After you've finished flossing, throw away the used floss and begin brushing:  Wet the brush and apply toothpaste. Place the brush at a 45-degree angle where the teeth meet the gums. Press firmly, and move the brush in small circles over the surface of the teeth. Be careful not to brush too hard. Vigorous brushing can make the gums pull away from the teeth and can scratch the tooth enamel. Brush all surfaces of the teeth, on the tongue side and on the cheek side. Pay special attention to the front teeth and all surfaces of the back teeth. Brush chewing surfaces with short back-and-forth strokes. After you've finished, help the person rinse the remaining toothpaste from their mouth. Where can you learn more?   Go to http://www.[employer].com/ and enter baby monitor. Television closed-captioning. This shows the words at the bottom of the screen. Most new TVs can do this. TTY (text telephone). This lets you type messages back and forth on the telephone instead of talking or listening. These devices are also called TDD. When messages are typed on the keyboard, they are sent over the phone line to a receiving TTY. The message is shown on a monitor. Use text messaging, social media, and email if it is hard for you to communicate by telephone. Try to learn a listening technique called speechreading. It is not lipreading. You pay attention to people's gestures, expressions, posture, and tone of voice. These clues can help you understand what a person is saying. Face the person you are talking to, and have them face you. Make sure the lighting is good. You need to see the other person's face clearly. Think about counseling if you need help to adjust to your hearing loss. When should you call for help? Watch closely for changes in your health, and be sure to contact your doctor if:    You think your hearing is getting worse. You have new symptoms, such as dizziness or nausea. Where can you learn more? Go to http://www.jackson.com/ and enter R798 to learn more about \"Hearing Loss: Care Instructions. \"  Current as of: May 4, 2022               Content Version: 13.5  © 2348-8845 Healthwise, Incorporated. Care instructions adapted under license by [facility] ([facility]). If you have questions about a medical condition or this instruction, always ask your healthcare professional. [place] [address] any warranty or liability for your use of this information. Learning About Activities of Daily Living  What are activities of daily living? Activities of daily living (ADLs) are the basic self-care tasks you do every day. As you age, and if you have health problems, you may find that it's harder to do these things for yourself.  That's when you may need some help. Your doctor uses ADLs to measure how much help you need. Knowing what you can and can't do for yourself is an important first step to getting help. And when you have the help you need, you can stay as independent as possible. Your doctor will want to know if you are able to do tasks such as: Take a bath or shower without help. Go to the bathroom by yourself. Dress and undress without help. Shave, comb your hair, and brush teeth on your own. Get in and out of bed or a chair without help. Feed yourself without help. If you are having trouble doing basic self-care tasks, talk with your doctor. You may want to bring a caregiver or family member who can help the doctor understand your needs and abilities. How will a doctor assess your ADLs? Asking about ADLs is part of a routine health checkup your doctor will likely do as you age. Your health check might be done in a doctor's office, in your home, or at a hospital. The goal is to find out if you are having any problems that could make your health problems worse or that make it unsafe for you to be on your own. To measure your ADLs, your doctor will ask how hard it is for you to do routine tasks. He or she may also want to know if you have changed the way you do a task because of a health problem. He or she may watch how you:  Walk back and forth. Keep your balance while you stand or walk. Move from sitting to standing or from a bed to a chair. Button or unbutton a shirt or sweater. Remove and put on your shoes. It's normal to feel a little worried or anxious if you find you can't do all the things you used to be able to do. Talking with your doctor about ADLs isn't a test that you either pass or fail. It's just a way to get more information about your health and safety. Follow-up care is a key part of your treatment and safety. Be sure to make and go to all appointments, and call your doctor if you are having problems.  It's also a good idea to know your test results and keep a list of the medicines you take. Current as of: October 6, 2021               Content Version: 13.5  © 2006-2022 Healthwise, [employer]. Care instructions adapted under license by [facility] ([facility]). If you have questions about a medical condition or this instruction, always ask your healthcare professional. [address] any warranty or liability for your use of this information. Advance Directives: Care Instructions  Overview  An advance directive is a legal way to state your wishes at the end of your life. It tells your family and your doctor what to do if you can't say what you want. There are two main types of advance directives. You can change them any time your wishes change. Living will. This form tells your family and your doctor your wishes about life support and other treatment. The form is also called a declaration. Medical power of . This form lets you name a person to make treatment decisions for you when you can't speak for yourself. This person is called a health care agent (health care proxy, health care surrogate). The form is also called a durable power of  for health care. If you do not have an advance directive, decisions about your medical care may be made by a family member, or by a doctor or a  who doesn't know you. It may help to think of an advance directive as a gift to the people who care for you. If you have one, they won't have to make tough decisions by themselves. For more information, including forms for your state, see the [address] website (www.caringinfo.org/planning/advance-directives/). Follow-up care is a key part of your treatment and safety. Be sure to make and go to all appointments, and call your doctor if you are having problems. It's also a good idea to know your test results and keep a list of the medicines you take.   What should you include in an advance directive? Many states have a unique advance directive form. (It may ask you to address specific issues.) Or you might use a universal form that's approved by many states. If your form doesn't tell you what to address, it may be hard to know what to include in your advance directive. Use the questions below to help you get started. Who do you want to make decisions about your medical care if you are not able to? What life-support measures do you want if you have a serious illness that gets worse over time or can't be cured? What are you most afraid of that might happen? (Maybe you're afraid of having pain, losing your independence, or being kept alive by machines.)  Where would you prefer to die? (Your home? A hospital? A nursing home?)  Do you want to donate your organs when you die? Do you want certain [facility] practices performed before you die? When should you call for help? Be sure to contact your doctor if you have any questions. Where can you learn more? Go to http://www.jackson.com/ and enter R264 to learn more about \"Advance Directives: Care Instructions. \"  Current as of: June 16, 2022               Content Version: 13.5  © 7770-9910 Healthwise, Incorporated. Care instructions adapted under license by [facility] ([facility]). If you have questions about a medical condition or this instruction, always ask your healthcare professional. [place] [address] any warranty or liability for your use of this information. Personalized Preventive Plan for Shan Becker - 12/12/2022  Medicare offers a range of preventive health benefits. Some of the tests and screenings are paid in full while other may be subject to a deductible, co-insurance, and/or copay. Some of these benefits include a comprehensive review of your medical history including lifestyle, illnesses that may run in your family, and various assessments and screenings as appropriate.     After reviewing your medical record and screening and assessments performed today your provider may have ordered immunizations, labs, imaging, and/or referrals for you. A list of these orders (if applicable) as well as your Preventive Care list are included within your After Visit Summary for your review. Other Preventive Recommendations:    A preventive eye exam performed by an eye specialist is recommended every 1-2 years to screen for glaucoma; cataracts, macular degeneration, and other eye disorders. A preventive dental visit is recommended every 6 months. Try to get at least 150 minutes of exercise per week or 10,000 steps per day on a pedometer . Order or download the FREE \"Exercise & Physical Activity: Your Everyday Guide\" from [employer]. Call 9-656.952.3381 or search [employer] online. You need 6388-5991 mg of calcium and 7137-0898 IU of vitamin D per day. It is possible to meet your calcium requirement with diet alone, but a vitamin D supplement is usually necessary to meet this goal.  When exposed to the sun, use a sunscreen that protects against both UVA and UVB radiation with an SPF of 30 or greater. Reapply every 2 to 3 hours or after sweating, drying off with a towel, or swimming. Always wear a seat belt when traveling in a car. Always wear a helmet when riding a bicycle or motorcycle.

## 2022-12-12 NOTE — PROGRESS NOTES
[address] [facility], [address]  Phone [phone]   Fax [phone]      OFFICE VISIT: 2022    Jennifer Flood-: 1945      HPI  Reason For Visit:  Sagar Garsia is a 78 y.o. Medicare AWV (Discuss if lasix is needed, she was having gout attacks while taking. She was prescribed in the hospital), Referral - General (She would like referral to neuro, she is not please with [facility]. ), Insomnia (Issues sleeping, she will lay in the bed wide awake. ), and Medication Refill (Norco)    Patient presents on follow-up for multiple health issues. She has had multiple electrolyte problems recently. Hypermagnesemia  She has been battling with hypomagnesemia. Most recent labs showed that magnesium levels were markedly elevated. She did have some numbness and tingling in her face and generalized weakness. She is on high-level magnesium replacement therapy. We have backed off on this over the weekend and are rechecking levels today. Hyperkalemia  Potassium was also elevated  She is on potassium supplementation. We have backed off on this as well. We will recheck this today as well. History of transient alteration of consciousness:  She has had multiple episodes without evidence of CNS pathology on imaging studies. She has also had a fairly recent cerebrovascular accident (reportedly). Most recent studies were performed at [facility] in [address] [place] [address] A scan of the brain without contrast on 2022 was relatively unremarkable  MRI of the brain without contrast on 2022, suggested a possible evolving cerebrovascular accident.     Impression    1.. Previously noted foci of diffusion restriction within the right   cerebellar hemisphere are more prominent on the current exam. There are   also now noted to be sites of diffusion restriction within the left   cerebellar hemisphere with associated ADC mapping abnormality consistent   with acute bilateral cerebellar hemisphere infarcts. These are   nonhemorrhagic. There is also a focus of diffusion abnormality within   the right posterior parietal/occipital lobe involving the cortex with   associated ADC mapping abnormality suggesting an additional site of   cortical infarct. A focus of more indeterminate diffusion restriction   within the right frontal vertex involving a gyrus is also present not   appreciated on the previous exam.   2. No mass effect or shift of the midline. No evidence of hemorrhagic   conversion. There are normal flow-voids within the [place]. 3. Atrophy with small vessel disease involving the periventricular and   higher white matter tracts. This report was finalized on 11/17/2022 09:49 by Dr. Marta Estrada MD.  Narrative    EXAMINATION: MRI brain without contrast 11/17/2022     HISTORY: Transient ischemic attack. Stroke follow-up     FINDINGS: Today's exam is compared to a previous study of 3 days   earlier. Multiplanar fast spin echo imaging sequences were obtained of   the brain on a high-field magnet without gadolinium enhancement. There are peripheral sites of diffusion restriction within both   cerebellar hemispheres showing progression from the previous   examination. The left cerebellar hemisphere lesions are new from the   prior study. There are associated ADC mapping abnormalities consistent   with acute ischemic infarction. No evidence of hemorrhagic conversion. There is no mass effect. There is also a peripheral diffusion   restriction within the right posterior parietal/occipital lobe with   associated ADC mapping abnormality also new from the previous exam   suggesting a small focus of cortical infarction. A more equivocal focus   of diffusion restriction within the right frontal lobe gyrus on image   192 of series 204 is present. There is mild atrophy the brain.  Small vessel ischemic changes are noted   involving the periventricular and higher white matter tracts. There is   normal flow-voids within the [place]. The orbits are unremarkable. Seizure Disorder  Medication:   Keppra 500 mg bid  Symptoms: no appreciated recent seizure activity      Diabetes Mellitus Type 2  Diet compliance:  compliant most of the time  Nutrition Consultation Needed:  no  Medication:              Metformin 1000 mg twice daily              Ozempic 0.5mg  subcu weekly              Metformin 1000 mg twice daily with meals      Medication compliance:  compliant most of the time  Weight trend: increasing. Weight is up 7lb in the past 2 weeks. Current exercise: yes - walking  Checkin times daily  Home blood sugar records: fasting range: Low 1 teens  Blood sugar was 121 this morning, fasting  Low BG:  no  Eye exam current (within one year): yes  Checking Feet regularly:  yes - no sores  ACE/ARB:  yes - lisinopril  Aspirin: Yes  She also takes gabapentin for her neuropathy  Tobacco history: She  reports that she has never smoked. She has never used smokeless tobacco.    Lab Results   Component Value Date    LABA1C 5.8 2022    LABA1C 5.6 2022    LABA1C 6.1 (H) 12/10/2021     Lab Results   Component Value Date    LABMICR 1.70 2022    CREATININE 0.7 2022       Hypertension:   BP today was   BP Readings from Last 1 Encounters:   22 116/68      Recent BP readings:    BP Readings from Last 3 Encounters:   22 116/68   22 130/84   11/10/22 136/74     Medication              Amlodipine 5 mg daily              Lisinopril 20 mg daily              Lasix 20 mg daily as needed       Medication compliance:  compliant most of the time  Home blood pressure monitoring: Yes - controlled. She  is some  adherent to a low sodium diet.      Symptoms: none  Laboratory:  Lab Results   Component Value Date    BUN 13 2022    CREATININE 0.7 2022       Hyperlipidemia:   Medication:   atorvastatin (Lipitor) 20 mg nightly   Low Fat, Low Choleterol Diet:  yes - she is trying  Myalgias or GI upset: no  The patient exercises rarely. Laboratory:    Lab Results   Component Value Date    CHOL 131 (L) 12/12/2022    TRIG 106 12/12/2022    HDL 67 12/12/2022    LDLCALC 43 12/12/2022    LDLDIRECT 85 (L) 08/19/2015      Lab Results   Component Value Date    ALT 8 12/12/2022    AST 11 12/12/2022       Hyperuricemia:  Medication:              Allopurinol 300 mg daily  Symptoms: no recent gout symptoms  Most recent uric acid level was 3.7 on 12/10/2021        Migraine headaches:  Medication:              Excedrin Migraine as needed              Fioricet as needed              She had tried some samples of Ubrelvy in the past and this was helpful as well  Symptoms:this is a rare event. B12 deficiency. Medication:              She does get B12 injections. Symptoms:she does feel better when she takes the b12 shots. Osteoarthritis:  Medication:              Meloxicam 15 mg daily (this is helpful)              She also takes tylenol for this prn. She was recently given a prescription for hydrocodone on 12/5/2022 for # 30. She is requesting a refill of this medication today  This was for her acute shoulder pain. This is pending evaluation by MRI and presumed to be a rotator cuff tear. Symptoms: she tolerates. height is 5' 1\" (1.549 m) and weight is 145 lb (65.8 kg). Her temporal temperature is 98.1 °F (36.7 °C). Her blood pressure is 116/68 and her pulse is 80. Her oxygen saturation is 98%. Body mass index is 27.4 kg/m². I have reviewed the following with the Ms. Flood   Lab Review  Orders Only on 12/09/2022   Component Date Value    Magnesium 12/09/2022 3.3 (A)     Sodium 12/09/2022 131 (A)     Potassium 12/09/2022 6.6 (A)     Chloride 12/09/2022 98     CO2 12/09/2022 25     Anion Gap 12/09/2022 8     Glucose 12/09/2022 162 (A)     BUN 12/09/2022 16     Creatinine 12/09/2022 0.8     Est, Glom Filt Rate 12/09/2022 >60     Calcium 12/09/2022 10.0     Total Protein 12/09/2022 6.0 (A)     Albumin 12/09/2022 4.1     Total Bilirubin 12/09/2022 <0.2     Alkaline Phosphatase 12/09/2022 64     ALT 12/09/2022 10     AST 12/09/2022 13    Orders Only on 12/05/2022   Component Date Value    Magnesium 12/05/2022 1.6    Orders Only on 11/10/2022   Component Date Value    Magnesium 11/10/2022 1.5 (A)     Sodium 11/10/2022 140     Potassium 11/10/2022 4.4     Chloride 11/10/2022 100     CO2 11/10/2022 27     Anion Gap 11/10/2022 13     Glucose 11/10/2022 141 (A)     BUN 11/10/2022 7 (A)     Creatinine 11/10/2022 0.6     Est, Glom Filt Rate 11/10/2022 >60     Calcium 11/10/2022 10.4 (A)     Total Protein 11/10/2022 6.8     Albumin 11/10/2022 4.3     Total Bilirubin 11/10/2022 0.4     Alkaline Phosphatase 11/10/2022 74     ALT 11/10/2022 11     AST 11/10/2022 11     WBC 11/10/2022 11.2 (A)     RBC 11/10/2022 3.77 (A)     Hemoglobin 11/10/2022 11.1 (A)     Hematocrit 11/10/2022 35.9 (A)     MCV 11/10/2022 95.2     MCH 11/10/2022 29.4     MCHC 11/10/2022 30.9 (A)     RDW 11/10/2022 13.5     Platelets 37/33/8980 242     MPV 11/10/2022 12.8 (A)     Neutrophils % 11/10/2022 48.0 (A)     Lymphocytes % 11/10/2022 18.0 (A)     Monocytes % 11/10/2022 26.0 (A)     Eosinophils % 11/10/2022 2.0     Basophils % 11/10/2022 2.0 (A)     Neutrophils Absolute 11/10/2022 5.8     Immature Granulocytes # 11/10/2022 0.1     Lymphocytes Absolute 11/10/2022 2.0     Monocytes Absolute 11/10/2022 2.90 (A)     Eosinophils Absolute 11/10/2022 0.22     Basophils Absolute 11/10/2022 0.20     Bands Relative 11/10/2022 4     RBC Morphology 11/10/2022 Normal    Orders Only on 09/23/2022   Component Date Value    Magnesium 09/23/2022 1.6     WBC 09/23/2022 10.9 (A)     RBC 09/23/2022 3.88 (A)     Hemoglobin 09/23/2022 11.5 (A)     Hematocrit 09/23/2022 37.6     MCV 09/23/2022 96.9     MCH 09/23/2022 29.6     MCHC 09/23/2022 30.6 (A)     RDW 09/23/2022 14.2     Platelets 49/10/0266 206     MPV 09/23/2022 13.6 (A)     Neutrophils % 09/23/2022 56.6     Lymphocytes % 09/23/2022 25.3     Monocytes % 09/23/2022 16.7 (A)     Eosinophils % 09/23/2022 0.1     Basophils % 09/23/2022 0.2     Neutrophils Absolute 09/23/2022 6.2     Immature Granulocytes # 09/23/2022 0.1     Lymphocytes Absolute 09/23/2022 2.8     Monocytes Absolute 09/23/2022 1.80 (A)     Eosinophils Absolute 09/23/2022 0.00     Basophils Absolute 09/23/2022 0.00    Orders Only on 09/02/2022   Component Date Value    Magnesium 09/02/2022 1.6     WBC 09/02/2022 9.9     RBC 09/02/2022 4.13 (A)     Hemoglobin 09/02/2022 12.0     Hematocrit 09/02/2022 39.5     MCV 09/02/2022 95.6     MCH 09/02/2022 29.1     MCHC 09/02/2022 30.4 (A)     RDW 09/02/2022 14.6 (A)     Platelets 19/44/0569 189     MPV 09/02/2022 13.0 (A)     Neutrophils % 09/02/2022 54.7     Lymphocytes % 09/02/2022 26.7     Monocytes % 09/02/2022 16.8 (A)     Eosinophils % 09/02/2022 0.2     Basophils % 09/02/2022 0.2     Neutrophils Absolute 09/02/2022 5.4     Immature Granulocytes # 09/02/2022 0.1     Lymphocytes Absolute 09/02/2022 2.6     Monocytes Absolute 09/02/2022 1.70 (A)     Eosinophils Absolute 09/02/2022 0.00     Basophils Absolute 09/02/2022 0.00    Orders Only on 08/26/2022   Component Date Value    Urine Culture, Routine 08/26/2022 <50,000 CFU/ml mixed skin/urogenital monica.  No further workup     Sodium 08/26/2022 137     Potassium 08/26/2022 3.9     Chloride 08/26/2022 101     CO2 08/26/2022 20 (A)     Anion Gap 08/26/2022 16     Glucose 08/26/2022 179 (A)     BUN 08/26/2022 15     Creatinine 08/26/2022 0.9     GFR Non- 08/26/2022 >60     GFR  08/26/2022 >59     Calcium 08/26/2022 9.3     Total Protein 08/26/2022 6.5 (A)     Albumin 08/26/2022 4.6     Total Bilirubin 08/26/2022 <0.2     Alkaline Phosphatase 08/26/2022 61     ALT 08/26/2022 9     AST 08/26/2022 11     WBC 08/26/2022 16.4 (A)     RBC 08/26/2022 3.86 (A)     Hemoglobin 08/26/2022 07/19/2022 0.00     Vitamin B-12 07/19/2022 353     Vit D, 25-Hydroxy 07/19/2022 29.8 (A)      Copies of these are in the chart. Current Outpatient Medications   Medication Sig Dispense Refill    HYDROcodone-acetaminophen (NORCO) 7.5-325 MG per tablet Take 1 tablet by mouth every 6 hours as needed for Pain for up to 10 days. Intended supply: 3 days.  Take lowest dose possible to manage pain 30 tablet 0    furosemide (LASIX) 20 MG tablet Take 1 tablet by mouth daily as needed (swelling) Indications: only takes rarely Only taking PRN 60 tablet 11    Multiple Vitamins-Minerals (MULTI COMPLETE) CAPS Take 1 tablet by mouth daily 30 capsule 11    Insulin Pen Needle (H-E-B INCONTROL PEN NEEDLES) 32G X 4 MM MISC 1 each by Does not apply route daily 000 each 3    folic acid (FOLVITE) 1 MG tablet Take 1 mg by mouth daily      metoprolol succinate (TOPROL XL) 50 MG extended release tablet Take 50 mg by mouth daily      clopidogrel (PLAVIX) 75 MG tablet Take 1 tablet by mouth daily 30 tablet 11    metFORMIN (GLUCOPHAGE) 1000 MG tablet Take 0.5 tablets by mouth in the morning, at noon, and at bedtime 180 tablet 3    lisinopril (PRINIVIL;ZESTRIL) 20 MG tablet Take 1 tablet by mouth daily 90 tablet 3    levETIRAcetam (KEPPRA) 500 MG tablet Take 1 tablet by mouth in the morning and at bedtime 60 tablet 5    blood glucose monitor kit and supplies Cap glucose daily and prn 1 kit 0    blood glucose monitor strips Cap glucose daily and prn 100 strip 3    ondansetron (ZOFRAN-ODT) 4 MG disintegrating tablet Take 1 tablet by mouth 3 times daily as needed for Nausea or Vomiting 21 tablet 0    atorvastatin (LIPITOR) 20 MG tablet TAKE ONE TABLET BY MOUTH NIGHTLY 90 tablet 3    allopurinol (ZYLOPRIM) 300 MG tablet Take 1 tablet by mouth daily 90 tablet 3    vitamin D (ERGOCALCIFEROL) 1.25 MG (67404 UT) CAPS capsule Take 1 capsule by mouth once a week 12 capsule 1    meloxicam (MOBIC) 15 MG tablet Take 1 tablet by mouth daily 90 tablet 3 Calcium-Vitamin D (CALTRATE 600 PLUS-VIT D PO) Take 1 tablet by mouth 2 times daily      calcium carbonate (CALTRATE 600) 1500 (600 Ca) MG TABS tablet Take 1 tablet by mouth daily (Patient not taking: Reported on 2022) 60 tablet 3    potassium chloride (KLOR-CON M) 20 MEQ extended release tablet TAKE ONE TABLET BY MOUTH DAILY (Patient not taking: Reported on 2022) 30 tablet 0    Magnesium 400 MG TABS 4 po bid (Patient not taking: Reported on 2022) 120 tablet 5     Current Facility-Administered Medications   Medication Dose Route Frequency Provider Last Rate Last Admin    dexamethasone (DECADRON) injection 8 mg  8 mg IntraMUSCular Once LAYLA Royal           Allergies: Patient has no known allergies. 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    Type II or unspecified type diabetes mellitus without mention of complication, not stated as uncontrolled        Family History   Problem Relation Age of Onset    Colon Cancer Mother     Colon Polyps Neg Hx     Esophageal Cancer Neg Hx     Liver Disease Neg Hx     Liver Cancer Neg Hx     Rectal Cancer Neg Hx     Stomach Cancer Neg Hx        Past Surgical History:   Procedure Laterality Date    APPENDECTOMY       SECTION      x2    CHOLECYSTECTOMY      COLONOSCOPY      COLONOSCOPY  18    Dr Merline Marvel [facility] co)-Tubular AP (-) dysplasia x 1, 5 yr recall    HERNIA REPAIR      She has had multiple hernia surgeries; x4    HERNIA REPAIR      pt states she's had difficulty with mesh.     HYSTERECTOMY, TOTAL ABDOMINAL (CERVIX REMOVED)      OVARY REMOVAL Bilateral     age 46    UMBILICAL HERNIA REPAIR N/A 2019    INCISIONAL HERNIA REPAIR WITH BIOLOGIC MESH performed by Isiah Chambers MD at [facility]       Social History     Tobacco Use    Smoking status: Never Smokeless tobacco: Never   Substance Use Topics    Alcohol use: No        Review of Systems   Constitutional:  Positive for activity change. Negative for appetite change, fatigue, fever and unexpected weight change. HENT:  Negative for congestion, postnasal drip and sore throat. Respiratory:  Negative for cough, shortness of breath and wheezing. Cardiovascular:  Positive for leg swelling. Negative for chest pain and palpitations. Gastrointestinal:  Negative for abdominal pain (hard area in ventral repair area), diarrhea, nausea and vomiting. Genitourinary:  Negative for difficulty urinating. Musculoskeletal:  Positive for arthralgias, neck pain and neck stiffness (right trapezius muscle). Skin:  Negative for rash. Hematological:  Does not bruise/bleed easily. Psychiatric/Behavioral:  Positive for sleep disturbance (due to pain). Negative for behavioral problems and self-injury. The patient is not nervous/anxious and is not hyperactive. Physical Exam  Vitals reviewed. Constitutional:       General: She is not in acute distress. Appearance: She is well-developed. She is obese. She is not toxic-appearing. Comments: Body habitus is ob, but rapidly losing weight. HENT:      Head: Normocephalic and atraumatic. Right Ear: Hearing, tympanic membrane, ear canal and external ear normal.      Left Ear: Hearing, tympanic membrane, ear canal and external ear normal.      Nose: Nose normal.      Mouth/Throat:      Mouth: Mucous membranes are moist.      Pharynx: Oropharynx is clear. Eyes:      General: Lids are normal.      Extraocular Movements: Extraocular movements intact. Conjunctiva/sclera: Conjunctivae normal.      Pupils: Pupils are equal, round, and reactive to light. Neck:      Thyroid: No thyromegaly. Vascular: No carotid bruit or JVD. Trachea: Phonation normal.   Cardiovascular:      Rate and Rhythm: Normal rate and regular rhythm.  No extrasystoles are present. Chest Wall: PMI is not displaced. Pulses: Normal pulses. Heart sounds: Normal heart sounds. No murmur heard. No friction rub. No gallop. Pulmonary:      Effort: Pulmonary effort is normal. No respiratory distress. Breath sounds: Normal breath sounds. No wheezing, rhonchi or rales. Abdominal:      General: Bowel sounds are normal. There is no distension. Palpations: Abdomen is soft. There is no mass. Tenderness: There is no abdominal tenderness. Genitourinary:     Comments: Examination deferred  Musculoskeletal:         General: Tenderness (over bilateral knees  ) present. No swelling. Normal range of motion. Cervical back: Normal range of motion and neck supple. Right lower leg: Edema (trace to 1+) present. Left lower leg: Edema (trace to 1+) present. Comments: Joint examination reveals no acute arthritis or synovitis. Lymphadenopathy:      Cervical: No cervical adenopathy. Skin:     General: Skin is warm and dry. Capillary Refill: Capillary refill takes less than 2 seconds. Findings: No rash. Neurological:      General: No focal deficit present. Mental Status: She is alert and oriented to person, place, and time. Cranial Nerves: No cranial nerve deficit (by gross examination). Sensory: No sensory deficit. Motor: No tremor or atrophy. Gait: Gait normal.      Comments: No focal deficits appreciated   Psychiatric:         Mood and Affect: Mood normal.         Speech: Speech normal.         Behavior: Behavior normal. Behavior is cooperative. ASSESSMENT      ICD-10-CM    1. Type 2 diabetes mellitus with diabetic polyneuropathy, without long-term current use of insulin ([facility])  E11.42 Comprehensive Metabolic Panel     Hemoglobin A1C     Microalbumin / Creatinine Urine Ratio      2. Fall, initial encounter  [address] XXXA HYDROcodone-acetaminophen (NORCO) 7.5-325 MG per tablet      3.  Acute pain of right shoulder  M25.511 HYDROcodone-acetaminophen (NORCO) 7.5-325 MG per tablet      4. Trapezius muscle strain, right, sequela  S46.811S HYDROcodone-acetaminophen (NORCO) 7.5-325 MG per tablet      5. Primary hypertension  I10 Comprehensive Metabolic Panel     CBC with Auto Differential     Microalbumin / Creatinine Urine Ratio      6. Mixed hyperlipidemia  E78.2 Comprehensive Metabolic Panel     Lipid Panel      7. Hyperuricemia  E79.0       8. Magnesium deficiency  E61.2       9. Cerebral infarction due to thrombosis of middle cerebral artery, unspecified blood vessel laterality [facility])  I63.319 Gregory Smyth MD, Neurology, [place]      10. TIA (transient ischemic attack)  G45.9 Gregory Smyth MD, Neurology, [place]      11. B12 deficiency  E53.8       12. Hyperkalemia  E87.5 Comprehensive Metabolic Panel      13. Hypomagnesemia  E83.42 Comprehensive Metabolic Panel     Magnesium      14. Fatigue, unspecified type  R53.83 TSH     T4, Free      15. Medicare annual wellness visit, subsequent  Z00.00       16. Seizure disorder [facility])  Ana Liriano MD, Neurology, [place]      17. Primary osteoarthritis of left knee  M17.12 triamcinolone acetonide (KENALOG-40) injection 40 mg     DC DRAIN/INJECT LARGE JOINT/BURSA      18. Primary osteoarthritis of right knee  M17.11 triamcinolone acetonide (KENALOG-40) injection 40 mg     AMB POC US DRAIN/INJECT LARGE JOINT/BURSA            PLAN    1. Fall, initial encounter  She does have a significant amount of pain associated with her recent falls. These are at least contributed to by her electrolyte abnormalities. Hopefully dialing in in that regard to normalize these levels. Should result in decreased risk of falling  - HYDROcodone-acetaminophen (NORCO) 7.5-325 MG per tablet; Take 1 tablet by mouth every 6 hours as needed for Pain for up to 10 days. Intended supply: 3 days. Take lowest dose possible to manage pain  Dispense: 30 tablet; Refill: 0    2.  Acute pain of right shoulder  Treat pain acutely given her recent injury  - HYDROcodone-acetaminophen (NORCO) 7.5-325 MG per tablet; Take 1 tablet by mouth every 6 hours as needed for Pain for up to 10 days. Intended supply: 3 days. Take lowest dose possible to manage pain  Dispense: 30 tablet; Refill: 0    3. Trapezius muscle strain, right, sequela  Treat pain acutely as above  - HYDROcodone-acetaminophen (NORCO) 7.5-325 MG per tablet; Take 1 tablet by mouth every 6 hours as needed for Pain for up to 10 days. Intended supply: 3 days. Take lowest dose possible to manage pain  Dispense: 30 tablet; Refill: 0    4. Type 2 diabetes mellitus with diabetic polyneuropathy, without long-term current use of insulin ([facility])  Blood sugars are excellently controlled on present medication regimen. We will continue the same.  - Comprehensive Metabolic Panel; Future  - Hemoglobin A1C; Future  - Microalbumin / Creatinine Urine Ratio; Future    5. Primary hypertension  Blood pressure is excellently controlled on present medication regimen. We will continue the same and monitor levels  - Comprehensive Metabolic Panel; Future  - CBC with Auto Differential; Future  - Microalbumin / Creatinine Urine Ratio; Future    6. Mixed hyperlipidemia  Lipids have historically been well controlled. Continue to monitor and treat accordingly  - Comprehensive Metabolic Panel; Future  - Lipid Panel; Future    7. Hyperuricemia  Continue allopurinol as this has corrected her problem and she is no longer having any gout symptoms    8. Magnesium deficiency  Continue to adjust magnesium levels based upon laboratory studies. She has blood work being done today    9. Cerebral infarction due to thrombosis of middle cerebral artery, unspecified blood vessel laterality (HCC)  Treating risk factors as well as anticoagulation. We will refer to neurology at [address] [place] [address], Lilliam Acevedo MD, Neurology, [place]    10.  TIA (transient ischemic attack)  As above  - Michelle Payan MD, Neurology, [place]    11. B12 deficiency  Replace B12    12. Hyperkalemia  Monitor and adjust potassium in order to obtain normal levels  - Comprehensive Metabolic Panel; Future    13. Hypomagnesemia  Adjusting magnesium as above  - Comprehensive Metabolic Panel; Future  - Magnesium; Future    14. Fatigue, unspecified type  Look for metabolic cause, in particular thyroid  - TSH; Future  - T4, Free; Future    15. Medicare annual wellness visit, subsequent  Routine age-appropriate anticipatory guidance was provided. Annual wellness visit was performed in a separate note and issues were addressed as identified. 16. Seizure disorder ([facility] [address]  Continue present seizure medication and refer to neurology  - Michelle Payan MD, Neurology, [place]      Orders Placed This Encounter   Procedures    AMB POC US DRAIN/INJECT LARGE JOINT/BURSA    Comprehensive Metabolic Panel    Magnesium    CBC with Auto Differential    Hemoglobin A1C    Lipid Panel    Microalbumin / Creatinine Urine Ratio    TSH    T4, Free    Michelle Payan MD, Neurology, [place]    [place] DRAIN/INJECT LARGE JOINT/BURSA          Return in 1 month (on 1/12/2023) for Medicare Annual Wellness Visit in 1 year, 27. This was an in-house visit          Medicare Annual Wellness Visit    Rain Fulton is here for Medicare AWV (Discuss if lasix is needed, she was having gout attacks while taking. She was prescribed in the hospital), Referral - General (She would like referral to neuro, she is not please with [facility]. ), Insomnia (Issues sleeping, she will lay in the bed wide awake. ), and Medication Refill (Norco)    Assessment & Plan   Type 2 diabetes mellitus with diabetic polyneuropathy, without long-term current use of insulin ([facility] [address]  -     Comprehensive Metabolic Panel; Future  -     Hemoglobin A1C; Future  -     Microalbumin / Creatinine Urine Ratio;  Future  Fall, initial encounter  -     HYDROcodone-acetaminophen (NORCO) 7.5-325 MG per tablet; Take 1 tablet by mouth every 6 hours as needed for Pain for up to 10 days. Intended supply: 3 days. Take lowest dose possible to manage pain, Disp-30 tablet, R-0Normal  Acute pain of right shoulder  -     HYDROcodone-acetaminophen (NORCO) 7.5-325 MG per tablet; Take 1 tablet by mouth every 6 hours as needed for Pain for up to 10 days. Intended supply: 3 days. Take lowest dose possible to manage pain, Disp-30 tablet, R-0Normal  Trapezius muscle strain, right, sequela  -     HYDROcodone-acetaminophen (NORCO) 7.5-325 MG per tablet; Take 1 tablet by mouth every 6 hours as needed for Pain for up to 10 days. Intended supply: 3 days. Take lowest dose possible to manage pain, Disp-30 tablet, R-0Normal  Primary hypertension  -     Comprehensive Metabolic Panel; Future  -     CBC with Auto Differential; Future  -     Microalbumin / Creatinine Urine Ratio; Future  Mixed hyperlipidemia  -     Comprehensive Metabolic Panel; Future  -     Lipid Panel; Future  Hyperuricemia  Magnesium deficiency  Cerebral infarction due to thrombosis of middle cerebral artery, unspecified blood vessel laterality [facility])  -     Ruth Reno MD, Neurology, [place]  TIA (transient ischemic attack)  -     Ruth Reno MD, Neurology, [place]  B12 deficiency  Hyperkalemia  -     Comprehensive Metabolic Panel; Future  Hypomagnesemia  -     Comprehensive Metabolic Panel; Future  -     Magnesium; Future  Fatigue, unspecified type  -     TSH;  Future  -     T4, Free; Future  Medicare annual wellness visit, subsequent  Seizure disorder ([facility] [address]  -     Ruth Reno MD, Neurology, [place]  Primary osteoarthritis of left knee  -     triamcinolone acetonide (KENALOG-40) injection 40 mg; 40 mg, Intra-artICUlar, ONCE, 1 dose, On Tue 12/13/22 at 0830  -     [place] DRAIN/INJECT LARGE JOINT/BURSA  Primary osteoarthritis of right knee  -     triamcinolone acetonide (KENALOG-40) injection 40 mg; 40 mg, Intra-artICUlar, ONCE, 1 dose, On Tue 12/13/22 at 0830  -     AMB POC US DRAIN/INJECT LARGE JOINT/BURSA    Recommendations for Preventive Services Due: see orders and patient instructions/AVS.  Recommended screening schedule for the next 5-10 years is provided to the patient in written form: see Patient Instructions/AVS.     Return in 1 month (on 1/12/2023) for Medicare Annual Wellness Visit in 1 year, 27. Subjective   The following acute and/or chronic problems were also addressed today:    Patient's complete Health Risk Assessment and screening values have been reviewed and are found in Flowsheets. The following problems were reviewed today and where indicated follow up appointments were made and/or referrals ordered. Positive Risk Factor Screenings with Interventions:    Fall Risk:  Do you feel unsteady or are you worried about falling? : no  2 or more falls in past year?: (!) yes  Fall with injury in past year?: (!) yes     Interventions:    See AVS for additional education material  See A/P for plan and any pertinent orders             Opioid Risk: (Low risk score <55) Opioid risk score: 17    Patient is low risk for opioid use disorder or overdose. Last PDMP Mariaelena Frankel as Reviewed:  Review User Review Instant Review Result   Olimpia Castillo. 11/10/2022 10:25 AM     Reviewed PDMP [1]     Last Controlled Substance Monitoring Documentation      [address] Office Visit from 11/10/2022 in [address] [place]   Periodic Controlled Substance Monitoring Possible medication side effects, risk of tolerance/dependence & alternative treatments discussed., No signs of potential drug abuse or diversion identified. , Assessed functional status.  filed at 11/10/2022 1025   Chronic Pain > 50 MEDD Considered consultation with a specialist. filed at 11/10/2022 1025              Weight and Activity:  Physical Activity: Inactive    Days of Exercise per Week: 0 days    Minutes of Exercise per Session: 0 min     On average, how many days per week do you engage in moderate to strenuous exercise (like a brisk walk)?: 0 days  Have you lost any weight without trying in the past 3 months?: No  Body mass index: (!) 27.39    Inactivity Interventions:  See AVS for additional education material  See A/P for plan and any pertinent orders      Dentist Screen:  Have you seen the dentist within the past year?: (!) No    Intervention:  See AVS for additional education material  See A/P for any pertinent orders       ADL's:   Patient reports needing help with:  Select all that apply: (!) Laundry, Housekeeping, Food Preparation (due to shoulder pain)  Interventions:  See AVS for additional education material  See A/P for plan and any pertinent orders    Advanced Directives:  Do you have a Living Will?: (!) No            Objective   Vitals:    12/12/22 0817   BP: 116/68   Site: Left Upper Arm   Position: Sitting   Cuff Size: Large Adult   Pulse: 80   Temp: 98.1 °F (36.7 °C)   TempSrc: Temporal   SpO2: 98%   Weight: 145 lb (65.8 kg)   Height: 5' 1\" (1.549 m)      Body mass index is 27.4 kg/m². Physical exam  Physical exam as documented elsewhere in a separate note      No Known Allergies  Prior to Visit Medications    Medication Sig Taking? Authorizing Provider   HYDROcodone-acetaminophen (NORCO) 7.5-325 MG per tablet Take 1 tablet by mouth every 6 hours as needed for Pain for up to 10 days. Intended supply: 3 days.  Take lowest dose possible to manage pain Yes NICOLE Hou,    furosemide (LASIX) 20 MG tablet Take 1 tablet by mouth daily as needed (swelling) Indications: only takes rarely Only taking PRN Yes Edward Núñezg APRN   Multiple Vitamins-Minerals (MULTI COMPLETE) CAPS Take 1 tablet by mouth daily Yes Edward Long APRN   Insulin Pen Needle (H-E-B INCONTROL PEN NEEDLES) 32G X 4 MM MISC 1 each by Does not apply route daily Yes Edward Long APRSHAGUFTA   folic acid (FOLVITE) 1 MG tablet Take 1 mg by mouth daily Yes Historical Provider, MD   metoprolol succinate (TOPROL XL) 50 MG extended release tablet Take 50 mg by mouth daily Yes Historical Provider, MD   clopidogrel (PLAVIX) 75 MG tablet Take 1 tablet by mouth daily Yes LAYLA Ventura   metFORMIN (GLUCOPHAGE) 1000 MG tablet Take 0.5 tablets by mouth in the morning, at noon, and at bedtime Yes LAYLA Ventura   lisinopril (PRINIVIL;ZESTRIL) 20 MG tablet Take 1 tablet by mouth daily Yes LAYLA Ventura   levETIRAcetam (KEPPRA) 500 MG tablet Take 1 tablet by mouth in the morning and at bedtime Yes LAYLA Ventura   blood glucose monitor kit and supplies Cap glucose daily and prn Yes LAYLA Ventura   blood glucose monitor strips Cap glucose daily and prn Yes LAYLA Ventura   ondansetron (ZOFRAN-ODT) 4 MG disintegrating tablet Take 1 tablet by mouth 3 times daily as needed for Nausea or Vomiting Yes LAYLA Ventura   atorvastatin (LIPITOR) 20 MG tablet TAKE ONE TABLET BY MOUTH NIGHTLY Yes LAYLA Melendez   allopurinol (ZYLOPRIM) 300 MG tablet Take 1 tablet by mouth daily Yes NICOLE Hou DO   vitamin D (ERGOCALCIFEROL) 1.25 MG (90564 UT) CAPS capsule Take 1 capsule by mouth once a week Yes LAYLA Ventura   meloxicam (MOBIC) 15 MG tablet Take 1 tablet by mouth daily Yes LAYLA Ventura   Calcium-Vitamin D (CALTRATE 600 PLUS-VIT D PO) Take 1 tablet by mouth 2 times daily Yes Historical Provider, MD   calcium carbonate (CALTRATE 600) 1500 (600 Ca) MG TABS tablet Take 1 tablet by mouth daily  Patient not taking: Reported on 12/12/2022  LAYLA Melendez   potassium chloride (KLOR-CON M) 20 MEQ extended release tablet TAKE ONE TABLET BY MOUTH DAILY  Patient not taking: Reported on 12/12/2022  NICOLE Hou DO   Magnesium 400 MG TABS 4 po bid  Patient not taking: Reported on 12/12/2022  LAYLA Ventura       CareTeam (Including outside providers/suppliers regularly involved in providing care):   Patient Care Team:  Bianka Carpenter DO as PCP - General   Krystyna Valera DO as PCP - [facility] Empaneled Provider     Reviewed and updated this visit:  Tobacco  Allergies  Meds  Med Hx  Surg Hx  Soc Hx  Fam Hx           This was an in-house visit      Knee Arthrocentesis with Injection Procedure Note    Pre-operative Diagnosis: right knee djd with pain    Post-operative Diagnosis: same    Indications: Symptom relief from osteoarthritis    Anesthesia: not required     Procedure Details     Verbal consent was obtained for the procedure. The joint was prepped with chlorhexadine. The area was sprayed with Gebauer's Ethyl Chloride as a topical anesthetic and then a 22 gauge needle was inserted into the superior aspect of the joint from a lateral approach. 2 ml 1% lidocaine and 2 ml of DepoMedrol (40mg/ml) and 1ml Kenalog (40mg/ml)  was then injected into the joint. The needle was removed and the area cleansed and dressed. Complications:  None; patient tolerated the procedure well. Knee Arthrocentesis with Injection Procedure Note    Pre-operative Diagnosis: left knee djd with pain    Post-operative Diagnosis: same    Indications: Symptom relief from osteoarthritis    Anesthesia: not required     Procedure Details     Verbal consent was obtained for the procedure. The joint was prepped with chlorhexadine. The area was sprayed with Gebauer's Ethyl Chloride as a topical anesthetic and then a 22 gauge needle was inserted into the superior aspect of the joint from a lateral approach. 2 ml 1% lidocaine and 2 ml of DepoMedrol (40mg/ml) and 1ml Kenalog (40mg/ml)  was then injected into the joint. The needle was removed and the area cleansed and dressed. Complications:  None; patient tolerated the procedure well.

## 2022-12-12 NOTE — TELEPHONE ENCOUNTER
----- Message from LAYLA Ventura sent at 12/9/2022  4:38 PM CST -----  Magnesium now high  Hold for a day  And go back down to 2 twice a day   Recheck Monday

## 2022-12-13 ENCOUNTER — TELEPHONE (OUTPATIENT)
Dept: PRIMARY CARE CLINIC | Age: 77
End: 2022-12-13

## 2022-12-13 DIAGNOSIS — Z00.00 ROUTINE ADULT HEALTH MAINTENANCE: Primary | ICD-10-CM

## 2022-12-13 DIAGNOSIS — E61.2 MAGNESIUM DEFICIENCY: Primary | ICD-10-CM

## 2022-12-13 RX ORDER — TRIAMCINOLONE ACETONIDE 40 MG/ML
40 INJECTION, SUSPENSION INTRA-ARTICULAR; INTRAMUSCULAR ONCE
Status: COMPLETED | OUTPATIENT
Start: 2022-12-13 | End: 2022-12-13

## 2022-12-13 RX ADMIN — TRIAMCINOLONE ACETONIDE 40 MG: 40 INJECTION, SUSPENSION INTRA-ARTICULAR; INTRAMUSCULAR at 08:11

## 2022-12-13 RX ADMIN — TRIAMCINOLONE ACETONIDE 40 MG: 40 INJECTION, SUSPENSION INTRA-ARTICULAR; INTRAMUSCULAR at 08:09

## 2022-12-13 NOTE — TELEPHONE ENCOUNTER
We can try 1 magnesium twice a day and recheck magnesium on Thursday or Friday morning. Hopefully this will get you in the normal range but not make you feel strange. We discussed difficulty sleeping and you felt it was the pain that was keeping her from sleeping. We prescribed the hydrocodone that you can take at bedtime to help with the pain and hopefully help you sleep.

## 2022-12-13 NOTE — TELEPHONE ENCOUNTER
Pt daughter called stating she got prescribed to take 2 mg of Magnesium day and night, she started today and she stated her eyes are feeling funny like when she had her past stroke. Pt stated she doesn't think she having a stroke though they would like to know if she needs to continue or not. .      Pt also had an appt yesterday and got told a prescription for sleeping medication will get called in and it has not yet, is this something she is going to get?

## 2022-12-13 NOTE — TELEPHONE ENCOUNTER
----- Message from [address] [facility],[address], DO sent at 12/12/2022  7:51 PM CST -----  There is a mild amount of protein in the urine. This is much better than it was a year ago. Thyroid values are normal.  CBC shows a relatively stable anemia of nonspecific type. Lipids are excellently controlled. Magnesium is now low. Recommend restarting magnesium at half the dose previously. Potassium remains elevated. Recommend continue to hold potassium and increase water consumption  Recheck metabolic profile in 1 week. Also check magnesium in 1 week.   Hemoglobin A1c is 5.8 which indicates excellent blood sugar control over the past 3 months

## 2022-12-14 ENCOUNTER — TELEPHONE (OUTPATIENT)
Dept: NEUROSURGERY | Age: 77
End: 2022-12-14

## 2022-12-16 DIAGNOSIS — Z00.00 ROUTINE ADULT HEALTH MAINTENANCE: ICD-10-CM

## 2022-12-16 DIAGNOSIS — E61.2 MAGNESIUM DEFICIENCY: ICD-10-CM

## 2022-12-16 LAB
ANION GAP SERPL CALCULATED.3IONS-SCNC: 13 MMOL/L (ref 7–19)
BUN BLDV-MCNC: 14 MG/DL (ref 8–23)
CALCIUM SERPL-MCNC: 10.2 MG/DL (ref 8.8–10.2)
CHLORIDE BLD-SCNC: 94 MMOL/L (ref 98–111)
CO2: 24 MMOL/L (ref 22–29)
CREAT SERPL-MCNC: 0.7 MG/DL (ref 0.5–0.9)
GFR SERPL CREATININE-BSD FRML MDRD: >60 ML/MIN/{1.73_M2}
GLUCOSE BLD-MCNC: 229 MG/DL (ref 74–109)
MAGNESIUM: 1.4 MG/DL (ref 1.6–2.4)
POTASSIUM SERPL-SCNC: 4.1 MMOL/L (ref 3.5–5)
SODIUM BLD-SCNC: 131 MMOL/L (ref 136–145)

## 2022-12-19 ENCOUNTER — TELEPHONE (OUTPATIENT)
Dept: PRIMARY CARE CLINIC | Age: 77
End: 2022-12-19

## 2022-12-19 DIAGNOSIS — E61.2 MAGNESIUM DEFICIENCY: Primary | ICD-10-CM

## 2022-12-19 NOTE — TELEPHONE ENCOUNTER
----- Message from LAYLA Ventura sent at 12/19/2022  8:22 AM CST -----  Cmp noted low sodium suggest some v8 if patient likes it   Limit water intake some   K normal  Mag 1.4 two extra mag pills today then return to usual mag twice a day recheck Thursday

## 2022-12-27 ENCOUNTER — TELEPHONE (OUTPATIENT)
Dept: PRIMARY CARE CLINIC | Age: 77
End: 2022-12-27

## 2022-12-27 DIAGNOSIS — E61.2 MAGNESIUM DEFICIENCY: Primary | ICD-10-CM

## 2022-12-27 DIAGNOSIS — M25.511 ACUTE PAIN OF RIGHT SHOULDER: ICD-10-CM

## 2022-12-27 DIAGNOSIS — S46.811S TRAPEZIUS MUSCLE STRAIN, RIGHT, SEQUELA: ICD-10-CM
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
## 2022-12-27 DIAGNOSIS — R79.0 LOW MAGNESIUM LEVEL: Primary | ICD-10-CM

## 2022-12-27 LAB — MAGNESIUM: 1.7 MG/DL

## 2022-12-27 RX ORDER — HYDROCODONE BITARTRATE AND ACETAMINOPHEN 7.5; 325 MG/1; MG/1
1 TABLET ORAL EVERY 6 HOURS PRN
Qty: 30 TABLET | Refills: 0 | Status: SHIPPED | OUTPATIENT
Start: 2022-12-27 | End: 2023-01-06

## 2022-12-27 NOTE — TELEPHONE ENCOUNTER
----- Message from LAYLA Enciso sent at 12/27/2022  2:45 PM CST -----  Magnesium slightly low  Recheck in am tomorrow

## 2022-12-27 NOTE — TELEPHONE ENCOUNTER
Received fax from pharmacy requesting refill on pts medication(s). Pt was last seen in office on 12/12/2022  and has a follow up scheduled for 1/9/2023. Will send request to  Dr. Benedicto Ruano  for patient. Requested Prescriptions     Pending Prescriptions Disp Refills    HYDROcodone-acetaminophen (NORCO) 7.5-325 MG per tablet 30 tablet 0     Sig: Take 1 tablet by mouth every 6 hours as needed for Pain for up to 10 days. Intended supply: 3 days.  Take lowest dose possible to manage pain

## 2022-12-28 ENCOUNTER — HOSPITAL ENCOUNTER (OUTPATIENT)
Dept: MRI IMAGING | Age: 77
Discharge: HOME | End: 2022-12-28
Payer: MEDICARE
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
## 2022-12-28 DIAGNOSIS — M25.511 ACUTE PAIN OF RIGHT SHOULDER: ICD-10-CM

## 2022-12-28 PROCEDURE — 73221 MRI JOINT UPR EXTREM W/O DYE: CPT

## 2022-12-29 ENCOUNTER — TELEPHONE (OUTPATIENT)
Dept: PRIMARY CARE CLINIC | Age: 77
End: 2022-12-29

## 2022-12-29 DIAGNOSIS — R79.0 LOW MAGNESIUM LEVEL: ICD-10-CM

## 2022-12-29 LAB — MAGNESIUM: 1.6 MG/DL (ref 1.6–2.4)

## 2022-12-29 NOTE — TELEPHONE ENCOUNTER
office called to get pts MRI report for her referral. It was done yesterday, but it has not been read yet.   Will fax when it is completed to [phone]

## 2022-12-30 ENCOUNTER — TELEPHONE (OUTPATIENT)
Dept: PRIMARY CARE CLINIC | Age: 77
End: 2022-12-30

## 2022-12-30 DIAGNOSIS — E55.9 VITAMIN D DEFICIENCY: ICD-10-CM

## 2022-12-30 RX ORDER — ERGOCALCIFEROL 1.25 MG/1
50000 CAPSULE ORAL WEEKLY
Qty: 12 CAPSULE | Refills: 1 | Status: SHIPPED | OUTPATIENT
Start: 2022-12-30

## 2022-12-30 NOTE — TELEPHONE ENCOUNTER
Received fax from pharmacy requesting refill on pts medication(s). Pt was last seen in office on 12/12/2022  and has a follow up scheduled for 1/9/2023. Will send request to  Sheron Chang  for authorization.      Requested Prescriptions     Pending Prescriptions Disp Refills    vitamin D (ERGOCALCIFEROL) 1.25 MG (59178 UT) CAPS capsule [Pharmacy Med Name: Vitamin D2 1,250 mcg (50,000 unit) capsule] 12 capsule 1     Sig: Take 1 capsule by mouth once a week

## 2022-12-30 NOTE — TELEPHONE ENCOUNTER
----- Message from LAYLA Del Real sent at 12/29/2022  4:33 PM CST -----  MRI shoulder noted a tear at insertion of supraspinatus tendon   With hypertrophic changes  Please send to her ortho at [facility] or wherever she is going for shoulder care

## 2022-12-30 NOTE — TELEPHONE ENCOUNTER
Called patient, spoke with: Patient regarding the results of the patients most recent MRI. I advised Patient of Dr. Lisette Aleman recommendations.    Patient did voice understanding

## 2023-01-05 LAB — MAGNESIUM: 1.5 MG/DL

## 2023-01-06 ENCOUNTER — TELEPHONE (OUTPATIENT)
Dept: PRIMARY CARE CLINIC | Age: 78
End: 2023-01-06

## 2023-01-06 NOTE — TELEPHONE ENCOUNTER
----- Message from LAYLA Roberts sent at 1/6/2023  4:01 PM CST -----  Magnesium low  Miss any doses ?   Avoid diarrhea  Take mag ox daily for 5 days and recheck level  Along with cmp

## 2023-01-09 ENCOUNTER — OFFICE VISIT (OUTPATIENT)
Dept: PRIMARY CARE CLINIC | Age: 78
End: 2023-01-09
Payer: MEDICARE

## 2023-01-09 VITALS
WEIGHT: 142 LBS | HEART RATE: 94 BPM | TEMPERATURE: 98.2 F | OXYGEN SATURATION: 96 % | BODY MASS INDEX: 26.81 KG/M2 | DIASTOLIC BLOOD PRESSURE: 80 MMHG | SYSTOLIC BLOOD PRESSURE: 132 MMHG | HEIGHT: 61 IN

## 2023-01-09 DIAGNOSIS — E78.2 MIXED HYPERLIPIDEMIA: ICD-10-CM

## 2023-01-09 DIAGNOSIS — I10 PRIMARY HYPERTENSION: ICD-10-CM

## 2023-01-09 DIAGNOSIS — G89.29 CHRONIC RIGHT SHOULDER PAIN: Primary | ICD-10-CM

## 2023-01-09 DIAGNOSIS — E83.42 HYPOMAGNESEMIA: ICD-10-CM

## 2023-01-09 DIAGNOSIS — E11.42 TYPE 2 DIABETES MELLITUS WITH DIABETIC POLYNEUROPATHY, WITHOUT LONG-TERM CURRENT USE OF INSULIN (HCC): ICD-10-CM

## 2023-01-09 DIAGNOSIS — E87.8 ELECTROLYTE ABNORMALITY: ICD-10-CM

## 2023-01-09 DIAGNOSIS — M25.511 CHRONIC RIGHT SHOULDER PAIN: Primary | ICD-10-CM

## 2023-01-09 LAB — MAGNESIUM: 1.5 MG/DL (ref 1.6–2.4)

## 2023-01-09 PROCEDURE — 3079F DIAST BP 80-89 MM HG: CPT | Performed by: PEDIATRICS

## 2023-01-09 PROCEDURE — 99214 OFFICE O/P EST MOD 30 MIN: CPT | Performed by: PEDIATRICS

## 2023-01-09 PROCEDURE — 1123F ACP DISCUSS/DSCN MKR DOCD: CPT | Performed by: PEDIATRICS

## 2023-01-09 PROCEDURE — 3075F SYST BP GE 130 - 139MM HG: CPT | Performed by: PEDIATRICS

## 2023-01-09 RX ORDER — HYDROCODONE BITARTRATE AND ACETAMINOPHEN 7.5; 325 MG/1; MG/1
1 TABLET ORAL EVERY 8 HOURS PRN
Qty: 90 TABLET | Refills: 0 | Status: SHIPPED | OUTPATIENT
Start: 2023-01-09 | End: 2023-02-08

## 2023-01-09 ASSESSMENT — ENCOUNTER SYMPTOMS
WHEEZING: 0
ABDOMINAL PAIN: 0
DIARRHEA: 0
VOMITING: 0
SORE THROAT: 0
SHORTNESS OF BREATH: 0
NAUSEA: 0
COUGH: 0

## 2023-01-09 ASSESSMENT — PATIENT HEALTH QUESTIONNAIRE - PHQ9
SUM OF ALL RESPONSES TO PHQ QUESTIONS 1-9: 0
1. LITTLE INTEREST OR PLEASURE IN DOING THINGS: 0
2. FEELING DOWN, DEPRESSED OR HOPELESS: 0
SUM OF ALL RESPONSES TO PHQ9 QUESTIONS 1 & 2: 0
SUM OF ALL RESPONSES TO PHQ QUESTIONS 1-9: 0

## 2023-01-09 NOTE — PROGRESS NOTES
[facility], [address]  Phone [phone]   Fax [phone]      OFFICE VISIT: 2023    Farida Flood-: 1945      [facility]  Reason For Visit:  Turner Arriaga is a 78 y.o.     1 Month Follow-Up (Patient seen Dr. Marco Negro this morning, stated she would need a total shoulder replacement on right shoulder. She doesn't think she would like to go through with surgery. She states she is able to tolerate the pain when taking the norco daily. ) and Medication Refill (norco)    Patient presents on follow up. She is needing a right shoulder replacement. She is unsure if she will tolerate the surgery. Electrolyte abnormalities:  Mg is now on the low side. Alteration of consciousness: This is still undefined. Diabetes Mellitus Type 2  Diet compliance:  compliant most of the time  Nutrition Consultation Needed:  no  Medication:              Metformin 1000 mg twice daily              Ozempic 0.5mg  subcu weekly              Metformin 1000 mg twice daily with meals      Medication compliance:  compliant most of the time  Weight trend: increasing. Weight is down 3lb in the past 1 month. Current exercise: yes - walking  Checkin times daily  Home blood sugar records: fasting range: Low 1 teens  Blood sugar was 121 this morning, fasting  Low BG:  no  Eye exam current (within one year): yes  Checking Feet regularly:  yes - no sores  ACE/ARB:  yes - lisinopril  Aspirin: Yes  She also takes gabapentin for her neuropathy  Tobacco history: She  reports that she has never smoked.  She has never used smokeless tobacco.    Lab Results   Component Value Date    LABA1C 5.8 2022    LABA1C 5.6 2022    LABA1C 6.1 (H) 12/10/2021     Lab Results   Component Value Date    LABMICR 1.70 2022    CREATININE 0.7 2022       Hypertension:   BP today was   BP Readings from Last 1 Encounters:   23 132/80      Recent BP readings:    BP Readings from Last 3 Encounters: 01/09/23 132/80   12/12/22 116/68   12/05/22 130/84     Medication              Amlodipine 5 mg daily              Lisinopril 20 mg daily              Lasix 20 mg daily as need   Medication compliance:  compliant most of the time  Home blood pressure monitoring: Yes - and controlled. She  is somewhat  adherent to a low sodium diet. Symptoms: none  Laboratory:  Lab Results   Component Value Date    BUN 14 12/16/2022    CREATININE 0.7 12/16/2022       Hyperlipidemia:   Medication:   atorvastatin (Lipitor) 20mg nightly  Low Fat, Low Choleterol Diet:  yes - she is trying  Myalgias or GI upset: no  The patient exercises rarely. Laboratory:    Lab Results   Component Value Date    CHOL 131 (L) 12/12/2022    TRIG 106 12/12/2022    HDL 67 12/12/2022    LDLCALC 43 12/12/2022    LDLDIRECT 85 (L) 08/19/2015      Lab Results   Component Value Date    ALT 8 12/12/2022    AST 11 12/12/2022       Hyperuricemia:  Medication:              Allopurinol 300 mg daily  Symptoms: no recent gout symptoms  Most recent uric acid level was 3.7 on 12/10/2021        Migraine headaches:  Medication:              Excedrin Migraine as needed              Fioricet as needed              She had tried some samples of Ubrelvy in the past and this was helpful as well  Symptoms:this is a rare event. B12 deficiency. Medication:              She does get B12 injections. Symptoms:she does feel better when she takes the b12 shots. Osteoarthritis:  Medication:              Meloxicam 15 mg daily (this is helpful)              She also takes tylenol for this prn. She was recently given a prescription for hydrocodone on 12/28/2022 for # 30. She is requesting a refill of this medication today  She has severe shoulder djd requiring joint replacement. Symptoms: she tolerates. height is 5' 1\" (1.549 m) and weight is 142 lb (64.4 kg). Her temporal temperature is 98.2 °F (36.8 °C).  Her blood pressure is 132/80 and her pulse is 94. Her oxygen saturation is 96%. Body mass index is 26.83 kg/m². I have reviewed the following with the Ms. Flood   Lab Review  Abstract on 01/06/2023   Component Date Value    Magnesium 01/05/2023 1.50    Orders Only on 12/29/2022   Component Date Value    Magnesium 12/29/2022 1.6    Orders Only on 12/27/2022   Component Date Value    Magnesium 12/21/2022 1.70 (A)    Orders Only on 12/16/2022   Component Date Value    Magnesium 12/16/2022 1.4 (A)     Sodium 12/16/2022 131 (A)     Potassium 12/16/2022 4.1     Chloride 12/16/2022 94 (A)     CO2 12/16/2022 24     Anion Gap 12/16/2022 13     Glucose 12/16/2022 229 (A)     BUN 12/16/2022 14     Creatinine 12/16/2022 0.7     Est, Glom Filt Rate 12/16/2022 >60     Calcium 12/16/2022 10.2    Orders Only on 12/12/2022   Component Date Value    T4 Free 12/12/2022 1.35     TSH 12/12/2022 2.850     Microalbumin, Random Uri* 12/12/2022 1.70     Creatinine, Ur 12/12/2022 52.8     Microalbumin Creatinine * 12/12/2022 32.2     Cholesterol, Total 12/12/2022 131 (A)     Triglycerides 12/12/2022 106     HDL 12/12/2022 67     LDL Calculated 12/12/2022 43     Hemoglobin A1C 12/12/2022 5.8     WBC 12/12/2022 11.0 (A)     RBC 12/12/2022 3.66 (A)     Hemoglobin 12/12/2022 10.9 (A)     Hematocrit 12/12/2022 36.4 (A)     MCV 12/12/2022 99.5 (A)     MCH 12/12/2022 29.8     MCHC 12/12/2022 29.9 (A)     RDW 12/12/2022 14.9 (A)     Platelets 72/24/3332 158     MPV 12/12/2022 12.9 (A)     Neutrophils % 12/12/2022 55.1     Lymphocytes % 12/12/2022 27.8     Monocytes % 12/12/2022 15.4 (A)     Eosinophils % 12/12/2022 0.3     Basophils % 12/12/2022 0.4     Neutrophils Absolute 12/12/2022 6.1     Immature Granulocytes # 12/12/2022 0.1     Lymphocytes Absolute 12/12/2022 3.1     Monocytes Absolute 12/12/2022 1.70 (A)     Eosinophils Absolute 12/12/2022 0.00     Basophils Absolute 12/12/2022 0.00     Magnesium 12/12/2022 1.5 (A)     Sodium 12/12/2022 134 (A)     Potassium 12/12/2022 5.8 (A) Chloride 12/12/2022 99     CO2 12/12/2022 24     Anion Gap 12/12/2022 11     Glucose 12/12/2022 140 (A)     BUN 12/12/2022 13     Creatinine 12/12/2022 0.7     Est, Glom Filt Rate 12/12/2022 >60     Calcium 12/12/2022 10.5 (A)     Total Protein 12/12/2022 6.3 (A)     Albumin 12/12/2022 4.1     Total Bilirubin 12/12/2022 <0.2     Alkaline Phosphatase 12/12/2022 71     ALT 12/12/2022 8     AST 12/12/2022 11    Orders Only on 12/09/2022   Component Date Value    Magnesium 12/09/2022 3.3 (A)     Sodium 12/09/2022 131 (A)     Potassium 12/09/2022 6.6 (A)     Chloride 12/09/2022 98     CO2 12/09/2022 25     Anion Gap 12/09/2022 8     Glucose 12/09/2022 162 (A)     BUN 12/09/2022 16     Creatinine 12/09/2022 0.8     Est, Glom Filt Rate 12/09/2022 >60     Calcium 12/09/2022 10.0     Total Protein 12/09/2022 6.0 (A)     Albumin 12/09/2022 4.1     Total Bilirubin 12/09/2022 <0.2     Alkaline Phosphatase 12/09/2022 64     ALT 12/09/2022 10     AST 12/09/2022 13    Orders Only on 12/05/2022   Component Date Value    Magnesium 12/05/2022 1.6    Orders Only on 11/10/2022   Component Date Value    Magnesium 11/10/2022 1.5 (A)     Sodium 11/10/2022 140     Potassium 11/10/2022 4.4     Chloride 11/10/2022 100     CO2 11/10/2022 27     Anion Gap 11/10/2022 13     Glucose 11/10/2022 141 (A)     BUN 11/10/2022 7 (A)     Creatinine 11/10/2022 0.6     Est, Glom Filt Rate 11/10/2022 >60     Calcium 11/10/2022 10.4 (A)     Total Protein 11/10/2022 6.8     Albumin 11/10/2022 4.3     Total Bilirubin 11/10/2022 0.4     Alkaline Phosphatase 11/10/2022 74     ALT 11/10/2022 11     AST 11/10/2022 11     WBC 11/10/2022 11.2 (A)     RBC 11/10/2022 3.77 (A)     Hemoglobin 11/10/2022 11.1 (A)     Hematocrit 11/10/2022 35.9 (A)     MCV 11/10/2022 95.2     MCH 11/10/2022 29.4     MCHC 11/10/2022 30.9 (A)     RDW 11/10/2022 13.5     Platelets 88/09/1724 242     MPV 11/10/2022 12.8 (A)     Neutrophils % 11/10/2022 48.0 (A)     Lymphocytes % 11/10/2022 18.0 (A)     Monocytes % 11/10/2022 26.0 (A)     Eosinophils % 11/10/2022 2.0     Basophils % 11/10/2022 2.0 (A)     Neutrophils Absolute 11/10/2022 5.8     Immature Granulocytes # 11/10/2022 0.1     Lymphocytes Absolute 11/10/2022 2.0     Monocytes Absolute 11/10/2022 2.90 (A)     Eosinophils Absolute 11/10/2022 0.22     Basophils Absolute 11/10/2022 0.20     Bands Relative 11/10/2022 4     RBC Morphology 11/10/2022 Normal    Orders Only on 09/23/2022   Component Date Value    Magnesium 09/23/2022 1.6     WBC 09/23/2022 10.9 (A)     RBC 09/23/2022 3.88 (A)     Hemoglobin 09/23/2022 11.5 (A)     Hematocrit 09/23/2022 37.6     MCV 09/23/2022 96.9     MCH 09/23/2022 29.6     MCHC 09/23/2022 30.6 (A)     RDW 09/23/2022 14.2     Platelets 70/57/4272 206     MPV 09/23/2022 13.6 (A)     Neutrophils % 09/23/2022 56.6     Lymphocytes % 09/23/2022 25.3     Monocytes % 09/23/2022 16.7 (A)     Eosinophils % 09/23/2022 0.1     Basophils % 09/23/2022 0.2     Neutrophils Absolute 09/23/2022 6.2     Immature Granulocytes # 09/23/2022 0.1     Lymphocytes Absolute 09/23/2022 2.8     Monocytes Absolute 09/23/2022 1.80 (A)     Eosinophils Absolute 09/23/2022 0.00     Basophils Absolute 09/23/2022 0.00    Orders Only on 09/02/2022   Component Date Value    Magnesium 09/02/2022 1.6     WBC 09/02/2022 9.9     RBC 09/02/2022 4.13 (A)     Hemoglobin 09/02/2022 12.0     Hematocrit 09/02/2022 39.5     MCV 09/02/2022 95.6     MCH 09/02/2022 29.1     MCHC 09/02/2022 30.4 (A)     RDW 09/02/2022 14.6 (A)     Platelets 93/82/3845 189     MPV 09/02/2022 13.0 (A)     Neutrophils % 09/02/2022 54.7     Lymphocytes % 09/02/2022 26.7     Monocytes % 09/02/2022 16.8 (A)     Eosinophils % 09/02/2022 0.2     Basophils % 09/02/2022 0.2     Neutrophils Absolute 09/02/2022 5.4     Immature Granulocytes # 09/02/2022 0.1     Lymphocytes Absolute 09/02/2022 2.6     Monocytes Absolute 09/02/2022 1.70 (A)     Eosinophils Absolute 09/02/2022 0.00 Basophils Absolute 09/02/2022 0.00    There may be more visits with results that are not included. Copies of these are in the chart. Current Outpatient Medications   Medication Sig Dispense Refill    HYDROcodone-acetaminophen (NORCO) 7.5-325 MG per tablet Take 1 tablet by mouth every 8 hours as needed for Pain for up to 30 days. Intended supply: 3 days.  Take lowest dose possible to manage pain Max Daily Amount: 3 tablets 90 tablet 0    vitamin D (ERGOCALCIFEROL) 1.25 MG (78130 UT) CAPS capsule Take 1 capsule by mouth once a week 12 capsule 1    calcium carbonate (CALTRATE 600) 1500 (600 Ca) MG TABS tablet Take 1 tablet by mouth daily 60 tablet 3    furosemide (LASIX) 20 MG tablet Take 1 tablet by mouth daily as needed (swelling) Indications: only takes rarely Only taking PRN 60 tablet 11    Multiple Vitamins-Minerals (MULTI COMPLETE) CAPS Take 1 tablet by mouth daily 30 capsule 11    Insulin Pen Needle (H-E-B INCONTROL PEN NEEDLES) 32G X 4 MM MISC 1 each by Does not apply route daily 100 each 3    potassium chloride (KLOR-CON M) 20 MEQ extended release tablet TAKE ONE TABLET BY MOUTH DAILY 30 tablet 0    Magnesium 400 MG TABS 4 po bid (Patient taking differently: Take 800 mg by mouth daily) 802 tablet 5    folic acid (FOLVITE) 1 MG tablet Take 1 mg by mouth daily      metoprolol succinate (TOPROL XL) 50 MG extended release tablet Take 50 mg by mouth daily      clopidogrel (PLAVIX) 75 MG tablet Take 1 tablet by mouth daily 30 tablet 11    metFORMIN (GLUCOPHAGE) 1000 MG tablet Take 0.5 tablets by mouth in the morning, at noon, and at bedtime 180 tablet 3    lisinopril (PRINIVIL;ZESTRIL) 20 MG tablet Take 1 tablet by mouth daily 90 tablet 3    levETIRAcetam (KEPPRA) 500 MG tablet Take 1 tablet by mouth in the morning and at bedtime 60 tablet 5    blood glucose monitor kit and supplies Cap glucose daily and prn 1 kit 0    blood glucose monitor strips Cap glucose daily and prn 100 strip 3    ondansetron (ZOFRAN-ODT) 4 MG disintegrating tablet Take 1 tablet by mouth 3 times daily as needed for Nausea or Vomiting 21 tablet 0    atorvastatin (LIPITOR) 20 MG tablet TAKE ONE TABLET BY MOUTH NIGHTLY 90 tablet 3    allopurinol (ZYLOPRIM) 300 MG tablet Take 1 tablet by mouth daily 90 tablet 3    meloxicam (MOBIC) 15 MG tablet Take 1 tablet by mouth daily 90 tablet 3    Calcium-Vitamin D (CALTRATE 600 PLUS-VIT D PO) Take 1 tablet by mouth 2 times daily       Current Facility-Administered Medications   Medication Dose Route Frequency Provider Last Rate Last Admin    dexamethasone (DECADRON) injection 8 mg  8 mg IntraMUSCular Once LAYLA Royal           Allergies: Patient has no known allergies. 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    Type II or unspecified type diabetes mellitus without mention of complication, not stated as uncontrolled        Family History   Problem Relation Age of Onset    Colon Cancer Mother     Colon Polyps Neg Hx     Esophageal Cancer Neg Hx     Liver Disease Neg Hx     Liver Cancer Neg Hx     Rectal Cancer Neg Hx     Stomach Cancer Neg Hx        Past Surgical History:   Procedure Laterality Date    APPENDECTOMY       SECTION      x2    CHOLECYSTECTOMY      COLONOSCOPY      COLONOSCOPY  18    Dr [employer] [facility])-Tubular AP (-) dysplasia x 1, 5 yr recall    HERNIA REPAIR      She has had multiple hernia surgeries; x4    HERNIA REPAIR      pt states she's had difficulty with mesh.     HYSTERECTOMY, TOTAL ABDOMINAL (CERVIX REMOVED)      OVARY REMOVAL Bilateral     age 46    UMBILICAL HERNIA REPAIR N/A 2019    INCISIONAL HERNIA REPAIR WITH BIOLOGIC MESH performed by Esperanza Meza MD at [facility]       Social History     Tobacco Use    Smoking status: Never    Smokeless tobacco: Never Substance Use Topics    Alcohol use: No        Review of Systems   Constitutional:  Positive for activity change. Negative for appetite change, fatigue, fever and unexpected weight change. HENT:  Negative for congestion, postnasal drip and sore throat. Respiratory:  Negative for cough, shortness of breath and wheezing. Cardiovascular:  Positive for leg swelling. Negative for chest pain and palpitations. Gastrointestinal:  Negative for abdominal pain (hard area in ventral repair area), diarrhea, nausea and vomiting. Genitourinary:  Negative for difficulty urinating. Musculoskeletal:  Positive for arthralgias (particularly her right shoulder), neck pain and neck stiffness (right trapezius muscle). Skin:  Negative for rash. Hematological:  Does not bruise/bleed easily. Psychiatric/Behavioral:  Positive for sleep disturbance (due to pain). Negative for behavioral problems and self-injury. The patient is not nervous/anxious and is not hyperactive. Physical Exam  Vitals reviewed. Constitutional:       General: She is not in acute distress. Appearance: She is well-developed. She is obese. She is not toxic-appearing. Comments: Body habitus is ob, but rapidly losing weight. HENT:      Head: Normocephalic and atraumatic. Right Ear: Hearing, tympanic membrane, ear canal and external ear normal.      Left Ear: Hearing, tympanic membrane, ear canal and external ear normal.      Nose: Nose normal.      Mouth/Throat:      Mouth: Mucous membranes are moist.      Pharynx: Oropharynx is clear. Eyes:      General: Lids are normal.      Extraocular Movements: Extraocular movements intact. Conjunctiva/sclera: Conjunctivae normal.      Pupils: Pupils are equal, round, and reactive to light. Neck:      Thyroid: No thyromegaly. Vascular: No carotid bruit or JVD. Trachea: Phonation normal.   Cardiovascular:      Rate and Rhythm: Normal rate and regular rhythm.  No extrasystoles are present. Chest Wall: PMI is not displaced. Pulses: Normal pulses. Heart sounds: Normal heart sounds. No murmur heard. No friction rub. No gallop. Pulmonary:      Effort: Pulmonary effort is normal. No respiratory distress. Breath sounds: Normal breath sounds. No wheezing, rhonchi or rales. Abdominal:      General: Bowel sounds are normal. There is no distension. Palpations: Abdomen is soft. There is no mass. Tenderness: There is no abdominal tenderness. Genitourinary:     Comments: Examination deferred  Musculoskeletal:         General: Tenderness (over bilateral knees  ) present. No swelling. Normal range of motion. Cervical back: Normal range of motion and neck supple. Right lower leg: Edema (trace to 1+) present. Left lower leg: Edema (trace to 1+) present. Comments: Joint examination reveals no acute arthritis or synovitis. Lymphadenopathy:      Cervical: No cervical adenopathy. Skin:     General: Skin is warm and dry. Capillary Refill: Capillary refill takes less than 2 seconds. Findings: No rash. Neurological:      General: No focal deficit present. Mental Status: She is alert and oriented to person, place, and time. Cranial Nerves: No cranial nerve deficit (by gross examination). Sensory: No sensory deficit. Motor: No tremor or atrophy. Gait: Gait normal.      Comments: No focal deficits appreciated   Psychiatric:         Mood and Affect: Mood normal.         Speech: Speech normal.         Behavior: Behavior normal. Behavior is cooperative. ASSESSMENT      ICD-10-CM    1. Chronic right shoulder pain  M25.511 HYDROcodone-acetaminophen (NORCO) 7.5-325 MG per tablet    G89.29       2. Electrolyte abnormality  E87.8       3. Type 2 diabetes mellitus with diabetic polyneuropathy, without long-term current use of insulin (HCC)  E11.42       4. Primary hypertension  I10       5.  Mixed hyperlipidemia E78.2             PLAN    1. Chronic right shoulder pain  The current medical regimen is effective;  continue present plan and medications.  - HYDROcodone-acetaminophen (NORCO) 7.5-325 MG per tablet; Take 1 tablet by mouth every 8 hours as needed for Pain for up to 30 days. Intended supply: 3 days. Take lowest dose possible to manage pain Max Daily Amount: 3 tablets  Dispense: 90 tablet; Refill: 0    2. Electrolyte abnormality  This is related to diarrhea illness. She is pending surgery appointment    3. Type 2 diabetes mellitus with diabetic polyneuropathy, without long-term current use of insulin (HCC)  Doing well on present regimen    4. Primary hypertension  The current medical regimen is effective;  continue present plan and medications. 5. Mixed hyperlipidemia  The current medical regimen is effective;  continue present plan and medications. No orders of the defined types were placed in this encounter. Return in about 3 months (around 4/9/2023) for 30. This was an in-house visit.

## 2023-01-10 NOTE — TELEPHONE ENCOUNTER
----- Message from [facility],[address], DO sent at 1/9/2023  7:16 PM CST -----  Magnesium is slightly low at 1.5 with the low normal being 1.6. I recommend increasing to 3 times a day with magnesium oxide.   Magnesium oxide 400 mg 3 times daily, dispense #90 with 11 refills

## 2023-01-17 ENCOUNTER — TELEPHONE (OUTPATIENT)
Dept: FAMILY MEDICINE CLINIC | Age: 78
End: 2023-01-17

## 2023-01-17 ENCOUNTER — OFFICE VISIT (OUTPATIENT)
Dept: FAMILY MEDICINE CLINIC | Age: 78
End: 2023-01-17
Payer: MEDICARE

## 2023-01-17 VITALS
OXYGEN SATURATION: 97 % | DIASTOLIC BLOOD PRESSURE: 90 MMHG | WEIGHT: 139 LBS | TEMPERATURE: 97.2 F | HEART RATE: 68 BPM | SYSTOLIC BLOOD PRESSURE: 156 MMHG | BODY MASS INDEX: 26.26 KG/M2

## 2023-01-17 DIAGNOSIS — J40 BRONCHITIS: Primary | ICD-10-CM

## 2023-01-17 DIAGNOSIS — E83.42 HYPOMAGNESEMIA: ICD-10-CM

## 2023-01-17 LAB — MAGNESIUM: 1.6 MG/DL (ref 1.6–2.4)

## 2023-01-17 PROCEDURE — 3077F SYST BP >= 140 MM HG: CPT | Performed by: NURSE PRACTITIONER

## 2023-01-17 PROCEDURE — 3080F DIAST BP >= 90 MM HG: CPT | Performed by: NURSE PRACTITIONER

## 2023-01-17 PROCEDURE — 99213 OFFICE O/P EST LOW 20 MIN: CPT | Performed by: NURSE PRACTITIONER

## 2023-01-17 PROCEDURE — 1123F ACP DISCUSS/DSCN MKR DOCD: CPT | Performed by: NURSE PRACTITIONER

## 2023-01-17 RX ORDER — BENZONATATE 100 MG/1
100 CAPSULE ORAL 3 TIMES DAILY PRN
Qty: 30 CAPSULE | Refills: 0 | Status: SHIPPED | OUTPATIENT
Start: 2023-01-17 | End: 2023-01-27

## 2023-01-17 ASSESSMENT — ENCOUNTER SYMPTOMS
SINUS PRESSURE: 0
SORE THROAT: 0
COUGH: 1
RHINORRHEA: 0
NAUSEA: 0
VOMITING: 0
CONSTIPATION: 0
SHORTNESS OF BREATH: 0
DIARRHEA: 0
TROUBLE SWALLOWING: 0
ABDOMINAL PAIN: 0

## 2023-01-17 NOTE — PROGRESS NOTES
Abhinav Carrion (:  1945) is a 78 y.o. female,Established patient, here for evaluation of the following chief complaint(s):  Cough (Cough and congestion, tried zyrtec no help. X 2 weeks. No fever )      ASSESSMENT/PLAN:    ICD-10-CM    1. Bronchitis  J40 benzonatate (TESSALON PERLES) 100 MG capsule          Return if symptoms worsen or fail to improve. SUBJECTIVE/OBJECTIVE:  HPI  Here for cough and congesion  No fever  Last week was here (Monday)- saw Dr Hira Craft and he thought was allergies  Been taking zyrtec  Been using hot steamy shower to get stuff up. Otherwise cough is dry and hacky. Denies shortness of breath  No sick contacts  Throat feels scratchy from coughing. No abdominal complaints. BP (!) 156/90   Pulse 68   Temp 97.2 °F (36.2 °C) (Temporal)   Wt 139 lb (63 kg)   SpO2 97%   BMI 26.26 kg/m²     Review of Systems   Constitutional:  Negative for activity change, appetite change, fatigue, fever and unexpected weight change. HENT:  Positive for congestion. Negative for hearing loss, rhinorrhea, sinus pressure, sore throat and trouble swallowing. Eyes:  Negative for visual disturbance. Respiratory:  Positive for cough. Negative for shortness of breath. Cardiovascular:  Negative for chest pain, palpitations and leg swelling. Gastrointestinal:  Negative for abdominal pain, constipation, diarrhea, nausea and vomiting. Endocrine: Negative for cold intolerance and heat intolerance. Genitourinary:  Negative for flank pain, menstrual problem, pelvic pain, urgency and vaginal discharge. Musculoskeletal:  Negative for arthralgias. Skin:  Negative for rash. Neurological:  Negative for headaches. Psychiatric/Behavioral:  Negative for dysphoric mood and sleep disturbance. The patient is not nervous/anxious. Physical Exam  Vitals reviewed. Constitutional:       Appearance: Normal appearance. HENT:      Head: Normocephalic and atraumatic.       Right Ear: Tympanic membrane, ear canal and external ear normal.      Left Ear: Tympanic membrane, ear canal and external ear normal.      Nose: Nose normal.      Mouth/Throat:      Mouth: Mucous membranes are moist.      Pharynx: Oropharynx is clear. Eyes:      Conjunctiva/sclera: Conjunctivae normal.   Cardiovascular:      Rate and Rhythm: Normal rate and regular rhythm. Pulses: Normal pulses. Heart sounds: Normal heart sounds. Pulmonary:      Effort: Pulmonary effort is normal.      Breath sounds: Normal breath sounds. Abdominal:      General: Bowel sounds are normal. There is no distension. Palpations: Abdomen is soft. Tenderness: There is no abdominal tenderness. There is no guarding. Musculoskeletal:      Cervical back: Normal range of motion and neck supple. Skin:     General: Skin is warm. Neurological:      Mental Status: She is alert and oriented to person, place, and time. An electronic signature was used to authenticate this note.     --LAYLA Montes

## 2023-01-17 NOTE — TELEPHONE ENCOUNTER
Call returned to pt and scheduled apt.
Pt called stating that she has a cough and some chest congestion. No fever. She said that she just rattles and she can't get it coughed up. States her chest feels tight. Wants something called in for this. I advised that she may need to come in and be seen for this. She would like to first see if they will just send her something in for this. Will send to provider for recommendations.
verbal instruction/written material

## 2023-01-19 ENCOUNTER — TELEPHONE (OUTPATIENT)
Dept: FAMILY MEDICINE CLINIC | Age: 78
End: 2023-01-19

## 2023-01-19 NOTE — TELEPHONE ENCOUNTER
I have attempted without success to contact this patient by phone to discuss lab results. I left a message for the patient to call us back when she is available.

## 2023-01-19 NOTE — TELEPHONE ENCOUNTER
Called patient, spoke with: Patient regarding the results of the patients most recent labs. I advised Patient of  Mario Salazar  recommendations.    Patient did voice understanding

## 2023-01-19 NOTE — TELEPHONE ENCOUNTER
----- Message from LAYLA Valente sent at 1/18/2023  8:08 PM CST -----  Please let patient know that magnesium level was in normal range. Continue current regimen.

## 2023-01-24 RX ORDER — LANOLIN ALCOHOL/MO/W.PET/CERES
400 CREAM (GRAM) TOPICAL 3 TIMES DAILY
Qty: 90 TABLET | Refills: 11 | Status: SHIPPED | OUTPATIENT
Start: 2023-01-24

## 2023-01-26 ENCOUNTER — TELEPHONE (OUTPATIENT)
Dept: FAMILY MEDICINE CLINIC | Age: 78
End: 2023-01-26

## 2023-01-26 DIAGNOSIS — E83.42 HYPOMAGNESEMIA: ICD-10-CM

## 2023-01-26 LAB — MAGNESIUM: 1.7 MG/DL (ref 1.6–2.4)

## 2023-01-26 NOTE — TELEPHONE ENCOUNTER
----- Message from LAYLA Sierra sent at 1/26/2023  3:42 PM CST -----  Magnesium normal  Cont present regimen

## 2023-01-27 ENCOUNTER — OFFICE VISIT (OUTPATIENT)
Dept: SURGERY | Age: 78
End: 2023-01-27
Payer: MEDICARE

## 2023-01-27 VITALS
BODY MASS INDEX: 27.19 KG/M2 | HEIGHT: 61 IN | WEIGHT: 144 LBS | HEART RATE: 92 BPM | OXYGEN SATURATION: 99 % | TEMPERATURE: 97.6 F

## 2023-01-27 DIAGNOSIS — S30.1XXA ABDOMINAL WALL SEROMA, INITIAL ENCOUNTER: Primary | ICD-10-CM

## 2023-01-27 PROCEDURE — 99203 OFFICE O/P NEW LOW 30 MIN: CPT | Performed by: SURGERY

## 2023-01-27 PROCEDURE — 1123F ACP DISCUSS/DSCN MKR DOCD: CPT | Performed by: SURGERY

## 2023-01-27 ASSESSMENT — ENCOUNTER SYMPTOMS
CHOKING: 0
SINUS PRESSURE: 1
EYE PAIN: 0
EYE REDNESS: 0
VOMITING: 0
FACIAL SWELLING: 0
CHEST TIGHTNESS: 0
WHEEZING: 0
DIARRHEA: 0
CONSTIPATION: 0
EYE DISCHARGE: 0
ABDOMINAL DISTENTION: 0
SORE THROAT: 0
BACK PAIN: 1
SHORTNESS OF BREATH: 0
ABDOMINAL PAIN: 0

## 2023-01-27 NOTE — PROGRESS NOTES
SUBJECTIVE:  Ms. Daphney Bailon is a 78 y.o. female who presents today with h/o multiple ventral hernia repairs. Was recently admitted to [facility] for stroke like symptoms. Underwent CT of the abd and it showed a large 3x6x9 cm fluid collection consistent with post operative seroma. Denies abd pain. Doing well overall from her stroke like symptoms. 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 HYSTERECTOMY, TOTAL ABDOMINAL (CERVIX REMOVED)      OVARY REMOVAL Bilateral     age 46    UMBILICAL HERNIA REPAIR N/A 2019    INCISIONAL HERNIA REPAIR WITH BIOLOGIC MESH performed by Gerson Razo MD at [facility]     Current Outpatient Medications   Medication Sig Dispense Refill    magnesium oxide (MAG-OX) 400 (240 Mg) MG tablet Take 1 tablet by mouth in the morning, at noon, and at bedtime 90 tablet 11    benzonatate (TESSALON PERLES) 100 MG capsule Take 1 capsule by mouth 3 times daily as needed for Cough 30 capsule 0    HYDROcodone-acetaminophen (NORCO) 7.5-325 MG per tablet Take 1 tablet by mouth every 8 hours as needed for Pain for up to 30 days. Intended supply: 3 days.  Take lowest dose possible to manage pain Max Daily Amount: 3 tablets 90 tablet 0    vitamin D (ERGOCALCIFEROL) 1.25 MG (42800 UT) CAPS capsule Take 1 capsule by mouth once a week 12 capsule 1    calcium carbonate (CALTRATE 600) 1500 (600 Ca) MG TABS tablet Take 1 tablet by mouth daily 60 tablet 3    furosemide (LASIX) 20 MG tablet Take 1 tablet by mouth daily as needed (swelling) Indications: only takes rarely Only taking PRN 60 tablet 11    Multiple Vitamins-Minerals (MULTI COMPLETE) CAPS Take 1 tablet by mouth daily 30 capsule 11    Insulin Pen Needle (H-E-B INCONTROL PEN NEEDLES) 32G X 4 MM MISC 1 each by Does not apply route daily 100 each 3    potassium chloride (KLOR-CON M) 20 MEQ extended release tablet TAKE ONE TABLET BY MOUTH DAILY 30 tablet 0    Magnesium 400 MG TABS 4 po bid (Patient taking differently: Take 800 mg by mouth daily) 560 tablet 5    folic acid (FOLVITE) 1 MG tablet Take 1 mg by mouth daily      metoprolol succinate (TOPROL XL) 50 MG extended release tablet Take 50 mg by mouth daily      clopidogrel (PLAVIX) 75 MG tablet Take 1 tablet by mouth daily 30 tablet 11    metFORMIN (GLUCOPHAGE) 1000 MG tablet Take 0.5 tablets by mouth in the morning, at noon, and at bedtime 180 tablet 3    lisinopril (PRINIVIL;ZESTRIL) 20 MG tablet Take 1 tablet by mouth daily 90 tablet 3    levETIRAcetam (KEPPRA) 500 MG tablet Take 1 tablet by mouth in the morning and at bedtime 60 tablet 5    blood glucose monitor kit and supplies Cap glucose daily and prn 1 kit 0    blood glucose monitor strips Cap glucose daily and prn 100 strip 3    ondansetron (ZOFRAN-ODT) 4 MG disintegrating tablet Take 1 tablet by mouth 3 times daily as needed for Nausea or Vomiting 21 tablet 0    atorvastatin (LIPITOR) 20 MG tablet TAKE ONE TABLET BY MOUTH NIGHTLY 90 tablet 3    allopurinol (ZYLOPRIM) 300 MG tablet Take 1 tablet by mouth daily 90 tablet 3    meloxicam (MOBIC) 15 MG tablet Take 1 tablet by mouth daily 90 tablet 3    Calcium-Vitamin D (CALTRATE 600 PLUS-VIT D PO) Take 1 tablet by mouth 2 times daily       Current Facility-Administered Medications   Medication Dose Route Frequency Provider Last Rate Last Admin dexamethasone (DECADRON) injection 8 mg  8 mg IntraMUSCular Once LAYLA Royal         Allergies: Patient has no known allergies. Family History   Problem Relation Age of Onset    Colon Cancer Mother     Colon Polyps Neg Hx     Esophageal Cancer Neg Hx     Liver Disease Neg Hx     Liver Cancer Neg Hx     Rectal Cancer Neg Hx     Stomach Cancer Neg Hx        Social History     Tobacco Use    Smoking status: Never    Smokeless tobacco: Never   Substance Use Topics    Alcohol use: No       Review of Systems   Constitutional:  Negative for activity change, chills, fatigue and fever. HENT:  Positive for sinus pressure. Negative for facial swelling, hearing loss and sore throat. Eyes:  Negative for pain, discharge and redness. Respiratory:  Negative for choking, chest tightness, shortness of breath and wheezing. Cardiovascular:  Negative for chest pain and palpitations. Gastrointestinal:  Negative for abdominal distention, abdominal pain, constipation, diarrhea and vomiting. Musculoskeletal:  Positive for back pain. Negative for neck pain and neck stiffness. Neurological:  Negative for dizziness, speech difficulty, weakness and numbness. Psychiatric/Behavioral:  Negative for agitation, hallucinations and suicidal ideas. OBJECTIVE:  Pulse 92   Temp 97.6 °F (36.4 °C)   Ht 5' 1\" (1.549 m)   Wt 144 lb (65.3 kg)   SpO2 99%   BMI 27.21 kg/m²   Physical Exam    ASSESSMENT:   Diagnosis Orders   1. Abdominal wall seroma, initial encounter            PLAN:  No orders of the defined types were placed in this encounter. No orders of the defined types were placed in this encounter. Denies abd pain. No current issues noted. The seroma is a sterile fluid collection. Would not recommend draining unless it is causing the patient some issues. I explained the findings and she is happy with the plan. Will FU if she begins to have pain. No follow-ups on file.     Won Trejo DO 1/27/2023 12:25 PM

## 2023-02-03 DIAGNOSIS — E83.42 HYPOMAGNESEMIA: ICD-10-CM

## 2023-02-03 LAB — MAGNESIUM: 1.5 MG/DL (ref 1.6–2.4)

## 2023-02-06 ENCOUNTER — TELEPHONE (OUTPATIENT)
Dept: FAMILY MEDICINE CLINIC | Age: 78
End: 2023-02-06

## 2023-02-06 NOTE — TELEPHONE ENCOUNTER
Called patient, spoke with: Patient regarding the results of the patients most recent results. I advised Patient of Dr. Juanita Dawkins recommendations.    Patient did voice understanding

## 2023-02-06 NOTE — TELEPHONE ENCOUNTER
----- Message from [address] [facility],[address], DO sent at 2/3/2023  6:13 PM CST -----  Magnesium is just below the normal limit.   Recommend taking extra dose of magnesium oxide and resume regular medication regimen

## 2023-02-10 ENCOUNTER — TELEPHONE (OUTPATIENT)
Dept: FAMILY MEDICINE CLINIC | Age: 78
End: 2023-02-10

## 2023-02-10 NOTE — TELEPHONE ENCOUNTER
I have attempted without success to contact this patient by phone, I left a message on the answering machine.

## 2023-02-10 NOTE — TELEPHONE ENCOUNTER
----- Message from LAYLA Hilario sent at 2/9/2023  5:03 PM CST -----  Magnesium normal   Recheck in 1 week

## 2023-02-16 ENCOUNTER — TELEPHONE (OUTPATIENT)
Dept: FAMILY MEDICINE CLINIC | Age: 78
End: 2023-02-16

## 2023-02-16 DIAGNOSIS — E83.42 HYPOMAGNESEMIA: ICD-10-CM

## 2023-02-16 LAB — MAGNESIUM: 1.5 MG/DL (ref 1.6–2.4)

## 2023-02-16 NOTE — TELEPHONE ENCOUNTER
Spoke with patient, acknowledged and aware of results. Patient is wondering what she should do since it is on low side. Please advise.

## 2023-02-17 NOTE — TELEPHONE ENCOUNTER
She could take an extra dose of magnesium oxide every other day and this may be sufficient to keep it slightly higher, but not cause too much over normal.

## 2023-02-17 NOTE — TELEPHONE ENCOUNTER
Spoke with patient and informed her of Dr. Deshawn Grubbs recommendations. Patient aware and acknowledged.

## 2023-02-21 ENCOUNTER — OFFICE VISIT (OUTPATIENT)
Dept: NEUROLOGY | Age: 78
End: 2023-02-21
Payer: MEDICARE

## 2023-02-21 VITALS
HEIGHT: 61 IN | DIASTOLIC BLOOD PRESSURE: 74 MMHG | OXYGEN SATURATION: 98 % | HEART RATE: 78 BPM | SYSTOLIC BLOOD PRESSURE: 128 MMHG | BODY MASS INDEX: 27.19 KG/M2 | WEIGHT: 144 LBS

## 2023-02-21 DIAGNOSIS — R56.9 SEIZURE (HCC): ICD-10-CM

## 2023-02-21 DIAGNOSIS — I63.9 CEREBROVASCULAR ACCIDENT (CVA), UNSPECIFIED MECHANISM (HCC): Primary | ICD-10-CM

## 2023-02-21 PROBLEM — R51.9 HEADACHE: Status: ACTIVE | Noted: 2023-02-21

## 2023-02-21 PROBLEM — E83.42 HYPOMAGNESEMIA: Status: ACTIVE | Noted: 2022-11-17

## 2023-02-21 PROBLEM — R19.7 DIARRHEA: Status: ACTIVE | Noted: 2022-11-17

## 2023-02-21 PROBLEM — E87.1 HYPONATREMIA: Status: ACTIVE | Noted: 2022-11-17

## 2023-02-21 PROBLEM — G92.8 TOXIC METABOLIC ENCEPHALOPATHY: Status: ACTIVE | Noted: 2022-11-14

## 2023-02-21 PROBLEM — R00.0 TACHYCARDIA: Status: ACTIVE | Noted: 2022-11-14

## 2023-02-21 PROBLEM — D72.829 LEUKOCYTOSIS: Status: ACTIVE | Noted: 2022-11-17

## 2023-02-21 PROCEDURE — 1090F PRES/ABSN URINE INCON ASSESS: CPT | Performed by: PSYCHIATRY & NEUROLOGY

## 2023-02-21 PROCEDURE — 1123F ACP DISCUSS/DSCN MKR DOCD: CPT | Performed by: PSYCHIATRY & NEUROLOGY

## 2023-02-21 PROCEDURE — 99204 OFFICE O/P NEW MOD 45 MIN: CPT | Performed by: PSYCHIATRY & NEUROLOGY

## 2023-02-21 PROCEDURE — G8399 PT W/DXA RESULTS DOCUMENT: HCPCS | Performed by: PSYCHIATRY & NEUROLOGY

## 2023-02-21 PROCEDURE — G8427 DOCREV CUR MEDS BY ELIG CLIN: HCPCS | Performed by: PSYCHIATRY & NEUROLOGY

## 2023-02-21 PROCEDURE — G8417 CALC BMI ABV UP PARAM F/U: HCPCS | Performed by: PSYCHIATRY & NEUROLOGY

## 2023-02-21 PROCEDURE — 3017F COLORECTAL CA SCREEN DOC REV: CPT | Performed by: PSYCHIATRY & NEUROLOGY

## 2023-02-21 PROCEDURE — G8484 FLU IMMUNIZE NO ADMIN: HCPCS | Performed by: PSYCHIATRY & NEUROLOGY

## 2023-02-21 PROCEDURE — 1036F TOBACCO NON-USER: CPT | Performed by: PSYCHIATRY & NEUROLOGY

## 2023-02-21 PROCEDURE — 3078F DIAST BP <80 MM HG: CPT | Performed by: PSYCHIATRY & NEUROLOGY

## 2023-02-21 PROCEDURE — 3074F SYST BP LT 130 MM HG: CPT | Performed by: PSYCHIATRY & NEUROLOGY

## 2023-02-21 NOTE — PROGRESS NOTES
[facility] Neurology Office Note      Patient: Shan Becker  MR#:    [id number]  Account Number:                         YOB: 1945  Date of Evaluation:  2023  Time of Note:                          11:07 AM  Primary/Referring Physician:  Randy Pérez DO   Consulting Physician:  Soo Love DO    NEW PATIENT CONSULTATION    Chief Complaint   Patient presents with    New Patient    Seizures     No seizures to report     Other     Cerebral infraction        HISTORY OF PRESENT ILLNESS    Shan Becker is a 78y.o. year old female here for stroke, possible seizure. Patient was hospitalized back in November last year for stroke. Patient had a syncopal event, no overt GTC activity noted, no facial drooping, slurred speech or focal weakness or numbness pre patient. Records from [facility], report headaches, report left arm and leg weakness at the time. MRI from [facility] showed bilateral cerebellar infarcts as well as a right posterior parietal / occipital stroke and a right frontal infarct. Multiple strokes on MRI. ECHO and OSMIN negative from a stroke standpoint. CTA head and neck negative. EEG with semi-rhythmic slowing, follow up EEG improved at [facility], nothing clearly epileptiform. Zio completed, no results available. No a fib noted on tele. She is currently on Plavix. Also on Keppra. Family declined anticoagulation per records due to bruising, age, fall risk. No further events, no new stroke like symptoms. No further headaches noted. No further seizure like activity. No residual effects noted from her recent stroke.      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 [address]    COLONOSCOPY  5/18/16    Dr Margot Hamlin [facility])-Tubular AP (-) dysplasia x 1, 5 yr recall    HERNIA REPAIR  2012    She has had multiple hernia surgeries; x4    HERNIA REPAIR      pt states she's had difficulty with mesh.     HYSTERECTOMY, TOTAL ABDOMINAL (CERVIX REMOVED)  1986    OVARY REMOVAL Bilateral 1986    age 46    UMBILICAL HERNIA REPAIR N/A 1/22/2019    INCISIONAL HERNIA REPAIR WITH BIOLOGIC MESH performed by Lucinda Shields MD at [facility]       Family History   Problem Relation Age of Onset    Colon Cancer Mother     Colon Polyps Neg Hx     Esophageal Cancer Neg Hx     Liver Disease Neg Hx     Liver Cancer Neg Hx     Rectal Cancer Neg Hx     Stomach Cancer Neg Hx        Social History     Socioeconomic History    Marital status:      Spouse name: Not on file    Number of children: Not on file    Years of education: Not on file    Highest education level: Not on file   Occupational History    Not on file   Tobacco Use    Smoking status: Never    Smokeless tobacco: Never   Vaping Use    Vaping Use: Never used   Substance and Sexual Activity    Alcohol use: No    Drug use: No    Sexual activity: Not Currently   Other Topics Concern    Not on file   Social History Narrative    Not on file     Social Determinants of Health     Financial Resource Strain: Low Risk     Difficulty of Paying Living Expenses: Not hard at all   Food Insecurity: No Food Insecurity    Worried About Running Out of Food in the Last Year: Never true    Ran Out of Food in the Last Year: Never true   Transportation Needs: Not on file   Physical Activity: Inactive    Days of Exercise per Week: 0 days    Minutes of Exercise per Session: 0 min   Stress: Not on file   Social Connections: Not on file   Intimate Partner Violence: Not on file   Housing Stability: Not on file       Current Outpatient Medications   Medication Sig Dispense Refill    amLODIPine (NORVASC) 5 MG tablet Take 1 tablet by mouth daily 30 tablet 11    magnesium oxide (MAG-OX) 400 (240 Mg) MG tablet Take 1 tablet by mouth in the morning, at noon, and at bedtime 90 tablet 11    vitamin D (ERGOCALCIFEROL) 1.25 MG (22817 UT) CAPS capsule Take 1 capsule by mouth once a week 12 capsule 1    calcium carbonate (CALTRATE 600) 1500 (600 Ca) MG TABS tablet Take 1 tablet by mouth daily 60 tablet 3    furosemide (LASIX) 20 MG tablet Take 1 tablet by mouth daily as needed (swelling) Indications: only takes rarely Only taking PRN 60 tablet 11    Multiple Vitamins-Minerals (MULTI COMPLETE) CAPS Take 1 tablet by mouth daily 30 capsule 11    Insulin Pen Needle (H-E-B INCONTROL PEN NEEDLES) 32G X 4 MM MISC 1 each by Does not apply route daily 100 each 3    potassium chloride (KLOR-CON M) 20 MEQ extended release tablet TAKE ONE TABLET BY MOUTH DAILY 30 tablet 0    Magnesium 400 MG TABS 4 po bid (Patient taking differently: Take 800 mg by mouth daily) 170 tablet 5    folic acid (FOLVITE) 1 MG tablet Take 1 mg by mouth daily      metoprolol succinate (TOPROL XL) 50 MG extended release tablet Take 50 mg by mouth daily      clopidogrel (PLAVIX) 75 MG tablet Take 1 tablet by mouth daily 30 tablet 11    metFORMIN (GLUCOPHAGE) 1000 MG tablet Take 0.5 tablets by mouth in the morning, at noon, and at bedtime 180 tablet 3    lisinopril (PRINIVIL;ZESTRIL) 20 MG tablet Take 1 tablet by mouth daily 90 tablet 3    levETIRAcetam (KEPPRA) 500 MG tablet Take 1 tablet by mouth in the morning and at bedtime 60 tablet 5    blood glucose monitor kit and supplies Cap glucose daily and prn 1 kit 0    blood glucose monitor strips Cap glucose daily and prn 100 strip 3    ondansetron (ZOFRAN-ODT) 4 MG disintegrating tablet Take 1 tablet by mouth 3 times daily as needed for Nausea or Vomiting 21 tablet 0    atorvastatin (LIPITOR) 20 MG tablet TAKE ONE TABLET BY MOUTH NIGHTLY 90 tablet 3    allopurinol (ZYLOPRIM) 300 MG tablet Take 1 tablet by mouth daily 90 tablet 3    meloxicam (MOBIC) 15 MG tablet Take 1 tablet by mouth daily 90 tablet 3    Calcium-Vitamin D (CALTRATE 600 PLUS-VIT D PO) Take 1 tablet by mouth 2 times daily       Current Facility-Administered Medications   Medication Dose Route Frequency Provider Last Rate Last Admin    dexamethasone (DECADRON) injection 8 mg  8 mg IntraMUSCular Once LAYLA Royal           No Known Allergies      REVIEW OF SYSTEMS    Constitutional: []Fever []Sweats []Chills [] Recent Injury []Fatigue  [x] Denies all unless marked  HENT:[]Headache  [] Head Injury  [] Sore Throat  [] Ear Pain  []Dizziness [] Hearing Loss []Trouble Swallowing []Voice Change  [] Tinnitus [x] Denies all unless marked  Eyes:   [] Eye Pain  [] Eye Injection []Visual Disturbance  [] Ptosis   Spine:  [] Neck pain  [] Back pain  [] Sciaticia  [x] Denies all unless marked  Cardiovascular:[]Chest Pain []Palpitations [] Heart Disease  [x] Denies all unless marked  Respirtory: []Shortness of Breath []Cough  []Wheezing  [x] Denies all unless marked  Gastrointestinal:  []Abdominal Pain  []Blood in Stool  []Diarrhea []Constipation []Nausea  []Vomiting  [x] Denies all unless marked  Genitourinary:  [] Dysuria [] Enuresis [] Incontinence [] Frequency/Urgency  [] Hematuria  [x] Denies all unless marked  Musculoskeletal: [] Joint Pain [] Myalgias [] Joint Swelling [] Neck Stiffness  [x] Denies all unless marked  Skin:[] Rash [] Pallor [] Color Change [] Wound  [x] Denies all unless marked  Neurological:[] Visual Disturbance [] Double Vision [] Slurred Speech [] Trouble swallowing  [] Vertigo [] Tingling [] Numbness [] Weakness [] Loss of Balance [] Loss of Consciousness [] Memory Loss [] Tremor [] Seizure [] Syncope  [] Ataxia  [x] Denies all unless marked  Psychiatric/Behavioral:[] Depression [] Anxiety [] Confusion [] Agitation [] Behavior Problems  [] Hallucinations  [] Suicidal idiation  [x] Denies all unless  marked  Sleep: []  Insomnia [] Sleep Disturbance [] Snoring [] Restless Legs [] Daytime Sleeping [] Sleep Apnea  [x] Denies all unless marked  Hematological:[] Adenopathy [] Bruises/Bleeds Easily  [x] Denies all unless marked  Endocrine: [] Cold Intolerance [] Heat Intolerance [] Polydipsia [] Polyphagia [] Polyuria  [x]Denies all unless marked  Allergic/Immunologic:[] Environmental Allergies [] Food Allergies [] Immunocompromised state  [x] Denies all unless marked    I have reviewed the above ROS with the patient and agree with the ROS as documented above. PHYSICAL EXAM    Constitutional -   /74   Pulse 78   Ht 5' 1\" (1.549 m)   Wt 144 lb (65.3 kg)   SpO2 98%   BMI 27.21 kg/m²   General appearance: No acute distress   EYES -   Conjunctiva normal  Pupillary exam as below, see CN exam in the neurologic exam  ENT-    No scars, masses, or lesions over external nose or ears  Hearing normal bilaterally to finger rub  Cardiovascular -   No clubbing, cyanosis, or edema   Pulmonary-   Good expansion, normal effort without use of accessory muscles  Musculoskeletal -   No significant wasting of muscles noted  Gait as below, see gait exam in the neurologic exam  Muscle strength, tone, stability as below. No bony deformities  Skin -   Warm, dry, and intact to inspection and palpation. No rash, erythema, or pallor  Psychiatric -   Mood, affect, and behavior appear normal    Memory as below see mental status examination in the neurologic exam    NEUROLOGICAL EXAM    Mental status   [x]Awake, alert, oriented   [x]Affect attention and concentration appear appropriate  [x]Recent and remote memory appears unremarkable  [x]Speech normal without dysarthria or aphasia, comprehension and repetition intact.    COMMENTS:    Cranial Nerves [x]No VF deficit to confrontation  [x]PERRLA, EOMI, no nystagmus, conjugate eye movements, no ptosis  [x]Face symmetric  [x]Facial sensation intact  [x]Tongue midline no atrophy or fasciculations present  [x]Palate midline, hearing to finger rub normal bilaterally  [x]Shoulder shrug and SCM testing normal bilaterally  COMMENTS:   Motor   [x]5/5 strength x 4 extremities  [x]Normal bulk and tone  [x]No tremor present  [x]No rigidity or bradykinesia noted  COMMENTS:   Sensory  [x]Sensation intact to light touch, pin prick, vibration, and proprioception BLE  []Sensation intact to light touch, pin prick, vibration, and proprioception BUE  COMMENTS:   Coordination [x]FTN normal bilaterally   []HTS normal bilaterally  []JOSE normal bilaterally. COMMENTS:   Reflexes  [x]Symmetric and non-pathological  [x]Toes down going bilaterally  [x]No clonus present  COMMENTS:   Gait                  [x]Normal steady gait    []Ataxic    []Spastic     []Magnetic     []Shuffling  COMMENTS:       LABS RECORD AND IMAGING REVIEW (As below and per HPI)    Lab Results   Component Value Date    [id number] 07/19/2022     Lab Results   Component Value Date    WBC 11.0 (H) 12/12/2022    HGB 10.9 (L) 12/12/2022    HCT 36.4 (L) 12/12/2022    MCV 99.5 (H) 12/12/2022     12/12/2022     Lab Results   Component Value Date     (L) 12/16/2022    K 4.1 12/16/2022    CL 94 (L) 12/16/2022    CO2 24 12/16/2022    BUN 14 12/16/2022    CREATININE 0.7 12/16/2022    GLUCOSE 229 (H) 12/16/2022    CALCIUM 10.2 12/16/2022    PROT 6.3 (L) 12/12/2022    LABALBU 4.1 12/12/2022    BILITOT <0.2 12/12/2022    ALKPHOS 71 12/12/2022    AST 11 12/12/2022    ALT 8 12/12/2022    LABGLOM >60 12/16/2022    GFRAA >59 08/26/2022    GLOB 3.0 02/28/2017                 ASSESSMENT:    Andreea Santos is a 78y.o. year old female here for recent stroke, headaches, syncopal event, questionable seizure. MRI with multiple infarcts, concern for embolic event, though workup including OSMIN and telemetry negative. CTA head and neck negative. Zio completed but no results.   Currently on plavix, concern from family for full anticoagulation due to fall risk and bruising history. No further syncopal events or questionable seizure activity. Long history of migraine noted. On Keppra. EEG x 2 with nothing clearly epileptiform at [facility]. No residual deficits noted from prior stroke. PLAN:   Request recent Zio results. Continue plavix, re-visit anticoagulation if zio were to show a fib. Risk factor maximization through primary. On statin. Continue Keppra for now, long term EEG monitoring if events recur to try and clarify. But if remains event free will likely stop Keppra on down the line. Epilepsy precautions and seizure first aid discussed. Driving per [employer], no heights, swimming, tub baths, open flames, or heavy machinery.       Nichole Márquez DO  Board Certified Neurology

## 2023-02-23 DIAGNOSIS — M25.511 CHRONIC RIGHT SHOULDER PAIN: ICD-10-CM

## 2023-02-23 DIAGNOSIS — G89.29 CHRONIC RIGHT SHOULDER PAIN: ICD-10-CM

## 2023-02-23 RX ORDER — HYDROCODONE BITARTRATE AND ACETAMINOPHEN 7.5; 325 MG/1; MG/1
TABLET ORAL
Qty: 90 TABLET | Refills: 0 | OUTPATIENT
Start: 2023-02-23

## 2023-02-24 ENCOUNTER — OFFICE VISIT (OUTPATIENT)
Dept: FAMILY MEDICINE CLINIC | Age: 78
End: 2023-02-24
Payer: MEDICARE

## 2023-02-24 ENCOUNTER — TELEPHONE (OUTPATIENT)
Dept: FAMILY MEDICINE CLINIC | Age: 78
End: 2023-02-24

## 2023-02-24 ENCOUNTER — HOSPITAL ENCOUNTER (OUTPATIENT)
Dept: GENERAL RADIOLOGY | Age: 78
Discharge: HOME | End: 2023-02-24
Payer: MEDICARE

## 2023-02-24 VITALS
DIASTOLIC BLOOD PRESSURE: 80 MMHG | HEIGHT: 61 IN | HEART RATE: 104 BPM | TEMPERATURE: 97.3 F | OXYGEN SATURATION: 98 % | SYSTOLIC BLOOD PRESSURE: 130 MMHG | BODY MASS INDEX: 26.62 KG/M2 | WEIGHT: 141 LBS

## 2023-02-24 DIAGNOSIS — M54.16 LUMBAR BACK PAIN WITH RADICULOPATHY AFFECTING LEFT LOWER EXTREMITY: ICD-10-CM

## 2023-02-24 DIAGNOSIS — E83.42 HYPOMAGNESEMIA: ICD-10-CM

## 2023-02-24 DIAGNOSIS — M25.511 CHRONIC RIGHT SHOULDER PAIN: ICD-10-CM

## 2023-02-24 DIAGNOSIS — G89.29 CHRONIC RIGHT SHOULDER PAIN: ICD-10-CM

## 2023-02-24 DIAGNOSIS — M54.16 LUMBAR BACK PAIN WITH RADICULOPATHY AFFECTING LEFT LOWER EXTREMITY: Primary | ICD-10-CM

## 2023-02-24 LAB — MAGNESIUM: 1.8 MG/DL (ref 1.6–2.4)

## 2023-02-24 PROCEDURE — G8417 CALC BMI ABV UP PARAM F/U: HCPCS | Performed by: NURSE PRACTITIONER

## 2023-02-24 PROCEDURE — 99213 OFFICE O/P EST LOW 20 MIN: CPT | Performed by: NURSE PRACTITIONER

## 2023-02-24 PROCEDURE — 1090F PRES/ABSN URINE INCON ASSESS: CPT | Performed by: NURSE PRACTITIONER

## 2023-02-24 PROCEDURE — G8484 FLU IMMUNIZE NO ADMIN: HCPCS | Performed by: NURSE PRACTITIONER

## 2023-02-24 PROCEDURE — 72100 X-RAY EXAM L-S SPINE 2/3 VWS: CPT

## 2023-02-24 PROCEDURE — 3079F DIAST BP 80-89 MM HG: CPT | Performed by: NURSE PRACTITIONER

## 2023-02-24 PROCEDURE — G8399 PT W/DXA RESULTS DOCUMENT: HCPCS | Performed by: NURSE PRACTITIONER

## 2023-02-24 PROCEDURE — 1036F TOBACCO NON-USER: CPT | Performed by: NURSE PRACTITIONER

## 2023-02-24 PROCEDURE — 1123F ACP DISCUSS/DSCN MKR DOCD: CPT | Performed by: NURSE PRACTITIONER

## 2023-02-24 PROCEDURE — 3017F COLORECTAL CA SCREEN DOC REV: CPT | Performed by: NURSE PRACTITIONER

## 2023-02-24 PROCEDURE — 3075F SYST BP GE 130 - 139MM HG: CPT | Performed by: NURSE PRACTITIONER

## 2023-02-24 PROCEDURE — G8427 DOCREV CUR MEDS BY ELIG CLIN: HCPCS | Performed by: NURSE PRACTITIONER

## 2023-02-24 PROCEDURE — 72100 X-RAY EXAM L-S SPINE 2/3 VWS: CPT | Performed by: RADIOLOGY

## 2023-02-24 RX ORDER — METHYLPREDNISOLONE 4 MG/1
TABLET ORAL
Qty: 1 KIT | Refills: 0 | Status: SHIPPED | OUTPATIENT
Start: 2023-02-24 | End: 2023-03-02

## 2023-02-24 RX ORDER — DEXAMETHASONE SODIUM PHOSPHATE 10 MG/ML
8 INJECTION INTRAMUSCULAR; INTRAVENOUS ONCE
Status: COMPLETED | OUTPATIENT
Start: 2023-02-24 | End: 2023-02-24

## 2023-02-24 RX ORDER — HYDROCODONE BITARTRATE AND ACETAMINOPHEN 7.5; 325 MG/1; MG/1
1 TABLET ORAL EVERY 8 HOURS PRN
Qty: 90 TABLET | Refills: 0 | Status: SHIPPED | OUTPATIENT
Start: 2023-02-24 | End: 2023-03-26

## 2023-02-24 RX ADMIN — DEXAMETHASONE SODIUM PHOSPHATE 8 MG: 10 INJECTION INTRAMUSCULAR; INTRAVENOUS at 08:33

## 2023-02-24 ASSESSMENT — ENCOUNTER SYMPTOMS: BACK PAIN: 1

## 2023-02-24 NOTE — PROGRESS NOTES
Maddie Argueta (:  1945) is a 78 y.o. female,Established patient, here for evaluation of the following chief complaint(s):  Back Pain      ASSESSMENT/PLAN:    ICD-10-CM    1. Lumbar back pain with radiculopathy affecting left lower extremity  M54.16 XR LUMBAR SPINE (2-3 VIEWS)     dexamethasone (DECADRON) injection 8 mg     methylPREDNISolone (MEDROL DOSEPACK) 4 MG tablet (start oral steroids tomorrow)     HYDROcodone-acetaminophen (NORCO) 7.5-325 MG per tablet      2. Chronic right shoulder pain  M25.511 HYDROcodone-acetaminophen (NORCO) 7.5-325 MG per tablet    G89.29           Return if symptoms worsen or fail to improve. SUBJECTIVE/OBJECTIVE:  HPI    About 3 weeks, she lifted a heavy box. \"It did not take me long to put it down. \"  \"My back has been in pain. \"  Pain is shooting into her left buttock and sometimes it shoots in my left leg. \"I have to be careful on how I sit. \"  She has been taking Norco.   \"It has helped but it is not getting any better. \"  Denies any difficulty with urination or defecation    Last fill Maddy Escobar was 2023. She takes this for right shoulder pain. \"I need a shoulder replacement but it's not recommended because I have had strokes. \"  \"I need a refill today. \"  The Maddy Escobar helps with her shoulder pain and she has been taking it for her back pain too recently. /80   Pulse (!) 104   Temp 97.3 °F (36.3 °C) (Temporal)   Ht 5' 1\" (1.549 m)   Wt 141 lb (64 kg)   SpO2 98%   BMI 26.64 kg/m²     Review of Systems   Musculoskeletal:  Positive for arthralgias (right shoulder) and back pain (lumbar with left sided sciatica). Physical Exam  Vitals reviewed. Constitutional:       Appearance: She is well-developed. HENT:      Head: Normocephalic. Right Ear: External ear normal.      Left Ear: External ear normal.      Nose: Nose normal.   Eyes:      General:         Right eye: No discharge. Left eye: No discharge.    Cardiovascular:      Rate and Rhythm: Normal rate and regular rhythm. Pulmonary:      Effort: Pulmonary effort is normal.      Breath sounds: Normal breath sounds. No wheezing, rhonchi or rales. Abdominal:      General: Bowel sounds are normal.      Palpations: Abdomen is soft. Musculoskeletal:      Right shoulder: Tenderness present. Cervical back: Normal range of motion. Lumbar back: Tenderness present. Positive right straight leg raise test and positive left straight leg raise test.   Skin:     General: Skin is dry. Neurological:      General: No focal deficit present. Mental Status: She is alert and oriented to person, place, and time. Mental status is at baseline. Psychiatric:         Mood and Affect: Mood normal.         Behavior: Behavior normal.         Thought Content: Thought content normal.         Judgment: Judgment normal.           An electronic signature was used to authenticate this note.     --LAYLA Del Rosario

## 2023-02-24 NOTE — PROGRESS NOTES
After obtaining consent, and per orders of JIM RICH, injection of 8mg decadron given in Left upper quad. gluteus  by John Paul Stiles. Patient tolerated well. Medication was not supplied by patient.

## 2023-02-24 NOTE — TELEPHONE ENCOUNTER
----- Message from Marylee Homes, APRN sent at 2/24/2023  4:06 PM CST -----  Lumbar x-ray shows moderate degenerative disc narrowing at L4 and L5. There is spurs at all levels with compression deformities. Recommend treatment as prescribed in office today.     If pain is not improving we will have to consider physical therapy and/or further imaging

## 2023-02-24 NOTE — TELEPHONE ENCOUNTER
Called patient, spoke with: Patient regarding the results of the patients most recent XRAY. I advised Patient of Dr. Jimbo Akins recommendations.    Patient did voice understanding

## 2023-02-24 NOTE — TELEPHONE ENCOUNTER
Called patient, spoke with: Patient regarding the results of the patients most recent Labs. I advised Patient of Dr. Fede Stone recommendations.    Patient did voice understanding

## 2023-03-01 DIAGNOSIS — M54.31 SCIATICA OF RIGHT SIDE: ICD-10-CM

## 2023-03-01 DIAGNOSIS — M51.36 DDD (DEGENERATIVE DISC DISEASE), LUMBAR: ICD-10-CM

## 2023-03-01 RX ORDER — MELOXICAM 15 MG/1
15 TABLET ORAL DAILY
Qty: 76 TABLET | Refills: 0 | Status: SHIPPED | OUTPATIENT
Start: 2023-03-01

## 2023-03-01 NOTE — TELEPHONE ENCOUNTER
Received fax from pharmacy requesting refill on pts medication(s). Pt was last seen in office on 2/24/2023  and has a follow up scheduled for 4/10/2023. Will send request to  Dr. Fede Hou  for authorization.      Requested Prescriptions     Pending Prescriptions Disp Refills    meloxicam (MOBIC) 15 MG tablet [Pharmacy Med Name: meloxicam 15 mg tablet] 76 tablet 0     Sig: TAKE ONE TABLET BY MOUTH DAILY

## 2023-03-02 DIAGNOSIS — Z00.00 ROUTINE ADULT HEALTH MAINTENANCE: ICD-10-CM

## 2023-03-02 LAB — MAGNESIUM: 1.9 MG/DL (ref 1.6–2.4)

## 2023-03-03 ENCOUNTER — TELEPHONE (OUTPATIENT)
Dept: FAMILY MEDICINE CLINIC | Age: 78
End: 2023-03-03

## 2023-03-03 NOTE — TELEPHONE ENCOUNTER
----- Message from [id number] [facility],[address], DO sent at 3/2/2023  7:05 PM CST -----  Magnesium level is normal

## 2023-03-13 ENCOUNTER — OFFICE VISIT (OUTPATIENT)
Dept: FAMILY MEDICINE CLINIC | Age: 78
End: 2023-03-13
Payer: MEDICARE

## 2023-03-13 VITALS
WEIGHT: 149 LBS | HEIGHT: 61 IN | SYSTOLIC BLOOD PRESSURE: 136 MMHG | HEART RATE: 112 BPM | DIASTOLIC BLOOD PRESSURE: 72 MMHG | OXYGEN SATURATION: 98 % | BODY MASS INDEX: 28.13 KG/M2 | TEMPERATURE: 98.6 F

## 2023-03-13 DIAGNOSIS — E83.42 HYPOMAGNESEMIA: ICD-10-CM

## 2023-03-13 DIAGNOSIS — M54.41 ACUTE RIGHT-SIDED LOW BACK PAIN WITH RIGHT-SIDED SCIATICA: Primary | ICD-10-CM

## 2023-03-13 LAB — MAGNESIUM: 1.4 MG/DL (ref 1.6–2.4)

## 2023-03-13 PROCEDURE — G8417 CALC BMI ABV UP PARAM F/U: HCPCS | Performed by: NURSE PRACTITIONER

## 2023-03-13 PROCEDURE — G8427 DOCREV CUR MEDS BY ELIG CLIN: HCPCS | Performed by: NURSE PRACTITIONER

## 2023-03-13 PROCEDURE — 1090F PRES/ABSN URINE INCON ASSESS: CPT | Performed by: NURSE PRACTITIONER

## 2023-03-13 PROCEDURE — 3017F COLORECTAL CA SCREEN DOC REV: CPT | Performed by: NURSE PRACTITIONER

## 2023-03-13 PROCEDURE — 3075F SYST BP GE 130 - 139MM HG: CPT | Performed by: NURSE PRACTITIONER

## 2023-03-13 PROCEDURE — 99213 OFFICE O/P EST LOW 20 MIN: CPT | Performed by: NURSE PRACTITIONER

## 2023-03-13 PROCEDURE — 1036F TOBACCO NON-USER: CPT | Performed by: NURSE PRACTITIONER

## 2023-03-13 PROCEDURE — G8484 FLU IMMUNIZE NO ADMIN: HCPCS | Performed by: NURSE PRACTITIONER

## 2023-03-13 PROCEDURE — G8399 PT W/DXA RESULTS DOCUMENT: HCPCS | Performed by: NURSE PRACTITIONER

## 2023-03-13 PROCEDURE — 1123F ACP DISCUSS/DSCN MKR DOCD: CPT | Performed by: NURSE PRACTITIONER

## 2023-03-13 PROCEDURE — 3078F DIAST BP <80 MM HG: CPT | Performed by: NURSE PRACTITIONER

## 2023-03-13 RX ORDER — PREDNISONE 10 MG/1
TABLET ORAL
Qty: 42 TABLET | Refills: 0 | Status: SHIPPED | OUTPATIENT
Start: 2023-03-13 | End: 2023-03-25

## 2023-03-13 SDOH — ECONOMIC STABILITY: FOOD INSECURITY: WITHIN THE PAST 12 MONTHS, THE FOOD YOU BOUGHT JUST DIDN'T LAST AND YOU DIDN'T HAVE MONEY TO GET MORE.: NEVER TRUE

## 2023-03-13 SDOH — ECONOMIC STABILITY: FOOD INSECURITY: WITHIN THE PAST 12 MONTHS, YOU WORRIED THAT YOUR FOOD WOULD RUN OUT BEFORE YOU GOT MONEY TO BUY MORE.: NEVER TRUE

## 2023-03-13 SDOH — ECONOMIC STABILITY: HOUSING INSECURITY
IN THE LAST 12 MONTHS, WAS THERE A TIME WHEN YOU DID NOT HAVE A STEADY PLACE TO SLEEP OR SLEPT IN A SHELTER (INCLUDING NOW)?: NO

## 2023-03-13 SDOH — ECONOMIC STABILITY: INCOME INSECURITY: HOW HARD IS IT FOR YOU TO PAY FOR THE VERY BASICS LIKE FOOD, HOUSING, MEDICAL CARE, AND HEATING?: NOT VERY HARD

## 2023-03-13 ASSESSMENT — ENCOUNTER SYMPTOMS
DIARRHEA: 0
TROUBLE SWALLOWING: 0
SORE THROAT: 0
NAUSEA: 0
RHINORRHEA: 0
SINUS PRESSURE: 0
CONSTIPATION: 0
COUGH: 0
SHORTNESS OF BREATH: 0
BACK PAIN: 1
ABDOMINAL PAIN: 0
VOMITING: 0

## 2023-03-13 NOTE — PROGRESS NOTES
Everette Greene (:  1945) is a 78 y.o. female,Established patient, here for evaluation of the following chief complaint(s):  Back Pain (Right sciatic pain -nerve pain /Radiates down hip and leg )      ASSESSMENT/PLAN:    ICD-10-CM    1. Acute right-sided low back pain with right-sided sciatica  M54.41 predniSONE (DELTASONE) 10 MG tablet          Return if symptoms worsen or fail to improve. SUBJECTIVE/OBJECTIVE:  HPI  Here for lower back pain   Pain radiates down right leg/hip to knee  Been taking norco and mobic for symptoms  Denies any injury  A few weeks ago had same pain in left side and got a shot and took oral steroids. It went away. Pain is worse with sleep, going to bed, bending, moving  Get some relief with sit still  No loss of bowel or bladder control. No hx of back problems. Have also tried muscle rubs. Heating pad and hot shower helps some. DM in hx   Not on medications for sugars. Hemoglobin A1C   Date Value Ref Range Status   2022 5.8 4.0 - 6.0 % Final     Comment:     HbA1c levels >6% are an indication of hyperglycemia during the preceding 2  to 3 months or longer. HbA1c levels may reach 20% or higher in poorly controlled diabetes. Therapeutic action is suggested at levels above 8%. Diabetes patients with HbA1c levels below 7% meet the goal of the American  Diabetes Association. HbA1c levels below the established reference range may indicate recent  episodes of hypoglycemia, the presence of Hb variants, or shortened lifetime  of erythrocytes. Lumbar spine xray 2023  Impression   Bony demineralization with anterior spurs at all levels and compression deformities of the superior endplates of W34 and L3. Moderate degenerative disc narrowing at L4-5. Small aortic calcific plaques. Recommendation:    Follow up as clinically indicated.    Dictated and Electronically Signed by Monet Frazier MD at 2023 04:45:27 PM EST       /72 Pulse (!) 112   Temp 98.6 °F (37 °C)   Ht 5' 1\" (1.549 m)   Wt 149 lb (67.6 kg)   SpO2 98%   BMI 28.15 kg/m²     Review of Systems   Constitutional:  Negative for activity change, appetite change, fatigue, fever and unexpected weight change. HENT:  Negative for congestion, hearing loss, rhinorrhea, sinus pressure, sore throat and trouble swallowing. Eyes:  Negative for visual disturbance. Respiratory:  Negative for cough and shortness of breath. Cardiovascular:  Negative for chest pain, palpitations and leg swelling. Gastrointestinal:  Negative for abdominal pain, constipation, diarrhea, nausea and vomiting. Endocrine: Negative for cold intolerance and heat intolerance. Genitourinary:  Negative for flank pain, menstrual problem, pelvic pain, urgency and vaginal discharge. Musculoskeletal:  Positive for back pain. Negative for arthralgias. Skin:  Negative for rash. Neurological:  Negative for headaches. Psychiatric/Behavioral:  Negative for dysphoric mood and sleep disturbance. The patient is not nervous/anxious. Physical Exam  Vitals reviewed. Constitutional:       Appearance: Normal appearance. HENT:      Head: Normocephalic and atraumatic. Eyes:      Conjunctiva/sclera: Conjunctivae normal.   Cardiovascular:      Rate and Rhythm: Normal rate and regular rhythm. Pulses: Normal pulses. Heart sounds: Normal heart sounds. Pulmonary:      Effort: Pulmonary effort is normal.      Breath sounds: Normal breath sounds. Abdominal:      Palpations: Abdomen is soft. Musculoskeletal:      Cervical back: Normal range of motion and neck supple. Lumbar back: Tenderness (right SI joint) present. Decreased range of motion. Negative right straight leg raise test and negative left straight leg raise test.   Skin:     General: Skin is warm. Neurological:      Mental Status: She is alert and oriented to person, place, and time.                An electronic signature was used to authenticate this note.     --Edward Long, APRN

## 2023-03-13 NOTE — PATIENT INSTRUCTIONS
No lifting, pulling or tugging. Avoid painful motion. Ice for 20 minutes every couple of hours followed by few minutes of heat if needed.   Hold meloxicam while taking the steroids    If worsens follow up

## 2023-03-14 ENCOUNTER — TELEPHONE (OUTPATIENT)
Dept: FAMILY MEDICINE CLINIC | Age: 78
End: 2023-03-14

## 2023-03-14 NOTE — TELEPHONE ENCOUNTER
----- Message from LAYLA Randall sent at 3/14/2023  9:47 AM CDT -----  Magnesium low miss any of the three times a day dosing ?   Take an extra for 3 days then return to three times a day   Recheck in 1 week

## 2023-03-17 DIAGNOSIS — E83.42 HYPOMAGNESEMIA: ICD-10-CM

## 2023-03-17 LAB — MAGNESIUM SERPL-MCNC: 1.4 MG/DL (ref 1.6–2.4)

## 2023-03-20 ENCOUNTER — TELEPHONE (OUTPATIENT)
Dept: FAMILY MEDICINE CLINIC | Age: 78
End: 2023-03-20

## 2023-03-20 DIAGNOSIS — E61.2 MAGNESIUM DEFICIENCY: Primary | ICD-10-CM

## 2023-03-20 NOTE — TELEPHONE ENCOUNTER
Pt aware, her mail in pharmacy has been giving her different amounts she was advised last draw to take 4 for 3 days and then continue back on 3 a day.  Pt was confused so I advised her to take 3 a day and recheck in a week

## 2023-03-20 NOTE — TELEPHONE ENCOUNTER
----- Message from LAYLA Chatterjee sent at 3/17/2023  4:41 PM CDT -----  Magnesium remains low at 1.4 but stable from previous blood draw.   Please ensure patient is taking magnesium 3 times daily as prescribed  Repeat magnesium in 1 week

## 2023-03-24 DIAGNOSIS — E61.2 MAGNESIUM DEFICIENCY: ICD-10-CM

## 2023-03-24 LAB — MAGNESIUM SERPL-MCNC: 1.7 MG/DL (ref 1.6–2.4)

## 2023-03-30 DIAGNOSIS — M54.16 LUMBAR BACK PAIN WITH RADICULOPATHY AFFECTING LEFT LOWER EXTREMITY: ICD-10-CM

## 2023-03-30 DIAGNOSIS — G89.29 CHRONIC RIGHT SHOULDER PAIN: ICD-10-CM

## 2023-03-30 DIAGNOSIS — M25.511 CHRONIC RIGHT SHOULDER PAIN: ICD-10-CM

## 2023-03-30 RX ORDER — HYDROCODONE BITARTRATE AND ACETAMINOPHEN 7.5; 325 MG/1; MG/1
1 TABLET ORAL EVERY 8 HOURS PRN
Qty: 90 TABLET | Refills: 0 | Status: SHIPPED | OUTPATIENT
Start: 2023-03-30 | End: 2023-04-29

## 2023-03-30 NOTE — PROGRESS NOTES
Patient here with spouse  Ran out of pain medication  Filled 2/24 with sciatica taking three times a day  Would like refill today  As doesn't want to run out before sees doc  Seen in last month   Known to provider  Will send in   Low risk of diversion  RADHA bolden

## 2023-03-31 ENCOUNTER — TELEPHONE (OUTPATIENT)
Dept: FAMILY MEDICINE CLINIC | Age: 78
End: 2023-03-31

## 2023-03-31 NOTE — TELEPHONE ENCOUNTER
Per MM on 3/17/23  Magnesium remains low at 1.4 but stable from previous blood draw. Please ensure patient is taking magnesium 3 times daily as prescribed  Repeat magnesium in 1 week      As long as she is taking 3x a day for the past week, she needs to come in today to be drawn.

## 2023-03-31 NOTE — TELEPHONE ENCOUNTER
Pt called stating she has not received her Magnesium level that got drawn on Friday.  She needs to know if she is supposed to get these drawn again today or not

## 2023-04-04 DIAGNOSIS — E83.42 HYPOMAGNESEMIA: ICD-10-CM

## 2023-04-04 LAB — MAGNESIUM SERPL-MCNC: 1.5 MG/DL (ref 1.6–2.4)

## 2023-04-05 ENCOUNTER — TELEPHONE (OUTPATIENT)
Dept: FAMILY MEDICINE CLINIC | Age: 78
End: 2023-04-05

## 2023-04-05 NOTE — TELEPHONE ENCOUNTER
----- Message from [address] [facility],[address], DO sent at 4/5/2023 12:29 PM CDT -----  Magnesium level is just below the normal limit at 1.5. Normal being 1.6-2.4. Recommend taking extra dose of magnesium oxide to boost this up. Consider doing on an every other day basis.

## 2023-04-14 ENCOUNTER — TELEPHONE (OUTPATIENT)
Dept: FAMILY MEDICINE CLINIC | Age: 78
End: 2023-04-14

## 2023-04-20 DIAGNOSIS — E83.42 HYPOMAGNESEMIA: Primary | ICD-10-CM

## 2023-04-20 LAB — MAGNESIUM SERPL-MCNC: 2 MG/DL (ref 1.6–2.4)

## 2023-04-21 ENCOUNTER — TELEPHONE (OUTPATIENT)
Dept: FAMILY MEDICINE CLINIC | Age: 78
End: 2023-04-21

## 2023-04-21 NOTE — TELEPHONE ENCOUNTER
----- Message from [id number] [facility],[address], DO sent at 4/20/2023  6:54 PM CDT -----  Magnesium is normal

## 2023-04-27 ENCOUNTER — TELEPHONE (OUTPATIENT)
Dept: FAMILY MEDICINE CLINIC | Age: 78
End: 2023-04-27

## 2023-04-27 DIAGNOSIS — M25.511 CHRONIC RIGHT SHOULDER PAIN: ICD-10-CM

## 2023-04-27 DIAGNOSIS — E83.42 HYPOMAGNESEMIA: ICD-10-CM

## 2023-04-27 DIAGNOSIS — M54.16 LUMBAR BACK PAIN WITH RADICULOPATHY AFFECTING LEFT LOWER EXTREMITY: ICD-10-CM

## 2023-04-27 DIAGNOSIS — G89.29 CHRONIC RIGHT SHOULDER PAIN: ICD-10-CM

## 2023-04-27 LAB — MAGNESIUM SERPL-MCNC: 1.5 MG/DL (ref 1.6–2.4)

## 2023-04-27 NOTE — TELEPHONE ENCOUNTER
Called patient, spoke with: Patient regarding the results of the patients most recent labs. Pt has not missed any doses. I advised Patient of [employer] recommendations.      Patient did voice understanding

## 2023-04-27 NOTE — TELEPHONE ENCOUNTER
Received call/My Chart Message from patient requesting refill on medication(s). Pt was last seen in office on 4/10/2023  and has a follow up scheduled for 7/11/2023. Will send request to provider for authorization. Ahsan Gardner scanned in pts chart for review. Requested Prescriptions     Pending Prescriptions Disp Refills    HYDROcodone-acetaminophen (NORCO) 7.5-325 MG per tablet 90 tablet 0     Sig: Take 1 tablet by mouth every 8 hours as needed for Pain for up to 30 days. Intended supply: 3 days.  Take lowest dose possible to manage pain Max Daily Amount: 3 tablets

## 2023-04-27 NOTE — TELEPHONE ENCOUNTER
----- Message from LAYLA Amador sent at 4/27/2023  4:17 PM CDT -----  Magnesium slightly low at 1.5  Miss any doses?   Increase mag ox 1 extra tonight  Then return to normal regimen  Recheck in 1 week

## 2023-04-28 RX ORDER — HYDROCODONE BITARTRATE AND ACETAMINOPHEN 7.5; 325 MG/1; MG/1
1 TABLET ORAL EVERY 8 HOURS PRN
Qty: 90 TABLET | Refills: 0 | Status: SHIPPED | OUTPATIENT
Start: 2023-04-28 | End: 2023-05-28

## 2023-05-04 DIAGNOSIS — M51.36 DDD (DEGENERATIVE DISC DISEASE), LUMBAR: ICD-10-CM

## 2023-05-04 DIAGNOSIS — M54.31 SCIATICA OF RIGHT SIDE: ICD-10-CM

## 2023-05-04 RX ORDER — MELOXICAM 15 MG/1
15 TABLET ORAL DAILY
Qty: 90 TABLET | Refills: 3 | Status: SHIPPED | OUTPATIENT
Start: 2023-05-04

## 2023-05-04 NOTE — TELEPHONE ENCOUNTER
Received fax from pharmacy requesting refill on pts medication(s). Pt was last seen in office on 4/10/2023  and has a follow up scheduled for 7/11/2023. Will send request to  Dr. Cristobal Hurst  for authorization.      Requested Prescriptions     Pending Prescriptions Disp Refills    meloxicam (MOBIC) 15 MG tablet [Pharmacy Med Name: meloxicam 15 mg tablet] 90 tablet 3     Sig: Take 1 tablet by mouth daily

## 2023-05-09 ENCOUNTER — TELEMEDICINE (OUTPATIENT)
Dept: FAMILY MEDICINE CLINIC | Age: 78
End: 2023-05-09
Payer: MEDICARE

## 2023-05-09 DIAGNOSIS — I63.50 CEREBROVASCULAR ACCIDENT (CVA) DUE TO OCCLUSION OF CEREBRAL ARTERY (HCC): ICD-10-CM

## 2023-05-09 DIAGNOSIS — R56.9 SEIZURES (HCC): ICD-10-CM

## 2023-05-09 DIAGNOSIS — M54.16 LUMBAR BACK PAIN WITH RADICULOPATHY AFFECTING LEFT LOWER EXTREMITY: ICD-10-CM

## 2023-05-09 DIAGNOSIS — M54.31 SCIATICA OF RIGHT SIDE: Primary | ICD-10-CM

## 2023-05-09 DIAGNOSIS — G89.29 CHRONIC RIGHT SHOULDER PAIN: ICD-10-CM

## 2023-05-09 DIAGNOSIS — M25.511 CHRONIC RIGHT SHOULDER PAIN: ICD-10-CM

## 2023-05-09 PROCEDURE — G8417 CALC BMI ABV UP PARAM F/U: HCPCS | Performed by: PEDIATRICS

## 2023-05-09 PROCEDURE — G8399 PT W/DXA RESULTS DOCUMENT: HCPCS | Performed by: PEDIATRICS

## 2023-05-09 PROCEDURE — 99213 OFFICE O/P EST LOW 20 MIN: CPT | Performed by: PEDIATRICS

## 2023-05-09 PROCEDURE — 1090F PRES/ABSN URINE INCON ASSESS: CPT | Performed by: PEDIATRICS

## 2023-05-09 PROCEDURE — 1123F ACP DISCUSS/DSCN MKR DOCD: CPT | Performed by: PEDIATRICS

## 2023-05-09 PROCEDURE — 1036F TOBACCO NON-USER: CPT | Performed by: PEDIATRICS

## 2023-05-09 PROCEDURE — 3017F COLORECTAL CA SCREEN DOC REV: CPT | Performed by: PEDIATRICS

## 2023-05-09 PROCEDURE — G8428 CUR MEDS NOT DOCUMENT: HCPCS | Performed by: PEDIATRICS

## 2023-05-09 RX ORDER — PREDNISONE 10 MG/1
TABLET ORAL
Qty: 43 TABLET | Refills: 1 | Status: SHIPPED | OUTPATIENT
Start: 2023-05-09

## 2023-05-09 RX ORDER — HYDROCODONE BITARTRATE AND ACETAMINOPHEN 7.5; 325 MG/1; MG/1
1 TABLET ORAL EVERY 8 HOURS PRN
Qty: 90 TABLET | Refills: 0 | Status: SHIPPED | OUTPATIENT
Start: 2023-05-09 | End: 2023-06-08

## 2023-05-09 RX ORDER — LEVETIRACETAM 500 MG/1
500 TABLET ORAL 2 TIMES DAILY
Qty: 60 TABLET | Refills: 4 | Status: SHIPPED | OUTPATIENT
Start: 2023-05-09

## 2023-05-09 ASSESSMENT — ENCOUNTER SYMPTOMS
VOMITING: 0
NAUSEA: 0
SHORTNESS OF BREATH: 0
DIARRHEA: 0
ABDOMINAL PAIN: 0
WHEEZING: 0
COUGH: 0
SORE THROAT: 0

## 2023-05-09 NOTE — TELEPHONE ENCOUNTER
Received fax from pharmacy requesting refill on pts medication(s). Pt was last seen in office on 4/10/2023  and has a follow up scheduled for 5/9/2023. Will send request to  Dr. Paulette Cooley  for authorization.      Requested Prescriptions     Pending Prescriptions Disp Refills    levETIRAcetam (KEPPRA) 500 MG tablet [Pharmacy Med Name: levetiracetam 500 mg tablet] 60 tablet 4     Sig: TAKE ONE TABLET BY MOUTH IN THE MORNING and TAKE ONE TABLET AT BEDTIME

## 2023-05-09 NOTE — PROGRESS NOTES
[facility], [address]  Phone [phone]   Fax [phone]      OFFICE VISIT: 2023    Meghan Flood-: 1945      HPI  Reason For Visit:  Martínez Roblero is a 78 y.o. Back Pain    Patient presents with complaints of recurrent sciatica. She has had this before. This starts in her right back and radiating down the the lateral right knee. She is unable to walk without her walker. This started when she strained her knee. She was walking differently. This resulted in Sciatica. When this did occur, she found benefit with prednisone taper. We are going to try this again and see if this makes a difference for her.     vitals were not taken for this visit. There is no height or weight on file to calculate BMI. I have reviewed the following with the Ms. Flood   Lab Review  Orders Only on 2023   Component Date Value    Magnesium 2023 1.5 (L)    Orders Only on 2023   Component Date Value    Magnesium 2023 2.0    Orders Only on 2023   Component Date Value    Uric Acid, Serum 2023 4.7     Magnesium 2023 1.7     T4 Free 2023 1.59     TSH 2023 1.360     Microalbumin, Random Uri* 2023 87.80 (H)     Creatinine, Ur 2023 83.5     Microalbumin Creatinine * 2023 1051. 5     Cholesterol, Total 2023 169     Triglycerides 2023 142     HDL 2023 81     LDL Calculated 2023 60     Hemoglobin A1C 2023 7.6 (H)     Sodium 2023 131 (L)     Potassium 2023 4.7     Chloride 2023 91 (L)     CO2 2023 20 (L)     Anion Gap 2023 20 (H)     Glucose 2023 190 (H)     BUN 2023 16     Creatinine 2023 0.7     Est, Glom Filt Rate 2023 >60     Calcium 2023 11.0 (H)     Total Protein 2023 7.3     Albumin 2023 4.7     Total Bilirubin 2023 0.5     Alkaline Phosphatase 2023 116 (H)     ALT 2023 14     AST

## 2023-05-12 DIAGNOSIS — E83.42 HYPOMAGNESEMIA: ICD-10-CM

## 2023-05-12 LAB — MAGNESIUM SERPL-MCNC: 1.6 MG/DL (ref 1.6–2.4)

## 2023-05-15 ENCOUNTER — TELEPHONE (OUTPATIENT)
Dept: FAMILY MEDICINE CLINIC | Age: 78
End: 2023-05-15

## 2023-05-15 NOTE — TELEPHONE ENCOUNTER
----- Message from [address] [facility],[address], DO sent at 5/13/2023  9:48 AM CDT -----  Mg level is normal

## 2023-05-15 NOTE — TELEPHONE ENCOUNTER
Called patient, spoke with: Patient regarding the results of the patients most recent labs. I advised Patient of Dr. Rafa Olivera recommendations.    Patient did voice understanding

## 2023-05-19 ENCOUNTER — TELEPHONE (OUTPATIENT)
Dept: FAMILY MEDICINE CLINIC | Age: 78
End: 2023-05-19

## 2023-05-19 DIAGNOSIS — E61.2 MAGNESIUM DEFICIENCY: Primary | ICD-10-CM

## 2023-05-19 DIAGNOSIS — E83.42 HYPOMAGNESEMIA: ICD-10-CM

## 2023-05-19 LAB — MAGNESIUM SERPL-MCNC: 1.6 MG/DL (ref 1.6–2.4)

## 2023-05-19 NOTE — TELEPHONE ENCOUNTER
----- Message from Julane Fothergill, APRN sent at 5/19/2023 11:58 AM CDT -----  Magnesium normal recheck in 1 week

## 2023-05-19 NOTE — TELEPHONE ENCOUNTER
Called patient, spoke with: Patient regarding the results of the patients most recent labs. I advised Patient of Shanna Jackman recommendations.    Patient did voice understanding

## 2023-05-24 ENCOUNTER — TELEPHONE (OUTPATIENT)
Dept: FAMILY MEDICINE CLINIC | Age: 78
End: 2023-05-24

## 2023-05-24 ENCOUNTER — OFFICE VISIT (OUTPATIENT)
Dept: FAMILY MEDICINE CLINIC | Age: 78
End: 2023-05-24
Payer: MEDICARE

## 2023-05-24 VITALS
BODY MASS INDEX: 28.13 KG/M2 | HEIGHT: 61 IN | OXYGEN SATURATION: 98 % | HEART RATE: 103 BPM | SYSTOLIC BLOOD PRESSURE: 138 MMHG | WEIGHT: 149 LBS | TEMPERATURE: 98.8 F | DIASTOLIC BLOOD PRESSURE: 88 MMHG

## 2023-05-24 DIAGNOSIS — I10 PRIMARY HYPERTENSION: ICD-10-CM

## 2023-05-24 DIAGNOSIS — E83.42 HYPOMAGNESEMIA: ICD-10-CM

## 2023-05-24 DIAGNOSIS — E11.42 TYPE 2 DIABETES MELLITUS WITH DIABETIC POLYNEUROPATHY, WITHOUT LONG-TERM CURRENT USE OF INSULIN (HCC): ICD-10-CM

## 2023-05-24 DIAGNOSIS — M54.31 CHRONIC SCIATICA OF RIGHT SIDE: Primary | ICD-10-CM

## 2023-05-24 DIAGNOSIS — G89.29 CHRONIC RIGHT SHOULDER PAIN: ICD-10-CM

## 2023-05-24 DIAGNOSIS — M54.16 LUMBAR BACK PAIN WITH RADICULOPATHY AFFECTING LEFT LOWER EXTREMITY: ICD-10-CM

## 2023-05-24 DIAGNOSIS — E78.2 MIXED HYPERLIPIDEMIA: ICD-10-CM

## 2023-05-24 DIAGNOSIS — M25.511 CHRONIC RIGHT SHOULDER PAIN: ICD-10-CM

## 2023-05-24 PROCEDURE — 3017F COLORECTAL CA SCREEN DOC REV: CPT | Performed by: PEDIATRICS

## 2023-05-24 PROCEDURE — 3079F DIAST BP 80-89 MM HG: CPT | Performed by: PEDIATRICS

## 2023-05-24 PROCEDURE — 3075F SYST BP GE 130 - 139MM HG: CPT | Performed by: PEDIATRICS

## 2023-05-24 PROCEDURE — 3051F HG A1C>EQUAL 7.0%<8.0%: CPT | Performed by: PEDIATRICS

## 2023-05-24 PROCEDURE — G8399 PT W/DXA RESULTS DOCUMENT: HCPCS | Performed by: PEDIATRICS

## 2023-05-24 PROCEDURE — 1123F ACP DISCUSS/DSCN MKR DOCD: CPT | Performed by: PEDIATRICS

## 2023-05-24 PROCEDURE — 2022F DILAT RTA XM EVC RTNOPTHY: CPT | Performed by: PEDIATRICS

## 2023-05-24 PROCEDURE — 1090F PRES/ABSN URINE INCON ASSESS: CPT | Performed by: PEDIATRICS

## 2023-05-24 PROCEDURE — G8417 CALC BMI ABV UP PARAM F/U: HCPCS | Performed by: PEDIATRICS

## 2023-05-24 PROCEDURE — G8427 DOCREV CUR MEDS BY ELIG CLIN: HCPCS | Performed by: PEDIATRICS

## 2023-05-24 PROCEDURE — 99214 OFFICE O/P EST MOD 30 MIN: CPT | Performed by: PEDIATRICS

## 2023-05-24 PROCEDURE — 1036F TOBACCO NON-USER: CPT | Performed by: PEDIATRICS

## 2023-05-24 RX ORDER — HYDROCODONE BITARTRATE AND ACETAMINOPHEN 7.5; 325 MG/1; MG/1
1 TABLET ORAL EVERY 8 HOURS PRN
Qty: 90 TABLET | Refills: 0 | Status: SHIPPED | OUTPATIENT
Start: 2023-05-24 | End: 2023-06-23

## 2023-05-24 ASSESSMENT — ENCOUNTER SYMPTOMS
VOMITING: 0
ABDOMINAL PAIN: 0
WHEEZING: 0
SORE THROAT: 0
COUGH: 0
DIARRHEA: 0
NAUSEA: 0
SHORTNESS OF BREATH: 0

## 2023-05-24 NOTE — TELEPHONE ENCOUNTER
Called patient to let her know that her MRI is scheduled for 07/13/2023 at 9:15 am. Patient is aware and voiced understanding.

## 2023-05-24 NOTE — PROGRESS NOTES
[address] [facility], [address]  Phone [phone]   Fax [phone]      OFFICE VISIT: 2023    Ludin Flood-: 1945      [facility]  Reason For Visit:  Mike Aguirre is a 78 y.o. Back Pain (PT wanted her to have FU on sciatica pain. She states it is much better since started PT, she does still use her walker on long walks. )    Patient presents on follow-up for chronic recurrent sciatica. She has been going to physical therapy for this and notes significant improvement in her symptoms. Lumbar spine x-rays did show anterior spurs noted at all lumbar levels with bony demineralization and there was moderate degenerative changes and disc narrowing at the L4-L5 location. MRI was not performed pending physical therapy. She did undergo several courses of prednisone as well prior to physical therapy. This was helpful as well when she was on the prednisone  She is fairly satisfied with her back and the difference that PT has made. She is hoping to avoid surgery at this point. We discussed the information that MRI would provide and that we are looking to see if there is a surgical option to help her. She understands this and she realizes that there is no obligation to proceed to surgery even if we find a surgical issue. She is taking hydrocodone at least 2x per day but at times she does require more. We are going to send this in today as she is here. height is 5' 1\" (1.549 m) and weight is 149 lb (67.6 kg). Her temporal temperature is 98.8 °F (37.1 °C). Her blood pressure is 138/88 and her pulse is 103 (abnormal). Her oxygen saturation is 98%. Body mass index is 28.15 kg/m². I have reviewed the following with the Ms. Flood   Lab Review  Orders Only on 2023   Component Date Value    Magnesium 2023 1.6    Orders Only on 2023   Component Date Value    Magnesium 2023 1.6    Orders Only on 2023   Component Date Value    Magnesium

## 2023-05-26 ENCOUNTER — TELEPHONE (OUTPATIENT)
Dept: FAMILY MEDICINE CLINIC | Age: 78
End: 2023-05-26

## 2023-05-26 DIAGNOSIS — R79.0 LOW MAGNESIUM LEVEL: Primary | ICD-10-CM

## 2023-05-26 DIAGNOSIS — E61.2 MAGNESIUM DEFICIENCY: ICD-10-CM

## 2023-05-26 LAB — MAGNESIUM SERPL-MCNC: 1.8 MG/DL (ref 1.6–2.4)

## 2023-05-26 NOTE — TELEPHONE ENCOUNTER
----- Message from LAYLA Seth sent at 5/26/2023 12:39 PM CDT -----  Magnesium normal  Recheck in 2 weeks

## 2023-06-02 DIAGNOSIS — R53.83 FATIGUE, UNSPECIFIED TYPE: ICD-10-CM

## 2023-06-02 DIAGNOSIS — E61.2 MAGNESIUM DEFICIENCY: ICD-10-CM

## 2023-06-05 RX ORDER — MAGNESIUM OXIDE 400 MG/1
400 TABLET ORAL 2 TIMES DAILY
Qty: 276 TABLET | Refills: 0 | Status: SHIPPED | OUTPATIENT
Start: 2023-06-05

## 2023-06-05 NOTE — TELEPHONE ENCOUNTER
Received fax from pharmacy requesting refill on pts medication(s). Pt was last seen in office on 5/24/2023  and has a follow up scheduled for 7/11/2023. Will send request to  Dr. Daija Argueta  for authorization.      Requested Prescriptions     Pending Prescriptions Disp Refills    magnesium oxide (MAG-OX) 400 MG tablet [Pharmacy Med Name: magnesium oxide 400 mg (241.3 mg magnesium) tablet] 276 tablet 0     Sig: TAKE ONE TABLET BY MOUTH TWICE DAILY

## 2023-06-07 DIAGNOSIS — E55.9 VITAMIN D DEFICIENCY: ICD-10-CM

## 2023-06-07 RX ORDER — ERGOCALCIFEROL 1.25 MG/1
CAPSULE ORAL
Qty: 12 CAPSULE | Refills: 1 | Status: SHIPPED | OUTPATIENT
Start: 2023-06-07

## 2023-06-07 NOTE — TELEPHONE ENCOUNTER
Received fax from pharmacy requesting refill on pts medication(s). Pt was last seen in office on 5/24/2023  and has a follow up scheduled for 7/11/2023. Will send request to  Dr. Fenry Hernandez  for authorization.      Requested Prescriptions     Pending Prescriptions Disp Refills    vitamin D (ERGOCALCIFEROL) 1.25 MG (26395 UT) CAPS capsule [Pharmacy Med Name: ergocalciferol (vitamin D2) 1,250 mcg (50,000 unit) capsule] 12 capsule 1     Sig: TAKE ONE CAPSULE BY MOUTH EVERY WEEK

## 2023-06-08 DIAGNOSIS — R79.0 LOW MAGNESIUM LEVEL: ICD-10-CM

## 2023-06-08 LAB — MAGNESIUM SERPL-MCNC: 1.9 MG/DL (ref 1.6–2.4)

## 2023-06-27 ENCOUNTER — TELEPHONE (OUTPATIENT)
Dept: FAMILY MEDICINE CLINIC | Age: 78
End: 2023-06-27

## 2023-06-27 DIAGNOSIS — E83.42 HYPOMAGNESEMIA: ICD-10-CM

## 2023-06-27 DIAGNOSIS — E61.2 MAGNESIUM DEFICIENCY: Primary | ICD-10-CM

## 2023-06-27 LAB — MAGNESIUM SERPL-MCNC: 1.6 MG/DL (ref 1.6–2.4)

## 2023-06-28 DIAGNOSIS — I48.91 ATRIAL FIBRILLATION, UNSPECIFIED TYPE (HCC): Primary | ICD-10-CM

## 2023-06-30 ENCOUNTER — HOSPITAL ENCOUNTER (OUTPATIENT)
Dept: MRI IMAGING | Age: 78
Discharge: HOME | End: 2023-06-30
Payer: MEDICARE

## 2023-06-30 ENCOUNTER — TELEPHONE (OUTPATIENT)
Dept: FAMILY MEDICINE CLINIC | Age: 78
End: 2023-06-30

## 2023-06-30 DIAGNOSIS — M48.00 CENTRAL STENOSIS OF SPINAL CANAL: Primary | ICD-10-CM

## 2023-06-30 DIAGNOSIS — M54.31 CHRONIC SCIATICA OF RIGHT SIDE: ICD-10-CM

## 2023-06-30 PROCEDURE — 72148 MRI LUMBAR SPINE W/O DYE: CPT

## 2023-07-03 ENCOUNTER — TELEPHONE (OUTPATIENT)
Dept: NEUROSURGERY | Age: 78
End: 2023-07-03

## 2023-07-03 NOTE — TELEPHONE ENCOUNTER
1st attempt to contact patient.  I left a voicemail instructing patient to call back at [phone] to schedule their new patient appointment     Central stenosis of spinal canal    [facility] 6/30/23

## 2023-07-05 ENCOUNTER — TELEPHONE (OUTPATIENT)
Dept: CARDIOLOGY CLINIC | Age: 78
End: 2023-07-05

## 2023-07-11 ENCOUNTER — OFFICE VISIT (OUTPATIENT)
Dept: FAMILY MEDICINE CLINIC | Age: 78
End: 2023-07-11

## 2023-07-11 VITALS
WEIGHT: 143 LBS | BODY MASS INDEX: 27 KG/M2 | TEMPERATURE: 98.3 F | HEIGHT: 61 IN | OXYGEN SATURATION: 98 % | DIASTOLIC BLOOD PRESSURE: 72 MMHG | HEART RATE: 115 BPM | SYSTOLIC BLOOD PRESSURE: 132 MMHG

## 2023-07-11 DIAGNOSIS — R53.83 FATIGUE, UNSPECIFIED TYPE: ICD-10-CM

## 2023-07-11 DIAGNOSIS — E11.42 TYPE 2 DIABETES MELLITUS WITH DIABETIC POLYNEUROPATHY, WITHOUT LONG-TERM CURRENT USE OF INSULIN (HCC): Primary | ICD-10-CM

## 2023-07-11 DIAGNOSIS — G89.29 CHRONIC RIGHT SHOULDER PAIN: ICD-10-CM

## 2023-07-11 DIAGNOSIS — E61.2 MAGNESIUM DEFICIENCY: ICD-10-CM

## 2023-07-11 DIAGNOSIS — M25.561 CHRONIC PAIN OF RIGHT KNEE: ICD-10-CM

## 2023-07-11 DIAGNOSIS — E78.2 MIXED HYPERLIPIDEMIA: ICD-10-CM

## 2023-07-11 DIAGNOSIS — G89.29 CHRONIC PAIN OF LEFT KNEE: ICD-10-CM

## 2023-07-11 DIAGNOSIS — M25.511 CHRONIC RIGHT SHOULDER PAIN: ICD-10-CM

## 2023-07-11 DIAGNOSIS — M25.562 CHRONIC PAIN OF LEFT KNEE: ICD-10-CM

## 2023-07-11 DIAGNOSIS — M54.16 LUMBAR BACK PAIN WITH RADICULOPATHY AFFECTING LEFT LOWER EXTREMITY: ICD-10-CM

## 2023-07-11 DIAGNOSIS — G89.29 CHRONIC PAIN OF RIGHT KNEE: ICD-10-CM

## 2023-07-11 DIAGNOSIS — I10 PRIMARY HYPERTENSION: ICD-10-CM

## 2023-07-11 RX ORDER — TRIAMCINOLONE ACETONIDE 40 MG/ML
40 INJECTION, SUSPENSION INTRA-ARTICULAR; INTRAMUSCULAR ONCE
Status: COMPLETED | OUTPATIENT
Start: 2023-07-11 | End: 2023-07-11

## 2023-07-11 RX ORDER — TRIAMCINOLONE ACETONIDE 40 MG/ML
40 INJECTION, SUSPENSION INTRA-ARTICULAR; INTRAMUSCULAR ONCE
Status: DISCONTINUED | OUTPATIENT
Start: 2023-07-11 | End: 2023-07-11

## 2023-07-11 RX ORDER — HYDROCODONE BITARTRATE AND ACETAMINOPHEN 7.5; 325 MG/1; MG/1
1 TABLET ORAL EVERY 6 HOURS PRN
Qty: 120 TABLET | Refills: 0 | Status: SHIPPED | OUTPATIENT
Start: 2023-07-11 | End: 2023-08-10

## 2023-07-11 RX ADMIN — TRIAMCINOLONE ACETONIDE 40 MG: 40 INJECTION, SUSPENSION INTRA-ARTICULAR; INTRAMUSCULAR at 15:30

## 2023-07-11 RX ADMIN — TRIAMCINOLONE ACETONIDE 40 MG: 40 INJECTION, SUSPENSION INTRA-ARTICULAR; INTRAMUSCULAR at 15:27

## 2023-07-11 ASSESSMENT — ENCOUNTER SYMPTOMS
ABDOMINAL PAIN: 0
NAUSEA: 0
SHORTNESS OF BREATH: 0
COUGH: 0
VOMITING: 0
SORE THROAT: 0
WHEEZING: 0
DIARRHEA: 0

## 2023-07-13 DIAGNOSIS — E78.2 MIXED HYPERLIPIDEMIA: ICD-10-CM

## 2023-07-13 DIAGNOSIS — I10 PRIMARY HYPERTENSION: ICD-10-CM

## 2023-07-13 DIAGNOSIS — E61.2 MAGNESIUM DEFICIENCY: ICD-10-CM

## 2023-07-13 DIAGNOSIS — R53.83 FATIGUE, UNSPECIFIED TYPE: ICD-10-CM

## 2023-07-13 DIAGNOSIS — E11.42 TYPE 2 DIABETES MELLITUS WITH DIABETIC POLYNEUROPATHY, WITHOUT LONG-TERM CURRENT USE OF INSULIN (HCC): ICD-10-CM

## 2023-07-13 LAB
ALBUMIN SERPL-MCNC: 4.6 G/DL (ref 3.5–5.2)
ALP SERPL-CCNC: 100 U/L (ref 35–104)
ALT SERPL-CCNC: 16 U/L (ref 5–33)
ANION GAP SERPL CALCULATED.3IONS-SCNC: 18 MMOL/L (ref 7–19)
AST SERPL-CCNC: 16 U/L (ref 5–32)
BASOPHILS # BLD: 0 K/UL (ref 0–0.2)
BASOPHILS NFR BLD: 0 % (ref 0–1)
BILIRUB SERPL-MCNC: 0.5 MG/DL (ref 0.2–1.2)
BUN SERPL-MCNC: 18 MG/DL (ref 8–23)
CALCIUM SERPL-MCNC: 10.3 MG/DL (ref 8.8–10.2)
CHLORIDE SERPL-SCNC: 92 MMOL/L (ref 98–111)
CHOLEST SERPL-MCNC: 139 MG/DL (ref 160–199)
CO2 SERPL-SCNC: 18 MMOL/L (ref 22–29)
CREAT SERPL-MCNC: 0.8 MG/DL (ref 0.5–0.9)
EOSINOPHIL # BLD: 0 K/UL (ref 0–0.6)
EOSINOPHIL NFR BLD: 0 % (ref 0–5)
ERYTHROCYTE [DISTWIDTH] IN BLOOD BY AUTOMATED COUNT: 13.8 % (ref 11.5–14.5)
GLUCOSE SERPL-MCNC: 164 MG/DL (ref 74–109)
HBA1C MFR BLD: 7.7 % (ref 4–6)
HCT VFR BLD AUTO: 37.6 % (ref 37–47)
HDLC SERPL-MCNC: 62 MG/DL (ref 65–121)
HGB BLD-MCNC: 11.9 G/DL (ref 12–16)
IMM GRANULOCYTES # BLD: 0.5 K/UL
LDLC SERPL CALC-MCNC: 45 MG/DL
LYMPHOCYTES # BLD: 2.6 K/UL (ref 1.1–4.5)
LYMPHOCYTES NFR BLD: 15 % (ref 20–40)
MAGNESIUM SERPL-MCNC: 1.5 MG/DL (ref 1.6–2.4)
MCH RBC QN AUTO: 29.2 PG (ref 27–31)
MCHC RBC AUTO-ENTMCNC: 31.6 G/DL (ref 33–37)
MCV RBC AUTO: 92.2 FL (ref 81–99)
MONOCYTES # BLD: 0.9 K/UL (ref 0–0.9)
MONOCYTES NFR BLD: 6 % (ref 0–10)
NEUTROPHILS # BLD: 11.9 K/UL (ref 1.5–7.5)
NEUTS SEG NFR BLD: 77 % (ref 50–65)
PLATELET # BLD AUTO: 244 K/UL (ref 130–400)
PLATELET SLIDE REVIEW: ADEQUATE
PMV BLD AUTO: 12.1 FL (ref 9.4–12.3)
POTASSIUM SERPL-SCNC: 4.5 MMOL/L (ref 3.5–5)
PROT SERPL-MCNC: 7 G/DL (ref 6.6–8.7)
RBC # BLD AUTO: 4.08 M/UL (ref 4.2–5.4)
RBC MORPH BLD: NORMAL
SODIUM SERPL-SCNC: 128 MMOL/L (ref 136–145)
T4 FREE SERPL-MCNC: 1.53 NG/DL (ref 0.93–1.7)
TRIGL SERPL-MCNC: 162 MG/DL (ref 0–149)
TSH SERPL DL<=0.005 MIU/L-ACNC: 3.72 UIU/ML (ref 0.27–4.2)
VARIANT LYMPHS NFR BLD: 2 % (ref 0–8)
WBC # BLD AUTO: 15.5 K/UL (ref 4.8–10.8)

## 2023-07-13 RX ORDER — TRIAMCINOLONE ACETONIDE 40 MG/ML
40 INJECTION, SUSPENSION INTRA-ARTICULAR; INTRAMUSCULAR ONCE
Status: COMPLETED | OUTPATIENT
Start: 2023-07-13 | End: 2023-07-13

## 2023-07-13 RX ADMIN — TRIAMCINOLONE ACETONIDE 40 MG: 40 INJECTION, SUSPENSION INTRA-ARTICULAR; INTRAMUSCULAR at 08:14

## 2023-07-13 RX ADMIN — TRIAMCINOLONE ACETONIDE 40 MG: 40 INJECTION, SUSPENSION INTRA-ARTICULAR; INTRAMUSCULAR at 08:13

## 2023-07-14 ENCOUNTER — TELEPHONE (OUTPATIENT)
Dept: FAMILY MEDICINE CLINIC | Age: 78
End: 2023-07-14

## 2023-07-14 LAB
CREAT UR-MCNC: 11.9 MG/DL (ref 28–217)
MICROALBUMIN UR-MCNC: <1.2 MG/DL (ref 0–19)
MICROALBUMIN/CREAT UR-RTO: ABNORMAL MG/G

## 2023-07-17 ENCOUNTER — TELEPHONE (OUTPATIENT)
Dept: FAMILY MEDICINE CLINIC | Age: 78
End: 2023-07-17

## 2023-07-17 NOTE — TELEPHONE ENCOUNTER
Left msg letting pt know to go ahead and start back on the Ozempic. Asked that she call back with any questions or concerns.

## 2023-07-17 NOTE — TELEPHONE ENCOUNTER
----- Message from [address] [employer], DO sent at 7/16/2023  6:59 PM CDT -----  No pathologic protein in urine.

## 2023-07-18 NOTE — TELEPHONE ENCOUNTER
Called patient, spoke with: Patient regarding the results of the patients most recent labs. I advised Patient of Dr. Jack Romero recommendations.    Patient did voice understanding

## 2023-07-25 ENCOUNTER — OFFICE VISIT (OUTPATIENT)
Dept: CARDIOLOGY CLINIC | Age: 78
End: 2023-07-25
Payer: MEDICARE

## 2023-07-25 VITALS
BODY MASS INDEX: 27 KG/M2 | SYSTOLIC BLOOD PRESSURE: 138 MMHG | HEIGHT: 61 IN | WEIGHT: 143 LBS | HEART RATE: 91 BPM | DIASTOLIC BLOOD PRESSURE: 82 MMHG

## 2023-07-25 DIAGNOSIS — I48.0 PAROXYSMAL ATRIAL FIBRILLATION (HCC): Primary | ICD-10-CM

## 2023-07-25 DIAGNOSIS — I25.118 CORONARY ARTERY DISEASE INVOLVING NATIVE CORONARY ARTERY OF NATIVE HEART WITH OTHER FORM OF ANGINA PECTORIS (HCC): ICD-10-CM

## 2023-07-25 PROCEDURE — 3075F SYST BP GE 130 - 139MM HG: CPT | Performed by: INTERNAL MEDICINE

## 2023-07-25 PROCEDURE — G8417 CALC BMI ABV UP PARAM F/U: HCPCS | Performed by: INTERNAL MEDICINE

## 2023-07-25 PROCEDURE — 1090F PRES/ABSN URINE INCON ASSESS: CPT | Performed by: INTERNAL MEDICINE

## 2023-07-25 PROCEDURE — 3079F DIAST BP 80-89 MM HG: CPT | Performed by: INTERNAL MEDICINE

## 2023-07-25 PROCEDURE — G8399 PT W/DXA RESULTS DOCUMENT: HCPCS | Performed by: INTERNAL MEDICINE

## 2023-07-25 PROCEDURE — G8427 DOCREV CUR MEDS BY ELIG CLIN: HCPCS | Performed by: INTERNAL MEDICINE

## 2023-07-25 PROCEDURE — 99214 OFFICE O/P EST MOD 30 MIN: CPT | Performed by: INTERNAL MEDICINE

## 2023-07-25 PROCEDURE — 1123F ACP DISCUSS/DSCN MKR DOCD: CPT | Performed by: INTERNAL MEDICINE

## 2023-07-25 PROCEDURE — 1036F TOBACCO NON-USER: CPT | Performed by: INTERNAL MEDICINE

## 2023-07-25 PROCEDURE — 93000 ELECTROCARDIOGRAM COMPLETE: CPT | Performed by: INTERNAL MEDICINE

## 2023-07-25 ASSESSMENT — ENCOUNTER SYMPTOMS
SHORTNESS OF BREATH: 0
SORE THROAT: 0
CONSTIPATION: 0
DIARRHEA: 0
EYE PAIN: 0
WHEEZING: 0
CHEST TIGHTNESS: 0
COUGH: 0
ABDOMINAL DISTENTION: 0
VOMITING: 0
ABDOMINAL PAIN: 0
EYE DISCHARGE: 0
EYE REDNESS: 0
FACIAL SWELLING: 0
NAUSEA: 0
APNEA: 0
BLOOD IN STOOL: 0

## 2023-07-25 NOTE — PROGRESS NOTES
Cardiology Office Visit Note  [address] [facility] [place], [facility] [place] [address]  Phone: [phone]  Fax: [phone]                            Date:  7/25/2023  Patient: Tati Garcia  Age:  70 y.o., 1945    Referral: Lisa Kumar DO    REASON FOR VISIT:  New Patient         PROBLEM LIST:    Patient Active Problem List    Diagnosis Date Noted    Headache 02/21/2023     Priority: Medium    Diarrhea 11/17/2022     Priority: Medium    Hypomagnesemia 11/17/2022     Priority: Medium    Hyponatremia 11/17/2022     Priority: Medium    Leukocytosis 11/17/2022     Priority: Medium    Cerebellar infarct ([address]) 11/14/2022     Priority: Medium    Tachycardia 11/14/2022     Priority: Medium    Toxic metabolic encephalopathy 20/36/0563     Priority: Medium    Vitamin D deficiency 09/26/2022     Priority: Medium    Magnesium deficiency 09/26/2022     Priority: Medium    Bruises easily 09/26/2022     Priority: Medium    Fatigue 09/26/2022     Priority: Medium    Recurrent ventral incisional hernia with necrosis 01/22/2019    Ventral hernia without obstruction or gangrene 01/16/2019    Hyperuricemia 08/03/2018    B12 deficiency 11/03/2016    DM2 (diabetes mellitus, type 2) ([address]) 05/19/2015    Cerebral infarction ([address]) 05/19/2015     Overview Note:     Replacing Inactive Diagnoses        Hyperlipidemia 05/19/2015    Hypertension 05/19/2015    Family history of colon cancer 09/25/2014         PRESENTATION: Tati Garcia is a 70y.o. year old female is seen in cardiology consultation today for further evaluation and management of CVA and abnormal cardiac monitor. Patient has had at least 3 episodes of CVA/TIA over the past 2 to 3 years. Most recent cardiac monitor significant for findings of paroxysmal atrial fibrillation with a 1% burden. She has been on Plavix since November of last year. Notably she has had excessive bruising of the upper extremity without any major bleeding.   She denies palpitations,

## 2023-07-26 ENCOUNTER — TELEPHONE (OUTPATIENT)
Dept: CARDIOLOGY CLINIC | Age: 78
End: 2023-07-26

## 2023-07-27 DIAGNOSIS — I48.0 PAROXYSMAL ATRIAL FIBRILLATION (HCC): Primary | ICD-10-CM

## 2023-07-27 RX ORDER — WARFARIN SODIUM 5 MG/1
5 TABLET ORAL NIGHTLY
Qty: 30 TABLET | Refills: 3 | Status: SHIPPED | OUTPATIENT
Start: 2023-07-27

## 2023-07-27 NOTE — TELEPHONE ENCOUNTER
Called and spoke with the Pt about starting Warfarin. Pt will start Warfarin 5mg tablet nightly tomorrow on 07/28/23, and come into the office on 8/4/23 for her first INR check. I explained that she would have to stay away from Leafy greens and anything that has Vit K in it, and make sure that she would not miss any doses. I also voiced that she would have to have her INR checked frequently and once she is in range at least once a month. Pt voiced her understanding to the above, and stated she will  the Warfarin tomorrow.

## 2023-08-01 ENCOUNTER — OFFICE VISIT (OUTPATIENT)
Dept: NEUROSURGERY | Age: 78
End: 2023-08-01
Payer: MEDICARE

## 2023-08-01 VITALS
DIASTOLIC BLOOD PRESSURE: 79 MMHG | BODY MASS INDEX: 27 KG/M2 | HEIGHT: 61 IN | WEIGHT: 143 LBS | SYSTOLIC BLOOD PRESSURE: 140 MMHG | HEART RATE: 99 BPM

## 2023-08-01 DIAGNOSIS — G89.29 CHRONIC MIDLINE LOW BACK PAIN WITHOUT SCIATICA: ICD-10-CM

## 2023-08-01 DIAGNOSIS — M48.061 SPINAL STENOSIS OF LUMBAR REGION WITHOUT NEUROGENIC CLAUDICATION: Primary | ICD-10-CM

## 2023-08-01 DIAGNOSIS — M54.50 CHRONIC MIDLINE LOW BACK PAIN WITHOUT SCIATICA: ICD-10-CM

## 2023-08-01 DIAGNOSIS — M51.36 DDD (DEGENERATIVE DISC DISEASE), LUMBAR: ICD-10-CM

## 2023-08-01 PROCEDURE — 3077F SYST BP >= 140 MM HG: CPT | Performed by: NURSE PRACTITIONER

## 2023-08-01 PROCEDURE — 1123F ACP DISCUSS/DSCN MKR DOCD: CPT | Performed by: NURSE PRACTITIONER

## 2023-08-01 PROCEDURE — G8399 PT W/DXA RESULTS DOCUMENT: HCPCS | Performed by: NURSE PRACTITIONER

## 2023-08-01 PROCEDURE — 3078F DIAST BP <80 MM HG: CPT | Performed by: NURSE PRACTITIONER

## 2023-08-01 PROCEDURE — 1090F PRES/ABSN URINE INCON ASSESS: CPT | Performed by: NURSE PRACTITIONER

## 2023-08-01 PROCEDURE — G8417 CALC BMI ABV UP PARAM F/U: HCPCS | Performed by: NURSE PRACTITIONER

## 2023-08-01 PROCEDURE — G8427 DOCREV CUR MEDS BY ELIG CLIN: HCPCS | Performed by: NURSE PRACTITIONER

## 2023-08-01 PROCEDURE — 99204 OFFICE O/P NEW MOD 45 MIN: CPT | Performed by: NURSE PRACTITIONER

## 2023-08-01 PROCEDURE — 1036F TOBACCO NON-USER: CPT | Performed by: NURSE PRACTITIONER

## 2023-08-01 ASSESSMENT — ENCOUNTER SYMPTOMS
RESPIRATORY NEGATIVE: 1
EYES NEGATIVE: 1
GASTROINTESTINAL NEGATIVE: 1
BACK PAIN: 1

## 2023-08-01 NOTE — PROGRESS NOTES
Review of Systems   Constitutional: Negative. HENT: Negative. Eyes: Negative. Respiratory: Negative. Cardiovascular: Negative. Gastrointestinal: Negative. Genitourinary: Negative. Musculoskeletal:  Positive for back pain. Skin: Negative. Neurological: Negative. Endo/Heme/Allergies: Negative. Psychiatric/Behavioral: Negative.
affect, and behavior appear normal.     Neurologic Examination  Awake, Alert and oriented x 4  Normal speech pattern, following commands    Motor:  RIGHT:     iliopsoas 5/5    knee flexor 5/5    knee extension 5/5    EHL 5/5   dorsiflexion 5/5    plantar flexion 5/5    LEFT:       iliopsoas 5/5    knee flexor 5/5    knee extension 5/5    EHL 5/5   dorsiflexion 5/5    plantar flexion 5/5    No deficits to light touch or pinprick sensation  Reflexes are 2+ and symmetric  No myofacial tenderness to palpation  Normal Gait pattern        DATA and IMAGING:    Nursing/pcp notes, imaging, labs, and vitals reviewed. PT,OT and/or speech notes reviewed    Lab Results   Component Value Date    WBC 15.5 (H) 07/13/2023    HGB 11.9 (L) 07/13/2023    HCT 37.6 07/13/2023    MCV 92.2 07/13/2023     07/13/2023     Lab Results   Component Value Date     (L) 07/13/2023    K 4.5 07/13/2023    CL 92 (L) 07/13/2023    CO2 18 (L) 07/13/2023    BUN 18 07/13/2023    CREATININE 0.8 07/13/2023    GLUCOSE 164 (H) 07/13/2023    CALCIUM 10.3 (H) 07/13/2023    PROT 7.0 07/13/2023    LABALBU 4.6 07/13/2023    BILITOT 0.5 07/13/2023    ALKPHOS 100 07/13/2023    AST 16 07/13/2023    ALT 16 07/13/2023    LABGLOM >60 07/13/2023    GFRAA >59 08/26/2022    GLOB 3.0 02/28/2017   No results found for: INR, PROTIME      EXAM:  MRI OF THE LUMBAR SPINE WITHOUT CONTRAST. HISTORY:  Chronic sciatica on the right side     TECHNIQUE:  Multiplanar imaging of the lumbar spine was performed using T1, T2, inversion recovery sequences. Comparison none. FINDINGS:  There is straightening of the lumbar spine. There is no bone marrow edema. There is chronic appearing mild compression deformity seen at the L1, L2, L3 and L4 vertebral body levels. Five lumbar-type vertebral bodies are seen. The conus   medullaris is located at the superior aspect of the L1 level. The lumbar spinal cord demonstrates normal signal on T2W images.   There is mild

## 2023-08-04 ENCOUNTER — ANTI-COAG VISIT (OUTPATIENT)
Dept: CARDIOLOGY CLINIC | Age: 78
End: 2023-08-04
Payer: MEDICARE

## 2023-08-04 DIAGNOSIS — E83.42 HYPOMAGNESEMIA: ICD-10-CM

## 2023-08-04 DIAGNOSIS — I48.0 PAROXYSMAL ATRIAL FIBRILLATION (HCC): Primary | ICD-10-CM

## 2023-08-04 LAB
INTERNATIONAL NORMALIZATION RATIO, POC: 2.8
MAGNESIUM SERPL-MCNC: 1.5 MG/DL (ref 1.6–2.4)
PROTHROMBIN TIME, POC: 30

## 2023-08-04 PROCEDURE — 93793 ANTICOAG MGMT PT WARFARIN: CPT | Performed by: NURSE PRACTITIONER

## 2023-08-07 ENCOUNTER — TELEPHONE (OUTPATIENT)
Dept: FAMILY MEDICINE CLINIC | Age: 78
End: 2023-08-07

## 2023-08-07 NOTE — TELEPHONE ENCOUNTER
Called patient, spoke with: Patient regarding the results of the patients most recent labs. I advised Patient of Dr. Dexter Coates recommendations.    Patient did voice understanding

## 2023-08-07 NOTE — TELEPHONE ENCOUNTER
----- Message from [employer], DO sent at 8/7/2023  8:18 AM CDT -----  Magnesium is slightly low. Recommend adding an extra tablet of magnesium oxide on alternating days, (every other day).

## 2023-08-08 ENCOUNTER — HOSPITAL ENCOUNTER (OUTPATIENT)
Dept: NUCLEAR MEDICINE | Age: 78
Discharge: HOME | End: 2023-08-10
Payer: MEDICARE

## 2023-08-08 ENCOUNTER — HOSPITAL ENCOUNTER (OUTPATIENT)
Dept: NON INVASIVE DIAGNOSTICS | Age: 78
Discharge: HOME | End: 2023-08-08
Payer: MEDICARE

## 2023-08-08 DIAGNOSIS — I48.0 PAROXYSMAL ATRIAL FIBRILLATION (HCC): ICD-10-CM

## 2023-08-08 DIAGNOSIS — I25.118 CORONARY ARTERY DISEASE INVOLVING NATIVE CORONARY ARTERY OF NATIVE HEART WITH OTHER FORM OF ANGINA PECTORIS (HCC): ICD-10-CM

## 2023-08-08 LAB
LV EF: 58 %
LVEF MODALITY: NORMAL

## 2023-08-08 PROCEDURE — 93017 CV STRESS TEST TRACING ONLY: CPT

## 2023-08-08 PROCEDURE — A9502 TC99M TETROFOSMIN: HCPCS | Performed by: INTERNAL MEDICINE

## 2023-08-08 PROCEDURE — 93306 TTE W/DOPPLER COMPLETE: CPT

## 2023-08-08 PROCEDURE — 6360000002 HC RX W HCPCS: Performed by: INTERNAL MEDICINE

## 2023-08-08 PROCEDURE — 3430000000 HC RX DIAGNOSTIC RADIOPHARMACEUTICAL: Performed by: INTERNAL MEDICINE

## 2023-08-08 RX ORDER — REGADENOSON 0.08 MG/ML
0.4 INJECTION, SOLUTION INTRAVENOUS
Status: COMPLETED | OUTPATIENT
Start: 2023-08-08 | End: 2023-08-08

## 2023-08-08 RX ADMIN — TETROFOSMIN 24 MILLICURIE: 1.38 INJECTION, POWDER, LYOPHILIZED, FOR SOLUTION INTRAVENOUS at 12:00

## 2023-08-08 RX ADMIN — REGADENOSON 0.4 MG: 0.08 INJECTION, SOLUTION INTRAVENOUS at 11:55

## 2023-08-08 RX ADMIN — TETROFOSMIN 8 MILLICURIE: 1.38 INJECTION, POWDER, LYOPHILIZED, FOR SOLUTION INTRAVENOUS at 09:30

## 2023-08-09 LAB
LV EF: 67 %
LVEF MODALITY: NORMAL

## 2023-08-09 PROCEDURE — 93018 CV STRESS TEST I&R ONLY: CPT | Performed by: INTERNAL MEDICINE

## 2023-08-09 PROCEDURE — 78452 HT MUSCLE IMAGE SPECT MULT: CPT | Performed by: INTERNAL MEDICINE

## 2023-08-09 PROCEDURE — 93016 CV STRESS TEST SUPVJ ONLY: CPT | Performed by: INTERNAL MEDICINE

## 2023-08-09 NOTE — RESULT ENCOUNTER NOTE
No major structural heart disease on echocardiogram.  Micro changes noted, probably related to aging. Continue current treatment plan.

## 2023-08-09 NOTE — RESULT ENCOUNTER NOTE
Stress test results reassuring. Normal ejection fraction 67%. No evidence for ischemia. Continue current treatment plan.

## 2023-08-14 ENCOUNTER — ANTI-COAG VISIT (OUTPATIENT)
Dept: CARDIOLOGY CLINIC | Age: 78
End: 2023-08-14
Payer: MEDICARE

## 2023-08-14 DIAGNOSIS — I48.0 PAROXYSMAL ATRIAL FIBRILLATION (HCC): Primary | ICD-10-CM

## 2023-08-14 LAB
INTERNATIONAL NORMALIZATION RATIO, POC: 8
PROTHROMBIN TIME, POC: 35

## 2023-08-14 PROCEDURE — 93793 ANTICOAG MGMT PT WARFARIN: CPT | Performed by: NURSE PRACTITIONER

## 2023-08-18 ENCOUNTER — ANTI-COAG VISIT (OUTPATIENT)
Dept: CARDIOLOGY CLINIC | Age: 78
End: 2023-08-18
Payer: MEDICARE

## 2023-08-18 DIAGNOSIS — I48.0 PAROXYSMAL ATRIAL FIBRILLATION (HCC): Primary | ICD-10-CM

## 2023-08-18 LAB
INTERNATIONAL NORMALIZATION RATIO, POC: 1.7
PROTHROMBIN TIME, POC: 18.6

## 2023-08-18 PROCEDURE — 93793 ANTICOAG MGMT PT WARFARIN: CPT | Performed by: NURSE PRACTITIONER

## 2023-08-28 LAB
MAXIMAL PREDICTED HEART RATE: 145 BPM
STRESS TARGET HR: 123 BPM

## 2023-09-01 ENCOUNTER — ANTI-COAG VISIT (OUTPATIENT)
Dept: CARDIOLOGY CLINIC | Age: 78
End: 2023-09-01
Payer: MEDICARE

## 2023-09-01 DIAGNOSIS — E83.42 HYPOMAGNESEMIA: ICD-10-CM

## 2023-09-01 DIAGNOSIS — I48.0 PAROXYSMAL ATRIAL FIBRILLATION (HCC): Primary | ICD-10-CM

## 2023-09-01 LAB
INTERNATIONAL NORMALIZATION RATIO, POC: 2.1
MAGNESIUM SERPL-MCNC: 1.3 MG/DL (ref 1.6–2.4)
PROTHROMBIN TIME, POC: 22

## 2023-09-01 PROCEDURE — 93793 ANTICOAG MGMT PT WARFARIN: CPT | Performed by: NURSE PRACTITIONER

## 2023-09-05 ENCOUNTER — TELEPHONE (OUTPATIENT)
Dept: FAMILY MEDICINE CLINIC | Age: 78
End: 2023-09-05

## 2023-09-05 DIAGNOSIS — E61.2 MAGNESIUM DEFICIENCY: Primary | ICD-10-CM

## 2023-09-05 NOTE — TELEPHONE ENCOUNTER
Left msg on pts vm with Dr Mendenhall Semen recommendations. Asked that pt call back with any questions or concerns.

## 2023-09-05 NOTE — TELEPHONE ENCOUNTER
----- Message from [employer], DO sent at 9/4/2023  8:16 AM CDT -----  Magnesium is low. Recommend increasing magnesium oxide with an additional dose for the next 2 days and then resume regular schedule. Continue regular magnesium evaluation as present.

## 2023-09-12 ENCOUNTER — OFFICE VISIT (OUTPATIENT)
Dept: CARDIOLOGY CLINIC | Age: 78
End: 2023-09-12
Payer: MEDICARE

## 2023-09-12 ENCOUNTER — OFFICE VISIT (OUTPATIENT)
Dept: FAMILY MEDICINE CLINIC | Age: 78
End: 2023-09-12
Payer: MEDICARE

## 2023-09-12 VITALS
HEART RATE: 95 BPM | DIASTOLIC BLOOD PRESSURE: 88 MMHG | BODY MASS INDEX: 27.48 KG/M2 | WEIGHT: 140 LBS | HEIGHT: 60 IN | SYSTOLIC BLOOD PRESSURE: 134 MMHG

## 2023-09-12 VITALS
SYSTOLIC BLOOD PRESSURE: 126 MMHG | BODY MASS INDEX: 26.81 KG/M2 | OXYGEN SATURATION: 98 % | HEART RATE: 121 BPM | WEIGHT: 142 LBS | TEMPERATURE: 98.2 F | DIASTOLIC BLOOD PRESSURE: 78 MMHG | HEIGHT: 61 IN

## 2023-09-12 DIAGNOSIS — M54.16 LUMBAR BACK PAIN WITH RADICULOPATHY AFFECTING LEFT LOWER EXTREMITY: ICD-10-CM

## 2023-09-12 DIAGNOSIS — E83.42 HYPOMAGNESEMIA: Primary | ICD-10-CM

## 2023-09-12 DIAGNOSIS — E83.42 HYPOMAGNESEMIA: ICD-10-CM

## 2023-09-12 DIAGNOSIS — G89.29 CHRONIC RIGHT SHOULDER PAIN: ICD-10-CM

## 2023-09-12 DIAGNOSIS — I48.0 PAROXYSMAL ATRIAL FIBRILLATION (HCC): Primary | ICD-10-CM

## 2023-09-12 DIAGNOSIS — M25.511 CHRONIC RIGHT SHOULDER PAIN: ICD-10-CM

## 2023-09-12 LAB
INTERNATIONAL NORMALIZATION RATIO, POC: 1.4
MAGNESIUM SERPL-MCNC: 1.4 MG/DL (ref 1.6–2.4)
PROTHROMBIN TIME, POC: 15.7

## 2023-09-12 PROCEDURE — 3074F SYST BP LT 130 MM HG: CPT | Performed by: PEDIATRICS

## 2023-09-12 PROCEDURE — G8417 CALC BMI ABV UP PARAM F/U: HCPCS | Performed by: PEDIATRICS

## 2023-09-12 PROCEDURE — 1090F PRES/ABSN URINE INCON ASSESS: CPT | Performed by: INTERNAL MEDICINE

## 2023-09-12 PROCEDURE — G8427 DOCREV CUR MEDS BY ELIG CLIN: HCPCS | Performed by: INTERNAL MEDICINE

## 2023-09-12 PROCEDURE — 99214 OFFICE O/P EST MOD 30 MIN: CPT | Performed by: INTERNAL MEDICINE

## 2023-09-12 PROCEDURE — 99214 OFFICE O/P EST MOD 30 MIN: CPT | Performed by: PEDIATRICS

## 2023-09-12 PROCEDURE — 1123F ACP DISCUSS/DSCN MKR DOCD: CPT | Performed by: PEDIATRICS

## 2023-09-12 PROCEDURE — G8417 CALC BMI ABV UP PARAM F/U: HCPCS | Performed by: INTERNAL MEDICINE

## 2023-09-12 PROCEDURE — G8427 DOCREV CUR MEDS BY ELIG CLIN: HCPCS | Performed by: PEDIATRICS

## 2023-09-12 PROCEDURE — 3079F DIAST BP 80-89 MM HG: CPT | Performed by: INTERNAL MEDICINE

## 2023-09-12 PROCEDURE — 1036F TOBACCO NON-USER: CPT | Performed by: PEDIATRICS

## 2023-09-12 PROCEDURE — 3075F SYST BP GE 130 - 139MM HG: CPT | Performed by: INTERNAL MEDICINE

## 2023-09-12 PROCEDURE — G8399 PT W/DXA RESULTS DOCUMENT: HCPCS | Performed by: PEDIATRICS

## 2023-09-12 PROCEDURE — 3078F DIAST BP <80 MM HG: CPT | Performed by: PEDIATRICS

## 2023-09-12 PROCEDURE — 1123F ACP DISCUSS/DSCN MKR DOCD: CPT | Performed by: INTERNAL MEDICINE

## 2023-09-12 PROCEDURE — G8399 PT W/DXA RESULTS DOCUMENT: HCPCS | Performed by: INTERNAL MEDICINE

## 2023-09-12 PROCEDURE — 1036F TOBACCO NON-USER: CPT | Performed by: INTERNAL MEDICINE

## 2023-09-12 PROCEDURE — 1090F PRES/ABSN URINE INCON ASSESS: CPT | Performed by: PEDIATRICS

## 2023-09-12 RX ORDER — HYDROCODONE BITARTRATE AND ACETAMINOPHEN 7.5; 325 MG/1; MG/1
1 TABLET ORAL EVERY 6 HOURS PRN
Qty: 120 TABLET | Refills: 0 | Status: SHIPPED | OUTPATIENT
Start: 2023-09-12 | End: 2023-10-12

## 2023-09-12 ASSESSMENT — ENCOUNTER SYMPTOMS
EYE REDNESS: 0
SHORTNESS OF BREATH: 0
COUGH: 0
APNEA: 0
WHEEZING: 0
EYE PAIN: 0
FACIAL SWELLING: 0
SORE THROAT: 0
SHORTNESS OF BREATH: 0
VOMITING: 0
NAUSEA: 0
EYE DISCHARGE: 0
CHEST TIGHTNESS: 0
ABDOMINAL PAIN: 0
ABDOMINAL PAIN: 0
DIARRHEA: 0
SORE THROAT: 0
VOMITING: 0
NAUSEA: 0
CONSTIPATION: 0
DIARRHEA: 0
COUGH: 0
WHEEZING: 0
ABDOMINAL DISTENTION: 0
BLOOD IN STOOL: 0

## 2023-09-12 NOTE — PROGRESS NOTES
Cardiology Office Visit Note  [address], [facility] [place] [address]  Phone: [phone]  Fax: [phone]                            Date:  9/12/2023  Patient: Amina Rodriguez  Age:  70 y.o., 1945    Referral: No ref. provider found    REASON FOR VISIT:  Anticoagulation management  Atrial fibrillation      PROBLEM LIST:    Patient Active Problem List    Diagnosis Date Noted    Headache 02/21/2023     Priority: Medium    Diarrhea 11/17/2022     Priority: Medium    Hypomagnesemia 11/17/2022     Priority: Medium    Hyponatremia 11/17/2022     Priority: Medium    Leukocytosis 11/17/2022     Priority: Medium    Cerebellar infarct ([address]) 11/14/2022     Priority: Medium    Tachycardia 11/14/2022     Priority: Medium    Toxic metabolic encephalopathy 57/50/7466     Priority: Medium    Vitamin D deficiency 09/26/2022     Priority: Medium    Magnesium deficiency 09/26/2022     Priority: Medium    Bruises easily 09/26/2022     Priority: Medium    Fatigue 09/26/2022     Priority: Medium    Recurrent ventral incisional hernia with necrosis 01/22/2019    Ventral hernia without obstruction or gangrene 01/16/2019    Hyperuricemia 08/03/2018    B12 deficiency 11/03/2016    DM2 (diabetes mellitus, type 2) ([address]) 05/19/2015    Cerebral infarction ([address]) 05/19/2015     Overview Note:     Replacing Inactive Diagnoses      Hyperlipidemia 05/19/2015    Hypertension 05/19/2015    Family history of colon cancer 09/25/2014         PRESENTATION: Amina Rodriguez is a 70y.o. year old female returns today for a routine cardiology follow-up appointment. Patient was seen in cardiology consultation in July of this year for evaluation management of CVA and abnormal cardiac monitor. Patient's history notable for multiple episodes of CVA/TIA over the past 2 to 3 years. Cardiac monitor notable for findings of paroxysmal atrial fibrillation with a 1% burden.   Recent nuclear medicine stress test negative for ischemia and prior

## 2023-09-12 NOTE — PROGRESS NOTES
per tablet; Take 1 tablet by mouth every 6 hours as needed for Pain for up to 30 days. Max Daily Amount: 4 tablets  Dispense: 120 tablet; Refill: 0    3. Hypomagnesemia  Will recheck mg again today. - Magnesium; Future      Orders Placed This Encounter   Procedures    Magnesium        Return in about 1 month (around 10/12/2023) for 30. This was an in-house visit.

## 2023-09-13 ENCOUNTER — TELEPHONE (OUTPATIENT)
Dept: FAMILY MEDICINE CLINIC | Age: 78
End: 2023-09-13

## 2023-09-13 NOTE — TELEPHONE ENCOUNTER
----- Message from [place], DO sent at 9/12/2023  8:11 PM CDT -----  Magnesium is still low. Recommend additional dose of magnesium oxide every other day on an ongoing basis, for example on odd numbered calendar days (1, 3, 5, 7, 9,. ..  31)

## 2023-09-13 NOTE — TELEPHONE ENCOUNTER
Called patient, spoke with: Patient regarding the results of the patients most recent labs. I advised Patient of Dr. Cindy Hernandez recommendations.    Patient did voice understanding

## 2023-09-21 ENCOUNTER — ANTI-COAG VISIT (OUTPATIENT)
Dept: CARDIOLOGY CLINIC | Age: 78
End: 2023-09-21
Payer: MEDICARE

## 2023-09-21 DIAGNOSIS — E61.2 MAGNESIUM DEFICIENCY: ICD-10-CM

## 2023-09-21 DIAGNOSIS — I48.0 PAROXYSMAL ATRIAL FIBRILLATION (HCC): Primary | ICD-10-CM

## 2023-09-21 LAB
INTERNATIONAL NORMALIZATION RATIO, POC: 1.6
MAGNESIUM SERPL-MCNC: 1.5 MG/DL (ref 1.6–2.4)
PROTHROMBIN TIME, POC: NORMAL

## 2023-09-21 PROCEDURE — 93793 ANTICOAG MGMT PT WARFARIN: CPT | Performed by: CLINICAL NURSE SPECIALIST

## 2023-09-22 ENCOUNTER — TELEPHONE (OUTPATIENT)
Dept: FAMILY MEDICINE CLINIC | Age: 78
End: 2023-09-22

## 2023-09-22 DIAGNOSIS — E78.2 MIXED HYPERLIPIDEMIA: ICD-10-CM

## 2023-09-22 DIAGNOSIS — E61.2 MAGNESIUM DEFICIENCY: ICD-10-CM

## 2023-09-22 DIAGNOSIS — R53.83 FATIGUE, UNSPECIFIED TYPE: ICD-10-CM

## 2023-09-22 RX ORDER — ATORVASTATIN CALCIUM 20 MG/1
20 TABLET, FILM COATED ORAL
Qty: 272 TABLET | Refills: 0 | Status: SHIPPED | OUTPATIENT
Start: 2023-09-22

## 2023-09-22 NOTE — TELEPHONE ENCOUNTER
Called patient, spoke with: Patient regarding the results of the patients most recent labs. I advised Patient of Dr. José Luis Berry recommendations.    Patient did voice understanding

## 2023-09-22 NOTE — TELEPHONE ENCOUNTER
----- Message from [employer], DO sent at 9/21/2023  6:51 PM CDT -----  Magnesium is just below the lower limit of normal.  We may want to have you do the extra tablet every night and see how this works out

## 2023-09-22 NOTE — TELEPHONE ENCOUNTER
Received fax from pharmacy requesting refill on pts medication(s). Pt was last seen in office on 9/12/2023  and has a follow up scheduled for 10/17/2023. Will send request to  Dr. Shelby Colby  for authorization.      Requested Prescriptions     Pending Prescriptions Disp Refills    atorvastatin (LIPITOR) 20 MG tablet [Pharmacy Med Name: atorvastatin 20 mg tablet] 272 tablet 0     Sig: TAKE ONE TABLET BY MOUTH AT BEDTIME

## 2023-09-25 RX ORDER — ALLOPURINOL 300 MG/1
300 TABLET ORAL DAILY
Qty: 272 TABLET | Refills: 3 | Status: SHIPPED | OUTPATIENT
Start: 2023-09-25

## 2023-09-25 RX ORDER — MAGNESIUM OXIDE 400 MG/1
1 TABLET ORAL 2 TIMES DAILY
Qty: 276 TABLET | Refills: 3 | Status: SHIPPED | OUTPATIENT
Start: 2023-09-25

## 2023-09-25 NOTE — TELEPHONE ENCOUNTER
Received fax from pharmacy requesting refill on pts medication(s). Pt was last seen in office on 9/12/2023  and has a follow up scheduled for 9/22/2023. Will send request to  Dr. Atlas Dakins  for patient.      Requested Prescriptions     Pending Prescriptions Disp Refills    allopurinol (ZYLOPRIM) 300 MG tablet [Pharmacy Med Name: allopurinol 300 mg tablet] 272 tablet 3     Sig: Take 1 tablet by mouth daily    magnesium oxide (MAG-OX) 400 MG tablet [Pharmacy Med Name: magnesium oxide 400 mg (241.3 mg magnesium) tablet] 276 tablet 3     Sig: Take 1 tablet by mouth 2 times daily

## 2023-09-28 DIAGNOSIS — R56.9 SEIZURES (HCC): ICD-10-CM

## 2023-09-28 DIAGNOSIS — I63.50 CEREBROVASCULAR ACCIDENT (CVA) DUE TO OCCLUSION OF CEREBRAL ARTERY (HCC): ICD-10-CM

## 2023-09-28 DIAGNOSIS — E87.6 HYPOKALEMIA: ICD-10-CM

## 2023-09-28 RX ORDER — MULTIVIT-MIN/FERROUS SULFATE 4.5 MG
1 TABLET ORAL DAILY
Qty: 30 TABLET | Refills: 9 | Status: SHIPPED | OUTPATIENT
Start: 2023-09-28

## 2023-09-28 RX ORDER — LEVETIRACETAM 500 MG/1
500 TABLET ORAL 2 TIMES DAILY
Qty: 180 TABLET | Refills: 3 | Status: SHIPPED | OUTPATIENT
Start: 2023-09-28

## 2023-09-28 NOTE — TELEPHONE ENCOUNTER
Sent rx to pharmacy for pt    Requested Prescriptions     Signed Prescriptions Disp Refills    levETIRAcetam (KEPPRA) 500 MG tablet 180 tablet 3     Sig: Take 1 tablet by mouth 2 times daily     Authorizing Provider: Diego Gamble     Ordering User: Betina Dougherty

## 2023-09-28 NOTE — TELEPHONE ENCOUNTER
Received fax from pharmacy requesting refill on pts medication(s). Pt was last seen in office on 9/12/2023  and has a follow up scheduled for 10/17/2023. Will send request to  Dr. Gary Garcia  for authorization.      Requested Prescriptions     Pending Prescriptions Disp Refills    Multiple Vitamins-Minerals (ONE-DAILY MULTI-VIT/MINERAL) TABS [Pharmacy Med Name: One Daily Multivitamins with Minerals 4.5 mg iron tablet] 30 tablet 9     Sig: TAKE ONE TABLET BY MOUTH DAILY

## 2023-09-28 NOTE — TELEPHONE ENCOUNTER
Sent rx to pharmacy for pt  Requested Prescriptions     Signed Prescriptions Disp Refills    Multiple Vitamins-Minerals (ONE-DAILY MULTI-VIT/MINERAL) TABS 30 tablet 9     Sig: TAKE ONE TABLET BY MOUTH DAILY     Authorizing Provider: Erika Cardona     Ordering User: Angely Saavedra

## 2023-09-28 NOTE — TELEPHONE ENCOUNTER
Received fax from pharmacy requesting refill on pts medication(s). Pt was last seen in office on 9/12/2023  and has a follow up scheduled for 9/28/2023. Will send request to  Dr. Miguelito Oliva  for patient.      Requested Prescriptions     Pending Prescriptions Disp Refills    levETIRAcetam (KEPPRA) 500 MG tablet [Pharmacy Med Name: levetiracetam 500 mg tablet] 180 tablet 3     Sig: Take 1 tablet by mouth 2 times daily

## 2023-09-29 ENCOUNTER — TELEPHONE (OUTPATIENT)
Dept: FAMILY MEDICINE CLINIC | Age: 78
End: 2023-09-29

## 2023-09-29 DIAGNOSIS — E83.42 HYPOMAGNESEMIA: ICD-10-CM

## 2023-09-29 DIAGNOSIS — R53.83 FATIGUE, UNSPECIFIED TYPE: ICD-10-CM

## 2023-09-29 DIAGNOSIS — E61.2 MAGNESIUM DEFICIENCY: ICD-10-CM

## 2023-09-29 LAB — MAGNESIUM SERPL-MCNC: 1.4 MG/DL (ref 1.6–2.4)

## 2023-09-29 NOTE — TELEPHONE ENCOUNTER
Called patient, spoke with: Patient regarding the results of the patients most recent labs. I advised Patient of Dr. Carboen Shed recommendations. Pt states she is taking one in the morning and one in the evening. Will send this back to provider for recommendations.  Patient did voice understanding

## 2023-09-29 NOTE — TELEPHONE ENCOUNTER
----- Message from [employer], DO sent at 9/29/2023  1:00 PM CDT -----  Magnesium level is still low. Please let us know how you are taking magnesium at this time.   We are going to need to increase this regimen slightly

## 2023-09-29 NOTE — TELEPHONE ENCOUNTER
Recommend taking 2 in the morning and 1 in the evening (3 a day).   Follow magnesium levels as before

## 2023-10-13 ENCOUNTER — ANTI-COAG VISIT (OUTPATIENT)
Dept: CARDIOLOGY CLINIC | Age: 78
End: 2023-10-13

## 2023-10-13 DIAGNOSIS — I48.0 PAROXYSMAL ATRIAL FIBRILLATION (HCC): Primary | ICD-10-CM

## 2023-10-13 LAB
INTERNATIONAL NORMALIZATION RATIO, POC: 1.7
PROTHROMBIN TIME, POC: 18.2

## 2023-10-17 ENCOUNTER — TELEPHONE (OUTPATIENT)
Dept: FAMILY MEDICINE CLINIC | Age: 78
End: 2023-10-17

## 2023-10-17 ENCOUNTER — OFFICE VISIT (OUTPATIENT)
Dept: FAMILY MEDICINE CLINIC | Age: 78
End: 2023-10-17
Payer: MEDICARE

## 2023-10-17 VITALS
BODY MASS INDEX: 27.19 KG/M2 | SYSTOLIC BLOOD PRESSURE: 132 MMHG | WEIGHT: 144 LBS | TEMPERATURE: 97.8 F | OXYGEN SATURATION: 99 % | DIASTOLIC BLOOD PRESSURE: 88 MMHG | HEIGHT: 61 IN | HEART RATE: 96 BPM

## 2023-10-17 DIAGNOSIS — I48.0 PAROXYSMAL ATRIAL FIBRILLATION (HCC): ICD-10-CM

## 2023-10-17 DIAGNOSIS — R19.7 DIARRHEA, UNSPECIFIED TYPE: ICD-10-CM

## 2023-10-17 DIAGNOSIS — I10 ESSENTIAL HYPERTENSION: ICD-10-CM

## 2023-10-17 DIAGNOSIS — E83.42 HYPOMAGNESEMIA: ICD-10-CM

## 2023-10-17 DIAGNOSIS — E53.8 VITAMIN B 12 DEFICIENCY: ICD-10-CM

## 2023-10-17 DIAGNOSIS — M15.9 PRIMARY OSTEOARTHRITIS INVOLVING MULTIPLE JOINTS: ICD-10-CM

## 2023-10-17 DIAGNOSIS — E11.9 TYPE 2 DIABETES MELLITUS WITHOUT COMPLICATION, WITHOUT LONG-TERM CURRENT USE OF INSULIN (HCC): ICD-10-CM

## 2023-10-17 DIAGNOSIS — G89.29 CHRONIC RIGHT SHOULDER PAIN: ICD-10-CM

## 2023-10-17 DIAGNOSIS — M25.511 CHRONIC RIGHT SHOULDER PAIN: ICD-10-CM

## 2023-10-17 DIAGNOSIS — Z23 NEED FOR INFLUENZA VACCINATION: Primary | ICD-10-CM

## 2023-10-17 DIAGNOSIS — M54.16 LUMBAR BACK PAIN WITH RADICULOPATHY AFFECTING LEFT LOWER EXTREMITY: ICD-10-CM

## 2023-10-17 PROCEDURE — 3051F HG A1C>EQUAL 7.0%<8.0%: CPT | Performed by: PEDIATRICS

## 2023-10-17 PROCEDURE — G8427 DOCREV CUR MEDS BY ELIG CLIN: HCPCS | Performed by: PEDIATRICS

## 2023-10-17 PROCEDURE — 1123F ACP DISCUSS/DSCN MKR DOCD: CPT | Performed by: PEDIATRICS

## 2023-10-17 PROCEDURE — 3075F SYST BP GE 130 - 139MM HG: CPT | Performed by: PEDIATRICS

## 2023-10-17 PROCEDURE — 1036F TOBACCO NON-USER: CPT | Performed by: PEDIATRICS

## 2023-10-17 PROCEDURE — 99214 OFFICE O/P EST MOD 30 MIN: CPT | Performed by: PEDIATRICS

## 2023-10-17 PROCEDURE — G0008 ADMIN INFLUENZA VIRUS VAC: HCPCS | Performed by: PEDIATRICS

## 2023-10-17 PROCEDURE — G8417 CALC BMI ABV UP PARAM F/U: HCPCS | Performed by: PEDIATRICS

## 2023-10-17 PROCEDURE — G8484 FLU IMMUNIZE NO ADMIN: HCPCS | Performed by: PEDIATRICS

## 2023-10-17 PROCEDURE — G8399 PT W/DXA RESULTS DOCUMENT: HCPCS | Performed by: PEDIATRICS

## 2023-10-17 PROCEDURE — 1090F PRES/ABSN URINE INCON ASSESS: CPT | Performed by: PEDIATRICS

## 2023-10-17 PROCEDURE — 96372 THER/PROPH/DIAG INJ SC/IM: CPT | Performed by: PEDIATRICS

## 2023-10-17 PROCEDURE — 3079F DIAST BP 80-89 MM HG: CPT | Performed by: PEDIATRICS

## 2023-10-17 PROCEDURE — 90694 VACC AIIV4 NO PRSRV 0.5ML IM: CPT | Performed by: PEDIATRICS

## 2023-10-17 RX ORDER — CYANOCOBALAMIN 1000 UG/ML
1000 INJECTION, SOLUTION INTRAMUSCULAR; SUBCUTANEOUS ONCE
Status: COMPLETED | OUTPATIENT
Start: 2023-10-17 | End: 2023-10-17

## 2023-10-17 RX ORDER — METOPROLOL SUCCINATE 50 MG/1
50 TABLET, EXTENDED RELEASE ORAL DAILY
Qty: 90 TABLET | Refills: 3 | Status: SHIPPED | OUTPATIENT
Start: 2023-10-17

## 2023-10-17 RX ORDER — HYDROCODONE BITARTRATE AND ACETAMINOPHEN 7.5; 325 MG/1; MG/1
1 TABLET ORAL EVERY 6 HOURS PRN
Qty: 120 TABLET | Refills: 0 | Status: SHIPPED | OUTPATIENT
Start: 2023-10-17 | End: 2023-11-16

## 2023-10-17 RX ORDER — WARFARIN SODIUM 1 MG/1
1 TABLET ORAL DAILY
Qty: 30 TABLET | Refills: 3 | Status: SHIPPED | OUTPATIENT
Start: 2023-10-17

## 2023-10-17 RX ORDER — FOLIC ACID 1 MG/1
1 TABLET ORAL DAILY
Qty: 90 TABLET | Refills: 3 | Status: SHIPPED | OUTPATIENT
Start: 2023-10-17 | End: 2024-09-10

## 2023-10-17 RX ORDER — LISINOPRIL 20 MG/1
20 TABLET ORAL DAILY
Qty: 90 TABLET | Refills: 3 | Status: SHIPPED | OUTPATIENT
Start: 2023-10-17

## 2023-10-17 RX ADMIN — CYANOCOBALAMIN 1000 MCG: 1000 INJECTION, SOLUTION INTRAMUSCULAR; SUBCUTANEOUS at 10:31

## 2023-10-17 ASSESSMENT — ENCOUNTER SYMPTOMS
DIARRHEA: 0
ABDOMINAL PAIN: 0
NAUSEA: 0
WHEEZING: 0
COUGH: 0
SHORTNESS OF BREATH: 0
VOMITING: 0
SORE THROAT: 0

## 2023-10-17 NOTE — TELEPHONE ENCOUNTER
----- Message from [employer], DO sent at 10/16/2023  7:43 PM CDT -----  Mg is low normal.  What dose are you taking now?

## 2023-10-17 NOTE — PROGRESS NOTES
[address] [place] [address]  Phone [phone]   Fax [phone]      OFFICE VISIT: 10/17/2023    Anitra Flood-: 1945      HPI  Reason For Visit:  [facility] [place] is a 70 y.o.     1 Month Follow-Up (She would like to discuss most recent magnesium level. She is taking 400mg TID. She would like bilateral shoulder and bilateral knee injections to help with pain.)    Patient presents on follow-up for multiple health issues. Primarily she is interested in discussing her magnesium levels. She is presently taking magnesium oxide 400 mg 3 times daily  Most recent magnesium level was 1.5. This is just below the normal.      Osteoarthritis:  She is noting significant shoulder and knee pain. She is requesting injections as soon as she can. Last shoulder injection was on 2023. Last knee injection was on the same date, 2023. She did see ortho, Dr. Shukri Everett and they did not think that she has rotator cuff tear as she can raise her arm. She has MRI confirmation of Supraspinatus m. Tear from back on 2022. Patient presents on follow-up for chronic sciatica pain. She is requesting a refill of her hydrocodone. I last prescribed hydrocodone 7.5/325 mg on 2023 for number 120 tablets. She has ongoing sciatica on the right side.      Pain Diagnosis:              Chronic low back pain with sciatica              Lumbar Stenosis (felt to be non-surgical)              Degenerative disc disease              Severe limiting osteoarthritis:     Supportive Studies:              Radiologic imaging studies     Analgesia: She gets appropriate relief with hydrocodone                          She has also benefited by injection in her shoulder R.     Pain Control: Average: 6-7/10         Best: 5/10          Worst: 10/10  (10/17/2023)     Pain Interfering with life:  100%  Pain Interfering with general activity:  100%     Adverse effects: None  Aberrant behavior: None  Affect:

## 2023-10-17 NOTE — PROGRESS NOTES
After obtaining consent, and per orders of Dr. Dayana Quezada, injection of flu and B12 was given in the Right deltoid . Patient tolerated it well. Patient instructed to report any adverse reaction to me immediately.

## 2023-10-19 ENCOUNTER — TELEPHONE (OUTPATIENT)
Dept: FAMILY MEDICINE CLINIC | Age: 78
End: 2023-10-19

## 2023-10-19 DIAGNOSIS — R79.89 ELEVATED PARATHYROID HORMONE: Primary | ICD-10-CM

## 2023-10-19 NOTE — TELEPHONE ENCOUNTER
----- Message from [employer], DO sent at 10/18/2023  7:11 PM CDT -----  Vitamin B12 is normal, actually on the high side. This is not a problem. Metabolic profile is normal other than glucose being elevated at 220  Magnesium is stable. Parathyroid hormone is elevated. This is inappropriate given that calcium level is normal.  Recommend nuclear parathyroid scan (please set this up)    This can be a secondary cause of low magnesium (elevated parathyroid hormone)  Phosphorus is normal  Vitamin D is normal  Your WBC, (infection fighting ability) Hgb and Hct, (oxygen carrying cells) are normal; as is your percentage of each cell type.

## 2023-10-19 NOTE — TELEPHONE ENCOUNTER
Called patient, spoke with: Patient regarding the results of the patients most recent labs. I advised Patient of Dr. Crissy Jovel recommendations.    Patient did voice understanding

## 2023-10-27 ENCOUNTER — ANTI-COAG VISIT (OUTPATIENT)
Dept: CARDIOLOGY CLINIC | Age: 78
End: 2023-10-27

## 2023-10-27 DIAGNOSIS — I48.0 PAROXYSMAL ATRIAL FIBRILLATION (HCC): Primary | ICD-10-CM

## 2023-10-27 LAB
INTERNATIONAL NORMALIZATION RATIO, POC: 4.3
PROTHROMBIN TIME, POC: 41.7

## 2023-11-01 ENCOUNTER — HOSPITAL ENCOUNTER (OUTPATIENT)
Dept: NUCLEAR MEDICINE | Age: 78
Discharge: HOME | End: 2023-11-03
Payer: MEDICARE

## 2023-11-01 DIAGNOSIS — R79.89 ELEVATED PARATHYROID HORMONE: ICD-10-CM

## 2023-11-01 PROCEDURE — 78070 PARATHYROID PLANAR IMAGING: CPT

## 2023-11-01 PROCEDURE — 3430000000 HC RX DIAGNOSTIC RADIOPHARMACEUTICAL: Performed by: PEDIATRICS

## 2023-11-01 PROCEDURE — A9500 TC99M SESTAMIBI: HCPCS | Performed by: PEDIATRICS

## 2023-11-01 RX ORDER — TETRAKIS(2-METHOXYISOBUTYLISOCYANIDE)COPPER(I) TETRAFLUOROBORATE 1 MG/ML
20 INJECTION, POWDER, LYOPHILIZED, FOR SOLUTION INTRAVENOUS
Status: COMPLETED | OUTPATIENT
Start: 2023-11-01 | End: 2023-11-01

## 2023-11-01 RX ADMIN — TETRAKIS(2-METHOXYISOBUTYLISOCYANIDE)COPPER(I) TETRAFLUOROBORATE 20 MILLICURIE: 1 INJECTION, POWDER, LYOPHILIZED, FOR SOLUTION INTRAVENOUS at 15:16

## 2023-11-02 ENCOUNTER — TELEPHONE (OUTPATIENT)
Dept: FAMILY MEDICINE CLINIC | Age: 78
End: 2023-11-02

## 2023-11-02 NOTE — TELEPHONE ENCOUNTER
----- Message from [employer], DO sent at 11/2/2023 12:57 PM CDT -----  No parathyroid tumor noted. This is good news.   Everything looked okay on the scan

## 2023-11-02 NOTE — TELEPHONE ENCOUNTER
Called patient, spoke with: Patient regarding the results of the patients most recent NM test.  I advised Patient of Dr. Margy Knight recommendations.    Patient did voice understanding

## 2023-11-06 ENCOUNTER — OFFICE VISIT (OUTPATIENT)
Dept: FAMILY MEDICINE CLINIC | Age: 78
End: 2023-11-06
Payer: MEDICARE

## 2023-11-06 VITALS
WEIGHT: 147 LBS | BODY MASS INDEX: 27.75 KG/M2 | OXYGEN SATURATION: 98 % | TEMPERATURE: 98 F | SYSTOLIC BLOOD PRESSURE: 142 MMHG | HEIGHT: 61 IN | DIASTOLIC BLOOD PRESSURE: 82 MMHG | HEART RATE: 77 BPM

## 2023-11-06 DIAGNOSIS — M17.11 PRIMARY OSTEOARTHRITIS OF RIGHT KNEE: ICD-10-CM

## 2023-11-06 DIAGNOSIS — L98.9 SKIN LESION OF FACE: Primary | ICD-10-CM

## 2023-11-06 DIAGNOSIS — M19.011 PRIMARY OSTEOARTHRITIS OF RIGHT SHOULDER: ICD-10-CM

## 2023-11-06 DIAGNOSIS — G89.29 CHRONIC RIGHT SHOULDER PAIN: ICD-10-CM

## 2023-11-06 DIAGNOSIS — E11.9 TYPE 2 DIABETES MELLITUS WITHOUT COMPLICATION, WITHOUT LONG-TERM CURRENT USE OF INSULIN (HCC): ICD-10-CM

## 2023-11-06 DIAGNOSIS — M54.16 LUMBAR BACK PAIN WITH RADICULOPATHY AFFECTING LEFT LOWER EXTREMITY: ICD-10-CM

## 2023-11-06 DIAGNOSIS — I10 ESSENTIAL HYPERTENSION: ICD-10-CM

## 2023-11-06 DIAGNOSIS — M25.511 CHRONIC RIGHT SHOULDER PAIN: ICD-10-CM

## 2023-11-06 DIAGNOSIS — M19.012 PRIMARY OSTEOARTHRITIS OF LEFT SHOULDER: ICD-10-CM

## 2023-11-06 DIAGNOSIS — M17.12 PRIMARY OSTEOARTHRITIS OF LEFT KNEE: ICD-10-CM

## 2023-11-06 PROCEDURE — 1036F TOBACCO NON-USER: CPT | Performed by: PEDIATRICS

## 2023-11-06 PROCEDURE — 1123F ACP DISCUSS/DSCN MKR DOCD: CPT | Performed by: PEDIATRICS

## 2023-11-06 PROCEDURE — 3051F HG A1C>EQUAL 7.0%<8.0%: CPT | Performed by: PEDIATRICS

## 2023-11-06 PROCEDURE — G8399 PT W/DXA RESULTS DOCUMENT: HCPCS | Performed by: PEDIATRICS

## 2023-11-06 PROCEDURE — 3077F SYST BP >= 140 MM HG: CPT | Performed by: PEDIATRICS

## 2023-11-06 PROCEDURE — 3079F DIAST BP 80-89 MM HG: CPT | Performed by: PEDIATRICS

## 2023-11-06 PROCEDURE — 96372 THER/PROPH/DIAG INJ SC/IM: CPT | Performed by: PEDIATRICS

## 2023-11-06 PROCEDURE — G8427 DOCREV CUR MEDS BY ELIG CLIN: HCPCS | Performed by: PEDIATRICS

## 2023-11-06 PROCEDURE — G8484 FLU IMMUNIZE NO ADMIN: HCPCS | Performed by: PEDIATRICS

## 2023-11-06 PROCEDURE — G8417 CALC BMI ABV UP PARAM F/U: HCPCS | Performed by: PEDIATRICS

## 2023-11-06 PROCEDURE — 99214 OFFICE O/P EST MOD 30 MIN: CPT | Performed by: PEDIATRICS

## 2023-11-06 PROCEDURE — 1090F PRES/ABSN URINE INCON ASSESS: CPT | Performed by: PEDIATRICS

## 2023-11-06 RX ORDER — TRIAMCINOLONE ACETONIDE 40 MG/ML
40 INJECTION, SUSPENSION INTRA-ARTICULAR; INTRAMUSCULAR ONCE
Status: COMPLETED | OUTPATIENT
Start: 2023-11-06 | End: 2023-11-06

## 2023-11-06 RX ORDER — HYDROCODONE BITARTRATE AND ACETAMINOPHEN 7.5; 325 MG/1; MG/1
1 TABLET ORAL EVERY 6 HOURS PRN
Qty: 120 TABLET | Refills: 0 | Status: SHIPPED | OUTPATIENT
Start: 2023-11-06 | End: 2023-12-06

## 2023-11-06 RX ORDER — LISINOPRIL 30 MG/1
30 TABLET ORAL DAILY
Qty: 90 TABLET | Refills: 3 | Status: SHIPPED | OUTPATIENT
Start: 2023-11-06

## 2023-11-06 RX ADMIN — TRIAMCINOLONE ACETONIDE 40 MG: 40 INJECTION, SUSPENSION INTRA-ARTICULAR; INTRAMUSCULAR at 08:41

## 2023-11-06 RX ADMIN — TRIAMCINOLONE ACETONIDE 40 MG: 40 INJECTION, SUSPENSION INTRA-ARTICULAR; INTRAMUSCULAR at 08:42

## 2023-11-06 ASSESSMENT — ENCOUNTER SYMPTOMS
WHEEZING: 0
SORE THROAT: 0
COUGH: 0
DIARRHEA: 0
NAUSEA: 0
ABDOMINAL PAIN: 0
VOMITING: 0
SHORTNESS OF BREATH: 0

## 2023-11-06 NOTE — PROGRESS NOTES
[address] [place] [address]  Phone [phone]   Fax [phone]      OFFICE VISIT: 2023    Makeda Flood-: 1945      HPI  Reason For Visit:  Adriana Grossman is a 70 y.o. Shoulder Pain (Bilateral shoulder and knee pain. She would like steroid injections. ) and Skin Problem (Rough patch above right eye and spot on scalp)    Patient presents on follow-up for multiple health issues. Present concerns:    She needs a refill of her medication and joint injections. Osteoarthritis:  She is noting significant shoulder and knee pain. She is requesting injections as soon as she can. Last shoulder injection was on 2023. Last knee injection was on the same date, 2023. She did see ortho, Dr. Clarence Carver and they did not think that she has rotator cuff tear as she can raise her arm. She has MRI confirmation of Supraspinatus m. Tear from back on 2022. Patient presents on follow-up for chronic sciatica pain. She is requesting a refill of her hydrocodone. I last prescribed hydrocodone 7.5/325 mg on 10/17/2023 for number 120 tablets. She has ongoing sciatica on the right side.      Pain Diagnosis:              Chronic low back pain with sciatica              Lumbar Stenosis (felt to be non-surgical)              Degenerative disc disease              Severe limiting osteoarthritis:     Supportive Studies:              Radiologic imaging studies     Analgesia: She gets appropriate relief with hydrocodone                          She has also benefited by injection in her shoulder R.     Pain Control: Average: 6-7/10         Best: 5/10          Worst: 10/10  (2023)     Pain Interfering with life:  100%  Pain Interfering with general activity:  100%     Adverse effects: None  Aberrant behavior: None  Affect: Normal     Alternative medications:              Meloxicam     Urine Drug screen: 2022 and appropriate  Risk Factors:              Illegal Drug use:

## 2023-11-10 ENCOUNTER — ANTI-COAG VISIT (OUTPATIENT)
Dept: CARDIOLOGY CLINIC | Age: 78
End: 2023-11-10

## 2023-11-10 DIAGNOSIS — I48.0 PAROXYSMAL ATRIAL FIBRILLATION (HCC): Primary | ICD-10-CM

## 2023-11-10 LAB
INTERNATIONAL NORMALIZATION RATIO, POC: 2.4
PROTHROMBIN TIME, POC: 24.8

## 2023-11-13 ENCOUNTER — TELEPHONE (OUTPATIENT)
Dept: FAMILY MEDICINE CLINIC | Age: 78
End: 2023-11-13

## 2023-11-13 DIAGNOSIS — E61.2 MAGNESIUM DEFICIENCY: ICD-10-CM

## 2023-11-13 LAB — MAGNESIUM SERPL-MCNC: 1.8 MG/DL (ref 1.6–2.4)

## 2023-11-13 NOTE — TELEPHONE ENCOUNTER
Add 52 Modifier (Optional): no Called patient, spoke with: Patient regarding the results of the patients most recent labs. I advised Patient of Simon Mcburney recommendations.    Patient did voice understanding Medical Necessity Clause: This procedure was medically necessary because the lesions that were treated were: Detail Level: Simple Post-Care Instructions: I reviewed with the patient in detail post-care instructions. Patient is to wear sunprotection, and avoid picking at any of the treated lesions. Patient may apply Vaseline to crusted or scabbing areas. Number Of Freeze-Thaw Cycles: 2 freeze-thaw cycles Consent: The patient's (or patient's guardian's) consent was obtained including but not limited to risks of crusting, scabbing, blistering, scarring, nerve damage, darker or lighter pigmentary change, recurrence, incomplete removal and infection. Duration Of Freeze Thaw-Cycle (Seconds): 5 Medical Necessity Information: It is in your best interest to select a reason for this procedure from the list below. All of these items fulfill various CMS LCD requirements except the new and changing color options.

## 2023-11-17 ENCOUNTER — OFFICE VISIT (OUTPATIENT)
Dept: FAMILY MEDICINE CLINIC | Age: 78
End: 2023-11-17
Payer: MEDICARE

## 2023-11-17 VITALS — BODY MASS INDEX: 27.78 KG/M2 | HEART RATE: 62 BPM | TEMPERATURE: 98.7 F | OXYGEN SATURATION: 98 % | WEIGHT: 147 LBS
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
## 2023-11-17 DIAGNOSIS — S51.011D SKIN TEAR OF RIGHT ELBOW WITHOUT COMPLICATION, SUBSEQUENT ENCOUNTER: Primary | ICD-10-CM

## 2023-11-17 DIAGNOSIS — Z79.01 ANTICOAGULATED: ICD-10-CM

## 2023-11-17 PROCEDURE — 1090F PRES/ABSN URINE INCON ASSESS: CPT | Performed by: NURSE PRACTITIONER

## 2023-11-17 PROCEDURE — 1123F ACP DISCUSS/DSCN MKR DOCD: CPT | Performed by: NURSE PRACTITIONER

## 2023-11-17 PROCEDURE — 99213 OFFICE O/P EST LOW 20 MIN: CPT | Performed by: NURSE PRACTITIONER

## 2023-11-17 PROCEDURE — G8399 PT W/DXA RESULTS DOCUMENT: HCPCS | Performed by: NURSE PRACTITIONER

## 2023-11-17 PROCEDURE — 1036F TOBACCO NON-USER: CPT | Performed by: NURSE PRACTITIONER

## 2023-11-17 PROCEDURE — G8427 DOCREV CUR MEDS BY ELIG CLIN: HCPCS | Performed by: NURSE PRACTITIONER

## 2023-11-17 PROCEDURE — G8417 CALC BMI ABV UP PARAM F/U: HCPCS | Performed by: NURSE PRACTITIONER

## 2023-11-17 PROCEDURE — G8484 FLU IMMUNIZE NO ADMIN: HCPCS | Performed by: NURSE PRACTITIONER

## 2023-11-17 RX ORDER — CEPHALEXIN 500 MG/1
500 CAPSULE ORAL 2 TIMES DAILY
Qty: 14 CAPSULE | Refills: 0 | Status: SHIPPED | OUTPATIENT
Start: 2023-11-17 | End: 2023-11-24

## 2023-11-17 ASSESSMENT — ENCOUNTER SYMPTOMS
DIARRHEA: 0
ABDOMINAL PAIN: 0
RHINORRHEA: 0
EYE REDNESS: 0
TROUBLE SWALLOWING: 0
COUGH: 0
WHEEZING: 0
SORE THROAT: 0
EYE DISCHARGE: 0
BLOOD IN STOOL: 0
CONSTIPATION: 0

## 2023-11-24 DIAGNOSIS — E55.9 VITAMIN D DEFICIENCY: ICD-10-CM

## 2023-11-27 ENCOUNTER — OFFICE VISIT (OUTPATIENT)
Dept: FAMILY MEDICINE CLINIC | Age: 78
End: 2023-11-27
Payer: MEDICARE

## 2023-11-27 VITALS
SYSTOLIC BLOOD PRESSURE: 120 MMHG | WEIGHT: 145.5 LBS | TEMPERATURE: 98.1 F | BODY MASS INDEX: 27.49 KG/M2 | OXYGEN SATURATION: 96 % | HEART RATE: 84 BPM | DIASTOLIC BLOOD PRESSURE: 82 MMHG

## 2023-11-27 DIAGNOSIS — S51.011D SKIN TEAR OF ELBOW WITHOUT COMPLICATION, RIGHT, SUBSEQUENT ENCOUNTER: ICD-10-CM

## 2023-11-27 DIAGNOSIS — J40 BRONCHITIS: Primary | ICD-10-CM

## 2023-11-27 PROCEDURE — G8427 DOCREV CUR MEDS BY ELIG CLIN: HCPCS | Performed by: NURSE PRACTITIONER

## 2023-11-27 PROCEDURE — 1036F TOBACCO NON-USER: CPT | Performed by: NURSE PRACTITIONER

## 2023-11-27 PROCEDURE — 1123F ACP DISCUSS/DSCN MKR DOCD: CPT | Performed by: NURSE PRACTITIONER

## 2023-11-27 PROCEDURE — 1090F PRES/ABSN URINE INCON ASSESS: CPT | Performed by: NURSE PRACTITIONER

## 2023-11-27 PROCEDURE — G8399 PT W/DXA RESULTS DOCUMENT: HCPCS | Performed by: NURSE PRACTITIONER

## 2023-11-27 PROCEDURE — 3079F DIAST BP 80-89 MM HG: CPT | Performed by: NURSE PRACTITIONER

## 2023-11-27 PROCEDURE — G8417 CALC BMI ABV UP PARAM F/U: HCPCS | Performed by: NURSE PRACTITIONER

## 2023-11-27 PROCEDURE — G8484 FLU IMMUNIZE NO ADMIN: HCPCS | Performed by: NURSE PRACTITIONER

## 2023-11-27 PROCEDURE — 3074F SYST BP LT 130 MM HG: CPT | Performed by: NURSE PRACTITIONER

## 2023-11-27 PROCEDURE — 99213 OFFICE O/P EST LOW 20 MIN: CPT | Performed by: NURSE PRACTITIONER

## 2023-11-27 RX ORDER — CEFDINIR 300 MG/1
300 CAPSULE ORAL 2 TIMES DAILY
Qty: 20 CAPSULE | Refills: 0 | Status: SHIPPED | OUTPATIENT
Start: 2023-11-27 | End: 2023-12-07

## 2023-11-27 RX ORDER — ERGOCALCIFEROL 1.25 MG/1
50000 CAPSULE ORAL WEEKLY
Qty: 12 CAPSULE | Refills: 3 | Status: SHIPPED | OUTPATIENT
Start: 2023-11-27

## 2023-11-27 RX ORDER — GUAIFENESIN 600 MG/1
600 TABLET, EXTENDED RELEASE ORAL 2 TIMES DAILY
Qty: 30 TABLET | Refills: 0 | Status: SHIPPED | OUTPATIENT
Start: 2023-11-27 | End: 2023-12-12

## 2023-11-27 ASSESSMENT — ENCOUNTER SYMPTOMS
SHORTNESS OF BREATH: 0
COUGH: 1
SINUS PAIN: 1
WHEEZING: 0
SINUS PRESSURE: 1

## 2023-11-27 NOTE — TELEPHONE ENCOUNTER
Received fax from pharmacy requesting refill on pts medication(s). Pt was last seen in office on 11/17/2023  and has a follow up scheduled for 11/27/2023. Will send request to  Dr. Esteban Lopez  for patient.      Requested Prescriptions     Pending Prescriptions Disp Refills    vitamin D (ERGOCALCIFEROL) 1.25 MG (07164 UT) CAPS capsule [Pharmacy Med Name: ergocalciferol (vitamin D2) 1,250 mcg (50,000 unit) capsule] 12 capsule 3     Sig: Take 1 capsule by mouth once a week

## 2023-12-01 ENCOUNTER — ANTI-COAG VISIT (OUTPATIENT)
Dept: CARDIOLOGY CLINIC | Age: 78
End: 2023-12-01

## 2023-12-01 ENCOUNTER — TELEPHONE (OUTPATIENT)
Dept: NEUROLOGY | Age: 78
End: 2023-12-01

## 2023-12-01 DIAGNOSIS — I48.0 PAROXYSMAL ATRIAL FIBRILLATION (HCC): Primary | ICD-10-CM

## 2023-12-01 LAB
INTERNATIONAL NORMALIZATION RATIO, POC: 2.2
PROTHROMBIN TIME, POC: 22.9

## 2023-12-01 NOTE — TELEPHONE ENCOUNTER
Called patient had to leave a  asking her to return my call @ [phone] please this was regarding her appt on 12- with Dr Pierre Cool  needing to r/s  this appt Dr Pierre Cool is going to be out of the office

## 2023-12-07 ENCOUNTER — ANTI-COAG VISIT (OUTPATIENT)
Dept: CARDIOLOGY CLINIC | Age: 78
End: 2023-12-07
Payer: MEDICARE

## 2023-12-07 DIAGNOSIS — I48.0 PAROXYSMAL ATRIAL FIBRILLATION (HCC): Primary | ICD-10-CM

## 2023-12-07 LAB
INTERNATIONAL NORMALIZATION RATIO, POC: 1.4
PROTHROMBIN TIME, POC: 15.6

## 2023-12-07 PROCEDURE — 93793 ANTICOAG MGMT PT WARFARIN: CPT | Performed by: CLINICAL NURSE SPECIALIST

## 2023-12-11 ENCOUNTER — TELEPHONE (OUTPATIENT)
Dept: NEUROLOGY | Age: 78
End: 2023-12-11

## 2023-12-11 NOTE — TELEPHONE ENCOUNTER
Annabel Gamble is requesting for the office to return her call in regards to a message she received to reschedule her appt for tomorrow. [facility] was unable to accommodate. Please return her call. Thank you!

## 2024-01-05 ENCOUNTER — ANTI-COAG VISIT (OUTPATIENT)
Dept: CARDIOLOGY CLINIC | Age: 79
End: 2024-01-05
Payer: MEDICARE

## 2024-01-05 ENCOUNTER — TELEPHONE (OUTPATIENT)
Dept: CARDIOLOGY CLINIC | Age: 79
End: 2024-01-05

## 2024-01-05 DIAGNOSIS — I48.0 PAROXYSMAL ATRIAL FIBRILLATION (HCC): Primary | ICD-10-CM

## 2024-01-05 LAB
INTERNATIONAL NORMALIZATION RATIO, POC: 5.2
PROTHROMBIN TIME, POC: 49.3

## 2024-01-05 PROCEDURE — 93793 ANTICOAG MGMT PT WARFARIN: CPT | Performed by: CLINICAL NURSE SPECIALIST

## 2024-01-05 NOTE — TELEPHONE ENCOUNTER
Pt was in the office today 01/05/24 she was having her INR drawn. Pt has been on warfarin for years due to have a-fib. Pt would like to know if she would be a candidate to switch from warfarin to eliquis? She just recently changed insurances this year so that she would have a better chance of having eliquis be covered. Since her INR has been out of range, I did inform the pt that she would have to get therapeutic at around 2.0 before we could switch her off of warfarin to eliquis. Please let me know if you feel pt is able to be put on eliquis.

## 2024-01-08 ENCOUNTER — ANTI-COAG VISIT (OUTPATIENT)
Dept: CARDIOLOGY CLINIC | Age: 79
End: 2024-01-08

## 2024-01-08 DIAGNOSIS — I48.0 PAROXYSMAL ATRIAL FIBRILLATION (HCC): Primary | ICD-10-CM

## 2024-01-08 NOTE — TELEPHONE ENCOUNTER
Pt to start eliquis tonight, due to INR being down, Dr. Hutchison okay'd the switch to eliquis 5 mg BID. Prescription for eliquis sent to Dr. Hutchison to sign off.

## 2024-01-08 NOTE — TELEPHONE ENCOUNTER
Pt's INR was 1.2 today therefore she is able to start eliquis now and get off of the coumadin per Dr. Hutchison. Please send prescription for eliquis to pt's pharmacy.

## 2024-01-11 DIAGNOSIS — R60.9 PERIPHERAL EDEMA: ICD-10-CM

## 2024-01-11 RX ORDER — FUROSEMIDE 20 MG/1
TABLET ORAL
Qty: 60 TABLET | Refills: 11 | Status: SHIPPED | OUTPATIENT
Start: 2024-01-11

## 2024-01-11 NOTE — TELEPHONE ENCOUNTER
Received fax from pharmacy requesting refill on pts medication(s). Pt was last seen in office on 11/27/2023  and has a follow up scheduled for 1/17/2024. Will send request to  Dr. Solis  for authorization.     Requested Prescriptions     Pending Prescriptions Disp Refills    furosemide (LASIX) 20 MG tablet [Pharmacy Med Name: furosemide 20 mg tablet] 60 tablet 11     Sig: TAKE ONE TABLET BY MOUTH AS NEEDED FOR SWELLING

## 2024-01-17 ENCOUNTER — TELEMEDICINE (OUTPATIENT)
Dept: FAMILY MEDICINE CLINIC | Age: 79
End: 2024-01-17
Payer: MEDICARE

## 2024-01-17 DIAGNOSIS — M54.16 LUMBAR BACK PAIN WITH RADICULOPATHY AFFECTING LEFT LOWER EXTREMITY: ICD-10-CM

## 2024-01-17 DIAGNOSIS — G89.29 CHRONIC RIGHT SHOULDER PAIN: ICD-10-CM

## 2024-01-17 DIAGNOSIS — M25.511 CHRONIC RIGHT SHOULDER PAIN: ICD-10-CM

## 2024-01-17 PROCEDURE — 99213 OFFICE O/P EST LOW 20 MIN: CPT | Performed by: PEDIATRICS

## 2024-01-17 PROCEDURE — 1123F ACP DISCUSS/DSCN MKR DOCD: CPT | Performed by: PEDIATRICS

## 2024-01-17 RX ORDER — HYDROCODONE BITARTRATE AND ACETAMINOPHEN 7.5; 325 MG/1; MG/1
1 TABLET ORAL EVERY 6 HOURS PRN
Qty: 120 TABLET | Refills: 0 | Status: SHIPPED | OUTPATIENT
Start: 2024-01-17 | End: 2024-02-16

## 2024-01-17 SDOH — ECONOMIC STABILITY: FOOD INSECURITY: WITHIN THE PAST 12 MONTHS, THE FOOD YOU BOUGHT JUST DIDN'T LAST AND YOU DIDN'T HAVE MONEY TO GET MORE.: NEVER TRUE

## 2024-01-17 SDOH — ECONOMIC STABILITY: INCOME INSECURITY: HOW HARD IS IT FOR YOU TO PAY FOR THE VERY BASICS LIKE FOOD, HOUSING, MEDICAL CARE, AND HEATING?: NOT HARD AT ALL

## 2024-01-17 SDOH — ECONOMIC STABILITY: FOOD INSECURITY: WITHIN THE PAST 12 MONTHS, YOU WORRIED THAT YOUR FOOD WOULD RUN OUT BEFORE YOU GOT MONEY TO BUY MORE.: NEVER TRUE

## 2024-01-17 ASSESSMENT — ENCOUNTER SYMPTOMS
DIARRHEA: 0
SORE THROAT: 0
WHEEZING: 0
VOMITING: 0
NAUSEA: 0
SHORTNESS OF BREATH: 0
COUGH: 0
ABDOMINAL PAIN: 0

## 2024-01-17 ASSESSMENT — PATIENT HEALTH QUESTIONNAIRE - PHQ9
SUM OF ALL RESPONSES TO PHQ QUESTIONS 1-9: 0
SUM OF ALL RESPONSES TO PHQ QUESTIONS 1-9: 0
SUM OF ALL RESPONSES TO PHQ9 QUESTIONS 1 & 2: 0
2. FEELING DOWN, DEPRESSED OR HOPELESS: 0
SUM OF ALL RESPONSES TO PHQ QUESTIONS 1-9: 0
1. LITTLE INTEREST OR PLEASURE IN DOING THINGS: 0
SUM OF ALL RESPONSES TO PHQ QUESTIONS 1-9: 0

## 2024-01-17 NOTE — PROGRESS NOTES
[address] [place] [address]  Phone [phone]   Fax [phone]      OFFICE VISIT: 2024    Elda Flood-: 1945      HPI  Reason For Visit:  Elda is a 78 y.o.     Medication Refill (Norco ) and Congestion (Had a chest cold per patient /Started around lavinia/Lingering cough productive cough on and off with clear sputum was green for a bit but uncolored now /Took two rounds of antibiotics - week before yifan saw Matilde RICH and had another round per patient didn't seem to help /This happened after she fell before yifan and hit her chest and stomach really hard and chest xr was negative but patient wonders if her resp issue is from this)      Patient presents on follow-up for medication refill but she also has several issues she would like to discuss.  Present concerns:    She states that she has had a cough since [place].  This has had some waxing and waning of symptoms with a variety of sputum from clear to green to back to clear.  She has been on 2 different rounds of antibiotics, but nothing seems to help.  She denies any fever or chills in the recent past.  She typically has problems with cough that \"hangs on\" after respiratory infections.    Chronic severe arthritis pain  She is requesting a refill of her hydrocodone.  I last prescribed hydrocodone 7.5/325 mg on 11/15/2023 for number 120 tablets.  She has ongoing sciatica on the right side.     Pain Diagnosis:              Chronic low back pain with sciatica              Lumbar Stenosis (felt to be non-surgical)              Degenerative disc disease              Severe limiting osteoarthritis:     Supportive Studies:              Radiologic imaging studies     Analgesia: She gets appropriate relief with hydrocodone                          She has also benefited by injection in her shoulder R.     Pain Control: Average: 6-7/10         Best: 10          Worst: 10/10  (2024)

## 2024-01-24 ENCOUNTER — OFFICE VISIT (OUTPATIENT)
Dept: FAMILY MEDICINE CLINIC | Age: 79
End: 2024-01-24
Payer: MEDICARE

## 2024-01-24 VITALS
SYSTOLIC BLOOD PRESSURE: 138 MMHG | DIASTOLIC BLOOD PRESSURE: 64 MMHG | OXYGEN SATURATION: 98 % | HEART RATE: 68 BPM | HEIGHT: 61 IN | WEIGHT: 144.4 LBS | BODY MASS INDEX: 27.26 KG/M2 | TEMPERATURE: 98.4 F | RESPIRATION RATE: 20 BRPM

## 2024-01-24 DIAGNOSIS — Z00.00 MEDICARE ANNUAL WELLNESS VISIT, SUBSEQUENT: Primary | ICD-10-CM

## 2024-01-24 PROBLEM — I67.82 TEMPORARY CEREBRAL VASCULAR DYSFUNCTION: Status: ACTIVE | Noted: 2024-01-24

## 2024-01-24 PROBLEM — I63.9 CEREBROVASCULAR ACCIDENT (CVA) (HCC): Status: ACTIVE | Noted: 2022-11-17

## 2024-01-24 PROBLEM — I10 UNCONTROLLED HYPERTENSION: Status: ACTIVE | Noted: 2022-11-14

## 2024-01-24 PROCEDURE — 1123F ACP DISCUSS/DSCN MKR DOCD: CPT | Performed by: PEDIATRICS

## 2024-01-24 PROCEDURE — G0439 PPPS, SUBSEQ VISIT: HCPCS | Performed by: PEDIATRICS

## 2024-01-24 PROCEDURE — 3075F SYST BP GE 130 - 139MM HG: CPT | Performed by: PEDIATRICS

## 2024-01-24 PROCEDURE — 3078F DIAST BP <80 MM HG: CPT | Performed by: PEDIATRICS

## 2024-01-24 RX ORDER — WARFARIN SODIUM 5 MG/1
TABLET ORAL
COMMUNITY
Start: 2024-01-15 | End: 2024-01-24

## 2024-01-24 ASSESSMENT — PATIENT HEALTH QUESTIONNAIRE - PHQ9
SUM OF ALL RESPONSES TO PHQ QUESTIONS 1-9: 0
2. FEELING DOWN, DEPRESSED OR HOPELESS: 0
SUM OF ALL RESPONSES TO PHQ QUESTIONS 1-9: 0
1. LITTLE INTEREST OR PLEASURE IN DOING THINGS: 0
SUM OF ALL RESPONSES TO PHQ9 QUESTIONS 1 & 2: 0

## 2024-01-24 ASSESSMENT — LIFESTYLE VARIABLES
HOW MANY STANDARD DRINKS CONTAINING ALCOHOL DO YOU HAVE ON A TYPICAL DAY: PATIENT DOES NOT DRINK
HOW OFTEN DO YOU HAVE A DRINK CONTAINING ALCOHOL: NEVER

## 2024-01-24 NOTE — PROGRESS NOTES
Yes NICOLE Solis DO   metoprolol succinate (TOPROL XL) 50 MG extended release tablet Take 1 tablet by mouth daily Yes NICOLE Solis DO   levETIRAcetam (KEPPRA) 500 MG tablet Take 1 tablet by mouth 2 times daily Yes NICOLE Solis DO   Multiple Vitamins-Minerals (ONE-DAILY MULTI-VIT/MINERAL) TABS TAKE ONE TABLET BY MOUTH DAILY Yes Fernando Peterson APRN   allopurinol (ZYLOPRIM) 300 MG tablet Take 1 tablet by mouth daily Yes NICOLE Solis DO   atorvastatin (LIPITOR) 20 MG tablet TAKE ONE TABLET BY MOUTH AT BEDTIME Yes NICOLE Solis DO   meloxicam (MOBIC) 15 MG tablet Take 1 tablet by mouth daily Yes NICOLE Solis DO   Insulin Pen Needle (H-E-B INCONTROL PEN NEEDLES) 32G X 4 MM MISC 1 each by Does not apply route daily Yes Fernando Peterson APRN   Magnesium 400 MG TABS 4 po bid  Patient taking differently: Take 1,200 mg by mouth daily 1 tablet TID Yes Mary Dodd APRN   blood glucose monitor kit and supplies Cap glucose daily and prn Yes Mary Dodd APRN   blood glucose monitor strips Cap glucose daily and prn Yes Mary Dodd APRN   ondansetron (ZOFRAN-ODT) 4 MG disintegrating tablet Take 1 tablet by mouth 3 times daily as needed for Nausea or Vomiting  Patient not taking: Reported on 1/17/2024  Mary Dodd APRN       CareTeam (Including outside providers/suppliers regularly involved in providing care):   Patient Care Team:  NICOLE Solis DO as PCP - General  NICOLE Solis DO as PCP - Empaneled Provider  Balbir Levy DO as Consulting Physician (Neurology)     Reviewed and updated this visit:  Allergies  Meds  Med Hx  Surg Hx  Soc Hx  Fam Hx      Lab Work is current. Health Maintenance is reviewed with the patient and updated.     Sakina RAMIREZ LPN, 1/24/2024, performed the documented evaluation under the direct supervision of the attending physician.    This encounter was performed under my,  Yes - the patient is able to be screened

## 2024-02-06 NOTE — TELEPHONE ENCOUNTER
----- Message from [id number] [facility],[address], DO sent at 2/16/2023 12:45 PM CST -----  Mg is on the low side of normal Stable to slightly better.  I reviewed treatment options with her, I reviewed life style changes to help improve mood, continue current treatment.  We will continue with current dose of SSRI for now.  Reviewed we will monitor for improvement based on MCI treatment

## 2024-03-05 ENCOUNTER — OFFICE VISIT (OUTPATIENT)
Dept: FAMILY MEDICINE CLINIC | Age: 79
End: 2024-03-05

## 2024-03-05 VITALS
DIASTOLIC BLOOD PRESSURE: 68 MMHG | BODY MASS INDEX: 27.85 KG/M2 | HEART RATE: 85 BPM | TEMPERATURE: 97.9 F | OXYGEN SATURATION: 98 % | SYSTOLIC BLOOD PRESSURE: 138 MMHG | WEIGHT: 147.38 LBS

## 2024-03-05 DIAGNOSIS — M17.12 PRIMARY OSTEOARTHRITIS OF LEFT KNEE: ICD-10-CM

## 2024-03-05 DIAGNOSIS — M17.11 PRIMARY OSTEOARTHRITIS OF RIGHT KNEE: Primary | ICD-10-CM

## 2024-03-05 DIAGNOSIS — H11.31 SUBCONJUNCTIVAL HEMORRHAGE OF RIGHT EYE: ICD-10-CM

## 2024-03-05 DIAGNOSIS — M19.011 PRIMARY OSTEOARTHRITIS OF RIGHT SHOULDER: ICD-10-CM

## 2024-03-05 DIAGNOSIS — M54.16 LUMBAR BACK PAIN WITH RADICULOPATHY AFFECTING LEFT LOWER EXTREMITY: ICD-10-CM

## 2024-03-05 DIAGNOSIS — M25.511 CHRONIC RIGHT SHOULDER PAIN: ICD-10-CM

## 2024-03-05 DIAGNOSIS — G89.29 CHRONIC RIGHT SHOULDER PAIN: ICD-10-CM

## 2024-03-05 RX ORDER — TRIAMCINOLONE ACETONIDE 40 MG/ML
40 INJECTION, SUSPENSION INTRA-ARTICULAR; INTRAMUSCULAR ONCE
Status: COMPLETED | OUTPATIENT
Start: 2024-03-05 | End: 2024-03-05

## 2024-03-05 RX ORDER — HYDROCODONE BITARTRATE AND ACETAMINOPHEN 7.5; 325 MG/1; MG/1
1 TABLET ORAL EVERY 6 HOURS PRN
Qty: 120 TABLET | Refills: 0 | Status: SHIPPED | OUTPATIENT
Start: 2024-03-05 | End: 2024-04-04

## 2024-03-05 RX ADMIN — TRIAMCINOLONE ACETONIDE 40 MG: 40 INJECTION, SUSPENSION INTRA-ARTICULAR; INTRAMUSCULAR at 15:56

## 2024-03-05 RX ADMIN — TRIAMCINOLONE ACETONIDE 40 MG: 40 INJECTION, SUSPENSION INTRA-ARTICULAR; INTRAMUSCULAR at 15:57

## 2024-03-05 ASSESSMENT — ENCOUNTER SYMPTOMS
NAUSEA: 0
CONSTIPATION: 0
SORE THROAT: 0
DIARRHEA: 0
COUGH: 0
SHORTNESS OF BREATH: 0
RHINORRHEA: 0
EYE PAIN: 0
TROUBLE SWALLOWING: 0
ABDOMINAL PAIN: 0
EYE REDNESS: 1
VOMITING: 0
SINUS PRESSURE: 0

## 2024-03-05 NOTE — PROGRESS NOTES
Elda Flood (:  1945) is a 78 y.o. female,Established patient, here for evaluation of the following chief complaint(s):  Injections and Eye Pain      ASSESSMENT/PLAN:    ICD-10-CM    1. Primary osteoarthritis of right knee  M17.11 triamcinolone acetonide (KENALOG-40) injection 40 mg     MT DRAIN/INJECT LARGE JOINT/BURSA      2. Primary osteoarthritis of left knee  M17.12 MT DRAIN/INJECT LARGE JOINT/BURSA     triamcinolone acetonide (KENALOG-40) injection 40 mg      3. Primary osteoarthritis of right shoulder  M19.011 MT DRAIN/INJECT LARGE JOINT/BURSA     triamcinolone acetonide (KENALOG-40) injection 40 mg      4. Chronic right shoulder pain  M25.511 HYDROcodone-acetaminophen (NORCO) 7.5-325 MG per tablet    G89.29       5. Subconjunctival hemorrhage of right eye  H11.31       6. Lumbar back pain with radiculopathy affecting left lower extremity  M54.16 HYDROcodone-acetaminophen (NORCO) 7.5-325 MG per tablet          Return in about 2 months (around 2024), or if symptoms worsen or fail to improve.    Knee Arthrocentesis with Injection Procedure Note     Pre-operative Diagnosis: left knee djd with pain     Post-operative Diagnosis: same     Indications: Symptom relief from osteoarthritis     Anesthesia: not required      Procedure Details      Verbal consent was obtained for the procedure. The joint was prepped with chlorhexadine. The area was sprayed with Gebauer's Ethyl Chloride as a topical anesthetic and then a 22 gauge needle was inserted into the superior aspect of the joint from a lateral approach.  2 ml 1% lidocaine and 1ml Kenalog (40mg/ml)  was then injected into the joint. The needle was removed and the area cleansed and dressed.     Complications:  None; patient tolerated the procedure well.        Knee Arthrocentesis with Injection Procedure Note     Pre-operative Diagnosis: right knee djd with pain     Post-operative Diagnosis: same     Indications: Symptom relief from osteoarthritis

## 2024-03-07 DIAGNOSIS — E83.42 HYPOMAGNESEMIA: ICD-10-CM

## 2024-03-07 LAB — MAGNESIUM SERPL-MCNC: 1.7 MG/DL (ref 1.6–2.4)

## 2024-03-08 ENCOUNTER — TELEPHONE (OUTPATIENT)
Dept: FAMILY MEDICINE CLINIC | Age: 79
End: 2024-03-08

## 2024-03-08 NOTE — TELEPHONE ENCOUNTER
Called patient, spoke with: Patient regarding the results of the patients most recent labs.  I advised Patient of Dr. Solis recommendations.   Patient did voice understanding

## 2024-03-12 ENCOUNTER — TELEPHONE (OUTPATIENT)
Dept: NEUROLOGY | Age: 79
End: 2024-03-12

## 2024-03-12 ENCOUNTER — OFFICE VISIT (OUTPATIENT)
Dept: CARDIOLOGY CLINIC | Age: 79
End: 2024-03-12
Payer: MEDICARE

## 2024-03-12 VITALS
HEIGHT: 61 IN | BODY MASS INDEX: 28.32 KG/M2 | WEIGHT: 150 LBS | SYSTOLIC BLOOD PRESSURE: 126 MMHG | OXYGEN SATURATION: 97 % | DIASTOLIC BLOOD PRESSURE: 68 MMHG | HEART RATE: 92 BPM

## 2024-03-12 DIAGNOSIS — E61.2 MAGNESIUM DEFICIENCY: Primary | ICD-10-CM

## 2024-03-12 PROCEDURE — 99214 OFFICE O/P EST MOD 30 MIN: CPT | Performed by: INTERNAL MEDICINE

## 2024-03-12 PROCEDURE — 3078F DIAST BP <80 MM HG: CPT | Performed by: INTERNAL MEDICINE

## 2024-03-12 PROCEDURE — 3074F SYST BP LT 130 MM HG: CPT | Performed by: INTERNAL MEDICINE

## 2024-03-12 PROCEDURE — 1123F ACP DISCUSS/DSCN MKR DOCD: CPT | Performed by: INTERNAL MEDICINE

## 2024-03-12 ASSESSMENT — ENCOUNTER SYMPTOMS
SHORTNESS OF BREATH: 0
APNEA: 0
COUGH: 0
EYE PAIN: 0
BLOOD IN STOOL: 0
VOMITING: 0
SORE THROAT: 0
FACIAL SWELLING: 0
ABDOMINAL DISTENTION: 0
ABDOMINAL PAIN: 0
EYE DISCHARGE: 0
WHEEZING: 0
CHEST TIGHTNESS: 0
CONSTIPATION: 0
DIARRHEA: 0
EYE REDNESS: 0
NAUSEA: 0

## 2024-03-12 NOTE — PROGRESS NOTES
magnesium infusion  Oral magnesium levels increased to 400 mg 3 times daily  Follow-up with PCP     Essential hypertension  Dyslipidemia, goal LDL less than 70  Insulin-dependent diabetes mellitus  Abnormal EKG  Presumed underlying coronary disease given multiple cardiovascular risk factors  Lexiscan 8/9/2023: LVEF 67%. No ischemia or infarct.  ECHO 8/2023: No major structural heart disease. Mild LVH. Mild TR. RVSP within normal limits.  Continue conservative medical management. Stable on lisinopril, metoprolol, amlodipine and Lasix as needed.                          Electronically signed by Jacoby Hutchison MD on 3/12/2024 at 2:18 PM    Jacoby Hutchison MD, JONAS, [facility]  Noninvasive Cardiology Consultant    This dictation was generated by voice recognition computer software.  Although all attempts are made to edit the dictation for accuracy, there may be errors in the transcription that are not intended.

## 2024-03-12 NOTE — TELEPHONE ENCOUNTER
Tried to call patient back her vm was full, will cancel today's appt per patients request will reschedule to a different day . Unfortunately can't get patient seen with Dr Levy this afternoon due to his schedule is already booked.

## 2024-03-13 ENCOUNTER — TELEPHONE (OUTPATIENT)
Dept: FAMILY MEDICINE CLINIC | Age: 79
End: 2024-03-13

## 2024-03-13 ENCOUNTER — OFFICE VISIT (OUTPATIENT)
Dept: FAMILY MEDICINE CLINIC | Age: 79
End: 2024-03-13
Payer: MEDICARE

## 2024-03-13 VITALS
BODY MASS INDEX: 27.94 KG/M2 | OXYGEN SATURATION: 95 % | SYSTOLIC BLOOD PRESSURE: 130 MMHG | WEIGHT: 148 LBS | HEART RATE: 81 BPM | TEMPERATURE: 97.8 F | DIASTOLIC BLOOD PRESSURE: 70 MMHG | HEIGHT: 61 IN

## 2024-03-13 DIAGNOSIS — E11.9 TYPE 2 DIABETES MELLITUS WITHOUT COMPLICATION, WITHOUT LONG-TERM CURRENT USE OF INSULIN (HCC): Primary | ICD-10-CM

## 2024-03-13 DIAGNOSIS — E61.2 MAGNESIUM DEFICIENCY: ICD-10-CM

## 2024-03-13 DIAGNOSIS — E61.2 MAGNESIUM DEFICIENCY: Primary | ICD-10-CM

## 2024-03-13 DIAGNOSIS — I63.9 CEREBROVASCULAR ACCIDENT (CVA), UNSPECIFIED MECHANISM (HCC): ICD-10-CM

## 2024-03-13 LAB
ALBUMIN SERPL-MCNC: 4.1 G/DL (ref 3.5–5.2)
ALP SERPL-CCNC: 94 U/L (ref 35–104)
ALT SERPL-CCNC: 12 U/L (ref 5–33)
ANION GAP SERPL CALCULATED.3IONS-SCNC: 12 MMOL/L (ref 7–19)
AST SERPL-CCNC: 10 U/L (ref 5–32)
BILIRUB SERPL-MCNC: 0.3 MG/DL (ref 0.2–1.2)
BUN SERPL-MCNC: 8 MG/DL (ref 8–23)
CALCIUM SERPL-MCNC: 9.2 MG/DL (ref 8.8–10.2)
CHLORIDE SERPL-SCNC: 101 MMOL/L (ref 98–111)
CO2 SERPL-SCNC: 25 MMOL/L (ref 22–29)
CREAT SERPL-MCNC: 0.6 MG/DL (ref 0.5–0.9)
GLUCOSE SERPL-MCNC: 209 MG/DL (ref 74–109)
MAGNESIUM SERPL-MCNC: 1.3 MG/DL (ref 1.6–2.4)
POTASSIUM SERPL-SCNC: 4.2 MMOL/L (ref 3.5–5)
PROT SERPL-MCNC: 6.5 G/DL (ref 6.6–8.7)
SODIUM SERPL-SCNC: 138 MMOL/L (ref 136–145)

## 2024-03-13 PROCEDURE — 99213 OFFICE O/P EST LOW 20 MIN: CPT | Performed by: NURSE PRACTITIONER

## 2024-03-13 PROCEDURE — 1123F ACP DISCUSS/DSCN MKR DOCD: CPT | Performed by: NURSE PRACTITIONER

## 2024-03-13 PROCEDURE — 3078F DIAST BP <80 MM HG: CPT | Performed by: NURSE PRACTITIONER

## 2024-03-13 PROCEDURE — 3075F SYST BP GE 130 - 139MM HG: CPT | Performed by: NURSE PRACTITIONER

## 2024-03-13 ASSESSMENT — ENCOUNTER SYMPTOMS
COUGH: 0
VOMITING: 0
DIARRHEA: 1
RHINORRHEA: 0
SHORTNESS OF BREATH: 0
EYES NEGATIVE: 1

## 2024-03-13 NOTE — PROGRESS NOTES
Elda Flood (:  1945) is a 78 y.o. female,Established patient, here for evaluation of the following chief complaint(s):  Follow-Up from Hospital ([facility]-records requested)      ASSESSMENT/PLAN:    ICD-10-CM    1. Magnesium deficiency  E61.2 Magnesium     Comprehensive Metabolic Panel  Increase Magnesium to 400 mg TID  Take Lasix prn      2. Cerebrovascular accident (CVA) ([facility])  I63.9 Continue Eliquis 5 mg BID        Keep scheduled follow-up with Dr. Solis    Return if symptoms worsen or fail to improve.    SUBJECTIVE/OBJECTIVE:  HPI    She was seen at [facility] on 3-   She had CVA symptoms.  Magnesium: 1.0  She had IV magnesium.  Symptoms resolved after IV infusion.    (Magnesium: 1.7, 3-7-2024)    Patient has had diarrhea yesterday and day before. Patient had 3-4 bowel movement of diarrhea on . She has diarrhea from time to time. She denies vomiting and fever  She got up yesterday and had blurred vision and did not feel right so she called ambulance.  Patient has fatigue.  She did not take lasix yesterday and had not taken it today.  Denies slurred speech, blurred vision or weakness today.   She did not eat a lot on . She had a sausage biscuit and a bowl of cereal. Patient takes a multivitamin and magnesium supplement.     Magnesium was increased to TID per Dr. Hutchison  (She is still only taking BID)    /70   Pulse 81   Temp 97.8 °F (36.6 °C) (Temporal)   Ht 1.549 m (5' 1\")   Wt 67.1 kg (148 lb)   SpO2 95%   BMI 27.96 kg/m²     Review of Systems   Constitutional:  Positive for fatigue. Negative for fever.   HENT:  Positive for sneezing. Negative for congestion, ear pain and rhinorrhea.    Eyes: Negative.    Respiratory:  Negative for cough and shortness of breath.    Cardiovascular:  Negative for chest pain and palpitations.   Gastrointestinal:  Positive for diarrhea. Negative for vomiting.   Endocrine: Negative.    Genitourinary:  Negative for difficulty urinating, frequency

## 2024-03-13 NOTE — TELEPHONE ENCOUNTER
----- Message from LAYLA Royal sent at 3/13/2024  3:51 PM CDT -----  CMP: Normal sodium and potassium.  Blood glucose is elevated at 209.  Normal kidney function.  Normal liver function.  Need to get tighter control of blood sugars.  Recommend low-carb diet.  Recommend exercise as tolerated.  Please see if we can add on A1c    Magnesium is low.  It is 1.3.  Patient has been taking magnesium twice daily but I increase to 10 magnesium 3 times daily.  Recommend rechecking magnesium in 1 week

## 2024-03-21 ENCOUNTER — TELEPHONE (OUTPATIENT)
Dept: FAMILY MEDICINE CLINIC | Age: 79
End: 2024-03-21

## 2024-03-21 DIAGNOSIS — E61.2 MAGNESIUM DEFICIENCY: ICD-10-CM

## 2024-03-21 LAB — MAGNESIUM SERPL-MCNC: 1.5 MG/DL (ref 1.6–2.4)

## 2024-03-21 NOTE — TELEPHONE ENCOUNTER
----- Message from NICOLE Solis DO sent at 3/21/2024  2:14 PM CDT -----  Magnesium is just on the low side of normal.  Recommend taking an extra magnesium for the next 3 days then go back to normal regimen

## 2024-04-01 DIAGNOSIS — E83.42 HYPOMAGNESEMIA: ICD-10-CM

## 2024-04-01 LAB — MAGNESIUM SERPL-MCNC: 1.5 MG/DL (ref 1.6–2.4)

## 2024-04-02 ENCOUNTER — TELEPHONE (OUTPATIENT)
Dept: FAMILY MEDICINE CLINIC | Age: 79
End: 2024-04-02

## 2024-04-02 NOTE — TELEPHONE ENCOUNTER
----- Message from NICOLE Solis DO sent at 4/1/2024  6:56 PM CDT -----  Magnesium is just slightly on the low side of normal.  Recommend increasing magnesium dose by 1 tablet daily

## 2024-04-30 LAB — MAGNESIUM SERPL-MCNC: 1.5 MG/DL (ref 1.6–2.4)

## 2024-05-01 ENCOUNTER — TELEPHONE (OUTPATIENT)
Dept: FAMILY MEDICINE CLINIC | Age: 79
End: 2024-05-01

## 2024-05-01 NOTE — TELEPHONE ENCOUNTER
----- Message from NICOLE Solis DO sent at 4/30/2024  6:14 PM CDT -----  Magnesium is just on the lower margin of normal.  Recommend increasing by 1 tablet every other day and that should be sufficient to maintain in the normal range.  If you need an additional prescription, please let us know

## 2024-05-06 ENCOUNTER — OFFICE VISIT (OUTPATIENT)
Dept: FAMILY MEDICINE CLINIC | Age: 79
End: 2024-05-06
Payer: MEDICARE

## 2024-05-06 VITALS
DIASTOLIC BLOOD PRESSURE: 66 MMHG | HEART RATE: 84 BPM | SYSTOLIC BLOOD PRESSURE: 126 MMHG | HEIGHT: 61 IN | BODY MASS INDEX: 28.56 KG/M2 | OXYGEN SATURATION: 98 % | WEIGHT: 151.25 LBS | TEMPERATURE: 98.3 F

## 2024-05-06 DIAGNOSIS — E55.9 VITAMIN D DEFICIENCY: ICD-10-CM

## 2024-05-06 DIAGNOSIS — E11.65 TYPE 2 DIABETES MELLITUS WITH HYPERGLYCEMIA, WITHOUT LONG-TERM CURRENT USE OF INSULIN (HCC): Primary | ICD-10-CM

## 2024-05-06 DIAGNOSIS — M25.511 CHRONIC RIGHT SHOULDER PAIN: ICD-10-CM

## 2024-05-06 DIAGNOSIS — M25.562 CHRONIC PAIN OF LEFT KNEE: ICD-10-CM

## 2024-05-06 DIAGNOSIS — E78.2 MIXED HYPERLIPIDEMIA: ICD-10-CM

## 2024-05-06 DIAGNOSIS — I10 PRIMARY HYPERTENSION: ICD-10-CM

## 2024-05-06 DIAGNOSIS — M25.561 CHRONIC PAIN OF RIGHT KNEE: ICD-10-CM

## 2024-05-06 DIAGNOSIS — R60.0 PERIPHERAL EDEMA: ICD-10-CM

## 2024-05-06 DIAGNOSIS — G89.29 CHRONIC PAIN OF LEFT KNEE: ICD-10-CM

## 2024-05-06 DIAGNOSIS — G89.29 CHRONIC PAIN OF RIGHT KNEE: ICD-10-CM

## 2024-05-06 DIAGNOSIS — M54.16 LUMBAR BACK PAIN WITH RADICULOPATHY AFFECTING LEFT LOWER EXTREMITY: ICD-10-CM

## 2024-05-06 DIAGNOSIS — G89.29 CHRONIC RIGHT SHOULDER PAIN: ICD-10-CM

## 2024-05-06 DIAGNOSIS — E83.42 HYPOMAGNESEMIA: ICD-10-CM

## 2024-05-06 PROCEDURE — 3078F DIAST BP <80 MM HG: CPT | Performed by: PEDIATRICS

## 2024-05-06 PROCEDURE — 99214 OFFICE O/P EST MOD 30 MIN: CPT | Performed by: PEDIATRICS

## 2024-05-06 PROCEDURE — 3074F SYST BP LT 130 MM HG: CPT | Performed by: PEDIATRICS

## 2024-05-06 PROCEDURE — 1123F ACP DISCUSS/DSCN MKR DOCD: CPT | Performed by: PEDIATRICS

## 2024-05-06 PROCEDURE — 20610 DRAIN/INJ JOINT/BURSA W/O US: CPT | Performed by: PEDIATRICS

## 2024-05-06 RX ORDER — TRIAMCINOLONE ACETONIDE 40 MG/ML
40 INJECTION, SUSPENSION INTRA-ARTICULAR; INTRAMUSCULAR ONCE
Status: COMPLETED | OUTPATIENT
Start: 2024-05-06 | End: 2024-05-06

## 2024-05-06 RX ORDER — FUROSEMIDE 20 MG/1
20 TABLET ORAL DAILY
Qty: 60 TABLET | Refills: 11 | Status: SHIPPED | OUTPATIENT
Start: 2024-05-06

## 2024-05-06 RX ORDER — HYDROCODONE BITARTRATE AND ACETAMINOPHEN 7.5; 325 MG/1; MG/1
1 TABLET ORAL EVERY 6 HOURS PRN
Qty: 120 TABLET | Refills: 0 | Status: SHIPPED | OUTPATIENT
Start: 2024-05-06 | End: 2024-06-05

## 2024-05-06 RX ADMIN — TRIAMCINOLONE ACETONIDE 40 MG: 40 INJECTION, SUSPENSION INTRA-ARTICULAR; INTRAMUSCULAR at 19:04

## 2024-05-06 RX ADMIN — TRIAMCINOLONE ACETONIDE 40 MG: 40 INJECTION, SUSPENSION INTRA-ARTICULAR; INTRAMUSCULAR at 19:05

## 2024-05-06 RX ADMIN — TRIAMCINOLONE ACETONIDE 40 MG: 40 INJECTION, SUSPENSION INTRA-ARTICULAR; INTRAMUSCULAR at 19:06

## 2024-05-06 SDOH — ECONOMIC STABILITY: FOOD INSECURITY: WITHIN THE PAST 12 MONTHS, THE FOOD YOU BOUGHT JUST DIDN'T LAST AND YOU DIDN'T HAVE MONEY TO GET MORE.: NEVER TRUE

## 2024-05-06 SDOH — ECONOMIC STABILITY: INCOME INSECURITY: HOW HARD IS IT FOR YOU TO PAY FOR THE VERY BASICS LIKE FOOD, HOUSING, MEDICAL CARE, AND HEATING?: NOT HARD AT ALL

## 2024-05-06 SDOH — ECONOMIC STABILITY: FOOD INSECURITY: WITHIN THE PAST 12 MONTHS, YOU WORRIED THAT YOUR FOOD WOULD RUN OUT BEFORE YOU GOT MONEY TO BUY MORE.: NEVER TRUE

## 2024-05-06 ASSESSMENT — PATIENT HEALTH QUESTIONNAIRE - PHQ9
SUM OF ALL RESPONSES TO PHQ QUESTIONS 1-9: 0
SUM OF ALL RESPONSES TO PHQ9 QUESTIONS 1 & 2: 0
SUM OF ALL RESPONSES TO PHQ QUESTIONS 1-9: 0
1. LITTLE INTEREST OR PLEASURE IN DOING THINGS: NOT AT ALL
2. FEELING DOWN, DEPRESSED OR HOPELESS: NOT AT ALL
SUM OF ALL RESPONSES TO PHQ QUESTIONS 1-9: 0
SUM OF ALL RESPONSES TO PHQ QUESTIONS 1-9: 0

## 2024-05-06 ASSESSMENT — ENCOUNTER SYMPTOMS
ABDOMINAL PAIN: 0
COUGH: 0
NAUSEA: 0
SORE THROAT: 0
DIARRHEA: 0
SHORTNESS OF BREATH: 0
VOMITING: 0
WHEEZING: 0

## 2024-05-06 NOTE — PROGRESS NOTES
[address] [place] [address]  Phone [phone]   Fax [phone]      OFFICE VISIT: 2024    Elda Flood-: 1945      HPI  Reason For Visit:  Elda is a 78 y.o.     Follow-up (Requesting right shoulder and bilateral knees injection/Pain and stiffness ) and Hip Pain (Sciatic nerve bothering her )    Patient presents on follow-up for multiple health issues.  Present concerns:  She is having a lot of joint pains.  She would like injections in bilateral knees and her right shoulder.  We have injected these in the past.  She has had benefit from these injections.      Diabetes Mellitus Type 2  Diet compliance:  compliant most of the time  Nutrition Consultation Needed:  no  Medication:   Metformin 500 mg twice daily  Medication compliance:  compliant most of the time  Weight trend: fluctuating  Current exercise: yes -but limited secondary to arthritic pain  Checkin times daily  Home blood sugar records: fasting range: 120 or less  Low BG:  no  Eye exam current (within one year): yes  Checking Feet regularly:  yes -no sores  ACE/ARB: Lisinopril 30 mg daily  Aspirin: No: She is taking Eliquis 5 mg twice daily  Tobacco history: She  reports that she has never smoked. She has never used smokeless tobacco.    Lab Results   Component Value Date    LABA1C 7.7 (H) 2023    LABA1C 7.6 (H) 2023    LABA1C 5.8 2022     Lab Results   Component Value Date    CREATININE 0.6 2024       Hypertension:   BP today was   BP Readings from Last 1 Encounters:   24 126/66      Recent BP readings:    BP Readings from Last 3 Encounters:   24 126/66   24 130/70   24 126/68     Medication   Lisinopril 30 mg daily   Amlodipine 5 mg daily  Lasix 20 mg daily as needed   Metoprolol succinate 50 mg daily   Medication compliance:  compliant most of the time  Home blood pressure monitoring: Yes -controlled.    She  is mostly  adherent to a low sodium

## 2024-05-09 DIAGNOSIS — E11.65 TYPE 2 DIABETES MELLITUS WITH HYPERGLYCEMIA, WITHOUT LONG-TERM CURRENT USE OF INSULIN (HCC): ICD-10-CM

## 2024-05-09 DIAGNOSIS — I10 PRIMARY HYPERTENSION: ICD-10-CM

## 2024-05-09 LAB
CREAT UR-MCNC: 39.8 MG/DL (ref 28–217)
MICROALBUMIN UR-MCNC: 3.6 MG/DL (ref 0–19)
MICROALBUMIN/CREAT UR-RTO: 90.5 MG/G

## 2024-05-10 ENCOUNTER — TELEPHONE (OUTPATIENT)
Dept: FAMILY MEDICINE CLINIC | Age: 79
End: 2024-05-10

## 2024-05-10 NOTE — TELEPHONE ENCOUNTER
----- Message from NICOLE Solis DO sent at 5/9/2024  6:46 PM CDT -----  There is a minimal amount of protein in the urine.  This is better than it has been.  We can continue to follow this over time at this level.

## 2024-05-13 DIAGNOSIS — E78.2 MIXED HYPERLIPIDEMIA: ICD-10-CM

## 2024-05-13 DIAGNOSIS — E55.9 VITAMIN D DEFICIENCY: ICD-10-CM

## 2024-05-13 DIAGNOSIS — E11.65 TYPE 2 DIABETES MELLITUS WITH HYPERGLYCEMIA, WITHOUT LONG-TERM CURRENT USE OF INSULIN (HCC): ICD-10-CM

## 2024-05-13 DIAGNOSIS — E83.42 HYPOMAGNESEMIA: ICD-10-CM

## 2024-05-13 DIAGNOSIS — I10 PRIMARY HYPERTENSION: ICD-10-CM

## 2024-05-13 LAB
25(OH)D3 SERPL-MCNC: 67.9 NG/ML
ALBUMIN SERPL-MCNC: 4.2 G/DL (ref 3.5–5.2)
ALP SERPL-CCNC: 93 U/L (ref 35–104)
ALT SERPL-CCNC: 9 U/L (ref 5–33)
ANION GAP SERPL CALCULATED.3IONS-SCNC: 16 MMOL/L (ref 7–19)
ANISOCYTOSIS BLD QL SMEAR: ABNORMAL
AST SERPL-CCNC: 9 U/L (ref 5–32)
BASOPHILS # BLD: 0 K/UL (ref 0–0.2)
BASOPHILS NFR BLD: 0 % (ref 0–1)
BILIRUB SERPL-MCNC: 0.4 MG/DL (ref 0.2–1.2)
BUN SERPL-MCNC: 19 MG/DL (ref 8–23)
CALCIUM SERPL-MCNC: 10 MG/DL (ref 8.8–10.2)
CHLORIDE SERPL-SCNC: 101 MMOL/L (ref 98–111)
CHOLEST SERPL-MCNC: 122 MG/DL (ref 160–199)
CO2 SERPL-SCNC: 24 MMOL/L (ref 22–29)
CREAT SERPL-MCNC: 0.8 MG/DL (ref 0.5–0.9)
EOSINOPHIL # BLD: 0 K/UL (ref 0–0.6)
EOSINOPHIL NFR BLD: 0 % (ref 0–5)
ERYTHROCYTE [DISTWIDTH] IN BLOOD BY AUTOMATED COUNT: 14.9 % (ref 11.5–14.5)
GLUCOSE SERPL-MCNC: 208 MG/DL (ref 74–109)
HBA1C MFR BLD: 8.2 % (ref 4–6)
HCT VFR BLD AUTO: 37.9 % (ref 37–47)
HDLC SERPL-MCNC: 59 MG/DL (ref 65–121)
HGB BLD-MCNC: 11.7 G/DL (ref 12–16)
HYPOCHROMIA BLD QL SMEAR: ABNORMAL
IMM GRANULOCYTES # BLD: 0.4 K/UL
LDLC SERPL CALC-MCNC: 36 MG/DL
LYMPHOCYTES # BLD: 3.8 K/UL (ref 1.1–4.5)
LYMPHOCYTES NFR BLD: 19 % (ref 20–40)
MAGNESIUM SERPL-MCNC: 1.7 MG/DL (ref 1.6–2.4)
MCH RBC QN AUTO: 25.8 PG (ref 27–31)
MCHC RBC AUTO-ENTMCNC: 30.9 G/DL (ref 33–37)
MCV RBC AUTO: 83.5 FL (ref 81–99)
MONOCYTES # BLD: 4 K/UL (ref 0–0.9)
MONOCYTES NFR BLD: 20 % (ref 0–10)
NEUTROPHILS # BLD: 12.2 K/UL (ref 1.5–7.5)
NEUTS SEG NFR BLD: 61 % (ref 50–65)
OVALOCYTES BLD QL SMEAR: ABNORMAL
PLATELET # BLD AUTO: 176 K/UL (ref 130–400)
PLATELET SLIDE REVIEW: ADEQUATE
POTASSIUM SERPL-SCNC: 4.9 MMOL/L (ref 3.5–5)
PROT SERPL-MCNC: 6.4 G/DL (ref 6.6–8.7)
RBC # BLD AUTO: 4.54 M/UL (ref 4.2–5.4)
SODIUM SERPL-SCNC: 141 MMOL/L (ref 136–145)
T4 FREE SERPL-MCNC: 1.48 NG/DL (ref 0.93–1.7)
TRIGL SERPL-MCNC: 135 MG/DL (ref 0–149)
TSH SERPL DL<=0.005 MIU/L-ACNC: 1.63 UIU/ML (ref 0.27–4.2)
WBC # BLD AUTO: 20 K/UL (ref 4.8–10.8)

## 2024-05-15 ENCOUNTER — TELEPHONE (OUTPATIENT)
Dept: FAMILY MEDICINE CLINIC | Age: 79
End: 2024-05-15

## 2024-05-15 DIAGNOSIS — D64.9 ANEMIA, UNSPECIFIED TYPE: Primary | ICD-10-CM

## 2024-05-15 DIAGNOSIS — E11.65 TYPE 2 DIABETES MELLITUS WITH HYPERGLYCEMIA, WITHOUT LONG-TERM CURRENT USE OF INSULIN (HCC): ICD-10-CM

## 2024-05-15 LAB
FERRITIN SERPL-MCNC: 15.8 NG/ML (ref 13–150)
IRON SATN MFR SERPL: 16 % (ref 14–50)
IRON SERPL-MCNC: 58 UG/DL (ref 37–145)
TIBC SERPL-MCNC: 360 UG/DL (ref 250–400)

## 2024-05-15 RX ORDER — DAPAGLIFLOZIN 10 MG/1
10 TABLET, FILM COATED ORAL EVERY MORNING
Qty: 30 TABLET | Refills: 11 | Status: SHIPPED | OUTPATIENT
Start: 2024-05-15

## 2024-05-15 NOTE — TELEPHONE ENCOUNTER
----- Message from NICOLE Solis DO sent at 5/15/2024 11:13 AM CDT -----  Iron levels are in the low normal range

## 2024-05-15 NOTE — TELEPHONE ENCOUNTER
----- Message from NICOLE Solis DO sent at 5/14/2024  6:24 PM CDT -----  White blood cell count is significantly elevated.  This could indicate the presence of an infection.  Steroids could also cause this kind of elevation.  There is also a stable anemia with microcytosis.  Please see if we can add iron levels to the labs (iron, TIBC, ferritin)  Lipids are excellently controlled.  Your metabolic profile is normal.  This includes kidney and liver functions as well as electrolytes.  Glucose was 208 at the time of the lab draw  Hemoglobin A1c is 8.2 which indicates poor blood sugar control over the past 3 months.  We need to get better control of blood sugars.  Consider Farxiga 10 mg daily, #30 with 11 refills.  If necessary, we can try to get patient assistance for this medication with a 90-day supply of the medication.  Thyroid is normal.  Magnesium is now normal.  At 1.7  Vitamin D is normal.

## 2024-05-15 NOTE — TELEPHONE ENCOUNTER
Called patient, spoke with: Patient regarding the results of the patients most recent labs.  I advised Patient of Dr. Solis recommendations.   Patient did voice understanding     Called lab and spoke to Elda lechuga she approved labs - released over to lab     Medication pending

## 2024-05-31 DIAGNOSIS — E78.2 MIXED HYPERLIPIDEMIA: ICD-10-CM

## 2024-05-31 RX ORDER — ATORVASTATIN CALCIUM 20 MG/1
20 TABLET, FILM COATED ORAL
Qty: 272 TABLET | Refills: 0 | Status: SHIPPED | OUTPATIENT
Start: 2024-05-31

## 2024-05-31 NOTE — TELEPHONE ENCOUNTER
Elda Flood called to request a refill on her medication.      Last office visit : 5/6/2024   Next office visit : 8/6/2024     Requested Prescriptions     Pending Prescriptions Disp Refills    atorvastatin (LIPITOR) 20 MG tablet [Pharmacy Med Name: atorvastatin 20 mg tablet] 272 tablet 0     Sig: TAKE ONE TABLET BY MOUTH AT BEDTIME            Gabriela Rivera MA

## 2024-06-04 NOTE — TELEPHONE ENCOUNTER
Received fax from pharmacy requesting refill on pts medication(s). Pt was last seen in office on 12/10/2021  and has a follow up scheduled for 3/11/2022. Will send request to  Dr. Cleopatra Romberg  for authorization.      Requested Prescriptions     Pending Prescriptions Disp Refills    amLODIPine (NORVASC) 5 MG tablet [Pharmacy Med Name: amlodipine 5 mg tablet] 28 tablet 3     Sig: TAKE ONE TABLET BY MOUTH DAILY [No Acute Distress] : no acute distress [Well Nourished] : well nourished [Well Developed] : well developed [Well-Appearing] : well-appearing [Normal Sclera/Conjunctiva] : normal sclera/conjunctiva [PERRL] : pupils equal round and reactive to light [EOMI] : extraocular movements intact [Normal Outer Ear/Nose] : the outer ears and nose were normal in appearance [Normal Oropharynx] : the oropharynx was normal [No JVD] : no jugular venous distention [No Lymphadenopathy] : no lymphadenopathy [Supple] : supple [Thyroid Normal, No Nodules] : the thyroid was normal and there were no nodules present [No Respiratory Distress] : no respiratory distress  [No Accessory Muscle Use] : no accessory muscle use [Clear to Auscultation] : lungs were clear to auscultation bilaterally [Normal Rate] : normal rate  [Regular Rhythm] : with a regular rhythm [Normal S1, S2] : normal S1 and S2 [No Murmur] : no murmur heard [No Carotid Bruits] : no carotid bruits [No Abdominal Bruit] : a ~M bruit was not heard ~T in the abdomen [No Varicosities] : no varicosities [Pedal Pulses Present] : the pedal pulses are present [No Edema] : there was no peripheral edema [No Palpable Aorta] : no palpable aorta [No Extremity Clubbing/Cyanosis] : no extremity clubbing/cyanosis [Soft] : abdomen soft [Non Tender] : non-tender [Non-distended] : non-distended [No Masses] : no abdominal mass palpated [No HSM] : no HSM [Normal Bowel Sounds] : normal bowel sounds [Normal Posterior Cervical Nodes] : no posterior cervical lymphadenopathy [Normal Anterior Cervical Nodes] : no anterior cervical lymphadenopathy [No CVA Tenderness] : no CVA  tenderness [No Spinal Tenderness] : no spinal tenderness [No Joint Swelling] : no joint swelling [Grossly Normal Strength/Tone] : grossly normal strength/tone [No Rash] : no rash [Coordination Grossly Intact] : coordination grossly intact [No Focal Deficits] : no focal deficits [Normal Gait] : normal gait [Deep Tendon Reflexes (DTR)] : deep tendon reflexes were 2+ and symmetric [Normal Affect] : the affect was normal [Normal Insight/Judgement] : insight and judgment were intact [Normal Appearance] : normal in appearance [Normal] : affect was normal and insight and judgment were intact [de-identified] : Hard of hearing [de-identified] : No flank pain [de-identified] : Right shoulder pain decreased range of motion cross chest Loya positive.

## 2024-06-28 ENCOUNTER — TELEPHONE (OUTPATIENT)
Dept: FAMILY MEDICINE CLINIC | Age: 79
End: 2024-06-28

## 2024-06-28 NOTE — TELEPHONE ENCOUNTER
Care Transitions Initial Follow Up Call    Outreach made within 2 business days of discharge: Yes    Patient: Elda Flood Patient : 1945   MRN: [id number]  Reason for Admission: Low Magnesium   Discharge Date: 19       Spoke with: Patient    Discharge department/facility: [facility]     TCM Interactive Patient Contact:  Was patient able to fill all prescriptions: Yes  Was patient instructed to bring all medications to the follow-up visit: Yes  Is patient taking all medications as directed in the discharge summary? Yes  Does patient understand their discharge instructions: Yes  Does patient have questions or concerns that need addressed prior to 7-14 day follow up office visit: no    Scheduled appointment with PCP within 7-14 days    Follow Up  Future Appointments   Date Time Provider Department Center   2024  9:30 AM NICOLE Solis DO [facility]   2024  1:30 PM Balbir Levy DO [facility] Neurology -   2024  3:00 PM NICOLE Solis DO [facility]   2024  1:45 PM Jacoby Hutchison MD [facility]       Yumiko Manning LPN

## 2024-07-02 ENCOUNTER — TELEPHONE (OUTPATIENT)
Dept: FAMILY MEDICINE CLINIC | Age: 79
End: 2024-07-02

## 2024-07-02 DIAGNOSIS — E87.6 HYPOKALEMIA: ICD-10-CM

## 2024-07-02 DIAGNOSIS — E83.42 HYPOMAGNESEMIA: Primary | ICD-10-CM

## 2024-07-02 LAB — MAGNESIUM SERPL-MCNC: 1.3 MG/DL (ref 1.6–2.4)

## 2024-07-02 RX ORDER — MULTIVIT-MIN/FERROUS SULFATE 4.5 MG
1 TABLET ORAL DAILY
Qty: 30 TABLET | Refills: 9 | Status: ON HOLD | OUTPATIENT
Start: 2024-07-02

## 2024-07-02 NOTE — TELEPHONE ENCOUNTER
Received fax from pharmacy requesting refill on pts medication(s). Pt was last seen in office on 5/6/2024  and has a follow up scheduled for 7/9/2024. Will send request to  Dr. Solis  for authorization.     Requested Prescriptions     Pending Prescriptions Disp Refills    Multiple Vitamins-Minerals (ONE-DAILY MULTI-VIT/MINERAL) TABS [Pharmacy Med Name: One Daily Multivitamins with Minerals 4.5 mg iron tablet] 30 tablet 9     Sig: TAKE ONE TABLET BY MOUTH DAILY

## 2024-07-02 NOTE — TELEPHONE ENCOUNTER
----- Message from NICOLE Solis DO sent at 7/2/2024  3:15 PM CDT -----  Magnesium level is low.  Recommend increasing magnesium supplementation by 1 tablet every other day.  Recheck in 2 weeks

## 2024-07-07 ENCOUNTER — HOSPITAL ENCOUNTER (INPATIENT)
Age: 79
LOS: 1 days | Discharge: HOME | DRG: 641 | End: 2024-07-09
Attending: STUDENT IN AN ORGANIZED HEALTH CARE EDUCATION/TRAINING PROGRAM | Admitting: HOSPITALIST
Payer: MEDICARE

## 2024-07-07 ENCOUNTER — APPOINTMENT (OUTPATIENT)
Dept: CT IMAGING | Age: 79
DRG: 641 | End: 2024-07-07
Payer: MEDICARE

## 2024-07-07 DIAGNOSIS — R19.7 NAUSEA VOMITING AND DIARRHEA: Primary | ICD-10-CM

## 2024-07-07 DIAGNOSIS — E87.1 HYPONATREMIA: ICD-10-CM

## 2024-07-07 DIAGNOSIS — R11.2 NAUSEA VOMITING AND DIARRHEA: Primary | ICD-10-CM

## 2024-07-07 LAB
ALBUMIN SERPL-MCNC: 4 G/DL (ref 3.5–5.2)
ALP SERPL-CCNC: 108 U/L (ref 35–104)
ALT SERPL-CCNC: 10 U/L (ref 5–33)
ANION GAP SERPL CALCULATED.3IONS-SCNC: 11 MMOL/L (ref 7–19)
AST SERPL-CCNC: 13 U/L (ref 5–32)
BACTERIA URNS QL MICRO: NEGATIVE /HPF
BASOPHILS # BLD: 0.2 K/UL (ref 0–0.2)
BASOPHILS NFR BLD: 1 % (ref 0–1)
BILIRUB SERPL-MCNC: 0.2 MG/DL (ref 0.2–1.2)
BILIRUB UR QL STRIP: NEGATIVE
BUN SERPL-MCNC: 16 MG/DL (ref 8–23)
CALCIUM SERPL-MCNC: 9.4 MG/DL (ref 8.8–10.2)
CHLORIDE SERPL-SCNC: 92 MMOL/L (ref 98–111)
CLARITY UR: CLEAR
CO2 SERPL-SCNC: 24 MMOL/L (ref 22–29)
COLOR UR: YELLOW
CREAT SERPL-MCNC: 0.7 MG/DL (ref 0.5–0.9)
CRYSTALS URNS MICRO: NORMAL /HPF
EKG P AXIS: 26 DEGREES
EKG P-R INTERVAL: 158 MS
EKG Q-T INTERVAL: 368 MS
EKG QRS DURATION: 98 MS
EKG QTC CALCULATION (BAZETT): 392 MS
EKG T AXIS: 5 DEGREES
EOSINOPHIL # BLD: 0 K/UL (ref 0–0.6)
EOSINOPHIL NFR BLD: 0 % (ref 0–5)
EPI CELLS #/AREA URNS AUTO: 1 /HPF (ref 0–5)
ERYTHROCYTE [DISTWIDTH] IN BLOOD BY AUTOMATED COUNT: 14.2 % (ref 11.5–14.5)
GLUCOSE BLD-MCNC: 133 MG/DL (ref 70–99)
GLUCOSE BLD-MCNC: 169 MG/DL (ref 70–99)
GLUCOSE BLD-MCNC: 173 MG/DL (ref 70–99)
GLUCOSE BLD-MCNC: 192 MG/DL (ref 70–99)
GLUCOSE SERPL-MCNC: 184 MG/DL (ref 74–109)
GLUCOSE UR STRIP.AUTO-MCNC: NEGATIVE MG/DL
HBA1C MFR BLD: 7.7 % (ref 4–6)
HCT VFR BLD AUTO: 38.5 % (ref 37–47)
HGB BLD-MCNC: 11.8 G/DL (ref 12–16)
HGB UR STRIP.AUTO-MCNC: NEGATIVE MG/L
HYALINE CASTS #/AREA URNS AUTO: 0 /HPF (ref 0–8)
HYPOCHROMIA BLD QL SMEAR: ABNORMAL
IMM GRANULOCYTES # BLD: 0.2 K/UL
KETONES UR STRIP.AUTO-MCNC: NEGATIVE MG/DL
LEUKOCYTE ESTERASE UR QL STRIP.AUTO: NEGATIVE
LIPASE SERPL-CCNC: 41 U/L (ref 13–60)
LYMPHOCYTES # BLD: 1.8 K/UL (ref 1.1–4.5)
LYMPHOCYTES NFR BLD: 11 % (ref 20–40)
MAGNESIUM SERPL-MCNC: 1.7 MG/DL (ref 1.6–2.4)
MCH RBC QN AUTO: 24.3 PG (ref 27–31)
MCHC RBC AUTO-ENTMCNC: 30.6 G/DL (ref 33–37)
MCV RBC AUTO: 79.4 FL (ref 81–99)
MICROCYTES BLD QL SMEAR: ABNORMAL
MONOCYTES # BLD: 3.9 K/UL (ref 0–0.9)
MONOCYTES NFR BLD: 24 % (ref 0–10)
NEUTROPHILS # BLD: 10.5 K/UL (ref 1.5–7.5)
NEUTS BAND NFR BLD MANUAL: 1 % (ref 0–5)
NEUTS SEG NFR BLD: 63 % (ref 50–65)
NITRITE UR QL STRIP.AUTO: NEGATIVE
OSMOLALITY URINE: 434 MOSM/KG (ref 250–1200)
PERFORMED ON: ABNORMAL
PH UR STRIP.AUTO: >=9 [PH] (ref 5–8)
PLATELET # BLD AUTO: 280 K/UL (ref 130–400)
PLATELET SLIDE REVIEW: ADEQUATE
PMV BLD AUTO: 12.6 FL (ref 9.4–12.3)
POTASSIUM SERPL-SCNC: 5.4 MMOL/L (ref 3.5–5)
PROT SERPL-MCNC: 6.6 G/DL (ref 6.6–8.7)
PROT UR STRIP.AUTO-MCNC: 30 MG/DL
RBC # BLD AUTO: 4.85 M/UL (ref 4.2–5.4)
RBC #/AREA URNS AUTO: 0 /HPF (ref 0–4)
SARS-COV-2 RDRP RESP QL NAA+PROBE: NOT DETECTED
SODIUM SERPL-SCNC: 127 MMOL/L (ref 136–145)
SODIUM UR-SCNC: 128 MMOL/L
SP GR UR STRIP.AUTO: 1.01 (ref 1–1.03)
UROBILINOGEN UR STRIP.AUTO-MCNC: 0.2 E.U./DL
WBC # BLD AUTO: 16.4 K/UL (ref 4.8–10.8)
WBC #/AREA URNS AUTO: 0 /HPF (ref 0–5)

## 2024-07-07 PROCEDURE — G0378 HOSPITAL OBSERVATION PER HR: HCPCS

## 2024-07-07 PROCEDURE — 74177 CT ABD & PELVIS W/CONTRAST: CPT

## 2024-07-07 PROCEDURE — 81001 URINALYSIS AUTO W/SCOPE: CPT

## 2024-07-07 PROCEDURE — 84300 ASSAY OF URINE SODIUM: CPT

## 2024-07-07 PROCEDURE — 83735 ASSAY OF MAGNESIUM: CPT

## 2024-07-07 PROCEDURE — 93005 ELECTROCARDIOGRAM TRACING: CPT | Performed by: STUDENT IN AN ORGANIZED HEALTH CARE EDUCATION/TRAINING PROGRAM

## 2024-07-07 PROCEDURE — 87635 SARS-COV-2 COVID-19 AMP PRB: CPT

## 2024-07-07 PROCEDURE — 80053 COMPREHEN METABOLIC PANEL: CPT

## 2024-07-07 PROCEDURE — 2580000003 HC RX 258: Performed by: STUDENT IN AN ORGANIZED HEALTH CARE EDUCATION/TRAINING PROGRAM

## 2024-07-07 PROCEDURE — 93010 ELECTROCARDIOGRAM REPORT: CPT | Performed by: INTERNAL MEDICINE

## 2024-07-07 PROCEDURE — 82962 GLUCOSE BLOOD TEST: CPT

## 2024-07-07 PROCEDURE — 99285 EMERGENCY DEPT VISIT HI MDM: CPT

## 2024-07-07 PROCEDURE — 6360000004 HC RX CONTRAST MEDICATION: Performed by: STUDENT IN AN ORGANIZED HEALTH CARE EDUCATION/TRAINING PROGRAM

## 2024-07-07 PROCEDURE — 83690 ASSAY OF LIPASE: CPT

## 2024-07-07 PROCEDURE — 83935 ASSAY OF URINE OSMOLALITY: CPT

## 2024-07-07 PROCEDURE — 96361 HYDRATE IV INFUSION ADD-ON: CPT

## 2024-07-07 PROCEDURE — 85025 COMPLETE CBC W/AUTO DIFF WBC: CPT

## 2024-07-07 PROCEDURE — 83036 HEMOGLOBIN GLYCOSYLATED A1C: CPT

## 2024-07-07 PROCEDURE — 6370000000 HC RX 637 (ALT 250 FOR IP): Performed by: STUDENT IN AN ORGANIZED HEALTH CARE EDUCATION/TRAINING PROGRAM

## 2024-07-07 PROCEDURE — 36415 COLL VENOUS BLD VENIPUNCTURE: CPT

## 2024-07-07 RX ORDER — ATORVASTATIN CALCIUM 20 MG/1
20 TABLET, FILM COATED ORAL NIGHTLY
Status: DISCONTINUED | OUTPATIENT
Start: 2024-07-07 | End: 2024-07-09 | Stop reason: HOSPADM

## 2024-07-07 RX ORDER — AMLODIPINE BESYLATE 5 MG/1
5 TABLET ORAL DAILY
Status: DISCONTINUED | OUTPATIENT
Start: 2024-07-07 | End: 2024-07-09 | Stop reason: HOSPADM

## 2024-07-07 RX ORDER — ONDANSETRON 4 MG/1
4 TABLET, ORALLY DISINTEGRATING ORAL EVERY 8 HOURS PRN
Status: DISCONTINUED | OUTPATIENT
Start: 2024-07-07 | End: 2024-07-09 | Stop reason: HOSPADM

## 2024-07-07 RX ORDER — SODIUM CHLORIDE 9 MG/ML
INJECTION, SOLUTION INTRAVENOUS CONTINUOUS
Status: DISCONTINUED | OUTPATIENT
Start: 2024-07-07 | End: 2024-07-09

## 2024-07-07 RX ORDER — FOLIC ACID 1 MG/1
1 TABLET ORAL DAILY
Status: DISCONTINUED | OUTPATIENT
Start: 2024-07-07 | End: 2024-07-09 | Stop reason: HOSPADM

## 2024-07-07 RX ORDER — LEVETIRACETAM 500 MG/1
500 TABLET ORAL 2 TIMES DAILY
Status: DISCONTINUED | OUTPATIENT
Start: 2024-07-07 | End: 2024-07-09 | Stop reason: HOSPADM

## 2024-07-07 RX ORDER — POLYETHYLENE GLYCOL 3350 17 G/17G
17 POWDER, FOR SOLUTION ORAL DAILY PRN
Status: DISCONTINUED | OUTPATIENT
Start: 2024-07-07 | End: 2024-07-09 | Stop reason: HOSPADM

## 2024-07-07 RX ORDER — ACETAMINOPHEN 325 MG/1
650 TABLET ORAL EVERY 6 HOURS PRN
Status: DISCONTINUED | OUTPATIENT
Start: 2024-07-07 | End: 2024-07-09 | Stop reason: HOSPADM

## 2024-07-07 RX ORDER — ALLOPURINOL 300 MG/1
300 TABLET ORAL DAILY
Status: DISCONTINUED | OUTPATIENT
Start: 2024-07-07 | End: 2024-07-09 | Stop reason: HOSPADM

## 2024-07-07 RX ORDER — DEXTROSE MONOHYDRATE 100 MG/ML
INJECTION, SOLUTION INTRAVENOUS CONTINUOUS PRN
Status: DISCONTINUED | OUTPATIENT
Start: 2024-07-07 | End: 2024-07-09 | Stop reason: HOSPADM

## 2024-07-07 RX ORDER — SODIUM CHLORIDE 0.9 % (FLUSH) 0.9 %
5-40 SYRINGE (ML) INJECTION PRN
Status: DISCONTINUED | OUTPATIENT
Start: 2024-07-07 | End: 2024-07-09 | Stop reason: HOSPADM

## 2024-07-07 RX ORDER — POTASSIUM CHLORIDE 7.45 MG/ML
10 INJECTION INTRAVENOUS PRN
Status: DISCONTINUED | OUTPATIENT
Start: 2024-07-07 | End: 2024-07-09 | Stop reason: HOSPADM

## 2024-07-07 RX ORDER — MAGNESIUM SULFATE IN WATER 40 MG/ML
2000 INJECTION, SOLUTION INTRAVENOUS PRN
Status: DISCONTINUED | OUTPATIENT
Start: 2024-07-07 | End: 2024-07-09 | Stop reason: HOSPADM

## 2024-07-07 RX ORDER — POTASSIUM CHLORIDE 20 MEQ/1
40 TABLET, EXTENDED RELEASE ORAL PRN
Status: DISCONTINUED | OUTPATIENT
Start: 2024-07-07 | End: 2024-07-09 | Stop reason: HOSPADM

## 2024-07-07 RX ORDER — INSULIN LISPRO 100 [IU]/ML
0-4 INJECTION, SOLUTION INTRAVENOUS; SUBCUTANEOUS NIGHTLY
Status: DISCONTINUED | OUTPATIENT
Start: 2024-07-07 | End: 2024-07-09 | Stop reason: HOSPADM

## 2024-07-07 RX ORDER — ACETAMINOPHEN 650 MG/1
650 SUPPOSITORY RECTAL EVERY 6 HOURS PRN
Status: DISCONTINUED | OUTPATIENT
Start: 2024-07-07 | End: 2024-07-09 | Stop reason: HOSPADM

## 2024-07-07 RX ORDER — ENOXAPARIN SODIUM 100 MG/ML
40 INJECTION SUBCUTANEOUS DAILY
Status: CANCELLED | OUTPATIENT
Start: 2024-07-07

## 2024-07-07 RX ORDER — SODIUM CHLORIDE 9 MG/ML
INJECTION, SOLUTION INTRAVENOUS PRN
Status: DISCONTINUED | OUTPATIENT
Start: 2024-07-07 | End: 2024-07-09 | Stop reason: HOSPADM

## 2024-07-07 RX ORDER — METOPROLOL SUCCINATE 50 MG/1
50 TABLET, EXTENDED RELEASE ORAL DAILY
Status: DISCONTINUED | OUTPATIENT
Start: 2024-07-07 | End: 2024-07-09 | Stop reason: HOSPADM

## 2024-07-07 RX ORDER — INSULIN LISPRO 100 [IU]/ML
0-4 INJECTION, SOLUTION INTRAVENOUS; SUBCUTANEOUS
Status: DISCONTINUED | OUTPATIENT
Start: 2024-07-07 | End: 2024-07-09 | Stop reason: HOSPADM

## 2024-07-07 RX ORDER — ONDANSETRON 2 MG/ML
4 INJECTION INTRAMUSCULAR; INTRAVENOUS EVERY 6 HOURS PRN
Status: DISCONTINUED | OUTPATIENT
Start: 2024-07-07 | End: 2024-07-09 | Stop reason: HOSPADM

## 2024-07-07 RX ORDER — 0.9 % SODIUM CHLORIDE 0.9 %
1000 INTRAVENOUS SOLUTION INTRAVENOUS ONCE
Status: COMPLETED | OUTPATIENT
Start: 2024-07-07 | End: 2024-07-07

## 2024-07-07 RX ORDER — SODIUM CHLORIDE 0.9 % (FLUSH) 0.9 %
5-40 SYRINGE (ML) INJECTION EVERY 12 HOURS SCHEDULED
Status: DISCONTINUED | OUTPATIENT
Start: 2024-07-07 | End: 2024-07-09 | Stop reason: HOSPADM

## 2024-07-07 RX ADMIN — SODIUM CHLORIDE: 9 INJECTION, SOLUTION INTRAVENOUS at 10:19

## 2024-07-07 RX ADMIN — LEVETIRACETAM 500 MG: 500 TABLET, FILM COATED ORAL at 19:37

## 2024-07-07 RX ADMIN — SODIUM CHLORIDE 1000 ML: 9 INJECTION, SOLUTION INTRAVENOUS at 03:32

## 2024-07-07 RX ADMIN — IOPAMIDOL 70 ML: 755 INJECTION, SOLUTION INTRAVENOUS at 03:45

## 2024-07-07 RX ADMIN — AMLODIPINE BESYLATE 5 MG: 5 TABLET ORAL at 10:21

## 2024-07-07 RX ADMIN — LEVETIRACETAM 500 MG: 500 TABLET, FILM COATED ORAL at 10:20

## 2024-07-07 RX ADMIN — LISINOPRIL 30 MG: 20 TABLET ORAL at 10:21

## 2024-07-07 RX ADMIN — ATORVASTATIN CALCIUM 20 MG: 20 TABLET, FILM COATED ORAL at 19:37

## 2024-07-07 RX ADMIN — ALLOPURINOL 300 MG: 300 TABLET ORAL at 10:21

## 2024-07-07 RX ADMIN — APIXABAN 5 MG: 5 TABLET, FILM COATED ORAL at 19:37

## 2024-07-07 RX ADMIN — APIXABAN 5 MG: 5 TABLET, FILM COATED ORAL at 10:27

## 2024-07-07 RX ADMIN — SODIUM CHLORIDE, PRESERVATIVE FREE 10 ML: 5 INJECTION INTRAVENOUS at 11:31

## 2024-07-07 RX ADMIN — ACETAMINOPHEN 650 MG: 325 TABLET ORAL at 19:37

## 2024-07-07 RX ADMIN — METOPROLOL SUCCINATE 50 MG: 50 TABLET, EXTENDED RELEASE ORAL at 10:21

## 2024-07-07 RX ADMIN — FOLIC ACID 1 MG: 1 TABLET ORAL at 10:21

## 2024-07-07 ASSESSMENT — PAIN SCALES - GENERAL
PAINLEVEL_OUTOF10: 6
PAINLEVEL_OUTOF10: 10

## 2024-07-07 ASSESSMENT — PAIN DESCRIPTION - DESCRIPTORS: DESCRIPTORS: ACHING

## 2024-07-07 ASSESSMENT — PAIN DESCRIPTION - LOCATION
LOCATION: HEAD
LOCATION: SHOULDER

## 2024-07-07 ASSESSMENT — PAIN - FUNCTIONAL ASSESSMENT: PAIN_FUNCTIONAL_ASSESSMENT: 0-10

## 2024-07-07 ASSESSMENT — PAIN DESCRIPTION - ORIENTATION: ORIENTATION: RIGHT

## 2024-07-07 NOTE — ED NOTES
Contacted clinical house to request PT bed close to the nurses station due to PT intermittent confusion, attempting to climb out of the bed in ED.

## 2024-07-07 NOTE — PROGRESS NOTES
Patient in bed, visitors at bedside. Nursing walked patient to restroom with standby assist. No further complaints at this time.

## 2024-07-07 NOTE — ED NOTES
ED TO INPATIENT SBAR HANDOFF    Patient Name: Elda Flood   : 1945  78 y.o.   Family/Caregiver Present:   Code Status Order: No Order    C-SSRS:    Sitter   Restraints:         Situation  Chief Complaint:   Chief Complaint   Patient presents with    Nausea & Vomiting     Patient given Zofran 4 mg IV by EMS    Diarrhea     X 2     Patient Diagnosis: Hyponatremia [E87.1]     Brief Description of Patient's Condition: PT arrived to ED for N/V/D. PT admitted to hospitalist for NVD and hyponatremia. Received 1 L NS in ED. PT ambulatory, intermittent confusion at baseline. PT arrived from home.    Mental Status: alert and able to concentrate and follow conversation  Arrived from: home    Imaging:   CT ABDOMEN PELVIS W IV CONTRAST Additional Contrast? None   Final Result   Impression: Trace air in the bladder.  The differential diagnosis includes infection.       Stable right adrenal nodule.       Atherosclerotic vascular disease.       Cholecystectomy.       Hysterectomy.       Chronic 10.1 x 3.5 cm subcutaneous fluid collection as described.       Ventral hernia repair.        All CT scans are performed using dose optimization techniques as appropriate to the performed exam and include    at least one of the following: Automated exposure control, adjustment of the mA and/or kV according to size, and the use of iterative reconstruction technique.        ______________________________________    Electronically signed by: BRYNN WARNER M.D.   Date:     2024   Time:    04:25         COVID-19 Results:   Internal Administration   First Dose COVID-19, MODERNA BLUE border, Primary or Immunocompromised, (age 12y+), IM, 100 mcg/0.5mL  2021   Second Dose COVID-19, MODERNA BLUE border, Primary or Immunocompromised, (age 12y+), IM, 100 mcg/0.5mL   2021       Last COVID Lab SARS-CoV-2, NAAT (no units)   Date Value   2024 Not Detected     POC Anion Gap (no units)   Date Value   2019 12     POC

## 2024-07-07 NOTE — ED PROVIDER NOTES
takes less than 2 seconds.      Coloration: Skin is not jaundiced.   Neurological:      General: No focal deficit present.      Mental Status: She is alert and oriented to person, place, and time.   Psychiatric:         Mood and Affect: Mood normal.         DIAGNOSTIC RESULTS     EKG: All EKG's areinterpreted by the Emergency Department Physician who either signs or Co-signs this chart in the absence of a cardiologist.    NSR without ischemic changes    RADIOLOGY:  Non-plain film images such as CT, Ultrasound and MRI are read by the radiologist. Plain radiographic images are visualized and preliminarily interpreted bythe emergency physician with the below findings:      CT ABDOMEN PELVIS W IV CONTRAST Additional Contrast? None   Final Result   Impression: Trace air in the bladder.  The differential diagnosis includes infection.       Stable right adrenal nodule.       Atherosclerotic vascular disease.       Cholecystectomy.       Hysterectomy.       Chronic 10.1 x 3.5 cm subcutaneous fluid collection as described.       Ventral hernia repair.        All CT scans are performed using dose optimization techniques as appropriate to the performed exam and include    at least one of the following: Automated exposure control, adjustment of the mA and/or kV according to size, and the use of iterative reconstruction technique.        ______________________________________    Electronically signed by: BRYNN WARNER M.D.   Date:     07/07/2024   Time:    04:25               LABS:  Labs Reviewed   CBC WITH AUTO DIFFERENTIAL - Abnormal; Notable for the following components:       Result Value    WBC 16.4 (*)     Hemoglobin 11.8 (*)     MCV 79.4 (*)     MCH 24.3 (*)     MCHC 30.6 (*)     MPV 12.6 (*)     Lymphocytes % 11.0 (*)     Monocytes % 24.0 (*)     Neutrophils Absolute 10.5 (*)     Monocytes Absolute 3.90 (*)     Microcytes 1+ (*)     Hypochromia 1+ (*)     All other components within normal limits   COMPREHENSIVE

## 2024-07-07 NOTE — H&P
Hospitalist: History and Physical    Date: 2024 Time: 5:18 AM    Name: Elda Flood : 1945 MRN: [id number]    Code Status: Full Code No additional code details    PCP: NICOLE Solis DO    Patient's Chief Complaint Is: Nausea/Vomiting, Diarrhea    HPI: Patient is a 78 y.o. female with T2DM, prior CVA, chronic hypomagnesemia who presented to [facility] ED with 1 day of nausea, vomiting, and diarrhea. Pt has not felt well in general for the past 1-2 days, then nausea/vomiting and diarrhea began about 24 hours ago. She denies any fevers, chills, abdominal pain, suspect foods, or sick contacts. Diarrhea is described as \"soupy\" with >3 episodes in the past 24 hours. She has not tolerated PO intake. Pt was recently admitted to [facility] about 1 week ago due to hypomagnesemia. Since that time she has been taking 5 magnesium tablets daily. Denies recent ABX use.     CT abdomen was negative for acute findings; showed chronic subcutaneous fluid collection in anterior pelvis that likely represents a seroma. Labs showed Na 127, K 5.4. She was given 1 L NS in the ED.       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    HYSTERECTOMY, TOTAL ABDOMINAL (CERVIX REMOVED)      OVARY REMOVAL Bilateral     age 41    UMBILICAL HERNIA REPAIR N/A 2019    INCISIONAL HERNIA REPAIR WITH BIOLOGIC MESH performed by Bebo Guevara MD at [facility] OR    UPPER GASTROINTESTINAL ENDOSCOPY  1985     Family

## 2024-07-08 LAB
ANION GAP SERPL CALCULATED.3IONS-SCNC: 12 MMOL/L (ref 7–19)
BASOPHILS # BLD: 0 K/UL (ref 0–0.2)
BASOPHILS NFR BLD: 0 % (ref 0–1)
BUN SERPL-MCNC: 9 MG/DL (ref 8–23)
CALCIUM SERPL-MCNC: 8.9 MG/DL (ref 8.8–10.2)
CHLORIDE SERPL-SCNC: 94 MMOL/L (ref 98–111)
CO2 SERPL-SCNC: 20 MMOL/L (ref 22–29)
CREAT SERPL-MCNC: 0.6 MG/DL (ref 0.5–0.9)
EOSINOPHIL # BLD: 0 K/UL (ref 0–0.6)
EOSINOPHIL NFR BLD: 0 % (ref 0–5)
ERYTHROCYTE [DISTWIDTH] IN BLOOD BY AUTOMATED COUNT: 14.1 % (ref 11.5–14.5)
GLUCOSE BLD-MCNC: 145 MG/DL (ref 70–99)
GLUCOSE BLD-MCNC: 196 MG/DL (ref 70–99)
GLUCOSE BLD-MCNC: 205 MG/DL (ref 70–99)
GLUCOSE BLD-MCNC: 208 MG/DL (ref 70–99)
GLUCOSE SERPL-MCNC: 167 MG/DL (ref 74–109)
HCT VFR BLD AUTO: 33.5 % (ref 37–47)
HGB BLD-MCNC: 10.4 G/DL (ref 12–16)
IMM GRANULOCYTES # BLD: 0.2 K/UL
LYMPHOCYTES # BLD: 4.2 K/UL (ref 1.1–4.5)
LYMPHOCYTES NFR BLD: 29 % (ref 20–40)
MCH RBC QN AUTO: 24.1 PG (ref 27–31)
MCHC RBC AUTO-ENTMCNC: 31 G/DL (ref 33–37)
MCV RBC AUTO: 77.5 FL (ref 81–99)
MICROCYTES BLD QL SMEAR: ABNORMAL
MONOCYTES # BLD: 1.4 K/UL (ref 0–0.9)
MONOCYTES NFR BLD: 10 % (ref 0–10)
NEUTROPHILS # BLD: 8.8 K/UL (ref 1.5–7.5)
NEUTS SEG NFR BLD: 61 % (ref 50–65)
PERFORMED ON: ABNORMAL
PLATELET # BLD AUTO: 235 K/UL (ref 130–400)
PLATELET SLIDE REVIEW: ADEQUATE
PMV BLD AUTO: 11.9 FL (ref 9.4–12.3)
POTASSIUM SERPL-SCNC: 4.9 MMOL/L (ref 3.5–5)
RBC # BLD AUTO: 4.32 M/UL (ref 4.2–5.4)
SODIUM SERPL-SCNC: 126 MMOL/L (ref 136–145)
WBC # BLD AUTO: 14.4 K/UL (ref 4.8–10.8)

## 2024-07-08 PROCEDURE — 80048 BASIC METABOLIC PNL TOTAL CA: CPT

## 2024-07-08 PROCEDURE — 94760 N-INVAS EAR/PLS OXIMETRY 1: CPT

## 2024-07-08 PROCEDURE — 6370000000 HC RX 637 (ALT 250 FOR IP): Performed by: STUDENT IN AN ORGANIZED HEALTH CARE EDUCATION/TRAINING PROGRAM

## 2024-07-08 PROCEDURE — 82962 GLUCOSE BLOOD TEST: CPT

## 2024-07-08 PROCEDURE — 36415 COLL VENOUS BLD VENIPUNCTURE: CPT

## 2024-07-08 PROCEDURE — 2580000003 HC RX 258: Performed by: STUDENT IN AN ORGANIZED HEALTH CARE EDUCATION/TRAINING PROGRAM

## 2024-07-08 PROCEDURE — 96361 HYDRATE IV INFUSION ADD-ON: CPT

## 2024-07-08 PROCEDURE — 85025 COMPLETE CBC W/AUTO DIFF WBC: CPT

## 2024-07-08 PROCEDURE — 6370000000 HC RX 637 (ALT 250 FOR IP): Performed by: HOSPITALIST

## 2024-07-08 PROCEDURE — G0378 HOSPITAL OBSERVATION PER HR: HCPCS

## 2024-07-08 RX ORDER — SODIUM BICARBONATE 650 MG/1
650 TABLET ORAL 4 TIMES DAILY
Status: DISCONTINUED | OUTPATIENT
Start: 2024-07-08 | End: 2024-07-09 | Stop reason: HOSPADM

## 2024-07-08 RX ORDER — HYDROCODONE BITARTRATE AND ACETAMINOPHEN 7.5; 325 MG/1; MG/1
1 TABLET ORAL ONCE
Status: COMPLETED | OUTPATIENT
Start: 2024-07-08 | End: 2024-07-08

## 2024-07-08 RX ADMIN — LEVETIRACETAM 500 MG: 500 TABLET, FILM COATED ORAL at 19:12

## 2024-07-08 RX ADMIN — SODIUM BICARBONATE 650 MG: 650 TABLET ORAL at 08:12

## 2024-07-08 RX ADMIN — SODIUM BICARBONATE 650 MG: 650 TABLET ORAL at 12:18

## 2024-07-08 RX ADMIN — APIXABAN 5 MG: 5 TABLET, FILM COATED ORAL at 08:05

## 2024-07-08 RX ADMIN — SODIUM BICARBONATE 650 MG: 650 TABLET ORAL at 19:12

## 2024-07-08 RX ADMIN — ACETAMINOPHEN 650 MG: 325 TABLET ORAL at 15:40

## 2024-07-08 RX ADMIN — LEVETIRACETAM 500 MG: 500 TABLET, FILM COATED ORAL at 08:05

## 2024-07-08 RX ADMIN — SODIUM CHLORIDE, PRESERVATIVE FREE 10 ML: 5 INJECTION INTRAVENOUS at 19:12

## 2024-07-08 RX ADMIN — ATORVASTATIN CALCIUM 20 MG: 20 TABLET, FILM COATED ORAL at 19:12

## 2024-07-08 RX ADMIN — HYDROCODONE BITARTRATE AND ACETAMINOPHEN 1 TABLET: 7.5; 325 TABLET ORAL at 00:59

## 2024-07-08 RX ADMIN — AMLODIPINE BESYLATE 5 MG: 5 TABLET ORAL at 08:05

## 2024-07-08 RX ADMIN — SODIUM BICARBONATE 650 MG: 650 TABLET ORAL at 16:41

## 2024-07-08 RX ADMIN — FOLIC ACID 1 MG: 1 TABLET ORAL at 08:05

## 2024-07-08 RX ADMIN — LISINOPRIL 30 MG: 20 TABLET ORAL at 08:05

## 2024-07-08 RX ADMIN — METOPROLOL SUCCINATE 50 MG: 50 TABLET, EXTENDED RELEASE ORAL at 08:05

## 2024-07-08 RX ADMIN — APIXABAN 5 MG: 5 TABLET, FILM COATED ORAL at 19:12

## 2024-07-08 RX ADMIN — INSULIN LISPRO 1 UNITS: 100 INJECTION, SOLUTION INTRAVENOUS; SUBCUTANEOUS at 12:18

## 2024-07-08 RX ADMIN — INSULIN LISPRO 1 UNITS: 100 INJECTION, SOLUTION INTRAVENOUS; SUBCUTANEOUS at 16:41

## 2024-07-08 RX ADMIN — ALLOPURINOL 300 MG: 300 TABLET ORAL at 08:05

## 2024-07-08 RX ADMIN — SODIUM CHLORIDE, PRESERVATIVE FREE 10 ML: 5 INJECTION INTRAVENOUS at 08:06

## 2024-07-08 ASSESSMENT — ENCOUNTER SYMPTOMS
DIARRHEA: 0
CONSTIPATION: 0
VOMITING: 0
ABDOMINAL PAIN: 0
WHEEZING: 0
NAUSEA: 0
SINUS PAIN: 0
SHORTNESS OF BREATH: 0
TROUBLE SWALLOWING: 0
ABDOMINAL DISTENTION: 0
COUGH: 0
BLOOD IN STOOL: 0
SORE THROAT: 0

## 2024-07-08 ASSESSMENT — PAIN DESCRIPTION - ORIENTATION: ORIENTATION: RIGHT

## 2024-07-08 ASSESSMENT — PAIN DESCRIPTION - LOCATION: LOCATION: SHOULDER

## 2024-07-08 ASSESSMENT — PAIN SCALES - GENERAL: PAINLEVEL_OUTOF10: 10

## 2024-07-08 NOTE — PROGRESS NOTES
[facility]      [facility] Note    Patient:  Elda Flood  YOB: 1945  Date of Service: 7/8/2024  MRN: [id number]   Acct: [id number]   Primary Care Physician: NICOLE Solis DO  Advance Directive: Full Code  Admit Date: 7/7/2024       Hospital Day: 0    Portions of this note have been copied forward, however, updated to reflect the most current clinical status of this patient.     CHIEF COMPLAINT Nausea/Vomiting/Diarrhea    SUBJECTIVE:  Ms. Flood was resting in bed this morning. Denies nausea, vomiting, or diarrhea today. Denies fever or chills.       CUMULATIVE HOSPITAL COURSE:   The patient is a 78-year-old female with past medical history of A-fib, hypertension, hyperlipidemia, diabetes, prior CVA, chronic hypomagnesemia who presented to [facility] ED on 7/7/2024 for evaluation of 1 day history of nausea, vomiting, and diarrhea.  Stated she had not felt well in general for past 1 to 2 days.  Denied fever, chills, abdominal pain, suspect foods, or sick contacts.  Stated she had not been able to tolerate p.o. intake.  Was recently admitted and discharged from [facility] about a week ago due to hypomagnesemia.  Since that time she has been taking 5 magnesium tablets daily.  Denied recent antibiotic use.  Workup in ED revealed CT abdomen was negative for acute findings; showed chronic subcutaneous fluid collection in anterior pelvis that likely represents a seroma. Labs showed Na 127, K 5.4.  Received 1 L NS in ED.  Patient was admitted to hospital medicine for further evaluation.  Hyponatremia presumably related to hypovolemia secondary to nausea, vomiting and diarrhea. Maintenance IV fluids initiated.  Home Lasix held.        Review of Systems   Constitutional:  Negative for chills, diaphoresis, fatigue and fever.   HENT:  Negative for congestion, ear pain, sinus pain, sore throat and trouble swallowing.    Eyes:  Negative for visual disturbance.   Respiratory:  Negative for

## 2024-07-08 NOTE — PLAN OF CARE
Problem: Pain  Goal: Verbalizes/displays adequate comfort level or baseline comfort level  Outcome: Progressing     Problem: Safety - Adult  Goal: Free from fall injury  Outcome: Progressing     Problem: ABCDS Injury Assessment  Goal: Absence of physical injury  Outcome: Progressing     Problem: Chronic Conditions and Co-morbidities  Goal: Patient's chronic conditions and co-morbidity symptoms are monitored and maintained or improved  Outcome: Progressing     Problem: Skin/Tissue Integrity  Goal: Absence of new skin breakdown  Description: 1.  Monitor for areas of redness and/or skin breakdown  2.  Assess vascular access sites hourly  3.  Every 4-6 hours minimum:  Change oxygen saturation probe site  4.  Every 4-6 hours:  If on nasal continuous positive airway pressure, respiratory therapy assess nares and determine need for appliance change or resting period.  Outcome: Progressing

## 2024-07-08 NOTE — PLAN OF CARE
Problem: Pain  Goal: Verbalizes/displays adequate comfort level or baseline comfort level  7/8/2024 0905 by Trish Resendiz RN  Outcome: Progressing  7/7/2024 2144 by Luke Flores RN  Outcome: Progressing     Problem: Safety - Adult  Goal: Free from fall injury  7/8/2024 0905 by Trish Resendiz RN  Outcome: Progressing  7/7/2024 2144 by Luke Flores RN  Outcome: Progressing     Problem: ABCDS Injury Assessment  Goal: Absence of physical injury  7/8/2024 0905 by Trish Resendiz RN  Outcome: Progressing  7/7/2024 2144 by Luke Flores RN  Outcome: Progressing     Problem: Chronic Conditions and Co-morbidities  Goal: Patient's chronic conditions and co-morbidity symptoms are monitored and maintained or improved  7/8/2024 0905 by Trish Resendiz RN  Outcome: Progressing  7/7/2024 2144 by Luke Flores RN  Outcome: Progressing     Problem: Skin/Tissue Integrity  Goal: Absence of new skin breakdown  Description: 1.  Monitor for areas of redness and/or skin breakdown  2.  Assess vascular access sites hourly  3.  Every 4-6 hours minimum:  Change oxygen saturation probe site  4.  Every 4-6 hours:  If on nasal continuous positive airway pressure, respiratory therapy assess nares and determine need for appliance change or resting period.  7/8/2024 0905 by Trish Resendiz RN  Outcome: Progressing  7/7/2024 2144 by Luke Flores RN  Outcome: Progressing

## 2024-07-09 ENCOUNTER — READMISSION MANAGEMENT (OUTPATIENT)
Dept: CALL CENTER | Facility: HOSPITAL | Age: 79
End: 2024-07-09
Payer: MEDICARE

## 2024-07-09 VITALS
TEMPERATURE: 98 F | DIASTOLIC BLOOD PRESSURE: 66 MMHG | OXYGEN SATURATION: 96 % | BODY MASS INDEX: 26.45 KG/M2 | SYSTOLIC BLOOD PRESSURE: 149 MMHG | WEIGHT: 140 LBS | RESPIRATION RATE: 18 BRPM | HEART RATE: 77 BPM

## 2024-07-09 LAB
ADV 40+41 DNA STL QL NAA+NON-PROBE: NOT DETECTED
ANION GAP SERPL CALCULATED.3IONS-SCNC: 13 MMOL/L (ref 7–19)
BASOPHILS # BLD: 0 K/UL (ref 0–0.2)
BASOPHILS NFR BLD: 0 % (ref 0–1)
BUN SERPL-MCNC: 9 MG/DL (ref 8–23)
C CAYETANENSIS DNA STL QL NAA+NON-PROBE: NOT DETECTED
C COLI+JEJ+UPSA DNA STL QL NAA+NON-PROBE: NOT DETECTED
C DIFF TOX A+B STL QL IA: NORMAL
CALCIUM SERPL-MCNC: 8.1 MG/DL (ref 8.8–10.2)
CHLORIDE SERPL-SCNC: 100 MMOL/L (ref 98–111)
CO2 SERPL-SCNC: 17 MMOL/L (ref 22–29)
CREAT SERPL-MCNC: 0.5 MG/DL (ref 0.5–0.9)
CRYPTOSP DNA STL QL NAA+NON-PROBE: NOT DETECTED
E HISTOLYT DNA STL QL NAA+NON-PROBE: NOT DETECTED
EAEC PAA PLAS AGGR+AATA ST NAA+NON-PRB: NOT DETECTED
EC STX1+STX2 GENES STL QL NAA+NON-PROBE: NOT DETECTED
EOSINOPHIL # BLD: 0 K/UL (ref 0–0.6)
EOSINOPHIL NFR BLD: 0 % (ref 0–5)
EPEC EAE GENE STL QL NAA+NON-PROBE: NOT DETECTED
ERYTHROCYTE [DISTWIDTH] IN BLOOD BY AUTOMATED COUNT: 14.1 % (ref 11.5–14.5)
ETEC LTA+ST1A+ST1B TOX ST NAA+NON-PROBE: NOT DETECTED
G LAMBLIA DNA STL QL NAA+NON-PROBE: NOT DETECTED
GI PATH DNA+RNA PNL STL NAA+NON-PROBE: NOT DETECTED
GLUCOSE BLD-MCNC: 178 MG/DL (ref 70–99)
GLUCOSE BLD-MCNC: 241 MG/DL (ref 70–99)
GLUCOSE SERPL-MCNC: 152 MG/DL (ref 74–109)
HCT VFR BLD AUTO: 36.5 % (ref 37–47)
HGB BLD-MCNC: 11 G/DL (ref 12–16)
IMM GRANULOCYTES # BLD: 0.3 K/UL
LYMPHOCYTES # BLD: 2.5 K/UL (ref 1.1–4.5)
LYMPHOCYTES NFR BLD: 16 % (ref 20–40)
MAGNESIUM SERPL-MCNC: 1.2 MG/DL (ref 1.6–2.4)
MCH RBC QN AUTO: 23.4 PG (ref 27–31)
MCHC RBC AUTO-ENTMCNC: 30.1 G/DL (ref 33–37)
MCV RBC AUTO: 77.5 FL (ref 81–99)
MONOCYTES # BLD: 2.2 K/UL (ref 0–0.9)
MONOCYTES NFR BLD: 14 % (ref 0–10)
NEUTROPHILS # BLD: 10.8 K/UL (ref 1.5–7.5)
NEUTS SEG NFR BLD: 70 % (ref 50–65)
NOROVIRUS GI+II RNA STL QL NAA+NON-PROBE: NOT DETECTED
P SHIGELLOIDES DNA STL QL NAA+NON-PROBE: NOT DETECTED
PERFORMED ON: ABNORMAL
PERFORMED ON: ABNORMAL
PLATELET # BLD AUTO: 283 K/UL (ref 130–400)
PLATELET SLIDE REVIEW: ADEQUATE
PMV BLD AUTO: 12.2 FL (ref 9.4–12.3)
POTASSIUM SERPL-SCNC: 4.3 MMOL/L (ref 3.5–5)
RBC # BLD AUTO: 4.71 M/UL (ref 4.2–5.4)
RVA RNA STL QL NAA+NON-PROBE: NOT DETECTED
S ENT+BONG DNA STL QL NAA+NON-PROBE: NOT DETECTED
SAPO I+II+IV+V RNA STL QL NAA+NON-PROBE: NOT DETECTED
SHIGELLA SP+EIEC IPAH ST NAA+NON-PROBE: NOT DETECTED
SODIUM SERPL-SCNC: 130 MMOL/L (ref 136–145)
V CHOL+PARA+VUL DNA STL QL NAA+NON-PROBE: NOT DETECTED
V CHOLERAE DNA STL QL NAA+NON-PROBE: NOT DETECTED
WBC # BLD AUTO: 15.4 K/UL (ref 4.8–10.8)
Y ENTEROCOL DNA STL QL NAA+NON-PROBE: NOT DETECTED

## 2024-07-09 PROCEDURE — 80048 BASIC METABOLIC PNL TOTAL CA: CPT

## 2024-07-09 PROCEDURE — 1200000000 HC SEMI PRIVATE

## 2024-07-09 PROCEDURE — 96365 THER/PROPH/DIAG IV INF INIT: CPT

## 2024-07-09 PROCEDURE — 85025 COMPLETE CBC W/AUTO DIFF WBC: CPT

## 2024-07-09 PROCEDURE — 87324 CLOSTRIDIUM AG IA: CPT

## 2024-07-09 PROCEDURE — 87507 IADNA-DNA/RNA PROBE TQ 12-25: CPT

## 2024-07-09 PROCEDURE — 2580000003 HC RX 258: Performed by: STUDENT IN AN ORGANIZED HEALTH CARE EDUCATION/TRAINING PROGRAM

## 2024-07-09 PROCEDURE — 83735 ASSAY OF MAGNESIUM: CPT

## 2024-07-09 PROCEDURE — 6370000000 HC RX 637 (ALT 250 FOR IP): Performed by: HOSPITALIST

## 2024-07-09 PROCEDURE — 36415 COLL VENOUS BLD VENIPUNCTURE: CPT

## 2024-07-09 PROCEDURE — 82962 GLUCOSE BLOOD TEST: CPT

## 2024-07-09 PROCEDURE — 96361 HYDRATE IV INFUSION ADD-ON: CPT

## 2024-07-09 PROCEDURE — G0378 HOSPITAL OBSERVATION PER HR: HCPCS

## 2024-07-09 PROCEDURE — 87449 NOS EACH ORGANISM AG IA: CPT

## 2024-07-09 PROCEDURE — 94760 N-INVAS EAR/PLS OXIMETRY 1: CPT

## 2024-07-09 PROCEDURE — 6370000000 HC RX 637 (ALT 250 FOR IP): Performed by: STUDENT IN AN ORGANIZED HEALTH CARE EDUCATION/TRAINING PROGRAM

## 2024-07-09 PROCEDURE — 6360000002 HC RX W HCPCS: Performed by: HOSPITALIST

## 2024-07-09 RX ORDER — MAGNESIUM SULFATE IN WATER 40 MG/ML
4000 INJECTION, SOLUTION INTRAVENOUS ONCE
Status: COMPLETED | OUTPATIENT
Start: 2024-07-09 | End: 2024-07-09

## 2024-07-09 RX ADMIN — LEVETIRACETAM 500 MG: 500 TABLET, FILM COATED ORAL at 07:54

## 2024-07-09 RX ADMIN — ALLOPURINOL 300 MG: 300 TABLET ORAL at 07:54

## 2024-07-09 RX ADMIN — APIXABAN 5 MG: 5 TABLET, FILM COATED ORAL at 07:54

## 2024-07-09 RX ADMIN — MAGNESIUM SULFATE HEPTAHYDRATE 4000 MG: 40 INJECTION, SOLUTION INTRAVENOUS at 10:25

## 2024-07-09 RX ADMIN — INSULIN LISPRO 1 UNITS: 100 INJECTION, SOLUTION INTRAVENOUS; SUBCUTANEOUS at 11:47

## 2024-07-09 RX ADMIN — LISINOPRIL 30 MG: 20 TABLET ORAL at 07:54

## 2024-07-09 RX ADMIN — ACETAMINOPHEN 650 MG: 325 TABLET ORAL at 01:44

## 2024-07-09 RX ADMIN — METOPROLOL SUCCINATE 50 MG: 50 TABLET, EXTENDED RELEASE ORAL at 07:54

## 2024-07-09 RX ADMIN — FOLIC ACID 1 MG: 1 TABLET ORAL at 07:54

## 2024-07-09 RX ADMIN — SODIUM CHLORIDE, PRESERVATIVE FREE 10 ML: 5 INJECTION INTRAVENOUS at 07:56

## 2024-07-09 RX ADMIN — AMLODIPINE BESYLATE 5 MG: 5 TABLET ORAL at 07:54

## 2024-07-09 RX ADMIN — SODIUM BICARBONATE 650 MG: 650 TABLET ORAL at 07:54

## 2024-07-09 RX ADMIN — SODIUM BICARBONATE 650 MG: 650 TABLET ORAL at 11:47

## 2024-07-09 NOTE — PLAN OF CARE
Problem: Pain  Goal: Verbalizes/displays adequate comfort level or baseline comfort level  Outcome: Progressing     Problem: Safety - Adult  Goal: Free from fall injury  Outcome: Progressing  Flowsheets (Taken 7/9/2024 0800)  Free From Fall Injury: Instruct family/caregiver on patient safety     Problem: ABCDS Injury Assessment  Goal: Absence of physical injury  Outcome: Progressing  Flowsheets (Taken 7/9/2024 0800)  Absence of Physical Injury: Implement safety measures based on patient assessment     Problem: Chronic Conditions and Co-morbidities  Goal: Patient's chronic conditions and co-morbidity symptoms are monitored and maintained or improved  Outcome: Progressing     Problem: Skin/Tissue Integrity  Goal: Absence of new skin breakdown  Description: 1.  Monitor for areas of redness and/or skin breakdown  2.  Assess vascular access sites hourly  3.  Every 4-6 hours minimum:  Change oxygen saturation probe site  4.  Every 4-6 hours:  If on nasal continuous positive airway pressure, respiratory therapy assess nares and determine need for appliance change or resting period.  Outcome: Progressing

## 2024-07-09 NOTE — DISCHARGE SUMMARY
evaluation, counseling as well as medication reconciliation, prescriptions for required medications, discharge plan and follow up.      Surgeries/Procedures Performed:  NONE       Significant Diagnostic Studies:   Recent Labs:  CBC:   Lab Results   Component Value Date/Time    WBC 15.4 07/09/2024 12:25 AM    RBC 4.71 07/09/2024 12:25 AM    HGB 11.0 07/09/2024 12:25 AM    HCT 36.5 07/09/2024 12:25 AM    MCV 77.5 07/09/2024 12:25 AM    MCH 23.4 07/09/2024 12:25 AM    MCHC 30.1 07/09/2024 12:25 AM    RDW 14.1 07/09/2024 12:25 AM     07/09/2024 12:25 AM     BMP:    Lab Results   Component Value Date/Time    GLUCOSE 152 07/09/2024 12:25 AM     07/09/2024 12:25 AM    K 4.3 07/09/2024 12:25 AM     07/09/2024 12:25 AM    CO2 17 07/09/2024 12:25 AM    ANIONGAP 13 07/09/2024 12:25 AM    BUN 9 07/09/2024 12:25 AM    CREATININE 0.5 07/09/2024 12:25 AM    CALCIUM 8.1 07/09/2024 12:25 AM    LABGLOM >90 07/09/2024 12:25 AM    GFRAA >59 08/26/2022 01:36 PM       Radiology Last 7 Days:  CT ABDOMEN PELVIS W IV CONTRAST Additional Contrast? None    Result Date: 7/7/2024  Impression: Trace air in the bladder.  The differential diagnosis includes infection.  Stable right adrenal nodule.  Atherosclerotic vascular disease.  Cholecystectomy.  Hysterectomy.  Chronic 10.1 x 3.5 cm subcutaneous fluid collection as described.  Ventral hernia repair.  All CT scans are performed using dose optimization techniques as appropriate to the performed exam and include at least one of the following: Automated exposure control, adjustment of the mA and/or kV according to size, and the use of iterative reconstruction technique.  ______________________________________ Electronically signed by: BRYNN WARNER M.D. Date:     07/07/2024 Time:    04:25       Discharge Plan   Disposition: Home    Provider Follow-Up:   NICOLE Solis DO  [address] [facility] [address] [phone]    Go on 7/16/2024  at  9:30 am       Patient

## 2024-07-09 NOTE — PLAN OF CARE
Problem: Pain  Goal: Verbalizes/displays adequate comfort level or baseline comfort level  7/8/2024 2013 by Luke Flores RN  Outcome: Progressing  7/8/2024 0905 by Trish Resendiz RN  Outcome: Progressing     Problem: Safety - Adult  Goal: Free from fall injury  7/8/2024 2013 by Luke Flores RN  Outcome: Progressing  Flowsheets (Taken 7/8/2024 0906 by Trish Resendiz RN)  Free From Fall Injury: Instruct family/caregiver on patient safety  7/8/2024 0905 by Trish Resendiz RN  Outcome: Progressing     Problem: ABCDS Injury Assessment  Goal: Absence of physical injury  7/8/2024 2013 by Luke Flores RN  Outcome: Progressing  Flowsheets (Taken 7/8/2024 0906 by Trish Resendiz RN)  Absence of Physical Injury: Implement safety measures based on patient assessment  7/8/2024 0905 by Trish Resendiz RN  Outcome: Progressing     Problem: Chronic Conditions and Co-morbidities  Goal: Patient's chronic conditions and co-morbidity symptoms are monitored and maintained or improved  7/8/2024 2013 by Luke Flores RN  Outcome: Progressing  7/8/2024 0905 by Trish Resendiz RN  Outcome: Progressing     Problem: Skin/Tissue Integrity  Goal: Absence of new skin breakdown  Description: 1.  Monitor for areas of redness and/or skin breakdown  2.  Assess vascular access sites hourly  3.  Every 4-6 hours minimum:  Change oxygen saturation probe site  4.  Every 4-6 hours:  If on nasal continuous positive airway pressure, respiratory therapy assess nares and determine need for appliance change or resting period.  7/8/2024 2013 by Luke Flores RN  Outcome: Progressing  7/8/2024 0905 by Trish Resendiz RN  Outcome: Progressing      Acute deep vein thrombosis (DVT) of right lower extremity, unspecified vein

## 2024-07-09 NOTE — OUTREACH NOTE
Prep Survey      Flowsheet Row Responses   [facility] facility patient discharged from? Non-BH   Is LACE score < 7 ? Non-BH Discharge   Eligibility Not Eligible   What are the reasons patient is not eligible? Other  [No PCP visit]   Does the patient have one of the following disease processes/diagnoses(primary or secondary)? Other   Prep survey completed? Yes            BLAYNE SILVA - Registered Nurse

## 2024-07-16 ENCOUNTER — OFFICE VISIT (OUTPATIENT)
Dept: FAMILY MEDICINE CLINIC | Age: 79
End: 2024-07-16

## 2024-07-16 VITALS
OXYGEN SATURATION: 96 % | BODY MASS INDEX: 28.04 KG/M2 | HEART RATE: 68 BPM | WEIGHT: 148.5 LBS | HEIGHT: 61 IN | TEMPERATURE: 96.9 F | DIASTOLIC BLOOD PRESSURE: 58 MMHG | SYSTOLIC BLOOD PRESSURE: 122 MMHG

## 2024-07-16 DIAGNOSIS — M54.16 LUMBAR BACK PAIN WITH RADICULOPATHY AFFECTING LEFT LOWER EXTREMITY: ICD-10-CM

## 2024-07-16 DIAGNOSIS — R42 DIZZINESS: ICD-10-CM

## 2024-07-16 DIAGNOSIS — E83.42 HYPOMAGNESEMIA: ICD-10-CM

## 2024-07-16 DIAGNOSIS — E87.1 HYPONATREMIA: ICD-10-CM

## 2024-07-16 DIAGNOSIS — M25.511 CHRONIC RIGHT SHOULDER PAIN: ICD-10-CM

## 2024-07-16 DIAGNOSIS — Z09 HOSPITAL DISCHARGE FOLLOW-UP: Primary | ICD-10-CM

## 2024-07-16 DIAGNOSIS — E87.5 HYPERKALEMIA: ICD-10-CM

## 2024-07-16 DIAGNOSIS — R53.83 FATIGUE, UNSPECIFIED TYPE: ICD-10-CM

## 2024-07-16 DIAGNOSIS — G89.29 CHRONIC RIGHT SHOULDER PAIN: ICD-10-CM

## 2024-07-16 LAB
ALBUMIN SERPL-MCNC: 4.6 G/DL (ref 3.5–5.2)
ALP SERPL-CCNC: 117 U/L (ref 35–104)
ALT SERPL-CCNC: 9 U/L (ref 5–33)
ANION GAP SERPL CALCULATED.3IONS-SCNC: 18 MMOL/L (ref 7–19)
ANISOCYTOSIS BLD QL SMEAR: ABNORMAL
AST SERPL-CCNC: 16 U/L (ref 5–32)
BASOPHILS # BLD: 0 K/UL (ref 0–0.2)
BASOPHILS NFR BLD: 0 % (ref 0–1)
BILIRUB SERPL-MCNC: 0.3 MG/DL (ref 0.2–1.2)
BUN SERPL-MCNC: 18 MG/DL (ref 8–23)
CALCIUM SERPL-MCNC: 10.2 MG/DL (ref 8.8–10.2)
CHLORIDE SERPL-SCNC: 93 MMOL/L (ref 98–111)
CO2 SERPL-SCNC: 19 MMOL/L (ref 22–29)
CREAT SERPL-MCNC: 0.8 MG/DL (ref 0.5–0.9)
DACRYOCYTES BLD QL SMEAR: ABNORMAL
EOSINOPHIL # BLD: 0 K/UL (ref 0–0.6)
EOSINOPHIL NFR BLD: 0 % (ref 0–5)
ERYTHROCYTE [DISTWIDTH] IN BLOOD BY AUTOMATED COUNT: 14.6 % (ref 11.5–14.5)
GLUCOSE SERPL-MCNC: 169 MG/DL (ref 74–109)
HCT VFR BLD AUTO: 43.8 % (ref 37–47)
HGB BLD-MCNC: 12.9 G/DL (ref 12–16)
HYPOCHROMIA BLD QL SMEAR: ABNORMAL
IMM GRANULOCYTES # BLD: 0.3 K/UL
LYMPHOCYTES # BLD: 4.6 K/UL (ref 1.1–4.5)
LYMPHOCYTES NFR BLD: 28 % (ref 20–40)
MAGNESIUM SERPL-MCNC: 2 MG/DL (ref 1.6–2.4)
MCH RBC QN AUTO: 24.3 PG (ref 27–31)
MCHC RBC AUTO-ENTMCNC: 29.5 G/DL (ref 33–37)
MCV RBC AUTO: 82.5 FL (ref 81–99)
MONOCYTES # BLD: 2.1 K/UL (ref 0–0.9)
MONOCYTES NFR BLD: 13 % (ref 0–10)
NEUTROPHILS # BLD: 9.7 K/UL (ref 1.5–7.5)
NEUTS SEG NFR BLD: 59 % (ref 50–65)
OVALOCYTES BLD QL SMEAR: ABNORMAL
PLATELET # BLD AUTO: 330 K/UL (ref 130–400)
PLATELET SLIDE REVIEW: ADEQUATE
PMV BLD AUTO: 12.8 FL (ref 9.4–12.3)
POTASSIUM SERPL-SCNC: 5.4 MMOL/L (ref 3.5–5)
PROT SERPL-MCNC: 7.2 G/DL (ref 6.6–8.7)
RBC # BLD AUTO: 5.31 M/UL (ref 4.2–5.4)
SODIUM SERPL-SCNC: 130 MMOL/L (ref 136–145)
T4 FREE SERPL-MCNC: 1.47 NG/DL (ref 0.93–1.7)
TSH SERPL DL<=0.005 MIU/L-ACNC: 4.02 UIU/ML (ref 0.27–4.2)
WBC # BLD AUTO: 16.5 K/UL (ref 4.8–10.8)

## 2024-07-16 RX ORDER — HYDROCODONE BITARTRATE AND ACETAMINOPHEN 7.5; 325 MG/1; MG/1
1 TABLET ORAL EVERY 6 HOURS PRN
Qty: 120 TABLET | Refills: 0 | Status: SHIPPED | OUTPATIENT
Start: 2024-07-16 | End: 2024-08-15

## 2024-07-16 SDOH — ECONOMIC STABILITY: INCOME INSECURITY: HOW HARD IS IT FOR YOU TO PAY FOR THE VERY BASICS LIKE FOOD, HOUSING, MEDICAL CARE, AND HEATING?: NOT VERY HARD

## 2024-07-16 SDOH — ECONOMIC STABILITY: FOOD INSECURITY: WITHIN THE PAST 12 MONTHS, THE FOOD YOU BOUGHT JUST DIDN'T LAST AND YOU DIDN'T HAVE MONEY TO GET MORE.: NEVER TRUE

## 2024-07-16 SDOH — ECONOMIC STABILITY: FOOD INSECURITY: WITHIN THE PAST 12 MONTHS, YOU WORRIED THAT YOUR FOOD WOULD RUN OUT BEFORE YOU GOT MONEY TO BUY MORE.: NEVER TRUE

## 2024-07-16 ASSESSMENT — PATIENT HEALTH QUESTIONNAIRE - PHQ9
2. FEELING DOWN, DEPRESSED OR HOPELESS: NOT AT ALL
SUM OF ALL RESPONSES TO PHQ QUESTIONS 1-9: 0
SUM OF ALL RESPONSES TO PHQ9 QUESTIONS 1 & 2: 0
SUM OF ALL RESPONSES TO PHQ QUESTIONS 1-9: 0
1. LITTLE INTEREST OR PLEASURE IN DOING THINGS: NOT AT ALL

## 2024-07-16 ASSESSMENT — ENCOUNTER SYMPTOMS
VOMITING: 0
DIARRHEA: 0
WHEEZING: 0
ABDOMINAL PAIN: 0
NAUSEA: 0
SORE THROAT: 0
SHORTNESS OF BREATH: 0
COUGH: 0

## 2024-07-16 NOTE — PROGRESS NOTES
07/07/2024 1     Microcytes 07/07/2024 1+ (A)     Hypochromia 07/07/2024 1+ (A)     Sodium 07/07/2024 127 (L)     Potassium 07/07/2024 5.4 (H)     Chloride 07/07/2024 92 (L)     CO2 07/07/2024 24     Anion Gap 07/07/2024 11     Glucose 07/07/2024 184 (H)     BUN 07/07/2024 16     Creatinine 07/07/2024 0.7     Est, Glom Filt Rate 07/07/2024 89     Calcium 07/07/2024 9.4     Total Protein 07/07/2024 6.6     Albumin 07/07/2024 4.0     Total Bilirubin 07/07/2024 0.2     Alkaline Phosphatase 07/07/2024 108 (H)     ALT 07/07/2024 10     AST 07/07/2024 13     Lipase 07/07/2024 41     Color, UA 07/07/2024 YELLOW     Clarity, UA 07/07/2024 Clear     Glucose, Ur 07/07/2024 Negative     Bilirubin, Urine 07/07/2024 Negative     Ketones, Urine 07/07/2024 Negative     Specific Gravity, UA 07/07/2024 1.012     Blood, Urine 07/07/2024 Negative     pH, Urine 07/07/2024 >=9.0 (A)     Protein, UA 07/07/2024 30 (A)     Urobilinogen, Urine 07/07/2024 0.2     Nitrite, Urine 07/07/2024 Negative     Leukocyte Esterase, Urine 07/07/2024 Negative     SARS-CoV-2, NAAT 07/07/2024 Not Detected     Magnesium 07/07/2024 1.7     Bacteria, UA 07/07/2024 Negative     Crystals, UA 07/07/2024 NEG     Hyaline Casts, UA 07/07/2024 0     WBC, UA 07/07/2024 0     RBC, UA 07/07/2024 0     Epithelial Cells, UA 07/07/2024 1     Sodium, Ur 07/07/2024 128.0     Osmolality, Ur 07/07/2024 434     Adenovirus F 40 41 PCR 07/09/2024 Not Detected     Astrovirus PCR 07/09/2024 Not Detected     Campylobacter PCR 07/09/2024 Not Detected     Cryptosporidium PCR 07/09/2024 Not Detected     Cyclospora Cayetanensis * 07/09/2024 Not Detected     Entamoeba Histolytica PCR 07/09/2024 Not Detected     E Coli Enteroaggregative* 07/09/2024 Not Detected     E Coli Enteropathogenic * 07/09/2024 Not Detected     E Coli Enterotoxigenic P* 07/09/2024 Not Detected     Giardia Lamblia PCR 07/09/2024 Not Detected     Norovirus GI GII PCR 07/09/2024 Not Detected     Plesiomonas

## 2024-07-18 ENCOUNTER — TELEPHONE (OUTPATIENT)
Dept: FAMILY MEDICINE CLINIC | Age: 79
End: 2024-07-18

## 2024-07-18 NOTE — TELEPHONE ENCOUNTER
----- Message from NICOLE Solis DO sent at 7/17/2024  6:57 PM CDT -----  CBC is does show a fairly consistent mild elevation of the white blood cell count.  This has been stable over time.  CBC is otherwise relatively unremarkable.    Sodium is still mildly low and potassium is on the higher side of normal.  There is also a slight acidosis.  This is only suggestive of decreased aldosterone.  I would like you to cut your lisinopril in half but follow blood pressure.  Magnesium is now solidly in the normal range at 2.0.  Thyroid is normal

## 2024-07-30 NOTE — PROGRESS NOTES
Elda Flood (:  1945) is a 79 y.o. female,Established patient, here for evaluation of the following chief complaint(s):  Follow-Up from Hospital (Magnesium and sodium low. Vomiting. Was seen at [facility] .)      ASSESSMENT/PLAN:    ICD-10-CM    1. Hyponatremia  E87.1 Comprehensive Metabolic Panel     Magnesium    Repeating labs to reassess sodium and magnesium level.       2. Hypomagnesemia  E83.42 Comprehensive Metabolic Panel     Magnesium      3. Confusion  R41.0 Improved.       4. Primary hypertension  I10 Increasing lisinopril back to full tablet 30mg daily due to report BP is running high and high in office.       5. Type 2 diabetes mellitus with hyperglycemia, without long-term current use of insulin (HCC)  E11.65 SITagliptin (JANUVIA) 100 MG tablet     colesevelam (WELCHOL) 625 MG tablet    Continuing meds started during hospitalization. I do think januvia is a better option than the metformin given her diarrhea spells which have resolved.          Return if symptoms worsen or fail to improve.    SUBJECTIVE/OBJECTIVE:  HPI  Visit is for a hospital follow-up    Patient was admitted on 2024 at [facility] due to confusion. She was discharged on 2024  Patient went to the hospital with complaints of fatigue, bad headache, weakness and not feeling well for the last week.  She was found to have hyponatremia, hypomagnesemia.  She was admitted at [facility] on 2024 with nausea vomiting and diarrhea.  She had follow-up here in the office with Dr. Solis on 2024.  Metformin was discontinued and patient was sent home on Januvia 100 mg daily and WelChol 3 pills twice daily with meals for diarrhea with appt with GI scheduled for next month. 2024    Review of [facility] record, magnesium 1.5, WBC 16.7, hemoglobin 13.2, hematocrit 41.6, platelets 274, glucose 234, BUN 10, creatinine 1, sodium 124, potassium 4.4.  UDS was positive for

## 2024-07-31 ENCOUNTER — OFFICE VISIT (OUTPATIENT)
Dept: FAMILY MEDICINE CLINIC | Age: 79
End: 2024-07-31
Payer: MEDICARE

## 2024-07-31 VITALS
BODY MASS INDEX: 28.72 KG/M2 | OXYGEN SATURATION: 98 % | DIASTOLIC BLOOD PRESSURE: 82 MMHG | SYSTOLIC BLOOD PRESSURE: 166 MMHG | TEMPERATURE: 97.1 F | WEIGHT: 152 LBS | HEART RATE: 76 BPM

## 2024-07-31 DIAGNOSIS — E11.65 TYPE 2 DIABETES MELLITUS WITH HYPERGLYCEMIA, WITHOUT LONG-TERM CURRENT USE OF INSULIN (HCC): ICD-10-CM

## 2024-07-31 DIAGNOSIS — E87.1 HYPONATREMIA: ICD-10-CM

## 2024-07-31 DIAGNOSIS — E87.1 HYPONATREMIA: Primary | ICD-10-CM

## 2024-07-31 DIAGNOSIS — I48.0 PAROXYSMAL ATRIAL FIBRILLATION (HCC): ICD-10-CM

## 2024-07-31 DIAGNOSIS — R41.0 CONFUSION: ICD-10-CM

## 2024-07-31 DIAGNOSIS — E83.42 HYPOMAGNESEMIA: ICD-10-CM

## 2024-07-31 DIAGNOSIS — I10 PRIMARY HYPERTENSION: ICD-10-CM

## 2024-07-31 LAB
ALBUMIN SERPL-MCNC: 4.2 G/DL (ref 3.5–5.2)
ALP SERPL-CCNC: 105 U/L (ref 35–104)
ALT SERPL-CCNC: 6 U/L (ref 5–33)
ANION GAP SERPL CALCULATED.3IONS-SCNC: 13 MMOL/L (ref 7–19)
AST SERPL-CCNC: 11 U/L (ref 5–32)
BILIRUB SERPL-MCNC: 0.3 MG/DL (ref 0.2–1.2)
BUN SERPL-MCNC: 15 MG/DL (ref 8–23)
CALCIUM SERPL-MCNC: 10 MG/DL (ref 8.8–10.2)
CHLORIDE SERPL-SCNC: 93 MMOL/L (ref 98–111)
CO2 SERPL-SCNC: 22 MMOL/L (ref 22–29)
CREAT SERPL-MCNC: 0.7 MG/DL (ref 0.5–0.9)
GLUCOSE SERPL-MCNC: 188 MG/DL (ref 74–109)
MAGNESIUM SERPL-MCNC: 2 MG/DL (ref 1.6–2.4)
POTASSIUM SERPL-SCNC: 4.9 MMOL/L (ref 3.5–5)
PROT SERPL-MCNC: 6.8 G/DL (ref 6.6–8.7)
SODIUM SERPL-SCNC: 128 MMOL/L (ref 136–145)

## 2024-07-31 PROCEDURE — 99214 OFFICE O/P EST MOD 30 MIN: CPT | Performed by: NURSE PRACTITIONER

## 2024-07-31 PROCEDURE — 3077F SYST BP >= 140 MM HG: CPT | Performed by: NURSE PRACTITIONER

## 2024-07-31 PROCEDURE — 1123F ACP DISCUSS/DSCN MKR DOCD: CPT | Performed by: NURSE PRACTITIONER

## 2024-07-31 PROCEDURE — 3079F DIAST BP 80-89 MM HG: CPT | Performed by: NURSE PRACTITIONER

## 2024-07-31 PROCEDURE — 3051F HG A1C>EQUAL 7.0%<8.0%: CPT | Performed by: NURSE PRACTITIONER

## 2024-07-31 RX ORDER — COLESEVELAM 180 1/1
1875 TABLET ORAL 2 TIMES DAILY WITH MEALS
Qty: 180 TABLET | Refills: 11 | Status: SHIPPED | OUTPATIENT
Start: 2024-07-31

## 2024-07-31 RX ORDER — APIXABAN 5 MG/1
5 TABLET, FILM COATED ORAL 2 TIMES DAILY
Qty: 60 TABLET | Refills: 5 | Status: SHIPPED | OUTPATIENT
Start: 2024-07-31

## 2024-07-31 RX ORDER — COLESEVELAM 180 1/1
1875 TABLET ORAL 2 TIMES DAILY WITH MEALS
COMMUNITY
End: 2024-07-31 | Stop reason: SDUPTHER

## 2024-07-31 ASSESSMENT — ENCOUNTER SYMPTOMS
SHORTNESS OF BREATH: 0
SINUS PRESSURE: 0
RHINORRHEA: 0
VOMITING: 0
NAUSEA: 0
DIARRHEA: 0
SORE THROAT: 0
ABDOMINAL PAIN: 0
CONSTIPATION: 0
COUGH: 0
TROUBLE SWALLOWING: 0

## 2024-07-31 NOTE — PATIENT INSTRUCTIONS
Increase lisinopril back to full tablet (30mg daily)    Check BP 2-3 times per week.  Keep a log and bring to your next appointment.    Check sugars every morning and write down and bring log to follow up visit.     Keep follow up with Dr Solis on 08/06/2024

## 2024-08-01 ENCOUNTER — TELEPHONE (OUTPATIENT)
Dept: FAMILY MEDICINE CLINIC | Age: 79
End: 2024-08-01

## 2024-08-01 RX ORDER — AMLODIPINE BESYLATE 5 MG/1
5 TABLET ORAL DAILY
Qty: 30 TABLET | Refills: 11 | Status: SHIPPED
Start: 2024-08-01

## 2024-08-01 NOTE — TELEPHONE ENCOUNTER
Called patient, verified name and date of birth, and gave results in detail as per provider notes below. Patient verbalized understanding and had no further questions.  Adjusted Sig on Amlodipine to 2 tablets daily as per provider recommendations.

## 2024-08-01 NOTE — TELEPHONE ENCOUNTER
----- Message from LAYLA Lopez sent at 8/1/2024  7:34 AM CDT -----  Please call patient and let them know results.   Sodium remains low.  Recommend decreasing lisinopril back to 15 mg or half a tablet daily and increase amlodipine to 10 mg daily to control blood pressure.   also decrease fluid intake (limit fluids to 1L/day)   Normal magnesium

## 2024-08-06 ENCOUNTER — OFFICE VISIT (OUTPATIENT)
Dept: FAMILY MEDICINE CLINIC | Age: 79
End: 2024-08-06
Payer: MEDICARE

## 2024-08-06 VITALS
OXYGEN SATURATION: 98 % | HEIGHT: 61 IN | WEIGHT: 148.75 LBS | SYSTOLIC BLOOD PRESSURE: 126 MMHG | DIASTOLIC BLOOD PRESSURE: 60 MMHG | HEART RATE: 80 BPM | BODY MASS INDEX: 28.08 KG/M2 | TEMPERATURE: 98.9 F

## 2024-08-06 DIAGNOSIS — G89.29 CHRONIC RIGHT SHOULDER PAIN: ICD-10-CM

## 2024-08-06 DIAGNOSIS — J06.9 VIRAL URI: ICD-10-CM

## 2024-08-06 DIAGNOSIS — M54.16 LUMBAR BACK PAIN WITH RADICULOPATHY AFFECTING LEFT LOWER EXTREMITY: Primary | ICD-10-CM

## 2024-08-06 DIAGNOSIS — E83.42 HYPOMAGNESEMIA: ICD-10-CM

## 2024-08-06 DIAGNOSIS — M25.511 CHRONIC RIGHT SHOULDER PAIN: ICD-10-CM

## 2024-08-06 PROCEDURE — 99214 OFFICE O/P EST MOD 30 MIN: CPT | Performed by: PEDIATRICS

## 2024-08-06 PROCEDURE — 1123F ACP DISCUSS/DSCN MKR DOCD: CPT | Performed by: PEDIATRICS

## 2024-08-06 PROCEDURE — 3074F SYST BP LT 130 MM HG: CPT | Performed by: PEDIATRICS

## 2024-08-06 PROCEDURE — 3078F DIAST BP <80 MM HG: CPT | Performed by: PEDIATRICS

## 2024-08-06 RX ORDER — HYDROCODONE BITARTRATE AND ACETAMINOPHEN 7.5; 325 MG/1; MG/1
1 TABLET ORAL EVERY 6 HOURS PRN
Qty: 120 TABLET | Refills: 0 | Status: SHIPPED | OUTPATIENT
Start: 2024-08-06 | End: 2024-09-05

## 2024-08-06 SDOH — ECONOMIC STABILITY: FOOD INSECURITY: WITHIN THE PAST 12 MONTHS, THE FOOD YOU BOUGHT JUST DIDN'T LAST AND YOU DIDN'T HAVE MONEY TO GET MORE.: NEVER TRUE

## 2024-08-06 SDOH — ECONOMIC STABILITY: INCOME INSECURITY: HOW HARD IS IT FOR YOU TO PAY FOR THE VERY BASICS LIKE FOOD, HOUSING, MEDICAL CARE, AND HEATING?: NOT VERY HARD

## 2024-08-06 SDOH — ECONOMIC STABILITY: FOOD INSECURITY: WITHIN THE PAST 12 MONTHS, YOU WORRIED THAT YOUR FOOD WOULD RUN OUT BEFORE YOU GOT MONEY TO BUY MORE.: NEVER TRUE

## 2024-08-06 ASSESSMENT — ENCOUNTER SYMPTOMS
VOMITING: 0
DIARRHEA: 0
SORE THROAT: 0
COUGH: 0
ABDOMINAL PAIN: 0
NAUSEA: 0
SHORTNESS OF BREATH: 0
WHEEZING: 0

## 2024-08-06 ASSESSMENT — PATIENT HEALTH QUESTIONNAIRE - PHQ9
SUM OF ALL RESPONSES TO PHQ QUESTIONS 1-9: 0
SUM OF ALL RESPONSES TO PHQ QUESTIONS 1-9: 0
1. LITTLE INTEREST OR PLEASURE IN DOING THINGS: NOT AT ALL
SUM OF ALL RESPONSES TO PHQ QUESTIONS 1-9: 0
SUM OF ALL RESPONSES TO PHQ QUESTIONS 1-9: 0
2. FEELING DOWN, DEPRESSED OR HOPELESS: NOT AT ALL
SUM OF ALL RESPONSES TO PHQ9 QUESTIONS 1 & 2: 0

## 2024-08-06 NOTE — PROGRESS NOTES
[address] [place] [address]  Phone [phone]   Fax [phone]      OFFICE VISIT: 2024    Elda Flood-: 1945      HPI  Reason For Visit:  Elda is a 79 y.o.     Follow-up (Has a sore throat and cold s/s over the weekend but feeling somewhat better today)    Patient presents with complaints of with stuffy nose, nasal drainage and sore throat.  This was present several days ago but is now getting better.      She also presents on follow-up for medication refill.  She is needing a refill of her hydrocodone.   chronic sciatica pain.  She is requesting a refill of her hydrocodone.  I last prescribed hydrocodone 7.5/325 mg on 2024 for number 120 tablets.  She continues to have back pain which is why she is requesting a refill of her prescription today  She has ongoing sciatica on the right side.  Her prescription will be due on 2024    Pain Diagnosis:              Chronic low back pain with sciatica              Lumbar Stenosis (felt to be non-surgical)              Degenerative disc disease              Severe limiting osteoarthritis:     Supportive Studies:              Radiologic imaging studies     Analgesia: She gets appropriate relief with hydrocodone                          She has also benefited by injection in her shoulder R.     Pain Control: Average: 6-7/10         Best: 5/10          Worst: 10/10  (2024)     Pain Interfering with life:  100%  Pain Interfering with general activity:  100%     Adverse effects: None  Aberrant behavior: None  Affect: Normal     Alternative medications:              Meloxicam     Urine Drug screen: 2022 and appropriate  Risk Factors:              Illegal Drug use: None              History of substance use disorder: None              Mental health conditions: None              Sleep disordered breathing: None              Concurrent Benzodiazepine use: No recent     Bowel regimen:occasional Miralax  Bowel

## 2024-08-13 ENCOUNTER — OFFICE VISIT (OUTPATIENT)
Dept: FAMILY MEDICINE CLINIC | Age: 79
End: 2024-08-13
Payer: MEDICARE

## 2024-08-13 ENCOUNTER — HOSPITAL ENCOUNTER (OUTPATIENT)
Dept: GENERAL RADIOLOGY | Age: 79
Discharge: HOME | End: 2024-08-13
Payer: MEDICARE

## 2024-08-13 VITALS
OXYGEN SATURATION: 96 % | DIASTOLIC BLOOD PRESSURE: 78 MMHG | WEIGHT: 148.5 LBS | SYSTOLIC BLOOD PRESSURE: 110 MMHG | HEART RATE: 68 BPM | HEIGHT: 61 IN | BODY MASS INDEX: 28.04 KG/M2 | TEMPERATURE: 97.8 F

## 2024-08-13 DIAGNOSIS — T14.8XXA HEMATOMA: ICD-10-CM

## 2024-08-13 DIAGNOSIS — M79.604 RIGHT LEG PAIN: ICD-10-CM

## 2024-08-13 DIAGNOSIS — M79.604 RIGHT LEG PAIN: Primary | ICD-10-CM

## 2024-08-13 PROCEDURE — 3078F DIAST BP <80 MM HG: CPT | Performed by: NURSE PRACTITIONER

## 2024-08-13 PROCEDURE — 3074F SYST BP LT 130 MM HG: CPT | Performed by: NURSE PRACTITIONER

## 2024-08-13 PROCEDURE — 1123F ACP DISCUSS/DSCN MKR DOCD: CPT | Performed by: NURSE PRACTITIONER

## 2024-08-13 PROCEDURE — 73590 X-RAY EXAM OF LOWER LEG: CPT

## 2024-08-13 PROCEDURE — 99214 OFFICE O/P EST MOD 30 MIN: CPT | Performed by: NURSE PRACTITIONER
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
## 2024-08-14 ENCOUNTER — TELEPHONE (OUTPATIENT)
Dept: FAMILY MEDICINE CLINIC | Age: 79
End: 2024-08-14

## 2024-08-14 ENCOUNTER — HOSPITAL ENCOUNTER (INPATIENT)
Age: 79
LOS: 22 days | Discharge: NURSING, REHAB OR LONG-TERM CARE | DRG: 299 | End: 2024-09-05
Attending: PEDIATRICS | Admitting: HOSPITALIST
Payer: MEDICARE

## 2024-08-14 ENCOUNTER — APPOINTMENT (OUTPATIENT)
Dept: CT IMAGING | Age: 79
DRG: 299 | End: 2024-08-14
Payer: MEDICARE

## 2024-08-14 ENCOUNTER — APPOINTMENT (OUTPATIENT)
Dept: GENERAL RADIOLOGY | Age: 79
DRG: 299 | End: 2024-08-14
Payer: MEDICARE

## 2024-08-14 DIAGNOSIS — S80.11XA HEMATOMA OF RIGHT LOWER LEG: ICD-10-CM

## 2024-08-14 DIAGNOSIS — E11.9 TYPE 2 DIABETES MELLITUS WITHOUT COMPLICATION, WITHOUT LONG-TERM CURRENT USE OF INSULIN (HCC): ICD-10-CM

## 2024-08-14 DIAGNOSIS — I10 ESSENTIAL HYPERTENSION: ICD-10-CM

## 2024-08-14 DIAGNOSIS — R23.3 BLEEDING INTO THE SKIN: ICD-10-CM

## 2024-08-14 DIAGNOSIS — S80.11XA HEMATOMA OF LEG, RIGHT, INITIAL ENCOUNTER: Primary | ICD-10-CM

## 2024-08-14 DIAGNOSIS — R52 UNCONTROLLED PAIN: ICD-10-CM

## 2024-08-14 LAB
ALBUMIN SERPL-MCNC: 4.2 G/DL (ref 3.5–5.2)
ALP SERPL-CCNC: 94 U/L (ref 35–104)
ALT SERPL-CCNC: <5 U/L (ref 5–33)
ANION GAP SERPL CALCULATED.3IONS-SCNC: 14 MMOL/L (ref 7–19)
APTT PPP: 37.7 SEC (ref 26–36.2)
AST SERPL-CCNC: 11 U/L (ref 5–32)
BACTERIA URNS QL MICRO: NEGATIVE /HPF
BASOPHILS # BLD: 0 K/UL (ref 0–0.2)
BASOPHILS NFR BLD: 0 % (ref 0–1)
BILIRUB SERPL-MCNC: 0.4 MG/DL (ref 0.2–1.2)
BILIRUB UR QL STRIP: NEGATIVE
BUN SERPL-MCNC: 12 MG/DL (ref 8–23)
BURR CELLS BLD QL SMEAR: ABNORMAL
CALCIUM SERPL-MCNC: 10 MG/DL (ref 8.8–10.2)
CHLORIDE SERPL-SCNC: 91 MMOL/L (ref 98–111)
CK SERPL-CCNC: 25 U/L (ref 26–192)
CLARITY UR: CLEAR
CO2 SERPL-SCNC: 20 MMOL/L (ref 22–29)
COLOR UR: YELLOW
CREAT SERPL-MCNC: 0.7 MG/DL (ref 0.5–0.9)
CRYSTALS URNS MICRO: NORMAL /HPF
EOSINOPHIL # BLD: 0.17 K/UL (ref 0–0.6)
EOSINOPHIL NFR BLD: 1 % (ref 0–5)
EPI CELLS #/AREA URNS AUTO: 2 /HPF (ref 0–5)
ERYTHROCYTE [DISTWIDTH] IN BLOOD BY AUTOMATED COUNT: 14.1 % (ref 11.5–14.5)
ERYTHROCYTE [SEDIMENTATION RATE] IN BLOOD BY WESTERGREN METHOD: 4 MM/HR (ref 0–25)
GLUCOSE BLD-MCNC: 320 MG/DL (ref 70–99)
GLUCOSE SERPL-MCNC: 207 MG/DL (ref 74–109)
GLUCOSE UR STRIP.AUTO-MCNC: NEGATIVE MG/DL
HBA1C MFR BLD: 7.9 % (ref 4–6)
HCT VFR BLD AUTO: 34 % (ref 37–47)
HGB BLD-MCNC: 10.8 G/DL (ref 12–16)
HGB UR STRIP.AUTO-MCNC: NEGATIVE MG/L
HYALINE CASTS #/AREA URNS AUTO: 0 /HPF (ref 0–8)
HYPOCHROMIA BLD QL SMEAR: ABNORMAL
IMM GRANULOCYTES # BLD: 0.3 K/UL
INR PPP: 1.48 (ref 0.88–1.18)
KETONES UR STRIP.AUTO-MCNC: NEGATIVE MG/DL
LEUKOCYTE ESTERASE UR QL STRIP.AUTO: ABNORMAL
LYMPHOCYTES # BLD: 2.2 K/UL (ref 1.1–4.5)
LYMPHOCYTES NFR BLD: 4 % (ref 20–40)
MAGNESIUM SERPL-MCNC: 1.6 MG/DL (ref 1.6–2.4)
MCH RBC QN AUTO: 23.9 PG (ref 27–31)
MCHC RBC AUTO-ENTMCNC: 31.8 G/DL (ref 33–37)
MCV RBC AUTO: 75.2 FL (ref 81–99)
MONOCYTES # BLD: 1.7 K/UL (ref 0–0.9)
MONOCYTES NFR BLD: 10 % (ref 0–10)
NEUTROPHILS # BLD: 12.8 K/UL (ref 1.5–7.5)
NEUTS SEG NFR BLD: 76 % (ref 50–65)
NITRITE UR QL STRIP.AUTO: NEGATIVE
OSMOLALITY URINE: 415 MOSM/KG (ref 250–1200)
OVALOCYTES BLD QL SMEAR: ABNORMAL
PERFORMED ON: ABNORMAL
PH UR STRIP.AUTO: 7 [PH] (ref 5–8)
PLATELET # BLD AUTO: 325 K/UL (ref 130–400)
PLATELET SLIDE REVIEW: ADEQUATE
PMV BLD AUTO: 11.8 FL (ref 9.4–12.3)
POIKILOCYTOSIS BLD QL SMEAR: ABNORMAL
POTASSIUM SERPL-SCNC: 4.8 MMOL/L (ref 3.5–5)
PROCALCITONIN: 0.05 NG/ML (ref 0–0.09)
PROT SERPL-MCNC: 6.5 G/DL (ref 6.6–8.7)
PROT UR STRIP.AUTO-MCNC: NEGATIVE MG/DL
PROTHROMBIN TIME: 17.6 SEC (ref 12–14.6)
RBC # BLD AUTO: 4.52 M/UL (ref 4.2–5.4)
RBC #/AREA URNS AUTO: 1 /HPF (ref 0–4)
SCHISTOCYTES BLD QL SMEAR: ABNORMAL
SODIUM SERPL-SCNC: 125 MMOL/L (ref 136–145)
SODIUM UR-SCNC: 57 MMOL/L
SP GR UR STRIP.AUTO: 1.04 (ref 1–1.03)
UROBILINOGEN UR STRIP.AUTO-MCNC: 0.2 E.U./DL
VARIANT LYMPHS NFR BLD: 9 % (ref 0–8)
WBC # BLD AUTO: 16.9 K/UL (ref 4.8–10.8)
WBC #/AREA URNS AUTO: 1 /HPF (ref 0–5)

## 2024-08-14 PROCEDURE — 85652 RBC SED RATE AUTOMATED: CPT

## 2024-08-14 PROCEDURE — G0378 HOSPITAL OBSERVATION PER HR: HCPCS

## 2024-08-14 PROCEDURE — 96366 THER/PROPH/DIAG IV INF ADDON: CPT

## 2024-08-14 PROCEDURE — 81001 URINALYSIS AUTO W/SCOPE: CPT

## 2024-08-14 PROCEDURE — 84145 PROCALCITONIN (PCT): CPT

## 2024-08-14 PROCEDURE — 71045 X-RAY EXAM CHEST 1 VIEW: CPT

## 2024-08-14 PROCEDURE — 96376 TX/PRO/DX INJ SAME DRUG ADON: CPT

## 2024-08-14 PROCEDURE — 2580000003 HC RX 258: Performed by: NURSE PRACTITIONER

## 2024-08-14 PROCEDURE — 96365 THER/PROPH/DIAG IV INF INIT: CPT

## 2024-08-14 PROCEDURE — 6360000004 HC RX CONTRAST MEDICATION: Performed by: PEDIATRICS

## 2024-08-14 PROCEDURE — 83735 ASSAY OF MAGNESIUM: CPT

## 2024-08-14 PROCEDURE — 99285 EMERGENCY DEPT VISIT HI MDM: CPT

## 2024-08-14 PROCEDURE — 83935 ASSAY OF URINE OSMOLALITY: CPT

## 2024-08-14 PROCEDURE — 36415 COLL VENOUS BLD VENIPUNCTURE: CPT

## 2024-08-14 PROCEDURE — 85730 THROMBOPLASTIN TIME PARTIAL: CPT

## 2024-08-14 PROCEDURE — 82550 ASSAY OF CK (CPK): CPT

## 2024-08-14 PROCEDURE — 85025 COMPLETE CBC W/AUTO DIFF WBC: CPT

## 2024-08-14 PROCEDURE — 6360000002 HC RX W HCPCS: Performed by: PEDIATRICS

## 2024-08-14 PROCEDURE — 85610 PROTHROMBIN TIME: CPT

## 2024-08-14 PROCEDURE — 6370000000 HC RX 637 (ALT 250 FOR IP): Performed by: NURSE PRACTITIONER

## 2024-08-14 PROCEDURE — 96374 THER/PROPH/DIAG INJ IV PUSH: CPT

## 2024-08-14 PROCEDURE — 82962 GLUCOSE BLOOD TEST: CPT

## 2024-08-14 PROCEDURE — 84300 ASSAY OF URINE SODIUM: CPT

## 2024-08-14 PROCEDURE — 83036 HEMOGLOBIN GLYCOSYLATED A1C: CPT

## 2024-08-14 PROCEDURE — 6360000002 HC RX W HCPCS: Performed by: NURSE PRACTITIONER

## 2024-08-14 PROCEDURE — 96375 TX/PRO/DX INJ NEW DRUG ADDON: CPT

## 2024-08-14 PROCEDURE — 73701 CT LOWER EXTREMITY W/DYE: CPT

## 2024-08-14 PROCEDURE — 80053 COMPREHEN METABOLIC PANEL: CPT

## 2024-08-14 RX ORDER — SODIUM CHLORIDE 9 MG/ML
INJECTION, SOLUTION INTRAVENOUS PRN
Status: DISCONTINUED | OUTPATIENT
Start: 2024-08-14 | End: 2024-08-16 | Stop reason: SDUPTHER

## 2024-08-14 RX ORDER — OXYCODONE HYDROCHLORIDE 10 MG/1
10 TABLET ORAL EVERY 4 HOURS PRN
Status: DISCONTINUED | OUTPATIENT
Start: 2024-08-14 | End: 2024-08-19

## 2024-08-14 RX ORDER — MORPHINE SULFATE 4 MG/ML
4 INJECTION, SOLUTION INTRAMUSCULAR; INTRAVENOUS
Status: DISCONTINUED | OUTPATIENT
Start: 2024-08-14 | End: 2024-08-19

## 2024-08-14 RX ORDER — POTASSIUM CHLORIDE 1500 MG/1
40 TABLET, EXTENDED RELEASE ORAL PRN
Status: DISCONTINUED | OUTPATIENT
Start: 2024-08-14 | End: 2024-09-05 | Stop reason: HOSPADM

## 2024-08-14 RX ORDER — MAGNESIUM SULFATE IN WATER 40 MG/ML
2000 INJECTION, SOLUTION INTRAVENOUS PRN
Status: DISCONTINUED | OUTPATIENT
Start: 2024-08-14 | End: 2024-09-05 | Stop reason: HOSPADM

## 2024-08-14 RX ORDER — ATORVASTATIN CALCIUM 20 MG/1
20 TABLET, FILM COATED ORAL NIGHTLY
Status: DISCONTINUED | OUTPATIENT
Start: 2024-08-14 | End: 2024-09-05 | Stop reason: HOSPADM

## 2024-08-14 RX ORDER — NALOXONE HYDROCHLORIDE 0.4 MG/ML
0.4 INJECTION, SOLUTION INTRAMUSCULAR; INTRAVENOUS; SUBCUTANEOUS PRN
Status: DISCONTINUED | OUTPATIENT
Start: 2024-08-14 | End: 2024-09-05 | Stop reason: HOSPADM

## 2024-08-14 RX ORDER — SODIUM CHLORIDE 0.9 % (FLUSH) 0.9 %
5-40 SYRINGE (ML) INJECTION PRN
Status: DISCONTINUED | OUTPATIENT
Start: 2024-08-14 | End: 2024-08-16 | Stop reason: SDUPTHER

## 2024-08-14 RX ORDER — ONDANSETRON 4 MG/1
4 TABLET, ORALLY DISINTEGRATING ORAL EVERY 8 HOURS PRN
Status: DISCONTINUED | OUTPATIENT
Start: 2024-08-14 | End: 2024-09-05 | Stop reason: HOSPADM

## 2024-08-14 RX ORDER — INSULIN LISPRO 100 [IU]/ML
0-4 INJECTION, SOLUTION INTRAVENOUS; SUBCUTANEOUS
Status: DISCONTINUED | OUTPATIENT
Start: 2024-08-14 | End: 2024-08-21

## 2024-08-14 RX ORDER — MORPHINE SULFATE 2 MG/ML
2 INJECTION, SOLUTION INTRAMUSCULAR; INTRAVENOUS
Status: DISCONTINUED | OUTPATIENT
Start: 2024-08-14 | End: 2024-08-19

## 2024-08-14 RX ORDER — SODIUM CHLORIDE 9 MG/ML
INJECTION, SOLUTION INTRAVENOUS CONTINUOUS
Status: DISCONTINUED | OUTPATIENT
Start: 2024-08-14 | End: 2024-08-15

## 2024-08-14 RX ORDER — LEVETIRACETAM 500 MG/1
500 TABLET ORAL 2 TIMES DAILY
Status: DISCONTINUED | OUTPATIENT
Start: 2024-08-14 | End: 2024-09-05 | Stop reason: HOSPADM

## 2024-08-14 RX ORDER — MAGNESIUM SULFATE 1 G/100ML
1000 INJECTION INTRAVENOUS ONCE
Status: DISCONTINUED | OUTPATIENT
Start: 2024-08-14 | End: 2024-08-14

## 2024-08-14 RX ORDER — ACETAMINOPHEN 650 MG/1
650 SUPPOSITORY RECTAL EVERY 6 HOURS PRN
Status: DISCONTINUED | OUTPATIENT
Start: 2024-08-14 | End: 2024-09-05 | Stop reason: HOSPADM

## 2024-08-14 RX ORDER — DEXTROSE MONOHYDRATE 100 MG/ML
INJECTION, SOLUTION INTRAVENOUS CONTINUOUS PRN
Status: DISCONTINUED | OUTPATIENT
Start: 2024-08-14 | End: 2024-09-05 | Stop reason: HOSPADM

## 2024-08-14 RX ORDER — ACETAMINOPHEN 325 MG/1
650 TABLET ORAL EVERY 6 HOURS PRN
Status: DISCONTINUED | OUTPATIENT
Start: 2024-08-14 | End: 2024-09-05 | Stop reason: HOSPADM

## 2024-08-14 RX ORDER — FENTANYL CITRATE 50 UG/ML
75 INJECTION, SOLUTION INTRAMUSCULAR; INTRAVENOUS ONCE
Status: COMPLETED | OUTPATIENT
Start: 2024-08-14 | End: 2024-08-14

## 2024-08-14 RX ORDER — OXYCODONE HYDROCHLORIDE 5 MG/1
5 TABLET ORAL EVERY 4 HOURS PRN
Status: DISCONTINUED | OUTPATIENT
Start: 2024-08-14 | End: 2024-08-23

## 2024-08-14 RX ORDER — INSULIN LISPRO 100 [IU]/ML
0-4 INJECTION, SOLUTION INTRAVENOUS; SUBCUTANEOUS NIGHTLY
Status: DISCONTINUED | OUTPATIENT
Start: 2024-08-14 | End: 2024-08-21

## 2024-08-14 RX ORDER — METOPROLOL SUCCINATE 50 MG/1
50 TABLET, EXTENDED RELEASE ORAL DAILY
Status: DISCONTINUED | OUTPATIENT
Start: 2024-08-14 | End: 2024-08-29

## 2024-08-14 RX ORDER — IOPAMIDOL 755 MG/ML
70 INJECTION, SOLUTION INTRAVASCULAR
Status: COMPLETED | OUTPATIENT
Start: 2024-08-14 | End: 2024-08-14

## 2024-08-14 RX ORDER — MAGNESIUM SULFATE IN WATER 40 MG/ML
2000 INJECTION, SOLUTION INTRAVENOUS PRN
Status: DISCONTINUED | OUTPATIENT
Start: 2024-08-14 | End: 2024-08-14 | Stop reason: SDUPTHER

## 2024-08-14 RX ORDER — AMLODIPINE BESYLATE 10 MG/1
10 TABLET ORAL DAILY
Status: DISCONTINUED | OUTPATIENT
Start: 2024-08-14 | End: 2024-09-05 | Stop reason: HOSPADM

## 2024-08-14 RX ORDER — CHOLESTYRAMINE LIGHT 4 G/5.7G
4 POWDER, FOR SUSPENSION ORAL DAILY
Status: DISCONTINUED | OUTPATIENT
Start: 2024-08-14 | End: 2024-09-05 | Stop reason: HOSPADM

## 2024-08-14 RX ORDER — ONDANSETRON 2 MG/ML
4 INJECTION INTRAMUSCULAR; INTRAVENOUS EVERY 6 HOURS PRN
Status: DISCONTINUED | OUTPATIENT
Start: 2024-08-14 | End: 2024-08-19

## 2024-08-14 RX ORDER — ALLOPURINOL 100 MG/1
300 TABLET ORAL DAILY
Status: DISCONTINUED | OUTPATIENT
Start: 2024-08-14 | End: 2024-09-05 | Stop reason: HOSPADM

## 2024-08-14 RX ORDER — POTASSIUM CHLORIDE 7.45 MG/ML
10 INJECTION INTRAVENOUS PRN
Status: DISCONTINUED | OUTPATIENT
Start: 2024-08-14 | End: 2024-09-05 | Stop reason: HOSPADM

## 2024-08-14 RX ORDER — POLYETHYLENE GLYCOL 3350 17 G/17G
17 POWDER, FOR SOLUTION ORAL DAILY PRN
Status: DISCONTINUED | OUTPATIENT
Start: 2024-08-14 | End: 2024-08-21

## 2024-08-14 RX ORDER — SODIUM CHLORIDE 0.9 % (FLUSH) 0.9 %
5-40 SYRINGE (ML) INJECTION EVERY 12 HOURS SCHEDULED
Status: DISCONTINUED | OUTPATIENT
Start: 2024-08-14 | End: 2024-08-16 | Stop reason: SDUPTHER

## 2024-08-14 RX ORDER — FOLIC ACID 1 MG/1
1 TABLET ORAL DAILY
Status: DISCONTINUED | OUTPATIENT
Start: 2024-08-14 | End: 2024-09-05 | Stop reason: HOSPADM

## 2024-08-14 RX ADMIN — ATORVASTATIN CALCIUM 20 MG: 20 TABLET, FILM COATED ORAL at 20:47

## 2024-08-14 RX ADMIN — SODIUM CHLORIDE: 9 INJECTION, SOLUTION INTRAVENOUS at 15:54

## 2024-08-14 RX ADMIN — MAGNESIUM SULFATE HEPTAHYDRATE 2000 MG: 40 INJECTION, SOLUTION INTRAVENOUS at 15:54

## 2024-08-14 RX ADMIN — FENTANYL CITRATE 75 MCG: 50 INJECTION INTRAMUSCULAR; INTRAVENOUS at 11:39

## 2024-08-14 RX ADMIN — OXYCODONE HYDROCHLORIDE 10 MG: 10 TABLET ORAL at 17:37

## 2024-08-14 RX ADMIN — IOPAMIDOL 70 ML: 755 INJECTION, SOLUTION INTRAVENOUS at 13:15

## 2024-08-14 RX ADMIN — MORPHINE SULFATE 2 MG: 2 INJECTION, SOLUTION INTRAMUSCULAR; INTRAVENOUS at 15:54

## 2024-08-14 RX ADMIN — LEVETIRACETAM 500 MG: 500 TABLET, FILM COATED ORAL at 20:48

## 2024-08-14 RX ADMIN — ONDANSETRON 4 MG: 2 INJECTION INTRAMUSCULAR; INTRAVENOUS at 15:59

## 2024-08-14 RX ADMIN — FENTANYL CITRATE 75 MCG: 50 INJECTION INTRAMUSCULAR; INTRAVENOUS at 13:44

## 2024-08-14 RX ADMIN — SODIUM CHLORIDE, PRESERVATIVE FREE 10 ML: 5 INJECTION INTRAVENOUS at 20:50

## 2024-08-14 RX ADMIN — OXYCODONE 5 MG: 5 TABLET ORAL at 20:47

## 2024-08-14 RX ADMIN — INSULIN LISPRO 4 UNITS: 100 INJECTION, SOLUTION INTRAVENOUS; SUBCUTANEOUS at 20:49

## 2024-08-14 ASSESSMENT — PAIN - FUNCTIONAL ASSESSMENT
PAIN_FUNCTIONAL_ASSESSMENT: 0-10
PAIN_FUNCTIONAL_ASSESSMENT: PREVENTS OR INTERFERES WITH MANY ACTIVE NOT PASSIVE ACTIVITIES
PAIN_FUNCTIONAL_ASSESSMENT: PREVENTS OR INTERFERES SOME ACTIVE ACTIVITIES AND ADLS
PAIN_FUNCTIONAL_ASSESSMENT: PREVENTS OR INTERFERES WITH MANY ACTIVE NOT PASSIVE ACTIVITIES

## 2024-08-14 ASSESSMENT — PAIN DESCRIPTION - DESCRIPTORS
DESCRIPTORS: SHARP
DESCRIPTORS: ACHING
DESCRIPTORS: SHARP

## 2024-08-14 ASSESSMENT — PAIN SCALES - GENERAL
PAINLEVEL_OUTOF10: 7
PAINLEVEL_OUTOF10: 6
PAINLEVEL_OUTOF10: 5
PAINLEVEL_OUTOF10: 0
PAINLEVEL_OUTOF10: 10
PAINLEVEL_OUTOF10: 8
PAINLEVEL_OUTOF10: 10
PAINLEVEL_OUTOF10: 10
PAINLEVEL_OUTOF10: 0

## 2024-08-14 ASSESSMENT — PAIN DESCRIPTION - FREQUENCY
FREQUENCY: CONTINUOUS
FREQUENCY: CONTINUOUS

## 2024-08-14 ASSESSMENT — PAIN DESCRIPTION - ONSET
ONSET: ON-GOING
ONSET: ON-GOING

## 2024-08-14 ASSESSMENT — PAIN DESCRIPTION - ORIENTATION
ORIENTATION: RIGHT

## 2024-08-14 ASSESSMENT — PAIN DESCRIPTION - LOCATION
LOCATION: LEG

## 2024-08-14 ASSESSMENT — PAIN DESCRIPTION - PAIN TYPE
TYPE: ACUTE PAIN
TYPE: ACUTE PAIN
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
## 2024-08-14 NOTE — TELEPHONE ENCOUNTER
Pt daughter Cari called and wanted to give update that pt is admitted to [facility] and they are going to try to get the bleeding to stop and she might possibly have to have surgery. I advised her to call us if anything changes or if we could do anything.

## 2024-08-14 NOTE — TELEPHONE ENCOUNTER
----- Message from LAYLA Huffman sent at 8/13/2024  5:40 PM CDT -----  X-ray negative noted large hematoma cont supportive care

## 2024-08-14 NOTE — PROGRESS NOTES
Elda Flood arrived to room # 330.   Presented with: hematoma   Mental Status: Patient is oriented, alert, and coherent.   Vitals:    08/14/24 1445   BP: 120/87   Pulse: 77   Resp: 17   Temp: 97.7 °F (36.5 °C)   SpO2: 97%     Patient safety contract and falls prevention contract reviewed with patient Yes.  Oriented Patient and Family to room.  Call light within reach. Yes.  Needs, issues or concerns expressed at this time: no.      Electronically signed by lAma Sinha RN on 8/14/2024 at 3:40 PM

## 2024-08-14 NOTE — PROGRESS NOTES
Case discussed case with the ER physician.  Recommend hold anticoagulation, leg elevation, Ace wrap.

## 2024-08-14 NOTE — PROGRESS NOTES
4 Eyes Skin Assessment     NAME:  Elda Flood  YOB: 1945  MEDICAL RECORD NUMBER:  [id number]    The patient is being assessed for  Admission    I agree that at least one RN has performed a thorough Head to Toe Skin Assessment on the patient. ALL assessment sites listed below have been assessed.      Areas assessed by both nurses:    Head, Face, Ears, Shoulders, Back, Chest, Arms, Elbows, Hands, Sacrum. Buttock, Coccyx, Ischium, and Legs. Feet and Heels        Does the Patient have a Wound? No noted wound(s)       Leon Prevention initiated by RN: Yes  Wound Care Orders initiated by RN: No    Pressure Injury (Stage 3,4, Unstageable, DTI, NWPT, and Complex wounds) if present, place Wound referral order by RN under : Yes-vascular surgery has been consulted. Dr. Landry aware.     New Ostomies, if present place, Ostomy referral order under : No     Nurse 1 eSignature: Electronically signed by Alma Sinha RN on 8/14/24 at 4:40 PM CDT    **SHARE this note so that the co-signing nurse can place an eSignature**    Nurse 2 eSignature: {Esignature:[id number]}
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
## 2024-08-14 NOTE — TELEPHONE ENCOUNTER
Called patient and spoke to daughter with results. Ambulance had been called and arrived as I was speaking to her to take her to  ER due to the leg turning purple and getting worse. She has requested a wheel chair order, PT and Home Health to be ordered.

## 2024-08-14 NOTE — ED NOTES
ED TO INPATIENT SBAR HANDOFF    Patient Name: Elda Flood   : 1945  79 y.o.   Family/Caregiver Present: Yes  Code Status Order: Full Code    C-SSRS: Risk of Suicide: No Risk  Sitter No  Restraints:         Situation  Chief Complaint:   Chief Complaint   Patient presents with    Hematoma     Pt here with large hematoma and swelling injured yesterday      Patient Diagnosis: Hematoma of right lower leg [S80.11XA]     Brief Description of Patient's Condition: Elda Flood is a 79 y.o. female who presents to the emergency department with pain in bruising to right lower extremity.  They state that patient was attempting to help her  up off the floor yesterday when his foot slipped striking her in the right lower leg.  Patient began having bruising immediately.  Patient is currently on Eliquis daily.  Patient was seen by her primary care provider, Dr. Solis.  X-rays were negative.  Patient was instructed to ice and elevate her right lower extremity.  Hematoma has been expanding and patient is in severe pain.    Mental Status: oriented, alert, coherent, and logical  Arrived from: home    Imaging:   XR CHEST PORTABLE   Final Result       No acute radiographic abnormality.               ______________________________________    Electronically signed by: STALIN MCCOY D.O.   Date:     2024   Time:    14:36       CT TIBIA FIBULA RIGHT W CONTRAST   Final Result   17.4 x 11.1 x 4.0 cm heterogeneous mass/collection within the anteromedial subcutaneous tissues of the calf through the ankle concerning for a large hematoma with active extravasation from venous varicosities at that level as detailed above.  Recommend    follow up imaging to resolution in order to exclude the presence of an underlying mass at that level.  Adjacent subcutaneous edema.       Marked demineralization limits sensitivity for detection of an acute nondisplaced fracture without definite evidence of an acute fracture.       Marked

## 2024-08-14 NOTE — PROGRESS NOTES
Elda Flood arrived to room # 330.   Presented with: hematoma   Mental Status: Patient is oriented, alert, and coherent.   Vitals:    08/14/24 1554   BP: (!) 140/68   Pulse: 74   Resp: 16   Temp: 97.6 °F (36.4 °C)   SpO2:      Patient safety contract and falls prevention contract reviewed with patient Yes.  Oriented Patient to room.  Call light within reach. Yes.  Needs, issues or concerns expressed at this time: no.      Electronically signed by Alma Sinha RN on 8/14/2024 at 4:38 PM

## 2024-08-15 ENCOUNTER — ANESTHESIA (OUTPATIENT)
Dept: OPERATING ROOM | Age: 79
End: 2024-08-15
Payer: MEDICARE

## 2024-08-15 ENCOUNTER — ANESTHESIA EVENT (OUTPATIENT)
Dept: OPERATING ROOM | Age: 79
End: 2024-08-15
Payer: MEDICARE

## 2024-08-15 LAB
ABO + RH BLD: NORMAL
ANION GAP SERPL CALCULATED.3IONS-SCNC: 10 MMOL/L (ref 7–19)
BLD GP AB SCN SERPL QL: NORMAL
BUN SERPL-MCNC: 16 MG/DL (ref 8–23)
CALCIUM SERPL-MCNC: 8.4 MG/DL (ref 8.8–10.2)
CHLORIDE SERPL-SCNC: 93 MMOL/L (ref 98–111)
CO2 SERPL-SCNC: 21 MMOL/L (ref 22–29)
CREAT SERPL-MCNC: 1.1 MG/DL (ref 0.5–0.9)
EKG P AXIS: 63 DEGREES
EKG P-R INTERVAL: 150 MS
EKG Q-T INTERVAL: 386 MS
EKG QRS DURATION: 100 MS
EKG QTC CALCULATION (BAZETT): 417 MS
EKG T AXIS: 45 DEGREES
ERYTHROCYTE [DISTWIDTH] IN BLOOD BY AUTOMATED COUNT: 14.1 % (ref 11.5–14.5)
ERYTHROCYTE [DISTWIDTH] IN BLOOD BY AUTOMATED COUNT: 14.3 % (ref 11.5–14.5)
GLUCOSE BLD-MCNC: 167 MG/DL (ref 70–99)
GLUCOSE BLD-MCNC: 184 MG/DL (ref 70–99)
GLUCOSE BLD-MCNC: 189 MG/DL (ref 70–99)
GLUCOSE BLD-MCNC: 215 MG/DL (ref 70–99)
GLUCOSE BLD-MCNC: 308 MG/DL (ref 70–99)
GLUCOSE SERPL-MCNC: 204 MG/DL (ref 74–109)
HCT VFR BLD AUTO: 23.5 % (ref 37–47)
HCT VFR BLD AUTO: 24 % (ref 37–47)
HGB BLD-MCNC: 7.4 G/DL (ref 12–16)
HGB BLD-MCNC: 7.5 G/DL (ref 12–16)
MAGNESIUM SERPL-MCNC: 2.3 MG/DL (ref 1.6–2.4)
MCH RBC QN AUTO: 23.6 PG (ref 27–31)
MCH RBC QN AUTO: 24.4 PG (ref 27–31)
MCHC RBC AUTO-ENTMCNC: 30.8 G/DL (ref 33–37)
MCHC RBC AUTO-ENTMCNC: 31.9 G/DL (ref 33–37)
MCV RBC AUTO: 76.5 FL (ref 81–99)
MCV RBC AUTO: 76.7 FL (ref 81–99)
OSMOLALITY SERPL CALC.SUM OF ELEC: 269 MOSM/KG (ref 275–300)
PERFORMED ON: ABNORMAL
PLATELET # BLD AUTO: 255 K/UL (ref 130–400)
PLATELET # BLD AUTO: 259 K/UL (ref 130–400)
PMV BLD AUTO: 12.2 FL (ref 9.4–12.3)
PMV BLD AUTO: 12.2 FL (ref 9.4–12.3)
POTASSIUM SERPL-SCNC: 5.1 MMOL/L (ref 3.5–5)
RBC # BLD AUTO: 3.07 M/UL (ref 4.2–5.4)
RBC # BLD AUTO: 3.13 M/UL (ref 4.2–5.4)
SODIUM SERPL-SCNC: 124 MMOL/L (ref 136–145)
TSH SERPL DL<=0.005 MIU/L-ACNC: 2.42 UIU/ML (ref 0.27–4.2)
URATE SERPL-MCNC: 3.7 MG/DL (ref 2.4–5.7)
WBC # BLD AUTO: 20.1 K/UL (ref 4.8–10.8)
WBC # BLD AUTO: 20.4 K/UL (ref 4.8–10.8)

## 2024-08-15 PROCEDURE — 86900 BLOOD TYPING SEROLOGIC ABO: CPT

## 2024-08-15 PROCEDURE — 93005 ELECTROCARDIOGRAM TRACING: CPT | Performed by: ANESTHESIOLOGY

## 2024-08-15 PROCEDURE — 86923 COMPATIBILITY TEST ELECTRIC: CPT

## 2024-08-15 PROCEDURE — 2580000003 HC RX 258: Performed by: ANESTHESIOLOGY

## 2024-08-15 PROCEDURE — 0Y9H0ZZ DRAINAGE OF RIGHT LOWER LEG, OPEN APPROACH: ICD-10-PCS | Performed by: SURGERY

## 2024-08-15 PROCEDURE — 6360000002 HC RX W HCPCS: Performed by: NURSE ANESTHETIST, CERTIFIED REGISTERED

## 2024-08-15 PROCEDURE — 94760 N-INVAS EAR/PLS OXIMETRY 1: CPT

## 2024-08-15 PROCEDURE — 0HCKXZZ EXTIRPATION OF MATTER FROM RIGHT LOWER LEG SKIN, EXTERNAL APPROACH: ICD-10-PCS | Performed by: SURGERY

## 2024-08-15 PROCEDURE — 6360000002 HC RX W HCPCS: Performed by: ANESTHESIOLOGY

## 2024-08-15 PROCEDURE — 2580000003 HC RX 258: Performed by: SURGERY

## 2024-08-15 PROCEDURE — 84443 ASSAY THYROID STIM HORMONE: CPT

## 2024-08-15 PROCEDURE — 6360000002 HC RX W HCPCS: Performed by: NURSE PRACTITIONER

## 2024-08-15 PROCEDURE — 85027 COMPLETE CBC AUTOMATED: CPT

## 2024-08-15 PROCEDURE — 6370000000 HC RX 637 (ALT 250 FOR IP): Performed by: SURGERY

## 2024-08-15 PROCEDURE — 3700000000 HC ANESTHESIA ATTENDED CARE: Performed by: SURGERY

## 2024-08-15 PROCEDURE — 7100000001 HC PACU RECOVERY - ADDTL 15 MIN: Performed by: SURGERY

## 2024-08-15 PROCEDURE — 84550 ASSAY OF BLOOD/URIC ACID: CPT

## 2024-08-15 PROCEDURE — 3700000001 HC ADD 15 MINUTES (ANESTHESIA): Performed by: SURGERY

## 2024-08-15 PROCEDURE — 2709999900 HC NON-CHARGEABLE SUPPLY: Performed by: SURGERY

## 2024-08-15 PROCEDURE — 2500000003 HC RX 250 WO HCPCS: Performed by: NURSE ANESTHETIST, CERTIFIED REGISTERED

## 2024-08-15 PROCEDURE — 83735 ASSAY OF MAGNESIUM: CPT

## 2024-08-15 PROCEDURE — 6370000000 HC RX 637 (ALT 250 FOR IP): Performed by: NURSE PRACTITIONER

## 2024-08-15 PROCEDURE — 83930 ASSAY OF BLOOD OSMOLALITY: CPT

## 2024-08-15 PROCEDURE — 3600000003 HC SURGERY LEVEL 3 BASE: Performed by: SURGERY

## 2024-08-15 PROCEDURE — 6360000002 HC RX W HCPCS

## 2024-08-15 PROCEDURE — 93010 ELECTROCARDIOGRAM REPORT: CPT | Performed by: INTERNAL MEDICINE

## 2024-08-15 PROCEDURE — 3600000013 HC SURGERY LEVEL 3 ADDTL 15MIN: Performed by: SURGERY

## 2024-08-15 PROCEDURE — 1200000000 HC SEMI PRIVATE

## 2024-08-15 PROCEDURE — P9016 RBC LEUKOCYTES REDUCED: HCPCS

## 2024-08-15 PROCEDURE — 2500000003 HC RX 250 WO HCPCS: Performed by: ANESTHESIOLOGY

## 2024-08-15 PROCEDURE — 2580000003 HC RX 258: Performed by: NURSE PRACTITIONER

## 2024-08-15 PROCEDURE — 82962 GLUCOSE BLOOD TEST: CPT

## 2024-08-15 PROCEDURE — 7100000000 HC PACU RECOVERY - FIRST 15 MIN: Performed by: SURGERY

## 2024-08-15 PROCEDURE — 86901 BLOOD TYPING SEROLOGIC RH(D): CPT

## 2024-08-15 PROCEDURE — 36415 COLL VENOUS BLD VENIPUNCTURE: CPT

## 2024-08-15 PROCEDURE — 86850 RBC ANTIBODY SCREEN: CPT

## 2024-08-15 PROCEDURE — 80048 BASIC METABOLIC PNL TOTAL CA: CPT

## 2024-08-15 RX ORDER — SODIUM CHLORIDE, SODIUM LACTATE, POTASSIUM CHLORIDE, CALCIUM CHLORIDE 600; 310; 30; 20 MG/100ML; MG/100ML; MG/100ML; MG/100ML
INJECTION, SOLUTION INTRAVENOUS CONTINUOUS
Status: DISCONTINUED | OUTPATIENT
Start: 2024-08-15 | End: 2024-08-15 | Stop reason: HOSPADM

## 2024-08-15 RX ORDER — HYDROCODONE BITARTRATE AND ACETAMINOPHEN 7.5; 325 MG/1; MG/1
1 TABLET ORAL EVERY 6 HOURS PRN
Status: DISCONTINUED | OUTPATIENT
Start: 2024-08-15 | End: 2024-08-15

## 2024-08-15 RX ORDER — NALOXONE HYDROCHLORIDE 0.4 MG/ML
INJECTION, SOLUTION INTRAMUSCULAR; INTRAVENOUS; SUBCUTANEOUS PRN
Status: DISCONTINUED | OUTPATIENT
Start: 2024-08-15 | End: 2024-08-15 | Stop reason: HOSPADM

## 2024-08-15 RX ORDER — SODIUM CHLORIDE 9 MG/ML
INJECTION, SOLUTION INTRAVENOUS PRN
Status: DISCONTINUED | OUTPATIENT
Start: 2024-08-15 | End: 2024-08-15 | Stop reason: HOSPADM

## 2024-08-15 RX ORDER — SODIUM CHLORIDE 0.9 % (FLUSH) 0.9 %
5-40 SYRINGE (ML) INJECTION PRN
Status: DISCONTINUED | OUTPATIENT
Start: 2024-08-15 | End: 2024-08-15 | Stop reason: HOSPADM

## 2024-08-15 RX ORDER — SODIUM CHLORIDE 0.9 % (FLUSH) 0.9 %
5-40 SYRINGE (ML) INJECTION EVERY 12 HOURS SCHEDULED
Status: DISCONTINUED | OUTPATIENT
Start: 2024-08-15 | End: 2024-08-15 | Stop reason: HOSPADM

## 2024-08-15 RX ORDER — FENTANYL CITRATE 50 UG/ML
INJECTION, SOLUTION INTRAMUSCULAR; INTRAVENOUS PRN
Status: DISCONTINUED | OUTPATIENT
Start: 2024-08-15 | End: 2024-08-15 | Stop reason: SDUPTHER

## 2024-08-15 RX ORDER — HYDROMORPHONE HYDROCHLORIDE 1 MG/ML
0.5 INJECTION, SOLUTION INTRAMUSCULAR; INTRAVENOUS; SUBCUTANEOUS EVERY 5 MIN PRN
Status: DISCONTINUED | OUTPATIENT
Start: 2024-08-15 | End: 2024-08-15 | Stop reason: HOSPADM

## 2024-08-15 RX ORDER — FAMOTIDINE 10 MG/ML
INJECTION, SOLUTION INTRAVENOUS
Status: DISPENSED
Start: 2024-08-15 | End: 2024-08-16

## 2024-08-15 RX ORDER — INSULIN GLARGINE 100 [IU]/ML
10 INJECTION, SOLUTION SUBCUTANEOUS 2 TIMES DAILY
Status: DISCONTINUED | OUTPATIENT
Start: 2024-08-15 | End: 2024-08-18

## 2024-08-15 RX ORDER — EPHEDRINE SULFATE/0.9% NACL/PF 25 MG/5 ML
SYRINGE (ML) INTRAVENOUS PRN
Status: DISCONTINUED | OUTPATIENT
Start: 2024-08-15 | End: 2024-08-15 | Stop reason: SDUPTHER

## 2024-08-15 RX ORDER — HYDROMORPHONE HYDROCHLORIDE 1 MG/ML
0.25 INJECTION, SOLUTION INTRAMUSCULAR; INTRAVENOUS; SUBCUTANEOUS EVERY 5 MIN PRN
Status: DISCONTINUED | OUTPATIENT
Start: 2024-08-15 | End: 2024-08-15 | Stop reason: HOSPADM

## 2024-08-15 RX ORDER — SODIUM CHLORIDE 9 MG/ML
INJECTION, SOLUTION INTRAVENOUS PRN
Status: DISCONTINUED | OUTPATIENT
Start: 2024-08-15 | End: 2024-09-05 | Stop reason: HOSPADM

## 2024-08-15 RX ORDER — ONDANSETRON 2 MG/ML
4 INJECTION INTRAMUSCULAR; INTRAVENOUS
Status: DISCONTINUED | OUTPATIENT
Start: 2024-08-15 | End: 2024-08-15 | Stop reason: HOSPADM

## 2024-08-15 RX ORDER — LIDOCAINE HYDROCHLORIDE 10 MG/ML
INJECTION, SOLUTION EPIDURAL; INFILTRATION; INTRACAUDAL; PERINEURAL PRN
Status: DISCONTINUED | OUTPATIENT
Start: 2024-08-15 | End: 2024-08-15 | Stop reason: SDUPTHER

## 2024-08-15 RX ORDER — ONDANSETRON 2 MG/ML
INJECTION INTRAMUSCULAR; INTRAVENOUS PRN
Status: DISCONTINUED | OUTPATIENT
Start: 2024-08-15 | End: 2024-08-15 | Stop reason: SDUPTHER

## 2024-08-15 RX ORDER — PROPOFOL 10 MG/ML
INJECTION, EMULSION INTRAVENOUS PRN
Status: DISCONTINUED | OUTPATIENT
Start: 2024-08-15 | End: 2024-08-15 | Stop reason: SDUPTHER

## 2024-08-15 RX ORDER — SODIUM CHLORIDE 0.9 % (FLUSH) 0.9 %
5-40 SYRINGE (ML) INJECTION PRN
Status: DISCONTINUED | OUTPATIENT
Start: 2024-08-15 | End: 2024-09-05 | Stop reason: HOSPADM

## 2024-08-15 RX ORDER — SODIUM CHLORIDE 0.9 % (FLUSH) 0.9 %
5-40 SYRINGE (ML) INJECTION EVERY 12 HOURS SCHEDULED
Status: DISCONTINUED | OUTPATIENT
Start: 2024-08-15 | End: 2024-09-05 | Stop reason: HOSPADM

## 2024-08-15 RX ADMIN — LIDOCAINE HYDROCHLORIDE 50 MG: 10 INJECTION, SOLUTION EPIDURAL; INFILTRATION; INTRACAUDAL; PERINEURAL at 14:16

## 2024-08-15 RX ADMIN — OXYCODONE 5 MG: 5 TABLET ORAL at 07:56

## 2024-08-15 RX ADMIN — Medication 15 G: at 15:51

## 2024-08-15 RX ADMIN — PROPOFOL 50 MG: 10 INJECTION, EMULSION INTRAVENOUS at 14:16

## 2024-08-15 RX ADMIN — INSULIN LISPRO 4 UNITS: 100 INJECTION, SOLUTION INTRAVENOUS; SUBCUTANEOUS at 22:26

## 2024-08-15 RX ADMIN — PROPOFOL 50 MG: 10 INJECTION, EMULSION INTRAVENOUS at 14:23

## 2024-08-15 RX ADMIN — CEFAZOLIN 2000 MG: 1 INJECTION, POWDER, FOR SOLUTION INTRAMUSCULAR; INTRAVENOUS at 14:22

## 2024-08-15 RX ADMIN — SODIUM CHLORIDE, PRESERVATIVE FREE 10 ML: 5 INJECTION INTRAVENOUS at 07:58

## 2024-08-15 RX ADMIN — ALLOPURINOL 300 MG: 100 TABLET ORAL at 08:02

## 2024-08-15 RX ADMIN — HYDROMORPHONE HYDROCHLORIDE 0.25 MG: 1 INJECTION, SOLUTION INTRAMUSCULAR; INTRAVENOUS; SUBCUTANEOUS at 14:57

## 2024-08-15 RX ADMIN — SODIUM CHLORIDE, SODIUM LACTATE, POTASSIUM CHLORIDE, AND CALCIUM CHLORIDE: 600; 310; 30; 20 INJECTION, SOLUTION INTRAVENOUS at 14:10

## 2024-08-15 RX ADMIN — SODIUM CHLORIDE, PRESERVATIVE FREE 10 ML: 5 INJECTION INTRAVENOUS at 22:47

## 2024-08-15 RX ADMIN — HYDROCODONE BITARTRATE AND ACETAMINOPHEN 1 TABLET: 7.5; 325 TABLET ORAL at 11:10

## 2024-08-15 RX ADMIN — LEVETIRACETAM 500 MG: 500 TABLET, FILM COATED ORAL at 22:25

## 2024-08-15 RX ADMIN — SODIUM CHLORIDE, PRESERVATIVE FREE 10 ML: 5 INJECTION INTRAVENOUS at 22:48

## 2024-08-15 RX ADMIN — ATORVASTATIN CALCIUM 20 MG: 20 TABLET, FILM COATED ORAL at 22:25

## 2024-08-15 RX ADMIN — FENTANYL CITRATE 25 MCG: 0.05 INJECTION, SOLUTION INTRAMUSCULAR; INTRAVENOUS at 14:28

## 2024-08-15 RX ADMIN — OXYCODONE HYDROCHLORIDE 10 MG: 10 TABLET ORAL at 00:38

## 2024-08-15 RX ADMIN — SODIUM CHLORIDE, PRESERVATIVE FREE 20 MG: 5 INJECTION INTRAVENOUS at 13:42

## 2024-08-15 RX ADMIN — INSULIN GLARGINE 10 UNITS: 100 INJECTION, SOLUTION SUBCUTANEOUS at 22:25

## 2024-08-15 RX ADMIN — EPHEDRINE SULFATE 10 MG: 5 INJECTION INTRAVENOUS at 14:23

## 2024-08-15 RX ADMIN — OXYCODONE HYDROCHLORIDE 10 MG: 10 TABLET ORAL at 22:24

## 2024-08-15 RX ADMIN — FOLIC ACID 1 MG: 1 TABLET ORAL at 07:57

## 2024-08-15 RX ADMIN — METOPROLOL SUCCINATE 50 MG: 50 TABLET, FILM COATED, EXTENDED RELEASE ORAL at 07:56

## 2024-08-15 RX ADMIN — OXYCODONE 5 MG: 5 TABLET ORAL at 17:07

## 2024-08-15 RX ADMIN — ONDANSETRON 4 MG: 2 INJECTION INTRAMUSCULAR; INTRAVENOUS at 14:31

## 2024-08-15 RX ADMIN — LEVETIRACETAM 500 MG: 500 TABLET, FILM COATED ORAL at 07:57

## 2024-08-15 RX ADMIN — HYDROMORPHONE HYDROCHLORIDE 0.25 MG: 1 INJECTION, SOLUTION INTRAMUSCULAR; INTRAVENOUS; SUBCUTANEOUS at 15:03

## 2024-08-15 RX ADMIN — FENTANYL CITRATE 50 MCG: 0.05 INJECTION, SOLUTION INTRAMUSCULAR; INTRAVENOUS at 14:13

## 2024-08-15 ASSESSMENT — PAIN SCALES - GENERAL
PAINLEVEL_OUTOF10: 4
PAINLEVEL_OUTOF10: 5
PAINLEVEL_OUTOF10: 6
PAINLEVEL_OUTOF10: 9
PAINLEVEL_OUTOF10: 5
PAINLEVEL_OUTOF10: 3
PAINLEVEL_OUTOF10: 8
PAINLEVEL_OUTOF10: 8
PAINLEVEL_OUTOF10: 6
PAINLEVEL_OUTOF10: 3
PAINLEVEL_OUTOF10: 4
PAINLEVEL_OUTOF10: 5

## 2024-08-15 ASSESSMENT — PAIN - FUNCTIONAL ASSESSMENT
PAIN_FUNCTIONAL_ASSESSMENT: PREVENTS OR INTERFERES SOME ACTIVE ACTIVITIES AND ADLS

## 2024-08-15 ASSESSMENT — PAIN DESCRIPTION - LOCATION
LOCATION: LEG

## 2024-08-15 ASSESSMENT — PAIN DESCRIPTION - DESCRIPTORS
DESCRIPTORS: ACHING;BURNING
DESCRIPTORS: BURNING
DESCRIPTORS: ACHING;SHARP
DESCRIPTORS: ACHING

## 2024-08-15 ASSESSMENT — LIFESTYLE VARIABLES: SMOKING_STATUS: 0

## 2024-08-15 ASSESSMENT — PAIN DESCRIPTION - ORIENTATION
ORIENTATION: RIGHT

## 2024-08-15 NOTE — ANESTHESIA POSTPROCEDURE EVALUATION
Department of Anesthesiology  Postprocedure Note    Patient: Elda Flood  MRN: [id number]  YOB: 1945  Date of evaluation: 8/15/2024    Procedure Summary       Date: 08/15/24 Room / Location: [address]    Anesthesia Start: 1410 Anesthesia Stop: 1447    Procedure: RIGHT LEG EVACUTION HEMATOMA (Right) Diagnosis:       Hematoma of right lower extremity, initial encounter      (Hematoma of right lower extremity, initial encounter [S80.11XA])    Surgeons: Emili Landry DO Responsible Provider: Cyn Jo APRN - CRNA    Anesthesia Type: general ASA Status: 3            Anesthesia Type: No value filed.    Elia Phase I: Elia Score: 4    Elia Phase II:      Anesthesia Post Evaluation    Patient location during evaluation: PACU  Patient participation: complete - patient participated  Level of consciousness: sleepy but conscious  Pain score: 0  Airway patency: patent  Nausea & Vomiting: no nausea and no vomiting  Cardiovascular status: hemodynamically stable  Respiratory status: acceptable  Hydration status: stable  Pain management: adequate    No notable events documented.

## 2024-08-15 NOTE — CARE COORDINATION
08/15/24 1313   Service Assessment   Patient Orientation Alert and Oriented;Person;Place;Situation   Cognition Alert   History Provided By Patient;Child/Family   Primary Caregiver Self   Accompanied By/Relationship friend Cari in room   Support Systems Spouse/Significant Other;Children;Family Members   Patient's Healthcare Decision Maker is: Legal Next of Kin   PCP Verified by CM Yes   Last Visit to PCP Within last 3 months   Prior Functional Level Independent in ADLs/IADLs   Current Functional Level Assistance with the following:;Toileting;Mobility   Can patient return to prior living arrangement Yes   Ability to make needs known: Good   Family able to assist with home care needs: Yes   Would you like for me to discuss the discharge plan with any other family members/significant others, and if so, who? No   Social/Functional History   Lives With Spouse   Type of Home Mobile home   Home Layout One level   Home Access Stairs to enter with rails   Entrance Stairs - Number of Steps 8   Bathroom Shower/Tub Walk-in shower   Bathroom Toilet Standard   Bathroom Accessibility Accessible   Home Equipment Walker - Rolling   Receives Help From Family   ADL Assistance Independent   Homemaking Assistance Independent   Homemaking Responsibilities Yes   Ambulation Assistance Independent   Transfer Assistance Independent   Active  Yes   Mode of Transportation Car   Occupation Retired   Discharge Planning   Type of Residence Trailer/Mobile Home   Living Arrangements Spouse/Significant Other   Current Services Prior To Admission None   Potential Assistance Needed Durable Medical Equipment;Home Care   Potential DME Needed Wheelchair;Wound Vac   DME Ordered? Other (comment)  (following)   Type of Home Care Services None   Patient expects to be discharged to: Trailer/mobile home   Condition of Participation: Discharge Planning   The Plan for Transition of Care is related to the following treatment goals: treatment of leg

## 2024-08-15 NOTE — PROGRESS NOTES
Physical Therapy  Name: Elda Flood  MRN:  [id number]  Date of service:  8/15/2024    Pt. not available for PT due to off the floor for procedure. Will f/u at a later time.    Electronically signed by Olga Dobson PT on 8/15/2024 at 2:04 PM
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
## 2024-08-15 NOTE — PROGRESS NOTES
RLE post-op dressing saturated with serosanguinous drainage through ACE wrap. Dressing removed to xeroform and re-dressed with 4X4 gauze, kerlex, and ACE. Original packing and xeroform left in place. Patient OOB and up to bedside commode, voided 500cc, back to bed. Patient tolerated activity very well. No further needs at this time.    Electronically signed by Elizabeth Sorto RN on 8/15/2024 at 6:57 PM

## 2024-08-15 NOTE — PROGRESS NOTES
Per patient's daughter, Cari, patient is very sensitive to anesthesia. Per patient, last time she was put under, \"I almost just didn't wake up.\" Will notify CRNA when patient has OR assignment made.    Electronically signed by Elizabeth Sorto RN on 8/15/2024 at 12:12 PM

## 2024-08-15 NOTE — OP NOTE
Operative Note      Patient: Elda Flood  YOB: 1945  MRN: [id number]    Date of Procedure: 8/15/2024    Pre-Op Diagnosis Codes:      * Hematoma of right lower extremity, initial encounter [S80.11XA]    Post-Op Diagnosis: Same       Procedure(s):  RIGHT LEG EVACUTION HEMATOMA    Surgeon(s):  Emili Landry DO    Assistant:   * No surgical staff found *    Anesthesia: General    Estimated Blood Loss (mL): less than 100     Complications: None    Specimens:   * No specimens in log *    Implants:  * No implants in log *      Drains: * No LDAs found *    Findings:  Large gluteal hematoma with compromised skin.    Detailed Description of Procedure:   Patient was brought to the operating room and placed in supine position.  Her right leg was prepped and draped in sterile fashion.  Preoperative antibiotics were given.  Timeout was performed.  Using 15 blade the hematoma was incised on top and large blood clots were evacuated.  Subcutaneous fat was intact.  Skin was compromised on top of hematoma with skin necrosis.  It was excised through the dermis.  Residual wound size was 20 cm in length by 16 cm in width and 0.5 cm depth.  Wound was irrigated.  Saline soaked Kerlix roll was laid on top of the wound.  It was covered using Xeroform, 4 x 4's, Kerlix roll and Ace wrap.  Plan to continue dressing changes daily with possible VAC placement on Monday.  Patient tolerated procedure well and was transferred to PACU stable condition.    Electronically signed by Emili Landry DO on 8/15/2024 at 2:42 PM

## 2024-08-15 NOTE — PLAN OF CARE
Problem: Discharge Planning  Goal: Discharge to home or other facility with appropriate resources  8/15/2024 1128 by Soo Wong LPN  Outcome: Progressing  Flowsheets (Taken 8/15/2024 0922)  Discharge to home or other facility with appropriate resources:   Identify barriers to discharge with patient and caregiver   Arrange for needed discharge resources and transportation as appropriate   Identify discharge learning needs (meds, wound care, etc)   Refer to discharge planning if patient needs post-hospital services based on physician order or complex needs related to functional status, cognitive ability or social support system  8/15/2024 0247 by Joyce Mendoza, RN  Outcome: Progressing  Flowsheets  Taken 8/15/2024 0228 by Joyce Mendoza, RN  Discharge to home or other facility with appropriate resources:   Identify barriers to discharge with patient and caregiver   Arrange for needed discharge resources and transportation as appropriate   Identify discharge learning needs (meds, wound care, etc)   Arrange for interpreters to assist at discharge as needed   Refer to discharge planning if patient needs post-hospital services based on physician order or complex needs related to functional status, cognitive ability or social support system  Taken 8/14/2024 1750 by Alma Sinha RN  Discharge to home or other facility with appropriate resources:   Identify barriers to discharge with patient and caregiver   Arrange for needed discharge resources and transportation as appropriate   Identify discharge learning needs (meds, wound care, etc)   Arrange for interpreters to assist at discharge as needed   Refer to discharge planning if patient needs post-hospital services based on physician order or complex needs related to functional status, cognitive ability or social support system  Taken 8/14/2024 1600 by Alma Sinha RN  Discharge to home or other facility with appropriate resources:   Identify barriers to

## 2024-08-15 NOTE — PROGRESS NOTES
Physician Progress Note      PATIENT:               KARINA MYLES  CSN #:                  [id number]  :                       1945  ADMIT DATE:       2024 10:20 AM  DISCH DATE:  RESPONDING  PROVIDER #:        CALLI AGUAYO          QUERY TEXT:    Pt admitted with hematoma RLE. Pt noted to have decline in H&H. If possible,   please document in the progress notes and discharge summary if you are   evaluating and/or treating any of the following:    The medical record reflects the following:  Risk Factors: Hematoma RLE. Eliquis as home medication  Clinical Indicators: H&H beginning @ admission, 10. 7.4/24 7.5/23.5.  Treatment: Serial CBC, evacuation of hematoma, Vascular consultation,  Options provided:  -- Acute blood loss anemia  -- Postoperative acute blood loss anemia  -- Other - I will add my own diagnosis  -- Disagree - Not applicable / Not valid  -- Disagree - Clinically unable to determine / Unknown  -- Refer to Clinical Documentation Reviewer    PROVIDER RESPONSE TEXT:    This patient has acute blood loss anemia.    Query created by: Calli Boogie on 8/15/2024 6:54 PM      Electronically signed by:  CALLI AGUAYO 8/15/2024 7:01 PM

## 2024-08-15 NOTE — PROGRESS NOTES
Dr. Whitley has read EKG no new orders at this time. Electronically signed by Raul Goodwin RN on 8/15/2024 at 3:07 PM

## 2024-08-15 NOTE — PLAN OF CARE
Problem: Discharge Planning  Goal: Discharge to home or other facility with appropriate resources  Outcome: Progressing  Flowsheets  Taken 8/15/2024 0228 by Joyce Mendoza RN  Discharge to home or other facility with appropriate resources:   Identify barriers to discharge with patient and caregiver   Arrange for needed discharge resources and transportation as appropriate   Identify discharge learning needs (meds, wound care, etc)   Arrange for interpreters to assist at discharge as needed   Refer to discharge planning if patient needs post-hospital services based on physician order or complex needs related to functional status, cognitive ability or social support system  Taken 8/14/2024 1750 by Alma Sinha RN  Discharge to home or other facility with appropriate resources:   Identify barriers to discharge with patient and caregiver   Arrange for needed discharge resources and transportation as appropriate   Identify discharge learning needs (meds, wound care, etc)   Arrange for interpreters to assist at discharge as needed   Refer to discharge planning if patient needs post-hospital services based on physician order or complex needs related to functional status, cognitive ability or social support system  Taken 8/14/2024 1600 by Alma Sinha RN  Discharge to home or other facility with appropriate resources:   Identify barriers to discharge with patient and caregiver   Arrange for needed discharge resources and transportation as appropriate   Identify discharge learning needs (meds, wound care, etc)   Arrange for interpreters to assist at discharge as needed   Refer to discharge planning if patient needs post-hospital services based on physician order or complex needs related to functional status, cognitive ability or social support system     Problem: Pain  Goal: Verbalizes/displays adequate comfort level or baseline comfort level  Outcome: Progressing     Problem: Safety - Adult  Goal: Free from  fall injury  Outcome: Progressing     Problem: Skin/Tissue Integrity  Goal: Absence of new skin breakdown  Description: 1.  Monitor for areas of redness and/or skin breakdown  2.  Assess vascular access sites hourly  3.  Every 4-6 hours minimum:  Change oxygen saturation probe site  4.  Every 4-6 hours:  If on nasal continuous positive airway pressure, respiratory therapy assess nares and determine need for appliance change or resting period.  Outcome: Progressing     Problem: ABCDS Injury Assessment  Goal: Absence of physical injury  Outcome: Progressing

## 2024-08-15 NOTE — CARE COORDINATION
08/15/24 1304   IMM Letter   IMM Letter given to Patient/Family/Significant other/Guardian/POA/by: letter explained to and signed by roma MORENO   IMM Letter date given: 08/15/24   IMM Letter time given: 1245     Changed from Obs to Inpt.  Important Message from Medicare letter explained and provided to patient by Tangela Lan RN CM. All questions answered. Patient voiced understanding. Copy placed in soft chart.

## 2024-08-15 NOTE — PROGRESS NOTES
This nurse received report from PATRICIA Carter at this time. Pt to bed transported back to room via bed.

## 2024-08-15 NOTE — ANESTHESIA PRE PROCEDURE
Department of Anesthesiology  Preprocedure Note       Name:  Elda Flood   Age:  79 y.o.  :  1945                                          MRN:  [id number]         Date:  8/15/2024      Surgeon: Surgeon(s):  Emili Landry DO    Procedure: Procedure(s):  RIGHT LEG EVACUTION HEMATOMA    Medications prior to admission:   Prior to Admission medications    Medication Sig Start Date End Date Taking? Authorizing Provider   HYDROcodone-acetaminophen (NORCO) 7.5-325 MG per tablet Take 1 tablet by mouth every 6 hours as needed for Pain for up to 30 days. Max Daily Amount: 4 tablets 24  NICOLE Solis DO   amLODIPine (NORVASC) 5 MG tablet Take 1 tablet by mouth daily Dose increase 24- Take 2 tablets by mouth daily  Patient taking differently: Take 2 tablets by mouth daily Dose increase 24- Take 2 tablets by mouth daily 24   NICOLE Solis DO   SITagliptin (JANUVIA) 100 MG tablet Take 1 tablet by mouth daily 24   Fernando Peterson APRN   colesevelam (WELCHOL) 625 MG tablet Take 3 tablets by mouth 2 times daily (with meals) 24   Fernando Peterson APRN   ELIQUIS 5 MG TABS tablet TAKE ONE TABLET BY MOUTH TWICE DAILY 24   Rosalie Merritt APRN   Multiple Vitamins-Minerals (ONE-DAILY MULTI-VIT/MINERAL) TABS TAKE ONE TABLET BY MOUTH DAILY 24   NICOLE Solis DO   atorvastatin (LIPITOR) 20 MG tablet TAKE ONE TABLET BY MOUTH AT BEDTIME 24   NICOLE Solis DO   furosemide (LASIX) 20 MG tablet Take 1 tablet by mouth daily 24   NICOLE Solis DO   vitamin D (ERGOCALCIFEROL) 1.25 MG (27536 UT) CAPS capsule Take 1 capsule by mouth once a week 23   NICOLE Solis DO   lisinopril (PRINIVIL;ZESTRIL) 30 MG tablet Take 1 tablet by mouth daily  Patient taking differently: Take 0.5 tablets by mouth daily 23   NICOLE Solis DO   folic acid (FOLVITE) 1 MG tablet Take 1 tablet by mouth daily 10/17/23 9/10/24
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
## 2024-08-15 NOTE — PROGRESS NOTES
Dr. Whitley made aware of patients complaint of chest pain. EKG orders received, awaiting EKG reading from Dr. Whitley. Will update as needed. Electronically signed by Raul Goodwin RN on 8/15/2024 at 3:01 PM

## 2024-08-16 LAB
ANION GAP SERPL CALCULATED.3IONS-SCNC: 12 MMOL/L (ref 7–19)
BLOOD BANK DISPENSE STATUS: NORMAL
BLOOD BANK PRODUCT CODE: NORMAL
BPU ID: NORMAL
BUN SERPL-MCNC: 27 MG/DL (ref 8–23)
CALCIUM SERPL-MCNC: 8.5 MG/DL (ref 8.8–10.2)
CHLORIDE SERPL-SCNC: 96 MMOL/L (ref 98–111)
CO2 SERPL-SCNC: 20 MMOL/L (ref 22–29)
CREAT SERPL-MCNC: 0.9 MG/DL (ref 0.5–0.9)
DESCRIPTION BLOOD BANK: NORMAL
ERYTHROCYTE [DISTWIDTH] IN BLOOD BY AUTOMATED COUNT: 14 % (ref 11.5–14.5)
GLUCOSE BLD-MCNC: 147 MG/DL (ref 70–99)
GLUCOSE BLD-MCNC: 195 MG/DL (ref 70–99)
GLUCOSE BLD-MCNC: 287 MG/DL (ref 70–99)
GLUCOSE BLD-MCNC: 333 MG/DL (ref 70–99)
GLUCOSE SERPL-MCNC: 160 MG/DL (ref 74–109)
HCT VFR BLD AUTO: 21 % (ref 37–47)
HCT VFR BLD AUTO: 27.1 % (ref 37–47)
HGB BLD-MCNC: 6.8 G/DL (ref 12–16)
HGB BLD-MCNC: 8.4 G/DL (ref 12–16)
MAGNESIUM SERPL-MCNC: 1.9 MG/DL (ref 1.6–2.4)
MCH RBC QN AUTO: 24.5 PG (ref 27–31)
MCHC RBC AUTO-ENTMCNC: 32.4 G/DL (ref 33–37)
MCV RBC AUTO: 75.8 FL (ref 81–99)
PERFORMED ON: ABNORMAL
PLATELET # BLD AUTO: 251 K/UL (ref 130–400)
PMV BLD AUTO: 12.4 FL (ref 9.4–12.3)
POTASSIUM SERPL-SCNC: 4.6 MMOL/L (ref 3.5–5)
RBC # BLD AUTO: 2.77 M/UL (ref 4.2–5.4)
SODIUM SERPL-SCNC: 128 MMOL/L (ref 136–145)
WBC # BLD AUTO: 20.2 K/UL (ref 4.8–10.8)

## 2024-08-16 PROCEDURE — 97161 PT EVAL LOW COMPLEX 20 MIN: CPT

## 2024-08-16 PROCEDURE — 97165 OT EVAL LOW COMPLEX 30 MIN: CPT

## 2024-08-16 PROCEDURE — 97530 THERAPEUTIC ACTIVITIES: CPT

## 2024-08-16 PROCEDURE — 1200000000 HC SEMI PRIVATE

## 2024-08-16 PROCEDURE — 97535 SELF CARE MNGMENT TRAINING: CPT

## 2024-08-16 PROCEDURE — 85014 HEMATOCRIT: CPT

## 2024-08-16 PROCEDURE — 51798 US URINE CAPACITY MEASURE: CPT

## 2024-08-16 PROCEDURE — 2580000003 HC RX 258: Performed by: SURGERY

## 2024-08-16 PROCEDURE — 85018 HEMOGLOBIN: CPT

## 2024-08-16 PROCEDURE — 6360000002 HC RX W HCPCS: Performed by: SURGERY

## 2024-08-16 PROCEDURE — 36415 COLL VENOUS BLD VENIPUNCTURE: CPT

## 2024-08-16 PROCEDURE — 94760 N-INVAS EAR/PLS OXIMETRY 1: CPT

## 2024-08-16 PROCEDURE — 83735 ASSAY OF MAGNESIUM: CPT

## 2024-08-16 PROCEDURE — 6370000000 HC RX 637 (ALT 250 FOR IP): Performed by: SURGERY

## 2024-08-16 PROCEDURE — 80048 BASIC METABOLIC PNL TOTAL CA: CPT

## 2024-08-16 PROCEDURE — 85027 COMPLETE CBC AUTOMATED: CPT

## 2024-08-16 PROCEDURE — 36430 TRANSFUSION BLD/BLD COMPNT: CPT

## 2024-08-16 PROCEDURE — 82962 GLUCOSE BLOOD TEST: CPT

## 2024-08-16 RX ORDER — SODIUM CHLORIDE 9 MG/ML
INJECTION, SOLUTION INTRAVENOUS PRN
Status: DISCONTINUED | OUTPATIENT
Start: 2024-08-16 | End: 2024-08-23 | Stop reason: SDUPTHER

## 2024-08-16 RX ORDER — BISMUTH TRIBROMOPH/PETROLATUM 5"X9"
1 BANDAGE TOPICAL 2 TIMES DAILY
Status: DISCONTINUED | OUTPATIENT
Start: 2024-08-16 | End: 2024-08-19

## 2024-08-16 RX ADMIN — LEVETIRACETAM 500 MG: 500 TABLET, FILM COATED ORAL at 09:04

## 2024-08-16 RX ADMIN — INSULIN GLARGINE 10 UNITS: 100 INJECTION, SOLUTION SUBCUTANEOUS at 21:04

## 2024-08-16 RX ADMIN — INSULIN GLARGINE 10 UNITS: 100 INJECTION, SOLUTION SUBCUTANEOUS at 09:10

## 2024-08-16 RX ADMIN — INSULIN LISPRO 3 UNITS: 100 INJECTION, SOLUTION INTRAVENOUS; SUBCUTANEOUS at 13:23

## 2024-08-16 RX ADMIN — ATORVASTATIN CALCIUM 20 MG: 20 TABLET, FILM COATED ORAL at 21:04

## 2024-08-16 RX ADMIN — LEVETIRACETAM 500 MG: 500 TABLET, FILM COATED ORAL at 21:04

## 2024-08-16 RX ADMIN — MORPHINE SULFATE 2 MG: 2 INJECTION, SOLUTION INTRAMUSCULAR; INTRAVENOUS at 08:31

## 2024-08-16 RX ADMIN — SODIUM CHLORIDE, PRESERVATIVE FREE 10 ML: 5 INJECTION INTRAVENOUS at 21:29

## 2024-08-16 RX ADMIN — FOLIC ACID 1 MG: 1 TABLET ORAL at 09:02

## 2024-08-16 RX ADMIN — METOPROLOL SUCCINATE 50 MG: 50 TABLET, FILM COATED, EXTENDED RELEASE ORAL at 09:03

## 2024-08-16 RX ADMIN — Medication 15 G: at 09:04

## 2024-08-16 RX ADMIN — OXYCODONE HYDROCHLORIDE 10 MG: 10 TABLET ORAL at 06:16

## 2024-08-16 RX ADMIN — MORPHINE SULFATE 2 MG: 2 INJECTION, SOLUTION INTRAMUSCULAR; INTRAVENOUS at 21:03

## 2024-08-16 RX ADMIN — CHOLESTYRAMINE 4 G: 4 POWDER, FOR SUSPENSION ORAL at 09:04

## 2024-08-16 RX ADMIN — Medication 1 EACH: at 21:04

## 2024-08-16 RX ADMIN — OXYCODONE HYDROCHLORIDE 10 MG: 10 TABLET ORAL at 14:47

## 2024-08-16 RX ADMIN — ALLOPURINOL 300 MG: 100 TABLET ORAL at 09:02

## 2024-08-16 ASSESSMENT — PAIN DESCRIPTION - LOCATION
LOCATION: LEG

## 2024-08-16 ASSESSMENT — PAIN DESCRIPTION - ORIENTATION
ORIENTATION: RIGHT

## 2024-08-16 ASSESSMENT — PAIN DESCRIPTION - DESCRIPTORS
DESCRIPTORS: ACHING

## 2024-08-16 ASSESSMENT — PAIN - FUNCTIONAL ASSESSMENT
PAIN_FUNCTIONAL_ASSESSMENT: PREVENTS OR INTERFERES SOME ACTIVE ACTIVITIES AND ADLS
PAIN_FUNCTIONAL_ASSESSMENT: PREVENTS OR INTERFERES SOME ACTIVE ACTIVITIES AND ADLS

## 2024-08-16 ASSESSMENT — PAIN SCALES - WONG BAKER: WONGBAKER_NUMERICALRESPONSE: NO HURT

## 2024-08-16 ASSESSMENT — PAIN SCALES - GENERAL
PAINLEVEL_OUTOF10: 8
PAINLEVEL_OUTOF10: 6
PAINLEVEL_OUTOF10: 10
PAINLEVEL_OUTOF10: 3
PAINLEVEL_OUTOF10: 3
PAINLEVEL_OUTOF10: 7
PAINLEVEL_OUTOF10: 8

## 2024-08-16 NOTE — PROGRESS NOTES
Oxycodone 10 mg pill was found in patient's bed. None were given this shift. Pill confirmed by Cassidy from pharmacy in person at the desk. Pill disposed of in medical waste in 3rd floor med room by Pauline GOMEZ as witnessed by Val GOMEZ.     I witnessed Pauline dispose of pill that was found in pt bed.  Tangela Mckeon RN

## 2024-08-16 NOTE — PROGRESS NOTES
Restrictions  Restrictions/Precautions  Restrictions/Precautions: Fall Risk     Subjective   Pain: pre-tx: 6/10 (had meds), post-tx: 8/10 (nurse notified)  General  Diagnosis: RLE HEMATOMA EVACUATION  Subjective  Subjective: Pt WILLING TO PARTICIPATE BUT HESITANT DUE TO PAIN         Social/Functional History  Social/Functional History  Lives With: Spouse  Type of Home: Mobile home  Home Layout: One level  Home Access: Stairs to enter with rails ([employer] to build ramp soon)  Entrance Stairs - Number of Steps: 8  Bathroom Shower/Tub: Walk-in shower  Bathroom Toilet: Standard  Bathroom Accessibility: Accessible  Home Equipment: Walker - Rolling  Has the patient had two or more falls in the past year or any fall with injury in the past year?: No  Receives Help From: Family  ADL Assistance: Independent  Homemaking Assistance: Independent  Homemaking Responsibilities: Yes  Ambulation Assistance: Independent  Transfer Assistance: Independent  Active : Yes  Mode of Transportation: Car  Occupation: Retired  Vision/Hearing  Vision  Vision: Within Functional Limits  Hearing  Hearing: Within functional limits    Cognition   Orientation  Overall Orientation Status: Within Normal Limits  Cognition  Overall Cognitive Status: WNL     Objective   Temp: 98.2 °F (36.8 °C)  Pulse: 88  Heart Rate Source: Monitor  Respirations: 16  SpO2: 100 %  O2 Device: None (Room air)  BP: (!) 114/59  MAP (Calculated): 77  BP Location: Right lower arm  Patient Position: Supine     Observation/Palpation  Posture: Fair  Gross Assessment  AROM: Generally decreased, functional  Strength: Generally decreased, functional                    Bed mobility  Supine to Sit: Moderate assistance;Maximum assistance (mostly due to RLE)  Sit to Supine: Moderate assistance;Maximum assistance  Transfers  Sit to Stand: Moderate Assistance;2 Person Assistance  Stand to Sit: Moderate Assistance;2 Person Assistance  Comment: DIFFICULT TO ACHIEVE FULL UPRIGHT  POSITION IN WALKER, AVOIDS WEIGHTBEARING RLE        Balance  Sitting - Dynamic: Fair  Standing - Dynamic: Poor           OutComes Score                                                  AM-PAC - Mobility              Tinneti Score       Goals  Short Term Goals  Time Frame for Short Term Goals: 14 DAYS  Short Term Goal 1: BED MOB MIN ASSIST  Short Term Goal 2: TRANSFERS MIN ASSIST  Short Term Goal 3: AMB 25' RW MIN ASSIST       Education  Patient Education  Education Given To: Patient;Family  Education Provided: Role of Therapy;Plan of Care  Barriers to Learning: None      Therapy Time   Individual Concurrent Group Co-treatment   Time In           Time Out           Minutes                   Trish Toussaint, PT

## 2024-08-16 NOTE — ACP (ADVANCE CARE PLANNING)
Advance Care Planning     Advance Care Planning Inpatient Note  Spiritual Care Department    Today's Date: 8/16/2024  Unit: [facility] NA[facility]/VAS/MED    Received request from rounding.  Upon review of chart and communication with care team, patient's decision making abilities are not in question.. Patient was/were present in the room during visit.    Goals of ACP Conversation:  Facilitate a discussion related to patient's goals of care as they align with the patient's values and beliefs.    Health Care Decision Makers:       Primary Decision Maker: SONIA STALLWORTH - Child - [phone]    Primary Decision Maker: Shaun Flood - Spouse - [phone]  Summary:  Documented Next of Kin, per patient report  The patient had a  family of Bapt[employer] and they all helpful to her. She has a son and family and enjoy her [employer] fellowship and life getting along ok with the given situation.    Advance Care Planning Documents (Patient Wishes):  None     Assessment:  The patient was emotionally feel well and did not have spiritual issues and her [employer] was a great blessing to her during this time.    Interventions:  Encouraged ongoing ACP conversation with future decision makers and loved ones    Care Preferences Communicated:   No    Outcomes/Plan:      Electronically signed by Stephon Benitez on 8/16/2024 at 11:30 AM

## 2024-08-16 NOTE — PROGRESS NOTES
Occupational Therapy Initial Assessment  Date: 2024   Patient Name: Elda Flood  MRN: [id number]     : 1945    Date of Service: 2024    Discharge Recommendations:  Patient would benefit from continued therapy after discharge       Assessment   Assessment: Evaluation completed and tx initiated.  The patient would benefit from further skilled therapy to upgrade safety and functional independence. Needs to establish a functional method for transfers due to patient not currently able to stand very well with assist of two. Pain management will be a significant factor in achieving this goal.  Initial strategies may need to include sliding board.  Patient and family will likely need repetitive training to be able to perform all necessary tasks for safe home discharge with 24/7 care.  Recommend rehab prior to discharge home  Treatment Diagnosis: Hematoma RLE s/p evacuation of hematoma  REQUIRES OT FOLLOW-UP: Yes  Activity Tolerance  Activity Tolerance: Patient limited by pain           Patient Diagnosis(es): The primary encounter diagnosis was Hematoma of leg, right, initial encounter. Diagnoses of Bleeding into the skin and Uncontrolled pain were also pertinent to this visit.   has a past medical history of Arthritis, Atrial fibrillation (HCC), Hyperlipidemia, Hypertension, Incisional hernia, and Stroke (cerebrum) ([facility]).   has a past surgical history that includes Cholecystectomy; Appendectomy; Colonoscopy (); Upper gastrointestinal endoscopy (); Colonoscopy (16);  section; hernia repair (); hernia repair; Umbilical hernia repair (N/A, 2019); Hysterectomy, total abdominal (); Ovary removal (Bilateral, ); and Leg Surgery (Right, 8/15/2024).    Treatment Diagnosis: Hematoma RLE s/p evacuation of hematoma      Restrictions  Restrictions/Precautions  Restrictions/Precautions: Fall Risk    Subjective   General  Chart Reviewed: Yes  Patient assessed for rehabilitation

## 2024-08-16 NOTE — PLAN OF CARE
Problem: Discharge Planning  Goal: Discharge to home or other facility with appropriate resources  8/16/2024 1504 by Pauline Lee RN  Outcome: Progressing  8/16/2024 0322 by Joyce Mendoza RN  Outcome: Progressing  Flowsheets (Taken 8/16/2024 0313)  Discharge to home or other facility with appropriate resources:   Identify barriers to discharge with patient and caregiver   Arrange for needed discharge resources and transportation as appropriate   Identify discharge learning needs (meds, wound care, etc)   Arrange for interpreters to assist at discharge as needed   Refer to discharge planning if patient needs post-hospital services based on physician order or complex needs related to functional status, cognitive ability or social support system     Problem: Pain  Goal: Verbalizes/displays adequate comfort level or baseline comfort level  8/16/2024 1504 by Pauline Lee RN  Outcome: Progressing  8/16/2024 0322 by Joyce Mendoza RN  Outcome: Progressing     Problem: Safety - Adult  Goal: Free from fall injury  8/16/2024 1504 by Pauilne Lee RN  Outcome: Progressing  8/16/2024 0322 by Joyce Mendoza RN  Outcome: Progressing     Problem: Skin/Tissue Integrity  Goal: Absence of new skin breakdown  Description: 1.  Monitor for areas of redness and/or skin breakdown  2.  Assess vascular access sites hourly  3.  Every 4-6 hours minimum:  Change oxygen saturation probe site  4.  Every 4-6 hours:  If on nasal continuous positive airway pressure, respiratory therapy assess nares and determine need for appliance change or resting period.  8/16/2024 1504 by Pauline Lee RN  Outcome: Progressing  8/16/2024 0322 by Joyce Mendoza RN  Outcome: Progressing     Problem: ABCDS Injury Assessment  Goal: Absence of physical injury  8/16/2024 1504 by Pauline Lee RN  Outcome: Progressing  8/16/2024 0322 by Joyce Mendoza RN  Outcome: Progressing     Problem: Chronic Conditions and Co-morbidities  Goal: Patient's  chronic conditions and co-morbidity symptoms are monitored and maintained or improved  8/16/2024 1504 by Pauline Lee, RN  Outcome: Progressing  8/16/2024 0322 by Joyce Mendoza, RN  Outcome: Progressing  Flowsheets (Taken 8/16/2024 0313)  Care Plan - Patient's Chronic Conditions and Co-Morbidity Symptoms are Monitored and Maintained or Improved:   Monitor and assess patient's chronic conditions and comorbid symptoms for stability, deterioration, or improvement   Collaborate with multidisciplinary team to address chronic and comorbid conditions and prevent exacerbation or deterioration   Update acute care plan with appropriate goals if chronic or comorbid symptoms are exacerbated and prevent overall improvement and discharge

## 2024-08-16 NOTE — PROGRESS NOTES
Occupational Therapy      Getting blood transfusion in am.  Electronically signed by Elda Melchor OT on 8/16/2024 at 1:23 PM

## 2024-08-16 NOTE — PROGRESS NOTES
[facility] Hospitalists      Progress Note    Patient:  Elda Flood  YOB: 1945  Date of Service: 8/16/2024  MRN: [id number]   Acct: [id number]   Primary Care Physician: NICOLE Solis DO  Advance Directive: Full Code  Admit Date: 8/14/2024       Hospital Day: 2    Portions of this note have been copied forward, however, updated to reflect the most current clinical status of this patient.     CHIEF COMPLAINT right leg hematoma    SUBJECTIVE: Pain controlled does not have any new complaints      CUMULATIVE HOSPITAL COURSE:    The patient is a 79 y.o. female who presented to [facility] ED for evaluation of bruising and pain of right lower leg. Pt has history of atrial fibrillation on eliquis, diabetes, stroke, hypertension and hyperlipidemia.     Pt describes accidentally being kicked along her right lower anterior leg yesterday assisting her  from the floor after he fell. She has had increased pain and swelling since onset. She had evaluation at pcp office today sent here for further evaluation. She has had increased pain and swelling since onset. She has not been able to ambulate due to symptoms.      In ED, patient's pain difficult to control after doses of iv pain medication. Dr. Landry by ED physician with recommendation to hold eliquis, ACE wrap, elevate and apply ice to affected area as needed. CT right tib/fib-17.4 x 11.1 x 4.0 cm heterogeneous mass/collection within the anteromedial subcutaneous tissues of the calf through the ankle concerning for a large hematoma with active extravasation from venous varicosities at that level as detailed above.       Wbc 16.9k, hgb 10.8, platelets 325k, esr 4, magnesium 1.6, sodium 125-sodium 128 on 7/31/2024, creatinine 0.7/bun 12, glucose 207, ck 25, INR 1.48. Pt is admitted observation to hospitalist.  Patient's pain is controlled .  Need for transfusion discussed with patient and family and she consented and consent in the chart        Objective:  vascular calcifications with otherwise limited assessment the arterial system on this non angiographic study.  Marked demineralization.  Marked tricompartmental osteoarthrosis in the right knee with severe joint space narrowing.  All three compartments with most severe joint space narrowing.  Articular surface remodeling medial compartment.  Notch osteophytes and degenerative spurring along the tibial spines.  Partially imaged moderate sized suprapatellar effusion.  5.8 x 2.4 x 2.3 cm complex low attenuation lesion with peripheral wall thickening internal septations posteromedially at the knee through the proximal calf, favoring a complex popliteal cyst at that level.  Correlate clinically for any signs of infection.  Muscle atrophy.  Partially imaged degenerative change of the ankle and retrocalcaneal spur.  Chronic-appearing deformity of the lateral malleolus related to old trauma.  Marked demineralization limits sensitivity for detection of an acute nondisplaced fracture without definite evidence of an acute fracture.  Chronic deformity remodeling along the syndesmosis distally.  Unexpected finding:  The findings were communicated to Dr. Esteves by telephone at 1:53 p.m. on 08/14/2024.      17.4 x 11.1 x 4.0 cm heterogeneous mass/collection within the anteromedial subcutaneous tissues of the calf through the ankle concerning for a large hematoma with active extravasation from venous varicosities at that level as detailed above.  Recommend follow up imaging to resolution in order to exclude the presence of an underlying mass at that level.  Adjacent subcutaneous edema.  Marked demineralization limits sensitivity for detection of an acute nondisplaced fracture without definite evidence of an acute fracture.  Marked degenerative changes of the knee with nonspecific joint effusion and suspected complex popliteal cyst as described.  Partially imaged chronic / degenerative changes of the ankle.  Atherosclerotic vascular

## 2024-08-16 NOTE — PROGRESS NOTES
08/16/24 1113   Encounter Summary   Encounter Overview/Reason Initial Encounter   Service Provided For Patient   Referral/Consult From Nurse   Support System Family members;M[employer]on/db community   Complexity of Encounter Low   Begin Time 1050   End Time  1105   Total Time Calculated 15 min   Crisis   Type Family Care   Spiritual/Emotional needs   Type Spiritual Support   Grief, Loss, and Adjustments   Type Adjustment to illness   Advance Care Planning   Type ACP conversation   Assessment/Intervention/Outcome   Assessment Hopeful;Peaceful  (The patient said she belonged to a [employer] and her whole family  attend for Sunday worships.  She did not have any emotional or social issues in her family.)   Intervention Active listening;Discussed death, afterlife;Empowerment;Prayer (assurance of)/[place];Nurtured Hope  (The patient expressed that she was a Judaism and her life is anchored in [place].  I did not have any issues said the patient.)   Outcome Comfort;Concerns relieved;Encouraged;Expressed Gratitude   Plan and Referrals   Plan/Referrals No future visits requested   Does the patient have a [facility] PCP? No     Electronically signed by Stephon BenitezRaymond Vizcaino. on 8/16/2024 at 11:28 AM

## 2024-08-16 NOTE — CONSENT
Informed Consent for Blood Component Transfusion Note    I have discussed with the patient the rationale for blood component transfusion; its benefits in treating or preventing fatigue, organ damage, or death; and its risk which includes mild transfusion reactions, rare risk of blood borne infection, or more serious but rare reactions. I have discussed the alternatives to transfusion, including the risk and consequences of not receiving transfusion. The patient had an opportunity to ask questions and had agreed to proceed with transfusion of blood components.    Electronically signed by LAYLA Johansen CNP on 8/16/24 at 7:58 AM SONJA

## 2024-08-16 NOTE — PLAN OF CARE
Problem: Discharge Planning  Goal: Discharge to home or other facility with appropriate resources  Outcome: Progressing  Flowsheets (Taken 8/16/2024 0313)  Discharge to home or other facility with appropriate resources:   Identify barriers to discharge with patient and caregiver   Arrange for needed discharge resources and transportation as appropriate   Identify discharge learning needs (meds, wound care, etc)   Arrange for interpreters to assist at discharge as needed   Refer to discharge planning if patient needs post-hospital services based on physician order or complex needs related to functional status, cognitive ability or social support system     Problem: Pain  Goal: Verbalizes/displays adequate comfort level or baseline comfort level  Outcome: Progressing     Problem: Safety - Adult  Goal: Free from fall injury  Outcome: Progressing     Problem: Skin/Tissue Integrity  Goal: Absence of new skin breakdown  Description: 1.  Monitor for areas of redness and/or skin breakdown  2.  Assess vascular access sites hourly  3.  Every 4-6 hours minimum:  Change oxygen saturation probe site  4.  Every 4-6 hours:  If on nasal continuous positive airway pressure, respiratory therapy assess nares and determine need for appliance change or resting period.  Outcome: Progressing     Problem: ABCDS Injury Assessment  Goal: Absence of physical injury  Outcome: Progressing     Problem: Chronic Conditions and Co-morbidities  Goal: Patient's chronic conditions and co-morbidity symptoms are monitored and maintained or improved  Outcome: Progressing  Flowsheets (Taken 8/16/2024 0313)  Care Plan - Patient's Chronic Conditions and Co-Morbidity Symptoms are Monitored and Maintained or Improved:   Monitor and assess patient's chronic conditions and comorbid symptoms for stability, deterioration, or improvement   Collaborate with multidisciplinary team to address chronic and comorbid conditions and prevent exacerbation or

## 2024-08-17 LAB
ANION GAP SERPL CALCULATED.3IONS-SCNC: 11 MMOL/L (ref 7–19)
BACTERIA URNS QL MICRO: NEGATIVE /HPF
BILIRUB UR QL STRIP: NEGATIVE
BUN SERPL-MCNC: 22 MG/DL (ref 8–23)
CALCIUM SERPL-MCNC: 8.9 MG/DL (ref 8.8–10.2)
CHLORIDE SERPL-SCNC: 102 MMOL/L (ref 98–111)
CLARITY UR: CLEAR
CO2 SERPL-SCNC: 20 MMOL/L (ref 22–29)
COLOR UR: YELLOW
CREAT SERPL-MCNC: 0.7 MG/DL (ref 0.5–0.9)
CRYSTALS URNS MICRO: NORMAL /HPF
EPI CELLS #/AREA URNS AUTO: 0 /HPF (ref 0–5)
ERYTHROCYTE [DISTWIDTH] IN BLOOD BY AUTOMATED COUNT: 15.5 % (ref 11.5–14.5)
GLUCOSE BLD-MCNC: 194 MG/DL (ref 70–99)
GLUCOSE BLD-MCNC: 240 MG/DL (ref 70–99)
GLUCOSE BLD-MCNC: 245 MG/DL (ref 70–99)
GLUCOSE BLD-MCNC: 262 MG/DL (ref 70–99)
GLUCOSE SERPL-MCNC: 163 MG/DL (ref 74–109)
GLUCOSE UR STRIP.AUTO-MCNC: NEGATIVE MG/DL
HCT VFR BLD AUTO: 23.6 % (ref 37–47)
HCT VFR BLD AUTO: 25.1 % (ref 37–47)
HCT VFR BLD AUTO: 26.3 % (ref 37–47)
HGB BLD-MCNC: 7.4 G/DL (ref 12–16)
HGB BLD-MCNC: 7.7 G/DL (ref 12–16)
HGB BLD-MCNC: 8.1 G/DL (ref 12–16)
HGB UR STRIP.AUTO-MCNC: NEGATIVE MG/L
HYALINE CASTS #/AREA URNS AUTO: 0 /HPF (ref 0–8)
KETONES UR STRIP.AUTO-MCNC: NEGATIVE MG/DL
LACTATE BLDV-SCNC: 1.7 MMOL/L (ref 0.5–1.9)
LEUKOCYTE ESTERASE UR QL STRIP.AUTO: ABNORMAL
MAGNESIUM SERPL-MCNC: 1.6 MG/DL (ref 1.6–2.4)
MCH RBC QN AUTO: 24.8 PG (ref 27–31)
MCHC RBC AUTO-ENTMCNC: 30.7 G/DL (ref 33–37)
MCV RBC AUTO: 80.7 FL (ref 81–99)
NITRITE UR QL STRIP.AUTO: NEGATIVE
PERFORMED ON: ABNORMAL
PH UR STRIP.AUTO: 5.5 [PH] (ref 5–8)
PLATELET # BLD AUTO: 249 K/UL (ref 130–400)
PMV BLD AUTO: 12.2 FL (ref 9.4–12.3)
POTASSIUM SERPL-SCNC: 4.5 MMOL/L (ref 3.5–5)
PROCALCITONIN: 0.1 NG/ML (ref 0–0.09)
PROT UR STRIP.AUTO-MCNC: NEGATIVE MG/DL
RBC # BLD AUTO: 3.11 M/UL (ref 4.2–5.4)
RBC #/AREA URNS AUTO: 1 /HPF (ref 0–4)
SODIUM SERPL-SCNC: 133 MMOL/L (ref 136–145)
SP GR UR STRIP.AUTO: 1.01 (ref 1–1.03)
UROBILINOGEN UR STRIP.AUTO-MCNC: 0.2 E.U./DL
WBC # BLD AUTO: 24.5 K/UL (ref 4.8–10.8)
WBC #/AREA URNS AUTO: 1 /HPF (ref 0–5)

## 2024-08-17 PROCEDURE — 36415 COLL VENOUS BLD VENIPUNCTURE: CPT

## 2024-08-17 PROCEDURE — 6360000002 HC RX W HCPCS: Performed by: SURGERY

## 2024-08-17 PROCEDURE — 83735 ASSAY OF MAGNESIUM: CPT

## 2024-08-17 PROCEDURE — 2580000003 HC RX 258: Performed by: SURGERY

## 2024-08-17 PROCEDURE — 83605 ASSAY OF LACTIC ACID: CPT

## 2024-08-17 PROCEDURE — 6370000000 HC RX 637 (ALT 250 FOR IP): Performed by: SURGERY

## 2024-08-17 PROCEDURE — 85018 HEMOGLOBIN: CPT

## 2024-08-17 PROCEDURE — 85014 HEMATOCRIT: CPT

## 2024-08-17 PROCEDURE — 97530 THERAPEUTIC ACTIVITIES: CPT

## 2024-08-17 PROCEDURE — 80048 BASIC METABOLIC PNL TOTAL CA: CPT

## 2024-08-17 PROCEDURE — 94760 N-INVAS EAR/PLS OXIMETRY 1: CPT

## 2024-08-17 PROCEDURE — 85027 COMPLETE CBC AUTOMATED: CPT

## 2024-08-17 PROCEDURE — 84145 PROCALCITONIN (PCT): CPT

## 2024-08-17 PROCEDURE — 82962 GLUCOSE BLOOD TEST: CPT

## 2024-08-17 PROCEDURE — 1200000000 HC SEMI PRIVATE

## 2024-08-17 PROCEDURE — 81001 URINALYSIS AUTO W/SCOPE: CPT

## 2024-08-17 PROCEDURE — 6370000000 HC RX 637 (ALT 250 FOR IP): Performed by: NURSE PRACTITIONER

## 2024-08-17 PROCEDURE — 87040 BLOOD CULTURE FOR BACTERIA: CPT

## 2024-08-17 RX ORDER — LANOLIN ALCOHOL/MO/W.PET/CERES
400 CREAM (GRAM) TOPICAL DAILY
Status: DISCONTINUED | OUTPATIENT
Start: 2024-08-17 | End: 2024-08-18

## 2024-08-17 RX ADMIN — ALLOPURINOL 300 MG: 100 TABLET ORAL at 09:51

## 2024-08-17 RX ADMIN — ACETAMINOPHEN 650 MG: 325 TABLET ORAL at 17:31

## 2024-08-17 RX ADMIN — Medication 1 EACH: at 11:30

## 2024-08-17 RX ADMIN — FOLIC ACID 1 MG: 1 TABLET ORAL at 09:51

## 2024-08-17 RX ADMIN — SODIUM CHLORIDE, PRESERVATIVE FREE 10 ML: 5 INJECTION INTRAVENOUS at 20:46

## 2024-08-17 RX ADMIN — INSULIN GLARGINE 10 UNITS: 100 INJECTION, SOLUTION SUBCUTANEOUS at 20:44

## 2024-08-17 RX ADMIN — Medication 1 EACH: at 20:44

## 2024-08-17 RX ADMIN — MORPHINE SULFATE 2 MG: 2 INJECTION, SOLUTION INTRAMUSCULAR; INTRAVENOUS at 20:43

## 2024-08-17 RX ADMIN — OXYCODONE HYDROCHLORIDE 10 MG: 10 TABLET ORAL at 02:03

## 2024-08-17 RX ADMIN — Medication 400 MG: at 09:51

## 2024-08-17 RX ADMIN — LEVETIRACETAM 500 MG: 500 TABLET, FILM COATED ORAL at 20:44

## 2024-08-17 RX ADMIN — MORPHINE SULFATE 2 MG: 2 INJECTION, SOLUTION INTRAMUSCULAR; INTRAVENOUS at 09:49

## 2024-08-17 RX ADMIN — LEVETIRACETAM 500 MG: 500 TABLET, FILM COATED ORAL at 09:51

## 2024-08-17 RX ADMIN — OXYCODONE HYDROCHLORIDE 10 MG: 10 TABLET ORAL at 11:17

## 2024-08-17 RX ADMIN — Medication 15 G: at 09:52

## 2024-08-17 RX ADMIN — ATORVASTATIN CALCIUM 20 MG: 20 TABLET, FILM COATED ORAL at 20:44

## 2024-08-17 RX ADMIN — SODIUM CHLORIDE, PRESERVATIVE FREE 10 ML: 5 INJECTION INTRAVENOUS at 09:52

## 2024-08-17 RX ADMIN — INSULIN LISPRO 1 UNITS: 100 INJECTION, SOLUTION INTRAVENOUS; SUBCUTANEOUS at 17:20

## 2024-08-17 RX ADMIN — METOPROLOL SUCCINATE 50 MG: 50 TABLET, FILM COATED, EXTENDED RELEASE ORAL at 09:51

## 2024-08-17 RX ADMIN — INSULIN LISPRO 2 UNITS: 100 INJECTION, SOLUTION INTRAVENOUS; SUBCUTANEOUS at 12:56

## 2024-08-17 RX ADMIN — INSULIN GLARGINE 10 UNITS: 100 INJECTION, SOLUTION SUBCUTANEOUS at 09:51

## 2024-08-17 ASSESSMENT — PAIN DESCRIPTION - DESCRIPTORS
DESCRIPTORS: ACHING;SHARP
DESCRIPTORS: SHARP
DESCRIPTORS: SHARP

## 2024-08-17 ASSESSMENT — PAIN - FUNCTIONAL ASSESSMENT
PAIN_FUNCTIONAL_ASSESSMENT: PREVENTS OR INTERFERES SOME ACTIVE ACTIVITIES AND ADLS

## 2024-08-17 ASSESSMENT — PAIN DESCRIPTION - LOCATION
LOCATION: LEG

## 2024-08-17 ASSESSMENT — PAIN SCALES - GENERAL
PAINLEVEL_OUTOF10: 7
PAINLEVEL_OUTOF10: 6
PAINLEVEL_OUTOF10: 2
PAINLEVEL_OUTOF10: 9

## 2024-08-17 ASSESSMENT — PAIN DESCRIPTION - ORIENTATION
ORIENTATION: RIGHT

## 2024-08-17 NOTE — PROGRESS NOTES
[facility] Hospitalists      Progress Note    Patient:  Elda Flood  YOB: 1945  Date of Service: 8/17/2024  MRN: [id number]   Acct: [id number]   Primary Care Physician: NICOLE Solis DO  Advance Directive: Full Code  Admit Date: 8/14/2024       Hospital Day: 3    Portions of this note have been copied forward, however, updated to reflect the most current clinical status of this patient.     CHIEF COMPLAINT right leg hematoma    SUBJECTIVE: Pain controlled does not have any new complaints.  Resting before they do a dressing change today      CUMULATIVE HOSPITAL COURSE:    The patient is a 79 y.o. female who presented to [facility] ED for evaluation of bruising and pain of right lower leg. Pt has history of atrial fibrillation on eliquis, diabetes, stroke, hypertension and hyperlipidemia.     Pt describes accidentally being kicked along her right lower anterior leg yesterday assisting her  from the floor after he fell. She has had increased pain and swelling since onset. She had evaluation at pcp office today sent here for further evaluation. She has had increased pain and swelling since onset. She has not been able to ambulate due to symptoms.      In ED, patient's pain difficult to control after doses of iv pain medication. Dr. Landry by ED physician with recommendation to hold eliquis, ACE wrap, elevate and apply ice to affected area as needed. CT right tib/fib-17.4 x 11.1 x 4.0 cm heterogeneous mass/collection within the anteromedial subcutaneous tissues of the calf through the ankle concerning for a large hematoma with active extravasation from venous varicosities at that level as detailed above.       Wbc 16.9k, hgb 10.8, platelets 325k, esr 4, magnesium 1.6, sodium 125-sodium 128 on 7/31/2024, creatinine 0.7/bun 12, glucose 207, ck 25, INR 1.48. Pt is admitted observation to hospitalist.  Patient's pain is controlled .  Need for transfusion discussed with patient and family and she  effusion.  No pneumothorax.  Cardiac silhouette: Normal.  Bones: No acute abnormality.        No acute radiographic abnormality.    ______________________________________ Electronically signed by: STALIN MCCOY D.O. Date:     08/14/2024 Time:    14:36     CT TIBIA FIBULA RIGHT W CONTRAST    Result Date: 8/14/2024  EXAM:  CT OF THE RIGHT TIBIA/FIBULA WITH INTRAVENOUS CONTRAST  COMPARISON:  Radiographs of the right tibia/fibula 08/13/2024.  HISTORY:  Enlarging hematoma in the anterior lower leg.  Recent trauma, on anticoagulation.  TECHNIQUE:  CT images were obtained through the left lower extremity following the intravenous administration of contrast.  Axial images were obtained from the level of the knee through the tibiotalar joint at the ankle with sagittal and coronal reformats through the entire tibia/fibula provided.  FINDINGS:  There is marked soft tissue swelling throughout the calf with subcutaneous edema.  There is an extensive region of abnormal attenuation, complex mass/collection measuring 17.4 cm in proximal to distal dimension and up to 11.1 cm in medial to lateral dimension and 4.0 cm in anterior-posterior dimension within the subcutaneous tissues at the medial to anterior portion of the mid to distal calf extending to the ankle and just into the proximal calf as well.  Markedly heterogeneous attenuation centrally with regions of low attenuation and hematocrit levels as well as regions of soft tissue attenuation at that level and with abnormality extending deep to the skin surface over a broad region at that site.  There are linear/tubular regions of contrast opacification centrally within the collection most pronounced within the mid to distal portion of the collection and to a lesser extent along the proximal margin with irregular regions of contrast pooling and contrast blush at that level.  The appearance is consistent with active extravasation/bleeding related to venous varicosities at that

## 2024-08-17 NOTE — PROGRESS NOTES
Physical Therapy     08/17/24 1000   Restrictions/Precautions   Restrictions/Precautions Fall Risk   General   Diagnosis RLE HEMATOMA EVACUATION   Subjective   Subjective pt willing but hesitant due to pain; agreed to EOB but declined transfer to chair   Subjective   Pain 8/10 with any touch/movement; nurse aware   Bed mobility   Supine to Sit Minimal assistance   Sit to Supine Moderate assistance   Bed Mobility Comments pt Theresa with HOB elevated and use of R rail to sit EOB. modA for BLE assistance onto EOB. scooting bilat in bed SBA with pt able to do but required frequent rest breaks due to limited strength in BUE and pain in L knee. able to pull self up in bed with cues and use of headboard.   Transfers   Sit to Stand Moderate Assistance   Stand to Sit Moderate Assistance   Comment modA from EOB <> RW. pt only able to tolerate static standing for less than one minute due to extreme pain in RLE. unable to swivel step EOB at this time due to limited strength and pain in L knee.   Short Term Goals   Time Frame for Short Term Goals 14 DAYS   Short Term Goal 1 BED MOB MIN ASSIST   Short Term Goal 2 TRANSFERS MIN ASSIST   Short Term Goal 3 AMB 25' RW MIN ASSIST   Activity Tolerance   Activity Tolerance Patient limited by pain   Assessment   Assessment pt limited by pain and fatigue at this time. due to limited endurance of L knee might be a good candidate for sliding board to . left in semi fowlers with RLE heel floated and positioned to prevent external rotation. pt encouraged to be up at [facility] as often as possible during the day to maintain strength needed to DC home with family support.   PT Plan of Care   Saturday X   Safety Devices   Type of Devices Bed alarm in place;Call light within reach;Gait belt;Heels elevated for pressure relief;Left in bed;Nurse notified     Electronically signed by Edison Nation PTA on 8/17/2024 at 10:13 AM

## 2024-08-18 LAB
ALBUMIN SERPL-MCNC: 3.1 G/DL (ref 3.5–5.2)
ALP SERPL-CCNC: 88 U/L (ref 35–104)
ALT SERPL-CCNC: <5 U/L (ref 5–33)
ANION GAP SERPL CALCULATED.3IONS-SCNC: 11 MMOL/L (ref 7–19)
AST SERPL-CCNC: 8 U/L (ref 5–32)
BASOPHILS # BLD: 0 K/UL (ref 0–0.2)
BASOPHILS NFR BLD: 0 % (ref 0–1)
BILIRUB SERPL-MCNC: 0.4 MG/DL (ref 0.2–1.2)
BUN SERPL-MCNC: 18 MG/DL (ref 8–23)
BURR CELLS BLD QL SMEAR: ABNORMAL
CALCIUM SERPL-MCNC: 8.8 MG/DL (ref 8.8–10.2)
CHLORIDE SERPL-SCNC: 97 MMOL/L (ref 98–111)
CO2 SERPL-SCNC: 21 MMOL/L (ref 22–29)
CREAT SERPL-MCNC: 0.6 MG/DL (ref 0.5–0.9)
EOSINOPHIL # BLD: 0 K/UL (ref 0–0.6)
EOSINOPHIL NFR BLD: 0 % (ref 0–5)
ERYTHROCYTE [DISTWIDTH] IN BLOOD BY AUTOMATED COUNT: 15.5 % (ref 11.5–14.5)
GLUCOSE BLD-MCNC: 159 MG/DL (ref 70–99)
GLUCOSE BLD-MCNC: 262 MG/DL (ref 70–99)
GLUCOSE BLD-MCNC: 281 MG/DL (ref 70–99)
GLUCOSE BLD-MCNC: 296 MG/DL (ref 70–99)
GLUCOSE SERPL-MCNC: 150 MG/DL (ref 74–109)
HCT VFR BLD AUTO: 25 % (ref 37–47)
HCT VFR BLD AUTO: 25.6 % (ref 37–47)
HCT VFR BLD AUTO: 26.3 % (ref 37–47)
HCT VFR BLD AUTO: 26.4 % (ref 37–47)
HGB BLD-MCNC: 7.8 G/DL (ref 12–16)
HGB BLD-MCNC: 7.9 G/DL (ref 12–16)
HGB BLD-MCNC: 8.2 G/DL (ref 12–16)
HGB BLD-MCNC: 8.3 G/DL (ref 12–16)
HYPOCHROMIA BLD QL SMEAR: ABNORMAL
IMM GRANULOCYTES # BLD: 1 K/UL
IRON SATN MFR SERPL: 2 % (ref 14–50)
IRON SERPL-MCNC: 5 UG/DL (ref 37–145)
LYMPHOCYTES # BLD: 3.4 K/UL (ref 1.1–4.5)
LYMPHOCYTES NFR BLD: 12 % (ref 20–40)
MAGNESIUM SERPL-MCNC: 1.3 MG/DL (ref 1.6–2.4)
MCH RBC QN AUTO: 24.6 PG (ref 27–31)
MCHC RBC AUTO-ENTMCNC: 30.5 G/DL (ref 33–37)
MCV RBC AUTO: 80.8 FL (ref 81–99)
METAMYELOCYTES NFR BLD MANUAL: 2 %
MONOCYTES # BLD: 4.8 K/UL (ref 0–0.9)
MONOCYTES NFR BLD: 17 % (ref 0–10)
NEUTROPHILS # BLD: 20 K/UL (ref 1.5–7.5)
NEUTS BAND NFR BLD MANUAL: 1 % (ref 0–5)
NEUTS SEG NFR BLD: 68 % (ref 50–65)
PERFORMED ON: ABNORMAL
PLATELET # BLD AUTO: 285 K/UL (ref 130–400)
PLATELET SLIDE REVIEW: ADEQUATE
PMV BLD AUTO: 12.8 FL (ref 9.4–12.3)
POTASSIUM SERPL-SCNC: 4.6 MMOL/L (ref 3.5–5)
PROT SERPL-MCNC: 5.5 G/DL (ref 6.6–8.7)
RBC # BLD AUTO: 3.17 M/UL (ref 4.2–5.4)
SODIUM SERPL-SCNC: 129 MMOL/L (ref 136–145)
TIBC SERPL-MCNC: 244 UG/DL (ref 250–400)
WBC # BLD AUTO: 28.2 K/UL (ref 4.8–10.8)

## 2024-08-18 PROCEDURE — 80053 COMPREHEN METABOLIC PANEL: CPT

## 2024-08-18 PROCEDURE — 85018 HEMOGLOBIN: CPT

## 2024-08-18 PROCEDURE — 83550 IRON BINDING TEST: CPT

## 2024-08-18 PROCEDURE — 6370000000 HC RX 637 (ALT 250 FOR IP): Performed by: SURGERY

## 2024-08-18 PROCEDURE — 36415 COLL VENOUS BLD VENIPUNCTURE: CPT

## 2024-08-18 PROCEDURE — 85014 HEMATOCRIT: CPT

## 2024-08-18 PROCEDURE — 6360000002 HC RX W HCPCS: Performed by: SURGERY

## 2024-08-18 PROCEDURE — 83540 ASSAY OF IRON: CPT

## 2024-08-18 PROCEDURE — 1200000000 HC SEMI PRIVATE

## 2024-08-18 PROCEDURE — 85025 COMPLETE CBC W/AUTO DIFF WBC: CPT

## 2024-08-18 PROCEDURE — 83735 ASSAY OF MAGNESIUM: CPT

## 2024-08-18 PROCEDURE — 94150 VITAL CAPACITY TEST: CPT

## 2024-08-18 PROCEDURE — 6370000000 HC RX 637 (ALT 250 FOR IP): Performed by: NURSE PRACTITIONER

## 2024-08-18 PROCEDURE — 82962 GLUCOSE BLOOD TEST: CPT

## 2024-08-18 PROCEDURE — 94760 N-INVAS EAR/PLS OXIMETRY 1: CPT

## 2024-08-18 PROCEDURE — 2580000003 HC RX 258: Performed by: SURGERY

## 2024-08-18 RX ORDER — LANOLIN ALCOHOL/MO/W.PET/CERES
400 CREAM (GRAM) TOPICAL 2 TIMES DAILY
Status: DISCONTINUED | OUTPATIENT
Start: 2024-08-18 | End: 2024-08-19

## 2024-08-18 RX ORDER — INSULIN GLARGINE 100 [IU]/ML
12 INJECTION, SOLUTION SUBCUTANEOUS 2 TIMES DAILY
Status: DISCONTINUED | OUTPATIENT
Start: 2024-08-18 | End: 2024-08-21

## 2024-08-18 RX ADMIN — ATORVASTATIN CALCIUM 20 MG: 20 TABLET, FILM COATED ORAL at 20:09

## 2024-08-18 RX ADMIN — MAGNESIUM SULFATE HEPTAHYDRATE 2000 MG: 40 INJECTION, SOLUTION INTRAVENOUS at 06:43

## 2024-08-18 RX ADMIN — LEVETIRACETAM 500 MG: 500 TABLET, FILM COATED ORAL at 09:54

## 2024-08-18 RX ADMIN — Medication 15 G: at 09:54

## 2024-08-18 RX ADMIN — ALLOPURINOL 300 MG: 100 TABLET ORAL at 09:54

## 2024-08-18 RX ADMIN — INSULIN GLARGINE 12 UNITS: 100 INJECTION, SOLUTION SUBCUTANEOUS at 20:09

## 2024-08-18 RX ADMIN — LEVETIRACETAM 500 MG: 500 TABLET, FILM COATED ORAL at 20:09

## 2024-08-18 RX ADMIN — Medication 400 MG: at 20:15

## 2024-08-18 RX ADMIN — SODIUM CHLORIDE, PRESERVATIVE FREE 10 ML: 5 INJECTION INTRAVENOUS at 09:58

## 2024-08-18 RX ADMIN — OXYCODONE HYDROCHLORIDE 10 MG: 10 TABLET ORAL at 08:54

## 2024-08-18 RX ADMIN — SODIUM CHLORIDE, PRESERVATIVE FREE 10 ML: 5 INJECTION INTRAVENOUS at 20:11

## 2024-08-18 RX ADMIN — INSULIN LISPRO 2 UNITS: 100 INJECTION, SOLUTION INTRAVENOUS; SUBCUTANEOUS at 18:16

## 2024-08-18 RX ADMIN — MAGNESIUM SULFATE HEPTAHYDRATE 2000 MG: 40 INJECTION, SOLUTION INTRAVENOUS at 09:57

## 2024-08-18 RX ADMIN — OXYCODONE HYDROCHLORIDE 10 MG: 10 TABLET ORAL at 03:46

## 2024-08-18 RX ADMIN — MORPHINE SULFATE 2 MG: 2 INJECTION, SOLUTION INTRAMUSCULAR; INTRAVENOUS at 20:09

## 2024-08-18 RX ADMIN — INSULIN GLARGINE 10 UNITS: 100 INJECTION, SOLUTION SUBCUTANEOUS at 09:54

## 2024-08-18 RX ADMIN — MORPHINE SULFATE 2 MG: 2 INJECTION, SOLUTION INTRAMUSCULAR; INTRAVENOUS at 14:27

## 2024-08-18 RX ADMIN — INSULIN LISPRO 2 UNITS: 100 INJECTION, SOLUTION INTRAVENOUS; SUBCUTANEOUS at 12:51

## 2024-08-18 RX ADMIN — METOPROLOL SUCCINATE 50 MG: 50 TABLET, FILM COATED, EXTENDED RELEASE ORAL at 09:54

## 2024-08-18 RX ADMIN — Medication 400 MG: at 09:54

## 2024-08-18 RX ADMIN — Medication 1 EACH: at 14:45

## 2024-08-18 RX ADMIN — ACETAMINOPHEN 650 MG: 325 TABLET ORAL at 15:14

## 2024-08-18 RX ADMIN — FOLIC ACID 1 MG: 1 TABLET ORAL at 09:54

## 2024-08-18 ASSESSMENT — PAIN DESCRIPTION - DESCRIPTORS
DESCRIPTORS: ACHING;DISCOMFORT
DESCRIPTORS: ACHING
DESCRIPTORS: ACHING

## 2024-08-18 ASSESSMENT — PAIN SCALES - GENERAL
PAINLEVEL_OUTOF10: 9
PAINLEVEL_OUTOF10: 0
PAINLEVEL_OUTOF10: 0
PAINLEVEL_OUTOF10: 9
PAINLEVEL_OUTOF10: 8
PAINLEVEL_OUTOF10: 6

## 2024-08-18 ASSESSMENT — PAIN SCALES - WONG BAKER: WONGBAKER_NUMERICALRESPONSE: NO HURT

## 2024-08-18 ASSESSMENT — PAIN DESCRIPTION - LOCATION
LOCATION: LEG
LOCATION: BACK
LOCATION: LEG

## 2024-08-18 ASSESSMENT — PAIN DESCRIPTION - ORIENTATION
ORIENTATION: RIGHT
ORIENTATION: MID;LOWER
ORIENTATION: RIGHT

## 2024-08-18 NOTE — PROGRESS NOTES
[facility] Hospitalists      Progress Note    Patient:  Elda Flood  YOB: 1945  Date of Service: 8/18/2024  MRN: [id number]   Acct: [id number]   Primary Care Physician: NICOLE Solis DO  Advance Directive: Full Code  Admit Date: 8/14/2024       Hospital Day: 4    Portions of this note have been copied forward, however, updated to reflect the most current clinical status of this patient.     CHIEF COMPLAINT right leg hematoma    SUBJECTIVE: Pain controlled does not have any new complaints.  Resting before they do a dressing change today      CUMULATIVE HOSPITAL COURSE:    The patient is a 79 y.o. female who presented to [facility] ED for evaluation of bruising and pain of right lower leg. Pt has history of atrial fibrillation on eliquis, diabetes, stroke, hypertension and hyperlipidemia.     Pt describes accidentally being kicked along her right lower anterior leg yesterday assisting her  from the floor after he fell. She has had increased pain and swelling since onset. She had evaluation at pcp office today sent here for further evaluation. She has had increased pain and swelling since onset. She has not been able to ambulate due to symptoms.      In ED, patient's pain difficult to control after doses of iv pain medication. Dr. Landry by ED physician with recommendation to hold eliquis, ACE wrap, elevate and apply ice to affected area as needed. CT right tib/fib-17.4 x 11.1 x 4.0 cm heterogeneous mass/collection within the anteromedial subcutaneous tissues of the calf through the ankle concerning for a large hematoma with active extravasation from venous varicosities at that level as detailed above.       Wbc 16.9k, hgb 10.8, platelets 325k, esr 4, magnesium 1.6, sodium 125-sodium 128 on 7/31/2024, creatinine 0.7/bun 12, glucose 207, ck 25, INR 1.48. Pt is admitted observation to hospitalist.  Patient's pain is controlled .  Need for transfusion discussed with patient and family and she  of this encounter note is an electronic transcription/translation of spoken language to printed text. The electronic translation of spoken language may permit erroneous, or at times, nonsensical words or phrases to be inadvertently transcribed; although attempts have made to review the note for such errors, some may still exist.

## 2024-08-19 LAB
ALBUMIN SERPL-MCNC: 3.1 G/DL (ref 3.5–5.2)
ALP SERPL-CCNC: 88 U/L (ref 35–104)
ALT SERPL-CCNC: <5 U/L (ref 5–33)
ANION GAP SERPL CALCULATED.3IONS-SCNC: 14 MMOL/L (ref 7–19)
AST SERPL-CCNC: 8 U/L (ref 5–32)
BASOPHILS # BLD: 0 K/UL (ref 0–0.2)
BASOPHILS NFR BLD: 0 % (ref 0–1)
BILIRUB SERPL-MCNC: 0.5 MG/DL (ref 0.2–1.2)
BUN SERPL-MCNC: 22 MG/DL (ref 8–23)
CALCIUM SERPL-MCNC: 8.4 MG/DL (ref 8.8–10.2)
CHLORIDE SERPL-SCNC: 94 MMOL/L (ref 98–111)
CO2 SERPL-SCNC: 18 MMOL/L (ref 22–29)
CREAT SERPL-MCNC: 0.7 MG/DL (ref 0.5–0.9)
EOSINOPHIL # BLD: 0 K/UL (ref 0–0.6)
EOSINOPHIL NFR BLD: 0 % (ref 0–5)
ERYTHROCYTE [DISTWIDTH] IN BLOOD BY AUTOMATED COUNT: 15.8 % (ref 11.5–14.5)
GLUCOSE BLD-MCNC: 194 MG/DL (ref 70–99)
GLUCOSE BLD-MCNC: 223 MG/DL (ref 70–99)
GLUCOSE BLD-MCNC: 227 MG/DL (ref 70–99)
GLUCOSE BLD-MCNC: 247 MG/DL (ref 70–99)
GLUCOSE SERPL-MCNC: 201 MG/DL (ref 74–109)
HCT VFR BLD AUTO: 25.5 % (ref 37–47)
HGB BLD-MCNC: 8.1 G/DL (ref 12–16)
IMM GRANULOCYTES # BLD: 0.9 K/UL
LYMPHOCYTES # BLD: 1.9 K/UL (ref 1.1–4.5)
LYMPHOCYTES NFR BLD: 6 % (ref 20–40)
MAGNESIUM SERPL-MCNC: 1.7 MG/DL (ref 1.6–2.4)
MCH RBC QN AUTO: 24.9 PG (ref 27–31)
MCHC RBC AUTO-ENTMCNC: 31.8 G/DL (ref 33–37)
MCV RBC AUTO: 78.5 FL (ref 81–99)
MICROCYTES BLD QL SMEAR: ABNORMAL
MONOCYTES # BLD: 6.7 K/UL (ref 0–0.9)
MONOCYTES NFR BLD: 21 % (ref 0–10)
NEUTROPHILS # BLD: 23.1 K/UL (ref 1.5–7.5)
NEUTS BAND NFR BLD MANUAL: 2 % (ref 0–5)
NEUTS SEG NFR BLD: 71 % (ref 50–65)
OVALOCYTES BLD QL SMEAR: ABNORMAL
PERFORMED ON: ABNORMAL
PLATELET # BLD AUTO: 307 K/UL (ref 130–400)
PLATELET SLIDE REVIEW: ADEQUATE
PMV BLD AUTO: 12.8 FL (ref 9.4–12.3)
POTASSIUM SERPL-SCNC: 4.7 MMOL/L (ref 3.5–5)
PROCALCITONIN: 0.21 NG/ML (ref 0–0.09)
PROT SERPL-MCNC: 5 G/DL (ref 6.6–8.7)
RBC # BLD AUTO: 3.25 M/UL (ref 4.2–5.4)
SODIUM SERPL-SCNC: 126 MMOL/L (ref 136–145)
WBC # BLD AUTO: 31.7 K/UL (ref 4.8–10.8)

## 2024-08-19 PROCEDURE — 80053 COMPREHEN METABOLIC PANEL: CPT

## 2024-08-19 PROCEDURE — 85025 COMPLETE CBC W/AUTO DIFF WBC: CPT

## 2024-08-19 PROCEDURE — 6360000002 HC RX W HCPCS: Performed by: HOSPITALIST

## 2024-08-19 PROCEDURE — 6360000002 HC RX W HCPCS: Performed by: SURGERY

## 2024-08-19 PROCEDURE — 36415 COLL VENOUS BLD VENIPUNCTURE: CPT

## 2024-08-19 PROCEDURE — 84145 PROCALCITONIN (PCT): CPT

## 2024-08-19 PROCEDURE — 1200000000 HC SEMI PRIVATE

## 2024-08-19 PROCEDURE — 2580000003 HC RX 258: Performed by: HOSPITALIST

## 2024-08-19 PROCEDURE — 6370000000 HC RX 637 (ALT 250 FOR IP): Performed by: SURGERY

## 2024-08-19 PROCEDURE — 83735 ASSAY OF MAGNESIUM: CPT

## 2024-08-19 PROCEDURE — 97535 SELF CARE MNGMENT TRAINING: CPT

## 2024-08-19 PROCEDURE — 6370000000 HC RX 637 (ALT 250 FOR IP): Performed by: HOSPITALIST

## 2024-08-19 PROCEDURE — 94760 N-INVAS EAR/PLS OXIMETRY 1: CPT

## 2024-08-19 PROCEDURE — 82962 GLUCOSE BLOOD TEST: CPT

## 2024-08-19 PROCEDURE — 97530 THERAPEUTIC ACTIVITIES: CPT

## 2024-08-19 PROCEDURE — 2580000003 HC RX 258: Performed by: SURGERY

## 2024-08-19 PROCEDURE — 6370000000 HC RX 637 (ALT 250 FOR IP): Performed by: NURSE PRACTITIONER

## 2024-08-19 PROCEDURE — 6360000002 HC RX W HCPCS: Performed by: NURSE PRACTITIONER

## 2024-08-19 RX ORDER — HYDROMORPHONE HYDROCHLORIDE 1 MG/ML
0.25 INJECTION, SOLUTION INTRAMUSCULAR; INTRAVENOUS; SUBCUTANEOUS EVERY 4 HOURS PRN
Status: DISCONTINUED | OUTPATIENT
Start: 2024-08-19 | End: 2024-09-05 | Stop reason: HOSPADM

## 2024-08-19 RX ORDER — HYDROCODONE BITARTRATE AND ACETAMINOPHEN 5; 325 MG/1; MG/1
1 TABLET ORAL EVERY 4 HOURS PRN
Status: DISCONTINUED | OUTPATIENT
Start: 2024-08-19 | End: 2024-08-19

## 2024-08-19 RX ORDER — ONDANSETRON 2 MG/ML
2 INJECTION INTRAMUSCULAR; INTRAVENOUS EVERY 4 HOURS PRN
Status: DISCONTINUED | OUTPATIENT
Start: 2024-08-19 | End: 2024-09-05 | Stop reason: HOSPADM

## 2024-08-19 RX ORDER — LANOLIN ALCOHOL/MO/W.PET/CERES
400 CREAM (GRAM) TOPICAL 4 TIMES DAILY
Status: DISCONTINUED | OUTPATIENT
Start: 2024-08-19 | End: 2024-08-22

## 2024-08-19 RX ORDER — HYDROMORPHONE HYDROCHLORIDE 1 MG/ML
1 INJECTION, SOLUTION INTRAMUSCULAR; INTRAVENOUS; SUBCUTANEOUS EVERY 4 HOURS PRN
Status: DISCONTINUED | OUTPATIENT
Start: 2024-08-19 | End: 2024-08-19

## 2024-08-19 RX ADMIN — INSULIN GLARGINE 12 UNITS: 100 INJECTION, SOLUTION SUBCUTANEOUS at 08:48

## 2024-08-19 RX ADMIN — SODIUM CHLORIDE, PRESERVATIVE FREE 10 ML: 5 INJECTION INTRAVENOUS at 20:04

## 2024-08-19 RX ADMIN — INSULIN GLARGINE 12 UNITS: 100 INJECTION, SOLUTION SUBCUTANEOUS at 20:03

## 2024-08-19 RX ADMIN — ATORVASTATIN CALCIUM 20 MG: 20 TABLET, FILM COATED ORAL at 20:03

## 2024-08-19 RX ADMIN — ONDANSETRON 4 MG: 2 INJECTION INTRAMUSCULAR; INTRAVENOUS at 07:38

## 2024-08-19 RX ADMIN — LEVETIRACETAM 500 MG: 500 TABLET, FILM COATED ORAL at 08:47

## 2024-08-19 RX ADMIN — INSULIN LISPRO 1 UNITS: 100 INJECTION, SOLUTION INTRAVENOUS; SUBCUTANEOUS at 08:48

## 2024-08-19 RX ADMIN — Medication 1 EACH: at 00:18

## 2024-08-19 RX ADMIN — ALLOPURINOL 300 MG: 100 TABLET ORAL at 08:57

## 2024-08-19 RX ADMIN — OXYCODONE HYDROCHLORIDE 10 MG: 10 TABLET ORAL at 11:01

## 2024-08-19 RX ADMIN — SODIUM CHLORIDE, PRESERVATIVE FREE 10 ML: 5 INJECTION INTRAVENOUS at 08:51

## 2024-08-19 RX ADMIN — OXYCODONE HYDROCHLORIDE 10 MG: 10 TABLET ORAL at 04:14

## 2024-08-19 RX ADMIN — INSULIN LISPRO 1 UNITS: 100 INJECTION, SOLUTION INTRAVENOUS; SUBCUTANEOUS at 17:27

## 2024-08-19 RX ADMIN — METOPROLOL SUCCINATE 50 MG: 50 TABLET, FILM COATED, EXTENDED RELEASE ORAL at 08:49

## 2024-08-19 RX ADMIN — CHOLESTYRAMINE 4 G: 4 POWDER, FOR SUSPENSION ORAL at 08:47

## 2024-08-19 RX ADMIN — LEVETIRACETAM 500 MG: 500 TABLET, FILM COATED ORAL at 20:03

## 2024-08-19 RX ADMIN — Medication 400 MG: at 20:03

## 2024-08-19 RX ADMIN — WATER 2000 MG: 1 INJECTION INTRAMUSCULAR; INTRAVENOUS; SUBCUTANEOUS at 14:43

## 2024-08-19 RX ADMIN — MORPHINE SULFATE 2 MG: 2 INJECTION, SOLUTION INTRAMUSCULAR; INTRAVENOUS at 00:12

## 2024-08-19 RX ADMIN — Medication 400 MG: at 08:58

## 2024-08-19 RX ADMIN — HYDROMORPHONE HYDROCHLORIDE 1 MG: 1 INJECTION, SOLUTION INTRAMUSCULAR; INTRAVENOUS; SUBCUTANEOUS at 12:24

## 2024-08-19 RX ADMIN — Medication 1 EACH: at 08:45

## 2024-08-19 RX ADMIN — MORPHINE SULFATE 4 MG: 4 INJECTION, SOLUTION INTRAMUSCULAR; INTRAVENOUS at 07:30

## 2024-08-19 RX ADMIN — ONDANSETRON 2 MG: 2 INJECTION INTRAMUSCULAR; INTRAVENOUS at 12:24

## 2024-08-19 RX ADMIN — Medication 15 G: at 08:57

## 2024-08-19 RX ADMIN — FOLIC ACID 1 MG: 1 TABLET ORAL at 08:47

## 2024-08-19 RX ADMIN — INSULIN LISPRO 1 UNITS: 100 INJECTION, SOLUTION INTRAVENOUS; SUBCUTANEOUS at 13:22

## 2024-08-19 RX ADMIN — WATER 2000 MG: 1 INJECTION INTRAMUSCULAR; INTRAVENOUS; SUBCUTANEOUS at 20:03

## 2024-08-19 ASSESSMENT — PAIN SCALES - GENERAL
PAINLEVEL_OUTOF10: 10
PAINLEVEL_OUTOF10: 8
PAINLEVEL_OUTOF10: 6
PAINLEVEL_OUTOF10: 1
PAINLEVEL_OUTOF10: 7
PAINLEVEL_OUTOF10: 1

## 2024-08-19 ASSESSMENT — PAIN DESCRIPTION - ORIENTATION
ORIENTATION: RIGHT
ORIENTATION: RIGHT
ORIENTATION: RIGHT;LOWER
ORIENTATION: LEFT
ORIENTATION: RIGHT

## 2024-08-19 ASSESSMENT — PAIN DESCRIPTION - LOCATION
LOCATION: LEG

## 2024-08-19 ASSESSMENT — PAIN DESCRIPTION - DESCRIPTORS
DESCRIPTORS: ACHING;BURNING;DISCOMFORT
DESCRIPTORS: ACHING;DISCOMFORT
DESCRIPTORS: THROBBING;DISCOMFORT;ACHING
DESCRIPTORS: ACHING
DESCRIPTORS: ACHING

## 2024-08-19 ASSESSMENT — PAIN - FUNCTIONAL ASSESSMENT: PAIN_FUNCTIONAL_ASSESSMENT: PREVENTS OR INTERFERES WITH MANY ACTIVE NOT PASSIVE ACTIVITIES

## 2024-08-19 ASSESSMENT — PAIN SCALES - WONG BAKER: WONGBAKER_NUMERICALRESPONSE: HURTS A LITTLE BIT

## 2024-08-19 NOTE — PROGRESS NOTES
Pt. daughter in room and requested this RN to come evaluate patient. Pt. Has been completely oriented x4 with no confusion all day and at this time was exhibiting mental status changes. Pt. Was trying to drink her coleslaw on her dinner tray, slurring her words, stating she was at [facility], repeating phrases such as \"I ate my crispy rose this morning for breakfast\" four times while this RN was in the room. Pt. Daughter at bedside and states they have had trouble with her magnesium level dropping very quickly for two years for unknown reasons. Due to this issue the patient takes PO Magnesium 4x daily at home. Pt. Daughter states that when her levels drop the patient's mental status declines, such as this.   Patient and daughter both requesting for a magnesium level to be drawn. This RN reached out to Dr. Guy concerning this new change in mental status. No new orders at this time.

## 2024-08-19 NOTE — PROGRESS NOTES
[facility] Hospitalists      Progress Note    Patient:  Elda Flood  YOB: 1945  Date of Service: 8/19/2024  MRN: [id number]   Acct: [id number]   Primary Care Physician: NICOLE Solis DO  Advance Directive: Full Code  Admit Date: 8/14/2024       Hospital Day: 5    Portions of this note have been copied forward, however, updated to reflect the most current clinical status of this patient.     CHIEF COMPLAINT right leg hematoma    SUBJECTIVE: Pain controlled does not have any new complaints.  Resting before they do a dressing change today      CUMULATIVE HOSPITAL COURSE:    The patient is a 79 y.o. female who presented to [facility] ED for evaluation of bruising and pain of right lower leg. Pt has history of atrial fibrillation on eliquis, diabetes, stroke, hypertension and hyperlipidemia.     Pt describes accidentally being kicked along her right lower anterior leg yesterday assisting her  from the floor after he fell. She has had increased pain and swelling since onset. She had evaluation at pcp office today sent here for further evaluation. She has had increased pain and swelling since onset. She has not been able to ambulate due to symptoms.      In ED, patient's pain difficult to control after doses of iv pain medication. Dr. Landry by ED physician with recommendation to hold eliquis, ACE wrap, elevate and apply ice to affected area as needed. CT right tib/fib-17.4 x 11.1 x 4.0 cm heterogeneous mass/collection within the anteromedial subcutaneous tissues of the calf through the ankle concerning for a large hematoma with active extravasation from venous varicosities at that level as detailed above.       Wbc 16.9k, hgb 10.8, platelets 325k, esr 4, magnesium 1.6, sodium 125-sodium 128 on 7/31/2024, creatinine 0.7/bun 12, glucose 207, ck 25, INR 1.48. Pt is admitted observation to hospitalist.  Patient's pain is controlled .  Need for transfusion discussed with patient and family and she

## 2024-08-19 NOTE — PROGRESS NOTES
Vascular Surgery  Dr. Emili Landry   Daily Progress Note    Pt Name: Elda Flood  Medical Record Number: [id number]  Date of Birth 1945   Today's Date: 8/19/2024    SUBJECTIVE:     Patient was seen and examined.  Pain is controlled, patient very drowsy from pain medication  has not had nausea/vomiting  OBJECTIVE:     Patient Vitals for the past 24 hrs:   BP Temp Temp src Pulse Resp SpO2   08/19/24 1101 -- -- -- -- 18 --   08/19/24 0808 115/60 98.1 °F (36.7 °C) -- 100 18 95 %   08/19/24 0646 -- -- -- -- -- 95 %   08/19/24 0544 134/61 99.3 °F (37.4 °C) Oral (!) 102 18 96 %   08/19/24 0042 -- -- -- -- 16 --   08/19/24 0012 -- -- -- -- 16 --   08/18/24 2039 -- -- -- -- 14 --   08/18/24 1945 139/61 99.2 °F (37.3 °C) Oral (!) 104 16 96 %   08/18/24 1837 -- -- -- (!) 103 -- --   08/18/24 1632 130/67 98.2 °F (36.8 °C) Temporal (!) 111 18 94 %   08/18/24 1500 -- 100.4 °F (38 °C) Oral -- -- --       No intake or output data in the 24 hours ending 08/19/24 1454    No intake/output data recorded.    I/O last 3 completed shifts:  In: -   Out: 1150 [Urine:1150]       Wt Readings from Last 3 Encounters:   08/15/24 67.4 kg (148 lb 8 oz)   08/13/24 67.4 kg (148 lb 8 oz)   08/06/24 67.5 kg (148 lb 12 oz)        Body mass index is 28.07 kg/m².     Diet: ADULT DIET; Regular; 4 carb choices (60 gm/meal)  ADULT ORAL NUTRITION SUPPLEMENT; Breakfast, Dinner; Wound Healing Oral Supplement    MEDS:     Scheduled Meds:   ceFAZolin  2,000 mg IntraVENous Q8H    magnesium oxide  400 mg Oral BID    insulin glargine  12 Units SubCUTAneous BID    xeroform petrolatum gauze 5\"X9\"  1 each Topical BID    urea  15 g Oral Daily    sodium chloride flush  5-40 mL IntraVENous 2 times per day    insulin lispro  0-4 Units SubCUTAneous TID WC    insulin lispro  0-4 Units SubCUTAneous Nightly    allopurinol  300 mg Oral Daily    [Held by provider] amLODIPine  10 mg Oral Daily    atorvastatin  20 mg Oral Nightly    cholestyramine light  4 g Oral Daily

## 2024-08-19 NOTE — PLAN OF CARE
Problem: Discharge Planning  Goal: Discharge to home or other facility with appropriate resources  8/18/2024 1937 by Janel Lopes RN  Outcome: Progressing  8/18/2024 1931 by Janel Lopes RN  Outcome: Progressing     Problem: Pain  Goal: Verbalizes/displays adequate comfort level or baseline comfort level  8/18/2024 1937 by Janel Lopes RN  Outcome: Progressing  8/18/2024 1931 by Janel Lopes RN  Outcome: Progressing     Problem: Safety - Adult  Goal: Free from fall injury  8/18/2024 1937 by Janel Lopes RN  Outcome: Progressing  8/18/2024 1931 by Janel Lopes RN  Outcome: Progressing     Problem: Skin/Tissue Integrity  Goal: Absence of new skin breakdown  Description: 1.  Monitor for areas of redness and/or skin breakdown  2.  Assess vascular access sites hourly  3.  Every 4-6 hours minimum:  Change oxygen saturation probe site  4.  Every 4-6 hours:  If on nasal continuous positive airway pressure, respiratory therapy assess nares and determine need for appliance change or resting period.  8/18/2024 1937 by Janel Lopes RN  Outcome: Progressing  8/18/2024 1931 by Janel Lopes RN  Outcome: Progressing     Problem: ABCDS Injury Assessment  Goal: Absence of physical injury  8/18/2024 1937 by Janel Lopes RN  Outcome: Progressing  8/18/2024 1931 by Janel Lopes RN  Outcome: Progressing     Problem: Chronic Conditions and Co-morbidities  Goal: Patient's chronic conditions and co-morbidity symptoms are monitored and maintained or improved  8/18/2024 1937 by Janel Lopes RN  Outcome: Progressing  8/18/2024 1931 by Janel Lopes RN  Outcome: Progressing

## 2024-08-19 NOTE — PROGRESS NOTES
Facility/Department: [facility] 3 ANNA/VAS/MED  Daily Treatment Note  NAME: Elda Flood  : 1945  MRN: [id number]    Date of Service: 2024    Discharge Recommendations:  Patient would benefit from continued therapy after discharge       Assessment   Assessment: Attempted to initiate training in sliding board transfers but pain is a significant limiting factor.  Pain management will be cary to proceeding to increase functional mobility as needed for ADL. This was discussed with the patient's nurse.  Treatment Diagnosis: Hematoma RLE s/p evacuation of hematoma  Activity Tolerance  Activity Tolerance: Patient limited by pain         Patient Diagnosis(es): The primary encounter diagnosis was Hematoma of leg, right, initial encounter. Diagnoses of Bleeding into the skin and Uncontrolled pain were also pertinent to this visit.    has a past medical history of Arthritis, Atrial fibrillation (HCC), Hyperlipidemia, Hypertension, Incisional hernia, and Stroke (cerebrum) ([facility]).   has a past surgical history that includes Cholecystectomy; Appendectomy; Colonoscopy (); Upper gastrointestinal endoscopy (); Colonoscopy (16);  section; hernia repair (); hernia repair; Umbilical hernia repair (N/A, 2019); Hysterectomy, total abdominal (); Ovary removal (Bilateral, ); and Leg Surgery (Right, 8/15/2024).    Restrictions  Restrictions/Precautions  Restrictions/Precautions: Fall Risk    Subjective   General  Chart Reviewed: Yes  Patient assessed for rehabilitation services?: Yes  Family / Caregiver Present: No  Pain Screening  Pain at present: 10 (primarily RLE, but also L Knee and generalize all over pain)  Scale Used: Numeric Score  Intervention List: Patient able to continue with treatment  Comments / Details: Discussed significant pain issues with nurse.  Positioned for comfort in bed. Patient had pain meds prior to tx     Objective    ADL  Feeding: Independent  Grooming: Independent;Setup  UE

## 2024-08-19 NOTE — PLAN OF CARE
Problem: Discharge Planning  Goal: Discharge to home or other facility with appropriate resources  8/18/2024 1937 by Janel Lopes RN  Outcome: Progressing  8/18/2024 1931 by Janel Lopse RN  Outcome: Progressing     Problem: Pain  Goal: Verbalizes/displays adequate comfort level or baseline comfort level  8/18/2024 1937 by Janel Lopes RN  Outcome: Progressing  8/18/2024 1931 by Janel Lopes RN  Outcome: Progressing     Problem: Safety - Adult  Goal: Free from fall injury  8/18/2024 1937 by Janel Lopes RN  Outcome: Progressing  8/18/2024 1931 by Janel Lopes RN  Outcome: Progressing     Problem: Skin/Tissue Integrity  Goal: Absence of new skin breakdown  Description: 1.  Monitor for areas of redness and/or skin breakdown  2.  Assess vascular access sites hourly  3.  Every 4-6 hours minimum:  Change oxygen saturation probe site  4.  Every 4-6 hours:  If on nasal continuous positive airway pressure, respiratory therapy assess nares and determine need for appliance change or resting period.  8/18/2024 1937 by Janel Lopes RN  Outcome: Progressing  8/18/2024 1931 by Janel Lopes RN  Outcome: Progressing     Problem: ABCDS Injury Assessment  Goal: Absence of physical injury  8/18/2024 1937 by Janel Lopes RN  Outcome: Progressing  8/18/2024 1931 by Janel Lopes RN  Outcome: Progressing     Problem: Chronic Conditions and Co-morbidities  Goal: Patient's chronic conditions and co-morbidity symptoms are monitored and maintained or improved  8/18/2024 1937 by Janel Lopes RN  Outcome: Progressing  8/18/2024 1931 by Janel Lopes RN  Outcome: Progressing   Electronically signed by Celia Adame RN on 8/18/2024 at 10:43 PM

## 2024-08-20 PROBLEM — E43 SEVERE MALNUTRITION (HCC): Status: ACTIVE | Noted: 2024-08-20

## 2024-08-20 LAB
ALBUMIN SERPL-MCNC: 3.1 G/DL (ref 3.5–5.2)
ALP SERPL-CCNC: 97 U/L (ref 35–104)
ALT SERPL-CCNC: <5 U/L (ref 5–33)
ANION GAP SERPL CALCULATED.3IONS-SCNC: 12 MMOL/L (ref 7–19)
ANISOCYTOSIS BLD QL SMEAR: ABNORMAL
AST SERPL-CCNC: 8 U/L (ref 5–32)
BACTERIA URNS QL MICRO: NEGATIVE /HPF
BASOPHILS # BLD: 0 K/UL (ref 0–0.2)
BASOPHILS NFR BLD: 0 % (ref 0–1)
BILIRUB SERPL-MCNC: 0.3 MG/DL (ref 0.2–1.2)
BILIRUB UR QL STRIP: NEGATIVE
BUN SERPL-MCNC: 29 MG/DL (ref 8–23)
CALCIUM SERPL-MCNC: 8.8 MG/DL (ref 8.8–10.2)
CHLORIDE SERPL-SCNC: 91 MMOL/L (ref 98–111)
CLARITY UR: CLEAR
CO2 SERPL-SCNC: 22 MMOL/L (ref 22–29)
COLOR UR: YELLOW
CREAT SERPL-MCNC: 0.9 MG/DL (ref 0.5–0.9)
CRYSTALS URNS MICRO: NORMAL /HPF
DACRYOCYTES BLD QL SMEAR: ABNORMAL
EOSINOPHIL # BLD: 0 K/UL (ref 0–0.6)
EOSINOPHIL NFR BLD: 0 % (ref 0–5)
EPI CELLS #/AREA URNS AUTO: 1 /HPF (ref 0–5)
ERYTHROCYTE [DISTWIDTH] IN BLOOD BY AUTOMATED COUNT: 16 % (ref 11.5–14.5)
GLUCOSE BLD-MCNC: 216 MG/DL (ref 70–99)
GLUCOSE BLD-MCNC: 247 MG/DL (ref 70–99)
GLUCOSE BLD-MCNC: 275 MG/DL (ref 70–99)
GLUCOSE BLD-MCNC: 284 MG/DL (ref 70–99)
GLUCOSE SERPL-MCNC: 230 MG/DL (ref 74–109)
GLUCOSE UR STRIP.AUTO-MCNC: NEGATIVE MG/DL
HCT VFR BLD AUTO: 24.4 % (ref 37–47)
HGB BLD-MCNC: 7.8 G/DL (ref 12–16)
HGB UR STRIP.AUTO-MCNC: NEGATIVE MG/L
HYALINE CASTS #/AREA URNS AUTO: 1 /HPF (ref 0–8)
IMM GRANULOCYTES # BLD: 0.9 K/UL
KETONES UR STRIP.AUTO-MCNC: NEGATIVE MG/DL
LEUKOCYTE ESTERASE UR QL STRIP.AUTO: ABNORMAL
LYMPHOCYTES # BLD: 3.2 K/UL (ref 1.1–4.5)
LYMPHOCYTES NFR BLD: 10 % (ref 20–40)
MAGNESIUM SERPL-MCNC: 1.5 MG/DL (ref 1.6–2.4)
MCH RBC QN AUTO: 24.9 PG (ref 27–31)
MCHC RBC AUTO-ENTMCNC: 32 G/DL (ref 33–37)
MCV RBC AUTO: 78 FL (ref 81–99)
MICROCYTES BLD QL SMEAR: ABNORMAL
MONOCYTES # BLD: 7.2 K/UL (ref 0–0.9)
MONOCYTES NFR BLD: 23 % (ref 0–10)
NEUTROPHILS # BLD: 21.1 K/UL (ref 1.5–7.5)
NEUTS SEG NFR BLD: 67 % (ref 50–65)
NITRITE UR QL STRIP.AUTO: NEGATIVE
OVALOCYTES BLD QL SMEAR: ABNORMAL
PERFORMED ON: ABNORMAL
PH UR STRIP.AUTO: 5.5 [PH] (ref 5–8)
PLATELET # BLD AUTO: 375 K/UL (ref 130–400)
PLATELET SLIDE REVIEW: ADEQUATE
PMV BLD AUTO: 11.9 FL (ref 9.4–12.3)
POTASSIUM SERPL-SCNC: 4.9 MMOL/L (ref 3.5–5)
PROT SERPL-MCNC: 5.8 G/DL (ref 6.6–8.7)
PROT UR STRIP.AUTO-MCNC: 30 MG/DL
RBC # BLD AUTO: 3.13 M/UL (ref 4.2–5.4)
RBC #/AREA URNS AUTO: 3 /HPF (ref 0–4)
SODIUM SERPL-SCNC: 125 MMOL/L (ref 136–145)
SP GR UR STRIP.AUTO: 1.02 (ref 1–1.03)
UROBILINOGEN UR STRIP.AUTO-MCNC: 0.2 E.U./DL
WBC # BLD AUTO: 31.5 K/UL (ref 4.8–10.8)
WBC #/AREA URNS AUTO: 1 /HPF (ref 0–5)

## 2024-08-20 PROCEDURE — 6370000000 HC RX 637 (ALT 250 FOR IP): Performed by: SURGERY

## 2024-08-20 PROCEDURE — 80053 COMPREHEN METABOLIC PANEL: CPT

## 2024-08-20 PROCEDURE — 83735 ASSAY OF MAGNESIUM: CPT

## 2024-08-20 PROCEDURE — 85025 COMPLETE CBC W/AUTO DIFF WBC: CPT

## 2024-08-20 PROCEDURE — 1200000000 HC SEMI PRIVATE

## 2024-08-20 PROCEDURE — 6370000000 HC RX 637 (ALT 250 FOR IP): Performed by: HOSPITALIST

## 2024-08-20 PROCEDURE — 6360000002 HC RX W HCPCS: Performed by: HOSPITALIST

## 2024-08-20 PROCEDURE — 81001 URINALYSIS AUTO W/SCOPE: CPT

## 2024-08-20 PROCEDURE — 6360000002 HC RX W HCPCS: Performed by: NURSE PRACTITIONER

## 2024-08-20 PROCEDURE — 36415 COLL VENOUS BLD VENIPUNCTURE: CPT

## 2024-08-20 PROCEDURE — 2580000003 HC RX 258: Performed by: HOSPITALIST

## 2024-08-20 PROCEDURE — 6360000002 HC RX W HCPCS: Performed by: SURGERY

## 2024-08-20 PROCEDURE — 2580000003 HC RX 258: Performed by: SURGERY

## 2024-08-20 PROCEDURE — 94760 N-INVAS EAR/PLS OXIMETRY 1: CPT

## 2024-08-20 PROCEDURE — 82962 GLUCOSE BLOOD TEST: CPT

## 2024-08-20 PROCEDURE — 6370000000 HC RX 637 (ALT 250 FOR IP): Performed by: NURSE PRACTITIONER

## 2024-08-20 RX ORDER — LIDOCAINE HYDROCHLORIDE 40 MG/ML
SOLUTION TOPICAL
Status: DISCONTINUED | OUTPATIENT
Start: 2024-08-21 | End: 2024-09-05 | Stop reason: HOSPADM

## 2024-08-20 RX ORDER — SODIUM CHLORIDE 1 G/1
2 TABLET ORAL
Status: DISCONTINUED | OUTPATIENT
Start: 2024-08-20 | End: 2024-09-05 | Stop reason: HOSPADM

## 2024-08-20 RX ADMIN — HYDROMORPHONE HYDROCHLORIDE 0.25 MG: 1 INJECTION, SOLUTION INTRAMUSCULAR; INTRAVENOUS; SUBCUTANEOUS at 06:40

## 2024-08-20 RX ADMIN — ONDANSETRON 2 MG: 2 INJECTION INTRAMUSCULAR; INTRAVENOUS at 13:39

## 2024-08-20 RX ADMIN — OXYCODONE 5 MG: 5 TABLET ORAL at 17:02

## 2024-08-20 RX ADMIN — ONDANSETRON 2 MG: 2 INJECTION INTRAMUSCULAR; INTRAVENOUS at 20:43

## 2024-08-20 RX ADMIN — SODIUM CHLORIDE, PRESERVATIVE FREE 10 ML: 5 INJECTION INTRAVENOUS at 20:53

## 2024-08-20 RX ADMIN — INSULIN GLARGINE 12 UNITS: 100 INJECTION, SOLUTION SUBCUTANEOUS at 20:53

## 2024-08-20 RX ADMIN — SODIUM CHLORIDE 2 G: 1 TABLET ORAL at 10:27

## 2024-08-20 RX ADMIN — HYDROMORPHONE HYDROCHLORIDE 0.25 MG: 1 INJECTION, SOLUTION INTRAMUSCULAR; INTRAVENOUS; SUBCUTANEOUS at 20:42

## 2024-08-20 RX ADMIN — WATER 2000 MG: 1 INJECTION INTRAMUSCULAR; INTRAVENOUS; SUBCUTANEOUS at 13:41

## 2024-08-20 RX ADMIN — FOLIC ACID 1 MG: 1 TABLET ORAL at 10:28

## 2024-08-20 RX ADMIN — INSULIN GLARGINE 12 UNITS: 100 INJECTION, SOLUTION SUBCUTANEOUS at 10:25

## 2024-08-20 RX ADMIN — Medication 400 MG: at 10:27

## 2024-08-20 RX ADMIN — INSULIN LISPRO 1 UNITS: 100 INJECTION, SOLUTION INTRAVENOUS; SUBCUTANEOUS at 10:25

## 2024-08-20 RX ADMIN — ALLOPURINOL 300 MG: 100 TABLET ORAL at 10:27

## 2024-08-20 RX ADMIN — Medication 400 MG: at 13:24

## 2024-08-20 RX ADMIN — SODIUM CHLORIDE 2 G: 1 TABLET ORAL at 13:43

## 2024-08-20 RX ADMIN — HYDROMORPHONE HYDROCHLORIDE 0.25 MG: 1 INJECTION, SOLUTION INTRAMUSCULAR; INTRAVENOUS; SUBCUTANEOUS at 13:29

## 2024-08-20 RX ADMIN — ATORVASTATIN CALCIUM 20 MG: 20 TABLET, FILM COATED ORAL at 20:43

## 2024-08-20 RX ADMIN — MAGNESIUM SULFATE HEPTAHYDRATE 2000 MG: 40 INJECTION, SOLUTION INTRAVENOUS at 01:18

## 2024-08-20 RX ADMIN — SODIUM CHLORIDE, PRESERVATIVE FREE 10 ML: 5 INJECTION INTRAVENOUS at 10:26

## 2024-08-20 RX ADMIN — LEVETIRACETAM 500 MG: 500 TABLET, FILM COATED ORAL at 10:27

## 2024-08-20 RX ADMIN — OXYCODONE 5 MG: 5 TABLET ORAL at 01:59

## 2024-08-20 RX ADMIN — CHOLESTYRAMINE 4 G: 4 POWDER, FOR SUSPENSION ORAL at 10:26

## 2024-08-20 RX ADMIN — Medication 400 MG: at 17:02

## 2024-08-20 RX ADMIN — Medication 15 G: at 10:26

## 2024-08-20 RX ADMIN — MAGNESIUM SULFATE HEPTAHYDRATE 2000 MG: 40 INJECTION, SOLUTION INTRAVENOUS at 09:05

## 2024-08-20 RX ADMIN — Medication 400 MG: at 20:42

## 2024-08-20 RX ADMIN — INSULIN LISPRO 2 UNITS: 100 INJECTION, SOLUTION INTRAVENOUS; SUBCUTANEOUS at 13:24

## 2024-08-20 RX ADMIN — SODIUM CHLORIDE 2 G: 1 TABLET ORAL at 17:02

## 2024-08-20 RX ADMIN — LEVETIRACETAM 500 MG: 500 TABLET, FILM COATED ORAL at 20:42

## 2024-08-20 RX ADMIN — WATER 2000 MG: 1 INJECTION INTRAMUSCULAR; INTRAVENOUS; SUBCUTANEOUS at 06:35

## 2024-08-20 RX ADMIN — WATER 2000 MG: 1 INJECTION INTRAMUSCULAR; INTRAVENOUS; SUBCUTANEOUS at 20:42

## 2024-08-20 RX ADMIN — OXYCODONE 5 MG: 5 TABLET ORAL at 10:27

## 2024-08-20 RX ADMIN — METOPROLOL SUCCINATE 50 MG: 50 TABLET, FILM COATED, EXTENDED RELEASE ORAL at 10:29

## 2024-08-20 RX ADMIN — ONDANSETRON 2 MG: 2 INJECTION INTRAMUSCULAR; INTRAVENOUS at 06:40

## 2024-08-20 ASSESSMENT — PAIN SCALES - GENERAL
PAINLEVEL_OUTOF10: 7
PAINLEVEL_OUTOF10: 10
PAINLEVEL_OUTOF10: 6
PAINLEVEL_OUTOF10: 0
PAINLEVEL_OUTOF10: 0
PAINLEVEL_OUTOF10: 7
PAINLEVEL_OUTOF10: 6
PAINLEVEL_OUTOF10: 8

## 2024-08-20 ASSESSMENT — PAIN DESCRIPTION - ORIENTATION
ORIENTATION: RIGHT
ORIENTATION: RIGHT;LOWER
ORIENTATION: RIGHT
ORIENTATION: RIGHT;LOWER
ORIENTATION: RIGHT;LOWER
ORIENTATION: RIGHT

## 2024-08-20 ASSESSMENT — PAIN DESCRIPTION - DESCRIPTORS
DESCRIPTORS: DISCOMFORT
DESCRIPTORS: ACHING;BURNING;DISCOMFORT
DESCRIPTORS: ACHING;DISCOMFORT
DESCRIPTORS: DISCOMFORT
DESCRIPTORS: DISCOMFORT
DESCRIPTORS: ACHING;BURNING;DISCOMFORT

## 2024-08-20 ASSESSMENT — PAIN DESCRIPTION - ONSET: ONSET: ON-GOING

## 2024-08-20 ASSESSMENT — PAIN DESCRIPTION - LOCATION
LOCATION: LEG

## 2024-08-20 ASSESSMENT — PAIN DESCRIPTION - FREQUENCY: FREQUENCY: CONTINUOUS

## 2024-08-20 NOTE — PLAN OF CARE
Nutrition Problem #1: Inadequate protein-energy intake, Altered nutrition-related lab values  Intervention: Food and/or Nutrient Delivery: Continue Current Diet, Start Oral Nutrition Supplement  Nutritional

## 2024-08-20 NOTE — PLAN OF CARE
plan with appropriate goals if chronic or comorbid symptoms are exacerbated and prevent overall improvement and discharge   Electronically signed by Celia Adame RN on 8/19/2024 at 10:56 PM

## 2024-08-20 NOTE — CARE COORDINATION
Spoke with Verna at [facility]- they are out of network and unable to accept.   Also spoke with Stephanie at [facility], they are also out of network.    Followed up with pt again, she wants to talk with her family again but wants to try facilities in [place]. [place] and [place] as these are close to her [employer] family.  Will send referrals to [place] clinics.  Electronically signed by Tangela Lan RN on 8/20/2024 at 3:16 PM

## 2024-08-20 NOTE — CONSULTS
Comprehensive Nutrition Assessment    Type and Reason for Visit:  Initial, Consult    Nutrition Recommendations/Plan:   Start ONS--Kenton and Gelatein      Malnutrition Assessment:  Malnutrition Status:  Severe malnutrition (08/20/24 1023)    Context:  Acute Illness     Findings of the 6 clinical characteristics of malnutrition:  Energy Intake:  50% or less of estimated energy requirements for 5 or more days  Weight Loss:  No significant weight loss     Body Fat Loss:  No significant body fat loss     Muscle Mass Loss:       Fluid Accumulation:  Moderate to Severe Extremities   Strength:  Not Performed    Nutrition Assessment:    Received consult for wounds.  Pt receiving a Carb control diet.  PO intake fair with intake ranging 25-50%.  Did do better this a.m. with intake ranging 50-75%.  Sending yogurt with breakfast as pt requested.  Started Kenton for L & D along with SF Gelatein at L & D for extra protein.  Pt's current weight is pprox 3# les than weight in 5/6/2024--stable.  Accuchek's 194-247.  Pt concerned about this, but had discussion why this maybe d/t wounds, less activity etc.    Nutrition Related Findings:      Wound Type: Surgical Incision, Wound Vac, Open Wounds       Current Nutrition Intake & Therapies:    Average Meal Intake: 26-50%, 51-75%  Average Supplements Intake: Unable to assess  ADULT ORAL NUTRITION SUPPLEMENT; Dinner, Lunch; Wound Healing Oral Supplement  ADULT DIET; Regular; 4 carb choices (60 gm/meal); 1500 ml    Anthropometric Measures:  Height: 154.9 cm (5' 0.98\")  Ideal Body Weight (IBW): 105 lbs (48 kg)    Admission Body Weight: 67.4 kg (148 lb 8 oz)  Current Body Weight: 67.4 kg (148 lb 9.4 oz), 141.5 % IBW.    Current BMI (kg/m2): 28.1  Usual Body Weight: 68.6 kg (151 lb 3.8 oz)  % Weight Change (Calculated): -1.7  BMI Categories: Overweight (BMI 25.0-29.9)    Estimated Daily Nutrient Needs:  Energy Requirements Based On: Kcal/kg  Weight Used for Energy Requirements:

## 2024-08-20 NOTE — PROGRESS NOTES
Vascular Surgery  Dr. Emili Landry   Daily Progress Note    Pt Name: Elda Flood  Medical Record Number: [id number]  Date of Birth 1945   Today's Date: 8/20/2024    SUBJECTIVE:     Patient was seen and examined.  Stating she felt bad last night because her magnesium was low, but she feels much better today after they gave her some IV magnesium.  Stating her right leg is still very painful, she was not able to put any weight on leg or walk on it yesterday with therapy.   OBJECTIVE:     Patient Vitals for the past 24 hrs:   BP Temp Temp src Pulse Resp SpO2   08/20/24 0759 (!) 110/57 98.6 °F (37 °C) Temporal 90 16 94 %   08/20/24 0514 (!) 122/55 98 °F (36.7 °C) Temporal 94 18 96 %   08/20/24 0229 -- -- -- -- 14 --   08/20/24 0043 (!) 115/58 98.5 °F (36.9 °C) -- 98 16 95 %   08/19/24 2014 136/65 100.2 °F (37.9 °C) -- (!) 103 18 96 %   08/19/24 1654 (!) 127/54 97.9 °F (36.6 °C) -- 91 18 96 %   08/19/24 1101 -- -- -- -- 18 --       Intake/Output Summary (Last 24 hours) at 8/20/2024 0940  Last data filed at 8/20/2024 0634  Gross per 24 hour   Intake --   Output 2200 ml   Net -2200 ml       In: -   Out: 2200 [Urine:2200]    I/O last 3 completed shifts:  In: -   Out: 2200 [Urine:2200]     Date 08/20/24 0000 - 08/20/24 2359   Shift 4858-0882 3646-8575 8733-7756 24 Hour Total   INTAKE   Shift Total(mL/kg)       OUTPUT   Urine(mL/kg/hr) 900(1.7)   900   Shift Total(mL/kg) 900(13.4)   900(13.4)   Weight (kg) 67.4 67.4 67.4 67.4     Wt Readings from Last 3 Encounters:   08/15/24 67.4 kg (148 lb 8 oz)   08/13/24 67.4 kg (148 lb 8 oz)   08/06/24 67.5 kg (148 lb 12 oz)        Body mass index is 28.07 kg/m².     Diet: ADULT ORAL NUTRITION SUPPLEMENT; Dinner, Lunch; Wound Healing Oral Supplement  ADULT DIET; Regular; 4 carb choices (60 gm/meal); 1500 ml    MEDS:     Scheduled Meds:   sodium chloride  2 g Oral TID WC    ceFAZolin  2,000 mg IntraVENous Q8H    magnesium oxide  400 mg Oral 4x daily    insulin glargine  12 Units

## 2024-08-20 NOTE — PLAN OF CARE
Problem: Discharge Planning  Goal: Discharge to home or other facility with appropriate resources  Outcome: Progressing  Flowsheets (Taken 8/20/2024 1027)  Discharge to home or other facility with appropriate resources: Identify barriers to discharge with patient and caregiver     Problem: Pain  Goal: Verbalizes/displays adequate comfort level or baseline comfort level  Outcome: Progressing     Problem: Safety - Adult  Goal: Free from fall injury  Outcome: Progressing     Problem: Skin/Tissue Integrity  Goal: Absence of new skin breakdown  Description: 1.  Monitor for areas of redness and/or skin breakdown  2.  Assess vascular access sites hourly  3.  Every 4-6 hours minimum:  Change oxygen saturation probe site  4.  Every 4-6 hours:  If on nasal continuous positive airway pressure, respiratory therapy assess nares and determine need for appliance change or resting period.  Outcome: Progressing     Problem: Chronic Conditions and Co-morbidities  Goal: Patient's chronic conditions and co-morbidity symptoms are monitored and maintained or improved  Outcome: Progressing  Flowsheets (Taken 8/20/2024 1027)  Care Plan - Patient's Chronic Conditions and Co-Morbidity Symptoms are Monitored and Maintained or Improved: Monitor and assess patient's chronic conditions and comorbid symptoms for stability, deterioration, or improvement     Problem: Nutrition Deficit:  Goal: Optimize nutritional status  Outcome: Progressing
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
## 2024-08-20 NOTE — CARE COORDINATION
Spoke with pt at bedside about dc planning again.  Pt feels she will need to go somewhere for wound vac and therapy prior to going home. Pt wants to try [facility] bed as this is the closest facility to her home. If this is not an option she [address] would be second choice.   Referral faxed to Verna at [facility].  Electronically signed by Tangela Lan RN on 8/20/2024 at 11:55 AM

## 2024-08-20 NOTE — DISCHARGE INSTRUCTIONS
WOULD PREFER VERAFLOW VAC THERAPY IF AVAILABLE AT REHAB CENTER. IF NO VERAFLOW VAC, FOLLOW INSTRUCTIONS BELOW FOR VAC THERAPY:          WOUND VAC INSTRUCTIONS RIGHT LOWER EXTREMITY:    No-sting skin prep to júnior wound skin, allow to air dry.     Ostomy extenders or paste strips around wound edges.    Restore or Mepitel One (contact layer) to wound bed.    Black KCI Foam over the contact layer and cover with Drape.    Apply Trac Pad, turn on pump - VAC at -125mmHg cont, change on MWF.    If VAC pump fails or dressing leaks, remove VAC dressing.    DAILY apply saline moistened gauze to wound bed, cover with single layer of Vaseline gauze.    Place dry fluffs over Vaseline gauze, and wrap with gauze roll from base of toes to below knee. Gently apply 4\" Ace from base of toes to below knee to secure dressings.     Replace VAC dressing as soon as possible.     It is okay for patient to shower on VAC change days. Clamp tubing and cover end of tubing with glove. After shower, dc old VAC dressing and apply new one.         TO REMOVE VAC DRESSING:    Turn off pump, clamp VAC tubing.   Mix Lidocaine 4% topical solution 20ml with sterile normal saline 20ml.  Careful inject the solution under the Drape into the Foam, allow to sit for 10 minutes before removing dressing.   Draw solution up in 20ml syringe with #18 gauze needle.                                                       Elevate your leg as often as possible, this helps decrease the swelling.    You cannot shower or tub bath, do not get your dressing wet UNLESS IT IS VAC CHANGE DAY. You will need to sponge bath.       If you have excessive bleeding, drainage with odor, redness or new tenderness at your incision site, call the office.     If you have a temperature over 101, fever and chills, call our office at [phone].

## 2024-08-20 NOTE — PROGRESS NOTES
Physical Therapy  Name: Elda Flood  MRN:  [id number]  Date of service:  8/20/2024    Attempt this am.  Patient having increased pain this am.  She called for pain meds while I was in room.  Deferred therapy this am due to pain.  Physical Therapy will continue to follow and progress as able.    Electronically signed by Usha Velasquez PTA on 8/20/2024 at 12:51 PM

## 2024-08-21 LAB
ALBUMIN SERPL-MCNC: 2.7 G/DL (ref 3.5–5.2)
ALP SERPL-CCNC: 105 U/L (ref 35–104)
ALT SERPL-CCNC: <5 U/L (ref 5–33)
ANION GAP SERPL CALCULATED.3IONS-SCNC: 10 MMOL/L (ref 7–19)
ANION GAP SERPL CALCULATED.3IONS-SCNC: 15 MMOL/L (ref 7–19)
AST SERPL-CCNC: 9 U/L (ref 5–32)
BASOPHILS # BLD: 0 K/UL (ref 0–0.2)
BASOPHILS NFR BLD: 0 % (ref 0–1)
BILIRUB SERPL-MCNC: 0.2 MG/DL (ref 0.2–1.2)
BUN SERPL-MCNC: 30 MG/DL (ref 8–23)
BUN SERPL-MCNC: 37 MG/DL (ref 8–23)
BURR CELLS BLD QL SMEAR: ABNORMAL
CALCIUM SERPL-MCNC: 9.2 MG/DL (ref 8.8–10.2)
CALCIUM SERPL-MCNC: 9.3 MG/DL (ref 8.8–10.2)
CHLORIDE SERPL-SCNC: 90 MMOL/L (ref 98–111)
CHLORIDE SERPL-SCNC: 95 MMOL/L (ref 98–111)
CO2 SERPL-SCNC: 22 MMOL/L (ref 22–29)
CO2 SERPL-SCNC: 23 MMOL/L (ref 22–29)
CREAT SERPL-MCNC: 0.7 MG/DL (ref 0.5–0.9)
CREAT SERPL-MCNC: 0.8 MG/DL (ref 0.5–0.9)
EOSINOPHIL # BLD: 0.51 K/UL (ref 0–0.6)
EOSINOPHIL NFR BLD: 2 % (ref 0–5)
ERYTHROCYTE [DISTWIDTH] IN BLOOD BY AUTOMATED COUNT: 16.3 % (ref 11.5–14.5)
GLUCOSE BLD-MCNC: 196 MG/DL (ref 70–99)
GLUCOSE BLD-MCNC: 236 MG/DL (ref 70–99)
GLUCOSE BLD-MCNC: 299 MG/DL (ref 70–99)
GLUCOSE BLD-MCNC: 336 MG/DL (ref 70–99)
GLUCOSE SERPL-MCNC: 160 MG/DL (ref 74–109)
GLUCOSE SERPL-MCNC: 215 MG/DL (ref 74–109)
HCT VFR BLD AUTO: 22.6 % (ref 37–47)
HGB BLD-MCNC: 7.1 G/DL (ref 12–16)
IMM GRANULOCYTES # BLD: 0.4 K/UL
LYMPHOCYTES # BLD: 3.8 K/UL (ref 1.1–4.5)
LYMPHOCYTES NFR BLD: 15 % (ref 20–40)
MAGNESIUM SERPL-MCNC: 1.9 MG/DL (ref 1.6–2.4)
MCH RBC QN AUTO: 24.2 PG (ref 27–31)
MCHC RBC AUTO-ENTMCNC: 31.4 G/DL (ref 33–37)
MCV RBC AUTO: 77.1 FL (ref 81–99)
MONOCYTES # BLD: 3.6 K/UL (ref 0–0.9)
MONOCYTES NFR BLD: 14 % (ref 0–10)
NEUTROPHILS # BLD: 17.6 K/UL (ref 1.5–7.5)
NEUTS BAND NFR BLD MANUAL: 1 % (ref 0–5)
NEUTS SEG NFR BLD: 68 % (ref 50–65)
OVALOCYTES BLD QL SMEAR: ABNORMAL
PERFORMED ON: ABNORMAL
PLATELET # BLD AUTO: 435 K/UL (ref 130–400)
PLATELET SLIDE REVIEW: ADEQUATE
PMV BLD AUTO: 11.5 FL (ref 9.4–12.3)
POIKILOCYTOSIS BLD QL SMEAR: ABNORMAL
POTASSIUM SERPL-SCNC: 4.6 MMOL/L (ref 3.5–5)
POTASSIUM SERPL-SCNC: 5.4 MMOL/L (ref 3.5–5)
PROT SERPL-MCNC: 5.5 G/DL (ref 6.6–8.7)
RBC # BLD AUTO: 2.93 M/UL (ref 4.2–5.4)
SCHISTOCYTES BLD QL SMEAR: ABNORMAL
SODIUM SERPL-SCNC: 127 MMOL/L (ref 136–145)
SODIUM SERPL-SCNC: 128 MMOL/L (ref 136–145)
WBC # BLD AUTO: 25.5 K/UL (ref 4.8–10.8)

## 2024-08-21 PROCEDURE — 2580000003 HC RX 258: Performed by: SURGERY

## 2024-08-21 PROCEDURE — 1200000000 HC SEMI PRIVATE

## 2024-08-21 PROCEDURE — 6360000002 HC RX W HCPCS: Performed by: HOSPITALIST

## 2024-08-21 PROCEDURE — 2580000003 HC RX 258: Performed by: HOSPITALIST

## 2024-08-21 PROCEDURE — 82962 GLUCOSE BLOOD TEST: CPT

## 2024-08-21 PROCEDURE — 97530 THERAPEUTIC ACTIVITIES: CPT

## 2024-08-21 PROCEDURE — 6360000002 HC RX W HCPCS: Performed by: SURGERY

## 2024-08-21 PROCEDURE — 6370000000 HC RX 637 (ALT 250 FOR IP): Performed by: NURSE PRACTITIONER

## 2024-08-21 PROCEDURE — 6370000000 HC RX 637 (ALT 250 FOR IP): Performed by: SURGERY

## 2024-08-21 PROCEDURE — 94760 N-INVAS EAR/PLS OXIMETRY 1: CPT

## 2024-08-21 PROCEDURE — 97535 SELF CARE MNGMENT TRAINING: CPT

## 2024-08-21 PROCEDURE — 6360000002 HC RX W HCPCS: Performed by: NURSE PRACTITIONER

## 2024-08-21 PROCEDURE — 6370000000 HC RX 637 (ALT 250 FOR IP): Performed by: HOSPITALIST

## 2024-08-21 PROCEDURE — 36415 COLL VENOUS BLD VENIPUNCTURE: CPT

## 2024-08-21 PROCEDURE — 6370000000 HC RX 637 (ALT 250 FOR IP): Performed by: STUDENT IN AN ORGANIZED HEALTH CARE EDUCATION/TRAINING PROGRAM

## 2024-08-21 PROCEDURE — 83735 ASSAY OF MAGNESIUM: CPT

## 2024-08-21 PROCEDURE — 80053 COMPREHEN METABOLIC PANEL: CPT

## 2024-08-21 PROCEDURE — 85025 COMPLETE CBC W/AUTO DIFF WBC: CPT

## 2024-08-21 RX ORDER — INSULIN LISPRO 100 [IU]/ML
0-8 INJECTION, SOLUTION INTRAVENOUS; SUBCUTANEOUS
Status: DISCONTINUED | OUTPATIENT
Start: 2024-08-21 | End: 2024-09-05 | Stop reason: HOSPADM

## 2024-08-21 RX ORDER — SENNA AND DOCUSATE SODIUM 50; 8.6 MG/1; MG/1
2 TABLET, FILM COATED ORAL DAILY
Status: DISCONTINUED | OUTPATIENT
Start: 2024-08-21 | End: 2024-08-30

## 2024-08-21 RX ORDER — INSULIN GLARGINE 100 [IU]/ML
15 INJECTION, SOLUTION SUBCUTANEOUS 2 TIMES DAILY
Status: DISCONTINUED | OUTPATIENT
Start: 2024-08-21 | End: 2024-09-05 | Stop reason: HOSPADM

## 2024-08-21 RX ORDER — INSULIN LISPRO 100 [IU]/ML
0-4 INJECTION, SOLUTION INTRAVENOUS; SUBCUTANEOUS NIGHTLY
Status: DISCONTINUED | OUTPATIENT
Start: 2024-08-21 | End: 2024-09-05 | Stop reason: HOSPADM

## 2024-08-21 RX ORDER — HYDROMORPHONE HYDROCHLORIDE 1 MG/ML
0.25 INJECTION, SOLUTION INTRAMUSCULAR; INTRAVENOUS; SUBCUTANEOUS ONCE
Status: COMPLETED | OUTPATIENT
Start: 2024-08-21 | End: 2024-08-21

## 2024-08-21 RX ORDER — POLYETHYLENE GLYCOL 3350 17 G/17G
17 POWDER, FOR SOLUTION ORAL DAILY
Status: DISCONTINUED | OUTPATIENT
Start: 2024-08-21 | End: 2024-09-05 | Stop reason: HOSPADM

## 2024-08-21 RX ADMIN — ATORVASTATIN CALCIUM 20 MG: 20 TABLET, FILM COATED ORAL at 21:47

## 2024-08-21 RX ADMIN — Medication 400 MG: at 21:47

## 2024-08-21 RX ADMIN — ONDANSETRON 2 MG: 2 INJECTION INTRAMUSCULAR; INTRAVENOUS at 02:54

## 2024-08-21 RX ADMIN — HYDROMORPHONE HYDROCHLORIDE 0.25 MG: 1 INJECTION, SOLUTION INTRAMUSCULAR; INTRAVENOUS; SUBCUTANEOUS at 13:04

## 2024-08-21 RX ADMIN — LIDOCAINE HYDROCHLORIDE 20 ML/DAY: 40 SOLUTION ORAL at 12:04

## 2024-08-21 RX ADMIN — Medication 400 MG: at 12:30

## 2024-08-21 RX ADMIN — SODIUM CHLORIDE 2 G: 1 TABLET ORAL at 08:46

## 2024-08-21 RX ADMIN — WATER 2000 MG: 1 INJECTION INTRAMUSCULAR; INTRAVENOUS; SUBCUTANEOUS at 15:53

## 2024-08-21 RX ADMIN — HYDROMORPHONE HYDROCHLORIDE 0.25 MG: 1 INJECTION, SOLUTION INTRAMUSCULAR; INTRAVENOUS; SUBCUTANEOUS at 12:27

## 2024-08-21 RX ADMIN — SODIUM CHLORIDE, PRESERVATIVE FREE 10 ML: 5 INJECTION INTRAVENOUS at 08:50

## 2024-08-21 RX ADMIN — INSULIN GLARGINE 15 UNITS: 100 INJECTION, SOLUTION SUBCUTANEOUS at 21:48

## 2024-08-21 RX ADMIN — WATER 2000 MG: 1 INJECTION INTRAMUSCULAR; INTRAVENOUS; SUBCUTANEOUS at 21:48

## 2024-08-21 RX ADMIN — Medication 400 MG: at 17:51

## 2024-08-21 RX ADMIN — ALLOPURINOL 300 MG: 100 TABLET ORAL at 08:47

## 2024-08-21 RX ADMIN — LEVETIRACETAM 500 MG: 500 TABLET, FILM COATED ORAL at 08:47

## 2024-08-21 RX ADMIN — SODIUM CHLORIDE 2 G: 1 TABLET ORAL at 17:55

## 2024-08-21 RX ADMIN — WATER 2000 MG: 1 INJECTION INTRAMUSCULAR; INTRAVENOUS; SUBCUTANEOUS at 05:20

## 2024-08-21 RX ADMIN — HYDROMORPHONE HYDROCHLORIDE 0.25 MG: 1 INJECTION, SOLUTION INTRAMUSCULAR; INTRAVENOUS; SUBCUTANEOUS at 02:54

## 2024-08-21 RX ADMIN — SODIUM CHLORIDE 2 G: 1 TABLET ORAL at 12:30

## 2024-08-21 RX ADMIN — OXYCODONE 5 MG: 5 TABLET ORAL at 20:26

## 2024-08-21 RX ADMIN — NALOXEGOL OXALATE 12.5 MG: 12.5 TABLET, FILM COATED ORAL at 15:53

## 2024-08-21 RX ADMIN — Medication 400 MG: at 08:47

## 2024-08-21 RX ADMIN — CHOLESTYRAMINE 4 G: 4 POWDER, FOR SUSPENSION ORAL at 08:47

## 2024-08-21 RX ADMIN — POLYETHYLENE GLYCOL 3350 17 G: 17 POWDER, FOR SOLUTION ORAL at 08:58

## 2024-08-21 RX ADMIN — METOPROLOL SUCCINATE 50 MG: 50 TABLET, FILM COATED, EXTENDED RELEASE ORAL at 08:46

## 2024-08-21 RX ADMIN — ONDANSETRON 2 MG: 2 INJECTION INTRAMUSCULAR; INTRAVENOUS at 09:02

## 2024-08-21 RX ADMIN — LEVETIRACETAM 500 MG: 500 TABLET, FILM COATED ORAL at 21:47

## 2024-08-21 RX ADMIN — INSULIN GLARGINE 12 UNITS: 100 INJECTION, SOLUTION SUBCUTANEOUS at 08:49

## 2024-08-21 RX ADMIN — Medication 15 G: at 08:46

## 2024-08-21 RX ADMIN — SODIUM CHLORIDE, PRESERVATIVE FREE 10 ML: 5 INJECTION INTRAVENOUS at 22:24

## 2024-08-21 RX ADMIN — OXYCODONE 5 MG: 5 TABLET ORAL at 08:46

## 2024-08-21 RX ADMIN — FOLIC ACID 1 MG: 1 TABLET ORAL at 08:47

## 2024-08-21 RX ADMIN — INSULIN LISPRO 1 UNITS: 100 INJECTION, SOLUTION INTRAVENOUS; SUBCUTANEOUS at 08:48

## 2024-08-21 RX ADMIN — INSULIN LISPRO 6 UNITS: 100 INJECTION, SOLUTION INTRAVENOUS; SUBCUTANEOUS at 12:29

## 2024-08-21 RX ADMIN — SENNOSIDES AND DOCUSATE SODIUM 2 TABLET: 8.6; 5 TABLET ORAL at 15:53

## 2024-08-21 ASSESSMENT — PAIN - FUNCTIONAL ASSESSMENT
PAIN_FUNCTIONAL_ASSESSMENT: PREVENTS OR INTERFERES SOME ACTIVE ACTIVITIES AND ADLS
PAIN_FUNCTIONAL_ASSESSMENT: PREVENTS OR INTERFERES WITH MANY ACTIVE NOT PASSIVE ACTIVITIES
PAIN_FUNCTIONAL_ASSESSMENT: PREVENTS OR INTERFERES SOME ACTIVE ACTIVITIES AND ADLS
PAIN_FUNCTIONAL_ASSESSMENT: PREVENTS OR INTERFERES SOME ACTIVE ACTIVITIES AND ADLS
PAIN_FUNCTIONAL_ASSESSMENT: PREVENTS OR INTERFERES WITH ALL ACTIVE AND SOME PASSIVE ACTIVITIES

## 2024-08-21 ASSESSMENT — PAIN DESCRIPTION - DESCRIPTORS
DESCRIPTORS: DISCOMFORT
DESCRIPTORS: DISCOMFORT
DESCRIPTORS: ACHING;BURNING;DISCOMFORT
DESCRIPTORS: THROBBING

## 2024-08-21 ASSESSMENT — PAIN SCALES - GENERAL
PAINLEVEL_OUTOF10: 6
PAINLEVEL_OUTOF10: 8
PAINLEVEL_OUTOF10: 0
PAINLEVEL_OUTOF10: 7
PAINLEVEL_OUTOF10: 8
PAINLEVEL_OUTOF10: 10
PAINLEVEL_OUTOF10: 6

## 2024-08-21 ASSESSMENT — PAIN DESCRIPTION - PAIN TYPE: TYPE: ACUTE PAIN

## 2024-08-21 ASSESSMENT — PAIN DESCRIPTION - ORIENTATION
ORIENTATION: RIGHT;LEFT
ORIENTATION: RIGHT
ORIENTATION: RIGHT
ORIENTATION: RIGHT;LOWER
ORIENTATION: RIGHT
ORIENTATION: RIGHT

## 2024-08-21 ASSESSMENT — PAIN DESCRIPTION - LOCATION
LOCATION: LEG

## 2024-08-21 ASSESSMENT — PAIN DESCRIPTION - FREQUENCY: FREQUENCY: CONTINUOUS

## 2024-08-21 ASSESSMENT — PAIN DESCRIPTION - ONSET: ONSET: ON-GOING

## 2024-08-21 NOTE — PLAN OF CARE
Problem: Discharge Planning  Goal: Discharge to home or other facility with appropriate resources  Outcome: Progressing  Flowsheets (Taken 8/21/2024 4028)  Discharge to home or other facility with appropriate resources: Identify barriers to discharge with patient and caregiver     Problem: Pain  Goal: Verbalizes/displays adequate comfort level or baseline comfort level  Outcome: Progressing     Problem: Safety - Adult  Goal: Free from fall injury  Outcome: Progressing     Problem: Skin/Tissue Integrity  Goal: Absence of new skin breakdown  Description: 1.  Monitor for areas of redness and/or skin breakdown  2.  Assess vascular access sites hourly  3.  Every 4-6 hours minimum:  Change oxygen saturation probe site  4.  Every 4-6 hours:  If on nasal continuous positive airway pressure, respiratory therapy assess nares and determine need for appliance change or resting period.  Outcome: Progressing     Problem: Nutrition Deficit:  Goal: Optimize nutritional status  Outcome: Progressing

## 2024-08-21 NOTE — PROGRESS NOTES
Physical Therapy    Name: Elda Flood  MRN: [id number]  Date of service: 8/21/2024 08/21/24 1400   Restrictions/Precautions   Restrictions/Precautions Fall Risk   General   Diagnosis RLE HEMATOMA EVACUATION   General Comment   Comments Nurse reports pt just got wound vac dressing changed   Subjective   Subjective Pt agreed to therapy and encouraged to do more today   Subjective   Subjective hurts but is a little better   Pain seems moderate today, improved from previous sessions   Bed mobility   Supine to Sit Minimal assistance;2 Person assistance   Sit to Supine Moderate assistance;2 Person assistance   Transfers   Sit to Stand Moderate Assistance;2 Person Assistance   Stand to Sit Moderate Assistance;2 Person Assistance   Lateral Transfers Minimal Assistance;2 Person Assistance  (SB to/from WC)   Comment attempted stand, unable to fully stand due to L knee decreased ROM, partially due to swelling.   Ambulation   Comments not at this time   Wheelchair Activities   Propulsion Yes   Propulsion 1   Propulsion Manual   Level Level Tile   Method RUE;LUE   Level of Assistance Stand by assistance   Description/ Details small, slow strokes   Distance 75ft before fatigued   Short Term Goals   Time Frame for Short Term Goals 14 DAYS   Short Term Goal 1 BED MOB MIN ASSIST   Short Term Goal 2 TRANSFERS MIN ASSIST   Short Term Goal 3 AMB 25' RW MIN ASSIST   Activity Tolerance   Activity Tolerance Patient tolerated treatment well;Patient limited by pain   Assessment   Assessment Pain has decreased significantly compared to other session. This allowed for py to participate. Performed bed mobility with therapist assist RLE. Sat EOB, attempted to stand but not able to fully stand due to bilateral limited knee ROM. Used SB to transfer to . Pt did requiring minimal assist and verbal cueing. Propelled WC in hallway until fatigued. Pt sat in WB with daughter present to eat lunch. Then PT/OT used SB back to bed. Left pt in bed

## 2024-08-21 NOTE — PROGRESS NOTES
Daily Progress Note    Date:2024  Patient: Elda Flood  : 1945  MRN:[id number]  CODE:Full Code No additional code details  PCP:NICOLE Solis DO    Admit Date: 2024 10:20 AM   LOS: 7 days     Chief Complaint   Patient presents with    Hematoma     Pt here with large hematoma and swelling injured yesterday          Subjective: NAEON. Still having RLE pain intermittently, but controlled at this time. Due for wound vac change today. Pt has not had bowel movement in 7 days.   Her daughter was at bedside.         Hospital Summary: 79 y.o. female who presented to [facility] ED for evaluation of bruising and pain of right lower leg. Pt has history of atrial fibrillation on eliquis, diabetes, stroke, hypertension and hyperlipidemia.   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 Vascular surgery was consulted.  Pt admitted to hospitalist service for further management.   Underwent evacuation of RLE hematoma on 8/15. She did require transfusion due to anemia of acute blood loss. Wound vac was placed .   Case management working on placement.         Review of Systems   All other systems reviewed and are negative.      Objective:      Vital signs in last 24 hours:  Patient Vitals for the past 24 hrs:   BP Temp Temp src Pulse Resp SpO2   24 1304 -- -- -- -- 16 --   24 0944 -- -- -- -- -- 97 %   24 0744 (!) 126/49 97.4 °F (36.3 °C) Temporal 95 16 98 %   24 0433 115/60 98.6 °F (37 °C) Oral (!) 103 14 97 %   24 0324 -- -- -- -- 14 --   24 2112 -- -- -- -- 14 --   241 (!) 127/49 98.6 °F (37 °C) -- (!) 107 16 96 %   24 1650 (!) 124/49 98.8 °F (37.1 °C) Temporal 98 16 98 %       I/O last 3 completed shifts:  In: 670 [P.O.:670]  Out: 4000 [Urine:4000]  No intake/output data recorded.    Physical Exam  Constitutional:       General: She is not in acute distress.     Appearance: She is not toxic-appearing.    Cardiovascular:      Rate and Rhythm: Normal rate and regular rhythm.      Pulses: Normal pulses.      Heart sounds: Normal heart sounds.   Pulmonary:      Effort: Pulmonary effort is normal. No respiratory distress.      Breath sounds: Normal breath sounds.   Abdominal:      General: Bowel sounds are normal. There is no distension.      Palpations: Abdomen is soft.      Tenderness: There is no abdominal tenderness.   Skin:     Comments: R calf wound with wound vac in place             Lab Review   Recent Results (from the past 24 hour(s))   POCT Glucose    Collection Time: 08/20/24  4:51 PM   Result Value Ref Range    POC Glucose 247 (H) 70 - 99 mg/dl    Performed on AccuChek    POCT Glucose    Collection Time: 08/20/24  8:53 PM   Result Value Ref Range    POC Glucose 284 (H) 70 - 99 mg/dl    Performed on NetLexuChek    Comprehensive Metabolic Panel w/ Reflex to MG    Collection Time: 08/21/24  7:43 AM   Result Value Ref Range    Sodium 128 (L) 136 - 145 mmol/L    Potassium reflex Magnesium 5.4 (H) 3.5 - 5.0 mmol/L    Chloride 95 (L) 98 - 111 mmol/L    CO2 23 22 - 29 mmol/L    Anion Gap 10 7 - 19 mmol/L    Glucose 215 (H) 74 - 109 mg/dL    BUN 30 (H) 8 - 23 mg/dL    Creatinine 0.8 0.5 - 0.9 mg/dL    Est, Glom Filt Rate 76 >60    Calcium 9.3 8.8 - 10.2 mg/dL    Total Protein 5.5 (L) 6.6 - 8.7 g/dL    Albumin 2.7 (L) 3.5 - 5.2 g/dL    Total Bilirubin 0.2 0.2 - 1.2 mg/dL    Alkaline Phosphatase 105 (H) 35 - 104 U/L    ALT <5 (A) 5 - 33 U/L    AST 9 5 - 32 U/L   Magnesium    Collection Time: 08/21/24  7:43 AM   Result Value Ref Range    Magnesium 1.9 1.6 - 2.4 mg/dL   CBC with Auto Differential    Collection Time: 08/21/24  7:43 AM   Result Value Ref Range    WBC 25.5 (H) 4.8 - 10.8 K/uL    RBC 2.93 (L) 4.20 - 5.40 M/uL    Hemoglobin 7.1 (L) 12.0 - 16.0 g/dL    Hematocrit 22.6 (L) 37.0 - 47.0 %    MCV 77.1 (L) 81.0 - 99.0 fL    MCH 24.2 (L) 27.0 - 31.0 pg    MCHC 31.4 (L) 33.0 - 37.0 g/dL    RDW 16.3 (H) 11.5 - 14.5 %

## 2024-08-21 NOTE — CARE COORDINATION
[facility] is able to accept.  Potentially for Friday.  Insurance requries a precert and this will be started pending further therapy notes.  Also spoke with Stephanie about potential for pt to have/need a kci vac with irrigation (veraflow).    They can do this if needed.  Will cont to follow for any needs or changes.  Dc tentatively planned for Friday.  [facility]   P   F  Electronically signed by Tangela Lan RN on 8/21/2024 at 2:47 PM

## 2024-08-21 NOTE — PROGRESS NOTES
Facility/Department: [facility] 3 ANNA/VAS/MED  Daily Treatment Note  NAME: Elda Flood  : 1945  MRN: [id number]    Date of Service: 2024    Discharge Recommendations:  Patient would benefit from continued therapy after discharge       Assessment   Assessment: The patient was able to successfully use sliding board with assist for out of bed to wheelchair for a late lunch, then assisted back to bed.  Treatment Diagnosis: Hematoma RLE s/p evacuation of hematoma, wound vac placement  Activity Tolerance  Activity Tolerance: Patient Tolerated treatment well  Activity Tolerance Comments: pain was decreased this visit and the patient was pleased at her ability to functionally participate more in tx.         Patient Diagnosis(es): The primary encounter diagnosis was Hematoma of leg, right, initial encounter. Diagnoses of Bleeding into the skin and Uncontrolled pain were also pertinent to this visit.    has a past medical history of Arthritis, Atrial fibrillation (HCC), Hyperlipidemia, Hypertension, Incisional hernia, and Stroke (cerebrum) (HCC).   has a past surgical history that includes Cholecystectomy; Appendectomy; Colonoscopy (); Upper gastrointestinal endoscopy (); Colonoscopy (16);  section; hernia repair (); hernia repair; Umbilical hernia repair (N/A, 2019); Hysterectomy, total abdominal (); Ovary removal (Bilateral, ); and Leg Surgery (Right, 8/15/2024).    Restrictions  Restrictions/Precautions  Restrictions/Precautions: Fall Risk    Subjective   General  Chart Reviewed: Yes  Patient assessed for rehabilitation services?: Yes  Family / Caregiver Present: Yes (dtr)  Pain Screening  Pain at present: 6 (attempting to stand LLE pain, later RLE from dependent position in sitting)  Scale Used: Numeric Score  Intervention List: Patient able to continue with treatment  Comments / Details: Patient was up to date on pain meds allowed.  Overall pain was significantly reduced in

## 2024-08-21 NOTE — CARE COORDINATION
Per previous note, [facility] was noted to be out of network, that was incorrect.  [facility] is in network and is currently reviewing case. Will need furhter therapy notes when available. Will follow up.  [facility]  in [place] do not have any beds att his time.  Referral had been sent to [facility] per request of Cari-  they are also out of network.  Electronically signed by Tangela Lan RN on 8/21/2024 at 11:08 AM

## 2024-08-21 NOTE — PROGRESS NOTES
Vascular Surgery  Dr. Emili Landry   Daily Progress Note    Pt Name: Elda Flood  Medical Record Number: [id number]  Date of Birth 1945   Today's Date: 8/21/2024    SUBJECTIVE:     Patient was seen and examined.  Stating her leg has been hurting during the night, asking if time for pain medication  Stating she has not had a BM since admission      OBJECTIVE:     Patient Vitals for the past 24 hrs:   BP Temp Temp src Pulse Resp SpO2 Height   08/21/24 0744 (!) 126/49 97.4 °F (36.3 °C) Temporal 95 16 98 % --   08/21/24 0433 115/60 98.6 °F (37 °C) Oral (!) 103 14 97 % --   08/21/24 0324 -- -- -- -- 14 -- --   08/20/24 2112 -- -- -- -- 14 -- --   08/20/24 2031 (!) 127/49 98.6 °F (37 °C) -- (!) 107 16 96 % --   08/20/24 1650 (!) 124/49 98.8 °F (37.1 °C) Temporal 98 16 98 % --   08/20/24 1122 -- -- -- -- -- 97 % --   08/20/24 1050 (!) 117/53 98.1 °F (36.7 °C) Temporal 93 16 97 % --   08/20/24 1004 -- -- -- -- -- -- 1.549 m (5' 0.98\")       Intake/Output Summary (Last 24 hours) at 8/21/2024 0840  Last data filed at 8/21/2024 0452  Gross per 24 hour   Intake 670 ml   Output 1800 ml   Net -1130 ml       In: 670 [P.O.:670]  Out: 1800 [Urine:1800]    I/O last 3 completed shifts:  In: 670 [P.O.:670]  Out: 4000 [Urine:4000]     Date 08/21/24 0000 - 08/21/24 2359   Shift 4778-4926 1325-8821 3455-7658 24 Hour Total   INTAKE   Shift Total(mL/kg)       OUTPUT   Urine(mL/kg/hr) 1000(1.9)   1000   Shift Total(mL/kg) 1000(14.8)   1000(14.8)   Weight (kg) 67.4 67.4 67.4 67.4     Wt Readings from Last 3 Encounters:   08/15/24 67.4 kg (148 lb 8 oz)   08/13/24 67.4 kg (148 lb 8 oz)   08/06/24 67.5 kg (148 lb 12 oz)        Body mass index is 28.07 kg/m².     Diet: ADULT ORAL NUTRITION SUPPLEMENT; Dinner, Lunch; Wound Healing Oral Supplement  ADULT DIET; Regular; 4 carb choices (60 gm/meal); 1500 ml    MEDS:     Scheduled Meds:   polyethylene glycol  17 g Oral Daily    sodium chloride  2 g Oral TID WC    lidocaine   Topical Once per

## 2024-08-21 NOTE — PLAN OF CARE
Problem: Discharge Planning  Goal: Discharge to home or other facility with appropriate resources  8/20/2024 2108 by Leander Barrett RN  Outcome: Progressing  8/20/2024 1814 by Ami Mar RN  Outcome: Progressing  Flowsheets (Taken 8/20/2024 1027)  Discharge to home or other facility with appropriate resources: Identify barriers to discharge with patient and caregiver     Problem: Pain  Goal: Verbalizes/displays adequate comfort level or baseline comfort level  8/20/2024 2108 by Leander Barrett RN  Outcome: Progressing  8/20/2024 1814 by Ami Mar RN  Outcome: Progressing     Problem: Safety - Adult  Goal: Free from fall injury  8/20/2024 2108 by Leander Barrett RN  Outcome: Progressing  8/20/2024 1814 by Ami Mar RN  Outcome: Progressing     Problem: Skin/Tissue Integrity  Goal: Absence of new skin breakdown  Description: 1.  Monitor for areas of redness and/or skin breakdown  2.  Assess vascular access sites hourly  3.  Every 4-6 hours minimum:  Change oxygen saturation probe site  4.  Every 4-6 hours:  If on nasal continuous positive airway pressure, respiratory therapy assess nares and determine need for appliance change or resting period.  8/20/2024 2108 by Leander Barrett RN  Outcome: Progressing  8/20/2024 1814 by Ami Mar RN  Outcome: Progressing     Problem: ABCDS Injury Assessment  Goal: Absence of physical injury  8/20/2024 2108 by Leander Barrett RN  Outcome: Progressing  8/20/2024 1814 by Ami Mar RN  Outcome: Progressing     Problem: Chronic Conditions and Co-morbidities  Goal: Patient's chronic conditions and co-morbidity symptoms are monitored and maintained or improved  8/20/2024 2108 by Leander Barrett RN  Outcome: Progressing  8/20/2024 1814 by Ami Mar RN  Outcome: Progressing  Flowsheets (Taken 8/20/2024 1027)  Care Plan - Patient's Chronic Conditions and Co-Morbidity Symptoms are Monitored and Maintained or Improved: Monitor and assess patient's chronic

## 2024-08-22 LAB
ABO/RH: NORMAL
ANION GAP SERPL CALCULATED.3IONS-SCNC: 12 MMOL/L (ref 7–19)
ANISOCYTOSIS BLD QL SMEAR: ABNORMAL
ANTIBODY SCREEN: NORMAL
BACTERIA BLD CULT ORG #2: NORMAL
BACTERIA BLD CULT: NORMAL
BASOPHILS # BLD: 0 K/UL (ref 0–0.2)
BASOPHILS NFR BLD: 0 % (ref 0–1)
BLOOD BANK DISPENSE STATUS: NORMAL
BLOOD BANK PRODUCT CODE: NORMAL
BPU ID: NORMAL
BUN SERPL-MCNC: 30 MG/DL (ref 8–23)
CALCIUM SERPL-MCNC: 8.8 MG/DL (ref 8.8–10.2)
CHLORIDE SERPL-SCNC: 96 MMOL/L (ref 98–111)
CO2 SERPL-SCNC: 21 MMOL/L (ref 22–29)
CREAT SERPL-MCNC: 0.7 MG/DL (ref 0.5–0.9)
DESCRIPTION BLOOD BANK: NORMAL
EOSINOPHIL # BLD: 0 K/UL (ref 0–0.6)
EOSINOPHIL NFR BLD: 0 % (ref 0–5)
ERYTHROCYTE [DISTWIDTH] IN BLOOD BY AUTOMATED COUNT: 16.4 % (ref 11.5–14.5)
GLUCOSE BLD-MCNC: 196 MG/DL (ref 70–99)
GLUCOSE BLD-MCNC: 201 MG/DL (ref 70–99)
GLUCOSE BLD-MCNC: 219 MG/DL (ref 70–99)
GLUCOSE BLD-MCNC: 238 MG/DL (ref 70–99)
GLUCOSE SERPL-MCNC: 208 MG/DL (ref 74–109)
HCT VFR BLD AUTO: 21.4 % (ref 37–47)
HCT VFR BLD AUTO: 27.7 % (ref 37–47)
HGB BLD-MCNC: 6.8 G/DL (ref 12–16)
HGB BLD-MCNC: 8.9 G/DL (ref 12–16)
HYPOCHROMIA BLD QL SMEAR: ABNORMAL
IMM GRANULOCYTES # BLD: 0.6 K/UL
LYMPHOCYTES # BLD: 1.7 K/UL (ref 1.1–4.5)
LYMPHOCYTES NFR BLD: 6 % (ref 20–40)
MAGNESIUM SERPL-MCNC: 1.6 MG/DL (ref 1.6–2.4)
MCH RBC QN AUTO: 24.2 PG (ref 27–31)
MCHC RBC AUTO-ENTMCNC: 31.8 G/DL (ref 33–37)
MCV RBC AUTO: 76.2 FL (ref 81–99)
MONOCYTES # BLD: 6.1 K/UL (ref 0–0.9)
MONOCYTES NFR BLD: 22 % (ref 0–10)
NEUTROPHILS # BLD: 19.9 K/UL (ref 1.5–7.5)
NEUTS BAND NFR BLD MANUAL: 1 % (ref 0–5)
NEUTS SEG NFR BLD: 71 % (ref 50–65)
PERFORMED ON: ABNORMAL
PLATELET # BLD AUTO: 483 K/UL (ref 130–400)
PLATELET SLIDE REVIEW: ABNORMAL
PMV BLD AUTO: 11.8 FL (ref 9.4–12.3)
POTASSIUM SERPL-SCNC: 4.2 MMOL/L (ref 3.5–5)
RBC # BLD AUTO: 2.81 M/UL (ref 4.2–5.4)
SODIUM SERPL-SCNC: 129 MMOL/L (ref 136–145)
WBC # BLD AUTO: 27.6 K/UL (ref 4.8–10.8)

## 2024-08-22 PROCEDURE — 6360000002 HC RX W HCPCS: Performed by: STUDENT IN AN ORGANIZED HEALTH CARE EDUCATION/TRAINING PROGRAM

## 2024-08-22 PROCEDURE — 97535 SELF CARE MNGMENT TRAINING: CPT

## 2024-08-22 PROCEDURE — 82962 GLUCOSE BLOOD TEST: CPT

## 2024-08-22 PROCEDURE — 36430 TRANSFUSION BLD/BLD COMPNT: CPT

## 2024-08-22 PROCEDURE — 80048 BASIC METABOLIC PNL TOTAL CA: CPT

## 2024-08-22 PROCEDURE — 86900 BLOOD TYPING SEROLOGIC ABO: CPT

## 2024-08-22 PROCEDURE — 6370000000 HC RX 637 (ALT 250 FOR IP): Performed by: SURGERY

## 2024-08-22 PROCEDURE — 86923 COMPATIBILITY TEST ELECTRIC: CPT

## 2024-08-22 PROCEDURE — 94760 N-INVAS EAR/PLS OXIMETRY 1: CPT

## 2024-08-22 PROCEDURE — 86901 BLOOD TYPING SEROLOGIC RH(D): CPT

## 2024-08-22 PROCEDURE — 85014 HEMATOCRIT: CPT

## 2024-08-22 PROCEDURE — 30233N1 TRANSFUSION OF NONAUTOLOGOUS RED BLOOD CELLS INTO PERIPHERAL VEIN, PERCUTANEOUS APPROACH: ICD-10-PCS | Performed by: SURGERY

## 2024-08-22 PROCEDURE — 85018 HEMOGLOBIN: CPT

## 2024-08-22 PROCEDURE — 97110 THERAPEUTIC EXERCISES: CPT

## 2024-08-22 PROCEDURE — 86850 RBC ANTIBODY SCREEN: CPT

## 2024-08-22 PROCEDURE — 6370000000 HC RX 637 (ALT 250 FOR IP): Performed by: STUDENT IN AN ORGANIZED HEALTH CARE EDUCATION/TRAINING PROGRAM

## 2024-08-22 PROCEDURE — 97530 THERAPEUTIC ACTIVITIES: CPT

## 2024-08-22 PROCEDURE — 6360000002 HC RX W HCPCS: Performed by: HOSPITALIST

## 2024-08-22 PROCEDURE — 85025 COMPLETE CBC W/AUTO DIFF WBC: CPT

## 2024-08-22 PROCEDURE — 36415 COLL VENOUS BLD VENIPUNCTURE: CPT

## 2024-08-22 PROCEDURE — 6370000000 HC RX 637 (ALT 250 FOR IP): Performed by: HOSPITALIST

## 2024-08-22 PROCEDURE — P9016 RBC LEUKOCYTES REDUCED: HCPCS

## 2024-08-22 PROCEDURE — 2580000003 HC RX 258: Performed by: SURGERY

## 2024-08-22 PROCEDURE — 1200000000 HC SEMI PRIVATE

## 2024-08-22 PROCEDURE — 2580000003 HC RX 258: Performed by: HOSPITALIST

## 2024-08-22 PROCEDURE — 83735 ASSAY OF MAGNESIUM: CPT

## 2024-08-22 RX ORDER — SODIUM CHLORIDE 9 MG/ML
INJECTION, SOLUTION INTRAVENOUS PRN
Status: DISCONTINUED | OUTPATIENT
Start: 2024-08-22 | End: 2024-08-29

## 2024-08-22 RX ORDER — LANOLIN ALCOHOL/MO/W.PET/CERES
800 CREAM (GRAM) TOPICAL 3 TIMES DAILY
Status: DISCONTINUED | OUTPATIENT
Start: 2024-08-22 | End: 2024-09-05 | Stop reason: HOSPADM

## 2024-08-22 RX ORDER — BISACODYL 10 MG
10 SUPPOSITORY, RECTAL RECTAL ONCE
Status: COMPLETED | OUTPATIENT
Start: 2024-08-22 | End: 2024-08-22

## 2024-08-22 RX ORDER — FUROSEMIDE 20 MG
20 TABLET ORAL DAILY
Status: DISCONTINUED | OUTPATIENT
Start: 2024-08-22 | End: 2024-09-05 | Stop reason: HOSPADM

## 2024-08-22 RX ORDER — MAGNESIUM SULFATE IN WATER 40 MG/ML
4000 INJECTION, SOLUTION INTRAVENOUS ONCE
Status: COMPLETED | OUTPATIENT
Start: 2024-08-22 | End: 2024-08-22

## 2024-08-22 RX ADMIN — SODIUM CHLORIDE 2 G: 1 TABLET ORAL at 11:20

## 2024-08-22 RX ADMIN — SENNOSIDES AND DOCUSATE SODIUM 2 TABLET: 8.6; 5 TABLET ORAL at 08:55

## 2024-08-22 RX ADMIN — Medication 400 MG: at 11:20

## 2024-08-22 RX ADMIN — INSULIN GLARGINE 15 UNITS: 100 INJECTION, SOLUTION SUBCUTANEOUS at 20:45

## 2024-08-22 RX ADMIN — OXYCODONE 5 MG: 5 TABLET ORAL at 10:24

## 2024-08-22 RX ADMIN — Medication 15 G: at 08:55

## 2024-08-22 RX ADMIN — ATORVASTATIN CALCIUM 20 MG: 20 TABLET, FILM COATED ORAL at 20:42

## 2024-08-22 RX ADMIN — OXYCODONE 5 MG: 5 TABLET ORAL at 20:42

## 2024-08-22 RX ADMIN — INSULIN GLARGINE 15 UNITS: 100 INJECTION, SOLUTION SUBCUTANEOUS at 08:55

## 2024-08-22 RX ADMIN — POLYETHYLENE GLYCOL 3350 17 G: 17 POWDER, FOR SOLUTION ORAL at 08:55

## 2024-08-22 RX ADMIN — CHOLESTYRAMINE 4 G: 4 POWDER, FOR SUSPENSION ORAL at 08:55

## 2024-08-22 RX ADMIN — INSULIN LISPRO 2 UNITS: 100 INJECTION, SOLUTION INTRAVENOUS; SUBCUTANEOUS at 18:02

## 2024-08-22 RX ADMIN — METOPROLOL SUCCINATE 50 MG: 50 TABLET, FILM COATED, EXTENDED RELEASE ORAL at 08:55

## 2024-08-22 RX ADMIN — FUROSEMIDE 20 MG: 20 TABLET ORAL at 11:20

## 2024-08-22 RX ADMIN — MAGNESIUM SULFATE HEPTAHYDRATE 4000 MG: 40 INJECTION, SOLUTION INTRAVENOUS at 18:31

## 2024-08-22 RX ADMIN — SODIUM CHLORIDE 2 G: 1 TABLET ORAL at 18:02

## 2024-08-22 RX ADMIN — WATER 2000 MG: 1 INJECTION INTRAMUSCULAR; INTRAVENOUS; SUBCUTANEOUS at 21:22

## 2024-08-22 RX ADMIN — Medication 800 MG: at 20:42

## 2024-08-22 RX ADMIN — NALOXEGOL OXALATE 12.5 MG: 12.5 TABLET, FILM COATED ORAL at 05:41

## 2024-08-22 RX ADMIN — LEVETIRACETAM 500 MG: 500 TABLET, FILM COATED ORAL at 08:55

## 2024-08-22 RX ADMIN — SODIUM CHLORIDE, PRESERVATIVE FREE 10 ML: 5 INJECTION INTRAVENOUS at 21:06

## 2024-08-22 RX ADMIN — OXYCODONE 5 MG: 5 TABLET ORAL at 14:17

## 2024-08-22 RX ADMIN — LEVETIRACETAM 500 MG: 500 TABLET, FILM COATED ORAL at 20:42

## 2024-08-22 RX ADMIN — BISACODYL 10 MG: 10 SUPPOSITORY RECTAL at 10:25

## 2024-08-22 RX ADMIN — OXYCODONE 5 MG: 5 TABLET ORAL at 05:47

## 2024-08-22 RX ADMIN — ALLOPURINOL 300 MG: 100 TABLET ORAL at 08:55

## 2024-08-22 RX ADMIN — WATER 2000 MG: 1 INJECTION INTRAMUSCULAR; INTRAVENOUS; SUBCUTANEOUS at 14:13

## 2024-08-22 RX ADMIN — FOLIC ACID 1 MG: 1 TABLET ORAL at 08:54

## 2024-08-22 RX ADMIN — INSULIN LISPRO 2 UNITS: 100 INJECTION, SOLUTION INTRAVENOUS; SUBCUTANEOUS at 11:32

## 2024-08-22 RX ADMIN — Medication 400 MG: at 08:55

## 2024-08-22 RX ADMIN — WATER 2000 MG: 1 INJECTION INTRAMUSCULAR; INTRAVENOUS; SUBCUTANEOUS at 05:42

## 2024-08-22 RX ADMIN — SODIUM CHLORIDE 2 G: 1 TABLET ORAL at 08:54

## 2024-08-22 ASSESSMENT — PAIN SCALES - GENERAL
PAINLEVEL_OUTOF10: 8
PAINLEVEL_OUTOF10: 0
PAINLEVEL_OUTOF10: 8
PAINLEVEL_OUTOF10: 6
PAINLEVEL_OUTOF10: 6

## 2024-08-22 ASSESSMENT — PAIN DESCRIPTION - DESCRIPTORS
DESCRIPTORS: ACHING
DESCRIPTORS: SHARP;OTHER (COMMENT)
DESCRIPTORS: ACHING;CRAMPING;DISCOMFORT
DESCRIPTORS: ACHING

## 2024-08-22 ASSESSMENT — PAIN DESCRIPTION - LOCATION
LOCATION: LEG

## 2024-08-22 ASSESSMENT — PAIN DESCRIPTION - ORIENTATION
ORIENTATION: RIGHT

## 2024-08-22 NOTE — PROGRESS NOTES
Facility/Department: [facility] 3 ANNA/VAS/MED  Daily Treatment Note  NAME: Elda Flood  : 1945  MRN: [id number]    Date of Service: 2024    Discharge Recommendations:  Patient would benefit from continued therapy after discharge       Assessment   Assessment: Continues to make functional gains  Treatment Diagnosis: Hematoma RLE s/p evacuation of hematoma, wound vac placement  Activity Tolerance  Activity Tolerance: Patient Tolerated treatment well         Patient Diagnosis(es): The primary encounter diagnosis was Hematoma of leg, right, initial encounter. Diagnoses of Bleeding into the skin and Uncontrolled pain were also pertinent to this visit.    has a past medical history of Arthritis, Atrial fibrillation (HCC), Hyperlipidemia, Hypertension, Incisional hernia, and Stroke (cerebrum) ([facility]).   has a past surgical history that includes Cholecystectomy; Appendectomy; Colonoscopy (); Upper gastrointestinal endoscopy (); Colonoscopy (16);  section; hernia repair (); hernia repair; Umbilical hernia repair (N/A, 2019); Hysterectomy, total abdominal (); Ovary removal (Bilateral, ); and Leg Surgery (Right, 8/15/2024).    Restrictions  Restrictions/Precautions  Restrictions/Precautions: Fall Risk  Position Activity Restriction  Other position/activity restrictions: wound vac    Subjective   General  Chart Reviewed: Yes  Patient assessed for rehabilitation services?: Yes  Family / Caregiver Present: Yes  Pain Screening  Comments / Details: Did not rate pain, but smiling throughout and stating her pain management has been much improved.     Objective             Toilet Transfers  Toilet Transfers Comments: Patient and daughter state she used BSC successfully last night using stand step transfer.     Transfers  Slide Board: Contact guard assistance;Minimal assistance (of one, SBA of another for safety.  Cues to lean sufficiently forward and keep knees sufficiently flexed while

## 2024-08-22 NOTE — PROGRESS NOTES
Vascular Surgery  Dr. Emili Landry   Daily Progress Note    Pt Name: Elda Flood  Medical Record Number: [id number]  Date of Birth 1945   Today's Date: 8/22/2024    SUBJECTIVE:     Patient was seen and examined, daughter at bedside.  Pain is controlled at this time controlled, just had pain medication  Has not had nausea/vomiting  Has had constipation      OBJECTIVE:     Patient Vitals for the past 24 hrs:   BP Temp Temp src Pulse Resp SpO2   08/22/24 1134 -- -- -- -- -- 96 %   08/22/24 0903 136/65 97.7 °F (36.5 °C) Oral 96 18 99 %   08/22/24 0844 (!) 124/56 97.8 °F (36.6 °C) Oral 96 18 99 %   08/22/24 0738 (!) 127/51 97.2 °F (36.2 °C) -- 90 18 98 %   08/22/24 0617 -- -- -- -- 16 --   08/22/24 0547 -- -- -- -- 16 --   08/22/24 0433 (!) 121/54 98.8 °F (37.1 °C) -- (!) 106 16 95 %   08/21/24 2056 -- -- -- -- 18 --   08/21/24 2017 130/70 98.2 °F (36.8 °C) Oral (!) 112 18 97 %   08/21/24 1610 (!) 98/54 98 °F (36.7 °C) Oral (!) 101 18 96 %   08/21/24 1304 -- -- -- -- 16 --       Intake/Output Summary (Last 24 hours) at 8/22/2024 1234  Last data filed at 8/22/2024 1002  Gross per 24 hour   Intake 240 ml   Output 900 ml   Net -660 ml       In: 480 [P.O.:480]  Out: 900 [Urine:900]    I/O last 3 completed shifts:  In: 480 [P.O.:480]  Out: 1600 [Urine:1600]     Date 08/22/24 0000 - 08/22/24 2359   Shift 1368-8398 1787-2180 4643-0193 24 Hour Total   INTAKE   Shift Total(mL/kg)       OUTPUT   Urine(mL/kg/hr) 600(1.1) 300  900   Shift Total(mL/kg) 600(8.9) 300(4.5)  900(13.4)   Weight (kg) 67.4 67.4 67.4 67.4       Wt Readings from Last 3 Encounters:   08/15/24 67.4 kg (148 lb 8 oz)   08/13/24 67.4 kg (148 lb 8 oz)   08/06/24 67.5 kg (148 lb 12 oz)        Body mass index is 28.07 kg/m².     Diet: ADULT ORAL NUTRITION SUPPLEMENT; Dinner, Lunch; Wound Healing Oral Supplement  ADULT DIET; Regular; 4 carb choices (60 gm/meal); 1500 ml  ADULT ORAL NUTRITION SUPPLEMENT; AM Snack, PM Snack; Diabetic Oral Supplement    MEDS:      Scheduled Meds:   furosemide  20 mg Oral Daily    polyethylene glycol  17 g Oral Daily    insulin glargine  15 Units SubCUTAneous BID    insulin lispro  0-8 Units SubCUTAneous TID WC    insulin lispro  0-4 Units SubCUTAneous Nightly    sennosides-docusate sodium  2 tablet Oral Daily    naloxegol  12.5 mg Oral QAM AC    sodium chloride  2 g Oral TID WC    lidocaine   Topical Once per day on Monday Wednesday Friday    ceFAZolin  2,000 mg IntraVENous Q8H    magnesium oxide  400 mg Oral 4x daily    urea  15 g Oral Daily    sodium chloride flush  5-40 mL IntraVENous 2 times per day    allopurinol  300 mg Oral Daily    [Held by provider] amLODIPine  10 mg Oral Daily    atorvastatin  20 mg Oral Nightly    cholestyramine light  4 g Oral Daily    folic acid  1 mg Oral Daily    levETIRAcetam  500 mg Oral BID    [Held by provider] lisinopril  15 mg Oral Daily    metoprolol succinate  50 mg Oral Daily     Continuous Infusions:   sodium chloride      sodium chloride      sodium chloride      dextrose       PRN Meds:sodium chloride, , PRN  ondansetron, 2 mg, Q4H PRN  HYDROmorphone, 0.25 mg, Q4H PRN  sodium chloride, , PRN  sodium chloride flush, 5-40 mL, PRN  sodium chloride, , PRN  potassium chloride, 40 mEq, PRN   Or  potassium alternative oral replacement, 40 mEq, PRN   Or  potassium chloride, 10 mEq, PRN  magnesium sulfate, 2,000 mg, PRN  ondansetron, 4 mg, Q8H PRN  acetaminophen, 650 mg, Q6H PRN   Or  acetaminophen, 650 mg, Q6H PRN  oxyCODONE, 5 mg, Q4H PRN  naloxone, 0.4 mg, PRN  glucose, 4 tablet, PRN  dextrose bolus, 125 mL, PRN   Or  dextrose bolus, 250 mL, PRN  glucagon (rDNA), 1 mg, PRN  dextrose, , Continuous PRN      PHYSICAL EXAM:     CONSTITUTIONAL: Alert and oriented times 3, no acute distress and cooperative to examination.  LUNGS: clear bilaterally anteriorly, diminished lower lobe breath sounds  CARDIOVASCULAR: Heart regular rate and rhythm, systolic murmur noted  ABDOMEN: soft, , obese, nontender,

## 2024-08-22 NOTE — PLAN OF CARE
Problem: Discharge Planning  Goal: Discharge to home or other facility with appropriate resources  8/22/2024 0318 by Erlin Mcelroy RN  Outcome: Progressing  8/21/2024 1616 by Ami Mar RN  Outcome: Progressing  Flowsheets (Taken 8/21/2024 0846)  Discharge to home or other facility with appropriate resources: Identify barriers to discharge with patient and caregiver     Problem: Pain  Goal: Verbalizes/displays adequate comfort level or baseline comfort level  8/22/2024 0318 by Erlin Mcelroy RN  Outcome: Progressing  8/21/2024 1616 by Ami Mar RN  Outcome: Progressing     Problem: Safety - Adult  Goal: Free from fall injury  8/22/2024 0318 by Erlin Mcelroy RN  Outcome: Progressing  8/21/2024 1616 by Ami Mar RN  Outcome: Progressing     Problem: Skin/Tissue Integrity  Goal: Absence of new skin breakdown  Description: 1.  Monitor for areas of redness and/or skin breakdown  2.  Assess vascular access sites hourly  3.  Every 4-6 hours minimum:  Change oxygen saturation probe site  4.  Every 4-6 hours:  If on nasal continuous positive airway pressure, respiratory therapy assess nares and determine need for appliance change or resting period.  8/22/2024 0318 by Erlin Mcelroy RN  Outcome: Progressing  8/21/2024 1616 by Ami Mar RN  Outcome: Progressing     Problem: ABCDS Injury Assessment  Goal: Absence of physical injury  8/22/2024 0318 by Erlin Mcelroy RN  Outcome: Progressing  8/21/2024 1616 by Ami Mar RN  Outcome: Progressing     Problem: Chronic Conditions and Co-morbidities  Goal: Patient's chronic conditions and co-morbidity symptoms are monitored and maintained or improved  8/22/2024 0318 by Erlin Mcelroy RN  Outcome: Progressing  8/21/2024 1616 by Ami Mar RN  Outcome: Progressing  Flowsheets (Taken 8/21/2024 0846)  Care Plan - Patient's Chronic Conditions and Co-Morbidity Symptoms are Monitored and Maintained or Improved: Monitor and assess

## 2024-08-22 NOTE — PLAN OF CARE
Problem: Discharge Planning  Goal: Discharge to home or other facility with appropriate resources  8/22/2024 1127 by Ruth Becker RN  Outcome: Progressing  Flowsheets (Taken 8/22/2024 0855)  Discharge to home or other facility with appropriate resources: Identify barriers to discharge with patient and caregiver  8/22/2024 0318 by Erlin Mcelroy RN  Outcome: Progressing     Problem: Pain  Goal: Verbalizes/displays adequate comfort level or baseline comfort level  8/22/2024 1127 by Ruth Becker RN  Outcome: Progressing  8/22/2024 0318 by Erlin Mcelroy RN  Outcome: Progressing     Problem: Safety - Adult  Goal: Free from fall injury  8/22/2024 1127 by Ruth Becker RN  Outcome: Progressing  8/22/2024 0318 by Erlin Mcelroy RN  Outcome: Progressing     Problem: Skin/Tissue Integrity  Goal: Absence of new skin breakdown  Description: 1.  Monitor for areas of redness and/or skin breakdown  2.  Assess vascular access sites hourly  3.  Every 4-6 hours minimum:  Change oxygen saturation probe site  4.  Every 4-6 hours:  If on nasal continuous positive airway pressure, respiratory therapy assess nares and determine need for appliance change or resting period.  8/22/2024 1127 by Ruth Becker RN  Outcome: Progressing  8/22/2024 0318 by Erlin Mcelroy RN  Outcome: Progressing     Problem: ABCDS Injury Assessment  Goal: Absence of physical injury  8/22/2024 1127 by Ruth Becker RN  Outcome: Progressing  8/22/2024 0318 by Erlin Mcelroy RN  Outcome: Progressing     Problem: Chronic Conditions and Co-morbidities  Goal: Patient's chronic conditions and co-morbidity symptoms are monitored and maintained or improved  8/22/2024 1127 by Ruth Becker RN  Outcome: Progressing  8/22/2024 0318 by Erlin Mcelroy RN  Outcome: Progressing     Problem: Nutrition Deficit:  Goal: Optimize nutritional status  8/22/2024 1127 by Ruth Becker RN  Outcome: Progressing  8/22/2024 0318 by Erlin Mcelroy

## 2024-08-22 NOTE — PROGRESS NOTES
Daily Progress Note    Date:2024  Patient: Elda Flood  : 1945  MRN:[id number]  CODE:Full Code No additional code details  PCP:NICOLE Solis DO    Admit Date: 2024 10:20 AM   LOS: 8 days     Chief Complaint   Patient presents with    Hematoma     Pt here with large hematoma and swelling injured yesterday          Subjective: NAEON. No bowel movement yet today, received suppository. Tolerating PO diet. Daughter is at bedside, requested Ensure between meals to optimize nutrition.   Daughter also asked about giving IV albumin if pt's albumin is low. I explained that this would only be a temporary measure and wouldn't help with albumin levels long term - that would require better nutrition.         Hospital Summary: 79 y.o. female who presented to [facility] ED for evaluation of bruising and pain of right lower leg. Pt has history of atrial fibrillation on eliquis, diabetes, stroke, hypertension and hyperlipidemia.   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 Vascular surgery was consulted.  Pt admitted to hospitalist service for further management.   Underwent evacuation of RLE hematoma on 8/15. She did require transfusion due to anemia of acute blood loss. Wound vac was placed .   Case management working on placement.         Review of Systems   All other systems reviewed and are negative.      Objective:      Vital signs in last 24 hours:  Patient Vitals for the past 24 hrs:   BP Temp Temp src Pulse Resp SpO2   24 1243 130/61 98.1 °F (36.7 °C) Oral 87 18 100 %   24 1134 -- -- -- -- -- 96 %   24 0903 136/65 97.7 °F (36.5 °C) Oral 96 18 99 %   24 0844 (!) 124/56 97.8 °F (36.6 °C) Oral 96 18 99 %   24 0738 (!) 127/51 97.2 °F (36.2 °C) -- 90 18 98 %   24 0617 -- -- -- -- 16 --   24 0547 -- -- -- -- 16 --   24 0433 (!) 121/54 98.8 °F (37.1 °C) -- (!) 106 16 95 %   24 -- -- -- -- 18 --   24  2017 130/70 98.2 °F (36.8 °C) Oral (!) 112 18 97 %   08/21/24 1610 (!) 98/54 98 °F (36.7 °C) Oral (!) 101 18 96 %       I/O last 3 completed shifts:  In: 480 [P.O.:480]  Out: 1600 [Urine:1600]  I/O this shift:  In: 295 [Blood:295]  Out: 300 [Urine:300]    Physical Exam  Constitutional:       General: She is not in acute distress.     Appearance: She is not toxic-appearing.   Cardiovascular:      Rate and Rhythm: Normal rate and regular rhythm.      Pulses: Normal pulses.      Heart sounds: Normal heart sounds.   Pulmonary:      Effort: Pulmonary effort is normal. No respiratory distress.      Breath sounds: Normal breath sounds.   Abdominal:      General: Bowel sounds are normal. There is no distension.      Palpations: Abdomen is soft.      Tenderness: There is no abdominal tenderness.   Skin:     Comments: R calf wound with wound vac in place             Lab Review   Recent Results (from the past 24 hour(s))   POCT Glucose    Collection Time: 08/21/24  4:10 PM   Result Value Ref Range    POC Glucose 196 (H) 70 - 99 mg/dl    Performed on AccuChek    POCT Glucose    Collection Time: 08/21/24  8:16 PM   Result Value Ref Range    POC Glucose 299 (H) 70 - 99 mg/dl    Performed on AccuChek    Basic Metabolic Panel w/ Reflex to MG    Collection Time: 08/22/24  3:35 AM   Result Value Ref Range    Sodium 129 (L) 136 - 145 mmol/L    Potassium reflex Magnesium 4.2 3.5 - 5.0 mmol/L    Chloride 96 (L) 98 - 111 mmol/L    CO2 21 (L) 22 - 29 mmol/L    Anion Gap 12 7 - 19 mmol/L    Glucose 208 (H) 74 - 109 mg/dL    BUN 30 (H) 8 - 23 mg/dL    Creatinine 0.7 0.5 - 0.9 mg/dL    Est, Glom Filt Rate 89 >60    Calcium 8.8 8.8 - 10.2 mg/dL   CBC with Auto Differential    Collection Time: 08/22/24  3:35 AM   Result Value Ref Range    WBC 27.6 (H) 4.8 - 10.8 K/uL    RBC 2.81 (L) 4.20 - 5.40 M/uL    Hemoglobin 6.8 (L) 12.0 - 16.0 g/dL    Hematocrit 21.4 (L) 37.0 - 47.0 %    MCV 76.2 (L) 81.0 - 99.0 fL    MCH 24.2 (L) 27.0 - 31.0 pg    MCHC

## 2024-08-22 NOTE — PROGRESS NOTES
08/22/24 1500   Subjective   Subjective Patient in bed agrees to therapy.  No C/O pain at this time believes pain is being controlled   Vitals   O2 Device None (Room air)   Bed Mobility Training   Bed Mobility Training Yes   Supine to Sit Stand-by assistance   Balance   Sitting Intact  (EOB-15 minutes for EX & SB TR > WC)   Transfer Training   Transfer Training Yes   Sliding Board Stand-by assistance;Contact-guard assistance  (With cues to stay flexed FRWD & assist c SB placement.)   PT Exercises   A/AROM Exercises BL LE EX sitting EOB X 20 Reps.   Patient Education   Education Given To Patient;Family   Education Provided Role of Therapy;Plan of Care   Education Provided Comments Use of call light & staff assist.   Education Method Demonstration;Teach Back   Barriers to Learning None   Education Outcome Continued education needed   Other Specialty Interventions   Other Treatments/Modalities Patient in WC with Elda Domínguez, OT present when this therapist left.   PT Plan of Care   Thursday X       Electronically signed by Scooter Eddy PTA on 8/22/2024 at 3:16 PM

## 2024-08-23 ENCOUNTER — APPOINTMENT (OUTPATIENT)
Dept: GENERAL RADIOLOGY | Age: 79
DRG: 299 | End: 2024-08-23
Payer: MEDICARE

## 2024-08-23 PROBLEM — E44.1 MILD MALNUTRITION (HCC): Status: ACTIVE | Noted: 2024-08-23

## 2024-08-23 LAB
ANION GAP SERPL CALCULATED.3IONS-SCNC: 13 MMOL/L (ref 7–19)
ANISOCYTOSIS BLD QL SMEAR: ABNORMAL
BACTERIA #/AREA URNS HPF: NORMAL /HPF
BASOPHILS # BLD: 0.1 K/UL (ref 0–0.2)
BASOPHILS NFR BLD: 0.2 % (ref 0–1)
BILIRUB UR QL STRIP: NEGATIVE
BUN SERPL-MCNC: 30 MG/DL (ref 8–23)
CALCIUM SERPL-MCNC: 8.6 MG/DL (ref 8.8–10.2)
CHLORIDE SERPL-SCNC: 98 MMOL/L (ref 98–111)
CLARITY UR: CLEAR
CO2 SERPL-SCNC: 18 MMOL/L (ref 22–29)
COLOR UR: YELLOW
CREAT SERPL-MCNC: 0.6 MG/DL (ref 0.5–0.9)
EOSINOPHIL # BLD: 0 K/UL (ref 0–0.6)
EOSINOPHIL NFR BLD: 0.1 % (ref 0–5)
ERYTHROCYTE [DISTWIDTH] IN BLOOD BY AUTOMATED COUNT: 17.2 % (ref 11.5–14.5)
GLUCOSE BLD-MCNC: 191 MG/DL (ref 70–99)
GLUCOSE BLD-MCNC: 220 MG/DL (ref 70–99)
GLUCOSE BLD-MCNC: 227 MG/DL (ref 70–99)
GLUCOSE BLD-MCNC: 249 MG/DL (ref 70–99)
GLUCOSE BLD-MCNC: 303 MG/DL (ref 70–99)
GLUCOSE SERPL-MCNC: 197 MG/DL (ref 70–99)
GLUCOSE UR STRIP.AUTO-MCNC: NEGATIVE MG/DL
HCT VFR BLD AUTO: 28.9 % (ref 37–47)
HGB BLD-MCNC: 8.7 G/DL (ref 12–16)
HGB UR STRIP.AUTO-MCNC: NEGATIVE MG/L
HYPOCHROMIA BLD QL SMEAR: ABNORMAL
IMM GRANULOCYTES # BLD: 1 K/UL
KETONES UR STRIP.AUTO-MCNC: NEGATIVE MG/DL
LACTATE BLDV-SCNC: 1.1 MMOL/L (ref 0.5–1.9)
LEUKOCYTE ESTERASE UR QL STRIP.AUTO: ABNORMAL
LYMPHOCYTES # BLD: 2.1 K/UL (ref 1.1–4.5)
LYMPHOCYTES NFR BLD: 6.9 % (ref 20–40)
MAGNESIUM SERPL-MCNC: 2 MG/DL (ref 1.6–2.4)
MCH RBC QN AUTO: 25.7 PG (ref 27–31)
MCHC RBC AUTO-ENTMCNC: 30.1 G/DL (ref 33–37)
MCV RBC AUTO: 85.3 FL (ref 81–99)
MONOCYTES # BLD: 10.7 K/UL (ref 0–0.9)
MONOCYTES NFR BLD: 35.9 % (ref 0–10)
NEUTROPHILS # BLD: 16 K/UL (ref 1.5–7.5)
NEUTS SEG NFR BLD: 53.5 % (ref 50–65)
NITRITE UR QL STRIP.AUTO: NEGATIVE
PERFORMED ON: ABNORMAL
PH UR STRIP.AUTO: 6 [PH] (ref 5–8)
PLATELET # BLD AUTO: 465 K/UL (ref 130–400)
PLATELET SLIDE REVIEW: ABNORMAL
PMV BLD AUTO: 11.5 FL (ref 9.4–12.3)
POTASSIUM SERPL-SCNC: 3.8 MMOL/L (ref 3.5–5)
PROT UR STRIP.AUTO-MCNC: 30 MG/DL
RBC # BLD AUTO: 3.39 M/UL (ref 4.2–5.4)
RBC #/AREA URNS HPF: NORMAL /HPF (ref 0–2)
SODIUM SERPL-SCNC: 129 MMOL/L (ref 136–145)
SP GR UR STRIP.AUTO: 1.02 (ref 1–1.03)
SQUAMOUS #/AREA URNS HPF: NORMAL /HPF
UROBILINOGEN UR STRIP.AUTO-MCNC: 0.2 E.U./DL
WBC # BLD AUTO: 29.9 K/UL (ref 4.8–10.8)
WBC #/AREA URNS HPF: NORMAL /HPF (ref 0–5)

## 2024-08-23 PROCEDURE — 94760 N-INVAS EAR/PLS OXIMETRY 1: CPT

## 2024-08-23 PROCEDURE — 2580000003 HC RX 258: Performed by: STUDENT IN AN ORGANIZED HEALTH CARE EDUCATION/TRAINING PROGRAM

## 2024-08-23 PROCEDURE — 87186 SC STD MICRODIL/AGAR DIL: CPT

## 2024-08-23 PROCEDURE — 83605 ASSAY OF LACTIC ACID: CPT

## 2024-08-23 PROCEDURE — 6360000002 HC RX W HCPCS: Performed by: HOSPITALIST

## 2024-08-23 PROCEDURE — 36415 COLL VENOUS BLD VENIPUNCTURE: CPT

## 2024-08-23 PROCEDURE — 2580000003 HC RX 258: Performed by: SURGERY

## 2024-08-23 PROCEDURE — 82962 GLUCOSE BLOOD TEST: CPT

## 2024-08-23 PROCEDURE — 81001 URINALYSIS AUTO W/SCOPE: CPT

## 2024-08-23 PROCEDURE — 87070 CULTURE OTHR SPECIMN AEROBIC: CPT

## 2024-08-23 PROCEDURE — 87205 SMEAR GRAM STAIN: CPT

## 2024-08-23 PROCEDURE — 6370000000 HC RX 637 (ALT 250 FOR IP): Performed by: HOSPITALIST

## 2024-08-23 PROCEDURE — 87040 BLOOD CULTURE FOR BACTERIA: CPT

## 2024-08-23 PROCEDURE — 97530 THERAPEUTIC ACTIVITIES: CPT

## 2024-08-23 PROCEDURE — 87076 CULTURE ANAEROBE IDENT EACH: CPT

## 2024-08-23 PROCEDURE — 6370000000 HC RX 637 (ALT 250 FOR IP): Performed by: SURGERY

## 2024-08-23 PROCEDURE — 2580000003 HC RX 258: Performed by: HOSPITALIST

## 2024-08-23 PROCEDURE — 87077 CULTURE AEROBIC IDENTIFY: CPT

## 2024-08-23 PROCEDURE — 87075 CULTR BACTERIA EXCEPT BLOOD: CPT

## 2024-08-23 PROCEDURE — 6370000000 HC RX 637 (ALT 250 FOR IP): Performed by: STUDENT IN AN ORGANIZED HEALTH CARE EDUCATION/TRAINING PROGRAM

## 2024-08-23 PROCEDURE — 85025 COMPLETE CBC W/AUTO DIFF WBC: CPT

## 2024-08-23 PROCEDURE — 83735 ASSAY OF MAGNESIUM: CPT

## 2024-08-23 PROCEDURE — 1200000000 HC SEMI PRIVATE

## 2024-08-23 PROCEDURE — 6360000002 HC RX W HCPCS: Performed by: STUDENT IN AN ORGANIZED HEALTH CARE EDUCATION/TRAINING PROGRAM

## 2024-08-23 PROCEDURE — 80048 BASIC METABOLIC PNL TOTAL CA: CPT

## 2024-08-23 PROCEDURE — 71045 X-RAY EXAM CHEST 1 VIEW: CPT

## 2024-08-23 RX ORDER — SODIUM CHLORIDE 1 G/1
2 TABLET ORAL
Qty: 180 TABLET | Refills: 1 | Status: SHIPPED | OUTPATIENT
Start: 2024-08-23

## 2024-08-23 RX ORDER — LANOLIN ALCOHOL/MO/W.PET/CERES
800 CREAM (GRAM) TOPICAL 3 TIMES DAILY
Qty: 180 TABLET | Refills: 0 | Status: SHIPPED | OUTPATIENT
Start: 2024-08-23

## 2024-08-23 RX ORDER — OXYCODONE HYDROCHLORIDE 10 MG/1
10 TABLET ORAL EVERY 6 HOURS PRN
Qty: 12 TABLET | Refills: 0 | Status: SHIPPED | OUTPATIENT
Start: 2024-08-23 | End: 2024-08-26

## 2024-08-23 RX ORDER — INSULIN LISPRO 100 [IU]/ML
0-8 INJECTION, SOLUTION INTRAVENOUS; SUBCUTANEOUS
Qty: 10 ML | Refills: 1 | Status: SHIPPED | OUTPATIENT
Start: 2024-08-23 | End: 2024-09-05

## 2024-08-23 RX ORDER — OXYCODONE HYDROCHLORIDE 10 MG/1
10 TABLET ORAL EVERY 6 HOURS PRN
Status: DISCONTINUED | OUTPATIENT
Start: 2024-08-23 | End: 2024-09-05 | Stop reason: HOSPADM

## 2024-08-23 RX ORDER — LISINOPRIL 30 MG/1
15 TABLET ORAL DAILY
Qty: 30 TABLET | Refills: 0 | Status: SHIPPED | OUTPATIENT
Start: 2024-08-23

## 2024-08-23 RX ORDER — CEPHALEXIN 500 MG/1
500 CAPSULE ORAL 4 TIMES DAILY
Qty: 40 CAPSULE | Refills: 0 | Status: SHIPPED | OUTPATIENT
Start: 2024-08-23 | End: 2024-09-04 | Stop reason: HOSPADM

## 2024-08-23 RX ORDER — SENNA AND DOCUSATE SODIUM 50; 8.6 MG/1; MG/1
2 TABLET, FILM COATED ORAL DAILY
Qty: 60 TABLET | Refills: 0 | Status: SHIPPED | OUTPATIENT
Start: 2024-08-24

## 2024-08-23 RX ORDER — INSULIN GLARGINE 100 [IU]/ML
15 INJECTION, SOLUTION SUBCUTANEOUS 2 TIMES DAILY
Qty: 10 ML | Refills: 3 | Status: SHIPPED | OUTPATIENT
Start: 2024-08-23 | End: 2024-09-05

## 2024-08-23 RX ORDER — POLYETHYLENE GLYCOL 3350 17 G/17G
17 POWDER, FOR SOLUTION ORAL DAILY
Qty: 527 G | Refills: 1 | Status: SHIPPED | OUTPATIENT
Start: 2024-08-24 | End: 2024-10-25

## 2024-08-23 RX ADMIN — CHOLESTYRAMINE 4 G: 4 POWDER, FOR SUSPENSION ORAL at 08:59

## 2024-08-23 RX ADMIN — INSULIN LISPRO 6 UNITS: 100 INJECTION, SOLUTION INTRAVENOUS; SUBCUTANEOUS at 11:52

## 2024-08-23 RX ADMIN — Medication 800 MG: at 14:33

## 2024-08-23 RX ADMIN — OXYCODONE HYDROCHLORIDE 10 MG: 10 TABLET ORAL at 11:13

## 2024-08-23 RX ADMIN — INSULIN GLARGINE 15 UNITS: 100 INJECTION, SOLUTION SUBCUTANEOUS at 20:53

## 2024-08-23 RX ADMIN — Medication 800 MG: at 08:59

## 2024-08-23 RX ADMIN — VANCOMYCIN HYDROCHLORIDE 1500 MG: 10 INJECTION, POWDER, LYOPHILIZED, FOR SOLUTION INTRAVENOUS at 18:49

## 2024-08-23 RX ADMIN — METOPROLOL SUCCINATE 50 MG: 50 TABLET, FILM COATED, EXTENDED RELEASE ORAL at 08:58

## 2024-08-23 RX ADMIN — POLYETHYLENE GLYCOL 3350 17 G: 17 POWDER, FOR SOLUTION ORAL at 09:00

## 2024-08-23 RX ADMIN — FUROSEMIDE 20 MG: 20 TABLET ORAL at 09:00

## 2024-08-23 RX ADMIN — CEFEPIME 2000 MG: 2 INJECTION, POWDER, FOR SOLUTION INTRAVENOUS at 18:05

## 2024-08-23 RX ADMIN — INSULIN GLARGINE 15 UNITS: 100 INJECTION, SOLUTION SUBCUTANEOUS at 09:00

## 2024-08-23 RX ADMIN — ALLOPURINOL 300 MG: 100 TABLET ORAL at 09:00

## 2024-08-23 RX ADMIN — LEVETIRACETAM 500 MG: 500 TABLET, FILM COATED ORAL at 20:30

## 2024-08-23 RX ADMIN — LEVETIRACETAM 500 MG: 500 TABLET, FILM COATED ORAL at 08:59

## 2024-08-23 RX ADMIN — ATORVASTATIN CALCIUM 20 MG: 20 TABLET, FILM COATED ORAL at 20:30

## 2024-08-23 RX ADMIN — SODIUM CHLORIDE 2 G: 1 TABLET ORAL at 11:52

## 2024-08-23 RX ADMIN — WATER 2000 MG: 1 INJECTION INTRAMUSCULAR; INTRAVENOUS; SUBCUTANEOUS at 05:28

## 2024-08-23 RX ADMIN — INSULIN LISPRO 2 UNITS: 100 INJECTION, SOLUTION INTRAVENOUS; SUBCUTANEOUS at 17:52

## 2024-08-23 RX ADMIN — WATER 2000 MG: 1 INJECTION INTRAMUSCULAR; INTRAVENOUS; SUBCUTANEOUS at 13:29

## 2024-08-23 RX ADMIN — SODIUM CHLORIDE 2 G: 1 TABLET ORAL at 08:59

## 2024-08-23 RX ADMIN — Medication 800 MG: at 20:30

## 2024-08-23 RX ADMIN — Medication 15 G: at 09:00

## 2024-08-23 RX ADMIN — SENNOSIDES AND DOCUSATE SODIUM 2 TABLET: 8.6; 5 TABLET ORAL at 08:59

## 2024-08-23 RX ADMIN — SODIUM CHLORIDE 2 G: 1 TABLET ORAL at 17:53

## 2024-08-23 RX ADMIN — SODIUM CHLORIDE, PRESERVATIVE FREE 10 ML: 5 INJECTION INTRAVENOUS at 21:28

## 2024-08-23 RX ADMIN — ACETAMINOPHEN 650 MG: 325 TABLET ORAL at 04:21

## 2024-08-23 RX ADMIN — ACETAMINOPHEN 650 MG: 325 TABLET ORAL at 16:24

## 2024-08-23 RX ADMIN — OXYCODONE HYDROCHLORIDE 10 MG: 10 TABLET ORAL at 17:53

## 2024-08-23 RX ADMIN — OXYCODONE 5 MG: 5 TABLET ORAL at 02:38

## 2024-08-23 RX ADMIN — FOLIC ACID 1 MG: 1 TABLET ORAL at 08:59

## 2024-08-23 RX ADMIN — ONDANSETRON 4 MG: 4 TABLET, ORALLY DISINTEGRATING ORAL at 09:28

## 2024-08-23 RX ADMIN — NALOXEGOL OXALATE 12.5 MG: 12.5 TABLET, FILM COATED ORAL at 05:33

## 2024-08-23 ASSESSMENT — PAIN SCALES - GENERAL
PAINLEVEL_OUTOF10: 6
PAINLEVEL_OUTOF10: 8
PAINLEVEL_OUTOF10: 6

## 2024-08-23 ASSESSMENT — PAIN - FUNCTIONAL ASSESSMENT: PAIN_FUNCTIONAL_ASSESSMENT: PREVENTS OR INTERFERES SOME ACTIVE ACTIVITIES AND ADLS

## 2024-08-23 ASSESSMENT — PAIN DESCRIPTION - DESCRIPTORS
DESCRIPTORS: BURNING;THROBBING
DESCRIPTORS: THROBBING

## 2024-08-23 ASSESSMENT — PAIN DESCRIPTION - ORIENTATION
ORIENTATION: RIGHT
ORIENTATION: RIGHT;LEFT

## 2024-08-23 ASSESSMENT — PAIN DESCRIPTION - LOCATION
LOCATION: LEG
LOCATION: LEG

## 2024-08-23 NOTE — PROGRESS NOTES
Physical Therapy  Name: Elda Flood  MRN:  [id number]  Date of service:  8/23/2024 08/23/24 1358   Restrictions/Precautions   Restrictions/Precautions Fall Risk   Subjective   Subjective I have had a shower today.  I would like to go back to bed before the ambulance comes and gets me.   Oxygen Therapy   O2 Device None (Room air)   Bed Mobility   Sit to Supine Maximal assistance   Transfers   Sit to Stand Moderate Assistance;Maximum Assistance   Stand to Sit Moderate Assistance;Maximum Assistance   Squat Pivot Transfers Maximum Assistance;Dependent/Total   Short Term Goals   Time Frame for Short Term Goals 14 DAYS   Short Term Goal 1 BED MOB MIN ASSIST   Short Term Goal 2 TRANSFERS MIN ASSIST   Short Term Goal 3 AMB 25' RW MIN ASSIST   Conditions Requiring Skilled Therapeutic Intervention   Body Structures, Functions, Activity Limitations Requiring Skilled Therapeutic Intervention Decreased functional mobility ;Decreased ADL status;Decreased ROM;Decreased strength;Decreased endurance;Decreased balance;Increased pain;Decreased posture   Assessment assisted patient back to bed with stand pivot max/dep assist. Daughter present to change patient's breif.   Discharge Recommendations Subacute/Skilled Nursing Facility   Activity Tolerance   Activity Tolerance Treatment limited secondary to medical complications;Patient limited by pain   Physical Therapy Plan   General Plan 5-7 times per week   Current Treatment Recommendations Strengthening;ROM;Balance training;Functional mobility training;Transfer training;Gait training;Patient/Caregiver education & training;Safety education & training   PT Plan of Care   Friday X   Safety Devices   Type of Devices Left in bed;Nurse notified;Call light within reach         Electronically signed by Usha Velasquez PTA on 8/23/2024 at 2:02 PM

## 2024-08-23 NOTE — DISCHARGE SUMMARY
[facility] [place] [address]    DEPARTMENT OF HOSPITALIST MEDICINE      DISCHARGE SUMMARY:      PATIENT NAME:  Elda Flood  :  1945  MRN:  [id number]    Admission Date:   2024 10:20 AM Attending: Cameron Hurst MD   Discharge Date:   2024              PCP: NICOLE Solis DO  Length of Stay: 9 days     Chief Complaint on Admission:   Chief Complaint   Patient presents with    Hematoma     Pt here with large hematoma and swelling injured yesterday        Consultants:     IP CONSULT TO VASCULAR SURGERY  IP CONSULT TO DIETITIAN       CUMULATIVE  HOSPITAL  COURSE  AND  TREATMENT:  79 y.o. female who presented to [facility] ED for evaluation of bruising and pain of right lower leg. Pt has history of atrial fibrillation on eliquis, diabetes, stroke, hypertension and hyperlipidemia.   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 Vascular surgery was consulted.  Pt admitted to hospitalist service for further management.     Pt has chronic appearing hyponatremia that worsened during hospitalization. 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 She was started on Urea packet daily, Salt tabs 2 g TID, and 1.5 L fluid restriction. Na was stable at 129 on day of discharge.    She also has issues with chronic hypomagnesemia. She required IV magnesium intermittently in the hospital. Her PO magnesium oxide was increased to 800 mg TID at discharge.     Underwent evacuation of RLE hematoma on 8/15. She did require transfusion due to anemia of acute blood loss. Wound vac was placed . Eliquis will continue to be held at discharge.  Wound vac to be replaced upon arrival to nursing facility. Continue to offload heels and elevate lower extremities to prevent edema and increased drainage from wounds. Outpatient wound care appointment arranged on 24 at 1:15 pm.  Pt had continued Leukocytosis with WBC 29.9, however neutrophil count has continues to downtrend and was 16.0  with monocyte count of 10.7 on day of discharge. Pt has been without fever, chills, or purulent drainage from wound. We will continue Keflex at discharge for 10 additional days as prophylaxis against wound superinfection.     She developed constipation, likely opioid induced. Started on scheduled bowel regimen with miralax daily, Senokot-S 2 tabs daily, and Movantik daily. Pt had a large bowel movement on 8/22. Bowel regimen was continued at discharge.    Pt discharged to SNF in stable condition. Recommend repeat CBC, BMP, and magnesium level within 1 week. Follow up with wound care as scheduled. Follow up with facility physician within 1 week.        Discharge Problem List:   Principal Problem:    Hematoma of right lower leg  Active Problems:    Hyponatremia    Leukocytosis    Diabetes (HCC)    Hypertension    Intractable pain    Mild malnutrition (HCC)  Resolved Problems:    * No resolved hospital problems. *         Last dated Assessment and Plan ... 8/22/24    Hematoma of right lower extremity  -- Vascular surgery following  -- s/p evacuation on 8/15  -- Wound vac placed 8/20, due for dressing change today  -- Wound care following  -- Continue Cefazolin to prevent superinfection  -- Requiring IV pain medication     Opioid induced constipation  -- No bowel movement x7 days  -- Miralax daily  -- Add Senokot 2 tabs daily and Movantik     Hyperkalemia  -- K 5.4  -- Repeat BMP now to reassess  -- Holding Lisinopril     Hyponatremia  -- Na improving, up to 130 today once corrected for glucose  -- Continue salt tabs, urea packet, 1.5 L fluid restriction     Hypomagnesemia  -- Increase PO Magnesium Oxide to 800 mg TID  -- Give 4 mg IV mag today for mag level 1.6     T2DM  -- Lantus 15 units BID + medium SSI     HTN  -- Continue Lopressor  -- Amlodipine and Lisinopril held - BP has been controlled without them      OBJECTIVE:  /74   Pulse 95   Temp 97.6 °F (36.4 °C) (Temporal)   Resp 16   Ht 1.549 m (5' 0.98\")

## 2024-08-23 NOTE — CARE COORDINATION
Mike has precert back. Pt can dc today.   Mike   P   F  Electronically signed by Tangela Lan RN on 8/23/2024 at 1:31 PM

## 2024-08-23 NOTE — PROGRESS NOTES
Pharmacy Adjustment per [facility] protocol    Elda Flood is a 79 y.o. female. Pharmacy has adjusted medications per [facility] protocol.    Recent Labs     08/22/24  0335 08/23/24  0405   BUN 30* 30*       Recent Labs     08/22/24  0335 08/23/24  0405   CREATININE 0.7 0.6       Estimated Creatinine Clearance: 69 mL/min (based on SCr of 0.6 mg/dL).    Height:   Ht Readings from Last 1 Encounters:   08/20/24 1.549 m (5' 0.98\")     Weight:  Wt Readings from Last 1 Encounters:   08/15/24 67.4 kg (148 lb 8 oz)         Plan: Adjust the following medications based on [facility] protocol:           Cefepime to 2000 mg IV once over 30 minutes followed by 2000 mg IV every 8 hours extended infusion over 240 minutes     Electronically signed by Rao Wheeler RPH on 8/23/2024 at 4:53 PM

## 2024-08-23 NOTE — PLAN OF CARE
Problem: Discharge Planning  Goal: Discharge to home or other facility with appropriate resources  8/22/2024 2358 by Erlin Mcelroy RN  Outcome: Progressing  8/22/2024 1127 by Ruth Becker RN  Outcome: Progressing  Flowsheets (Taken 8/22/2024 0800)  Discharge to home or other facility with appropriate resources: Identify barriers to discharge with patient and caregiver     Problem: Pain  Goal: Verbalizes/displays adequate comfort level or baseline comfort level  8/22/2024 2358 by Erlin Mcelroy RN  Outcome: Progressing  8/22/2024 1127 by Ruth Becker RN  Outcome: Progressing     Problem: Safety - Adult  Goal: Free from fall injury  8/22/2024 2358 by Erlin Mcelroy RN  Outcome: Progressing  8/22/2024 1127 by Ruth Becker RN  Outcome: Progressing     Problem: Skin/Tissue Integrity  Goal: Absence of new skin breakdown  Description: 1.  Monitor for areas of redness and/or skin breakdown  2.  Assess vascular access sites hourly  3.  Every 4-6 hours minimum:  Change oxygen saturation probe site  4.  Every 4-6 hours:  If on nasal continuous positive airway pressure, respiratory therapy assess nares and determine need for appliance change or resting period.  8/22/2024 2358 by Erlin Mcelroy RN  Outcome: Progressing  8/22/2024 1127 by Ruth Becker RN  Outcome: Progressing     Problem: ABCDS Injury Assessment  Goal: Absence of physical injury  8/22/2024 2358 by Erlin Mcelroy RN  Outcome: Progressing  8/22/2024 1127 by Ruth Becker RN  Outcome: Progressing     Problem: Chronic Conditions and Co-morbidities  Goal: Patient's chronic conditions and co-morbidity symptoms are monitored and maintained or improved  8/22/2024 2358 by Erlin Mcelroy RN  Outcome: Progressing  8/22/2024 1127 by Ruth Becker RN  Outcome: Progressing     Problem: Nutrition Deficit:  Goal: Optimize nutritional status  8/22/2024 2358 by Erlin Mcelroy RN  Outcome: Progressing  8/22/2024 1127 by Ruth Becker

## 2024-08-23 NOTE — PROGRESS NOTES
Comprehensive Nutrition Assessment    Type and Reason for Visit:  Reassess    Nutrition Recommendations/Plan:   Continue current nutritional interventions.  Obtain new wt     Malnutrition Assessment:  Malnutrition Status:  Mild malnutrition (08/23/24 1348)    Context:  Acute Illness     Findings of the 6 clinical characteristics of malnutrition:  Energy Intake:  Mild decrease in energy intake (Comment)  Weight Loss:  No significant weight loss     Body Fat Loss:  No significant body fat loss     Muscle Mass Loss:  No significant muscle mass loss    Fluid Accumulation:  Moderate to Severe Extremities   Strength:  Not Performed    Nutrition Assessment:    Patient eating breakfast at time of visit.  Intake slowly improving with po intake ranging 25-75% with more meals at 50-75%.  Is taking Glucerna and [place]on supplements well.  New wt NA.  Have asked for a new wt.  Accuheks' 201-238  With improving po intake, pt's malnutrition status has improved and is now ranked at mild malnutrition.    Nutrition Related Findings:    +2 LLR edema., +3 RLE edema Wound Type: Pressure Injury, Surgical Incision       Current Nutrition Intake & Therapies:    Average Meal Intake: 26-50%, 51-75%  Average Supplements Intake: 51-75%, %  ADULT ORAL NUTRITION SUPPLEMENT; Dinner, Lunch; Wound Healing Oral Supplement  ADULT DIET; Regular; 4 carb choices (60 gm/meal); 1500 ml  ADULT ORAL NUTRITION SUPPLEMENT; Breakfast, Dinner; Diabetic Oral Supplement    Anthropometric Measures:  Height: 154.9 cm (5' 0.98\")  Ideal Body Weight (IBW): 105 lbs (48 kg)    Admission Body Weight: 67.4 kg (148 lb 8 oz)  Current Body Weight: 67.4 kg (148 lb 9.4 oz), 141.5 % IBW.    Current BMI (kg/m2): 28.1  Usual Body Weight: 68.6 kg (151 lb 3.8 oz)  % Weight Change (Calculated): -1.7  Weight Adjustment For: No Adjustment                 BMI Categories: Overweight (BMI 25.0-29.9)    Estimated Daily Nutrient Needs:  Energy Requirements Based On: Kcal/kg  Weight

## 2024-08-23 NOTE — CARE COORDINATION
08/23/24 1248   IMM Letter   IMM Letter given to Patient/Family/Significant other/Guardian/POA/by: letter explained to and signed by family. Zahida GOMEZCM   IMM Letter date given: 08/23/24   IMM Letter time given: 1200     Important Message from Medicare letter explained and provided to patient by Tangela Lan RN CM. All questions answered. Patient voiced understanding. Copy placed in soft chart.   Pt/family waives the 4 hour time requirements.  Electronically signed by Tangela Lan RN on 8/23/2024 at 12:49 PM

## 2024-08-23 NOTE — PLAN OF CARE
Problem: Discharge Planning  Goal: Discharge to home or other facility with appropriate resources  8/23/2024 1317 by oSo Wong LPN  Outcome: Completed  8/23/2024 0949 by Soo Wong LPN  Outcome: Progressing  Flowsheets (Taken 8/23/2024 0924)  Discharge to home or other facility with appropriate resources:   Identify barriers to discharge with patient and caregiver   Identify discharge learning needs (meds, wound care, etc)   Arrange for needed discharge resources and transportation as appropriate   Arrange for interpreters to assist at discharge as needed   Refer to discharge planning if patient needs post-hospital services based on physician order or complex needs related to functional status, cognitive ability or social support system  8/22/2024 2358 by Erlin Mcelroy, RN  Outcome: Progressing     Problem: Pain  Goal: Verbalizes/displays adequate comfort level or baseline comfort level  8/23/2024 1317 by Soo Wong LPN  Outcome: Completed  8/23/2024 0949 by Soo Wong LPN  Outcome: Progressing  8/22/2024 2358 by Erlin Mcelroy, RN  Outcome: Progressing     Problem: Safety - Adult  Goal: Free from fall injury  8/23/2024 1317 by Soo Wong LPN  Outcome: Completed  8/23/2024 0949 by Soo Wong LPN  Outcome: Progressing  8/22/2024 2358 by Erlin Mcelroy, RN  Outcome: Progressing     Problem: Skin/Tissue Integrity  Goal: Absence of new skin breakdown  Description: 1.  Monitor for areas of redness and/or skin breakdown  2.  Assess vascular access sites hourly  3.  Every 4-6 hours minimum:  Change oxygen saturation probe site  4.  Every 4-6 hours:  If on nasal continuous positive airway pressure, respiratory therapy assess nares and determine need for appliance change or resting period.  8/23/2024 1317 by Soo Wong LPN  Outcome: Completed  8/23/2024 0949 by oSo Wong LPN  Outcome: Progressing  8/22/2024 2358 by Erlin Mcelroy, RN  Outcome: Progressing     Problem: ABCDS Injury

## 2024-08-23 NOTE — PROGRESS NOTES
Vascular Surgery  Dr. Emili Landry   Daily Progress Note    Pt Name: Elda Flood  Medical Record Number: [id number]  Date of Birth 1945   Today's Date: 8/23/2024    SUBJECTIVE:     Patient was seen and examined, stating she rested fairly well last night.  Pain is controlled at present time  Has not had nausea/vomiting    OBJECTIVE:     Patient Vitals for the past 24 hrs:   BP Temp Temp src Pulse Resp SpO2   08/23/24 0849 131/65 97.3 °F (36.3 °C) -- 91 -- 99 %   08/23/24 0515 (!) 136/57 98.2 °F (36.8 °C) Oral 95 16 --   08/23/24 0308 -- -- -- -- 18 --   08/23/24 0238 -- -- -- -- 16 --   08/23/24 0043 -- -- -- 90 -- --   08/22/24 2150 (!) 142/75 96.8 °F (36 °C) Oral (!) 103 16 96 %   08/22/24 2112 -- -- -- -- 18 --   08/22/24 1617 132/64 97 °F (36.1 °C) -- 96 18 99 %   08/22/24 1243 130/61 98.1 °F (36.7 °C) Oral 87 18 100 %   08/22/24 1134 -- -- -- -- -- 96 %       Intake/Output Summary (Last 24 hours) at 8/23/2024 0914  Last data filed at 8/22/2024 1804  Gross per 24 hour   Intake 535 ml   Output 300 ml   Net 235 ml       In: 535 [P.O.:240; Blood:295]  Out: 300 [Urine:300]    I/O last 3 completed shifts:  In: 775 [P.O.:480; Blood:295]  Out: 900 [Urine:900]       Wt Readings from Last 3 Encounters:   08/15/24 67.4 kg (148 lb 8 oz)   08/13/24 67.4 kg (148 lb 8 oz)   08/06/24 67.5 kg (148 lb 12 oz)        Body mass index is 28.07 kg/m².     Diet: ADULT ORAL NUTRITION SUPPLEMENT; Dinner, Lunch; Wound Healing Oral Supplement  ADULT DIET; Regular; 4 carb choices (60 gm/meal); 1500 ml  ADULT ORAL NUTRITION SUPPLEMENT; Breakfast, Dinner; Diabetic Oral Supplement    MEDS:     Scheduled Meds:   furosemide  20 mg Oral Daily    magnesium oxide  800 mg Oral TID    polyethylene glycol  17 g Oral Daily    insulin glargine  15 Units SubCUTAneous BID    insulin lispro  0-8 Units SubCUTAneous TID WC    insulin lispro  0-4 Units SubCUTAneous Nightly    sennosides-docusate sodium  2 tablet Oral Daily    naloxegol  12.5 mg Oral  QAM AC    sodium chloride  2 g Oral TID WC    lidocaine   Topical Once per day on Monday Wednesday Friday    ceFAZolin  2,000 mg IntraVENous Q8H    urea  15 g Oral Daily    sodium chloride flush  5-40 mL IntraVENous 2 times per day    allopurinol  300 mg Oral Daily    [Held by provider] amLODIPine  10 mg Oral Daily    atorvastatin  20 mg Oral Nightly    cholestyramine light  4 g Oral Daily    folic acid  1 mg Oral Daily    levETIRAcetam  500 mg Oral BID    [Held by provider] lisinopril  15 mg Oral Daily    metoprolol succinate  50 mg Oral Daily     Continuous Infusions:   sodium chloride      sodium chloride      dextrose       PRN Meds:oxyCODONE, 10 mg, Q6H PRN  sodium chloride, , PRN  ondansetron, 2 mg, Q4H PRN  HYDROmorphone, 0.25 mg, Q4H PRN  sodium chloride flush, 5-40 mL, PRN  sodium chloride, , PRN  potassium chloride, 40 mEq, PRN   Or  potassium alternative oral replacement, 40 mEq, PRN   Or  potassium chloride, 10 mEq, PRN  magnesium sulfate, 2,000 mg, PRN  ondansetron, 4 mg, Q8H PRN  acetaminophen, 650 mg, Q6H PRN   Or  acetaminophen, 650 mg, Q6H PRN  naloxone, 0.4 mg, PRN  glucose, 4 tablet, PRN  dextrose bolus, 125 mL, PRN   Or  dextrose bolus, 250 mL, PRN  glucagon (rDNA), 1 mg, PRN  dextrose, , Continuous PRN        PHYSICAL EXAM:     CONSTITUTIONAL: Alert and oriented times 3, no acute distress and cooperative to examination.  LUNGS: clear bilaterally anteriorly, normal effort  CARDIOVASCULAR: Heart regular rate and rhythm, systolic murmur noted  ABDOMEN: soft, nontender, nondistended  NEUROLOGIC: Awake, alert, oriented to name, place and time.    WOUND/INCISION:  RLE with VAC dressing intact, good seal noted. Appx 300ml of dark bloody drainage noted.Moderate edema, moderate bruising around wound.   EXTREMITY: Bilateral LE's with moderate edema, somewhat improved. Feet warm to touch, +palp DP pulses noted.    Document Information    Wound Care Image: Wound   RLE wound   08/23/2024 12:02   Attached To:

## 2024-08-23 NOTE — PLAN OF CARE
Problem: Discharge Planning  Goal: Discharge to home or other facility with appropriate resources  8/23/2024 0949 by Soo Wong LPN  Outcome: Progressing  Flowsheets (Taken 8/23/2024 0924)  Discharge to home or other facility with appropriate resources:   Identify barriers to discharge with patient and caregiver   Identify discharge learning needs (meds, wound care, etc)   Arrange for needed discharge resources and transportation as appropriate   Arrange for interpreters to assist at discharge as needed   Refer to discharge planning if patient needs post-hospital services based on physician order or complex needs related to functional status, cognitive ability or social support system  8/22/2024 2358 by Erlin Mcelroy, RN  Outcome: Progressing     Problem: Pain  Goal: Verbalizes/displays adequate comfort level or baseline comfort level  8/23/2024 0949 by Soo Wong LPN  Outcome: Progressing  8/22/2024 2358 by Erlin Mcelroy, RN  Outcome: Progressing     Problem: Safety - Adult  Goal: Free from fall injury  8/23/2024 0949 by Soo Wong LPN  Outcome: Progressing  8/22/2024 2358 by Erlin Mcelroy, RN  Outcome: Progressing     Problem: Skin/Tissue Integrity  Goal: Absence of new skin breakdown  Description: 1.  Monitor for areas of redness and/or skin breakdown  2.  Assess vascular access sites hourly  3.  Every 4-6 hours minimum:  Change oxygen saturation probe site  4.  Every 4-6 hours:  If on nasal continuous positive airway pressure, respiratory therapy assess nares and determine need for appliance change or resting period.  8/23/2024 0949 by Soo Wong LPN  Outcome: Progressing  8/22/2024 2358 by Erlin Mcelroy, RN  Outcome: Progressing     Problem: ABCDS Injury Assessment  Goal: Absence of physical injury  8/23/2024 0949 by Soo Wong LPN  Outcome: Progressing  8/22/2024 2358 by Erlin Mcelroy, RN  Outcome: Progressing     Problem: Chronic Conditions and Co-morbidities  Goal: Patient's

## 2024-08-24 LAB
ANION GAP SERPL CALCULATED.3IONS-SCNC: 13 MMOL/L (ref 7–19)
ANISOCYTOSIS BLD QL SMEAR: ABNORMAL
B PARAP IS1001 DNA NPH QL NAA+NON-PROBE: NOT DETECTED
B PERT.PT PRMT NPH QL NAA+NON-PROBE: NOT DETECTED
BASOPHILS # BLD: 0 K/UL (ref 0–0.2)
BASOPHILS NFR BLD: 0 % (ref 0–1)
BUN SERPL-MCNC: 24 MG/DL (ref 8–23)
C PNEUM DNA NPH QL NAA+NON-PROBE: NOT DETECTED
CALCIUM SERPL-MCNC: 8.3 MG/DL (ref 8.8–10.2)
CHLORIDE SERPL-SCNC: 96 MMOL/L (ref 98–111)
CO2 SERPL-SCNC: 21 MMOL/L (ref 22–29)
CREAT SERPL-MCNC: 0.5 MG/DL (ref 0.5–0.9)
DACRYOCYTES BLD QL SMEAR: ABNORMAL
EOSINOPHIL # BLD: 0 K/UL (ref 0–0.6)
EOSINOPHIL NFR BLD: 0 % (ref 0–5)
ERYTHROCYTE [DISTWIDTH] IN BLOOD BY AUTOMATED COUNT: 17.2 % (ref 11.5–14.5)
FLUAV RNA NPH QL NAA+NON-PROBE: NOT DETECTED
FLUBV RNA NPH QL NAA+NON-PROBE: NOT DETECTED
GLUCOSE BLD-MCNC: 214 MG/DL (ref 70–99)
GLUCOSE BLD-MCNC: 240 MG/DL (ref 70–99)
GLUCOSE BLD-MCNC: 257 MG/DL (ref 70–99)
GLUCOSE BLD-MCNC: 334 MG/DL (ref 70–99)
GLUCOSE SERPL-MCNC: 191 MG/DL (ref 70–99)
HADV DNA NPH QL NAA+NON-PROBE: NOT DETECTED
HCOV 229E RNA NPH QL NAA+NON-PROBE: NOT DETECTED
HCOV HKU1 RNA NPH QL NAA+NON-PROBE: NOT DETECTED
HCOV NL63 RNA NPH QL NAA+NON-PROBE: NOT DETECTED
HCOV OC43 RNA NPH QL NAA+NON-PROBE: NOT DETECTED
HCT VFR BLD AUTO: 25 % (ref 37–47)
HGB BLD-MCNC: 8 G/DL (ref 12–16)
HMPV RNA NPH QL NAA+NON-PROBE: NOT DETECTED
HPIV1 RNA NPH QL NAA+NON-PROBE: NOT DETECTED
HPIV2 RNA NPH QL NAA+NON-PROBE: NOT DETECTED
HPIV3 RNA NPH QL NAA+NON-PROBE: NOT DETECTED
HPIV4 RNA NPH QL NAA+NON-PROBE: NOT DETECTED
IMM GRANULOCYTES # BLD: 0.7 K/UL
LYMPHOCYTES # BLD: 2.6 K/UL (ref 1.1–4.5)
LYMPHOCYTES NFR BLD: 9 % (ref 20–40)
M PNEUMO DNA NPH QL NAA+NON-PROBE: NOT DETECTED
MAGNESIUM SERPL-MCNC: 1.5 MG/DL (ref 1.6–2.4)
MCH RBC QN AUTO: 25 PG (ref 27–31)
MCHC RBC AUTO-ENTMCNC: 32 G/DL (ref 33–37)
MCV RBC AUTO: 78.1 FL (ref 81–99)
MONOCYTES # BLD: 6.7 K/UL (ref 0–0.9)
MONOCYTES NFR BLD: 23 % (ref 0–10)
NEUTROPHILS # BLD: 19.9 K/UL (ref 1.5–7.5)
NEUTS BAND NFR BLD MANUAL: 4 % (ref 0–5)
NEUTS SEG NFR BLD: 64 % (ref 50–65)
OVALOCYTES BLD QL SMEAR: ABNORMAL
PERFORMED ON: ABNORMAL
PLATELET # BLD AUTO: 516 K/UL (ref 130–400)
PLATELET SLIDE REVIEW: ABNORMAL
PMV BLD AUTO: 11.2 FL (ref 9.4–12.3)
POTASSIUM SERPL-SCNC: 4 MMOL/L (ref 3.5–5)
RBC # BLD AUTO: 3.2 M/UL (ref 4.2–5.4)
RSV RNA NPH QL NAA+NON-PROBE: NOT DETECTED
RV+EV RNA NPH QL NAA+NON-PROBE: NOT DETECTED
SARS-COV-2 RNA NPH QL NAA+NON-PROBE: DETECTED
SCHISTOCYTES BLD QL SMEAR: ABNORMAL
SODIUM SERPL-SCNC: 130 MMOL/L (ref 136–145)
VANCOMYCIN TROUGH SERPL-MCNC: 10.7 UG/ML (ref 10–20)
WBC # BLD AUTO: 29.2 K/UL (ref 4.8–10.8)

## 2024-08-24 PROCEDURE — 2580000003 HC RX 258: Performed by: SURGERY

## 2024-08-24 PROCEDURE — 1200000000 HC SEMI PRIVATE

## 2024-08-24 PROCEDURE — 6370000000 HC RX 637 (ALT 250 FOR IP): Performed by: HOSPITALIST

## 2024-08-24 PROCEDURE — 2580000003 HC RX 258: Performed by: STUDENT IN AN ORGANIZED HEALTH CARE EDUCATION/TRAINING PROGRAM

## 2024-08-24 PROCEDURE — 80048 BASIC METABOLIC PNL TOTAL CA: CPT

## 2024-08-24 PROCEDURE — 94760 N-INVAS EAR/PLS OXIMETRY 1: CPT

## 2024-08-24 PROCEDURE — 83735 ASSAY OF MAGNESIUM: CPT

## 2024-08-24 PROCEDURE — 0202U NFCT DS 22 TRGT SARS-COV-2: CPT

## 2024-08-24 PROCEDURE — 6370000000 HC RX 637 (ALT 250 FOR IP): Performed by: STUDENT IN AN ORGANIZED HEALTH CARE EDUCATION/TRAINING PROGRAM

## 2024-08-24 PROCEDURE — 36415 COLL VENOUS BLD VENIPUNCTURE: CPT

## 2024-08-24 PROCEDURE — 6360000002 HC RX W HCPCS: Performed by: NURSE PRACTITIONER

## 2024-08-24 PROCEDURE — 80202 ASSAY OF VANCOMYCIN: CPT

## 2024-08-24 PROCEDURE — 6370000000 HC RX 637 (ALT 250 FOR IP): Performed by: SURGERY

## 2024-08-24 PROCEDURE — 82962 GLUCOSE BLOOD TEST: CPT

## 2024-08-24 PROCEDURE — 85025 COMPLETE CBC W/AUTO DIFF WBC: CPT

## 2024-08-24 PROCEDURE — 6360000002 HC RX W HCPCS: Performed by: STUDENT IN AN ORGANIZED HEALTH CARE EDUCATION/TRAINING PROGRAM

## 2024-08-24 PROCEDURE — 6360000002 HC RX W HCPCS: Performed by: SURGERY

## 2024-08-24 RX ORDER — MAGNESIUM SULFATE IN WATER 40 MG/ML
4000 INJECTION, SOLUTION INTRAVENOUS ONCE
Status: COMPLETED | OUTPATIENT
Start: 2024-08-24 | End: 2024-08-24

## 2024-08-24 RX ADMIN — POLYETHYLENE GLYCOL 3350 17 G: 17 POWDER, FOR SOLUTION ORAL at 08:19

## 2024-08-24 RX ADMIN — SODIUM CHLORIDE, PRESERVATIVE FREE 10 ML: 5 INJECTION INTRAVENOUS at 08:21

## 2024-08-24 RX ADMIN — SODIUM CHLORIDE, PRESERVATIVE FREE 10 ML: 5 INJECTION INTRAVENOUS at 20:59

## 2024-08-24 RX ADMIN — CEFEPIME 2000 MG: 2 INJECTION, POWDER, FOR SOLUTION INTRAVENOUS at 01:25

## 2024-08-24 RX ADMIN — CEFEPIME 2000 MG: 2 INJECTION, POWDER, FOR SOLUTION INTRAVENOUS at 11:39

## 2024-08-24 RX ADMIN — METOPROLOL SUCCINATE 50 MG: 50 TABLET, FILM COATED, EXTENDED RELEASE ORAL at 08:20

## 2024-08-24 RX ADMIN — Medication 800 MG: at 08:20

## 2024-08-24 RX ADMIN — LEVETIRACETAM 500 MG: 500 TABLET, FILM COATED ORAL at 08:20

## 2024-08-24 RX ADMIN — ALLOPURINOL 300 MG: 100 TABLET ORAL at 08:20

## 2024-08-24 RX ADMIN — OXYCODONE HYDROCHLORIDE 10 MG: 10 TABLET ORAL at 01:15

## 2024-08-24 RX ADMIN — VANCOMYCIN HYDROCHLORIDE 1000 MG: 10 INJECTION, POWDER, LYOPHILIZED, FOR SOLUTION INTRAVENOUS at 21:27

## 2024-08-24 RX ADMIN — SODIUM CHLORIDE 2 G: 1 TABLET ORAL at 11:07

## 2024-08-24 RX ADMIN — MAGNESIUM SULFATE HEPTAHYDRATE 4000 MG: 40 INJECTION, SOLUTION INTRAVENOUS at 11:10

## 2024-08-24 RX ADMIN — SODIUM CHLORIDE 2 G: 1 TABLET ORAL at 08:20

## 2024-08-24 RX ADMIN — INSULIN LISPRO 2 UNITS: 100 INJECTION, SOLUTION INTRAVENOUS; SUBCUTANEOUS at 17:28

## 2024-08-24 RX ADMIN — ATORVASTATIN CALCIUM 20 MG: 20 TABLET, FILM COATED ORAL at 20:56

## 2024-08-24 RX ADMIN — ONDANSETRON 2 MG: 2 INJECTION INTRAMUSCULAR; INTRAVENOUS at 18:16

## 2024-08-24 RX ADMIN — INSULIN LISPRO 2 UNITS: 100 INJECTION, SOLUTION INTRAVENOUS; SUBCUTANEOUS at 08:24

## 2024-08-24 RX ADMIN — LEVETIRACETAM 500 MG: 500 TABLET, FILM COATED ORAL at 20:56

## 2024-08-24 RX ADMIN — OXYCODONE HYDROCHLORIDE 10 MG: 10 TABLET ORAL at 07:33

## 2024-08-24 RX ADMIN — SODIUM CHLORIDE 2 G: 1 TABLET ORAL at 17:28

## 2024-08-24 RX ADMIN — OXYCODONE HYDROCHLORIDE 10 MG: 10 TABLET ORAL at 14:25

## 2024-08-24 RX ADMIN — INSULIN LISPRO 4 UNITS: 100 INJECTION, SOLUTION INTRAVENOUS; SUBCUTANEOUS at 20:20

## 2024-08-24 RX ADMIN — NALOXEGOL OXALATE 12.5 MG: 12.5 TABLET, FILM COATED ORAL at 08:20

## 2024-08-24 RX ADMIN — CHOLESTYRAMINE 4 G: 4 POWDER, FOR SUSPENSION ORAL at 08:19

## 2024-08-24 RX ADMIN — INSULIN LISPRO 4 UNITS: 100 INJECTION, SOLUTION INTRAVENOUS; SUBCUTANEOUS at 12:40

## 2024-08-24 RX ADMIN — ACETAMINOPHEN 650 MG: 325 TABLET ORAL at 18:16

## 2024-08-24 RX ADMIN — OXYCODONE HYDROCHLORIDE 10 MG: 10 TABLET ORAL at 20:56

## 2024-08-24 RX ADMIN — VANCOMYCIN HYDROCHLORIDE 750 MG: 750 INJECTION, POWDER, LYOPHILIZED, FOR SOLUTION INTRAVENOUS at 08:18

## 2024-08-24 RX ADMIN — Medication 800 MG: at 20:56

## 2024-08-24 RX ADMIN — ONDANSETRON 2 MG: 2 INJECTION INTRAMUSCULAR; INTRAVENOUS at 08:28

## 2024-08-24 RX ADMIN — MAGNESIUM SULFATE HEPTAHYDRATE 2000 MG: 40 INJECTION, SOLUTION INTRAVENOUS at 05:49

## 2024-08-24 RX ADMIN — INSULIN GLARGINE 15 UNITS: 100 INJECTION, SOLUTION SUBCUTANEOUS at 20:56

## 2024-08-24 RX ADMIN — Medication 15 G: at 08:19

## 2024-08-24 RX ADMIN — FUROSEMIDE 20 MG: 20 TABLET ORAL at 08:20

## 2024-08-24 RX ADMIN — CEFEPIME 2000 MG: 2 INJECTION, POWDER, FOR SOLUTION INTRAVENOUS at 18:10

## 2024-08-24 RX ADMIN — FOLIC ACID 1 MG: 1 TABLET ORAL at 08:21

## 2024-08-24 RX ADMIN — Medication 800 MG: at 13:38

## 2024-08-24 RX ADMIN — SENNOSIDES AND DOCUSATE SODIUM 2 TABLET: 8.6; 5 TABLET ORAL at 08:20

## 2024-08-24 RX ADMIN — INSULIN GLARGINE 15 UNITS: 100 INJECTION, SOLUTION SUBCUTANEOUS at 08:20

## 2024-08-24 ASSESSMENT — PAIN SCALES - GENERAL
PAINLEVEL_OUTOF10: 8
PAINLEVEL_OUTOF10: 6
PAINLEVEL_OUTOF10: 4
PAINLEVEL_OUTOF10: 3
PAINLEVEL_OUTOF10: 6
PAINLEVEL_OUTOF10: 9
PAINLEVEL_OUTOF10: 9

## 2024-08-24 ASSESSMENT — PAIN DESCRIPTION - ORIENTATION: ORIENTATION: LEFT;RIGHT

## 2024-08-24 ASSESSMENT — PAIN DESCRIPTION - DESCRIPTORS
DESCRIPTORS: ACHING
DESCRIPTORS: ACHING

## 2024-08-24 ASSESSMENT — PAIN DESCRIPTION - LOCATION
LOCATION: LEG
LOCATION: LEG

## 2024-08-24 ASSESSMENT — PAIN - FUNCTIONAL ASSESSMENT: PAIN_FUNCTIONAL_ASSESSMENT: PREVENTS OR INTERFERES SOME ACTIVE ACTIVITIES AND ADLS

## 2024-08-24 ASSESSMENT — PAIN SCALES - WONG BAKER
WONGBAKER_NUMERICALRESPONSE: HURTS A LITTLE BIT
WONGBAKER_NUMERICALRESPONSE: HURTS A LITTLE BIT

## 2024-08-24 NOTE — PROGRESS NOTES
[facility]   Pharmacy Pharmacokinetic Monitoring Service - Vancomycin    Consulting Provider: Dr. Hurst   Indication: Sepsis  Target Concentration: Goal AUC/YIFAN 400-600 mg*hr/L  Day of Therapy: 2  Additional Antimicrobials: Cefepime    Pertinent Laboratory Values:   Wt Readings from Last 1 Encounters:   08/15/24 67.4 kg (148 lb 8 oz)     Temp Readings from Last 1 Encounters:   08/24/24 98.8 °F (37.1 °C)     Estimated Creatinine Clearance: 83 mL/min (based on SCr of 0.5 mg/dL).  Recent Labs     08/23/24  0405 08/24/24  0350   CREATININE 0.6 0.5   BUN 30* 24*   WBC 29.9* 29.2*     Procalcitonin: no result    Pertinent Cultures:  Culture Date Source Results   08/17/24 Blood No growth   08/23/24 Wound Enterococcus, Enterobacter   08/23/24 Blood No growth   MRSA Nasal Swab: N/A. Non-respiratory infection.    Recent vancomycin administrations                     vancomycin (VANCOCIN) 750 mg in sodium chloride 0.9 % 250 mL IVPB ([id number]) (mg) 750 mg New Bag 08/24/24 0818    vancomycin (VANCOCIN) 1500 mg in sodium chloride 0.9 % 250 mL IVPB (mg) 1,500 mg New Bag 08/23/24 1849                    Assessment:  Date/Time Current Dose Concentration Timing of Concentration (h) AUC   08/24/24 750 mg IV q12h 10.7 9 hr 48 min 390   Note: Serum concentrations collected for AUC dosing may appear elevated if collected in close proximity to the dose administered, this is not necessarily an indication of toxicity    Plan:  Current dosing regimen is sub-therapeutic  Increase dose to 1000 mg IV q12h  Repeat vancomycin concentration ordered for 08/25 @ 1930   Pharmacy will continue to monitor patient and adjust therapy as indicated    Thank you for the consult,  Jorge L Cassidy RPH  8/24/2024 6:45 PM

## 2024-08-24 NOTE — PROGRESS NOTES
Turned and wt shift performed as pt would allow. Offload boot placed on left foot. Right foot elevated and floated

## 2024-08-24 NOTE — PLAN OF CARE
Problem: Discharge Planning  Goal: Discharge to home or other facility with appropriate resources  8/24/2024 1257 by Tangela Mckeon RN  Outcome: Progressing  8/24/2024 0412 by Erlin Mcelroy RN  Outcome: Progressing     Problem: Skin/Tissue Integrity  Goal: Absence of new skin breakdown  Description: 1.  Monitor for areas of redness and/or skin breakdown  2.  Assess vascular access sites hourly  3.  Every 4-6 hours minimum:  Change oxygen saturation probe site  4.  Every 4-6 hours:  If on nasal continuous positive airway pressure, respiratory therapy assess nares and determine need for appliance change or resting period.  8/24/2024 1257 by Tangela Mckeon RN  Outcome: Progressing  8/24/2024 0412 by Erlin Mcelroy, RN  Outcome: Progressing     Problem: Safety - Adult  Goal: Free from fall injury  8/24/2024 1257 by Tangela Mckeon RN  Outcome: Progressing  8/24/2024 0412 by Erlin Mcelroy, RN  Outcome: Progressing

## 2024-08-24 NOTE — PROGRESS NOTES
Daily Progress Note    Date:2024  Patient: Elda Flood  : 1945  MRN:[id number]  CODE:Full Code No additional code details  PCP:NICOLE Solis DO    Admit Date: 2024 10:20 AM   LOS: 10 days     Chief Complaint   Patient presents with    Hematoma     Pt here with large hematoma and swelling injured yesterday          Subjective: Plan was for pt to discharge to nursing facility. She asked for temp to be checked when EMS arrived and she had fever to 101.3. It improved to 99.0 within 30 minutes without intervention.   She has had nonproductive cough for the last 2 days that is improved today. No fevers or chills otherwise. Feeling well overall with improved RLE pain.         Hospital Summary: 79 y.o. female who presented to [facility] ED for evaluation of bruising and pain of right lower leg. Pt has history of atrial fibrillation on eliquis, diabetes, stroke, hypertension and hyperlipidemia.   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 Vascular surgery was consulted.  Pt admitted to hospitalist service for further management.   Underwent evacuation of RLE hematoma on 8/15. She did require transfusion due to anemia of acute blood loss. Wound vac was placed .   SNF placement secured.    Pt developed fever to 101.3 on day of  and discharge was cancelled.        Review of Systems   All other systems reviewed and are negative.      Objective:      Vital signs in last 24 hours:  Patient Vitals for the past 24 hrs:   BP Temp Temp src Pulse Resp SpO2   24 1220 (!) 145/64 99 °F (37.2 °C) -- 93 18 96 %   24 0803 -- -- -- -- 18 --   24 0750 -- -- -- -- -- 97 %   24 0747 (!) 137/57 98.2 °F (36.8 °C) -- 95 18 98 %   24 0733 -- -- -- -- 16 --   24 0350 (!) 140/58 98.6 °F (37 °C) Oral (!) 101 16 99 %   24 0145 -- -- -- -- 16 --   24 0022 133/61 99.1 °F (37.3 °C) Oral (!) 105 18 98 %   24 (!) 142/52 -- --  right lower extremity  -- Vascular surgery following  -- s/p evacuation on 8/15  -- Wound vac placed 8/20, due for dressing change today  -- Wound care following  -- Continue Cefazolin to prevent superinfection  -- Requiring IV pain medication    Opioid induced constipation  -- No bowel movement x7 days  -- Miralax daily  -- Add Senokot 2 tabs daily and Movantik    Hyperkalemia  -- K 5.4  -- Repeat BMP now to reassess  -- Holding Lisinopril    Hyponatremia  -- Na improving, up to 130 today once corrected for glucose  -- Continue salt tabs, urea packet, 1.5 L fluid restriction    Hypomagnesemia  -- Increase PO Magnesium Oxide to 800 mg TID  -- Give 4 mg IV mag today for mag level 1.6    T2DM  -- Lantus 15 units BID + medium SSI    HTN  -- Continue Lopressor  -- Amlodipine and Lisinopril held - BP has been controlled without them      DVT prophylaxis: Hold due to ongoing anemia, hematoma    DISPOSITION:  Possible SNF discharge tomorrow    Full Code No additional code details         After evaluating the complexity of problems addressed and management options selected today, I believe the patient's risk of complications and/or morbidity or mortality to be moderate.      Cameron Hurst MD 8/24/2024 12:59 PM

## 2024-08-24 NOTE — PROGRESS NOTES
[facility]   Pharmacy Pharmacokinetic Monitoring Service - Vancomycin     Elda Flood is a 79 y.o. female starting on vancomycin therapy for sepsis. Pharmacy consulted by Dr. Hurst for monitoring and adjustment.    Target Concentration: Goal AUC/YIFAN 400-600 mg*hr/L    Additional Antimicrobials: Cefepime    Pertinent Laboratory Values:   Wt Readings from Last 1 Encounters:   08/15/24 67.4 kg (148 lb 8 oz)     Temp Readings from Last 1 Encounters:   08/23/24 99 °F (37.2 °C)     Estimated Creatinine Clearance: 69 mL/min (based on SCr of 0.6 mg/dL).  Recent Labs     08/22/24  0335 08/23/24  0405   CREATININE 0.7 0.6   BUN 30* 30*   WBC 27.6* 29.9*     Procalcitonin: No new results    Pertinent Cultures:  Culture Date Source Results   08/17/24 Blood No growth   08/23/24 Wound Moderate gram - rods  Few gram + cocci   08/23/24 Blood Sent   MRSA Nasal Swab: N/A. Non-respiratory infection.    Plan:  Dosing recommendations based on Bayesian software  Start vancomycin 1500 mg IV loading dose then 750 mg IV q12h  Anticipated AUC of 462 and trough concentration of 13.3 at steady state  Renal labs as indicated   Vancomycin concentration ordered for 8/24 @ 1800   Pharmacy will continue to monitor patient and adjust therapy as indicated    Thank you for the consult,  Bronwyn Turner [facility], BCPS  8/23/2024 7:29 PM

## 2024-08-24 NOTE — PROGRESS NOTES
Daily Progress Note    Date:2024  Patient: Elda Flood  : 1945  MRN:[id number]  CODE:Full Code No additional code details  PCP:NICOLE Solis DO    Admit Date: 2024 10:20 AM   LOS: 10 days     Chief Complaint   Patient presents with    Hematoma     Pt here with large hematoma and swelling injured yesterday          Subjective: Plan was for pt to discharge to nursing facility last night. She asked for temp to be checked when EMS arrived and she had fever to 101.3. It improved to 99.0 within 30 minutes without intervention.   She has had nonproductive cough for the last 2 days that is improved today. No fevers or chills otherwise. Feeling well overall with improved RLE pain.         Hospital Summary: 79 y.o. female who presented to [facility] ED for evaluation of bruising and pain of right lower leg. Pt has history of atrial fibrillation on eliquis, diabetes, stroke, hypertension and hyperlipidemia.   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 Vascular surgery was consulted.  Pt admitted to hospitalist service for further management.   Underwent evacuation of RLE hematoma on 8/15. She did require transfusion due to anemia of acute blood loss. Wound vac was placed .   SNF placement secured.    Pt developed fever to 101.3 on day of  and discharge was cancelled.        Review of Systems   All other systems reviewed and are negative.      Objective:      Vital signs in last 24 hours:  Patient Vitals for the past 24 hrs:   BP Temp Temp src Pulse Resp SpO2   24 1220 (!) 145/64 99 °F (37.2 °C) -- 93 18 96 %   24 0803 -- -- -- -- 18 --   24 0750 -- -- -- -- -- 97 %   24 0747 (!) 137/57 98.2 °F (36.8 °C) -- 95 18 98 %   24 0733 -- -- -- -- 16 --   24 0350 (!) 140/58 98.6 °F (37 °C) Oral (!) 101 16 99 %   24 0145 -- -- -- -- 16 --   24 0022 133/61 99.1 °F (37.3 °C) Oral (!) 105 18 98 %   24 (!)

## 2024-08-25 LAB
ANION GAP SERPL CALCULATED.3IONS-SCNC: 12 MMOL/L (ref 7–19)
ANISOCYTOSIS BLD QL SMEAR: ABNORMAL
BASOPHILS # BLD: 0 K/UL (ref 0–0.2)
BASOPHILS NFR BLD: 0 % (ref 0–1)
BUN SERPL-MCNC: 24 MG/DL (ref 8–23)
CALCIUM SERPL-MCNC: 8.1 MG/DL (ref 8.8–10.2)
CHLORIDE SERPL-SCNC: 100 MMOL/L (ref 98–111)
CO2 SERPL-SCNC: 22 MMOL/L (ref 22–29)
CREAT SERPL-MCNC: 0.6 MG/DL (ref 0.5–0.9)
EOSINOPHIL # BLD: 0 K/UL (ref 0–0.6)
EOSINOPHIL NFR BLD: 0 % (ref 0–5)
ERYTHROCYTE [DISTWIDTH] IN BLOOD BY AUTOMATED COUNT: 17.5 % (ref 11.5–14.5)
GLUCOSE BLD-MCNC: 232 MG/DL (ref 70–99)
GLUCOSE BLD-MCNC: 239 MG/DL (ref 70–99)
GLUCOSE BLD-MCNC: 245 MG/DL (ref 70–99)
GLUCOSE BLD-MCNC: 270 MG/DL (ref 70–99)
GLUCOSE SERPL-MCNC: 140 MG/DL (ref 70–99)
HCT VFR BLD AUTO: 26.2 % (ref 37–47)
HGB BLD-MCNC: 8.2 G/DL (ref 12–16)
HYPOCHROMIA BLD QL SMEAR: ABNORMAL
IMM GRANULOCYTES # BLD: 0.7 K/UL
LYMPHOCYTES # BLD: 1.7 K/UL (ref 1.1–4.5)
LYMPHOCYTES NFR BLD: 6 % (ref 20–40)
MAGNESIUM SERPL-MCNC: 2.3 MG/DL (ref 1.6–2.4)
MCH RBC QN AUTO: 25.1 PG (ref 27–31)
MCHC RBC AUTO-ENTMCNC: 31.3 G/DL (ref 33–37)
MCV RBC AUTO: 80.1 FL (ref 81–99)
MICROCYTES BLD QL SMEAR: ABNORMAL
MONOCYTES # BLD: 9.4 K/UL (ref 0–0.9)
MONOCYTES NFR BLD: 34 % (ref 0–10)
NEUTROPHILS # BLD: 16.6 K/UL (ref 1.5–7.5)
NEUTS BAND NFR BLD MANUAL: 4 % (ref 0–5)
NEUTS SEG NFR BLD: 56 % (ref 50–65)
PERFORMED ON: ABNORMAL
PLATELET # BLD AUTO: 477 K/UL (ref 130–400)
PLATELET SLIDE REVIEW: ABNORMAL
PMV BLD AUTO: 11.2 FL (ref 9.4–12.3)
POTASSIUM SERPL-SCNC: 3.5 MMOL/L (ref 3.5–5)
RBC # BLD AUTO: 3.27 M/UL (ref 4.2–5.4)
SODIUM SERPL-SCNC: 134 MMOL/L (ref 136–145)
WBC # BLD AUTO: 27.7 K/UL (ref 4.8–10.8)

## 2024-08-25 PROCEDURE — 94760 N-INVAS EAR/PLS OXIMETRY 1: CPT

## 2024-08-25 PROCEDURE — 36415 COLL VENOUS BLD VENIPUNCTURE: CPT

## 2024-08-25 PROCEDURE — 83735 ASSAY OF MAGNESIUM: CPT

## 2024-08-25 PROCEDURE — 2580000003 HC RX 258: Performed by: STUDENT IN AN ORGANIZED HEALTH CARE EDUCATION/TRAINING PROGRAM

## 2024-08-25 PROCEDURE — 82962 GLUCOSE BLOOD TEST: CPT

## 2024-08-25 PROCEDURE — 6360000002 HC RX W HCPCS: Performed by: STUDENT IN AN ORGANIZED HEALTH CARE EDUCATION/TRAINING PROGRAM

## 2024-08-25 PROCEDURE — 6370000000 HC RX 637 (ALT 250 FOR IP): Performed by: HOSPITALIST

## 2024-08-25 PROCEDURE — 6370000000 HC RX 637 (ALT 250 FOR IP): Performed by: STUDENT IN AN ORGANIZED HEALTH CARE EDUCATION/TRAINING PROGRAM

## 2024-08-25 PROCEDURE — 6370000000 HC RX 637 (ALT 250 FOR IP): Performed by: SURGERY

## 2024-08-25 PROCEDURE — 85025 COMPLETE CBC W/AUTO DIFF WBC: CPT

## 2024-08-25 PROCEDURE — 80048 BASIC METABOLIC PNL TOTAL CA: CPT

## 2024-08-25 PROCEDURE — 1200000000 HC SEMI PRIVATE

## 2024-08-25 PROCEDURE — 2580000003 HC RX 258: Performed by: SURGERY

## 2024-08-25 PROCEDURE — 6360000002 HC RX W HCPCS: Performed by: NURSE PRACTITIONER

## 2024-08-25 RX ADMIN — CEFEPIME 2000 MG: 2 INJECTION, POWDER, FOR SOLUTION INTRAVENOUS at 09:20

## 2024-08-25 RX ADMIN — ATORVASTATIN CALCIUM 20 MG: 20 TABLET, FILM COATED ORAL at 20:12

## 2024-08-25 RX ADMIN — INSULIN LISPRO 2 UNITS: 100 INJECTION, SOLUTION INTRAVENOUS; SUBCUTANEOUS at 09:09

## 2024-08-25 RX ADMIN — INSULIN LISPRO 4 UNITS: 100 INJECTION, SOLUTION INTRAVENOUS; SUBCUTANEOUS at 17:27

## 2024-08-25 RX ADMIN — SODIUM CHLORIDE 2 G: 1 TABLET ORAL at 11:50

## 2024-08-25 RX ADMIN — Medication 800 MG: at 20:12

## 2024-08-25 RX ADMIN — INSULIN GLARGINE 15 UNITS: 100 INJECTION, SOLUTION SUBCUTANEOUS at 09:02

## 2024-08-25 RX ADMIN — ACETAMINOPHEN 650 MG: 325 TABLET ORAL at 20:15

## 2024-08-25 RX ADMIN — OXYCODONE HYDROCHLORIDE 10 MG: 10 TABLET ORAL at 18:25

## 2024-08-25 RX ADMIN — Medication 15 G: at 09:01

## 2024-08-25 RX ADMIN — CEFEPIME 2000 MG: 2 INJECTION, POWDER, FOR SOLUTION INTRAVENOUS at 17:27

## 2024-08-25 RX ADMIN — SODIUM CHLORIDE 2 G: 1 TABLET ORAL at 17:27

## 2024-08-25 RX ADMIN — FUROSEMIDE 20 MG: 20 TABLET ORAL at 09:02

## 2024-08-25 RX ADMIN — CHOLESTYRAMINE 4 G: 4 POWDER, FOR SUSPENSION ORAL at 09:01

## 2024-08-25 RX ADMIN — METOPROLOL SUCCINATE 50 MG: 50 TABLET, FILM COATED, EXTENDED RELEASE ORAL at 09:02

## 2024-08-25 RX ADMIN — OXYCODONE HYDROCHLORIDE 10 MG: 10 TABLET ORAL at 02:16

## 2024-08-25 RX ADMIN — POLYETHYLENE GLYCOL 3350 17 G: 17 POWDER, FOR SOLUTION ORAL at 09:01

## 2024-08-25 RX ADMIN — INSULIN GLARGINE 15 UNITS: 100 INJECTION, SOLUTION SUBCUTANEOUS at 20:12

## 2024-08-25 RX ADMIN — NALOXEGOL OXALATE 12.5 MG: 12.5 TABLET, FILM COATED ORAL at 06:31

## 2024-08-25 RX ADMIN — INSULIN LISPRO 2 UNITS: 100 INJECTION, SOLUTION INTRAVENOUS; SUBCUTANEOUS at 12:04

## 2024-08-25 RX ADMIN — Medication 800 MG: at 14:26

## 2024-08-25 RX ADMIN — CEFEPIME 2000 MG: 2 INJECTION, POWDER, FOR SOLUTION INTRAVENOUS at 02:13

## 2024-08-25 RX ADMIN — ONDANSETRON 2 MG: 2 INJECTION INTRAMUSCULAR; INTRAVENOUS at 09:09

## 2024-08-25 RX ADMIN — VANCOMYCIN HYDROCHLORIDE 1000 MG: 10 INJECTION, POWDER, LYOPHILIZED, FOR SOLUTION INTRAVENOUS at 09:16

## 2024-08-25 RX ADMIN — FOLIC ACID 1 MG: 1 TABLET ORAL at 09:02

## 2024-08-25 RX ADMIN — LEVETIRACETAM 500 MG: 500 TABLET, FILM COATED ORAL at 20:12

## 2024-08-25 RX ADMIN — OXYCODONE HYDROCHLORIDE 10 MG: 10 TABLET ORAL at 11:50

## 2024-08-25 RX ADMIN — SENNOSIDES AND DOCUSATE SODIUM 2 TABLET: 8.6; 5 TABLET ORAL at 09:21

## 2024-08-25 RX ADMIN — Medication 800 MG: at 09:02

## 2024-08-25 RX ADMIN — LEVETIRACETAM 500 MG: 500 TABLET, FILM COATED ORAL at 09:01

## 2024-08-25 RX ADMIN — SODIUM CHLORIDE, PRESERVATIVE FREE 10 ML: 5 INJECTION INTRAVENOUS at 20:11

## 2024-08-25 RX ADMIN — SODIUM CHLORIDE 2 G: 1 TABLET ORAL at 09:01

## 2024-08-25 RX ADMIN — ALLOPURINOL 300 MG: 100 TABLET ORAL at 09:02

## 2024-08-25 ASSESSMENT — PAIN DESCRIPTION - DESCRIPTORS
DESCRIPTORS: ACHING
DESCRIPTORS: ACHING

## 2024-08-25 ASSESSMENT — PAIN DESCRIPTION - LOCATION
LOCATION: LEG
LOCATION: LEG

## 2024-08-25 ASSESSMENT — PAIN SCALES - GENERAL
PAINLEVEL_OUTOF10: 4
PAINLEVEL_OUTOF10: 8
PAINLEVEL_OUTOF10: 4
PAINLEVEL_OUTOF10: 8

## 2024-08-25 ASSESSMENT — PAIN SCALES - WONG BAKER: WONGBAKER_NUMERICALRESPONSE: HURTS A LITTLE BIT

## 2024-08-25 ASSESSMENT — PAIN - FUNCTIONAL ASSESSMENT: PAIN_FUNCTIONAL_ASSESSMENT: PREVENTS OR INTERFERES SOME ACTIVE ACTIVITIES AND ADLS

## 2024-08-25 ASSESSMENT — PAIN DESCRIPTION - ORIENTATION
ORIENTATION: RIGHT;LEFT
ORIENTATION: LEFT;RIGHT

## 2024-08-25 NOTE — PROGRESS NOTES
Patient name: Elda Flood  MRN: [id number]  YOB: 1945    Date of service: 8/25/2024    Vascular surgery progress note    Patient seen and examined.  Her wound VAC is intact status post evacuation of large right leg hematoma by Dr Landry 8/15/2024.  She was about to be discharged, but her family asked for a temperature check before EMS transfer the patient and she was noted to have a pretty significant fever.  She then tested positive for COVID.  She is now staying in the hospital for the moment.  Her wound VAC on the right lower extremity was placed by Diamond Peres on Friday, and it is still intact with good suction.  No new issues with her leg.  We will continue to follow.  If she still here tomorrow, wound VAC change tomorrow.  If she is discharged, we will plan to remove the wound VAC and placed a damp to dry dressing.  The patient says her leg feels fine and no new issues.    We appreciate the opportunity to participate in the care of this patient.

## 2024-08-25 NOTE — PROGRESS NOTES
Daily Progress Note    Date:2024  Patient: Elda Flood  : 1945  MRN:[id number]  CODE:Full Code No additional code details  PCP:NICOLE Solis DO    Admit Date: 2024 10:20 AM   LOS: 11 days     Chief Complaint   Patient presents with    Hematoma     Pt here with large hematoma and swelling injured yesterday          Subjective: NAEON. T max 99.0. Pt feels well overall. No cough or dyspnea.         Hospital Summary: 79 y.o. female who presented to [facility] ED for evaluation of bruising and pain of right lower leg. Pt has history of atrial fibrillation on eliquis, diabetes, stroke, hypertension and hyperlipidemia.   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 Vascular surgery was consulted.  Pt admitted to hospitalist service for further management.   Underwent evacuation of RLE hematoma on 8/15. She did require transfusion due to anemia of acute blood loss. Wound vac was placed .   SNF placement secured.    Pt developed fever to 101.3 on day of  and discharge was cancelled. Antibiotics were empirically broadened to Vancomycin and Cefepime.   Viral panel returned positive for COVID. CXR unremarkable. Blood cx NGTD. Vancomycin dced .        Review of Systems   All other systems reviewed and are negative.      Objective:      Vital signs in last 24 hours:  Patient Vitals for the past 24 hrs:   BP Temp Temp src Pulse Resp SpO2   24 0837 (!) 128/53 98.2 °F (36.8 °C) -- 95 18 97 %   24 0735 -- -- -- -- -- 99 %   24 0434 (!) 119/54 98.8 °F (37.1 °C) Oral 99 16 96 %   24 0216 -- -- -- -- 16 --   24 0042 (!) 119/59 97.9 °F (36.6 °C) Oral 91 18 100 %   24 2125 136/60 99 °F (37.2 °C) Oral (!) 101 18 95 %   24 -- -- -- -- 18 --   24 -- -- -- (!) 104 -- --   24 1704 (!) 133/59 98.8 °F (37.1 °C) -- 96 18 98 %   24 1455 -- -- -- -- 16 --   24 1430 (!) 145/64 99 °F (37.2 °C) Oral 93   potassium chloride (KLOR-CON M) extended release tablet 40 mEq, 40 mEq, Oral, PRN **OR** potassium bicarb-citric acid (EFFER-K) effervescent tablet 40 mEq, 40 mEq, Oral, PRN **OR** potassium chloride 10 mEq/100 mL IVPB (Peripheral Line), 10 mEq, IntraVENous, PRN, Ivanukoff, Emili, DO    magnesium sulfate 2000 mg in 50 mL IVPB premix, 2,000 mg, IntraVENous, PRN, Ivanukoff, Emili, DO, Stopped at 08/24/24 0821    ondansetron (ZOFRAN-ODT) disintegrating tablet 4 mg, 4 mg, Oral, Q8H PRN, 4 mg at 08/23/24 0928 **OR** [DISCONTINUED] ondansetron (ZOFRAN) injection 4 mg, 4 mg, IntraVENous, Q6H PRN, Ivanoff, Emili, DO, 4 mg at 08/19/24 0738    acetaminophen (TYLENOL) tablet 650 mg, 650 mg, Oral, Q6H PRN, 650 mg at 08/24/24 1816 **OR** acetaminophen (TYLENOL) suppository 650 mg, 650 mg, Rectal, Q6H PRN, Ivanukoff, Emili, DO    naloxone (NARCAN) injection 0.4 mg, 0.4 mg, IntraVENous, PRN, Ivanukoff, Emili, DO    glucose chewable tablet 16 g, 4 tablet, Oral, PRN, Ivanukoff, Emili, DO    dextrose bolus 10% 125 mL, 125 mL, IntraVENous, PRN **OR** dextrose bolus 10% 250 mL, 250 mL, IntraVENous, PRN, Ivanukoff, Emili, DO    glucagon injection 1 mg, 1 mg, SubCUTAneous, PRN, Ivanukoff, Emili, DO    dextrose 10 % infusion, , IntraVENous, Continuous PRN, Ivanukoff, Emili, DO    allopurinol (ZYLOPRIM) tablet 300 mg, 300 mg, Oral, Daily, Ivanukoff, Emili, DO, 300 mg at 08/25/24 0902    [Held by provider] amLODIPine (NORVASC) tablet 10 mg, 10 mg, Oral, Daily, Cecilio Church, APRN - CNP    atorvastatin (LIPITOR) tablet 20 mg, 20 mg, Oral, Nightly, Ivanukoff, Emili, DO, 20 mg at 08/24/24 2056    cholestyramine light packet 4 g, 4 g, Oral, Daily, Emili Landry DO, 4 g at 08/25/24 0901    folic acid (FOLVITE) tablet 1 mg, 1 mg, Oral, Daily, Emili Landry DO, 1 mg at 08/25/24 0902    levETIRAcetam (KEPPRA) tablet 500 mg, 500 mg, Oral, BID, Emili Landry DO, 500 mg at 08/25/24 0901    [Held

## 2024-08-25 NOTE — PLAN OF CARE
Problem: Discharge Planning  Goal: Discharge to home or other facility with appropriate resources  Outcome: Progressing  Flowsheets (Taken 8/24/2024 2050)  Discharge to home or other facility with appropriate resources:   Identify barriers to discharge with patient and caregiver   Arrange for needed discharge resources and transportation as appropriate     Problem: Skin/Tissue Integrity  Goal: Absence of new skin breakdown  Description: 1.  Monitor for areas of redness and/or skin breakdown  2.  Assess vascular access sites hourly  3.  Every 4-6 hours minimum:  Change oxygen saturation probe site  4.  Every 4-6 hours:  If on nasal continuous positive airway pressure, respiratory therapy assess nares and determine need for appliance change or resting period.  Outcome: Progressing     Problem: Safety - Adult  Goal: Free from fall injury  Outcome: Progressing  Flowsheets (Taken 8/25/2024 0256)  Free From Fall Injury: Instruct family/caregiver on patient safety

## 2024-08-26 LAB
ANION GAP SERPL CALCULATED.3IONS-SCNC: 11 MMOL/L (ref 7–19)
ANISOCYTOSIS BLD QL SMEAR: ABNORMAL
BASOPHILS # BLD: 0 K/UL (ref 0–0.2)
BASOPHILS NFR BLD: 0 % (ref 0–1)
BUN SERPL-MCNC: 31 MG/DL (ref 8–23)
CALCIUM SERPL-MCNC: 8.4 MG/DL (ref 8.8–10.2)
CHLORIDE SERPL-SCNC: 101 MMOL/L (ref 98–111)
CO2 SERPL-SCNC: 21 MMOL/L (ref 22–29)
CREAT SERPL-MCNC: 0.6 MG/DL (ref 0.5–0.9)
EKG P AXIS: 58 DEGREES
EKG P-R INTERVAL: 150 MS
EKG Q-T INTERVAL: 294 MS
EKG QRS DURATION: 94 MS
EKG QTC CALCULATION (BAZETT): 407 MS
EKG T AXIS: 80 DEGREES
EOSINOPHIL # BLD: 0.54 K/UL (ref 0–0.6)
EOSINOPHIL NFR BLD: 2 % (ref 0–5)
ERYTHROCYTE [DISTWIDTH] IN BLOOD BY AUTOMATED COUNT: 17.6 % (ref 11.5–14.5)
GLUCOSE BLD-MCNC: 173 MG/DL (ref 70–99)
GLUCOSE BLD-MCNC: 188 MG/DL (ref 70–99)
GLUCOSE BLD-MCNC: 223 MG/DL (ref 70–99)
GLUCOSE BLD-MCNC: 235 MG/DL (ref 70–99)
GLUCOSE SERPL-MCNC: 122 MG/DL (ref 70–99)
HCT VFR BLD AUTO: 23.9 % (ref 37–47)
HGB BLD-MCNC: 7.6 G/DL (ref 12–16)
HYPOCHROMIA BLD QL SMEAR: ABNORMAL
IMM GRANULOCYTES # BLD: 1 K/UL
LACTATE BLDV-SCNC: 1.2 MMOL/L (ref 0.5–1.9)
LYMPHOCYTES # BLD: 3.5 K/UL (ref 1.1–4.5)
LYMPHOCYTES NFR BLD: 13 % (ref 20–40)
MAGNESIUM SERPL-MCNC: 1.8 MG/DL (ref 1.6–2.4)
MCH RBC QN AUTO: 24.8 PG (ref 27–31)
MCHC RBC AUTO-ENTMCNC: 31.8 G/DL (ref 33–37)
MCV RBC AUTO: 78.1 FL (ref 81–99)
MICROCYTES BLD QL SMEAR: ABNORMAL
MONOCYTES # BLD: 6 K/UL (ref 0–0.9)
MONOCYTES NFR BLD: 22 % (ref 0–10)
NEUTROPHILS # BLD: 17.1 K/UL (ref 1.5–7.5)
NEUTS BAND NFR BLD MANUAL: 5 % (ref 0–5)
NEUTS SEG NFR BLD: 58 % (ref 50–65)
PERFORMED ON: ABNORMAL
PLATELET # BLD AUTO: 468 K/UL (ref 130–400)
PLATELET SLIDE REVIEW: ABNORMAL
PMV BLD AUTO: 11.2 FL (ref 9.4–12.3)
POTASSIUM SERPL-SCNC: 3.6 MMOL/L (ref 3.5–5)
PROCALCITONIN: 0.3 NG/ML (ref 0–0.09)
RBC # BLD AUTO: 3.06 M/UL (ref 4.2–5.4)
SODIUM SERPL-SCNC: 133 MMOL/L (ref 136–145)
WBC # BLD AUTO: 27.2 K/UL (ref 4.8–10.8)

## 2024-08-26 PROCEDURE — 94760 N-INVAS EAR/PLS OXIMETRY 1: CPT

## 2024-08-26 PROCEDURE — 6370000000 HC RX 637 (ALT 250 FOR IP): Performed by: SURGERY

## 2024-08-26 PROCEDURE — 87040 BLOOD CULTURE FOR BACTERIA: CPT

## 2024-08-26 PROCEDURE — 82962 GLUCOSE BLOOD TEST: CPT

## 2024-08-26 PROCEDURE — 6370000000 HC RX 637 (ALT 250 FOR IP): Performed by: STUDENT IN AN ORGANIZED HEALTH CARE EDUCATION/TRAINING PROGRAM

## 2024-08-26 PROCEDURE — 6370000000 HC RX 637 (ALT 250 FOR IP): Performed by: HOSPITALIST

## 2024-08-26 PROCEDURE — 83735 ASSAY OF MAGNESIUM: CPT

## 2024-08-26 PROCEDURE — 1200000000 HC SEMI PRIVATE

## 2024-08-26 PROCEDURE — 6360000002 HC RX W HCPCS: Performed by: STUDENT IN AN ORGANIZED HEALTH CARE EDUCATION/TRAINING PROGRAM

## 2024-08-26 PROCEDURE — 80048 BASIC METABOLIC PNL TOTAL CA: CPT

## 2024-08-26 PROCEDURE — 6360000002 HC RX W HCPCS: Performed by: NURSE PRACTITIONER

## 2024-08-26 PROCEDURE — 93005 ELECTROCARDIOGRAM TRACING: CPT | Performed by: STUDENT IN AN ORGANIZED HEALTH CARE EDUCATION/TRAINING PROGRAM

## 2024-08-26 PROCEDURE — 93010 ELECTROCARDIOGRAM REPORT: CPT | Performed by: INTERNAL MEDICINE

## 2024-08-26 PROCEDURE — 36415 COLL VENOUS BLD VENIPUNCTURE: CPT

## 2024-08-26 PROCEDURE — 84145 PROCALCITONIN (PCT): CPT

## 2024-08-26 PROCEDURE — 2580000003 HC RX 258: Performed by: SURGERY

## 2024-08-26 PROCEDURE — 83605 ASSAY OF LACTIC ACID: CPT

## 2024-08-26 PROCEDURE — 85025 COMPLETE CBC W/AUTO DIFF WBC: CPT

## 2024-08-26 PROCEDURE — 2580000003 HC RX 258: Performed by: STUDENT IN AN ORGANIZED HEALTH CARE EDUCATION/TRAINING PROGRAM

## 2024-08-26 RX ORDER — SODIUM HYPOCHLORITE 1.25 MG/ML
SOLUTION TOPICAL DAILY
Status: DISCONTINUED | OUTPATIENT
Start: 2024-08-26 | End: 2024-09-05 | Stop reason: HOSPADM

## 2024-08-26 RX ORDER — MAGNESIUM SULFATE IN WATER 40 MG/ML
2000 INJECTION, SOLUTION INTRAVENOUS ONCE
Status: COMPLETED | OUTPATIENT
Start: 2024-08-26 | End: 2024-08-26

## 2024-08-26 RX ADMIN — Medication 15 G: at 08:55

## 2024-08-26 RX ADMIN — SENNOSIDES AND DOCUSATE SODIUM 2 TABLET: 8.6; 5 TABLET ORAL at 08:55

## 2024-08-26 RX ADMIN — SODIUM CHLORIDE 2 G: 1 TABLET ORAL at 17:52

## 2024-08-26 RX ADMIN — POLYETHYLENE GLYCOL 3350 17 G: 17 POWDER, FOR SOLUTION ORAL at 08:54

## 2024-08-26 RX ADMIN — CEFEPIME 2000 MG: 2 INJECTION, POWDER, FOR SOLUTION INTRAVENOUS at 09:14

## 2024-08-26 RX ADMIN — LEVETIRACETAM 500 MG: 500 TABLET, FILM COATED ORAL at 08:54

## 2024-08-26 RX ADMIN — INSULIN LISPRO 2 UNITS: 100 INJECTION, SOLUTION INTRAVENOUS; SUBCUTANEOUS at 12:43

## 2024-08-26 RX ADMIN — OXYCODONE HYDROCHLORIDE 10 MG: 10 TABLET ORAL at 12:55

## 2024-08-26 RX ADMIN — OXYCODONE HYDROCHLORIDE 10 MG: 10 TABLET ORAL at 23:45

## 2024-08-26 RX ADMIN — ALLOPURINOL 300 MG: 100 TABLET ORAL at 08:55

## 2024-08-26 RX ADMIN — SODIUM CHLORIDE 2 G: 1 TABLET ORAL at 12:43

## 2024-08-26 RX ADMIN — METOPROLOL SUCCINATE 50 MG: 50 TABLET, FILM COATED, EXTENDED RELEASE ORAL at 08:55

## 2024-08-26 RX ADMIN — LEVETIRACETAM 500 MG: 500 TABLET, FILM COATED ORAL at 20:53

## 2024-08-26 RX ADMIN — SODIUM CHLORIDE 2 G: 1 TABLET ORAL at 08:55

## 2024-08-26 RX ADMIN — FUROSEMIDE 20 MG: 20 TABLET ORAL at 09:05

## 2024-08-26 RX ADMIN — INSULIN GLARGINE 15 UNITS: 100 INJECTION, SOLUTION SUBCUTANEOUS at 20:55

## 2024-08-26 RX ADMIN — ACETAMINOPHEN 650 MG: 325 TABLET ORAL at 17:37

## 2024-08-26 RX ADMIN — ATORVASTATIN CALCIUM 20 MG: 20 TABLET, FILM COATED ORAL at 20:53

## 2024-08-26 RX ADMIN — FOLIC ACID 1 MG: 1 TABLET ORAL at 08:55

## 2024-08-26 RX ADMIN — DAKIN'S SOLUTION 0.125% (QUARTER STRENGTH): 0.12 SOLUTION at 09:11

## 2024-08-26 RX ADMIN — NALOXEGOL OXALATE 12.5 MG: 12.5 TABLET, FILM COATED ORAL at 05:15

## 2024-08-26 RX ADMIN — OXYCODONE HYDROCHLORIDE 10 MG: 10 TABLET ORAL at 01:14

## 2024-08-26 RX ADMIN — Medication 800 MG: at 20:53

## 2024-08-26 RX ADMIN — CEFEPIME 2000 MG: 2 INJECTION, POWDER, FOR SOLUTION INTRAVENOUS at 01:12

## 2024-08-26 RX ADMIN — Medication 800 MG: at 08:55

## 2024-08-26 RX ADMIN — INSULIN GLARGINE 15 UNITS: 100 INJECTION, SOLUTION SUBCUTANEOUS at 08:55

## 2024-08-26 RX ADMIN — APIXABAN 5 MG: 5 TABLET, FILM COATED ORAL at 09:05

## 2024-08-26 RX ADMIN — LIDOCAINE HYDROCHLORIDE: 40 SOLUTION ORAL at 13:47

## 2024-08-26 RX ADMIN — CHOLESTYRAMINE 4 G: 4 POWDER, FOR SUSPENSION ORAL at 08:55

## 2024-08-26 RX ADMIN — AMOXICILLIN AND CLAVULANATE POTASSIUM 1 TABLET: 875; 125 TABLET, FILM COATED ORAL at 20:54

## 2024-08-26 RX ADMIN — SODIUM CHLORIDE, PRESERVATIVE FREE 10 ML: 5 INJECTION INTRAVENOUS at 08:56

## 2024-08-26 RX ADMIN — Medication 800 MG: at 12:55

## 2024-08-26 RX ADMIN — OXYCODONE HYDROCHLORIDE 10 MG: 10 TABLET ORAL at 06:44

## 2024-08-26 RX ADMIN — MAGNESIUM SULFATE HEPTAHYDRATE 2000 MG: 40 INJECTION, SOLUTION INTRAVENOUS at 12:48

## 2024-08-26 RX ADMIN — ONDANSETRON 2 MG: 2 INJECTION INTRAMUSCULAR; INTRAVENOUS at 09:04

## 2024-08-26 RX ADMIN — APIXABAN 5 MG: 5 TABLET, FILM COATED ORAL at 20:54

## 2024-08-26 ASSESSMENT — PAIN DESCRIPTION - ORIENTATION
ORIENTATION: RIGHT

## 2024-08-26 ASSESSMENT — PAIN - FUNCTIONAL ASSESSMENT
PAIN_FUNCTIONAL_ASSESSMENT: PREVENTS OR INTERFERES SOME ACTIVE ACTIVITIES AND ADLS
PAIN_FUNCTIONAL_ASSESSMENT: PREVENTS OR INTERFERES WITH MANY ACTIVE NOT PASSIVE ACTIVITIES

## 2024-08-26 ASSESSMENT — PAIN DESCRIPTION - LOCATION
LOCATION: LEG

## 2024-08-26 ASSESSMENT — PAIN SCALES - GENERAL
PAINLEVEL_OUTOF10: 6
PAINLEVEL_OUTOF10: 4
PAINLEVEL_OUTOF10: 4
PAINLEVEL_OUTOF10: 6
PAINLEVEL_OUTOF10: 9

## 2024-08-26 ASSESSMENT — PAIN DESCRIPTION - DESCRIPTORS
DESCRIPTORS: ACHING;BURNING
DESCRIPTORS: ACHING;BURNING;THROBBING
DESCRIPTORS: ACHING;DISCOMFORT

## 2024-08-26 ASSESSMENT — PAIN SCALES - WONG BAKER
WONGBAKER_NUMERICALRESPONSE: HURTS A LITTLE BIT
WONGBAKER_NUMERICALRESPONSE: HURTS A LITTLE BIT

## 2024-08-26 NOTE — PLAN OF CARE
Problem: Discharge Planning  Goal: Discharge to home or other facility with appropriate resources  8/26/2024 1132 by Tangela Mckeon, RN  Outcome: Progressing  8/25/2024 2331 by Tianna You, RN  Outcome: Progressing     Problem: Skin/Tissue Integrity  Goal: Absence of new skin breakdown  Description: 1.  Monitor for areas of redness and/or skin breakdown  2.  Assess vascular access sites hourly  3.  Every 4-6 hours minimum:  Change oxygen saturation probe site  4.  Every 4-6 hours:  If on nasal continuous positive airway pressure, respiratory therapy assess nares and determine need for appliance change or resting period.  8/26/2024 1132 by Tangela Mckeon, RN  Outcome: Progressing  8/25/2024 2331 by Tianna You, RN  Outcome: Progressing     Problem: Safety - Adult  Goal: Free from fall injury  8/26/2024 1132 by Tangela Mckeon, RN  Outcome: Progressing  8/25/2024 2331 by Tianna You, RN  Outcome: Progressing

## 2024-08-26 NOTE — CARE COORDINATION
Late entry from Friday 8/23- pt spiked a tempt right as ems was here getting pt.  Had spoke with Mike, notified [facility] that pt would not be coming today.  Per Stephanie pt had to be fever free for 24 hours prior to dc and they dont do admits on Sunday.  Would have to be Monday if pt stable.  Noted today Monday that pt + covid.  Will follow up with pt Mike about further plans.  Electronically signed by Tangela Lan RN on 8/26/2024 at 7:35 AM

## 2024-08-26 NOTE — PROGRESS NOTES
Daily Progress Note    Date:2024  Patient: Elda Flood  : 1945  MRN:[id number]  CODE:Full Code No additional code details  PCP:NICOLE Solis DO    Admit Date: 2024 10:20 AM   LOS: 12 days     Chief Complaint   Patient presents with    Hematoma     Pt here with large hematoma and swelling injured yesterday          Subjective: NAEON. Tmax 99.9 yesterday afternoon. Pt feels well overall. Had 2 bowel movements in the last 24 hours.         Hospital Summary: 79 y.o. female who presented to [facility] ED for evaluation of bruising and pain of right lower leg. Pt has history of atrial fibrillation on eliquis, diabetes, stroke, hypertension and hyperlipidemia.   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 Vascular surgery was consulted.  Pt admitted to hospitalist service for further management.   Underwent evacuation of RLE hematoma on 8/15. She did require transfusion due to anemia of acute blood loss. Wound vac was placed .   SNF placement secured.    Pt developed fever to 101.3 on day of  and discharge was cancelled. Antibiotics were empirically broadened to Vancomycin and Cefepime.   Viral panel returned positive for COVID. CXR unremarkable. Blood cx NGTD. Vancomycin dced .        Review of Systems   All other systems reviewed and are negative.      Objective:      Vital signs in last 24 hours:  Patient Vitals for the past 24 hrs:   BP Temp Temp src Pulse Resp SpO2 Weight   24 1141 (!) 124/58 97.1 °F (36.2 °C) Oral 98 16 -- --   24 0811 (!) 124/58 97 °F (36.1 °C) -- 98 16 98 % --   24 0729 -- -- -- -- -- 92 % --   24 0714 -- -- -- -- 16 -- --   24 0644 -- -- -- -- 18 -- --   24 0433 (!) 120/54 97.4 °F (36.3 °C) Oral 92 16 99 % 63.9 kg (140 lb 14 oz)   24 0144 -- -- -- -- 16 -- --   24 0114 -- -- -- -- 16 -- --   24 0002 126/60 97.8 °F (36.6 °C) -- 96 16 100 % --   24 (!) 128/53

## 2024-08-26 NOTE — CARE COORDINATION
Spoke with Stephanie at [facility], pt will need to be in quarantine for 10 days prior to discharging to the facility. Earliest time they can accept is 9/4. Attending aware.  Electronically signed by Tangela Lan RN on 8/26/2024 at 11:15 AM

## 2024-08-26 NOTE — PROGRESS NOTES
Vascular Surgery  Dr. Emili Landry   Daily Progress Note    Pt Name: Elda Flood  Medical Record Number: [id number]  Date of Birth 1945   Today's Date: 8/26/2024    SUBJECTIVE:     Patient was seen and examined.  Right leg pain is not well controlled today, seems to be burning and aching  Has not had nausea/vomiting  Stating she has had 2 BM's today    OBJECTIVE:     Patient Vitals for the past 24 hrs:   BP Temp Temp src Pulse Resp SpO2 Weight   08/26/24 1141 (!) 124/58 97.1 °F (36.2 °C) Oral 98 16 -- --   08/26/24 0811 (!) 124/58 97 °F (36.1 °C) -- 98 16 98 % --   08/26/24 0729 -- -- -- -- -- 92 % --   08/26/24 0714 -- -- -- -- 16 -- --   08/26/24 0644 -- -- -- -- 18 -- --   08/26/24 0433 (!) 120/54 97.4 °F (36.3 °C) Oral 92 16 99 % 63.9 kg (140 lb 14 oz)   08/26/24 0144 -- -- -- -- 16 -- --   08/26/24 0114 -- -- -- -- 16 -- --   08/26/24 0002 126/60 97.8 °F (36.6 °C) -- 96 16 100 % --   08/25/24 2024 (!) 128/53 98.7 °F (37.1 °C) Oral (!) 113 16 95 % --   08/25/24 1855 -- -- -- -- 16 -- --   08/25/24 1628 (!) 129/58 99.9 °F (37.7 °C) -- (!) 106 18 96 % --       Intake/Output Summary (Last 24 hours) at 8/26/2024 1259  Last data filed at 8/26/2024 1035  Gross per 24 hour   Intake 780 ml   Output 1400 ml   Net -620 ml     In: 1140 [P.O.:1140]  Out: 1400 [Urine:1400]    I/O last 3 completed shifts:  In: 1140 [P.O.:1140]  Out: 1550 [Urine:1550]     Date 08/26/24 0000 - 08/26/24 2359   Shift 8756-0564 7470-0447 9145-7619 24 Hour Total   INTAKE   Shift Total(mL/kg)       OUTPUT   Urine(mL/kg/hr) 750(1.5) 650  1400   Shift Total(mL/kg) 750(11.7) 650(10.2)  1400(21.9)   Weight (kg) 63.9 63.9 63.9 63.9     Wt Readings from Last 3 Encounters:   08/26/24 63.9 kg (140 lb 14 oz)   08/13/24 67.4 kg (148 lb 8 oz)   08/06/24 67.5 kg (148 lb 12 oz)      Body mass index is 26.63 kg/m².     Diet: ADULT ORAL NUTRITION SUPPLEMENT; Dinner, Lunch; Wound Healing Oral Supplement  ADULT DIET; Regular; 4 carb choices (60 gm/meal);  and Aerobic [[id number]] (Abnormal)  Collected: 08/23/24 1245   Lab Status: Preliminary result Specimen: Wound from Leg Updated: 08/26/24 0715    Gram Stain Result Many WBC's (Polymorphonuclear)  Moderate Gram negative rods  Few Gram positive cocci  in pairs   Abnormal     Anaerobic Culture No anaerobes isolated  5 days    Organism Enterococcus faecalis Abnormal     WOUND/ABSCESS Moderate growth    Organism Enterobacter cloacae complex Abnormal     WOUND/ABSCESS Moderate growth    Organism Bacillus cereus Abnormal     WOUND/ABSCESS --    Light growth  Bacillus cereus group  No further workup    Organism Enterococcus casseliflavus Abnormal     WOUND/ABSCESS Rare growth   Narrative:     ORDER#: [id number]                          ORDERED BY: CARLOS JOHNSON  SOURCE: Leg R Castillo                         COLLECTED:  08/23/24 12:45  ANTIBIOTICS AT [facility].:                      RECEIVED :  08/23/24 13:05   Susceptibility       Enterococcus faecalis Enterobacter cloacae complex Enterococcus casseliflavus     BACTERIAL SUSCEPTIBILITY PANEL BY YIFAN BACTERIAL SUSCEPTIBILITY PANEL BY YIFAN BACTERIAL SUSCEPTIBILITY PANEL BY YIFAN     ampicillin <=2 mcg/mL Sensitive   <=2 mcg/mL Sensitive     cefepime   <=0.12 mcg/mL Sensitive       doxycycline 8 mcg/mL Intermediate   <=0.5 mcg/mL Sensitive     gentamicin   <=1 mcg/mL Sensitive       gentamicin-syn SYN-S mcg/mL Sensitive   SYN-S mcg/mL Sensitive     levofloxacin   <=0.12 mcg/mL Sensitive       trimethoprim-sulfamethoxazole   <=20 mcg/mL Sensitive       vancomycin 1 mcg/mL Sensitive                  DVT prophylaxis:             [x] Already on Anticoagulation- Eliquis restarted today    ASSESSMENT:        Procedure(s) 8/15/24: RIGHT LEG EVACUTION HEMATOMA   HD # 12  Active Hospital Problems    Diagnosis Date Noted    Hyponatremia [E87.1] 11/17/2022     Priority: Medium    Leukocytosis [D72.829] 11/17/2022     Priority: Medium    Mild malnutrition (HCC) [E44.1] 08/23/2024    Hematoma of

## 2024-08-27 LAB
ANION GAP SERPL CALCULATED.3IONS-SCNC: 9 MMOL/L (ref 7–19)
ANISOCYTOSIS BLD QL SMEAR: ABNORMAL
BASOPHILS # BLD: 0 K/UL (ref 0–0.2)
BASOPHILS NFR BLD: 0 % (ref 0–1)
BUN SERPL-MCNC: 39 MG/DL (ref 8–23)
CALCIUM SERPL-MCNC: 8.5 MG/DL (ref 8.8–10.2)
CHLORIDE SERPL-SCNC: 99 MMOL/L (ref 98–111)
CO2 SERPL-SCNC: 23 MMOL/L (ref 22–29)
CREAT SERPL-MCNC: 0.7 MG/DL (ref 0.5–0.9)
EOSINOPHIL # BLD: 0 K/UL (ref 0–0.6)
EOSINOPHIL NFR BLD: 0 % (ref 0–5)
ERYTHROCYTE [DISTWIDTH] IN BLOOD BY AUTOMATED COUNT: 17.8 % (ref 11.5–14.5)
GLUCOSE BLD-MCNC: 149 MG/DL (ref 70–99)
GLUCOSE BLD-MCNC: 204 MG/DL (ref 70–99)
GLUCOSE BLD-MCNC: 224 MG/DL (ref 70–99)
GLUCOSE BLD-MCNC: 237 MG/DL (ref 70–99)
GLUCOSE SERPL-MCNC: 140 MG/DL (ref 70–99)
HCT VFR BLD AUTO: 24.3 % (ref 37–47)
HGB BLD-MCNC: 7.4 G/DL (ref 12–16)
HYPOCHROMIA BLD QL SMEAR: ABNORMAL
IMM GRANULOCYTES # BLD: 1.2 K/UL
LYMPHOCYTES # BLD: 3.5 K/UL (ref 1.1–4.5)
LYMPHOCYTES NFR BLD: 13 % (ref 20–40)
MAGNESIUM SERPL-MCNC: 1.9 MG/DL (ref 1.6–2.4)
MCH RBC QN AUTO: 24.2 PG (ref 27–31)
MCHC RBC AUTO-ENTMCNC: 30.5 G/DL (ref 33–37)
MCV RBC AUTO: 79.4 FL (ref 81–99)
MICROCYTES BLD QL SMEAR: ABNORMAL
MONOCYTES # BLD: 7.3 K/UL (ref 0–0.9)
MONOCYTES NFR BLD: 27 % (ref 0–10)
NEUTROPHILS # BLD: 16.3 K/UL (ref 1.5–7.5)
NEUTS BAND NFR BLD MANUAL: 2 % (ref 0–5)
NEUTS SEG NFR BLD: 58 % (ref 50–65)
PERFORMED ON: ABNORMAL
PLATELET # BLD AUTO: 475 K/UL (ref 130–400)
PLATELET SLIDE REVIEW: ABNORMAL
PMV BLD AUTO: 11.3 FL (ref 9.4–12.3)
POTASSIUM SERPL-SCNC: 3.4 MMOL/L (ref 3.5–5)
PREALB SERPL-MCNC: 5 MG/DL (ref 20–40)
RBC # BLD AUTO: 3.06 M/UL (ref 4.2–5.4)
SODIUM SERPL-SCNC: 131 MMOL/L (ref 136–145)
WBC # BLD AUTO: 27.2 K/UL (ref 4.8–10.8)

## 2024-08-27 PROCEDURE — 2580000003 HC RX 258: Performed by: SURGERY

## 2024-08-27 PROCEDURE — 97110 THERAPEUTIC EXERCISES: CPT

## 2024-08-27 PROCEDURE — 1200000000 HC SEMI PRIVATE

## 2024-08-27 PROCEDURE — 6370000000 HC RX 637 (ALT 250 FOR IP): Performed by: HOSPITALIST

## 2024-08-27 PROCEDURE — 6360000002 HC RX W HCPCS: Performed by: NURSE PRACTITIONER

## 2024-08-27 PROCEDURE — 83735 ASSAY OF MAGNESIUM: CPT

## 2024-08-27 PROCEDURE — 84134 ASSAY OF PREALBUMIN: CPT

## 2024-08-27 PROCEDURE — 6360000002 HC RX W HCPCS: Performed by: STUDENT IN AN ORGANIZED HEALTH CARE EDUCATION/TRAINING PROGRAM

## 2024-08-27 PROCEDURE — 6360000002 HC RX W HCPCS: Performed by: HOSPITALIST

## 2024-08-27 PROCEDURE — 36415 COLL VENOUS BLD VENIPUNCTURE: CPT

## 2024-08-27 PROCEDURE — 6370000000 HC RX 637 (ALT 250 FOR IP): Performed by: SURGERY

## 2024-08-27 PROCEDURE — 80048 BASIC METABOLIC PNL TOTAL CA: CPT

## 2024-08-27 PROCEDURE — 6370000000 HC RX 637 (ALT 250 FOR IP): Performed by: STUDENT IN AN ORGANIZED HEALTH CARE EDUCATION/TRAINING PROGRAM

## 2024-08-27 PROCEDURE — 82962 GLUCOSE BLOOD TEST: CPT

## 2024-08-27 PROCEDURE — 85025 COMPLETE CBC W/AUTO DIFF WBC: CPT

## 2024-08-27 PROCEDURE — 97530 THERAPEUTIC ACTIVITIES: CPT

## 2024-08-27 PROCEDURE — 94760 N-INVAS EAR/PLS OXIMETRY 1: CPT

## 2024-08-27 RX ORDER — MAGNESIUM SULFATE 1 G/100ML
1000 INJECTION INTRAVENOUS ONCE
Status: COMPLETED | OUTPATIENT
Start: 2024-08-27 | End: 2024-08-27

## 2024-08-27 RX ADMIN — Medication 800 MG: at 19:53

## 2024-08-27 RX ADMIN — FOLIC ACID 1 MG: 1 TABLET ORAL at 09:20

## 2024-08-27 RX ADMIN — ATORVASTATIN CALCIUM 20 MG: 20 TABLET, FILM COATED ORAL at 19:53

## 2024-08-27 RX ADMIN — INSULIN LISPRO 2 UNITS: 100 INJECTION, SOLUTION INTRAVENOUS; SUBCUTANEOUS at 12:21

## 2024-08-27 RX ADMIN — AMOXICILLIN AND CLAVULANATE POTASSIUM 1 TABLET: 875; 125 TABLET, FILM COATED ORAL at 19:53

## 2024-08-27 RX ADMIN — SODIUM CHLORIDE 2 G: 1 TABLET ORAL at 09:19

## 2024-08-27 RX ADMIN — INSULIN GLARGINE 15 UNITS: 100 INJECTION, SOLUTION SUBCUTANEOUS at 09:17

## 2024-08-27 RX ADMIN — FUROSEMIDE 20 MG: 20 TABLET ORAL at 09:18

## 2024-08-27 RX ADMIN — LEVETIRACETAM 500 MG: 500 TABLET, FILM COATED ORAL at 09:18

## 2024-08-27 RX ADMIN — INSULIN GLARGINE 15 UNITS: 100 INJECTION, SOLUTION SUBCUTANEOUS at 19:53

## 2024-08-27 RX ADMIN — LEVETIRACETAM 500 MG: 500 TABLET, FILM COATED ORAL at 19:53

## 2024-08-27 RX ADMIN — Medication 800 MG: at 13:48

## 2024-08-27 RX ADMIN — SENNOSIDES AND DOCUSATE SODIUM 2 TABLET: 8.6; 5 TABLET ORAL at 09:18

## 2024-08-27 RX ADMIN — DAKIN'S SOLUTION 0.125% (QUARTER STRENGTH): 0.12 SOLUTION at 09:21

## 2024-08-27 RX ADMIN — SODIUM CHLORIDE 2 G: 1 TABLET ORAL at 16:15

## 2024-08-27 RX ADMIN — SODIUM CHLORIDE 2 G: 1 TABLET ORAL at 12:22

## 2024-08-27 RX ADMIN — POLYETHYLENE GLYCOL 3350 17 G: 17 POWDER, FOR SOLUTION ORAL at 09:17

## 2024-08-27 RX ADMIN — CHOLESTYRAMINE 4 G: 4 POWDER, FOR SUSPENSION ORAL at 09:17

## 2024-08-27 RX ADMIN — OXYCODONE HYDROCHLORIDE 10 MG: 10 TABLET ORAL at 09:18

## 2024-08-27 RX ADMIN — NALOXEGOL OXALATE 12.5 MG: 12.5 TABLET, FILM COATED ORAL at 05:24

## 2024-08-27 RX ADMIN — APIXABAN 5 MG: 5 TABLET, FILM COATED ORAL at 09:20

## 2024-08-27 RX ADMIN — SODIUM CHLORIDE, PRESERVATIVE FREE 10 ML: 5 INJECTION INTRAVENOUS at 19:56

## 2024-08-27 RX ADMIN — APIXABAN 5 MG: 5 TABLET, FILM COATED ORAL at 19:53

## 2024-08-27 RX ADMIN — METOPROLOL SUCCINATE 50 MG: 50 TABLET, FILM COATED, EXTENDED RELEASE ORAL at 09:19

## 2024-08-27 RX ADMIN — ONDANSETRON 2 MG: 2 INJECTION INTRAMUSCULAR; INTRAVENOUS at 09:26

## 2024-08-27 RX ADMIN — OXYCODONE HYDROCHLORIDE 10 MG: 10 TABLET ORAL at 16:14

## 2024-08-27 RX ADMIN — INSULIN LISPRO 2 UNITS: 100 INJECTION, SOLUTION INTRAVENOUS; SUBCUTANEOUS at 16:14

## 2024-08-27 RX ADMIN — AMOXICILLIN AND CLAVULANATE POTASSIUM 1 TABLET: 875; 125 TABLET, FILM COATED ORAL at 09:18

## 2024-08-27 RX ADMIN — MAGNESIUM SULFATE HEPTAHYDRATE 1000 MG: 1 INJECTION, SOLUTION INTRAVENOUS at 09:30

## 2024-08-27 RX ADMIN — ALLOPURINOL 300 MG: 100 TABLET ORAL at 09:20

## 2024-08-27 RX ADMIN — Medication 800 MG: at 09:18

## 2024-08-27 RX ADMIN — POTASSIUM CHLORIDE 40 MEQ: 1500 TABLET, EXTENDED RELEASE ORAL at 05:24

## 2024-08-27 RX ADMIN — Medication 15 G: at 09:17

## 2024-08-27 ASSESSMENT — PAIN DESCRIPTION - FREQUENCY
FREQUENCY: CONTINUOUS
FREQUENCY: INTERMITTENT

## 2024-08-27 ASSESSMENT — PAIN DESCRIPTION - PAIN TYPE: TYPE: ACUTE PAIN

## 2024-08-27 ASSESSMENT — PAIN SCALES - GENERAL
PAINLEVEL_OUTOF10: 0
PAINLEVEL_OUTOF10: 7
PAINLEVEL_OUTOF10: 7
PAINLEVEL_OUTOF10: 6
PAINLEVEL_OUTOF10: 0
PAINLEVEL_OUTOF10: 10
PAINLEVEL_OUTOF10: 3

## 2024-08-27 ASSESSMENT — PAIN SCALES - WONG BAKER
WONGBAKER_NUMERICALRESPONSE: NO HURT

## 2024-08-27 ASSESSMENT — PAIN DESCRIPTION - ORIENTATION
ORIENTATION: RIGHT
ORIENTATION: RIGHT
ORIENTATION: MID;LOWER
ORIENTATION: RIGHT
ORIENTATION: RIGHT

## 2024-08-27 ASSESSMENT — PAIN DESCRIPTION - DESCRIPTORS
DESCRIPTORS: ACHING

## 2024-08-27 ASSESSMENT — PAIN DESCRIPTION - LOCATION
LOCATION: LEG
LOCATION: BACK
LOCATION: LEG

## 2024-08-27 ASSESSMENT — PAIN - FUNCTIONAL ASSESSMENT
PAIN_FUNCTIONAL_ASSESSMENT: PREVENTS OR INTERFERES WITH MANY ACTIVE NOT PASSIVE ACTIVITIES
PAIN_FUNCTIONAL_ASSESSMENT: PREVENTS OR INTERFERES SOME ACTIVE ACTIVITIES AND ADLS

## 2024-08-27 ASSESSMENT — PAIN DESCRIPTION - ONSET: ONSET: ON-GOING

## 2024-08-27 NOTE — PLAN OF CARE
Problem: Discharge Planning  Goal: Discharge to home or other facility with appropriate resources  8/26/2024 2256 by Celia Adame RN  Outcome: Progressing  8/26/2024 1132 by Tangela Mckeon RN  Outcome: Progressing     Problem: Skin/Tissue Integrity  Goal: Absence of new skin breakdown  Description: 1.  Monitor for areas of redness and/or skin breakdown  2.  Assess vascular access sites hourly  3.  Every 4-6 hours minimum:  Change oxygen saturation probe site  4.  Every 4-6 hours:  If on nasal continuous positive airway pressure, respiratory therapy assess nares and determine need for appliance change or resting period.  8/26/2024 2256 by Celia Adame RN  Outcome: Progressing  8/26/2024 1132 by Tangela Mckeon RN  Outcome: Progressing     Problem: Safety - Adult  Goal: Free from fall injury  8/26/2024 2256 by Celia Adame RN  Outcome: Progressing  8/26/2024 1132 by Tangela Mckeon RN  Outcome: Progressing   Electronically signed by Celia Adame RN on 8/26/2024 at 10:56 PM

## 2024-08-27 NOTE — PROGRESS NOTES
Daily Progress Note    Date:2024  Patient: Elda Flood  : 1945  MRN:[id number]  CODE:Full Code No additional code details  PCP:NICOLE Solis DO    Admit Date: 2024 10:20 AM   LOS: 13 days     Chief Complaint   Patient presents with    Hematoma     Pt here with large hematoma and swelling injured yesterday          Subjective: Fever 101 yesterday afternoon. Pt had sinus tachycardia and transient disorientation which have resolved. She denies any increased dyspnea or cough. Reports intermittent right ear pain.         Hospital Summary: 79 y.o. female who presented to [facility] ED for evaluation of bruising and pain of right lower leg. Pt has history of atrial fibrillation on eliquis, diabetes, stroke, hypertension and hyperlipidemia.   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 Vascular surgery was consulted.  Pt admitted to hospitalist service for further management.   Underwent evacuation of RLE hematoma on 8/15. She did require transfusion due to anemia of acute blood loss. Wound vac was placed .   SNF placement secured.    Pt developed fever to 101.3 on day of  and discharge was cancelled. Antibiotics were empirically broadened to Vancomycin and Cefepime.   Viral panel returned positive for COVID. CXR unremarkable. Blood cx NGTD. Vancomycin dced .        Review of Systems   All other systems reviewed and are negative.      Objective:      Vital signs in last 24 hours:  Patient Vitals for the past 24 hrs:   BP Temp Temp src Pulse Resp SpO2   24 0948 -- -- -- -- 18 --   24 0918 -- -- -- -- 18 --   24 0743 (!) 122/48 97.9 °F (36.6 °C) Temporal 94 16 98 %   24 0015 -- -- -- -- 16 --   24 2345 -- -- -- -- 18 --   24 2330 -- 98.7 °F (37.1 °C) Oral -- -- --   24 2017 (!) 124/51 98.6 °F (37 °C) Temporal (!) 108 16 97 %   24 1827 (!) 125/56 100.2 °F (37.9 °C) Temporal (!) 122 18 --   24 5729  IntraVENous, Q4H PRN, Diamond Guy MD, 0.25 mg at 08/21/24 1227    urea (URE-NA) packet 15 g, 15 g, Oral, Daily, Emili Landry, DO, 15 g at 08/27/24 0917    sodium chloride flush 0.9 % injection 5-40 mL, 5-40 mL, IntraVENous, 2 times per day, Ivviktoriya, Emili, DO, 10 mL at 08/26/24 0856    sodium chloride flush 0.9 % injection 5-40 mL, 5-40 mL, IntraVENous, PRN, Gris, Emili, DO    0.9 % sodium chloride infusion, , IntraVENous, PRN, Ivviktoriya, Emili, DO    potassium chloride (KLOR-CON M) extended release tablet 40 mEq, 40 mEq, Oral, PRN, 40 mEq at 08/27/24 0524 **OR** potassium bicarb-citric acid (EFFER-K) effervescent tablet 40 mEq, 40 mEq, Oral, PRN **OR** potassium chloride 10 mEq/100 mL IVPB (Peripheral Line), 10 mEq, IntraVENous, PRN, Gris, Emili, DO    magnesium sulfate 2000 mg in 50 mL IVPB premix, 2,000 mg, IntraVENous, PRN, Ivviktoriya, Emili, DO, Stopped at 08/24/24 0821    ondansetron (ZOFRAN-ODT) disintegrating tablet 4 mg, 4 mg, Oral, Q8H PRN, 4 mg at 08/23/24 0928 **OR** [DISCONTINUED] ondansetron (ZOFRAN) injection 4 mg, 4 mg, IntraVENous, Q6H PRN, Emili Landry, DO, 4 mg at 08/19/24 0738    acetaminophen (TYLENOL) tablet 650 mg, 650 mg, Oral, Q6H PRN, 650 mg at 08/26/24 1737 **OR** acetaminophen (TYLENOL) suppository 650 mg, 650 mg, Rectal, Q6H PRN, Gris, Emili, DO    naloxone (NARCAN) injection 0.4 mg, 0.4 mg, IntraVENous, PRN, Ivmoeoff, Emili, DO    glucose chewable tablet 16 g, 4 tablet, Oral, PRN, Ivviktoriya, Emili, DO    dextrose bolus 10% 125 mL, 125 mL, IntraVENous, PRN **OR** dextrose bolus 10% 250 mL, 250 mL, IntraVENous, PRN, Ivviktoriya, Emili, DO    glucagon injection 1 mg, 1 mg, SubCUTAneous, PRN, Ivviktoriya, Emili, DO    dextrose 10 % infusion, , IntraVENous, Continuous PRN, Ivviktoriya, Emili, DO    allopurinol (ZYLOPRIM) tablet 300 mg, 300 mg, Oral, Daily, IvEmili sadler, , 300 mg at 08/27/24 0920    [Held by provider]

## 2024-08-27 NOTE — PROGRESS NOTES
08/27/24 1200   Subjective   Subjective Patient in chair TR by NSG assist X 2 pivot.   Pain Assessment   Pain Assessment 0-10   Pain Level 10  (Patient statets pain is 10/10 but more controlled & tolerable than in previous TX)   Patient's Stated Pain Goal 0 - No pain   Pain Location Leg   Pain Orientation Right   Pain Descriptors Aching   Functional Pain Assessment Prevents or interferes some active activities and ADLs   Pain Type Acute pain   Pain Frequency Continuous   Pain Onset On-going   Non-Pharmaceutical Pain Intervention(s) Rest   Response to Pain Intervention Patient satisfied   Side Effects No reported side effects   Multiple Pain Sites No   Cognition   Overall Cognitive Status WNL   Orientation   Overall Orientation Status WNL   Vitals   O2 Device None (Room air)   Balance   Standing With support   Transfer Training   Transfer Training Yes   Sit to Stand Maximum assistance  (From chair .  Could not achieve full stand.)   Stand to Sit Minimum assistance   PT Exercises   A/AROM Exercises BL LE EX sitting in recliner X  15 Reps..   Patient Education   Education Given To Patient   Education Provided Role of Therapy;Plan of Care   Education Provided Comments Use of call light & staff assist.   Education Method Demonstration;Teach Back   Barriers to Learning None   Education Outcome Continued education needed   Other Specialty Interventions   Other Treatments/Modalities Patient in recliner with call light needs in reach.  Recommend DEEPIKA CHOUDHURY FOR BTB.   Assessment   Activity Tolerance Patient tolerated treatment well   PT Plan of Care   Tuesday X       Electronically signed by Scooter Eddy PTA on 8/27/2024 at 12:20 PM

## 2024-08-27 NOTE — PROGRESS NOTES
Vascular Surgery  Dr. Emili Landry   Daily Progress Note    Pt Name: Elda Flood  Medical Record Number: [id number]  Date of Birth 1945   Today's Date: 8/27/2024    SUBJECTIVE:     Patient was seen and examined, sitting in recliner with legs elevated.  Stating she is having a good day. Does complain of ears aching and leg wound kind of burning  Has not had nausea/vomiting    OBJECTIVE:     Patient Vitals for the past 24 hrs:   BP Temp Temp src Pulse Resp SpO2   08/27/24 0948 -- -- -- -- 18 --   08/27/24 0918 -- -- -- -- 18 --   08/27/24 0743 (!) 122/48 97.9 °F (36.6 °C) Temporal 94 16 98 %   08/27/24 0015 -- -- -- -- 16 --   08/26/24 2345 -- -- -- -- 18 --   08/26/24 2330 -- 98.7 °F (37.1 °C) Oral -- -- --   08/26/24 2017 (!) 124/51 98.6 °F (37 °C) Temporal (!) 108 16 97 %   08/26/24 1827 (!) 125/56 100.2 °F (37.9 °C) Temporal (!) 122 18 --   08/26/24 1730 -- (!) 101 °F (38.3 °C) -- -- -- --   08/26/24 1728 (!) 156/72 100.2 °F (37.9 °C) -- (!) 126 18 98 %   08/26/24 1325 -- -- -- -- 18 --   08/26/24 1141 (!) 124/58 97.1 °F (36.2 °C) Oral 98 16 --       Intake/Output Summary (Last 24 hours) at 8/27/2024 1128  Last data filed at 8/27/2024 0743  Gross per 24 hour   Intake 960 ml   Output 2070 ml   Net -1110 ml     In: 960 [P.O.:960]  Out: 3320 [Urine:3320]    I/O last 3 completed shifts:  In: 1260 [P.O.:1260]  Out: 3120 [Urine:3120]     Date 08/27/24 0000 - 08/27/24 2359   Shift 8808-7800 1860-3098 5972-7997 24 Hour Total   INTAKE   Shift Total(mL/kg)       OUTPUT   Urine(mL/kg/hr) 1950(3.8)   1950   Shift Total(mL/kg) 1950(30.5)   1950(30.5)   Weight (kg) 63.9 63.9 63.9 63.9     Wt Readings from Last 3 Encounters:   08/26/24 63.9 kg (140 lb 14 oz)   08/13/24 67.4 kg (148 lb 8 oz)   08/06/24 67.5 kg (148 lb 12 oz)      Body mass index is 26.63 kg/m².     Diet: ADULT ORAL NUTRITION SUPPLEMENT; Dinner, Lunch; Wound Healing Oral Supplement  ADULT DIET; Regular; 4 carb choices (60 gm/meal); 1500 ml  ADULT ORAL

## 2024-08-28 ENCOUNTER — HOSPITAL ENCOUNTER (OUTPATIENT)
Dept: WOUND CARE | Age: 79
Discharge: HOME | End: 2024-08-28
Payer: MEDICARE

## 2024-08-28 LAB
ANION GAP SERPL CALCULATED.3IONS-SCNC: 10 MMOL/L (ref 7–19)
BACTERIA BLD CULT ORG #2: NORMAL
BACTERIA BLD CULT: NORMAL
BACTERIA SPEC ANAEROBE CULT: ABNORMAL
BACTERIA SPEC ANAEROBE+AEROBE CULT: ABNORMAL
BASOPHILS # BLD: 0 K/UL (ref 0–0.2)
BASOPHILS NFR BLD: 0 % (ref 0–1)
BUN SERPL-MCNC: 31 MG/DL (ref 8–23)
BURR CELLS BLD QL SMEAR: ABNORMAL
CALCIUM SERPL-MCNC: 9.2 MG/DL (ref 8.8–10.2)
CHLORIDE SERPL-SCNC: 99 MMOL/L (ref 98–111)
CO2 SERPL-SCNC: 24 MMOL/L (ref 22–29)
CREAT SERPL-MCNC: 0.6 MG/DL (ref 0.5–0.9)
EOSINOPHIL # BLD: 0 K/UL (ref 0–0.6)
EOSINOPHIL NFR BLD: 0 % (ref 0–5)
ERYTHROCYTE [DISTWIDTH] IN BLOOD BY AUTOMATED COUNT: 18.3 % (ref 11.5–14.5)
GLUCOSE BLD-MCNC: 149 MG/DL (ref 70–99)
GLUCOSE BLD-MCNC: 214 MG/DL (ref 70–99)
GLUCOSE BLD-MCNC: 226 MG/DL (ref 70–99)
GLUCOSE BLD-MCNC: 292 MG/DL (ref 70–99)
GLUCOSE SERPL-MCNC: 129 MG/DL (ref 70–99)
GRAM STN SPEC: ABNORMAL
HCT VFR BLD AUTO: 25.3 % (ref 37–47)
HGB BLD-MCNC: 7.8 G/DL (ref 12–16)
HYPOCHROMIA BLD QL SMEAR: ABNORMAL
IMM GRANULOCYTES # BLD: 1.6 K/UL
LYMPHOCYTES # BLD: 3.6 K/UL (ref 1.1–4.5)
LYMPHOCYTES NFR BLD: 13 % (ref 20–40)
MAGNESIUM SERPL-MCNC: 1.6 MG/DL (ref 1.6–2.4)
MCH RBC QN AUTO: 24.9 PG (ref 27–31)
MCHC RBC AUTO-ENTMCNC: 30.8 G/DL (ref 33–37)
MCV RBC AUTO: 80.8 FL (ref 81–99)
MONOCYTES # BLD: 6.7 K/UL (ref 0–0.9)
MONOCYTES NFR BLD: 24 % (ref 0–10)
MYELOCYTES NFR BLD MANUAL: 1 %
NEUTROPHILS # BLD: 17.5 K/UL (ref 1.5–7.5)
NEUTS SEG NFR BLD: 62 % (ref 50–65)
ORGANISM: ABNORMAL
OVALOCYTES BLD QL SMEAR: ABNORMAL
PERFORMED ON: ABNORMAL
PLATELET # BLD AUTO: 529 K/UL (ref 130–400)
PLATELET SLIDE REVIEW: ABNORMAL
PMV BLD AUTO: 11.9 FL (ref 9.4–12.3)
POLYCHROMASIA BLD QL SMEAR: ABNORMAL
POTASSIUM SERPL-SCNC: 3.6 MMOL/L (ref 3.5–5)
RBC # BLD AUTO: 3.13 M/UL (ref 4.2–5.4)
SCHISTOCYTES BLD QL SMEAR: ABNORMAL
SODIUM SERPL-SCNC: 133 MMOL/L (ref 136–145)
WBC # BLD AUTO: 27.8 K/UL (ref 4.8–10.8)

## 2024-08-28 PROCEDURE — 6370000000 HC RX 637 (ALT 250 FOR IP): Performed by: STUDENT IN AN ORGANIZED HEALTH CARE EDUCATION/TRAINING PROGRAM

## 2024-08-28 PROCEDURE — 82962 GLUCOSE BLOOD TEST: CPT

## 2024-08-28 PROCEDURE — 6360000002 HC RX W HCPCS: Performed by: SURGERY

## 2024-08-28 PROCEDURE — 6370000000 HC RX 637 (ALT 250 FOR IP): Performed by: SURGERY

## 2024-08-28 PROCEDURE — 85025 COMPLETE CBC W/AUTO DIFF WBC: CPT

## 2024-08-28 PROCEDURE — 80048 BASIC METABOLIC PNL TOTAL CA: CPT

## 2024-08-28 PROCEDURE — 36415 COLL VENOUS BLD VENIPUNCTURE: CPT

## 2024-08-28 PROCEDURE — 2580000003 HC RX 258: Performed by: SURGERY

## 2024-08-28 PROCEDURE — 83735 ASSAY OF MAGNESIUM: CPT

## 2024-08-28 PROCEDURE — 6370000000 HC RX 637 (ALT 250 FOR IP): Performed by: HOSPITALIST

## 2024-08-28 PROCEDURE — 1200000000 HC SEMI PRIVATE

## 2024-08-28 RX ADMIN — INSULIN GLARGINE 15 UNITS: 100 INJECTION, SOLUTION SUBCUTANEOUS at 10:09

## 2024-08-28 RX ADMIN — ONDANSETRON 4 MG: 4 TABLET, ORALLY DISINTEGRATING ORAL at 09:59

## 2024-08-28 RX ADMIN — LEVETIRACETAM 500 MG: 500 TABLET, FILM COATED ORAL at 09:59

## 2024-08-28 RX ADMIN — COLLAGENASE SANTYL: 250 OINTMENT TOPICAL at 10:20

## 2024-08-28 RX ADMIN — LEVETIRACETAM 500 MG: 500 TABLET, FILM COATED ORAL at 22:43

## 2024-08-28 RX ADMIN — MAGNESIUM SULFATE HEPTAHYDRATE 2000 MG: 40 INJECTION, SOLUTION INTRAVENOUS at 12:05

## 2024-08-28 RX ADMIN — CHOLESTYRAMINE 4 G: 4 POWDER, FOR SUSPENSION ORAL at 09:58

## 2024-08-28 RX ADMIN — FUROSEMIDE 20 MG: 20 TABLET ORAL at 10:00

## 2024-08-28 RX ADMIN — INSULIN LISPRO 4 UNITS: 100 INJECTION, SOLUTION INTRAVENOUS; SUBCUTANEOUS at 12:02

## 2024-08-28 RX ADMIN — ACETAMINOPHEN 650 MG: 325 TABLET ORAL at 15:22

## 2024-08-28 RX ADMIN — INSULIN GLARGINE 15 UNITS: 100 INJECTION, SOLUTION SUBCUTANEOUS at 22:43

## 2024-08-28 RX ADMIN — OXYCODONE HYDROCHLORIDE 10 MG: 10 TABLET ORAL at 15:40

## 2024-08-28 RX ADMIN — ATORVASTATIN CALCIUM 20 MG: 20 TABLET, FILM COATED ORAL at 22:43

## 2024-08-28 RX ADMIN — SODIUM CHLORIDE 2 G: 1 TABLET ORAL at 12:02

## 2024-08-28 RX ADMIN — OXYCODONE HYDROCHLORIDE 10 MG: 10 TABLET ORAL at 09:16

## 2024-08-28 RX ADMIN — SODIUM CHLORIDE 2 G: 1 TABLET ORAL at 17:01

## 2024-08-28 RX ADMIN — ALLOPURINOL 300 MG: 100 TABLET ORAL at 10:00

## 2024-08-28 RX ADMIN — NALOXEGOL OXALATE 12.5 MG: 12.5 TABLET, FILM COATED ORAL at 05:31

## 2024-08-28 RX ADMIN — APIXABAN 5 MG: 5 TABLET, FILM COATED ORAL at 09:58

## 2024-08-28 RX ADMIN — SODIUM CHLORIDE, PRESERVATIVE FREE 10 ML: 5 INJECTION INTRAVENOUS at 22:43

## 2024-08-28 RX ADMIN — ACETAMINOPHEN 650 MG: 325 TABLET ORAL at 05:31

## 2024-08-28 RX ADMIN — FOLIC ACID 1 MG: 1 TABLET ORAL at 10:01

## 2024-08-28 RX ADMIN — INSULIN LISPRO 2 UNITS: 100 INJECTION, SOLUTION INTRAVENOUS; SUBCUTANEOUS at 17:02

## 2024-08-28 RX ADMIN — METOPROLOL SUCCINATE 50 MG: 50 TABLET, FILM COATED, EXTENDED RELEASE ORAL at 10:01

## 2024-08-28 RX ADMIN — Medication 800 MG: at 15:02

## 2024-08-28 RX ADMIN — OXYCODONE HYDROCHLORIDE 10 MG: 10 TABLET ORAL at 22:48

## 2024-08-28 RX ADMIN — Medication 15 G: at 09:58

## 2024-08-28 RX ADMIN — Medication 800 MG: at 09:59

## 2024-08-28 RX ADMIN — AMOXICILLIN AND CLAVULANATE POTASSIUM 1 TABLET: 875; 125 TABLET, FILM COATED ORAL at 10:01

## 2024-08-28 RX ADMIN — DAKIN'S SOLUTION 0.125% (QUARTER STRENGTH): 0.12 SOLUTION at 10:11

## 2024-08-28 RX ADMIN — Medication 800 MG: at 22:43

## 2024-08-28 RX ADMIN — SODIUM CHLORIDE 2 G: 1 TABLET ORAL at 10:00

## 2024-08-28 RX ADMIN — SODIUM CHLORIDE, PRESERVATIVE FREE 10 ML: 5 INJECTION INTRAVENOUS at 10:11

## 2024-08-28 RX ADMIN — AMOXICILLIN AND CLAVULANATE POTASSIUM 1 TABLET: 875; 125 TABLET, FILM COATED ORAL at 22:43

## 2024-08-28 RX ADMIN — APIXABAN 5 MG: 5 TABLET, FILM COATED ORAL at 22:43

## 2024-08-28 RX ADMIN — OXYCODONE HYDROCHLORIDE 10 MG: 10 TABLET ORAL at 02:11

## 2024-08-28 ASSESSMENT — PAIN SCALES - GENERAL
PAINLEVEL_OUTOF10: 8
PAINLEVEL_OUTOF10: 9
PAINLEVEL_OUTOF10: 6
PAINLEVEL_OUTOF10: 1
PAINLEVEL_OUTOF10: 0
PAINLEVEL_OUTOF10: 3
PAINLEVEL_OUTOF10: 1
PAINLEVEL_OUTOF10: 6

## 2024-08-28 ASSESSMENT — PAIN DESCRIPTION - LOCATION
LOCATION: BACK;LEG
LOCATION: LEG
LOCATION: BACK;LEG

## 2024-08-28 ASSESSMENT — PAIN SCALES - WONG BAKER
WONGBAKER_NUMERICALRESPONSE: NO HURT
WONGBAKER_NUMERICALRESPONSE: NO HURT

## 2024-08-28 ASSESSMENT — PAIN DESCRIPTION - DESCRIPTORS
DESCRIPTORS: ACHING
DESCRIPTORS: ACHING;CRAMPING;DISCOMFORT
DESCRIPTORS: ACHING
DESCRIPTORS: ACHING;DISCOMFORT
DESCRIPTORS: ACHING;BURNING;THROBBING

## 2024-08-28 ASSESSMENT — PAIN DESCRIPTION - ORIENTATION
ORIENTATION: RIGHT

## 2024-08-28 ASSESSMENT — PAIN - FUNCTIONAL ASSESSMENT: PAIN_FUNCTIONAL_ASSESSMENT: PREVENTS OR INTERFERES SOME ACTIVE ACTIVITIES AND ADLS

## 2024-08-28 NOTE — PROGRESS NOTES
Vascular Surgery  Dr. Emili Landry   Daily Progress Note    Pt Name: Elda Flood  Medical Record Number: [id number]  Date of Birth 1945   Today's Date: 8/28/2024    SUBJECTIVE:     Patient was seen and examined, stating she ate all of her breakfast and is drinking her ONS now.  Complaining that her right foot hurt last night after they used the Rosanna Stedy to put her back to bed    OBJECTIVE:     Patient Vitals for the past 24 hrs:   BP Temp Temp src Pulse Resp SpO2   08/28/24 0808 (!) 117/55 98.2 °F (36.8 °C) Temporal (!) 111 18 98 %   08/28/24 0241 -- -- -- -- 16 --   08/28/24 0211 -- -- -- -- 18 --   08/27/24 2039 123/60 98.2 °F (36.8 °C) Temporal (!) 107 16 99 %   08/27/24 1738 -- -- -- -- -- 99 %   08/27/24 1731 (!) 140/59 99 °F (37.2 °C) -- (!) 106 -- 98 %   08/27/24 1644 -- -- -- -- 20 --   08/27/24 1614 -- -- -- -- 20 --   08/27/24 1348 -- -- -- -- 18 --   08/27/24 0948 -- -- -- -- 18 --       Intake/Output Summary (Last 24 hours) at 8/28/2024 0920  Last data filed at 8/28/2024 0543  Gross per 24 hour   Intake --   Output 600 ml   Net -600 ml     In: -   Out: 2450 [Urine:2450]    I/O last 3 completed shifts:  In: -   Out: 3450 [Urine:3450]     Date 08/28/24 0000 - 08/28/24 2359   Shift 3656-7680 7958-3362 2721-7868 24 Hour Total   INTAKE   Shift Total(mL/kg)       OUTPUT   Urine(mL/kg/hr) 600(1.2)   600   Shift Total(mL/kg) 600(9.4)   600(9.4)   Weight (kg) 63.9 63.9 63.9 63.9     Wt Readings from Last 3 Encounters:   08/26/24 63.9 kg (140 lb 14 oz)   08/13/24 67.4 kg (148 lb 8 oz)   08/06/24 67.5 kg (148 lb 12 oz)      Body mass index is 26.63 kg/m².     Diet: ADULT ORAL NUTRITION SUPPLEMENT; Dinner, Lunch; Wound Healing Oral Supplement  ADULT DIET; Regular; 4 carb choices (60 gm/meal); 1500 ml  ADULT ORAL NUTRITION SUPPLEMENT; Breakfast, Dinner; Diabetic Oral Supplement    MEDS:     Scheduled Meds:   apixaban  5 mg Oral BID    sodium hypochlorite   Irrigation Daily    amoxicillin-clavulanate  1   wound bed clean, beefy color with some scattered fat necrosis, bloody drainage noted with removal of VAC dressing. No odor noted  EXTREMITY: RLE with moderate edema, moderate bruising, mild erythema to dorsal forefoot.. Left foot warm to touch, pink color with +palp DP pulse, right foot doppler signal DP and PT biphasic    Document Information    Wound Care Image: Wound      08/28/2024 10:15   Attached To:   Hospital Encounter on 8/14/24   Source Information    Calli Peres RN  [facility]     LABS:     CBC:   Recent Labs     08/26/24  0258 08/27/24  0359 08/28/24  0558   WBC 27.2* 27.2* 27.8*   RBC 3.06* 3.06* 3.13*   HGB 7.6* 7.4* 7.8*   HCT 23.9* 24.3* 25.3*   MCV 78.1* 79.4* 80.8*   MCH 24.8* 24.2* 24.9*   MCHC 31.8* 30.5* 30.8*   RDW 17.6* 17.8* 18.3*   * 475* 529*   MPV 11.2 11.3 11.9      Last 3 CMP:   Recent Labs     08/26/24  0258 08/27/24  0359 08/28/24  0558   * 131* 133*   K 3.6 3.4* 3.6    99 99   CO2 21* 23 24   BUN 31* 39* 31*   CREATININE 0.6 0.7 0.6   GLUCOSE 122* 140* 129*   CALCIUM 8.4* 8.5* 9.2        Calcium:   Lab Results   Component Value Date/Time    CALCIUM 9.2 08/28/2024 05:58 AM    CALCIUM 8.5 08/27/2024 03:59 AM    CALCIUM 8.4 08/26/2024 02:58 AM        Lactic Acid:   Lab Results   Component Value Date/Time    LACTA 1.2 08/26/2024 06:04 PM    LACTA 1.1 08/23/2024 06:01 PM    LACTA 1.7 08/17/2024 07:16 PM        DVT prophylaxis:             [x] Already on Anticoagulation- Eliquis restarted today    ASSESSMENT:        Procedure(s) 8/15/24: RIGHT LEG EVACUTION OF HEMATOMA   HD # 14  Active Hospital Problems    Diagnosis Date Noted    Hyponatremia [E87.1] 11/17/2022     Priority: Medium    Leukocytosis [D72.829] 11/17/2022     Priority: Medium    Mild malnutrition (HCC) [E44.1] 08/23/2024    Hematoma of right lower leg [S80.11XA] 08/14/2024    Intractable pain [R52] 08/14/2024    Hypertension [I10] 05/19/2015    Diabetes (HCC) [E11.9] 05/19/2015       Chief

## 2024-08-28 NOTE — PLAN OF CARE
Problem: Discharge Planning  Goal: Discharge to home or other facility with appropriate resources  8/27/2024 2111 by Celia Adame RN  Outcome: Progressing  8/27/2024 1357 by Dexter Huizar RN  Outcome: Progressing     Problem: Skin/Tissue Integrity  Goal: Absence of new skin breakdown  Description: 1.  Monitor for areas of redness and/or skin breakdown  2.  Assess vascular access sites hourly  3.  Every 4-6 hours minimum:  Change oxygen saturation probe site  4.  Every 4-6 hours:  If on nasal continuous positive airway pressure, respiratory therapy assess nares and determine need for appliance change or resting period.  8/27/2024 2111 by Celia Adame RN  Outcome: Progressing  8/27/2024 1357 by Dexter Huizar RN  Outcome: Progressing     Problem: Safety - Adult  Goal: Free from fall injury  8/27/2024 2111 by Celia Adame RN  Outcome: Progressing  8/27/2024 1357 by Dexter Huizar RN  Outcome: Progressing   Electronically signed by Celia Adame RN on 8/27/2024 at 9:11 PM

## 2024-08-28 NOTE — PLAN OF CARE
Nutrition Problem #1: Inadequate protein-energy intake, Altered nutrition-related lab values  Intervention: Food and/or Nutrient Delivery: Continue Current Diet, Continue Oral Nutrition Supplement  Nutritional

## 2024-08-28 NOTE — PLAN OF CARE
Problem: Discharge Planning  Goal: Discharge to home or other facility with appropriate resources  Outcome: Progressing  Flowsheets (Taken 8/28/2024 1130)  Discharge to home or other facility with appropriate resources: Identify barriers to discharge with patient and caregiver     Problem: Skin/Tissue Integrity  Goal: Absence of new skin breakdown  Description: 1.  Monitor for areas of redness and/or skin breakdown  2.  Assess vascular access sites hourly  3.  Every 4-6 hours minimum:  Change oxygen saturation probe site  4.  Every 4-6 hours:  If on nasal continuous positive airway pressure, respiratory therapy assess nares and determine need for appliance change or resting period.  Outcome: Progressing     Problem: Safety - Adult  Goal: Free from fall injury  Outcome: Progressing

## 2024-08-28 NOTE — PROGRESS NOTES
wheezing  Abdomen: Soft, positive bowel sounds in all quadrants, no distention, nontender to palpation, no rebound noted.    extremities: Right LE wound reviewed in media, + RLE tenderness, no edema, moves all extremities  Psych: Affect normal and good eye contact, behavioral normal.        Labs/Imaging/Diagnostics    Labs:  CBC:  Recent Labs     08/26/24 0258 08/27/24  0359 08/28/24  0558   WBC 27.2* 27.2* 27.8*   RBC 3.06* 3.06* 3.13*   HGB 7.6* 7.4* 7.8*   HCT 23.9* 24.3* 25.3*   MCV 78.1* 79.4* 80.8*   RDW 17.6* 17.8* 18.3*   * 475* 529*     CHEMISTRIES:  Recent Labs     08/26/24 0258 08/27/24  0359 08/28/24  0558   * 131* 133*   K 3.6 3.4* 3.6    99 99   CO2 21* 23 24   BUN 31* 39* 31*   CREATININE 0.6 0.7 0.6   GLUCOSE 122* 140* 129*   MG 1.8 1.9 1.6     PT/INR:No results for input(s): \"PROTIME\", \"INR\" in the last 72 hours.  APTT:No results for input(s): \"APTT\" in the last 72 hours.  LIVER PROFILE:No results for input(s): \"AST\", \"ALT\", \"BILIDIR\", \"BILITOT\", \"ALKPHOS\" in the last 72 hours.    Imaging Last 24 Hours:  No results found.  Assessment//Plan           Hospital Problems             Last Modified POA    * (Principal) Hematoma of right lower leg 8/14/2024 Yes    Hyponatremia 8/14/2024 Yes    Leukocytosis 8/14/2024 Yes    Diabetes (HCC) 8/14/2024 Yes    Hypertension 8/14/2024 Yes    Intractable pain 8/14/2024 Yes    Severe malnutrition (HCC) 8/28/2024 Yes     Assessment & Plan      Fever with Covid-19  Tmax 101.3 on 8/23  CXR showed possible evidence of bronchitis, no pneumonia  Blood cx NGTD  Lactic normal  No steroids given lack of O2 requirement  Supportive care PRN as symptoms arise     Hematoma of right lower extremity  Vascular surgery following  s/p evacuation on 8/15  Wound vac placed 8/20, plans to replace on Friday  Wound care following  ABX expanded due to fever, but no change in wound to indicate superinfection  Dressing change today per vascular, may replace vac on

## 2024-08-28 NOTE — PROGRESS NOTES
Comprehensive Nutrition Assessment    Type and Reason for Visit:  Reassess    Nutrition Recommendations/Plan:   Continue current ONS.  Ad 2 little scoops of whey protein with yogurt for extra protein     Malnutrition Assessment:  Malnutrition Status:  Severe malnutrition (08/28/24 1306)    Context:  Acute Illness     Findings of the 6 clinical characteristics of malnutrition:  Energy Intake:  Mild decrease in energy intake (Comment)  Weight Loss:  Greater than 2% over 1 week     Body Fat Loss:  No significant body fat loss     Muscle Mass Loss:  No significant muscle mass loss    Fluid Accumulation:  Moderate to Severe Extremities   Strength:  Not Performed    Nutrition Assessment:    Pt with medical staff at time of visit.  PO intake has remained the same.  New wt NA.  Continues to take ONS well.  Accuchek's 149-237  Fluid restriction has been added to pt's diet order    Nutrition Related Findings:    +3 LLE medema Wound Type: Pressure Injury, Surgical Incision, Wound Vac       Current Nutrition Intake & Therapies:    Average Meal Intake: 26-50%, 51-75%  Average Supplements Intake: 51-75%, %  ADULT ORAL NUTRITION SUPPLEMENT; Dinner, Lunch; Wound Healing Oral Supplement  ADULT DIET; Regular; 4 carb choices (60 gm/meal); 1500 ml  ADULT ORAL NUTRITION SUPPLEMENT; Breakfast, Dinner; Diabetic Oral Supplement    Anthropometric Measures:  Height: 154.9 cm (5' 0.98\")  Ideal Body Weight (IBW): 105 lbs (48 kg)    Admission Body Weight: 67.4 kg (148 lb 8 oz)  Current Body Weight: 63.9 kg (140 lb 14 oz), 134.2 % IBW. Weight Source: Bed Scale  Current BMI (kg/m2): 26.6  Usual Body Weight: 68.6 kg (151 lb 3.8 oz)  % Weight Change (Calculated): -6.9  Weight Adjustment For: No Adjustment  BMI Categories: Overweight (BMI 25.0-29.9)    Estimated Daily Nutrient Needs:  Energy Requirements Based On: Kcal/kg  Weight Used for Energy Requirements: Current  Energy (kcal/day): 7631-4161 kcals (20-25 kcals/kg)  Weight Used for

## 2024-08-29 ENCOUNTER — OUTSIDE FACILITY SERVICE (OUTPATIENT)
Age: 79
End: 2024-08-29
Payer: MEDICARE

## 2024-08-29 LAB
ANION GAP SERPL CALCULATED.3IONS-SCNC: 10 MMOL/L (ref 7–19)
BUN SERPL-MCNC: 31 MG/DL (ref 8–23)
CALCIUM SERPL-MCNC: 8.9 MG/DL (ref 8.8–10.2)
CHLORIDE SERPL-SCNC: 99 MMOL/L (ref 98–111)
CO2 SERPL-SCNC: 22 MMOL/L (ref 22–29)
CREAT SERPL-MCNC: 0.7 MG/DL (ref 0.5–0.9)
ERYTHROCYTE [DISTWIDTH] IN BLOOD BY AUTOMATED COUNT: 18.6 % (ref 11.5–14.5)
GLUCOSE BLD-MCNC: 190 MG/DL (ref 70–99)
GLUCOSE BLD-MCNC: 200 MG/DL (ref 70–99)
GLUCOSE BLD-MCNC: 212 MG/DL (ref 70–99)
GLUCOSE BLD-MCNC: 269 MG/DL (ref 70–99)
GLUCOSE SERPL-MCNC: 182 MG/DL (ref 70–99)
HCT VFR BLD AUTO: 25.1 % (ref 37–47)
HGB BLD-MCNC: 7.7 G/DL (ref 12–16)
MAGNESIUM SERPL-MCNC: 1.7 MG/DL (ref 1.6–2.4)
MCH RBC QN AUTO: 24.4 PG (ref 27–31)
MCHC RBC AUTO-ENTMCNC: 30.7 G/DL (ref 33–37)
MCV RBC AUTO: 79.7 FL (ref 81–99)
PERFORMED ON: ABNORMAL
PLATELET # BLD AUTO: 519 K/UL (ref 130–400)
PMV BLD AUTO: 12 FL (ref 9.4–12.3)
POTASSIUM SERPL-SCNC: 4 MMOL/L (ref 3.5–5)
RBC # BLD AUTO: 3.15 M/UL (ref 4.2–5.4)
SODIUM SERPL-SCNC: 131 MMOL/L (ref 136–145)
WBC # BLD AUTO: 26.7 K/UL (ref 4.8–10.8)

## 2024-08-29 PROCEDURE — 83735 ASSAY OF MAGNESIUM: CPT

## 2024-08-29 PROCEDURE — 2580000003 HC RX 258: Performed by: SURGERY

## 2024-08-29 PROCEDURE — 1200000000 HC SEMI PRIVATE

## 2024-08-29 PROCEDURE — 36415 COLL VENOUS BLD VENIPUNCTURE: CPT

## 2024-08-29 PROCEDURE — 76937 US GUIDE VASCULAR ACCESS: CPT

## 2024-08-29 PROCEDURE — 05HA33Z INSERTION OF INFUSION DEVICE INTO LEFT BRACHIAL VEIN, PERCUTANEOUS APPROACH: ICD-10-PCS | Performed by: HOSPITALIST

## 2024-08-29 PROCEDURE — 80048 BASIC METABOLIC PNL TOTAL CA: CPT

## 2024-08-29 PROCEDURE — 85027 COMPLETE CBC AUTOMATED: CPT

## 2024-08-29 PROCEDURE — 82962 GLUCOSE BLOOD TEST: CPT

## 2024-08-29 PROCEDURE — 6360000002 HC RX W HCPCS: Performed by: HOSPITALIST

## 2024-08-29 PROCEDURE — 6370000000 HC RX 637 (ALT 250 FOR IP): Performed by: SURGERY

## 2024-08-29 PROCEDURE — 6360000002 HC RX W HCPCS: Performed by: NURSE PRACTITIONER

## 2024-08-29 PROCEDURE — 6370000000 HC RX 637 (ALT 250 FOR IP): Performed by: HOSPITALIST

## 2024-08-29 PROCEDURE — 6370000000 HC RX 637 (ALT 250 FOR IP): Performed by: STUDENT IN AN ORGANIZED HEALTH CARE EDUCATION/TRAINING PROGRAM

## 2024-08-29 PROCEDURE — C1751 CATH, INF, PER/CENT/MIDLINE: HCPCS

## 2024-08-29 PROCEDURE — 2580000003 HC RX 258: Performed by: HOSPITALIST

## 2024-08-29 PROCEDURE — 36410 VNPNXR 3YR/> PHY/QHP DX/THER: CPT

## 2024-08-29 RX ORDER — METOPROLOL SUCCINATE 50 MG/1
50 TABLET, EXTENDED RELEASE ORAL 2 TIMES DAILY
Status: DISCONTINUED | OUTPATIENT
Start: 2024-08-29 | End: 2024-09-05 | Stop reason: HOSPADM

## 2024-08-29 RX ORDER — DIPHENHYDRAMINE HYDROCHLORIDE 50 MG/ML
25 INJECTION INTRAMUSCULAR; INTRAVENOUS ONCE
Status: COMPLETED | OUTPATIENT
Start: 2024-08-29 | End: 2024-08-29

## 2024-08-29 RX ADMIN — DAKIN'S SOLUTION 0.125% (QUARTER STRENGTH): 0.12 SOLUTION at 09:36

## 2024-08-29 RX ADMIN — SODIUM CHLORIDE 2 G: 1 TABLET ORAL at 17:08

## 2024-08-29 RX ADMIN — AMOXICILLIN AND CLAVULANATE POTASSIUM 1 TABLET: 875; 125 TABLET, FILM COATED ORAL at 20:29

## 2024-08-29 RX ADMIN — ALLOPURINOL 300 MG: 100 TABLET ORAL at 09:29

## 2024-08-29 RX ADMIN — METHYLPREDNISOLONE SODIUM SUCCINATE 60 MG: 125 INJECTION INTRAMUSCULAR; INTRAVENOUS at 19:49

## 2024-08-29 RX ADMIN — FUROSEMIDE 20 MG: 20 TABLET ORAL at 09:29

## 2024-08-29 RX ADMIN — CHOLESTYRAMINE 4 G: 4 POWDER, FOR SUSPENSION ORAL at 09:27

## 2024-08-29 RX ADMIN — ACETAMINOPHEN 650 MG: 325 TABLET ORAL at 17:18

## 2024-08-29 RX ADMIN — SODIUM CHLORIDE, PRESERVATIVE FREE 10 ML: 5 INJECTION INTRAVENOUS at 20:32

## 2024-08-29 RX ADMIN — AMOXICILLIN AND CLAVULANATE POTASSIUM 1 TABLET: 875; 125 TABLET, FILM COATED ORAL at 09:28

## 2024-08-29 RX ADMIN — OXYCODONE HYDROCHLORIDE 10 MG: 10 TABLET ORAL at 14:03

## 2024-08-29 RX ADMIN — Medication 800 MG: at 09:28

## 2024-08-29 RX ADMIN — Medication 800 MG: at 14:05

## 2024-08-29 RX ADMIN — Medication 800 MG: at 20:28

## 2024-08-29 RX ADMIN — DIPHENHYDRAMINE HYDROCHLORIDE 25 MG: 50 INJECTION, SOLUTION INTRAMUSCULAR; INTRAVENOUS at 19:50

## 2024-08-29 RX ADMIN — LEVETIRACETAM 500 MG: 500 TABLET, FILM COATED ORAL at 09:29

## 2024-08-29 RX ADMIN — INSULIN GLARGINE 15 UNITS: 100 INJECTION, SOLUTION SUBCUTANEOUS at 09:40

## 2024-08-29 RX ADMIN — APIXABAN 5 MG: 5 TABLET, FILM COATED ORAL at 09:29

## 2024-08-29 RX ADMIN — COLLAGENASE SANTYL: 250 OINTMENT TOPICAL at 09:36

## 2024-08-29 RX ADMIN — ONDANSETRON 2 MG: 2 INJECTION INTRAMUSCULAR; INTRAVENOUS at 09:25

## 2024-08-29 RX ADMIN — METOPROLOL SUCCINATE 50 MG: 50 TABLET, EXTENDED RELEASE ORAL at 20:29

## 2024-08-29 RX ADMIN — INSULIN GLARGINE 15 UNITS: 100 INJECTION, SOLUTION SUBCUTANEOUS at 20:28

## 2024-08-29 RX ADMIN — ATORVASTATIN CALCIUM 20 MG: 20 TABLET, FILM COATED ORAL at 20:29

## 2024-08-29 RX ADMIN — SODIUM CHLORIDE, PRESERVATIVE FREE 10 ML: 5 INJECTION INTRAVENOUS at 07:37

## 2024-08-29 RX ADMIN — FOLIC ACID 1 MG: 1 TABLET ORAL at 09:28

## 2024-08-29 RX ADMIN — OXYCODONE HYDROCHLORIDE 10 MG: 10 TABLET ORAL at 07:35

## 2024-08-29 RX ADMIN — Medication 15 G: at 09:28

## 2024-08-29 RX ADMIN — APIXABAN 5 MG: 5 TABLET, FILM COATED ORAL at 20:29

## 2024-08-29 RX ADMIN — HYDROMORPHONE HYDROCHLORIDE 0.25 MG: 1 INJECTION, SOLUTION INTRAMUSCULAR; INTRAVENOUS; SUBCUTANEOUS at 14:18

## 2024-08-29 RX ADMIN — LEVETIRACETAM 500 MG: 500 TABLET, FILM COATED ORAL at 20:28

## 2024-08-29 RX ADMIN — METOPROLOL SUCCINATE 50 MG: 50 TABLET, FILM COATED, EXTENDED RELEASE ORAL at 09:28

## 2024-08-29 RX ADMIN — INSULIN LISPRO 4 UNITS: 100 INJECTION, SOLUTION INTRAVENOUS; SUBCUTANEOUS at 11:42

## 2024-08-29 RX ADMIN — SODIUM CHLORIDE 2 G: 1 TABLET ORAL at 11:42

## 2024-08-29 RX ADMIN — INSULIN LISPRO 2 UNITS: 100 INJECTION, SOLUTION INTRAVENOUS; SUBCUTANEOUS at 17:08

## 2024-08-29 ASSESSMENT — PAIN DESCRIPTION - DESCRIPTORS
DESCRIPTORS: ACHING
DESCRIPTORS: BURNING
DESCRIPTORS: ACHING

## 2024-08-29 ASSESSMENT — PAIN SCALES - GENERAL
PAINLEVEL_OUTOF10: 1
PAINLEVEL_OUTOF10: 6
PAINLEVEL_OUTOF10: 3
PAINLEVEL_OUTOF10: 4
PAINLEVEL_OUTOF10: 3
PAINLEVEL_OUTOF10: 6
PAINLEVEL_OUTOF10: 6

## 2024-08-29 ASSESSMENT — PAIN DESCRIPTION - ORIENTATION
ORIENTATION: RIGHT

## 2024-08-29 ASSESSMENT — PAIN DESCRIPTION - ONSET
ONSET: GRADUAL

## 2024-08-29 ASSESSMENT — PAIN DESCRIPTION - LOCATION
LOCATION: KNEE
LOCATION: LEG
LOCATION: KNEE

## 2024-08-29 ASSESSMENT — PAIN - FUNCTIONAL ASSESSMENT
PAIN_FUNCTIONAL_ASSESSMENT: PREVENTS OR INTERFERES WITH MANY ACTIVE NOT PASSIVE ACTIVITIES
PAIN_FUNCTIONAL_ASSESSMENT: PREVENTS OR INTERFERES SOME ACTIVE ACTIVITIES AND ADLS
PAIN_FUNCTIONAL_ASSESSMENT: PREVENTS OR INTERFERES WITH MANY ACTIVE NOT PASSIVE ACTIVITIES

## 2024-08-29 ASSESSMENT — PAIN DESCRIPTION - PAIN TYPE
TYPE: ACUTE PAIN
TYPE: ACUTE PAIN

## 2024-08-29 ASSESSMENT — PAIN DESCRIPTION - FREQUENCY
FREQUENCY: INTERMITTENT

## 2024-08-29 NOTE — CONSULTS
[facility] Vascular Access Team:  Midline Insertion Procedure Note      Patient: Elda Flood  YOB: 1945   MRN: [id number]  Room: [address] [place] [address]     Attending Physician - Diamond Guy MD  Ordering Physician - Diamond Guy MD    Diagnosis:   Bleeding into the skin [R23.3]  Uncontrolled pain [R52]  Hematoma of leg, right, initial encounter [S80.11XA]  Hematoma of right lower leg [S80.11XA]       Procedure(s):   Insertion of a 4.5 [place] Single Lumen Power Midline    Indication(s):   Poor Venous Access    Date of Procedure: 8/29/2024   Start Time: 1530  End Time: 1600    Performed by: Derrick Stone RN    Anesthesia: Local Injection 3 mL 1% Lidocaine without Epinephrine    Estimated Blood Loss (mL): Minimal    Complications: None       Findings:   1. Successful insertion of Midline.  2. Midline is ready to be used. Please change tubing prior to using the new Midline. Make sure to label, date and use alcohol caps on new tubing and alcohol caps on unused ports.   3. Labs may be drawn from Midline. Please make patient a unit draw.       Detailed Description of Procedure:   Informed consent was obtained for the procedure. Risks including infection, thrombosis, nerve injury, arterial puncture, bleeding, and adverse drug reaction were discussed.     The Left Brachial vein was visualized using the ultrasound and deemed a suitable vessel. The area was prepped with Chlorhexidine and draped in sterile fashion using full max barrier draping. The area was anesthetized with 3 cc's of 1% Lidocaine. The vein was accessed using US guidance. The guidewire was advanced through the needle to secure the vein. The needle was removed and a peel-a-way Sheath was placed over the guidewire. Guidewire was removed with tip intact. The 4.5 [place] Single Lumen Power Midline was left untrimmed at 9 cm and inserted into the Sheath. The peel-a-way sheath was removed. Approximately 0 cm exposed. All lumens had brisk blood return and

## 2024-08-29 NOTE — PROGRESS NOTES
Progress Note    Date:2024       Room:0330/330-01  Patient Name:Elda Flood     YOB: 1945     Age:79 y.o.      Subjective    Subjective: 79 year old female who presented to [facility] ED for evaluation of bruising and pain of right lower leg. Pt has history of atrial fibrillation on eliquis, diabetes, stroke, hypertension and hyperlipidemia. 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 Seen by vascular surgery, s/p evacuation of RLE hematoma on 8/15. She did require transfusion due to anemia of acute blood loss. Wound vac was placed .  SNF placement secured.    Due to elevated WBC count patient was placed on empiric antibiotics with cefazolin  for possible skin/soft tissue infection.      Pt developed fever to 101.3 on  and discharge was cancelled. Antibiotics were empirically broadened to Vancomycin and Cefepime.   Viral panel returned positive for COVID. CXR unremarkable. Blood cx NGTD. Vancomycin dced  and currently on Augmentin.    Plans to discharge to [place]  after completion of 10 days quarantine.     Antibiotics course  Cefazolin -  Vanc -  Cefepime -  Augmentin  - present     Review of Systems: 10 point system reviewed and negative except as stated above.      Objective         Vitals Last 24 Hours:  TEMPERATURE:  Temp  Av.8 °F (37.7 °C)  Min: 98.1 °F (36.7 °C)  Max: 100.9 °F (38.3 °C)  RESPIRATIONS RANGE: Resp  Av.1  Min: 16  Max: 18  PULSE OXIMETRY RANGE: SpO2  Av.3 %  Min: 95 %  Max: 98 %  PULSE RANGE: Pulse  Av.8  Min: 99  Max: 117  BLOOD PRESSURE RANGE: Systolic (24hrs), Av , Min:122 , Max:134   ; Diastolic (24hrs), Av, Min:52, Max:63    I/O (24Hr):    Intake/Output Summary (Last 24 hours) at 2024 1420  Last data filed at 2024 2140  Gross per 24 hour   Intake 240 ml   Output 2000 ml   Net -1760 ml       Physical Examination:  General: Well-developed, no  acute distress lying comfortably in bed.  HEENT: Atraumatic normocephalic, range of motion normal, no JVD, no tracheal deviation noted.  Cardiac: Normal S1-S2  Respiratory: clear To auscultation bilaterally, no rhonchi or rales, no wheezing  Abdomen: Soft, positive bowel sounds in all quadrants, no distention, nontender to palpation, no rebound noted.    extremities: Right LE wound reviewed in media, + RLE tenderness, no edema, moves all extremities  Psych: Affect normal and good eye contact, behavioral normal.        Labs/Imaging/Diagnostics    Labs:  CBC:  Recent Labs     08/27/24  0359 08/28/24  0558 08/29/24  0450   WBC 27.2* 27.8* 26.7*   RBC 3.06* 3.13* 3.15*   HGB 7.4* 7.8* 7.7*   HCT 24.3* 25.3* 25.1*   MCV 79.4* 80.8* 79.7*   RDW 17.8* 18.3* 18.6*   * 529* 519*     CHEMISTRIES:  Recent Labs     08/27/24  0359 08/28/24  0558 08/29/24  0450   * 133* 131*   K 3.4* 3.6 4.0   CL 99 99 99   CO2 23 24 22   BUN 39* 31* 31*   CREATININE 0.7 0.6 0.7   GLUCOSE 140* 129* 182*   MG 1.9 1.6 1.7     PT/INR:No results for input(s): \"PROTIME\", \"INR\" in the last 72 hours.  APTT:No results for input(s): \"APTT\" in the last 72 hours.  LIVER PROFILE:No results for input(s): \"AST\", \"ALT\", \"BILIDIR\", \"BILITOT\", \"ALKPHOS\" in the last 72 hours.    Imaging Last 24 Hours:  No results found.  Assessment//Plan           Hospital Problems             Last Modified POA    * (Principal) Hematoma of right lower leg 8/14/2024 Yes    Hyponatremia 8/14/2024 Yes    Leukocytosis 8/14/2024 Yes    Diabetes (HCC) 8/14/2024 Yes    Hypertension 8/14/2024 Yes    Intractable pain 8/14/2024 Yes    Severe malnutrition (HCC) 8/28/2024 Yes     Assessment & Plan      Fever with Covid-19  Tmax 101.3 on 8/23  CXR showed possible evidence of bronchitis, no pneumonia  Blood cx NGTD  Lactic normal  No steroids given lack of O2 requirement  Supportive care PRN as symptoms arise     Hematoma of right lower extremity  Reactive Leukocytosis  Vascular

## 2024-08-29 NOTE — CONSULTS
INFECTIOUS DISEASES CONSULT NOTE    Patient:  Elda Flood 79 y.o. female  ROOM # [unfilled]  YOB: 1945  MRN: [id number]  [facility]:  [id number]  Admit date: 8/14/2024   Admitting Physician: Diamond Guy MD  Primary Care Physician: NICOLE Solis DO  REFERRING PROVIDER: No ref. provider found    Reason for Consultation: \"Antibiotic management-white blood cell count continues to trend up, recurrent fever\"    History of Present Illness/Chief Complaint: Pleasant 79-year-old woman.  History is obtained from patient, her daughter who is at bedside, and chart review.  Also discussed with Diamond Peres with vascular earlier today.  She was admitted to the hospital on August 14, 2024.  She was assisting her .  He had apparently slipped when she was helping him up.  His leg struck her on the medial aspect of her right lower leg.  She developed a very large hematoma.  The area worsened during the day.  She presented to the hospital with discomfort and a very large hematoma right medial leg.  She required debridement.  On Friday she was stable enough that plan plans were in place to transfer her to [place] [facility] for further management.  She developed a fever.  She had previous cultures from the wound right medial leg.  She had received manage treatment.  On Saturday 5 days ago she also tested positive for COVID.  She had a little bit of congestion at that time.  She has not had any severe cough, sputum production, or dyspnea.  She has been hemodynamically stable.  She indicates about 3 to 4 weeks ago she had had some cough URI symptoms that were mild and short-lived.  She has had discomfort with her dressing changes.  Overall she feels better.  Appears on her medication list that she has received least 1 dose of methylprednisolone.  He has had a white blood cell count ranging in the range of 27,000.  Infectious diseases asked to evaluate and offer recommendations.    Current Scheduled  left ventricular size with preserved LV function and an estimated ejection fraction of approximately 55-60%. Mild concentric left ventricular hypertrophy. No regional wall motion abnormalities. Impaired relaxation compatible with diastolic dysfunction. ( reversed E/A ratio) Signature Electronically signed by Jeferson Marino MD(Interpreting physician) on 08/08/2023 10:32 AM    Impression:   1..  Leukocytosis-definite etiology uncertain.  She did have 1 dose of Solu-Medrol today.  In looking back her white blood cell count on admission it was 16,000.  Would like to explore for infectious and noninfectious causes of her leukocytosis.  2.  Open wound medial right lower extremity-overall the wound to me looks like it is improving.  I do not think there is significant wound infection present.  3.  Fever I suspect this may be related to COVID.  Which does not appear to be causing her any difficulty from pulmonary standpoint at present.      Recommendations:    Add manual differential to CBC and ask for path review of peripheral smear  Feel that Augmentin to be discontinued  Monitor temperature curve and clinical course off antibiotic treatment  Continue local wound care    ED DAMIAN MD  08/29/24  6:03 PM

## 2024-08-29 NOTE — PLAN OF CARE
Problem: Discharge Planning  Goal: Discharge to home or other facility with appropriate resources  Outcome: Progressing     Problem: Skin/Tissue Integrity  Goal: Absence of new skin breakdown  Description: 1.  Monitor for areas of redness and/or skin breakdown  2.  Assess vascular access sites hourly  3.  Every 4-6 hours minimum:  Change oxygen saturation probe site  4.  Every 4-6 hours:  If on nasal continuous positive airway pressure, respiratory therapy assess nares and determine need for appliance change or resting period.  Outcome: Progressing     Problem: Safety - Adult  Goal: Free from fall injury  Outcome: Progressing     Problem: Nutrition Deficit:  Goal: Optimize nutritional status  Outcome: Progressing  Flowsheets (Taken 8/28/2024 1256 by Jacque Kathleen, MS, RD, LD)  Nutrient intake appropriate for improving, restoring, or maintaining nutritional needs:   Assess nutritional status and recommend course of action   Monitor oral intake, labs, and treatment plans   Recommend appropriate diets, oral nutritional supplements, and vitamin/mineral supplements

## 2024-08-30 ENCOUNTER — OUTSIDE FACILITY SERVICE (OUTPATIENT)
Age: 79
End: 2024-08-30
Payer: MEDICARE

## 2024-08-30 LAB
ALBUMIN SERPL-MCNC: 2.6 G/DL (ref 3.5–5.2)
ALP SERPL-CCNC: 117 U/L (ref 35–104)
ALT SERPL-CCNC: 5 U/L (ref 5–33)
ANION GAP SERPL CALCULATED.3IONS-SCNC: 13 MMOL/L (ref 7–19)
ANISOCYTOSIS BLD QL SMEAR: ABNORMAL
AST SERPL-CCNC: 7 U/L (ref 5–32)
BASOPHILS # BLD: 0.3 K/UL (ref 0–0.2)
BASOPHILS NFR BLD: 1 % (ref 0–1)
BILIRUB SERPL-MCNC: 0.2 MG/DL (ref 0.2–1.2)
BUN SERPL-MCNC: 32 MG/DL (ref 8–23)
CALCIUM SERPL-MCNC: 8.9 MG/DL (ref 8.8–10.2)
CHLORIDE SERPL-SCNC: 97 MMOL/L (ref 98–111)
CO2 SERPL-SCNC: 24 MMOL/L (ref 22–29)
CREAT SERPL-MCNC: 0.6 MG/DL (ref 0.5–0.9)
EOSINOPHIL # BLD: 0 K/UL (ref 0–0.6)
EOSINOPHIL NFR BLD: 0 % (ref 0–5)
ERYTHROCYTE [DISTWIDTH] IN BLOOD BY AUTOMATED COUNT: 18.5 % (ref 11.5–14.5)
GLUCOSE BLD-MCNC: 236 MG/DL (ref 70–99)
GLUCOSE BLD-MCNC: 293 MG/DL (ref 70–99)
GLUCOSE BLD-MCNC: 349 MG/DL (ref 70–99)
GLUCOSE BLD-MCNC: 356 MG/DL (ref 70–99)
GLUCOSE BLD-MCNC: 369 MG/DL (ref 70–99)
GLUCOSE SERPL-MCNC: 303 MG/DL (ref 70–99)
HCT VFR BLD AUTO: 25.5 % (ref 37–47)
HGB BLD-MCNC: 7.8 G/DL (ref 12–16)
HYPOCHROMIA BLD QL SMEAR: ABNORMAL
LYMPHOCYTES # BLD: 3.1 K/UL (ref 1.1–4.5)
LYMPHOCYTES NFR BLD: 11 % (ref 20–40)
MCH RBC QN AUTO: 24 PG (ref 27–31)
MCHC RBC AUTO-ENTMCNC: 30.6 G/DL (ref 33–37)
MCV RBC AUTO: 78.5 FL (ref 81–99)
MICROCYTES BLD QL SMEAR: ABNORMAL
MONOCYTES # BLD: 2 K/UL (ref 0–0.9)
MONOCYTES NFR BLD: 7 % (ref 0–10)
NEUTROPHILS # BLD: 22.8 K/UL (ref 1.5–7.5)
NEUTS BAND NFR BLD MANUAL: 7 % (ref 0–5)
NEUTS SEG NFR BLD: 74 % (ref 50–65)
PERFORMED ON: ABNORMAL
PLATELET # BLD AUTO: 591 K/UL (ref 130–400)
PLATELET SLIDE REVIEW: ABNORMAL
PMV BLD AUTO: 11.8 FL (ref 9.4–12.3)
POTASSIUM SERPL-SCNC: 4.8 MMOL/L (ref 3.5–5)
PROT SERPL-MCNC: 4.6 G/DL (ref 6.4–8.3)
RBC # BLD AUTO: 3.25 M/UL (ref 4.2–5.4)
SODIUM SERPL-SCNC: 134 MMOL/L (ref 136–145)
WBC # BLD AUTO: 28.2 K/UL (ref 4.8–10.8)

## 2024-08-30 PROCEDURE — 85007 BL SMEAR W/DIFF WBC COUNT: CPT

## 2024-08-30 PROCEDURE — 6370000000 HC RX 637 (ALT 250 FOR IP): Performed by: SURGERY

## 2024-08-30 PROCEDURE — 36415 COLL VENOUS BLD VENIPUNCTURE: CPT

## 2024-08-30 PROCEDURE — 82962 GLUCOSE BLOOD TEST: CPT

## 2024-08-30 PROCEDURE — 6370000000 HC RX 637 (ALT 250 FOR IP): Performed by: STUDENT IN AN ORGANIZED HEALTH CARE EDUCATION/TRAINING PROGRAM

## 2024-08-30 PROCEDURE — 80053 COMPREHEN METABOLIC PANEL: CPT

## 2024-08-30 PROCEDURE — 6370000000 HC RX 637 (ALT 250 FOR IP): Performed by: HOSPITALIST

## 2024-08-30 PROCEDURE — 85027 COMPLETE CBC AUTOMATED: CPT

## 2024-08-30 PROCEDURE — 6360000002 HC RX W HCPCS: Performed by: STUDENT IN AN ORGANIZED HEALTH CARE EDUCATION/TRAINING PROGRAM

## 2024-08-30 PROCEDURE — 2580000003 HC RX 258: Performed by: SURGERY

## 2024-08-30 PROCEDURE — 1200000000 HC SEMI PRIVATE

## 2024-08-30 RX ORDER — INSULIN LISPRO 100 [IU]/ML
5 INJECTION, SOLUTION INTRAVENOUS; SUBCUTANEOUS
Status: DISCONTINUED | OUTPATIENT
Start: 2024-08-30 | End: 2024-08-30

## 2024-08-30 RX ORDER — DIPHENHYDRAMINE HYDROCHLORIDE 50 MG/ML
25 INJECTION INTRAMUSCULAR; INTRAVENOUS ONCE
Status: COMPLETED | OUTPATIENT
Start: 2024-08-30 | End: 2024-08-30

## 2024-08-30 RX ORDER — LIDOCAINE HYDROCHLORIDE 40 MG/ML
SOLUTION TOPICAL ONCE
Status: DISCONTINUED | OUTPATIENT
Start: 2024-08-30 | End: 2024-09-01

## 2024-08-30 RX ORDER — INSULIN LISPRO 100 [IU]/ML
8 INJECTION, SOLUTION INTRAVENOUS; SUBCUTANEOUS
Status: DISCONTINUED | OUTPATIENT
Start: 2024-08-30 | End: 2024-09-01

## 2024-08-30 RX ADMIN — APIXABAN 5 MG: 5 TABLET, FILM COATED ORAL at 20:28

## 2024-08-30 RX ADMIN — INSULIN LISPRO 8 UNITS: 100 INJECTION, SOLUTION INTRAVENOUS; SUBCUTANEOUS at 12:45

## 2024-08-30 RX ADMIN — METOPROLOL SUCCINATE 50 MG: 50 TABLET, EXTENDED RELEASE ORAL at 10:14

## 2024-08-30 RX ADMIN — SODIUM CHLORIDE 2 G: 1 TABLET ORAL at 12:53

## 2024-08-30 RX ADMIN — INSULIN GLARGINE 15 UNITS: 100 INJECTION, SOLUTION SUBCUTANEOUS at 08:30

## 2024-08-30 RX ADMIN — INSULIN HUMAN 10 UNITS: 100 INJECTION, SOLUTION PARENTERAL at 14:46

## 2024-08-30 RX ADMIN — METOPROLOL SUCCINATE 50 MG: 50 TABLET, EXTENDED RELEASE ORAL at 20:28

## 2024-08-30 RX ADMIN — Medication 800 MG: at 08:27

## 2024-08-30 RX ADMIN — INSULIN LISPRO 5 UNITS: 100 INJECTION, SOLUTION INTRAVENOUS; SUBCUTANEOUS at 12:44

## 2024-08-30 RX ADMIN — ALLOPURINOL 300 MG: 100 TABLET ORAL at 08:27

## 2024-08-30 RX ADMIN — AMOXICILLIN AND CLAVULANATE POTASSIUM 1 TABLET: 875; 125 TABLET, FILM COATED ORAL at 08:28

## 2024-08-30 RX ADMIN — FOLIC ACID 1 MG: 1 TABLET ORAL at 08:28

## 2024-08-30 RX ADMIN — INSULIN GLARGINE 15 UNITS: 100 INJECTION, SOLUTION SUBCUTANEOUS at 20:28

## 2024-08-30 RX ADMIN — APIXABAN 5 MG: 5 TABLET, FILM COATED ORAL at 08:28

## 2024-08-30 RX ADMIN — INSULIN LISPRO 6 UNITS: 100 INJECTION, SOLUTION INTRAVENOUS; SUBCUTANEOUS at 08:30

## 2024-08-30 RX ADMIN — OXYCODONE HYDROCHLORIDE 10 MG: 10 TABLET ORAL at 03:27

## 2024-08-30 RX ADMIN — SODIUM CHLORIDE, PRESERVATIVE FREE 10 ML: 5 INJECTION INTRAVENOUS at 21:32

## 2024-08-30 RX ADMIN — INSULIN LISPRO 4 UNITS: 100 INJECTION, SOLUTION INTRAVENOUS; SUBCUTANEOUS at 17:17

## 2024-08-30 RX ADMIN — LIDOCAINE HYDROCHLORIDE: 40 SOLUTION ORAL at 14:48

## 2024-08-30 RX ADMIN — FUROSEMIDE 20 MG: 20 TABLET ORAL at 08:27

## 2024-08-30 RX ADMIN — LEVETIRACETAM 500 MG: 500 TABLET, FILM COATED ORAL at 08:28

## 2024-08-30 RX ADMIN — OXYCODONE HYDROCHLORIDE 10 MG: 10 TABLET ORAL at 12:42

## 2024-08-30 RX ADMIN — ONDANSETRON 4 MG: 4 TABLET, ORALLY DISINTEGRATING ORAL at 08:36

## 2024-08-30 RX ADMIN — Medication 15 G: at 08:29

## 2024-08-30 RX ADMIN — INSULIN LISPRO 8 UNITS: 100 INJECTION, SOLUTION INTRAVENOUS; SUBCUTANEOUS at 17:15

## 2024-08-30 RX ADMIN — SODIUM CHLORIDE 2 G: 1 TABLET ORAL at 17:15

## 2024-08-30 RX ADMIN — CHOLESTYRAMINE 4 G: 4 POWDER, FOR SUSPENSION ORAL at 08:29

## 2024-08-30 RX ADMIN — Medication 800 MG: at 20:29

## 2024-08-30 RX ADMIN — SODIUM CHLORIDE 2 G: 1 TABLET ORAL at 10:14

## 2024-08-30 RX ADMIN — SODIUM CHLORIDE, PRESERVATIVE FREE 10 ML: 5 INJECTION INTRAVENOUS at 08:39

## 2024-08-30 RX ADMIN — OXYCODONE HYDROCHLORIDE 10 MG: 10 TABLET ORAL at 20:29

## 2024-08-30 RX ADMIN — ATORVASTATIN CALCIUM 20 MG: 20 TABLET, FILM COATED ORAL at 20:30

## 2024-08-30 RX ADMIN — LEVETIRACETAM 500 MG: 500 TABLET, FILM COATED ORAL at 20:29

## 2024-08-30 RX ADMIN — DIPHENHYDRAMINE HYDROCHLORIDE 25 MG: 50 INJECTION INTRAMUSCULAR; INTRAVENOUS at 23:15

## 2024-08-30 RX ADMIN — Medication 800 MG: at 13:29

## 2024-08-30 ASSESSMENT — PAIN DESCRIPTION - LOCATION
LOCATION: LEG

## 2024-08-30 ASSESSMENT — PAIN DESCRIPTION - DESCRIPTORS
DESCRIPTORS: ACHING;SHOOTING;THROBBING
DESCRIPTORS: ACHING;STABBING;THROBBING
DESCRIPTORS: ACHING

## 2024-08-30 ASSESSMENT — PAIN SCALES - GENERAL
PAINLEVEL_OUTOF10: 7
PAINLEVEL_OUTOF10: 3
PAINLEVEL_OUTOF10: 6
PAINLEVEL_OUTOF10: 7
PAINLEVEL_OUTOF10: 3

## 2024-08-30 ASSESSMENT — PAIN DESCRIPTION - ORIENTATION
ORIENTATION: RIGHT

## 2024-08-30 NOTE — PROGRESS NOTES
Vascular Surgery  Dr. Emili Landry   Daily Progress Note    Pt Name: Elda Flood  Medical Record Number: [id number]  Date of Birth 1945   Today's Date: 8/30/2024    SUBJECTIVE:     Patient was seen and examined, stating her top lip still with swelling but she feels pretty good today.  Pain is currently controlled  Has had mild nausea earlier    OBJECTIVE:     Patient Vitals for the past 24 hrs:   BP Temp Temp src Pulse Resp SpO2   08/30/24 1014 (!) 150/66 -- -- 81 -- --   08/30/24 0740 (!) 150/66 97.7 °F (36.5 °C) Temporal 81 20 98 %   08/30/24 0357 -- -- -- -- 16 --   08/30/24 0330 134/66 97.9 °F (36.6 °C) Oral 89 18 96 %   08/30/24 0327 -- -- -- -- 16 --   08/29/24 2000 (!) 116/54 99.3 °F (37.4 °C) Oral (!) 109 20 96 %   08/29/24 1709 (!) 142/70 (!) 100.9 °F (38.3 °C) -- (!) 118 -- 100 %       Intake/Output Summary (Last 24 hours) at 8/30/2024 1146  Last data filed at 8/30/2024 1139  Gross per 24 hour   Intake 675 ml   Output 1300 ml   Net -625 ml       In: 675 [P.O.:675]  Out: 1300 [Urine:1300]    I/O last 3 completed shifts:  In: 675 [P.O.:675]  Out: 2100 [Urine:2100]     Date 08/30/24 0000 - 08/30/24 2359   Shift 7185-4220 5045-5527 4165-7711 24 Hour Total   INTAKE   P.O.(mL/kg/hr) 275(0.5)   275   Shift Total(mL/kg) 275(4.3)   275(4.3)   OUTPUT   Urine(mL/kg/hr) 900(1.8) 400  1300   Shift Total(mL/kg) 900(14.1) 400(6.3)  1300(20.3)   Weight (kg) 63.9 63.9 63.9 63.9       Wt Readings from Last 3 Encounters:   08/26/24 63.9 kg (140 lb 14 oz)   08/13/24 67.4 kg (148 lb 8 oz)   08/06/24 67.5 kg (148 lb 12 oz)        Body mass index is 26.63 kg/m².     Diet: ADULT ORAL NUTRITION SUPPLEMENT; Breakfast, Dinner; Diabetic Oral Supplement  ADULT ORAL NUTRITION SUPPLEMENT; Dinner, Lunch; Wound Healing Oral Supplement  ADULT DIET; Regular; 4 carb choices (60 gm/meal); 1500 ml    MEDS:     Scheduled Meds:   insulin lispro  5 Units SubCUTAneous TID WC    metoprolol succinate  50 mg Oral BID    collagenase   Topical  here with large hematoma and swelling injured yesterday      PLAN:     VAC dressing reapplied with Mepitel One and Black Foam, patient tolerated it fairly well, will plan change Monday.  Patient reported less pain with dressing change today.  ID following  4.   Activity as tolerates

## 2024-08-30 NOTE — PLAN OF CARE
Problem: Discharge Planning  Goal: Discharge to home or other facility with appropriate resources  Outcome: Progressing  Flowsheets (Taken 8/29/2024 1430 by Alma Sinha, RN)  Discharge to home or other facility with appropriate resources:   Identify barriers to discharge with patient and caregiver   Arrange for needed discharge resources and transportation as appropriate   Identify discharge learning needs (meds, wound care, etc)   Arrange for interpreters to assist at discharge as needed   Refer to discharge planning if patient needs post-hospital services based on physician order or complex needs related to functional status, cognitive ability or social support system     Problem: Skin/Tissue Integrity  Goal: Absence of new skin breakdown  Description: 1.  Monitor for areas of redness and/or skin breakdown  2.  Assess vascular access sites hourly  3.  Every 4-6 hours minimum:  Change oxygen saturation probe site  4.  Every 4-6 hours:  If on nasal continuous positive airway pressure, respiratory therapy assess nares and determine need for appliance change or resting period.  Outcome: Progressing     Problem: Safety - Adult  Goal: Free from fall injury  Outcome: Progressing     Problem: Nutrition Deficit:  Goal: Optimize nutritional status  Outcome: Progressing

## 2024-08-30 NOTE — PROGRESS NOTES
per day on Monday Wednesday Friday  ondansetron (ZOFRAN) injection 2 mg, Q4H PRN  HYDROmorphone HCl PF (DILAUDID) injection 0.25 mg, Q4H PRN  urea (URE-NA) packet 15 g, Daily  sodium chloride flush 0.9 % injection 5-40 mL, 2 times per day  sodium chloride flush 0.9 % injection 5-40 mL, PRN  0.9 % sodium chloride infusion, PRN  potassium chloride (KLOR-CON M) extended release tablet 40 mEq, PRN   Or  potassium bicarb-citric acid (EFFER-K) effervescent tablet 40 mEq, PRN   Or  potassium chloride 10 mEq/100 mL IVPB (Peripheral Line), PRN  magnesium sulfate 2000 mg in 50 mL IVPB premix, PRN  ondansetron (ZOFRAN-ODT) disintegrating tablet 4 mg, Q8H PRN  acetaminophen (TYLENOL) tablet 650 mg, Q6H PRN   Or  acetaminophen (TYLENOL) suppository 650 mg, Q6H PRN  naloxone (NARCAN) injection 0.4 mg, PRN  glucose chewable tablet 16 g, PRN  dextrose bolus 10% 125 mL, PRN   Or  dextrose bolus 10% 250 mL, PRN  glucagon injection 1 mg, PRN  dextrose 10 % infusion, Continuous PRN  allopurinol (ZYLOPRIM) tablet 300 mg, Daily  [Held by provider] amLODIPine (NORVASC) tablet 10 mg, Daily  atorvastatin (LIPITOR) tablet 20 mg, Nightly  cholestyramine light packet 4 g, Daily  folic acid (FOLVITE) tablet 1 mg, Daily  levETIRAcetam (KEPPRA) tablet 500 mg, BID  [Held by provider] lisinopril (PRINIVIL;ZESTRIL) tablet 15 mg, Daily      Review of Systems see HPI    VitalSigns:  BP (!) 150/66   Pulse 81   Temp 97.7 °F (36.5 °C) (Temporal)   Resp 20   Ht 1.549 m (5' 0.98\")   Wt 63.9 kg (140 lb 14 oz)   SpO2 98%   BMI 26.63 kg/m²      Physical Exam  Line/IV (left arm PICC) site: No erythema, warmth, induration, or tenderness.  Lungs clear without crackles  Abdomen is soft and nontender.  No guarding or rebound.  Lower extremity dressing in place.    Lab Results:  CBC:   Recent Labs     08/28/24  0558 08/29/24  0450 08/30/24  0317   WBC 27.8* 26.7* 28.2*   HGB 7.8* 7.7* 7.8*   * 519* 591*   Manual white blood cell differential done  September 1.  Available in interim for nonemergent questions via [email or web address].    ED DAMIAN MD

## 2024-08-30 NOTE — PROGRESS NOTES
Progress Note    Date:2024       Room:0330/330-01  Patient Name:Elda Flood     YOB: 1945     Age:79 y.o.      Subjective    Subjective: 79 year old female who presented to [facility] ED for evaluation of bruising and pain of right lower leg. Pt has history of atrial fibrillation on eliquis, diabetes, stroke, hypertension and hyperlipidemia. 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 Seen by vascular surgery, s/p evacuation of RLE hematoma on 8/15. She did require transfusion due to anemia of acute blood loss. Wound vac was placed .  SNF placement secured.    Due to elevated WBC count patient was placed on empiric antibiotics with cefazolin  for possible skin/soft tissue infection.      Pt developed fever to 101.3 on  and discharge was cancelled. Antibiotics were empirically broadened to Vancomycin and Cefepime.   Viral panel returned positive for COVID. CXR unremarkable. Blood cx NGTD. Vancomycin dced  and currently on Augmentin.    Antibiotics course  Cefazolin -  Vanc -  Cefepime -  Augmentin  - present    Seen by ID  and recommended monitor off antibiotics at this time.     patient with swollen upper lips, no new meds initiated and no food allergies noted. Was given 1x dose of solumedrol and benadryl with improvement in swelling.     Plans to discharge to [place]  after completion of 10 days quarantine.      Review of Systems: 10 point system reviewed and negative except as stated above.      Objective         Vitals Last 24 Hours:  TEMPERATURE:  Temp  Av °F (37.2 °C)  Min: 97.7 °F (36.5 °C)  Max: 100.9 °F (38.3 °C)  RESPIRATIONS RANGE: Resp  Av  Min: 16  Max: 20  PULSE OXIMETRY RANGE: SpO2  Av.5 %  Min: 96 %  Max: 100 %  PULSE RANGE: Pulse  Av.6  Min: 81  Max: 118  BLOOD PRESSURE RANGE: Systolic (24hrs), Av , Min:116 , Max:150   ; Diastolic (24hrs), Av, Min:54,

## 2024-08-31 LAB
ANION GAP SERPL CALCULATED.3IONS-SCNC: 10 MMOL/L (ref 7–19)
BACTERIA BLD CULT: NORMAL
BUN SERPL-MCNC: 49 MG/DL (ref 8–23)
CALCIUM SERPL-MCNC: 9.4 MG/DL (ref 8.8–10.2)
CHLORIDE SERPL-SCNC: 99 MMOL/L (ref 98–111)
CO2 SERPL-SCNC: 26 MMOL/L (ref 22–29)
CREAT SERPL-MCNC: 0.7 MG/DL (ref 0.5–0.9)
ERYTHROCYTE [DISTWIDTH] IN BLOOD BY AUTOMATED COUNT: 18.9 % (ref 11.5–14.5)
GLUCOSE BLD-MCNC: 174 MG/DL (ref 70–99)
GLUCOSE BLD-MCNC: 191 MG/DL (ref 70–99)
GLUCOSE BLD-MCNC: 213 MG/DL (ref 70–99)
GLUCOSE BLD-MCNC: 96 MG/DL (ref 70–99)
GLUCOSE SERPL-MCNC: 122 MG/DL (ref 70–99)
HCT VFR BLD AUTO: 28.3 % (ref 37–47)
HGB BLD-MCNC: 8.1 G/DL (ref 12–16)
MAGNESIUM SERPL-MCNC: 1.4 MG/DL (ref 1.6–2.4)
MCH RBC QN AUTO: 24.2 PG (ref 27–31)
MCHC RBC AUTO-ENTMCNC: 28.6 G/DL (ref 33–37)
MCV RBC AUTO: 84.5 FL (ref 81–99)
PERFORMED ON: ABNORMAL
PERFORMED ON: NORMAL
PLATELET # BLD AUTO: 666 K/UL (ref 130–400)
PMV BLD AUTO: 11 FL (ref 9.4–12.3)
POTASSIUM SERPL-SCNC: 4.7 MMOL/L (ref 3.5–5)
RBC # BLD AUTO: 3.35 M/UL (ref 4.2–5.4)
SODIUM SERPL-SCNC: 135 MMOL/L (ref 136–145)
WBC # BLD AUTO: 35.9 K/UL (ref 4.8–10.8)

## 2024-08-31 PROCEDURE — 6360000002 HC RX W HCPCS: Performed by: NURSE PRACTITIONER

## 2024-08-31 PROCEDURE — 6370000000 HC RX 637 (ALT 250 FOR IP): Performed by: STUDENT IN AN ORGANIZED HEALTH CARE EDUCATION/TRAINING PROGRAM

## 2024-08-31 PROCEDURE — 83735 ASSAY OF MAGNESIUM: CPT

## 2024-08-31 PROCEDURE — 94760 N-INVAS EAR/PLS OXIMETRY 1: CPT

## 2024-08-31 PROCEDURE — 1200000000 HC SEMI PRIVATE

## 2024-08-31 PROCEDURE — 85027 COMPLETE CBC AUTOMATED: CPT

## 2024-08-31 PROCEDURE — 6370000000 HC RX 637 (ALT 250 FOR IP): Performed by: SURGERY

## 2024-08-31 PROCEDURE — 80048 BASIC METABOLIC PNL TOTAL CA: CPT

## 2024-08-31 PROCEDURE — 82962 GLUCOSE BLOOD TEST: CPT

## 2024-08-31 PROCEDURE — 2580000003 HC RX 258: Performed by: SURGERY

## 2024-08-31 PROCEDURE — 36415 COLL VENOUS BLD VENIPUNCTURE: CPT

## 2024-08-31 PROCEDURE — 6370000000 HC RX 637 (ALT 250 FOR IP): Performed by: HOSPITALIST

## 2024-08-31 RX ADMIN — ATORVASTATIN CALCIUM 20 MG: 20 TABLET, FILM COATED ORAL at 20:29

## 2024-08-31 RX ADMIN — INSULIN LISPRO 8 UNITS: 100 INJECTION, SOLUTION INTRAVENOUS; SUBCUTANEOUS at 16:50

## 2024-08-31 RX ADMIN — INSULIN GLARGINE 15 UNITS: 100 INJECTION, SOLUTION SUBCUTANEOUS at 20:31

## 2024-08-31 RX ADMIN — SODIUM CHLORIDE 2 G: 1 TABLET ORAL at 16:50

## 2024-08-31 RX ADMIN — INSULIN LISPRO 8 UNITS: 100 INJECTION, SOLUTION INTRAVENOUS; SUBCUTANEOUS at 08:49

## 2024-08-31 RX ADMIN — OXYCODONE HYDROCHLORIDE 10 MG: 10 TABLET ORAL at 09:05

## 2024-08-31 RX ADMIN — Medication 800 MG: at 16:50

## 2024-08-31 RX ADMIN — CHOLESTYRAMINE 4 G: 4 POWDER, FOR SUSPENSION ORAL at 08:47

## 2024-08-31 RX ADMIN — SODIUM CHLORIDE 2 G: 1 TABLET ORAL at 08:48

## 2024-08-31 RX ADMIN — APIXABAN 5 MG: 5 TABLET, FILM COATED ORAL at 20:30

## 2024-08-31 RX ADMIN — OXYCODONE HYDROCHLORIDE 10 MG: 10 TABLET ORAL at 17:57

## 2024-08-31 RX ADMIN — LEVETIRACETAM 500 MG: 500 TABLET, FILM COATED ORAL at 08:49

## 2024-08-31 RX ADMIN — Medication 15 G: at 08:47

## 2024-08-31 RX ADMIN — OXYCODONE HYDROCHLORIDE 10 MG: 10 TABLET ORAL at 02:32

## 2024-08-31 RX ADMIN — INSULIN LISPRO 8 UNITS: 100 INJECTION, SOLUTION INTRAVENOUS; SUBCUTANEOUS at 12:27

## 2024-08-31 RX ADMIN — FUROSEMIDE 20 MG: 20 TABLET ORAL at 08:49

## 2024-08-31 RX ADMIN — Medication 800 MG: at 08:48

## 2024-08-31 RX ADMIN — SODIUM CHLORIDE, PRESERVATIVE FREE 10 ML: 5 INJECTION INTRAVENOUS at 20:35

## 2024-08-31 RX ADMIN — ALLOPURINOL 300 MG: 100 TABLET ORAL at 08:49

## 2024-08-31 RX ADMIN — METOPROLOL SUCCINATE 50 MG: 50 TABLET, EXTENDED RELEASE ORAL at 20:30

## 2024-08-31 RX ADMIN — SODIUM CHLORIDE 2 G: 1 TABLET ORAL at 12:27

## 2024-08-31 RX ADMIN — LEVETIRACETAM 500 MG: 500 TABLET, FILM COATED ORAL at 20:30

## 2024-08-31 RX ADMIN — NALOXEGOL OXALATE 12.5 MG: 12.5 TABLET, FILM COATED ORAL at 05:29

## 2024-08-31 RX ADMIN — INSULIN GLARGINE 15 UNITS: 100 INJECTION, SOLUTION SUBCUTANEOUS at 10:16

## 2024-08-31 RX ADMIN — APIXABAN 5 MG: 5 TABLET, FILM COATED ORAL at 08:49

## 2024-08-31 RX ADMIN — METOPROLOL SUCCINATE 50 MG: 50 TABLET, EXTENDED RELEASE ORAL at 08:48

## 2024-08-31 RX ADMIN — ONDANSETRON 2 MG: 2 INJECTION INTRAMUSCULAR; INTRAVENOUS at 08:53

## 2024-08-31 RX ADMIN — Medication 800 MG: at 20:30

## 2024-08-31 RX ADMIN — FOLIC ACID 1 MG: 1 TABLET ORAL at 08:48

## 2024-08-31 RX ADMIN — SODIUM CHLORIDE, PRESERVATIVE FREE 10 ML: 5 INJECTION INTRAVENOUS at 08:51

## 2024-08-31 ASSESSMENT — PAIN DESCRIPTION - ORIENTATION
ORIENTATION: LEFT;RIGHT
ORIENTATION: RIGHT;LEFT

## 2024-08-31 ASSESSMENT — PAIN - FUNCTIONAL ASSESSMENT: PAIN_FUNCTIONAL_ASSESSMENT: PREVENTS OR INTERFERES SOME ACTIVE ACTIVITIES AND ADLS

## 2024-08-31 ASSESSMENT — PAIN SCALES - GENERAL
PAINLEVEL_OUTOF10: 8
PAINLEVEL_OUTOF10: 8
PAINLEVEL_OUTOF10: 7

## 2024-08-31 ASSESSMENT — PAIN DESCRIPTION - LOCATION
LOCATION: LEG

## 2024-08-31 ASSESSMENT — PAIN DESCRIPTION - DESCRIPTORS
DESCRIPTORS: ACHING

## 2024-08-31 NOTE — PLAN OF CARE
Problem: Discharge Planning  Goal: Discharge to home or other facility with appropriate resources  Outcome: Progressing  Flowsheets (Taken 8/30/2024 5355)  Discharge to home or other facility with appropriate resources:   Identify barriers to discharge with patient and caregiver   Arrange for needed discharge resources and transportation as appropriate   Identify discharge learning needs (meds, wound care, etc)   Arrange for interpreters to assist at discharge as needed   Refer to discharge planning if patient needs post-hospital services based on physician order or complex needs related to functional status, cognitive ability or social support system     Problem: Skin/Tissue Integrity  Goal: Absence of new skin breakdown  Description: 1.  Monitor for areas of redness and/or skin breakdown  2.  Assess vascular access sites hourly  3.  Every 4-6 hours minimum:  Change oxygen saturation probe site  4.  Every 4-6 hours:  If on nasal continuous positive airway pressure, respiratory therapy assess nares and determine need for appliance change or resting period.  Outcome: Progressing     Problem: Safety - Adult  Goal: Free from fall injury  Outcome: Progressing     Problem: Nutrition Deficit:  Goal: Optimize nutritional status  Outcome: Progressing

## 2024-08-31 NOTE — PROGRESS NOTES
Progress Note    Date:2024       Room:0330/330-01  Patient Name:Elda Flood     YOB: 1945     Age:79 y.o.      Subjective    Subjective: 79 year old female who presented to [facility] ED for evaluation of bruising and pain of right lower leg. Pt has history of atrial fibrillation on eliquis, diabetes, stroke, hypertension and hyperlipidemia. 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 Seen by vascular surgery, s/p evacuation of RLE hematoma on 8/15. She did require transfusion due to anemia of acute blood loss. Wound vac was placed . Due to elevated WBC count patient was placed on empiric antibiotics with cefazolin  for possible skin/soft tissue infection.      Pt developed fever to 101.3 on  and discharge was cancelled. Antibiotics were empirically broadened to Vancomycin and Cefepime.   Viral panel returned positive for COVID. CXR unremarkable. Blood cx NGTD. Vancomycin dced  and currently on Augmentin.    Antibiotics course  Cefazolin -  Vanc -  Cefepime -  Augmentin  -     Seen by ID  and recommended monitor off antibiotics at this time.     patient with swollen upper lips, no new meds initiated and no food allergies noted. Was given 1x dose of solumedrol and benadryl with improvement in swelling.     Plans to discharge to [place]  after completion of 10 days quarantine.     No acute overnight events     Review of Systems: 10 point system reviewed and negative except as stated above.      Objective         Vitals Last 24 Hours:  TEMPERATURE:  Temp  Av.1 °F (36.7 °C)  Min: 97.2 °F (36.2 °C)  Max: 99 °F (37.2 °C)  RESPIRATIONS RANGE: Resp  Av  Min: 16  Max: 20  PULSE OXIMETRY RANGE: SpO2  Av.7 %  Min: 96 %  Max: 99 %  PULSE RANGE: Pulse  Av  Min: 71  Max: 91  BLOOD PRESSURE RANGE: Systolic (24hrs), Av , Min:121 , Max:136   ; Diastolic (24hrs), Av, Min:43,

## 2024-09-01 PROBLEM — D72.828 NEUTROPHILIC LEUKOCYTOSIS: Status: ACTIVE | Noted: 2022-11-17

## 2024-09-01 PROBLEM — D64.9 NORMOCYTIC ANEMIA: Status: ACTIVE | Noted: 2024-09-01

## 2024-09-01 PROBLEM — Z71.89 COORDINATION OF COMPLEX CARE: Status: ACTIVE | Noted: 2024-09-01

## 2024-09-01 PROBLEM — D75.839 THROMBOCYTOSIS: Status: ACTIVE | Noted: 2024-09-01

## 2024-09-01 PROBLEM — D72.9 NEUTROPHILIC LEUKOCYTOSIS: Status: ACTIVE | Noted: 2022-11-17

## 2024-09-01 LAB
CRP SERPL HS-MCNC: 2.13 MG/DL (ref 0–0.5)
ERYTHROCYTE [SEDIMENTATION RATE] IN BLOOD BY WESTERGREN METHOD: 40 MM/HR (ref 0–25)
FERRITIN SERPL-MCNC: 111.9 NG/ML (ref 13–150)
FOLATE SERPL-MCNC: >20 NG/ML (ref 4.8–37.3)
GLUCOSE BLD-MCNC: 145 MG/DL (ref 70–99)
GLUCOSE BLD-MCNC: 199 MG/DL (ref 70–99)
GLUCOSE BLD-MCNC: 228 MG/DL (ref 70–99)
GLUCOSE BLD-MCNC: 244 MG/DL (ref 70–99)
HAPTOGLOB SERPL-MCNC: 322 MG/DL (ref 30–200)
PERFORMED ON: ABNORMAL
REASON FOR REJECTION: NORMAL
REASON FOR REJECTION: NORMAL
REJECTED TEST: NORMAL
REJECTED TEST: NORMAL
VIT B12 SERPL-MCNC: 593 PG/ML (ref 232–1245)

## 2024-09-01 PROCEDURE — 99223 1ST HOSP IP/OBS HIGH 75: CPT | Performed by: INTERNAL MEDICINE

## 2024-09-01 PROCEDURE — 94760 N-INVAS EAR/PLS OXIMETRY 1: CPT

## 2024-09-01 PROCEDURE — 2580000003 HC RX 258: Performed by: SURGERY

## 2024-09-01 PROCEDURE — 2580000003 HC RX 258: Performed by: INTERNAL MEDICINE

## 2024-09-01 PROCEDURE — 6360000002 HC RX W HCPCS: Performed by: NURSE PRACTITIONER

## 2024-09-01 PROCEDURE — 85652 RBC SED RATE AUTOMATED: CPT

## 2024-09-01 PROCEDURE — 83010 ASSAY OF HAPTOGLOBIN QUANT: CPT

## 2024-09-01 PROCEDURE — 6370000000 HC RX 637 (ALT 250 FOR IP): Performed by: STUDENT IN AN ORGANIZED HEALTH CARE EDUCATION/TRAINING PROGRAM

## 2024-09-01 PROCEDURE — 1200000000 HC SEMI PRIVATE

## 2024-09-01 PROCEDURE — 88184 FLOWCYTOMETRY/ TC 1 MARKER: CPT

## 2024-09-01 PROCEDURE — 6370000000 HC RX 637 (ALT 250 FOR IP): Performed by: SURGERY

## 2024-09-01 PROCEDURE — 6370000000 HC RX 637 (ALT 250 FOR IP): Performed by: HOSPITALIST

## 2024-09-01 PROCEDURE — 82962 GLUCOSE BLOOD TEST: CPT

## 2024-09-01 PROCEDURE — 6360000002 HC RX W HCPCS: Performed by: INTERNAL MEDICINE

## 2024-09-01 PROCEDURE — 2580000003 HC RX 258: Performed by: HOSPITALIST

## 2024-09-01 PROCEDURE — 36415 COLL VENOUS BLD VENIPUNCTURE: CPT

## 2024-09-01 PROCEDURE — 88185 FLOWCYTOMETRY/TC ADD-ON: CPT

## 2024-09-01 PROCEDURE — 81206 BCR/ABL1 GENE MAJOR BP: CPT

## 2024-09-01 PROCEDURE — 82607 VITAMIN B-12: CPT

## 2024-09-01 PROCEDURE — 82746 ASSAY OF FOLIC ACID SERUM: CPT

## 2024-09-01 PROCEDURE — 86140 C-REACTIVE PROTEIN: CPT

## 2024-09-01 PROCEDURE — 81207 BCR/ABL1 GENE MINOR BP: CPT

## 2024-09-01 PROCEDURE — 82728 ASSAY OF FERRITIN: CPT

## 2024-09-01 PROCEDURE — 6360000002 HC RX W HCPCS: Performed by: HOSPITALIST

## 2024-09-01 PROCEDURE — 81208 BCR/ABL1 GENE OTHER BP: CPT

## 2024-09-01 RX ORDER — INSULIN LISPRO 100 [IU]/ML
5 INJECTION, SOLUTION INTRAVENOUS; SUBCUTANEOUS
Status: DISCONTINUED | OUTPATIENT
Start: 2024-09-01 | End: 2024-09-05 | Stop reason: HOSPADM

## 2024-09-01 RX ADMIN — SODIUM CHLORIDE 2 G: 1 TABLET ORAL at 16:36

## 2024-09-01 RX ADMIN — METOPROLOL SUCCINATE 50 MG: 50 TABLET, EXTENDED RELEASE ORAL at 20:08

## 2024-09-01 RX ADMIN — INSULIN GLARGINE 15 UNITS: 100 INJECTION, SOLUTION SUBCUTANEOUS at 20:13

## 2024-09-01 RX ADMIN — INSULIN LISPRO 5 UNITS: 100 INJECTION, SOLUTION INTRAVENOUS; SUBCUTANEOUS at 12:05

## 2024-09-01 RX ADMIN — SODIUM CHLORIDE 2 G: 1 TABLET ORAL at 12:04

## 2024-09-01 RX ADMIN — Medication 800 MG: at 20:13

## 2024-09-01 RX ADMIN — APIXABAN 5 MG: 5 TABLET, FILM COATED ORAL at 08:35

## 2024-09-01 RX ADMIN — SODIUM CHLORIDE, PRESERVATIVE FREE 10 ML: 5 INJECTION INTRAVENOUS at 08:47

## 2024-09-01 RX ADMIN — INSULIN LISPRO 2 UNITS: 100 INJECTION, SOLUTION INTRAVENOUS; SUBCUTANEOUS at 12:05

## 2024-09-01 RX ADMIN — OXYCODONE HYDROCHLORIDE 10 MG: 10 TABLET ORAL at 22:22

## 2024-09-01 RX ADMIN — Medication 800 MG: at 08:37

## 2024-09-01 RX ADMIN — ONDANSETRON 2 MG: 2 INJECTION INTRAMUSCULAR; INTRAVENOUS at 08:41

## 2024-09-01 RX ADMIN — ATORVASTATIN CALCIUM 20 MG: 20 TABLET, FILM COATED ORAL at 20:13

## 2024-09-01 RX ADMIN — SODIUM CHLORIDE, PRESERVATIVE FREE 10 ML: 5 INJECTION INTRAVENOUS at 20:14

## 2024-09-01 RX ADMIN — SODIUM CHLORIDE 250 MG: 9 INJECTION, SOLUTION INTRAVENOUS at 13:37

## 2024-09-01 RX ADMIN — LEVETIRACETAM 500 MG: 500 TABLET, FILM COATED ORAL at 20:13

## 2024-09-01 RX ADMIN — MAGNESIUM SULFATE HEPTAHYDRATE 6000 MG: 500 INJECTION, SOLUTION INTRAMUSCULAR; INTRAVENOUS at 09:32

## 2024-09-01 RX ADMIN — OXYCODONE HYDROCHLORIDE 10 MG: 10 TABLET ORAL at 09:32

## 2024-09-01 RX ADMIN — Medication 800 MG: at 12:04

## 2024-09-01 RX ADMIN — Medication 15 G: at 08:36

## 2024-09-01 RX ADMIN — SODIUM CHLORIDE 2 G: 1 TABLET ORAL at 08:35

## 2024-09-01 RX ADMIN — LEVETIRACETAM 500 MG: 500 TABLET, FILM COATED ORAL at 08:34

## 2024-09-01 RX ADMIN — FUROSEMIDE 20 MG: 20 TABLET ORAL at 08:35

## 2024-09-01 RX ADMIN — OXYCODONE HYDROCHLORIDE 10 MG: 10 TABLET ORAL at 15:56

## 2024-09-01 RX ADMIN — ACETAMINOPHEN 650 MG: 325 TABLET ORAL at 01:56

## 2024-09-01 RX ADMIN — METOPROLOL SUCCINATE 50 MG: 50 TABLET, EXTENDED RELEASE ORAL at 08:35

## 2024-09-01 RX ADMIN — FOLIC ACID 1 MG: 1 TABLET ORAL at 08:34

## 2024-09-01 RX ADMIN — ALLOPURINOL 300 MG: 100 TABLET ORAL at 08:35

## 2024-09-01 RX ADMIN — CHOLESTYRAMINE 4 G: 4 POWDER, FOR SUSPENSION ORAL at 08:36

## 2024-09-01 RX ADMIN — NALOXEGOL OXALATE 12.5 MG: 12.5 TABLET, FILM COATED ORAL at 05:01

## 2024-09-01 RX ADMIN — APIXABAN 5 MG: 5 TABLET, FILM COATED ORAL at 20:13

## 2024-09-01 RX ADMIN — INSULIN LISPRO 5 UNITS: 100 INJECTION, SOLUTION INTRAVENOUS; SUBCUTANEOUS at 16:36

## 2024-09-01 RX ADMIN — INSULIN GLARGINE 15 UNITS: 100 INJECTION, SOLUTION SUBCUTANEOUS at 08:38

## 2024-09-01 ASSESSMENT — PAIN - FUNCTIONAL ASSESSMENT
PAIN_FUNCTIONAL_ASSESSMENT: PREVENTS OR INTERFERES SOME ACTIVE ACTIVITIES AND ADLS

## 2024-09-01 ASSESSMENT — PAIN DESCRIPTION - DESCRIPTORS
DESCRIPTORS: ACHING

## 2024-09-01 ASSESSMENT — PAIN DESCRIPTION - ORIENTATION
ORIENTATION: RIGHT

## 2024-09-01 ASSESSMENT — PAIN SCALES - GENERAL
PAINLEVEL_OUTOF10: 8
PAINLEVEL_OUTOF10: 5
PAINLEVEL_OUTOF10: 8
PAINLEVEL_OUTOF10: 6

## 2024-09-01 ASSESSMENT — PAIN DESCRIPTION - LOCATION
LOCATION: LEG
LOCATION: THROAT;LEG
LOCATION: LEG
LOCATION: LEG

## 2024-09-01 NOTE — PROGRESS NOTES
Patient seen at he bedside  Doing well. Eating in the chair  Vac is functioning  A/P  Leucocytosis with left shift - hem /onc consult  Off antibiotics per ID

## 2024-09-01 NOTE — PROGRESS NOTES
Progress Note    Date:2024       Room:0330/330-01  Patient Name:Elda Flood     YOB: 1945     Age:79 y.o.      Subjective    Subjective: 79 year old female who presented to [facility] ED for evaluation of bruising and pain of right lower leg. Pt has history of atrial fibrillation on eliquis, diabetes, stroke, hypertension and hyperlipidemia. 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 Seen by vascular surgery, s/p evacuation of RLE hematoma on 8/15. She did require transfusion due to anemia of acute blood loss. Wound vac was placed . Due to elevated WBC count patient was placed on empiric antibiotics with cefazolin  for possible skin/soft tissue infection.      Pt developed fever to 101.3 on  and discharge was cancelled. Antibiotics were empirically broadened to Vancomycin and Cefepime.   Viral panel returned positive for COVID. CXR unremarkable. Blood cx NGTD. Vancomycin dced  and started on Augmentin till .    Antibiotics course  Cefazolin -  Vanc -  Cefepime -  Augmentin  -     Seen by ID  and recommended monitor off antibiotics at this time.     patient with swollen upper lips, no new meds initiated and no food allergies noted. Was given 1x dose of solumedrol and benadryl with improvement in swelling.     Plans to discharge to [place]  after completion of 10 days quarantine.     No acute overnight events     Review of Systems: 10 point system reviewed and negative except as stated above.      Objective         Vitals Last 24 Hours:  TEMPERATURE:  Temp  Av.9 °F (36.6 °C)  Min: 97 °F (36.1 °C)  Max: 98.6 °F (37 °C)  RESPIRATIONS RANGE: Resp  Av.3  Min: 14  Max: 18  PULSE OXIMETRY RANGE: SpO2  Av.8 %  Min: 97 %  Max: 100 %  PULSE RANGE: Pulse  Av.7  Min: 92  Max: 103  BLOOD PRESSURE RANGE: Systolic (24hrs), Av , Min:105 , Max:143   ; Diastolic (24hrs), Av, Min:47,

## 2024-09-01 NOTE — PLAN OF CARE
Problem: Discharge Planning  Goal: Discharge to home or other facility with appropriate resources  Outcome: Progressing  Flowsheets (Taken 9/1/2024 0049)  Discharge to home or other facility with appropriate resources:   Identify barriers to discharge with patient and caregiver   Arrange for needed discharge resources and transportation as appropriate   Identify discharge learning needs (meds, wound care, etc)   Arrange for interpreters to assist at discharge as needed   Refer to discharge planning if patient needs post-hospital services based on physician order or complex needs related to functional status, cognitive ability or social support system     Problem: Skin/Tissue Integrity  Goal: Absence of new skin breakdown  Description: 1.  Monitor for areas of redness and/or skin breakdown  2.  Assess vascular access sites hourly  3.  Every 4-6 hours minimum:  Change oxygen saturation probe site  4.  Every 4-6 hours:  If on nasal continuous positive airway pressure, respiratory therapy assess nares and determine need for appliance change or resting period.  9/1/2024 0124 by Joyce Mendoza, RN  Outcome: Progressing  8/31/2024 1723 by Kelsey Duggan LPN  Outcome: Progressing     Problem: Safety - Adult  Goal: Free from fall injury  9/1/2024 0124 by Joyce Mendoza, RN  Outcome: Progressing  8/31/2024 1723 by Kelsey Duggan LPN  Outcome: Progressing     Problem: Nutrition Deficit:  Goal: Optimize nutritional status  Outcome: Progressing

## 2024-09-01 NOTE — CONSULTS
MEDICAL ONCOLOGY CONSULTATION    Pt Name: Elda Flood  MRN: [id number]  YOB: 1945  Date of evaluation: 2024    REASON FOR CONSULTATION: Leukocytosis, thrombocytosis, left shift  REQUESTING PHYSICIAN: Vascular    History Obtained From:    patient, electronic medical record    HISTORY OF PRESENT ILLNESS:  Elda Flood was first seen by me on 2020 for consult by vascular for findings of leukocytosis and thrombocytosis with left shift.  The patient has a history of A-fib on chronic anticoagulation with Eliquis, type 2 diabetes, hyperlipidemia, hypertension, prior history of stroke  Patient was hospitalized with traumatic hematoma of the right lower extremity.  Vascular was consulted and performed evacuation of right lower extremity hematoma on 8/15/2024.  Patient becomes severely anemic requiring transfusional support.  Patient was noted to have neutrophil leukocytosis, fever and was placed on antibiotic therapy.  Patient has been tested positive for COVID-19 infection.  She has received several course antibiotics include cefazolin, vancomycin, cefepime and Augmentin.  She has persistent and worsening neutrophil leukocytosis.  Left shift noted with presence of myelocytes and blasts 7%.  Patient has persistent thrombocytosis as well.  Therefore, I was consulted        Past Medical History:    Past Medical History:   Diagnosis Date    Arthritis     Atrial fibrillation (HCC)     Hyperlipidemia     Hypertension     Incisional hernia     Stroke (cerebrum) (HCC)     4yr ago; no residual       Past Surgical History:    Past Surgical History:   Procedure Laterality Date    APPENDECTOMY       SECTION      x2    CHOLECYSTECTOMY      COLONOSCOPY      COLONOSCOPY  16    Dr Phelan ([facility])-Tubular AP (-) dysplasia x 1, 5 yr recall    HERNIA REPAIR      She has had multiple hernia surgeries; x4    HERNIA REPAIR      pt states she's had difficulty with mesh.    HYSTERECTOMY, TOTAL ABDOMINAL  any GI bleed    # COVID-19 infection    # Right thigh hematoma-traumatic  Patient was on anticoagulation with apixaban for A-fib  Patient had incidental trauma to the right leg  Status post evacuation of hematoma by vascular      PLAN:  Peripheral blood smear review  Continue to monitor CBC  Plan discussed with Dr. Landry  Peripheral blood flow cytometry  BCR/ABL  CRP, ESR  B12, ferritin, folate, haptoglobin  We will review peripheral blood smear    I have seen, examined and reviewed this patient medication list, appropriate labs and imaging studies. I reviewed relevant medical records and others physician’s notes. I discussed the plans of care with the patient. I answered all the questions to the patient’s satisfaction. I have also reviewed the chief complaint (CC) and part of the history (History of Present Illness (HPI), Past Family Social History (PFSH), or Review of Systems (ROS) and made changes when appropriated.       (Please note that portions of this note were completed with a voice recognition program. Efforts were made to edit the dictations but occasionally words are mis-transcribed.)        Musa Llamas MD    09/01/24  10:18 AM

## 2024-09-01 NOTE — CARE COORDINATION
Patient was seen during rounds today without family present. She denied any acute overnight events. Stated she was feeling a lot better, sitting up in chair and eating breakfast. Yesterday complained of some pain in her mouth with food. On examination, no evidence of sores or thrush. Today patient stated that the pain has actually improved though still present. Discussed with patient that it is most likely viral syndrome and will continue to monitor at this time as she is still able to tolerate her meals.     2/3 days ago, patient with swollen upper lips and mild facial swelling. No new meds was added to patient regimen and stated she has no known allergies. Received Solumedrol and benadryl as she stated swelling was getting worse. Patient stated she is quite sensitive to things and always hard for anyone to figure out what is going on with her.     Later this afternoon, got message from nurse that patient complaining of pain in her mouth as well as picture sent. Pictures classic for viral syndrome and these are self limiting. Discussed with nurse hesitant to give patient any new medications at this time as these are self limiting and patient really sensitive to meds. Received message again from nurse stating daughter asking if patient can have solumedrol and benadryl. Responded to nurse that there is no indication for any of those meds at this time and dont see the reason to prescribe. Daughter requesting another provider.     Since admission, there has been one issue or the other with the daughter requesting non indicated treatment plan or laboratory orders for patient.     Informed nurse will be more than happy to switch to a different provider to  patient case tomorrow.

## 2024-09-02 ENCOUNTER — OUTSIDE FACILITY SERVICE (OUTPATIENT)
Age: 79
End: 2024-09-02
Payer: MEDICARE

## 2024-09-02 LAB
ANION GAP SERPL CALCULATED.3IONS-SCNC: 7 MMOL/L (ref 7–19)
ANISOCYTOSIS BLD QL SMEAR: ABNORMAL
BASOPHILS # BLD: 0 K/UL (ref 0–0.2)
BASOPHILS NFR BLD: 0 % (ref 0–1)
BUN SERPL-MCNC: 33 MG/DL (ref 8–23)
CALCIUM SERPL-MCNC: 8.6 MG/DL (ref 8.8–10.2)
CHLORIDE SERPL-SCNC: 104 MMOL/L (ref 98–111)
CO2 SERPL-SCNC: 25 MMOL/L (ref 22–29)
CREAT SERPL-MCNC: 0.5 MG/DL (ref 0.5–0.9)
DACRYOCYTES BLD QL SMEAR: ABNORMAL
EOSINOPHIL # BLD: 0 K/UL (ref 0–0.6)
EOSINOPHIL NFR BLD: 0 % (ref 0–5)
ERYTHROCYTE [DISTWIDTH] IN BLOOD BY AUTOMATED COUNT: 18.9 % (ref 11.5–14.5)
ERYTHROCYTE [DISTWIDTH] IN BLOOD BY AUTOMATED COUNT: 19.2 % (ref 11.5–14.5)
GLUCOSE BLD-MCNC: 126 MG/DL (ref 70–99)
GLUCOSE BLD-MCNC: 178 MG/DL (ref 70–99)
GLUCOSE BLD-MCNC: 179 MG/DL (ref 70–99)
GLUCOSE BLD-MCNC: 207 MG/DL (ref 70–99)
GLUCOSE SERPL-MCNC: 168 MG/DL (ref 70–99)
HCT VFR BLD AUTO: 23.4 % (ref 37–47)
HCT VFR BLD AUTO: 24.3 % (ref 37–47)
HGB BLD-MCNC: 7.2 G/DL (ref 12–16)
HGB BLD-MCNC: 7.4 G/DL (ref 12–16)
HYPOCHROMIA BLD QL SMEAR: ABNORMAL
IMM GRANULOCYTES # BLD: 2.7 K/UL
LYMPHOCYTES # BLD: 5.9 K/UL (ref 1.1–4.5)
LYMPHOCYTES NFR BLD: 19 % (ref 20–40)
MAGNESIUM SERPL-MCNC: 1.7 MG/DL (ref 1.6–2.4)
MCH RBC QN AUTO: 24.6 PG (ref 27–31)
MCH RBC QN AUTO: 24.6 PG (ref 27–31)
MCHC RBC AUTO-ENTMCNC: 30.5 G/DL (ref 33–37)
MCHC RBC AUTO-ENTMCNC: 30.8 G/DL (ref 33–37)
MCV RBC AUTO: 79.9 FL (ref 81–99)
MCV RBC AUTO: 80.7 FL (ref 81–99)
MONOCYTES # BLD: 3.1 K/UL (ref 0–0.9)
MONOCYTES NFR BLD: 10 % (ref 0–10)
NEUTROPHILS # BLD: 22.2 K/UL (ref 1.5–7.5)
NEUTS BAND NFR BLD MANUAL: 2 % (ref 0–5)
NEUTS SEG NFR BLD: 69 % (ref 50–65)
OVALOCYTES BLD QL SMEAR: ABNORMAL
PERFORMED ON: ABNORMAL
PLATELET # BLD AUTO: 656 K/UL (ref 130–400)
PLATELET # BLD AUTO: 656 K/UL (ref 130–400)
PLATELET SLIDE REVIEW: ABNORMAL
PMV BLD AUTO: 11.3 FL (ref 9.4–12.3)
PMV BLD AUTO: 11.6 FL (ref 9.4–12.3)
POLYCHROMASIA BLD QL SMEAR: ABNORMAL
POTASSIUM SERPL-SCNC: 4.1 MMOL/L (ref 3.5–5)
RBC # BLD AUTO: 2.93 M/UL (ref 4.2–5.4)
RBC # BLD AUTO: 3.01 M/UL (ref 4.2–5.4)
SODIUM SERPL-SCNC: 136 MMOL/L (ref 136–145)
WBC # BLD AUTO: 31.3 K/UL (ref 4.8–10.8)
WBC # BLD AUTO: 32.1 K/UL (ref 4.8–10.8)

## 2024-09-02 PROCEDURE — 6370000000 HC RX 637 (ALT 250 FOR IP): Performed by: SURGERY

## 2024-09-02 PROCEDURE — 1200000000 HC SEMI PRIVATE

## 2024-09-02 PROCEDURE — 99232 SBSQ HOSP IP/OBS MODERATE 35: CPT | Performed by: INTERNAL MEDICINE

## 2024-09-02 PROCEDURE — 94760 N-INVAS EAR/PLS OXIMETRY 1: CPT

## 2024-09-02 PROCEDURE — 6360000002 HC RX W HCPCS: Performed by: HOSPITALIST

## 2024-09-02 PROCEDURE — 6360000002 HC RX W HCPCS: Performed by: INTERNAL MEDICINE

## 2024-09-02 PROCEDURE — 36415 COLL VENOUS BLD VENIPUNCTURE: CPT

## 2024-09-02 PROCEDURE — 80048 BASIC METABOLIC PNL TOTAL CA: CPT

## 2024-09-02 PROCEDURE — 83735 ASSAY OF MAGNESIUM: CPT

## 2024-09-02 PROCEDURE — 82962 GLUCOSE BLOOD TEST: CPT

## 2024-09-02 PROCEDURE — 6370000000 HC RX 637 (ALT 250 FOR IP): Performed by: STUDENT IN AN ORGANIZED HEALTH CARE EDUCATION/TRAINING PROGRAM

## 2024-09-02 PROCEDURE — 85025 COMPLETE CBC W/AUTO DIFF WBC: CPT

## 2024-09-02 PROCEDURE — 6370000000 HC RX 637 (ALT 250 FOR IP)

## 2024-09-02 PROCEDURE — 2580000003 HC RX 258: Performed by: INTERNAL MEDICINE

## 2024-09-02 PROCEDURE — 6370000000 HC RX 637 (ALT 250 FOR IP): Performed by: HOSPITALIST

## 2024-09-02 PROCEDURE — 85027 COMPLETE CBC AUTOMATED: CPT

## 2024-09-02 PROCEDURE — 2580000003 HC RX 258: Performed by: SURGERY

## 2024-09-02 RX ORDER — OXYCODONE HYDROCHLORIDE 10 MG/1
10 TABLET ORAL ONCE
Status: COMPLETED | OUTPATIENT
Start: 2024-09-02 | End: 2024-09-02

## 2024-09-02 RX ADMIN — METOPROLOL SUCCINATE 50 MG: 50 TABLET, EXTENDED RELEASE ORAL at 19:57

## 2024-09-02 RX ADMIN — LIDOCAINE HYDROCHLORIDE: 40 SOLUTION ORAL at 11:30

## 2024-09-02 RX ADMIN — NALOXEGOL OXALATE 12.5 MG: 12.5 TABLET, FILM COATED ORAL at 05:58

## 2024-09-02 RX ADMIN — HYDROMORPHONE HYDROCHLORIDE 0.25 MG: 1 INJECTION, SOLUTION INTRAMUSCULAR; INTRAVENOUS; SUBCUTANEOUS at 21:04

## 2024-09-02 RX ADMIN — INSULIN LISPRO 5 UNITS: 100 INJECTION, SOLUTION INTRAVENOUS; SUBCUTANEOUS at 12:13

## 2024-09-02 RX ADMIN — LEVETIRACETAM 500 MG: 500 TABLET, FILM COATED ORAL at 08:57

## 2024-09-02 RX ADMIN — ACETAMINOPHEN 650 MG: 325 TABLET ORAL at 20:04

## 2024-09-02 RX ADMIN — INSULIN GLARGINE 15 UNITS: 100 INJECTION, SOLUTION SUBCUTANEOUS at 08:58

## 2024-09-02 RX ADMIN — INSULIN GLARGINE 15 UNITS: 100 INJECTION, SOLUTION SUBCUTANEOUS at 19:57

## 2024-09-02 RX ADMIN — Medication 800 MG: at 08:57

## 2024-09-02 RX ADMIN — OXYCODONE HYDROCHLORIDE 10 MG: 10 TABLET ORAL at 05:59

## 2024-09-02 RX ADMIN — ALLOPURINOL 300 MG: 100 TABLET ORAL at 08:57

## 2024-09-02 RX ADMIN — OXYCODONE HYDROCHLORIDE 10 MG: 10 TABLET ORAL at 18:53

## 2024-09-02 RX ADMIN — DIPHENHYDRAMINE HCL 5 ML: 12.5 LIQUID ORAL at 13:24

## 2024-09-02 RX ADMIN — Medication 800 MG: at 13:32

## 2024-09-02 RX ADMIN — INSULIN LISPRO 5 UNITS: 100 INJECTION, SOLUTION INTRAVENOUS; SUBCUTANEOUS at 17:24

## 2024-09-02 RX ADMIN — OXYCODONE HYDROCHLORIDE 10 MG: 10 TABLET ORAL at 10:20

## 2024-09-02 RX ADMIN — APIXABAN 5 MG: 5 TABLET, FILM COATED ORAL at 19:57

## 2024-09-02 RX ADMIN — CHOLESTYRAMINE 4 G: 4 POWDER, FOR SUSPENSION ORAL at 08:56

## 2024-09-02 RX ADMIN — Medication 15 G: at 08:56

## 2024-09-02 RX ADMIN — APIXABAN 5 MG: 5 TABLET, FILM COATED ORAL at 08:57

## 2024-09-02 RX ADMIN — Medication 800 MG: at 19:57

## 2024-09-02 RX ADMIN — SODIUM CHLORIDE 2 G: 1 TABLET ORAL at 12:13

## 2024-09-02 RX ADMIN — ATORVASTATIN CALCIUM 20 MG: 20 TABLET, FILM COATED ORAL at 19:57

## 2024-09-02 RX ADMIN — SODIUM CHLORIDE 250 MG: 9 INJECTION, SOLUTION INTRAVENOUS at 09:10

## 2024-09-02 RX ADMIN — SODIUM CHLORIDE 2 G: 1 TABLET ORAL at 17:22

## 2024-09-02 RX ADMIN — FUROSEMIDE 20 MG: 20 TABLET ORAL at 08:57

## 2024-09-02 RX ADMIN — SODIUM CHLORIDE, PRESERVATIVE FREE 10 ML: 5 INJECTION INTRAVENOUS at 19:56

## 2024-09-02 RX ADMIN — INSULIN LISPRO 5 UNITS: 100 INJECTION, SOLUTION INTRAVENOUS; SUBCUTANEOUS at 08:58

## 2024-09-02 RX ADMIN — SODIUM CHLORIDE, PRESERVATIVE FREE 10 ML: 5 INJECTION INTRAVENOUS at 09:22

## 2024-09-02 RX ADMIN — DIPHENHYDRAMINE HCL 5 ML: 12.5 LIQUID ORAL at 17:22

## 2024-09-02 RX ADMIN — INSULIN LISPRO 2 UNITS: 100 INJECTION, SOLUTION INTRAVENOUS; SUBCUTANEOUS at 12:14

## 2024-09-02 RX ADMIN — SODIUM CHLORIDE 2 G: 1 TABLET ORAL at 08:58

## 2024-09-02 RX ADMIN — METOPROLOL SUCCINATE 50 MG: 50 TABLET, EXTENDED RELEASE ORAL at 08:57

## 2024-09-02 RX ADMIN — ONDANSETRON 4 MG: 4 TABLET, ORALLY DISINTEGRATING ORAL at 08:57

## 2024-09-02 RX ADMIN — LEVETIRACETAM 500 MG: 500 TABLET, FILM COATED ORAL at 19:57

## 2024-09-02 RX ADMIN — FOLIC ACID 1 MG: 1 TABLET ORAL at 08:57

## 2024-09-02 ASSESSMENT — PAIN DESCRIPTION - LOCATION
LOCATION: MOUTH
LOCATION: KNEE
LOCATION: KNEE
LOCATION: MOUTH
LOCATION: LEG

## 2024-09-02 ASSESSMENT — PAIN DESCRIPTION - DESCRIPTORS
DESCRIPTORS: DISCOMFORT;SORE;THROBBING
DESCRIPTORS: ACHING;BURNING
DESCRIPTORS: ACHING;CRUSHING;DISCOMFORT
DESCRIPTORS: THROBBING
DESCRIPTORS: BURNING
DESCRIPTORS: ACHING
DESCRIPTORS: ACHING

## 2024-09-02 ASSESSMENT — PAIN SCALES - GENERAL
PAINLEVEL_OUTOF10: 3
PAINLEVEL_OUTOF10: 3
PAINLEVEL_OUTOF10: 9
PAINLEVEL_OUTOF10: 7
PAINLEVEL_OUTOF10: 9
PAINLEVEL_OUTOF10: 8
PAINLEVEL_OUTOF10: 7
PAINLEVEL_OUTOF10: 5
PAINLEVEL_OUTOF10: 1

## 2024-09-02 ASSESSMENT — PAIN DESCRIPTION - ORIENTATION
ORIENTATION: RIGHT
ORIENTATION: RIGHT
ORIENTATION: INNER
ORIENTATION: RIGHT
ORIENTATION: RIGHT
ORIENTATION: ANTERIOR

## 2024-09-02 ASSESSMENT — PAIN - FUNCTIONAL ASSESSMENT
PAIN_FUNCTIONAL_ASSESSMENT: PREVENTS OR INTERFERES SOME ACTIVE ACTIVITIES AND ADLS

## 2024-09-02 NOTE — PLAN OF CARE
Problem: Discharge Planning  Goal: Discharge to home or other facility with appropriate resources  9/1/2024 2330 by Joyce Mendoza, RN  Outcome: Progressing  Flowsheets (Taken 9/1/2024 2303)  Discharge to home or other facility with appropriate resources:   Identify barriers to discharge with patient and caregiver   Arrange for needed discharge resources and transportation as appropriate   Identify discharge learning needs (meds, wound care, etc)   Arrange for interpreters to assist at discharge as needed   Refer to discharge planning if patient needs post-hospital services based on physician order or complex needs related to functional status, cognitive ability or social support system  9/1/2024 1304 by Elizabeth Sorto, RN  Outcome: Progressing  Flowsheets (Taken 9/1/2024 0730)  Discharge to home or other facility with appropriate resources:   Identify barriers to discharge with patient and caregiver   Arrange for needed discharge resources and transportation as appropriate   Identify discharge learning needs (meds, wound care, etc)   Arrange for interpreters to assist at discharge as needed   Refer to discharge planning if patient needs post-hospital services based on physician order or complex needs related to functional status, cognitive ability or social support system     Problem: Skin/Tissue Integrity  Goal: Absence of new skin breakdown  Description: 1.  Monitor for areas of redness and/or skin breakdown  2.  Assess vascular access sites hourly  3.  Every 4-6 hours minimum:  Change oxygen saturation probe site  4.  Every 4-6 hours:  If on nasal continuous positive airway pressure, respiratory therapy assess nares and determine need for appliance change or resting period.  9/1/2024 2330 by Joyce Mendoza, RN  Outcome: Progressing  9/1/2024 1304 by Elizabeth Sorto, RN  Outcome: Progressing     Problem: Safety - Adult  Goal: Free from fall injury  9/1/2024 2330 by Joyce Mendoza, RN  Outcome: Progressing  9/1/2024  1304 by Elizabeth Sorto, RN  Outcome: Progressing     Problem: Nutrition Deficit:  Goal: Optimize nutritional status  9/1/2024 2330 by Joyce Mendoza, RN  Outcome: Progressing  9/1/2024 1304 by Elizabeth Sorto, RN  Outcome: Progressing

## 2024-09-02 NOTE — PROGRESS NOTES
MEDICAL ONCOLOGY PROGRESS NOTE     Pt Name: Elda Flood  MRN: [id number]  YOB: 1945  Date of evaluation: 2024    Subjective-tolerated IV iron therapy relatively well.  Denies any infusion reaction.  I met her 2 daughters at bedside this morning had a discussion about her blood work.  The patient is afebrile.  Overall she is feeling better.  Reviewed interval notes vascular and internal medicine.    HISTORY OF PRESENT ILLNESS:    Reason for consultation  Longstanding neutrophil leukocytosis  Monocytosis  Iron deficiency anemia  Multifactorial anemia  Reactive thrombocytosis    HEMATOLOGY HISTORY  Elda Flood was first seen by me on 2020 for consult by vascular for findings of leukocytosis and thrombocytosis with left shift.  The patient has a history of A-fib on chronic anticoagulation with Eliquis, type 2 diabetes, hyperlipidemia, hypertension, prior history of stroke  Patient was hospitalized with traumatic hematoma of the right lower extremity.  Vascular was consulted and performed evacuation of right lower extremity hematoma on 8/15/2024.  Patient becomes severely anemic requiring transfusional support.  Patient was noted to have neutrophil leukocytosis, fever and was placed on antibiotic therapy.  Patient has been tested positive for COVID-19 infection.  She has received several course antibiotics include cefazolin, vancomycin, cefepime and Augmentin.  She has persistent and worsening neutrophil leukocytosis.  Left shift noted with presence of myelocytes and blasts 7%.  Patient has persistent thrombocytosis as well.  Therefore, I was consulted        Past Medical History:    Past Medical History:   Diagnosis Date    Arthritis     Atrial fibrillation (HCC)     Hyperlipidemia     Hypertension     Incisional hernia     Stroke (cerebrum) (HCC)     4yr ago; no residual       Past Surgical History:    Past Surgical History:   Procedure Laterality Date    APPENDECTOMY       SECTION      x2     CHOLECYSTECTOMY      COLONOSCOPY  1998    COLONOSCOPY  5/18/16    Dr Phelan ([facility])-Tubular AP (-) dysplasia x 1, 5 yr recall    HERNIA REPAIR  2012    She has had multiple hernia surgeries; x4    HERNIA REPAIR      pt states she's had difficulty with mesh.    HYSTERECTOMY, TOTAL ABDOMINAL (CERVIX REMOVED)  1986    LEG SURGERY Right 8/15/2024    RIGHT LEG EVACUTION HEMATOMA performed by Emili Landry DO at [facility] OR    OVARY REMOVAL Bilateral 1986    age 41    UMBILICAL HERNIA REPAIR N/A 1/22/2019    INCISIONAL HERNIA REPAIR WITH BIOLOGIC MESH performed by Bebo Guevara MD at [facility] OR    UPPER GASTROINTESTINAL ENDOSCOPY  1985       Social History:    Marital status:   Smoking status: Never smoker  ETOH status: No  Resides: [place], [place]    Family History:   Family History   Problem Relation Age of Onset    Colon Cancer Mother     Colon Polyps Neg Hx     Esophageal Cancer Neg Hx     Liver Disease Neg Hx     Liver Cancer Neg Hx     Rectal Cancer Neg Hx     Stomach Cancer Neg Hx        Current Hospital Medications:    Current Facility-Administered Medications   Medication Dose Route Frequency Provider Last Rate Last Admin    insulin lispro (HUMALOG,ADMELOG) injection vial 5 Units  5 Units SubCUTAneous TID WC Diamond Guy MD   5 Units at 09/02/24 0858    ferric gluconate (FERRLECIT) 250 mg in sodium chloride 0.9 % 250 mL IVPB  250 mg IntraVENous Daily Musa Llamas  mL/hr at 09/02/24 0910 250 mg at 09/02/24 0910    metoprolol succinate (TOPROL XL) extended release tablet 50 mg  50 mg Oral BID Diamond Guy MD   50 mg at 09/02/24 0857    collagenase ointment   Topical Daily Emili Landry DO   Given at 08/29/24 0936    apixaban (ELIQUIS) tablet 5 mg  5 mg Oral BID Emili Landry DO   5 mg at 09/02/24 0857    sodium hypochlorite (DAKINS) 0.125 % external solution   Irrigation Daily Emili Landry DO   Given at 08/29/24 0936    oxyCODONE HCl (OXY-IR)

## 2024-09-02 NOTE — PROGRESS NOTES
[facility]    Progress Note    Patient:  Elda Flood  YOB: 1945  Date of Service: 9/2/2024  MRN: [id number]   Acct: [id number]   Primary Care Physician: NICOLE Solis DO  Advance Directive: Full Code  Admit Date: 8/14/2024       Hospital Day: 19    Portions of this note have been copied forward, however, updated to reflect the most current clinical status of this patient.     CHIEF COMPLAINT: bruising RLE    SUBJECTIVE: Sitting in chair, no complaints other than mouth pain    CUMULATIVE HOSPITAL COURSE:     This patient is a 79-year-old female with PMH T2DM, hypertension, CVA, hyperlipidemia, atrial fibrillation on chronic anticoagulation who presented to [facility] ED on 8/14 for evaluation of bruising and pain to RLE; found to have large hematoma with active extravasation from venous varicosities on CT.  She was seen by vascular surgery and is s/p evacuation of hematoma on 8/15.  Postoperatively, patient required blood transfusion due to anemia from acute blood loss. Hemoglobin has since been stable. Eliquis held initially, however restarted once hemoglobin stabilized. Wound VAC was placed on 8/20 and wound is healing appropriately.  Patient has had persistent leukocytosis throughout admission; initially placed on Ancef for possible skin/soft tissue infection.  ID was consulted and do not believe that her leukocytosis is infectious in nature.  Hematology/oncology have evaluated the patient and further workup is ongoing; recommend outpatient follow-up to discuss results.  Patient had been accepted to SNF and had plan to discharge on 8/24, however prior to discharge had noted temp up to 101.3 which resolved quickly on its own.  Respiratory panel positive for COVID-19. Hyponatremia consistent with SIADH treated with urea packets, nacl tabs, and fluid restriction, resolved. Intermittent hypomagnesemia treated per protocol. Patient experienced some swelling to her lips on 8/29, which resolved

## 2024-09-02 NOTE — PROGRESS NOTES
Infectious Diseases Progress Note    Patient:  Elda Flood  YOB: 1945  MRN: [id number]   Admit date: 8/14/2024   Admitting Physician: Diamond Guy MD  Primary Care Physician: NICOLE Solis DO    Chief Complaint/Interval History: She seems to be doing very well from infection standpoint.  She is not having fever.  She is not having any chills or night sweats.  She is not describing any cough or sputum production.  She is not having abdominal pain or diarrhea.  I was here today when she had dressing change.  Right lower extremity wound seems to be doing very well.  There is no purulence.  There is no surrounding erythema.  There does not appear to be any findings suggest abscess.  Her white blood cell count remains elevated.  Despite her elevation in white count, she is not manifesting any new or progressing signs/symptoms of infection.  Oncology consultation note reviewed.    In/Out    Intake/Output Summary (Last 24 hours) at 9/2/2024 1036  Last data filed at 9/2/2024 0912  Gross per 24 hour   Intake 480 ml   Output 1450 ml   Net -970 ml     Allergies: No Known Allergies  Current Meds: insulin lispro (HUMALOG,ADMELOG) injection vial 5 Units, TID WC  ferric gluconate (FERRLECIT) 250 mg in sodium chloride 0.9 % 250 mL IVPB, Daily  metoprolol succinate (TOPROL XL) extended release tablet 50 mg, BID  collagenase ointment, Daily  apixaban (ELIQUIS) tablet 5 mg, BID  sodium hypochlorite (DAKINS) 0.125 % external solution, Daily  oxyCODONE HCl (OXY-IR) immediate release tablet 10 mg, Q6H PRN  furosemide (LASIX) tablet 20 mg, Daily  magnesium oxide (MAG-OX) tablet 800 mg, TID  polyethylene glycol (GLYCOLAX) packet 17 g, Daily  insulin glargine (LANTUS) injection vial 15 Units, BID  insulin lispro (HUMALOG,ADMELOG) injection vial 0-8 Units, TID WC  insulin lispro (HUMALOG,ADMELOG) injection vial 0-4 Units, Nightly  naloxegol (MOVANTIK) tablet 12.5 mg, QAM AC  sodium chloride tablet 2 g, TID  09/02/24  0736   WBC 35.9* 32.1* 31.3*   HGB 8.1* 7.2* 7.4*   * 656* 656*     BMP:  Recent Labs     08/31/24  1412 09/02/24  0435   * 136   K 4.7 4.1   CL 99 104   CO2 26 25   BUN 49* 33*   CREATININE 0.7 0.5   GLUCOSE 122* 168*     CRP September 1 results 2.1  Sedimentation rate September 1 2024 results 40    Flow cytometry pending  BCR/ABL translocation pending    Culture Results:  Blood cultures August 26, 2024-no growth  Blood cultures August 23, 2024-no growth  Blood cultures August 23, 2024-no growth    Wound cultures August 23, 2024-Enterococcus, Enterobacter, bacillus-previously treated    Radiology: None    Additional Studies Reviewed:      FINAL DIAGNOSIS:   Peripheral blood smear, physician requested review:   1.  Leukocytosis with a left shift of the myeloid series.   2.  Microcytic anemia.   3.  Thrombocytosis.   4.  Negative for blasts.   5.  Negative for schistocytes.   COMMENT:    Clinical correlation is suggested.     CBG/CBG     Impression:  1.  Leukocytosis-definite etiology remains uncertain.  Leaning toward noninfectious etiology.  She is without fever.  She is not having any significant pain or discomfort.  She has not demonstrating any new localizing symptoms of infection.  Her wound has been improving with local care off antibiotic treatment.  2.  Open wound right lower extremity following hematoma drainage-improving with healthy appearing granulation, no purulence, and no cellulitis.  3.  Previous fever-that may have been related to COVID-remaining resolved    Recommendations:  Continue off antibiotic treatment  Continue local wound care  Continue to monitor for new localizing signs or symptoms suggestive of infection  Await flow cytometry  Additional recommendations regarding leukocytosis per oncology  May eventually need bone marrow biopsy depending upon clinical course  Continue to follow    ED DAMIAN MD

## 2024-09-02 NOTE — PROGRESS NOTES
Wound vac changed per physician order. Pictures uploaded to media. Patient tolerated procedure well. Resting comfortably in chair, heels elevated, call light within reach, no further needs at this time.          Electronically signed by Elizabeth Sorto RN on 9/2/2024 at 11:41 AM

## 2024-09-03 ENCOUNTER — OUTSIDE FACILITY SERVICE (OUTPATIENT)
Age: 79
End: 2024-09-03
Payer: MEDICARE

## 2024-09-03 LAB
ACUTE LEUKEMIA MARKERS SPEC-IMP: NORMAL
EVENTS COUNTED SPEC: 26 MARKERS
GLUCOSE BLD-MCNC: 120 MG/DL (ref 70–99)
GLUCOSE BLD-MCNC: 130 MG/DL (ref 70–99)
GLUCOSE BLD-MCNC: 179 MG/DL (ref 70–99)
GLUCOSE BLD-MCNC: 202 MG/DL (ref 70–99)
PERFORMED ON: ABNORMAL
PROF LEUK/LYMPH PHENO 16 OR MORE MARKERS: NORMAL
PROF LEUK/LYMPH PHENO 26 MARKERS: NORMAL
SOURCE: NORMAL

## 2024-09-03 PROCEDURE — 6360000002 HC RX W HCPCS: Performed by: INTERNAL MEDICINE

## 2024-09-03 PROCEDURE — 94760 N-INVAS EAR/PLS OXIMETRY 1: CPT

## 2024-09-03 PROCEDURE — 6370000000 HC RX 637 (ALT 250 FOR IP): Performed by: SURGERY

## 2024-09-03 PROCEDURE — 6360000002 HC RX W HCPCS: Performed by: HOSPITALIST

## 2024-09-03 PROCEDURE — 2580000003 HC RX 258: Performed by: INTERNAL MEDICINE

## 2024-09-03 PROCEDURE — 6370000000 HC RX 637 (ALT 250 FOR IP): Performed by: HOSPITALIST

## 2024-09-03 PROCEDURE — 6370000000 HC RX 637 (ALT 250 FOR IP): Performed by: STUDENT IN AN ORGANIZED HEALTH CARE EDUCATION/TRAINING PROGRAM

## 2024-09-03 PROCEDURE — 97530 THERAPEUTIC ACTIVITIES: CPT

## 2024-09-03 PROCEDURE — 1200000000 HC SEMI PRIVATE

## 2024-09-03 PROCEDURE — 97110 THERAPEUTIC EXERCISES: CPT

## 2024-09-03 PROCEDURE — 2580000003 HC RX 258: Performed by: SURGERY

## 2024-09-03 PROCEDURE — 82962 GLUCOSE BLOOD TEST: CPT

## 2024-09-03 PROCEDURE — 99232 SBSQ HOSP IP/OBS MODERATE 35: CPT | Performed by: INTERNAL MEDICINE

## 2024-09-03 PROCEDURE — 97535 SELF CARE MNGMENT TRAINING: CPT

## 2024-09-03 RX ADMIN — HYDROMORPHONE HYDROCHLORIDE 0.25 MG: 1 INJECTION, SOLUTION INTRAMUSCULAR; INTRAVENOUS; SUBCUTANEOUS at 22:25

## 2024-09-03 RX ADMIN — DIPHENHYDRAMINE HCL 5 ML: 12.5 LIQUID ORAL at 08:49

## 2024-09-03 RX ADMIN — SODIUM CHLORIDE, PRESERVATIVE FREE 10 ML: 5 INJECTION INTRAVENOUS at 20:28

## 2024-09-03 RX ADMIN — FUROSEMIDE 20 MG: 20 TABLET ORAL at 08:49

## 2024-09-03 RX ADMIN — ALLOPURINOL 300 MG: 100 TABLET ORAL at 08:49

## 2024-09-03 RX ADMIN — OXYCODONE HYDROCHLORIDE 10 MG: 10 TABLET ORAL at 12:43

## 2024-09-03 RX ADMIN — SODIUM CHLORIDE 2 G: 1 TABLET ORAL at 08:49

## 2024-09-03 RX ADMIN — CHOLESTYRAMINE 4 G: 4 POWDER, FOR SUSPENSION ORAL at 08:50

## 2024-09-03 RX ADMIN — DIPHENHYDRAMINE HCL 5 ML: 12.5 LIQUID ORAL at 15:15

## 2024-09-03 RX ADMIN — SODIUM CHLORIDE 250 MG: 9 INJECTION, SOLUTION INTRAVENOUS at 08:53

## 2024-09-03 RX ADMIN — APIXABAN 5 MG: 5 TABLET, FILM COATED ORAL at 20:27

## 2024-09-03 RX ADMIN — NALOXEGOL OXALATE 12.5 MG: 12.5 TABLET, FILM COATED ORAL at 05:23

## 2024-09-03 RX ADMIN — METOPROLOL SUCCINATE 50 MG: 50 TABLET, EXTENDED RELEASE ORAL at 08:49

## 2024-09-03 RX ADMIN — LEVETIRACETAM 500 MG: 500 TABLET, FILM COATED ORAL at 20:27

## 2024-09-03 RX ADMIN — SODIUM CHLORIDE, PRESERVATIVE FREE 10 ML: 5 INJECTION INTRAVENOUS at 08:59

## 2024-09-03 RX ADMIN — Medication 800 MG: at 08:49

## 2024-09-03 RX ADMIN — SODIUM CHLORIDE 2 G: 1 TABLET ORAL at 12:43

## 2024-09-03 RX ADMIN — ATORVASTATIN CALCIUM 20 MG: 20 TABLET, FILM COATED ORAL at 20:28

## 2024-09-03 RX ADMIN — INSULIN GLARGINE 15 UNITS: 100 INJECTION, SOLUTION SUBCUTANEOUS at 08:51

## 2024-09-03 RX ADMIN — LEVETIRACETAM 500 MG: 500 TABLET, FILM COATED ORAL at 08:49

## 2024-09-03 RX ADMIN — FOLIC ACID 1 MG: 1 TABLET ORAL at 08:49

## 2024-09-03 RX ADMIN — INSULIN LISPRO 5 UNITS: 100 INJECTION, SOLUTION INTRAVENOUS; SUBCUTANEOUS at 08:51

## 2024-09-03 RX ADMIN — INSULIN LISPRO 5 UNITS: 100 INJECTION, SOLUTION INTRAVENOUS; SUBCUTANEOUS at 17:07

## 2024-09-03 RX ADMIN — Medication 800 MG: at 12:43

## 2024-09-03 RX ADMIN — METOPROLOL SUCCINATE 50 MG: 50 TABLET, EXTENDED RELEASE ORAL at 20:27

## 2024-09-03 RX ADMIN — Medication 15 G: at 08:49

## 2024-09-03 RX ADMIN — ONDANSETRON 4 MG: 4 TABLET, ORALLY DISINTEGRATING ORAL at 08:48

## 2024-09-03 RX ADMIN — APIXABAN 5 MG: 5 TABLET, FILM COATED ORAL at 08:49

## 2024-09-03 RX ADMIN — INSULIN GLARGINE 15 UNITS: 100 INJECTION, SOLUTION SUBCUTANEOUS at 20:26

## 2024-09-03 RX ADMIN — OXYCODONE HYDROCHLORIDE 10 MG: 10 TABLET ORAL at 05:23

## 2024-09-03 RX ADMIN — SODIUM CHLORIDE 2 G: 1 TABLET ORAL at 17:07

## 2024-09-03 RX ADMIN — OXYCODONE HYDROCHLORIDE 10 MG: 10 TABLET ORAL at 20:27

## 2024-09-03 RX ADMIN — Medication 800 MG: at 20:28

## 2024-09-03 RX ADMIN — INSULIN LISPRO 5 UNITS: 100 INJECTION, SOLUTION INTRAVENOUS; SUBCUTANEOUS at 12:43

## 2024-09-03 ASSESSMENT — PAIN SCALES - GENERAL
PAINLEVEL_OUTOF10: 4
PAINLEVEL_OUTOF10: 10
PAINLEVEL_OUTOF10: 8
PAINLEVEL_OUTOF10: 7
PAINLEVEL_OUTOF10: 7
PAINLEVEL_OUTOF10: 3

## 2024-09-03 ASSESSMENT — PAIN DESCRIPTION - LOCATION
LOCATION: KNEE
LOCATION: MOUTH
LOCATION: LEG
LOCATION: LEG;PERINEUM
LOCATION: KNEE
LOCATION: MOUTH

## 2024-09-03 ASSESSMENT — PAIN DESCRIPTION - DESCRIPTORS
DESCRIPTORS: BURNING
DESCRIPTORS: ACHING;DISCOMFORT
DESCRIPTORS: ACHING
DESCRIPTORS: ACHING;THROBBING
DESCRIPTORS: BURNING
DESCRIPTORS: ACHING;DISCOMFORT

## 2024-09-03 ASSESSMENT — PAIN DESCRIPTION - ORIENTATION
ORIENTATION: RIGHT
ORIENTATION: RIGHT
ORIENTATION: INNER
ORIENTATION: INNER
ORIENTATION: RIGHT

## 2024-09-03 ASSESSMENT — PAIN - FUNCTIONAL ASSESSMENT
PAIN_FUNCTIONAL_ASSESSMENT: PREVENTS OR INTERFERES SOME ACTIVE ACTIVITIES AND ADLS

## 2024-09-03 ASSESSMENT — PAIN DESCRIPTION - FREQUENCY: FREQUENCY: CONTINUOUS

## 2024-09-03 ASSESSMENT — PAIN DESCRIPTION - PAIN TYPE: TYPE: ACUTE PAIN

## 2024-09-03 ASSESSMENT — PAIN DESCRIPTION - ONSET: ONSET: ON-GOING

## 2024-09-03 NOTE — PROGRESS NOTES
Allergies: No Known Allergies      Objective   BP (!) 121/54   Pulse 84   Temp 98.2 °F (36.8 °C) (Oral)   Resp 18   Ht 1.549 m (5' 0.98\")   Wt 78.4 kg (172 lb 12.8 oz)   SpO2 97%   BMI 32.67 kg/m²   PHYSICAL EXAM:  CONSTITUTIONAL: Alert, appropriate, no acute distress, looks ill-appearing, up in the chair this morning  EYES: Non icteric, EOM intact, pupils equal round   ENT: Mucus membranes moist,external inspection of ears and nose are normal  CHEST/LUNGS: CTA bilaterally, normal respiratory effort   CARDIOVASCULAR: RRR, no murmurs.  No lower extremity edema  ABDOMEN: soft non-tender, active bowel sounds, no HSM.  No palpable masses  EXTREMITIES: warm, full ROM in all 4 extremities, no focal weakness.  SKIN: Ecchymotic rashes  NEUROLOGIC: follows commands, non focal     LABORATORY RESULTS REVIEWED/ANALYZED BY ME:  Recent Labs     09/02/24  0736 09/02/24  0435 08/31/24  1411   WBC 31.3* 32.1* 35.9*   HGB 7.4* 7.2* 8.1*   HCT 24.3* 23.4* 28.3*   MCV 80.7* 79.9* 84.5   * 656* 666*     Lab Results   Component Value Date    NEUTROABS 22.2 (H) 09/02/2024       Lab Results   Component Value Date     09/02/2024    K 4.1 09/02/2024     09/02/2024    CO2 25 09/02/2024    BUN 33 (H) 09/02/2024    CREATININE 0.5 09/02/2024    GLUCOSE 168 (H) 09/02/2024    CALCIUM 8.6 (L) 09/02/2024    BILITOT 0.2 08/30/2024    ALKPHOS 117 (H) 08/30/2024    AST 7 08/30/2024    ALT 5 08/30/2024    LABGLOM >90 09/02/2024    GFRAA >59 08/26/2022    GLOB 3.0 02/28/2017       Lab Results   Component Value Date    INR 1.48 (H) 08/14/2024    INR 5.2 01/05/2024    INR 5.0 12/22/2023    PROTIME 17.6 (H) 08/14/2024    PROTIME 49.3 01/05/2024    PROTIME 47.7 12/22/2023       RADIOLOGY STUDIES REPORT/REVIEWED AND INTERPRETED BY ME:  XR CHEST PORTABLE    Result Date: 8/23/2024  EXAM:  FRONTAL VIEW OF THE CHEST.  HISTORY:  Fever, cough  COMPARISON:  08/14/2024  FINDINGS:   Cardiac silhouette is normal.  Mild increase in  to the ankle and just into the proximal calf as well.  Markedly heterogeneous attenuation centrally with regions of low attenuation and hematocrit levels as well as regions of soft tissue attenuation at that level and with abnormality extending deep to the skin surface over a broad region at that site.  There are linear/tubular regions of contrast opacification centrally within the collection most pronounced within the mid to distal portion of the collection and to a lesser extent along the proximal margin with irregular regions of contrast pooling and contrast blush at that level.  The appearance is consistent with active extravasation/bleeding related to venous varicosities at that site.  Marked edema throughout the adjacent soft tissues.  No drainable intramuscular or deep fascial fluid collection.  Extensive vascular calcifications with otherwise limited assessment the arterial system on this non angiographic study.  Marked demineralization.  Marked tricompartmental osteoarthrosis in the right knee with severe joint space narrowing.  All three compartments with most severe joint space narrowing.  Articular surface remodeling medial compartment.  Notch osteophytes and degenerative spurring along the tibial spines.  Partially imaged moderate sized suprapatellar effusion.  5.8 x 2.4 x 2.3 cm complex low attenuation lesion with peripheral wall thickening internal septations posteromedially at the knee through the proximal calf, favoring a complex popliteal cyst at that level.  Correlate clinically for any signs of infection.  Muscle atrophy.  Partially imaged degenerative change of the ankle and retrocalcaneal spur.  Chronic-appearing deformity of the lateral malleolus related to old trauma.  Marked demineralization limits sensitivity for detection of an acute nondisplaced fracture without definite evidence of an acute fracture.  Chronic deformity remodeling along the syndesmosis distally.  Unexpected finding:  The

## 2024-09-03 NOTE — CONSULTS
Comprehensive Nutrition Assessment    Type and Reason for Visit:  Consult    Nutrition Recommendations/Plan:   Discontinue whey protein powder supplement.   Continue Glucerna.      Malnutrition Assessment:  Malnutrition Status:  Severe malnutrition (08/28/24 1306)    Context:  Acute Illness     Findings of the 6 clinical characteristics of malnutrition:  Energy Intake:  Mild decrease in energy intake (Comment)  Weight Loss:  Greater than 2% over 1 week     Fluid Accumulation:  Moderate to Severe Extremities    Nutrition Assessment:    Consult received for pt and family concerned she is allergic to whey protein. Pt reports she had oral cavity pain and soreness following intake of yogurt with added whey protein. She has been treated with Magic Mouthwash and this is much improved. Pt eating breakfast without difficulty at time of visit. Discussed typical manifestation of whey allergy including rash and GI symptoms. Pt has been consuming Glucerna for several days without complaint. Glucerna also contains whey. We discussed the possibility of whey protein powder containing another ingredient that she is intolerant to. We agreed to discontinue whey protein powder supplement and continue Glucerna and [place]. Pt agreeable. PO intake avg remains 25-50% of meals. Encouraged intake and updated preferences. Will continue to follow.    Nutrition Related Findings:    BM 9/2; trace LLE edema, +2 RLE edema Wound Type: Pressure Injury, Surgical Incision, Wound Vac       Current Nutrition Intake & Therapies:    Average Meal Intake: 26-50%  Average Supplements Intake: 51-75%, %  ADULT ORAL NUTRITION SUPPLEMENT; Breakfast, Dinner; Diabetic Oral Supplement  ADULT ORAL NUTRITION SUPPLEMENT; Dinner, Lunch; Wound Healing Oral Supplement  ADULT DIET; Regular; 4 carb choices (60 gm/meal); 1500 ml    Anthropometric Measures:  Height: 154.9 cm (5' 0.98\")  Ideal Body Weight (IBW): 105 lbs (48 kg)    Admission Body Weight: 67.4 kg (148 lb 8

## 2024-09-03 NOTE — PROGRESS NOTES
Infectious Diseases Progress Note    Patient:  Elda Flood  YOB: 1945  MRN: [id number]   Admit date: 8/14/2024   Admitting Physician: Diamond Guy MD  Primary Care Physician: NICOLE Solis DO    Chief Complaint/Interval History: She feels okay.  She is without fever or chills.  She has no new localizing symptoms.  No productive cough or sputum production.  No nausea, abdominal pain, or diarrhea.  No urinary symptoms.  No right leg pain.  No rash or skin itching.    In/Out    Intake/Output Summary (Last 24 hours) at 9/3/2024 1008  Last data filed at 9/3/2024 0522  Gross per 24 hour   Intake 120 ml   Output 1700 ml   Net -1580 ml     Allergies: No Known Allergies  Current Meds: magic (miracle) mouthwash, 4x Daily PRN  insulin lispro (HUMALOG,ADMELOG) injection vial 5 Units, TID WC  ferric gluconate (FERRLECIT) 250 mg in sodium chloride 0.9 % 250 mL IVPB, Daily  metoprolol succinate (TOPROL XL) extended release tablet 50 mg, BID  collagenase ointment, Daily  apixaban (ELIQUIS) tablet 5 mg, BID  sodium hypochlorite (DAKINS) 0.125 % external solution, Daily  oxyCODONE HCl (OXY-IR) immediate release tablet 10 mg, Q6H PRN  furosemide (LASIX) tablet 20 mg, Daily  magnesium oxide (MAG-OX) tablet 800 mg, TID  polyethylene glycol (GLYCOLAX) packet 17 g, Daily  insulin glargine (LANTUS) injection vial 15 Units, BID  insulin lispro (HUMALOG,ADMELOG) injection vial 0-8 Units, TID WC  insulin lispro (HUMALOG,ADMELOG) injection vial 0-4 Units, Nightly  naloxegol (MOVANTIK) tablet 12.5 mg, QAM AC  sodium chloride tablet 2 g, TID WC  lidocaine (XYLOCAINE) 4 % external solution, Once per day on Monday Wednesday Friday  ondansetron (ZOFRAN) injection 2 mg, Q4H PRN  HYDROmorphone HCl PF (DILAUDID) injection 0.25 mg, Q4H PRN  urea (URE-NA) packet 15 g, Daily  sodium chloride flush 0.9 % injection 5-40 mL, 2 times per day  sodium chloride flush 0.9 % injection 5-40 mL, PRN  0.9 % sodium chloride infusion,    Additional Studies Reviewed:       FINAL DIAGNOSIS:   Peripheral blood smear, physician requested review:   1.  Leukocytosis with a left shift of the myeloid series.   2.  Microcytic anemia.   3.  Thrombocytosis.   4.  Negative for blasts.   5.  Negative for schistocytes.   COMMENT:    Clinical correlation is suggested.     CBG/CBG     Impression:  1.  Leukocytosis-leaning toward noninfectious etiology.  She is without fever.  She has no new localizing symptoms.  Prior blood cultures have been no growth.  Previous positive wound cultures have been treated and the wound continues to improve off antibiotic therapy suggesting no ongoing infection.  2.  Open wound right lower extremity following hematoma debridement/drainage.  No signs of infection or cellulitis at present.  3.  Previous fever-may have been related to COVID.  Remaining resolved.  No cardiopulmonary symptoms.    Recommendations:  Continue off antibiotic treatment  Continue local wound care  Monitor for new signs or symptoms suggestive of infection  Await flow cytometry  Continue to follow    ED DAMIAN MD

## 2024-09-03 NOTE — PROGRESS NOTES
Occupational Therapy     09/03/24 1243   Subjective   Subjective Pt sitting up in recliner upon arrival for therapy. Pt agreeable to participate.   Pain Assessment   Pain Assessment 0-10   Pain Level 7   Patient's Stated Pain Goal 0 - No pain   Pain Location Leg   Pain Orientation Right   Pain Descriptors Aching   Functional Pain Assessment Prevents or interferes some active activities and ADLs   Pain Type Acute pain   Pain Frequency Continuous   Pain Onset On-going   Non-Pharmaceutical Pain Intervention(s) Emotional support;Rest;Repositioned;Elevation;Ambulation/Increased Activity   Response to Pain Intervention Pain improved but above pain goal   Vitals   Respirations 18   Cognition   Overall Cognitive Status WNL   Orientation   Overall Orientation Status WNL   Bed Mobility Training   Bed Mobility Training Yes   Overall Level of Assistance Minimum assistance;Moderate assistance   Interventions Verbal cues;Tactile cues;Manual cues   Sit to Supine Moderate assistance   Scooting Minimum assistance;Moderate assistance   Transfer Training   Transfer Training Yes   Overall Level of Assistance Moderate assistance;Maximum assistance;Assist X2   Interventions Verbal cues;Tactile cues;Manual cues   Sit to Stand Moderate assistance;Maximum assistance;Assist X2   Stand to Sit Moderate assistance;Assist X2   Bed to Chair Moderate assistance;Maximum assistance;Assist X2  (Arnold De Paz)   Balance   Sitting Intact   Standing With support  (Arnold De Paz with pillow for padding.)   ADL   Feeding Independent   Grooming Independent;Setup   UE Bathing Minimal assistance   LE Bathing Maximum assistance   UE Dressing Minimal assistance   LE Dressing Maximum assistance   Toileting Maximum assistance   Toileting Skilled Clinical Factors for clean up and clothing management; arnold de paz to [facility]   Functional Mobility Maximum assistance   Functional Mobility Skilled Clinical Factors Arnold De Paz   Assessment   Assessment Tx focused on STS mobility

## 2024-09-03 NOTE — PROGRESS NOTES
Vascular Surgery  Dr. Emili Landry   Daily Progress Note    Pt Name: Elda Flood  Medical Record Number: [id number]  Date of Birth 1945   Today's Date: 9/3/2024    SUBJECTIVE:     Patient was seen and examined, sitting up in chair at bedside.  Pain is  controlled  Has not had nausea/vomiting    OBJECTIVE:     Patient Vitals for the past 24 hrs:   BP Temp Temp src Pulse Resp SpO2   09/03/24 0757 (!) 143/105 97.7 °F (36.5 °C) -- 92 16 97 %   09/03/24 0521 (!) 121/54 98.2 °F (36.8 °C) Oral 84 18 97 %   09/02/24 2104 -- -- -- -- 18 --   09/02/24 2007 128/71 98.7 °F (37.1 °C) Oral 98 18 100 %   09/02/24 1853 -- -- -- -- 18 --   09/02/24 1701 (!) 124/55 98.4 °F (36.9 °C) Oral 99 16 97 %       Intake/Output Summary (Last 24 hours) at 9/3/2024 1054  Last data filed at 9/3/2024 0916  Gross per 24 hour   Intake 120 ml   Output 1950 ml   Net -1830 ml       In: 360 [P.O.:360]  Out: 2750 [Urine:2750]    I/O last 3 completed shifts:  In: 360 [P.O.:360]  Out: 3150 [Urine:3150]     Date 09/03/24 0000 - 09/03/24 2359   Shift 1298-0909 1534-5363 9545-9211 24 Hour Total   INTAKE   Shift Total(mL/kg)       OUTPUT   Urine(mL/kg/hr) 1000(1.6) 250  1250   Shift Total(mL/kg) 1000(12.8) 250(3.2)  1250(15.9)   Weight (kg) 78.4 78.4 78.4 78.4       Wt Readings from Last 3 Encounters:   09/01/24 78.4 kg (172 lb 12.8 oz)   08/13/24 67.4 kg (148 lb 8 oz)   08/06/24 67.5 kg (148 lb 12 oz)        Body mass index is 32.67 kg/m².     Diet: ADULT ORAL NUTRITION SUPPLEMENT; Breakfast, Dinner; Diabetic Oral Supplement  ADULT ORAL NUTRITION SUPPLEMENT; Dinner, Lunch; Wound Healing Oral Supplement  ADULT DIET; Regular; 4 carb choices (60 gm/meal); 1500 ml    MEDS:     Scheduled Meds:   insulin lispro  5 Units SubCUTAneous TID WC    ferric gluconate  250 mg IntraVENous Daily    metoprolol succinate  50 mg Oral BID    collagenase   Topical Daily    apixaban  5 mg Oral BID    sodium hypochlorite   Irrigation Daily    furosemide  20 mg Oral Daily     magnesium oxide  800 mg Oral TID    polyethylene glycol  17 g Oral Daily    insulin glargine  15 Units SubCUTAneous BID    insulin lispro  0-8 Units SubCUTAneous TID WC    insulin lispro  0-4 Units SubCUTAneous Nightly    naloxegol  12.5 mg Oral QAM AC    sodium chloride  2 g Oral TID WC    lidocaine   Topical Once per day on Monday Wednesday Friday    urea  15 g Oral Daily    sodium chloride flush  5-40 mL IntraVENous 2 times per day    allopurinol  300 mg Oral Daily    [Held by provider] amLODIPine  10 mg Oral Daily    atorvastatin  20 mg Oral Nightly    cholestyramine light  4 g Oral Daily    folic acid  1 mg Oral Daily    levETIRAcetam  500 mg Oral BID    [Held by provider] lisinopril  15 mg Oral Daily     Continuous Infusions:   sodium chloride      dextrose       PRN Meds:magic (miracle) mouthwash, 5 mL, 4x Daily PRN  oxyCODONE, 10 mg, Q6H PRN  ondansetron, 2 mg, Q4H PRN  HYDROmorphone, 0.25 mg, Q4H PRN  sodium chloride flush, 5-40 mL, PRN  sodium chloride, , PRN  potassium chloride, 40 mEq, PRN   Or  potassium alternative oral replacement, 40 mEq, PRN   Or  potassium chloride, 10 mEq, PRN  magnesium sulfate, 2,000 mg, PRN  ondansetron, 4 mg, Q8H PRN  acetaminophen, 650 mg, Q6H PRN   Or  acetaminophen, 650 mg, Q6H PRN  naloxone, 0.4 mg, PRN  glucose, 4 tablet, PRN  dextrose bolus, 125 mL, PRN   Or  dextrose bolus, 250 mL, PRN  glucagon (rDNA), 1 mg, PRN  dextrose, , Continuous PRN      PHYSICAL EXAM:     CONSTITUTIONAL: Alert and oriented times 3, no acute distress and cooperative to examination.  LUNGS: clear bilaterally anteriorly  CARDIOVASCULAR: Regular rate, regular rhythm, soft systolic murmur     ABDOMEN: soft, nontender, nondistended  NEUROLOGIC: Awake, alert, oriented to name, place and time.    WOUND/INCISION: RLE with Wound VAC in place with Black Foam, good seal noted. Appx 150ml old bloody drainage in canister   EXTREMITY: feet warm to touch, pink color, mild edema to LE's, bruising resolving

## 2024-09-03 NOTE — PROGRESS NOTES
[facility]    Progress Note    Patient:  Elda Flood  YOB: 1945  Date of Service: 9/3/2024  MRN: [id number]   Acct: [id number]   Primary Care Physician: NICOLE Solis DO  Advance Directive: Full Code  Admit Date: 8/14/2024       Hospital Day: 20    Portions of this note have been copied forward, however, updated to reflect the most current clinical status of this patient.     CHIEF COMPLAINT: bruising RLE    SUBJECTIVE: Sitting in chair, mouth pain improved    CUMULATIVE HOSPITAL COURSE:     This patient is a 79-year-old female with PMH T2DM, hypertension, CVA, hyperlipidemia, atrial fibrillation on chronic anticoagulation who presented to [facility] ED on 8/14 for evaluation of bruising and pain to RLE; found to have large hematoma with active extravasation from venous varicosities on CT.  She was seen by vascular surgery and is s/p evacuation of hematoma on 8/15.  Postoperatively, patient required blood transfusion due to anemia from acute blood loss. Hemoglobin has since been stable. Eliquis held initially, however restarted once hemoglobin stabilized. Wound VAC was placed on 8/20 and wound is healing appropriately.  Patient has had persistent leukocytosis throughout admission; initially placed on Ancef for possible skin/soft tissue infection.  ID was consulted and do not believe that her leukocytosis is infectious in nature.  Hematology/oncology have evaluated the patient and further workup is ongoing; recommend outpatient follow-up to discuss results. She completed 3 day course of Ferrlecit. Patient had been accepted to SNF and had plan to discharge on 8/24, however prior to discharge had noted temp up to 101.3 which resolved quickly on its own.  Respiratory panel positive for COVID-19. Supportive care recommended. Magic mouthwash ordered due to reports of mouth pain with improvement. Patient experienced some swelling to her lips on 8/29, which resolved with Solu-Medrol and Benadryl.  Wound Healing Oral Supplement  ADULT DIET; Regular; 4 carb choices (60 gm/meal); 1500 ml     Consults Made:   IP CONSULT TO VASCULAR SURGERY  IP CONSULT TO DIETITIAN  IP CONSULT TO INFECTIOUS DISEASES  IP CONSULT TO VASCULAR ACCESS TEAM  IP CONSULT TO HEM/ONC  IP CONSULT TO DIETITIAN    Further orders per clinical course/attending physician.    EMR Dragon/Transcription disclaimer:   Much of this encounter note is an electronic transcription/translation of spoken language to printed text. The electronic translation of spoken language may permit erroneous words or phrases to be inadvertently transcribed; although attempts have made to review the note for such errors, some may still exist.

## 2024-09-03 NOTE — PROGRESS NOTES
Patient premedicated for pain and RLE dressing soaked with normal saline. Dressing slowly removed, wound bed beefy color with a few small areas of fat necrosis, bloody drainage noted. New photo taken for EPIC. Júnior wound clean with bruising fading, moderate edema. No-sting skin prep to júnior wound skin and allowed to air dry. Mepitel One over wound bed, Ostomy extenders around edges. Black KCI foam, Drape, and Trac Pad applied. VAC at -125mmHg cont, good seal noted. Patient tolerated procedure well.

## 2024-09-03 NOTE — PROGRESS NOTES
Assistance: Independent  Homemaking Responsibilities: Yes  Ambulation Assistance: Independent  Transfer Assistance: Independent  Active : Yes  Mode of Transportation: Car  Occupation: Retired  Additional Comments: States potential assist from sister who has back issues and works full time.  States another option is to discharge to [facility] home. Plans on going to [facility] now.  Vision/Hearing  Vision  Vision: Within Functional Limits  Hearing  Hearing: Within functional limits    Cognition   Orientation  Overall Orientation Status: Within Normal Limits  Cognition  Overall Cognitive Status: Exceptions  Arousal/Alertness: Appears intact  Following Commands: Follows one step commands with repetition;Follows one step commands with increased time  Attention Span: Impaired  Memory: Impaired  Safety Judgement: Impaired;Decreased awareness of need for assistance;Decreased awareness of need for safety  Problem Solving: Impaired;Assistance required to generate solutions;Assistance required to implement solutions  Insights: Impaired  Initiation: Impaired  Sequencing: Impaired    Objective  Temp: 97.7 °F (36.5 °C)  Pulse: 92  Heart Rate Source: Monitor  Respirations: 18  SpO2: 97 %  O2 Device: None (Room air)  BP: (!) 143/105  MAP (Calculated): 118  BP Location: Right upper arm  Patient Position: Supine     Observation/Palpation  Posture: Fair  Observation: wound vac, purewick (removed for mobility)       AROM RLE (degrees)  RLE AROM: Exceptions  RLE General AROM: 0-90 at knee and hip AAROM, but painful, ankle to neutral DF  AROM LLE (degrees)  LLE AROM : Exceptions  LLE General AROM: 0-90 at knee and hip AAROM, but painful, ankle to neutral DF  Strength RLE  Strength RLE: Exception  Comment: 3-/5  Strength LLE  Strength LLE: Exception  Comment: 3-/5        Bed Mobility Training  Bed Mobility Training: Yes  Overall Level of Assistance: Minimum assistance;Moderate assistance  Interventions: Verbal cues;Tactile cues;Manual  Care;Transfer Training;Fall Prevention Strategies;Equipment  Education Provided Comments: Use of call light & staff assist, SS  Education Method: Demonstration;Verbal  Barriers to Learning: Other (Comment) (pain)  Education Outcome: Continued education needed      Therapy Time   Individual Concurrent Group Co-treatment   Time In           Time Out           Minutes                   Olga Dobson, PT       Electronically signed by Olga Dobson, PT on 9/3/2024 at 2:19 PM

## 2024-09-03 NOTE — CARE COORDINATION
Mike initiated precert today for Pt to admit tomorrow 9/4  [facility]  [phone] P  [phone] F  Electronically signed by ISABEL Erazo on 9/3/2024 at 3:48 PM

## 2024-09-04 ENCOUNTER — TELEPHONE (OUTPATIENT)
Dept: HEMATOLOGY | Age: 79
End: 2024-09-04

## 2024-09-04 ENCOUNTER — OUTSIDE FACILITY SERVICE (OUTPATIENT)
Age: 79
End: 2024-09-04
Payer: MEDICARE

## 2024-09-04 PROBLEM — E43 SEVERE MALNUTRITION (HCC): Status: RESOLVED | Noted: 2024-08-20 | Resolved: 2024-09-04

## 2024-09-04 LAB
ANION GAP SERPL CALCULATED.3IONS-SCNC: 10 MMOL/L (ref 7–19)
ANISOCYTOSIS BLD QL SMEAR: ABNORMAL
BASOPHILS # BLD: 0.4 K/UL (ref 0–0.2)
BASOPHILS NFR BLD: 1 % (ref 0–1)
BUN SERPL-MCNC: 29 MG/DL (ref 8–23)
CALCIUM SERPL-MCNC: 8.6 MG/DL (ref 8.8–10.2)
CHLORIDE SERPL-SCNC: 101 MMOL/L (ref 98–111)
CO2 SERPL-SCNC: 25 MMOL/L (ref 22–29)
CREAT SERPL-MCNC: 0.5 MG/DL (ref 0.5–0.9)
DACRYOCYTES BLD QL SMEAR: ABNORMAL
EOSINOPHIL # BLD: 0 K/UL (ref 0–0.6)
EOSINOPHIL NFR BLD: 0 % (ref 0–5)
ERYTHROCYTE [DISTWIDTH] IN BLOOD BY AUTOMATED COUNT: 21 % (ref 11.5–14.5)
GLUCOSE BLD-MCNC: 103 MG/DL (ref 70–99)
GLUCOSE BLD-MCNC: 137 MG/DL (ref 70–99)
GLUCOSE BLD-MCNC: 147 MG/DL (ref 70–99)
GLUCOSE BLD-MCNC: 254 MG/DL (ref 70–99)
GLUCOSE SERPL-MCNC: 102 MG/DL (ref 70–99)
HCT VFR BLD AUTO: 25.9 % (ref 37–47)
HGB BLD-MCNC: 7.8 G/DL (ref 12–16)
HYPOCHROMIA BLD QL SMEAR: ABNORMAL
IMM GRANULOCYTES # BLD: 4.1 K/UL
LYMPHOCYTES # BLD: 2.7 K/UL (ref 1.1–4.5)
LYMPHOCYTES NFR BLD: 6 % (ref 20–40)
MAGNESIUM SERPL-MCNC: 1.3 MG/DL (ref 1.6–2.4)
MAGNESIUM SERPL-MCNC: 1.8 MG/DL (ref 1.6–2.4)
MCH RBC QN AUTO: 25.2 PG (ref 27–31)
MCHC RBC AUTO-ENTMCNC: 30.1 G/DL (ref 33–37)
MCV RBC AUTO: 83.8 FL (ref 81–99)
METAMYELOCYTES NFR BLD MANUAL: 2 %
MICROCYTES BLD QL SMEAR: ABNORMAL
MONOCYTES # BLD: 5.3 K/UL (ref 0–0.9)
MONOCYTES NFR BLD: 12 % (ref 0–10)
MYELOCYTES NFR BLD MANUAL: 4 %
NEUTROPHILS # BLD: 35.9 K/UL (ref 1.5–7.5)
NEUTS BAND NFR BLD MANUAL: 3 % (ref 0–5)
NEUTS SEG NFR BLD: 72 % (ref 50–65)
OVALOCYTES BLD QL SMEAR: ABNORMAL
PERFORMED ON: ABNORMAL
PLATELET # BLD AUTO: 611 K/UL (ref 130–400)
PLATELET SLIDE REVIEW: ABNORMAL
PMV BLD AUTO: 11.9 FL (ref 9.4–12.3)
POLYCHROMASIA BLD QL SMEAR: ABNORMAL
POTASSIUM SERPL-SCNC: 3.8 MMOL/L (ref 3.5–5)
RBC # BLD AUTO: 3.09 M/UL (ref 4.2–5.4)
SCHISTOCYTES BLD QL SMEAR: ABNORMAL
SODIUM SERPL-SCNC: 136 MMOL/L (ref 136–145)
WBC # BLD AUTO: 44.3 K/UL (ref 4.8–10.8)

## 2024-09-04 PROCEDURE — 1200000000 HC SEMI PRIVATE

## 2024-09-04 PROCEDURE — 85025 COMPLETE CBC W/AUTO DIFF WBC: CPT

## 2024-09-04 PROCEDURE — 97530 THERAPEUTIC ACTIVITIES: CPT

## 2024-09-04 PROCEDURE — 36415 COLL VENOUS BLD VENIPUNCTURE: CPT

## 2024-09-04 PROCEDURE — 6370000000 HC RX 637 (ALT 250 FOR IP): Performed by: STUDENT IN AN ORGANIZED HEALTH CARE EDUCATION/TRAINING PROGRAM

## 2024-09-04 PROCEDURE — 94760 N-INVAS EAR/PLS OXIMETRY 1: CPT

## 2024-09-04 PROCEDURE — 6360000002 HC RX W HCPCS: Performed by: SURGERY

## 2024-09-04 PROCEDURE — 83735 ASSAY OF MAGNESIUM: CPT

## 2024-09-04 PROCEDURE — 6370000000 HC RX 637 (ALT 250 FOR IP): Performed by: SURGERY

## 2024-09-04 PROCEDURE — 82962 GLUCOSE BLOOD TEST: CPT

## 2024-09-04 PROCEDURE — 87040 BLOOD CULTURE FOR BACTERIA: CPT

## 2024-09-04 PROCEDURE — 6360000002 HC RX W HCPCS: Performed by: HOSPITALIST

## 2024-09-04 PROCEDURE — 6370000000 HC RX 637 (ALT 250 FOR IP): Performed by: HOSPITALIST

## 2024-09-04 PROCEDURE — 99232 SBSQ HOSP IP/OBS MODERATE 35: CPT | Performed by: INTERNAL MEDICINE

## 2024-09-04 PROCEDURE — 80048 BASIC METABOLIC PNL TOTAL CA: CPT

## 2024-09-04 RX ORDER — SODIUM HYPOCHLORITE 1.25 MG/ML
SOLUTION TOPICAL DAILY
Qty: 473 ML | Refills: 0 | Status: SHIPPED | OUTPATIENT
Start: 2024-09-04

## 2024-09-04 RX ADMIN — ACETAMINOPHEN 650 MG: 325 TABLET ORAL at 20:47

## 2024-09-04 RX ADMIN — APIXABAN 5 MG: 5 TABLET, FILM COATED ORAL at 08:24

## 2024-09-04 RX ADMIN — LEVETIRACETAM 500 MG: 500 TABLET, FILM COATED ORAL at 08:23

## 2024-09-04 RX ADMIN — APIXABAN 5 MG: 5 TABLET, FILM COATED ORAL at 20:19

## 2024-09-04 RX ADMIN — SODIUM CHLORIDE 2 G: 1 TABLET ORAL at 08:23

## 2024-09-04 RX ADMIN — METOPROLOL SUCCINATE 50 MG: 50 TABLET, EXTENDED RELEASE ORAL at 20:19

## 2024-09-04 RX ADMIN — METOPROLOL SUCCINATE 50 MG: 50 TABLET, EXTENDED RELEASE ORAL at 08:24

## 2024-09-04 RX ADMIN — FUROSEMIDE 20 MG: 20 TABLET ORAL at 08:23

## 2024-09-04 RX ADMIN — INSULIN LISPRO 5 UNITS: 100 INJECTION, SOLUTION INTRAVENOUS; SUBCUTANEOUS at 12:40

## 2024-09-04 RX ADMIN — INSULIN LISPRO 4 UNITS: 100 INJECTION, SOLUTION INTRAVENOUS; SUBCUTANEOUS at 12:40

## 2024-09-04 RX ADMIN — Medication 800 MG: at 08:24

## 2024-09-04 RX ADMIN — OXYCODONE HYDROCHLORIDE 10 MG: 10 TABLET ORAL at 05:33

## 2024-09-04 RX ADMIN — FOLIC ACID 1 MG: 1 TABLET ORAL at 08:24

## 2024-09-04 RX ADMIN — ATORVASTATIN CALCIUM 20 MG: 20 TABLET, FILM COATED ORAL at 20:19

## 2024-09-04 RX ADMIN — NALOXEGOL OXALATE 12.5 MG: 12.5 TABLET, FILM COATED ORAL at 05:31

## 2024-09-04 RX ADMIN — INSULIN GLARGINE 15 UNITS: 100 INJECTION, SOLUTION SUBCUTANEOUS at 08:25

## 2024-09-04 RX ADMIN — Medication 800 MG: at 20:19

## 2024-09-04 RX ADMIN — OXYCODONE HYDROCHLORIDE 10 MG: 10 TABLET ORAL at 13:55

## 2024-09-04 RX ADMIN — INSULIN GLARGINE 15 UNITS: 100 INJECTION, SOLUTION SUBCUTANEOUS at 20:20

## 2024-09-04 RX ADMIN — HYDROMORPHONE HYDROCHLORIDE 0.25 MG: 1 INJECTION, SOLUTION INTRAMUSCULAR; INTRAVENOUS; SUBCUTANEOUS at 01:31

## 2024-09-04 RX ADMIN — ACETAMINOPHEN 650 MG: 325 TABLET ORAL at 10:13

## 2024-09-04 RX ADMIN — INSULIN LISPRO 5 UNITS: 100 INJECTION, SOLUTION INTRAVENOUS; SUBCUTANEOUS at 08:24

## 2024-09-04 RX ADMIN — OXYCODONE HYDROCHLORIDE 10 MG: 10 TABLET ORAL at 20:19

## 2024-09-04 RX ADMIN — INSULIN LISPRO 5 UNITS: 100 INJECTION, SOLUTION INTRAVENOUS; SUBCUTANEOUS at 16:45

## 2024-09-04 RX ADMIN — ALLOPURINOL 300 MG: 100 TABLET ORAL at 08:24

## 2024-09-04 RX ADMIN — MAGNESIUM SULFATE HEPTAHYDRATE 2000 MG: 40 INJECTION, SOLUTION INTRAVENOUS at 08:39

## 2024-09-04 RX ADMIN — MAGNESIUM SULFATE HEPTAHYDRATE 2000 MG: 40 INJECTION, SOLUTION INTRAVENOUS at 06:34

## 2024-09-04 RX ADMIN — SODIUM CHLORIDE 2 G: 1 TABLET ORAL at 16:45

## 2024-09-04 RX ADMIN — SODIUM CHLORIDE 2 G: 1 TABLET ORAL at 12:39

## 2024-09-04 RX ADMIN — ONDANSETRON 4 MG: 4 TABLET, ORALLY DISINTEGRATING ORAL at 08:23

## 2024-09-04 RX ADMIN — LEVETIRACETAM 500 MG: 500 TABLET, FILM COATED ORAL at 20:19

## 2024-09-04 RX ADMIN — Medication 15 G: at 08:24

## 2024-09-04 RX ADMIN — Medication 800 MG: at 13:52

## 2024-09-04 RX ADMIN — DIPHENHYDRAMINE HCL 5 ML: 12.5 LIQUID ORAL at 20:20

## 2024-09-04 ASSESSMENT — PAIN SCALES - GENERAL
PAINLEVEL_OUTOF10: 8
PAINLEVEL_OUTOF10: 8
PAINLEVEL_OUTOF10: 7
PAINLEVEL_OUTOF10: 10
PAINLEVEL_OUTOF10: 8
PAINLEVEL_OUTOF10: 8

## 2024-09-04 ASSESSMENT — PAIN DESCRIPTION - FREQUENCY: FREQUENCY: CONTINUOUS

## 2024-09-04 ASSESSMENT — PAIN DESCRIPTION - ORIENTATION
ORIENTATION: RIGHT
ORIENTATION: RIGHT
ORIENTATION: LOWER
ORIENTATION: RIGHT

## 2024-09-04 ASSESSMENT — PAIN - FUNCTIONAL ASSESSMENT: PAIN_FUNCTIONAL_ASSESSMENT: PREVENTS OR INTERFERES SOME ACTIVE ACTIVITIES AND ADLS

## 2024-09-04 ASSESSMENT — PAIN DESCRIPTION - LOCATION
LOCATION: KNEE
LOCATION: LEG

## 2024-09-04 ASSESSMENT — PAIN DESCRIPTION - DESCRIPTORS
DESCRIPTORS: THROBBING
DESCRIPTORS: ACHING;DISCOMFORT;SHOOTING;SHARP
DESCRIPTORS: ACHING
DESCRIPTORS: ACHING

## 2024-09-04 ASSESSMENT — PAIN DESCRIPTION - ONSET: ONSET: ON-GOING

## 2024-09-04 ASSESSMENT — PAIN DESCRIPTION - PAIN TYPE: TYPE: ACUTE PAIN

## 2024-09-04 NOTE — DISCHARGE SUMMARY
mouth daily     sennosides-docusate sodium 8.6-50 MG tablet  Commonly known as: SENOKOT-S  Take 2 tablets by mouth daily     sodium chloride 1 g tablet  Take 2 tablets by mouth 3 times daily (with meals)     sodium hypochlorite 0.125 % Soln external solution  Commonly known as: DAKINS  Apply topically daily     urea 15 g Pack packet  Commonly known as: URE-NA  Take 15 g by mouth daily            CONTINUE taking these medications      allopurinol 300 MG tablet  Commonly known as: ZYLOPRIM  Take 1 tablet by mouth daily  Notes to patient: 8/24/24     apixaban 5 MG Tabs tablet  Commonly known as: ELIQUIS  Take 1 tablet by mouth 2 times daily     atorvastatin 20 MG tablet  Commonly known as: LIPITOR  TAKE ONE TABLET BY MOUTH AT BEDTIME     blood glucose monitor kit and supplies  Cap glucose daily and prn     blood glucose test strips  Cap glucose daily and prn     colesevelam 625 MG tablet  Commonly known as: WELCHOL  Take 3 tablets by mouth 2 times daily (with meals)     folic acid 1 MG tablet  Commonly known as: FOLVITE  Take 1 tablet by mouth daily     furosemide 20 MG tablet  Commonly known as: LASIX  Take 1 tablet by mouth daily     H-E-B inControl Pen Needles 32G X 4 MM Misc  Generic drug: Insulin Pen Needle  1 each by Does not apply route daily     levETIRAcetam 500 MG tablet  Commonly known as: KEPPRA  Take 1 tablet by mouth 2 times daily     lisinopril 30 MG tablet  Commonly known as: PRINIVIL;ZESTRIL  Take 0.5 tablets by mouth daily     metoprolol succinate 50 MG extended release tablet  Commonly known as: TOPROL XL  Take 1 tablet by mouth daily     One-Daily Multi-Vit/Mineral Tabs  TAKE ONE TABLET BY MOUTH DAILY     SITagliptin 100 MG tablet  Commonly known as: Januvia  Take 1 tablet by mouth daily     vitamin D 1.25 MG (57523 UT) Caps capsule  Commonly known as: ERGOCALCIFEROL  Take 1 capsule by mouth once a week            STOP taking these medications      amLODIPine 5 MG tablet  Commonly known as:  Breakfast, Dinner; Diabetic Oral Supplement  ADULT ORAL NUTRITION SUPPLEMENT; Dinner, Lunch; Wound Healing Oral Supplement  ADULT DIET; Regular; 4 carb choices (60 gm/meal); 1500 ml     Disposition: Patient is medically stable and will be discharged today.    Time spent on discharge 38 minutes spent in assessing patient, reviewing medications, discussion with nursing, confirming safe discharge plan and preparation of discharge summary.    Signed:  Electronically signed by LAYLA Mcguire CNP on 9/4/24 at 12:27 PM CDT     EMR Dragon/Transcription disclaimer:   Much of this encounter note is an electronic transcription/translation of spoken language to printed text. The electronic translation of spoken language may permit erroneous, or at times, nonsensical words or phrases to be inadvertently transcribed; although attempts have made to review the note for such errors, some may still exist.

## 2024-09-04 NOTE — PROGRESS NOTES
Infectious Diseases Progress Note    Patient:  Elda Flood  YOB: 1945  MRN: [id number]   Admit date: 8/14/2024   Admitting Physician: Diamond Guy MD  Primary Care Physician: NICOLE Solis DO    Chief Complaint/Interval History: She has not had fever.  She has not had chills.  Her heart rate and blood pressure are remaining stable.  She developed some increased right knee discomfort overnight.  She indicates she had also bumped the anterior aspect of her right knee on the underside of her bedside tray.  She is not noticing any surrounding erythema around the right lower extremity.  She is not having any abdominal pain or diarrhea.  She has no cardiopulmonary symptoms.    In/Out    Intake/Output Summary (Last 24 hours) at 9/4/2024 1826  Last data filed at 9/4/2024 0956  Gross per 24 hour   Intake 360 ml   Output --   Net 360 ml     Allergies: No Known Allergies  Current Meds: magic (miracle) mouthwash, 4x Daily PRN  insulin lispro (HUMALOG,ADMELOG) injection vial 5 Units, TID WC  ferric gluconate (FERRLECIT) 250 mg in sodium chloride 0.9 % 250 mL IVPB, Daily  metoprolol succinate (TOPROL XL) extended release tablet 50 mg, BID  collagenase ointment, Daily  apixaban (ELIQUIS) tablet 5 mg, BID  sodium hypochlorite (DAKINS) 0.125 % external solution, Daily  oxyCODONE HCl (OXY-IR) immediate release tablet 10 mg, Q6H PRN  furosemide (LASIX) tablet 20 mg, Daily  magnesium oxide (MAG-OX) tablet 800 mg, TID  polyethylene glycol (GLYCOLAX) packet 17 g, Daily  insulin glargine (LANTUS) injection vial 15 Units, BID  insulin lispro (HUMALOG,ADMELOG) injection vial 0-8 Units, TID WC  insulin lispro (HUMALOG,ADMELOG) injection vial 0-4 Units, Nightly  naloxegol (MOVANTIK) tablet 12.5 mg, QAM AC  sodium chloride tablet 2 g, TID WC  lidocaine (XYLOCAINE) 4 % external solution, Once per day on Monday Wednesday Friday  ondansetron (ZOFRAN) injection 2 mg, Q4H PRN  HYDROmorphone HCl PF (DILAUDID) injection 0.25  mg, Q4H PRN  urea (URE-NA) packet 15 g, Daily  sodium chloride flush 0.9 % injection 5-40 mL, 2 times per day  sodium chloride flush 0.9 % injection 5-40 mL, PRN  0.9 % sodium chloride infusion, PRN  potassium chloride (KLOR-CON M) extended release tablet 40 mEq, PRN   Or  potassium bicarb-citric acid (EFFER-K) effervescent tablet 40 mEq, PRN   Or  potassium chloride 10 mEq/100 mL IVPB (Peripheral Line), PRN  magnesium sulfate 2000 mg in 50 mL IVPB premix, PRN  ondansetron (ZOFRAN-ODT) disintegrating tablet 4 mg, Q8H PRN  acetaminophen (TYLENOL) tablet 650 mg, Q6H PRN   Or  acetaminophen (TYLENOL) suppository 650 mg, Q6H PRN  naloxone (NARCAN) injection 0.4 mg, PRN  glucose chewable tablet 16 g, PRN  dextrose bolus 10% 125 mL, PRN   Or  dextrose bolus 10% 250 mL, PRN  glucagon injection 1 mg, PRN  dextrose 10 % infusion, Continuous PRN  allopurinol (ZYLOPRIM) tablet 300 mg, Daily  [Held by provider] amLODIPine (NORVASC) tablet 10 mg, Daily  atorvastatin (LIPITOR) tablet 20 mg, Nightly  cholestyramine light packet 4 g, Daily  folic acid (FOLVITE) tablet 1 mg, Daily  levETIRAcetam (KEPPRA) tablet 500 mg, BID  [Held by provider] lisinopril (PRINIVIL;ZESTRIL) tablet 15 mg, Daily      Review of Systems see HPI    VitalSigns:  BP (!) 142/62   Pulse 100   Temp 98.8 °F (37.1 °C) (Oral)   Resp 18   Ht 1.549 m (5' 0.98\")   Wt 78.4 kg (172 lb 12.8 oz)   SpO2 97%   BMI 32.67 kg/m²      Physical Exam  Line/IV site: No erythema, warmth, induration, or tenderness.  Skin without rash  Right knee with moderate tenderness anteriorly  There was a little bit of warmth and erythema  Did not appreciate a large joint effusion, but hard to assess given her chronic lymphedema and body habitus  Wound VAC in place anterior right lower extremity  Pictures of right lower extremity wound taken today reviewed and are outlined below        Lab Results:  CBC:   Recent Labs     09/02/24  0435 09/02/24  0736 09/04/24  0500   WBC 32.1* 31.3*

## 2024-09-04 NOTE — PROGRESS NOTES
Vascular Surgery  Dr. Emili Landry   Daily Progress Note    Pt Name: Elda Flood  Medical Record Number: [id number]  Date of Birth 1945   Today's Date: 9/4/2024    SUBJECTIVE:     Patient was seen and examined.  Stating she had a really painful night, right knee extremely painful to touch and movement .  Stating she had 2 IV shots during night for right knee pain, hurts all around her kneecap .  Has not had nausea/vomiting    OBJECTIVE:     Patient Vitals for the past 24 hrs:   BP Temp Pulse Resp SpO2   09/04/24 0714 -- -- -- -- 97 %   09/04/24 0533 -- -- -- 18 --   09/04/24 0449 120/68 98.6 °F (37 °C) 94 -- 97 %   09/04/24 0131 -- -- -- 18 --   09/03/24 2225 -- -- -- 18 --   09/03/24 2106 100/62 98.8 °F (37.1 °C) 99 -- 95 %   09/03/24 2027 -- -- -- 18 --   09/03/24 1847 -- -- -- -- 98 %   09/03/24 1654 (!) 127/52 99.1 °F (37.3 °C) 95 16 99 %   09/03/24 1243 -- -- -- 18 --   09/03/24 0757 (!) 143/105 97.7 °F (36.5 °C) 92 16 97 %       Intake/Output Summary (Last 24 hours) at 9/4/2024 0723  Last data filed at 9/3/2024 1712  Gross per 24 hour   Intake --   Output 650 ml   Net -650 ml       In: -   Out: 650 [Urine:650]    I/O last 3 completed shifts:  In: -   Out: 2350 [Urine:2350]       Wt Readings from Last 3 Encounters:   09/01/24 78.4 kg (172 lb 12.8 oz)   08/13/24 67.4 kg (148 lb 8 oz)   08/06/24 67.5 kg (148 lb 12 oz)        Body mass index is 32.67 kg/m².     Diet: ADULT ORAL NUTRITION SUPPLEMENT; Breakfast, Dinner; Diabetic Oral Supplement  ADULT ORAL NUTRITION SUPPLEMENT; Dinner, Lunch; Wound Healing Oral Supplement  ADULT DIET; Regular; 4 carb choices (60 gm/meal); 1500 ml    MEDS:     Scheduled Meds:   insulin lispro  5 Units SubCUTAneous TID WC    ferric gluconate  250 mg IntraVENous Daily    metoprolol succinate  50 mg Oral BID    collagenase   Topical Daily    apixaban  5 mg Oral BID    sodium hypochlorite   Irrigation Daily    furosemide  20 mg Oral Daily    magnesium oxide  800 mg Oral TID     with    Hematoma     Pt here with large hematoma and swelling injured yesterday      PLAN:     Magnesium replacement in progress  Complaints of right knee pain, around kneecap  Will remove VAC dressing today and place Dakins moist to dry dressing if patient is transferring to [facility].  Patient has f/u with [facility] next week, goal is to have patient ready for [facility] as soon as possible

## 2024-09-04 NOTE — TELEPHONE ENCOUNTER
Calling about Hospital Follow up Appointment  On 9/26 @ 9:00 talked to daughter made her aware of her mother's appt. She is aware and said they would be here. [place]

## 2024-09-04 NOTE — PROGRESS NOTES
MEDICAL ONCOLOGY PROGRESS NOTE     Pt Name: Elda Flood  MRN: [id number]  YOB: 1945  Date of evaluation: 9/4/2024    Subjective-tolerated IV iron replacement relatively well.  No infusion reaction.  Discussed results flow cytometry with patient.  Reviewed interval notes vascular and ID.  Patient has a VAC dressing.  The patient remains afebrile.  Awaiting discharge to [facility].       HISTORY OF PRESENT ILLNESS:    Reason for consultation  Longstanding neutrophil leukocytosis  Monocytosis  Iron deficiency anemia  Multifactorial anemia  Reactive thrombocytosis    HEMATOLOGY HISTORY  Elda Flood was first seen by me on 9/1/2020 for consult by vascular for findings of leukocytosis and thrombocytosis with left shift.  The patient has a history of A-fib on chronic anticoagulation with Eliquis, type 2 diabetes, hyperlipidemia, hypertension, prior history of stroke  Patient was hospitalized with traumatic hematoma of the right lower extremity.  Vascular was consulted and performed evacuation of right lower extremity hematoma on 8/15/2024.  Patient becomes severely anemic requiring transfusional support.  Patient was noted to have neutrophil leukocytosis, fever and was placed on antibiotic therapy.  Patient has been tested positive for COVID-19 infection.  She has received several course antibiotics include cefazolin, vancomycin, cefepime and Augmentin.  She has persistent and worsening neutrophil leukocytosis.  Left shift noted with presence of myelocytes and blasts 7%.  Patient has persistent thrombocytosis as well.  Therefore, I was consulted        9/1/2024-peripheral blood flow cytometry, [facility]:  Mildly left shifted granulocytes without significant   immunophenotypic   aberrancy. See comment 1.   COMMENT:   1. Clinical and morphologic correlation along with pending other   ancillary   studies will be required to make a definitive diagnosis.     Past Medical History:    Past Medical History:   Diagnosis Date

## 2024-09-04 NOTE — PROGRESS NOTES
Occupational Therapy     09/04/24 1300   Subjective   Subjective Pt in bed upon arrival for therapy. Pt agreeable to participate.   Pain Assessment   Pain Assessment 0-10   Pain Level 7   Pain Location Leg   Pain Orientation Lower   Pain Descriptors Aching;Discomfort;Shooting;Sharp   Functional Pain Assessment Prevents or interferes some active activities and ADLs   Pain Type Acute pain   Pain Frequency Continuous   Pain Onset On-going   Non-Pharmaceutical Pain Intervention(s) Ambulation/Increased Activity;Repositioned;Rest;Nurse notified (comment)   Response to Pain Intervention Pain improved but above pain goal   Cognition   Overall Cognitive Status Exceptions   Cognition Comment Distracted by pain.   Orientation   Overall Orientation Status WFL   Bed Mobility Training   Bed Mobility Training Yes   Overall Level of Assistance Minimum assistance;Moderate assistance   Interventions Verbal cues;Tactile cues;Manual cues   Supine to Sit Minimum assistance   Sit to Supine Moderate assistance   Scooting Minimum assistance   Transfer Training   Transfer Training Yes   Overall Level of Assistance Moderate assistance;Maximum assistance  (Arnold Stedy)   Interventions Verbal cues;Tactile cues;Manual cues   Sit to Stand Moderate assistance;Maximum assistance   Stand to Sit Moderate assistance;Maximum assistance   Balance   Sitting Intact   Standing With support  (Arnold Stedy)   ADL   Feeding Independent   Grooming Independent;Setup   UE Bathing Minimal assistance   LE Bathing Maximum assistance   UE Dressing Minimal assistance   LE Dressing Maximum assistance   Toileting Maximum assistance   Toileting Skilled Clinical Factors for clean up and clothing management; arnold stedy to [facility]   Functional Mobility Maximum assistance   Functional Mobility Skilled Clinical Factors Arnold Stedy   Assessment   Assessment Tx focused on sitting EOB, attempts to  the Arnold Stedy requiring Mod-Max assist. Pt attempted multiple times with bed

## 2024-09-04 NOTE — PROGRESS NOTES
VAC pump turned off, tubing disconnected. Lidocaine 4% with sterile NS instilled into VAC Foam and allowed to soak X15 minutes. Adhesive remover used to remove VAC Drape, Foam removed and new photos taken. Saline soaked kerlex fluffs, covered with Vaseline Gauze, dry fluffs , ABD's, Kerlex roll, and 4\"Ace from base of toes to below knee. VAC pump # [id number] placed in 3rd floor dirty utility room.

## 2024-09-04 NOTE — PLAN OF CARE
Problem: Discharge Planning  Goal: Discharge to home or other facility with appropriate resources  9/3/2024 2217 by Vivian Fisher RN  Outcome: Progressing  9/3/2024 1014 by Elizabeth Sorto RN  Outcome: Progressing  Flowsheets (Taken 9/3/2024 0730)  Discharge to home or other facility with appropriate resources:   Identify barriers to discharge with patient and caregiver   Identify discharge learning needs (meds, wound care, etc)   Arrange for interpreters to assist at discharge as needed   Refer to discharge planning if patient needs post-hospital services based on physician order or complex needs related to functional status, cognitive ability or social support system   Arrange for needed discharge resources and transportation as appropriate     Problem: Safety - Adult  Goal: Free from fall injury  9/3/2024 2217 by Vivian Fisher RN  Outcome: Progressing  9/3/2024 1014 by Elizabeth Sorto RN  Outcome: Progressing     Problem: Skin/Tissue Integrity  Goal: Absence of new skin breakdown  Description: 1.  Monitor for areas of redness and/or skin breakdown  2.  Assess vascular access sites hourly  3.  Every 4-6 hours minimum:  Change oxygen saturation probe site  4.  Every 4-6 hours:  If on nasal continuous positive airway pressure, respiratory therapy assess nares and determine need for appliance change or resting period.  9/3/2024 2217 by Vivian Fisher RN  Outcome: Progressing  9/3/2024 1014 by Elizabeth Sorto RN  Outcome: Progressing     Problem: Nutrition Deficit:  Goal: Optimize nutritional status  Recent Flowsheet Documentation  Taken 9/3/2024 0924 by Della Lyles, MS, RD, LD  Nutrient intake appropriate for improving, restoring, or maintaining nutritional needs:   Monitor oral intake, labs, and treatment plans   Recommend appropriate diets, oral nutritional supplements, and vitamin/mineral supplements     Problem: Nutrition Deficit:  Goal: Optimize nutritional status  9/3/2024 2217 by Vivian Fisher RN  Outcome:

## 2024-09-04 NOTE — PROGRESS NOTES
Comprehensive Nutrition Assessment    Type and Reason for Visit:  Reassess    Nutrition Recommendations/Plan:   Continue current nutritional interventions     Malnutrition Assessment:  Malnutrition Status:  Mild malnutrition (09/04/24 1347)    Context:  Acute Illness     Findings of the 6 clinical characteristics of malnutrition:  Energy Intake:  Mild decrease in energy intake (Comment)  Weight Loss:  No significant weight loss     Body Fat Loss:  No significant body fat loss     Muscle Mass Loss:  No significant muscle mass loss    Fluid Accumulation:  Moderate to Severe Extremities   Strength:  Not Performed    Nutrition Assessment:    Following up for mouth sores and po intake.  \"I just don't feel so good today,  but mouth is feeling much better.\"  Stopped pudding with whey, but will continue pudding with [place] as that doesnot have whey--its collagen based.  Pt was also receiving Augmentin--was d/c 8/30--mouth sores could have been a reaction to the anitbiotic also.  Weight has increased in the past week--edema now +3 pitting BLE.    Nutrition Related Findings:    +3 pitting BLE edema Wound Type: Pressure Injury, Surgical Incision, Wound Vac       Current Nutrition Intake & Therapies:    Average Meal Intake: 1-25%, 51-75%, %  Average Supplements Intake: 26-50%, 51-75%, %  ADULT ORAL NUTRITION SUPPLEMENT; Breakfast, Dinner; Diabetic Oral Supplement  ADULT ORAL NUTRITION SUPPLEMENT; Dinner, Lunch; Wound Healing Oral Supplement  ADULT DIET; Regular; 4 carb choices (60 gm/meal); 1500 ml    Anthropometric Measures:  Height: 154.9 cm (5' 0.98\")  Ideal Body Weight (IBW): 105 lbs (48 kg)    Admission Body Weight: 67.4 kg (148 lb 8 oz)  Current Body Weight: 78.4 kg (172 lb 13.5 oz), 164.6 % IBW. Weight Source: Bed Scale  Current BMI (kg/m2): 32.7  Usual Body Weight: 68.6 kg (151 lb 3.8 oz)  % Weight Change (Calculated): -6.9  Weight Adjustment For: No Adjustment  BMI Categories: Obese Class 1 (BMI

## 2024-09-04 NOTE — CARE COORDINATION
Important Message from Medicare letter given to  Elda S Aleena  by Edward Griffiths. All questions answered,  Elda MACE Aleena  voiced understanding. Signed copy of IMM placed in patient's soft chart. Elda MACE Aleena given a copy of the IMM.      09/04/24 9117   IMM Letter   IMM Letter given to Patient/Family/Significant other/Guardian/POA/by: Edward Griffiths   IMM Letter date given: 09/04/24   IMM Letter time given: 0449

## 2024-09-05 ENCOUNTER — APPOINTMENT (OUTPATIENT)
Dept: GENERAL RADIOLOGY | Age: 79
DRG: 299 | End: 2024-09-05
Payer: MEDICARE

## 2024-09-05 VITALS
RESPIRATION RATE: 18 BRPM | BODY MASS INDEX: 32.62 KG/M2 | DIASTOLIC BLOOD PRESSURE: 59 MMHG | HEIGHT: 61 IN | HEART RATE: 93 BPM | WEIGHT: 172.8 LBS | OXYGEN SATURATION: 96 % | SYSTOLIC BLOOD PRESSURE: 135 MMHG | TEMPERATURE: 97.5 F

## 2024-09-05 LAB
ANISOCYTOSIS BLD QL SMEAR: ABNORMAL
BACTERIA BLD CULT ORG #2: NORMAL
BACTERIA BLD CULT: NORMAL
BASOPHILS # BLD: 0 K/UL (ref 0–0.2)
BASOPHILS NFR BLD: 0 % (ref 0–1)
CRP SERPL HS-MCNC: 6.11 MG/DL (ref 0–0.5)
EOSINOPHIL # BLD: 0 K/UL (ref 0–0.6)
EOSINOPHIL NFR BLD: 0 % (ref 0–5)
ERYTHROCYTE [DISTWIDTH] IN BLOOD BY AUTOMATED COUNT: 21.8 % (ref 11.5–14.5)
GLUCOSE BLD-MCNC: 125 MG/DL (ref 70–99)
GLUCOSE BLD-MCNC: 94 MG/DL (ref 70–99)
HCT VFR BLD AUTO: 26.3 % (ref 37–47)
HGB BLD-MCNC: 7.9 G/DL (ref 12–16)
HYPOCHROMIA BLD QL SMEAR: ABNORMAL
LYMPHOCYTES # BLD: 3.3 K/UL (ref 1.1–4.5)
LYMPHOCYTES NFR BLD: 8 % (ref 20–40)
MCH RBC QN AUTO: 25.2 PG (ref 27–31)
MCHC RBC AUTO-ENTMCNC: 30 G/DL (ref 33–37)
MCV RBC AUTO: 83.8 FL (ref 81–99)
MONOCYTES # BLD: 4.6 K/UL (ref 0–0.9)
MONOCYTES NFR BLD: 11 % (ref 0–10)
NEUTROPHILS # BLD: 33.5 K/UL (ref 1.5–7.5)
NEUTS BAND NFR BLD MANUAL: 4 % (ref 0–5)
NEUTS SEG NFR BLD: 77 % (ref 50–65)
PERFORMED ON: ABNORMAL
PERFORMED ON: NORMAL
PLATELET # BLD AUTO: 560 K/UL (ref 130–400)
PLATELET SLIDE REVIEW: ABNORMAL
PMV BLD AUTO: 11.8 FL (ref 9.4–12.3)
RBC # BLD AUTO: 3.14 M/UL (ref 4.2–5.4)
WBC # BLD AUTO: 41.4 K/UL (ref 4.8–10.8)

## 2024-09-05 PROCEDURE — 86140 C-REACTIVE PROTEIN: CPT

## 2024-09-05 PROCEDURE — 85027 COMPLETE CBC AUTOMATED: CPT

## 2024-09-05 PROCEDURE — 2580000003 HC RX 258: Performed by: SURGERY

## 2024-09-05 PROCEDURE — 99232 SBSQ HOSP IP/OBS MODERATE 35: CPT | Performed by: INTERNAL MEDICINE

## 2024-09-05 PROCEDURE — 36415 COLL VENOUS BLD VENIPUNCTURE: CPT

## 2024-09-05 PROCEDURE — 94760 N-INVAS EAR/PLS OXIMETRY 1: CPT

## 2024-09-05 PROCEDURE — 82962 GLUCOSE BLOOD TEST: CPT

## 2024-09-05 PROCEDURE — 81219 CALR GENE COM VARIANTS: CPT

## 2024-09-05 PROCEDURE — 6370000000 HC RX 637 (ALT 250 FOR IP): Performed by: STUDENT IN AN ORGANIZED HEALTH CARE EDUCATION/TRAINING PROGRAM

## 2024-09-05 PROCEDURE — 85007 BL SMEAR W/DIFF WBC COUNT: CPT

## 2024-09-05 PROCEDURE — 6370000000 HC RX 637 (ALT 250 FOR IP): Performed by: HOSPITALIST

## 2024-09-05 PROCEDURE — 6370000000 HC RX 637 (ALT 250 FOR IP): Performed by: SURGERY

## 2024-09-05 PROCEDURE — 81270 JAK2 GENE: CPT

## 2024-09-05 PROCEDURE — 73560 X-RAY EXAM OF KNEE 1 OR 2: CPT

## 2024-09-05 RX ORDER — INSULIN LISPRO 100 [IU]/ML
0-8 INJECTION, SOLUTION INTRAVENOUS; SUBCUTANEOUS
Qty: 10 ML | Refills: 1 | Status: SHIPPED | OUTPATIENT
Start: 2024-09-05

## 2024-09-05 RX ORDER — INSULIN GLARGINE 100 [IU]/ML
15 INJECTION, SOLUTION SUBCUTANEOUS 2 TIMES DAILY
Qty: 10 ML | Refills: 3 | Status: SHIPPED | OUTPATIENT
Start: 2024-09-05

## 2024-09-05 RX ADMIN — FUROSEMIDE 20 MG: 20 TABLET ORAL at 09:16

## 2024-09-05 RX ADMIN — INSULIN GLARGINE 15 UNITS: 100 INJECTION, SOLUTION SUBCUTANEOUS at 09:16

## 2024-09-05 RX ADMIN — METOPROLOL SUCCINATE 50 MG: 50 TABLET, EXTENDED RELEASE ORAL at 09:17

## 2024-09-05 RX ADMIN — APIXABAN 5 MG: 5 TABLET, FILM COATED ORAL at 09:16

## 2024-09-05 RX ADMIN — ONDANSETRON 4 MG: 4 TABLET, ORALLY DISINTEGRATING ORAL at 09:17

## 2024-09-05 RX ADMIN — Medication 800 MG: at 09:16

## 2024-09-05 RX ADMIN — INSULIN LISPRO 5 UNITS: 100 INJECTION, SOLUTION INTRAVENOUS; SUBCUTANEOUS at 09:15

## 2024-09-05 RX ADMIN — FOLIC ACID 1 MG: 1 TABLET ORAL at 09:16

## 2024-09-05 RX ADMIN — LEVETIRACETAM 500 MG: 500 TABLET, FILM COATED ORAL at 09:17

## 2024-09-05 RX ADMIN — SODIUM CHLORIDE, PRESERVATIVE FREE 10 ML: 5 INJECTION INTRAVENOUS at 09:17

## 2024-09-05 RX ADMIN — OXYCODONE HYDROCHLORIDE 10 MG: 10 TABLET ORAL at 10:15

## 2024-09-05 RX ADMIN — SODIUM CHLORIDE 2 G: 1 TABLET ORAL at 09:17

## 2024-09-05 RX ADMIN — Medication 15 G: at 09:16

## 2024-09-05 RX ADMIN — NALOXEGOL OXALATE 12.5 MG: 12.5 TABLET, FILM COATED ORAL at 06:07

## 2024-09-05 RX ADMIN — ALLOPURINOL 300 MG: 100 TABLET ORAL at 09:17

## 2024-09-05 RX ADMIN — ACETAMINOPHEN 650 MG: 325 TABLET ORAL at 11:54

## 2024-09-05 RX ADMIN — CHOLESTYRAMINE 4 G: 4 POWDER, FOR SUSPENSION ORAL at 09:16

## 2024-09-05 ASSESSMENT — PAIN SCALES - GENERAL
PAINLEVEL_OUTOF10: 9
PAINLEVEL_OUTOF10: 0
PAINLEVEL_OUTOF10: 5

## 2024-09-05 ASSESSMENT — PAIN DESCRIPTION - ORIENTATION
ORIENTATION: RIGHT
ORIENTATION: RIGHT

## 2024-09-05 ASSESSMENT — PAIN DESCRIPTION - ONSET
ONSET: ON-GOING
ONSET: ON-GOING

## 2024-09-05 ASSESSMENT — PAIN DESCRIPTION - PAIN TYPE
TYPE: ACUTE PAIN
TYPE: ACUTE PAIN

## 2024-09-05 ASSESSMENT — PAIN DESCRIPTION - DESCRIPTORS
DESCRIPTORS: ACHING
DESCRIPTORS: ACHING

## 2024-09-05 ASSESSMENT — PAIN DESCRIPTION - LOCATION
LOCATION: ANKLE
LOCATION: SHOULDER

## 2024-09-05 ASSESSMENT — PAIN - FUNCTIONAL ASSESSMENT
PAIN_FUNCTIONAL_ASSESSMENT: ACTIVITIES ARE NOT PREVENTED
PAIN_FUNCTIONAL_ASSESSMENT: ACTIVITIES ARE NOT PREVENTED

## 2024-09-05 ASSESSMENT — PAIN DESCRIPTION - FREQUENCY
FREQUENCY: CONTINUOUS
FREQUENCY: CONTINUOUS

## 2024-09-05 NOTE — PROGRESS NOTES
[facility]    [facility] Note    Patient:  Elda Flood  YOB: 1945  Date of Service: 9/5/2024  MRN: [id number]   Acct: [id number]   Primary Care Physician: NICOLE Solis DO  Advance Directive: Prior  Admit Date: 8/14/2024       Hospital Day: 22    Portions of this note have been copied forward, however, updated to reflect the most current clinical status of this patient.     CHIEF COMPLAINT: bruising RLE    SUBJECTIVE: Sitting in chair, mouth pain improved    CUMULATIVE HOSPITAL COURSE:     This patient is a 79-year-old female with PMH T2DM, hypertension, CVA, hyperlipidemia, atrial fibrillation on chronic anticoagulation who presented to [facility] ED on 8/14 for evaluation of bruising and pain to RLE; found to have large hematoma with active extravasation from venous varicosities on CT.  She was seen by vascular surgery and is s/p evacuation of hematoma on 8/15.  Postoperatively, patient required blood transfusion due to anemia from acute blood loss. Hemoglobin has since been stable. Eliquis held initially, however restarted once hemoglobin stabilized. Wound VAC was placed on 8/20 and wound is healing appropriately.  Patient has had persistent leukocytosis throughout admission; initially placed on Ancef for possible skin/soft tissue infection.  ID was consulted and do not believe that her leukocytosis is infectious in nature.  Antibiotics discontinued.  Hematology/oncology have evaluated the patient and further workup is ongoing; recommend outpatient follow-up to discuss results.  She has an appointment made at the end of this month with [facility].  She completed 4 day course of Ferrlecit. Patient had been accepted to SNF and had plan to discharge on 8/24, however prior to discharge had noted temp up to 101.3 which resolved quickly on its own.  Respiratory panel positive for COVID-19. Supportive care recommended. Magic mouthwash ordered due to reports of mouth pain with improvement.

## 2024-09-05 NOTE — PLAN OF CARE
Problem: Discharge Planning  Goal: Discharge to home or other facility with appropriate resources  Outcome: Progressing     Problem: Safety - Adult  Goal: Free from fall injury  Outcome: Progressing     Problem: Skin/Tissue Integrity  Goal: Absence of new skin breakdown  Description: 1.  Monitor for areas of redness and/or skin breakdown  2.  Assess vascular access sites hourly  3.  Every 4-6 hours minimum:  Change oxygen saturation probe site  4.  Every 4-6 hours:  If on nasal continuous positive airway pressure, respiratory therapy assess nares and determine need for appliance change or resting period.  Outcome: Progressing     Problem: Nutrition Deficit:  Goal: Optimize nutritional status  Recent Flowsheet Documentation  Taken 9/4/2024 1333 by Jacque Kathleen, MS, RD, LD  Nutrient intake appropriate for improving, restoring, or maintaining nutritional needs:   Assess nutritional status and recommend course of action   Monitor oral intake, labs, and treatment plans   Recommend appropriate diets, oral nutritional supplements, and vitamin/mineral supplements     Problem: Nutrition Deficit:  Goal: Optimize nutritional status  9/5/2024 0314 by Vivian Fisher RN  Outcome: Progressing  9/4/2024 1350 by Jacque Kathleen, MS, RD, LD  Outcome: Progressing  Flowsheets (Taken 9/4/2024 1333)  Nutrient intake appropriate for improving, restoring, or maintaining nutritional needs:   Assess nutritional status and recommend course of action   Monitor oral intake, labs, and treatment plans   Recommend appropriate diets, oral nutritional supplements, and vitamin/mineral supplements

## 2024-09-05 NOTE — PROGRESS NOTES
Vascular Surgery  Dr. Emili Landry   Daily Progress Note    Pt Name: Elda Flood  Medical Record Number: [id number]  Date of Birth 1945   Today's Date: 9/5/2024    SUBJECTIVE:     Patient was seen and examined.  Complaining that her right shoulder is really painful today, still complains of right knee pain.   Asking when she will be transferred to Rehab.    OBJECTIVE:     Patient Vitals for the past 24 hrs:   BP Temp Temp src Pulse Resp SpO2 Height   09/05/24 0957 -- -- -- -- -- 96 % --   09/05/24 0708 (!) 135/59 97.5 °F (36.4 °C) Temporal 93 18 98 % --   09/05/24 0421 (!) 109/48 98.7 °F (37.1 °C) Oral 88 18 99 % --   09/04/24 2215 -- 98.6 °F (37 °C) Oral -- -- -- --   09/04/24 2056 -- 99.7 °F (37.6 °C) Oral -- -- -- --   09/04/24 2010 113/86 100 °F (37.8 °C) Oral (!) 106 16 95 % --   09/04/24 1645 (!) 142/62 98.8 °F (37.1 °C) Oral 100 -- 97 % --   09/04/24 1333 -- -- -- -- -- -- 1.549 m (5' 0.98\")       Intake/Output Summary (Last 24 hours) at 9/5/2024 1200  Last data filed at 9/5/2024 0922  Gross per 24 hour   Intake 345 ml   Output 500 ml   Net -155 ml     In: 585 [P.O.:585]  Out: 500 [Urine:500]    I/O last 3 completed shifts:  In: 480 [P.O.:480]  Out: 500 [Urine:500]   Date 09/05/24 0000 - 09/05/24 2359   Shift 3055-5103 1079-9027 4709-6750 24 Hour Total   INTAKE   P.O.(mL/kg/hr)  105  105   Shift Total(mL/kg)  105(1.3)  105(1.3)   OUTPUT   Urine(mL/kg/hr) 500(0.8)   500   Shift Total(mL/kg) 500(6.4)   500(6.4)   Weight (kg) 78.4 78.4 78.4 78.4     Wt Readings from Last 3 Encounters:   09/01/24 78.4 kg (172 lb 12.8 oz)   08/13/24 67.4 kg (148 lb 8 oz)   08/06/24 67.5 kg (148 lb 12 oz)      Body mass index is 32.67 kg/m².     Diet: ADULT ORAL NUTRITION SUPPLEMENT; Breakfast, Dinner; Diabetic Oral Supplement  ADULT ORAL NUTRITION SUPPLEMENT; Dinner, Lunch; Wound Healing Oral Supplement  ADULT DIET; Regular; 4 carb choices (60 gm/meal); 1500 ml    MEDS:     Scheduled Meds:   insulin lispro  5 Units  pain [R52] 08/14/2024    Hypertension [I10] 05/19/2015    Diabetes (HCC) [E11.9] 05/19/2015       Chief Complaint:  Chief Complaint   Patient presents with    Hematoma     Pt here with large hematoma and swelling injured yesterday      PLAN:     To SNF today for Rehab  Complaints of right knee  and right shoulder pain today   Dakins moist to dry dressing changed. Plans for placement of VeraFlow VAC at Rehab Facility  Patient has f/u with [facility] next week, goal is to have patient ready for [facility] as soon as possible

## 2024-09-05 NOTE — PROGRESS NOTES
Pt spiked fever of 100, Dr. Schroeder notified. Dr. Schroeder clarified with Dr. Llamas that pt is still to be discharged. Dr. Llamas would like pt to stay overnight. EMS, [place], CN, [facility], and family notified.     Ok to leave Dakins dressing on RLE per Dr. Landry.    Electronically signed by Vivian Marin RN on 9/4/2024 at 9:39 PM

## 2024-09-05 NOTE — PROGRESS NOTES
MEDICAL ONCOLOGY PROGRESS NOTE     Pt Name: Elda Flood  MRN: [id number]  YOB: 1945  Date of evaluation: 9/5/2024    Subjective-status post IV iron treatment with ferric gluconate.  Tolerated relatively well.  Tmax 100 yesterday and therefore discharge was held due to concern of further fever.  She was watched overnight.  Temperature came down.  She denies any complaints today.  Feels well.    HISTORY OF PRESENT ILLNESS:    Reason for consultation  Longstanding neutrophil leukocytosis  Monocytosis  Iron deficiency anemia  Multifactorial anemia  Reactive thrombocytosis    HEMATOLOGY HISTORY  Elda Flood was first seen by me on 9/1/2020 for consult by vascular for findings of leukocytosis and thrombocytosis with left shift.  The patient has a history of A-fib on chronic anticoagulation with Eliquis, type 2 diabetes, hyperlipidemia, hypertension, prior history of stroke  Patient was hospitalized with traumatic hematoma of the right lower extremity.  Vascular was consulted and performed evacuation of right lower extremity hematoma on 8/15/2024.  Patient becomes severely anemic requiring transfusional support.  Patient was noted to have neutrophil leukocytosis, fever and was placed on antibiotic therapy.  Patient has been tested positive for COVID-19 infection.  She has received several course antibiotics include cefazolin, vancomycin, cefepime and Augmentin.  She has persistent and worsening neutrophil leukocytosis.  Left shift noted with presence of myelocytes and blasts 7%.  Patient has persistent thrombocytosis as well.  Therefore, I was consulted        9/1/2024-peripheral blood flow cytometry, [facility]:  Mildly left shifted granulocytes without significant   immunophenotypic   aberrancy. See comment 1.   COMMENT:   1. Clinical and morphologic correlation along with pending other   ancillary   studies will be required to make a definitive diagnosis.     Past Medical History:    Past Medical History:  dislocation. 2. Diffuse soft tissue edema. 3. Osteopenia. 4. Severe knee tricompartmental osteoarthritis. 5. Probable hematoma at the medial lower leg measuring 7 cm.     .   ______________________________________ Electronically signed by: SALTY MEYER D.O. Date:     08/13/2024 Time:    17:32        My interpretation: Large thigh hematoma involving the anterior, medial and lateral aspect of the right thigh      ASSESSMENT:  # Neutrophil leukocytosis with left shift-reactive versus clonal process.  She has some evidence of neutrophil leukocytosis since at least December 2022.  Prior CBCs showed evidence of blasts and myelocyte  Neutrophil leukocytosis has been more longstanding  Path review-as below  BCR/ABL-in process  CRP 2.13 (H), ESR 40 (H)  Blood cultures-negative  Reviewed ID notes  Flow cytometry-essentially normal.    8/30/2024 peripheral blood smear- Peripheral blood smear, physician requested review:   1.  Leukocytosis with a left shift of the myeloid series.   2.  Microcytic anemia.   3.  Thrombocytosis.   4.  Negative for blasts.   5.  Negative for schistocytes.       9/1/2024-peripheral blood flow cytometry, [facility]:  Mildly left shifted granulocytes without significant   immunophenotypic aberrancy  See comment 1.   COMMENT:   1. Clinical and morphologic correlation along with pending other   ancillary   studies will be required to make a definitive diagnosis.     I reviewed prior CBCs available in our system that showed persistent neutrophil leukocytosis and several incidents since December 2022  In addition, she has chronic longstanding anemia  There is some evidence of iron deficiency.  Will check ferritin as well.  She has not received an IV iron supplementation during this hospitalization    # Monocytosis-reactive versus clonal process    # Thrombocytosis-likely reactive, may be related to iron deficiency, inflammation  -This has developed during this hospitalization  -Likely reactive to

## 2024-09-06 LAB
SPECIMEN SOURCE: NORMAL
T(9;22)(ABL1,BCR) BLD/T QL: NOT DETECTED

## 2024-09-09 ENCOUNTER — TELEPHONE (OUTPATIENT)
Dept: HEMATOLOGY | Age: 79
End: 2024-09-09

## 2024-09-09 DIAGNOSIS — D75.839 THROMBOCYTOSIS: ICD-10-CM

## 2024-09-09 DIAGNOSIS — D72.9 NEUTROPHILIC LEUKOCYTOSIS: Primary | ICD-10-CM

## 2024-09-09 LAB
BACTERIA BLD CULT ORG #2: NORMAL
BACTERIA BLD CULT: NORMAL

## 2024-09-11 ENCOUNTER — HOSPITAL ENCOUNTER (OUTPATIENT)
Dept: WOUND CARE | Age: 79
Discharge: HOME | End: 2024-09-11
Payer: MEDICARE

## 2024-09-11 VITALS
BODY MASS INDEX: 32.74 KG/M2 | DIASTOLIC BLOOD PRESSURE: 72 MMHG | HEART RATE: 94 BPM | WEIGHT: 173.4 LBS | HEIGHT: 61 IN | RESPIRATION RATE: 18 BRPM | SYSTOLIC BLOOD PRESSURE: 130 MMHG | TEMPERATURE: 98.7 F

## 2024-09-11 DIAGNOSIS — E11.622 TYPE 2 DIABETES MELLITUS WITH OTHER SKIN ULCER, WITH LONG-TERM CURRENT USE OF INSULIN (HCC): ICD-10-CM

## 2024-09-11 DIAGNOSIS — Z79.4 TYPE 2 DIABETES MELLITUS WITH OTHER SKIN ULCER, WITH LONG-TERM CURRENT USE OF INSULIN (HCC): ICD-10-CM

## 2024-09-11 DIAGNOSIS — B37.2 YEAST DERMATITIS: ICD-10-CM

## 2024-09-11 DIAGNOSIS — E44.1 MILD MALNUTRITION (HCC): Primary | ICD-10-CM

## 2024-09-11 DIAGNOSIS — E55.9 VITAMIN D DEFICIENCY: ICD-10-CM

## 2024-09-11 DIAGNOSIS — L97.912 SKIN ULCER OF RIGHT LOWER LEG WITH FAT LAYER EXPOSED (HCC): ICD-10-CM

## 2024-09-11 PROBLEM — E11.628 TYPE 2 DIABETES MELLITUS WITH SKIN COMPLICATION, WITH LONG-TERM CURRENT USE OF INSULIN (HCC): Status: ACTIVE | Noted: 2024-09-11

## 2024-09-11 PROCEDURE — 11042 DBRDMT SUBQ TIS 1ST 20SQCM/<: CPT | Performed by: NURSE PRACTITIONER

## 2024-09-11 PROCEDURE — 11042 DBRDMT SUBQ TIS 1ST 20SQCM/<: CPT

## 2024-09-11 PROCEDURE — 99215 OFFICE O/P EST HI 40 MIN: CPT | Performed by: NURSE PRACTITIONER

## 2024-09-11 PROCEDURE — 11045 DBRDMT SUBQ TISS EACH ADDL: CPT

## 2024-09-11 PROCEDURE — 99215 OFFICE O/P EST HI 40 MIN: CPT

## 2024-09-11 PROCEDURE — 11045 DBRDMT SUBQ TISS EACH ADDL: CPT | Performed by: NURSE PRACTITIONER

## 2024-09-11 RX ORDER — ASCORBIC ACID 500 MG
500 TABLET ORAL DAILY
COMMUNITY

## 2024-09-11 RX ORDER — NEOMYCIN/BACITRACIN/POLYMYXINB 3.5-400-5K
OINTMENT (GRAM) TOPICAL ONCE
OUTPATIENT
Start: 2024-09-11 | End: 2024-09-11

## 2024-09-11 RX ORDER — TRIAMCINOLONE ACETONIDE 1 MG/G
OINTMENT TOPICAL ONCE
OUTPATIENT
Start: 2024-09-11 | End: 2024-09-11

## 2024-09-11 RX ORDER — LIDOCAINE 40 MG/G
CREAM TOPICAL ONCE
OUTPATIENT
Start: 2024-09-11 | End: 2024-09-11

## 2024-09-11 RX ORDER — BACITRACIN ZINC AND POLYMYXIN B SULFATE 500; 1000 [USP'U]/G; [USP'U]/G
OINTMENT TOPICAL ONCE
OUTPATIENT
Start: 2024-09-11 | End: 2024-09-11

## 2024-09-11 RX ORDER — SODIUM CHLOR/HYPOCHLOROUS ACID 0.033 %
SOLUTION, IRRIGATION IRRIGATION ONCE
OUTPATIENT
Start: 2024-09-11 | End: 2024-09-11

## 2024-09-11 RX ORDER — ACETAMINOPHEN 325 MG/1
650 TABLET ORAL EVERY 4 HOURS PRN
COMMUNITY

## 2024-09-11 RX ORDER — LIDOCAINE 50 MG/G
OINTMENT TOPICAL ONCE
OUTPATIENT
Start: 2024-09-11 | End: 2024-09-11

## 2024-09-11 RX ORDER — LIDOCAINE HYDROCHLORIDE 40 MG/ML
SOLUTION TOPICAL ONCE
OUTPATIENT
Start: 2024-09-11 | End: 2024-09-11

## 2024-09-11 RX ORDER — LIDOCAINE HYDROCHLORIDE 20 MG/ML
JELLY TOPICAL ONCE
OUTPATIENT
Start: 2024-09-11 | End: 2024-09-11

## 2024-09-11 RX ORDER — ZINC SULFATE 50(220)MG
50 CAPSULE ORAL DAILY
COMMUNITY

## 2024-09-11 RX ORDER — CLOBETASOL PROPIONATE 0.5 MG/G
OINTMENT TOPICAL ONCE
OUTPATIENT
Start: 2024-09-11 | End: 2024-09-11

## 2024-09-11 RX ORDER — GENTAMICIN SULFATE 1 MG/G
OINTMENT TOPICAL ONCE
OUTPATIENT
Start: 2024-09-11 | End: 2024-09-11

## 2024-09-11 RX ORDER — GINSENG 100 MG
CAPSULE ORAL ONCE
OUTPATIENT
Start: 2024-09-11 | End: 2024-09-11

## 2024-09-11 RX ORDER — SILVER SULFADIAZINE 10 MG/G
CREAM TOPICAL ONCE
OUTPATIENT
Start: 2024-09-11 | End: 2024-09-11

## 2024-09-11 RX ORDER — OXYCODONE HYDROCHLORIDE 10 MG/1
10 TABLET ORAL EVERY 6 HOURS PRN
COMMUNITY

## 2024-09-11 RX ORDER — MUPIROCIN 20 MG/G
OINTMENT TOPICAL ONCE
OUTPATIENT
Start: 2024-09-11 | End: 2024-09-11

## 2024-09-11 RX ORDER — BETAMETHASONE DIPROPIONATE 0.5 MG/G
CREAM TOPICAL ONCE
OUTPATIENT
Start: 2024-09-11 | End: 2024-09-11

## 2024-09-11 RX ORDER — FLUCONAZOLE 150 MG/1
150 TABLET ORAL DAILY
Qty: 7 TABLET | Refills: 0
Start: 2024-09-11 | End: 2024-09-18

## 2024-09-11 RX ORDER — LIDOCAINE HYDROCHLORIDE 20 MG/ML
JELLY TOPICAL ONCE
Status: DISCONTINUED | OUTPATIENT
Start: 2024-09-11 | End: 2024-09-13 | Stop reason: HOSPADM

## 2024-09-11 ASSESSMENT — VISUAL ACUITY: OU: 1

## 2024-09-11 ASSESSMENT — PAIN DESCRIPTION - DESCRIPTORS: DESCRIPTORS: ACHING;BURNING

## 2024-09-11 ASSESSMENT — PAIN SCALES - GENERAL: PAINLEVEL_OUTOF10: 10

## 2024-09-11 ASSESSMENT — PAIN DESCRIPTION - FREQUENCY: FREQUENCY: CONTINUOUS

## 2024-09-11 ASSESSMENT — PAIN DESCRIPTION - ONSET: ONSET: ON-GOING

## 2024-09-11 ASSESSMENT — PAIN DESCRIPTION - LOCATION: LOCATION: LEG

## 2024-09-11 ASSESSMENT — PAIN - FUNCTIONAL ASSESSMENT: PAIN_FUNCTIONAL_ASSESSMENT: PREVENTS OR INTERFERES SOME ACTIVE ACTIVITIES AND ADLS

## 2024-09-11 ASSESSMENT — PAIN DESCRIPTION - ORIENTATION: ORIENTATION: RIGHT

## 2024-09-11 ASSESSMENT — PAIN DESCRIPTION - PAIN TYPE: TYPE: ACUTE PAIN

## 2024-09-13 ENCOUNTER — TELEPHONE (OUTPATIENT)
Dept: HEMATOLOGY | Age: 79
End: 2024-09-13

## 2024-09-13 LAB
CITATION REF LAB TEST: NORMAL
JAK2 P.V617F BLD/T QL: NORMAL
LAB DIRECTOR NAME PROVIDER: NORMAL
REF LAB TEST METHOD: NORMAL
REFLEX: NORMAL
TEST PERFORMANCE INFO SPEC: NORMAL

## 2024-09-16 ENCOUNTER — OFFICE VISIT (OUTPATIENT)
Dept: CARDIOLOGY CLINIC | Age: 79
End: 2024-09-16
Payer: MEDICARE

## 2024-09-16 VITALS
BODY MASS INDEX: 27.59 KG/M2 | OXYGEN SATURATION: 100 % | DIASTOLIC BLOOD PRESSURE: 54 MMHG | WEIGHT: 146 LBS | HEART RATE: 81 BPM | SYSTOLIC BLOOD PRESSURE: 118 MMHG

## 2024-09-16 DIAGNOSIS — G40.89 OTHER SEIZURES (HCC): ICD-10-CM

## 2024-09-16 DIAGNOSIS — R60.9 EDEMA, UNSPECIFIED TYPE: ICD-10-CM

## 2024-09-16 DIAGNOSIS — R60.0 PERIPHERAL EDEMA: ICD-10-CM

## 2024-09-16 DIAGNOSIS — I25.118 CORONARY ARTERY DISEASE INVOLVING NATIVE CORONARY ARTERY OF NATIVE HEART WITH OTHER FORM OF ANGINA PECTORIS (HCC): Primary | ICD-10-CM

## 2024-09-16 PROBLEM — R56.9 UNSPECIFIED CONVULSIONS (HCC): Status: ACTIVE | Noted: 2024-09-16

## 2024-09-16 PROCEDURE — 3074F SYST BP LT 130 MM HG: CPT | Performed by: INTERNAL MEDICINE

## 2024-09-16 PROCEDURE — 99214 OFFICE O/P EST MOD 30 MIN: CPT | Performed by: INTERNAL MEDICINE

## 2024-09-16 PROCEDURE — 3078F DIAST BP <80 MM HG: CPT | Performed by: INTERNAL MEDICINE

## 2024-09-16 PROCEDURE — 1123F ACP DISCUSS/DSCN MKR DOCD: CPT | Performed by: INTERNAL MEDICINE

## 2024-09-16 RX ORDER — LIDOCAINE 50 MG/G
PATCH TOPICAL
COMMUNITY

## 2024-09-16 RX ORDER — POTASSIUM CHLORIDE 1500 MG/1
40 TABLET, EXTENDED RELEASE ORAL ONCE
Qty: 2 TABLET | Refills: 0 | Status: SHIPPED | OUTPATIENT
Start: 2024-09-16 | End: 2024-09-16

## 2024-09-16 RX ORDER — FUROSEMIDE 40 MG
80 TABLET ORAL ONCE
Qty: 60 TABLET | Refills: 0 | Status: SHIPPED | OUTPATIENT
Start: 2024-09-17 | End: 2024-09-17

## 2024-09-16 RX ORDER — POTASSIUM CHLORIDE 1500 MG/1
20 TABLET, EXTENDED RELEASE ORAL DAILY
Qty: 30 TABLET | Refills: 0 | Status: SHIPPED | OUTPATIENT
Start: 2024-09-17

## 2024-09-16 RX ORDER — FUROSEMIDE 40 MG
40 TABLET ORAL DAILY
Qty: 30 TABLET | Refills: 0 | Status: SHIPPED | OUTPATIENT
Start: 2024-09-16 | End: 2024-10-16

## 2024-09-16 ASSESSMENT — ENCOUNTER SYMPTOMS
CONSTIPATION: 0
SHORTNESS OF BREATH: 0
EYE PAIN: 0
BLOOD IN STOOL: 0
ABDOMINAL PAIN: 0
EYE REDNESS: 0
FACIAL SWELLING: 0
EYE DISCHARGE: 0
WHEEZING: 0
SORE THROAT: 0
CHEST TIGHTNESS: 0
DIARRHEA: 0
APNEA: 0
COUGH: 0
VOMITING: 0
NAUSEA: 0
ABDOMINAL DISTENTION: 0

## 2024-09-17 ENCOUNTER — TELEPHONE (OUTPATIENT)
Dept: HEMATOLOGY | Age: 79
End: 2024-09-17

## 2024-09-17 ENCOUNTER — TELEPHONE (OUTPATIENT)
Dept: CARDIOLOGY CLINIC | Age: 79
End: 2024-09-17

## 2024-09-18 ENCOUNTER — TELEPHONE (OUTPATIENT)
Dept: HEMATOLOGY | Age: 79
End: 2024-09-18

## 2024-09-20 ENCOUNTER — TELEPHONE (OUTPATIENT)
Dept: HEMATOLOGY | Age: 79
End: 2024-09-20

## 2024-09-23 RX ORDER — MAGNESIUM SULFATE HEPTAHYDRATE 40 MG/ML
2000 INJECTION, SOLUTION INTRAVENOUS
Qty: 300 ML | Refills: 3
Start: 2024-09-23 | End: 2024-12-03

## 2024-09-25 ENCOUNTER — HOSPITAL ENCOUNTER (OUTPATIENT)
Dept: WOUND CARE | Age: 79
Discharge: HOME | End: 2024-09-25

## 2024-09-25 NOTE — PROGRESS NOTES
of IV magnesium.     ASSESSMENT:    No diagnosis found.  # Neutrophil leukocytosis with left shift-reactive versus clonal process.  She has some evidence of neutrophil leukocytosis since at least December 2022.  Prior CBCs showed evidence of blasts and myelocyte  Neutrophil leukocytosis has been more longstanding  Path review-as below  BCR/ABL 9/1/2024- no evidence of major, minor, or micro BCR-ABL1 fusion transcripts.   9/1/2024: CRP 2.13 (H), ESR 40 (H)  9/4/2024 Blood cultures-negative  9/1/2024 Flow cytometry-essentially normal.     8/30/2024 peripheral blood smear- Peripheral blood smear, physician requested review:   1.  Leukocytosis with a left shift of the myeloid series.   2.  Microcytic anemia.   3.  Thrombocytosis.   4.  Negative for blasts.   5.  Negative for schistocytes.         9/1/2024-peripheral blood flow cytometry, [facility]:  Mildly left shifted granulocytes without significant   immunophenotypic aberrancy  See comment 1.   COMMENT:   1. Clinical and morphologic correlation along with pending other   ancillary   studies will be required to make a definitive diagnosis.      I reviewed prior CBCs available in our system that showed persistent neutrophil leukocytosis and several incidents since December 2022  In addition, she has chronic longstanding anemia  There is some evidence of iron deficiency.  Will check ferritin as well.  She has not received an IV iron supplementation during this hospitalization     # Monocytosis-reactive versus clonal process     # Thrombocytosis-likely reactive, may be related to iron deficiency, inflammation  -This has developed during this hospitalization  -Likely reactive to infection  -I expect improvement     # Normocytic anemia-stable        Recent Labs     09/05/24  0346 09/04/24  0500 09/02/24  0736   WBC 41.4* 44.3* 31.3*   HGB 7.9* 7.8* 7.4*   HCT 26.3* 25.9* 24.3*   MCV 83.8 83.8 80.7*   * 611* 656*   8/18/2024 iron 5, TIBC 244, saturation 2%  9/1/2024  questions to the patient’s satisfaction. I have also reviewed the chief complaint (CC) and part of the history (History of Present Illness (HPI), Past Family Social History (PFSH), or Review of Systems (ROS) and made changes when appropriate.        (Please note that portions of this note were completed with a voice recognition program. Efforts were made to edit the dictations but occasionally words are mis-transcribed.)    The total time (40 min) I spent to see the patient today includes at least one or more of the following: preparing to see the patient by reviewing prior tests, prior notes or other relevant information, performing appropriate independent examination and evaluation, counseling, ordering of medications, tests or procedures, referrals, communicating with other healthcare professionals when appropriated to coordinate care, documenting clinic information in the electronic medical record or other health records, independently interpreting results of tests, managing test results and communicating the results to the patient/family or caregiver.      Electronically signed by LAYLA Rai on 9/30/2024 at 7:49 PM

## 2024-09-26 ENCOUNTER — OFFICE VISIT (OUTPATIENT)
Dept: HEMATOLOGY | Age: 79
End: 2024-09-26
Payer: MEDICARE

## 2024-09-26 ENCOUNTER — HOSPITAL ENCOUNTER (OUTPATIENT)
Dept: INFUSION THERAPY | Age: 79
Discharge: HOME | End: 2024-09-26
Payer: MEDICARE

## 2024-09-26 VITALS
SYSTOLIC BLOOD PRESSURE: 136 MMHG | HEIGHT: 61 IN | BODY MASS INDEX: 27.59 KG/M2 | HEART RATE: 96 BPM | OXYGEN SATURATION: 97 % | TEMPERATURE: 97.8 F | DIASTOLIC BLOOD PRESSURE: 60 MMHG

## 2024-09-26 DIAGNOSIS — Z71.89 CARE PLAN DISCUSSED WITH PATIENT: ICD-10-CM

## 2024-09-26 DIAGNOSIS — D50.0 IRON DEFICIENCY ANEMIA DUE TO CHRONIC BLOOD LOSS: ICD-10-CM

## 2024-09-26 DIAGNOSIS — D75.839 THROMBOCYTOSIS: ICD-10-CM

## 2024-09-26 DIAGNOSIS — D47.1 MPN (MYELOPROLIFERATIVE NEOPLASM) (HCC): Primary | ICD-10-CM

## 2024-09-26 DIAGNOSIS — Z15.89 JAK2 V617F MUTATION: ICD-10-CM

## 2024-09-26 DIAGNOSIS — D47.1 MPN (MYELOPROLIFERATIVE NEOPLASM) (HCC): ICD-10-CM

## 2024-09-26 DIAGNOSIS — D72.821 MONOCYTOSIS: ICD-10-CM

## 2024-09-26 DIAGNOSIS — D72.828 NEUTROPHILIC LEUKOCYTOSIS: ICD-10-CM

## 2024-09-26 DIAGNOSIS — S70.11XD HEMATOMA OF RIGHT THIGH, SUBSEQUENT ENCOUNTER: ICD-10-CM

## 2024-09-26 DIAGNOSIS — D72.9 NEUTROPHILIC LEUKOCYTOSIS: ICD-10-CM

## 2024-09-26 LAB
BASOPHILS # BLD: 0.03 K/UL (ref 0.01–0.08)
BASOPHILS NFR BLD: 0.2 % (ref 0.1–1.2)
EOSINOPHIL # BLD: 0.11 K/UL (ref 0.04–0.54)
EOSINOPHIL NFR BLD: 0.6 % (ref 0.7–7)
ERYTHROCYTE [DISTWIDTH] IN BLOOD BY AUTOMATED COUNT: 19.6 % (ref 11.7–14.4)
FERRITIN SERPL-MCNC: 295.3 NG/ML (ref 13–150)
HCT VFR BLD AUTO: 25.3 % (ref 34.1–44.9)
HGB BLD-MCNC: 7.7 G/DL (ref 11.2–15.7)
IRON SATN MFR SERPL: 5 % (ref 14–50)
IRON SERPL-MCNC: 10 UG/DL (ref 37–145)
LYMPHOCYTES # BLD: 1.7 K/UL (ref 1.18–3.74)
LYMPHOCYTES NFR BLD: 9.8 % (ref 19.3–53.1)
MCH RBC QN AUTO: 23.7 PG (ref 25.6–32.2)
MCHC RBC AUTO-ENTMCNC: 30.4 G/DL (ref 32.3–35.5)
MCV RBC AUTO: 77.8 FL (ref 79.4–94.8)
MONOCYTES # BLD: 3.38 K/UL (ref 0.24–0.82)
MONOCYTES NFR BLD: 19.5 % (ref 4.7–12.5)
NEUTROPHILS # BLD: 11.74 K/UL (ref 1.56–6.13)
NEUTS SEG NFR BLD: 68 % (ref 34–71.1)
PLATELET # BLD AUTO: 383 K/UL (ref 182–369)
PMV BLD AUTO: 10.2 FL (ref 7.4–10.4)
RBC # BLD AUTO: 3.25 M/UL (ref 3.93–5.22)
TIBC SERPL-MCNC: 219 UG/DL (ref 250–400)
WBC # BLD AUTO: 17.29 K/UL (ref 3.98–10.04)

## 2024-09-26 PROCEDURE — 3075F SYST BP GE 130 - 139MM HG: CPT | Performed by: NURSE PRACTITIONER

## 2024-09-26 PROCEDURE — 99215 OFFICE O/P EST HI 40 MIN: CPT | Performed by: NURSE PRACTITIONER

## 2024-09-26 PROCEDURE — 36415 COLL VENOUS BLD VENIPUNCTURE: CPT

## 2024-09-26 PROCEDURE — 3078F DIAST BP <80 MM HG: CPT | Performed by: NURSE PRACTITIONER

## 2024-09-26 PROCEDURE — 85025 COMPLETE CBC W/AUTO DIFF WBC: CPT

## 2024-09-26 PROCEDURE — 1123F ACP DISCUSS/DSCN MKR DOCD: CPT | Performed by: NURSE PRACTITIONER

## 2024-09-26 PROCEDURE — 99213 OFFICE O/P EST LOW 20 MIN: CPT

## 2024-09-26 RX ORDER — MENTHOL AND ZINC OXIDE .44; 20.625 G/100G; G/100G
OINTMENT TOPICAL DAILY
COMMUNITY

## 2024-09-26 RX ORDER — LIDOCAINE 4 G/G
1 PATCH TOPICAL DAILY
COMMUNITY

## 2024-09-26 RX ORDER — OLOPATADINE HYDROCHLORIDE 2 MG/ML
1 SOLUTION/ DROPS OPHTHALMIC DAILY
COMMUNITY

## 2024-09-26 RX ORDER — ONDANSETRON 4 MG/1
4 TABLET, FILM COATED ORAL EVERY 8 HOURS PRN
COMMUNITY

## 2024-09-26 RX ORDER — PREGABALIN 50 MG/1
50 CAPSULE ORAL 2 TIMES DAILY
COMMUNITY

## 2024-09-27 DIAGNOSIS — D50.9 IRON DEFICIENCY ANEMIA, UNSPECIFIED IRON DEFICIENCY ANEMIA TYPE: Primary | ICD-10-CM

## 2024-09-27 DIAGNOSIS — K90.49 MALABSORPTION DUE TO INTOLERANCE, NOT ELSEWHERE CLASSIFIED: ICD-10-CM

## 2024-09-27 RX ORDER — ACETAMINOPHEN 325 MG/1
650 TABLET ORAL
OUTPATIENT
Start: 2024-09-27

## 2024-09-27 RX ORDER — SODIUM CHLORIDE 9 MG/ML
5-250 INJECTION, SOLUTION INTRAVENOUS PRN
OUTPATIENT
Start: 2024-09-27

## 2024-09-27 RX ORDER — HEPARIN 100 UNIT/ML
500 SYRINGE INTRAVENOUS PRN
OUTPATIENT
Start: 2024-09-27

## 2024-09-27 RX ORDER — ALBUTEROL SULFATE 90 UG/1
4 INHALANT RESPIRATORY (INHALATION) PRN
OUTPATIENT
Start: 2024-09-27

## 2024-09-27 RX ORDER — EPINEPHRINE 1 MG/ML
0.3 INJECTION, SOLUTION, CONCENTRATE INTRAVENOUS PRN
OUTPATIENT
Start: 2024-09-27

## 2024-09-27 RX ORDER — DIPHENHYDRAMINE HYDROCHLORIDE 50 MG/ML
50 INJECTION INTRAMUSCULAR; INTRAVENOUS
OUTPATIENT
Start: 2024-09-27

## 2024-09-27 RX ORDER — ONDANSETRON 2 MG/ML
8 INJECTION INTRAMUSCULAR; INTRAVENOUS
OUTPATIENT
Start: 2024-09-27

## 2024-09-27 RX ORDER — FAMOTIDINE 10 MG/ML
20 INJECTION, SOLUTION INTRAVENOUS
OUTPATIENT
Start: 2024-09-27

## 2024-09-27 RX ORDER — SODIUM CHLORIDE 9 MG/ML
INJECTION, SOLUTION INTRAVENOUS CONTINUOUS
OUTPATIENT
Start: 2024-09-27

## 2024-09-27 RX ORDER — SODIUM CHLORIDE 0.9 % (FLUSH) 0.9 %
5-40 SYRINGE (ML) INJECTION PRN
OUTPATIENT
Start: 2024-09-27

## 2024-09-30 ENCOUNTER — TELEPHONE (OUTPATIENT)
Dept: WOUND CARE | Age: 79
End: 2024-09-30

## 2024-09-30 ENCOUNTER — SCHEDULED TELEPHONE ENCOUNTER (OUTPATIENT)
Dept: CARDIOLOGY CLINIC | Age: 79
End: 2024-09-30
Payer: MEDICARE

## 2024-09-30 DIAGNOSIS — E61.2 MAGNESIUM DEFICIENCY: Primary | ICD-10-CM

## 2024-09-30 PROCEDURE — 1123F ACP DISCUSS/DSCN MKR DOCD: CPT | Performed by: INTERNAL MEDICINE

## 2024-09-30 PROCEDURE — 99214 OFFICE O/P EST MOD 30 MIN: CPT | Performed by: INTERNAL MEDICINE

## 2024-09-30 ASSESSMENT — ENCOUNTER SYMPTOMS
WHEEZING: 0
ABDOMINAL PAIN: 0
COUGH: 0
ABDOMINAL PAIN: 0
EYE PAIN: 0
CHEST TIGHTNESS: 0
EYE ITCHING: 0
EYE DISCHARGE: 0
COUGH: 0
TROUBLE SWALLOWING: 0
CONSTIPATION: 0
ABDOMINAL DISTENTION: 0
EYE REDNESS: 0
BLOOD IN STOOL: 0
SORE THROAT: 0
WHEEZING: 0
VOMITING: 0
DIARRHEA: 0
VOMITING: 0
SORE THROAT: 0
SHORTNESS OF BREATH: 1
DIARRHEA: 0
FACIAL SWELLING: 0
APNEA: 0
SHORTNESS OF BREATH: 0
NAUSEA: 0
CONSTIPATION: 0
NAUSEA: 0
EYE DISCHARGE: 0

## 2024-09-30 NOTE — TELEPHONE ENCOUNTER
[facility] called stating that the vac was breaking down the júnior wound skin, I told the nurse to do the alternate dressing until Dr Parson sees her on Wednesday, Dr Parson and Piedad RICH notified

## 2024-09-30 NOTE — PROGRESS NOTES
Cardiology Office Visit Note  [address] [address]  Phone: [phone]  Fax: [phone]                            Date:  9/30/2024  Patient: Elda Flood  Age:  79 y.o., 1945    Referral: No ref. provider found    REASON FOR VISIT:  No chief complaint on file.         PROBLEM LIST:    Patient Active Problem List    Diagnosis Date Noted    Headache 02/21/2023     Priority: Medium    Diarrhea 11/17/2022     Priority: Medium    Hypomagnesemia 11/17/2022     Priority: Medium    Hyponatremia 11/17/2022     Priority: Medium    Neutrophilic leukocytosis 11/17/2022     Priority: Medium    Cerebellar infarct (HCC) 11/14/2022     Priority: Medium    Tachycardia 11/14/2022     Priority: Medium    Toxic metabolic encephalopathy 11/14/2022     Priority: Medium    Vitamin D deficiency 09/26/2022     Priority: Medium    Magnesium deficiency 09/26/2022     Priority: Medium    Bruises easily 09/26/2022     Priority: Medium    Fatigue 09/26/2022     Priority: Medium    Iron deficiency anemia 09/27/2024    Malabsorption due to intolerance, not elsewhere classified 09/27/2024    Coronary artery disease involving native coronary artery of native heart with other form of angina pectoris (formerly [facility]) 09/16/2024    Unspecified convulsions 09/16/2024    Other seizures 09/16/2024    Skin ulcer of right lower leg with fat layer exposed (formerly [facility]) 09/11/2024    Yeast dermatitis 09/11/2024    Type 2 diabetes mellitus with skin complication, with long-term current use of insulin (formerly [facility]) 09/11/2024    Thrombocytosis 09/01/2024    Normocytic anemia 09/01/2024    Coordination of complex care 09/01/2024    Mild malnutrition (formerly [facility]) 08/23/2024    Hematoma of leg, right, initial encounter 08/14/2024    Intractable pain 08/14/2024    Temporary cerebral vascular dysfunction 01/24/2024    Cerebrovascular accident (CVA) (formerly [facility]) 11/17/2022    Uncontrolled hypertension 11/14/2022    Recurrent ventral incisional hernia with

## 2024-10-02 ENCOUNTER — HOSPITAL ENCOUNTER (OUTPATIENT)
Dept: WOUND CARE | Age: 79
Discharge: HOME | End: 2024-10-02
Payer: MEDICARE

## 2024-10-02 ENCOUNTER — TELEPHONE (OUTPATIENT)
Dept: HEMATOLOGY | Age: 79
End: 2024-10-02

## 2024-10-02 VITALS
HEIGHT: 61 IN | SYSTOLIC BLOOD PRESSURE: 102 MMHG | BODY MASS INDEX: 27.59 KG/M2 | HEART RATE: 100 BPM | DIASTOLIC BLOOD PRESSURE: 54 MMHG | TEMPERATURE: 99 F | RESPIRATION RATE: 18 BRPM

## 2024-10-02 DIAGNOSIS — L97.912 SKIN ULCER OF RIGHT LOWER LEG WITH FAT LAYER EXPOSED (HCC): Primary | ICD-10-CM

## 2024-10-02 DIAGNOSIS — B37.2 YEAST DERMATITIS: ICD-10-CM

## 2024-10-02 DIAGNOSIS — E11.622 TYPE 2 DIABETES MELLITUS WITH OTHER SKIN ULCER, WITH LONG-TERM CURRENT USE OF INSULIN (HCC): ICD-10-CM

## 2024-10-02 DIAGNOSIS — Z79.4 TYPE 2 DIABETES MELLITUS WITH OTHER SKIN ULCER, WITH LONG-TERM CURRENT USE OF INSULIN (HCC): ICD-10-CM

## 2024-10-02 PROCEDURE — 99212 OFFICE O/P EST SF 10 MIN: CPT | Performed by: SURGERY

## 2024-10-02 PROCEDURE — 99215 OFFICE O/P EST HI 40 MIN: CPT

## 2024-10-02 RX ORDER — CLOBETASOL PROPIONATE 0.5 MG/G
OINTMENT TOPICAL ONCE
OUTPATIENT
Start: 2024-10-02 | End: 2024-10-02

## 2024-10-02 RX ORDER — LIDOCAINE HYDROCHLORIDE 20 MG/ML
JELLY TOPICAL ONCE
Status: COMPLETED | OUTPATIENT
Start: 2024-10-02 | End: 2024-10-02

## 2024-10-02 RX ORDER — NEOMYCIN/BACITRACIN/POLYMYXINB 3.5-400-5K
OINTMENT (GRAM) TOPICAL ONCE
OUTPATIENT
Start: 2024-10-02 | End: 2024-10-02

## 2024-10-02 RX ORDER — MUPIROCIN 20 MG/G
OINTMENT TOPICAL ONCE
OUTPATIENT
Start: 2024-10-02 | End: 2024-10-02

## 2024-10-02 RX ORDER — LIDOCAINE HYDROCHLORIDE 20 MG/ML
JELLY TOPICAL ONCE
OUTPATIENT
Start: 2024-10-02 | End: 2024-10-02

## 2024-10-02 RX ORDER — TRIAMCINOLONE ACETONIDE 1 MG/G
OINTMENT TOPICAL ONCE
OUTPATIENT
Start: 2024-10-02 | End: 2024-10-02

## 2024-10-02 RX ORDER — GENTAMICIN SULFATE 1 MG/G
OINTMENT TOPICAL ONCE
OUTPATIENT
Start: 2024-10-02 | End: 2024-10-02

## 2024-10-02 RX ORDER — BETAMETHASONE DIPROPIONATE 0.5 MG/G
CREAM TOPICAL ONCE
OUTPATIENT
Start: 2024-10-02 | End: 2024-10-02

## 2024-10-02 RX ORDER — SODIUM CHLOR/HYPOCHLOROUS ACID 0.033 %
SOLUTION, IRRIGATION IRRIGATION ONCE
OUTPATIENT
Start: 2024-10-02 | End: 2024-10-02

## 2024-10-02 RX ORDER — BACITRACIN ZINC AND POLYMYXIN B SULFATE 500; 1000 [USP'U]/G; [USP'U]/G
OINTMENT TOPICAL ONCE
OUTPATIENT
Start: 2024-10-02 | End: 2024-10-02

## 2024-10-02 RX ORDER — GINSENG 100 MG
CAPSULE ORAL ONCE
OUTPATIENT
Start: 2024-10-02 | End: 2024-10-02

## 2024-10-02 RX ORDER — SILVER SULFADIAZINE 10 MG/G
CREAM TOPICAL ONCE
OUTPATIENT
Start: 2024-10-02 | End: 2024-10-02

## 2024-10-02 RX ORDER — LIDOCAINE 40 MG/G
CREAM TOPICAL ONCE
OUTPATIENT
Start: 2024-10-02 | End: 2024-10-02

## 2024-10-02 RX ORDER — LIDOCAINE 50 MG/G
OINTMENT TOPICAL ONCE
OUTPATIENT
Start: 2024-10-02 | End: 2024-10-02

## 2024-10-02 RX ORDER — LIDOCAINE HYDROCHLORIDE 40 MG/ML
SOLUTION TOPICAL ONCE
OUTPATIENT
Start: 2024-10-02 | End: 2024-10-02

## 2024-10-02 RX ADMIN — LIDOCAINE HYDROCHLORIDE: 20 JELLY TOPICAL at 11:08

## 2024-10-02 ASSESSMENT — PAIN DESCRIPTION - PAIN TYPE: TYPE: ACUTE PAIN

## 2024-10-02 ASSESSMENT — PAIN DESCRIPTION - LOCATION: LOCATION: LEG

## 2024-10-02 ASSESSMENT — PAIN DESCRIPTION - ONSET: ONSET: ON-GOING

## 2024-10-02 ASSESSMENT — PAIN - FUNCTIONAL ASSESSMENT: PAIN_FUNCTIONAL_ASSESSMENT: PREVENTS OR INTERFERES SOME ACTIVE ACTIVITIES AND ADLS

## 2024-10-02 ASSESSMENT — PAIN DESCRIPTION - DESCRIPTORS: DESCRIPTORS: ACHING;BURNING;DISCOMFORT;SORE

## 2024-10-02 ASSESSMENT — PAIN DESCRIPTION - ORIENTATION: ORIENTATION: RIGHT

## 2024-10-02 ASSESSMENT — PAIN SCALES - GENERAL: PAINLEVEL_OUTOF10: 6

## 2024-10-02 NOTE — PATIENT INSTRUCTIONS
[facility] Wound Care and Hyperbaric Oxygen Therapy   Physician Orders and Discharge Instructions  [address] [place] [address]  [address]  [place] [address]  Telephone: [phone]      FAX [phone]    NAME:  Elda Flood  YOB: 1945  MEDICAL RECORD NUMBER:  [id number]  DATE:  10/2/2024    Discharge condition: Stable    Discharge to: Home    Left via:Private automobile    Accompanied by: Self    ECF/HHA: [facility]-Hold vac for 2 weeks     Dressing Orders: Right Lower Leg Wound   Xeroform to open area, cover with dry gauze, kerlex roll and 6 inch ace wrap from base of toes to just below the knee   Change Monday, Wednesday and Friday and PRN    Treatment Orders  High Protein Diet as Tolerated unless restricted by your Family Practitioner  Elevate foot above heart 3-4 times daily to prevent or reduce swelling  Physical Therapy as tolerated    [facility] follow up visit ____________2 weeks_________________  (Please note your next appointment above and if you are unable to keep, kindly give a 24 hour notice. Thank you.)    If you experience any of the following, please call the Wound Care Center during business hours:    * Increase in Pain  * Temperature over 101  * Increase in drainage from your wound  * Drainage with a foul odor  * Bleeding  * Increase in swelling  * Need for compression bandage changes due to slippage, breakthrough drainage.    If you need medical attention outside of the business hours of the Wound Care Centers please contact your PCP or go to the nearest emergency room.

## 2024-10-02 NOTE — PROGRESS NOTES
bruits  Abdomen: soft, non-tender, non-distended, normal bowel sounds, no masses or organomegaly  Extremities: no cyanosis, clubbing or edema  Musculoskeletal: normal range of motion, no joint swelling, deformity or tenderness  Neurologic: reflexes normal and symmetric, no cranial nerve deficit, gait, coordination and speech normal, skin sensation normal      Assessment:      Patient Active Problem List   Diagnosis Code    Family history of colon cancer Z80.0    Diabetes ([facility]) E11.9    Cerebral infarction ([facility]) I63.9    Hyperlipidemia E78.5    Hypertension I10    B12 deficiency E53.8    Hyperuricemia E79.0    Ventral hernia without obstruction or gangrene K43.9    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    Vitamin D deficiency E55.9    Magnesium deficiency E61.2    Bruises easily R23.3    Fatigue R53.83    Cerebellar infarct ([facility]) I63.9    Diarrhea R19.7    Headache R51.9    Hypomagnesemia E83.42    Hyponatremia E87.1    Neutrophilic leukocytosis D72.828    Tachycardia R00.0    Toxic metabolic encephalopathy G92.8    Cerebrovascular accident (CVA) ([facility]) I63.9    Temporary cerebral vascular dysfunction I67.82    Uncontrolled hypertension I10    Hematoma of leg, right, initial encounter S80.11XA    Intractable pain R52    Mild malnutrition ([facility]) E44.1    Thrombocytosis D75.839    Normocytic anemia D64.9    Coordination of complex care Z71.89    Skin ulcer of right lower leg with fat layer exposed ([facility]) L97.912    Yeast dermatitis B37.2    Type 2 diabetes mellitus with skin complication, with long-term current use of insulin ([facility]) E11.628, Z79.4    Coronary artery disease involving native coronary artery of native heart with other form of angina pectoris ([facility]) I25.118    Unspecified convulsions R56.9    Other seizures G40.89    Iron deficiency anemia D50.9    Malabsorption due to intolerance, not elsewhere classified K90.49             Wound 09/11/24 Pretibial Right #1 Right lower leg surgical (Active)

## 2024-10-02 NOTE — TELEPHONE ENCOUNTER
Returned phone call to Saloni. Saloni tells me that she wishes to cancel appointment for bone marrow biopsy tomorrow in [facility] and would like another appointment with Mag to discuss this so that all of Elda's children can be present to ask questions. Appointment for today is offered, however Elda is not able to make it to office today. Appointment made for next Wednesday 10/9 at 2:30 per request, and appointment for bmab tomorrow in [facility] is canceled.

## 2024-10-08 ENCOUNTER — TELEPHONE (OUTPATIENT)
Dept: HEMATOLOGY | Age: 79
End: 2024-10-08

## 2024-10-08 ENCOUNTER — HOSPITAL ENCOUNTER (OUTPATIENT)
Dept: INFUSION THERAPY | Age: 79
Setting detail: INFUSION SERIES
Discharge: HOME | End: 2024-10-08
Payer: MEDICARE

## 2024-10-08 VITALS
RESPIRATION RATE: 16 BRPM | OXYGEN SATURATION: 91 % | SYSTOLIC BLOOD PRESSURE: 157 MMHG | DIASTOLIC BLOOD PRESSURE: 74 MMHG | TEMPERATURE: 97.5 F | HEART RATE: 91 BPM

## 2024-10-08 DIAGNOSIS — K90.49 MALABSORPTION DUE TO INTOLERANCE, NOT ELSEWHERE CLASSIFIED: Primary | ICD-10-CM

## 2024-10-08 DIAGNOSIS — D50.9 IRON DEFICIENCY ANEMIA, UNSPECIFIED IRON DEFICIENCY ANEMIA TYPE: ICD-10-CM

## 2024-10-08 PROCEDURE — 96365 THER/PROPH/DIAG IV INF INIT: CPT

## 2024-10-08 PROCEDURE — 2580000003 HC RX 258: Performed by: NURSE PRACTITIONER

## 2024-10-08 PROCEDURE — 6360000002 HC RX W HCPCS: Performed by: NURSE PRACTITIONER

## 2024-10-08 RX ORDER — ONDANSETRON 2 MG/ML
8 INJECTION INTRAMUSCULAR; INTRAVENOUS
OUTPATIENT
Start: 2024-10-15

## 2024-10-08 RX ORDER — SODIUM CHLORIDE 9 MG/ML
5-250 INJECTION, SOLUTION INTRAVENOUS PRN
Status: CANCELLED | OUTPATIENT
Start: 2024-10-15

## 2024-10-08 RX ORDER — EPINEPHRINE 1 MG/ML
0.3 INJECTION, SOLUTION, CONCENTRATE INTRAVENOUS PRN
OUTPATIENT
Start: 2024-10-15

## 2024-10-08 RX ORDER — SODIUM CHLORIDE 9 MG/ML
5-250 INJECTION, SOLUTION INTRAVENOUS PRN
Status: DISCONTINUED | OUTPATIENT
Start: 2024-10-08 | End: 2024-10-09 | Stop reason: HOSPADM

## 2024-10-08 RX ORDER — SODIUM CHLORIDE 0.9 % (FLUSH) 0.9 %
5-40 SYRINGE (ML) INJECTION PRN
Status: DISCONTINUED | OUTPATIENT
Start: 2024-10-08 | End: 2024-10-09 | Stop reason: HOSPADM

## 2024-10-08 RX ORDER — ALBUTEROL SULFATE 90 UG/1
4 INHALANT RESPIRATORY (INHALATION) PRN
OUTPATIENT
Start: 2024-10-15

## 2024-10-08 RX ORDER — ACETAMINOPHEN 325 MG/1
650 TABLET ORAL
OUTPATIENT
Start: 2024-10-15

## 2024-10-08 RX ORDER — HEPARIN 100 UNIT/ML
500 SYRINGE INTRAVENOUS PRN
OUTPATIENT
Start: 2024-10-15

## 2024-10-08 RX ORDER — SODIUM CHLORIDE 0.9 % (FLUSH) 0.9 %
5-40 SYRINGE (ML) INJECTION PRN
Status: CANCELLED | OUTPATIENT
Start: 2024-10-15

## 2024-10-08 RX ORDER — SODIUM CHLORIDE 9 MG/ML
INJECTION, SOLUTION INTRAVENOUS CONTINUOUS
OUTPATIENT
Start: 2024-10-15

## 2024-10-08 RX ORDER — DIPHENHYDRAMINE HYDROCHLORIDE 50 MG/ML
50 INJECTION INTRAMUSCULAR; INTRAVENOUS
OUTPATIENT
Start: 2024-10-15

## 2024-10-08 RX ADMIN — SODIUM CHLORIDE 20 ML/HR: 9 INJECTION, SOLUTION INTRAVENOUS at 13:57

## 2024-10-08 RX ADMIN — FERUMOXYTOL 510 MG: 510 INJECTION INTRAVENOUS at 13:57

## 2024-10-08 NOTE — TELEPHONE ENCOUNTER
I called patient and reminded patient of their appt on 10/09/24 and patient confirmed they would be here. I also let patient know that we have moved into our new cancer facility and asked patient if they were aware of where we were now located, and patient voiced understanding of our new location. Patient knows not to arrive early and that we will get labs at the time of the follow up appointment and not the lab appointment time.

## 2024-10-08 NOTE — PROGRESS NOTES
Dictated utilizing Dragon transcription software.      Electronically signed by LAYLA Rai on 10/11/2024 at 9:23 PM

## 2024-10-09 ENCOUNTER — HOSPITAL ENCOUNTER (OUTPATIENT)
Dept: INFUSION THERAPY | Age: 79
Discharge: HOME | End: 2024-10-09
Payer: MEDICARE

## 2024-10-09 ENCOUNTER — OFFICE VISIT (OUTPATIENT)
Dept: HEMATOLOGY | Age: 79
End: 2024-10-09
Payer: MEDICARE

## 2024-10-09 VITALS
OXYGEN SATURATION: 100 % | SYSTOLIC BLOOD PRESSURE: 138 MMHG | DIASTOLIC BLOOD PRESSURE: 78 MMHG | TEMPERATURE: 98.1 F | HEART RATE: 93 BPM | BODY MASS INDEX: 32.7 KG/M2 | WEIGHT: 173.2 LBS | HEIGHT: 61 IN

## 2024-10-09 DIAGNOSIS — Z71.89 CARE PLAN DISCUSSED WITH PATIENT: ICD-10-CM

## 2024-10-09 DIAGNOSIS — D75.839 THROMBOCYTOSIS: ICD-10-CM

## 2024-10-09 DIAGNOSIS — D47.1 MPN (MYELOPROLIFERATIVE NEOPLASM) (HCC): Primary | ICD-10-CM

## 2024-10-09 DIAGNOSIS — Z51.11 CHEMOTHERAPY MANAGEMENT, ENCOUNTER FOR: ICD-10-CM

## 2024-10-09 DIAGNOSIS — Z15.89 JAK2 V617F MUTATION: ICD-10-CM

## 2024-10-09 DIAGNOSIS — S70.11XD HEMATOMA OF RIGHT THIGH, SUBSEQUENT ENCOUNTER: ICD-10-CM

## 2024-10-09 DIAGNOSIS — D50.0 IRON DEFICIENCY ANEMIA DUE TO CHRONIC BLOOD LOSS: ICD-10-CM

## 2024-10-09 DIAGNOSIS — D47.1 MPN (MYELOPROLIFERATIVE NEOPLASM) (HCC): ICD-10-CM

## 2024-10-09 LAB
BASOPHILS # BLD: 0.02 K/UL (ref 0.01–0.08)
BASOPHILS NFR BLD: 0.2 % (ref 0.1–1.2)
EOSINOPHIL # BLD: 0.1 K/UL (ref 0.04–0.54)
EOSINOPHIL NFR BLD: 0.9 % (ref 0.7–7)
ERYTHROCYTE [DISTWIDTH] IN BLOOD BY AUTOMATED COUNT: 20.7 % (ref 11.7–14.4)
HCT VFR BLD AUTO: 24.4 % (ref 34.1–44.9)
HGB BLD-MCNC: 7.3 G/DL (ref 11.2–15.7)
LYMPHOCYTES # BLD: 2.31 K/UL (ref 1.18–3.74)
LYMPHOCYTES NFR BLD: 20.9 % (ref 19.3–53.1)
MCH RBC QN AUTO: 23.3 PG (ref 25.6–32.2)
MCHC RBC AUTO-ENTMCNC: 29.9 G/DL (ref 32.3–35.5)
MCV RBC AUTO: 78 FL (ref 79.4–94.8)
MONOCYTES # BLD: 2.09 K/UL (ref 0.24–0.82)
MONOCYTES NFR BLD: 18.9 % (ref 4.7–12.5)
NEUTROPHILS # BLD: 6.43 K/UL (ref 1.56–6.13)
NEUTS SEG NFR BLD: 58 % (ref 34–71.1)
PLATELET # BLD AUTO: 511 K/UL (ref 182–369)
PMV BLD AUTO: 11 FL (ref 7.4–10.4)
RBC # BLD AUTO: 3.13 M/UL (ref 3.93–5.22)
WBC # BLD AUTO: 11.07 K/UL (ref 3.98–10.04)

## 2024-10-09 PROCEDURE — 85025 COMPLETE CBC W/AUTO DIFF WBC: CPT

## 2024-10-09 PROCEDURE — 99212 OFFICE O/P EST SF 10 MIN: CPT

## 2024-10-09 PROCEDURE — 36415 COLL VENOUS BLD VENIPUNCTURE: CPT

## 2024-10-11 ASSESSMENT — ENCOUNTER SYMPTOMS
DIARRHEA: 0
CONSTIPATION: 0
COUGH: 0
WHEEZING: 0
SHORTNESS OF BREATH: 1
EYE DISCHARGE: 0
EYE ITCHING: 0
NAUSEA: 0
ABDOMINAL PAIN: 0
SORE THROAT: 0
VOMITING: 0
TROUBLE SWALLOWING: 0

## 2024-10-14 DIAGNOSIS — D47.1 MPN (MYELOPROLIFERATIVE NEOPLASM) (HCC): Primary | ICD-10-CM

## 2024-10-14 RX ORDER — HYDROXYUREA 500 MG/1
CAPSULE ORAL
Qty: 30 CAPSULE | Refills: 5 | Status: SHIPPED | OUTPATIENT
Start: 2024-10-14

## 2024-10-15 ENCOUNTER — APPOINTMENT (OUTPATIENT)
Dept: INFUSION THERAPY | Age: 79
End: 2024-10-15
Payer: MEDICARE

## 2024-10-18 ENCOUNTER — HOSPITAL ENCOUNTER (OUTPATIENT)
Dept: INFUSION THERAPY | Age: 79
Setting detail: INFUSION SERIES
Discharge: HOME | End: 2024-10-18
Payer: MEDICARE

## 2024-10-18 VITALS
SYSTOLIC BLOOD PRESSURE: 126 MMHG | HEART RATE: 78 BPM | RESPIRATION RATE: 18 BRPM | TEMPERATURE: 96.3 F | DIASTOLIC BLOOD PRESSURE: 59 MMHG | OXYGEN SATURATION: 100 %

## 2024-10-18 DIAGNOSIS — D50.9 IRON DEFICIENCY ANEMIA, UNSPECIFIED IRON DEFICIENCY ANEMIA TYPE: ICD-10-CM

## 2024-10-18 DIAGNOSIS — K90.49 MALABSORPTION DUE TO INTOLERANCE, NOT ELSEWHERE CLASSIFIED: Primary | ICD-10-CM

## 2024-10-18 LAB
ANISOCYTOSIS BLD QL SMEAR: ABNORMAL
BASOPHILS # BLD: 0 K/UL (ref 0–0.2)
BASOPHILS NFR BLD: 0.2 % (ref 0–1)
EOSINOPHIL # BLD: 0.1 K/UL (ref 0–0.6)
EOSINOPHIL NFR BLD: 0.8 % (ref 0–5)
ERYTHROCYTE [DISTWIDTH] IN BLOOD BY AUTOMATED COUNT: 24.3 % (ref 11.5–14.5)
HCT VFR BLD AUTO: 31 % (ref 37–47)
HGB BLD-MCNC: 8.8 G/DL (ref 12–16)
HYPOCHROMIA BLD QL SMEAR: ABNORMAL
IMM GRANULOCYTES # BLD: 0.1 K/UL
LYMPHOCYTES # BLD: 2.6 K/UL (ref 1.1–4.5)
LYMPHOCYTES NFR BLD: 26.8 % (ref 20–40)
MCH RBC QN AUTO: 24 PG (ref 27–31)
MCHC RBC AUTO-ENTMCNC: 28.4 G/DL (ref 33–37)
MCV RBC AUTO: 84.5 FL (ref 81–99)
MONOCYTES # BLD: 1.7 K/UL (ref 0–0.9)
MONOCYTES NFR BLD: 17.1 % (ref 0–10)
NEUTROPHILS # BLD: 5.2 K/UL (ref 1.5–7.5)
NEUTS SEG NFR BLD: 54.2 % (ref 50–65)
PLATELET # BLD AUTO: 366 K/UL (ref 130–400)
PLATELET SLIDE REVIEW: ADEQUATE
PMV BLD AUTO: 11 FL (ref 9.4–12.3)
POIKILOCYTOSIS BLD QL SMEAR: ABNORMAL
RBC # BLD AUTO: 3.67 M/UL (ref 4.2–5.4)
WBC # BLD AUTO: 9.7 K/UL (ref 4.8–10.8)

## 2024-10-18 PROCEDURE — 6360000002 HC RX W HCPCS: Performed by: NURSE PRACTITIONER

## 2024-10-18 PROCEDURE — 2580000003 HC RX 258: Performed by: NURSE PRACTITIONER

## 2024-10-18 PROCEDURE — 96365 THER/PROPH/DIAG IV INF INIT: CPT

## 2024-10-18 PROCEDURE — 85025 COMPLETE CBC W/AUTO DIFF WBC: CPT

## 2024-10-18 RX ORDER — SODIUM CHLORIDE 9 MG/ML
INJECTION, SOLUTION INTRAVENOUS CONTINUOUS
OUTPATIENT
Start: 2024-10-22

## 2024-10-18 RX ORDER — SODIUM CHLORIDE 0.9 % (FLUSH) 0.9 %
5-40 SYRINGE (ML) INJECTION PRN
Status: DISCONTINUED | OUTPATIENT
Start: 2024-10-18 | End: 2024-10-19 | Stop reason: HOSPADM

## 2024-10-18 RX ORDER — HEPARIN 100 UNIT/ML
500 SYRINGE INTRAVENOUS PRN
OUTPATIENT
Start: 2024-10-22

## 2024-10-18 RX ORDER — ACETAMINOPHEN 325 MG/1
650 TABLET ORAL
OUTPATIENT
Start: 2024-10-22

## 2024-10-18 RX ORDER — ALBUTEROL SULFATE 90 UG/1
4 INHALANT RESPIRATORY (INHALATION) PRN
OUTPATIENT
Start: 2024-10-22

## 2024-10-18 RX ORDER — SODIUM CHLORIDE 9 MG/ML
5-250 INJECTION, SOLUTION INTRAVENOUS PRN
Status: DISCONTINUED | OUTPATIENT
Start: 2024-10-18 | End: 2024-10-19 | Stop reason: HOSPADM

## 2024-10-18 RX ORDER — EPINEPHRINE 1 MG/ML
0.3 INJECTION, SOLUTION, CONCENTRATE INTRAVENOUS PRN
OUTPATIENT
Start: 2024-10-22

## 2024-10-18 RX ORDER — DIPHENHYDRAMINE HYDROCHLORIDE 50 MG/ML
50 INJECTION INTRAMUSCULAR; INTRAVENOUS
OUTPATIENT
Start: 2024-10-22

## 2024-10-18 RX ORDER — SODIUM CHLORIDE 9 MG/ML
5-250 INJECTION, SOLUTION INTRAVENOUS PRN
Status: CANCELLED | OUTPATIENT
Start: 2024-10-22

## 2024-10-18 RX ORDER — SODIUM CHLORIDE 0.9 % (FLUSH) 0.9 %
5-40 SYRINGE (ML) INJECTION PRN
OUTPATIENT
Start: 2024-10-22

## 2024-10-18 RX ORDER — SODIUM CHLORIDE 9 MG/ML
5-250 INJECTION, SOLUTION INTRAVENOUS PRN
OUTPATIENT
Start: 2024-10-22

## 2024-10-18 RX ORDER — ONDANSETRON 2 MG/ML
8 INJECTION INTRAMUSCULAR; INTRAVENOUS
OUTPATIENT
Start: 2024-10-22

## 2024-10-18 RX ADMIN — SODIUM CHLORIDE 20 ML/HR: 9 INJECTION, SOLUTION INTRAVENOUS at 14:32

## 2024-10-18 RX ADMIN — FERUMOXYTOL 510 MG: 510 INJECTION INTRAVENOUS at 14:31

## 2024-10-18 NOTE — PROGRESS NOTES
Pt arrived to [facility] and PIV placed. Feraheme 510mg administered. STAT cbc drawn. HGB 8.8. Pt monitored for 30 minutes infusion. Pt tolerated well.     Electronically signed by Antonette Alvarado RN on 10/18/2024 at 3:38 PM

## 2024-10-21 ENCOUNTER — TELEPHONE (OUTPATIENT)
Dept: CARDIOLOGY CLINIC | Age: 79
End: 2024-10-21

## 2024-10-21 NOTE — TELEPHONE ENCOUNTER
Received a call from patient's daughter advising she's having increasing edema in her lower extremities.  She advised she weighed 179lbs and is wondering if she should try switching to bumex or increasing her lasix.  They have given her an extra lasix for two days with no relief.

## 2024-10-22 NOTE — TELEPHONE ENCOUNTER
Called and spoke with patient's daughter, gave instructions, verbally understood and asked to send orders to nursing home.

## 2024-10-23 ENCOUNTER — HOSPITAL ENCOUNTER (OUTPATIENT)
Dept: WOUND CARE | Age: 79
Discharge: HOME | End: 2024-10-23
Payer: MEDICARE

## 2024-10-23 DIAGNOSIS — E11.622 TYPE 2 DIABETES MELLITUS WITH OTHER SKIN ULCER, WITH LONG-TERM CURRENT USE OF INSULIN (HCC): ICD-10-CM

## 2024-10-23 DIAGNOSIS — B37.2 YEAST DERMATITIS: ICD-10-CM

## 2024-10-23 DIAGNOSIS — L97.912 SKIN ULCER OF RIGHT LOWER LEG WITH FAT LAYER EXPOSED (HCC): Primary | ICD-10-CM

## 2024-10-23 DIAGNOSIS — Z79.4 TYPE 2 DIABETES MELLITUS WITH OTHER SKIN ULCER, WITH LONG-TERM CURRENT USE OF INSULIN (HCC): ICD-10-CM

## 2024-10-23 PROCEDURE — 99214 OFFICE O/P EST MOD 30 MIN: CPT

## 2024-10-23 PROCEDURE — 99212 OFFICE O/P EST SF 10 MIN: CPT | Performed by: SURGERY

## 2024-10-23 RX ORDER — MUPIROCIN 20 MG/G
OINTMENT TOPICAL ONCE
OUTPATIENT
Start: 2024-10-23 | End: 2024-10-23

## 2024-10-23 RX ORDER — LIDOCAINE HYDROCHLORIDE 20 MG/ML
JELLY TOPICAL ONCE
Status: COMPLETED | OUTPATIENT
Start: 2024-10-23 | End: 2024-10-23

## 2024-10-23 RX ORDER — LIDOCAINE HYDROCHLORIDE 20 MG/ML
JELLY TOPICAL ONCE
OUTPATIENT
Start: 2024-10-23 | End: 2024-10-23

## 2024-10-23 RX ORDER — BACITRACIN ZINC AND POLYMYXIN B SULFATE 500; 1000 [USP'U]/G; [USP'U]/G
OINTMENT TOPICAL ONCE
OUTPATIENT
Start: 2024-10-23 | End: 2024-10-23

## 2024-10-23 RX ORDER — GINSENG 100 MG
CAPSULE ORAL ONCE
OUTPATIENT
Start: 2024-10-23 | End: 2024-10-23

## 2024-10-23 RX ORDER — LIDOCAINE HYDROCHLORIDE 40 MG/ML
SOLUTION TOPICAL ONCE
OUTPATIENT
Start: 2024-10-23 | End: 2024-10-23

## 2024-10-23 RX ORDER — SODIUM CHLOR/HYPOCHLOROUS ACID 0.033 %
SOLUTION, IRRIGATION IRRIGATION ONCE
OUTPATIENT
Start: 2024-10-23 | End: 2024-10-23

## 2024-10-23 RX ORDER — LIDOCAINE 40 MG/G
CREAM TOPICAL ONCE
OUTPATIENT
Start: 2024-10-23 | End: 2024-10-23

## 2024-10-23 RX ORDER — TRIAMCINOLONE ACETONIDE 1 MG/G
OINTMENT TOPICAL ONCE
OUTPATIENT
Start: 2024-10-23 | End: 2024-10-23

## 2024-10-23 RX ORDER — GENTAMICIN SULFATE 1 MG/G
OINTMENT TOPICAL ONCE
OUTPATIENT
Start: 2024-10-23 | End: 2024-10-23

## 2024-10-23 RX ORDER — CLOBETASOL PROPIONATE 0.5 MG/G
OINTMENT TOPICAL ONCE
OUTPATIENT
Start: 2024-10-23 | End: 2024-10-23

## 2024-10-23 RX ORDER — NEOMYCIN/BACITRACIN/POLYMYXINB 3.5-400-5K
OINTMENT (GRAM) TOPICAL ONCE
OUTPATIENT
Start: 2024-10-23 | End: 2024-10-23

## 2024-10-23 RX ORDER — SILVER SULFADIAZINE 10 MG/G
CREAM TOPICAL ONCE
OUTPATIENT
Start: 2024-10-23 | End: 2024-10-23

## 2024-10-23 RX ORDER — LIDOCAINE 50 MG/G
OINTMENT TOPICAL ONCE
OUTPATIENT
Start: 2024-10-23 | End: 2024-10-23

## 2024-10-23 RX ORDER — BETAMETHASONE DIPROPIONATE 0.5 MG/G
CREAM TOPICAL ONCE
OUTPATIENT
Start: 2024-10-23 | End: 2024-10-23

## 2024-10-23 RX ADMIN — LIDOCAINE HYDROCHLORIDE: 20 JELLY TOPICAL at 09:35

## 2024-10-23 NOTE — PLAN OF CARE
Problem: Wound:  Goal: Will show signs of wound healing; wound closure and no evidence of infection  Description: Will show signs of wound healing; wound closure and no evidence of infection  Outcome: Progressing     Problem: Venous:  Goal: Signs of wound healing will improve  Description: Signs of wound healing will improve  Outcome: Progressing     Problem: Falls - Risk of:  Goal: Will remain free from falls  Description: Will remain free from falls  Outcome: Progressing     Problem: Blood Glucose:  Goal: Ability to maintain appropriate glucose levels will improve  Description: Ability to maintain appropriate glucose levels will improve  Outcome: Progressing

## 2024-10-23 NOTE — PATIENT INSTRUCTIONS
[facility] Wound Care and Hyperbaric Oxygen Therapy   Physician Orders and Discharge Instructions  [address] [place] [address]  [address]  [place] [address]  Telephone: [phone]      FAX [phone]    NAME:  Elda Flood  YOB: 1945  MEDICAL RECORD NUMBER:  [id number]  DATE:  10/23/2024    Discharge condition: Stable    Discharge to: Home    Left via:Private automobile    Accompanied by: Family     [facility]    Dressing Orders: Right Lower Leg Wound  Xeroform to open area, cover with dry gauze, kerlex roll and 6 inch ace wrap from base of toes to just below the knee  Change Monday, Wednesday and Friday and PRN    Treatment Orders  High Protein Diet as Tolerated unless restricted by your Family Practitioner  Elevate foot above heart 3-4 times daily to prevent or reduce swelling  Physical Therapy as tolerated    [facility] follow up visit ____________3 weeks_________________  (Please note your next appointment above and if you are unable to keep, kindly give a 24 hour notice. Thank you.)    If you experience any of the following, please call the Wound Care Center during business hours:    * Increase in Pain  * Temperature over 101  * Increase in drainage from your wound  * Drainage with a foul odor  * Bleeding  * Increase in swelling  * Need for compression bandage changes due to slippage, breakthrough drainage.    If you need medical attention outside of the business hours of the Wound Care Centers please contact your PCP or go to the nearest emergency room.

## 2024-10-23 NOTE — PROGRESS NOTES
[facility] Wound Care Center   Progress Note and Procedure Note      Elda Flood  MEDICAL RECORD NUMBER:  [id number]  AGE: 79 y.o.   GENDER: female  : 1945  EPISODE DATE:  10/23/2024    Subjective:     No chief complaint on file.        HISTORY of PRESENT ILLNESS HPI     Elda Flood is a 79 y.o. female who presents today for wound/ulcer evaluation.   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    HYSTERECTOMY, TOTAL ABDOMINAL (CERVIX REMOVED)  1986    LEG SURGERY Right 8/15/2024    RIGHT LEG EVACUTION HEMATOMA performed by Emili Landry DO at [facility] OR    OVARY REMOVAL Bilateral     age 41    UMBILICAL HERNIA REPAIR N/A 2019    INCISIONAL HERNIA REPAIR WITH BIOLOGIC MESH performed by Bebo Guevara MD at [facility] OR    UPPER GASTROINTESTINAL ENDOSCOPY  1985       FAMILY HISTORY    Family History   Problem Relation Age of Onset    Colon Cancer Mother     Colon Polyps Neg Hx     Esophageal Cancer Neg Hx     Liver Disease Neg Hx     Liver Cancer Neg Hx     Rectal Cancer Neg Hx     Stomach Cancer Neg Hx        SOCIAL HISTORY    Social History

## 2024-11-06 ENCOUNTER — CARE COORDINATION (OUTPATIENT)
Dept: CASE MANAGEMENT | Age: 79
End: 2024-11-06

## 2024-11-06 NOTE — CARE COORDINATION
Care Transitions Note    Initial Call - Call within 2 business days of discharge: Yes    Patient Current Location:  Home: [facility]  [address] [place] [address]    Post Acute Care Manager contacted the family, Cyrus  by telephone to perform post hospital discharge assessment, verified name and  as identifiers. Provided introduction to self, and explanation of the Post Acute Care Manager role.     Patient: Elda Flood    Patient : 1945   MRN: [id number]    Reason for Admission: large hematoma from varicose veins.  Discharge Date: 24  RURS: Readmission Risk Score: 21.8      Last Discharge Facility       Date Complaint Diagnosis Description Type Department Provider    24 Hematoma Hematoma of leg, right, initial encounter ... ED to Hosp-Admission (Discharged) (ADMITTED) [facility] SURG Diamond Guy MD; Marlon Esteves...            Was this an external facility discharge? Yes. Discharge Date: . Facility Name:   [facility] to [place] - daughter Cyrus staying with her until she gets stronger    Additional needs identified to be addressed with provider   No needs identified             Method of communication with provider: none.    Patients top risk factors for readmission: medical condition-varicose     Interventions to address risk factors:   Education: importance of taking medications to HFU    Care Summary Note: Spoke with Cyrus after 2 IDs. Pt is doing great. She is getting around with a walker and Cyrus is providing additional help with getting around. She is staying with mom until she gets stronger. She is eating and sleeping well. She has all her medications. Unable to review as pt needs to go to [facility]. Agreeable to calls. Left my contact information.     Post Acute Care Manager reviewed red flags with family. The family was given an opportunity to ask questions; all questions answered at this time.. The family verbalized understanding.   Were discharge instructions available to

## 2024-11-08 ENCOUNTER — TELEPHONE (OUTPATIENT)
Dept: FAMILY MEDICINE CLINIC | Age: 79
End: 2024-11-08

## 2024-11-08 ENCOUNTER — TELEPHONE (OUTPATIENT)
Dept: HEMATOLOGY | Age: 79
End: 2024-11-08

## 2024-11-08 DIAGNOSIS — D47.1 MPN (MYELOPROLIFERATIVE NEOPLASM) (HCC): Primary | ICD-10-CM

## 2024-11-08 NOTE — TELEPHONE ENCOUNTER
Spoke with Colleen at [facility]. Gave verbal order for CBC with diff to be collected today and once weekly with results faxed to [phone], and critical results called to [phone].

## 2024-11-08 NOTE — TELEPHONE ENCOUNTER
Care Transitions Initial Follow Up Call    Outreach made within 2 business days of discharge: No    Patient: Elda Flood Patient : 1945   MRN: [id number]  Reason for Admission: Hematoma and wound  Discharge Date: 24       Spoke with: Daughter    Discharge department/facility: [facility]    TCM Interactive Patient Contact:  Was patient able to fill all prescriptions:  Wants to discuss meds and are not using as directed  Was patient instructed to bring all medications to the follow-up visit: Yes  Is patient taking all medications as directed in the discharge summary? No  Does patient understand their discharge instructions: Yes  Does patient have questions or concerns that need addressed prior to 7-14 day follow up office visit: no    Additional needs identified to be addressed with provider  No needs identified             Scheduled appointment with PCP within 7-14 days    Follow Up  Future Appointments   Date Time Provider Department Center   2024  2:00 PM NICOLE Solis DO [facility] DEP   2024 10:00 AM Maurisio Parson MD L [facility]   2024  2:00 PM [facility]   2024  2:00 PM SCHEDULE, L MED ONC MA L MED ONC [place] [facility]\""   2024  9:15 AM Balbir Levy DO [facility] Neurology -       Yumiko Manning LPN

## 2024-11-11 ENCOUNTER — OFFICE VISIT (OUTPATIENT)
Dept: FAMILY MEDICINE CLINIC | Age: 79
End: 2024-11-11

## 2024-11-11 VITALS
TEMPERATURE: 98 F | HEART RATE: 66 BPM | BODY MASS INDEX: 32.73 KG/M2 | SYSTOLIC BLOOD PRESSURE: 122 MMHG | DIASTOLIC BLOOD PRESSURE: 84 MMHG | HEIGHT: 61 IN | OXYGEN SATURATION: 96 %

## 2024-11-11 DIAGNOSIS — M1A.0720 IDIOPATHIC CHRONIC GOUT OF LEFT FOOT WITHOUT TOPHUS: ICD-10-CM

## 2024-11-11 DIAGNOSIS — S80.11XA HEMATOMA OF LEG, RIGHT, INITIAL ENCOUNTER: ICD-10-CM

## 2024-11-11 DIAGNOSIS — E11.42 DIABETIC POLYNEUROPATHY ASSOCIATED WITH TYPE 2 DIABETES MELLITUS (HCC): ICD-10-CM

## 2024-11-11 DIAGNOSIS — G89.21 CHRONIC PAIN DUE TO TRAUMA: ICD-10-CM

## 2024-11-11 DIAGNOSIS — R60.9 EDEMA, UNSPECIFIED TYPE: ICD-10-CM

## 2024-11-11 DIAGNOSIS — Z79.4 TYPE 2 DIABETES MELLITUS WITH OTHER SKIN ULCER, WITH LONG-TERM CURRENT USE OF INSULIN (HCC): ICD-10-CM

## 2024-11-11 DIAGNOSIS — E11.622 TYPE 2 DIABETES MELLITUS WITH OTHER SKIN ULCER, WITH LONG-TERM CURRENT USE OF INSULIN (HCC): ICD-10-CM

## 2024-11-11 DIAGNOSIS — E83.42 HYPOMAGNESEMIA: ICD-10-CM

## 2024-11-11 DIAGNOSIS — E78.2 MIXED HYPERLIPIDEMIA: ICD-10-CM

## 2024-11-11 DIAGNOSIS — I10 PRIMARY HYPERTENSION: ICD-10-CM

## 2024-11-11 DIAGNOSIS — Z09 HOSPITAL DISCHARGE FOLLOW-UP: Primary | ICD-10-CM

## 2024-11-11 DIAGNOSIS — M15.0 PRIMARY OSTEOARTHRITIS INVOLVING MULTIPLE JOINTS: ICD-10-CM

## 2024-11-11 RX ORDER — FUROSEMIDE 40 MG/1
40 TABLET ORAL DAILY PRN
Qty: 30 TABLET | Refills: 5 | Status: SHIPPED | OUTPATIENT
Start: 2024-11-11

## 2024-11-11 RX ORDER — PREDNISONE 5 MG/1
TABLET ORAL
Qty: 43 TABLET | Refills: 0 | Status: SHIPPED | OUTPATIENT
Start: 2024-11-11

## 2024-11-11 RX ORDER — PREGABALIN 50 MG/1
50 CAPSULE ORAL 2 TIMES DAILY
Qty: 60 CAPSULE | Refills: 5 | Status: SHIPPED | OUTPATIENT
Start: 2024-11-11 | End: 2025-05-10

## 2024-11-11 RX ORDER — OXYCODONE HYDROCHLORIDE 10 MG/1
10 TABLET ORAL EVERY 6 HOURS PRN
Qty: 120 TABLET | Refills: 0 | Status: SHIPPED | OUTPATIENT
Start: 2024-11-11 | End: 2024-12-11

## 2024-11-11 RX ORDER — CELECOXIB 100 MG/1
100 CAPSULE ORAL 2 TIMES DAILY
Qty: 180 CAPSULE | Refills: 0 | Status: SHIPPED | OUTPATIENT
Start: 2024-11-11

## 2024-11-11 SDOH — ECONOMIC STABILITY: FOOD INSECURITY: WITHIN THE PAST 12 MONTHS, YOU WORRIED THAT YOUR FOOD WOULD RUN OUT BEFORE YOU GOT MONEY TO BUY MORE.: NEVER TRUE

## 2024-11-11 SDOH — ECONOMIC STABILITY: INCOME INSECURITY: HOW HARD IS IT FOR YOU TO PAY FOR THE VERY BASICS LIKE FOOD, HOUSING, MEDICAL CARE, AND HEATING?: NOT VERY HARD

## 2024-11-11 SDOH — ECONOMIC STABILITY: FOOD INSECURITY: WITHIN THE PAST 12 MONTHS, THE FOOD YOU BOUGHT JUST DIDN'T LAST AND YOU DIDN'T HAVE MONEY TO GET MORE.: NEVER TRUE

## 2024-11-11 ASSESSMENT — PATIENT HEALTH QUESTIONNAIRE - PHQ9
2. FEELING DOWN, DEPRESSED OR HOPELESS: SEVERAL DAYS
SUM OF ALL RESPONSES TO PHQ QUESTIONS 1-9: 2
SUM OF ALL RESPONSES TO PHQ QUESTIONS 1-9: 2
SUM OF ALL RESPONSES TO PHQ9 QUESTIONS 1 & 2: 2
SUM OF ALL RESPONSES TO PHQ QUESTIONS 1-9: 2
1. LITTLE INTEREST OR PLEASURE IN DOING THINGS: SEVERAL DAYS
SUM OF ALL RESPONSES TO PHQ QUESTIONS 1-9: 2

## 2024-11-11 ASSESSMENT — ENCOUNTER SYMPTOMS
SORE THROAT: 0
SHORTNESS OF BREATH: 0
DIARRHEA: 0
NAUSEA: 0
COUGH: 0
ABDOMINAL PAIN: 0
WHEEZING: 0
VOMITING: 0
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
## 2024-11-12 DIAGNOSIS — E55.9 VITAMIN D DEFICIENCY: ICD-10-CM

## 2024-11-12 RX ORDER — ERGOCALCIFEROL 1.25 MG/1
CAPSULE, LIQUID FILLED ORAL
Qty: 12 CAPSULE | Refills: 3 | Status: SHIPPED | OUTPATIENT
Start: 2024-11-12

## 2024-11-12 NOTE — TELEPHONE ENCOUNTER
Received fax from pharmacy requesting refill on pts medication(s). Pt was last seen in office on 11/11/2024  and has a follow up scheduled for 12/12/2024. Will send request to  Dr. Solis  for patient.     Requested Prescriptions     Pending Prescriptions Disp Refills    vitamin D (ERGOCALCIFEROL) 1.25 MG (58955 UT) CAPS capsule [Pharmacy Med Name: ergocalciferol (vitamin D2) 1,250 mcg (50,000 unit) capsule] 12 capsule 3     Sig: TAKE ONE CAPSULE BY MOUTH EVERY WEEK

## 2024-11-13 DIAGNOSIS — E11.65 TYPE 2 DIABETES MELLITUS WITH HYPERGLYCEMIA, WITHOUT LONG-TERM CURRENT USE OF INSULIN (HCC): ICD-10-CM

## 2024-11-13 DIAGNOSIS — D47.1 MPN (MYELOPROLIFERATIVE NEOPLASM) (HCC): ICD-10-CM

## 2024-11-13 DIAGNOSIS — I10 ESSENTIAL HYPERTENSION: ICD-10-CM

## 2024-11-13 DIAGNOSIS — R56.9 SEIZURES (HCC): ICD-10-CM

## 2024-11-13 DIAGNOSIS — I63.50 CEREBROVASCULAR ACCIDENT (CVA) DUE TO OCCLUSION OF CEREBRAL ARTERY (HCC): ICD-10-CM

## 2024-11-13 DIAGNOSIS — E11.9 TYPE 2 DIABETES MELLITUS WITHOUT COMPLICATION, WITHOUT LONG-TERM CURRENT USE OF INSULIN (HCC): ICD-10-CM

## 2024-11-13 RX ORDER — COLESEVELAM 180 1/1
TABLET ORAL
Qty: 180 TABLET | Refills: 0 | Status: SHIPPED | OUTPATIENT
Start: 2024-11-13

## 2024-11-13 RX ORDER — HYDROXYUREA 500 MG/1
CAPSULE ORAL
Qty: 30 CAPSULE | Refills: 0 | Status: SHIPPED | OUTPATIENT
Start: 2024-11-13

## 2024-11-13 RX ORDER — APIXABAN 5 MG/1
5 TABLET, FILM COATED ORAL 2 TIMES DAILY
Qty: 60 TABLET | Refills: 0 | Status: SHIPPED | OUTPATIENT
Start: 2024-11-13

## 2024-11-13 RX ORDER — ALLOPURINOL 300 MG/1
300 TABLET ORAL DAILY
Qty: 30 TABLET | Refills: 0 | Status: SHIPPED | OUTPATIENT
Start: 2024-11-13

## 2024-11-13 RX ORDER — LEVETIRACETAM 500 MG/1
500 TABLET ORAL 2 TIMES DAILY
Qty: 60 TABLET | Refills: 0 | Status: SHIPPED | OUTPATIENT
Start: 2024-11-13

## 2024-11-13 RX ORDER — LISINOPRIL 30 MG/1
15 TABLET ORAL DAILY
Qty: 15 TABLET | Refills: 0 | Status: SHIPPED | OUTPATIENT
Start: 2024-11-13

## 2024-11-13 RX ORDER — FUROSEMIDE 40 MG/1
40 TABLET ORAL DAILY
Qty: 30 TABLET | Refills: 0 | Status: SHIPPED | OUTPATIENT
Start: 2024-11-13

## 2024-11-13 RX ORDER — METOPROLOL SUCCINATE 50 MG/1
50 TABLET, EXTENDED RELEASE ORAL DAILY
Qty: 30 TABLET | Refills: 0 | Status: SHIPPED | OUTPATIENT
Start: 2024-11-13

## 2024-11-13 RX ORDER — SITAGLIPTIN 100 MG/1
100 TABLET, FILM COATED ORAL DAILY
Qty: 30 TABLET | Refills: 0 | Status: SHIPPED | OUTPATIENT
Start: 2024-11-13

## 2024-11-13 RX ORDER — NALOXEGOL OXALATE 12.5 MG/1
12.5 TABLET, FILM COATED ORAL DAILY
Qty: 30 TABLET | Refills: 0 | Status: SHIPPED | OUTPATIENT
Start: 2024-11-13

## 2024-11-13 NOTE — TELEPHONE ENCOUNTER
Received fax from pharmacy requesting refill on pts medication(s). Pt was last seen in office on 11/11/2024  and has a follow up scheduled for 12/12/2024. Will send request to  Dr. Solis  for authorization.     Requested Prescriptions     Pending Prescriptions Disp Refills    furosemide (LASIX) 40 MG tablet [Pharmacy Med Name: furosemide 40 mg tablet] 30 tablet 0     Sig: TAKE ONE TABLET BY MOUTH DAILY    hydroxyurea (HYDREA) 500 MG chemo capsule [Pharmacy Med Name: hydroxyurea 500 mg capsule] 30 capsule 0     Sig: TAKE ONE CAPSULE BY MOUTH DAILY    metoprolol succinate (TOPROL XL) 50 MG extended release tablet [Pharmacy Med Name: metoprolol succinate ER 50 mg tablet,extended release 24 hr] 30 tablet 0     Sig: TAKE ONE TABLET BY MOUTH DAILY    ELIQUIS 5 MG TABS tablet [Pharmacy Med Name: Eliquis 5 mg tablet] 60 tablet 0     Sig: TAKE ONE TABLET BY MOUTH TWICE DAILY    JANUVIA 100 MG tablet [Pharmacy Med Name: Januvia 100 mg tablet] 30 tablet 0     Sig: TAKE ONE TABLET BY MOUTH DAILY    levETIRAcetam (KEPPRA) 500 MG tablet [Pharmacy Med Name: levetiracetam 500 mg tablet] 60 tablet 0     Sig: TAKE ONE TABLET BY MOUTH TWICE DAILY    MOVANTIK 12.5 MG TABS tablet [Pharmacy Med Name: Movantik 12.5 mg tablet] 30 tablet 0     Sig: TAKE ONE TABLET BY MOUTH DAILY    allopurinol (ZYLOPRIM) 300 MG tablet [Pharmacy Med Name: allopurinol 300 mg tablet] 30 tablet 0     Sig: TAKE ONE TABLET BY MOUTH DAILY    lisinopril (PRINIVIL;ZESTRIL) 30 MG tablet [Pharmacy Med Name: lisinopril 30 mg tablet] 15 tablet 0     Sig: TAKE 1/2 TABLET BY MOUTH DAILY    colesevelam (WELCHOL) 625 MG tablet [Pharmacy Med Name: colesevelam 625 mg tablet] 180 tablet 0     Sig: TAKE THREE TABLETS BY MOUTH TWICE DAILY

## 2024-11-15 ENCOUNTER — HOSPITAL ENCOUNTER (OUTPATIENT)
Dept: WOUND CARE | Age: 79
Discharge: HOME | End: 2024-11-15
Payer: MEDICARE

## 2024-11-15 VITALS
DIASTOLIC BLOOD PRESSURE: 74 MMHG | RESPIRATION RATE: 18 BRPM | TEMPERATURE: 97.6 F | HEART RATE: 80 BPM | SYSTOLIC BLOOD PRESSURE: 148 MMHG

## 2024-11-15 DIAGNOSIS — B37.2 YEAST DERMATITIS: ICD-10-CM

## 2024-11-15 DIAGNOSIS — Z79.4 TYPE 2 DIABETES MELLITUS WITH OTHER SKIN ULCER, WITH LONG-TERM CURRENT USE OF INSULIN (HCC): ICD-10-CM

## 2024-11-15 DIAGNOSIS — E11.622 TYPE 2 DIABETES MELLITUS WITH OTHER SKIN ULCER, WITH LONG-TERM CURRENT USE OF INSULIN (HCC): ICD-10-CM

## 2024-11-15 DIAGNOSIS — L97.912 SKIN ULCER OF RIGHT LOWER LEG WITH FAT LAYER EXPOSED (HCC): Primary | ICD-10-CM

## 2024-11-15 PROCEDURE — 99213 OFFICE O/P EST LOW 20 MIN: CPT

## 2024-11-15 PROCEDURE — 99212 OFFICE O/P EST SF 10 MIN: CPT | Performed by: SURGERY

## 2024-11-15 RX ORDER — SODIUM CHLOR/HYPOCHLOROUS ACID 0.033 %
SOLUTION, IRRIGATION IRRIGATION ONCE
OUTPATIENT
Start: 2024-11-15 | End: 2024-11-15

## 2024-11-15 RX ORDER — TRIAMCINOLONE ACETONIDE 1 MG/G
OINTMENT TOPICAL ONCE
OUTPATIENT
Start: 2024-11-15 | End: 2024-11-15

## 2024-11-15 RX ORDER — LIDOCAINE 50 MG/G
OINTMENT TOPICAL ONCE
OUTPATIENT
Start: 2024-11-15 | End: 2024-11-15

## 2024-11-15 RX ORDER — BETAMETHASONE DIPROPIONATE 0.5 MG/G
CREAM TOPICAL ONCE
OUTPATIENT
Start: 2024-11-15 | End: 2024-11-15

## 2024-11-15 RX ORDER — NEOMYCIN/BACITRACIN/POLYMYXINB 3.5-400-5K
OINTMENT (GRAM) TOPICAL ONCE
OUTPATIENT
Start: 2024-11-15 | End: 2024-11-15

## 2024-11-15 RX ORDER — LIDOCAINE HYDROCHLORIDE 20 MG/ML
JELLY TOPICAL ONCE
OUTPATIENT
Start: 2024-11-15 | End: 2024-11-15

## 2024-11-15 RX ORDER — LIDOCAINE 40 MG/G
CREAM TOPICAL ONCE
OUTPATIENT
Start: 2024-11-15 | End: 2024-11-15

## 2024-11-15 RX ORDER — SILVER SULFADIAZINE 10 MG/G
CREAM TOPICAL ONCE
OUTPATIENT
Start: 2024-11-15 | End: 2024-11-15

## 2024-11-15 RX ORDER — MUPIROCIN 20 MG/G
OINTMENT TOPICAL ONCE
OUTPATIENT
Start: 2024-11-15 | End: 2024-11-15

## 2024-11-15 RX ORDER — BACITRACIN ZINC AND POLYMYXIN B SULFATE 500; 1000 [USP'U]/G; [USP'U]/G
OINTMENT TOPICAL ONCE
OUTPATIENT
Start: 2024-11-15 | End: 2024-11-15

## 2024-11-15 RX ORDER — LIDOCAINE HYDROCHLORIDE 40 MG/ML
SOLUTION TOPICAL ONCE
OUTPATIENT
Start: 2024-11-15 | End: 2024-11-15

## 2024-11-15 RX ORDER — LIDOCAINE HYDROCHLORIDE 20 MG/ML
JELLY TOPICAL ONCE
Status: COMPLETED | OUTPATIENT
Start: 2024-11-15 | End: 2024-11-15

## 2024-11-15 RX ORDER — GENTAMICIN SULFATE 1 MG/G
OINTMENT TOPICAL ONCE
OUTPATIENT
Start: 2024-11-15 | End: 2024-11-15

## 2024-11-15 RX ORDER — CLOBETASOL PROPIONATE 0.5 MG/G
OINTMENT TOPICAL ONCE
OUTPATIENT
Start: 2024-11-15 | End: 2024-11-15

## 2024-11-15 RX ORDER — GINSENG 100 MG
CAPSULE ORAL ONCE
OUTPATIENT
Start: 2024-11-15 | End: 2024-11-15

## 2024-11-15 RX ADMIN — LIDOCAINE HYDROCHLORIDE: 20 JELLY TOPICAL at 11:22

## 2024-11-15 ASSESSMENT — PAIN DESCRIPTION - FREQUENCY: FREQUENCY: INTERMITTENT

## 2024-11-15 ASSESSMENT — PAIN SCALES - GENERAL: PAINLEVEL_OUTOF10: 3

## 2024-11-15 ASSESSMENT — PAIN DESCRIPTION - ONSET: ONSET: ON-GOING

## 2024-11-15 ASSESSMENT — PAIN DESCRIPTION - ORIENTATION: ORIENTATION: RIGHT

## 2024-11-15 ASSESSMENT — PAIN DESCRIPTION - PAIN TYPE: TYPE: CHRONIC PAIN

## 2024-11-15 ASSESSMENT — PAIN DESCRIPTION - DESCRIPTORS: DESCRIPTORS: DISCOMFORT

## 2024-11-15 NOTE — PATIENT INSTRUCTIONS
[facility] Wound Care and Hyperbaric Oxygen Therapy   Physician Orders and Discharge Instructions  [address] [place] [address] Drive  [address]  [place] [address]  Telephone: [phone]      FAX [phone]    NAME:  Elda Flood  YOB: 1945  MEDICAL RECORD NUMBER:  [id number]  DATE:  11/15/2024    Discharge condition: Stable    Discharge to: Home    Left via:Private automobile    Accompanied by: Family     [facility]     Dressing Orders: Right Lower Leg Wound  Xeroform to open area, cover with dry gauze, kerlex roll and 6 inch ace wrap from base of toes to just below the knee  Change Monday, Wednesday and Friday and PRN  GABRIEL on Thursday 12/5 [place] in [address] Test will be done in [address] at 1:00  Wound Care Appointment to follow test at 2:00    Treatment Orders  High Protein Diet as Tolerated unless restricted by your Family Practitioner  Elevate foot above heart 3-4 times daily to prevent or reduce swelling  Physical Therapy as tolerated    [facility] follow up visit _________2.5 weeks__________________  (Please note your next appointment above and if you are unable to keep, kindly give a 24 hour notice. Thank you.)    If you experience any of the following, please call the Wound Care Center during business hours:    * Increase in Pain  * Temperature over 101  * Increase in drainage from your wound  * Drainage with a foul odor  * Bleeding  * Increase in swelling  * Need for compression bandage changes due to slippage, breakthrough drainage.    If you need medical attention outside of the business hours of the Wound Care Centers please contact your PCP or go to the nearest emergency room.

## 2024-11-15 NOTE — PLAN OF CARE
7/10/2020         RE: Raphael Lizarraga  [address]  [place] [address]        Dear Colleague,    Thank you for referring your patient, Raphael Lizarraga, to the [facility]. Please see a copy of my visit note below.    Raphael Lizarraga is a 55 year old male who is being evaluated via a billable video visit.               Video visit was changed to phone since the patient was unable to launch the video.        NEUROLOGY PROGRESS NOTE   [facility]    Patient:Raphael Lizarraga  : 1965  Age: 55 year old  Today's Virtual Visit: July 10, 2020    History of present illness:     Raphael is participating in this virtual visit for a follow up on his seizures. He was last seen in 2019. He did not have any seizures since last visit. He is taking Dilantin 200 mg bid. Denies dizziness, at time loses his balance, but didn't fall, it's a chronic issue.       Current Outpatient Medications   Medication Sig Dispense Refill     albuterol (PROAIR HFA/PROVENTIL HFA/VENTOLIN HFA) 108 (90 Base) MCG/ACT inhaler Inhale 2 puffs into the lungs every 4 hours as needed (for asthma symptoms) 1 Inhaler 1     buPROPion (WELLBUTRIN SR) 150 MG 12 hr tablet TAKE 1 TABLET BY MOUTH EVERY MORNING FOR DEPRESSION AND ANXETY PREVENTION 30 tablet 0     PARoxetine (PAXIL) 20 MG tablet TAKE 1 TABLET(20 MG) BY MOUTH AT BEDTIME FOR DEPRESSION OR ANXIETY PREVENTION 90 tablet 1     phenytoin (DILANTIN) 100 MG capsule Take 2 tablets every morning & 2 tablets every evening for seizure prevention. 360 capsule 1     Cholecalciferol (VITAMIN D) 2000 units tablet Take 2,000 Units by mouth daily       IBUPROFEN PO Take 400-800 mg by mouth as needed        methocarbamol (ROBAXIN) 750 MG tablet Take 1 tablet (750 mg) by mouth 3 times daily as needed for muscle spasms (Patient not taking: Reported on 7/10/2020) 45 tablet 1     naproxen (NAPROSYN) 500 MG tablet Take 1 tablet (500 mg) by mouth 2 times daily as needed for moderate pain (Patient    Problem: Pain  Goal: Verbalizes/displays adequate comfort level or baseline comfort level  Outcome: Progressing     Problem: Wound:  Goal: Will show signs of wound healing; wound closure and no evidence of infection  Description: Will show signs of wound healing; wound closure and no evidence of infection  Outcome: Progressing     Problem: Venous:  Goal: Signs of wound healing will improve  Description: Signs of wound healing will improve  Outcome: Progressing     Problem: Falls - Risk of:  Goal: Will remain free from falls  Description: Will remain free from falls  Outcome: Progressing     Problem: Blood Glucose:  Goal: Ability to maintain appropriate glucose levels will improve  Description: Ability to maintain appropriate glucose levels will improve  Outcome: Progressing     Problem: Pain  Goal: Verbalizes/displays adequate comfort level or baseline comfort level  Outcome: Progressing      not taking: Reported on 7/10/2020) 30 tablet 1     ofloxacin (OCUFLOX) 0.3 % ophthalmic solution Place 1 drop into the right eye every 2 hours (while awake) (Patient not taking: Reported on 7/10/2020) 1 Bottle 0       Assessment/Plan:    Focal epilepsy, status post left anterior temporal lobectomy in 1993.  Seizures have been controlled for at least past 16 years.  Patient has been on Dilantin at least since his surgery.  Switching to another antiseizure medication with less long-term side effects has been discussed previously, but the patient was not inclined to change and was afraid of recurrence of seizures. He had recurrence of seizures when he discontinued Dilantin in the past. He is taking Vit D 2000 international unit(s) daily. I encouraged him to take calcium too.     - Continue Dilantin 200-200     - Take Ca and vit D, at least 2 hours apart from Dilantin    - RTC in 1 year      As described above, I spoke with the patient for 8 minutes and during this time counseling was greater than 50% of the visit time.  Ml Armstrong MD        Again, thank you for allowing me to participate in the care of your patient.        Sincerely,        Ml Armstrong MD

## 2024-11-15 NOTE — PROGRESS NOTES
[facility]   Progress Note and Procedure Note      Elda Flood  MEDICAL RECORD NUMBER:  [id number]  AGE: 79 y.o.   GENDER: female  : 1945  EPISODE DATE:  11/15/2024    Subjective:     Chief Complaint   Patient presents with    Wound Check     Right leg wound         HISTORY of PRESENT ILLNESS HPI     Elda Flood is a 79 y.o. female who presents today for wound/ulcer evaluation.   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    HYSTERECTOMY, TOTAL ABDOMINAL (CERVIX REMOVED)  1986    LEG SURGERY Right 8/15/2024    RIGHT LEG EVACUTION HEMATOMA performed by Emili Landry DO at [facility] OR    OVARY REMOVAL Bilateral     age 41    UMBILICAL HERNIA REPAIR N/A 2019    INCISIONAL HERNIA REPAIR WITH BIOLOGIC MESH performed by Bebo Guevara MD at [facility] OR    UPPER GASTROINTESTINAL ENDOSCOPY  1985       FAMILY HISTORY    Family History   Problem Relation Age of Onset    Colon Cancer Mother     Colon Polyps Neg Hx     Esophageal Cancer Neg Hx     Liver Disease Neg Hx     Liver Cancer Neg Hx     Rectal Cancer Neg Hx     Stomach

## 2024-11-19 ENCOUNTER — TELEPHONE (OUTPATIENT)
Dept: HEMATOLOGY | Age: 79
End: 2024-11-19

## 2024-11-19 NOTE — TELEPHONE ENCOUNTER
Called patient today for their upcoming appointment on 11/21/2024 and patient needed to be rescheduled. Patient's name and number was taken and given to  staff ( sent to  via teams    )  so that the patient could be rescheduled to a better date & time for the patient. We have now moved to the [facility] that is located between our old office and the ER at the [facility]

## 2024-12-03 ENCOUNTER — TELEPHONE (OUTPATIENT)
Dept: HEMATOLOGY | Age: 79
End: 2024-12-03

## 2024-12-03 NOTE — TELEPHONE ENCOUNTER
Called patient to make her aware of her appointment on 12/05/2024 with Mag. Her daughter said that she has a scan at 1pm and another dr esquivel at 2pm so they need to reschedule.  has been notified.

## 2024-12-04 ENCOUNTER — TELEPHONE (OUTPATIENT)
Dept: HEMATOLOGY | Age: 79
End: 2024-12-04

## 2024-12-04 ENCOUNTER — CARE COORDINATION (OUTPATIENT)
Dept: CARE COORDINATION | Age: 79
End: 2024-12-04

## 2024-12-04 NOTE — TELEPHONE ENCOUNTER
Patient, Elda Flood's daughter,Saloni, stated her mom has the flu and unable to make her appointment with Mag on 12/6/24. She also wants Kendal to speak with Home health about labs needing to be drawn and new labs for the next appointment. I sent this message on 12/4/24 to  to get patient rescheduled but also sent this to Mag's Nurse so they could speak with home health and get the labs taken care of for the patient.

## 2024-12-04 NOTE — CARE COORDINATION
Ambulatory Care Coordination Note     2024 2:57 PM     Patient Current Location:  Home: [address] [place] [address]     This patient was received as a referral from Care Transition Nurse.    ACM contacted the family by telephone. Verified name and  with  daughter, Saloni,  as identifiers. Provided introduction to self, and explanation of the ACM role.   Family accepted care management services at this time.          ACM: Tessie Rahman RN     Challenges to be reviewed by the provider   Additional needs identified to be addressed with provider Yes  medications-Requested med mgmt for URI sx               Method of communication with provider: staff message.    Utilization: N/A - Initial Call     Care Summary Note: Spoke to daughter.  Daughter reported she is staying with her mother and father at their home. Daughter feels main issues are leg healing and improved stamina/energy. Pt has right lower leg wound with scheduled wound clinic treatment and HH support for dressing mgmt. Patient also has had URI sx since Thursday last week. Daughter suspects low grade temperature elevation with runny nose, congestion, mild sore throat, no coughing. ACM encouraged fluids and to try Flonase OTC. Pt is drinking a lot of water, tomato juice, tea, etc. Urinating wnl. Pt has some IBS sx though not diagnosed formally. She had some vomting/diarrhea on  night, daughter feels 2/2 to having significant mucus drainage to stomach. ACM sent IB message to PCP requesting advice for treatment of URI symptoms.   Patient checks her glucose usually 3x/day: 126 avg fasting. Pt eats 3 meals plus snacks daily. Daughter is using only Humalog insulin for patient's glucose management, does not give pt any Lantus. Patient does take oral Januvia as well.   Not walking as much due to knee arthritis bilaterally. [facility] comes in and walks pt at home, using weights. Pt may dust furniture or swiffer at times but dtr stated still not back to her

## 2024-12-05 ENCOUNTER — HOSPITAL ENCOUNTER (OUTPATIENT)
Dept: NON INVASIVE DIAGNOSTICS | Age: 79
End: 2024-12-05
Payer: MEDICARE

## 2024-12-05 ENCOUNTER — HOSPITAL ENCOUNTER (OUTPATIENT)
Dept: WOUND CARE | Age: 79
Discharge: HOME | End: 2024-12-05
Attending: SURGERY
Payer: MEDICARE

## 2024-12-05 VITALS — TEMPERATURE: 97 F | DIASTOLIC BLOOD PRESSURE: 84 MMHG | SYSTOLIC BLOOD PRESSURE: 125 MMHG | HEART RATE: 74 BPM

## 2024-12-05 DIAGNOSIS — B37.2 YEAST DERMATITIS: ICD-10-CM

## 2024-12-05 DIAGNOSIS — L97.912 SKIN ULCER OF RIGHT LOWER LEG WITH FAT LAYER EXPOSED (HCC): Primary | ICD-10-CM

## 2024-12-05 DIAGNOSIS — D47.1 MPN (MYELOPROLIFERATIVE NEOPLASM) (HCC): ICD-10-CM

## 2024-12-05 DIAGNOSIS — Z79.4 TYPE 2 DIABETES MELLITUS WITH OTHER SKIN ULCER, WITH LONG-TERM CURRENT USE OF INSULIN (HCC): ICD-10-CM

## 2024-12-05 DIAGNOSIS — E11.622 TYPE 2 DIABETES MELLITUS WITH OTHER SKIN ULCER, WITH LONG-TERM CURRENT USE OF INSULIN (HCC): ICD-10-CM

## 2024-12-05 DIAGNOSIS — E11.65 TYPE 2 DIABETES MELLITUS WITH HYPERGLYCEMIA, WITHOUT LONG-TERM CURRENT USE OF INSULIN (HCC): ICD-10-CM

## 2024-12-05 DIAGNOSIS — I63.50 CEREBROVASCULAR ACCIDENT (CVA) DUE TO OCCLUSION OF CEREBRAL ARTERY (HCC): ICD-10-CM

## 2024-12-05 DIAGNOSIS — R56.9 SEIZURES (HCC): ICD-10-CM

## 2024-12-05 DIAGNOSIS — E11.9 TYPE 2 DIABETES MELLITUS WITHOUT COMPLICATION, WITHOUT LONG-TERM CURRENT USE OF INSULIN (HCC): ICD-10-CM

## 2024-12-05 DIAGNOSIS — I10 ESSENTIAL HYPERTENSION: ICD-10-CM

## 2024-12-05 DIAGNOSIS — L97.912 SKIN ULCER OF RIGHT LOWER LEG WITH FAT LAYER EXPOSED (HCC): ICD-10-CM

## 2024-12-05 LAB
ANISOCYTOSIS BLD QL SMEAR: ABNORMAL
BASOPHILS # BLD: 0 K/UL (ref 0–0.2)
BASOPHILS NFR BLD: 0.1 % (ref 0–1)
EOSINOPHIL # BLD: 0 K/UL (ref 0–0.6)
EOSINOPHIL NFR BLD: 0.2 % (ref 0–5)
ERYTHROCYTE [DISTWIDTH] IN BLOOD BY AUTOMATED COUNT: 23.2 % (ref 11.5–14.5)
HCT VFR BLD AUTO: 38.7 % (ref 37–47)
HGB BLD-MCNC: 11.9 G/DL (ref 12–16)
IMM GRANULOCYTES # BLD: 0.1 K/UL
LYMPHOCYTES # BLD: 2.8 K/UL (ref 1.1–4.5)
LYMPHOCYTES NFR BLD: 29.5 % (ref 20–40)
MCH RBC QN AUTO: 29.4 PG (ref 27–31)
MCHC RBC AUTO-ENTMCNC: 30.7 G/DL (ref 33–37)
MCV RBC AUTO: 95.6 FL (ref 81–99)
MONOCYTES # BLD: 1.9 K/UL (ref 0–0.9)
MONOCYTES NFR BLD: 20 % (ref 0–10)
NEUTROPHILS # BLD: 4.6 K/UL (ref 1.5–7.5)
NEUTS SEG NFR BLD: 49.2 % (ref 50–65)
PLATELET # BLD AUTO: 226 K/UL (ref 130–400)
PLATELET SLIDE REVIEW: ADEQUATE
PMV BLD AUTO: 12.6 FL (ref 9.4–12.3)
RBC # BLD AUTO: 4.05 M/UL (ref 4.2–5.4)
WBC # BLD AUTO: 9.4 K/UL (ref 4.8–10.8)

## 2024-12-05 PROCEDURE — 29580 STRAPPING UNNA BOOT: CPT

## 2024-12-05 PROCEDURE — 99212 OFFICE O/P EST SF 10 MIN: CPT | Performed by: SURGERY

## 2024-12-05 PROCEDURE — 93923 UPR/LXTR ART STDY 3+ LVLS: CPT

## 2024-12-05 RX ORDER — CLOBETASOL PROPIONATE 0.5 MG/G
OINTMENT TOPICAL ONCE
OUTPATIENT
Start: 2024-12-05 | End: 2024-12-05

## 2024-12-05 RX ORDER — GINSENG 100 MG
CAPSULE ORAL ONCE
OUTPATIENT
Start: 2024-12-05 | End: 2024-12-05

## 2024-12-05 RX ORDER — COLESEVELAM 180 1/1
TABLET ORAL
Qty: 180 TABLET | Refills: 11 | Status: SHIPPED | OUTPATIENT
Start: 2024-12-05

## 2024-12-05 RX ORDER — BETAMETHASONE DIPROPIONATE 0.5 MG/G
CREAM TOPICAL ONCE
OUTPATIENT
Start: 2024-12-05 | End: 2024-12-05

## 2024-12-05 RX ORDER — APIXABAN 5 MG/1
5 TABLET, FILM COATED ORAL 2 TIMES DAILY
Qty: 60 TABLET | Refills: 11 | Status: SHIPPED | OUTPATIENT
Start: 2024-12-05

## 2024-12-05 RX ORDER — BACITRACIN ZINC AND POLYMYXIN B SULFATE 500; 1000 [USP'U]/G; [USP'U]/G
OINTMENT TOPICAL ONCE
OUTPATIENT
Start: 2024-12-05 | End: 2024-12-05

## 2024-12-05 RX ORDER — GENTAMICIN SULFATE 1 MG/G
OINTMENT TOPICAL ONCE
OUTPATIENT
Start: 2024-12-05 | End: 2024-12-05

## 2024-12-05 RX ORDER — LIDOCAINE HYDROCHLORIDE 20 MG/ML
JELLY TOPICAL ONCE
OUTPATIENT
Start: 2024-12-05 | End: 2024-12-05

## 2024-12-05 RX ORDER — LIDOCAINE 40 MG/G
CREAM TOPICAL ONCE
OUTPATIENT
Start: 2024-12-05 | End: 2024-12-05

## 2024-12-05 RX ORDER — NALOXEGOL OXALATE 12.5 MG/1
12.5 TABLET, FILM COATED ORAL DAILY
Qty: 30 TABLET | Refills: 11 | Status: SHIPPED | OUTPATIENT
Start: 2024-12-05

## 2024-12-05 RX ORDER — LIDOCAINE HYDROCHLORIDE 40 MG/ML
SOLUTION TOPICAL ONCE
OUTPATIENT
Start: 2024-12-05 | End: 2024-12-05

## 2024-12-05 RX ORDER — LISINOPRIL 30 MG/1
15 TABLET ORAL DAILY
Qty: 15 TABLET | Refills: 11 | Status: SHIPPED | OUTPATIENT
Start: 2024-12-05

## 2024-12-05 RX ORDER — LEVETIRACETAM 500 MG/1
500 TABLET ORAL 2 TIMES DAILY
Qty: 60 TABLET | Refills: 11 | Status: SHIPPED | OUTPATIENT
Start: 2024-12-05

## 2024-12-05 RX ORDER — SILVER SULFADIAZINE 10 MG/G
CREAM TOPICAL ONCE
OUTPATIENT
Start: 2024-12-05 | End: 2024-12-05

## 2024-12-05 RX ORDER — MUPIROCIN 20 MG/G
OINTMENT TOPICAL ONCE
OUTPATIENT
Start: 2024-12-05 | End: 2024-12-05

## 2024-12-05 RX ORDER — METOPROLOL SUCCINATE 50 MG/1
50 TABLET, EXTENDED RELEASE ORAL DAILY
Qty: 30 TABLET | Refills: 11 | Status: SHIPPED | OUTPATIENT
Start: 2024-12-05

## 2024-12-05 RX ORDER — HYDROXYUREA 500 MG/1
CAPSULE ORAL
Qty: 30 CAPSULE | Refills: 11 | Status: SHIPPED | OUTPATIENT
Start: 2024-12-05

## 2024-12-05 RX ORDER — LIDOCAINE 50 MG/G
OINTMENT TOPICAL ONCE
OUTPATIENT
Start: 2024-12-05 | End: 2024-12-05

## 2024-12-05 RX ORDER — TRIAMCINOLONE ACETONIDE 1 MG/G
OINTMENT TOPICAL ONCE
OUTPATIENT
Start: 2024-12-05 | End: 2024-12-05

## 2024-12-05 RX ORDER — FUROSEMIDE 40 MG/1
40 TABLET ORAL DAILY
Qty: 30 TABLET | Refills: 11 | Status: SHIPPED | OUTPATIENT
Start: 2024-12-05

## 2024-12-05 RX ORDER — SITAGLIPTIN 100 MG/1
100 TABLET, FILM COATED ORAL DAILY
Qty: 30 TABLET | Refills: 11 | Status: SHIPPED | OUTPATIENT
Start: 2024-12-05

## 2024-12-05 RX ORDER — ALLOPURINOL 300 MG/1
300 TABLET ORAL DAILY
Qty: 30 TABLET | Refills: 11 | Status: SHIPPED | OUTPATIENT
Start: 2024-12-05

## 2024-12-05 RX ORDER — BLOOD SUGAR DIAGNOSTIC
STRIP MISCELLANEOUS
Qty: 100 STRIP | Refills: 11 | Status: SHIPPED | OUTPATIENT
Start: 2024-12-05

## 2024-12-05 RX ORDER — NEOMYCIN/BACITRACIN/POLYMYXINB 3.5-400-5K
OINTMENT (GRAM) TOPICAL ONCE
OUTPATIENT
Start: 2024-12-05 | End: 2024-12-05

## 2024-12-05 RX ORDER — LANCETS 30 GAUGE
EACH MISCELLANEOUS
Qty: 100 EACH | Refills: 11 | Status: SHIPPED | OUTPATIENT
Start: 2024-12-05

## 2024-12-05 RX ORDER — SODIUM CHLOR/HYPOCHLOROUS ACID 0.033 %
SOLUTION, IRRIGATION IRRIGATION ONCE
OUTPATIENT
Start: 2024-12-05 | End: 2024-12-05

## 2024-12-05 ASSESSMENT — PAIN DESCRIPTION - DESCRIPTORS: DESCRIPTORS: DISCOMFORT

## 2024-12-05 ASSESSMENT — PAIN DESCRIPTION - LOCATION: LOCATION: LEG

## 2024-12-05 ASSESSMENT — PAIN DESCRIPTION - ORIENTATION: ORIENTATION: RIGHT

## 2024-12-05 ASSESSMENT — PAIN DESCRIPTION - PAIN TYPE: TYPE: ACUTE PAIN

## 2024-12-05 ASSESSMENT — PAIN DESCRIPTION - FREQUENCY: FREQUENCY: INTERMITTENT

## 2024-12-05 ASSESSMENT — PAIN SCALES - GENERAL: PAINLEVEL_OUTOF10: 3

## 2024-12-05 ASSESSMENT — PAIN DESCRIPTION - ONSET: ONSET: ON-GOING
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
## 2024-12-05 NOTE — TELEPHONE ENCOUNTER
Received fax from pharmacy requesting refill on pts medication(s). Pt was last seen in office on 11/11/2024  and has a follow up scheduled for 12/12/2024. Will send request to Dr. Solis for authorization.     Requested Prescriptions     Pending Prescriptions Disp Refills    blood glucose test strips (ONETOUCH ULTRA) strip [Pharmacy Med Name: OneTouch Ultra Test strips] 100 strip 11     Sig: USE AS DIRECTED FOUR TIMES DAILY AS DIRECTED    OneTouch UltraSoft 2 Lancets MISC [Pharmacy Med Name: OneTouch UltraSoft 2 Lancet 30 gauge] 100 each 11     Sig: USE AS DIRECTED FOUR TIMES DAILY    hydroxyurea (HYDREA) 500 MG chemo capsule [Pharmacy Med Name: hydroxyurea 500 mg capsule] 30 capsule 11     Sig: TAKE ONE CAPSULE BY MOUTH DAILY    JANUVIA 100 MG tablet [Pharmacy Med Name: Januvia 100 mg tablet] 30 tablet 11     Sig: TAKE ONE TABLET BY MOUTH DAILY    lisinopril (PRINIVIL;ZESTRIL) 30 MG tablet [Pharmacy Med Name: lisinopril 30 mg tablet] 15 tablet 11     Sig: TAKE 1/2 TABLET BY MOUTH DAILY    allopurinol (ZYLOPRIM) 300 MG tablet [Pharmacy Med Name: allopurinol 300 mg tablet] 30 tablet 11     Sig: TAKE ONE TABLET BY MOUTH DAILY    levETIRAcetam (KEPPRA) 500 MG tablet [Pharmacy Med Name: levetiracetam 500 mg tablet] 60 tablet 11     Sig: TAKE ONE TABLET BY MOUTH TWICE DAILY    furosemide (LASIX) 40 MG tablet [Pharmacy Med Name: furosemide 40 mg tablet] 30 tablet 11     Sig: TAKE ONE TABLET BY MOUTH DAILY    ELIQUIS 5 MG TABS tablet [Pharmacy Med Name: Eliquis 5 mg tablet] 60 tablet 11     Sig: TAKE ONE TABLET BY MOUTH TWICE DAILY    metoprolol succinate (TOPROL XL) 50 MG extended release tablet [Pharmacy Med Name: metoprolol succinate ER 50 mg tablet,extended release 24 hr] 30 tablet 11     Sig: TAKE ONE TABLET BY MOUTH DAILY    MOVANTIK 12.5 MG TABS tablet [Pharmacy Med Name: Movantik 12.5 mg tablet] 30 tablet 11     Sig: TAKE ONE TABLET BY MOUTH DAILY    colesevelam (WELCHOL) 625 MG tablet [Pharmacy Med Name:

## 2024-12-05 NOTE — WOUND CARE
Unna Boot Application   Below Knee    NAME:  Elda Flood  YOB: 1945  MEDICAL RECORD NUMBER:  [id number]  DATE:  12/5/2024    Unna boot: Applied moisturizing agent to dry skin as needed.   Appied primary and secondary dressing as ordered.  Applied Unna roll from toes to knee overlapping each time.   Applied ace wrap or coban from toes to below the knee.   Secured with tape and/or metal clips covered with tape.   Instructed patient/caregiver to keep dressing dry and intact. DO NOT REMOVE DRESSING.   Instructed pt/family/caregiver to report excessive draining, loose bandage, wet dressing, severe pain or tingling in toes.  Applied Unna Boot dressing below the knee to right lower leg.    Unna Boot(s) were applied per  Guidelines.     Electronically signed by Rodríguez Herring RN on 12/5/2024 at 2:44 PM

## 2024-12-05 NOTE — PROGRESS NOTES
[facility] Wound Care Center   Progress Note and Procedure Note      Elda Flood  MEDICAL RECORD NUMBER:  [id number]  AGE: 79 y.o.   GENDER: female  : 1945  EPISODE DATE:  2024    Subjective:     Chief Complaint   Patient presents with    Wound Check         HISTORY of PRESENT ILLNESS HPI     Elda Flood is a 79 y.o. female who presents today for wound/ulcer evaluation.   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    HYSTERECTOMY, TOTAL ABDOMINAL (CERVIX REMOVED)  1986    LEG SURGERY Right 8/15/2024    RIGHT LEG EVACUTION HEMATOMA performed by Emili Landry DO at [facility] OR    OVARY REMOVAL Bilateral     age 41    UMBILICAL HERNIA REPAIR N/A 2019    INCISIONAL HERNIA REPAIR WITH BIOLOGIC MESH performed by Bebo Guevara MD at [facility] OR    UPPER GASTROINTESTINAL ENDOSCOPY  1985       FAMILY HISTORY    Family History   Problem Relation Age of Onset    Colon Cancer Mother     Colon Polyps Neg Hx     Esophageal Cancer Neg Hx     Liver Disease Neg Hx     Liver Cancer Neg Hx     Rectal Cancer Neg Hx     Stomach Cancer Neg Hx        SOCIAL

## 2024-12-06 ENCOUNTER — CARE COORDINATION (OUTPATIENT)
Dept: CARE COORDINATION | Age: 79
End: 2024-12-06

## 2024-12-06 ENCOUNTER — TELEPHONE (OUTPATIENT)
Dept: HEMATOLOGY | Age: 79
End: 2024-12-06

## 2024-12-06 DIAGNOSIS — R09.81 NASAL CONGESTION: Primary | ICD-10-CM

## 2024-12-06 DIAGNOSIS — D47.1 MPN (MYELOPROLIFERATIVE NEOPLASM) (HCC): Primary | ICD-10-CM

## 2024-12-06 RX ORDER — FLUTICASONE PROPIONATE 50 MCG
2 SPRAY, SUSPENSION (ML) NASAL DAILY
Qty: 16 G | Refills: 0 | Status: SHIPPED | OUTPATIENT
Start: 2024-12-06

## 2024-12-06 RX ORDER — GUAIFENESIN 600 MG/1
1200 TABLET, EXTENDED RELEASE ORAL 2 TIMES DAILY
Qty: 20 TABLET | Refills: 0 | Status: SHIPPED | OUTPATIENT
Start: 2024-12-06 | End: 2024-12-11

## 2024-12-06 NOTE — TELEPHONE ENCOUNTER
Spoke with Saloni and advised that per Mag Montgomery, LAYLA- patient should continue her same dose of hydrea and recheck CBC in 2 weeks. Saloni tells me that they will go to [facility] to have CBCs completed. Order entered.

## 2024-12-06 NOTE — TELEPHONE ENCOUNTER
"Subjective   Patient ID: Marcy Swanson is a 83 y.o. female who presents for Follow-up.  HPI  Patient presents today for routine follow-up hypertension hyperlipidemia diabetes.  She has a history of breast cancer.  We talked about doing her routine follow-ups on that and I reminded her that she should at least do that once a year and she also gets her mammograms routinely.  She is been consistent with her medications she had her blood work done.  We reviewed her CMP her lipid panel and her hemoglobin A1c.  Her hemoglobin A1c has drifted up a little bit so she was disappointed by that she is now back at 8.  She is going to continue to work on her diet and exercise.  She does not see an endocrinologist any longer as he left the area so she is just been seeing me she has her routine medications.  She has been seeing the nephrologist routinely because she has chronic renal insufficiency its been stable for some time there was a slight bump up today but it is all within the range that she has been.  She states her nephrologist told her that she has been stable for a while and he anticipates that her numbers will continue to stay that way.  She is on the Farxiga and they are happy that she is doing that as well as her cardiologist is on board with it.    Patient denies chest pains headaches dizziness lightheadedness or shortness of breath she does not have any lower extremity edema at this time.  She is been compliant with her meds.  She needs some refills and she would like those printed   Review of Systems  Review of systems was performed and is otherwise negative except as noted in HPI.  Objective   /74   Pulse 78   Temp 36.8 °C (98.2 °F) (Oral)   Ht 1.6 m (5' 3\")   Wt 74.8 kg (165 lb)   SpO2 98%   BMI 29.23 kg/m²    Physical Exam  Is normal  Lungs clear bilaterally  Heart is regular rate and rhythm no murmurs  Abdomen is benign  Lower extremities no edema  Assessment/Plan   Diagnoses and all orders for " ----- Message from LAYLA Huffman sent at 12/6/2024 10:06 AM CST -----  Regarding: FW: URI sx - requesting prescription for congestion and runny nose  Suggest mucinex 600mg 1 po bid 20 no refill  Flonase 2 sprays each nostril bid 1 bottle no refill  ----- Message -----  From: Tessie Rahman RN  Sent: 12/6/2024   9:56 AM CST  To: NICOLE Solis DO; #  Subject: URI sx - requesting prescription for congest#    Hello,     is out today, not sure who is covering for him.  Elda is having URI symptoms per her daughter/caregiver.   Mainly runny nose and nasal congestion. Daughter denied any fever or cough.    Daughter is requesting something sent in for patient to manage symptoms.    Thank you!    Tessie Rahman RN  Ambulatory Care Manager  [facility] [place]  C) [phone]  [email or web address]   this visit:  Anemia due to stage 4 chronic kidney disease (CMS/HCC)  -     CBC and Auto Differential; Future  -     Iron and TIBC; Future  -     Ferritin; Future  -     ferrous sulfate 325 (65 Fe) MG tablet; Take 1 tablet (325 mg) by mouth once daily.  Mixed hyperlipidemia  -     Lipid Panel; Future  -     Comprehensive Metabolic Panel; Future  -     Follow Up In [facility] Care - PCP - Established; Future  -     fenofibrate (Tricor) 145 mg tablet; Take 1 tablet (145 mg) by mouth once daily.  Vitamin B12 deficiency  -     Vitamin B12; Future  Type 2 diabetes mellitus without complication, without long-term current use of insulin (CMS/HCC)  -     Hemoglobin A1C; Future  -     Lipid Panel; Future  -     Comprehensive Metabolic Panel; Future  -     Follow Up In [facility] - PCP - Established; Future  -     metFORMIN XR (Glucophage-XR) 500 mg 24 hr tablet; Take 2 tablets (1,000 mg) by mouth once daily in the evening. Take with meals.  Benign essential hypertension  -     Comprehensive Metabolic Panel; Future  -     Follow Up In [facility] - PCP - Established; Future  Hypertension with renal disease  -     losartan (Cozaar) 100 mg tablet; Take 1 tablet (100 mg) by mouth once daily.  Lichen sclerosus  -     clobetasol (Temovate) 0.05 % cream; Apply topically 2 times a day for 14 days.  Dilated cardiomyopathy (CMS/HCC)  Atherosclerosis of aorta (CMS/HCC)    Patient will continue her routine medications  She will follow-up with her nephrologist  She will follow-up with me in 3 months  She sees cardiology routinely  I refilled her meds blood work is ordered I encouraged her to see her oncologist for follow-up in her breast cancer  Iron is still low she is can try to iron a day every other day and see if she tolerates that she also discussed her low iron/low hemoglobin with her nephrologist  Catrachita Maya MD

## 2024-12-06 NOTE — ACP (ADVANCE CARE PLANNING)
Advance Care Planning   The patient has the following advanced directives on file:  Advance Directives       Power of  Living Will ACP-Advance Directive ACP-Power of     Not on File Not on File Not on File Not on File            The patient has appointed the following active healthcare agents:    Primary Decision Maker: Saloni Iverson - Child - [phone]    The Patient has the following current code status:    Code Status: Prior    Visit Documentation:  I called and spoke with Elda Flood regarding Advance Care Planning and Advance Care Directives, including Living Will and Health Care Power of .     I discussed Advance Care Planning with patient's daughter, Saloni, today which included the importance of making their choices for care and treatment in the case of a health event that adversely affects their decision-making abilities. Daughter reported patient has completed the Advance Care Directives (pt has a POA). Patient does not have an active health care agent at this time. Daughter was encouraged to provide a signed copy for patient's medical records.     Tessie Rahman RN  12/6/2024

## 2024-12-06 NOTE — CARE COORDINATION
Ambulatory Care Coordination Note     2024 10:04 AM     Patient Current Location:  Home: [address] [place] [address] [place] [address]     ACM contacted the family by telephone. Verified name and  with  daughter  as identifiers.         ACM: Tessie Rahman RN     Challenges to be reviewed by the provider   Additional needs identified to be addressed with provider Yes  medications-Requesting meds for URI sx of runny nose and head congestion             Method of communication with provider: staff message.    Utilization: Patient has not had any utilization since our last call.     Care Summary Note: Spoke to daughter.  URI sx - better yesterday, a little relapse today after pt was out for appts yesterday in the cold weather. Denied fever, cough. ACM sent IB msg to PCP office regarding medication for patient's sx management.  Daughter and granddaughter recently moved in with patient and patient's spouse, learning what support is needed by patient. Discussed meals on wheels/[employer] and daughter is agreeable this would be helpful for patient and patient's spouse. ACM left voicemail with [employer] for call back and referral.   Discussed homemaker support and daughter is agreeable to this option if pt is eligible. Will refer to MSW for support with this.  Daughter reported pt had refills of medications yesterday but was not able to get refill on her glucometer test strips. Chart review indicates PCP refilled the strips, recommended daughter call the pharmacy to verify they are ready for . Daughter thinks glucose checks are good, encouraged that pt write down daily fasting result.   Encouraged that patient's blood pressure be checked 2-3 times per week, enc to as  nurse to write down her evaluation when she changes pt leg dressing on . Daughter voiced understanding.  Daughter stated patient's checks ups yesterday went well, was able to get CBC lab done with improved H&H.    Offered patient

## 2024-12-06 NOTE — TELEPHONE ENCOUNTER
Spoke to patients daughter. She is aware we are sending in a prescription to pharmacy.    Will send back to provider to send in.    Requested Prescriptions     Pending Prescriptions Disp Refills    guaiFENesin (MUCINEX) 600 MG extended release tablet 20 tablet 0     Sig: Take 2 tablets by mouth 2 times daily for 5 days    fluticasone (FLONASE) 50 MCG/ACT nasal spray 16 g 0     Si sprays by Each Nostril route daily

## 2024-12-07 LAB
VAS LEFT ARM BP: 125 MMHG
VAS LEFT CALF PRESSURE: 255 MMHG
VAS LEFT DORSALIS PEDIS BP: 255 MMHG
VAS LEFT LOW THIGH PRESSURE: 255 MMHG
VAS LEFT PTA BP: 255 MMHG
VAS LEFT TBI: 0.85
VAS LEFT TOE PRESSURE: 106 MMHG
VAS RIGHT ARM BP: 117 MMHG
VAS RIGHT CALF PRESSURE: 255 MMHG
VAS RIGHT LOW THIGH PRESSURE: 255 MMHG
VAS RIGHT TBI: 0.89
VAS RIGHT TOE PRESSURE: 111 MMHG

## 2024-12-10 ENCOUNTER — CARE COORDINATION (OUTPATIENT)
Dept: CARE COORDINATION | Age: 79
End: 2024-12-10

## 2024-12-10 ENCOUNTER — TELEPHONE (OUTPATIENT)
Dept: HEMATOLOGY | Age: 79
End: 2024-12-10

## 2024-12-10 NOTE — TELEPHONE ENCOUNTER
[facility] nurse called and said that Elda had told her that the lab order had been changed to every 2 weeks instead of the usual every week.  I looked up Elda's lab order and read to her that it had in fact been changed to every 2 weeks.  Order number (Order #: [id number])

## 2024-12-10 NOTE — CARE COORDINATION
Call to daughter Saloni, to initiate SW referral for homemaker support and meals for patient. She reported that patient was doing a lot better. She was able to complete ADLs and IADLs independently. She is no longer needed homemaker assistance. Saloni has not received a call from [facility]. Informed her that their hours are 11-1 M-F. She stated that she would try to give them a call to inquire about meals. She reported that patient BS and BP has been normal.     JEISON will follow up.

## 2024-12-12 ENCOUNTER — HOSPITAL ENCOUNTER (OUTPATIENT)
Dept: WOUND CARE | Age: 79
Discharge: HOME | End: 2024-12-12
Attending: SURGERY
Payer: MEDICARE

## 2024-12-12 VITALS — SYSTOLIC BLOOD PRESSURE: 124 MMHG | TEMPERATURE: 98 F | DIASTOLIC BLOOD PRESSURE: 67 MMHG | HEART RATE: 78 BPM

## 2024-12-12 DIAGNOSIS — E11.622 TYPE 2 DIABETES MELLITUS WITH OTHER SKIN ULCER, WITH LONG-TERM CURRENT USE OF INSULIN (HCC): ICD-10-CM

## 2024-12-12 DIAGNOSIS — B37.2 YEAST DERMATITIS: ICD-10-CM

## 2024-12-12 DIAGNOSIS — L97.912 SKIN ULCER OF RIGHT LOWER LEG WITH FAT LAYER EXPOSED (HCC): Primary | ICD-10-CM

## 2024-12-12 DIAGNOSIS — Z79.4 TYPE 2 DIABETES MELLITUS WITH OTHER SKIN ULCER, WITH LONG-TERM CURRENT USE OF INSULIN (HCC): ICD-10-CM

## 2024-12-12 PROCEDURE — 29580 STRAPPING UNNA BOOT: CPT

## 2024-12-12 PROCEDURE — 99212 OFFICE O/P EST SF 10 MIN: CPT | Performed by: SURGERY

## 2024-12-12 RX ORDER — LIDOCAINE HYDROCHLORIDE 20 MG/ML
JELLY TOPICAL ONCE
OUTPATIENT
Start: 2024-12-12 | End: 2024-12-12

## 2024-12-12 RX ORDER — LIDOCAINE 50 MG/G
OINTMENT TOPICAL ONCE
OUTPATIENT
Start: 2024-12-12 | End: 2024-12-12

## 2024-12-12 RX ORDER — CLOBETASOL PROPIONATE 0.5 MG/G
OINTMENT TOPICAL ONCE
OUTPATIENT
Start: 2024-12-12 | End: 2024-12-12

## 2024-12-12 RX ORDER — LIDOCAINE HYDROCHLORIDE 20 MG/ML
JELLY TOPICAL ONCE
Status: COMPLETED | OUTPATIENT
Start: 2024-12-12 | End: 2024-12-12

## 2024-12-12 RX ORDER — LIDOCAINE 40 MG/G
CREAM TOPICAL ONCE
OUTPATIENT
Start: 2024-12-12 | End: 2024-12-12

## 2024-12-12 RX ORDER — MUPIROCIN 20 MG/G
OINTMENT TOPICAL ONCE
OUTPATIENT
Start: 2024-12-12 | End: 2024-12-12

## 2024-12-12 RX ORDER — BACITRACIN ZINC AND POLYMYXIN B SULFATE 500; 1000 [USP'U]/G; [USP'U]/G
OINTMENT TOPICAL ONCE
OUTPATIENT
Start: 2024-12-12 | End: 2024-12-12

## 2024-12-12 RX ORDER — NEOMYCIN/BACITRACIN/POLYMYXINB 3.5-400-5K
OINTMENT (GRAM) TOPICAL ONCE
OUTPATIENT
Start: 2024-12-12 | End: 2024-12-12

## 2024-12-12 RX ORDER — TRIAMCINOLONE ACETONIDE 1 MG/G
OINTMENT TOPICAL ONCE
OUTPATIENT
Start: 2024-12-12 | End: 2024-12-12

## 2024-12-12 RX ORDER — SODIUM CHLOR/HYPOCHLOROUS ACID 0.033 %
SOLUTION, IRRIGATION IRRIGATION ONCE
OUTPATIENT
Start: 2024-12-12 | End: 2024-12-12

## 2024-12-12 RX ORDER — LIDOCAINE HYDROCHLORIDE 40 MG/ML
SOLUTION TOPICAL ONCE
OUTPATIENT
Start: 2024-12-12 | End: 2024-12-12

## 2024-12-12 RX ORDER — GENTAMICIN SULFATE 1 MG/G
OINTMENT TOPICAL ONCE
OUTPATIENT
Start: 2024-12-12 | End: 2024-12-12

## 2024-12-12 RX ORDER — BETAMETHASONE DIPROPIONATE 0.5 MG/G
CREAM TOPICAL ONCE
OUTPATIENT
Start: 2024-12-12 | End: 2024-12-12

## 2024-12-12 RX ORDER — GINSENG 100 MG
CAPSULE ORAL ONCE
OUTPATIENT
Start: 2024-12-12 | End: 2024-12-12

## 2024-12-12 RX ORDER — SILVER SULFADIAZINE 10 MG/G
CREAM TOPICAL ONCE
OUTPATIENT
Start: 2024-12-12 | End: 2024-12-12

## 2024-12-12 RX ADMIN — LIDOCAINE HYDROCHLORIDE: 20 JELLY TOPICAL at 14:16

## 2024-12-12 ASSESSMENT — PAIN DESCRIPTION - LOCATION: LOCATION: LEG

## 2024-12-12 ASSESSMENT — PAIN SCALES - GENERAL: PAINLEVEL_OUTOF10: 3

## 2024-12-12 ASSESSMENT — PAIN DESCRIPTION - ORIENTATION: ORIENTATION: RIGHT

## 2024-12-12 ASSESSMENT — PAIN DESCRIPTION - ONSET: ONSET: ON-GOING

## 2024-12-12 ASSESSMENT — PAIN DESCRIPTION - DESCRIPTORS: DESCRIPTORS: BURNING

## 2024-12-12 ASSESSMENT — PAIN DESCRIPTION - PAIN TYPE: TYPE: ACUTE PAIN

## 2024-12-12 ASSESSMENT — PAIN DESCRIPTION - FREQUENCY: FREQUENCY: INTERMITTENT

## 2024-12-12 NOTE — WOUND CARE
Unna Boot Application   Below Knee    NAME:  Elda Flood  YOB: 1945  MEDICAL RECORD NUMBER:  [id number]  DATE:  12/12/2024    Unna boot: Applied moisturizing agent to dry skin as needed.   Appied primary and secondary dressing as ordered.  Applied Unna roll from toes to knee overlapping each time.   Applied ace wrap or coban from toes to below the knee.   Secured with tape and/or metal clips covered with tape.   Instructed patient/caregiver to keep dressing dry and intact. DO NOT REMOVE DRESSING.   Instructed pt/family/caregiver to report excessive draining, loose bandage, wet dressing, severe pain or tingling in toes.  Applied Unna Boot dressing below the knee to right lower leg.    Unna Boot(s) were applied per  Guidelines.     Electronically signed by Rodríguez Herring RN on 12/12/2024 at 2:28 PM

## 2024-12-12 NOTE — PROGRESS NOTES
[facility]   Progress Note and Procedure Note      Elda Flood  MEDICAL RECORD NUMBER:  [id number]  AGE: 79 y.o.   GENDER: female  : 1945  EPISODE DATE:  2024    Subjective:     Chief Complaint   Patient presents with    Wound Check         HISTORY of PRESENT ILLNESS HPI     Elda Flood is a 79 y.o. female who presents today for wound/ulcer evaluation.   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    HYSTERECTOMY, TOTAL ABDOMINAL (CERVIX REMOVED)  1986    LEG SURGERY Right 8/15/2024    RIGHT LEG EVACUTION HEMATOMA performed by Emili Landry DO at [facility] OR    OVARY REMOVAL Bilateral     age 41    UMBILICAL HERNIA REPAIR N/A 2019    INCISIONAL HERNIA REPAIR WITH BIOLOGIC MESH performed by Bebo Guevara MD at [facility] OR    UPPER GASTROINTESTINAL ENDOSCOPY  1985       FAMILY HISTORY    Family History   Problem Relation Age of Onset    Colon Cancer Mother     Colon Polyps Neg Hx     Esophageal Cancer Neg Hx     Liver Disease Neg Hx     Liver Cancer Neg Hx     Rectal Cancer Neg Hx     Stomach Cancer Neg Hx        SOCIAL

## 2024-12-12 NOTE — PATIENT INSTRUCTIONS
[facility] Wound Care and Hyperbaric Oxygen Therapy   Physician Orders and Discharge Instructions  [address] [place] [address] Drive  [address]  [place] [address]  Telephone: [phone]      FAX [phone]    NAME:  Elda Flood  YOB: 1945  MEDICAL RECORD NUMBER:  [id number]  DATE:  12/12/2024    Discharge condition: Stable    Discharge to: Home    Left via:Private automobile    Accompanied by: Family    [facility] to change Coflex on Monday and Wound Care to change on Thursday    Dressing Orders: Right Lower Leg Wound  Xeroform to open areas  Calamine Coflex Unnaboot (absorbent layer between first and second layer) Keep clean and Dry  Elevate feet to level or above heart 3-4 times daily and as needed to for 30 minutes to reduce swelling  Remove wraps and call office if- Wraps get wet, Cause increased pain, Slide down or you are unable to make  your next scheduled appointment  Multi Vitamin, High Protein diet as tolerated    [facility] follow up visit ___________1 week__________________  (Please note your next appointment above and if you are unable to keep, kindly give a 24 hour notice. Thank you.)    If you experience any of the following, please call the Wound Care Center during business hours:    * Increase in Pain  * Temperature over 101  * Increase in drainage from your wound  * Drainage with a foul odor  * Bleeding  * Increase in swelling  * Need for compression bandage changes due to slippage, breakthrough drainage.    If you need medical attention outside of the business hours of the Wound Care Centers please contact your PCP or go to the nearest emergency room.

## 2024-12-13 ENCOUNTER — CARE COORDINATION (OUTPATIENT)
Dept: CARE COORDINATION | Age: 79
End: 2024-12-13

## 2024-12-13 NOTE — CARE COORDINATION
Ambulatory Care Coordination Note     2024 4:08 PM     Patient Current Location:  Home: [address] [place] [address] [place] [address]     ACM contacted the family by telephone. Verified name and  with  daughter  as identifiers.         ACM: Tessie Rahman RN     Challenges to be reviewed by the provider   Additional needs identified to be addressed with provider No  none               Method of communication with provider: none.    Utilization: Patient has not had any utilization since our last call.     Care Summary Note: Spoke to daughter, Saloni.  Daughter reported MOW started on 24 through [facility] in [place]  Patient is moving around more in her WC, trying to walk some with a walker. Daughter feels patient is regaining her strength.   nurse continues for weekly dressing change to right lower leg.  Dtr has slept at her own home the past 2 nights - said pt and spouse are improved enough now. Dtr goes over during the day.  Pt is using BSC at night for urination to reduce fall risk. Patient has a transfer chair in home that she uses for mobility also.  Per daughter, patient's BP is running 120s/60s; glucose avg is 136 fasting. A1c 7.9  Discussed utilizing RPM program to monitor blood pressure and glucose - daughter is agreeable, stated she feels patient will enjoy using the program and be agreeable as well.   ACM requested patient's phone number for follow up calls. Daughter unable to provide it during call, stated she will get it next call.    Offered patient enrollment in the Remote Patient Monitoring (RPM) program for in-home monitoring: Yes, patient enrolled today:     Remote Patient Monitoring Enrollment Note    Date/Time:  2024 4:13 PM    Offered patient enrollment in the [facility] Remote Patient Monitoring (RPM) program for in home monitoring for Diabetes; condition managed by Dr. Beau Solis. HTN; condition managed by Dr. Beau Solis.  Patient accepted.    Patient will

## 2024-12-13 NOTE — CARE COORDINATION
Remote Patient Monitoring Enrollment Note      Date/Time:  12/13/2024 4:17 PM    Offered patient enrollment in the [facility] Remote Patient Monitoring (RPM) program for in home monitoring for Diabetes; condition managed by Dr. Solis. HTN; condition managed by Dr. Solis.  Patient accepted RPM services.    Patient will be monitoring the following daily:  blood pressure reading and glucose reading      ACM reviewed the information below with patient:    Emergency Contact (name and contact number): Saloni Iverson. [phone]    [x] A member from the care coordination team will reach out to notify the patient once the RPM kit is ordered.   [x] Once the kit is delivered, the HRS team will contact the patient after UPS deliver to assist with set up.            [x] Determined BP cuff size: regular (9.05\"-15.74\")      [] Determined weight scale:  NA                                                  [x] Hours of ACM monitoring - Monday-Friday 5847-7724.   [x] It is important to take your vitals every day, even on the weekends,to keep your care team aware of how you are doing every day of the week.                  All questions about RPM program answered at this time.

## 2024-12-16 DIAGNOSIS — I10 PRIMARY HYPERTENSION: ICD-10-CM

## 2024-12-16 DIAGNOSIS — E11.42 TYPE 2 DIABETES MELLITUS WITH DIABETIC POLYNEUROPATHY, WITHOUT LONG-TERM CURRENT USE OF INSULIN (HCC): Primary | ICD-10-CM

## 2024-12-16 NOTE — PROGRESS NOTES
Remote Patient Monitoring Treatment Plan    Received request from [facility]/Tessie Lyle RN   to order remote patient monitoring for in home monitoring of Diabetes; Condition managed by PCP.  HTN; Condition managed by PCP.  and order completed.     Patient will be monitoring blood pressure   glucose.      Patient will engage in Remote Patient Monitoring each day to develop the skills necessary for self management.       RPM Care Team Responsibilities:   Alerts will be reviewed daily and addressed within 2-4 hours during operational hours (Monday -Friday 9 am-4 pm)  Alert response and intervention documented in patient medical record  Alert response escalated to PCP per protocol and documented in patient medical record  Patient monitored over approximately  days  Discharge from program based on self-management readiness    See care coordination encounters for additional details.

## 2024-12-17 ENCOUNTER — CARE COORDINATION (OUTPATIENT)
Dept: CARE COORDINATION | Age: 79
End: 2024-12-17

## 2024-12-17 ENCOUNTER — CARE COORDINATION (OUTPATIENT)
Age: 79
End: 2024-12-17

## 2024-12-17 ENCOUNTER — TELEMEDICINE (OUTPATIENT)
Dept: FAMILY MEDICINE CLINIC | Age: 79
End: 2024-12-17

## 2024-12-17 DIAGNOSIS — M1A.0720 IDIOPATHIC CHRONIC GOUT OF LEFT FOOT WITHOUT TOPHUS: ICD-10-CM

## 2024-12-17 DIAGNOSIS — D64.9 CHRONIC ANEMIA: ICD-10-CM

## 2024-12-17 DIAGNOSIS — Z09 HOSPITAL DISCHARGE FOLLOW-UP: ICD-10-CM

## 2024-12-17 DIAGNOSIS — F33.1 MODERATE EPISODE OF RECURRENT MAJOR DEPRESSIVE DISORDER (HCC): Primary | ICD-10-CM

## 2024-12-17 DIAGNOSIS — E87.8 ELECTROLYTE ABNORMALITY: ICD-10-CM

## 2024-12-17 DIAGNOSIS — E83.42 HYPOMAGNESEMIA: ICD-10-CM

## 2024-12-17 DIAGNOSIS — G89.21 CHRONIC PAIN DUE TO TRAUMA: ICD-10-CM

## 2024-12-17 RX ORDER — ESCITALOPRAM OXALATE 10 MG/1
10 TABLET ORAL DAILY
Qty: 30 TABLET | Refills: 5 | Status: SHIPPED | OUTPATIENT
Start: 2024-12-17

## 2024-12-17 RX ORDER — OXYCODONE HYDROCHLORIDE 10 MG/1
10 TABLET ORAL EVERY 6 HOURS PRN
Qty: 120 TABLET | Refills: 0 | Status: SHIPPED | OUTPATIENT
Start: 2024-12-17 | End: 2025-01-16

## 2024-12-17 ASSESSMENT — ENCOUNTER SYMPTOMS
WHEEZING: 0
SHORTNESS OF BREATH: 0
VOMITING: 0
ABDOMINAL PAIN: 0
SORE THROAT: 0
COUGH: 0
DIARRHEA: 0
NAUSEA: 0

## 2024-12-17 NOTE — CARE COORDINATION
Remote Patient Kit Ordering Note      Date/Time:  12/17/2024 8:05 AM      [facility] placed phone call to patient/family today to notify of RPM kit order; patient/family did not answer, no VM set up     [] [facility] confirmed patient shipping address  [] Patient will receive package over the next 1-3 business days. Someone 21 years or older must be present to sign for UPS delivery.  [] HRS will contact patient within 24 hours, an HRS  will call the patient directly: If the patient does not answer, HRS will follow up with the clinical team notifying them about the unsuccessful attempt to contact the patient. HRS will make three call attempts to the patient.Provide patient with [facility] Virtual install number is: [phone].  [x] The RPM Nurse will contact patient once equipment is active to welcome them to the program.                                                         [] Hours of RPM monitoring - Monday-Friday 5309-5287; encourage patient to get vitals entered by Noon each day to have the alert addressed same day.  [x][facility] mailed RPM Patient flyer to patient.                      ACM made aware the RPM kit has been ordered.

## 2024-12-17 NOTE — PROGRESS NOTES
[facility] [place] [address]  Phone [phone]   Fax [phone]      OFFICE VISIT: 2024    Elda Flood-: 1945      HPI  Reason For Visit:  Elda is a 79 y.o.     Medication Refill and Depression    Patient presents via [employer] video conferencing on follow-up for multiple concerns.  She is needing a refill of her pain medication.    She is needing something for mild depression.  She has been struggling with depression recently.  She has discussed this with her family.  She also recognizes that she is needing something to help her through this.  She is requesting a low-dose medication to help with her symptoms of depression.  We discussed SSRIs in detail.  We also particularly discussed Lexapro (escitalopram) as an option.  She is in agreement to try this today.    Leg wound:  Wound care is going well   She is improving.  Both she and her wound care specialist are very pleased with her progress in this regard    She is walking with her walker around the house.  She is gradually getting stronger.  She is still in outpatient rehab.  She has not had any falls      Shoulder arthritis  She is wanting a shot in her shoulder.  This is limiting her use of a walker some.  I advised her to make an appointment in the near future so we can inject her shoulder for symptomatic relief      She also presents on follow-up for medication refill.  She is needing a refill of her hydrocodone.   chronic sciatica pain.  She is requesting a refill of her hydrocodone.  I last prescribed hydrocodone 7.5/325 mg on 2024 for number 120 tablets.  She continues to have back pain which is why she is requesting a refill of her prescription today  She has ongoing sciatica on the right side.  Her prescription will be due on 2024     Pain Diagnosis:              Chronic low back pain with sciatica              Lumbar Stenosis (felt to be non-surgical)              Degenerative disc

## 2024-12-18 ENCOUNTER — TELEPHONE (OUTPATIENT)
Dept: HEMATOLOGY | Age: 79
End: 2024-12-18

## 2024-12-18 NOTE — CARE COORDINATION
Spoke to patient's daughter, Saloni - informed daughter that RPM team has not been able to make phone contact. Daughter stated she has patient's direct phone number and will get it to [facility] for call about RPM. Awaiting call back with patient's phone number. Electronically signed by Tessie Rahman RN on 12/18/2024 at 11:01 AM

## 2024-12-18 NOTE — TELEPHONE ENCOUNTER
I called patient and reminded patient of their appt on 12/20/24 and patient confirmed they would be here. I also let patient know that we have moved into our new cancer facility and asked patient if they were aware of where we were now located, and patient voiced understanding of our new location. Patient knows not to arrive early and that we will get labs at the time of the follow up appointment and not the lab appointment time. I also made patient aware to eat and drink plenty of water to hydrate properly before coming to these appointments because this will make their lab draw much easier.

## 2024-12-19 ENCOUNTER — TELEPHONE (OUTPATIENT)
Dept: FAMILY MEDICINE CLINIC | Age: 79
End: 2024-12-19

## 2024-12-19 ENCOUNTER — HOSPITAL ENCOUNTER (OUTPATIENT)
Dept: WOUND CARE | Age: 79
Discharge: HOME | End: 2024-12-19
Attending: SURGERY
Payer: MEDICARE

## 2024-12-19 VITALS
TEMPERATURE: 97.7 F | HEART RATE: 80 BPM | DIASTOLIC BLOOD PRESSURE: 72 MMHG | SYSTOLIC BLOOD PRESSURE: 141 MMHG | RESPIRATION RATE: 18 BRPM

## 2024-12-19 DIAGNOSIS — D64.9 CHRONIC ANEMIA: ICD-10-CM

## 2024-12-19 DIAGNOSIS — R79.0 LOW MAGNESIUM LEVEL: Primary | ICD-10-CM

## 2024-12-19 DIAGNOSIS — B37.2 YEAST DERMATITIS: ICD-10-CM

## 2024-12-19 DIAGNOSIS — E87.8 ELECTROLYTE ABNORMALITY: ICD-10-CM

## 2024-12-19 DIAGNOSIS — L97.912 SKIN ULCER OF RIGHT LOWER LEG WITH FAT LAYER EXPOSED (HCC): Primary | ICD-10-CM

## 2024-12-19 DIAGNOSIS — E83.42 HYPOMAGNESEMIA: ICD-10-CM

## 2024-12-19 DIAGNOSIS — Z79.4 TYPE 2 DIABETES MELLITUS WITH OTHER SKIN ULCER, WITH LONG-TERM CURRENT USE OF INSULIN (HCC): ICD-10-CM

## 2024-12-19 DIAGNOSIS — E11.622 TYPE 2 DIABETES MELLITUS WITH OTHER SKIN ULCER, WITH LONG-TERM CURRENT USE OF INSULIN (HCC): ICD-10-CM

## 2024-12-19 LAB
ALBUMIN SERPL-MCNC: 3.8 G/DL (ref 3.5–5.2)
ALP SERPL-CCNC: 142 U/L (ref 35–104)
ALT SERPL-CCNC: 11 U/L (ref 5–33)
ANION GAP SERPL CALCULATED.3IONS-SCNC: 15 MMOL/L (ref 7–19)
AST SERPL-CCNC: 12 U/L (ref 5–32)
BASOPHILS # BLD: 0 K/UL (ref 0–0.2)
BASOPHILS NFR BLD: 0.2 % (ref 0–1)
BILIRUB SERPL-MCNC: <0.2 MG/DL (ref 0.2–1.2)
BUN SERPL-MCNC: 31 MG/DL (ref 8–23)
CALCIUM SERPL-MCNC: 9.1 MG/DL (ref 8.8–10.2)
CHLORIDE SERPL-SCNC: 105 MMOL/L (ref 98–111)
CO2 SERPL-SCNC: 17 MMOL/L (ref 22–29)
CREAT SERPL-MCNC: 0.8 MG/DL (ref 0.5–0.9)
EOSINOPHIL # BLD: 0 K/UL (ref 0–0.6)
EOSINOPHIL NFR BLD: 0.2 % (ref 0–5)
ERYTHROCYTE [DISTWIDTH] IN BLOOD BY AUTOMATED COUNT: 20.5 % (ref 11.5–14.5)
GLUCOSE SERPL-MCNC: 248 MG/DL (ref 70–99)
HCT VFR BLD AUTO: 37.2 % (ref 37–47)
HGB BLD-MCNC: 11.4 G/DL (ref 12–16)
IMM GRANULOCYTES # BLD: 0.1 K/UL
LYMPHOCYTES # BLD: 2.3 K/UL (ref 1.1–4.5)
LYMPHOCYTES NFR BLD: 24.6 % (ref 20–40)
MAGNESIUM SERPL-MCNC: 1.1 MG/DL (ref 1.6–2.4)
MCH RBC QN AUTO: 29.2 PG (ref 27–31)
MCHC RBC AUTO-ENTMCNC: 30.6 G/DL (ref 33–37)
MCV RBC AUTO: 95.4 FL (ref 81–99)
MONOCYTES # BLD: 1.8 K/UL (ref 0–0.9)
MONOCYTES NFR BLD: 19.8 % (ref 0–10)
NEUTROPHILS # BLD: 5 K/UL (ref 1.5–7.5)
NEUTS SEG NFR BLD: 54.2 % (ref 50–65)
PLATELET # BLD AUTO: 199 K/UL (ref 130–400)
PMV BLD AUTO: 12 FL (ref 9.4–12.3)
POTASSIUM SERPL-SCNC: 4.7 MMOL/L (ref 3.5–5)
PROT SERPL-MCNC: 6.8 G/DL (ref 6.4–8.3)
RBC # BLD AUTO: 3.9 M/UL (ref 4.2–5.4)
SODIUM SERPL-SCNC: 137 MMOL/L (ref 136–145)
WBC # BLD AUTO: 9.2 K/UL (ref 4.8–10.8)

## 2024-12-19 PROCEDURE — 99212 OFFICE O/P EST SF 10 MIN: CPT | Performed by: SURGERY

## 2024-12-19 PROCEDURE — 29580 STRAPPING UNNA BOOT: CPT

## 2024-12-19 RX ORDER — LIDOCAINE HYDROCHLORIDE 20 MG/ML
JELLY TOPICAL ONCE
OUTPATIENT
Start: 2024-12-19 | End: 2024-12-19

## 2024-12-19 RX ORDER — LIDOCAINE 40 MG/G
CREAM TOPICAL ONCE
OUTPATIENT
Start: 2024-12-19 | End: 2024-12-19

## 2024-12-19 RX ORDER — TRIAMCINOLONE ACETONIDE 1 MG/G
OINTMENT TOPICAL ONCE
OUTPATIENT
Start: 2024-12-19 | End: 2024-12-19

## 2024-12-19 RX ORDER — GENTAMICIN SULFATE 1 MG/G
OINTMENT TOPICAL ONCE
OUTPATIENT
Start: 2024-12-19 | End: 2024-12-19

## 2024-12-19 RX ORDER — LIDOCAINE HYDROCHLORIDE 20 MG/ML
JELLY TOPICAL ONCE
Status: COMPLETED | OUTPATIENT
Start: 2024-12-19 | End: 2024-12-19

## 2024-12-19 RX ORDER — LIDOCAINE HYDROCHLORIDE 40 MG/ML
SOLUTION TOPICAL ONCE
OUTPATIENT
Start: 2024-12-19 | End: 2024-12-19

## 2024-12-19 RX ORDER — BACITRACIN ZINC AND POLYMYXIN B SULFATE 500; 1000 [USP'U]/G; [USP'U]/G
OINTMENT TOPICAL ONCE
OUTPATIENT
Start: 2024-12-19 | End: 2024-12-19

## 2024-12-19 RX ORDER — MUPIROCIN 20 MG/G
OINTMENT TOPICAL ONCE
OUTPATIENT
Start: 2024-12-19 | End: 2024-12-19

## 2024-12-19 RX ORDER — GINSENG 100 MG
CAPSULE ORAL ONCE
OUTPATIENT
Start: 2024-12-19 | End: 2024-12-19

## 2024-12-19 RX ORDER — CLOBETASOL PROPIONATE 0.5 MG/G
OINTMENT TOPICAL ONCE
OUTPATIENT
Start: 2024-12-19 | End: 2024-12-19

## 2024-12-19 RX ORDER — SILVER SULFADIAZINE 10 MG/G
CREAM TOPICAL ONCE
OUTPATIENT
Start: 2024-12-19 | End: 2024-12-19

## 2024-12-19 RX ORDER — LIDOCAINE 50 MG/G
OINTMENT TOPICAL ONCE
OUTPATIENT
Start: 2024-12-19 | End: 2024-12-19

## 2024-12-19 RX ORDER — NEOMYCIN/BACITRACIN/POLYMYXINB 3.5-400-5K
OINTMENT (GRAM) TOPICAL ONCE
OUTPATIENT
Start: 2024-12-19 | End: 2024-12-19

## 2024-12-19 RX ORDER — SODIUM CHLOR/HYPOCHLOROUS ACID 0.033 %
SOLUTION, IRRIGATION IRRIGATION ONCE
OUTPATIENT
Start: 2024-12-19 | End: 2024-12-19

## 2024-12-19 RX ORDER — BETAMETHASONE DIPROPIONATE 0.5 MG/G
CREAM TOPICAL ONCE
OUTPATIENT
Start: 2024-12-19 | End: 2024-12-19

## 2024-12-19 RX ADMIN — LIDOCAINE HYDROCHLORIDE: 20 JELLY TOPICAL at 08:22

## 2024-12-19 ASSESSMENT — PAIN DESCRIPTION - LOCATION: LOCATION: LEG

## 2024-12-19 ASSESSMENT — PAIN DESCRIPTION - DESCRIPTORS: DESCRIPTORS: BURNING

## 2024-12-19 ASSESSMENT — PAIN DESCRIPTION - FREQUENCY: FREQUENCY: INTERMITTENT

## 2024-12-19 ASSESSMENT — PAIN DESCRIPTION - ORIENTATION: ORIENTATION: RIGHT

## 2024-12-19 ASSESSMENT — PAIN SCALES - GENERAL: PAINLEVEL_OUTOF10: 2

## 2024-12-19 ASSESSMENT — PAIN - FUNCTIONAL ASSESSMENT: PAIN_FUNCTIONAL_ASSESSMENT: ACTIVITIES ARE NOT PREVENTED

## 2024-12-19 ASSESSMENT — PAIN DESCRIPTION - PAIN TYPE: TYPE: CHRONIC PAIN

## 2024-12-19 ASSESSMENT — PAIN DESCRIPTION - ONSET: ONSET: ON-GOING

## 2024-12-19 NOTE — PLAN OF CARE
Problem: Pain  Goal: Verbalizes/displays adequate comfort level or baseline comfort level  Outcome: Progressing     Problem: Wound:  Goal: Will show signs of wound healing; wound closure and no evidence of infection  Description: Will show signs of wound healing; wound closure and no evidence of infection  Outcome: Progressing     Problem: Venous:  Goal: Signs of wound healing will improve  Description: Signs of wound healing will improve  Outcome: Progressing     Problem: Falls - Risk of:  Goal: Will remain free from falls  Description: Will remain free from falls  Outcome: Progressing     Problem: Blood Glucose:  Goal: Ability to maintain appropriate glucose levels will improve  Description: Ability to maintain appropriate glucose levels will improve  Outcome: Progressing

## 2024-12-19 NOTE — TELEPHONE ENCOUNTER
----- Message from Dr. NICOLE Solis DO sent at 12/19/2024  3:39 PM CST -----  Magnesium level is very low.  We are going to need to increase your magnesium supplementation.  I would increase your supplementation by an additional 400 mg of magnesium oxide daily.  Recheck magnesium level in 2 weeks  See 2 shows markedly elevated glucose and dehydration.  You really need to increase your water consumption significantly.  Alkaline phosphatase is elevated.  Please make sure that you are taking calcium and vitamin D regularly.  CBC is stable.

## 2024-12-19 NOTE — PROGRESS NOTES
Unna Boot Application   Below Knee    NAME:  Elda Flood  YOB: 1945  MEDICAL RECORD NUMBER:  [id number]  DATE:  12/19/2024    Unna boot: Applied moisturizing agent to dry skin as needed.   Appied primary and secondary dressing as ordered.  Applied Unna roll from toes to knee overlapping each time.   Applied ace wrap or coban from toes to below the knee.   Instructed patient/caregiver to keep dressing dry and intact. DO NOT REMOVE DRESSING.   Instructed pt/family/caregiver to report excessive draining, loose bandage, wet dressing, severe pain or tingling in toes.  Applied Unna Boot dressing below the knee to right lower leg.    Unna Boot(s) were applied per  Guidelines.     Electronically signed by Verenice Cornell RN on 12/19/2024 at 8:51 AM     
Care Centers please contact your PCP  or go to the nearest emergency room.    Electronically signed by Maurisio Parson MD on 12/19/2024 at 8:39 AM

## 2024-12-19 NOTE — PATIENT INSTRUCTIONS
[facility] Wound Care and Hyperbaric Oxygen Therapy   Physician Orders and Discharge Instructions  [address] [place] [address] Drive  [address]  [place] [address]  Telephone: [phone]      FAX [phone]    NAME:  Elda Flood  YOB: 1945  MEDICAL RECORD NUMBER:  [id number]  DATE:  12/19/2024    Discharge condition: Stable    Discharge to: Home    Left via:Private automobile    Accompanied by: Family    [facility] to change Coflex twice weekly     Dressing Orders: Right Lower Leg Wound  Xeroform to open areas  Calamine Coflex Unnaboot (absorbent layer between first and second layer) Keep clean and Dry  Elevate feet to level or above heart 3-4 times daily and as needed to for 30 minutes to reduce swelling  Remove wraps and call office if- Wraps get wet, Cause increased pain, Slide down or you are unable to make  your next scheduled appointment  Multi Vitamin, High Protein diet as tolerated    [facility] follow up visit __________2 weeks___________________  (Please note your next appointment above and if you are unable to keep, kindly give a 24 hour notice. Thank you.)    If you experience any of the following, please call the Wound Care Center during business hours:    * Increase in Pain  * Temperature over 101  * Increase in drainage from your wound  * Drainage with a foul odor  * Bleeding  * Increase in swelling  * Need for compression bandage changes due to slippage, breakthrough drainage.    If you need medical attention outside of the business hours of the Wound Care Centers please contact your PCP or go to the nearest emergency room.

## 2024-12-19 NOTE — TELEPHONE ENCOUNTER
I have attempted to contact this patient by phone with the following results: no answer.  No sg box

## 2024-12-19 NOTE — PROGRESS NOTES
Pt Name: Elda Flood  MRN: [id number]  YOB: 1945  Date of evaluation: 12/19/24    History Obtained From:  patient and electronic medical record    CHIEF COMPLAINT:    No chief complaint on file.    HISTORY OF PRESENT ILLNESS:  Elda Flood is a 79 y.o.  female presenting to the clinic in hospital follow-up to discuss serology completed for evaluation of leukocytosis and thrombocytosis as well as chronic anemia.  Elda had a traumatic hematoma to her right lower extremity with evacuation performed 8/15/2024 by vascular surgery.  She was on anticoagulation for history of atrial fibrillation.  She became anemic requiring transfusional support.  Leukocytosis felt reactive versus clonal process. Anemia from acute blood loss and thrombocytosis secondary to iron deficiency and infection.  Elda has recovered from COVID as well. She if fatigued, but overall feeling better. She has wound vac to the RLE.     HEMATOLOGY/ONCOLOGY HISTORY:  Longstanding neutrophil leukocytosis  Monocytosis  Iron deficiency anemia  Multifactorial anemia  Reactive thrombocytosis     HEMATOLOGY HISTORY - JAK2 positive MPN  Elda Flood was seen in initial hematology consultation 9/1/2024 during inpatient hospitalization at the request of vascular surgery regarding leukocytosis and thrombocytosis with left shift.   She is chronically ill with PMH significant for atrial fibrillation on chronic anticoagulation with Eliquis, T2DM, hyperlipidemia, hypertension, prior history of CVA.     Patient was hospitalized with traumatic hematoma of the right lower extremity.  Vascular was consulted and performed evacuation of right lower extremity hematoma on 8/15/2024.    Patient became severely anemic requiring transfusional support.  Patient was noted to have neutrophilic leukocytosis, fever and was placed on antibiotic therapy.  Patient tested positive for COVID-19 infection.  She received several course antibiotics include cefazolin,

## 2024-12-20 ENCOUNTER — CARE COORDINATION (OUTPATIENT)
Dept: CARE COORDINATION | Age: 79
End: 2024-12-20

## 2024-12-20 NOTE — CARE COORDINATION
Ambulatory Care Coordination Note     2024 5:12 PM     Patient Current Location:  Home: [address] [place] [address] [place] [address]     ACM contacted the patient by telephone. Verified name and  with patient as identifiers.         ACM: Tessie Rahman RN     Challenges to be reviewed by the provider   Additional needs identified to be addressed with provider Yes  medications-PT has stopped her Movantik, Welchol and is not taking her insulin at present.               Method of communication with provider: staff message.    Utilization: Patient has not had any utilization since our last call.     Care Summary Note: Spoke to patient.  She has not opened her RPM kit, doesn't check her blood pressure on her own. She is checking her morning glucose and stated it was 112 today. She has not been keeping a log but stated she has stopped her insulin use due to low fasting glucose. Patient stopped taking Welchol and Movantik 2/2 having diarrhea. Pt stated she is having a BM daily without difficulty and is pulling the meds from her packet.  HH visits continue for 2 more weeks. Patient had weekly wound appointment yesterday, stated her leg wound is healing and getting smaller. Patient stated her dressing is changed once at wound clinic and again per HH nurse during the week. Patient stated she feels well but does get tired after activity. Pt ambulates with her walker and will use her WC to move about for household cleaning. She stated [facility] checks her BP at visits and it's been ok but did not have specific numbers.  and wife share laundry duty which is working well. She is making up her bed now, feels she's getting stronger. Patient is getting lunch meal delivery M-. Pt cooked supper for first time last night by herself, is very pleased with her progress since discharge.  Advised pt that RPM kit needs to be activated with remote team. Encouraged her to open the kit on Monday during business hours and call the remote team to get

## 2024-12-27 ENCOUNTER — TELEPHONE (OUTPATIENT)
Dept: FAMILY MEDICINE CLINIC | Age: 79
End: 2024-12-27

## 2024-12-27 NOTE — TELEPHONE ENCOUNTER
Patient's daughter called requesting the results of her labs as mother seemed confused when she was telling her what these were.  Explained these to daughter she will get her on the magnesium and get this rechecked in 2 weeks.  She will also get her some Calcitrate with vitamin D and get her started on that.

## 2025-01-01 ENCOUNTER — APPOINTMENT (OUTPATIENT)
Dept: GENERAL RADIOLOGY | Facility: HOSPITAL | Age: 80
DRG: 871 | End: 2025-01-01
Payer: MEDICARE

## 2025-01-01 ENCOUNTER — APPOINTMENT (OUTPATIENT)
Dept: ULTRASOUND IMAGING | Facility: HOSPITAL | Age: 80
DRG: 871 | End: 2025-01-01
Payer: MEDICARE

## 2025-01-01 ENCOUNTER — HOSPITAL ENCOUNTER (OUTPATIENT)
Dept: INFUSION THERAPY | Age: 80
Setting detail: INFUSION SERIES
Discharge: HOME | End: 2025-03-19
Payer: MEDICARE

## 2025-01-01 ENCOUNTER — APPOINTMENT (OUTPATIENT)
Dept: CARDIOLOGY | Facility: HOSPITAL | Age: 80
DRG: 871 | End: 2025-01-01
Payer: MEDICARE

## 2025-01-01 ENCOUNTER — APPOINTMENT (OUTPATIENT)
Dept: OTHER | Facility: HOSPITAL | Age: 80
DRG: 871 | End: 2025-01-01
Payer: MEDICARE

## 2025-01-01 ENCOUNTER — APPOINTMENT (OUTPATIENT)
Dept: CT IMAGING | Facility: HOSPITAL | Age: 80
DRG: 871 | End: 2025-01-01
Payer: MEDICARE

## 2025-01-01 ENCOUNTER — HOSPITAL ENCOUNTER (INPATIENT)
Facility: HOSPITAL | Age: 80
LOS: 2 days | DRG: 871 | End: 2025-03-24
Attending: INTERNAL MEDICINE | Admitting: INTERNAL MEDICINE
Payer: MEDICARE

## 2025-01-01 VITALS
OXYGEN SATURATION: 100 % | HEART RATE: 74 BPM | RESPIRATION RATE: 18 BRPM | SYSTOLIC BLOOD PRESSURE: 131 MMHG | DIASTOLIC BLOOD PRESSURE: 65 MMHG | TEMPERATURE: 98.6 F

## 2025-01-01 DIAGNOSIS — A41.9 SEPTIC SHOCK: Primary | ICD-10-CM

## 2025-01-01 DIAGNOSIS — D50.9 IRON DEFICIENCY ANEMIA, UNSPECIFIED IRON DEFICIENCY ANEMIA TYPE: ICD-10-CM

## 2025-01-01 DIAGNOSIS — R65.21 SEPTIC SHOCK: Primary | ICD-10-CM

## 2025-01-01 DIAGNOSIS — K90.9 IRON MALABSORPTION: Primary | ICD-10-CM

## 2025-01-01 LAB
A-A DO2: ABNORMAL
ACANTHOCYTES BLD QL SMEAR: ABNORMAL
ALBUMIN SERPL-MCNC: 3.5 G/DL (ref 3.5–5.2)
ALBUMIN SERPL-MCNC: 4 G/DL (ref 3.5–5.2)
ALBUMIN SERPL-MCNC: 4 G/DL (ref 3.5–5.2)
ALBUMIN/GLOB SERPL: 1.3 G/DL
ALP SERPL-CCNC: 117 U/L (ref 39–117)
ALP SERPL-CCNC: 163 U/L (ref 39–117)
ALT SERPL W P-5'-P-CCNC: 100 U/L (ref 1–33)
ALT SERPL W P-5'-P-CCNC: 39 U/L (ref 1–33)
ANION GAP SERPL CALCULATED.3IONS-SCNC: 26 MMOL/L (ref 5–15)
ANION GAP SERPL CALCULATED.3IONS-SCNC: 27 MMOL/L (ref 5–15)
ANION GAP SERPL CALCULATED.3IONS-SCNC: 30 MMOL/L (ref 5–15)
ANION GAP SERPL CALCULATED.3IONS-SCNC: 30 MMOL/L (ref 5–15)
ANISOCYTOSIS BLD QL: ABNORMAL
ANISOCYTOSIS BLD QL: ABNORMAL
APTT PPP: 46.6 SECONDS (ref 24.5–36)
ARTERIAL PATENCY WRIST A: ABNORMAL
AST SERPL-CCNC: 179 U/L (ref 1–32)
AST SERPL-CCNC: 74 U/L (ref 1–32)
ATMOSPHERIC PRESS: 746 MMHG
ATMOSPHERIC PRESS: 747 MMHG
ATMOSPHERIC PRESS: 751 MMHG
ATMOSPHERIC PRESS: 751 MMHG
ATMOSPHERIC PRESS: 753 MMHG
ATMOSPHERIC PRESS: 754 MMHG
AV MEAN PRESS GRAD SYS DOP V1V2: 5.8 MMHG
AV VMAX SYS DOP: 170 CM/SEC
BASE EXCESS BLDA CALC-SCNC: -12.9 MMOL/L (ref 0–2)
BASE EXCESS BLDA CALC-SCNC: -13.3 MMOL/L (ref 0–2)
BASE EXCESS BLDA CALC-SCNC: -14.5 MMOL/L (ref 0–2)
BASE EXCESS BLDA CALC-SCNC: -15.4 MMOL/L (ref 0–2)
BASE EXCESS BLDA CALC-SCNC: -16.6 MMOL/L (ref 0–2)
BASE EXCESS BLDA CALC-SCNC: -17.9 MMOL/L (ref 0–2)
BASOPHILS # BLD MANUAL: 0 10*3/MM3 (ref 0–0.2)
BASOPHILS # BLD MANUAL: 0 10*3/MM3 (ref 0–0.2)
BASOPHILS NFR BLD MANUAL: 0 % (ref 0–1.5)
BASOPHILS NFR BLD MANUAL: 0 % (ref 0–1.5)
BDY SITE: ABNORMAL
BH CV ECHO MEAS - AO MAX PG: 11.7 MMHG
BH CV ECHO MEAS - AO ROOT DIAM: 3.3 CM
BH CV ECHO MEAS - AO V2 VTI: 23 CM
BH CV ECHO MEAS - EDV(CUBED): 26.5 ML
BH CV ECHO MEAS - EDV(MOD-SP2): 48.7 ML
BH CV ECHO MEAS - EDV(MOD-SP4): 43.2 ML
BH CV ECHO MEAS - EF(MOD-SP2): 71.1 %
BH CV ECHO MEAS - EF(MOD-SP4): 80.3 %
BH CV ECHO MEAS - ESV(CUBED): 8.6 ML
BH CV ECHO MEAS - ESV(MOD-SP2): 14.1 ML
BH CV ECHO MEAS - ESV(MOD-SP4): 8.5 ML
BH CV ECHO MEAS - FS: 31.2 %
BH CV ECHO MEAS - IVS/LVPW: 1.2 CM
BH CV ECHO MEAS - IVSD: 1.56 CM
BH CV ECHO MEAS - LA DIMENSION: 3.3 CM
BH CV ECHO MEAS - LAT PEAK E' VEL: 6.4 CM/SEC
BH CV ECHO MEAS - LV DIASTOLIC VOL/BSA (35-75): 22.6 CM2
BH CV ECHO MEAS - LV MASS(C)D: 143.6 GRAMS
BH CV ECHO MEAS - LV SYSTOLIC VOL/BSA (12-30): 4.5 CM2
BH CV ECHO MEAS - LVIDD: 3 CM
BH CV ECHO MEAS - LVIDS: 2.05 CM
BH CV ECHO MEAS - LVOT AREA: 2.21 CM2
BH CV ECHO MEAS - LVOT DIAM: 1.68 CM
BH CV ECHO MEAS - LVPWD: 1.3 CM
BH CV ECHO MEAS - MED PEAK E' VEL: 6.7 CM/SEC
BH CV ECHO MEAS - MV A MAX VEL: 133.8 CM/SEC
BH CV ECHO MEAS - MV DEC SLOPE: 357.1 CM/SEC2
BH CV ECHO MEAS - MV DEC TIME: 0.19 SEC
BH CV ECHO MEAS - MV E MAX VEL: 67.3 CM/SEC
BH CV ECHO MEAS - MV E/A: 0.5
BH CV ECHO MEAS - PA V2 MAX: 125.8 CM/SEC
BH CV ECHO MEAS - RAP SYSTOLE: 15 MMHG
BH CV ECHO MEAS - RVDD: 2.7 CM
BH CV ECHO MEAS - RVSP: 41.6 MMHG
BH CV ECHO MEAS - SV(MOD-SP2): 34.6 ML
BH CV ECHO MEAS - SV(MOD-SP4): 34.7 ML
BH CV ECHO MEAS - SVI(MOD-SP2): 18.1 ML/M2
BH CV ECHO MEAS - SVI(MOD-SP4): 18.2 ML/M2
BH CV ECHO MEAS - TAPSE (>1.6): 1.15 CM
BH CV ECHO MEAS - TR MAX PG: 26.6 MMHG
BH CV ECHO MEAS - TR MAX VEL: 257.2 CM/SEC
BH CV ECHO MEASUREMENTS AVERAGE E/E' RATIO: 10.27
BILIRUB CONJ SERPL-MCNC: 0.6 MG/DL (ref 0–0.3)
BILIRUB INDIRECT SERPL-MCNC: 0.2 MG/DL
BILIRUB SERPL-MCNC: 0.6 MG/DL (ref 0–1.2)
BILIRUB SERPL-MCNC: 0.8 MG/DL (ref 0–1.2)
BODY TEMPERATURE: 37
BUN SERPL-MCNC: 40 MG/DL (ref 8–23)
BUN SERPL-MCNC: 40 MG/DL (ref 8–23)
BUN SERPL-MCNC: 44 MG/DL (ref 8–23)
BUN SERPL-MCNC: 44 MG/DL (ref 8–23)
BUN/CREAT SERPL: 12.9 (ref 7–25)
BUN/CREAT SERPL: 13.2 (ref 7–25)
BUN/CREAT SERPL: 13.4 (ref 7–25)
BUN/CREAT SERPL: 13.4 (ref 7–25)
BURR CELLS BLD QL SMEAR: ABNORMAL
BURR CELLS BLD QL SMEAR: ABNORMAL
CA-I BLD-MCNC: 4.63 MG/DL (ref 4.6–5.4)
CA-I BLD-MCNC: 4.65 MG/DL (ref 4.6–5.4)
CA-I BLD-MCNC: 4.98 MG/DL (ref 4.6–5.4)
CALCIUM SPEC-SCNC: 9.2 MG/DL (ref 8.6–10.5)
CALCIUM SPEC-SCNC: 9.3 MG/DL (ref 8.6–10.5)
CALCIUM SPEC-SCNC: 9.6 MG/DL (ref 8.6–10.5)
CALCIUM SPEC-SCNC: 9.6 MG/DL (ref 8.6–10.5)
CHLORIDE SERPL-SCNC: 90 MMOL/L (ref 98–107)
CHLORIDE SERPL-SCNC: 90 MMOL/L (ref 98–107)
CHLORIDE SERPL-SCNC: 91 MMOL/L (ref 98–107)
CHLORIDE SERPL-SCNC: 91 MMOL/L (ref 98–107)
CK SERPL-CCNC: 27 U/L (ref 20–180)
CLUMPED PLATELETS: PRESENT
CO2 SERPL-SCNC: 12 MMOL/L (ref 22–29)
CO2 SERPL-SCNC: 9 MMOL/L (ref 22–29)
COHGB MFR BLD: 1.3 % (ref 0–5)
COHGB MFR BLD: 1.5 % (ref 0–5)
CPAP: ABNORMAL
CREAT SERPL-MCNC: 3.03 MG/DL (ref 0.57–1)
CREAT SERPL-MCNC: 3.09 MG/DL (ref 0.57–1)
CREAT SERPL-MCNC: 3.28 MG/DL (ref 0.57–1)
CREAT SERPL-MCNC: 3.28 MG/DL (ref 0.57–1)
D DIMER PPP FEU-MCNC: 15.75 MCGFEU/ML (ref 0–0.77)
D-LACTATE SERPL-SCNC: 13.6 MMOL/L (ref 0.5–2)
D-LACTATE SERPL-SCNC: 7.3 MMOL/L (ref 0.5–2)
D-LACTATE SERPL-SCNC: 8.6 MMOL/L (ref 0.5–2)
D-LACTATE SERPL-SCNC: 9 MMOL/L (ref 0.5–2)
D-LACTATE SERPL-SCNC: 9.6 MMOL/L (ref 0.5–2)
DEPRECATED RDW RBC AUTO: 60 FL (ref 37–54)
DEPRECATED RDW RBC AUTO: 60.5 FL (ref 37–54)
EGFRCR SERPLBLD CKD-EPI 2021: 14 ML/MIN/1.73
EGFRCR SERPLBLD CKD-EPI 2021: 14 ML/MIN/1.73
EGFRCR SERPLBLD CKD-EPI 2021: 15 ML/MIN/1.73
EGFRCR SERPLBLD CKD-EPI 2021: 15.4 ML/MIN/1.73
EOSINOPHIL # BLD MANUAL: 0 10*3/MM3 (ref 0–0.4)
EOSINOPHIL # BLD MANUAL: 0 10*3/MM3 (ref 0–0.4)
EOSINOPHIL NFR BLD MANUAL: 0 % (ref 0.3–6.2)
EOSINOPHIL NFR BLD MANUAL: 0 % (ref 0.3–6.2)
EPAP: 6
EPAP: ABNORMAL
ERYTHROCYTE [DISTWIDTH] IN BLOOD BY AUTOMATED COUNT: 15.5 % (ref 12.3–15.4)
ERYTHROCYTE [DISTWIDTH] IN BLOOD BY AUTOMATED COUNT: 15.7 % (ref 12.3–15.4)
ERYTHROCYTE [SEDIMENTATION RATE] IN BLOOD: 2 MM/HR (ref 0–30)
FIBRINOGEN PPP-MCNC: 299 MG/DL (ref 240–460)
FOLATE SERPL-MCNC: >20 NG/ML (ref 4.78–24.2)
FSP PPP LA-ACNC: ABNORMAL
GAS FLOW AIRWAY: 2 LPM
GAS FLOW AIRWAY: 3 LPM
GEN 5 1HR TROPONIN T REFLEX: 76 NG/L
GIANT PLATELETS: ABNORMAL
GIANT PLATELETS: ABNORMAL
GLOBULIN UR ELPH-MCNC: 2.8 GM/DL
GLUCOSE BLDA-MCNC: 217 MG/DL (ref 65–99)
GLUCOSE BLDC GLUCOMTR-MCNC: 182 MG/DL (ref 70–130)
GLUCOSE BLDC GLUCOMTR-MCNC: 207 MG/DL (ref 70–130)
GLUCOSE BLDC GLUCOMTR-MCNC: 223 MG/DL (ref 70–130)
GLUCOSE BLDC GLUCOMTR-MCNC: 242 MG/DL (ref 70–130)
GLUCOSE SERPL-MCNC: 161 MG/DL (ref 65–99)
GLUCOSE SERPL-MCNC: 213 MG/DL (ref 65–99)
GLUCOSE SERPL-MCNC: 215 MG/DL (ref 65–99)
GLUCOSE SERPL-MCNC: 215 MG/DL (ref 65–99)
HBA1C MFR BLD: 5.6 % (ref 4.8–5.6)
HCO3 BLDA-SCNC: 10.6 MMOL/L (ref 20–26)
HCO3 BLDA-SCNC: 11.6 MMOL/L (ref 20–26)
HCO3 BLDA-SCNC: 12.3 MMOL/L (ref 20–26)
HCO3 BLDA-SCNC: 12.7 MMOL/L (ref 20–26)
HCO3 BLDA-SCNC: 12.8 MMOL/L (ref 20–26)
HCO3 BLDA-SCNC: 9.7 MMOL/L (ref 20–26)
HCT VFR BLD AUTO: 25.8 % (ref 34–46.6)
HCT VFR BLD AUTO: 30.6 % (ref 34–46.6)
HCT VFR BLD CALC: 26.5 % (ref 38–51)
HCT VFR BLD CALC: 27.9 % (ref 38–51)
HCT VFR BLD CALC: 29 % (ref 38–51)
HCT VFR BLD CALC: 29.9 % (ref 38–51)
HCT VFR BLD CALC: 32 % (ref 38–51)
HGB BLD-MCNC: 8 G/DL (ref 12–15.9)
HGB BLD-MCNC: 9.7 G/DL (ref 12–15.9)
HGB BLDA-MCNC: 10.5 G/DL (ref 12–16)
HGB BLDA-MCNC: 8.6 G/DL (ref 12–16)
HGB BLDA-MCNC: 9.1 G/DL (ref 12–16)
HGB BLDA-MCNC: 9.4 G/DL (ref 12–16)
HGB BLDA-MCNC: 9.7 G/DL (ref 12–16)
HYPOCHROMIA BLD QL: ABNORMAL
INHALED O2 CONCENTRATION: 30 %
INHALED O2 CONCENTRATION: ABNORMAL %
INR PPP: 7 (ref 0.91–1.09)
IPAP: 12
IPAP: ABNORMAL
LACTATE BLDA-SCNC: 7.7 MMOL/L (ref 0.5–2)
LYMPHOCYTES # BLD MANUAL: 2.26 10*3/MM3 (ref 0.7–3.1)
LYMPHOCYTES # BLD MANUAL: 2.56 10*3/MM3 (ref 0.7–3.1)
LYMPHOCYTES NFR BLD MANUAL: 20.2 % (ref 5–12)
LYMPHOCYTES NFR BLD MANUAL: 26.9 % (ref 5–12)
Lab: ABNORMAL
Lab: NORMAL
Lab: NORMAL
MACROCYTES BLD QL SMEAR: ABNORMAL
MACROCYTES BLD QL SMEAR: ABNORMAL
MAGNESIUM SERPL-MCNC: 2 MG/DL (ref 1.6–2.4)
MAGNESIUM SERPL-MCNC: 2.1 MG/DL (ref 1.6–2.4)
MAGNESIUM SERPL-MCNC: 2.2 MG/DL (ref 1.6–2.4)
MCH RBC QN AUTO: 32.9 PG (ref 26.6–33)
MCH RBC QN AUTO: 33.4 PG (ref 26.6–33)
MCHC RBC AUTO-ENTMCNC: 31 G/DL (ref 31.5–35.7)
MCHC RBC AUTO-ENTMCNC: 31.7 G/DL (ref 31.5–35.7)
MCV RBC AUTO: 105.5 FL (ref 79–97)
MCV RBC AUTO: 106.2 FL (ref 79–97)
METHGB BLD QL: 0.2 % (ref 0–3)
METHGB BLD QL: 0.2 % (ref 0–3)
METHGB BLD QL: 0.3 % (ref 0–3)
METHGB BLD QL: 0.4 % (ref 0–3)
METHGB BLD QL: 0.8 % (ref 0–3)
MODALITY: ABNORMAL
MONOCYTES # BLD: 10.26 10*3/MM3 (ref 0.1–0.9)
MONOCYTES # BLD: 6.44 10*3/MM3 (ref 0.1–0.9)
NEUTROPHILS # BLD AUTO: 23.19 10*3/MM3 (ref 1.7–7)
NEUTROPHILS # BLD AUTO: 25.31 10*3/MM3 (ref 1.7–7)
NEUTROPHILS NFR BLD MANUAL: 58.7 % (ref 42.7–76)
NEUTROPHILS NFR BLD MANUAL: 69.7 % (ref 42.7–76)
NEUTS BAND NFR BLD MANUAL: 3 % (ref 0–5)
NEUTS BAND NFR BLD MANUAL: 7.7 % (ref 0–5)
NITRIC OXIDE: ABNORMAL
NOTE: ABNORMAL
NOTIFIED BY: ABNORMAL
NOTIFIED WHO: ABNORMAL
NRBC SPEC MANUAL: 1 /100 WBC (ref 0–0.2)
OXYHGB MFR BLDV: 91.7 % (ref 94–99)
OXYHGB MFR BLDV: 91.8 % (ref 94–99)
OXYHGB MFR BLDV: 92.7 % (ref 94–99)
OXYHGB MFR BLDV: 93.6 % (ref 94–99)
OXYHGB MFR BLDV: 95.1 % (ref 94–99)
PAW @ PEAK INSP FLOW SETTING VENT: ABNORMAL CM[H2O]
PCO2 BLDA: 28.4 MM HG (ref 35–45)
PCO2 BLDA: 28.6 MM HG (ref 35–45)
PCO2 BLDA: 29.2 MM HG (ref 35–45)
PCO2 BLDA: 29.7 MM HG (ref 35–45)
PCO2 BLDA: 31.1 MM HG (ref 35–45)
PCO2 BLDA: 31.7 MM HG (ref 35–45)
PCO2 TEMP ADJ BLD: 28.4 MM HG (ref 35–45)
PCO2 TEMP ADJ BLD: 28.6 MM HG (ref 35–45)
PCO2 TEMP ADJ BLD: 29.2 MM HG (ref 35–45)
PCO2 TEMP ADJ BLD: 29.7 MM HG (ref 35–45)
PCO2 TEMP ADJ BLD: 31.1 MM HG (ref 35–45)
PCO2 TEMP ADJ BLD: 31.7 MM HG (ref 35–45)
PEEP RESPIRATORY: ABNORMAL CM[H2O]
PH BLDA: 7.14 PH UNITS (ref 7.35–7.45)
PH BLDA: 7.16 PH UNITS (ref 7.35–7.45)
PH BLDA: 7.18 PH UNITS (ref 7.35–7.45)
PH BLDA: 7.2 PH UNITS (ref 7.35–7.45)
PH BLDA: 7.25 PH UNITS (ref 7.35–7.45)
PH BLDA: 7.26 PH UNITS (ref 7.35–7.45)
PH, TEMP CORRECTED: 7.14 PH UNITS (ref 7.35–7.45)
PH, TEMP CORRECTED: 7.16 PH UNITS (ref 7.35–7.45)
PH, TEMP CORRECTED: 7.18 PH UNITS (ref 7.35–7.45)
PH, TEMP CORRECTED: 7.2 PH UNITS (ref 7.35–7.45)
PH, TEMP CORRECTED: 7.25 PH UNITS (ref 7.35–7.45)
PH, TEMP CORRECTED: 7.26 PH UNITS (ref 7.35–7.45)
PHOSPHATE SERPL-MCNC: 7.1 MG/DL (ref 2.5–4.5)
PHOSPHATE SERPL-MCNC: 7.1 MG/DL (ref 2.5–4.5)
PHOSPHATE SERPL-MCNC: 7.2 MG/DL (ref 2.5–4.5)
PLATELET # BLD AUTO: 312 10*3/MM3 (ref 140–450)
PLATELET # BLD AUTO: 460 10*3/MM3 (ref 140–450)
PMV BLD AUTO: 12.2 FL (ref 6–12)
PMV BLD AUTO: 12.5 FL (ref 6–12)
PO2 BLDA: 78.7 MM HG (ref 83–108)
PO2 BLDA: 82.5 MM HG (ref 83–108)
PO2 BLDA: 83.1 MM HG (ref 83–108)
PO2 BLDA: 85.5 MM HG (ref 83–108)
PO2 BLDA: 91 MM HG (ref 83–108)
PO2 BLDA: ABNORMAL MM[HG]
PO2 TEMP ADJ BLD: 78.7 MM HG (ref 83–108)
PO2 TEMP ADJ BLD: 79.8 MM HG (ref 83–108)
PO2 TEMP ADJ BLD: 82.5 MM HG (ref 83–108)
PO2 TEMP ADJ BLD: 83.1 MM HG (ref 83–108)
PO2 TEMP ADJ BLD: 85.5 MM HG (ref 83–108)
PO2 TEMP ADJ BLD: 91 MM HG (ref 83–108)
POIKILOCYTOSIS BLD QL SMEAR: ABNORMAL
POIKILOCYTOSIS BLD QL SMEAR: ABNORMAL
POLYCHROMASIA BLD QL SMEAR: ABNORMAL
POLYCHROMASIA BLD QL SMEAR: ABNORMAL
POTASSIUM BLDA-SCNC: 5.2 MMOL/L (ref 3.5–5.2)
POTASSIUM BLDA-SCNC: 5.3 MMOL/L (ref 3.5–5.2)
POTASSIUM BLDA-SCNC: 5.4 MMOL/L (ref 3.5–5.2)
POTASSIUM BLDA-SCNC: 5.4 MMOL/L (ref 3.5–5.2)
POTASSIUM BLDA-SCNC: 6 MMOL/L (ref 3.5–5.2)
POTASSIUM SERPL-SCNC: 5.5 MMOL/L (ref 3.5–5.2)
POTASSIUM SERPL-SCNC: 5.6 MMOL/L (ref 3.5–5.2)
POTASSIUM SERPL-SCNC: 6.3 MMOL/L (ref 3.5–5.2)
PROCALCITONIN SERPL-MCNC: 1.25 NG/ML (ref 0–0.25)
PROT SERPL-MCNC: 6 G/DL (ref 6–8.5)
PROT SERPL-MCNC: 6.3 G/DL (ref 6–8.5)
PROTHROMBIN TIME: 65 SECONDS (ref 11.8–14.8)
PSV: ABNORMAL
PULSE OX: ABNORMAL
RBC # BLD AUTO: 2.43 10*6/MM3 (ref 3.77–5.28)
RBC # BLD AUTO: 2.9 10*6/MM3 (ref 3.77–5.28)
SAO2 % BLDCOA: 93.4 % (ref 94–99)
SAO2 % BLDCOA: 94 % (ref 94–99)
SAO2 % BLDCOA: 94.3 % (ref 94–99)
SAO2 % BLDCOA: 95 % (ref 94–99)
SAO2 % BLDCOA: 95.3 % (ref 94–99)
SAO2 % BLDCOA: 96.5 % (ref 94–99)
SET MECH RESP RATE: 16
SET MECH RESP RATE: ABNORMAL
SODIUM BLDA-SCNC: 125 MMOL/L (ref 136–145)
SODIUM BLDA-SCNC: 128 MMOL/L (ref 136–145)
SODIUM BLDA-SCNC: 128 MMOL/L (ref 136–145)
SODIUM BLDA-SCNC: 131 MMOL/L (ref 136–145)
SODIUM BLDA-SCNC: 131 MMOL/L (ref 136–145)
SODIUM SERPL-SCNC: 126 MMOL/L (ref 136–145)
SODIUM SERPL-SCNC: 130 MMOL/L (ref 136–145)
SODIUM SERPL-SCNC: 132 MMOL/L (ref 136–145)
SODIUM SERPL-SCNC: 132 MMOL/L (ref 136–145)
T-UPTAKE NFR SERPL: 0.8 TBI (ref 0.8–1.3)
T4 SERPL-MCNC: 5.4 MCG/DL (ref 4.5–11.7)
TARGETS BLD QL SMEAR: ABNORMAL
TOTAL RATE: ABNORMAL
TROPONIN T % DELTA: -7
TROPONIN T NUMERIC DELTA: -6 NG/L
TROPONIN T SERPL HS-MCNC: 82 NG/L
TSH SERPL DL<=0.05 MIU/L-ACNC: 4.32 UIU/ML (ref 0.27–4.2)
UFH PPP CHRO-ACNC: >1.1 IU/ML (ref 0.3–0.7)
VARIANT LYMPHS NFR BLD MANUAL: 1.9 % (ref 0–5)
VARIANT LYMPHS NFR BLD MANUAL: 4.8 % (ref 19.6–45.3)
VARIANT LYMPHS NFR BLD MANUAL: 7.1 % (ref 19.6–45.3)
VENTILATOR MODE: ABNORMAL
VIT B12 BLD-MCNC: >2000 PG/ML (ref 211–946)
VT ON VENT VENT: ABNORMAL ML
WBC MORPH BLD: NORMAL
WBC MORPH BLD: NORMAL
WBC NRBC COR # BLD AUTO: 31.88 10*3/MM3 (ref 3.4–10.8)
WBC NRBC COR # BLD AUTO: 38.15 10*3/MM3 (ref 3.4–10.8)

## 2025-01-01 PROCEDURE — 93306 TTE W/DOPPLER COMPLETE: CPT

## 2025-01-01 PROCEDURE — 85007 BL SMEAR W/DIFF WBC COUNT: CPT | Performed by: PHYSICIAN ASSISTANT

## 2025-01-01 PROCEDURE — P9047 ALBUMIN (HUMAN), 25%, 50ML: HCPCS | Performed by: PHYSICIAN ASSISTANT

## 2025-01-01 PROCEDURE — 25010000002 EPINEPHRINE 1 MG/ML SOLUTION 30 ML VIAL: Performed by: PHYSICIAN ASSISTANT

## 2025-01-01 PROCEDURE — 84132 ASSAY OF SERUM POTASSIUM: CPT | Performed by: PHYSICIAN ASSISTANT

## 2025-01-01 PROCEDURE — 83605 ASSAY OF LACTIC ACID: CPT | Performed by: PHYSICIAN ASSISTANT

## 2025-01-01 PROCEDURE — 84100 ASSAY OF PHOSPHORUS: CPT | Performed by: PHYSICIAN ASSISTANT

## 2025-01-01 PROCEDURE — 73551 X-RAY EXAM OF FEMUR 1: CPT

## 2025-01-01 PROCEDURE — 82947 ASSAY GLUCOSE BLOOD QUANT: CPT

## 2025-01-01 PROCEDURE — 84132 ASSAY OF SERUM POTASSIUM: CPT

## 2025-01-01 PROCEDURE — 84484 ASSAY OF TROPONIN QUANT: CPT | Performed by: PHYSICIAN ASSISTANT

## 2025-01-01 PROCEDURE — 82805 BLOOD GASES W/O2 SATURATION: CPT

## 2025-01-01 PROCEDURE — 25010000002 ALBUMIN HUMAN 25% PER 50 ML: Performed by: PHYSICIAN ASSISTANT

## 2025-01-01 PROCEDURE — 84436 ASSAY OF TOTAL THYROXINE: CPT | Performed by: INTERNAL MEDICINE

## 2025-01-01 PROCEDURE — 72192 CT PELVIS W/O DYE: CPT

## 2025-01-01 PROCEDURE — 82607 VITAMIN B-12: CPT | Performed by: INTERNAL MEDICINE

## 2025-01-01 PROCEDURE — 80053 COMPREHEN METABOLIC PANEL: CPT | Performed by: PHYSICIAN ASSISTANT

## 2025-01-01 PROCEDURE — 85025 COMPLETE CBC W/AUTO DIFF WBC: CPT | Performed by: PHYSICIAN ASSISTANT

## 2025-01-01 PROCEDURE — 25010000002 LORAZEPAM PER 2 MG: Performed by: INTERNAL MEDICINE

## 2025-01-01 PROCEDURE — 83735 ASSAY OF MAGNESIUM: CPT | Performed by: PHYSICIAN ASSISTANT

## 2025-01-01 PROCEDURE — 82746 ASSAY OF FOLIC ACID SERUM: CPT | Performed by: INTERNAL MEDICINE

## 2025-01-01 PROCEDURE — 85610 PROTHROMBIN TIME: CPT | Performed by: INTERNAL MEDICINE

## 2025-01-01 PROCEDURE — 96365 THER/PROPH/DIAG IV INF INIT: CPT

## 2025-01-01 PROCEDURE — 25010000002 LEVETRIRACETAM PER 10 MG: Performed by: PHYSICIAN ASSISTANT

## 2025-01-01 PROCEDURE — 83050 HGB METHEMOGLOBIN QUAN: CPT

## 2025-01-01 PROCEDURE — 82948 REAGENT STRIP/BLOOD GLUCOSE: CPT

## 2025-01-01 PROCEDURE — 83036 HEMOGLOBIN GLYCOSYLATED A1C: CPT | Performed by: PHYSICIAN ASSISTANT

## 2025-01-01 PROCEDURE — 25010000003 VASOPRESSIN 0.2 UNITS/ML SOLUTION: Performed by: PHYSICIAN ASSISTANT

## 2025-01-01 PROCEDURE — 99223 1ST HOSP IP/OBS HIGH 75: CPT | Performed by: SURGERY

## 2025-01-01 PROCEDURE — 87040 BLOOD CULTURE FOR BACTERIA: CPT | Performed by: PHYSICIAN ASSISTANT

## 2025-01-01 PROCEDURE — 25010000002 MORPHINE PER 10 MG: Performed by: INTERNAL MEDICINE

## 2025-01-01 PROCEDURE — 99222 1ST HOSP IP/OBS MODERATE 55: CPT | Performed by: INTERNAL MEDICINE

## 2025-01-01 PROCEDURE — 83921 ORGANIC ACID SINGLE QUANT: CPT | Performed by: INTERNAL MEDICINE

## 2025-01-01 PROCEDURE — 85652 RBC SED RATE AUTOMATED: CPT | Performed by: INTERNAL MEDICINE

## 2025-01-01 PROCEDURE — 84295 ASSAY OF SERUM SODIUM: CPT

## 2025-01-01 PROCEDURE — 94799 UNLISTED PULMONARY SVC/PX: CPT

## 2025-01-01 PROCEDURE — 84479 ASSAY OF THYROID (T3 OR T4): CPT | Performed by: INTERNAL MEDICINE

## 2025-01-01 PROCEDURE — 85060 BLOOD SMEAR INTERPRETATION: CPT | Performed by: PHYSICIAN ASSISTANT

## 2025-01-01 PROCEDURE — 82330 ASSAY OF CALCIUM: CPT

## 2025-01-01 PROCEDURE — 85384 FIBRINOGEN ACTIVITY: CPT | Performed by: INTERNAL MEDICINE

## 2025-01-01 PROCEDURE — 94660 CPAP INITIATION&MGMT: CPT

## 2025-01-01 PROCEDURE — 85730 THROMBOPLASTIN TIME PARTIAL: CPT | Performed by: INTERNAL MEDICINE

## 2025-01-01 PROCEDURE — 76775 US EXAM ABDO BACK WALL LIM: CPT

## 2025-01-01 PROCEDURE — 93306 TTE W/DOPPLER COMPLETE: CPT | Performed by: INTERNAL MEDICINE

## 2025-01-01 PROCEDURE — 83605 ASSAY OF LACTIC ACID: CPT

## 2025-01-01 PROCEDURE — 25810000003 SODIUM CHLORIDE 0.9 % SOLUTION: Performed by: PHYSICIAN ASSISTANT

## 2025-01-01 PROCEDURE — 82375 ASSAY CARBOXYHB QUANT: CPT

## 2025-01-01 PROCEDURE — 85362 FIBRIN DEGRADATION PRODUCTS: CPT | Performed by: INTERNAL MEDICINE

## 2025-01-01 PROCEDURE — 25510000001 PERFLUTREN 6.52 MG/ML SUSPENSION: Performed by: INTERNAL MEDICINE

## 2025-01-01 PROCEDURE — 73700 CT LOWER EXTREMITY W/O DYE: CPT

## 2025-01-01 PROCEDURE — 84145 PROCALCITONIN (PCT): CPT | Performed by: PHYSICIAN ASSISTANT

## 2025-01-01 PROCEDURE — 25010000002 HYDROCORTISONE SOD SUC (PF) 100 MG RECONSTITUTED SOLUTION: Performed by: PHYSICIAN ASSISTANT

## 2025-01-01 PROCEDURE — 82550 ASSAY OF CK (CPK): CPT | Performed by: PHYSICIAN ASSISTANT

## 2025-01-01 PROCEDURE — 82248 BILIRUBIN DIRECT: CPT | Performed by: PHYSICIAN ASSISTANT

## 2025-01-01 PROCEDURE — 25010000002 CEFEPIME PER 500 MG: Performed by: PHYSICIAN ASSISTANT

## 2025-01-01 PROCEDURE — 03HY32Z INSERTION OF MONITORING DEVICE INTO UPPER ARTERY, PERCUTANEOUS APPROACH: ICD-10-PCS | Performed by: INTERNAL MEDICINE

## 2025-01-01 PROCEDURE — 85379 FIBRIN DEGRADATION QUANT: CPT | Performed by: INTERNAL MEDICINE

## 2025-01-01 PROCEDURE — 82803 BLOOD GASES ANY COMBINATION: CPT

## 2025-01-01 PROCEDURE — 85520 HEPARIN ASSAY: CPT | Performed by: INTERNAL MEDICINE

## 2025-01-01 PROCEDURE — 25010000002 PIPERACILLIN SOD-TAZOBACTAM PER 1 G: Performed by: INTERNAL MEDICINE

## 2025-01-01 PROCEDURE — 25010000003 DEXTROSE 5 % SOLUTION 1,000 ML FLEX CONT: Performed by: INTERNAL MEDICINE

## 2025-01-01 PROCEDURE — 36600 WITHDRAWAL OF ARTERIAL BLOOD: CPT

## 2025-01-01 PROCEDURE — 84443 ASSAY THYROID STIM HORMONE: CPT | Performed by: INTERNAL MEDICINE

## 2025-01-01 PROCEDURE — 25010000002 DOBUTAMINE PER 250 MG: Performed by: PHYSICIAN ASSISTANT

## 2025-01-01 PROCEDURE — 63710000001 INSULIN REGULAR HUMAN PER 5 UNITS: Performed by: PHYSICIAN ASSISTANT

## 2025-01-01 PROCEDURE — 25010000002 VANCOMYCIN 2-0.9 GM/500ML-% SOLUTION: Performed by: PHYSICIAN ASSISTANT

## 2025-01-01 PROCEDURE — 93005 ELECTROCARDIOGRAM TRACING: CPT | Performed by: PHYSICIAN ASSISTANT

## 2025-01-01 PROCEDURE — 85018 HEMOGLOBIN: CPT

## 2025-01-01 PROCEDURE — 71045 X-RAY EXAM CHEST 1 VIEW: CPT

## 2025-01-01 PROCEDURE — 74018 RADEX ABDOMEN 1 VIEW: CPT

## 2025-01-01 PROCEDURE — 25810000003 LACTATED RINGERS SOLUTION: Performed by: PHYSICIAN ASSISTANT

## 2025-01-01 PROCEDURE — 25010000003 DEXTROSE 5 % SOLUTION 1,000 ML FLEX CONT: Performed by: PHYSICIAN ASSISTANT

## 2025-01-01 PROCEDURE — 93010 ELECTROCARDIOGRAM REPORT: CPT | Performed by: INTERNAL MEDICINE

## 2025-01-01 PROCEDURE — 6360000002 HC RX W HCPCS: Performed by: NURSE PRACTITIONER

## 2025-01-01 PROCEDURE — 2580000003 HC RX 258: Performed by: NURSE PRACTITIONER

## 2025-01-01 PROCEDURE — 25010000002 KETOROLAC TROMETHAMINE PER 15 MG: Performed by: INTERNAL MEDICINE

## 2025-01-01 RX ORDER — OXYCODONE HYDROCHLORIDE 5 MG/1
5 TABLET ORAL EVERY 6 HOURS PRN
COMMUNITY

## 2025-01-01 RX ORDER — ACETAMINOPHEN 650 MG/1
650 SUPPOSITORY RECTAL EVERY 4 HOURS PRN
Status: DISCONTINUED | OUTPATIENT
Start: 2025-01-01 | End: 2025-01-01 | Stop reason: SDUPTHER

## 2025-01-01 RX ORDER — MORPHINE SULFATE 20 MG/ML
5 SOLUTION ORAL
Refills: 0 | Status: DISCONTINUED | OUTPATIENT
Start: 2025-01-01 | End: 2025-01-01

## 2025-01-01 RX ORDER — FUROSEMIDE 40 MG/1
40 TABLET ORAL DAILY
COMMUNITY

## 2025-01-01 RX ORDER — NICOTINE POLACRILEX 4 MG
15 LOZENGE BUCCAL
Status: DISCONTINUED | OUTPATIENT
Start: 2025-01-01 | End: 2025-03-24 | Stop reason: HOSPADM

## 2025-01-01 RX ORDER — HEPARIN SODIUM 1000 [USP'U]/ML
INJECTION, SOLUTION INTRAVENOUS; SUBCUTANEOUS AS NEEDED
Status: DISCONTINUED | OUTPATIENT
Start: 2025-01-01 | End: 2025-01-01

## 2025-01-01 RX ORDER — ACETAMINOPHEN 325 MG/1
650 TABLET ORAL EVERY 4 HOURS PRN
Status: DISCONTINUED | OUTPATIENT
Start: 2025-01-01 | End: 2025-03-24 | Stop reason: HOSPADM

## 2025-01-01 RX ORDER — OXYCODONE HCL 10 MG/1
10 TABLET, FILM COATED, EXTENDED RELEASE ORAL EVERY 12 HOURS
Status: ON HOLD | COMMUNITY
End: 2025-01-01

## 2025-01-01 RX ORDER — MORPHINE SULFATE 2 MG/ML
2 INJECTION, SOLUTION INTRAMUSCULAR; INTRAVENOUS
Status: DISCONTINUED | OUTPATIENT
Start: 2025-01-01 | End: 2025-03-24 | Stop reason: HOSPADM

## 2025-01-01 RX ORDER — HYDROCORTISONE SODIUM SUCCINATE 100 MG/2ML
50 INJECTION INTRAMUSCULAR; INTRAVENOUS EVERY 6 HOURS
Status: DISCONTINUED | OUTPATIENT
Start: 2025-01-01 | End: 2025-01-01

## 2025-01-01 RX ORDER — BISACODYL 5 MG/1
5 TABLET, DELAYED RELEASE ORAL DAILY PRN
Status: DISCONTINUED | OUTPATIENT
Start: 2025-01-01 | End: 2025-01-01

## 2025-01-01 RX ORDER — KETOROLAC TROMETHAMINE 15 MG/ML
15 INJECTION, SOLUTION INTRAMUSCULAR; INTRAVENOUS EVERY 6 HOURS PRN
Status: DISCONTINUED | OUTPATIENT
Start: 2025-01-01 | End: 2025-03-24 | Stop reason: HOSPADM

## 2025-01-01 RX ORDER — POLYETHYLENE GLYCOL 3350 17 G/17G
17 POWDER, FOR SOLUTION ORAL DAILY PRN
Status: DISCONTINUED | OUTPATIENT
Start: 2025-01-01 | End: 2025-01-01

## 2025-01-01 RX ORDER — LORAZEPAM 1 MG/1
2 TABLET ORAL
Status: DISCONTINUED | OUTPATIENT
Start: 2025-01-01 | End: 2025-01-01

## 2025-01-01 RX ORDER — LORAZEPAM 2 MG/ML
2 INJECTION INTRAMUSCULAR
Status: DISCONTINUED | OUTPATIENT
Start: 2025-01-01 | End: 2025-01-01

## 2025-01-01 RX ORDER — FOLIC ACID 1 MG/1
1 TABLET ORAL DAILY
Status: ON HOLD | COMMUNITY
End: 2025-01-01

## 2025-01-01 RX ORDER — SODIUM CHLORIDE 9 MG/ML
5-250 INJECTION, SOLUTION INTRAVENOUS PRN
Status: CANCELLED | OUTPATIENT
Start: 2025-03-24

## 2025-01-01 RX ORDER — MORPHINE SULFATE 2 MG/ML
1 INJECTION, SOLUTION INTRAMUSCULAR; INTRAVENOUS
Status: DISCONTINUED | OUTPATIENT
Start: 2025-01-01 | End: 2025-01-01

## 2025-01-01 RX ORDER — ACETAMINOPHEN 650 MG/1
650 SUPPOSITORY RECTAL EVERY 4 HOURS PRN
Status: DISCONTINUED | OUTPATIENT
Start: 2025-01-01 | End: 2025-03-24 | Stop reason: HOSPADM

## 2025-01-01 RX ORDER — CELECOXIB 100 MG/1
100 CAPSULE ORAL 2 TIMES DAILY
COMMUNITY

## 2025-01-01 RX ORDER — INSULIN GLARGINE 100 [IU]/ML
15 INJECTION, SOLUTION SUBCUTANEOUS 2 TIMES DAILY
COMMUNITY

## 2025-01-01 RX ORDER — DIPHENHYDRAMINE HYDROCHLORIDE 50 MG/ML
50 INJECTION, SOLUTION INTRAMUSCULAR; INTRAVENOUS
Status: CANCELLED | OUTPATIENT
Start: 2025-03-24

## 2025-01-01 RX ORDER — PREDNISONE 20 MG/1
20 TABLET ORAL 2 TIMES DAILY
Status: ON HOLD | COMMUNITY
End: 2025-01-01

## 2025-01-01 RX ORDER — ALBUTEROL SULFATE 90 UG/1
4 INHALANT RESPIRATORY (INHALATION) PRN
Status: CANCELLED | OUTPATIENT
Start: 2025-03-24

## 2025-01-01 RX ORDER — ACETAMINOPHEN 325 MG/1
650 TABLET ORAL
Status: CANCELLED | OUTPATIENT
Start: 2025-03-24

## 2025-01-01 RX ORDER — INSULIN LISPRO 100 [IU]/ML
0-8 INJECTION, SOLUTION INTRAVENOUS; SUBCUTANEOUS
COMMUNITY

## 2025-01-01 RX ORDER — PANTOPRAZOLE SODIUM 40 MG/10ML
40 INJECTION, POWDER, LYOPHILIZED, FOR SOLUTION INTRAVENOUS
Status: DISCONTINUED | OUTPATIENT
Start: 2025-01-01 | End: 2025-01-01

## 2025-01-01 RX ORDER — ACETAMINOPHEN 500 MG
500 TABLET ORAL 2 TIMES DAILY
COMMUNITY

## 2025-01-01 RX ORDER — HYDROCORTISONE SODIUM SUCCINATE 100 MG/2ML
100 INJECTION INTRAMUSCULAR; INTRAVENOUS
Status: CANCELLED | OUTPATIENT
Start: 2025-03-24

## 2025-01-01 RX ORDER — LORAZEPAM 2 MG/ML
1 CONCENTRATE ORAL
Status: DISCONTINUED | OUTPATIENT
Start: 2025-01-01 | End: 2025-01-01

## 2025-01-01 RX ORDER — LORAZEPAM 2 MG/ML
0.5 INJECTION INTRAMUSCULAR
Status: DISCONTINUED | OUTPATIENT
Start: 2025-01-01 | End: 2025-03-24 | Stop reason: HOSPADM

## 2025-01-01 RX ORDER — LORAZEPAM 2 MG/ML
0.5 CONCENTRATE ORAL
Status: DISCONTINUED | OUTPATIENT
Start: 2025-01-01 | End: 2025-03-24 | Stop reason: HOSPADM

## 2025-01-01 RX ORDER — LORAZEPAM 2 MG/ML
2 CONCENTRATE ORAL
Status: DISCONTINUED | OUTPATIENT
Start: 2025-01-01 | End: 2025-01-01

## 2025-01-01 RX ORDER — DEXTROSE MONOHYDRATE 25 G/50ML
50 INJECTION, SOLUTION INTRAVENOUS ONCE
Status: COMPLETED | OUTPATIENT
Start: 2025-01-01 | End: 2025-01-01

## 2025-01-01 RX ORDER — ACETAMINOPHEN 325 MG/1
650 TABLET ORAL EVERY 4 HOURS PRN
Status: DISCONTINUED | OUTPATIENT
Start: 2025-01-01 | End: 2025-01-01 | Stop reason: SDUPTHER

## 2025-01-01 RX ORDER — SODIUM CHLORIDE 9 MG/ML
INJECTION, SOLUTION INTRAVENOUS CONTINUOUS
Status: CANCELLED | OUTPATIENT
Start: 2025-03-24

## 2025-01-01 RX ORDER — DOBUTAMINE HYDROCHLORIDE 100 MG/100ML
2-20 INJECTION INTRAVENOUS
Status: DISCONTINUED | OUTPATIENT
Start: 2025-01-01 | End: 2025-01-01

## 2025-01-01 RX ORDER — LEVOTHYROXINE SODIUM 50 UG/1
50 TABLET ORAL
COMMUNITY

## 2025-01-01 RX ORDER — LEVETIRACETAM 500 MG/5ML
500 INJECTION, SOLUTION, CONCENTRATE INTRAVENOUS EVERY 12 HOURS SCHEDULED
Status: DISCONTINUED | OUTPATIENT
Start: 2025-01-01 | End: 2025-03-24 | Stop reason: HOSPADM

## 2025-01-01 RX ORDER — SODIUM CHLORIDE 9 MG/ML
5-250 INJECTION, SOLUTION INTRAVENOUS PRN
Status: DISCONTINUED | OUTPATIENT
Start: 2025-01-01 | End: 2025-01-01 | Stop reason: HOSPADM

## 2025-01-01 RX ORDER — SODIUM CHLORIDE 0.9 % (FLUSH) 0.9 %
10 SYRINGE (ML) INJECTION AS NEEDED
Status: DISCONTINUED | OUTPATIENT
Start: 2025-01-01 | End: 2025-01-01

## 2025-01-01 RX ORDER — INDOMETHACIN 25 MG/1
50 CAPSULE ORAL ONCE
Status: COMPLETED | OUTPATIENT
Start: 2025-01-01 | End: 2025-01-01

## 2025-01-01 RX ORDER — MORPHINE SULFATE 20 MG/ML
5 SOLUTION ORAL
Refills: 0 | Status: DISCONTINUED | OUTPATIENT
Start: 2025-01-01 | End: 2025-03-24 | Stop reason: HOSPADM

## 2025-01-01 RX ORDER — LORAZEPAM 1 MG/1
0.5 TABLET ORAL
Status: DISCONTINUED | OUTPATIENT
Start: 2025-01-01 | End: 2025-03-24 | Stop reason: HOSPADM

## 2025-01-01 RX ORDER — ALBUTEROL SULFATE 90 UG/1
2-4 INHALANT RESPIRATORY (INHALATION) EVERY 4 HOURS PRN
COMMUNITY

## 2025-01-01 RX ORDER — PREGABALIN 50 MG/1
50 CAPSULE ORAL 2 TIMES DAILY
COMMUNITY

## 2025-01-01 RX ORDER — LORAZEPAM 2 MG/ML
1 INJECTION INTRAMUSCULAR
Status: DISCONTINUED | OUTPATIENT
Start: 2025-01-01 | End: 2025-01-01

## 2025-01-01 RX ORDER — CALCIUM CHLORIDE, MAGNESIUM CHLORIDE, DEXTROSE MONOHYDRATE, LACTIC ACID, SODIUM CHLORIDE, SODIUM BICARBONATE AND POTASSIUM CHLORIDE 5.15; 2.03; 22; 5.4; 6.46; 3.09; .157 G/L; G/L; G/L; G/L; G/L; G/L; G/L
2000 INJECTION INTRAVENOUS CONTINUOUS
Status: DISCONTINUED | OUTPATIENT
Start: 2025-01-01 | End: 2025-01-01

## 2025-01-01 RX ORDER — CHLORHEXIDINE GLUCONATE 500 MG/1
1 CLOTH TOPICAL ONCE
Status: COMPLETED | OUTPATIENT
Start: 2025-01-01 | End: 2025-01-01

## 2025-01-01 RX ORDER — LIDOCAINE 4 G/G
1 PATCH TOPICAL
Status: DISCONTINUED | OUTPATIENT
Start: 2025-01-01 | End: 2025-03-24 | Stop reason: HOSPADM

## 2025-01-01 RX ORDER — SODIUM CHLORIDE 9 MG/ML
75 INJECTION, SOLUTION INTRAVENOUS ONCE
Status: DISCONTINUED | OUTPATIENT
Start: 2025-01-01 | End: 2025-01-01

## 2025-01-01 RX ORDER — CALCIUM CHLORIDE 100 MG/ML
1 INJECTION INTRAVENOUS; INTRAVENTRICULAR ONCE
Status: COMPLETED | OUTPATIENT
Start: 2025-01-01 | End: 2025-01-01

## 2025-01-01 RX ORDER — ALBUMIN (HUMAN) 12.5 G/50ML
50 SOLUTION INTRAVENOUS ONCE
Status: COMPLETED | OUTPATIENT
Start: 2025-01-01 | End: 2025-01-01

## 2025-01-01 RX ORDER — ONDANSETRON 2 MG/ML
4 INJECTION INTRAMUSCULAR; INTRAVENOUS EVERY 6 HOURS PRN
Status: DISCONTINUED | OUTPATIENT
Start: 2025-01-01 | End: 2025-03-24 | Stop reason: HOSPADM

## 2025-01-01 RX ORDER — INDOMETHACIN 25 MG/1
100 CAPSULE ORAL ONCE
Status: COMPLETED | OUTPATIENT
Start: 2025-01-01 | End: 2025-01-01

## 2025-01-01 RX ORDER — VANCOMYCIN/0.9 % SOD CHLORIDE 750 MG/250
750 PLASTIC BAG, INJECTION (ML) INTRAVENOUS EVERY 24 HOURS
Status: DISCONTINUED | OUTPATIENT
Start: 2025-03-24 | End: 2025-01-01

## 2025-01-01 RX ORDER — MULTIPLE VITAMINS W/ MINERALS TAB 9MG-400MCG
1 TAB ORAL DAILY
COMMUNITY

## 2025-01-01 RX ORDER — ACETAMINOPHEN 160 MG/5ML
650 SOLUTION ORAL EVERY 4 HOURS PRN
Status: DISCONTINUED | OUTPATIENT
Start: 2025-01-01 | End: 2025-03-24 | Stop reason: HOSPADM

## 2025-01-01 RX ORDER — KETOCONAZOLE 20 MG/ML
1 SHAMPOO, SUSPENSION TOPICAL 2 TIMES WEEKLY
COMMUNITY

## 2025-01-01 RX ORDER — HEPARIN SODIUM 1000 [USP'U]/ML
2000 INJECTION, SOLUTION INTRAVENOUS; SUBCUTANEOUS AS NEEDED
Status: DISCONTINUED | OUTPATIENT
Start: 2025-01-01 | End: 2025-01-01

## 2025-01-01 RX ORDER — SODIUM CHLORIDE 0.9 % (FLUSH) 0.9 %
5-40 SYRINGE (ML) INJECTION PRN
Status: DISCONTINUED | OUTPATIENT
Start: 2025-01-01 | End: 2025-01-01 | Stop reason: HOSPADM

## 2025-01-01 RX ORDER — SODIUM CHLORIDE 1 G/1
1 TABLET ORAL 3 TIMES DAILY
Status: ON HOLD | COMMUNITY
End: 2025-01-01

## 2025-01-01 RX ORDER — EPINEPHRINE 1 MG/ML
0.3 INJECTION, SOLUTION, CONCENTRATE INTRAVENOUS PRN
Status: CANCELLED | OUTPATIENT
Start: 2025-03-24

## 2025-01-01 RX ORDER — SODIUM CHLORIDE 0.9 % (FLUSH) 0.9 %
5-40 SYRINGE (ML) INJECTION PRN
Status: CANCELLED | OUTPATIENT
Start: 2025-03-24

## 2025-01-01 RX ORDER — VANCOMYCIN 2 GRAM/500 ML IN 0.9 % SODIUM CHLORIDE INTRAVENOUS
2000 ONCE
Status: COMPLETED | OUTPATIENT
Start: 2025-01-01 | End: 2025-01-01

## 2025-01-01 RX ORDER — LISINOPRIL 30 MG/1
15 TABLET ORAL DAILY
COMMUNITY

## 2025-01-01 RX ORDER — HEPARIN SODIUM 5000 [USP'U]/ML
5000 INJECTION, SOLUTION INTRAVENOUS; SUBCUTANEOUS EVERY 8 HOURS SCHEDULED
Status: DISCONTINUED | OUTPATIENT
Start: 2025-01-01 | End: 2025-01-01

## 2025-01-01 RX ORDER — AMOXICILLIN 250 MG
2 CAPSULE ORAL 2 TIMES DAILY
Status: DISCONTINUED | OUTPATIENT
Start: 2025-01-01 | End: 2025-01-01

## 2025-01-01 RX ORDER — COLESEVELAM 180 1/1
1875 TABLET ORAL 2 TIMES DAILY WITH MEALS
COMMUNITY

## 2025-01-01 RX ORDER — IBUPROFEN 600 MG/1
1 TABLET ORAL
Status: DISCONTINUED | OUTPATIENT
Start: 2025-01-01 | End: 2025-03-24 | Stop reason: HOSPADM

## 2025-01-01 RX ORDER — LORAZEPAM 1 MG/1
1 TABLET ORAL
Status: DISCONTINUED | OUTPATIENT
Start: 2025-01-01 | End: 2025-01-01

## 2025-01-01 RX ORDER — SODIUM CHLORIDE 0.9 % (FLUSH) 0.9 %
10 SYRINGE (ML) INJECTION EVERY 12 HOURS SCHEDULED
Status: DISCONTINUED | OUTPATIENT
Start: 2025-01-01 | End: 2025-01-01

## 2025-01-01 RX ORDER — HEPARIN SODIUM 10000 [USP'U]/100ML
16.2 INJECTION, SOLUTION INTRAVENOUS
Status: DISCONTINUED | OUTPATIENT
Start: 2025-01-01 | End: 2025-01-01

## 2025-01-01 RX ORDER — VANCOMYCIN/0.9 % SOD CHLORIDE 750 MG/250
750 PLASTIC BAG, INJECTION (ML) INTRAVENOUS EVERY 12 HOURS
Status: CANCELLED | OUTPATIENT
Start: 2025-01-01 | End: 2025-03-28

## 2025-01-01 RX ORDER — CALCIUM CHLORIDE, MAGNESIUM CHLORIDE, DEXTROSE MONOHYDRATE, LACTIC ACID, SODIUM CHLORIDE, SODIUM BICARBONATE AND POTASSIUM CHLORIDE 3.68; 3.05; 22; 5.4; 6.46; 3.09; .314 G/L; G/L; G/L; G/L; G/L; G/L; G/L
2100 INJECTION INTRAVENOUS CONTINUOUS
Status: DISCONTINUED | OUTPATIENT
Start: 2025-01-01 | End: 2025-01-01

## 2025-01-01 RX ORDER — ONDANSETRON 2 MG/ML
8 INJECTION INTRAMUSCULAR; INTRAVENOUS
Status: CANCELLED | OUTPATIENT
Start: 2025-03-24

## 2025-01-01 RX ORDER — CHLORHEXIDINE GLUCONATE 500 MG/1
1 CLOTH TOPICAL EVERY 24 HOURS
Status: DISCONTINUED | OUTPATIENT
Start: 2025-01-01 | End: 2025-01-01

## 2025-01-01 RX ORDER — NOREPINEPHRINE BITARTRATE 0.03 MG/ML
.02-.3 INJECTION, SOLUTION INTRAVENOUS
Status: DISCONTINUED | OUTPATIENT
Start: 2025-01-01 | End: 2025-03-24 | Stop reason: HOSPADM

## 2025-01-01 RX ORDER — HEPARIN SODIUM 1000 [USP'U]/ML
4000 INJECTION, SOLUTION INTRAVENOUS; SUBCUTANEOUS AS NEEDED
Status: DISCONTINUED | OUTPATIENT
Start: 2025-01-01 | End: 2025-01-01

## 2025-01-01 RX ORDER — BISACODYL 10 MG
10 SUPPOSITORY, RECTAL RECTAL DAILY PRN
Status: DISCONTINUED | OUTPATIENT
Start: 2025-01-01 | End: 2025-01-01

## 2025-01-01 RX ORDER — MORPHINE SULFATE 2 MG/ML
0.5 INJECTION, SOLUTION INTRAMUSCULAR; INTRAVENOUS
Status: DISCONTINUED | OUTPATIENT
Start: 2025-01-01 | End: 2025-01-01

## 2025-01-01 RX ORDER — ONDANSETRON 4 MG/1
4 TABLET, FILM COATED ORAL EVERY 6 HOURS PRN
Status: ON HOLD | COMMUNITY
End: 2025-01-01

## 2025-01-01 RX ORDER — HEPARIN 100 UNIT/ML
500 SYRINGE INTRAVENOUS PRN
Status: CANCELLED | OUTPATIENT
Start: 2025-03-24

## 2025-01-01 RX ORDER — MORPHINE SULFATE 2 MG/ML
2 INJECTION, SOLUTION INTRAMUSCULAR; INTRAVENOUS ONCE
Status: COMPLETED | OUTPATIENT
Start: 2025-01-01 | End: 2025-01-01

## 2025-01-01 RX ORDER — DEXTROSE MONOHYDRATE 25 G/50ML
25 INJECTION, SOLUTION INTRAVENOUS
Status: DISCONTINUED | OUTPATIENT
Start: 2025-01-01 | End: 2025-03-24 | Stop reason: HOSPADM

## 2025-01-01 RX ORDER — HEPARIN SODIUM 1000 [USP'U]/ML
80 INJECTION, SOLUTION INTRAVENOUS; SUBCUTANEOUS ONCE
Status: DISCONTINUED | OUTPATIENT
Start: 2025-01-01 | End: 2025-01-01

## 2025-01-01 RX ORDER — ESCITALOPRAM OXALATE 10 MG/1
10 TABLET ORAL DAILY
COMMUNITY

## 2025-01-01 RX ORDER — SODIUM CHLORIDE 9 MG/ML
40 INJECTION, SOLUTION INTRAVENOUS AS NEEDED
Status: DISCONTINUED | OUTPATIENT
Start: 2025-01-01 | End: 2025-01-01

## 2025-01-01 RX ORDER — HYDROXYUREA 500 MG/1
1 CAPSULE ORAL DAILY
COMMUNITY

## 2025-01-01 RX ADMIN — MORPHINE SULFATE 1 MG: 2 INJECTION, SOLUTION INTRAMUSCULAR; INTRAVENOUS at 22:09

## 2025-01-01 RX ADMIN — Medication 1 APPLICATION: at 08:43

## 2025-01-01 RX ADMIN — PIPERACILLIN SODIUM AND TAZOBACTAM SODIUM 4.5 G: 4; .5 INJECTION, POWDER, LYOPHILIZED, FOR SOLUTION INTRAVENOUS at 08:37

## 2025-01-01 RX ADMIN — ACETAMINOPHEN 650 MG: 325 TABLET, FILM COATED ORAL at 11:53

## 2025-01-01 RX ADMIN — ALBUMIN (HUMAN) 50 G: 0.25 INJECTION, SOLUTION INTRAVENOUS at 03:04

## 2025-01-01 RX ADMIN — KETOROLAC TROMETHAMINE 15 MG: 15 INJECTION, SOLUTION INTRAMUSCULAR; INTRAVENOUS at 18:43

## 2025-01-01 RX ADMIN — EPINEPHRINE 0.02 MCG/KG/MIN: 1 INJECTION INTRAMUSCULAR; INTRAVENOUS; SUBCUTANEOUS at 03:58

## 2025-01-01 RX ADMIN — MORPHINE SULFATE 2 MG: 2 INJECTION, SOLUTION INTRAMUSCULAR; INTRAVENOUS at 21:22

## 2025-01-01 RX ADMIN — HYDROCORTISONE SODIUM SUCCINATE 50 MG: 100 INJECTION, POWDER, FOR SOLUTION INTRAMUSCULAR; INTRAVENOUS at 04:42

## 2025-01-01 RX ADMIN — SODIUM BICARBONATE 150 MEQ: 84 INJECTION, SOLUTION INTRAVENOUS at 23:59

## 2025-01-01 RX ADMIN — FERUMOXYTOL 510 MG: 510 INJECTION INTRAVENOUS at 14:56

## 2025-01-01 RX ADMIN — SODIUM ZIRCONIUM CYCLOSILICATE 10 G: 10 POWDER, FOR SUSPENSION ORAL at 04:44

## 2025-01-01 RX ADMIN — MORPHINE SULFATE 0.5 MG: 2 INJECTION, SOLUTION INTRAMUSCULAR; INTRAVENOUS at 19:48

## 2025-01-01 RX ADMIN — Medication 2000 MG: at 01:26

## 2025-01-01 RX ADMIN — SODIUM CHLORIDE, SODIUM LACTATE, POTASSIUM CHLORIDE, CALCIUM CHLORIDE 500 ML: 20; 30; 600; 310 INJECTION, SOLUTION INTRAVENOUS at 03:07

## 2025-01-01 RX ADMIN — Medication 10 ML: at 00:08

## 2025-01-01 RX ADMIN — LORAZEPAM 0.5 MG: 2 INJECTION INTRAMUSCULAR; INTRAVENOUS at 19:49

## 2025-01-01 RX ADMIN — CALCIUM CHLORIDE 1 G: 100 INJECTION INTRAVENOUS; INTRAVENTRICULAR at 23:22

## 2025-01-01 RX ADMIN — SODIUM ZIRCONIUM CYCLOSILICATE 10 G: 10 POWDER, FOR SUSPENSION ORAL at 14:28

## 2025-01-01 RX ADMIN — MORPHINE SULFATE 0.5 MG: 2 INJECTION, SOLUTION INTRAMUSCULAR; INTRAVENOUS at 20:58

## 2025-01-01 RX ADMIN — SODIUM BICARBONATE 150 MEQ: 84 INJECTION, SOLUTION INTRAVENOUS at 10:22

## 2025-01-01 RX ADMIN — MORPHINE SULFATE 1 MG: 2 INJECTION, SOLUTION INTRAMUSCULAR; INTRAVENOUS at 22:56

## 2025-01-01 RX ADMIN — ALBUMIN (HUMAN) 50 G: 0.25 INJECTION, SOLUTION INTRAVENOUS at 23:19

## 2025-01-01 RX ADMIN — LORAZEPAM 0.5 MG: 2 INJECTION INTRAMUSCULAR; INTRAVENOUS at 22:10

## 2025-01-01 RX ADMIN — CHLORHEXIDINE GLUCONATE 1 APPLICATION: 500 CLOTH TOPICAL at 03:28

## 2025-01-01 RX ADMIN — DEXTROSE MONOHYDRATE 50 ML: 25 INJECTION, SOLUTION INTRAVENOUS at 23:22

## 2025-01-01 RX ADMIN — CHLORHEXIDINE GLUCONATE 1 APPLICATION: 500 CLOTH TOPICAL at 00:08

## 2025-01-01 RX ADMIN — PANTOPRAZOLE SODIUM 40 MG: 40 INJECTION, POWDER, FOR SOLUTION INTRAVENOUS at 05:06

## 2025-01-01 RX ADMIN — PERFLUTREN 65.2 MG: 6.52 INJECTION, SUSPENSION INTRAVENOUS at 09:19

## 2025-01-01 RX ADMIN — SODIUM ZIRCONIUM CYCLOSILICATE 10 G: 10 POWDER, FOR SUSPENSION ORAL at 08:41

## 2025-01-01 RX ADMIN — SODIUM BICARBONATE 100 MEQ: 84 INJECTION INTRAVENOUS at 06:11

## 2025-01-01 RX ADMIN — LORAZEPAM 0.5 MG: 2 INJECTION INTRAMUSCULAR; INTRAVENOUS at 20:56

## 2025-01-01 RX ADMIN — SODIUM BICARBONATE 50 MEQ: 84 INJECTION INTRAVENOUS at 23:22

## 2025-01-01 RX ADMIN — SODIUM CHLORIDE, SODIUM LACTATE, POTASSIUM CHLORIDE, CALCIUM CHLORIDE 500 ML: 20; 30; 600; 310 INJECTION, SOLUTION INTRAVENOUS at 23:25

## 2025-01-01 RX ADMIN — VASOPRESSIN IN 0.9% SODIUM CHLORIDE 0.03 UNITS/MIN: 20 INJECTION INTRAVENOUS at 15:51

## 2025-01-01 RX ADMIN — Medication 10 ML: at 08:38

## 2025-01-01 RX ADMIN — INSULIN HUMAN 10 UNITS: 100 INJECTION, SOLUTION PARENTERAL at 23:18

## 2025-01-01 RX ADMIN — INSULIN HUMAN 3 UNITS: 100 INJECTION, SOLUTION PARENTERAL at 11:31

## 2025-01-01 RX ADMIN — SODIUM CHLORIDE, SODIUM LACTATE, POTASSIUM CHLORIDE, CALCIUM CHLORIDE 500 ML: 20; 30; 600; 310 INJECTION, SOLUTION INTRAVENOUS at 03:58

## 2025-01-01 RX ADMIN — NOREPINEPHRINE BITARTRATE 0.26 MCG/KG/MIN: 0.03 INJECTION, SOLUTION INTRAVENOUS at 16:23

## 2025-01-01 RX ADMIN — INSULIN HUMAN 3 UNITS: 100 INJECTION, SOLUTION PARENTERAL at 05:06

## 2025-01-01 RX ADMIN — LEVETIRACETAM 500 MG: 100 INJECTION INTRAVENOUS at 08:38

## 2025-01-01 RX ADMIN — SODIUM BICARBONATE 100 MEQ: 84 INJECTION INTRAVENOUS at 10:03

## 2025-01-01 RX ADMIN — SODIUM BICARBONATE 50 MEQ: 84 INJECTION INTRAVENOUS at 03:42

## 2025-01-01 RX ADMIN — NOREPINEPHRINE BITARTRATE 0.06 MCG/KG/MIN: 0.03 INJECTION, SOLUTION INTRAVENOUS at 22:48

## 2025-01-01 RX ADMIN — CEFEPIME 2000 MG: 2 INJECTION, POWDER, FOR SOLUTION INTRAVENOUS at 00:29

## 2025-01-01 RX ADMIN — NOREPINEPHRINE BITARTRATE 0.18 MCG/KG/MIN: 0.03 INJECTION, SOLUTION INTRAVENOUS at 08:03

## 2025-01-01 RX ADMIN — DOBUTAMINE HYDROCHLORIDE 2 MCG/KG/MIN: 100 INJECTION INTRAVENOUS at 01:25

## 2025-01-01 RX ADMIN — HYDROCORTISONE SODIUM SUCCINATE 50 MG: 100 INJECTION, POWDER, FOR SOLUTION INTRAMUSCULAR; INTRAVENOUS at 11:31

## 2025-01-01 RX ADMIN — LORAZEPAM 0.5 MG: 2 INJECTION INTRAMUSCULAR; INTRAVENOUS at 22:55

## 2025-01-01 RX ADMIN — LIDOCAINE 1 PATCH: 4 PATCH TOPICAL at 11:52

## 2025-01-01 RX ADMIN — Medication 1 APPLICATION: at 00:29

## 2025-01-02 ENCOUNTER — CARE COORDINATION (OUTPATIENT)
Dept: CARE COORDINATION | Age: 80
End: 2025-01-02

## 2025-01-02 ENCOUNTER — HOSPITAL ENCOUNTER (OUTPATIENT)
Dept: WOUND CARE | Age: 80
Discharge: HOME | End: 2025-01-02
Attending: SURGERY
Payer: MEDICARE

## 2025-01-02 ENCOUNTER — TELEPHONE (OUTPATIENT)
Dept: FAMILY MEDICINE CLINIC | Age: 80
End: 2025-01-02

## 2025-01-02 VITALS
HEIGHT: 61 IN | BODY MASS INDEX: 32.66 KG/M2 | SYSTOLIC BLOOD PRESSURE: 130 MMHG | TEMPERATURE: 97.9 F | RESPIRATION RATE: 16 BRPM | DIASTOLIC BLOOD PRESSURE: 65 MMHG | WEIGHT: 173 LBS | HEART RATE: 75 BPM

## 2025-01-02 DIAGNOSIS — R79.0 LOW MAGNESIUM LEVEL: ICD-10-CM

## 2025-01-02 DIAGNOSIS — Z79.4 TYPE 2 DIABETES MELLITUS WITH OTHER SKIN ULCER, WITH LONG-TERM CURRENT USE OF INSULIN (HCC): ICD-10-CM

## 2025-01-02 DIAGNOSIS — E11.622 TYPE 2 DIABETES MELLITUS WITH OTHER SKIN ULCER, WITH LONG-TERM CURRENT USE OF INSULIN (HCC): ICD-10-CM

## 2025-01-02 DIAGNOSIS — B37.2 YEAST DERMATITIS: ICD-10-CM

## 2025-01-02 DIAGNOSIS — L97.912 SKIN ULCER OF RIGHT LOWER LEG WITH FAT LAYER EXPOSED (HCC): Primary | ICD-10-CM

## 2025-01-02 LAB — MAGNESIUM SERPL-MCNC: 1.8 MG/DL (ref 1.6–2.4)

## 2025-01-02 PROCEDURE — 97598 DBRDMT OPN WND ADDL 20CM/<: CPT

## 2025-01-02 PROCEDURE — 97597 DBRDMT OPN WND 1ST 20 CM/<: CPT | Performed by: SURGERY

## 2025-01-02 PROCEDURE — 97597 DBRDMT OPN WND 1ST 20 CM/<: CPT

## 2025-01-02 PROCEDURE — 97598 DBRDMT OPN WND ADDL 20CM/<: CPT | Performed by: SURGERY

## 2025-01-02 RX ORDER — LIDOCAINE HYDROCHLORIDE 20 MG/ML
JELLY TOPICAL ONCE
OUTPATIENT
Start: 2025-01-02 | End: 2025-01-02

## 2025-01-02 RX ORDER — NEOMYCIN/BACITRACIN/POLYMYXINB 3.5-400-5K
OINTMENT (GRAM) TOPICAL ONCE
OUTPATIENT
Start: 2025-01-02 | End: 2025-01-02

## 2025-01-02 RX ORDER — CLOBETASOL PROPIONATE 0.5 MG/G
OINTMENT TOPICAL ONCE
OUTPATIENT
Start: 2025-01-02 | End: 2025-01-02

## 2025-01-02 RX ORDER — GINSENG 100 MG
CAPSULE ORAL ONCE
OUTPATIENT
Start: 2025-01-02 | End: 2025-01-02

## 2025-01-02 RX ORDER — MUPIROCIN 20 MG/G
OINTMENT TOPICAL ONCE
OUTPATIENT
Start: 2025-01-02 | End: 2025-01-02

## 2025-01-02 RX ORDER — GENTAMICIN SULFATE 1 MG/G
OINTMENT TOPICAL ONCE
OUTPATIENT
Start: 2025-01-02 | End: 2025-01-02

## 2025-01-02 RX ORDER — LIDOCAINE 50 MG/G
OINTMENT TOPICAL ONCE
OUTPATIENT
Start: 2025-01-02 | End: 2025-01-02

## 2025-01-02 RX ORDER — LIDOCAINE HYDROCHLORIDE 20 MG/ML
JELLY TOPICAL ONCE
Status: COMPLETED | OUTPATIENT
Start: 2025-01-02 | End: 2025-01-02

## 2025-01-02 RX ORDER — BETAMETHASONE DIPROPIONATE 0.5 MG/G
CREAM TOPICAL ONCE
OUTPATIENT
Start: 2025-01-02 | End: 2025-01-02

## 2025-01-02 RX ORDER — TRIAMCINOLONE ACETONIDE 1 MG/G
OINTMENT TOPICAL ONCE
OUTPATIENT
Start: 2025-01-02 | End: 2025-01-02

## 2025-01-02 RX ORDER — LANOLIN ALCOHOL/MO/W.PET/CERES
400 CREAM (GRAM) TOPICAL DAILY
COMMUNITY

## 2025-01-02 RX ORDER — LIDOCAINE HYDROCHLORIDE 40 MG/ML
SOLUTION TOPICAL ONCE
OUTPATIENT
Start: 2025-01-02 | End: 2025-01-02

## 2025-01-02 RX ORDER — BACITRACIN ZINC AND POLYMYXIN B SULFATE 500; 1000 [USP'U]/G; [USP'U]/G
OINTMENT TOPICAL ONCE
OUTPATIENT
Start: 2025-01-02 | End: 2025-01-02

## 2025-01-02 RX ORDER — SILVER SULFADIAZINE 10 MG/G
CREAM TOPICAL ONCE
OUTPATIENT
Start: 2025-01-02 | End: 2025-01-02

## 2025-01-02 RX ORDER — SODIUM CHLOR/HYPOCHLOROUS ACID 0.033 %
SOLUTION, IRRIGATION IRRIGATION ONCE
OUTPATIENT
Start: 2025-01-02 | End: 2025-01-02

## 2025-01-02 RX ORDER — LIDOCAINE 40 MG/G
CREAM TOPICAL ONCE
OUTPATIENT
Start: 2025-01-02 | End: 2025-01-02

## 2025-01-02 RX ADMIN — LIDOCAINE HYDROCHLORIDE: 20 JELLY TOPICAL at 09:01

## 2025-01-02 ASSESSMENT — PAIN DESCRIPTION - LOCATION: LOCATION: LEG

## 2025-01-02 ASSESSMENT — PAIN DESCRIPTION - ONSET: ONSET: ON-GOING

## 2025-01-02 ASSESSMENT — PAIN DESCRIPTION - FREQUENCY: FREQUENCY: INTERMITTENT

## 2025-01-02 ASSESSMENT — PAIN DESCRIPTION - ORIENTATION: ORIENTATION: RIGHT

## 2025-01-02 ASSESSMENT — PAIN - FUNCTIONAL ASSESSMENT: PAIN_FUNCTIONAL_ASSESSMENT: PREVENTS OR INTERFERES SOME ACTIVE ACTIVITIES AND ADLS

## 2025-01-02 ASSESSMENT — PAIN DESCRIPTION - PAIN TYPE: TYPE: CHRONIC PAIN

## 2025-01-02 ASSESSMENT — PAIN SCALES - GENERAL: PAINLEVEL_OUTOF10: 5

## 2025-01-02 ASSESSMENT — PAIN DESCRIPTION - DESCRIPTORS: DESCRIPTORS: BURNING;SORE;TENDER

## 2025-01-02 NOTE — TELEPHONE ENCOUNTER
----- Message from Dr. NICOLE Solis DO sent at 1/2/2025  1:02 PM CST -----  Magnesium level is normal

## 2025-01-02 NOTE — PATIENT INSTRUCTIONS
[facility] Wound Care and Hyperbaric Oxygen Therapy   Physician Orders and Discharge Instructions  [address] [place] [address] Drive  [address]  [place] [address]  Telephone: [phone]      FAX [phone]    NAME:  Elda Flood  YOB: 1945  MEDICAL RECORD NUMBER:  [id number]  DATE:  1/2/2025    Discharge condition: Stable    Discharge to: Home    Left via:Private automobile    Accompanied by: Family    [facility] to change Coflex twice weekly    Dressing Orders: Right Lower Leg Wound  Xeroform to open areas  Calamine Coflex Unnaboot (absorbent layer between first and second layer) Keep clean and Dry  Elevate feet to level or above heart 3-4 times daily and as needed to for 30 minutes to reduce swelling  Remove wraps and call office if- Wraps get wet, Cause increased pain, Slide down or you are unable to make  your next scheduled appointment  Multi Vitamin, High Protein diet as tolerated    [facility] follow up visit __________2 weeks___________________  (Please note your next appointment above and if you are unable to keep, kindly give a 24 hour notice. Thank you.)    If you experience any of the following, please call the Wound Care Center during business hours:    * Increase in Pain  * Temperature over 101  * Increase in drainage from your wound  * Drainage with a foul odor  * Bleeding  * Increase in swelling  * Need for compression bandage changes due to slippage, breakthrough drainage.    If you need medical attention outside of the business hours of the Wound Care Centers please contact your PCP or go to the nearest emergency room.

## 2025-01-02 NOTE — PROGRESS NOTES
--EXCEPT MEASUREMENTS    Wound 09/11/24 Pretibial Right #1 Right lower leg surgical (Active)   Wound Image   01/02/25 0903   Wound Etiology Surgical 01/02/25 0903   Dressing Status Old drainage noted 01/02/25 0903   Wound Cleansed Soap and water 01/02/25 0903   Dressing/Treatment Xeroform 12/19/24 0840   Wound Length (cm) 16 cm 01/02/25 0903   Wound Width (cm) 12.9 cm 01/02/25 0903   Wound Depth (cm) 0.1 cm 01/02/25 0903   Wound Surface Area (cm^2) 206.4 cm^2 01/02/25 0903   Change in Wound Size % (l*w) 41.2 01/02/25 0903   Wound Volume (cm^3) 20.64 cm^3 01/02/25 0903   Wound Healing % 88 01/02/25 0903   Post-Procedure Length (cm) 16 cm 01/02/25 0904   Post-Procedure Width (cm) 12.9 cm 01/02/25 0904   Post-Procedure Depth (cm) 0.1 cm 01/02/25 0904   Post-Procedure Surface Area (cm^2) 206.4 cm^2 01/02/25 0904   Post-Procedure Volume (cm^3) 20.64 cm^3 01/02/25 0904   Distance Tunneling (cm) 0 cm 01/02/25 0903   Tunneling Position ___ O'Clock 0 01/02/25 0903   Undermining Starts ___ O'Clock 0 01/02/25 0903   Undermining Ends___ O'Clock 0 01/02/25 0903   Undermining Maxium Distance (cm) 0 01/02/25 0903   Wound Assessment Pink/red;Slough 01/02/25 0903   Drainage Amount Moderate (25-50%) 01/02/25 0903   Drainage Description Serosanguinous 01/02/25 0903   Odor None 01/02/25 0903   Jenny-wound Assessment Intact 01/02/25 0903   Margins Attached edges 01/02/25 0903   Wound Thickness Description not for Pressure Injury Full thickness 01/02/25 0903   Number of days: 113             Estimated Blood Loss:  Minimal    Hemostasis Achieved:  by pressure    Procedural Pain:  0  / 10     Post Procedural Pain:  0 / 10     Response to treatment:  Well tolerated by patient.         Plan:     Problem List Items Addressed This Visit       * (Principal) Skin ulcer of right lower leg with fat layer exposed (HCC) - Primary (Chronic)    Relevant Orders    Initiate Outpatient Wound Care Protocol    Diabetes (HCC)    Relevant Orders    Initiate

## 2025-01-02 NOTE — CARE COORDINATION
Ambulatory Care Coordination Note     2025 12:48 PM     Patient Current Location:  Home: [address] [place] [address] [place] [address]     ACM contacted the patient by telephone. Verified name and  with patient as identifiers.         ACM: Tessie Rahman RN     Challenges to be reviewed by the provider   Additional needs identified to be addressed with provider No  none               Method of communication with provider: none.    Utilization: Patient has not had any utilization since our last call.     Care Summary Note: Spoke to patient.  She stated wound care appt went well today, that her leg wound is progressing. Her dressing was changed during appointment.  Patient has been checking her glucose fasting and before supper daily. Patient continues to hold her insulins at this time, stated her fasting glucose today was 122. She did not have her glucose log during the call for longer review. Advised pt that ACM would like to review her log next call.   Patient is doing well with ambulation, stated her balance is much better. PT has helped a lot. She does get up 1-2 times to void during the night and keeps a light on at night to reduce fall risk. Pt denied any concerning respiratory symptoms, no fevers. Pt is having a daily BM, continues to hold the Movantik and Welchol. Patient's daughter added the Magnesium and Calcium/D to her pill organizer.  Patient does not have her RPM kit yet, will follow up on this if not arrived in the next week.    Offered patient enrollment in the Remote Patient Monitoring (RPM) program for in-home monitoring: Yes, patient enrolled; current status is awaiting kit.     Assessments Completed:   Care Coordination Interventions    Referral from Primary Care Provider: No  Suggested Interventions and Community Resources  Disease Specific Clinic: Completed (Comment: Hematology)  Home Care Waiver: Not Started (Comment: 24: Referred to  MSW for homemaker support options)  Home Health Services:

## 2025-01-02 NOTE — TELEPHONE ENCOUNTER
Called patient, spoke with: Child/Children regarding the results of the patients most recent labs.  I advised Child/Children of Dr. Solis recommendations.   Child/Children did voice understanding

## 2025-01-02 NOTE — PLAN OF CARE
Unna Boot Application   Below Knee    NAME:  Elda Flood  YOB: 1945  MEDICAL RECORD NUMBER:  [id number]  DATE:  1/2/2025    Unna boot: Applied moisturizing agent to dry skin as needed.   Appied primary and secondary dressing as ordered.  Applied Unna roll from toes to knee overlapping each time.   Applied ace wrap or coban from toes to below the knee.   Instructed patient/caregiver to keep dressing dry and intact. DO NOT REMOVE DRESSING.   Instructed pt/family/caregiver to report excessive draining, loose bandage, wet dressing, severe pain or tingling in toes.  Applied Unna Boot dressing below the knee to right lower leg.    Unna Boot(s) were applied per  Guidelines.     Electronically signed by Viri Mckeon RN on 1/2/2025 at 9:40 AM

## 2025-01-09 ENCOUNTER — CARE COORDINATION (OUTPATIENT)
Dept: CARE COORDINATION | Age: 80
End: 2025-01-09

## 2025-01-09 DIAGNOSIS — E55.9 VITAMIN D DEFICIENCY: Primary | ICD-10-CM

## 2025-01-09 DIAGNOSIS — E61.2 MAGNESIUM DEFICIENCY: ICD-10-CM

## 2025-01-09 DIAGNOSIS — I25.118 CORONARY ARTERY DISEASE INVOLVING NATIVE CORONARY ARTERY OF NATIVE HEART WITH OTHER FORM OF ANGINA PECTORIS (HCC): ICD-10-CM

## 2025-01-09 DIAGNOSIS — G89.21 CHRONIC PAIN DUE TO TRAUMA: ICD-10-CM

## 2025-01-09 DIAGNOSIS — E11.42 TYPE 2 DIABETES MELLITUS WITH DIABETIC POLYNEUROPATHY, WITHOUT LONG-TERM CURRENT USE OF INSULIN (HCC): ICD-10-CM

## 2025-01-09 DIAGNOSIS — M1A.0720 IDIOPATHIC CHRONIC GOUT OF LEFT FOOT WITHOUT TOPHUS: ICD-10-CM

## 2025-01-09 DIAGNOSIS — D50.9 IRON DEFICIENCY ANEMIA, UNSPECIFIED IRON DEFICIENCY ANEMIA TYPE: ICD-10-CM

## 2025-01-09 PROCEDURE — 1111F DSCHRG MED/CURRENT MED MERGE: CPT | Performed by: PEDIATRICS

## 2025-01-09 RX ORDER — ALBUTEROL SULFATE 90 UG/1
2 INHALANT RESPIRATORY (INHALATION) EVERY 4 HOURS PRN
COMMUNITY

## 2025-01-09 RX ORDER — CELECOXIB 100 MG/1
100 CAPSULE ORAL 2 TIMES DAILY
Qty: 180 CAPSULE | Refills: 3 | Status: SHIPPED | OUTPATIENT
Start: 2025-01-09

## 2025-01-09 RX ORDER — PREDNISONE 20 MG/1
20 TABLET ORAL 2 TIMES DAILY
COMMUNITY
Start: 2025-01-09 | End: 2025-01-12

## 2025-01-09 RX ORDER — CEFDINIR 300 MG/1
300 CAPSULE ORAL 2 TIMES DAILY
COMMUNITY
Start: 2025-01-09 | End: 2025-01-13

## 2025-01-09 NOTE — CARE COORDINATION
Spoke to patient.  Elda discharged from [facility] yesterday, stated she went in for Flu and PNA. Initial Summary of treatment is in Media - indicates fluid overload, Flu, Hypoxia, Hyperkalemia. Patient reported she is taking an antibiotic and a steroid. Medications were reviewed with [facility] as patient's medications are packaged and delivered, 1111F order entered. Patient has HFU appt with PCP on 1/20/25, has a wound clinic appointment on 1/16/25. Her right leg wound continues to heal with daily dressing change per patient -  nurse and family are providing assistance. Patient reported her FBS today was 162 and expects it to be elevated until she completes her abx and steroid. She does not use any insulin at this time. SpO2 on room air today is 96%. No BP today. Patient reported some edema of her left leg and stated she is taking her Lasix as directed. Last BM was Tuesday, pt feels some constipation (chronic). She has an OTC stool softener and was encouraged to take this to move her bowels. Patient does drink water but is on a fluid restriction 2/2 CHF. Patient reported a good appetite, ate well at breakfast and lunch today.   Discussed using her RPM kit to monitor her blood pressure and glucose, reviewed tablet use, signing of consent on tablet and to keep tablet plugged in/turned on. Encouraged pt to begin recording her vitals tomorrow, advised auto upload of BP and manual entry of glucose to tablet. Patient voiced understanding. Will follow closely for post-discharge support, RPM activation/ use/ understanding.    Offered patient enrollment in the Remote Patient Monitoring (RPM) program for in-home monitoring: Yes, patient enrolled; current status is preactivatedDiscussed and encouraged activation of RPM tablet by tomorrow .     Assessments Completed:   Care Coordination Interventions    Referral from Primary Care Provider: No  Suggested Interventions and Community Resources  Disease Specific

## 2025-01-09 NOTE — TELEPHONE ENCOUNTER
Received fax from pharmacy requesting refill on pts medication(s). Pt was last seen in office on 12/17/2024 and has a follow up scheduled for 1/20/2025. Will send request to Dr. Solis for authorization.     Requested Prescriptions     Pending Prescriptions Disp Refills    celecoxib (CELEBREX) 100 MG capsule [Pharmacy Med Name: celecoxib 100 mg capsule] 180 capsule 3     Sig: TAKE ONE CAPSULE BY MOUTH TWICE DAILY

## 2025-01-10 ENCOUNTER — CARE COORDINATION (OUTPATIENT)
Dept: CARE COORDINATION | Age: 80
End: 2025-01-10

## 2025-01-10 NOTE — CARE COORDINATION
Ambulatory Care Coordination Note     1/10/2025 9:51 AM     Phone  outreach attempt by this ACM today to perform care management follow up . ACM was unable to reach the patient by telephone today;   left voice message requesting a return phone call to this ACM.     ACM: Tessie Rahman RN     Care Summary Note: Patient needs to activate RPM tablet. If pt calls back, will attempt to assist or loop in with [facility] [[email or web address]] tech support if needed.    PCP/Specialist follow up:   Future Appointments         Provider Specialty Dept Phone    1/16/2025 10:00 AM Maurisio Parson MD Wound Ostomy [phone]    1/20/2025 11:00 AM NICOLE Solis,  Family Medicine [phone]    2/3/2025 8:15 AM Mag Montgomery, APRN Hematology and Oncology [phone]    2/3/2025  8:30 AM SCHEDULE, L MED ONC MA Infusion Therapy [phone]            Follow Up:   Plan for next ACM outreach in approximately 1 week to complete:  - disease specific assessments  - RPM.

## 2025-01-13 ENCOUNTER — CARE COORDINATION (OUTPATIENT)
Dept: CARE COORDINATION | Age: 80
End: 2025-01-13

## 2025-01-13 NOTE — CARE COORDINATION
Ambulatory Care Coordination Note     2025 10:53 AM     Patient Current Location:  Home: [address] [place] [address] [place] [address]     ACM contacted the patient by telephone. Verified name and  with patient as identifiers.         ACM: Tessie Rahman RN     Challenges to be reviewed by the provider   Additional needs identified to be addressed with provider No  none               Method of communication with provider: none.    Utilization: Patient has not had any utilization since our last call.     Care Summary Note: Spoke to patient.  She reported she continues to feel well, has a good appetite and is urinating without difficulty. She is taking her water pill as prescribed. Patient's right leg is dressed, stated her daughter changed the dressing for her. Left leg has some edema, encouraged pt to use compression for edema of her left leg is able to. Patient had a BM this morning. She continues to receive [employer] for lunch M-. No falls. She has not checked her FBS today, stated it was 112 on . Patient continues to hold her insulins. Patient is aware of her upcoming appointments. Patient's daughter is coming over today to assist with setting up/activating her RPM kit for blood pressure and glucose monitoring. Encouraged that daughter call the IT number provided with the kit or to call this ACM if any problems activating.    Offered patient enrollment in the Remote Patient Monitoring (RPM) program for in-home monitoring: Yes, patient enrolled; current status is preactivatedPatient plans to activate account today with daughter's help .     Assessments Completed:   Care Coordination Interventions    Referral from Primary Care Provider: No  Suggested Interventions and Community Resources  Disease Specific Clinic: Completed (Comment: Hematology)  Home Care Waiver: Not Started (Comment: 24: Referred to SONIYA SYLVESTERW for homemaker support options)  Home Health Services: Completed (Comment: [facility])  Meals on

## 2025-01-14 ENCOUNTER — CARE COORDINATION (OUTPATIENT)
Dept: CASE MANAGEMENT | Age: 80
End: 2025-01-14

## 2025-01-14 NOTE — CARE COORDINATION
LPN CC attempted RPM Welcome Call. Daughter noted she was driving at this time and requested call back tomorrow. States patient is pleased with program. She is agreeable to check the tablet today for the consent form. LPN CC to make outreach tomorrow to complete Welcome Call.    Thank you,   Tianna Monte, GETACHEW Care Coordinator   [facility]  Remote Patient Monitoring, Care Transitions, Ambulatory Care Management  [phone]

## 2025-01-15 ENCOUNTER — CARE COORDINATION (OUTPATIENT)
Dept: CASE MANAGEMENT | Age: 80
End: 2025-01-15

## 2025-01-15 NOTE — CARE COORDINATION
1/15/2025 2:00 PM  *  Unable to Reach Date/Time:  1/15/2025 2:00 PM  LPN attempted to reach family by telephone regarding  RPM Welcome Call  in remote patient monitoring program. Unable to leave VM. Will attempt to reach patient again.

## 2025-01-16 ENCOUNTER — CARE COORDINATION (OUTPATIENT)
Dept: CASE MANAGEMENT | Age: 80
End: 2025-01-16

## 2025-01-16 ENCOUNTER — HOSPITAL ENCOUNTER (OUTPATIENT)
Dept: WOUND CARE | Age: 80
Discharge: HOME | End: 2025-01-16
Attending: SURGERY
Payer: MEDICARE

## 2025-01-16 ENCOUNTER — TELEPHONE (OUTPATIENT)
Age: 80
End: 2025-01-16

## 2025-01-16 VITALS
HEART RATE: 64 BPM | RESPIRATION RATE: 16 BRPM | TEMPERATURE: 97.4 F | DIASTOLIC BLOOD PRESSURE: 62 MMHG | SYSTOLIC BLOOD PRESSURE: 132 MMHG

## 2025-01-16 DIAGNOSIS — B37.2 YEAST DERMATITIS: ICD-10-CM

## 2025-01-16 DIAGNOSIS — Z79.4 TYPE 2 DIABETES MELLITUS WITH OTHER SKIN ULCER, WITH LONG-TERM CURRENT USE OF INSULIN (HCC): ICD-10-CM

## 2025-01-16 DIAGNOSIS — E83.42 HYPOMAGNESEMIA: ICD-10-CM

## 2025-01-16 DIAGNOSIS — L97.912 SKIN ULCER OF RIGHT LOWER LEG WITH FAT LAYER EXPOSED (HCC): Primary | ICD-10-CM

## 2025-01-16 DIAGNOSIS — E11.622 TYPE 2 DIABETES MELLITUS WITH OTHER SKIN ULCER, WITH LONG-TERM CURRENT USE OF INSULIN (HCC): ICD-10-CM

## 2025-01-16 LAB — MAGNESIUM SERPL-MCNC: 1.8 MG/DL (ref 1.6–2.4)

## 2025-01-16 PROCEDURE — 97597 DBRDMT OPN WND 1ST 20 CM/<: CPT

## 2025-01-16 PROCEDURE — 97598 DBRDMT OPN WND ADDL 20CM/<: CPT | Performed by: SURGERY

## 2025-01-16 PROCEDURE — 97598 DBRDMT OPN WND ADDL 20CM/<: CPT

## 2025-01-16 PROCEDURE — 97597 DBRDMT OPN WND 1ST 20 CM/<: CPT | Performed by: SURGERY

## 2025-01-16 RX ORDER — NEOMYCIN/BACITRACIN/POLYMYXINB 3.5-400-5K
OINTMENT (GRAM) TOPICAL ONCE
OUTPATIENT
Start: 2025-01-16 | End: 2025-01-16

## 2025-01-16 RX ORDER — GENTAMICIN SULFATE 1 MG/G
OINTMENT TOPICAL ONCE
OUTPATIENT
Start: 2025-01-16 | End: 2025-01-16

## 2025-01-16 RX ORDER — GINSENG 100 MG
CAPSULE ORAL ONCE
OUTPATIENT
Start: 2025-01-16 | End: 2025-01-16

## 2025-01-16 RX ORDER — SILVER SULFADIAZINE 10 MG/G
CREAM TOPICAL ONCE
OUTPATIENT
Start: 2025-01-16 | End: 2025-01-16

## 2025-01-16 RX ORDER — LIDOCAINE HYDROCHLORIDE 20 MG/ML
JELLY TOPICAL ONCE
OUTPATIENT
Start: 2025-01-16 | End: 2025-01-16

## 2025-01-16 RX ORDER — TRIAMCINOLONE ACETONIDE 1 MG/G
OINTMENT TOPICAL ONCE
OUTPATIENT
Start: 2025-01-16 | End: 2025-01-16

## 2025-01-16 RX ORDER — LIDOCAINE HYDROCHLORIDE 20 MG/ML
JELLY TOPICAL ONCE
Status: COMPLETED | OUTPATIENT
Start: 2025-01-16 | End: 2025-01-16

## 2025-01-16 RX ORDER — BETAMETHASONE DIPROPIONATE 0.5 MG/G
CREAM TOPICAL ONCE
OUTPATIENT
Start: 2025-01-16 | End: 2025-01-16

## 2025-01-16 RX ORDER — LIDOCAINE 50 MG/G
OINTMENT TOPICAL ONCE
OUTPATIENT
Start: 2025-01-16 | End: 2025-01-16

## 2025-01-16 RX ORDER — LIDOCAINE 40 MG/G
CREAM TOPICAL ONCE
OUTPATIENT
Start: 2025-01-16 | End: 2025-01-16

## 2025-01-16 RX ORDER — BACITRACIN ZINC AND POLYMYXIN B SULFATE 500; 1000 [USP'U]/G; [USP'U]/G
OINTMENT TOPICAL ONCE
OUTPATIENT
Start: 2025-01-16 | End: 2025-01-16

## 2025-01-16 RX ORDER — SODIUM CHLOR/HYPOCHLOROUS ACID 0.033 %
SOLUTION, IRRIGATION IRRIGATION ONCE
OUTPATIENT
Start: 2025-01-16 | End: 2025-01-16

## 2025-01-16 RX ORDER — CLOBETASOL PROPIONATE 0.5 MG/G
OINTMENT TOPICAL ONCE
OUTPATIENT
Start: 2025-01-16 | End: 2025-01-16

## 2025-01-16 RX ORDER — MUPIROCIN 20 MG/G
OINTMENT TOPICAL ONCE
OUTPATIENT
Start: 2025-01-16 | End: 2025-01-16

## 2025-01-16 RX ORDER — LIDOCAINE HYDROCHLORIDE 40 MG/ML
SOLUTION TOPICAL ONCE
OUTPATIENT
Start: 2025-01-16 | End: 2025-01-16

## 2025-01-16 RX ADMIN — LIDOCAINE HYDROCHLORIDE: 20 JELLY TOPICAL at 10:31

## 2025-01-16 NOTE — CARE COORDINATION
Remote Patient Monitoring Welcome Note  Date/Time:  2025 11:40 AM  Patient Current Location: Home: [address]  [place] [address]  Verified patients name and  as identifiers.       Completed and confirmed the following:    [x] Patient received all RPM equipment (tablet, scale, blood pressure device and cuff, and pulse oximeter)  Cuff Size: regular (9.05\"-15.74\")    Weight Scale:  regular (<330lbs)                    [x] Instructed patient keep box for use when returning equipment                                                          [x] Reviewed Patient Welcome Letter with patient    []  Reviewed Consent Form  Copy of consent form in chart.                 [x] Reviewed expectations for patient and care team  Monitoring hours M-F 9-4pm  It is important to take your vitals every day, even on the weekends,to keep your care team aware of how you are doing every day of the week.  Completing monitoring by 12pm on  so that alerts can be responded to in the same day  Patient weighs self at same time every day (or after urinating and waking up)  Take blood pressure 1-2 hrs after medications   RPM team may have different phone area code (including [place], [place], [place] or [place])                              [] Instructed patient to keep scale on flat surface                                                         [x] Instructed patient to keep tablet plugged in at all times                         [x] Instructed how to contact IT support  ([phone])  [x] Provided Remote Patient Monitoring care  information     Emergency Contact Verified: daughterRegi, [phone]               All questions answered at this time.

## 2025-01-16 NOTE — PROGRESS NOTES
Unna Boot Application   Below Knee    NAME:  Elda Flood  YOB: 1945  MEDICAL RECORD NUMBER:  [id number]  DATE:  1/16/2025    Unna boot: Applied moisturizing agent to dry skin as needed.   Appied primary and secondary dressing as ordered.  Applied Unna roll from toes to knee overlapping each time.   Applied ace wrap or coban from toes to below the knee.   Instructed patient/caregiver to keep dressing dry and intact. DO NOT REMOVE DRESSING.   Instructed pt/family/caregiver to report excessive draining, loose bandage, wet dressing, severe pain or tingling in toes.  Applied Unna Boot dressing below the knee to right lower leg.    Unna Boot(s) were applied per  Guidelines.     Electronically signed by Verenice Cornell RN on 1/16/2025 at 11:00 AM     
exposed (HCC) - Primary (Chronic)    Relevant Orders    Initiate Outpatient Wound Care Protocol    Diabetes (HCC)    Relevant Orders    Initiate Outpatient Wound Care Protocol    Yeast dermatitis    Relevant Orders    Initiate Outpatient Wound Care Protocol       Cont compression therapy wound getting smaller    Treatment Note please see attached Discharge Instructions    In my professional opinion this patient would benefit from HBO Therapy: No    Written patient dismissal instructions given to patient and signed by patient or POA.         [facility] to change Coflex twice weekly, if patient has a wound appointment that week  change just once  Dressing Orders: Right Lower Leg Wound  Silver Alginate to open areas  Calamine Coflex Unnaboot (absorbent layer between first and second layer) Keep clean and Dry  Elevate feet to level or above heart 3-4 times daily and as needed to for 30 minutes to reduce swelling  Remove wraps and call office if- Wraps get wet, Cause increased pain, Slide down or you are unable to make  your next scheduled appointment  Multi Vitamin, High Protein diet as tolerated  [facility] follow up visit ___________1 week__________________  (Please note your next appointment above and if you are unable to keep, kindly give a 24 hour notice. Thank  you.)  If you experience any of the following, please call the Wound Care Center during business hours:  * Increase in Pain  * Temperature over 101  * Increase in drainage from your wound  * Drainage with a foul odor  * Bleeding  * Increase in swelling  * Need for compression bandage changes due to slippage, breakthrough drainage.  If you need medical attention outside of the business hours of the Wound Care Centers please contact your PCP  or go to the nearest emergency room.    Electronically signed by Maurisio Parson MD on 1/16/2025 at 10:35 AM

## 2025-01-16 NOTE — PATIENT INSTRUCTIONS
[facility] Wound Care and Hyperbaric Oxygen Therapy   Physician Orders and Discharge Instructions  [address] [place] [address]  [address]  [place] [address]  Telephone: [phone]      FAX [phone]    NAME:  Elda Flood  YOB: 1945  MEDICAL RECORD NUMBER:  [id number]  DATE:  1/16/2025    Discharge condition: Stable    Discharge to: Home    Left via:Private automobile    Accompanied by: Family    [facility] to change Coflex twice weekly, if patient has a wound appointment that week change just once    Dressing Orders: Right Lower Leg Wound  Silver Alginate to open areas  Calamine Coflex Unnaboot (absorbent layer between first and second layer) Keep clean and Dry  Elevate feet to level or above heart 3-4 times daily and as needed to for 30 minutes to reduce swelling  Remove wraps and call office if- Wraps get wet, Cause increased pain, Slide down or you are unable to make  your next scheduled appointment  Multi Vitamin, High Protein diet as tolerated    [facility] follow up visit ___________1 week__________________  (Please note your next appointment above and if you are unable to keep, kindly give a 24 hour notice. Thank you.)    If you experience any of the following, please call the Wound Care Center during business hours:    * Increase in Pain  * Temperature over 101  * Increase in drainage from your wound  * Drainage with a foul odor  * Bleeding  * Increase in swelling  * Need for compression bandage changes due to slippage, breakthrough drainage.    If you need medical attention outside of the business hours of the Wound Care Centers please contact your PCP or go to the nearest emergency room.

## 2025-01-20 ENCOUNTER — TELEMEDICINE (OUTPATIENT)
Age: 80
End: 2025-01-20

## 2025-01-20 DIAGNOSIS — G40.89 OTHER SEIZURES (HCC): ICD-10-CM

## 2025-01-20 DIAGNOSIS — F33.1 MODERATE EPISODE OF RECURRENT MAJOR DEPRESSIVE DISORDER (HCC): ICD-10-CM

## 2025-01-20 DIAGNOSIS — I63.9 CEREBROVASCULAR ACCIDENT (CVA), UNSPECIFIED MECHANISM (HCC): ICD-10-CM

## 2025-01-20 DIAGNOSIS — R09.02 HYPOXIA: ICD-10-CM

## 2025-01-20 DIAGNOSIS — Z09 HOSPITAL DISCHARGE FOLLOW-UP: Primary | ICD-10-CM

## 2025-01-20 DIAGNOSIS — Z79.4 TYPE 2 DIABETES MELLITUS WITH OTHER SKIN ULCER, WITH LONG-TERM CURRENT USE OF INSULIN (HCC): ICD-10-CM

## 2025-01-20 DIAGNOSIS — E11.622 TYPE 2 DIABETES MELLITUS WITH OTHER SKIN ULCER, WITH LONG-TERM CURRENT USE OF INSULIN (HCC): ICD-10-CM

## 2025-01-20 DIAGNOSIS — M54.32 SCIATICA OF LEFT SIDE: ICD-10-CM

## 2025-01-20 DIAGNOSIS — E78.2 MIXED HYPERLIPIDEMIA: ICD-10-CM

## 2025-01-20 DIAGNOSIS — J15.9 SECONDARY BACTERIAL PNEUMONIA: ICD-10-CM

## 2025-01-20 DIAGNOSIS — I48.0 PAROXYSMAL ATRIAL FIBRILLATION (HCC): ICD-10-CM

## 2025-01-20 DIAGNOSIS — E83.42 HYPOMAGNESEMIA: ICD-10-CM

## 2025-01-20 DIAGNOSIS — I10 PRIMARY HYPERTENSION: ICD-10-CM

## 2025-01-20 DIAGNOSIS — S81.809D OPEN WOUND OF LOWER EXTREMITY, UNSPECIFIED LATERALITY, SUBSEQUENT ENCOUNTER: ICD-10-CM

## 2025-01-20 DIAGNOSIS — J10.1 INFLUENZA A: ICD-10-CM

## 2025-01-20 PROBLEM — R52 INTRACTABLE PAIN: Status: RESOLVED | Noted: 2024-08-14 | Resolved: 2025-01-20

## 2025-01-20 PROBLEM — R53.83 FATIGUE: Status: RESOLVED | Noted: 2022-09-26 | Resolved: 2025-01-20

## 2025-01-20 PROBLEM — R00.0 TACHYCARDIA: Status: RESOLVED | Noted: 2022-11-14 | Resolved: 2025-01-20

## 2025-01-20 PROBLEM — R51.9 HEADACHE: Status: RESOLVED | Noted: 2023-02-21 | Resolved: 2025-01-20

## 2025-01-20 PROBLEM — D72.828 NEUTROPHILIC LEUKOCYTOSIS: Status: RESOLVED | Noted: 2022-11-17 | Resolved: 2025-01-20

## 2025-01-20 PROBLEM — K43.9 VENTRAL HERNIA WITHOUT OBSTRUCTION OR GANGRENE: Status: RESOLVED | Noted: 2019-01-16 | Resolved: 2025-01-20

## 2025-01-20 PROBLEM — K90.49 MALABSORPTION DUE TO INTOLERANCE, NOT ELSEWHERE CLASSIFIED: Status: RESOLVED | Noted: 2024-09-27 | Resolved: 2025-01-20

## 2025-01-20 PROBLEM — K43.2 RECURRENT VENTRAL INCISIONAL HERNIA: Status: RESOLVED | Noted: 2019-01-22 | Resolved: 2025-01-20

## 2025-01-20 PROBLEM — E87.1 HYPONATREMIA: Status: RESOLVED | Noted: 2022-11-17 | Resolved: 2025-01-20

## 2025-01-20 PROBLEM — G92.8 TOXIC METABOLIC ENCEPHALOPATHY: Status: RESOLVED | Noted: 2022-11-14 | Resolved: 2025-01-20

## 2025-01-20 PROBLEM — E61.2 MAGNESIUM DEFICIENCY: Status: RESOLVED | Noted: 2022-09-26 | Resolved: 2025-01-20

## 2025-01-20 PROBLEM — I67.82 TEMPORARY CEREBRAL VASCULAR DYSFUNCTION: Status: RESOLVED | Noted: 2024-01-24 | Resolved: 2025-01-20

## 2025-01-20 RX ORDER — OXYCODONE HYDROCHLORIDE 5 MG/1
5 TABLET ORAL EVERY 6 HOURS PRN
Qty: 120 TABLET | Refills: 0 | Status: SHIPPED | OUTPATIENT
Start: 2025-01-20 | End: 2025-02-19

## 2025-01-20 RX ORDER — PREDNISONE 10 MG/1
TABLET ORAL
Qty: 43 TABLET | Refills: 0 | Status: SHIPPED | OUTPATIENT
Start: 2025-01-20

## 2025-01-20 SDOH — ECONOMIC STABILITY: FOOD INSECURITY: WITHIN THE PAST 12 MONTHS, THE FOOD YOU BOUGHT JUST DIDN'T LAST AND YOU DIDN'T HAVE MONEY TO GET MORE.: PATIENT DECLINED

## 2025-01-20 SDOH — ECONOMIC STABILITY: FOOD INSECURITY: WITHIN THE PAST 12 MONTHS, YOU WORRIED THAT YOUR FOOD WOULD RUN OUT BEFORE YOU GOT MONEY TO BUY MORE.: PATIENT DECLINED

## 2025-01-20 ASSESSMENT — ENCOUNTER SYMPTOMS
VOMITING: 0
DIARRHEA: 0
WHEEZING: 0
SHORTNESS OF BREATH: 0
NAUSEA: 0
ABDOMINAL PAIN: 0
SORE THROAT: 0
COUGH: 0

## 2025-01-20 ASSESSMENT — PATIENT HEALTH QUESTIONNAIRE - PHQ9: DEPRESSION UNABLE TO ASSESS: PT REFUSES

## 2025-01-20 NOTE — PROGRESS NOTES
[address] [place] [address]  Phone [phone]   Fax [phone]      OFFICE VISIT: 2025    Elda Flood-: 1945      HPI  Reason For Visit:  Elda is a 79 y.o.     Follow-Up from Hospital (1. Was at [facility] emergency room related to pneumonia, flu a and volume overload/2. Daughter called requesting this be changed to a VV related to the cold/3. No imaging on [facility] portal /4. Echo done/5. Admitted 2025 discharged 2025)    Patient presents on follow up from hospitalization.  She was admitted to [facility]   Admission date 2025   Discharge 2025.    Principal diagnosis:  Influenza A   Secondary pneumonia  Hypoxia  Hypomagnesemia    Secondary diagnosis   Diabetes mellitus type 2   Hypertension   Hyperlipidemia   Hypomagnesemia   Generalized deconditioning     vitals were not taken for this visit.      There is no height or weight on file to calculate BMI.      I have reviewed the following with the Ms. Flood   Lab Review  Orders Only on 2025   Component Date Value    Magnesium 2025 1.8    Orders Only on 2025   Component Date Value    Magnesium 2025 1.8    Orders Only on 2024   Component Date Value    Magnesium 2024 1.1 (L)     WBC 2024 9.2     RBC 2024 3.90 (L)     Hemoglobin 2024 11.4 (L)     Hematocrit 2024 37.2     MCV 2024 95.4     MCH 2024 29.2     MCHC 2024 30.6 (L)     RDW 2024 20.5 (H)     Platelets 2024 199     MPV 2024 12.0     Neutrophils % 2024 54.2     Lymphocytes % 2024 24.6     Monocytes % 2024 19.8 (H)     Eosinophils % 2024 0.2     Basophils % 2024 0.2     Neutrophils Absolute 2024 5.0     Immature Granulocytes # 2024 0.1     Lymphocytes Absolute 2024 2.3     Monocytes Absolute 2024 1.80 (H)     Eosinophils Absolute 2024 0.00     Basophils Absolute

## 2025-01-22 ENCOUNTER — CARE COORDINATION (OUTPATIENT)
Dept: CARE COORDINATION | Age: 80
End: 2025-01-22

## 2025-01-22 ENCOUNTER — CARE COORDINATION (OUTPATIENT)
Dept: CASE MANAGEMENT | Age: 80
End: 2025-01-22

## 2025-01-22 DIAGNOSIS — Z79.4 TYPE 2 DIABETES MELLITUS WITH OTHER SKIN ULCER, WITH LONG-TERM CURRENT USE OF INSULIN (HCC): Primary | ICD-10-CM

## 2025-01-22 DIAGNOSIS — E11.622 TYPE 2 DIABETES MELLITUS WITH OTHER SKIN ULCER, WITH LONG-TERM CURRENT USE OF INSULIN (HCC): Primary | ICD-10-CM

## 2025-01-22 NOTE — CARE COORDINATION
Ambulatory Care Coordination Note     2025 11:01 AM     Patient Current Location:  Home: [address] [place] [address] [place] [address]     ACM contacted the patient by telephone. Verified name and  with patient as identifiers.         ACM: Shell Whittaker     Challenges to be reviewed by the provider   Additional needs identified to be addressed with provider yes  Hyperglycemia on steroid               Method of communication with provider: none.    Utilization: Patient has not had any utilization since our last call.     Care Summary Note: States she's doing better. Reports her sciatic nerve was bothering her and Dr. Solis started her on steroids  which has helped a lot. Reports she's up walking again and her pain is not as bad. Reports taking roxicodone 5 mg BID. States her leg wound is healing. Reports having regular bowel movements. Denies SOB, lightheadedness, dizziness, CP, fever, or flu-like symptoms. Reports occasional nonproductive cough.     States her BS has been running high since she started the steroid. Reports  last night. She took 15 units humalog and it came down to 300's. Reports  this morning, came down to 200s after administering 12 units humalog. States she's not using prescribed Lantus and doesn't recall ever using this, but states she has it. Denies symptoms or hyperglycemia. Routed to Dr. Solis. Reviewed RPM data. Pt denies other questions or concerns at this time.     Date BP BS   2025 115/64/81 238   2025 113/49/80 194   2025 104/52/80 250   2025 114/60/73 140   2025 136/60/64 112       Offered patient enrollment in the Remote Patient Monitoring (RPM) program for in-home monitoring: activated and monitoring.   Medications Reviewed:   Reviewed diabetes meds    Advance Care Planning:   Not reviewed during this call     Care Planning:   Not completed during this call    PCP/Specialist follow up:   Future Appointments         Provider Specialty Dept Phone

## 2025-01-22 NOTE — CARE COORDINATION
1/22/2025 12:58 PM  *  Unable to Reach Date/Time:  1/22/2025 12:58 PM  LPN attempted to reach patient by telephone regarding red alert in remote patient monitoring program. Unable to leave VM. Will attempt to reach patient again.

## 2025-01-22 NOTE — CARE COORDINATION
2025 1:32 PM  *  Alert and Triage   -Remote Alert Monitoring Note      Date/Time:  2025 1:32 PM  Patient Current Location: Home: [address] [place] [address] [place] [address]  Verified patients name and  as identifiers.    Rpm alert to be reviewed by the provider   red alert  glucose reading (345)  Vitals Recheck glucose reading (255)  Additional needs to be addressed by provider: FYI glucose has come down this afternoon. Notes patient has not been using insulin per medication review/orders noted in chart per last ACM note. She has not been using lantus & is using Humalog only on ss. Also, of note, patient is taking steroids & has approx 10 more days of that.                    LPN contacted patient by telephone regarding red alert received   Background: RPM monitoring for HTN, DM  Refer to 911 immediately if:  Patient unresponsive or unable to provide history  Change in cognition or sudden confusion  Patient unable to respond in complete sentences  Intense chest pain/tightness  Any concern for any clinical emergency  Red Alert: Provider response time of 1 hr required for any red alert requiring intervention  Yellow Alert: Provider response time of 3hr required for any escalated yellow alert  Patient Chief Complaint:  Hyperglycemia:none  Clinical Interventions:  LPN CC spoke with patient. States she's been doing ok today. Denies hyperglycemia sx. Patient checks glucose 3x/day. Does not take lantus at night, takes humalog only on ss. Notes she does need a refill of humalog.     Plan/Follow Up: Will continue to review, monitor and address alerts with follow up based on severity of symptoms and risk factors.  **For any new or worsening symptoms or you are concerned in anyway, please contact your Provider or report to the nearest Emergency Room.**

## 2025-01-23 ENCOUNTER — CARE COORDINATION (OUTPATIENT)
Dept: CASE MANAGEMENT | Age: 80
End: 2025-01-23

## 2025-01-23 ENCOUNTER — HOSPITAL ENCOUNTER (OUTPATIENT)
Dept: WOUND CARE | Age: 80
Discharge: HOME | End: 2025-01-23
Attending: SURGERY
Payer: MEDICARE

## 2025-01-23 VITALS
SYSTOLIC BLOOD PRESSURE: 168 MMHG | HEART RATE: 65 BPM | TEMPERATURE: 96.9 F | BODY MASS INDEX: 32.66 KG/M2 | WEIGHT: 173 LBS | RESPIRATION RATE: 18 BRPM | HEIGHT: 61 IN | DIASTOLIC BLOOD PRESSURE: 77 MMHG

## 2025-01-23 DIAGNOSIS — E11.622 TYPE 2 DIABETES MELLITUS WITH OTHER SKIN ULCER, WITH LONG-TERM CURRENT USE OF INSULIN (HCC): Primary | ICD-10-CM

## 2025-01-23 DIAGNOSIS — L97.912 SKIN ULCER OF RIGHT LOWER LEG WITH FAT LAYER EXPOSED (HCC): Primary | ICD-10-CM

## 2025-01-23 DIAGNOSIS — E83.42 HYPOMAGNESEMIA: ICD-10-CM

## 2025-01-23 DIAGNOSIS — Z79.4 TYPE 2 DIABETES MELLITUS WITH OTHER SKIN ULCER, WITH LONG-TERM CURRENT USE OF INSULIN (HCC): Primary | ICD-10-CM

## 2025-01-23 DIAGNOSIS — B37.2 YEAST DERMATITIS: ICD-10-CM

## 2025-01-23 DIAGNOSIS — Z79.4 TYPE 2 DIABETES MELLITUS WITH OTHER SKIN ULCER, WITH LONG-TERM CURRENT USE OF INSULIN (HCC): ICD-10-CM

## 2025-01-23 DIAGNOSIS — E11.622 TYPE 2 DIABETES MELLITUS WITH OTHER SKIN ULCER, WITH LONG-TERM CURRENT USE OF INSULIN (HCC): ICD-10-CM

## 2025-01-23 DIAGNOSIS — E83.42 HYPOMAGNESEMIA: Primary | ICD-10-CM

## 2025-01-23 DIAGNOSIS — E11.9 TYPE 2 DIABETES MELLITUS WITHOUT COMPLICATION, WITHOUT LONG-TERM CURRENT USE OF INSULIN (HCC): ICD-10-CM

## 2025-01-23 LAB — MAGNESIUM SERPL-MCNC: 1.5 MG/DL (ref 1.6–2.4)

## 2025-01-23 PROCEDURE — 99213 OFFICE O/P EST LOW 20 MIN: CPT

## 2025-01-23 PROCEDURE — 99212 OFFICE O/P EST SF 10 MIN: CPT | Performed by: SURGERY

## 2025-01-23 RX ORDER — MUPIROCIN 20 MG/G
OINTMENT TOPICAL ONCE
OUTPATIENT
Start: 2025-01-23 | End: 2025-01-23

## 2025-01-23 RX ORDER — BETAMETHASONE DIPROPIONATE 0.5 MG/G
CREAM TOPICAL ONCE
OUTPATIENT
Start: 2025-01-23 | End: 2025-01-23

## 2025-01-23 RX ORDER — LIDOCAINE HYDROCHLORIDE 40 MG/ML
SOLUTION TOPICAL ONCE
OUTPATIENT
Start: 2025-01-23 | End: 2025-01-23

## 2025-01-23 RX ORDER — INSULIN LISPRO 100 [IU]/ML
0-8 INJECTION, SOLUTION INTRAVENOUS; SUBCUTANEOUS
Qty: 10 ML | Refills: 1 | Status: CANCELLED | OUTPATIENT
Start: 2025-01-23

## 2025-01-23 RX ORDER — CLOBETASOL PROPIONATE 0.5 MG/G
OINTMENT TOPICAL ONCE
OUTPATIENT
Start: 2025-01-23 | End: 2025-01-23

## 2025-01-23 RX ORDER — BACITRACIN ZINC AND POLYMYXIN B SULFATE 500; 1000 [USP'U]/G; [USP'U]/G
OINTMENT TOPICAL ONCE
OUTPATIENT
Start: 2025-01-23 | End: 2025-01-23

## 2025-01-23 RX ORDER — LIDOCAINE 40 MG/G
CREAM TOPICAL ONCE
OUTPATIENT
Start: 2025-01-23 | End: 2025-01-23

## 2025-01-23 RX ORDER — SODIUM CHLOR/HYPOCHLOROUS ACID 0.033 %
SOLUTION, IRRIGATION IRRIGATION ONCE
OUTPATIENT
Start: 2025-01-23 | End: 2025-01-23

## 2025-01-23 RX ORDER — LIDOCAINE 50 MG/G
OINTMENT TOPICAL ONCE
OUTPATIENT
Start: 2025-01-23 | End: 2025-01-23

## 2025-01-23 RX ORDER — ACYCLOVIR 400 MG/1
1 TABLET ORAL
Qty: 3 EACH | Status: SHIPPED | OUTPATIENT
Start: 2025-01-23 | End: 2025-01-24

## 2025-01-23 RX ORDER — GENTAMICIN SULFATE 1 MG/G
OINTMENT TOPICAL ONCE
OUTPATIENT
Start: 2025-01-23 | End: 2025-01-23

## 2025-01-23 RX ORDER — GINSENG 100 MG
CAPSULE ORAL ONCE
OUTPATIENT
Start: 2025-01-23 | End: 2025-01-23

## 2025-01-23 RX ORDER — LIDOCAINE HYDROCHLORIDE 20 MG/ML
JELLY TOPICAL ONCE
Status: COMPLETED | OUTPATIENT
Start: 2025-01-23 | End: 2025-01-23

## 2025-01-23 RX ORDER — LIDOCAINE HYDROCHLORIDE 20 MG/ML
JELLY TOPICAL ONCE
OUTPATIENT
Start: 2025-01-23 | End: 2025-01-23

## 2025-01-23 RX ORDER — SILVER SULFADIAZINE 10 MG/G
CREAM TOPICAL ONCE
OUTPATIENT
Start: 2025-01-23 | End: 2025-01-23

## 2025-01-23 RX ORDER — NEOMYCIN/BACITRACIN/POLYMYXINB 3.5-400-5K
OINTMENT (GRAM) TOPICAL ONCE
OUTPATIENT
Start: 2025-01-23 | End: 2025-01-23

## 2025-01-23 RX ORDER — BLOOD SUGAR DIAGNOSTIC
STRIP MISCELLANEOUS
Qty: 100 STRIP | Refills: 11 | Status: SHIPPED | OUTPATIENT
Start: 2025-01-23 | End: 2025-01-24

## 2025-01-23 RX ORDER — INSULIN LISPRO 100 [IU]/ML
0-8 INJECTION, SOLUTION INTRAVENOUS; SUBCUTANEOUS
Qty: 10 ML | Refills: 1 | Status: SHIPPED | OUTPATIENT
Start: 2025-01-23

## 2025-01-23 RX ORDER — TRIAMCINOLONE ACETONIDE 1 MG/G
OINTMENT TOPICAL ONCE
OUTPATIENT
Start: 2025-01-23 | End: 2025-01-23

## 2025-01-23 RX ORDER — ACYCLOVIR 400 MG/1
1 TABLET ORAL DAILY
Qty: 1 EACH | Status: SHIPPED | OUTPATIENT
Start: 2025-01-23 | End: 2025-01-24

## 2025-01-23 RX ADMIN — LIDOCAINE HYDROCHLORIDE: 20 JELLY TOPICAL at 10:36

## 2025-01-23 ASSESSMENT — PAIN DESCRIPTION - FREQUENCY: FREQUENCY: INTERMITTENT

## 2025-01-23 ASSESSMENT — PAIN DESCRIPTION - ORIENTATION: ORIENTATION: LOWER

## 2025-01-23 ASSESSMENT — PAIN SCALES - GENERAL: PAINLEVEL_OUTOF10: 8

## 2025-01-23 ASSESSMENT — PAIN DESCRIPTION - DESCRIPTORS: DESCRIPTORS: SHARP

## 2025-01-23 ASSESSMENT — PAIN DESCRIPTION - PAIN TYPE: TYPE: CHRONIC PAIN

## 2025-01-23 ASSESSMENT — PAIN DESCRIPTION - LOCATION: LOCATION: BACK

## 2025-01-23 ASSESSMENT — PAIN - FUNCTIONAL ASSESSMENT: PAIN_FUNCTIONAL_ASSESSMENT: PREVENTS OR INTERFERES SOME ACTIVE ACTIVITIES AND ADLS

## 2025-01-23 ASSESSMENT — PAIN DESCRIPTION - ONSET: ONSET: ON-GOING

## 2025-01-23 NOTE — PLAN OF CARE
Problem: Pain  Goal: Verbalizes/displays adequate comfort level or baseline comfort level  Outcome: Completed     Problem: Wound:  Goal: Will show signs of wound healing; wound closure and no evidence of infection  Description: Will show signs of wound healing; wound closure and no evidence of infection  Outcome: Completed     Problem: Falls - Risk of:  Goal: Will remain free from falls  Description: Will remain free from falls  Outcome: Completed     Problem: Blood Glucose:  Goal: Ability to maintain appropriate glucose levels will improve  Description: Ability to maintain appropriate glucose levels will improve  Outcome: Completed

## 2025-01-23 NOTE — PROGRESS NOTES
[facility]   Progress Note and Procedure Note      Elda Flood  MEDICAL RECORD NUMBER:  [id number]  AGE: 79 y.o.   GENDER: female  : 1945  EPISODE DATE:  2025    Subjective:     Chief Complaint   Patient presents with    Wound Check     Nauseated this morning, stated from eating out this morning         HISTORY of PRESENT ILLNESS HPI     Elda Flood is a 79 y.o. female who presents today for wound/ulcer evaluation.   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    HYSTERECTOMY, TOTAL ABDOMINAL (CERVIX REMOVED)  1986    LEG SURGERY Right 8/15/2024    RIGHT LEG EVACUTION HEMATOMA performed by Emili Landry DO at [facility] OR    OVARY REMOVAL Bilateral     age 41    UMBILICAL HERNIA REPAIR N/A 2019    INCISIONAL HERNIA REPAIR WITH BIOLOGIC MESH performed by Bebo Guevara MD at [facility] OR    UPPER GASTROINTESTINAL ENDOSCOPY  1985       FAMILY HISTORY    Family History   Problem Relation Age of Onset    Colon Cancer Mother     Colon Polyps Neg Hx     Esophageal Cancer Neg Hx     Liver Disease Neg Hx     Liver

## 2025-01-23 NOTE — PATIENT INSTRUCTIONS
[facility]   Physician Orders and Discharge Instructions  [address] [place] [address] Drive  [address]  [place] [address]  Telephone: [phone]      FAX [phone]    NAME:  Elda Flood  YOB: 1945  MEDICAL RECORD NUMBER:  [id number]  DATE:  1/23/2025    Discharge condition: Stable    Discharge to: Home    Left via:Private automobile    Accompanied by: Family    [facility] to change Coflex on Mondays and Wound Care to Change on Thursdays    Dressing Orders: Right Lower Leg Wound  Silver Alginate to open areas  Calamine Coflex Unnaboot (absorbent layer between first and second layer) Keep clean and Dry  Elevate feet to level or above heart 3-4 times daily and as needed to for 30 minutes to reduce swelling  Remove wraps and call office if- Wraps get wet, Cause increased pain, Slide down or you are unable to make  your next scheduled appointment  Multi Vitamin, High Protein diet as tolerated    [facility] follow up visit __________1 week___________________  (Please note your next appointment above and if you are unable to keep, kindly give a 24 hour notice. Thank you.)    If you experience any of the following, please call the Wound Care Center during business hours:    * Increase in Pain  * Temperature over 101  * Increase in drainage from your wound  * Drainage with a foul odor  * Bleeding  * Increase in swelling  * Need for compression bandage changes due to slippage, breakthrough drainage.    If you need medical attention outside of the business hours of the Wound Care Centers please contact your PCP or go to the nearest emergency room.

## 2025-01-23 NOTE — CARE COORDINATION
2025 1:44 PM  *  Alert and Triage   -Remote Alert Monitoring Note      Date/Time:  2025 1:44 PM  Patient Current Location: Home: [address] [place] [address] [place] [address]  Verified patients name and  as identifiers.    Rpm alert to be reviewed by the provider   red alert  blood pressure reading (152/132)  Vitals Recheck blood pressure reading (126/60)  Additional needs to be addressed by provider: No                   LPN contacted patient by telephone regarding red alert received   Background: RPM monitoring for HTN, DM  Refer to 911 immediately if:  Patient unresponsive or unable to provide history  Change in cognition or sudden confusion  Patient unable to respond in complete sentences  Intense chest pain/tightness  Any concern for any clinical emergency  Red Alert: Provider response time of 1 hr required for any red alert requiring intervention  Yellow Alert: Provider response time of 3hr required for any escalated yellow alert  Patient Chief Complaint:  Blood Pressure BP Triage  Are you having any Chest Pain? no   Are you having any Shortness of Breath? no   Do you have a headache or have any vision changes? no   Are you having any numbness or tingling? no   Are you having any other health concerns or issues? no  Patient/Caregiver educated on how to properly take a blood pressure. Patient/Caregiver verbalizes understanding.     Clinical Interventions:  LPN CC spoke with patient. States she's doing fine. Had WC visit today. Denies HTN sx. It is noted 2 readings were taken close together on same arm. Patient agreeable to check BP again in 20-30 min.     Plan/Follow Up: Will continue to review, monitor and address alerts with follow up based on severity of symptoms and risk factors.  **For any new or worsening symptoms or you are concerned in anyway, please contact your Provider or report to the nearest Emergency Room.**

## 2025-01-23 NOTE — PLAN OF CARE
Unna Boot Application   Below Knee    NAME:  Elda Flood  YOB: 1945  MEDICAL RECORD NUMBER:  [id number]  DATE:  1/23/2025    Unna boot: Applied moisturizing agent to dry skin as needed.   Appied primary and secondary dressing as ordered.  Applied Unna roll from toes to knee overlapping each time.   Applied ace wrap or coban from toes to below the knee.   Instructed patient/caregiver to keep dressing dry and intact. DO NOT REMOVE DRESSING.   Instructed pt/family/caregiver to report excessive draining, loose bandage, wet dressing, severe pain or tingling in toes.  Applied Unna Boot dressing below the knee to right lower leg.    Unna Boot(s) were applied per  Guidelines.     Electronically signed by Emily Ackerman RN on 1/23/2025 at 11:15 AM

## 2025-01-23 NOTE — TELEPHONE ENCOUNTER
Tried to call patient to let her know we are sending in a refill for her Humalog.    Will send to provider to send in.    Requested Prescriptions     Pending Prescriptions Disp Refills    insulin lispro (HUMALOG,ADMELOG) 100 UNIT/ML SOLN injection vial 10 mL 1     Sig: Inject 0-8 Units into the skin 3 times daily (with meals)

## 2025-01-23 NOTE — TELEPHONE ENCOUNTER
Patients daughter called because they had requested a Dexcom G7 monitoring device from the  but I do not see that in the notes. She needs the test strips refilled as well.    Will send to provider for approval.     Requested Prescriptions     Pending Prescriptions Disp Refills    Continuous Glucose  (DEXCOM G7 ) JOSE 1 each DEVICE     Si Device by Does not apply route daily    Continuous Glucose Sensor (DEXCOM G7 SENSOR) MISC 3 each BOXES     Si Dose by Does not apply route every 3 days    blood glucose test strips (ONETOUCH ULTRA) strip 100 strip 11     Sig: USE AS DIRECTED FOUR TIMES DAILY AS DIRECTED

## 2025-01-24 ENCOUNTER — TELEPHONE (OUTPATIENT)
Age: 80
End: 2025-01-24

## 2025-01-24 VITALS — HEART RATE: 66 BPM | SYSTOLIC BLOOD PRESSURE: 126 MMHG | DIASTOLIC BLOOD PRESSURE: 60 MMHG

## 2025-01-24 DIAGNOSIS — E11.622 TYPE 2 DIABETES MELLITUS WITH OTHER SKIN ULCER, WITH LONG-TERM CURRENT USE OF INSULIN (HCC): Primary | ICD-10-CM

## 2025-01-24 DIAGNOSIS — Z79.4 TYPE 2 DIABETES MELLITUS WITH OTHER SKIN ULCER, WITH LONG-TERM CURRENT USE OF INSULIN (HCC): Primary | ICD-10-CM

## 2025-01-24 DIAGNOSIS — E44.1 MILD MALNUTRITION (HCC): ICD-10-CM

## 2025-01-24 RX ORDER — BLOOD SUGAR DIAGNOSTIC
STRIP MISCELLANEOUS
Qty: 100 STRIP | Refills: 11 | Status: SHIPPED | OUTPATIENT
Start: 2025-01-24

## 2025-01-24 RX ORDER — ACYCLOVIR 400 MG/1
1 TABLET ORAL DAILY
Qty: 1 EACH | Refills: 0 | Status: SHIPPED | OUTPATIENT
Start: 2025-01-24

## 2025-01-24 RX ORDER — ACYCLOVIR 400 MG/1
1 TABLET ORAL
Qty: 3 EACH | Refills: 0 | Status: SHIPPED | OUTPATIENT
Start: 2025-01-24

## 2025-01-24 NOTE — TELEPHONE ENCOUNTER
Dexcom g7 was denied - I can appeal but a fast appeal is 30 days, it looks like freestyle odilia is preferred. Do you want her to have that instead?    No

## 2025-01-24 NOTE — TELEPHONE ENCOUNTER
We can try to see if we can get the Dexcom 6.  Freestyle odilia is not accurate and I believe this would be dangerous for her

## 2025-01-27 ENCOUNTER — CARE COORDINATION (OUTPATIENT)
Dept: CASE MANAGEMENT | Age: 80
End: 2025-01-27

## 2025-01-27 ENCOUNTER — TELEPHONE (OUTPATIENT)
Age: 80
End: 2025-01-27

## 2025-01-27 RX ORDER — PROCHLORPERAZINE 25 MG/1
1 SUPPOSITORY RECTAL
Qty: 9 EACH | Refills: 3 | Status: SHIPPED | OUTPATIENT
Start: 2025-01-27 | End: 2025-02-06

## 2025-01-27 RX ORDER — PROCHLORPERAZINE 25 MG/1
1 SUPPOSITORY RECTAL
Qty: 3 EACH | Refills: 3 | Status: SHIPPED | OUTPATIENT
Start: 2025-01-27 | End: 2025-02-06

## 2025-01-27 RX ORDER — PROCHLORPERAZINE 25 MG/1
1 SUPPOSITORY RECTAL ONCE
Qty: 1 EACH | Refills: 0 | Status: SHIPPED | OUTPATIENT
Start: 2025-01-27 | End: 2025-01-27

## 2025-01-27 NOTE — TELEPHONE ENCOUNTER
----- Message from Dr. NICOLE Solis DO sent at 1/24/2025  6:32 PM CST -----  Magnesium level is low.  Recommend adding an additional magnesium tablet on alternating days, (every other day)

## 2025-01-27 NOTE — CARE COORDINATION
1/27/2025 12:12 PM  *  Tablet Messaging Message sent to patient via Patient Connect Portal for Remote Patient Monitoring     RPM Nurse message No readings in two days Hello, Please see an important message from your RPM Team:  This is a reminder that we have not received your readings for two days please enter them as soon as possible.     Please do not respond to this message; If you have a question or concern please call your Ambulatory Care Manager or your Primary Care Physician.

## 2025-01-27 NOTE — TELEPHONE ENCOUNTER
Called patient, spoke with: Child/Children regarding the results of the patients most recent labs.  I advised Child/Children of Dr. Solis recommendations.   Child/Children did voice understanding      Daughter states she went to get pt insulin from pharmacy and they stated they did not have order    I called pharmacy they stated they do not have RX in stock it will come in tomorrow- called daughter to make her aware     Daughter states she  will be okay until tomorrow since they have some insulin

## 2025-01-28 ENCOUNTER — CARE COORDINATION (OUTPATIENT)
Dept: CASE MANAGEMENT | Age: 80
End: 2025-01-28

## 2025-01-28 NOTE — CARE COORDINATION
1/28/2025 11:49 AM  *  Unable to Reach Date/Time:  1/28/2025 11:49 AM  LPN attempted to reach patient by telephone regarding  no metrics x 3 days  in remote patient monitoring program. Unable to leave VM. Will attempt to reach patient again.     1/28/2025 11:50 AM  *  Tablet Messaging Message sent to patient via Patient Connect Portal for Remote Patient Monitoring     RPM Nurse message Tablet Readings Hello, Please see an important message from your RPM Team:    Please remember to take your readings daily before noon.   Please do not respond to this message; If you have a question or concern please call your Ambulatory Care Manager or your Primary Care Physician.

## 2025-01-29 ENCOUNTER — TELEPHONE (OUTPATIENT)
Dept: HEMATOLOGY | Age: 80
End: 2025-01-29

## 2025-01-29 ENCOUNTER — CARE COORDINATION (OUTPATIENT)
Dept: CARE COORDINATION | Age: 80
End: 2025-01-29

## 2025-01-29 NOTE — CARE COORDINATION
Verbalizes Understanding  Comment: Reveiwed medication, pt is compliant.  continues to work with pt on mobility and balance. Enc to monitor BP daily, reviewed s/s worsening condition, when to call PCP    Educate reporting changes in condition, taught by Tessie Rahman RN at 1/29/2025 11:06 AM.  Learner: Patient  Readiness: Acceptance  Method: Explanation  Response: Verbalizes Understanding  Comment: Reveiwed medication, pt is compliant.  continues to work with pt on mobility and balance. Enc to monitor BP daily, reviewed s/s worsening condition, when to call PCP    Educate Patient on When to Call for Symptoms, taught by Tessie Rahman, PATRICIA at 1/29/2025 11:06 AM.  Learner: Patient  Readiness: Acceptance  Method: Explanation  Response: Verbalizes Understanding  Comment: Reveiwed medication, pt is compliant.  continues to work with pt on mobility and balance. Enc to monitor BP daily, reviewed s/s worsening condition, when to call PCP    Adaptive Equipment, taught by Tessie Rahman RN at 1/29/2025 11:06 AM.  Learner: Patient  Readiness: Acceptance  Method: Explanation  Response: Verbalizes Understanding  Comment: Reveiwed medication, pt is compliant.  continues to work with pt on mobility and balance. Enc to monitor BP daily, reviewed s/s worsening condition, when to call PCP    Education Comments  No comments found.     ,    Goals Addressed                   This Visit's Progress     Self Monitoring   Worsening     Self-Monitored Blood Glucose - I will check my blood sugar Fasting blood sugar and blood sugars ac  I will notify my provider of any trends of increasing or decreasing blood sugars over a 1 month period.  I will notify my provider if I have any blood sugar readings less than 70 more than 2 times a month.  Blood Pressure - I will take my blood pressure as directed - Daily  I will notify my provider of any trends of increasing or decreasing blood pressures over a month period of time.  I will notify my

## 2025-01-29 NOTE — TELEPHONE ENCOUNTER
Called Patient and reminded patient of their appointment on 02/03/2025 and patient confirmed they would be here. Reminded patient to just come at appointment time, and to not come at the lab appointment time. Reminded patient that we will not check them in any more than 30 minutes before appointment time.  We have now moved to the [facility] [place] that is located between our old office and the ER at the [facility]. Letting the Pt know that our front entrance faces the  [place]. Reminded pt to eat well and be well hydrated for their labs.

## 2025-01-30 ENCOUNTER — HOSPITAL ENCOUNTER (OUTPATIENT)
Dept: WOUND CARE | Age: 80
Discharge: HOME | End: 2025-01-30
Attending: SURGERY
Payer: MEDICARE

## 2025-01-30 VITALS
HEART RATE: 77 BPM | DIASTOLIC BLOOD PRESSURE: 77 MMHG | SYSTOLIC BLOOD PRESSURE: 175 MMHG | HEIGHT: 61 IN | BODY MASS INDEX: 32.66 KG/M2 | WEIGHT: 173 LBS | TEMPERATURE: 97.4 F | RESPIRATION RATE: 18 BRPM

## 2025-01-30 DIAGNOSIS — L97.222 NON-PRESSURE CHRONIC ULCER OF LEFT CALF WITH FAT LAYER EXPOSED (HCC): ICD-10-CM

## 2025-01-30 DIAGNOSIS — E11.622 TYPE 2 DIABETES MELLITUS WITH OTHER SKIN ULCER, WITH LONG-TERM CURRENT USE OF INSULIN (HCC): ICD-10-CM

## 2025-01-30 DIAGNOSIS — Z79.4 TYPE 2 DIABETES MELLITUS WITH OTHER SKIN ULCER, WITH LONG-TERM CURRENT USE OF INSULIN (HCC): ICD-10-CM

## 2025-01-30 DIAGNOSIS — L97.912 SKIN ULCER OF RIGHT LOWER LEG WITH FAT LAYER EXPOSED (HCC): Primary | ICD-10-CM

## 2025-01-30 DIAGNOSIS — B37.2 YEAST DERMATITIS: ICD-10-CM

## 2025-01-30 DIAGNOSIS — L97.912 SKIN ULCER OF RIGHT LOWER LEG WITH FAT LAYER EXPOSED (HCC): Chronic | ICD-10-CM

## 2025-01-30 DIAGNOSIS — R09.02 HYPOXIA: Primary | ICD-10-CM

## 2025-01-30 DIAGNOSIS — E53.8 B12 DEFICIENCY: ICD-10-CM

## 2025-01-30 LAB — MAGNESIUM SERPL-MCNC: 1.8 MG/DL (ref 1.6–2.4)

## 2025-01-30 PROCEDURE — 97598 DBRDMT OPN WND ADDL 20CM/<: CPT

## 2025-01-30 PROCEDURE — 97597 DBRDMT OPN WND 1ST 20 CM/<: CPT

## 2025-01-30 PROCEDURE — 97597 DBRDMT OPN WND 1ST 20 CM/<: CPT | Performed by: SURGERY

## 2025-01-30 PROCEDURE — 97598 DBRDMT OPN WND ADDL 20CM/<: CPT | Performed by: SURGERY

## 2025-01-30 RX ORDER — SODIUM CHLOR/HYPOCHLOROUS ACID 0.033 %
SOLUTION, IRRIGATION IRRIGATION ONCE
OUTPATIENT
Start: 2025-01-30 | End: 2025-01-30

## 2025-01-30 RX ORDER — LIDOCAINE HYDROCHLORIDE 20 MG/ML
JELLY TOPICAL ONCE
OUTPATIENT
Start: 2025-01-30 | End: 2025-01-30

## 2025-01-30 RX ORDER — MUPIROCIN 20 MG/G
OINTMENT TOPICAL ONCE
OUTPATIENT
Start: 2025-01-30 | End: 2025-01-30

## 2025-01-30 RX ORDER — NEOMYCIN/BACITRACIN/POLYMYXINB 3.5-400-5K
OINTMENT (GRAM) TOPICAL ONCE
OUTPATIENT
Start: 2025-01-30 | End: 2025-01-30

## 2025-01-30 RX ORDER — GINSENG 100 MG
CAPSULE ORAL ONCE
OUTPATIENT
Start: 2025-01-30 | End: 2025-01-30

## 2025-01-30 RX ORDER — SILVER SULFADIAZINE 10 MG/G
CREAM TOPICAL ONCE
OUTPATIENT
Start: 2025-01-30 | End: 2025-01-30

## 2025-01-30 RX ORDER — TRIAMCINOLONE ACETONIDE 1 MG/G
OINTMENT TOPICAL ONCE
OUTPATIENT
Start: 2025-01-30 | End: 2025-01-30

## 2025-01-30 RX ORDER — LIDOCAINE 50 MG/G
OINTMENT TOPICAL ONCE
OUTPATIENT
Start: 2025-01-30 | End: 2025-01-30

## 2025-01-30 RX ORDER — GENTAMICIN SULFATE 1 MG/G
OINTMENT TOPICAL ONCE
OUTPATIENT
Start: 2025-01-30 | End: 2025-01-30

## 2025-01-30 RX ORDER — LIDOCAINE HYDROCHLORIDE 20 MG/ML
JELLY TOPICAL ONCE
Status: COMPLETED | OUTPATIENT
Start: 2025-01-30 | End: 2025-01-30

## 2025-01-30 RX ORDER — BETAMETHASONE DIPROPIONATE 0.5 MG/G
CREAM TOPICAL ONCE
OUTPATIENT
Start: 2025-01-30 | End: 2025-01-30

## 2025-01-30 RX ORDER — CLOBETASOL PROPIONATE 0.5 MG/G
OINTMENT TOPICAL ONCE
OUTPATIENT
Start: 2025-01-30 | End: 2025-01-30

## 2025-01-30 RX ORDER — LIDOCAINE HYDROCHLORIDE 40 MG/ML
SOLUTION TOPICAL ONCE
OUTPATIENT
Start: 2025-01-30 | End: 2025-01-30

## 2025-01-30 RX ORDER — BACITRACIN ZINC AND POLYMYXIN B SULFATE 500; 1000 [USP'U]/G; [USP'U]/G
OINTMENT TOPICAL ONCE
OUTPATIENT
Start: 2025-01-30 | End: 2025-01-30

## 2025-01-30 RX ORDER — LIDOCAINE 40 MG/G
CREAM TOPICAL ONCE
OUTPATIENT
Start: 2025-01-30 | End: 2025-01-30

## 2025-01-30 RX ADMIN — LIDOCAINE HYDROCHLORIDE: 20 JELLY TOPICAL at 10:19

## 2025-01-30 NOTE — PLAN OF CARE
Problem: Wound:  Goal: Will show signs of wound healing; wound closure and no evidence of infection  Description: Will show signs of wound healing; wound closure and no evidence of infection  Outcome: Progressing     Problem: Compression therapy:  Goal: Will be free from complications associated with compression therapy  Description: Will be free from complications associated with compression therapy  Outcome: Progressing     Problem: Weight control:  Goal: Ability to maintain an optimal weight for height and age will be supported  Description: Ability to maintain an optimal weight for height and age will be supported  Outcome: Progressing     Problem: Falls - Risk of:  Goal: Will remain free from falls  Description: Will remain free from falls  Outcome: Progressing     Problem: Blood Glucose:  Goal: Ability to maintain appropriate glucose levels will improve  Description: Ability to maintain appropriate glucose levels will improve  Outcome: Progressing

## 2025-01-30 NOTE — PROGRESS NOTES
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         [facility] to change Coflex on Mondays and Wound Care to Change on Thursdays  Dressing Orders: Left Lower Leg  Mepilex Border to open area, change twice weekly  Dressing Orders: Right Lower Leg Wound  Silver Alginate to open areas  Calamine Coflex Unnaboot (absorbent layer between first and second layer) Keep clean and Dry  Elevate feet to level or above heart 3-4 times daily and as needed to for 30 minutes to reduce swelling  Remove wraps and call office if- Wraps get wet, Cause increased pain, Slide down or you are unable to make  your next scheduled appointment  Multi Vitamin, High Protein diet as tolerated  [facility] follow up visit ___________1 week__________________  (Please note your next appointment above and if you are unable to keep, kindly give a 24 hour notice. Thank  you.)  If you experience any of the following, please call the Wound Care Center during business hours:  * Increase in Pain  * Temperature over 101  * Increase in drainage from your wound  * Drainage with a foul odor  * Bleeding  Instructions  Elda Flood ([facility]: [id number]) (MRN: [id number])  Printed by [[id number]] at 1/30/2025 10:46 AM Page 11 of 11  Instructions (continued)  * Increase in swelling  * Need for compression bandage

## 2025-01-30 NOTE — PATIENT INSTRUCTIONS
[facility] Hyper[facility]xygen Therapy   Physician Orders and Discharge Instructions  [address] [place] [address] Drive  [address]  [place] [address]  Telephone: [phone]      FAX [phone]    NAME:  Elda Flood  YOB: 1945  MEDICAL RECORD NUMBER:  [id number]  DATE:  1/30/2025    Discharge condition: Stable    Discharge to: Home    Left via:Private automobile    Accompanied by: Family    [facility] to change Coflex on Mondays and Wound Care to Change on Thursdays    Dressing Orders: Left Lower Leg  Mepilex Border to open area, change twice weekly     Dressing Orders: Right Lower Leg Wound  Silver Alginate to open areas  Calamine Coflex Unnaboot (absorbent layer between first and second layer) Keep clean and Dry  Elevate feet to level or above heart 3-4 times daily and as needed to for 30 minutes to reduce swelling  Remove wraps and call office if- Wraps get wet, Cause increased pain, Slide down or you are unable to make  your next scheduled appointment  Multi Vitamin, High Protein diet as tolerated    [facility] follow up visit ___________1 week__________________  (Please note your next appointment above and if you are unable to keep, kindly give a 24 hour notice. Thank you.)    If you experience any of the following, please call the Wound Care Center during business hours:    * Increase in Pain  * Temperature over 101  * Increase in drainage from your wound  * Drainage with a foul odor  * Bleeding  * Increase in swelling  * Need for compression bandage changes due to slippage, breakthrough drainage.    If you need medical attention outside of the business hours of the Wound Care Centers please contact your PCP or go to the nearest emergency room.

## 2025-01-30 NOTE — PLAN OF CARE
Unna Boot Application   Below Knee    NAME:  Elda Flood  YOB: 1945  MEDICAL RECORD NUMBER:  [id number]  DATE:  1/30/2025    Unna boot: Applied moisturizing agent to dry skin as needed.   Appied primary and secondary dressing as ordered.  Applied Unna roll from toes to knee overlapping each time.   Applied ace wrap or coban from toes to below the knee.   Instructed patient/caregiver to keep dressing dry and intact. DO NOT REMOVE DRESSING.   Instructed pt/family/caregiver to report excessive draining, loose bandage, wet dressing, severe pain or tingling in toes.  Applied Unna Boot dressing below the knee to right lower leg.    Unna Boot(s) were applied per  Guidelines.     Electronically signed by Viri Mckeon RN on 1/30/2025 at 11:04 AM

## 2025-01-31 ENCOUNTER — CARE COORDINATION (OUTPATIENT)
Dept: CASE MANAGEMENT | Age: 80
End: 2025-01-31

## 2025-01-31 ENCOUNTER — TELEPHONE (OUTPATIENT)
Age: 80
End: 2025-01-31

## 2025-01-31 NOTE — CARE COORDINATION
2025 9:50 AM  *  Alert and Triage   -Remote Alert Monitoring Note      Date/Time:  2025 9:50 AM  Patient Current Location: Home: [address] [place] [address] [place] [address]  Verified patients name and  as identifiers.    Rpm alert to be reviewed by the provider   yellow alert  blood pressure reading (172/70)  Vitals Recheck blood pressure reading (133/60)  Additional needs to be addressed by provider: No                   LPN contacted patient by telephone regarding red alert received   Background: RPM monitoring for HTN, DM  Refer to 911 immediately if:  Patient unresponsive or unable to provide history  Change in cognition or sudden confusion  Patient unable to respond in complete sentences  Intense chest pain/tightness  Any concern for any clinical emergency  Red Alert: Provider response time of 1 hr required for any red alert requiring intervention  Yellow Alert: Provider response time of 3hr required for any escalated yellow alert  Patient Chief Complaint:  Blood Pressure BP Triage  Are you having any Chest Pain? no   Are you having any Shortness of Breath? no   Do you have a headache or have any vision changes? no   Are you having any numbness or tingling? no   Are you having any other health concerns or issues? no  Patient/Caregiver educated on how to properly take a blood pressure. Patient/Caregiver verbalizes understanding.     Clinical Interventions:  LPN CC spoke with patient. States she is doing fine. Had been up and active around the house just prior to checking initial BP. Took routine meds at approx 7 AM. Denies CP, HA, numbness/tingling, blurry vision. Agreeable to check BP during this call. BP did not flow over to tablet. Noted patient's status states Not Connected to Network at time of this note. LPN CC advised patient's tablet may not have a good signal where it is currently at in her home. Patient verbalized understanding. LPN CC manually input BP.     Plan/Follow Up: Will continue to review,

## 2025-02-03 ENCOUNTER — TELEPHONE (OUTPATIENT)
Dept: HEMATOLOGY | Age: 80
End: 2025-02-03

## 2025-02-03 DIAGNOSIS — Z79.4 TYPE 2 DIABETES MELLITUS WITH OTHER SKIN ULCER, WITH LONG-TERM CURRENT USE OF INSULIN (HCC): ICD-10-CM

## 2025-02-03 DIAGNOSIS — E11.622 TYPE 2 DIABETES MELLITUS WITH OTHER SKIN ULCER, WITH LONG-TERM CURRENT USE OF INSULIN (HCC): ICD-10-CM

## 2025-02-03 DIAGNOSIS — L97.912 SKIN ULCER OF RIGHT LOWER LEG WITH FAT LAYER EXPOSED (HCC): Chronic | ICD-10-CM

## 2025-02-03 LAB
ACANTHOCYTES BLD QL SMEAR: ABNORMAL
ALBUMIN SERPL-MCNC: 3.9 G/DL (ref 3.5–5.2)
ALP SERPL-CCNC: 111 U/L (ref 35–104)
ALT SERPL-CCNC: 7 U/L (ref 5–33)
ANION GAP SERPL CALCULATED.3IONS-SCNC: 12 MMOL/L (ref 8–16)
ANISOCYTOSIS BLD QL SMEAR: ABNORMAL
AST SERPL-CCNC: 10 U/L (ref 5–32)
BASOPHILS # BLD: 0 K/UL (ref 0–0.2)
BASOPHILS NFR BLD: 0 % (ref 0–1)
BILIRUB SERPL-MCNC: 0.4 MG/DL (ref 0.2–1.2)
BUN SERPL-MCNC: 29 MG/DL (ref 8–23)
CALCIUM SERPL-MCNC: 9.5 MG/DL (ref 8.8–10.2)
CHLORIDE SERPL-SCNC: 90 MMOL/L (ref 98–107)
CHOLEST SERPL-MCNC: 154 MG/DL (ref 0–199)
CO2 SERPL-SCNC: 27 MMOL/L (ref 22–29)
CREAT SERPL-MCNC: 0.9 MG/DL (ref 0.5–0.9)
CREAT UR-MCNC: 15.2 MG/DL (ref 28–217)
DACRYOCYTES BLD QL SMEAR: ABNORMAL
EOSINOPHIL # BLD: 0 K/UL (ref 0–0.6)
EOSINOPHIL NFR BLD: 0 % (ref 0–5)
ERYTHROCYTE [DISTWIDTH] IN BLOOD BY AUTOMATED COUNT: 19.2 % (ref 11.5–14.5)
GLUCOSE SERPL-MCNC: 168 MG/DL (ref 70–99)
HBA1C MFR BLD: 7.2 % (ref 4–5.6)
HCT VFR BLD AUTO: 32.3 % (ref 37–47)
HDLC SERPL-MCNC: 64 MG/DL (ref 40–60)
HGB BLD-MCNC: 10.2 G/DL (ref 12–16)
HYPOCHROMIA BLD QL SMEAR: ABNORMAL
IMM GRANULOCYTES # BLD: 0.2 K/UL
LDLC SERPL CALC-MCNC: 66 MG/DL
LYMPHOCYTES # BLD: 5.5 K/UL (ref 1.1–4.5)
LYMPHOCYTES NFR BLD: 29 % (ref 20–40)
MACROCYTES BLD QL SMEAR: ABNORMAL
MCH RBC QN AUTO: 32.7 PG (ref 27–31)
MCHC RBC AUTO-ENTMCNC: 31.6 G/DL (ref 33–37)
MCV RBC AUTO: 103.5 FL (ref 81–99)
MICROALBUMIN UR-MCNC: <1.2 MG/DL (ref 0–1.99)
MICROALBUMIN/CREAT UR-RTO: ABNORMAL MG/G (ref 0–29)
MONOCYTES # BLD: 3.1 K/UL (ref 0–0.9)
MONOCYTES NFR BLD: 18 % (ref 0–10)
NEUTROPHILS # BLD: 8.7 K/UL (ref 1.5–7.5)
NEUTS BAND NFR BLD MANUAL: 1 % (ref 0–5)
NEUTS SEG NFR BLD: 49 % (ref 50–65)
OVALOCYTES BLD QL SMEAR: ABNORMAL
PLATELET # BLD AUTO: 230 K/UL (ref 130–400)
PLATELET SLIDE REVIEW: ADEQUATE
PMV BLD AUTO: 13.1 FL (ref 9.4–12.3)
POIKILOCYTOSIS BLD QL SMEAR: ABNORMAL
POTASSIUM SERPL-SCNC: 4.8 MMOL/L (ref 3.5–5.1)
PROT SERPL-MCNC: 6.2 G/DL (ref 6.4–8.3)
RBC # BLD AUTO: 3.12 M/UL (ref 4.2–5.4)
SCHISTOCYTES BLD QL SMEAR: ABNORMAL
SODIUM SERPL-SCNC: 129 MMOL/L (ref 136–145)
SPHEROCYTES BLD QL SMEAR: ABNORMAL
STOMATOCYTES BLD QL SMEAR: ABNORMAL
T4 FREE SERPL-MCNC: 1.93 NG/DL (ref 0.93–1.7)
TRIGL SERPL-MCNC: 121 MG/DL (ref 0–149)
TSH SERPL DL<=0.005 MIU/L-ACNC: 6.37 UIU/ML (ref 0.27–4.2)
VARIANT LYMPHS NFR BLD: 3 % (ref 0–8)
WBC # BLD AUTO: 17.3 K/UL (ref 4.8–10.8)

## 2025-02-03 NOTE — TELEPHONE ENCOUNTER
Called Patient and reminded patient of their appointment on 02/06/2025 and patient confirmed they would be here. Reminded patient to just come at appointment time, and to not come at the lab appointment time. Reminded patient that we will not check them in any more than 30 minutes before appointment time.  We have now moved to the [facility] [place] that is located between our old office and the ER at the [facility]. Letting the Pt know that our front entrance faces the  [place]. Reminded pt to eat well and be well hydrated for their labs.

## 2025-02-04 ENCOUNTER — TELEPHONE (OUTPATIENT)
Age: 80
End: 2025-02-04

## 2025-02-04 DIAGNOSIS — E03.9 HYPOTHYROIDISM, UNSPECIFIED TYPE: Primary | ICD-10-CM

## 2025-02-04 RX ORDER — LEVOTHYROXINE SODIUM 50 UG/1
50 TABLET ORAL DAILY
Qty: 30 TABLET | Refills: 3 | Status: SHIPPED | OUTPATIENT
Start: 2025-02-04

## 2025-02-04 NOTE — TELEPHONE ENCOUNTER
Called patient, spoke with: Child/Children regarding the results of the patients most recent labs.  I advised Child/Children of Dr. Solis recommendations.   Child/Children did voice understanding    Sent rx to pharmacy for pt    Requested Prescriptions     Signed Prescriptions Disp Refills    levothyroxine (SYNTHROID) 50 MCG tablet 30 tablet 3     Sig: Take 1 tablet by mouth daily     Authorizing Provider: NICOLE SOLIS     Ordering User: JEAN LEWIS

## 2025-02-04 NOTE — TELEPHONE ENCOUNTER
----- Message from Dr. NICOLE Solis, DO sent at 2/3/2025  7:44 PM CST -----  Thyroid function is low on the laboratory panel.  If you are having symptoms of hypothyroidism, we can start a low level dose of Synthroid.  I would recommend starting at 50 mcg daily, if so.  #30 with 3 refills.  Recheck TSH and free T4 in 6 weeks.    There is no significant protein excretion in the urine.  CBC is stable.  Sodium and chloride are low.  It does appear that you are somewhat dehydrated.  Recommend backing off on Lasix frequency.  If you can take this on an as-needed basis when having significant edema, but not daily, that would be helpful.  Lipids are excellently controlled.  Hemoglobin A1c is 7.2 which indicates suboptimal control of blood sugars over the past 3 months.  We can discuss diabetes management and medication adjustments if needed.  I would recommend watching your diet and avoid excess carbohydrates as well as sweets and treats.

## 2025-02-06 ENCOUNTER — HOSPITAL ENCOUNTER (OUTPATIENT)
Dept: WOUND CARE | Age: 80
Discharge: HOME | End: 2025-02-06
Attending: SURGERY
Payer: MEDICARE

## 2025-02-06 ENCOUNTER — HOSPITAL ENCOUNTER (OUTPATIENT)
Dept: INFUSION THERAPY | Age: 80
Discharge: HOME | End: 2025-02-06
Payer: MEDICARE

## 2025-02-06 ENCOUNTER — OFFICE VISIT (OUTPATIENT)
Dept: HEMATOLOGY | Age: 80
End: 2025-02-06
Payer: MEDICARE

## 2025-02-06 VITALS
BODY MASS INDEX: 31.91 KG/M2 | SYSTOLIC BLOOD PRESSURE: 140 MMHG | HEART RATE: 65 BPM | TEMPERATURE: 97.4 F | RESPIRATION RATE: 18 BRPM | DIASTOLIC BLOOD PRESSURE: 65 MMHG | HEIGHT: 61 IN | WEIGHT: 169 LBS

## 2025-02-06 VITALS
HEIGHT: 61 IN | OXYGEN SATURATION: 96 % | TEMPERATURE: 97.9 F | SYSTOLIC BLOOD PRESSURE: 112 MMHG | HEART RATE: 71 BPM | DIASTOLIC BLOOD PRESSURE: 48 MMHG | BODY MASS INDEX: 32.04 KG/M2 | WEIGHT: 169.7 LBS

## 2025-02-06 DIAGNOSIS — S70.11XD HEMATOMA OF RIGHT THIGH, SUBSEQUENT ENCOUNTER: ICD-10-CM

## 2025-02-06 DIAGNOSIS — D50.0 IRON DEFICIENCY ANEMIA DUE TO CHRONIC BLOOD LOSS: ICD-10-CM

## 2025-02-06 DIAGNOSIS — D47.1 MPN (MYELOPROLIFERATIVE NEOPLASM) (HCC): ICD-10-CM

## 2025-02-06 DIAGNOSIS — Z71.89 CARE PLAN DISCUSSED WITH PATIENT: ICD-10-CM

## 2025-02-06 DIAGNOSIS — Z15.89 JAK2 V617F MUTATION: ICD-10-CM

## 2025-02-06 DIAGNOSIS — D75.839 THROMBOCYTOSIS: ICD-10-CM

## 2025-02-06 DIAGNOSIS — Z51.11 CHEMOTHERAPY MANAGEMENT, ENCOUNTER FOR: ICD-10-CM

## 2025-02-06 DIAGNOSIS — D47.1 MPN (MYELOPROLIFERATIVE NEOPLASM) (HCC): Primary | ICD-10-CM

## 2025-02-06 LAB
BASOPHILS # BLD: 0.01 K/UL (ref 0–0.2)
BASOPHILS NFR BLD: 0.1 % (ref 0–1)
EOSINOPHIL # BLD: 0.47 K/UL (ref 0–0.6)
EOSINOPHIL NFR BLD: 3.3 % (ref 0–5)
ERYTHROCYTE [DISTWIDTH] IN BLOOD BY AUTOMATED COUNT: 17.9 % (ref 11.5–14.5)
FERRITIN SERPL-MCNC: 217.7 NG/ML (ref 13–150)
HCT VFR BLD AUTO: 31.5 % (ref 37–47)
HGB BLD-MCNC: 10.5 G/DL (ref 12–16)
IRON SATN MFR SERPL: 12 % (ref 15–50)
IRON SERPL-MCNC: 28 UG/DL (ref 37–145)
LYMPHOCYTES # BLD: 2.48 K/UL (ref 1.1–4.5)
LYMPHOCYTES NFR BLD: 17.5 % (ref 20–40)
MCH RBC QN AUTO: 33.3 PG (ref 27–31)
MCHC RBC AUTO-ENTMCNC: 33.3 G/DL (ref 33–37)
MCV RBC AUTO: 100 FL (ref 81–99)
MONOCYTES # BLD: 3.36 K/UL (ref 0–0.9)
MONOCYTES NFR BLD: 23.7 % (ref 1–10)
NEUTROPHILS # BLD: 7.54 K/UL (ref 1.5–7.5)
NEUTS SEG NFR BLD: 53.3 % (ref 50–65)
PLATELET # BLD AUTO: 208 K/UL (ref 130–400)
PMV BLD AUTO: 11.9 FL (ref 9.4–12.3)
RBC # BLD AUTO: 3.15 M/UL (ref 4.2–5.4)
TIBC SERPL-MCNC: 237 UG/DL (ref 250–400)
WBC # BLD AUTO: 14.16 K/UL (ref 4.8–10.8)

## 2025-02-06 PROCEDURE — 1124F ACP DISCUSS-NO DSCNMKR DOCD: CPT | Performed by: NURSE PRACTITIONER

## 2025-02-06 PROCEDURE — 99214 OFFICE O/P EST MOD 30 MIN: CPT | Performed by: NURSE PRACTITIONER

## 2025-02-06 PROCEDURE — 3074F SYST BP LT 130 MM HG: CPT | Performed by: NURSE PRACTITIONER

## 2025-02-06 PROCEDURE — 85025 COMPLETE CBC W/AUTO DIFF WBC: CPT

## 2025-02-06 PROCEDURE — 82728 ASSAY OF FERRITIN: CPT

## 2025-02-06 PROCEDURE — 1126F AMNT PAIN NOTED NONE PRSNT: CPT | Performed by: NURSE PRACTITIONER

## 2025-02-06 PROCEDURE — 1159F MED LIST DOCD IN RCRD: CPT | Performed by: NURSE PRACTITIONER

## 2025-02-06 PROCEDURE — 36415 COLL VENOUS BLD VENIPUNCTURE: CPT

## 2025-02-06 PROCEDURE — 99212 OFFICE O/P EST SF 10 MIN: CPT

## 2025-02-06 PROCEDURE — 99212 OFFICE O/P EST SF 10 MIN: CPT | Performed by: SURGERY

## 2025-02-06 PROCEDURE — 83540 ASSAY OF IRON: CPT

## 2025-02-06 PROCEDURE — G2211 COMPLEX E/M VISIT ADD ON: HCPCS | Performed by: NURSE PRACTITIONER

## 2025-02-06 PROCEDURE — 29580 STRAPPING UNNA BOOT: CPT

## 2025-02-06 PROCEDURE — 83550 IRON BINDING TEST: CPT

## 2025-02-06 PROCEDURE — 3078F DIAST BP <80 MM HG: CPT | Performed by: NURSE PRACTITIONER

## 2025-02-06 NOTE — PROGRESS NOTES
[facility]   Progress Note and Procedure Note      Elda Flood  MEDICAL RECORD NUMBER:  [id number]  AGE: 79 y.o.   GENDER: female  : 1945  EPISODE DATE:  2025    Subjective:     Chief Complaint   Patient presents with    Wound Check     Pt presents for recheck of BLE wounds         HISTORY of PRESENT ILLNESS HPI     Elda Flood is a 79 y.o. female who presents today for wound/ulcer evaluation.   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    HYSTERECTOMY, TOTAL ABDOMINAL (CERVIX REMOVED)  1986    LEG SURGERY Right 8/15/2024    RIGHT LEG EVACUTION HEMATOMA performed by Emili Landry DO at [facility] OR    OVARY REMOVAL Bilateral     age 41    UMBILICAL HERNIA REPAIR N/A 2019    INCISIONAL HERNIA REPAIR WITH BIOLOGIC MESH performed by Bebo Guevara MD at [facility] OR    UPPER GASTROINTESTINAL ENDOSCOPY  1985       FAMILY HISTORY    Family History   Problem Relation Age of Onset    Colon Cancer Mother     Colon Polyps Neg Hx     Esophageal Cancer Neg Hx     Liver Disease Neg Hx

## 2025-02-06 NOTE — PATIENT INSTRUCTIONS
[facility]   Physician Orders and Discharge Instructions  [address] [place] [address] Drive  [address]  [place] [address]  Telephone: [phone]      FAX [phone]    NAME:  Elda Flood  YOB: 1945  MEDICAL RECORD NUMBER:  [id number]  DATE:  2/6/2025    Discharge condition: Stable    Discharge to: Home    Left via:Private automobile    Accompanied by: Family    [facility] to change Coflex on Mondays and Wound Care to Change on Thursdays    Dressing Orders: Right and Left Lower Leg Wounds  Silver Alginate to open areas  Calamine Coflex Unnaboot (absorbent layer between first and second layer) Keep clean and Dry  Elevate feet to level or above heart 3-4 times daily and as needed to for 30 minutes to reduce swelling  Remove wraps and call office if- Wraps get wet, Cause increased pain, Slide down or you are unable to make  your next scheduled appointment  Multi Vitamin, High Protein diet as tolerated    [facility] follow up visit ___________1 week__________________  (Please note your next appointment above and if you are unable to keep, kindly give a 24 hour notice. Thank you.)    If you experience any of the following, please call the Wound Care Center during business hours:    * Increase in Pain  * Temperature over 101  * Increase in drainage from your wound  * Drainage with a foul odor  * Bleeding  * Increase in swelling  * Need for compression bandage changes due to slippage, breakthrough drainage.    If you need medical attention outside of the business hours of the Wound Care Centers please contact your PCP or go to the nearest emergency room.

## 2025-02-06 NOTE — PROGRESS NOTES
Unna Boot Application   Below Knee    NAME:  Elda Flood  YOB: 1945  MEDICAL RECORD NUMBER:  [id number]  DATE:  2/6/2025    Unna boot: Applied moisturizing agent to dry skin as needed.   Appied primary and secondary dressing as ordered.  Applied Unna roll from toes to knee overlapping each time.   Applied ace wrap or coban from toes to below the knee.   Instructed patient/caregiver to keep dressing dry and intact. DO NOT REMOVE DRESSING.   Instructed pt/family/caregiver to report excessive draining, loose bandage, wet dressing, severe pain or tingling in toes.  Applied Unna Boot dressing below the knee to right lower leg.  Applied Unna Boot dressing below the knee to left lower leg.    Unna Boot(s) were applied per  Guidelines.     Electronically signed by Verenice Cornell RN on 2/6/2025 at 12:22 PM

## 2025-02-10 ENCOUNTER — CARE COORDINATION (OUTPATIENT)
Dept: CASE MANAGEMENT | Age: 80
End: 2025-02-10

## 2025-02-10 ASSESSMENT — ENCOUNTER SYMPTOMS
EYE DISCHARGE: 0
NAUSEA: 0
SHORTNESS OF BREATH: 1
EYE ITCHING: 0
SORE THROAT: 0
TROUBLE SWALLOWING: 0
WHEEZING: 0
ABDOMINAL PAIN: 0
DIARRHEA: 0
COUGH: 0
VOMITING: 0
CONSTIPATION: 0

## 2025-02-10 NOTE — CARE COORDINATION
2/10/2025 12:17 PM  *  Tablet Messaging Message sent to patient via Patient Connect Portal for Remote Patient Monitoring     RPM Nurse message No readings in two days Hello, Please see an important message from your RPM Team:  This is a reminder that we have not received your readings for two days please enter them as soon as possible.     Please do not respond to this message; If you have a question or concern please call your Ambulatory Care Manager or your Primary Care Physician.

## 2025-02-12 ENCOUNTER — CARE COORDINATION (OUTPATIENT)
Dept: CASE MANAGEMENT | Age: 80
End: 2025-02-12

## 2025-02-12 ENCOUNTER — CARE COORDINATION (OUTPATIENT)
Dept: CARE COORDINATION | Age: 80
End: 2025-02-12

## 2025-02-12 ENCOUNTER — TELEPHONE (OUTPATIENT)
Dept: HEMATOLOGY | Age: 80
End: 2025-02-12

## 2025-02-12 DIAGNOSIS — Z79.4 TYPE 2 DIABETES MELLITUS WITH OTHER SKIN ULCER, WITH LONG-TERM CURRENT USE OF INSULIN (HCC): Primary | ICD-10-CM

## 2025-02-12 DIAGNOSIS — E11.622 TYPE 2 DIABETES MELLITUS WITH OTHER SKIN ULCER, WITH LONG-TERM CURRENT USE OF INSULIN (HCC): Primary | ICD-10-CM

## 2025-02-12 RX ORDER — KETOROLAC TROMETHAMINE 30 MG/ML
1 INJECTION, SOLUTION INTRAMUSCULAR; INTRAVENOUS ONCE
Qty: 1 EACH | Refills: 0 | Status: SHIPPED | OUTPATIENT
Start: 2025-02-12 | End: 2025-02-12

## 2025-02-12 RX ORDER — ACYCLOVIR 800 MG/1
1 TABLET ORAL
Qty: 4 EACH | Refills: 3 | Status: SHIPPED | OUTPATIENT
Start: 2025-02-12

## 2025-02-12 NOTE — CARE COORDINATION
2025 2:10 PM  *  Alert and Triage   -Remote Alert Monitoring Note      Date/Time:  2025 2:10 PM  Patient Current Location: Home: [address] [place] [address] [place] [address]  Verified patients name and  as identifiers.    Rpm alert to be reviewed by the provider   No BG monitoring x5 days    Additional needs to be addressed by provider:  Patient is out of test strips. Daughter states patient's been testing 5-6 times per day & patient has run out. States they cannot refill RX for 10 more days. Notes patient has been administering insulin based on how she \"feels.\" Notes they have been in prior authorization for Dexcom for a long time. Please advise.                    LPN contacted patient by telephone regarding red alert received   Background: RPM monitoring for HTN, DM  Refer to 911 immediately if:  Patient unresponsive or unable to provide history  Change in cognition or sudden confusion  Patient unable to respond in complete sentences  Intense chest pain/tightness  Any concern for any clinical emergency  Red Alert: Provider response time of 1 hr required for any red alert requiring intervention  Yellow Alert: Provider response time of 3hr required for any escalated yellow alert    Clinical Interventions: Escalated alert to PCP-LPN CC spoke with patient's daughter, Saloni, HIPAA verified. Call was brief as she was in the store & was in a hurry & had trouble hearing on the phone. States patient has been out of test strips. Notes they've been having to test BG 5-6 times per day and patient had run out. States pharmacy advised they could not refill RX for 10 more days. States they have been in prior authorization for Dexcom for a long time.  Patient has been administering insulin based on how she \"feels.\"     Plan/Follow Up: Will continue to review, monitor and address alerts with follow up based on severity of symptoms and risk factors.  **For any new or worsening symptoms or you are concerned in anyway, please contact

## 2025-02-12 NOTE — CARE COORDINATION
Ambulatory Care Coordination Note     2025 5:03 PM     Patient Current Location:  Home: [address] [place] [address] Rd  [place] [address]     ACM contacted the patient by telephone. Verified name and  with patient as identifiers.         ACM: Tessie Rahman RN     Challenges to be reviewed by the provider   Additional needs identified to be addressed with provider Yes  medications-clarify patient insulin dosing schedule  DME-Pt is out of test strips, unable to obtain Dexcom meter               Method of communication with provider: staff message.    Utilization: Patient has not had any utilization since our last call.     Care Summary Note:   I called patient to clarify her insulin use, test strip status and CGM order.    1. Patient is out of her finger meter test strips due to checking glucose 4-5 times daily.   - Ins will approve 30 day refill on Monday, 25. (Cash price $211)    2. Patient stopped using her Humalog when she ran out of test strips late last week.   - she is not taking any Humalog until she has test strips.    3. Patient stopped taking her Lantus at least a month ago when her dtr noted the bottle had .    - She did not order a refill from the pharmacy, unsure why.    4. The Dexcom cannot be processed through local pharmacies.  - [facility] do not fulfill orders for continuous meters either.  - Sent IB message to office staff to request order of the continuous meter through [employer] online.    Patient has an appointment with Dr. Solis on 25. Patient will test her glucose TID with meals to avoid running out of strips before 30 days. Patient will monitor carb intake, reduce supper carbs to support stable fasting glucose. Will resume use of Humalog after she has test strips on Monday. Patient will discuss resumption of Lantus with PCP at her appointment on 25, unable to schedule a sooner appointment for patient.  ACM sent message to RPM team that patient will be unable

## 2025-02-12 NOTE — TELEPHONE ENCOUNTER
Attempted to contact patient's daughter, Saloni, regarding patients need for IV iron. No answer, unable to leave voicemail due to \"[employer] customer being unavailable\".

## 2025-02-12 NOTE — PROGRESS NOTES
Insurance will not cover patient's dexcom g6. Called [facility] and per the pharmacy tech hematology/oncology had called and told them to just cancel the script. Her insurance will only first cover derrick hartley. Per Dr. Solis will try these first.

## 2025-02-12 NOTE — CARE COORDINATION
Ambulatory Care Coordination Note     2025 3:50 PM     Patient Current Location:  Home: [address] [place] [address] [place] [address]     ACM contacted the family by telephone. Verified name and  with family as identifiers.         ACM: Shell Whittaker     Challenges to be reviewed by the provider   Additional needs identified to be addressed with provider: Yes  Needs PA completed for dexcom                Method of communication with provider:  RUBIO Kurtz to contact office staff .    Utilization: Patient has not had any utilization since our last call.     Care Summary Note: Pt's dtr states pt's leg wound continues to heal. She is working with PT once/week and getting around well. Reports back pain is sometimes a barrier to mobility; takes roxicodone PRN. Reports she completed her prednisone taper. RPM data reviewed - she has not been checking her BS because she ran out of test strips before insurance will cover refill. She was checking BS up to 6x daily due to hyperglycemia related to steroids. She has since been injecting insulin without checking her BS. Discussed risks associated with this. Discussed symptoms of hyper/hypoglycemia.   Test strips will not be covered for 10 more days per pt's dtr and pt cannot afford the test strips without insurance. They are also waiting on PA to be completed for dexcom. Referred to  for assistance with obtaining test strips. Discussed with primary RUBIO Kurtz - to follow up with PCP office on PA status.   Offered patient enrollment in the Remote Patient Monitoring (RPM) program for in-home monitoring: Yes, patient enrolled; current status is activated and monitoring.       Medications Reviewed:   Discussed changes.     Advance Care Planning:   Not reviewed during this call     Care Planning:   Not completed during this call    PCP/Specialist follow up:   Future Appointments         Provider Specialty Dept Phone    2025 10:30 AM NICOLE Solis,  Family Medicine [phone]

## 2025-02-13 DIAGNOSIS — E44.1 MILD MALNUTRITION (HCC): ICD-10-CM

## 2025-02-13 DIAGNOSIS — E11.622 TYPE 2 DIABETES MELLITUS WITH OTHER SKIN ULCER, WITH LONG-TERM CURRENT USE OF INSULIN (HCC): ICD-10-CM

## 2025-02-13 DIAGNOSIS — K90.9 IRON MALABSORPTION: Primary | ICD-10-CM

## 2025-02-13 DIAGNOSIS — Z79.4 TYPE 2 DIABETES MELLITUS WITH OTHER SKIN ULCER, WITH LONG-TERM CURRENT USE OF INSULIN (HCC): Primary | ICD-10-CM

## 2025-02-13 DIAGNOSIS — Z79.4 TYPE 2 DIABETES MELLITUS WITH OTHER SKIN ULCER, WITH LONG-TERM CURRENT USE OF INSULIN (HCC): ICD-10-CM

## 2025-02-13 DIAGNOSIS — E11.622 TYPE 2 DIABETES MELLITUS WITH OTHER SKIN ULCER, WITH LONG-TERM CURRENT USE OF INSULIN (HCC): Primary | ICD-10-CM

## 2025-02-13 DIAGNOSIS — E11.9 TYPE 2 DIABETES MELLITUS WITHOUT COMPLICATION, WITHOUT LONG-TERM CURRENT USE OF INSULIN (HCC): ICD-10-CM

## 2025-02-13 RX ORDER — SODIUM CHLORIDE 9 MG/ML
INJECTION, SOLUTION INTRAVENOUS CONTINUOUS
OUTPATIENT
Start: 2025-02-13

## 2025-02-13 RX ORDER — HEPARIN 100 UNIT/ML
500 SYRINGE INTRAVENOUS PRN
OUTPATIENT
Start: 2025-02-13

## 2025-02-13 RX ORDER — ALBUTEROL SULFATE 90 UG/1
4 INHALANT RESPIRATORY (INHALATION) PRN
OUTPATIENT
Start: 2025-02-13

## 2025-02-13 RX ORDER — SODIUM CHLORIDE 9 MG/ML
5-250 INJECTION, SOLUTION INTRAVENOUS PRN
OUTPATIENT
Start: 2025-02-13

## 2025-02-13 RX ORDER — FAMOTIDINE 10 MG/ML
20 INJECTION, SOLUTION INTRAVENOUS
OUTPATIENT
Start: 2025-02-13

## 2025-02-13 RX ORDER — PROCHLORPERAZINE 25 MG/1
1 SUPPOSITORY RECTAL ONCE
Qty: 1 EACH | Refills: 0 | Status: SHIPPED | OUTPATIENT
Start: 2025-02-13 | End: 2025-02-13

## 2025-02-13 RX ORDER — ONDANSETRON 2 MG/ML
8 INJECTION INTRAMUSCULAR; INTRAVENOUS
OUTPATIENT
Start: 2025-02-13

## 2025-02-13 RX ORDER — PROCHLORPERAZINE 25 MG/1
1 SUPPOSITORY RECTAL
Qty: 3 EACH | Refills: 3 | Status: SHIPPED | OUTPATIENT
Start: 2025-02-13

## 2025-02-13 RX ORDER — HYDROCORTISONE SODIUM SUCCINATE 100 MG/2ML
100 INJECTION INTRAMUSCULAR; INTRAVENOUS
OUTPATIENT
Start: 2025-02-13

## 2025-02-13 RX ORDER — PROCHLORPERAZINE 25 MG/1
1 SUPPOSITORY RECTAL
Qty: 3 EACH | Refills: 3 | Status: SHIPPED | OUTPATIENT
Start: 2025-02-13 | End: 2025-02-13 | Stop reason: SDUPTHER

## 2025-02-13 RX ORDER — BLOOD SUGAR DIAGNOSTIC
STRIP MISCELLANEOUS
Qty: 100 STRIP | Refills: 11 | Status: SHIPPED | OUTPATIENT
Start: 2025-02-13

## 2025-02-13 RX ORDER — ACETAMINOPHEN 325 MG/1
650 TABLET ORAL
OUTPATIENT
Start: 2025-02-13

## 2025-02-13 RX ORDER — EPINEPHRINE 1 MG/ML
0.3 INJECTION, SOLUTION, CONCENTRATE INTRAVENOUS PRN
OUTPATIENT
Start: 2025-02-13

## 2025-02-13 RX ORDER — DIPHENHYDRAMINE HYDROCHLORIDE 50 MG/ML
50 INJECTION INTRAMUSCULAR; INTRAVENOUS
OUTPATIENT
Start: 2025-02-13

## 2025-02-13 RX ORDER — SODIUM CHLORIDE 0.9 % (FLUSH) 0.9 %
5-40 SYRINGE (ML) INJECTION PRN
OUTPATIENT
Start: 2025-02-13

## 2025-02-13 NOTE — TELEPHONE ENCOUNTER
Dexcom was denied they said she has to use freestyle odilia first. [facility] has this ready for her. Saloni aware and will pick this up for her.

## 2025-02-14 ENCOUNTER — CARE COORDINATION (OUTPATIENT)
Dept: CARE COORDINATION | Age: 80
End: 2025-02-14

## 2025-02-14 NOTE — CARE COORDINATION
[facility] stated they have patient's Dexcom transmitter ready at no copay. The  and Sensors appear to have been filled at [facility]. Pharmacist will contact [facility] to verify or request cancel so that [facility] can fill the scripts. Pharmacist will notify ACM of status when known.  Electronically signed by Tessie Rahman RN on 2/14/2025 at 10:09 AM  
Hematology and Oncology [phone]    6/6/2025 10:30 AM SCHEDULE, L MED ONC MA Infusion Therapy [phone]            Follow Up:   Plan for next ACM outreach in approximately 1 week to complete:  - disease specific assessments  - medication review   - education   - RPM.   Caregiver is agreeable to this plan.

## 2025-02-17 ENCOUNTER — TELEPHONE (OUTPATIENT)
Age: 80
End: 2025-02-17

## 2025-02-17 ENCOUNTER — CARE COORDINATION (OUTPATIENT)
Dept: CARE COORDINATION | Age: 80
End: 2025-02-17

## 2025-02-17 NOTE — CARE COORDINATION
Ambulatory Care Coordination Note     2025 11:28 AM     Patient Current Location:  Home: [address] [place] [address] Rd  [place] [address]     Patient contacted the ACM by telephone. Verified name and  with patient as identifiers.         ACM: Tessie Rahman RN     Challenges to be reviewed by the provider   Additional needs identified to be addressed with provider No  none               Method of communication with provider: none.    Utilization: Patient has not had any utilization since our last call.     Care Summary Note:   Patient reported someone called her and requested a call back. Advised pt it was not this ACM. Patient reported  she will  her Dexcom meter from [employer] today. Reminded pt she also may refill her finger meter test strips today and to keep them for back up if Dexcom isn't working for some reason. Patient voiced understanding. Encouraged pt to call once she has meter supplies and is able to begin checking her glucose with the CGM. Patient voiced understanding.    Offered patient enrollment in the Remote Patient Monitoring (RPM) program for in-home monitoring: Yes, patient enrolled; current status is activated and monitoring.       PCP/Specialist follow up:   Future Appointments         Provider Specialty Dept Phone    2025 10:30 AM NICOLE Solis,  Family Medicine [phone]    2025 9:30 AM Maurisio Parson MD Wound Ostomy [phone]    2025 10:30 AM Mag Montgomery, APRN Hematology and Oncology [phone]    2025 10:30 AM SCHEDULE, [facility] MED ONC MA Infusion Therapy [phone]            Follow Up:   Plan for next ACM outreach in approximately 1-2 days  to complete:  - SDOH assessments  - glucose meter use - finger vs Dexcom .   Patient  is agreeable to this plan.

## 2025-02-17 NOTE — TELEPHONE ENCOUNTER
Received a call from Tessie RN , PIERRE   She was following up to see if pt is still a current pt with us - I did confirm she is     She then stated pt was dc'd from hospital 1/8/25 she would like to know if pt had a HFU     Pt did have [facility] HFU 1/20     She then confirmed our fax # if additional information is needed

## 2025-02-17 NOTE — CARE COORDINATION
SW collaborated with AC regarding SW referral. Patient has needed glucose testing supplies, no outreach needed at this time. Please re-refer to SW pool if additional needs arise.     Arelis Sanchez MSW, LSW     [phone]

## 2025-02-19 ENCOUNTER — CARE COORDINATION (OUTPATIENT)
Dept: CARE COORDINATION | Age: 80
End: 2025-02-19

## 2025-02-19 NOTE — CARE COORDINATION
Ambulatory Care Coordination Note     2025 12:58 PM     Patient Current Location:  Home: [address] [place] [address] [place] [address]     ACM contacted the patient by telephone. Verified name and  with patient as identifiers.         ACM: Tessie Rahman RN     Challenges to be reviewed by the provider   Additional needs identified to be addressed with provider No  none               Method of communication with provider: none.    Utilization: Patient has not had any utilization since our last call.     Care Summary Note:   Spoke to daughter, Saloni. She is unsure of status of patient's CGM - said she picked up the Dexcom from [employer] and pt also has test strips for her finger meter.  Patient is currently using her finger stick meter, stated she is not able to set up the CGM without support. Glucose check today was 196, was after breakfast. Glucose yesterday of 160 was also after eating. Patient continues to hold her Lantus insulin.  Encouraged patient to check her fasting glucose daily. Blood pressure today 155/66, HR 63, yesterday 131/49 and HR 69. Patient reported her leg wound is doing well, has f/u tomorrow. Patient is moving well, no balance concerns or falls. No CP, SoB or other concerning symptoms. Patient has f/u with PCP on 25.     Offered patient enrollment in the Remote Patient Monitoring (RPM) program for in-home monitoring: Yes, patient enrolled; current status is activated and monitoring.     Assessments Completed:   Care Coordination Interventions    Referral from Primary Care Provider: No  Suggested Interventions and Community Resources  Disease Specific Clinic: Completed (Comment: Hematology)  Home Care Waiver: Not Started (Comment: 24: [facility])  Home Health Services: Completed (Comment: [facility])  Meals on Wheels: Completed (Comment: 24: Pt receiving lunch del M-F)  Physical Therapy: Completed  Senior Services: In Process (Comment: 24:

## 2025-02-21 ENCOUNTER — TELEMEDICINE (OUTPATIENT)
Age: 80
End: 2025-02-21

## 2025-02-21 DIAGNOSIS — Z79.899 MEDICATION MANAGEMENT: ICD-10-CM

## 2025-02-21 DIAGNOSIS — S81.809D OPEN WOUND OF LOWER EXTREMITY, UNSPECIFIED LATERALITY, SUBSEQUENT ENCOUNTER: ICD-10-CM

## 2025-02-21 DIAGNOSIS — M54.32 SCIATICA OF LEFT SIDE: ICD-10-CM

## 2025-02-21 DIAGNOSIS — M48.061 NEUROFORAMINAL STENOSIS OF LUMBAR SPINE: ICD-10-CM

## 2025-02-21 DIAGNOSIS — M15.0 PRIMARY OSTEOARTHRITIS INVOLVING MULTIPLE JOINTS: Primary | ICD-10-CM

## 2025-02-21 RX ORDER — INSULIN GLARGINE 100 [IU]/ML
15 INJECTION, SOLUTION SUBCUTANEOUS 2 TIMES DAILY
Qty: 10 ML | Refills: 3 | Status: SHIPPED | OUTPATIENT
Start: 2025-02-21

## 2025-02-21 RX ORDER — OXYCODONE HYDROCHLORIDE 5 MG/1
5 TABLET ORAL EVERY 6 HOURS PRN
Qty: 120 TABLET | Refills: 0 | Status: SHIPPED | OUTPATIENT
Start: 2025-02-21 | End: 2025-03-23

## 2025-02-21 SDOH — ECONOMIC STABILITY: FOOD INSECURITY: WITHIN THE PAST 12 MONTHS, YOU WORRIED THAT YOUR FOOD WOULD RUN OUT BEFORE YOU GOT MONEY TO BUY MORE.: PATIENT DECLINED

## 2025-02-21 SDOH — ECONOMIC STABILITY: FOOD INSECURITY: WITHIN THE PAST 12 MONTHS, THE FOOD YOU BOUGHT JUST DIDN'T LAST AND YOU DIDN'T HAVE MONEY TO GET MORE.: PATIENT DECLINED

## 2025-02-21 ASSESSMENT — ENCOUNTER SYMPTOMS
SORE THROAT: 0
COUGH: 0
SHORTNESS OF BREATH: 0
ABDOMINAL PAIN: 0
WHEEZING: 0
NAUSEA: 0
VOMITING: 0
DIARRHEA: 0

## 2025-02-21 ASSESSMENT — PATIENT HEALTH QUESTIONNAIRE - PHQ9
2. FEELING DOWN, DEPRESSED OR HOPELESS: SEVERAL DAYS
SUM OF ALL RESPONSES TO PHQ QUESTIONS 1-9: 2
SUM OF ALL RESPONSES TO PHQ9 QUESTIONS 1 & 2: 2
1. LITTLE INTEREST OR PLEASURE IN DOING THINGS: SEVERAL DAYS
SUM OF ALL RESPONSES TO PHQ QUESTIONS 1-9: 2

## 2025-02-21 NOTE — PROGRESS NOTES
as needed for Pain for up to 30 days. Take lowest dose possible to manage pain Max Daily Amount: 20 mg  Dispense: 120 tablet; Refill: 0    5. Medication management  She is due for urine drug screen with next in-house evaluation    6.  Uncontrolled type 2 diabetes mellitus  We discussed utilization of insulin as well as her Dexcom.  She has not been using her basal insulin.  She states that she was afraid to use it.  She has been using her Humalog on a as needed basis utilizing a sliding scale after meals.  We discussed that this is not the way that we would want to control her diabetes.  We are going to start using her basal insulin at 30 units nightly.  We will also then use the sliding scale initially but then we will add coverage for meals depending on her blood sugar results.  She has a home health nurse coming on Monday.  We can have her help her apply her Dexcom so that way she does not have to be continually picking her fingers in order to determine what she needs to do from a blood sugar standpoint and insulin dosing  No orders of the defined types were placed in this encounter.       Return in about 1 month (around 3/21/2025) for Jose Flood, was evaluated through a synchronous (real-time) audio-video encounter. The patient (or guardian if applicable) is aware that this is a billable service, which includes applicable co-pays. This Virtual Visit was conducted with patient's (and/or legal guardian's) consent. Patient identification was verified, and a caregiver was present when appropriate.   The patient was located at Home: [address] [facility] [place] [address]  Provider was located at Facility (Appt Dept): [address] [facility]  [address]  [place] [address]  Confirm you are appropriately licensed, registered, or certified to deliver care in the state where the patient is located as indicated above. If you are not or unsure, please re-schedule the visit: Yes, I confirm.      Total time spent for this

## 2025-02-21 NOTE — PATIENT INSTRUCTIONS
[place] [address]  [phone]  Provides opportunities for employment and independence to individuals with visual disabilities.    [facility]  [address], [place] [address] [phone]  Residential Services provides support to adults and children who need assistance with daily living due to a developmental, cognitive or intellectual disability. HomeCare services include personal care and homemaking programs, geriatric care management, respite care and other programs. Workforce Services, Rehab Without Walls (RWW) program, [employer], [employer] [facility] [employer], Critical access hospital Behavioral Services, [employer]    [facility]  [address], [place] [address]  Adult Services: [phone]  Children's Services: [phone]  Autism (ASD) Services, [facility], Children’s Services, Adult Services, Senior Services,  &  Services, Employment & Training, Medical Rehabilitation, Camping & Recreation    [place] [id number]  [phone] -or- [phone]  [facility] is a statewide early intervention system that provides services to children with developmental disabilities from birth to age 3 and their families. [facility] is [place]’s response to the federal Infant-Toddler Program. [facility] offers comprehensive services through a variety of community agencies and service disciplines and is administered by the Department for Public Health in the Cabinet for Health and Family Services.    [facility]  Behavioral Health Counseling Services: Adult Services; Children’s Services; Medical Services; Substance Abuse; Testing & Assessment; Case Management Services; Supported Employment; Behavior Support: Intellectual or Developmental Disability Respite Care: Intellectual or Developmental Disability; In-Home Supports; Outreach and Education; IMPACT; Crisis Services; Day Training Program; Consumer

## 2025-02-24 ENCOUNTER — CARE COORDINATION (OUTPATIENT)
Dept: CARE COORDINATION | Age: 80
End: 2025-02-24

## 2025-02-24 NOTE — CARE COORDINATION
Ambulatory Care Coordination Note     2025 8:45 AM     Patient Current Location:  [place]     ACM contacted the  home health agency  by telephone. Verified name and  with  RN  as identifiers.         ACM: Tessie Rahman RN     Challenges to be reviewed by the provider   Additional needs identified to be addressed with provider No  none               Method of communication with provider: none.    Utilization: Patient has not had any utilization since our last call.     Care Summary Note: Spoke to home health nurseLiza.  Advised RN of patient encounter with PCP last week and instructions per provider to resume Lantus at 30 units daily, SS Humalog.  Additionally, PCP recommended that HH nurse assist pt to begin using her Dexcom CGM. HH nurse voiced understanding and requested copy of encounter notes for HH records. Faxed encounter note as requested.      PCP/Specialist follow up:   Future Appointments         Provider Specialty Dept Phone    2025 10:15 AM Maurisio Parson MD Wound Ostomy [phone]    2025 1:15 PM INFUSION SCHEDULE, [facility] Infusion Therapy [phone]    3/6/2025 1:15 PM INFUSION SCHEDULE, [facility] Infusion Therapy [phone]    2025 10:30 AM Mag Montgomery APRN Hematology and Oncology [phone]    2025 10:30 AM SCHEDULE, [facility] MA Infusion Therapy [phone]            Follow Up:   Plan for next AC outreach in approximately 1-2 days  to complete:

## 2025-02-27 ENCOUNTER — CARE COORDINATION (OUTPATIENT)
Dept: CARE COORDINATION | Age: 80
End: 2025-02-27

## 2025-02-27 NOTE — CARE COORDINATION
Ambulatory Care Coordination Note     2/27/2025 1:37 PM     Telephone  outreach attempt by this ACM today to perform care management follow up . ACM was unable to reach the patient by telephone today;   left voice message requesting a return phone call to this ACM.     ACM: Tessie Rahman RN      PCP/Specialist follow up:   Future Appointments         Provider Specialty Dept Phone    3/3/2025 9:30 AM Maurisio Parson MD Wound Ostomy [phone]    3/3/2025 3:15 PM INFUSION SCHEDULE, [facility] Infusion Therapy [phone]    3/18/2025 1:00 PM INFUSION SCHEDULE, [facility] Infusion Therapy [phone]    3/25/2025 2:30 PM NICOLE Solis, DO Family Medicine [phone]    6/6/2025 10:30 AM Mag Montgomery, [facility] Hematology and Oncology [phone]    6/6/2025 10:30 AM SCHEDULE, [facility] MED ONC MA Infusion Therapy [phone]            Follow Up:   Plan for next ACM outreach in approximately 1 week to complete:  - disease specific assessments  - education   - RPM  Leg wound f/u, use of Dexcom, Lantus resumed .

## 2025-02-28 ENCOUNTER — CARE COORDINATION (OUTPATIENT)
Dept: CASE MANAGEMENT | Age: 80
End: 2025-02-28

## 2025-02-28 NOTE — CARE COORDINATION
2/28/2025 1:09 PM  *  Tablet Messaging Message sent to patient via Patient Connect Portal for Remote Patient Monitoring     RPM Nurse message No readings in two days Hello, Please see an important message from your RPM Team:  This is a reminder that we have not received your readings for two days please enter them as soon as possible.     Please do not respond to this message; If you have a question or concern please call your Ambulatory Care Manager or your Primary Care Physician.

## 2025-03-03 ENCOUNTER — CARE COORDINATION (OUTPATIENT)
Dept: CARE COORDINATION | Age: 80
End: 2025-03-03

## 2025-03-03 ENCOUNTER — HOSPITAL ENCOUNTER (OUTPATIENT)
Dept: WOUND CARE | Age: 80
Discharge: HOME | End: 2025-03-03
Attending: SURGERY
Payer: MEDICARE

## 2025-03-03 ENCOUNTER — HOSPITAL ENCOUNTER (OUTPATIENT)
Dept: INFUSION THERAPY | Age: 80
Setting detail: INFUSION SERIES
Discharge: HOME | End: 2025-03-03
Payer: MEDICARE

## 2025-03-03 VITALS
HEIGHT: 61 IN | BODY MASS INDEX: 31.91 KG/M2 | SYSTOLIC BLOOD PRESSURE: 179 MMHG | WEIGHT: 169 LBS | DIASTOLIC BLOOD PRESSURE: 76 MMHG | RESPIRATION RATE: 18 BRPM | TEMPERATURE: 97 F | HEART RATE: 85 BPM

## 2025-03-03 VITALS
RESPIRATION RATE: 17 BRPM | SYSTOLIC BLOOD PRESSURE: 131 MMHG | TEMPERATURE: 97.2 F | HEART RATE: 68 BPM | OXYGEN SATURATION: 98 % | DIASTOLIC BLOOD PRESSURE: 56 MMHG

## 2025-03-03 DIAGNOSIS — K90.9 IRON MALABSORPTION: Primary | ICD-10-CM

## 2025-03-03 DIAGNOSIS — L97.222 NON-PRESSURE CHRONIC ULCER OF LEFT CALF WITH FAT LAYER EXPOSED (HCC): ICD-10-CM

## 2025-03-03 DIAGNOSIS — D50.9 IRON DEFICIENCY ANEMIA, UNSPECIFIED IRON DEFICIENCY ANEMIA TYPE: ICD-10-CM

## 2025-03-03 DIAGNOSIS — B37.2 YEAST DERMATITIS: ICD-10-CM

## 2025-03-03 DIAGNOSIS — L97.912 SKIN ULCER OF RIGHT LOWER LEG WITH FAT LAYER EXPOSED (HCC): Primary | ICD-10-CM

## 2025-03-03 DIAGNOSIS — Z79.4 TYPE 2 DIABETES MELLITUS WITH OTHER SKIN ULCER, WITH LONG-TERM CURRENT USE OF INSULIN (HCC): ICD-10-CM

## 2025-03-03 DIAGNOSIS — E11.622 TYPE 2 DIABETES MELLITUS WITH OTHER SKIN ULCER, WITH LONG-TERM CURRENT USE OF INSULIN (HCC): ICD-10-CM

## 2025-03-03 PROCEDURE — 97597 DBRDMT OPN WND 1ST 20 CM/<: CPT | Performed by: SURGERY

## 2025-03-03 PROCEDURE — 97598 DBRDMT OPN WND ADDL 20CM/<: CPT

## 2025-03-03 PROCEDURE — 2580000003 HC RX 258: Performed by: NURSE PRACTITIONER

## 2025-03-03 PROCEDURE — 97598 DBRDMT OPN WND ADDL 20CM/<: CPT | Performed by: SURGERY

## 2025-03-03 PROCEDURE — 76937 US GUIDE VASCULAR ACCESS: CPT

## 2025-03-03 PROCEDURE — 6360000002 HC RX W HCPCS: Performed by: NURSE PRACTITIONER

## 2025-03-03 PROCEDURE — 97597 DBRDMT OPN WND 1ST 20 CM/<: CPT

## 2025-03-03 PROCEDURE — 96365 THER/PROPH/DIAG IV INF INIT: CPT

## 2025-03-03 PROCEDURE — 36415 COLL VENOUS BLD VENIPUNCTURE: CPT

## 2025-03-03 RX ORDER — DIPHENHYDRAMINE HYDROCHLORIDE 50 MG/ML
50 INJECTION INTRAMUSCULAR; INTRAVENOUS
OUTPATIENT
Start: 2025-03-10

## 2025-03-03 RX ORDER — SODIUM CHLORIDE 9 MG/ML
5-250 INJECTION, SOLUTION INTRAVENOUS PRN
Status: DISCONTINUED | OUTPATIENT
Start: 2025-03-03 | End: 2025-03-04 | Stop reason: HOSPADM

## 2025-03-03 RX ORDER — LIDOCAINE HYDROCHLORIDE 40 MG/ML
SOLUTION TOPICAL ONCE
OUTPATIENT
Start: 2025-03-03 | End: 2025-03-03

## 2025-03-03 RX ORDER — MUPIROCIN 20 MG/G
OINTMENT TOPICAL ONCE
OUTPATIENT
Start: 2025-03-03 | End: 2025-03-03

## 2025-03-03 RX ORDER — SODIUM CHLORIDE 9 MG/ML
5-250 INJECTION, SOLUTION INTRAVENOUS PRN
OUTPATIENT
Start: 2025-03-10

## 2025-03-03 RX ORDER — BETAMETHASONE DIPROPIONATE 0.5 MG/G
CREAM TOPICAL ONCE
OUTPATIENT
Start: 2025-03-03 | End: 2025-03-03

## 2025-03-03 RX ORDER — SODIUM CHLORIDE 9 MG/ML
INJECTION, SOLUTION INTRAVENOUS CONTINUOUS
OUTPATIENT
Start: 2025-03-10

## 2025-03-03 RX ORDER — LIDOCAINE 50 MG/G
OINTMENT TOPICAL ONCE
OUTPATIENT
Start: 2025-03-03 | End: 2025-03-03

## 2025-03-03 RX ORDER — GINSENG 100 MG
CAPSULE ORAL ONCE
OUTPATIENT
Start: 2025-03-03 | End: 2025-03-03

## 2025-03-03 RX ORDER — LIDOCAINE HYDROCHLORIDE 20 MG/ML
JELLY TOPICAL ONCE
OUTPATIENT
Start: 2025-03-03 | End: 2025-03-03

## 2025-03-03 RX ORDER — ALBUTEROL SULFATE 90 UG/1
4 INHALANT RESPIRATORY (INHALATION) PRN
OUTPATIENT
Start: 2025-03-10

## 2025-03-03 RX ORDER — GENTAMICIN SULFATE 1 MG/G
OINTMENT TOPICAL ONCE
OUTPATIENT
Start: 2025-03-03 | End: 2025-03-03

## 2025-03-03 RX ORDER — BACITRACIN ZINC AND POLYMYXIN B SULFATE 500; 1000 [USP'U]/G; [USP'U]/G
OINTMENT TOPICAL ONCE
OUTPATIENT
Start: 2025-03-03 | End: 2025-03-03

## 2025-03-03 RX ORDER — SODIUM CHLORIDE 0.9 % (FLUSH) 0.9 %
5-40 SYRINGE (ML) INJECTION PRN
OUTPATIENT
Start: 2025-03-10

## 2025-03-03 RX ORDER — TRIAMCINOLONE ACETONIDE 1 MG/G
OINTMENT TOPICAL ONCE
OUTPATIENT
Start: 2025-03-03 | End: 2025-03-03

## 2025-03-03 RX ORDER — SODIUM CHLOR/HYPOCHLOROUS ACID 0.033 %
SOLUTION, IRRIGATION IRRIGATION ONCE
OUTPATIENT
Start: 2025-03-03 | End: 2025-03-03

## 2025-03-03 RX ORDER — LIDOCAINE HYDROCHLORIDE 20 MG/ML
JELLY TOPICAL ONCE
Status: COMPLETED | OUTPATIENT
Start: 2025-03-03 | End: 2025-03-03

## 2025-03-03 RX ORDER — HEPARIN 100 UNIT/ML
500 SYRINGE INTRAVENOUS PRN
OUTPATIENT
Start: 2025-03-10

## 2025-03-03 RX ORDER — SILVER SULFADIAZINE 10 MG/G
CREAM TOPICAL ONCE
OUTPATIENT
Start: 2025-03-03 | End: 2025-03-03

## 2025-03-03 RX ORDER — CLOBETASOL PROPIONATE 0.5 MG/G
OINTMENT TOPICAL ONCE
OUTPATIENT
Start: 2025-03-03 | End: 2025-03-03

## 2025-03-03 RX ORDER — ACETAMINOPHEN 325 MG/1
650 TABLET ORAL
OUTPATIENT
Start: 2025-03-10

## 2025-03-03 RX ORDER — ONDANSETRON 2 MG/ML
8 INJECTION INTRAMUSCULAR; INTRAVENOUS
OUTPATIENT
Start: 2025-03-10

## 2025-03-03 RX ORDER — EPINEPHRINE 1 MG/ML
0.3 INJECTION, SOLUTION, CONCENTRATE INTRAVENOUS PRN
OUTPATIENT
Start: 2025-03-10

## 2025-03-03 RX ORDER — NEOMYCIN/BACITRACIN/POLYMYXINB 3.5-400-5K
OINTMENT (GRAM) TOPICAL ONCE
OUTPATIENT
Start: 2025-03-03 | End: 2025-03-03

## 2025-03-03 RX ORDER — LIDOCAINE 40 MG/G
CREAM TOPICAL ONCE
OUTPATIENT
Start: 2025-03-03 | End: 2025-03-03

## 2025-03-03 RX ORDER — HYDROCORTISONE SODIUM SUCCINATE 100 MG/2ML
100 INJECTION INTRAMUSCULAR; INTRAVENOUS
OUTPATIENT
Start: 2025-03-10

## 2025-03-03 RX ADMIN — LIDOCAINE HYDROCHLORIDE: 20 JELLY TOPICAL at 09:58

## 2025-03-03 RX ADMIN — FERUMOXYTOL 510 MG: 510 INJECTION INTRAVENOUS at 12:45

## 2025-03-03 NOTE — PATIENT INSTRUCTIONS
[facility]ound Care and Hyperbaric Oxygen Therapy   Physician Orders and Discharge Instructions  [address] [place] [address] Drive  [address]  [place] [address]  Telephone: [phone]      FAX [phone]    NAME:  Elda Flood  YOB: 1945  MEDICAL RECORD NUMBER:  [id number]  DATE:  3/3/2025    Discharge condition: Stable    Discharge to: Home    Left via:Private automobile    Accompanied by: Family    [facility] to change Coflex on Mondays and Thursday unless patient has a wound care appointment on that day    Dressing Orders: Right and Left Lower Leg Wounds  Silver Alginate to open areas  Calamine Coflex Unnaboot (absorbent layer between first and second layer) Keep clean and Dry  Elevate feet to level or above heart 3-4 times daily and as needed to for 30 minutes to reduce swelling  Remove wraps and call office if- Wraps get wet, Cause increased pain, Slide down or you are unable to make  your next scheduled appointment  Multi Vitamin, High Protein diet as tolerated    [facility] follow up visit __________1 week___________________  (Please note your next appointment above and if you are unable to keep, kindly give a 24 hour notice. Thank you.)    If you experience any of the following, please call the Wound Care Center during business hours:    * Increase in Pain  * Temperature over 101  * Increase in drainage from your wound  * Drainage with a foul odor  * Bleeding  * Increase in swelling  * Need for compression bandage changes due to slippage, breakthrough drainage.    If you need medical attention outside of the business hours of the Wound Care Centers please contact your PCP or go to the nearest emergency room.

## 2025-03-03 NOTE — CARE COORDINATION
3/3/2025 12:35 PM  *  Tablet Messaging Message sent to patient via Patient Connect Portal for Remote Patient Monitoring     RPM Nurse message No readings in two days Hello, Please see an important message from your RPM Team:  This is a reminder that we have not received your readings for two days please enter them as soon as possible.     Please do not respond to this message; If you have a question or concern please call your Ambulatory Care Manager or your Primary Care Physician.

## 2025-03-03 NOTE — CARE COORDINATION
physical: general debiliziton and limited mobility and overwhelmed by complexity of regimen  Plan for overcoming my barriers:   Daughter willing to work with ACM to establish goal and implement strategies for optimal wellbeing of pt  Patient willing/ able to follow up with providers based on her needs and acuity  Patient willing to gain skills for self-monitoring and mgmt of chronic conditions at home  Confidence: 8/10  Anticipated Goal Completion Date: 3/4/25                 PCP/Specialist follow up:   Future Appointments         Provider Specialty Dept Phone    3/10/2025 9:15 AM Maurisio Parson MD Wound Ostomy [phone]    3/10/2025 2:15 PM INFUSION SCHEDULE, [facility] Infusion Therapy [phone]    3/25/2025 2:30 PM NICOLE Solis, DO Family Medicine [phone]    6/6/2025 10:30 AM Mag Montgomery, APRN Hematology and Oncology [phone]    6/6/2025 10:30 AM SCHEDULE, [facility] MED ONC MA Infusion Therapy [phone]            Follow Up:   Plan for next ACM outreach in approximately 1 week to complete:  - disease specific assessments  - RPM.   Patient  is agreeable to this plan.

## 2025-03-03 NOTE — PROGRESS NOTES
normal  ENT: tympanic membrane, external ear and ear canal normal bilaterally, nose without deformity, nasal mucosa and turbinates normal without polyps, lips teeth and gums normal  Neck: supple and non-tender without mass, no thyromegaly or thyroid nodules, no cervical lymphadenopathy  Pulmonary/Chest: clear to auscultation bilaterally- no wheezes, rales or rhonchi, normal air movement, no respiratory distress  Cardiovascular: normal rate, regular rhythm, normal S1 and S2, no murmurs, rubs, clicks, or gallops, distal pulses intact, no carotid bruits  Abdomen: soft, non-tender, non-distended, normal bowel sounds, no masses or organomegaly  Extremities: no cyanosis, clubbing or edema  Musculoskeletal: normal range of motion, no joint swelling, deformity or tenderness  Neurologic: reflexes normal and symmetric, no cranial nerve deficit, gait, coordination and speech normal, skin sensation normal      Assessment:      Problem List Items Addressed This Visit       * (Principal) Skin ulcer of right lower leg with fat layer exposed (HCC) - Primary (Chronic)    Relevant Orders    Initiate Outpatient Wound Care Protocol    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    Performed by: Maurisio Parson MD    Consent obtained:  Yes    Time out taken:  Yes    Pain Control: Anesthetic  Anesthetic: 2% Lidocaine Gel Topical       Debridement:Non-excisional Debridement    Using curette the wound(s)/ulcer(s) was/were sharply

## 2025-03-03 NOTE — PLAN OF CARE
Unna Boot Application   Below Knee    NAME:  Elda Flood  YOB: 1945  MEDICAL RECORD NUMBER:  [id number]  DATE:  3/3/2025    Unna boot: Applied moisturizing agent to dry skin as needed.   Appied primary and secondary dressing as ordered.  Applied Unna roll from toes to knee overlapping each time.   Applied ace wrap or coban from toes to below the knee.   Secured with tape and/or metal clips covered with tape.   Instructed patient/caregiver to keep dressing dry and intact. DO NOT REMOVE DRESSING.   Instructed pt/family/caregiver to report excessive draining, loose bandage, wet dressing, severe pain or tingling in toes.  Applied Unna Boot dressing below the knee to right lower leg.  Applied Unna Boot dressing below the knee to left lower leg.    Unna Boot(s) were applied per  Guidelines.     Electronically signed by Carolyn Boucher RN on 3/3/2025 at 10:12 AM

## 2025-03-03 NOTE — CONSULTS
[facility] Vascular Access Team:  Consult Note    Patient: Elda Flood  YOB: 1945   MRN: [id number]  Room: Room/bed info not found     Attending Physician: No att. 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    Indication(s):  Elda Flood is a 79 y.o. female presenting to outpatient infusion therapy ([facility]) for IV iron. [facility] RNs have attempted several times to obtain IV access over the last hour and has been unsuccessful. They requested this nurse to assess patient once finished with an in-progress sterile procedure.     Findings:  Successfully placed a 22 gauge 1.75\" ultrasound guided peripheral IV in the patient's right forearm with one attempt and no complications. Patient tolerated well. Notified [facility] RN.     Impression/Plan:  1. Ultrasound-Guided Peripheral IV is ready to be used    Thank you for your time and consult.     Electronically Signed By: Derrick Stone RN on 3/3/2025 at 1:38 PM

## 2025-03-05 ENCOUNTER — HOSPITAL ENCOUNTER (OUTPATIENT)
Dept: GENERAL RADIOLOGY | Age: 80
Discharge: HOME | End: 2025-03-05
Payer: MEDICARE

## 2025-03-05 ENCOUNTER — OFFICE VISIT (OUTPATIENT)
Age: 80
End: 2025-03-05

## 2025-03-05 ENCOUNTER — TELEPHONE (OUTPATIENT)
Age: 80
End: 2025-03-05

## 2025-03-05 VITALS
HEIGHT: 60 IN | OXYGEN SATURATION: 91 % | TEMPERATURE: 97.4 F | BODY MASS INDEX: 36.32 KG/M2 | DIASTOLIC BLOOD PRESSURE: 78 MMHG | WEIGHT: 185 LBS | HEART RATE: 75 BPM | SYSTOLIC BLOOD PRESSURE: 164 MMHG

## 2025-03-05 DIAGNOSIS — R06.00 DYSPNEA, UNSPECIFIED TYPE: ICD-10-CM

## 2025-03-05 DIAGNOSIS — R22.43 LOCALIZED SWELLING OF BOTH LOWER LEGS: ICD-10-CM

## 2025-03-05 DIAGNOSIS — R22.43 LOCALIZED SWELLING OF BOTH LOWER LEGS: Primary | ICD-10-CM

## 2025-03-05 LAB
ALBUMIN SERPL-MCNC: 3.5 G/DL (ref 3.5–5.2)
ALP SERPL-CCNC: 86 U/L (ref 35–104)
ALT SERPL-CCNC: 7 U/L (ref 10–35)
ANION GAP SERPL CALCULATED.3IONS-SCNC: 11 MMOL/L (ref 8–16)
AST SERPL-CCNC: 14 U/L (ref 10–35)
BASOPHILS # BLD: 0 K/UL (ref 0–0.2)
BASOPHILS NFR BLD: 0.2 % (ref 0–1)
BILIRUB SERPL-MCNC: 0.2 MG/DL (ref 0.2–1.2)
BNP BLD-MCNC: 2183 PG/ML (ref 0–449)
BUN SERPL-MCNC: 16 MG/DL (ref 8–23)
CALCIUM SERPL-MCNC: 9.1 MG/DL (ref 8.8–10.2)
CHLORIDE SERPL-SCNC: 97 MMOL/L (ref 98–107)
CO2 SERPL-SCNC: 22 MMOL/L (ref 22–29)
CREAT SERPL-MCNC: 0.8 MG/DL (ref 0.5–0.9)
EOSINOPHIL # BLD: 0 K/UL (ref 0–0.6)
EOSINOPHIL NFR BLD: 0.3 % (ref 0–5)
ERYTHROCYTE [DISTWIDTH] IN BLOOD BY AUTOMATED COUNT: 14.9 % (ref 11.5–14.5)
GLUCOSE SERPL-MCNC: 121 MG/DL (ref 70–99)
HCT VFR BLD AUTO: 28.3 % (ref 37–47)
HGB BLD-MCNC: 9.3 G/DL (ref 12–16)
IMM GRANULOCYTES # BLD: 0.1 K/UL
LYMPHOCYTES # BLD: 1.4 K/UL (ref 1.1–4.5)
LYMPHOCYTES NFR BLD: 24.4 % (ref 20–40)
MCH RBC QN AUTO: 34.1 PG (ref 27–31)
MCHC RBC AUTO-ENTMCNC: 32.9 G/DL (ref 33–37)
MCV RBC AUTO: 103.7 FL (ref 81–99)
MONOCYTES # BLD: 1.2 K/UL (ref 0–0.9)
MONOCYTES NFR BLD: 20.4 % (ref 0–10)
NEUTROPHILS # BLD: 3.1 K/UL (ref 1.5–7.5)
NEUTS SEG NFR BLD: 53.8 % (ref 50–65)
PLATELET # BLD AUTO: 220 K/UL (ref 130–400)
PMV BLD AUTO: 12.1 FL (ref 9.4–12.3)
POTASSIUM SERPL-SCNC: 4.7 MMOL/L (ref 3.5–5.1)
PROT SERPL-MCNC: 5.8 G/DL (ref 6.4–8.3)
RBC # BLD AUTO: 2.73 M/UL (ref 4.2–5.4)
SODIUM SERPL-SCNC: 130 MMOL/L (ref 136–145)
T4 FREE SERPL-MCNC: 1.55 NG/DL (ref 0.93–1.7)
TSH SERPL DL<=0.005 MIU/L-ACNC: 4.52 UIU/ML (ref 0.27–4.2)
WBC # BLD AUTO: 5.7 K/UL (ref 4.8–10.8)

## 2025-03-05 PROCEDURE — 71045 X-RAY EXAM CHEST 1 VIEW: CPT

## 2025-03-05 RX ORDER — FUROSEMIDE 40 MG/1
40 TABLET ORAL DAILY PRN
Qty: 30 TABLET | Refills: 0 | Status: SHIPPED | OUTPATIENT
Start: 2025-03-05

## 2025-03-05 ASSESSMENT — ENCOUNTER SYMPTOMS
RHINORRHEA: 0
NAUSEA: 0
TROUBLE SWALLOWING: 0
CONSTIPATION: 0
SORE THROAT: 0
DIARRHEA: 0
ABDOMINAL PAIN: 0
SHORTNESS OF BREATH: 1
VOMITING: 0
SINUS PRESSURE: 0
COUGH: 0

## 2025-03-05 NOTE — TELEPHONE ENCOUNTER
----- Message from Dr. NICOLE Solis DO sent at 3/5/2025  2:42 PM CST -----  Thyroid is on the low side of normal.

## 2025-03-05 NOTE — TELEPHONE ENCOUNTER
----- Message from LAYLA Herndon sent at 3/5/2025  1:30 PM CST -----  Please call patient and let them know results.   Metabolic panel is normal except for slightly low sodium  BNP which is a marker of fluid overload is elevated.  Take the Lasix as prescribed  Blood counts continue to show anemia continue to follow with hematology

## 2025-03-05 NOTE — TELEPHONE ENCOUNTER
----- Message from LAYLA Herndon sent at 3/5/2025  1:29 PM CST -----  Please call patient and let them know results.   Chest x-ray shows pleural effusion.  Take the extra Lasix as discussed at office visit

## 2025-03-05 NOTE — PATIENT INSTRUCTIONS
Take lasix 40mg extra today, tomorrow and Friday. Then only take an extra dose if increased swelling.     Weigh yourself daily (same time, scale, and amount of clothing)  Keep log and if gain more than 3 pounds overnight or 5 or more pounds in a week notify office  Bring log to follow-up appointment for review

## 2025-03-05 NOTE — PROGRESS NOTES
discomfort.    MEDICATIONS  Current: Lasix, Eliquis, metoprolol    Prior to Visit Medications    Medication Sig Taking? Authorizing Provider   furosemide (LASIX) 40 MG tablet Take 1 tablet by mouth daily as needed (swelling) Yes Fernando Peterson APRN   oxyCODONE (ROXICODONE) 5 MG immediate release tablet Take 1 tablet by mouth every 6 hours as needed for Pain for up to 30 days. Take lowest dose possible to manage pain Max Daily Amount: 20 mg Yes NICOLE Solis DO   insulin glargine (LANTUS) 100 UNIT/ML injection vial Inject 15 Units into the skin 2 times daily Yes NICOLE Solis DO   Continuous Glucose Sensor (DEXCOM G6 SENSOR) MISC 1 each by Does not apply route every 10 days Yes NICOLE Solis DO   blood glucose test strips (ONETOUCH ULTRA) strip USE AS DIRECTED FOUR TIMES DAILY AS DIRECTED Yes NICOLE Solis DO   Continuous Glucose Transmitter (DEXCOM G6 TRANSMITTER) MISC 1 each by Does not apply route every 3 months Yes NICOLE Solis DO   Continuous Glucose Sensor (FREESTYLE VERN 3 SENSOR) MISC 1 each by Does not apply route every 14 days Yes NICOLE Solis DO   levothyroxine (SYNTHROID) 50 MCG tablet Take 1 tablet by mouth daily Yes NICOLE Solis DO   insulin lispro (HUMALOG,ADMELOG) 100 UNIT/ML SOLN injection vial Inject 0-8 Units into the skin 3 times daily (with meals) Yes NICOLE Solis DO   celecoxib (CELEBREX) 100 MG capsule TAKE ONE CAPSULE BY MOUTH TWICE DAILY Yes NICOLE Solis DO   albuterol sulfate HFA (VENTOLIN HFA) 108 (90 Base) MCG/ACT inhaler Inhale 2 puffs into the lungs every 4 hours as needed for Shortness of Breath Yes Provider, MD Suleiman   magnesium oxide (MAG-OX) 400 (240 Mg) MG tablet Take 1 tablet by mouth daily Per Dr. Solis Yes Provider, MD Suleiman   fluticasone (FLONASE) 50 MCG/ACT nasal spray 2 sprays by Each Nostril route daily Yes Mary Dodd APRN   OneTouch UltraSoft 2 Lancets MISC USE

## 2025-03-07 ENCOUNTER — CARE COORDINATION (OUTPATIENT)
Dept: CASE MANAGEMENT | Age: 80
End: 2025-03-07

## 2025-03-10 ENCOUNTER — CARE COORDINATION (OUTPATIENT)
Dept: CARE COORDINATION | Age: 80
End: 2025-03-10

## 2025-03-10 ENCOUNTER — CARE COORDINATION (OUTPATIENT)
Dept: CASE MANAGEMENT | Age: 80
End: 2025-03-10

## 2025-03-10 NOTE — CARE COORDINATION
3/10/2025 12:10 PM  *  Unable to Reach Date/Time:  3/10/2025 12:11 PM  LPN attempted to reach patient by telephone regarding  no metrics x 5 days  in remote patient monitoring program. Left HIPAA compliant message requesting a return call. Message sent to [facility].

## 2025-03-10 NOTE — CARE COORDINATION
Ambulatory Care Coordination Note     3/10/2025 11:04 AM     Telephone  outreach attempt by this ACM today to perform care management follow up . ACM was unable to reach the patient by telephone today;   left voice message requesting a return phone call to this ACM.     ACM: Tessie Rahman RN      PCP/Specialist follow up:   Future Appointments         Provider Specialty Dept Phone    3/10/2025 2:15 PM INFUSION SCHEDULE, [facility] Infusion Therapy [phone]    3/11/2025 3:30 PM Maurisio Parson MD Wound Ostomy [phone]    3/12/2025 9:20 AM Fernando Peterson, L[facility] Family Medicine [phone]    3/25/2025 2:30 PM NICOLE Solis,  [facility] [phone]    6/6/2025 10:30 AM Mag Montgomery, APRN Hematology and Oncology [phone]    6/6/2025 10:30 AM SCHEDULE, [facility] MED ONC MA Infusion Therapy [phone]            Follow Up:   Plan for next ACM outreach in approximately 1-2 days  to complete:  - disease specific assessments  - RPM.

## 2025-03-11 ENCOUNTER — HOSPITAL ENCOUNTER (OUTPATIENT)
Dept: INFUSION THERAPY | Age: 80
Setting detail: INFUSION SERIES
End: 2025-03-11

## 2025-03-17 ENCOUNTER — CARE COORDINATION (OUTPATIENT)
Dept: CARE COORDINATION | Age: 80
End: 2025-03-17

## 2025-03-17 NOTE — CARE COORDINATION
Ambulatory Care Coordination Note     3/17/2025 2:25 PM     Patient outreach attempt by this ACM today to perform care management follow up . ACM was unable to reach the patient by telephone today;   left voice message requesting a return phone call to this ACM.     ACM: Tianna Monte LPN    PCP/Specialist follow up:   Future Appointments         Provider Specialty Dept Phone    3/18/2025 1:40 PM Fernando Peterson APRN Family Medicine [phone]    3/24/2025 11:15 AM Maurisio Parson MD Wound Ostomy [phone]    3/25/2025 2:30 PM NICOLE Solis DO Family [facility] [phone]    6/6/2025 10:30 AM Mag Montgomery APRN Hematology and Oncology [phone]    6/6/2025 10:30 AM SCHEDULE, [facility] MA Infusion Therapy [phone]            Follow Up:   Plan for next ACM outreach in approximately 1 week to complete:  - disease specific assessments  - RPM.

## 2025-03-18 ENCOUNTER — OFFICE VISIT (OUTPATIENT)
Age: 80
End: 2025-03-18
Payer: MEDICARE

## 2025-03-18 VITALS
SYSTOLIC BLOOD PRESSURE: 128 MMHG | BODY MASS INDEX: 34.55 KG/M2 | TEMPERATURE: 99.5 F | DIASTOLIC BLOOD PRESSURE: 68 MMHG | HEART RATE: 86 BPM | WEIGHT: 176 LBS | HEIGHT: 60 IN | OXYGEN SATURATION: 92 %

## 2025-03-18 DIAGNOSIS — I48.0 PAROXYSMAL ATRIAL FIBRILLATION (HCC): ICD-10-CM

## 2025-03-18 DIAGNOSIS — R22.43 LOCALIZED SWELLING OF BOTH LOWER LEGS: Primary | ICD-10-CM

## 2025-03-18 DIAGNOSIS — L21.9 SEBORRHEIC DERMATITIS OF SCALP: ICD-10-CM

## 2025-03-18 DIAGNOSIS — D50.9 IRON DEFICIENCY ANEMIA, UNSPECIFIED IRON DEFICIENCY ANEMIA TYPE: ICD-10-CM

## 2025-03-18 PROCEDURE — 1159F MED LIST DOCD IN RCRD: CPT | Performed by: NURSE PRACTITIONER

## 2025-03-18 PROCEDURE — 3078F DIAST BP <80 MM HG: CPT | Performed by: NURSE PRACTITIONER

## 2025-03-18 PROCEDURE — 1160F RVW MEDS BY RX/DR IN RCRD: CPT | Performed by: NURSE PRACTITIONER

## 2025-03-18 PROCEDURE — 99214 OFFICE O/P EST MOD 30 MIN: CPT | Performed by: NURSE PRACTITIONER

## 2025-03-18 PROCEDURE — 3074F SYST BP LT 130 MM HG: CPT | Performed by: NURSE PRACTITIONER

## 2025-03-18 PROCEDURE — 1124F ACP DISCUSS-NO DSCNMKR DOCD: CPT | Performed by: NURSE PRACTITIONER

## 2025-03-18 RX ORDER — KETOCONAZOLE 20 MG/ML
SHAMPOO, SUSPENSION TOPICAL
Qty: 120 ML | Refills: 0 | Status: SHIPPED | OUTPATIENT
Start: 2025-03-18

## 2025-03-18 SDOH — ECONOMIC STABILITY: FOOD INSECURITY: WITHIN THE PAST 12 MONTHS, THE FOOD YOU BOUGHT JUST DIDN'T LAST AND YOU DIDN'T HAVE MONEY TO GET MORE.: NEVER TRUE

## 2025-03-18 SDOH — ECONOMIC STABILITY: FOOD INSECURITY: WITHIN THE PAST 12 MONTHS, YOU WORRIED THAT YOUR FOOD WOULD RUN OUT BEFORE YOU GOT MONEY TO BUY MORE.: NEVER TRUE

## 2025-03-18 ASSESSMENT — PATIENT HEALTH QUESTIONNAIRE - PHQ9
7. TROUBLE CONCENTRATING ON THINGS, SUCH AS READING THE NEWSPAPER OR WATCHING TELEVISION: NOT AT ALL
SUM OF ALL RESPONSES TO PHQ QUESTIONS 1-9: 0
SUM OF ALL RESPONSES TO PHQ QUESTIONS 1-9: 0
2. FEELING DOWN, DEPRESSED OR HOPELESS: NOT AT ALL
4. FEELING TIRED OR HAVING LITTLE ENERGY: NOT AT ALL
SUM OF ALL RESPONSES TO PHQ QUESTIONS 1-9: 0
6. FEELING BAD ABOUT YOURSELF - OR THAT YOU ARE A FAILURE OR HAVE LET YOURSELF OR YOUR FAMILY DOWN: NOT AT ALL
10. IF YOU CHECKED OFF ANY PROBLEMS, HOW DIFFICULT HAVE THESE PROBLEMS MADE IT FOR YOU TO DO YOUR WORK, TAKE CARE OF THINGS AT HOME, OR GET ALONG WITH OTHER PEOPLE: NOT DIFFICULT AT ALL
5. POOR APPETITE OR OVEREATING: NOT AT ALL
1. LITTLE INTEREST OR PLEASURE IN DOING THINGS: NOT AT ALL
3. TROUBLE FALLING OR STAYING ASLEEP: NOT AT ALL
SUM OF ALL RESPONSES TO PHQ QUESTIONS 1-9: 0
8. MOVING OR SPEAKING SO SLOWLY THAT OTHER PEOPLE COULD HAVE NOTICED. OR THE OPPOSITE, BEING SO FIGETY OR RESTLESS THAT YOU HAVE BEEN MOVING AROUND A LOT MORE THAN USUAL: NOT AT ALL
9. THOUGHTS THAT YOU WOULD BE BETTER OFF DEAD, OR OF HURTING YOURSELF: NOT AT ALL

## 2025-03-18 ASSESSMENT — ENCOUNTER SYMPTOMS
TROUBLE SWALLOWING: 0
CONSTIPATION: 0
COUGH: 0
VOMITING: 0
NAUSEA: 0
SORE THROAT: 0
DIARRHEA: 0
SINUS PRESSURE: 0
ABDOMINAL PAIN: 0
SHORTNESS OF BREATH: 0
RHINORRHEA: 0

## 2025-03-18 NOTE — PROGRESS NOTES
Elda Flood (:  1945) is a 79 y.o. female, Established patient, here for evaluation of the following chief complaint(s):  Swelling (Follow up on bilateral leg swelling)         Assessment & Plan  1. Fluid overload: Improved. BNP 2183, echocardiogram in 2025 showed ejection fraction 55 to 60%, chest x-ray showed mild prominent pulmonary vasculature and small to moderate left pleural effusion. Weight loss of 9 pounds since last visit.  - Monitor weight closely for sudden increases indicating fluid retention  - Limit salt intake    2. Atrial fibrillation: Stable.  - Continue Eliquis twice a day    3. Scalp dermatitis.  - Prescribe ketoconazole shampoo  - Apply to scalp, massage in, leave for 2 to 3 minutes before rinsing  - Use 2 to 3 times per week  - Prescription sent to [facility] in [place]    4. Anemia: Chronic. Low iron levels.  - Scheduled for iron infusion tomorrow  - Consume red meat to help with iron levels    Follow-up  - Appointment with Dr. Solis on  at 2:30 PM    Results  Laboratory Studies  BNP was elevated at 2183. Thyroid levels were better. Mild anemia was present. Iron levels have been low.    Imaging  Echocardiogram done in January showed an ejection fraction of 55 to 60%. Chest x-ray showed mild prominent pulmonary vasculature and a small to moderate left pleural effusion.      ICD-10-CM    1. Localized swelling of both lower legs  R22.43       2. Seborrheic dermatitis of scalp  L21.9       3. Iron deficiency anemia, unspecified iron deficiency anemia type  D50.9       4. Paroxysmal atrial fibrillation (HCC)  I48.0         Return if symptoms worsen or fail to improve.       Subjective   History of Present Illness  The patient is a 79-year-old female who presents for evaluation of fluid overload, atrial fibrillation, scalp dermatitis, and anemia.    Fluid Overload  - Reports an improvement in her condition, with a noticeable reduction in swelling.  - Unable to monitor

## 2025-03-19 NOTE — PROGRESS NOTES
PIV inserted using US guidance by JIM Stone RN. Feraheme given as ordered. Post infusion monitoring completed. Pt tolerated well.

## 2025-03-20 ENCOUNTER — CARE COORDINATION (OUTPATIENT)
Dept: CARE COORDINATION | Age: 80
End: 2025-03-20

## 2025-03-20 NOTE — CARE COORDINATION
3/20/2025 12:18 PM  *  No Metrics 15 Days Elda Folod  is enrolled in Remote Patient Monitoring (RPM) and has not entered vitals in 15 days. The RPM team has  Been Unable to Reach your patient to discuss adherence in RPM. Your patient will be PAUSED in HRS due to Non-Adherence.     Please reach out to your patient and discuss adherence with RPM. If the patient is no longer interested in participating, please send a dis-enrollment request to the RPM pool for processing.    Thank you,   Tianna Monte LPN Care Coordinator   [facility]  Remote Patient Monitoring, Care Transitions, Ambulatory Care Management  [phone]

## 2025-03-20 NOTE — CARE COORDINATION
Ambulatory Care Coordination Note     3/20/2025 2:50 PM     Telephone  outreach attempt by this ACM today to perform care management follow up . ACM was unable to reach the patient by telephone today;   left voice message requesting a return phone call to this ACM.     ACM: Tessie Rahman RN      PCP/Specialist follow up:   Future Appointments         Provider Specialty Dept Phone    3/24/2025 11:15 AM Maurisio Parson MD Wound Ostomy [phone]    3/31/2025 7:30 AM NICOLE Solis, DO Family Medicine [phone]    6/6/2025 10:30 AM Mag Montgomery, APRN Hematology and Oncology [phone]    6/6/2025 10:30 AM SCHEDULE, [facility] MED ONC MA Infusion Therapy [phone]            Follow Up:   Plan for next ACM outreach in approximately 1-2 days  to complete:  - disease specific assessments  - education   - RPM.

## 2025-03-21 NOTE — TELEPHONE ENCOUNTER
----- Message from Get Lora DO sent at 12/29/2022  8:10 PM CST -----  Magnesium level is normal.  Continue present medication management regimen
Left VM with normal results
No

## 2025-03-22 PROBLEM — R65.21 SEPTIC SHOCK: Status: ACTIVE | Noted: 2025-01-01

## 2025-03-22 PROBLEM — A41.9 SEPTIC SHOCK: Status: ACTIVE | Noted: 2025-01-01

## 2025-03-23 NOTE — PLAN OF CARE
Goal Outcome Evaluation:                 Intermittent orientation - oriented to self at all times, sinus/sinus tach - vasopressors required overnight to maintain MAP see MAR - CVPs high 20s/30s - hemodynamics monitored placed on the hemisphere monitoring system, 2L NC, NPO so NG was placed to give lokelema, outside facility hein removed and replaced - no UOP, wound care provided. Cardiology, CT surg, and Nephro consulted overnight by the intensivist.     Severely acidotic overnight given total of 4 amps of bicarb, started on bicarb gtt @100 ml/hr, hyperkalemic given the potassium cocktail (amp of bicarb, 10 units of insulin, amp of D50W, 1g of calcium), 2L LR and 100 of albumin given. Additionally, was on dobutamine and epi gtt during the night, turned off see MAR.     Bicarb gtt @100, levo @0.18      Problem: Adult Inpatient Plan of Care  Goal: Absence of Hospital-Acquired Illness or Injury  Intervention: Identify and Manage Fall Risk  Recent Flowsheet Documentation  Taken 3/23/2025 0500 by Kimber Colon RN  Safety Promotion/Fall Prevention:   safety round/check completed   fall prevention program maintained  Taken 3/23/2025 0300 by Kimber Colon RN  Safety Promotion/Fall Prevention:   safety round/check completed   fall prevention program maintained  Taken 3/23/2025 0200 by Kimber Colon RN  Safety Promotion/Fall Prevention:   safety round/check completed   fall prevention program maintained  Taken 3/23/2025 0102 by Kimber Colon RN  Safety Promotion/Fall Prevention:   safety round/check completed   fall prevention program maintained  Taken 3/23/2025 0008 by Kimber Colon RN  Safety Promotion/Fall Prevention:   safety round/check completed   fall prevention program maintained  Intervention: Prevent Skin Injury  Recent Flowsheet Documentation  Taken 3/23/2025 0500 by Kimber Colon RN  Body Position:   turned   supine  Taken 3/23/2025 0300 by Kimber Colon RN  Body Position:   turned   left  Taken 3/23/2025 0102 by Kimber Colon  RN  Body Position:   turned   right  Goal: Readiness for Transition of Care  Intervention: Mutually Develop Transition Plan  Recent Flowsheet Documentation  Taken 3/23/2025 0014 by Kimber Colon RN  Transportation Anticipated: health plan transportation  Patient/Family Anticipated Services at Transition: none  Patient/Family Anticipates Transition to: home  Taken 3/23/2025 0011 by Kimber Colon RN  Equipment Currently Used at Home: cane, straight     Problem: Fall Injury Risk  Goal: Absence of Fall and Fall-Related Injury  Intervention: Promote Injury-Free Environment  Recent Flowsheet Documentation  Taken 3/23/2025 0500 by Kimber Colon RN  Safety Promotion/Fall Prevention:   safety round/check completed   fall prevention program maintained  Taken 3/23/2025 0300 by Kimber Colon RN  Safety Promotion/Fall Prevention:   safety round/check completed   fall prevention program maintained  Taken 3/23/2025 0200 by Kimber Colon RN  Safety Promotion/Fall Prevention:   safety round/check completed   fall prevention program maintained  Taken 3/23/2025 0102 by Kimber Colon RN  Safety Promotion/Fall Prevention:   safety round/check completed   fall prevention program maintained  Taken 3/23/2025 0008 by Kimber Colon RN  Safety Promotion/Fall Prevention:   safety round/check completed   fall prevention program maintained     Problem: Sepsis/Septic Shock  Goal: Absence of Infection Signs and Symptoms  Intervention: Promote Recovery  Recent Flowsheet Documentation  Taken 3/23/2025 0500 by Kimber Colon RN  Activity Management: bedrest  Taken 3/23/2025 0300 by Kimber Colon RN  Activity Management: bedrest  Taken 3/23/2025 0200 by Kimber Colon RN  Activity Management: bedrest  Taken 3/23/2025 0102 by Kimber Colon RN  Activity Management: bedrest  Taken 3/23/2025 0008 by Kimber Colon RN  Activity Management: bedrest     Problem: Skin Injury Risk Increased  Goal: Skin Health and Integrity  Intervention: Optimize Skin Protection  Recent Flowsheet  Documentation  Taken 3/23/2025 0500 by Kimber Colon RN  Activity Management: bedrest  Head of Bed (HOB) Positioning: HOB at 30-45 degrees  Taken 3/23/2025 0300 by Kimber Colon RN  Activity Management: bedrest  Head of Bed (HOB) Positioning: HOB at 30-45 degrees  Taken 3/23/2025 0200 by Kimber Colon RN  Activity Management: bedrest  Taken 3/23/2025 0102 by Kimber Colon RN  Activity Management: bedrest  Head of Bed (HOB) Positioning: HOB at 30-45 degrees  Taken 3/23/2025 0008 by Kimber Colon RN  Activity Management: bedrest

## 2025-03-23 NOTE — CONSULTS
"Referring Provider: Dr. Rissa Johnson  Reason for Consultation: pericardial effusion, possible tamponade     Patient Care Team:  Jaxson Vines DO as PCP - General (Hospitalist)    Chief complaint: \"not feeling good\"    Subjective .     History of present illness:  Information obtained from EMR as patient is a poor historian and no family is at bedside.   The patient was transferred to [facility] intensive care unit from [facility] for further evaluation and treatment of sepsis, VIRY, and pericardial effusion.  The patient fell on 3/17/2025 at her home.  The patient was found to have an impacted right distal femoral metaphysis fracture, abnormal labs, and admitted to [facility] ICU.  The patient showed no improvement in hemo-dynamic status despite receiving 4 L of IV fluid boluses.  A CT of the abdomen pelvis without contrast showed no renal obstruction, large pericardial effusion, moderate to large bilateral pleural effusions, lower lobe atelectasis, extensive subcutaneous emphysema, 4 x 6 x 9 cm seroma in the right lower quadrant abdominal wall.  She required Levophed for hemodynamic support.  Decision was made to transfer patient to [facility] for services not available at [facility].  Cardiothoracic surgery has been consulted for pericardial effusion, possible tamponade.  Upon assessment the patient is resting in bed on 2 L nasal cannula, O2 sat 96%, heart rate 106, cardiac output 5.0.  The patient is encephalopathic.  She is aware that she is in the hospital; however, she does not know which hospital she is at.  When asked why the patient presented to the hospital she states that she was \"having problems\".  She states that she was \"just not feeling good\".  She states that she did have a fall.  The patient will follow simple commands.  She is currently on Levophed and bicarb drips.    History  Code Status and Medical Interventions: No CPR (Do Not Attempt to " Resuscitate); Limited Support; No intubation (DNI)   Ordered at: 03/22/25 9186     Code Status (Patient has no pulse and is not breathing):    No CPR (Do Not Attempt to Resuscitate)     Medical Interventions (Patient has pulse or is breathing):    Limited Support     Medical Intervention Limits:    No intubation (DNI)         Past Medical History:   Diagnosis Date    Abdominal wall seroma     Anemia     DAUGHTER STATES PATIENT HAD VENOFER INFUSION ON 03/18/2025    Atrial fibrillation     B12 deficiency     Cerebral infarct     Diabetes mellitus     HLD (hyperlipidemia)     Hypertension     Hypokalemia     Hypomagnesemia     PFO (patent foramen ovale)    ,   Past Surgical History:   Procedure Laterality Date    CHOLECYSTECTOMY      HERNIA REPAIR      HYSTERECTOMY     ,   Family History   Problem Relation Age of Onset    Cancer Mother     Hypertension Mother     Transient ischemic attack Father    ,   Social History     Tobacco Use    Smoking status: Never     Passive exposure: Never    Smokeless tobacco: Never   Vaping Use    Vaping status: Never Used   Substance Use Topics    Alcohol use: Never    Drug use: Never   ,   Medications Prior to Admission   Medication Sig Dispense Refill Last Dose/Taking    acetaminophen (TYLENOL) 500 MG tablet Take 1 tablet by mouth 2 (Two) Times a Day.   Past Week    albuterol sulfate  (90 Base) MCG/ACT inhaler Inhale 2-4 puffs Every 4 (Four) Hours As Needed for Wheezing or Shortness of Air.   Past Week    allopurinol (ZYLOPRIM) 300 MG tablet Take 1 tablet by mouth Daily.   Past Week    apixaban (ELIQUIS) 5 MG tablet tablet Take 1 tablet by mouth 2 (Two) Times a Day.   Past Week    celecoxib (CeleBREX) 100 MG capsule Take 1 capsule by mouth 2 (Two) Times a Day.   Past Week    escitalopram (LEXAPRO) 10 MG tablet Take 1 tablet by mouth Daily.   Past Week    furosemide (LASIX) 40 MG tablet Take 1 tablet by mouth Daily.   Past Week    hydroxyurea (HYDREA) 500 MG capsule Take 1  capsule by mouth Daily.   Past Week    insulin glargine (LANTUS, SEMGLEE) 100 UNIT/ML injection Inject 15 Units under the skin into the appropriate area as directed 2 (Two) Times a Day.   Past Week    Insulin Lispro (humaLOG) 100 UNIT/ML injection Inject 0-8 Units under the skin into the appropriate area as directed 3 (Three) Times a Day Before Meals. 0-150=0 UNITS  SLIDING SCALE FOR BLOOD GLUCOSE  151-200=2 UNITS  201-250=4 UNITS  251-300=6 UNITS  301-350=8 UNITS  351-400=10 UNITS  gIVE 12 UNITS IF OVER 400   Past Week    ketoconazole (NIZORAL) 2 % shampoo Apply 1 Application topically to the appropriate area as directed 2 (Two) Times a Week. APPLY TOPICALLY 2-3 TIMES WEEKLY AS NEEDED   Past Week    levETIRAcetam (KEPPRA) 500 MG tablet Take 1 tablet by mouth 2 (Two) Times a Day. 60 tablet 1 Past Week    levothyroxine (SYNTHROID, LEVOTHROID) 50 MCG tablet Take 1 tablet by mouth Every Morning.   Past Week    lisinopril (PRINIVIL,ZESTRIL) 30 MG tablet Take 0.5 tablets by mouth Daily.   Past Week    metoprolol succinate XL (Toprol XL) 50 MG 24 hr tablet Take 1 tablet by mouth Daily. 30 tablet 2 Past Week    multivitamin with minerals tablet tablet Take 1 tablet by mouth Daily. TAB A NICKO 400MCG   Past Week    oxyCODONE (ROXICODONE) 10 MG tablet Take 1 tablet by mouth Every 6 (Six) Hours As Needed for Moderate Pain (rated 4-7).   Past Week    pregabalin (LYRICA) 50 MG capsule Take 1 capsule by mouth 2 (Two) Times a Day.   Past Week    SITagliptin (JANUVIA) 100 MG tablet Take 1 tablet by mouth Daily.   Past Week    vitamin D (ERGOCALCIFEROL) 1.25 MG (29997 UT) capsule capsule Take 1 capsule by mouth 1 (One) Time Per Week.   Past Week    colesevelam (WELCHOL) 625 MG tablet Take 3 tablets by mouth 2 (Two) Times a Day With Meals.       Diclofenac Sodium (VOLTAREN) 1 % gel gel Apply 4 g topically to the appropriate area as directed 4 (Four) Times a Day As Needed (ARTHRITIS PAIN).       folic acid (FOLVITE) 1 MG tablet Take 1  "tablet by mouth Daily. (Patient not taking: Reported on 3/23/2025)   Not Taking    magnesium oxide (MAG-OX) 400 MG tablet Take 800 mg by mouth 2 (Two) Times a Day. (Patient not taking: Reported on 3/23/2025)   Not Taking    Naloxegol Oxalate (Movantik) 12.5 MG tablet Take 1 tablet by mouth Every Morning. (Patient not taking: Reported on 3/23/2025)   Not Taking    ondansetron (ZOFRAN) 4 MG tablet Take 1 tablet by mouth Every 6 (Six) Hours As Needed for Nausea or Vomiting. (Patient not taking: Reported on 3/23/2025)   Not Taking    predniSONE (DELTASONE) 20 MG tablet Take 1 tablet by mouth 2 (Two) Times a Day. (Patient not taking: Reported on 3/23/2025)   Not Taking    sodium chloride 1 g tablet Take 1 tablet by mouth 3 (Three) Times a Day. (Patient not taking: Reported on 3/23/2025)   Not Taking    Urea (Ure-Na) 15 g pack packet Take 15 g by mouth Daily. (Patient not taking: Reported on 3/23/2025)   Not Taking   , Allergies: Patient has no known allergies.    Review of Systems  Review of Systems   Reason unable to perform ROS: encephalopathic.        Objective     Vital Signs   Visit Vitals  BP (!) 83/55   Pulse 105   Temp 97.5 °F (36.4 °C) (Axillary)   Resp 12   Ht 154.9 cm (61\")   Wt 92.6 kg (204 lb 2.3 oz)   LMP  (LMP Unknown)   SpO2 94%   BMI 38.57 kg/m²       Physical Exam  Vitals reviewed.   Constitutional:       General: She is awake.      Appearance: She is well-developed. She is obese.      Interventions: Nasal cannula in place.   HENT:      Head: Normocephalic and atraumatic.   Eyes:      General: No scleral icterus.     Conjunctiva/sclera: Conjunctivae normal.      Pupils: Pupils are equal, round, and reactive to light.   Cardiovascular:      Rate and Rhythm: Tachycardia present.   Pulmonary:      Effort: Pulmonary effort is normal. No respiratory distress.      Breath sounds: Decreased breath sounds and rhonchi present. No wheezing or rales.   Abdominal:      Palpations: Abdomen is soft.      Tenderness: " "There is no abdominal tenderness.   Musculoskeletal:         General: Normal range of motion.      Cervical back: Normal range of motion and neck supple.      Right lower leg: Edema present.      Left lower leg: Edema present.   Skin:     General: Skin is warm and dry.   Neurological:      Mental Status: She is alert. She is confused.   Psychiatric:         Behavior: Behavior normal.       LAB:   CBC:  Results from last 7 days   Lab Units 03/23/25  0405 03/22/25  2243   WBC 10*3/mm3 31.88* 38.15*   HEMATOCRIT % 25.8* 30.6*   PLATELETS 10*3/mm3 312 460*          BMP:)  Results from last 7 days   Lab Units 03/23/25  1329 03/23/25  0602 03/23/25  0405 03/23/25  0341 03/23/25  0132 03/22/25  2243   SODIUM mmol/L  --   --  130*  --   --  126*   SODIUM, ARTERIAL mmol/L 131* 128*  --  128*  --   --    POTASSIUM mmol/L  --   --  5.5*  --  5.6* 6.3*   CHLORIDE mmol/L  --   --  91*  --   --  91*   CO2 mmol/L  --   --  12.0*  --   --  9.0*   GLUCOSE mg/dL  --   --  213*  --   --  161*   GLUCOSE, ARTERIAL mg/dL  --   --   --  217*  --   --    BUN mg/dL  --   --  40*  --   --  40*   CREATININE mg/dL  --   --  3.03*  --   --  3.09*           COAG:      Invalid input(s): \"PT\"        IMAGES:       Imaging Results (Last 24 Hours)       Procedure Component Value Units Date/Time    US Renal Bilateral [[id number]] Collected: 03/23/25 1316     Updated: 03/23/25 1321    Narrative:      RENAL ULTRASOUND COMPLETE 3/23/2025 11:22 AM     REASON FOR EXAM: Acute renal insufficiency. A41.9-Sepsis, unspecified  organism; R65.21-Severe sepsis with septic shock.     COMPARISON: None.       TECHNIQUE: Multiple longitudinal and transverse realtime sonographic  images of the kidneys and urinary bladder are obtained.     FINDINGS:     RIGHT KIDNEY: The right kidney measures 10.9 cm in length. The cortical  thickness and echogenicity are normal. There are no solid or cystic  masses. There is no hydronephrosis. No nephrolithiasis or abnormal  perinephric " fluid collections.     LEFT KIDNEY: The left kidney measures 8.7 cm in length. The cortical  thickness and echogenicity are normal. There are no solid or cystic  masses. There is no hydronephrosis. No nephrolithiasis or abnormal  perinephric fluid collections.     PELVIS: The bladder is not well distended. There is small to moderate  free fluid in the pelvis. There is no free fluid in the pelvis.     ADDITIONAL FINDINGS: The abdominal aorta was not imaged.       Impression:      1. The renal size, cortical thickness and echogenicity are normal. No  hydronephrosis.  2. Small to moderate free fluid in the pelvis.           The images and report are stored on PAC's per institutional and state  guidelines.           This report was signed and finalized on 3/23/2025 1:18 PM by Dr. Sebastian Velazquez MD.       CT Lower Extremity Right Without Contrast [[id number]] Collected: 03/23/25 1116     Updated: 03/23/25 1127    Narrative:      EXAMINATION:  CT LOWER EXTREMITY RIGHT WO CONTRAST-  3/23/2025 9:53 AM     HISTORY: Comminuted fracture of distal fenmoral metaphysis at outside  hospital, in shock now, concerned for fat embolism; A41.9-Sepsis,  unspecified organism; R65.21-Severe sepsis with septic shock     TECHNIQUE: Spiral CT was performed of the right femur including the  right hip and right knee joint spaces. Sagittal and coronal images were  reconstructed.     DLP: 898.24 mGy.cm Automated dosage reduction technique was utilized to  reduce patient dosage.     COMPARISON: No comparison study.     FINDINGS: There is a fracture across the distal metaphysis of the femur.  There is some impaction of the fracture fragments. No significant  displacement of the fracture is seen. There is severe narrowing of all 3  compartments of the knee. There is hypertrophic spurring. There is a  small amount of fluid in the joint space. 3D MPR reconstruction in the  true sagittal plane and true coronal plane make the fracture easier to  see.  The fracture is difficult to see on standard axial images. Straight  sagittal and coronal images do not demonstrate the fracture as well.  There is mild degenerative change of the right hip.          Impression:      1. Transverse fracture of the distal metaphysis of the right femur  demonstrates some impaction. No significant displacement is seen. The  fracture is best seen on 3D MPR reconstruction in the true sagittal and  coronal planes.  2. Severe degenerative osteoarthritis of the right knee. There is mild  degenerative osteoarthritis of the right hip.  3. Small joint effusion/hemarthrosis.        This report was signed and finalized on 3/23/2025 11:24 AM by Dr. Sebastian Velazquez MD.       CT Pelvis Without Contrast [[id number]] Collected: 03/23/25 1112     Updated: 03/23/25 1119    Narrative:      EXAMINATION:  CT PELVIS WO CONTRAST-  3/23/2025 9:53 AM     HISTORY: The patient fell. A41.9-Sepsis, unspecified organism;  R65.21-Severe sepsis with septic shock.     TECHNIQUE: Spiral CT was performed of the pelvis. Sagittal and coronal  images were reconstructed.     DLP: 898.24 mGy.cm Automated dosage reduction technique was utilized to  reduce patient dosage.     COMPARISON: No comparison study.     FINDINGS: There is body wall edema. There is a 3.6 x 9.4 cm collection  in the subcutaneous fat of the right pelvis anteriorly and predominantly  to the right of midline. There is atheromatous disease of the  tcggd-[id number]-[id number] vessels. There appears to be some free fluid in the  left pelvis. There is presacral edema. There is a Goel catheter in the  bladder. There is enthesopathy of the pelvis. There is degenerative  narrowing of the hip joint spaces bilaterally. The visualized proximal  femurs are intact.          Impression:      1. No evidence of acute fracture of the pelvis or proximal femurs.  2. A 3.6 x 9.4 cm collection in the subcutaneous fat of the right pelvis  anteriorly and predominantly to the right of  midline. This may be a  focal hematoma or seroma. There are no air bubbles contained. Abscess  felt to be less likely.  3. Degenerative osteoarthritis of both hips. Enthesopathy of the pelvis.  4. Diffuse body wall edema.  5. Atheromatous disease.        This report was signed and finalized on 3/23/2025 11:16 AM by Dr. Sebastian Velazquez MD.       XR Chest 1 View [[id number]] Collected: 03/23/25 0939     Updated: 03/23/25 0945    Narrative:      EXAMINATION:  XR CHEST 1 VW-  3/22/2025 9:44 PM     HISTORY: Sepsis. Line placement.     COMPARISON: 11/14/2022.     TECHNIQUE: Single view AP image.     FINDINGS: There is a left subclavian central venous catheter with  catheter tip at the junction of the superior vena cava and  brachiocephalic vein. There is blunting of the left costophrenic angle.  There is enlargement of the cardiac silhouette. No acute bony  abnormality is seen.          Impression:      1. Left subclavian central venous catheter with catheter tip at the  junction of the superior vena cava and brachiocephalic vein. The distal  tip of the catheter approaches the right lateral wall of the superior  vena cava. There is no evidence of pneumothorax.  2. Blunting of the left costophrenic angle suggesting pleural effusion  (outside CT on the same day demonstrates bilateral pleural effusions).  3. Enlargement of the cardiac silhouette (outside CT on the same day  demonstrates a large pericardial effusion).           This report was signed and finalized on 3/23/2025 9:42 AM by Dr. Sebastian Velazquez MD.       CT Outside Films [[id number]] Resulted: 03/23/25 0915     Updated: 03/23/25 0915    Narrative:      This procedure was auto-finalized with no dictation required.    XR Outside Films [[id number]] Resulted: 03/23/25 0915     Updated: 03/23/25 0915    Narrative:      This procedure was auto-finalized with no dictation required.    XR Outside Films [[id number]] Resulted: 03/23/25 0915     Updated: 03/23/25 0915     Narrative:      This procedure was auto-finalized with no dictation required.    CT Outside Films [[id number]] Resulted: 03/23/25 0915     Updated: 03/23/25 0915    Narrative:      This procedure was auto-finalized with no dictation required.    XR Outside Films [[id number]] Resulted: 03/23/25 0915     Updated: 03/23/25 0915    Narrative:      This procedure was auto-finalized with no dictation required.    XR Femur 1 View Right [[id number]] Collected: 03/23/25 0849     Updated: 03/23/25 0855    Narrative:      EXAMINATION:  XR FEMUR 1 VW RIGHT-  3/22/2025 9:58 PM     HISTORY: Possible distal right femur fracture.     COMPARISON: No comparison study.     TECHNIQUE: Single AP image of the mid to lower right femur.     FINDINGS: There is splint material overlying the distal femur. There are  likely cortical step-offs involving the medial and lateral distal  femoral metaphysis. There may be a transverse fracture in this area. A  lateral view would be helpful. There is severe degenerative  osteoarthritis of the right knee with a near bone-on-bone appearance of  the medial compartment. There is medial and lateral compartment  spurring. Vascular calcification is noted. There is edema within the  soft tissues of the visualized leg.          Impression:      As above.     This report was signed and finalized on 3/23/2025 8:51 AM by Dr. Sebastian Velazquez MD.       XR Abdomen KUB [[id number]] Collected: 03/23/25 0748     Updated: 03/23/25 0751    Narrative:      EXAMINATION:  XR ABDOMEN KUB-  3/23/2025 3:25 AM     HISTORY: NG tube placement.     COMPARISON: No comparison study.     TECHNIQUE: Supine image of the lower chest and abdomen.     FINDINGS:   The NG tube tip and sideport are within the stomach. The  stomach is distended with air.          Impression:      NG tube in good position.           This report was signed and finalized on 3/23/2025 7:48 AM by Dr. Sebastian Velazquez MD.             CXR: Cardiomegaly, small left-sided  pleural effusion, no pneumothorax               Assessment & Plan    Pericardial effusion  Septic shock  Acute kidney injury  Hyperkalemia  Metabolic acidosis  Right distal femur fracture  A-fib  Seizure disorder  Diabetes mellitus type 2  History of myeloproliferative disorder      Echocardiogram pending to further evaluate for tamponade.  Large pericardial effusion is noted on CT of the chest from outlying facility.    Hypotension is likely multifactorial in nature given sepsis, anemia, and pericardial effusion.   Continue critical care management.  Defer management of septic shock, electrolyte abnormalities, femur fracture, diabetes, myeloproliferative disorder, and VIRY to other specialties.    Deepti Mai, APRN  03/23/25  15:55 CDT

## 2025-03-23 NOTE — CONSULTS
"Pharmacy Dosing Service  Antimicrobial Dosing  Zosyn    Assessment/Action/Plan:  Based on indication and renal function, initiated extended-infusion Zosyn 4.5 gm IV every 12 hours for patient with CrCl < 20 ml/min. Pharmacy will continue to monitor daily and make further adjustment(s) accordingly.     Subjective:  Concepcion Velez is a 79 y.o. female with a  \"Pharmacy to Dose Zosyn\" consult for the treatment of Empiric (Septic Shock criteria) , day 1 of 7 of treatment.    Objective:  Ht: 154.9 cm (61\"); Wt: 92.6 kg (204 lb 2.3 oz)  Estimated Creatinine Clearance: 16.1 mL/min (A) (by C-G formula based on SCr of 3.03 mg/dL (H)).   Creatinine   Date Value Ref Range Status   03/23/2025 3.03 (H) 0.57 - 1.00 mg/dL Final   03/22/2025 3.09 (H) 0.57 - 1.00 mg/dL Final      Lab Results   Component Value Date    WBC 31.88 (C) 03/23/2025    WBC 38.15 (C) 03/22/2025      Baseline culture results:  Microbiology Results (last 10 days)       ** No results found for the last 240 hours. **            Shane Snell, PharmD  03/23/25 06:38 CDT    "

## 2025-03-23 NOTE — PROCEDURES
Insert Arterial Line    Date/Time: 3/23/2025 5:27 PM    Performed by: New Olson PA-C  Authorized by: New Olson PA-C    Universal Protocol:     Written consent obtained?: Yes      Risks and benefits: Risks, benefits and alternatives were discussed      Consent given by: family-patient's children.    Procedure consent matches procedure scheduled: Yes      Relevant documents present and verified: Yes      Patient identity confirmed:  Arm band    Time out: Immediately prior to the procedure a time out was called    A time out verifies correct patient, procedure, equipment, support staff and site/side marked as required:   Preparation:     Preparation: Patient was prepped and draped in usual sterile fashion    Indications:     Indications: multiple ABGs and hemodynamic monitoring    Location:     Location:  Left radial  Anesthesia:     Patient sedated: No    Procedure Details:     Ultrasound Guidance: yes  The ultrasound was used for evaluation of possible access sites.  Vessel patency was confirmed with the ultrasound.  Needle entry into vessel was visualized in realtime with the ultrasound.       Trenton's test normal?: Yes      Needle gauge:  20    Seldinger technique: Seldinger technique used      Number of attempts:  2  Post-procedure:     Post-procedure:  Line sutured     Dr. Johnson was present to tsai the entire procedure. The arterial line was placed and connected to the arterial line kit. However, while suturing the arterial line, the catheter unfortunately became displaced. We tried to reposition the catheter and advance a guidewire, but unfortunately, we were unable to regain access. We felt a hematoma forming, so we removed the guidewire and catheter, and the procedure was ultimately aborted. We placed pressure over the area with gauze and later a bandage to stop bleeding.

## 2025-03-23 NOTE — H&P
[facility] Intensivist Services  HISTORY AND PHYSICAL    Date of Admission: 3/22/2025  Primary Care Physician: Jaxson Vinse DO Subjective   Primary Historian: Family    Chief Complaint: Septic shock, VIRY    History of Present Illness:  Patient is a 79-year-old female with PMH of DM2, HLD, HTN, CVA, myeloproliferative disorder, seizure disorder, AF on Eliquis, and PFO transferred from [facility] for sepsis, VIRY, and pericardial effusion.  She reportedly had a ground-level fall at home on Monday.  Daughters are at the bedside who provide the history.  She lives with her  and uses a walker to assist with ambulation.  It is unclear how the fall occurred, but daughter suspects the right leg gave out from under her causing her to fall.  She was taken to [facility] for evaluation yesterday for evaluation.  She was found to have an impacted fracture of the right distal femoral metaphysis.  Patient was found to have a number of lab abnormalities including leukocytosis and elevated creatinine.  She was admitted to ICU at [place], and was experiencing hypotension despite receiving 4L IVF boluses.  CT abdomen pelvis without contrast obtained that revealed no renal obstruction.  There is mention of large pericardial effusion along with moderate to large bilateral pleural effusions and lower lobe atelectasis.  Extensive subcutaneous edema along with 4 x 6 x 9 cm seroma or other fluid collection right lower quadrant abdominal wall unchanged from prior.  Patient was started on Levophed for hemodynamic support.  [facility] did not have nephrology services available and requested for transfer to this facility for ongoing management.  Empiric Rocephin administered at OSH due to concern for sepsis.    Patient seen and evaluated on arrival to ICU.  Levophed infusing on arrival with SBP 70s-80s.  Patient is not able provide any meaningful  history.  As mentioned above, history obtained from patient's daughters and also from medical record review.  Of note, daughters mention patient has leukocytosis at baseline due to myeloproliferative disorder which is followed by hematology at [facility].        Review of Systems   Unable to perform ROS: Acuity of condition      Otherwise complete ROS reviewed and negative except as mentioned in the HPI.    Past Medical History:   Past Medical History:   Diagnosis Date    Abdominal wall seroma     B12 deficiency     Cerebral infarct     Diabetes mellitus     HLD (hyperlipidemia)     Hypertension     Hypokalemia     Hypomagnesemia      Past Surgical History:  Past Surgical History:   Procedure Laterality Date    CHOLECYSTECTOMY      HERNIA REPAIR      HYSTERECTOMY       Social History:  reports that she has never smoked. She does not have any smokeless tobacco history on file. She reports that she does not drink alcohol and does not use drugs.    Family History: family history includes Cancer in her mother; Hypertension in her mother; Transient ischemic attack in her father.      Allergies:  No Known Allergies    Medications:  Prior to Admission medications    Medication Sig Start Date End Date Taking? Authorizing Provider   allopurinol (ZYLOPRIM) 300 MG tablet Take 1 tablet by mouth Daily.    ProviderSilver MD   atorvastatin (LIPITOR) 20 MG tablet Take 20 mg by mouth Every Night.    Silver Babin MD   HYDROcodone-acetaminophen (NORCO) 7.5-325 MG per tablet Take 1 tablet by mouth Every 6 (Six) Hours As Needed for Moderate Pain.    Silver Babin MD   levETIRAcetam (KEPPRA) 500 MG tablet Take 1 tablet by mouth 2 (Two) Times a Day. 11/15/22   Elie Lim MD   lisinopril (PRINIVIL,ZESTRIL) 20 MG tablet Take 1 tablet by mouth Daily.    Silver Babin MD   magnesium oxide (MAG-OX) 400 MG tablet Take 800 mg by mouth 2 (Two) Times a Day.    Silver Babin MD   meloxicam (MOBIC) 15 MG  "tablet Take 1 tablet by mouth Daily.    Silver Babin MD   metFORMIN (GLUCOPHAGE) 1000 MG tablet Take 1 tablet by mouth 2 (Two) Times a Day With Meals.    Silver Babin MD   metoprolol succinate XL (Toprol XL) 50 MG 24 hr tablet Take 1 tablet by mouth Daily. 11/15/22   Abhishek Kohli DO   oxyCODONE (oxyCONTIN) 10 MG 12 hr tablet Take 1 tablet by mouth Every 12 (Twelve) Hours.    Silver Babin MD   oxyCODONE (ROXICODONE) 10 MG tablet Take 1 tablet by mouth Every 6 (Six) Hours As Needed for Moderate Pain (rated 4-7).    Silver Babin MD   potassium chloride (K-DUR,KLOR-CON) 20 MEQ CR tablet Take 1 tablet by mouth Daily.    Silver Babin MD   vitamin D (ERGOCALCIFEROL) 1.25 MG (21371 UT) capsule capsule Take 50,000 Units by mouth 1 (One) Time Per Week.    Silver Baibn MD     I have utilized all available immediate resources to obtain, update, or review the patient's current medications (including all prescriptions, over-the-counter products, herbals, cannabis/cannabidiol products, and vitamin/mineral/dietary (nutritional) supplements).    Objective     Vital Signs: BP 96/58   Pulse 93   Temp 97.4 °F (36.3 °C) (Axillary)   Resp 20   Ht 154.9 cm (61\")   Wt 90 kg (198 lb 6.6 oz)   LMP  (LMP Unknown)   BMI 37.49 kg/m²   Physical Exam  Vitals and nursing note reviewed.   Constitutional:       General: She is not in acute distress.     Appearance: She is ill-appearing.   HENT:      Head: Normocephalic and atraumatic.      Mouth/Throat:      Mouth: Mucous membranes are moist.   Eyes:      Extraocular Movements: Extraocular movements intact.      Conjunctiva/sclera: Conjunctivae normal.   Cardiovascular:      Rate and Rhythm: Normal rate.      Heart sounds: No murmur heard.     Comments: Left subclavian CVL  Pulmonary:      Effort: Pulmonary effort is normal. No respiratory distress.      Breath sounds: No wheezing or rales.   Abdominal:      General: Abdomen is " flat. There is no distension.      Palpations: Abdomen is soft.      Tenderness: There is no abdominal tenderness. There is no guarding.   Musculoskeletal:      Cervical back: Normal range of motion and neck supple.      Comments: Moves all extremities bilateral.  Swelling and ecchymosis proximal right knee with knee immobilizer in place.  Wounds present right lower extremity   Neurological:      Mental Status: She is alert.          Results Reviewed:  Lab Results (last 24 hours)       Procedure Component Value Units Date/Time    High Sensitivity Troponin T 1Hr [[id number]] Collected: 03/22/25 2345    Specimen: Blood Updated: 03/22/25 2357    CK [[id number]] Collected: 03/22/25 2243    Specimen: Blood Updated: 03/22/25 2355    Manual Differential [[id number]]  (Abnormal) Collected: 03/22/25 2243    Specimen: Blood Updated: 03/22/25 2349     Neutrophil % 58.7 %      Lymphocyte % 4.8 %      Monocyte % 26.9 %      Eosinophil % 0.0 %      Basophil % 0.0 %      Bands %  7.7 %      Atypical Lymphocyte % 1.9 %      Neutrophils Absolute 25.31 10*3/mm3      Lymphocytes Absolute 2.56 10*3/mm3      Monocytes Absolute 10.26 10*3/mm3      Eosinophils Absolute 0.00 10*3/mm3      Basophils Absolute 0.00 10*3/mm3      Anisocytosis Slight/1+     Crenated RBC's Slight/1+     Macrocytes Slight/1+     Poikilocytes Slight/1+     Polychromasia Slight/1+     WBC Morphology Normal     Clumped Platelets Present     Giant Platelets Slight/1+    Slide Review, Hematology [[id number]] Collected: 03/22/25 2243    Specimen: Blood Updated: 03/22/25 2343    CBC & Differential [[id number]]  (Abnormal) Collected: 03/22/25 2243    Specimen: Blood Updated: 03/22/25 2320    Narrative:      The following orders were created for panel order CBC & Differential.  Procedure                               Abnormality         Status                     ---------                               -----------         ------                     CBC Auto  Differential[[id number]]        Abnormal            Final result                 Please view results for these tests on the individual orders.    CBC Auto Differential [[id number]]  (Abnormal) Collected: 03/22/25 2243    Specimen: Blood Updated: 03/22/25 2320     WBC 38.15 10*3/mm3      RBC 2.90 10*6/mm3      Hemoglobin 9.7 g/dL      Hematocrit 30.6 %      .5 fL      MCH 33.4 pg      MCHC 31.7 g/dL      RDW 15.7 %      RDW-SD 60.5 fl      MPV 12.2 fL      Platelets 460 10*3/mm3     Comprehensive Metabolic Panel [[id number]]  (Abnormal) Collected: 03/22/25 2243    Specimen: Blood Updated: 03/22/25 2319     Glucose 161 mg/dL      BUN 40 mg/dL      Creatinine 3.09 mg/dL      Sodium 126 mmol/L      Potassium 6.3 mmol/L      Chloride 91 mmol/L      CO2 9.0 mmol/L      Calcium 9.3 mg/dL      Total Protein 6.3 g/dL      Albumin 3.5 g/dL      ALT (SGPT) 39 U/L      AST (SGOT) 74 U/L      Alkaline Phosphatase 163 U/L      Total Bilirubin 0.6 mg/dL      Globulin 2.8 gm/dL      A/G Ratio 1.3 g/dL      BUN/Creatinine Ratio 12.9     Anion Gap 26.0 mmol/L      eGFR 15.0 mL/min/1.73     Narrative:      GFR Categories in Chronic Kidney Disease (CKD)      GFR Category          GFR (mL/min/1.73)    Interpretation  G1                     90 or greater         Normal or high (1)  G2                      60-89                Mild decrease (1)  G3a                   45-59                Mild to moderate decrease  G3b                   30-44                Moderate to severe decrease  G4                    15-29                Severe decrease  G5                    14 or less           Kidney failure          (1)In the absence of evidence of kidney disease, neither GFR category G1 or G2 fulfill the criteria for CKD.    eGFR calculation 2021 CKD-EPI creatinine equation, which does not include race as a factor    Magnesium [[id number]]  (Normal) Collected: 03/22/25 2243    Specimen: Blood Updated: 03/22/25 2319     Magnesium 2.2 mg/dL      High Sensitivity Troponin T [[id number]]  (Abnormal) Collected: 03/22/25 2243    Specimen: Blood Updated: 03/22/25 2314     HS Troponin T 82 ng/L     Narrative:      High Sensitive Troponin T Reference Range:  <14.0 ng/L- Negative Female for AMI  <22.0 ng/L- Negative Male for AMI  >=14 - Abnormal Female indicating possible myocardial injury.  >=22 - Abnormal Male indicating possible myocardial injury.   Clinicians would have to utilize clinical acumen, EKG, Troponin, and serial changes to determine if it is an Acute Myocardial Infarction or myocardial injury due to an underlying chronic condition.         Lactic Acid, Plasma [[id number]]  (Abnormal) Collected: 03/22/25 2243    Specimen: Blood Updated: 03/22/25 2314     Lactate 9.0 mmol/L     Phosphorus [[id number]]  (Abnormal) Collected: 03/22/25 2243    Specimen: Blood Updated: 03/22/25 2310     Phosphorus 7.2 mg/dL     POC Glucose Once [[id number]]  (Abnormal) Collected: 03/22/25 2243    Specimen: Blood Updated: 03/22/25 2253     Glucose 182 mg/dL      Comment: : [id number] Windmill DarronMeter ID: [id number]       Blood Culture With XIANG - Blood, Arm, Left [[id number]] Collected: 03/22/25 2245    Specimen: Blood from Arm, Left Updated: 03/22/25 2253    Blood Culture With XIANG - Blood, Hand, Right [[id number]] Collected: 03/22/25 2245    Specimen: Blood from Hand, Right Updated: 03/22/25 2253    Blood Gas, Arterial With Co-Ox [[id number]]  (Abnormal) Collected: 03/22/25 2238    Specimen: Arterial Blood Updated: 03/22/25 2241     Site Arterial Line     Trenton's Test N/A     pH, Arterial 7.140 pH units      Comment: 85 Value below critical limit        pCO2, Arterial 28.4 mm Hg      Comment: 84 Value below reference range        pO2, Arterial 85.5 mm Hg      HCO3, Arterial 9.7 mmol/L      Comment: 84 Value below reference range        Base Excess, Arterial -17.9 mmol/L      Comment: 84 Value below reference range        O2 Saturation, Arterial 94.3 %      Hemoglobin,  Blood Gas 10.5 g/dL      Comment: 84 Value below reference range        Hematocrit, Blood Gas 32.0 %      Comment: 84 Value below reference range        Oxyhemoglobin 92.7 %      Comment: 84 Value below reference range        Methemoglobin 0.20 %      Carboxyhemoglobin 1.5 %      Temperature 37.0     Sodium, Arterial 125 mmol/L      Comment: 84 Value below reference range        Potassium, Arterial 6.0 mmol/L      Comment: 83 Value above reference range        Barometric Pressure for Blood Gas 754 mmHg      Modality Nasal Cannula     Flow Rate 2.0 lpm      Ventilator Mode NA     Notified Who ADDIE RN     Notified By Maggy Burk, RRT     Notified Time 03/22/2025 22:39     Collected by [id number]     Comment: Meter: [id number]     :  Maggy Burk, DEE        pH, Temp Corrected 7.140 pH Units      pCO2, Temperature Corrected 28.4 mm Hg      pO2, Temperature Corrected 85.5 mm Hg              Chest x-ray and right femur.  Imaging studies reviewed, radiology read pending.    Result Review:  I have personally reviewed the results from the time of this admission to 3/23/2025 00:05 CDT and agree with these findings:  [x]  Laboratory list / accordion  [x]  Microbiology  [x]  Radiology  [x]  EKG/Telemetry   [x]  Cardiology/Vascular   []  Pathology  [x]  Old records  []  Other:      I have personally reviewed and interpreted the radiology studies and ECG obtained at time of admission.     Assessment / Plan   Assessment:   Active Hospital Problems    Diagnosis     **Septic shock      79-year-old female transferred from [facility] for sepsis, VIRY, pericardial effusion.  Started on Levophed for hemodynamic support after receiving 4L IVF boluses.  Empiric Rocephin given at OSH.    Shock, septic versus cardiogenic  -WBC 38, lactic acid 9  - bolus and albumin given  -Empiric Rocephin given at OSH  -Vanc/cefepime started  -Follow culture data  -Continue Levophed  -Started on Dobutamine, changed to Epi  gtt per Dr. Rishabh de la torre  -Echo ordered    Pericardial effusion  -CT abd/pelvis at OSH with evidence of pericardial effusion  -POCUS with evidence of pericardial effusion  -Echo ordered  -CT surgery consulted, discussed with Dr. Keating  -Dobutamine stopped, started on Epinephrine drip for Dr. Rishabh de la torre    Acute kidney injury  -Baseline Cr ~1.0  -Cr 3.09  -Monitor renal function and UOP  -Avoid nephrotoxic agents  -Nephrology consulted, spoke to Elie Kowalski NP    Hyperkalemia  -K 6.3  -EKG with no peaked T-waves  -Insulin, D50, calcium given  -Start Lokelma after NGT placed  -Nephrology consulted, appreciate assistance    Metabolic acidosis  -Likely multifactorial  -1 amp bicarb given  -Started on bicarb drip    Right distal femur fracture  -X-rays and CT knee at OSH with impacted distal femur fx  -X-rays of right knee at this facility-impacted distal femure fx  -Continue knee immobilizer  -Analgesics prn  -Consult orthopedics, appreciate assistance  -Will need PT/OT evaluation    Right lower extremity wound  -Present on admission  -Consult wound care, appreciate recs    Atrial fibrillation  PFO  -Anticoagulated on Eliquis  -Hold Eliquis for now 2/2 possible pericardiocentesis  -Echo ordered    Seizure disorder  -Continue home Keppra  -Keppra 500 mg IV BID for now    Diabetes mellitus, type II  -Check Hgb A1c  -Monitor CBG  -Sliding scale insulin    History of myeloproliferative disorder  -Daughter reports leukocytosis at baseline  -Followed by Hem[facility]Onc at [facility]    Diet: NPO  DVT PPx: Eliquis, hold for now   GI prophylaxis: Protonix  Antibiotics: Vancomycin/cefepime  CODE STATUS: DNR/DNI  Next of kin: Lillie Henson, daughter ([phone]); Lucy Segovia, daughter ([phone])    I provided 110 minutes of total critical care time. Due to the high probability of clinically significant, life-threatening deterioration, the patient required my direct and personal management. The critical care time does not include time  spent on separately billable procedures.    Electronically signed by Turner Lopez PA-C on 3/23/2025 at 00:05 CDT

## 2025-03-23 NOTE — CONSULTS
"Pharmacy Dosing Service  Pharmacokinetics  Vancomycin Initial Evaluation  Assessment/Action/Plan:  Loading dose: 2,000 mg  Current Order: Vancomycin 750 mg IVPB every 24 hours  Current end date/final dose: 3/27/25 at 0100  Levels: None ordered at this time  Additional antimicrobial agent(s): Cefepime (received Rocephin prior to transfer)  MRSA Nasal PCR ordered: No    Vancomycin dosage initiated based on population pharmacokinetic parameters. Pharmacy will continue to follow daily and adjust dose accordingly.     Subjective:  Concepcion Velez is a 79 y.o. female with a Vancomycin \"Pharmacy to Dose\" consult for the treatment of Empiric (Septic Shock) , day 1 of 5 of treatment.    AUC Model Data:  Loading dose: 2000 mg at 01:00 03/23/2025.  Regimen: 750 mg IV every 24 hours.  Start time: 01:00 on 03/24/2025  Exposure target: AUC24 (range)400-600 mg/L.hr   AUC24,ss: 550 mg/L.hr  Probability of AUC24 > 400: 82 %  Ctrough,ss: 19.9 mg/L  Probability of Ctrough,ss > 20: 50 %  Probability of nephrotoxicity (Lodise VIKASH 2009): 17 %    Objective:  Ht: 154.9 cm (61\"); Wt: 90 kg (198 lb 6.6 oz)  Estimated Creatinine Clearance: 15.6 mL/min (A) (by C-G formula based on SCr of 3.09 mg/dL (H)).   Creatinine   Date Value Ref Range Status   03/22/2025 3.09 (H) 0.57 - 1.00 mg/dL Final      Lab Results   Component Value Date    WBC 38.15 (C) 03/22/2025      Baseline culture results:  Microbiology Results (last 10 days)       ** No results found for the last 240 hours. **            Shane Snell, PharmD  03/23/25 00:45 CDT    "

## 2025-03-23 NOTE — CONSULTS
"Inpatient Cardiology Consult  Consult performed by: Yony Duran MD  Consult ordered by: Turner Lopez PA-C  Reason for consult: Evaluate pericardial effusion      Chief Complaint: Limited from the patient, however reason for consultation is to evaluate for pericardial effusion    HPI: This is a 79-year-old female who we are asked to evaluate for a pericardial effusion.  The patient was transferred from an outside hospital due to concerns of sepsis and acute kidney injury, also with a pericardial effusion.  The patient reportedly fell earlier in the week and was taken to an outside hospital where she was found have a fracture in the distal right lower extremity.  The patient was noted to be hypotensive upon arrival to the outside hospital and received IV fluids and imaging indicated a large pericardial effusion with bilateral pleural effusions amongst other noncardiac findings.  The patient was ultimately transferred here for higher level of care.  Cardiology was asked to evaluate given the presence of a pericardial effusion noted on outside imaging.  The patient upon arrival here was on Levophed and dobutamine was started, however dobutamine has now been weaned off and the patient is requiring Levophed at this time with stable hemodynamics on this medication.  The patient does not otherwise provide any significant meaningful history at this time.    All elements of the past medical history, past surgical history, home medications, allergy, social history, family history and review of systems are essentially considered unobtainable or of minimal use and assessment of this patient given her drowsy state and the acuity of her overall medical condition.    Physical Exam:  /71   Pulse 100   Temp 97.5 °F (36.4 °C) (Axillary)   Resp 12   Ht 154.9 cm (61\")   Wt 92.6 kg (204 lb 2.3 oz)   LMP  (LMP Unknown)   SpO2 94%   BMI 38.57 kg/m²   Temp:  [97.4 °F (36.3 °C)-97.5 °F (36.4 °C)] 97.5 °F " (36.4 °C)  Heart Rate:  [] 100  Resp:  [12-20] 12  BP: ()/(47-75) 100/71    Gen.: Ill in appearance, although currently in no acute distress  HEENT: Normocephalic, atraumatic, pupils equally round and reactive to light, NG tube in place, mucous membranes appear to be slightly dry  Neck: Central line in place, no obvious elevation of JVP  CV: Mildly tachycardic with heart rate slightly over 100 bpm, no significant murmurs appreciated  Pulmonary: Decreased breath sounds bilaterally with poor effort on the patient's part, no obvious wheezes  GI: Obese, soft, nontender to palpation, nondistended with active bowel sounds  Extremities: Warm and well-perfused with edema noted, dressings in place  Neurologic: Drowsy but arousable, does answer questions but does not provide any meaningful history    Diagnostic Data:    Lab Results   Component Value Date    WBC 31.88 (C) 03/23/2025    HGB 8.0 (L) 03/23/2025    HCT 25.8 (L) 03/23/2025    .2 (H) 03/23/2025     03/23/2025     Lab Results   Component Value Date    GLUCOSE 213 (H) 03/23/2025    CALCIUM 9.2 03/23/2025     (L) 03/23/2025    K 5.5 (H) 03/23/2025    CO2 12.0 (L) 03/23/2025    CL 91 (L) 03/23/2025    BUN 40 (H) 03/23/2025    CREATININE 3.03 (H) 03/23/2025    EGFR 15.4 (L) 03/23/2025    BCR 13.2 03/23/2025    ANIONGAP 27.0 (H) 03/23/2025     AST 74, ALT 39, alkaline phosphatase 163, total bilirubin 0.6, total protein 6.3, albumin 3.5    Lab Results   Component Value Date    PHART 7.163 (C) 03/23/2025    XEM1ELG 29.7 (L) 03/23/2025    PO2ART 78.7 (L) 03/23/2025    IIY8MMD 10.6 (L) 03/23/2025    BASEEXCESS -16.6 (L) 03/23/2025    X5YMXBVY 93.4 (L) 03/23/2025     Procalcitonin of 1.25  Most recent lactate of 9.6    I reviewed the patient's chest x-ray which is currently pending an official read.  Bilateral opacities noted, particular in the hilar area, cardiac silhouette is prominent.    ECG on arrival sinus tachycardia, ventricular rate 101  with low bundle-branch block    Echocardiogram at [facility]SH 1/6/25:      ASSESSMENT/PLAN:    1.  Pericardial effusion  2.  Shock, etiology unknown at this time, presumed to be septic, particularly given lab abnormalities included markedly elevated procalcitonin  3.  Acute kidney injury  4.  Hyponatremia  5.  Hyperkalemia  6.  Anemia  7.  Metabolic acidosis  8.  Right distal femoral fracture    -We are asked to evaluate this patient with a pericardial effusion.  An echocardiogram is currently pending at this time we will certainly review and look for any signs of tamponade.  However, on clinical exam, the patient was able to hold her breath today.  There was no significant change in heart rate or blood pressure with respiration, suggestive that the patient is not in cardiac tamponade, at least at this particular time.  Likely, the cause of her hypotension is multifactorial, certainly with potential for sepsis and pericardial effusion. Certainly, a cardiogenic source such as left ventricular systolic dysfunction is still considered and will be evaluated further when the echocardiogram is reviewed as well.  Pressors have been weaned overnight to Levophed alone at this time.  Sepsis workup and management per the primary service.  -At this time, even if the patient does have a large pericardial effusion, I would not necessarily see any obvious indication for drainage of the effusion as I do not feel that this is the sole source of her hemodynamic instability.  CT surgery is also being consulted for the same reason.  However, if significant signs of tamponade are noted by echocardiogram, even though the patient is not clinically in tamponade, drainage could be considered.  Of note, the patient is a CODE STATUS of no CPR with limited support, so this would need to be discussed with family.  -We will be available if there are further questions.  If significant findings are noted on the echocardiogram of the pericardial effusion,  we will reevaluate and make further recommendations regarding management of such findings.

## 2025-03-23 NOTE — PROCEDURES
Insert Arterial Line    Date/Time: 3/22/2025 11:00 PM    Performed by: Turner Lopez PA-C  Authorized by: Turner Lopez PA-C    [facility] Protocol:     Emergent situation      Patient identity confirmed:  Arm band and provided demographic data    Time out: Immediately prior to the procedure a time out was called    A time out verifies correct patient, procedure, equipment, support staff and site/side marked as required:   Preparation:     Preparation: Patient was prepped and draped in usual sterile fashion    Indications:     Indications: hemodynamic monitoring    Location:     Location:  Left femoral  Anesthesia:     Anesthesia:  Local infiltration    Local anesthetic:  Lidocaine 1% without epinephrine    Anesthetic total (ml):  3    Patient sedated: No    Procedure Details:     Ultrasound Guidance: yes  Vessel patency was confirmed with the ultrasound.  Needle entry into vessel was visualized in realtime with the ultrasound.       Needle gauge:  18    Seldinger technique: Seldinger technique used      Number of attempts:  1  Post-procedure:     Post-procedure:  Line sutured and dressing applied    Post-procedure CMS:  Normal     patient tolerated the procedure well with no immediate complications     Femoral site chosen due to poor vasculature in bilateral upper extremities

## 2025-03-23 NOTE — CONSULTS
Nephrology ([facility]) Consult Note      Patient:  Concepcion Velez  YOB: 1945  Date of Service: 3/23/2025  MRN: [id number]   Acct: [id number]   Primary Care Physician: Jaxson Vines DO  Advance Directive:   Code Status and Medical Interventions: No CPR (Do Not Attempt to Resuscitate); Limited Support; No intubation (DNI)   Ordered at: 03/22/25 3635     Code Status (Patient has no pulse and is not breathing):    No CPR (Do Not Attempt to Resuscitate)     Medical Interventions (Patient has pulse or is breathing):    Limited Support     Medical Intervention Limits:    No intubation (DNI)     Admit Date: 3/22/2025       Hospital Day: 1  Referring Provider: Forest Merlos MD      Patient Seen, Chart, Consults, Notes, Labs, Radiology studies reviewed.    Chief complaint: Abnormal labs.    Subjective:  Concepcion Velez is a 79 y.o. female  whom we were consulted for acute kidney injury, metabolic acidosis and hyperkalemia.  She has history of type 2 diabetes, hyperlipidemia, hypertension, CVA, myeloproliferative disorder, atrial fibrillation on Eliquis and PFO.  She was accepted from [facility] for the treatment of acute kidney injury/pericardial effusion.  Patient has fallen recently on Monday, was complaining of leg pain and was taken to [facility] where the x-ray of the leg did show patient has fracture of right distal femur.  She was also found to have worsening of renal function, leukocytosis.   Hospital course remarkable for IV fluid administration, need for vasopressors for blood pressure support.  Patient is currently DNI/DNR.  She was found to have a large pericardial effusion with moderate to large bilateral pleural effusion.  Patient was evaluated by cardiology for the treatment of pericardial effusion and rule out any need for pericardiocentesis.  Her serum creatinine is 3.0 mg, potassium is 5.6 mmol and has severe metabolic  acidosis.    This afternoon she was seen in ICU bed 10, currently breathing on BiPAP.  She is surrounded by family members including 2 daughters.  She is lethargic and very weak.  She has indwelling Goel's catheter and has no urine output.  Bladder scan confirmed no urine.    Allergies:  Patient has no known allergies.    Home Meds:  Medications Prior to Admission   Medication Sig Dispense Refill Last Dose/Taking    acetaminophen (TYLENOL) 500 MG tablet Take 1 tablet by mouth 2 (Two) Times a Day.   Past Week    albuterol sulfate  (90 Base) MCG/ACT inhaler Inhale 2-4 puffs Every 4 (Four) Hours As Needed for Wheezing or Shortness of Air.   Past Week    allopurinol (ZYLOPRIM) 300 MG tablet Take 1 tablet by mouth Daily.   Past Week    apixaban (ELIQUIS) 5 MG tablet tablet Take 1 tablet by mouth 2 (Two) Times a Day.   Past Week    celecoxib (CeleBREX) 100 MG capsule Take 1 capsule by mouth 2 (Two) Times a Day.   Past Week    escitalopram (LEXAPRO) 10 MG tablet Take 1 tablet by mouth Daily.   Past Week    furosemide (LASIX) 40 MG tablet Take 1 tablet by mouth Daily.   Past Week    hydroxyurea (HYDREA) 500 MG capsule Take 1 capsule by mouth Daily.   Past Week    insulin glargine (LANTUS, SEMGLEE) 100 UNIT/ML injection Inject 15 Units under the skin into the appropriate area as directed 2 (Two) Times a Day.   Past Week    Insulin Lispro (humaLOG) 100 UNIT/ML injection Inject 0-8 Units under the skin into the appropriate area as directed 3 (Three) Times a Day Before Meals. 0-150=0 UNITS  SLIDING SCALE FOR BLOOD GLUCOSE  151-200=2 UNITS  201-250=4 UNITS  251-300=6 UNITS  301-350=8 UNITS  351-400=10 UNITS  gIVE 12 UNITS IF OVER 400   Past Week    ketoconazole (NIZORAL) 2 % shampoo Apply 1 Application topically to the appropriate area as directed 2 (Two) Times a Week. APPLY TOPICALLY 2-3 TIMES WEEKLY AS NEEDED   Past Week    levETIRAcetam (KEPPRA) 500 MG tablet Take 1 tablet by mouth 2 (Two) Times a Day. 60 tablet 1  Past Week    levothyroxine (SYNTHROID, LEVOTHROID) 50 MCG tablet Take 1 tablet by mouth Every Morning.   Past Week    lisinopril (PRINIVIL,ZESTRIL) 30 MG tablet Take 0.5 tablets by mouth Daily.   Past Week    metoprolol succinate XL (Toprol XL) 50 MG 24 hr tablet Take 1 tablet by mouth Daily. 30 tablet 2 Past Week    multivitamin with minerals tablet tablet Take 1 tablet by mouth Daily. TAB A NICKO 400MCG   Past Week    oxyCODONE (ROXICODONE) 10 MG tablet Take 1 tablet by mouth Every 6 (Six) Hours As Needed for Moderate Pain (rated 4-7).   Past Week    pregabalin (LYRICA) 50 MG capsule Take 1 capsule by mouth 2 (Two) Times a Day.   Past Week    SITagliptin (JANUVIA) 100 MG tablet Take 1 tablet by mouth Daily.   Past Week    vitamin D (ERGOCALCIFEROL) 1.25 MG (17672 UT) capsule capsule Take 1 capsule by mouth 1 (One) Time Per Week.   Past Week    colesevelam (WELCHOL) 625 MG tablet Take 3 tablets by mouth 2 (Two) Times a Day With Meals.       Diclofenac Sodium (VOLTAREN) 1 % gel gel Apply 4 g topically to the appropriate area as directed 4 (Four) Times a Day As Needed (ARTHRITIS PAIN).       folic acid (FOLVITE) 1 MG tablet Take 1 tablet by mouth Daily. (Patient not taking: Reported on 3/23/2025)   Not Taking    magnesium oxide (MAG-OX) 400 MG tablet Take 800 mg by mouth 2 (Two) Times a Day. (Patient not taking: Reported on 3/23/2025)   Not Taking    Naloxegol Oxalate (Movantik) 12.5 MG tablet Take 1 tablet by mouth Every Morning. (Patient not taking: Reported on 3/23/2025)   Not Taking    ondansetron (ZOFRAN) 4 MG tablet Take 1 tablet by mouth Every 6 (Six) Hours As Needed for Nausea or Vomiting. (Patient not taking: Reported on 3/23/2025)   Not Taking    predniSONE (DELTASONE) 20 MG tablet Take 1 tablet by mouth 2 (Two) Times a Day. (Patient not taking: Reported on 3/23/2025)   Not Taking    sodium chloride 1 g tablet Take 1 tablet by mouth 3 (Three) Times a Day. (Patient not taking: Reported on 3/23/2025)   Not  Taking    Urea (Ure-Na) 15 g pack packet Take 15 g by mouth Daily. (Patient not taking: Reported on 3/23/2025)   Not Taking       Medicines:  Current Facility-Administered Medications   Medication Dose Route Frequency Provider Last Rate Last Admin    acetaminophen (TYLENOL) tablet 650 mg  650 mg Oral Q4H PRN Turner Lopez PA-C   650 mg at 03/23/25 1153    Or    acetaminophen (TYLENOL) suppository 650 mg  650 mg Rectal Q4H PRN Turner Lopez PA-C        sennosides-docusate (PERICOLACE) 8.6-50 MG per tablet 2 tablet  2 tablet Oral BID Turner Lopez PA-C        And    polyethylene glycol (MIRALAX) packet 17 g  17 g Oral Daily PRN Turner Lopez PA-C        And    bisacodyl (DULCOLAX) EC tablet 5 mg  5 mg Oral Daily PRN Turner Lopez PA-C        And    bisacodyl (DULCOLAX) suppository 10 mg  10 mg Rectal Daily PRN Turner Lopez PA-C        Calcium Replacement - Follow Nurse / BPA Driven Protocol   Not Applicable PRN Turner Lopez PA-C        Chlorhexidine Gluconate Cloth 2 % pads 1 Application  1 Application Topical Q24H Turner Lopez PA-C   1 Application at 03/23/25 0328    dextrose (D50W) (25 g/50 mL) IV injection 25 g  25 g Intravenous Q15 Min PRN Turner Lopez PA-C        dextrose (GLUTOSE) oral gel 15 g  15 g Oral Q15 Min PRN Turner Lopez PA-C        EPINEPHrine 5 mg in 250 mL NS infusion  0.02-0.3 mcg/kg/min Intravenous Titrated Turner Lopez PA-C   Stopped at 03/23/25 0503    glucagon (GLUCAGEN) injection 1 mg  1 mg Intramuscular Q15 Min PRN Turner Lopez PA-C        heparin (porcine) injection 2,000 Units  2,000 Units Intravenous PRN Rissa Johnson MD        heparin (porcine) injection 4,000 Units  4,000 Units Intravenous PRN Rissa Johnson MD        heparin (porcine) injection 7,410 Units  80 Units/kg Intravenous Once Rissa Johnson MD        heparin 05288 units/250 mL (100 units/mL) in 0.45 % NaCl infusion  16.2  Units/kg/hr Intravenous Titrated Rissa Johnson MD        Hydrocortisone Sod Suc (PF) (Solu-CORTEF) injection 50 mg  50 mg Intravenous Q6H Turner Lopez PA-C   50 mg at 03/23/25 1131    insulin regular (humuLIN R,novoLIN R) injection 2-7 Units  2-7 Units Subcutaneous Q6H Turner Lopez PA-C   3 Units at 03/23/25 1131    levETIRAcetam (KEPPRA) injection 500 mg  500 mg Intravenous Q12H Turner Lopez PA-C   500 mg at 03/23/25 0838    Lidocaine 4 % 1 patch  1 patch Transdermal Q24H New Olson PA-C   1 patch at 03/23/25 1152    Magnesium Standard Dose Replacement - Follow Nurse / BPA Driven Protocol   Not Applicable PRN Turner Lopez PA-C        mupirocin (BACTROBAN) 2 % nasal ointment 1 Application  1 Application Each Nare BID Turner Lopez PA-C   1 Application at 03/23/25 0843    norepinephrine (LEVOPHED) 8 mg in 250 mL NS infusion (premix)  0.02-0.3 mcg/kg/min Intravenous Titrated Turner Lopez PA-C 37.1 mL/hr at 03/23/25 1354 0.22 mcg/kg/min at 03/23/25 1354    ondansetron (ZOFRAN) injection 4 mg  4 mg Intravenous Q6H PRN Turner Lopez PA-C        pantoprazole (PROTONIX) injection 40 mg  40 mg Intravenous Q AM Turner Lopez PA-C   40 mg at 03/23/25 0506    Pharmacy To Dose: Piperacillin-tazobactam (Zosyn)   Not Applicable Continuous PRN Rissa Johnson MD        Phosphorus Replacement - Follow Nurse / BPA Driven Protocol   Not Applicable PRN Turner Loepz PA-C        piperacillin-tazobactam (ZOSYN) 4.5 g IVPB in 100 mL NS MBP (CD)  4.5 g Intravenous Q12H Rissa Johnson MD        Potassium Replacement - Follow Nurse / BPA Driven Protocol   Not Applicable PRN John, Turner A, PA-C        sodium bicarbonate 8.4 % 150 mEq in dextrose (D5W) 5 % 1,000 mL infusion (greater than 100 mEq)  150 mEq Intravenous Continuous Rissa Johnson  mL/hr at 03/23/25 1022 150 mEq at 03/23/25 1022    sodium chloride 0.9 % flush 10 mL  10 mL  "Intravenous Q12H Turner Lopez PA-C   10 mL at 03/23/25 0838    sodium chloride 0.9 % flush 10 mL  10 mL Intravenous PRN Turner Lopez PA-C        sodium chloride 0.9 % infusion 40 mL  40 mL Intravenous PRN Turner Lopez PA-C        sodium zirconium cyclosilicate (LOKELMA) packet 10 g  10 g Oral Q4H Turner Lopez PA-C   10 g at 03/23/25 0841    [START ON 3/24/2025] vancomycin (VANCOCIN) 750 mg in 0.9% NaCl 250 mL  750 mg Intravenous Q24H Turner Lopez PA-C           Past Medical History:  Past Medical History:   Diagnosis Date    Abdominal wall seroma     Anemia     DAUGHTER STATES PATIENT HAD VENOFER INFUSION ON 03/18/2025    Atrial fibrillation     B12 deficiency     Cerebral infarct     Diabetes mellitus     HLD (hyperlipidemia)     Hypertension     Hypokalemia     Hypomagnesemia     PFO (patent foramen ovale)        Past Surgical History:  Past Surgical History:   Procedure Laterality Date    CHOLECYSTECTOMY      HERNIA REPAIR      HYSTERECTOMY         Family History  Family History   Problem Relation Age of Onset    Cancer Mother     Hypertension Mother     Transient ischemic attack Father        Social History  Social History     Socioeconomic History    Marital status:    Tobacco Use    Smoking status: Never     Passive exposure: Never    Smokeless tobacco: Never   Vaping Use    Vaping status: Never Used   Substance and Sexual Activity    Alcohol use: Never    Drug use: Never    Sexual activity: Defer         Review of Systems:  Review of system is unobtainable as she is confused/disoriented    Objective:  BP (!) 83/55   Pulse 105   Temp 97.5 °F (36.4 °C) (Axillary)   Resp 12   Ht 154.9 cm (61\")   Wt 92.6 kg (204 lb 2.3 oz)   LMP  (LMP Unknown)   SpO2 94%   BMI 38.57 kg/m²     Intake/Output Summary (Last 24 hours) at 3/23/2025 1420  Last data filed at 3/23/2025 0339  Gross per 24 hour   Intake 2729.6 ml   Output --   Net 2729.6 ml     General:  " Confused/disoriented  HEENT: Normocephalic atraumatic head  Neck: Supple with no JVD or carotid bruits.  Chest:  Decreased breath sound at the lung bases bilaterally.  CVS: regular rate and rhythm/distant heart sound  Abdominal: soft, nontender, normal bowel sounds  Extremities: no cyanosis or edema  Skin: warm and dry without rash      Labs:    Results from last 7 days   Lab Units 03/23/25  0405 03/22/25  2243   WBC 10*3/mm3 31.88* 38.15*   HEMOGLOBIN g/dL 8.0* 9.7*   HEMATOCRIT % 25.8* 30.6*   PLATELETS 10*3/mm3 312 460*       Results from last 7 days   Lab Units 03/23/25  1329 03/23/25  0602 03/23/25  0405 03/23/25  0341 03/23/25  0132 03/22/25  2243   SODIUM mmol/L  --   --  130*  --   --  126*   SODIUM, ARTERIAL mmol/L 131* 128*  --  128*  --   --    POTASSIUM mmol/L  --   --  5.5*  --  5.6* 6.3*   CHLORIDE mmol/L  --   --  91*  --   --  91*   CO2 mmol/L  --   --  12.0*  --   --  9.0*   BUN mg/dL  --   --  40*  --   --  40*   CREATININE mg/dL  --   --  3.03*  --   --  3.09*   CALCIUM mg/dL  --   --  9.2  --   --  9.3   BILIRUBIN mg/dL  --   --   --   --   --  0.6   ALK PHOS U/L  --   --   --   --   --  163*   ALT (SGPT) U/L  --   --   --   --   --  39*   AST (SGOT) U/L  --   --   --   --   --  74*   GLUCOSE mg/dL  --   --  213*  --   --  161*   GLUCOSE, ARTERIAL mg/dL  --   --   --  217*  --   --    EGFR mL/min/1.73  --   --  15.4*  --   --  15.0*         Radiology:   Imaging Results (Last 24 Hours)       Procedure Component Value Units Date/Time    US Renal Bilateral [[id number]] Collected: 03/23/25 1316     Updated: 03/23/25 1321    Narrative:      RENAL ULTRASOUND COMPLETE 3/23/2025 11:22 AM     REASON FOR EXAM: Acute renal insufficiency. A41.9-Sepsis, unspecified  organism; R65.21-Severe sepsis with septic shock.     COMPARISON: None.       TECHNIQUE: Multiple longitudinal and transverse realtime sonographic  images of the kidneys and urinary bladder are obtained.     FINDINGS:     RIGHT KIDNEY: The right  kidney measures 10.9 cm in length. The cortical  thickness and echogenicity are normal. There are no solid or cystic  masses. There is no hydronephrosis. No nephrolithiasis or abnormal  perinephric fluid collections.     LEFT KIDNEY: The left kidney measures 8.7 cm in length. The cortical  thickness and echogenicity are normal. There are no solid or cystic  masses. There is no hydronephrosis. No nephrolithiasis or abnormal  perinephric fluid collections.     PELVIS: The bladder is not well distended. There is small to moderate  free fluid in the pelvis. There is no free fluid in the pelvis.     ADDITIONAL FINDINGS: The abdominal aorta was not imaged.       Impression:      1. The renal size, cortical thickness and echogenicity are normal. No  hydronephrosis.  2. Small to moderate free fluid in the pelvis.           The images and report are stored on PAC's per institutional and state  guidelines.           This report was signed and finalized on 3/23/2025 1:18 PM by Dr. Sebastian Velazquez MD.       CT Lower Extremity Right Without Contrast [[id number]] Collected: 03/23/25 1116     Updated: 03/23/25 1127    Narrative:      EXAMINATION:  CT LOWER EXTREMITY RIGHT WO CONTRAST-  3/23/2025 9:53 AM     HISTORY: Comminuted fracture of distal fenmoral metaphysis at outside  hospital, in shock now, concerned for fat embolism; A41.9-Sepsis,  unspecified organism; R65.21-Severe sepsis with septic shock     TECHNIQUE: Spiral CT was performed of the right femur including the  right hip and right knee joint spaces. Sagittal and coronal images were  reconstructed.     DLP: 898.24 mGy.cm Automated dosage reduction technique was utilized to  reduce patient dosage.     COMPARISON: No comparison study.     FINDINGS: There is a fracture across the distal metaphysis of the femur.  There is some impaction of the fracture fragments. No significant  displacement of the fracture is seen. There is severe narrowing of all 3  compartments of the  knee. There is hypertrophic spurring. There is a  small amount of fluid in the joint space. 3D MPR reconstruction in the  true sagittal plane and true coronal plane make the fracture easier to  see. The fracture is difficult to see on standard axial images. Straight  sagittal and coronal images do not demonstrate the fracture as well.  There is mild degenerative change of the right hip.          Impression:      1. Transverse fracture of the distal metaphysis of the right femur  demonstrates some impaction. No significant displacement is seen. The  fracture is best seen on 3D MPR reconstruction in the true sagittal and  coronal planes.  2. Severe degenerative osteoarthritis of the right knee. There is mild  degenerative osteoarthritis of the right hip.  3. Small joint effusion/hemarthrosis.        This report was signed and finalized on 3/23/2025 11:24 AM by Dr. Sebastian Velazquez MD.       CT Pelvis Without Contrast [[id number]] Collected: 03/23/25 1112     Updated: 03/23/25 1119    Narrative:      EXAMINATION:  CT PELVIS WO CONTRAST-  3/23/2025 9:53 AM     HISTORY: The patient fell. A41.9-Sepsis, unspecified organism;  R65.21-Severe sepsis with septic shock.     TECHNIQUE: Spiral CT was performed of the pelvis. Sagittal and coronal  images were reconstructed.     DLP: 898.24 mGy.cm Automated dosage reduction technique was utilized to  reduce patient dosage.     COMPARISON: No comparison study.     FINDINGS: There is body wall edema. There is a 3.6 x 9.4 cm collection  in the subcutaneous fat of the right pelvis anteriorly and predominantly  to the right of midline. There is atheromatous disease of the  plyur-yequ-gkmxtww vessels. There appears to be some free fluid in the  left pelvis. There is presacral edema. There is a Goel catheter in the  bladder. There is enthesopathy of the pelvis. There is degenerative  narrowing of the hip joint spaces bilaterally. The visualized proximal  femurs are intact.           Impression:      1. No evidence of acute fracture of the pelvis or proximal femurs.  2. A 3.6 x 9.4 cm collection in the subcutaneous fat of the right pelvis  anteriorly and predominantly to the right of midline. This may be a  focal hematoma or seroma. There are no air bubbles contained. Abscess  felt to be less likely.  3. Degenerative osteoarthritis of both hips. Enthesopathy of the pelvis.  4. Diffuse body wall edema.  5. Atheromatous disease.        This report was signed and finalized on 3/23/2025 11:16 AM by Dr. Sebastian Velazquez MD.       XR Chest 1 View [[id number]] Collected: 03/23/25 0939     Updated: 03/23/25 0945    Narrative:      EXAMINATION:  XR CHEST 1 VW-  3/22/2025 9:44 PM     HISTORY: Sepsis. Line placement.     COMPARISON: 11/14/2022.     TECHNIQUE: Single view AP image.     FINDINGS: There is a left subclavian central venous catheter with  catheter tip at the junction of the superior vena cava and  brachiocephalic vein. There is blunting of the left costophrenic angle.  There is enlargement of the cardiac silhouette. No acute bony  abnormality is seen.          Impression:      1. Left subclavian central venous catheter with catheter tip at the  junction of the superior vena cava and brachiocephalic vein. The distal  tip of the catheter approaches the right lateral wall of the superior  vena cava. There is no evidence of pneumothorax.  2. Blunting of the left costophrenic angle suggesting pleural effusion  (outside CT on the same day demonstrates bilateral pleural effusions).  3. Enlargement of the cardiac silhouette (outside CT on the same day  demonstrates a large pericardial effusion).           This report was signed and finalized on 3/23/2025 9:42 AM by Dr. Seabstian Velazquez MD.       CT Outside Films [[id number]] Resulted: 03/23/25 0915     Updated: 03/23/25 0915    Narrative:      This procedure was auto-finalized with no dictation required.    XR Outside Films [[id number]] Resulted: 03/23/25  0915     Updated: 03/23/25 0915    Narrative:      This procedure was auto-finalized with no dictation required.    XR Outside Films [[id number]] Resulted: 03/23/25 0915     Updated: 03/23/25 0915    Narrative:      This procedure was auto-finalized with no dictation required.    CT Outside Films [[id number]] Resulted: 03/23/25 0915     Updated: 03/23/25 0915    Narrative:      This procedure was auto-finalized with no dictation required.    XR Outside Films [[id number]] Resulted: 03/23/25 0915     Updated: 03/23/25 0915    Narrative:      This procedure was auto-finalized with no dictation required.    XR Femur 1 View Right [[id number]] Collected: 03/23/25 0849     Updated: 03/23/25 0855    Narrative:      EXAMINATION:  XR FEMUR 1 VW RIGHT-  3/22/2025 9:58 PM     HISTORY: Possible distal right femur fracture.     COMPARISON: No comparison study.     TECHNIQUE: Single AP image of the mid to lower right femur.     FINDINGS: There is splint material overlying the distal femur. There are  likely cortical step-offs involving the medial and lateral distal  femoral metaphysis. There may be a transverse fracture in this area. A  lateral view would be helpful. There is severe degenerative  osteoarthritis of the right knee with a near bone-on-bone appearance of  the medial compartment. There is medial and lateral compartment  spurring. Vascular calcification is noted. There is edema within the  soft tissues of the visualized leg.          Impression:      As above.     This report was signed and finalized on 3/23/2025 8:51 AM by Dr. Sebastian Velazquez MD.       XR Abdomen KUB [[id number]] Collected: 03/23/25 0748     Updated: 03/23/25 0751    Narrative:      EXAMINATION:  XR ABDOMEN KUB-  3/23/2025 3:25 AM     HISTORY: NG tube placement.     COMPARISON: No comparison study.     TECHNIQUE: Supine image of the lower chest and abdomen.     FINDINGS:   The NG tube tip and sideport are within the stomach. The  stomach is distended with  "air.          Impression:      NG tube in good position.           This report was signed and finalized on 3/23/2025 7:48 AM by Dr. Sebastian Velazquez MD.               Culture:  No components found for: \"WOUNDCUL\", \"3\"  No components found for: \"CSFCUL\", \"3\"  No components found for: \"BC\", \"3\"  No components found for: \"URINECUL\", \"3\"      Assessment   1.  Acute kidney injury/worsening.  2.  Acute tubular necrosis.  3.  Anion gap metabolic acidosis.  4.  Possible sepsis/septic shock.  5.  History of myeloproliferative disorder.  6.  Recurrent hyperkalemia.  7.  Mild hyponatremia.  8.  Moderate pericardial effusion.  9.  Bilateral pleural effusions..  10.  Status post fall and fracture of right distal femur.    Plan:  1.  Patient is currently DNR/DNI but family want to proceed with short-term acute dialysis.  Due to low blood pressure she will be candidate for continuous renal replacement therapy/CVVHD.  I have discussed with her daughters, they both agreed to proceed with CRRT.  Intensivist team will establish temporary dialysis access.  2.  I have reviewed renal ultrasound consistent with normal studies.  3.  Possible underlying cause for acute tubular necrosis, hyperkalemia metabolic acidosis and hyperphosphatemia could be rhabdomyolysis.  I will get CPK level  4.  Initiate CRRT once access is established.  5.  Plan was discussed extensively with Dr. Johnson/intensivist, family members including 2 daughters, critical care nurse and nephrology nurse.    Total critical care time spent 40-minute.      Thank you for the consult, we appreciate the opportunity to provide care to your patients.  Feel free to contact me if I can be of any further assistance.      Mani Puri MD  3/23/2025  14:20 CDT  "

## 2025-03-23 NOTE — CONSULTS
"Pharmacy Dosing Service  Antimicrobial Dosing  Cefepime    Assessment/Action/Plan:  Based on indication and renal function, initiated extended-infusion Cefepime 2 gm IV every 24 hours for patient with CrCl < 30 ml/min. Pharmacy will continue to monitor daily and make further adjustment(s) accordingly.     Subjective:  Concepcion Velez is a 79 y.o. female with a  \"Pharmacy to Dose Cefepime\" consult for the treatment of Empiric (Septic Shock) , day 1 of 5 of treatment.    Objective:  Ht: 154.9 cm (61\"); Wt: 90 kg (198 lb 6.6 oz)  Estimated Creatinine Clearance: 15.6 mL/min (A) (by C-G formula based on SCr of 3.09 mg/dL (H)).   Creatinine   Date Value Ref Range Status   03/22/2025 3.09 (H) 0.57 - 1.00 mg/dL Final      Lab Results   Component Value Date    WBC 38.15 (C) 03/22/2025      Baseline culture results:  Microbiology Results (last 10 days)       ** No results found for the last 240 hours. **            Shane Snell, PharmD  03/23/25 00:39 CDT    "

## 2025-03-24 VITALS
HEIGHT: 61 IN | OXYGEN SATURATION: 88 % | RESPIRATION RATE: 12 BRPM | TEMPERATURE: 98 F | SYSTOLIC BLOOD PRESSURE: 73 MMHG | BODY MASS INDEX: 38.54 KG/M2 | WEIGHT: 204.15 LBS | DIASTOLIC BLOOD PRESSURE: 39 MMHG

## 2025-03-24 LAB
CYTOLOGIST CVX/VAG CYTO: NORMAL
PATH INTERP BLD-IMP: NORMAL

## 2025-03-24 RX ORDER — CHLORHEXIDINE GLUCONATE 500 MG/1
1 CLOTH TOPICAL DAILY
Status: CANCELLED | OUTPATIENT
Start: 2025-03-24

## 2025-03-24 NOTE — CONSULTS
A prolonged conversation was had with the patient's intensivist regarding potential etiologies of the patient's current septic decline.  An inquiry about potential fatty emboli as the root source of the patient's current state was discussed.  Admittedly based on the minimal displacement at the fracture in the metaphysis with no intramedullary displacement of a large embolic load, the likelihood and timing of deterioration does not correlate with the patient's fall 2 days prior.  Even if the patient were to be subject to a fatty emboli, no orthopedic intervention would reverse that.  This was discussed at length with the intensivist.

## 2025-03-24 NOTE — NURSING NOTE
[facility]  DIALYSIS TREATMENT SUMMARY     Note: Consult with the attending physician for patient treatment orders, this document is not a physician order.     Patient Information  Patient Concepcion Velez  Date of Birth July 09, 1945  Chart Number [id number]  Location [facility]  Location MRN [id number]  Gender Female  SSN (last 4)   Treatment Information  Treatment Type Professional Services  Treatment Id [id number]  Start Time March 23, 2025 16:00  End Time March 23, 2025 17:00  Acutal Duration 01:00  Professional Services  Professional Services  Modality CRRT  [facility]  Service Set-up  Ordering MD Puri  Account/Finance Number [id number]  Room # ICU 10  Procedure Start Date 03/23/2025  Procedure Start Time 16:00  Procedure End Date 03/23/2025  Procedure End Time 17:00  Comments CRRT ordered per nephrology. Treatment was canceled after HD RN arrival and system set-up complete. Materials were wasted due to treatment canceled.  Completed By Dolly Roberts RN

## 2025-03-24 NOTE — DISCHARGE SUMMARY
[facility] Intensivist Services  DEATH SUMMARY       Date of Admission: 3/22/2025  Date of Death:  3/23/2025 at 2256  Primary Care Physician: Jaxson Vines DO    Presenting Problem/History of Present Illness:  Septic shock [A41.9, R65.21]     Final Death Diagnoses:  Active Hospital Problems    Diagnosis     **Septic shock        Consults: Cardiothoracic surgery, cardiology, nephrology, orthopedics    Procedures Performed: CVL, arterial line      Hospital Course:  The patient is a 79 y.o. female who presented to [facility] with PMH of DM2, HLD, HTN, CVA, myeloproliferative disorder, seizure disorder, AF on Eliquis, and PFO transferred from [facility] for sepsis, VIRY, and pericardial effusion.  She reportedly had a ground-level fall at home on Monday.  Daughters are at the bedside who provide the history.  She lives with her  and uses a walker to assist with ambulation.  It is unclear how the fall occurred, but daughter suspects the right leg gave out from under her causing her to fall.  She was taken to [facility] for evaluation yesterday for evaluation.  She was found to have an impacted fracture of the right distal femoral metaphysis.  Patient was found to have a number of lab abnormalities including leukocytosis and elevated creatinine.  She was admitted to ICU at [facility], and was experiencing hypotension despite receiving 4L IVF boluses.  CT abdomen pelvis without contrast obtained that revealed no renal obstruction.  There is mention of large pericardial effusion along with moderate to large bilateral pleural effusions and lower lobe atelectasis.  Extensive subcutaneous edema along with 4 x 6 x 9 cm seroma or other fluid collection right lower quadrant abdominal wall unchanged from prior.  Patient was started on Levophed for hemodynamic support.  [facility] did not have nephrology services available and  requested for transfer to this facility for ongoing management.  Empiric Rocephin administered at OSH due to concern for sepsis.     Patient seen and evaluated on arrival to ICU.  Levophed infusing on arrival with SBP 70s-80s.  Patient is not able provide any meaningful history.  As mentioned above, history obtained from patient's daughters and also from medical record review.  Of note, daughters mention patient has leukocytosis at baseline due to myeloproliferative disorder which is followed by hematology at [facility].    *Information above copied from admission H&P.    Patient was evaluated by cardiothoracic surgery, nephrology, orthopedic surgery, and cardiology.  Echocardiogram performed today showed EF>70%, LVH, large circumferential pericardial effusion with respiratory variation noted, but no right ventricular chamber collapse, large left pleural effusion.  In review of subspecialists consult notes, family had agreed to proceed with CRRT.  Prior to starting CRRT, repeat labs were concerning for DIC.  Ultimately patient's condition continued to decline and family decided to transition to comfort care.  Time of death: 2256.           Electronically signed by Turner Lopez PA-C on 3/23/2025 at 23:55 CDT    Time: 30 minutes

## 2025-03-24 NOTE — PLAN OF CARE
Goal Outcome Evaluation:                 Comfort measures provided, patient  at 22:56.       Problem: Adult Inpatient Plan of Care  Goal: Absence of Hospital-Acquired Illness or Injury  Intervention: Identify and Manage Fall Risk  Recent Flowsheet Documentation  Taken 3/23/2025 2122 by Kimber Colon RN  Safety Promotion/Fall Prevention:   safety round/check completed   fall prevention program maintained  Taken 3/23/2025 2000 by Kimber Colon RN  Safety Promotion/Fall Prevention:   safety round/check completed   fall prevention program maintained  Taken 3/23/2025 1900 by Kimber Colon RN  Safety Promotion/Fall Prevention:   safety round/check completed   fall prevention program maintained  Intervention: Prevent Skin Injury  Recent Flowsheet Documentation  Taken 3/23/2025 2122 by Kimber Colon RN  Body Position: patient/family refused  Taken 3/23/2025 2000 by Kimber Colon RN  Skin Protection: silicone foam dressing in place  Taken 3/23/2025 1900 by Kimber Colon RN  Body Position: patient/family refused  Goal: Optimal Comfort and Wellbeing  Intervention: Monitor Pain and Promote Comfort  Recent Flowsheet Documentation  Taken 3/23/2025 2255 by Kimber Colon RN  Pain Management Interventions: (comfort measures) pain medication given  Taken 3/23/2025 2209 by Kimber Colon RN  Pain Management Interventions: (comfort measures) pain medication given  Taken 3/23/2025 2122 by Kimber Colon RN  Pain Management Interventions: (comfort measures) pain medication given  Taken 3/23/2025 2056 by Kimber Colon RN  Pain Management Interventions: (comfort measures) pain medication given  Taken 3/23/2025 1948 by Kimber Colon RN  Pain Management Interventions: (comfort measures) pain medication given  Intervention: Provide Person-Centered Care  Recent Flowsheet Documentation  Taken 3/23/2025 2000 by Kimber Colon RN  Trust Relationship/Rapport:   care explained   choices provided   thoughts/feelings acknowledged     Problem: Fall Injury Risk  Goal:  Absence of Fall and Fall-Related Injury  Intervention: Promote Injury-Free Environment  Recent Flowsheet Documentation  Taken 3/23/2025 2122 by Kimber Colon RN  Safety Promotion/Fall Prevention:   safety round/check completed   fall prevention program maintained  Taken 3/23/2025 2000 by Kimber Colon RN  Safety Promotion/Fall Prevention:   safety round/check completed   fall prevention program maintained  Taken 3/23/2025 1900 by Kimber Colon RN  Safety Promotion/Fall Prevention:   safety round/check completed   fall prevention program maintained     Problem: Sepsis/Septic Shock  Goal: Absence of Infection Signs and Symptoms  Intervention: Promote Recovery  Recent Flowsheet Documentation  Taken 3/23/2025 2122 by Kimber Colon RN  Activity Management: bedrest  Taken 3/23/2025 2000 by Kimber Colon RN  Activity Management: bedrest  Taken 3/23/2025 1900 by Kimber Colon RN  Activity Management: bedrest     Problem: Skin Injury Risk Increased  Goal: Skin Health and Integrity  Intervention: Optimize Skin Protection  Recent Flowsheet Documentation  Taken 3/23/2025 2122 by Kimber Colon RN  Activity Management: bedrest  Taken 3/23/2025 2000 by Kimber Colon RN  Activity Management: bedrest  Pressure Reduction Techniques:   heels elevated off bed   positioned off wounds   pressure points protected   weight shift assistance provided  Pressure Reduction Devices: pressure-redistributing mattress utilized  Skin Protection: silicone foam dressing in place  Taken 3/23/2025 1900 by Kimber Colon RN  Activity Management: bedrest

## 2025-03-26 ENCOUNTER — CARE COORDINATION (OUTPATIENT)
Dept: CARE COORDINATION | Age: 80
End: 2025-03-26

## 2025-03-26 NOTE — CARE COORDINATION
Spoke to patient's daughter, Saloni. This ACM offered condolences to daughter on the passing of the patient. Advised daughter that the RPM kit may be boxed up and the RPM team will arrange for  of the kit. Daughter voiced understanding and thanked [facility] for the call.

## 2025-03-27 LAB
BACTERIA SPEC AEROBE CULT: NORMAL
BACTERIA SPEC AEROBE CULT: NORMAL

## 2025-03-28 LAB
QT INTERVAL: 400 MS
QTC INTERVAL: 518 MS

## 2025-04-02 LAB — METHYLMALONATE SERPL-SCNC: 666 NMOL/L (ref 0–378)

## 2025-04-10 ENCOUNTER — CARE COORDINATION (OUTPATIENT)
Age: 80
End: 2025-04-10

## 2025-04-10 DIAGNOSIS — I10 PRIMARY HYPERTENSION: Primary | ICD-10-CM

## 2025-04-10 DIAGNOSIS — E11.42 TYPE 2 DIABETES MELLITUS WITH DIABETIC POLYNEUROPATHY, WITHOUT LONG-TERM CURRENT USE OF INSULIN (HCC): ICD-10-CM

## 2025-04-10 NOTE — CARE COORDINATION
Elda RODRI KimAleena  4/10/25    Care Coordination  placed call to  patients daughter   to arrange RPM kit  through [employer].     CCSS spoke to  daughter  reviewed with  daughter  how to pack equipment in original packing.  Daughter  aware [employer] will  equipment in 2-4 days.   All questions and concerns answered.

## 2025-04-11 NOTE — PROGRESS NOTES
Remote Patient Order Discontinued    Received request from Tessie Rahman RN   to discontinue order for remote patient monitoring of Diabetes and HTN and order completed.

## 2025-05-27 RX ORDER — INSULIN GLARGINE 100 [IU]/ML
INJECTION, SOLUTION SUBCUTANEOUS
Qty: 10 ML | Refills: 3 | OUTPATIENT
Start: 2025-05-27

## (undated) DEVICE — GAUZE,SPONGE,FLUFF,6"X6.75",STRL,10/TRAY: Brand: MEDLINE

## (undated) DEVICE — PENCIL BTTN S S CAUT TIP W HOLSTER 25 50

## (undated) DEVICE — OCCLUSIVE GAUZE STRIP,3% BISMUTH TRIBROMOPHENATE IN PETROLATUM BLEND: Brand: XEROFORM

## (undated) DEVICE — MAJOR CDS

## (undated) DEVICE — SHEET,DRAPE,53X77,STERILE: Brand: MEDLINE

## (undated) DEVICE — DRAIN JACKSON PRATT ROUND 15FR: Brand: CARDINAL HEALTH

## (undated) DEVICE — LIQUIBAND RAPID ADHESIVE 36/CS 0.8ML: Brand: MEDLINE

## (undated) DEVICE — SUTURE VCRL SZ 2-0 L36IN ABSRB UD L36MM CT-1 1/2 CIR J945H

## (undated) DEVICE — GOWN,PREVENTION PLUS,2XL,ST,22/CS: Brand: MEDLINE

## (undated) DEVICE — SUTURE VCRL SZ 3-0 L18IN ABSRB UD L26MM SH 1/2 CIR J864D

## (undated) DEVICE — GOWN,PREVENTION PLUS,XL,ST,24/CS: Brand: MEDLINE

## (undated) DEVICE — MINOR CDS: Brand: MEDLINE INDUSTRIES, INC.

## (undated) DEVICE — PACK,UNIVERSAL,NO GOWNS: Brand: MEDLINE

## (undated) DEVICE — BANDAGE,GAUZE,4.5"X4.1YD,STERILE,LF: Brand: MEDLINE

## (undated) DEVICE — JACKSON-PRATT 100CC BULB RESERVOIR: Brand: CARDINAL HEALTH

## (undated) DEVICE — SUTURE VCRL SZ 3-0 L36IN ABSRB UD L36MM CT-1 1/2 CIR J944H

## (undated) DEVICE — SUTURE ABSORBABLE BRAIDED 0 CT-1 8X27 IN UD VICRYL JJ41G

## (undated) DEVICE — DRESSING FOAM W4XL12IN SIL RECT ADH WTRPRF FLM BK W/ BORD

## (undated) DEVICE — ROYAL SILK SURGICAL GOWN, XXL: Brand: CONVERTORS

## (undated) DEVICE — TOWEL,OR,DSP,ST,BLUE,DLX,4/PK,20PK/CS: Brand: MEDLINE

## (undated) DEVICE — AMBU AURA-I U SIZE 3, DISPOSABLE LARYNGEAL MASK: Brand: AURA-I

## (undated) DEVICE — GLOVE SURG SZ 65 CRM LTX FREE POLYISOPRENE POLYMER BEAD ANTI

## (undated) DEVICE — TRAY PREP DRY W/ PREM GLV 2 APPL 6 SPNG 2 UNDPD 1 OVERWRAP

## (undated) DEVICE — GLOVE SURG SZ 7 CRM LTX FREE POLYISOPRENE POLYMER BEAD ANTI

## (undated) DEVICE — SUTURE PERMAHAND SZ 2-0 L18IN NONABSORBABLE BLK L26MM FS 685G

## (undated) DEVICE — GLOVE SURG SZ 85 L12IN FNGR ORTHO 126MIL CRM LTX FREE

## (undated) DEVICE — SOLUTION IV IRRIG POUR BRL 0.9% SODIUM CHL 2F7124

## (undated) DEVICE — GLOVE SURG SZ 75 CRM LTX FREE POLYISOPRENE POLYMER BEAD ANTI